# Patient Record
Sex: FEMALE | Race: WHITE | Employment: OTHER | URBAN - METROPOLITAN AREA
[De-identification: names, ages, dates, MRNs, and addresses within clinical notes are randomized per-mention and may not be internally consistent; named-entity substitution may affect disease eponyms.]

---

## 2017-01-05 DIAGNOSIS — F32.A MILD DEPRESSION: Primary | ICD-10-CM

## 2017-01-05 NOTE — TELEPHONE ENCOUNTER
Fluoxetine 20 mg     Last Written Prescription Date:11/17/2016  Last Fill Quantity:30 refills: zero  Last Office Visit with [facility] primary care provider:  11/29/2016       Last PHQ-9 score on record= 9  PHQ-9 SCORE 9/20/2016   Total Score -   Total Score 9       Thank you  Kanchan Montanez  Float Technician

## 2017-01-06 ENCOUNTER — THERAPY VISIT (OUTPATIENT)
Dept: PHYSICAL THERAPY | Facility: CLINIC | Age: 82
End: 2017-01-06
Payer: COMMERCIAL

## 2017-01-06 DIAGNOSIS — Z47.1 AFTERCARE FOLLOWING RIGHT HIP JOINT REPLACEMENT SURGERY: Primary | ICD-10-CM

## 2017-01-06 DIAGNOSIS — Z96.641 AFTERCARE FOLLOWING RIGHT HIP JOINT REPLACEMENT SURGERY: Primary | ICD-10-CM

## 2017-01-06 DIAGNOSIS — R26.9 ABNORMAL GAIT: ICD-10-CM

## 2017-01-06 DIAGNOSIS — M25.551 HIP PAIN, RIGHT: ICD-10-CM

## 2017-01-06 PROCEDURE — 97110 THERAPEUTIC EXERCISES: CPT | Mod: GP | Performed by: PHYSICAL THERAPIST

## 2017-01-06 PROCEDURE — 97112 NEUROMUSCULAR REEDUCATION: CPT | Mod: GP | Performed by: PHYSICAL THERAPIST

## 2017-01-12 ENCOUNTER — TRANSFERRED RECORDS (OUTPATIENT)
Dept: HEALTH INFORMATION MANAGEMENT | Facility: CLINIC | Age: 82
End: 2017-01-12

## 2017-01-13 ENCOUNTER — THERAPY VISIT (OUTPATIENT)
Dept: PHYSICAL THERAPY | Facility: CLINIC | Age: 82
End: 2017-01-13
Payer: COMMERCIAL

## 2017-01-13 DIAGNOSIS — R26.9 ABNORMAL GAIT: ICD-10-CM

## 2017-01-13 DIAGNOSIS — Z96.641 AFTERCARE FOLLOWING RIGHT HIP JOINT REPLACEMENT SURGERY: Primary | ICD-10-CM

## 2017-01-13 DIAGNOSIS — Z47.1 AFTERCARE FOLLOWING RIGHT HIP JOINT REPLACEMENT SURGERY: Primary | ICD-10-CM

## 2017-01-13 DIAGNOSIS — M25.551 HIP PAIN, RIGHT: ICD-10-CM

## 2017-01-13 PROCEDURE — 97110 THERAPEUTIC EXERCISES: CPT | Mod: GP | Performed by: PHYSICAL THERAPIST

## 2017-01-16 ENCOUNTER — HOSPITAL ENCOUNTER (OUTPATIENT)
Dept: MAMMOGRAPHY | Facility: CLINIC | Age: 82
Discharge: HOME | End: 2017-01-16
Attending: FAMILY MEDICINE | Admitting: FAMILY MEDICINE
Payer: COMMERCIAL

## 2017-01-16 DIAGNOSIS — Z12.31 VISIT FOR SCREENING MAMMOGRAM: ICD-10-CM

## 2017-01-16 PROCEDURE — G0202 SCR MAMMO BI INCL CAD: HCPCS | Mod: 52

## 2017-01-20 ENCOUNTER — THERAPY VISIT (OUTPATIENT)
Dept: PHYSICAL THERAPY | Facility: CLINIC | Age: 82
End: 2017-01-20
Payer: COMMERCIAL

## 2017-01-20 ENCOUNTER — OFFICE VISIT (OUTPATIENT)
Dept: FAMILY MEDICINE | Facility: CLINIC | Age: 82
End: 2017-01-20
Payer: COMMERCIAL

## 2017-01-20 VITALS
TEMPERATURE: 97.7 F | OXYGEN SATURATION: 96 % | WEIGHT: 270 LBS | BODY MASS INDEX: 47.84 KG/M2 | SYSTOLIC BLOOD PRESSURE: 140 MMHG | DIASTOLIC BLOOD PRESSURE: 70 MMHG | HEART RATE: 77 BPM

## 2017-01-20 DIAGNOSIS — F32.A MILD DEPRESSION: ICD-10-CM

## 2017-01-20 DIAGNOSIS — M25.551 HIP PAIN, RIGHT: ICD-10-CM

## 2017-01-20 DIAGNOSIS — R26.9 ABNORMAL GAIT: ICD-10-CM

## 2017-01-20 DIAGNOSIS — D62 ANEMIA DUE TO BLOOD LOSS, ACUTE: Primary | ICD-10-CM

## 2017-01-20 DIAGNOSIS — M85.80 OSTEOPENIA: ICD-10-CM

## 2017-01-20 DIAGNOSIS — I10 HYPERTENSION GOAL BP (BLOOD PRESSURE) < 140/90: ICD-10-CM

## 2017-01-20 DIAGNOSIS — Z47.1 AFTERCARE FOLLOWING RIGHT HIP JOINT REPLACEMENT SURGERY: Primary | ICD-10-CM

## 2017-01-20 DIAGNOSIS — Z96.641 AFTERCARE FOLLOWING RIGHT HIP JOINT REPLACEMENT SURGERY: Primary | ICD-10-CM

## 2017-01-20 LAB
ERYTHROCYTE [DISTWIDTH] IN BLOOD BY AUTOMATED COUNT: 13.7 % (ref 10–15)
HCT VFR BLD AUTO: 42.8 % (ref 35–47)
HGB BLD-MCNC: 13.3 G/DL (ref 11.7–15.7)
MCH RBC QN AUTO: 28.3 PG (ref 26.5–33)
MCHC RBC AUTO-ENTMCNC: 31.1 G/DL (ref 31.5–36.5)
MCV RBC AUTO: 91 FL (ref 78–100)
PLATELET # BLD AUTO: 302 10E9/L (ref 150–450)
RBC # BLD AUTO: 4.7 10E12/L (ref 3.8–5.2)
WBC # BLD AUTO: 7.9 10E9/L (ref 4–11)

## 2017-01-20 PROCEDURE — 97530 THERAPEUTIC ACTIVITIES: CPT | Mod: GP | Performed by: PHYSICAL THERAPIST

## 2017-01-20 PROCEDURE — 97110 THERAPEUTIC EXERCISES: CPT | Mod: GP | Performed by: PHYSICAL THERAPIST

## 2017-01-20 PROCEDURE — 99214 OFFICE O/P EST MOD 30 MIN: CPT | Performed by: FAMILY MEDICINE

## 2017-01-20 PROCEDURE — 85027 COMPLETE CBC AUTOMATED: CPT | Performed by: FAMILY MEDICINE

## 2017-01-20 PROCEDURE — 36415 COLL VENOUS BLD VENIPUNCTURE: CPT | Performed by: FAMILY MEDICINE

## 2017-01-20 NOTE — PROGRESS NOTES
SUBJECTIVE:                                                    Carol Merida is a 81 year old female who presents to clinic today for the following health issues:      Surgical follow up.         Patient Active Problem List   Diagnosis     Obesity     Hypothyroidism     Benign neoplasm of colon     Mild Depression [296.31]     Obstructive sleep apnea     * * * SBE PROPHYLAXIS * * *     Degeneration of lumbar or lumbosacral intervertebral disc     Pain in joint, ankle and foot     Arthrodesis status     HYPERLIPIDEMIA LDL GOAL <100     Hypertension goal BP (blood pressure) < 140/90     Health Care Home     Osteopenia     Malignant neoplasm of female breast (H)     Impaired fasting glucose     Advanced directives, counseling/discussion     History of melanoma     BMI 45.0-49.9, adult (H)     Panic disorder without agoraphobia     Humerus fracture     Arthritis     PONV (postoperative nausea and vomiting)     S/P total hip arthroplasty     Constipation     DVT prophylaxis     Anticoagulation management encounter     Physical deconditioning     Periprosthetic fracture around internal prosthetic left hip joint (H)     Pain of left thigh     Chronic pain syndrome - hips     Fracture of greater trochanter of right femur 11/6/2016, closed, with routine healing, subsequent encounter     Anxiety     Osteoarthritis of multiple joints, unspecified osteoarthritis type     Anemia due to blood loss, acute     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     Long term (current) use of anticoagulants     Aftercare following right hip joint replacement surgery     Hip pain, right     Abnormal gait       Current Outpatient Prescriptions   Medication Sig Dispense Refill     FLUoxetine (PROZAC) 20 MG capsule Take 1 capsule (20 mg) by mouth daily 30 capsule 2     ferrous sulfate (IRON) 325 (65 FE) MG tablet Take 1 tablet (325 mg) by mouth 2 times daily 60 tablet 2     ALPRAZolam (XANAX) 0.25 MG tablet Take 1 tablet (0.25 mg)  by mouth 2 times daily as needed for anxiety Don't take with the oxycodone 10 tablet 0     calcium-vitamin D (CALCIUM 600 + D) 600-400 MG-UNIT per tablet Take 1 tablet by mouth daily 100 tablet 3     Warfarin Therapy Reminder 1 each continuous prn Goal INR 1.5-2.5.       acetaminophen (TYLENOL) 325 MG tablet Take 3 tablets (975 mg) by mouth every 8 hours 500 tablet 1     levothyroxine (SYNTHROID) 88 MCG tablet Take 1 tablet (88 mcg) by mouth daily 90 tablet 3     albuterol (PROAIR HFA, PROVENTIL HFA, VENTOLIN HFA) 108 (90 BASE) MCG/ACT inhaler Inhale 2 puffs into the lungs as needed for shortness of breath / dyspnea or wheezing       simvastatin (ZOCOR) 10 MG tablet Take 1 tablet (10 mg) by mouth At Bedtime 90 tablet 3     fluticasone (FLONASE) 50 MCG/ACT nasal spray Spray 1 spray into both nostrils daily as needed   5     triamcinolone (KENALOG) 0.1 % cream Apply sparingly to affected area two times daily as needed for itching. 30 g 0     magnesium hydroxide (MILK OF MAGNESIA) 400 MG/5ML suspension Take 30 mLs by mouth daily as needed        celecoxib (CELEBREX) 100 MG capsule Take 1 capsule (100 mg) by mouth 2 times daily . May wean to one per day if doing well. 60 capsule 0     Nystatin (NYSTOP) 468604 UNIT/GM POWD Apply tid to affectead area prn 60 g 1           ROS:  CONSTITUTIONAL:NEGATIVE for fever, chills, change in weight  CV: NEGATIVE for chest pain, palpitations or peripheral edema  PSYCHIATRIC: NEGATIVE for changes in mood or affect    Still hurting top of left leg.  They think some of it is the back too.    The right leg is doing real well.      Much less of the restlessness in legs.    Did go off celebrex.  Walking will get to her regarding will cause her pain.    Has had one anxiety attack since here last.  Just came; was in the bathroom.  This was prior to the holidays; actually realized it was on Christmas morning.    Left arm bothers underarm. Comes and goes. Prosthesis irritates. Will get  rash.  Will swell.  She does not believe any LN were taken when she had her breast surgery.        OBJECTIVE:                                                    /70 mmHg  Pulse 77  Temp(Src) 97.7  F (36.5  C) (Oral)  Wt 270 lb (122.471 kg)  SpO2 96%  Body mass index is 47.84 kg/(m^2).     GENERAL APPEARANCE: alert and no distress  RESP: lungs clear to auscultation - no rales, rhonchi or wheezes  CV: regular rates and rhythm  MS: No lump was palpated in the left axillary region. At this time, I could not determine any obvious swelling. She does have some fatty tissue.  PSYCH: mentation appears normal and affect normal/bright      HEMOGLOBIN   Date Value Ref Range Status   11/29/2016 10.0* 11.7 - 15.7 g/dL Final     Comment:     Reviewed: OK with previous   11/17/2016 8.0* 11.7 - 15.7 g/dL Final     Comment:     Results confirmed by repeat test   ]    PHQ-9 SCORE 3/11/2016 5/11/2016 9/20/2016   Total Score - - -   Total Score 12 9 9       EDILSON-7 SCORE 3/11/2016 5/11/2016 9/20/2016   Total Score - - -   Total Score 8 8 11   Total Score - - -      This had been updated; I'm not sure where the scores have gone.    A1C      6.0   3/11/2016  A1C      6.1   2/9/2015  A1C      6.0   2/6/2014  A1C      5.9   2/7/2013  A1C      5.9   3/1/2011      TSH   Date Value Ref Range Status   11/17/2016 3.56 0.40 - 4.00 mU/L Final   ]         ASSESSMENT/PLAN:                                                      Anemia due to blood loss, acute   Suspect this was related to her surgery. We'll see if this has improved.  - CBC with platelets    Hypertension goal BP (blood pressure) < 140/90   Reading is borderline today. Recommend that we continue to monitor. Encourage getting some additional values.    Mild depression  Currently stable. Refilling.  She reports a panic attack. However, this was on a holiday and she notes being somewhat stressed as people were coming over etc. Continue to follow.  - FLUoxetine (PROZAC) 20 MG  capsule; Take 1 capsule (20 mg) by mouth daily    Osteopenia    - DX Hip/Pelvis/Spine; Future      Follow up prn or as previously directed.        Yessica Lora MD, MD  [facility]

## 2017-01-20 NOTE — MR AVS SNAPSHOT
After Visit Summary   1/20/2017    Carol Merida    MRN: [id number]           Patient Information     Date Of Birth          1935        Visit Information        Provider Department      1/20/2017 11:10 AM Yessica Lora MD [facility] [place]        Today's Diagnoses     Osteopenia    -  1     Anemia due to blood loss, acute         Hypertension goal BP (blood pressure) < 140/90         Mild depression            Follow-ups after your visit        Your next 10 appointments already scheduled     Jan 27, 2017 10:20 AM   CLAIRE Extremity with Jayne Espinoza PT   [facility] [place] [facility] ([facility] [place])    [address]  [place] [address]   [phone]            Feb 03, 2017 10:20 AM   CLAIRE Extremity with Jayne Espinoza PT   [facility] [place] [facility] ([facility] [place])    [address]  [place] [address]   [phone]              Future tests that were ordered for you today     Open Future Orders        Priority Expected Expires Ordered    DX Hip/Pelvis/Spine Routine  1/20/2018 1/20/2017            Who to contact     If you have questions or need follow up information about today's clinic visit or your schedule please contact [facility] directly at [phone].  Normal or non-critical lab and imaging results will be communicated to you by [employer], letter or phone within 4 business days after the clinic has received the results. If you do not hear from us within 7 days, please contact the clinic through [employer] or phone. If you have a critical or abnormal lab result, we will notify you by phone as soon as possible.  Submit refill requests through [employer] or call your pharmacy and they will forward the refill request to us. Please allow 3 business days for your refill to be completed.          Additional Information About Your Visit        [employer] Information     [employer] lets you send messages to your doctor, view your  "test results, renew your prescriptions, schedule appointments and more. To sign up, go to [email or web address] . Click on \"Log in\" on the left side of the screen, which will take you to the Welcome page. Then click on \"Sign up Now\" on the right side of the page.     You will be asked to enter the access code listed below, as well as some personal information. Please follow the directions to create your username and password.     Your access code is: [email or web address]  Expires: 2017 12:03 PM     Your access code will  in 90 days. If you need help or a new code, please call your [place] clinic or [phone].        Care EveryWhere ID     This is your Care EveryWhere ID. This could be used by other organizations to access your [place] medical records  [id number]        Your Vitals Were     Pulse Temperature Pulse Oximetry             77 97.7  F (36.5  C) (Oral) 96%          Blood Pressure from Last 3 Encounters:   17 140/70   16 122/82   16 106/62    Weight from Last 3 Encounters:   17 270 lb (122.471 kg)   16 271 lb 9.6 oz (123.197 kg)   16 277 lb (125.646 kg)              We Performed the Following     CBC with platelets          Where to get your medicines      These medications were sent to [facility] [place], [place] - [address]  [address] [facility] [address]     Phone:  [phone]    - FLUoxetine 20 MG capsule       Primary Care Provider Office Phone # Fax #    Yessica Lora [phone]       [facility] [address]  [facility] [address]        Thank you!     Thank you for choosing [facility]  for your care. Our goal is always to provide you with excellent care. Hearing back from our patients is one way we can continue to improve our services. Please take a few minutes to complete the written survey that you may receive in the mail after your visit with us. Thank you!      "        Your Updated Medication List - Protect others around you: Learn how to safely use, store and throw away your medicines at www.disposemymeds.org.          This list is accurate as of: 1/20/17 12:03 PM.  Always use your most recent med list.                   Brand Name Dispense Instructions for use    acetaminophen 325 MG tablet    TYLENOL    500 tablet    Take 3 tablets (975 mg) by mouth every 8 hours       albuterol 108 (90 BASE) MCG/ACT Inhaler    PROAIR HFA/PROVENTIL HFA/VENTOLIN HFA     Inhale 2 puffs into the lungs as needed for shortness of breath / dyspnea or wheezing       ALPRAZolam 0.25 MG tablet    XANAX    10 tablet    Take 1 tablet (0.25 mg) by mouth 2 times daily as needed for anxiety Don't take with the oxycodone       calcium 600 + D 600-400 MG-UNIT per tablet   Generic drug:  calcium-vitamin D     100 tablet    Take 1 tablet by mouth daily       celecoxib 100 MG capsule    celeBREX    60 capsule    Take 1 capsule (100 mg) by mouth 2 times daily . May wean to one per day if doing well.       ferrous sulfate 325 (65 FE) MG tablet    IRON    60 tablet    Take 1 tablet (325 mg) by mouth 2 times daily       FLUoxetine 20 MG capsule    PROZAC    90 capsule    Take 1 capsule (20 mg) by mouth daily       fluticasone 50 MCG/ACT spray    FLONASE     Spray 1 spray into both nostrils daily as needed       levothyroxine 88 MCG tablet    SYNTHROID    90 tablet    Take 1 tablet (88 mcg) by mouth daily       magnesium hydroxide 400 MG/5ML suspension    MILK OF MAGNESIA     Take 30 mLs by mouth daily as needed       NYSTOP 383806 UNIT/GM Powd powder     60 g    Apply tid to affectead area prn       simvastatin 10 MG tablet    ZOCOR    90 tablet    Take 1 tablet (10 mg) by mouth At Bedtime       triamcinolone 0.1 % cream    KENALOG    30 g    Apply sparingly to affected area two times daily as needed for itching.       Warfarin Therapy Reminder      1 each continuous prn Goal INR 1.5-2.5.

## 2017-01-20 NOTE — NURSING NOTE
"Chief Complaint   Patient presents with     Surgical Followup       Initial /70 mmHg  Pulse 77  Temp(Src) 97.7  F (36.5  C) (Oral)  Wt 270 lb (122.471 kg)  SpO2 96% Estimated body mass index is 47.84 kg/(m^2) as calculated from the following:    Height as of 11/29/16: 5' 3\" (1.6 m).    Weight as of this encounter: 270 lb (122.471 kg).  BP completed using cuff size: large.Hannah COTE MA      "

## 2017-01-24 ENCOUNTER — TELEPHONE (OUTPATIENT)
Dept: FAMILY MEDICINE | Facility: CLINIC | Age: 82
End: 2017-01-24

## 2017-01-24 NOTE — TELEPHONE ENCOUNTER
Patient returned call. States that she did complete a questionnaire today and left it on the desk. States she does not have time to complete now. States she has an appointment for PT on Friday and would complete at that time.   Spring Garcia RN

## 2017-01-24 NOTE — TELEPHONE ENCOUNTER
Left message with patient  to call back to complete questionnaires.  Rossy Perez, RN  Triage Nurse

## 2017-01-24 NOTE — TELEPHONE ENCOUNTER
Please contact her to do a PHQ 9 and EDILSON 7. I am quite sure we did them, but it is not in the chart.    Apologize for the inconvenience.    Thank you!!

## 2017-01-27 ENCOUNTER — TELEPHONE (OUTPATIENT)
Dept: PHYSICAL THERAPY | Facility: CLINIC | Age: 82
End: 2017-01-27

## 2017-01-27 ENCOUNTER — THERAPY VISIT (OUTPATIENT)
Dept: PHYSICAL THERAPY | Facility: CLINIC | Age: 82
End: 2017-01-27
Payer: COMMERCIAL

## 2017-01-27 DIAGNOSIS — Z47.1 AFTERCARE FOLLOWING RIGHT HIP JOINT REPLACEMENT SURGERY: Primary | ICD-10-CM

## 2017-01-27 DIAGNOSIS — Z96.641 AFTERCARE FOLLOWING RIGHT HIP JOINT REPLACEMENT SURGERY: Primary | ICD-10-CM

## 2017-01-27 DIAGNOSIS — M25.551 HIP PAIN, RIGHT: ICD-10-CM

## 2017-01-27 DIAGNOSIS — R26.9 ABNORMAL GAIT: ICD-10-CM

## 2017-01-27 PROCEDURE — 97110 THERAPEUTIC EXERCISES: CPT | Mod: GP | Performed by: PHYSICAL THERAPIST

## 2017-01-27 PROCEDURE — 97535 SELF CARE MNGMENT TRAINING: CPT | Mod: GP | Performed by: PHYSICAL THERAPIST

## 2017-01-27 ASSESSMENT — ANXIETY QUESTIONNAIRES
7. FEELING AFRAID AS IF SOMETHING AWFUL MIGHT HAPPEN: SEVERAL DAYS
3. WORRYING TOO MUCH ABOUT DIFFERENT THINGS: SEVERAL DAYS
1. FEELING NERVOUS, ANXIOUS, OR ON EDGE: SEVERAL DAYS
5. BEING SO RESTLESS THAT IT IS HARD TO SIT STILL: SEVERAL DAYS
2. NOT BEING ABLE TO STOP OR CONTROL WORRYING: SEVERAL DAYS
GAD7 TOTAL SCORE: 7
6. BECOMING EASILY ANNOYED OR IRRITABLE: SEVERAL DAYS

## 2017-01-27 ASSESSMENT — PATIENT HEALTH QUESTIONNAIRE - PHQ9: 5. POOR APPETITE OR OVEREATING: SEVERAL DAYS

## 2017-01-27 NOTE — TELEPHONE ENCOUNTER
Called MD office and left VM stating that I was sending patient back to MD to have lumbar spine evaluated due to the nature of the L leg pain and that fact that PT has not had desired effect on leg, feel that the pain is radicular in nature.  Said the patient would be following up with a phone call also to find out if she should return to Dr. Giordano to evaluate the L leg or if she should go directly to spine MD.  Given contact info to reach me if needed

## 2017-01-27 NOTE — TELEPHONE ENCOUNTER
PHQ-9 SCORE 5/11/2016 9/20/2016 1/27/2017   Total Score - - -   Total Score 9 9 6       EDILSON-7 SCORE 5/11/2016 9/20/2016 1/27/2017   Total Score - - -   Total Score 8 11 7   Total Score - - -

## 2017-01-27 NOTE — TELEPHONE ENCOUNTER
Pt stopped in clinic to fill out PHq-9 and EDILSON questionnaire.  Answers entered into chart.    Maria Antonia Mendez, CMA

## 2017-01-27 NOTE — PROGRESS NOTES
Subjective:    HPI                    Objective:    System    Physical Exam    General     ROS    Assessment/Plan:      PROGRESS  REPORT    Progress reporting period is from 12/5/16 to 1/27/17.       SUBJECTIVE  Subjective changes noted by patient:  repots that she was sore  in her back and L thigh after last appt for 2 days, felt like it was getting better as the week has gone on, back is not painful w/ walking but hurts when standing still, even hurts when sitting. R hip is doing well and feels that that is progressing, frustrated by lack of progress w/ L leg pain    Current pain level is 8/10 in L thigh, <2/10 in R leg  .     Previous pain level was  4/10  .   Changes in function:  Yes (See Goal flowsheet attached for changes in current functional level)  Adverse reaction to treatment or activity: activity - standing, walking, exercising, stretching    OBJECTIVE  Changes noted in objective findings:  Yes,   Objective: L quad 4/5, L hip ER 4/5, IR 4+/5, R hip ER 4-/5, IR 4/5, hams 4+/5, abd 4-/5 B,trendelemberg gait B      ASSESSMENT/PLAN  Updated problem list and treatment plan: Diagnosis 1:  S/p R HUGO  Decreased ROM/flexibility - manual therapy, therapeutic exercise and home program  Decreased strength - therapeutic exercise, therapeutic activities and home program  Impaired gait - gait training and home program  Impaired muscle performance - neuro re-education and home program  Decreased function - therapeutic activities and home program  Diagnosis 2:  L thigh pain   Pain -  manual therapy, education, directional preference exercise and home program  Decreased strength - therapeutic exercise, therapeutic activities and home program  Impaired muscle performance - neuro re-education and home program  Decreased function - therapeutic activities and home program  STG/LTGs have been met or progress has been made towards goals:  Yes (See Goal flow sheet completed today.) for R hip, NO for L thigh pain  Assessment of  Progress: The patient's condition is improving on R hip.  The patient's condition has exacerbated in L thigh and low back.  Self Management Plans:  Patient has been instructed in a home treatment program.  Patient  has been instructed in self management of symptoms.  I have re-evaluated this patient and find that the nature, scope, duration and intensity of the therapy is appropriate for the medical condition of the patient.  Carol continues to require the following intervention to meet STG and LTG's:  PT    Recommendations:  This patient would benefit from further evaluation for L thigh pain, feel that this pain is radicular in nature and is related to ongoing LBP.    Please refer to the daily flowsheet for treatment today, total treatment time and time spent performing 1:1 timed codes.

## 2017-01-27 NOTE — Clinical Note
[facility]  [address] [place] [address]  [phone]    2017    Re: Carol Merida   :   1935  MRN:  [id number]   REFERRING PHYSICIAN:   Junior Giordano    [facility]  Date of Initial Evaluation:  2016  Visits:  Rxs Used: 7  Reason for Referral:     Aftercare following right hip joint replacement surgery  Hip pain, right, Abnormal gait    PROGRESS  REPORT  Progress reporting period is from 16 to 17.       SUBJECTIVE  Subjective changes noted by patient:  repots that she was sore  in her back and L thigh after last appt for 2 days, felt like it was getting better as the week has gone on, back is not painful w/ walking but hurts when standing still, even hurts when sitting. R hip is doing well and feels that that is progressing, frustrated by lack of progress w/ L leg pain    Current pain level is 8/10 in L thigh, <2/10 in R leg  .     Previous pain level was  4/10  .   Changes in function:  Yes (See Goal flowsheet attached for changes in current functional level)  Adverse reaction to treatment or activity: activity - standing, walking, exercising, stretching    OBJECTIVE  Changes noted in objective findings:  Yes,   Objective: L quad 4/5, L hip ER 4/5, IR 4+/5, R hip ER 4-/5, IR 4/5, hams 4+/5, abd 4-/5 B,trendelemberg gait B      ASSESSMENT/PLAN  Updated problem list and treatment plan: Diagnosis 1:  S/p R HUGO  Decreased ROM/flexibility - manual therapy, therapeutic exercise and home program  Decreased strength - therapeutic exercise, therapeutic activities and home program  Impaired gait - gait training and home program  Impaired muscle performance - neuro re-education and home program  Decreased function - therapeutic activities and home program  Diagnosis 2:  L thigh pain   Pain -  manual therapy, education, directional preference exercise and home program  Decreased strength - therapeutic  exercise, therapeutic activities and home program  Impaired muscle performance - neuro re-education and home program  Decreased function - therapeutic activities and home program    Re: Carol Merida   :   1935      STG/LTGs have been met or progress has been made towards goals:  Yes (See Goal flow sheet completed today.) for R hip, NO for L thigh pain  Assessment of Progress: The patient's condition is improving on R hip.  The patient's condition has exacerbated in L thigh and low back.  Self Management Plans:  Patient has been instructed in a home treatment program.  Patient  has been instructed in self management of symptoms.  I have re-evaluated this patient and find that the nature, scope, duration and intensity of the therapy is appropriate for the medical condition of the patient.  Carol continues to require the following intervention to meet STG and LTG's:  PT    Recommendations:  This patient would benefit from further evaluation for L thigh pain, feel that this pain is radicular in nature and is related to ongoing LBP.              Thank you for your referral.    INQUIRIES  Therapist: Jayne Espinoza PT   [facility]  [address] [address]  Phone: [phone]  Fax: [phone]

## 2017-01-28 ASSESSMENT — PATIENT HEALTH QUESTIONNAIRE - PHQ9: SUM OF ALL RESPONSES TO PHQ QUESTIONS 1-9: 6

## 2017-01-28 ASSESSMENT — ANXIETY QUESTIONNAIRES: GAD7 TOTAL SCORE: 7

## 2017-02-06 ENCOUNTER — TRANSFERRED RECORDS (OUTPATIENT)
Dept: HEALTH INFORMATION MANAGEMENT | Facility: CLINIC | Age: 82
End: 2017-02-06

## 2017-02-07 ENCOUNTER — DOCUMENTATION ONLY (OUTPATIENT)
Dept: OTHER | Facility: CLINIC | Age: 82
End: 2017-02-07

## 2017-02-07 DIAGNOSIS — Z71.89 ACP (ADVANCE CARE PLANNING): Primary | Chronic | ICD-10-CM

## 2017-03-22 DIAGNOSIS — E78.5 HYPERLIPIDEMIA LDL GOAL <100: ICD-10-CM

## 2017-03-22 NOTE — TELEPHONE ENCOUNTER
SIMVASTATIN 10 MG TABLETS     Last Written Prescription Date: 12/23/2016  Last Fill Quantity: 90, # refills: 0  Last Office Visit with [facility], [facility] or [facility] prescribing provider: 1/20/2017 RAIMUNDO       Lab Results   Component Value Date    CHOL 135 03/11/2016     Lab Results   Component Value Date    HDL 69 03/11/2016     Lab Results   Component Value Date    LDL 41 03/11/2016     Lab Results   Component Value Date    TRIG 127 03/11/2016     Lab Results   Component Value Date    CHOLHDLRATIO 2.0 02/09/2015

## 2017-03-23 RX ORDER — SIMVASTATIN 10 MG
10 TABLET ORAL AT BEDTIME
Qty: 90 TABLET | Refills: 0 | Status: SHIPPED | OUTPATIENT
Start: 2017-03-23 | End: 2017-06-22

## 2017-03-23 NOTE — TELEPHONE ENCOUNTER
Medication is being filled for 1 time refill only due to:  Due for yearly fasting cholesterol test.   Orders are placed. Please call patient to schedule lab visit.  Rossy Perez RN  Triage Nurse

## 2017-03-24 NOTE — TELEPHONE ENCOUNTER
Spoke with patient to schedule appointment. She states that she will call back to schedule appointment in the next couple of weeks.    -Noy Ohara

## 2017-04-28 DIAGNOSIS — E78.5 HYPERLIPIDEMIA LDL GOAL <100: ICD-10-CM

## 2017-04-28 DIAGNOSIS — M85.80 OSTEOPENIA: ICD-10-CM

## 2017-04-28 PROCEDURE — 80061 LIPID PANEL: CPT | Performed by: FAMILY MEDICINE

## 2017-04-28 PROCEDURE — 36415 COLL VENOUS BLD VENIPUNCTURE: CPT | Performed by: FAMILY MEDICINE

## 2017-04-28 PROCEDURE — 82306 VITAMIN D 25 HYDROXY: CPT | Performed by: FAMILY MEDICINE

## 2017-04-28 PROCEDURE — 80053 COMPREHEN METABOLIC PANEL: CPT | Performed by: FAMILY MEDICINE

## 2017-04-29 LAB
ALBUMIN SERPL-MCNC: 4.1 G/DL (ref 3.4–5)
ALP SERPL-CCNC: 99 U/L (ref 40–150)
ALT SERPL W P-5'-P-CCNC: 21 U/L (ref 0–50)
ANION GAP SERPL CALCULATED.3IONS-SCNC: 14 MMOL/L (ref 3–14)
AST SERPL W P-5'-P-CCNC: 18 U/L (ref 0–45)
BILIRUB SERPL-MCNC: 1 MG/DL (ref 0.2–1.3)
BUN SERPL-MCNC: 12 MG/DL (ref 7–30)
CALCIUM SERPL-MCNC: 8.9 MG/DL (ref 8.5–10.1)
CHLORIDE SERPL-SCNC: 105 MMOL/L (ref 94–109)
CHOLEST SERPL-MCNC: 135 MG/DL
CO2 SERPL-SCNC: 23 MMOL/L (ref 20–32)
CREAT SERPL-MCNC: 0.76 MG/DL (ref 0.52–1.04)
DEPRECATED CALCIDIOL+CALCIFEROL SERPL-MC: 11 UG/L (ref 20–75)
GFR SERPL CREATININE-BSD FRML MDRD: 73 ML/MIN/1.7M2
GLUCOSE SERPL-MCNC: 111 MG/DL (ref 70–99)
HDLC SERPL-MCNC: 65 MG/DL
LDLC SERPL CALC-MCNC: 40 MG/DL
NONHDLC SERPL-MCNC: 70 MG/DL
POTASSIUM SERPL-SCNC: 4.3 MMOL/L (ref 3.4–5.3)
PROT SERPL-MCNC: 7.7 G/DL (ref 6.8–8.8)
SODIUM SERPL-SCNC: 142 MMOL/L (ref 133–144)
TRIGL SERPL-MCNC: 148 MG/DL

## 2017-05-04 PROBLEM — E55.9 VITAMIN D DEFICIENCY: Status: ACTIVE | Noted: 2017-05-04

## 2017-06-02 DIAGNOSIS — E03.9 HYPOTHYROIDISM: ICD-10-CM

## 2017-06-02 RX ORDER — LEVOTHYROXINE SODIUM 88 UG/1
TABLET ORAL
Qty: 90 TABLET | Refills: 0 | Status: SHIPPED | OUTPATIENT
Start: 2017-06-02 | End: 2017-08-31

## 2017-06-02 NOTE — TELEPHONE ENCOUNTER
levothyroxine (SYNTHROID) 88 MCG     Last Written Prescription Date: 3/16/16  Last Quantity: 90, # refills: 3  Last Office Visit with G, P or [facility] prescribing provider: 1/20/17        TSH   Date Value Ref Range Status   11/17/2016 3.56 0.40 - 4.00 mU/L Final

## 2017-06-02 NOTE — TELEPHONE ENCOUNTER
Prescription approved per [facility] Refill Protocol.  Shikha Schumacher RN

## 2017-06-22 DIAGNOSIS — E78.5 HYPERLIPIDEMIA LDL GOAL <100: ICD-10-CM

## 2017-06-22 NOTE — TELEPHONE ENCOUNTER
simvastatin (ZOCOR) 10 MG     Last Written Prescription Date: 3/23/17  Last Fill Quantity: 90, # refills: 0  Last Office Visit with G, P or [facility] prescribing provider: 1/20/17       Lab Results   Component Value Date    CHOL 135 04/28/2017     Lab Results   Component Value Date    HDL 65 04/28/2017     Lab Results   Component Value Date    LDL 40 04/28/2017     Lab Results   Component Value Date    TRIG 148 04/28/2017     Lab Results   Component Value Date    CHOLHDLRATIO 2.0 02/09/2015

## 2017-06-27 RX ORDER — SIMVASTATIN 10 MG
TABLET ORAL
Qty: 90 TABLET | Refills: 2 | Status: SHIPPED | OUTPATIENT
Start: 2017-06-27 | End: 2018-04-05

## 2017-06-27 NOTE — TELEPHONE ENCOUNTER
Prescription approved per [facility] Refill Protocol.  Rossy Perez, RN  Triage Nurse

## 2017-07-27 ENCOUNTER — TRANSFERRED RECORDS (OUTPATIENT)
Dept: HEALTH INFORMATION MANAGEMENT | Facility: CLINIC | Age: 82
End: 2017-07-27

## 2017-08-07 DIAGNOSIS — F32.A MILD DEPRESSION: ICD-10-CM

## 2017-08-07 NOTE — TELEPHONE ENCOUNTER
FLUoxetine (PROZAC) 20 MG     Last Written Prescription Date: 1/20/17  Last Fill Quantity: 90, # refills: 1  Last Office Visit with [facility] primary care provider:  1/20/17   Next 5 appointments (look out 90 days)     Aug 15, 2017  3:10 PM CDT   [facility]    [id number] [place] [address] [phone]                   Last PHQ-9 score on record=   PHQ-9 SCORE 1/27/2017   Total Score -   Total Score 6

## 2017-08-08 NOTE — TELEPHONE ENCOUNTER
Patient due for PHQ9, but has upcoming appointment scheduled. Prescription refilled x 1 per [facility] Refill Protocol.

## 2017-08-15 ENCOUNTER — OFFICE VISIT (OUTPATIENT)
Dept: FAMILY MEDICINE | Facility: CLINIC | Age: 82
End: 2017-08-15
Payer: COMMERCIAL

## 2017-08-15 VITALS
HEART RATE: 87 BPM | SYSTOLIC BLOOD PRESSURE: 130 MMHG | RESPIRATION RATE: 16 BRPM | BODY MASS INDEX: 49.49 KG/M2 | WEIGHT: 279.3 LBS | OXYGEN SATURATION: 96 % | DIASTOLIC BLOOD PRESSURE: 82 MMHG | HEIGHT: 63 IN | TEMPERATURE: 98.2 F

## 2017-08-15 DIAGNOSIS — I10 HYPERTENSION GOAL BP (BLOOD PRESSURE) < 140/90: ICD-10-CM

## 2017-08-15 DIAGNOSIS — E03.9 HYPOTHYROIDISM, UNSPECIFIED TYPE: ICD-10-CM

## 2017-08-15 DIAGNOSIS — K59.00 CONSTIPATION, UNSPECIFIED CONSTIPATION TYPE: ICD-10-CM

## 2017-08-15 DIAGNOSIS — F32.A MILD DEPRESSION: Primary | ICD-10-CM

## 2017-08-15 DIAGNOSIS — E78.5 HYPERLIPIDEMIA LDL GOAL <100: ICD-10-CM

## 2017-08-15 DIAGNOSIS — R73.01 IMPAIRED FASTING GLUCOSE: ICD-10-CM

## 2017-08-15 DIAGNOSIS — E55.9 VITAMIN D DEFICIENCY: ICD-10-CM

## 2017-08-15 DIAGNOSIS — E66.01 MORBID OBESITY, UNSPECIFIED OBESITY TYPE (H): ICD-10-CM

## 2017-08-15 DIAGNOSIS — F41.9 ANXIETY: ICD-10-CM

## 2017-08-15 DIAGNOSIS — M25.552 HIP PAIN, LEFT: ICD-10-CM

## 2017-08-15 LAB — HBA1C MFR BLD: 5.9 % (ref 4.3–6)

## 2017-08-15 PROCEDURE — 99214 OFFICE O/P EST MOD 30 MIN: CPT | Performed by: FAMILY MEDICINE

## 2017-08-15 PROCEDURE — 83036 HEMOGLOBIN GLYCOSYLATED A1C: CPT | Performed by: FAMILY MEDICINE

## 2017-08-15 PROCEDURE — 36415 COLL VENOUS BLD VENIPUNCTURE: CPT | Performed by: FAMILY MEDICINE

## 2017-08-15 PROCEDURE — 82306 VITAMIN D 25 HYDROXY: CPT | Performed by: FAMILY MEDICINE

## 2017-08-15 RX ORDER — FLUOXETINE 40 MG/1
40 CAPSULE ORAL DAILY
Qty: 90 CAPSULE | Refills: 0 | Status: SHIPPED | OUTPATIENT
Start: 2017-08-15 | End: 2017-11-24

## 2017-08-15 RX ORDER — GABAPENTIN 300 MG/1
300 CAPSULE ORAL 3 TIMES DAILY
COMMUNITY
End: 2017-11-08

## 2017-08-15 ASSESSMENT — PATIENT HEALTH QUESTIONNAIRE - PHQ9
SUM OF ALL RESPONSES TO PHQ QUESTIONS 1-9: 12
5. POOR APPETITE OR OVEREATING: SEVERAL DAYS

## 2017-08-15 ASSESSMENT — ANXIETY QUESTIONNAIRES
GAD7 TOTAL SCORE: 12
3. WORRYING TOO MUCH ABOUT DIFFERENT THINGS: NEARLY EVERY DAY
5. BEING SO RESTLESS THAT IT IS HARD TO SIT STILL: MORE THAN HALF THE DAYS
2. NOT BEING ABLE TO STOP OR CONTROL WORRYING: MORE THAN HALF THE DAYS
7. FEELING AFRAID AS IF SOMETHING AWFUL MIGHT HAPPEN: SEVERAL DAYS
IF YOU CHECKED OFF ANY PROBLEMS ON THIS QUESTIONNAIRE, HOW DIFFICULT HAVE THESE PROBLEMS MADE IT FOR YOU TO DO YOUR WORK, TAKE CARE OF THINGS AT HOME, OR GET ALONG WITH OTHER PEOPLE: SOMEWHAT DIFFICULT
1. FEELING NERVOUS, ANXIOUS, OR ON EDGE: SEVERAL DAYS
6. BECOMING EASILY ANNOYED OR IRRITABLE: MORE THAN HALF THE DAYS

## 2017-08-15 NOTE — LETTER
August 22, 2017      Carol Merida  [address] [facility] [address]        Dear Ms. Carol Merida,    Enclosed is a copy of your recent results    The Hgb A1C is a reflection of your glucose control over the last 3 months.  In people who have diabetes, we like to see this under 7.0. We do consider someone to have diabetes if this is 6.5 or higher.     Your vitamin D level looks good. I would recommend continuing on at least your current level of intake.     Have a great rest of the Summer!                         Sincerely,     Yessica Lora MD

## 2017-08-15 NOTE — NURSING NOTE
"Chief Complaint   Patient presents with     Depression     medication     Constipation     Mass     lump on the back of the head       Initial /82 (BP Location: Right arm, Cuff Size: Adult Large)  Pulse 87  Temp 98.2  F (36.8  C) (Oral)  Resp 16  Ht 5' 3\" (1.6 m)  Wt 279 lb 4.8 oz (126.7 kg)  SpO2 96%  BMI 49.48 kg/m2 Estimated body mass index is 49.48 kg/(m^2) as calculated from the following:    Height as of this encounter: 5' 3\" (1.6 m).    Weight as of this encounter: 279 lb 4.8 oz (126.7 kg).  Medication Reconciliation: complete   Maria Antonia Mendez, MERY      "

## 2017-08-15 NOTE — MR AVS SNAPSHOT
"              After Visit Summary   8/15/2017    Carol Merida    MRN: [id number]           Patient Information     Date Of Birth          1935        Visit Information        Provider Department      8/15/2017 3:10 PM Yessica Lora MD [facility]        Today's Diagnoses     Mild Depression [296.31]    -  1    Hypertension goal BP (blood pressure) < 140/90        Hip pain, left        Vitamin D deficiency        Mild depression (H)        Impaired fasting glucose        Hypothyroidism, unspecified type          Care Instructions    I would like to see you in early November; earlier if needed.  We will get some blood, but you do not need to fast.    If your HgbA1C is real high, I may invite you back further.                Follow-ups after your visit        Future tests that were ordered for you today     Open Future Orders        Priority Expected Expires Ordered    TSH with free T4 reflex Routine  10/10/2017 8/15/2017            Who to contact     If you have questions or need follow up information about today's clinic visit or your schedule please contact [facility] directly at [phone].  Normal or non-critical lab and imaging results will be communicated to you by [employer], letter or phone within 4 business days after the clinic has received the results. If you do not hear from us within 7 days, please contact the clinic through [employer] or phone. If you have a critical or abnormal lab result, we will notify you by phone as soon as possible.  Submit refill requests through [employer] or call your pharmacy and they will forward the refill request to us. Please allow 3 business days for your refill to be completed.          Additional Information About Your Visit        [employer] Information     SideStep lets you send messages to your doctor, view your test results, renew your prescriptions, schedule appointments and more. To sign up, go to [email or web address] . Click on \"Log " "in\" on the left side of the screen, which will take you to the Welcome page. Then click on \"Sign up Now\" on the right side of the page.     You will be asked to enter the access code listed below, as well as some personal information. Please follow the directions to create your username and password.     Your access code is: [email or web address]  Expires: 2017  4:01 PM     Your access code will  in 90 days. If you need help or a new code, please call your [facility] clinic or [phone].        Care EveryWhere ID     This is your Care EveryWhere ID. This could be used by other organizations to access your [facility] medical records  [id number]        Your Vitals Were     Pulse Temperature Respirations Height Pulse Oximetry BMI (Body Mass Index)    87 98.2  F (36.8  C) (Oral) 16 5' 3\" (1.6 m) 96% 49.48 kg/m2       Blood Pressure from Last 3 Encounters:   08/15/17 130/82   17 140/70   16 122/82    Weight from Last 3 Encounters:   08/15/17 279 lb 4.8 oz (126.7 kg)   17 270 lb (122.5 kg)   16 271 lb 9.6 oz (123.2 kg)              We Performed the Following     DEPRESSION ACTION PLAN (DAP)     Hemoglobin A1c     PAF COMPLETED     Vitamin D Deficiency          Today's Medication Changes          These changes are accurate as of: 8/15/17  4:01 PM.  If you have any questions, ask your nurse or doctor.               Start taking these medicines.        Dose/Directions    FLUoxetine 40 MG capsule   Commonly known as:  PROzac   Used for:  Mild depression (H)   Started by:  Yessica Lora MD        Dose:  40 mg   Take 1 capsule (40 mg) by mouth daily   Quantity:  90 capsule   Refills:  0            Where to get your medicines      These medications were sent to [facility] - [address]  [address] [address]     Phone:  [phone]     FLUoxetine 40 MG capsule                Primary Care Provider Office Phone # Fax #    Yessica Lora MD " [phone]       [address]  [facility] [address]        Equal Access to Services     KHUSHI KENNY : Hadii eduardo recio annmarie Hartmann, wabravoda luqadaha, qaybta kaalmada zara, marc hartwilliam eusebio. So [facility] [phone].    ATENCIÓN: Si habla español, tiene a johns disposición servicios gratuitos de asistencia lingüística. Llame al [phone].    We comply with applicable federal civil rights laws and [place] laws. We do not discriminate on the basis of race, color, national origin, age, disability sex, sexual orientation or gender identity.            Thank you!     Thank you for choosing [facility]  for your care. Our goal is always to provide you with excellent care. Hearing back from our patients is one way we can continue to improve our services. Please take a few minutes to complete the written survey that you may receive in the mail after your visit with us. Thank you!             Your Updated Medication List - Protect others around you: Learn how to safely use, store and throw away your medicines at www.disposemymeds.org.          This list is accurate as of: 8/15/17  4:01 PM.  Always use your most recent med list.                   Brand Name Dispense Instructions for use Diagnosis    acetaminophen 325 MG tablet    TYLENOL    500 tablet    Take 3 tablets (975 mg) by mouth every 8 hours    Status post total replacement of right hip       albuterol 108 (90 BASE) MCG/ACT Inhaler    PROAIR HFA/PROVENTIL HFA/VENTOLIN HFA     Inhale 2 puffs into the lungs as needed for shortness of breath / dyspnea or wheezing        ALPRAZolam 0.25 MG tablet    XANAX    10 tablet    Take 1 tablet (0.25 mg) by mouth 2 times daily as needed for anxiety Don't take with the oxycodone    Anxiety       calcium 600 + D 600-400 MG-UNIT per tablet   Generic drug:  calcium-vitamin D     100 tablet    Take 1 tablet by mouth daily    Osteopenia       ferrous sulfate 325 (65 FE) MG  tablet    IRON    60 tablet    Take 1 tablet (325 mg) by mouth 2 times daily    Anemia due to blood loss, acute       FLUoxetine 40 MG capsule    PROzac    90 capsule    Take 1 capsule (40 mg) by mouth daily    Mild depression (H)       fluticasone 50 MCG/ACT spray    FLONASE     Spray 1 spray into both nostrils daily as needed        gabapentin 300 MG capsule    NEURONTIN     Take 300 mg by mouth 3 times daily        levothyroxine 88 MCG tablet    SYNTHROID/LEVOTHROID    90 tablet    TAKE 1 TABLET(88 MCG) BY MOUTH DAILY    Hypothyroidism       magnesium hydroxide 400 MG/5ML suspension    MILK OF MAGNESIA     Take 30 mLs by mouth daily as needed        NYSTOP 781238 UNIT/GM Powd   Generic drug:  nystatin     60 g    Apply tid to affectead area prn    Intertrigo       simvastatin 10 MG tablet    ZOCOR    90 tablet    TAKE 1 TABLET BY MOUTH AT BEDTIME    Hyperlipidemia LDL goal <100       triamcinolone 0.1 % cream    KENALOG    30 g    Apply sparingly to affected area two times daily as needed for itching.    Atopic dermatitis

## 2017-08-15 NOTE — LETTER
My Depression Action Plan  Name: Carol Merida   Date of Birth 1935  Date: 8/15/2017    My doctor: Yessica Lora   My clinic: [facility]  [address] [place] [address] [phone]          GREEN    ZONE   Good Control    What it looks like:     Things are going generally well. You have normal up s and down s. You may even feel depressed from time to time, but bad moods usually last less than a day.   What you need to do:  1. Continue to care for yourself (see self care plan)  2. Check your depression survival kit and update it as needed  3. Follow your physician s recommendations including any medication.  4. Do not stop taking medication unless you consult with your physician first.           YELLOW         ZONE Getting Worse    What it looks like:     Depression is starting to interfere with your life.     It may be hard to get out of bed; you may be starting to isolate yourself from others.    Symptoms of depression are starting to last most all day and this has happened for several days.     You may have suicidal thoughts but they are not constant.   What you need to do:     1. Call your care team, your response to treatment will improve if you keep your care team informed of your progress. Yellow periods are signs an adjustment may need to be made.     2. Continue your self-care, even if you have to fake it!    3. Talk to someone in your support network    4. Open up your depression survival kit           RED    ZONE Medical Alert - Get Help    What it looks like:     Depression is seriously interfering with your life.     You may experience these or other symptoms: You can t get out of bed most days, can t work or engage in other necessary activities, you have trouble taking care of basic hygiene, or basic responsibilities, thoughts of suicide or death that will not go away, self-injurious behavior.     What you need to do:  1. Call your care team and  request a same-day appointment. If they are not available (weekends or after hours) call your local crisis line, emergency room or 911.      Electronically signed by: Maria Antonia Mendez, August 15, 2017    Depression Self Care Plan / Survival Kit    Self-Care for Depression  Here s the deal. Your body and mind are really not as separate as most people think.  What you do and think affects how you feel and how you feel influences what you do and think. This means if you do things that people who feel good do, it will help you feel better.  Sometimes this is all it takes.  There is also a place for medication and therapy depending on how severe your depression is, so be sure to consult with your medical provider and/ or Behavioral Health Consultant if your symptoms are worsening or not improving.     In order to better manage my stress, I will:    Exercise  Get some form of exercise, every day. This will help reduce pain and release endorphins, the  feel good  chemicals in your brain. This is almost as good as taking antidepressants!  This is not the same as joining a gym and then never going! (they count on that by the way ) It can be as simple as just going for a walk or doing some gardening, anything that will get you moving.      Hygiene   Maintain good hygiene (Get out of bed in the morning, Make your bed, Brush your teeth, Take a shower, and Get dressed like you were going to work, even if you are unemployed).  If your clothes don't fit try to get ones that do.    Diet  I will strive to eat foods that are good for me, drink plenty of water, and avoid excessive sugar, caffeine, alcohol, and other mood-altering substances.  Some foods that are helpful in depression are: complex carbohydrates, B vitamins, flaxseed, fish or fish oil, fresh fruits and vegetables.    Psychotherapy  I agree to participate in Individual Therapy (if recommended).    Medication  If prescribed medications, I agree to take them.  Missing  doses can result in serious side effects.  I understand that drinking alcohol, or other illicit drug use, may cause potential side effects.  I will not stop my medication abruptly without first discussing it with my provider.    Staying Connected With Others  I will stay in touch with my friends, family members, and my primary care provider/team.    Use your imagination  Be creative.  We all have a creative side; it doesn t matter if it s oil painting, sand castles, or mud pies! This will also kick up the endorphins.    Witness Beauty  (AKA stop and smell the roses) Take a look outside, even in mid-winter. Notice colors, textures. Watch the squirrels and birds.     Service to others  Be of service to others.  There is always someone else in need.  By helping others we can  get out of ourselves  and remember the really important things.  This also provides opportunities for practicing all the other parts of the program.    Humor  Laugh and be silly!  Adjust your TV habits for less news and crime-drama and more comedy.    Control your stress  Try breathing deep, massage therapy, biofeedback, and meditation. Find time to relax each day.     My support system    Clinic Contact:  Phone number:    Contact 1:  Phone number:    Contact 2:  Phone number:    Pentecostal/:  Phone number:    Therapist:  Phone number:    Local crisis center:    Phone number:    Other community support:  Phone number:

## 2017-08-15 NOTE — PATIENT INSTRUCTIONS
I would like to see you in early November; earlier if needed.  We will get some blood, but you do not need to fast.    If your HgbA1C is real high, I may invite you back further.

## 2017-08-15 NOTE — PROGRESS NOTES
SUBJECTIVE:                                                    Carol Merida is a 82 year old female who presents to clinic today for the following health issues:      Depression and Anxiety Follow-Up    Status since last visit: No change    Other associated symptoms:None    Complicating factors:     Significant life event: No     Current substance abuse: None    PHQ-9 SCORE 5/11/2016 9/20/2016 1/27/2017   Total Score - - -   Total Score 9 9 6     EDILSON-7 SCORE 5/11/2016 9/20/2016 1/27/2017   Total Score - - -   Total Score 8 11 7   Total Score - - -       PHQ-9  English  PHQ-9   Any Language  GAD7      Amount of exercise or physical activity: None    Problems taking medications regularly: No    Medication side effects: none  Diet: regular (no restrictions)  Constipation      Duration: x 2 weeks    Description:       Frequency of bowel movements: 2-3 days       Consistency of stool: soft     Intensity:  mild    Accompanying signs and symptoms: none       Abdominal pain: no        Rectal pain: no        Blood in stool: no        Nausea/vomitting: no     History:        Similar problems in past: YES    Precipitating or alleviating factors:        Medications worsening symptoms: YES- epidural     Therapies tried and outcome: senna       Chronic laxative use: YES    Discuss a lump on the back of the head on the right side.  It's a gland that will get smaller or larger and has a sharp pain.            Problem list and histories reviewed & adjusted, as indicated.  Additional history:     See under ROS     Patient Active Problem List   Diagnosis     Obesity     Hypothyroidism     Benign neoplasm of colon     Mild Depression [296.31]     Obstructive sleep apnea     * * * SBE PROPHYLAXIS * * *     Degeneration of lumbar or lumbosacral intervertebral disc     Pain in joint, ankle and foot     Arthrodesis status     HYPERLIPIDEMIA LDL GOAL <100     Hypertension goal BP (blood pressure) < 140/90     [facility]      Osteopenia     Malignant neoplasm of female breast (H)     Impaired fasting glucose     ACP (advance care planning)     History of melanoma     BMI 45.0-49.9, adult (H)     Panic disorder without agoraphobia     Humerus fracture     Arthritis     PONV (postoperative nausea and vomiting)     S/P total hip arthroplasty     Constipation     DVT prophylaxis     Anticoagulation management encounter     Physical deconditioning     Periprosthetic fracture around internal prosthetic left hip joint (H)     Pain of left thigh     Chronic pain syndrome - hips     Fracture of greater trochanter of right femur 11/6/2016, closed, with routine healing, subsequent encounter     Anxiety     Osteoarthritis of multiple joints, unspecified osteoarthritis type     Anemia due to blood loss, acute     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     Long term (current) use of anticoagulants     Aftercare following right hip joint replacement surgery     Hip pain, right     Abnormal gait     Vitamin D deficiency       Current Outpatient Prescriptions   Medication Sig Dispense Refill     gabapentin (NEURONTIN) 300 MG capsule Take 300 mg by mouth 3 times daily       FLUoxetine (PROZAC) 20 MG capsule TAKE ONE CAPSULE BY MOUTH DAILY 30 capsule 0     simvastatin (ZOCOR) 10 MG tablet TAKE 1 TABLET BY MOUTH AT BEDTIME 90 tablet 2     levothyroxine (SYNTHROID/LEVOTHROID) 88 MCG tablet TAKE 1 TABLET(88 MCG) BY MOUTH DAILY 90 tablet 0     ferrous sulfate (IRON) 325 (65 FE) MG tablet Take 1 tablet (325 mg) by mouth 2 times daily 60 tablet 2     ALPRAZolam (XANAX) 0.25 MG tablet Take 1 tablet (0.25 mg) by mouth 2 times daily as needed for anxiety Don't take with the oxycodone 10 tablet 0     calcium-vitamin D (CALCIUM 600 + D) 600-400 MG-UNIT per tablet Take 1 tablet by mouth daily 100 tablet 3     Warfarin Therapy Reminder 1 each continuous prn Goal INR 1.5-2.5.       acetaminophen (TYLENOL) 325 MG tablet Take 3 tablets (975 mg) by mouth  every 8 hours 500 tablet 1     Nystatin (NYSTOP) 570840 UNIT/GM POWD Apply tid to affectead area prn 60 g 1     albuterol (PROAIR HFA, PROVENTIL HFA, VENTOLIN HFA) 108 (90 BASE) MCG/ACT inhaler Inhale 2 puffs into the lungs as needed for shortness of breath / dyspnea or wheezing       fluticasone (FLONASE) 50 MCG/ACT nasal spray Spray 1 spray into both nostrils daily as needed   5     triamcinolone (KENALOG) 0.1 % cream Apply sparingly to affected area two times daily as needed for itching. 30 g 0     magnesium hydroxide (MILK OF MAGNESIA) 400 MG/5ML suspension Take 30 mLs by mouth daily as needed            Reviewed and updated as needed this visit by clinical staff     Reviewed and updated as needed this visit by Provider         ROS:  CONSTITUTIONAL:NEGATIVE for fever, chills, change in weight; continues to try to lose weight unsuccessfully.   RESP:NEGATIVE for significant cough or SOB  CV: NEGATIVE for chest pain, palpitations or peripheral edema x some chronic right ankle edema  GI: see below  PSYCHIATRIC: does note her anxiety is up.  Stresses around 's health as well.    Seems to be going through anxiety.  Still with pain left hip. Discovered it was her back. Has had several epidural shots. Helping.  History of hip replacement.  Cortisone shot to the bursa left hip 3 weeks ago with mild relief.    Right hip replacement more recently, doing well.     Right foot with arthritis.  Left shoulder was hurting as well. Did get cortisone shot there as well through Dr. Giordano.   Will follow up with Dr. Giordano in the future.     Continues with difficulty losing weight.    Low vitamin D in April; taking vitamin D since then.  Believes taking 5000 iu daily.    Sleeping better with gabapentin.   Has been on this since sometime in July.    Some swelling in the back of head; will get a shooting pain in the back of head. Will come and go.     Constipated. Wonders if the Epidural might cause that.   Only taking tylenol  "for pain.    Will cough up some stuff first thing in am; ? From CPAP machine.     OBJECTIVE:     /82 (BP Location: Right arm, Cuff Size: Adult Large)  Pulse 87  Temp 98.2  F (36.8  C) (Oral)  Resp 16  Ht 5' 3\" (1.6 m)  Wt 279 lb 4.8 oz (126.7 kg)  SpO2 96%  BMI 49.48 kg/m2  Body mass index is 49.48 kg/(m^2).   /82  Upon repeat    GENERAL APPEARANCE: alert and no distress  RESP: lungs clear to auscultation - no rales, rhonchi or wheezes  CV: regular rates and rhythm  MS: moves stiffly.  Puffy medial malleolus right foot.  PSYCH: mentation appears normal and affect normal/bright        PHQ-9 SCORE 9/20/2016 1/27/2017 8/15/2017   Total Score - - -   Total Score 9 6 12       EDILSON-7 SCORE 9/20/2016 1/27/2017 8/15/2017   Total Score - - -   Total Score 11 7 12   Total Score - - -       Hemoglobin   Date Value Ref Range Status   01/20/2017 13.3 11.7 - 15.7 g/dL Final   11/29/2016 10.0 (L) 11.7 - 15.7 g/dL Final     Comment:     Reviewed: OK with previous   ]  TSH   Date Value Ref Range Status   11/17/2016 3.56 0.40 - 4.00 mU/L Final   ]  Recent Labs   Lab Test  04/28/17   1029  03/11/16   1310  02/09/15   0910  02/06/14   0902   CHOL  135  135  114  154   HDL  65  69  57  66   LDL  40  41  28  44   TRIG  148  127  146  216*   CHOLHDLRATIO   --    --   2.0  2.3     Lab Results   Component Value Date    A1C 6.0 03/11/2016    A1C 6.1 02/09/2015    A1C 6.0 02/06/2014    A1C 5.9 02/07/2013    A1C 5.9 03/01/2011           ASSESSMENT/PLAN:     Mild depression (H)  With stressors. At this time, increase the fluoxetine.   Will have her follow up in November; possibly earlier.   - FLUoxetine (PROZAC) 40 MG capsule; Take 1 capsule (40 mg) by mouth daily    Hypertension goal BP (blood pressure) < 140/90  Borderline control; improved upon repeat     Morbid obesity, unspecified obesity type (H)  Continue to work on lifestyle.    Anxiety  As above. Stressors.  Increase fluoxetine.     Vitamin D deficiency  Now on " vitamin D; sounds like she is on a fair amount. Will check level.  - Vitamin D Deficiency    Impaired fasting glucose  Monitoring. If real high, may invite back in the near future.   - Hemoglobin A1c    Hip pain, left  Ongoing. She is working with Dr. Giordano.    Constipation, unspecified constipation type  Discussed. Not so sure if epidural might cause it; but being sedentary, etc may.  Fiber and fluids. Can use stool softener. Consider miralax or other as well.     Hypothyroidism, unspecified type  Clinically euthyroid. Anticipate checking TSH this Fall.  - TSH with free T4 reflex; Future    Hyperlipidemia LDL goal <100  Not due for fasting lab until next Spring.      Patient Instructions   I would like to see you in early November; earlier if needed.  We will get some blood, but you do not need to fast.    If your HgbA1C is real high, I may invite you back further.            Yessica Lora MD, MD  [facility]

## 2017-08-15 NOTE — LETTER
[facility]  [address] [facility] [address]  [phone]        November 6, 2017    Carol Merida  [address] [address]              Dear Carol Merida    This is to remind you that your thyroid lab is due.    You may call our office at [phone]  to schedule an appointment.    Please disregard this notice if you have already had your labs drawn or made an appointment.        Sincerely,        Yessica Lora MD and [facility] lab staff

## 2017-08-16 ASSESSMENT — ANXIETY QUESTIONNAIRES: GAD7 TOTAL SCORE: 12

## 2017-08-18 LAB — DEPRECATED CALCIDIOL+CALCIFEROL SERPL-MC: 45 UG/L (ref 20–75)

## 2017-08-31 DIAGNOSIS — E03.9 HYPOTHYROIDISM: ICD-10-CM

## 2017-09-01 NOTE — TELEPHONE ENCOUNTER
levothyroxine (SYNTHROID/LEVOTHROID) 88 MCG     Last Written Prescription Date: 6/2/17  Last Quantity: 90, # refills: 0  Last Office Visit with [facility], [facility] or [facility] prescribing provider: 8/15/17        TSH   Date Value Ref Range Status   11/17/2016 3.56 0.40 - 4.00 mU/L Final

## 2017-09-05 RX ORDER — LEVOTHYROXINE SODIUM 88 UG/1
TABLET ORAL
Qty: 90 TABLET | Refills: 0 | Status: SHIPPED | OUTPATIENT
Start: 2017-09-05 | End: 2017-11-19

## 2017-11-08 ENCOUNTER — TRANSFERRED RECORDS (OUTPATIENT)
Dept: HEALTH INFORMATION MANAGEMENT | Facility: CLINIC | Age: 82
End: 2017-11-08

## 2017-11-08 DIAGNOSIS — M48.061 SPINAL STENOSIS OF LUMBAR REGION, UNSPECIFIED WHETHER NEUROGENIC CLAUDICATION PRESENT: ICD-10-CM

## 2017-11-08 DIAGNOSIS — M15.9 OSTEOARTHRITIS OF MULTIPLE JOINTS, UNSPECIFIED OSTEOARTHRITIS TYPE: Primary | ICD-10-CM

## 2017-11-08 DIAGNOSIS — M51.369 DDD (DEGENERATIVE DISC DISEASE), LUMBAR: ICD-10-CM

## 2017-11-08 NOTE — TELEPHONE ENCOUNTER
We can probably take over that prescription.    However, I think it appropriate to have a visit to get up to speed on where things are at.  I am not sure of what diagnosis to put in. I don't see any recent correspondence about her back...    Can you please have her schedule an appointment?   We might also need to have her sign GILDA for getting records....    When is she going to run out? Would like to get her in prior to that...    Thanks!!!

## 2017-11-08 NOTE — TELEPHONE ENCOUNTER
Patient is calling to ask if Dr. Lora would be willing to refill her Neurontin.  She got it from her spine doctor originally. They were asking that her primary  Care doctor take care of filling it now. She has also seen Dr. Estrada who is going  To give her another pain shot in her back soon. He recommended that she stay on   It, but the pharmacy was not able to refill it for her. Please sign if ok.    Ok for tomorrow.    Rossy Perez, RN  Triage Nurse

## 2017-11-09 PROBLEM — M48.061 SPINAL STENOSIS OF LUMBAR REGION, UNSPECIFIED WHETHER NEUROGENIC CLAUDICATION PRESENT: Status: ACTIVE | Noted: 2017-11-09

## 2017-11-09 RX ORDER — GABAPENTIN 300 MG/1
300 CAPSULE ORAL 3 TIMES DAILY
Qty: 90 CAPSULE | Refills: 0 | Status: SHIPPED | OUTPATIENT
Start: 2017-11-09 | End: 2017-11-16

## 2017-11-09 NOTE — TELEPHONE ENCOUNTER
There are some records in my paper inbox now; I was not here yesterday.    I did approve a month.     Thanks!

## 2017-11-09 NOTE — TELEPHONE ENCOUNTER
Called patient this morning about refill.  Taking it for low back pain bilateral.  She is out so would need this today if you would.   Scheduled an appointment in one week to follow up with you.  She will call the office where she was seen recently, and have them  Send records. She was told they would be sending them, but she   Will contact them today. Please sign if ok.    Rossy Perez, RN  Triage Nurse

## 2017-11-16 ENCOUNTER — OFFICE VISIT (OUTPATIENT)
Dept: FAMILY MEDICINE | Facility: CLINIC | Age: 82
End: 2017-11-16
Payer: COMMERCIAL

## 2017-11-16 VITALS
DIASTOLIC BLOOD PRESSURE: 82 MMHG | WEIGHT: 276.4 LBS | SYSTOLIC BLOOD PRESSURE: 132 MMHG | RESPIRATION RATE: 16 BRPM | TEMPERATURE: 98.2 F | HEIGHT: 63 IN | BODY MASS INDEX: 48.97 KG/M2 | HEART RATE: 77 BPM | OXYGEN SATURATION: 96 %

## 2017-11-16 DIAGNOSIS — F43.9 STRESS: ICD-10-CM

## 2017-11-16 DIAGNOSIS — M51.369 DDD (DEGENERATIVE DISC DISEASE), LUMBAR: ICD-10-CM

## 2017-11-16 DIAGNOSIS — Z23 NEED FOR PROPHYLACTIC VACCINATION AND INOCULATION AGAINST INFLUENZA: ICD-10-CM

## 2017-11-16 DIAGNOSIS — E78.5 HYPERLIPIDEMIA LDL GOAL <100: ICD-10-CM

## 2017-11-16 DIAGNOSIS — E03.9 HYPOTHYROIDISM, UNSPECIFIED TYPE: ICD-10-CM

## 2017-11-16 DIAGNOSIS — M47.26 OSTEOARTHRITIS OF SPINE WITH RADICULOPATHY, LUMBAR REGION: Primary | ICD-10-CM

## 2017-11-16 DIAGNOSIS — M15.9 OSTEOARTHRITIS OF MULTIPLE JOINTS, UNSPECIFIED OSTEOARTHRITIS TYPE: ICD-10-CM

## 2017-11-16 DIAGNOSIS — F33.0 MAJOR DEPRESSIVE DISORDER, RECURRENT EPISODE, MILD (H): ICD-10-CM

## 2017-11-16 PROCEDURE — 99214 OFFICE O/P EST MOD 30 MIN: CPT | Mod: 25 | Performed by: FAMILY MEDICINE

## 2017-11-16 PROCEDURE — 84443 ASSAY THYROID STIM HORMONE: CPT | Performed by: FAMILY MEDICINE

## 2017-11-16 PROCEDURE — 90662 IIV NO PRSV INCREASED AG IM: CPT | Performed by: FAMILY MEDICINE

## 2017-11-16 PROCEDURE — G0008 ADMIN INFLUENZA VIRUS VAC: HCPCS | Performed by: FAMILY MEDICINE

## 2017-11-16 PROCEDURE — 36415 COLL VENOUS BLD VENIPUNCTURE: CPT | Performed by: FAMILY MEDICINE

## 2017-11-16 RX ORDER — GABAPENTIN 300 MG/1
300 CAPSULE ORAL 3 TIMES DAILY
Qty: 270 CAPSULE | Refills: 0 | Status: SHIPPED | OUTPATIENT
Start: 2017-11-16 | End: 2019-05-30

## 2017-11-16 ASSESSMENT — ANXIETY QUESTIONNAIRES
7. FEELING AFRAID AS IF SOMETHING AWFUL MIGHT HAPPEN: SEVERAL DAYS
IF YOU CHECKED OFF ANY PROBLEMS ON THIS QUESTIONNAIRE, HOW DIFFICULT HAVE THESE PROBLEMS MADE IT FOR YOU TO DO YOUR WORK, TAKE CARE OF THINGS AT HOME, OR GET ALONG WITH OTHER PEOPLE: SOMEWHAT DIFFICULT
6. BECOMING EASILY ANNOYED OR IRRITABLE: SEVERAL DAYS
5. BEING SO RESTLESS THAT IT IS HARD TO SIT STILL: NOT AT ALL
2. NOT BEING ABLE TO STOP OR CONTROL WORRYING: MORE THAN HALF THE DAYS
1. FEELING NERVOUS, ANXIOUS, OR ON EDGE: SEVERAL DAYS
GAD7 TOTAL SCORE: 9
3. WORRYING TOO MUCH ABOUT DIFFERENT THINGS: MORE THAN HALF THE DAYS

## 2017-11-16 ASSESSMENT — PATIENT HEALTH QUESTIONNAIRE - PHQ9
5. POOR APPETITE OR OVEREATING: MORE THAN HALF THE DAYS
SUM OF ALL RESPONSES TO PHQ QUESTIONS 1-9: 9

## 2017-11-16 NOTE — PROGRESS NOTES
SUBJECTIVE:   Carol Merida is a 82 year old female who presents to clinic today for the following health issues:      Hyperlipidemia Follow-Up      Rate your low fat/cholesterol diet?: fair    Taking statin?  Yes, no muscle aches from statin    Other lipid medications/supplements?:  none    Depression and Anxiety Follow-Up    Status since last visit: Worsened - more anxiety    Other associated symptoms:SOB    Complicating factors:     Significant life event: Yes-  Husbands depression and anxiety     Current substance abuse: None    PHQ-9 Score and MyChart F/U Questions 9/20/2016 1/27/2017 8/15/2017   Total Score 9 6 12   Q9: Suicide Ideation Not at all Not at all Not at all     EDILSON-7 SCORE 9/20/2016 1/27/2017 8/15/2017   Total Score - - -   Total Score 11 7 12   Total Score - - -       PHQ-9  English  PHQ-9   Any Language  GAD7  Suicide Assessment Five-step Evaluation and Treatment (SAFE-T)  Hypothyroidism Follow-up      Since last visit, patient describes the following symptoms: Weight stable, no hair loss, no skin changes, no constipation, no loose stools        Amount of exercise or physical activity: None    Problems taking medications regularly: No    Medication side effects: none    Diet: regular (no restrictions)            Problem list and histories reviewed & adjusted, as indicated.  Additional history:     See under ROS    Patient Active Problem List   Diagnosis     Obesity     Hypothyroidism     Benign neoplasm of colon     Mild Depression [296.31]     Obstructive sleep apnea     * * * SBE PROPHYLAXIS * * *     Degeneration of lumbar or lumbosacral intervertebral disc     Pain in joint, ankle and foot     Arthrodesis status     HYPERLIPIDEMIA LDL GOAL <100     Hypertension goal BP (blood pressure) < 140/90     Health Care Home     Osteopenia     Malignant neoplasm of female breast (H)     Impaired fasting glucose     ACP (advance care planning)     History of melanoma     BMI 45.0-49.9, adult (H)      Panic disorder without agoraphobia     Humerus fracture     Arthritis     PONV (postoperative nausea and vomiting)     S/P total hip arthroplasty     Constipation     DVT prophylaxis     Anticoagulation management encounter     Physical deconditioning     Periprosthetic fracture around internal prosthetic left hip joint (H)     Pain of left thigh     Chronic pain syndrome - hips     Fracture of greater trochanter of right femur 11/6/2016, closed, with routine healing, subsequent encounter     Anxiety     Osteoarthritis of multiple joints, unspecified osteoarthritis type     Anemia due to blood loss, acute     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     Long term (current) use of anticoagulants     Aftercare following right hip joint replacement surgery     Hip pain, right     Abnormal gait     Vitamin D deficiency     Spinal stenosis of lumbar region, unspecified whether neurogenic claudication present       Current Outpatient Prescriptions   Medication Sig Dispense Refill     gabapentin (NEURONTIN) 300 MG capsule Take 1 capsule (300 mg) by mouth 3 times daily 90 capsule 0     levothyroxine (SYNTHROID/LEVOTHROID) 88 MCG tablet TAKE 1 TABLET(88 MCG) BY MOUTH DAILY 90 tablet 0     FLUoxetine (PROZAC) 40 MG capsule Take 1 capsule (40 mg) by mouth daily 90 capsule 0     simvastatin (ZOCOR) 10 MG tablet TAKE 1 TABLET BY MOUTH AT BEDTIME 90 tablet 2     ferrous sulfate (IRON) 325 (65 FE) MG tablet Take 1 tablet (325 mg) by mouth 2 times daily 60 tablet 2     ALPRAZolam (XANAX) 0.25 MG tablet Take 1 tablet (0.25 mg) by mouth 2 times daily as needed for anxiety Don't take with the oxycodone 10 tablet 0     calcium-vitamin D (CALCIUM 600 + D) 600-400 MG-UNIT per tablet Take 1 tablet by mouth daily 100 tablet 3     acetaminophen (TYLENOL) 325 MG tablet Take 3 tablets (975 mg) by mouth every 8 hours 500 tablet 1     Nystatin (NYSTOP) 795018 UNIT/GM POWD Apply tid to affectead area prn 60 g 1     albuterol (PROAIR  "HFA, PROVENTIL HFA, VENTOLIN HFA) 108 (90 BASE) MCG/ACT inhaler Inhale 2 puffs into the lungs as needed for shortness of breath / dyspnea or wheezing       fluticasone (FLONASE) 50 MCG/ACT nasal spray Spray 1 spray into both nostrils daily as needed   5     triamcinolone (KENALOG) 0.1 % cream Apply sparingly to affected area two times daily as needed for itching. 30 g 0     magnesium hydroxide (MILK OF MAGNESIA) 400 MG/5ML suspension Take 30 mLs by mouth daily as needed            Reviewed and updated as needed this visit by clinical staffAllergies       Reviewed and updated as needed this visit by Provider         ROS:  CONSTITUTIONAL:NEGATIVE for fever, chills, change in weight  CV: NEGATIVE for chest pain, palpitations  MUSCULOSKELETAL: see below  PSYCHIATRIC:   Anxiety with 's dx cancer.   Believes handling OK.     Was seen for right hip and this is doing well.  Having some back pain. Spasm for a few days. Talked with Dr. Mederos.  Recommended to see Dr. Estrada.     Also with OA of left shoulder.     Old surgery not good; from 2 years ago. Bursa; will affect knee.   Had cortisone in shoulder and bursa; so cancelled the back shot.  Rescheduled for p thanksgiving.  It is better.   Mid low back, more on right side.     Was without gabapentin x ~ 10 days.  For back pain/back problem.  Notes it did help.     Has occasionally had pain down the left leg.  Would like to continue the gabapentin; transferring to us from Ortho.     OBJECTIVE:     /82 (BP Location: Right arm, Cuff Size: Adult Large)  Pulse 77  Temp 98.2  F (36.8  C) (Oral)  Resp 16  Ht 5' 3\" (1.6 m)  Wt 276 lb 6.4 oz (125.4 kg)  SpO2 96%  BMI 48.96 kg/m2  Body mass index is 48.96 kg/(m^2).  GENERAL APPEARANCE: alert and no distress  CV: regular rates and rhythm  MS: very stiff with moving around. Does have a cane.   PSYCH: mentation appears normal and affect normal/bright    PHQ-9 SCORE 1/27/2017 8/15/2017 11/16/2017   Total Score - - - "   Total Score 6 12 9       EDILSON-7 SCORE 1/27/2017 8/15/2017 11/16/2017   Total Score - - -   Total Score 7 12 9   Total Score - - -       TSH   Date Value Ref Range Status   11/17/2016 3.56 0.40 - 4.00 mU/L Final   ]    Recent Labs   Lab Test  04/28/17   1029  03/11/16   1310  02/09/15   0910  02/06/14   0902   CHOL  135  135  114  154   HDL  65  69  57  66   LDL  40  41  28  44   TRIG  148  127  146  216*   CHOLHDLRATIO   --    --   2.0  2.3     Lab Results   Component Value Date    A1C 5.9 08/15/2017    A1C 6.0 03/11/2016    A1C 6.1 02/09/2015    A1C 6.0 02/06/2014    A1C 5.9 02/07/2013             ASSESSMENT/PLAN:     Osteoarthritis of spine with radiculopathy, lumbar region  Tolerating gabapentin.  It is helping.  We can take over prescribing. Continuing to go with same dose at this time. Could consider an increase if needed as well.   - gabapentin (NEURONTIN) 300 MG capsule; Take 1 capsule (300 mg) by mouth 3 times daily    Osteoarthritis of multiple joints, unspecified osteoarthritis type  As above.   - gabapentin (NEURONTIN) 300 MG capsule; Take 1 capsule (300 mg) by mouth 3 times daily    DDD (degenerative disc disease), lumbar  As above.   - gabapentin (NEURONTIN) 300 MG capsule; Take 1 capsule (300 mg) by mouth 3 times daily    Stress  On fluoxetine. Believes handling it OK.     Mild Depression [296.31]  As above.     Hypothyroidism, unspecified type  Clinically euthyroid.   - TSH with free T4 reflex  - levothyroxine (SYNTHROID/LEVOTHROID) 88 MCG tablet; Take 1 tablet (88 mcg) by mouth daily    Hyperlipidemia LDL goal <100  Anticipate fasting lab in the Spring.     Need for prophylactic vaccination and inoculation against influenza  - FLU VACCINE, INCREASED ANTIGEN, PRESV FREE, AGE 65+ [55228]  - ADMIN INFLUENZA (For MEDICARE Patients ONLY) []      anticipate seeing in the Spring.      Yessica Lora MD, MD  [facility]      Injectable Influenza Immunization Documentation    1.  Is the  person to be vaccinated sick today?   No    2. Does the person to be vaccinated have an allergy to a component   of the vaccine?   No  Egg Allergy Algorithm Link    3. Has the person to be vaccinated ever had a serious reaction   to influenza vaccine in the past?   No    4. Has the person to be vaccinated ever had Guillain-Barré syndrome?   No    Form completed by Maria Antonia Mendez CMA

## 2017-11-16 NOTE — NURSING NOTE
"Chief Complaint   Patient presents with     Thyroid Problem     medication recheck     Anxiety     medication recheck       Initial /82 (BP Location: Right arm, Cuff Size: Adult Large)  Pulse 77  Temp 98.2  F (36.8  C) (Oral)  Resp 16  Ht 5' 3\" (1.6 m)  Wt 276 lb 6.4 oz (125.4 kg)  SpO2 96%  BMI 48.96 kg/m2 Estimated body mass index is 48.96 kg/(m^2) as calculated from the following:    Height as of this encounter: 5' 3\" (1.6 m).    Weight as of this encounter: 276 lb 6.4 oz (125.4 kg).  Medication Reconciliation: complete   Maria Antonia Mendez, MERY    "

## 2017-11-16 NOTE — MR AVS SNAPSHOT
"              After Visit Summary   11/16/2017    Carol Merida    MRN: [id number]           Patient Information     Date Of Birth          1935        Visit Information        Provider Department      11/16/2017 11:10 AM Yessica Lora MD [facility]        Today's Diagnoses     Need for prophylactic vaccination and inoculation against influenza    -  1    Osteoarthritis of spine with radiculopathy, lumbar region        Osteoarthritis of multiple joints, unspecified osteoarthritis type        DDD (degenerative disc disease), lumbar           Follow-ups after your visit        Who to contact     If you have questions or need follow up information about today's clinic visit or your schedule please contact [facility] directly at [phone].  Normal or non-critical lab and imaging results will be communicated to you by MyChart, letter or phone within 4 business days after the clinic has received the results. If you do not hear from us within 7 days, please contact the clinic through MyChart or phone. If you have a critical or abnormal lab result, we will notify you by phone as soon as possible.  Submit refill requests through [employer] or call your pharmacy and they will forward the refill request to us. Please allow 3 business days for your refill to be completed.          Additional Information About Your Visit        MyChart Information     [employer] lets you send messages to your doctor, view your test results, renew your prescriptions, schedule appointments and more. To sign up, go to [email or web address] . Click on \"Log in\" on the left side of the screen, which will take you to the Welcome page. Then click on \"Sign up Now\" on the right side of the page.     You will be asked to enter the access code listed below, as well as some personal information. Please follow the directions to create your username and password.     Your access code is: [email or web address]  Expires: 2/14/2018 " "12:06 PM     Your access code will  in 90 days. If you need help or a new code, please call your [facility] clinic or [phone].        Care EveryWhere ID     This is your Care EveryWhere ID. This could be used by other organizations to access your [facility] medical records  [id number]        Your Vitals Were     Pulse Temperature Respirations Height Pulse Oximetry BMI (Body Mass Index)    77 98.2  F (36.8  C) (Oral) 16 5' 3\" (1.6 m) 96% 48.96 kg/m2       Blood Pressure from Last 3 Encounters:   17 132/82   08/15/17 130/82   17 140/70    Weight from Last 3 Encounters:   17 276 lb 6.4 oz (125.4 kg)   08/15/17 279 lb 4.8 oz (126.7 kg)   17 270 lb (122.5 kg)              We Performed the Following     ADMIN INFLUENZA (For MEDICARE Patients ONLY) []     FLU VACCINE, INCREASED ANTIGEN, PRESV FREE, AGE 65+ [52719]          Where to get your medicines      These medications were sent to [facility] - [place], [place] - [address]  [address] [facility] [address]     Phone:  [phone]     gabapentin 300 MG capsule          Primary Care Provider Office Phone # Fax #    Yessica Lora [phone]       [address]  [facility] [address]        Equal Access to Services     [facility] : Hadii aad ku hadasho Soomaali, waaxda luqadaha, qaybta kaalmada adeegyada, marc newton . So [facility] [phone].    ATENCIÓN: Si habla español, tiene a johns disposición servicios gratuitos de asistencia lingüística. Llame al [phone].    We comply with applicable federal civil rights laws and [place] laws. We do not discriminate on the basis of race, color, national origin, age, disability, sex, sexual orientation, or gender identity.            Thank you!     Thank you for choosing [facility]  for your care. Our goal is always to provide you with excellent care. Hearing back from our patients is one way we can " continue to improve our services. Please take a few minutes to complete the written survey that you may receive in the mail after your visit with us. Thank you!             Your Updated Medication List - Protect others around you: Learn how to safely use, store and throw away your medicines at www.disposemymeds.org.          This list is accurate as of: 11/16/17 12:06 PM.  Always use your most recent med list.                   Brand Name Dispense Instructions for use Diagnosis    acetaminophen 325 MG tablet    TYLENOL    500 tablet    Take 3 tablets (975 mg) by mouth every 8 hours    Status post total replacement of right hip       albuterol 108 (90 BASE) MCG/ACT Inhaler    PROAIR HFA/PROVENTIL HFA/VENTOLIN HFA     Inhale 2 puffs into the lungs as needed for shortness of breath / dyspnea or wheezing        ALPRAZolam 0.25 MG tablet    XANAX    10 tablet    Take 1 tablet (0.25 mg) by mouth 2 times daily as needed for anxiety Don't take with the oxycodone    Anxiety       calcium 600 + D 600-400 MG-UNIT per tablet   Generic drug:  calcium-vitamin D     100 tablet    Take 1 tablet by mouth daily    Osteopenia       ferrous sulfate 325 (65 FE) MG tablet    IRON    60 tablet    Take 1 tablet (325 mg) by mouth 2 times daily    Anemia due to blood loss, acute       FLUoxetine 40 MG capsule    PROzac    90 capsule    Take 1 capsule (40 mg) by mouth daily    Mild depression (H)       fluticasone 50 MCG/ACT spray    FLONASE     Spray 1 spray into both nostrils daily as needed        gabapentin 300 MG capsule    NEURONTIN    270 capsule    Take 1 capsule (300 mg) by mouth 3 times daily    Osteoarthritis of multiple joints, unspecified osteoarthritis type, DDD (degenerative disc disease), lumbar       levothyroxine 88 MCG tablet    SYNTHROID/LEVOTHROID    90 tablet    TAKE 1 TABLET(88 MCG) BY MOUTH DAILY    Hypothyroidism       magnesium hydroxide 400 MG/5ML suspension    MILK OF MAGNESIA     Take 30 mLs by mouth daily as  needed        NYSTOP 003631 UNIT/GM Powd   Generic drug:  nystatin     60 g    Apply tid to affectead area prn    Intertrigo       simvastatin 10 MG tablet    ZOCOR    90 tablet    TAKE 1 TABLET BY MOUTH AT BEDTIME    Hyperlipidemia LDL goal <100       triamcinolone 0.1 % cream    KENALOG    30 g    Apply sparingly to affected area two times daily as needed for itching.    Atopic dermatitis

## 2017-11-16 NOTE — LETTER
November 20, 2017      Carol Merida  [address] [address] [facility] [address]        Dear Ms. Carol Merida,    Enclosed is a copy of your recent results.    TSH stands for Thyroid Stimulating Hormone. It is the most sensitive thyroid test that we have. It goes in the opposite direction of the thyroid hormone level; if your thyroid is not producing sufficient thyroid hormone, it goes up to tell your thyroid to make more.    Continue same dose thyroid med, and recheck in a year; earlier if concerns.  I would also recommend rechecking in 4-6 weeks if you get a different brand of thyroid medication.    I did send in a prescription for refills.    Have a good holidiay season!    Sincerely,     Yessica Lora MD

## 2017-11-17 LAB — TSH SERPL DL<=0.005 MIU/L-ACNC: 2.89 MU/L (ref 0.4–4)

## 2017-11-17 ASSESSMENT — ANXIETY QUESTIONNAIRES: GAD7 TOTAL SCORE: 9

## 2017-11-19 RX ORDER — LEVOTHYROXINE SODIUM 88 UG/1
88 TABLET ORAL DAILY
Qty: 90 TABLET | Refills: 3 | Status: SHIPPED | OUTPATIENT
Start: 2017-11-19 | End: 2018-12-13

## 2017-11-24 DIAGNOSIS — F32.A MILD DEPRESSION: ICD-10-CM

## 2017-11-28 RX ORDER — FLUOXETINE 40 MG/1
CAPSULE ORAL
Qty: 90 CAPSULE | Refills: 1 | Status: SHIPPED | OUTPATIENT
Start: 2017-11-28 | End: 2018-05-21

## 2017-11-28 NOTE — TELEPHONE ENCOUNTER
Requested Prescriptions   Pending Prescriptions Disp Refills     FLUoxetine (PROZAC) 40 MG capsule [Pharmacy Med Name: FLUOXETINE HCL 40MG CAPS] 90 capsule 0     Sig: TAKE ONE CAPSULE BY MOUTH EVERY DAY    SSRIs Protocol Failed    11/24/2017  1:31 PM       Failed - PHQ-9 score less than 5 in past 6 months    Please review PHQ-9 score.          Passed - Medication is NOT Bupropion    If the medication is Bupropion (Wellbutrin), and the patient is taking for smoking cessation; OK to refill.         Passed - Patient is age 18 or older       Passed - No active pregnancy on record       Passed - No positive pregnancy test in last 12 months       Passed - Recent (6 mo) or future visit with authorizing provider's specialty    Patient had office visit in the last 6 months or has a visit in the next 30 days with authorizing provider.  See chart review.             Patient seen this month, PHQ 9 score is >4, please sign if ok.  Rossy Perez, RN  Triage Nurse

## 2017-11-28 NOTE — TELEPHONE ENCOUNTER
PHQ-9 SCORE 1/27/2017 8/15/2017 11/16/2017   Total Score - - -   Total Score 6 12 9       EDILSON-7 SCORE 1/27/2017 8/15/2017 11/16/2017   Total Score - - -   Total Score 7 12 9   Total Score - - -

## 2017-12-13 DIAGNOSIS — M51.369 DDD (DEGENERATIVE DISC DISEASE), LUMBAR: ICD-10-CM

## 2017-12-13 DIAGNOSIS — M15.9 OSTEOARTHRITIS OF MULTIPLE JOINTS, UNSPECIFIED OSTEOARTHRITIS TYPE: ICD-10-CM

## 2017-12-14 RX ORDER — GABAPENTIN 300 MG/1
CAPSULE ORAL
Qty: 270 CAPSULE | Refills: 0 | Status: SHIPPED | OUTPATIENT
Start: 2017-12-14 | End: 2018-03-21

## 2017-12-14 NOTE — TELEPHONE ENCOUNTER
Unable to fill per standing order routed to Dr. Lora for approval.    **Going to different pharmacy.       GABAPENTIN 300 MG TID      Last Written Prescription Date: 11/16/17  Last Fill Quantity: 270,  # refills: 0   Last Office Visit with G, P or [facility] prescribing provider: 11/16/17

## 2017-12-14 NOTE — TELEPHONE ENCOUNTER
See what is up.  We did 270 in November; should last 3 months.    If they did not do 3 months, I would think they would have put on refills?

## 2017-12-14 NOTE — TELEPHONE ENCOUNTER
Per pharmacy,    They did not recieve the 11/16/2017 gabapentin script.    Please resend.     Gail MONDRAGON RN, BSN, PHN  [place] [facility] [place]

## 2018-01-04 ENCOUNTER — OFFICE VISIT (OUTPATIENT)
Dept: FAMILY MEDICINE | Facility: CLINIC | Age: 83
End: 2018-01-04
Payer: COMMERCIAL

## 2018-01-04 VITALS
WEIGHT: 267.9 LBS | HEART RATE: 80 BPM | BODY MASS INDEX: 47.47 KG/M2 | OXYGEN SATURATION: 94 % | HEIGHT: 63 IN | DIASTOLIC BLOOD PRESSURE: 88 MMHG | TEMPERATURE: 97.7 F | SYSTOLIC BLOOD PRESSURE: 142 MMHG | RESPIRATION RATE: 18 BRPM

## 2018-01-04 DIAGNOSIS — K80.20 CALCULUS OF GALLBLADDER WITHOUT CHOLECYSTITIS WITHOUT OBSTRUCTION: ICD-10-CM

## 2018-01-04 DIAGNOSIS — Z90.12 HISTORY OF LEFT MASTECTOMY: ICD-10-CM

## 2018-01-04 DIAGNOSIS — Z85.3 PERSONAL HISTORY OF MALIGNANT NEOPLASM OF BREAST: ICD-10-CM

## 2018-01-04 DIAGNOSIS — R10.9 RIGHT SIDED ABDOMINAL PAIN: Primary | ICD-10-CM

## 2018-01-04 DIAGNOSIS — Z12.31 ENCOUNTER FOR SCREENING MAMMOGRAM FOR BREAST CANCER: ICD-10-CM

## 2018-01-04 LAB
ERYTHROCYTE [DISTWIDTH] IN BLOOD BY AUTOMATED COUNT: 13.1 % (ref 10–15)
HCT VFR BLD AUTO: 45.9 % (ref 35–47)
HGB BLD-MCNC: 14.7 G/DL (ref 11.7–15.7)
MCH RBC QN AUTO: 29.6 PG (ref 26.5–33)
MCHC RBC AUTO-ENTMCNC: 32 G/DL (ref 31.5–36.5)
MCV RBC AUTO: 92 FL (ref 78–100)
PLATELET # BLD AUTO: 278 10E9/L (ref 150–450)
RBC # BLD AUTO: 4.97 10E12/L (ref 3.8–5.2)
WBC # BLD AUTO: 9.5 10E9/L (ref 4–11)

## 2018-01-04 PROCEDURE — 85027 COMPLETE CBC AUTOMATED: CPT | Performed by: FAMILY MEDICINE

## 2018-01-04 PROCEDURE — 36415 COLL VENOUS BLD VENIPUNCTURE: CPT | Performed by: FAMILY MEDICINE

## 2018-01-04 PROCEDURE — 80053 COMPREHEN METABOLIC PANEL: CPT | Performed by: FAMILY MEDICINE

## 2018-01-04 PROCEDURE — 99214 OFFICE O/P EST MOD 30 MIN: CPT | Performed by: FAMILY MEDICINE

## 2018-01-04 NOTE — PROGRESS NOTES
SUBJECTIVE:   Carol Merida is a 82 year old female who presents to clinic today for the following health issues:      Abdominal Pain      Duration: x 3 weeks    Description (location/character/radiation): right lower abdomen- deep dull ache- radiates around to the side       Associated flank pain: None    Intensity:  Mild to severe  Accompanying signs and symptoms:        Fever/Chills: no        Gas/Bloating: YES- gas       Nausea/vomitting: no        Diarrhea: no        Dysuria or Hematuria: no   Has swelling in that area    History (previous similar pain/trauma/previous testing): none    Precipitating or alleviating factors:       Pain worse with eating/BM/urination: none       Pain relieved by BM: no     Therapies tried and outcome: aleve     LMP:  not applicable            Problem list and histories reviewed & adjusted, as indicated.  Additional history:     See under ROS     Patient Active Problem List   Diagnosis     Obesity     Hypothyroidism     Benign neoplasm of colon     Mild Depression [296.31]     Obstructive sleep apnea     * * * SBE PROPHYLAXIS * * *     Degeneration of lumbar or lumbosacral intervertebral disc     Pain in joint, ankle and foot     Arthrodesis status     HYPERLIPIDEMIA LDL GOAL <100     Hypertension goal BP (blood pressure) < 140/90     Health Care Home     Osteopenia     Malignant neoplasm of female breast (H)     Impaired fasting glucose     ACP (advance care planning)     History of melanoma     BMI 45.0-49.9, adult (H)     Panic disorder without agoraphobia     Humerus fracture     Arthritis     PONV (postoperative nausea and vomiting)     S/P total hip arthroplasty     Constipation     DVT prophylaxis     Anticoagulation management encounter     Physical deconditioning     Periprosthetic fracture around internal prosthetic left hip joint (H)     Pain of left thigh     Chronic pain syndrome - hips     Fracture of greater trochanter of right femur 11/6/2016, closed, with  routine healing, subsequent encounter     Anxiety     Osteoarthritis of multiple joints, unspecified osteoarthritis type     Anemia due to blood loss, acute     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     Long term (current) use of anticoagulants     Aftercare following right hip joint replacement surgery     Hip pain, right     Abnormal gait     Vitamin D deficiency     Past Surgical History:   Procedure Laterality Date     ARTHROPLASTY HIP Left 7/6/2015    Procedure: ARTHROPLASTY HIP;  Surgeon: Junior Giordano MD;  Location: [facility] OR     ARTHROPLASTY HIP Right 11/2/2016    Procedure: ARTHROPLASTY HIP;  Surgeon: Junior Giordano MD;  Location: [facility] OR     ARTHROPLASTY REVISION HIP Left 7/22/2015    Procedure: ARTHROPLASTY REVISION HIP;  Surgeon: Junior Giordano MD;  Location: [facility] OR     C NONSPECIFIC PROCEDURE      Knee replacement x 2 (B)     C NONSPECIFIC PROCEDURE      s/p [facility]lect[facility])     C NONSPECIFIC PROCEDURE      s/p Appy (high school)     C NONSPECIFIC PROCEDURE      Melanoma removed with sentinel node bx     C NONSPECIFIC PROCEDURE       bilateral cataract     COLONOSCOPY  9/01    tubular adenoma; repeat 3 years.     COLONOSCOPY  9/04    normal; repeat 5 years ([facility])     HYSTERECTOMY, MARGAUX  summer 2004    and BSO     SURGICAL HISTORY OF -   9/05    left mastectomy     SURGICAL HISTORY OF -   2008    right ankle surgery; triple arthrodesis     SURGICAL HISTORY OF -   5/09    right ankle surgery; triple arthrodesis and deltoid reconstruction; possible other     SURGICAL HISTORY OF -   2/10    removal of hardware from right ankle       Current Outpatient Prescriptions   Medication Sig Dispense Refill     gabapentin (NEURONTIN) 300 MG capsule TAKE 1 CAPSULE(300 MG) BY MOUTH THREE TIMES DAILY 270 capsule 0     FLUoxetine (PROZAC) 40 MG capsule TAKE ONE CAPSULE BY MOUTH EVERY DAY 90 capsule 1     levothyroxine (SYNTHROID/LEVOTHROID) 88 MCG tablet Take 1 tablet (88 mcg) by  mouth daily 90 tablet 3     gabapentin (NEURONTIN) 300 MG capsule Take 1 capsule (300 mg) by mouth 3 times daily 270 capsule 0     simvastatin (ZOCOR) 10 MG tablet TAKE 1 TABLET BY MOUTH AT BEDTIME 90 tablet 2     ferrous sulfate (IRON) 325 (65 FE) MG tablet Take 1 tablet (325 mg) by mouth 2 times daily 60 tablet 2     ALPRAZolam (XANAX) 0.25 MG tablet Take 1 tablet (0.25 mg) by mouth 2 times daily as needed for anxiety Don't take with the oxycodone 10 tablet 0     calcium-vitamin D (CALCIUM 600 + D) 600-400 MG-UNIT per tablet Take 1 tablet by mouth daily 100 tablet 3     acetaminophen (TYLENOL) 325 MG tablet Take 3 tablets (975 mg) by mouth every 8 hours 500 tablet 1     Nystatin (NYSTOP) 525489 UNIT/GM POWD Apply tid to affectead area prn 60 g 1     albuterol (PROAIR HFA, PROVENTIL HFA, VENTOLIN HFA) 108 (90 BASE) MCG/ACT inhaler Inhale 2 puffs into the lungs as needed for shortness of breath / dyspnea or wheezing       fluticasone (FLONASE) 50 MCG/ACT nasal spray Spray 1 spray into both nostrils daily as needed   5     triamcinolone (KENALOG) 0.1 % cream Apply sparingly to affected area two times daily as needed for itching. 30 g 0     magnesium hydroxide (MILK OF MAGNESIA) 400 MG/5ML suspension Take 30 mLs by mouth daily as needed            Reviewed and updated as needed this visit by clinical staff     Reviewed and updated as needed this visit by Provider         ROS:  Here with a young woman I believe may be one of her grandchildren. CONSTITUTIONAL:NEGATIVE for fever, chills, change in weight  Resp: no cough. No change in chronic PETTY  CV: NEGATIVE for chest pain, palpitations  GI: see below  MUSCULOSKELETAL: see below  PSYCHIATRIC: noting several stressors.  has cancer.     Pain right side abodmen/hip.  Initially thought pulled a muscle.  Worse with movement.   Has tried heat.  Has not improved.     Nothing seems to make it better.   Up and moving around makes it worse.    Might have seen a Urologist  "(?) in the past. Believes same person that did mastectomy.  Not sure what it was for.     Eating does not seem to bother her.   BMs OK.   No N/V.    No urinary symptoms.     OBJECTIVE:     /88 (BP Location: Right arm, Cuff Size: Adult Large)  Pulse 80  Temp 97.7  F (36.5  C) (Oral)  Resp 18  Ht 5' 3\" (1.6 m)  Wt 267 lb 14.4 oz (121.5 kg)  SpO2 94%  BMI 47.46 kg/m2  Body mass index is 47.46 kg/(m^2).  GENERAL APPEARANCE: alert and no distress  She was not able to climb onto the exam table, even with assistance.   RESP: lungs clear to auscultation - no rales, rhonchi or wheezes  CV: regular rates and rhythm  ABDOMEN: soft, nontender, without hepatosplenomegaly or masses and bowel sounds normal  MS: there is some mild tenderness to palpation right side laterally; seems to be mostly along iliac crest; but also somewhat inferior to this.   PSYCH: mentation appears normal and affect normal/concerned.    Reviewed chart. She was noted to have gallstones in the past. Saw surgeon. HIDA scan was done and it was felt that she was not having symptoms from the stones; so surgery was not performed.         ASSESSMENT/PLAN:     Right sided abdominal pain  Uncertain etiology.   I do think this is most consistent with musculoskeletal origin. Discussed heat/ice.  Could consider having her visit with surgery again.  Will check labs to see if they raise additional concern.  - Comprehensive metabolic panel  - CBC with platelets    Calculus of gallbladder without cholecystitis without obstruction  She is known to have gallstones.   There is no relationship of her current pain to eating. It is more related to food.   Could consider having her visit with general surgery again.     Personal history of malignant neoplasm of breast    - *MA Screening Digital Bilateral; Future    History of left mastectomy    - *MA Screening Digital Bilateral; Future    Encounter for screening mammogram for breast cancer    - *MA Screening Digital " Bilateral; Future      Follow up prn or as previously directed.        Yessica Lora MD, MD  [facility]

## 2018-01-04 NOTE — NURSING NOTE
"Chief Complaint   Patient presents with     Abdominal Pain       Initial /88 (BP Location: Right arm, Cuff Size: Adult Large)  Pulse 80  Temp 97.7  F (36.5  C) (Oral)  Resp 18  Ht 5' 3\" (1.6 m)  Wt 267 lb 14.4 oz (121.5 kg)  SpO2 94%  BMI 47.46 kg/m2 Estimated body mass index is 47.46 kg/(m^2) as calculated from the following:    Height as of this encounter: 5' 3\" (1.6 m).    Weight as of this encounter: 267 lb 14.4 oz (121.5 kg).  Medication Reconciliation: complete   Maria Antonia Mendez, MERY    "

## 2018-01-04 NOTE — MR AVS SNAPSHOT
"              After Visit Summary   1/4/2018    Carol Merida    MRN: [id number]           Patient Information     Date Of Birth          1935        Visit Information        Provider Department      1/4/2018 2:50 PM Yessica Lora MD [facility]        Today's Diagnoses     Encounter for screening mammogram for breast cancer    -  1    Personal history of malignant neoplasm of breast        History of left mastectomy        Right sided abdominal pain           Follow-ups after your visit        Future tests that were ordered for you today     Open Future Orders        Priority Expected Expires Ordered    *MA Screening Digital Bilateral Routine  1/4/2019 1/4/2018            Who to contact     If you have questions or need follow up information about today's clinic visit or your schedule please contact [facility] directly at [phone].  Normal or non-critical lab and imaging results will be communicated to you by MyChart, letter or phone within 4 business days after the clinic has received the results. If you do not hear from us within 7 days, please contact the clinic through MyChart or phone. If you have a critical or abnormal lab result, we will notify you by phone as soon as possible.  Submit refill requests through [employer] or call your pharmacy and they will forward the refill request to us. Please allow 3 business days for your refill to be completed.          Additional Information About Your Visit        [employer] Information     [employer] lets you send messages to your doctor, view your test results, renew your prescriptions, schedule appointments and more. To sign up, go to [email or web address] . Click on \"Log in\" on the left side of the screen, which will take you to the Welcome page. Then click on \"Sign up Now\" on the right side of the page.     You will be asked to enter the access code listed below, as well as some personal information. Please follow the " "directions to create your username and password.     Your access code is: [email or web address]  Expires: 2018 12:06 PM     Your access code will  in 90 days. If you need help or a new code, please call your [place] clinic or [phone].        Care EveryWhere ID     This is your Care EveryWhere ID. This could be used by other organizations to access your [place] medical records  [id number]        Your Vitals Were     Pulse Temperature Respirations Height Pulse Oximetry BMI (Body Mass Index)    80 97.7  F (36.5  C) (Oral) 18 5' 3\" (1.6 m) 94% 47.46 kg/m2       Blood Pressure from Last 3 Encounters:   18 142/88   17 132/82   08/15/17 130/82    Weight from Last 3 Encounters:   18 267 lb 14.4 oz (121.5 kg)   17 276 lb 6.4 oz (125.4 kg)   08/15/17 279 lb 4.8 oz (126.7 kg)              We Performed the Following     CBC with platelets     Comprehensive metabolic panel        Primary Care Provider Office Phone # Fax #    Yessica Lora [phone] [address] [facility] [address]        Equal Access to Services     CARLOS KENNY AH: Hadii aad ku hadasho Soomaali, waaxda luqadaha, qaybta kaalmada adeegyada, waxay idiin hayaan sandra khha [place] . So [facility] [phone].    ATENCIÓN: Si habla español, tiene a johns disposición servicios gratuitos de asistencia lingüística. ame al [phone].    We comply with applicable federal civil rights laws and [place] laws. We do not discriminate on the basis of race, color, national origin, age, disability, sex, sexual orientation, or gender identity.            Thank you!     Thank you for choosing [facility]  for your care. Our goal is always to provide you with excellent care. Hearing back from our patients is one way we can continue to improve our services. Please take a few minutes to complete the written survey that you may receive in the mail after your visit with us. Thank you!             Your " Updated Medication List - Protect others around you: Learn how to safely use, store and throw away your medicines at www.disposemymeds.org.          This list is accurate as of: 1/4/18  4:05 PM.  Always use your most recent med list.                   Brand Name Dispense Instructions for use Diagnosis    acetaminophen 325 MG tablet    TYLENOL    500 tablet    Take 3 tablets (975 mg) by mouth every 8 hours    Status post total replacement of right hip       albuterol 108 (90 BASE) MCG/ACT Inhaler    PROAIR HFA/PROVENTIL HFA/VENTOLIN HFA     Inhale 2 puffs into the lungs as needed for shortness of breath / dyspnea or wheezing        ALPRAZolam 0.25 MG tablet    XANAX    10 tablet    Take 1 tablet (0.25 mg) by mouth 2 times daily as needed for anxiety Don't take with the oxycodone    Anxiety       calcium 600 + D 600-400 MG-UNIT per tablet   Generic drug:  calcium-vitamin D     100 tablet    Take 1 tablet by mouth daily    Osteopenia       ferrous sulfate 325 (65 FE) MG tablet    IRON    60 tablet    Take 1 tablet (325 mg) by mouth 2 times daily    Anemia due to blood loss, acute       FLUoxetine 40 MG capsule    PROzac    90 capsule    TAKE ONE CAPSULE BY MOUTH EVERY DAY    Mild depression (H)       fluticasone 50 MCG/ACT spray    FLONASE     Spray 1 spray into both nostrils daily as needed        * gabapentin 300 MG capsule    NEURONTIN    270 capsule    Take 1 capsule (300 mg) by mouth 3 times daily    Osteoarthritis of multiple joints, unspecified osteoarthritis type, DDD (degenerative disc disease), lumbar, Osteoarthritis of spine with radiculopathy, lumbar region       * gabapentin 300 MG capsule    NEURONTIN    270 capsule    TAKE 1 CAPSULE(300 MG) BY MOUTH THREE TIMES DAILY    Osteoarthritis of multiple joints, unspecified osteoarthritis type, DDD (degenerative disc disease), lumbar       levothyroxine 88 MCG tablet    SYNTHROID/LEVOTHROID    90 tablet    Take 1 tablet (88 mcg) by mouth daily    Hypothyroidism,  unspecified type       magnesium hydroxide 400 MG/5ML suspension    MILK OF MAGNESIA     Take 30 mLs by mouth daily as needed        NYSTOP 575440 UNIT/GM Powd   Generic drug:  nystatin     60 g    Apply tid to affectead area prn    Intertrigo       simvastatin 10 MG tablet    ZOCOR    90 tablet    TAKE 1 TABLET BY MOUTH AT BEDTIME    Hyperlipidemia LDL goal <100       triamcinolone 0.1 % cream    KENALOG    30 g    Apply sparingly to affected area two times daily as needed for itching.    Atopic dermatitis       * Notice:  This list has 2 medication(s) that are the same as other medications prescribed for you. Read the directions carefully, and ask your doctor or other care provider to review them with you.

## 2018-01-05 LAB
ALBUMIN SERPL-MCNC: 4.2 G/DL (ref 3.4–5)
ALP SERPL-CCNC: 104 U/L (ref 40–150)
ALT SERPL W P-5'-P-CCNC: 23 U/L (ref 0–50)
ANION GAP SERPL CALCULATED.3IONS-SCNC: 7 MMOL/L (ref 3–14)
AST SERPL W P-5'-P-CCNC: 23 U/L (ref 0–45)
BILIRUB SERPL-MCNC: 0.7 MG/DL (ref 0.2–1.3)
BUN SERPL-MCNC: 18 MG/DL (ref 7–30)
CALCIUM SERPL-MCNC: 9.4 MG/DL (ref 8.5–10.1)
CHLORIDE SERPL-SCNC: 103 MMOL/L (ref 94–109)
CO2 SERPL-SCNC: 30 MMOL/L (ref 20–32)
CREAT SERPL-MCNC: 0.74 MG/DL (ref 0.52–1.04)
GFR SERPL CREATININE-BSD FRML MDRD: 75 ML/MIN/1.7M2
GLUCOSE SERPL-MCNC: 86 MG/DL (ref 70–99)
POTASSIUM SERPL-SCNC: 4.9 MMOL/L (ref 3.4–5.3)
PROT SERPL-MCNC: 7.6 G/DL (ref 6.8–8.8)
SODIUM SERPL-SCNC: 140 MMOL/L (ref 133–144)

## 2018-01-18 ENCOUNTER — HOSPITAL ENCOUNTER (OUTPATIENT)
Dept: MAMMOGRAPHY | Facility: CLINIC | Age: 83
Discharge: HOME | End: 2018-01-18
Attending: FAMILY MEDICINE | Admitting: FAMILY MEDICINE
Payer: COMMERCIAL

## 2018-01-18 DIAGNOSIS — Z85.3 PERSONAL HISTORY OF MALIGNANT NEOPLASM OF BREAST: ICD-10-CM

## 2018-01-18 DIAGNOSIS — Z12.31 ENCOUNTER FOR SCREENING MAMMOGRAM FOR BREAST CANCER: ICD-10-CM

## 2018-01-18 DIAGNOSIS — Z90.12 HISTORY OF LEFT MASTECTOMY: ICD-10-CM

## 2018-01-18 PROCEDURE — 77063 BREAST TOMOSYNTHESIS BI: CPT | Mod: 52

## 2018-01-22 ENCOUNTER — TELEPHONE (OUTPATIENT)
Dept: FAMILY MEDICINE | Facility: CLINIC | Age: 83
End: 2018-01-22

## 2018-01-22 NOTE — TELEPHONE ENCOUNTER
Pt calls.      Two weeks ago she saw Dr. Lora.  She was seen with a pain in right side.  She was to go back to ortho.  He said she should go back to see Dr. Lora.  Dr. Giordano from [facility] was going to be sending Dr. Lora some information.  She just saw Dr. Giordano on Thursday.  Not seeing any notes from Dr. Giordano yet.  She will check back in the end of the week.

## 2018-01-30 NOTE — PROGRESS NOTES
Patient failed to return to PT for further treatment after last visit.  Therefore, patient will be discharged at this time.  Please refer to SOAP note and/or progress note dated 1/27/17 for discharge status.

## 2018-02-06 DIAGNOSIS — M51.369 DDD (DEGENERATIVE DISC DISEASE), LUMBAR: ICD-10-CM

## 2018-02-06 DIAGNOSIS — M15.9 OSTEOARTHRITIS OF MULTIPLE JOINTS, UNSPECIFIED OSTEOARTHRITIS TYPE: ICD-10-CM

## 2018-02-07 NOTE — TELEPHONE ENCOUNTER
Routing refill request to provider for review/approval because:  Drug not on the [facility] refill protocol   Rossy Perez, RN  Triage Nurse

## 2018-02-07 NOTE — TELEPHONE ENCOUNTER
Looks like she was given #270 mid December; shouldn't that last another month?  Please see what's up...

## 2018-02-07 NOTE — TELEPHONE ENCOUNTER
gabapentin (NEURONTIN) 300 MG capsule      Last Written Prescription Date:  12/14/2017  Last Fill Quantity: 270,   # refills: 0  Last Office Visit: 1/4/2018  Future Office visit:       Routing refill request to provider for review/approval because:  Drug not on the [facility], [facility] or [facility] refill protocol or controlled substance

## 2018-02-08 DIAGNOSIS — M15.9 OSTEOARTHRITIS OF MULTIPLE JOINTS, UNSPECIFIED OSTEOARTHRITIS TYPE: ICD-10-CM

## 2018-02-08 DIAGNOSIS — M51.369 DDD (DEGENERATIVE DISC DISEASE), LUMBAR: ICD-10-CM

## 2018-02-08 RX ORDER — GABAPENTIN 300 MG/1
CAPSULE ORAL
Qty: 270 CAPSULE | Refills: 0 | OUTPATIENT
Start: 2018-02-08

## 2018-02-08 NOTE — TELEPHONE ENCOUNTER
"Pharmacy calls and this patient did lose one bottle of these pills,   So she only has 2 days left. A 90 day refill is given in 3 separate bottles, so   She did misplace one of them.   Huddled with Dr Lora, and this is considered a controlled substance,   so we do not want to do extra refills if they are not needed.  These medications are watched closely.   Not sure either if her insurance will cover another refill at this time.   May need to pay out of pocket.  Could possibly send a \"lost\" prescription claim.    Left message for patient to call back. Will wait for her to call back and see if she can locate the other bottle.  If needed per Dr. Lora, if she cannot locate bottle, then we could send one month in for her.  Perhaps future refills should be one month at a time so they are more manageable.    Rossy Perez, RN  Triage Nurse  "

## 2018-02-09 RX ORDER — GABAPENTIN 300 MG/1
CAPSULE ORAL
Qty: 90 CAPSULE | Refills: 0 | OUTPATIENT
Start: 2018-02-09

## 2018-02-09 NOTE — TELEPHONE ENCOUNTER
Patient calls back, she did find the other bottle of pills, so  Does not need this refilled now.   Rossy Perez, RN  Triage Nurse

## 2018-03-21 DIAGNOSIS — M15.9 OSTEOARTHRITIS OF MULTIPLE JOINTS, UNSPECIFIED OSTEOARTHRITIS TYPE: ICD-10-CM

## 2018-03-21 DIAGNOSIS — M51.369 DDD (DEGENERATIVE DISC DISEASE), LUMBAR: ICD-10-CM

## 2018-03-21 NOTE — TELEPHONE ENCOUNTER
Routing refill request to provider for review/approval because:  Drug not on the [facility] refill protocol     [place]:  reviewed- no concerns    [place]  [email or web address]  Last Filled:  12/14/2017, #434  11/09/2017, #928  10/4/2017, #90 (Patrick CROCKETT)

## 2018-03-21 NOTE — TELEPHONE ENCOUNTER
gabapentin (NEURONTIN) 300 MG capsule      Last Written Prescription Date:  12/14/2017  Last Fill Quantity: 270,   # refills: 0  Last Office Visit: 1/4/2018  Future Office visit:       Routing refill request to provider for review/approval because:  Drug not on the [facility], [facility] or [facility] refill protocol or controlled substance

## 2018-03-22 RX ORDER — GABAPENTIN 300 MG/1
CAPSULE ORAL
Qty: 270 CAPSULE | Refills: 1 | Status: SHIPPED | OUTPATIENT
Start: 2018-03-22 | End: 2018-06-02

## 2018-04-05 ENCOUNTER — TELEPHONE (OUTPATIENT)
Dept: FAMILY MEDICINE | Facility: CLINIC | Age: 83
End: 2018-04-05

## 2018-04-05 DIAGNOSIS — E78.5 HYPERLIPIDEMIA LDL GOAL <100: ICD-10-CM

## 2018-04-05 NOTE — TELEPHONE ENCOUNTER
"Requested Prescriptions   Pending Prescriptions Disp Refills     simvastatin (ZOCOR) 10 MG tablet [Pharmacy Med Name: SIMVASTATIN 10MG TABLETS] 90 tablet 0     Sig: TAKE 1 TABLET BY MOUTH AT BEDTIME    Statins Protocol Passed    4/5/2018  2:12 PM       Passed - LDL on file in past 12 months    Recent Labs   Lab Test  04/28/17   1029   LDL  40            Passed - No abnormal creatine kinase in past 12 months    No lab results found.            Passed - Recent (12 mo) or future (30 days) visit within the authorizing provider's specialty    Patient had office visit in the last 12 months or has a visit in the next 30 days with authorizing provider or within the authorizing provider's specialty.  See \"Patient Info\" tab in inbasket, or \"Choose Columns\" in Meds & Orders section of the refill encounter.           Passed - Patient is age 18 or older       Passed - No active pregnancy on record       Passed - No positive pregnancy test in past 12 months          "

## 2018-04-05 NOTE — TELEPHONE ENCOUNTER
"Requested Prescriptions   Pending Prescriptions Disp Refills     simvastatin (ZOCOR) 10 MG tablet [Pharmacy Med Name: SIMVASTATIN 10MG TABLETS]  Last Written Prescription Date:  6/27/2017  Last Fill Quantity: 90,  # refills: 2   Last office visit: 1/4/2018 with prescribing provider:  Yessica Lora   Future Office Visit:     90 tablet 0     Sig: TAKE 1 TABLET BY MOUTH AT BEDTIME    Statins Protocol Passed    4/5/2018  2:05 PM       Passed - LDL on file in past 12 months    Recent Labs   Lab Test  04/28/17   1029   LDL  40            Passed - No abnormal creatine kinase in past 12 months    No lab results found.            Passed - Recent (12 mo) or future (30 days) visit within the authorizing provider's specialty    Patient had office visit in the last 12 months or has a visit in the next 30 days with authorizing provider or within the authorizing provider's specialty.  See \"Patient Info\" tab in inbasket, or \"Choose Columns\" in Meds & Orders section of the refill encounter.           Passed - Patient is age 18 or older       Passed - No active pregnancy on record       Passed - No positive pregnancy test in past 12 months          "

## 2018-04-09 RX ORDER — SIMVASTATIN 10 MG
TABLET ORAL
Qty: 90 TABLET | Refills: 0 | Status: SHIPPED | OUTPATIENT
Start: 2018-04-09 | End: 2018-07-13

## 2018-04-09 NOTE — TELEPHONE ENCOUNTER
Prescription refilled x 1 per [facility] [place] Protocol. Patient due for cholesterol check only. Lab ordered.     Routing to [facility] Nurse [facility], please call to assist patient in scheduling a FASTING lab only appointment for a cholesterol recheck. She is not due for any other labs.

## 2018-04-17 NOTE — TELEPHONE ENCOUNTER
Spoke to patient and scheduled a fasting lab appt for 4/23/18 in the [facility].

## 2018-04-23 DIAGNOSIS — E78.5 HYPERLIPIDEMIA LDL GOAL <100: ICD-10-CM

## 2018-04-23 PROCEDURE — 36415 COLL VENOUS BLD VENIPUNCTURE: CPT | Performed by: FAMILY MEDICINE

## 2018-04-23 PROCEDURE — 80061 LIPID PANEL: CPT | Performed by: FAMILY MEDICINE

## 2018-04-24 LAB
CHOLEST SERPL-MCNC: 119 MG/DL
HDLC SERPL-MCNC: 60 MG/DL
LDLC SERPL CALC-MCNC: 38 MG/DL
NONHDLC SERPL-MCNC: 59 MG/DL
TRIGL SERPL-MCNC: 107 MG/DL

## 2018-06-02 ENCOUNTER — APPOINTMENT (OUTPATIENT)
Dept: GENERAL RADIOLOGY | Facility: CLINIC | Age: 83
DRG: 193 | End: 2018-06-02
Attending: EMERGENCY MEDICINE
Payer: COMMERCIAL

## 2018-06-02 ENCOUNTER — HOSPITAL ENCOUNTER (INPATIENT)
Facility: CLINIC | Age: 83
LOS: 6 days | Discharge: HOME HEALTH | DRG: 193 | End: 2018-06-08
Attending: EMERGENCY MEDICINE | Admitting: INTERNAL MEDICINE
Payer: COMMERCIAL

## 2018-06-02 DIAGNOSIS — J18.9 PNEUMONIA OF LEFT LOWER LOBE DUE TO INFECTIOUS ORGANISM: ICD-10-CM

## 2018-06-02 DIAGNOSIS — I50.31 ACUTE DIASTOLIC CONGESTIVE HEART FAILURE (H): ICD-10-CM

## 2018-06-02 DIAGNOSIS — M62.81 GENERALIZED MUSCLE WEAKNESS: ICD-10-CM

## 2018-06-02 DIAGNOSIS — I10 BENIGN ESSENTIAL HYPERTENSION: Primary | ICD-10-CM

## 2018-06-02 DIAGNOSIS — K59.00 CONSTIPATION, UNSPECIFIED CONSTIPATION TYPE: ICD-10-CM

## 2018-06-02 LAB
ALBUMIN UR-MCNC: NEGATIVE MG/DL
ANION GAP SERPL CALCULATED.3IONS-SCNC: 7 MMOL/L (ref 3–14)
APPEARANCE UR: CLEAR
BASOPHILS # BLD AUTO: 0 10E9/L (ref 0–0.2)
BASOPHILS NFR BLD AUTO: 0.3 %
BILIRUB UR QL STRIP: NEGATIVE
BUN SERPL-MCNC: 15 MG/DL (ref 7–30)
CALCIUM SERPL-MCNC: 9.4 MG/DL (ref 8.5–10.1)
CHLORIDE SERPL-SCNC: 102 MMOL/L (ref 94–109)
CO2 SERPL-SCNC: 28 MMOL/L (ref 20–32)
COLOR UR AUTO: YELLOW
CREAT SERPL-MCNC: 0.59 MG/DL (ref 0.52–1.04)
CREAT SERPL-MCNC: 0.73 MG/DL (ref 0.52–1.04)
DIFFERENTIAL METHOD BLD: ABNORMAL
EOSINOPHIL # BLD AUTO: 0 10E9/L (ref 0–0.7)
EOSINOPHIL NFR BLD AUTO: 0.2 %
ERYTHROCYTE [DISTWIDTH] IN BLOOD BY AUTOMATED COUNT: 12.9 % (ref 10–15)
GFR SERPL CREATININE-BSD FRML MDRD: 76 ML/MIN/1.7M2
GFR SERPL CREATININE-BSD FRML MDRD: >90 ML/MIN/1.7M2
GLUCOSE SERPL-MCNC: 139 MG/DL (ref 70–99)
GLUCOSE UR STRIP-MCNC: NEGATIVE MG/DL
HCT VFR BLD AUTO: 44.8 % (ref 35–47)
HGB BLD-MCNC: 14.5 G/DL (ref 11.7–15.7)
HGB UR QL STRIP: NEGATIVE
IMM GRANULOCYTES # BLD: 0.1 10E9/L (ref 0–0.4)
IMM GRANULOCYTES NFR BLD: 0.6 %
KETONES UR STRIP-MCNC: NEGATIVE MG/DL
LEUKOCYTE ESTERASE UR QL STRIP: NEGATIVE
LYMPHOCYTES # BLD AUTO: 1 10E9/L (ref 0.8–5.3)
LYMPHOCYTES NFR BLD AUTO: 7.6 %
MCH RBC QN AUTO: 29.7 PG (ref 26.5–33)
MCHC RBC AUTO-ENTMCNC: 32.4 G/DL (ref 31.5–36.5)
MCV RBC AUTO: 92 FL (ref 78–100)
MONOCYTES # BLD AUTO: 1.1 10E9/L (ref 0–1.3)
MONOCYTES NFR BLD AUTO: 7.9 %
MUCOUS THREADS #/AREA URNS LPF: PRESENT /LPF
NEUTROPHILS # BLD AUTO: 11.5 10E9/L (ref 1.6–8.3)
NEUTROPHILS NFR BLD AUTO: 83.4 %
NITRATE UR QL: NEGATIVE
NRBC # BLD AUTO: 0 10*3/UL
NRBC BLD AUTO-RTO: 0 /100
NT-PROBNP SERPL-MCNC: 187 PG/ML (ref 0–1800)
PH UR STRIP: 7 PH (ref 5–7)
PLATELET # BLD AUTO: 207 10E9/L (ref 150–450)
PLATELET # BLD AUTO: 247 10E9/L (ref 150–450)
POTASSIUM SERPL-SCNC: 3.9 MMOL/L (ref 3.4–5.3)
PROCALCITONIN SERPL-MCNC: 0.09 NG/ML
RBC # BLD AUTO: 4.88 10E12/L (ref 3.8–5.2)
RBC #/AREA URNS AUTO: 4 /HPF (ref 0–2)
SODIUM SERPL-SCNC: 137 MMOL/L (ref 133–144)
SOURCE: ABNORMAL
SP GR UR STRIP: 1.01 (ref 1–1.03)
TROPONIN I SERPL-MCNC: <0.015 UG/L (ref 0–0.04)
UROBILINOGEN UR STRIP-MCNC: 4 MG/DL (ref 0–2)
WBC # BLD AUTO: 13.8 10E9/L (ref 4–11)
WBC #/AREA URNS AUTO: <1 /HPF (ref 0–5)

## 2018-06-02 PROCEDURE — 73502 X-RAY EXAM HIP UNI 2-3 VIEWS: CPT

## 2018-06-02 PROCEDURE — 25000132 ZZH RX MED GY IP 250 OP 250 PS 637: Performed by: EMERGENCY MEDICINE

## 2018-06-02 PROCEDURE — 96365 THER/PROPH/DIAG IV INF INIT: CPT

## 2018-06-02 PROCEDURE — 93005 ELECTROCARDIOGRAM TRACING: CPT

## 2018-06-02 PROCEDURE — 83880 ASSAY OF NATRIURETIC PEPTIDE: CPT | Performed by: EMERGENCY MEDICINE

## 2018-06-02 PROCEDURE — 84484 ASSAY OF TROPONIN QUANT: CPT | Performed by: EMERGENCY MEDICINE

## 2018-06-02 PROCEDURE — 12000000 ZZH R&B MED SURG/OB

## 2018-06-02 PROCEDURE — 87186 SC STD MICRODIL/AGAR DIL: CPT | Performed by: EMERGENCY MEDICINE

## 2018-06-02 PROCEDURE — 84145 PROCALCITONIN (PCT): CPT | Performed by: INTERNAL MEDICINE

## 2018-06-02 PROCEDURE — 25000128 H RX IP 250 OP 636: Performed by: EMERGENCY MEDICINE

## 2018-06-02 PROCEDURE — 81001 URINALYSIS AUTO W/SCOPE: CPT | Performed by: EMERGENCY MEDICINE

## 2018-06-02 PROCEDURE — 71046 X-RAY EXAM CHEST 2 VIEWS: CPT

## 2018-06-02 PROCEDURE — 96361 HYDRATE IV INFUSION ADD-ON: CPT

## 2018-06-02 PROCEDURE — 25000128 H RX IP 250 OP 636: Performed by: INTERNAL MEDICINE

## 2018-06-02 PROCEDURE — 80048 BASIC METABOLIC PNL TOTAL CA: CPT | Performed by: EMERGENCY MEDICINE

## 2018-06-02 PROCEDURE — 25000132 ZZH RX MED GY IP 250 OP 250 PS 637: Performed by: INTERNAL MEDICINE

## 2018-06-02 PROCEDURE — 87800 DETECT AGNT MULT DNA DIREC: CPT | Performed by: EMERGENCY MEDICINE

## 2018-06-02 PROCEDURE — 87077 CULTURE AEROBIC IDENTIFY: CPT | Performed by: EMERGENCY MEDICINE

## 2018-06-02 PROCEDURE — 85025 COMPLETE CBC W/AUTO DIFF WBC: CPT | Performed by: EMERGENCY MEDICINE

## 2018-06-02 PROCEDURE — 87040 BLOOD CULTURE FOR BACTERIA: CPT | Performed by: EMERGENCY MEDICINE

## 2018-06-02 PROCEDURE — 36415 COLL VENOUS BLD VENIPUNCTURE: CPT | Performed by: EMERGENCY MEDICINE

## 2018-06-02 PROCEDURE — 99285 EMERGENCY DEPT VISIT HI MDM: CPT | Mod: 25

## 2018-06-02 PROCEDURE — 82565 ASSAY OF CREATININE: CPT | Performed by: INTERNAL MEDICINE

## 2018-06-02 PROCEDURE — 36415 COLL VENOUS BLD VENIPUNCTURE: CPT | Performed by: INTERNAL MEDICINE

## 2018-06-02 PROCEDURE — 99223 1ST HOSP IP/OBS HIGH 75: CPT | Mod: AI | Performed by: INTERNAL MEDICINE

## 2018-06-02 PROCEDURE — 85049 AUTOMATED PLATELET COUNT: CPT | Performed by: INTERNAL MEDICINE

## 2018-06-02 RX ORDER — POTASSIUM CL/LIDO/0.9 % NACL 10MEQ/0.1L
10 INTRAVENOUS SOLUTION, PIGGYBACK (ML) INTRAVENOUS
Status: DISCONTINUED | OUTPATIENT
Start: 2018-06-02 | End: 2018-06-08 | Stop reason: HOSPADM

## 2018-06-02 RX ORDER — ALBUTEROL SULFATE 90 UG/1
2 AEROSOL, METERED RESPIRATORY (INHALATION) EVERY 4 HOURS PRN
Status: DISCONTINUED | OUTPATIENT
Start: 2018-06-02 | End: 2018-06-08 | Stop reason: HOSPADM

## 2018-06-02 RX ORDER — HYDRALAZINE HYDROCHLORIDE 20 MG/ML
10 INJECTION INTRAMUSCULAR; INTRAVENOUS EVERY 4 HOURS PRN
Status: DISCONTINUED | OUTPATIENT
Start: 2018-06-02 | End: 2018-06-08 | Stop reason: HOSPADM

## 2018-06-02 RX ORDER — ACETAMINOPHEN 325 MG/1
650 TABLET ORAL EVERY 4 HOURS PRN
Status: DISCONTINUED | OUTPATIENT
Start: 2018-06-02 | End: 2018-06-08 | Stop reason: HOSPADM

## 2018-06-02 RX ORDER — POTASSIUM CHLORIDE 1500 MG/1
20-40 TABLET, EXTENDED RELEASE ORAL
Status: DISCONTINUED | OUTPATIENT
Start: 2018-06-02 | End: 2018-06-08 | Stop reason: HOSPADM

## 2018-06-02 RX ORDER — ONDANSETRON 2 MG/ML
4 INJECTION INTRAMUSCULAR; INTRAVENOUS EVERY 6 HOURS PRN
Status: DISCONTINUED | OUTPATIENT
Start: 2018-06-02 | End: 2018-06-08 | Stop reason: HOSPADM

## 2018-06-02 RX ORDER — LEVOTHYROXINE SODIUM 88 UG/1
88 TABLET ORAL DAILY
Status: DISCONTINUED | OUTPATIENT
Start: 2018-06-02 | End: 2018-06-08 | Stop reason: HOSPADM

## 2018-06-02 RX ORDER — CEFTRIAXONE 1 G/1
1 INJECTION, POWDER, FOR SOLUTION INTRAMUSCULAR; INTRAVENOUS ONCE
Status: COMPLETED | OUTPATIENT
Start: 2018-06-02 | End: 2018-06-02

## 2018-06-02 RX ORDER — ACETAMINOPHEN 325 MG/1
650 TABLET ORAL ONCE
Status: COMPLETED | OUTPATIENT
Start: 2018-06-02 | End: 2018-06-02

## 2018-06-02 RX ORDER — SIMVASTATIN 10 MG
10 TABLET ORAL AT BEDTIME
Status: DISCONTINUED | OUTPATIENT
Start: 2018-06-02 | End: 2018-06-08 | Stop reason: HOSPADM

## 2018-06-02 RX ORDER — POTASSIUM CHLORIDE 1.5 G/1.58G
20-40 POWDER, FOR SOLUTION ORAL
Status: DISCONTINUED | OUTPATIENT
Start: 2018-06-02 | End: 2018-06-08 | Stop reason: HOSPADM

## 2018-06-02 RX ORDER — SODIUM CHLORIDE 9 MG/ML
1000 INJECTION, SOLUTION INTRAVENOUS CONTINUOUS
Status: DISCONTINUED | OUTPATIENT
Start: 2018-06-02 | End: 2018-06-06

## 2018-06-02 RX ORDER — ALPRAZOLAM 0.25 MG
0.25 TABLET ORAL 2 TIMES DAILY PRN
Status: DISCONTINUED | OUTPATIENT
Start: 2018-06-02 | End: 2018-06-08 | Stop reason: HOSPADM

## 2018-06-02 RX ORDER — BENZONATATE 100 MG/1
100 CAPSULE ORAL 3 TIMES DAILY PRN
Status: DISCONTINUED | OUTPATIENT
Start: 2018-06-02 | End: 2018-06-08 | Stop reason: HOSPADM

## 2018-06-02 RX ORDER — POTASSIUM CHLORIDE 29.8 MG/ML
20 INJECTION INTRAVENOUS
Status: DISCONTINUED | OUTPATIENT
Start: 2018-06-02 | End: 2018-06-08 | Stop reason: HOSPADM

## 2018-06-02 RX ORDER — CEFTRIAXONE 2 G/1
2 INJECTION, POWDER, FOR SOLUTION INTRAMUSCULAR; INTRAVENOUS EVERY 24 HOURS
Status: DISCONTINUED | OUTPATIENT
Start: 2018-06-03 | End: 2018-06-08 | Stop reason: HOSPADM

## 2018-06-02 RX ORDER — POTASSIUM CHLORIDE 7.45 MG/ML
10 INJECTION INTRAVENOUS
Status: DISCONTINUED | OUTPATIENT
Start: 2018-06-02 | End: 2018-06-08 | Stop reason: HOSPADM

## 2018-06-02 RX ORDER — TRIAMCINOLONE ACETONIDE 1 MG/G
CREAM TOPICAL 2 TIMES DAILY
Status: DISCONTINUED | OUTPATIENT
Start: 2018-06-02 | End: 2018-06-05

## 2018-06-02 RX ORDER — ACETAMINOPHEN 325 MG/1
975 TABLET ORAL EVERY 8 HOURS
Status: DISCONTINUED | OUTPATIENT
Start: 2018-06-02 | End: 2018-06-08 | Stop reason: HOSPADM

## 2018-06-02 RX ORDER — ONDANSETRON 4 MG/1
4 TABLET, ORALLY DISINTEGRATING ORAL EVERY 6 HOURS PRN
Status: DISCONTINUED | OUTPATIENT
Start: 2018-06-02 | End: 2018-06-08 | Stop reason: HOSPADM

## 2018-06-02 RX ORDER — MAGNESIUM SULFATE HEPTAHYDRATE 40 MG/ML
4 INJECTION, SOLUTION INTRAVENOUS EVERY 4 HOURS PRN
Status: DISCONTINUED | OUTPATIENT
Start: 2018-06-02 | End: 2018-06-08 | Stop reason: HOSPADM

## 2018-06-02 RX ORDER — GABAPENTIN 300 MG/1
300 CAPSULE ORAL 3 TIMES DAILY
Status: DISCONTINUED | OUTPATIENT
Start: 2018-06-02 | End: 2018-06-08 | Stop reason: HOSPADM

## 2018-06-02 RX ORDER — FLUTICASONE PROPIONATE 50 MCG
1 SPRAY, SUSPENSION (ML) NASAL DAILY PRN
Status: DISCONTINUED | OUTPATIENT
Start: 2018-06-02 | End: 2018-06-08 | Stop reason: HOSPADM

## 2018-06-02 RX ORDER — NALOXONE HYDROCHLORIDE 0.4 MG/ML
.1-.4 INJECTION, SOLUTION INTRAMUSCULAR; INTRAVENOUS; SUBCUTANEOUS
Status: DISCONTINUED | OUTPATIENT
Start: 2018-06-02 | End: 2018-06-08 | Stop reason: HOSPADM

## 2018-06-02 RX ADMIN — GABAPENTIN 300 MG: 300 CAPSULE ORAL at 20:31

## 2018-06-02 RX ADMIN — ACETAMINOPHEN 650 MG: 325 TABLET, FILM COATED ORAL at 23:56

## 2018-06-02 RX ADMIN — SODIUM CHLORIDE 1000 ML: 9 INJECTION, SOLUTION INTRAVENOUS at 19:32

## 2018-06-02 RX ADMIN — SODIUM CHLORIDE 500 ML: 9 INJECTION, SOLUTION INTRAVENOUS at 09:20

## 2018-06-02 RX ADMIN — ENOXAPARIN SODIUM 40 MG: 40 INJECTION SUBCUTANEOUS at 17:10

## 2018-06-02 RX ADMIN — FLUOXETINE 40 MG: 20 CAPSULE ORAL at 12:55

## 2018-06-02 RX ADMIN — MICONAZOLE NITRATE: 2 POWDER TOPICAL at 20:25

## 2018-06-02 RX ADMIN — CEFTRIAXONE SODIUM 1 G: 1 INJECTION, POWDER, FOR SOLUTION INTRAMUSCULAR; INTRAVENOUS at 10:05

## 2018-06-02 RX ADMIN — SODIUM CHLORIDE 1000 ML: 9 INJECTION, SOLUTION INTRAVENOUS at 12:58

## 2018-06-02 RX ADMIN — AZITHROMYCIN MONOHYDRATE 500 MG: 500 INJECTION, POWDER, LYOPHILIZED, FOR SOLUTION INTRAVENOUS at 10:43

## 2018-06-02 RX ADMIN — BENZONATATE 100 MG: 100 CAPSULE ORAL at 17:39

## 2018-06-02 RX ADMIN — ACETAMINOPHEN 650 MG: 325 TABLET, FILM COATED ORAL at 09:41

## 2018-06-02 RX ADMIN — LEVOTHYROXINE SODIUM 88 MCG: 88 TABLET ORAL at 12:55

## 2018-06-02 RX ADMIN — SIMVASTATIN 10 MG: 10 TABLET, FILM COATED ORAL at 20:34

## 2018-06-02 RX ADMIN — GABAPENTIN 300 MG: 300 CAPSULE ORAL at 12:55

## 2018-06-02 RX ADMIN — ACETAMINOPHEN 975 MG: 325 TABLET, FILM COATED ORAL at 17:38

## 2018-06-02 ASSESSMENT — ENCOUNTER SYMPTOMS
VOMITING: 0
DYSURIA: 0
FEVER: 0
NAUSEA: 0
FREQUENCY: 0
HEMATURIA: 0
COUGH: 1
SHORTNESS OF BREATH: 1

## 2018-06-02 NOTE — H&P
[facility]    Hospitalist History and Physical    Name: Carol Merida    MRN: [id number]  YOB: 1935    Age: 83 year old  Date of Admission:  6/2/2018  Date of Service (when I saw the patient): 06/02/18    Assessment & Plan   Carol Merida is a 83 year old female with past medical history significant for hypothyroidism presents with 4 weeks history of shortness of breath cough weakness.  She presented to the emergency room after she slipped off the bed and was not able to get up.  Evaluation in the emergency room showed elevated white cell count left lower lobe pneumonia she is being admitted for left lower lobe pneumonia and weakness.      Left lower lobe pneumonia  --Low-grade fever leukocytosis mildly tachycardic on admission.  --Meeting sats on room air  --Slightly tachypneic  --Blood culture sent in emergency room  --Started on IV ceftriaxone and azithromycin in the emergency room will continue  --We will use nebs as needed    History of sleep apnea  --Continue CPAP with home settings    Hypothyroidism  --We will continue on Synthroid    Anxiety   --continue home regimen    Status post fall  --Mechanical fall slipped off the bed  --Consult PT    Elevated blood pressure/asymptomatic  --Not on any medications prior to admission  --Likely secondary to anxiety and pain  --We will order as needed hydralazine    DVT Prophylaxis: Heparin SQ  Code Status: Full Code    Disposition: Admitted for more than 2 midnight stay    Primary Care Physician   Yessica Lora MD    Chief Complaint   Cough, shortness of breath, weakness and fall    History is obtained from the patient    History of Present Illness   Carol Merida is a 83 year old female with past medical history significant for hypothyroidism, sleep apnea, anxiety presented to the emergency room after she slipped off the bed.  Per patient for the last 4 weeks she has had trouble breathing cough shortness of breath malaise weakness today  she slipped off the bed and was not able to get up because of her weakness.  Denies any chest pain.  Did have low-grade temperature.  Cough is mainly dry occasionally productive.  She denies any pain except for some shoulder pain chronic.  Also had some pain in her leg after fall.  Pain is at 4 out of 10.  Worse with range of motion.  No lightheadedness no nausea no palpitations no diaphoresis.  She does have bad knees and was weak and could not get up.  Review of all the other systems negative    Past Medical History    Past Medical History:   Diagnosis Date     Abnormal stress test     small area on stress thalium ?2003; but normal angiogram 2012     Cardiac dysrhythmia, unspecified     irregular heartbeat     Hypothyroid      Malignant 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     Melanoma of skin, site unspecified      NONSPECIFIC MEDICAL HISTORY 04    fx fifth finger right hand     Obstructive sleep apnea (adult) (pediatric) 8/25/2006    CPAP      Osteoarthritis      Other abnormal glucose      Other and unspecified hyperlipidemia      Other chronic pain     Joint pain for many years.     Other osteoporosis      PONV (postoperative nausea and vomiting)      Tubular adenoma in colon 9/01    colonoscopy due 2004     Unspecified essential hypertension          Past Surgical History   Past Surgical History:   Procedure Laterality Date     ARTHROPLASTY HIP Left 7/6/2015    Procedure: ARTHROPLASTY HIP;  Surgeon: Junior Giordano MD;  Location: [facility] OR     ARTHROPLASTY HIP Right 11/2/2016    Procedure: ARTHROPLASTY HIP;  Surgeon: Junior Giordano MD;  Location: [facility] OR     ARTHROPLASTY REVISION HIP Left 7/22/2015    Procedure: ARTHROPLASTY REVISION HIP;  Surgeon: Junior Giordano MD;  Location: [facility] OR     C NONSPECIFIC PROCEDURE      Knee replacement x 2 (B)     C NONSPECIFIC PROCEDURE      s/p Tonsillectomy (college)     C  NONSPECIFIC PROCEDURE      s/p Appy (high school)     C NONSPECIFIC PROCEDURE      Melanoma removed with sentinel node bx     C NONSPECIFIC PROCEDURE       bilateral cataract     COLONOSCOPY  9/01    tubular adenoma; repeat 3 years.     COLONOSCOPY  9/04    normal; repeat 5 years ([facility])     HYSTERECTOMY, MARGAUX  summer 2004    and BSO     SURGICAL HISTORY OF -   9/05    left mastectomy     SURGICAL HISTORY OF -   2008    right ankle surgery; triple arthrodesis     SURGICAL HISTORY OF -   5/09    right ankle surgery; triple arthrodesis and deltoid reconstruction; possible other     SURGICAL HISTORY OF -   2/10    removal of hardware from right ankle       Prior to Admission Medications   Prior to Admission Medications   Prescriptions Last Dose Informant Patient Reported? Taking?   ALPRAZolam (XANAX) 0.25 MG tablet   No No   Sig: Take 1 tablet (0.25 mg) by mouth 2 times daily as needed for anxiety Don't take with the oxycodone   FLUoxetine (PROZAC) 40 MG capsule   No No   Sig: TAKE ONE CAPSULE BY MOUTH EVERY DAY   Nystatin (NYSTOP) 551549 UNIT/GM POWD   No No   Sig: Apply tid to affectead area prn   acetaminophen (TYLENOL) 325 MG tablet   No No   Sig: Take 3 tablets (975 mg) by mouth every 8 hours   albuterol (PROAIR HFA, PROVENTIL HFA, VENTOLIN HFA) 108 (90 BASE) MCG/ACT inhaler   Yes No   Sig: Inhale 2 puffs into the lungs as needed for shortness of breath / dyspnea or wheezing   calcium-vitamin D (CALCIUM 600 + D) 600-400 MG-UNIT per tablet   Yes No   Sig: Take 1 tablet by mouth daily   ferrous sulfate (IRON) 325 (65 FE) MG tablet   Yes No   Sig: Take 1 tablet (325 mg) by mouth 2 times daily   fluticasone (FLONASE) 50 MCG/ACT nasal spray   Yes No   Sig: Spray 1 spray into both nostrils daily as needed    gabapentin (NEURONTIN) 300 MG capsule   No No   Sig: Take 1 capsule (300 mg) by mouth 3 times daily   gabapentin (NEURONTIN) 300 MG capsule   No No   Sig: TAKE 1 CAPSULE(300 MG) BY MOUTH THREE TIMES DAILY    levothyroxine (SYNTHROID/LEVOTHROID) 88 MCG tablet   No No   Sig: Take 1 tablet (88 mcg) by mouth daily   magnesium hydroxide (MILK OF MAGNESIA) 400 MG/5ML suspension   Yes No   Sig: Take 30 mLs by mouth daily as needed    simvastatin (ZOCOR) 10 MG tablet   No No   Sig: TAKE 1 TABLET BY MOUTH AT BEDTIME   triamcinolone (KENALOG) 0.1 % cream   No No   Sig: Apply sparingly to affected area two times daily as needed for itching.      Facility-Administered Medications: None     Allergies   Allergies   Allergen Reactions     Ace Inhibitors Cough       Social History   Social History   Substance Use Topics     Smoking status: Never Smoker     Smokeless tobacco: Never Used     Alcohol use 0.0 - 0.5 oz/week     0 - 1 Glasses of wine per week      Comment: 1 glass wine weekly     Social History     Social History Narrative     Lives with her .  Denies smoking.  Occasional wine.    Family History   Siblings have arthritis    Review of Systems   A Comprehensive greater than 10 system review of systems was carried out.  Pertinent positives and negatives are noted above.  Otherwise negative for contributory information.    Physical Exam   Temp: 100  F (37.8  C) Temp src: Oral BP: (!) 177/123 Pulse: 101 Heart Rate: 92 Resp: 16 SpO2: 90 %      Vital Signs with Ranges  Temp:  [100  F (37.8  C)] 100  F (37.8  C)  Pulse:  [101] 101  Heart Rate:  [92-98] 92  Resp:  [13-26] 16  BP: (141-177)/() 177/123  SpO2:  [89 %-95 %] 90 %  255 lbs 0 oz    GEN:  Alert, oriented x 3, appears comfortable, no overt distress  HEENT:  Normocephalic/atraumatic, no scleral icterus, no nasal discharge, mouth moist.  CV:  Regular rate and rhythm, no murmur or JVD.  S1 + S2 noted, no S3 or S4.  LUNGS:   left basilar crackles.  Symmetric chest rise on inhalation noted.  ABD:  Active bowel sounds, soft, non-tender/non-distended.  No rebound/guarding/rigidity.  EXT:  No edema.  No cyanosis.  No joint synovitis noted.  SKIN:  Dry to touch, no  exanthems noted in the visualized areas.  NEURO:  Symmetric muscle strength, sensation to touch grossly intact.  Coordination symmetric on general exam.  No new focal deficits appreciated.    Data   Data reviewed today:  I personally reviewed the EKG tracing showing Normal sinus rhythm with Q-wave in lead III and aVF.  Not new when compared to prior EKG.    No results for input(s): PH, PHV, PO2, PO2V, SAT, PCO2, PCO2V, HCO3, HCO3V in the last 168 hours.    Recent Labs  Lab 06/02/18  0700   WBC 13.8*   HGB 14.5   HCT 44.8   MCV 92          Recent Labs  Lab 06/02/18  0700      POTASSIUM 3.9   CHLORIDE 102   CO2 28   ANIONGAP 7   *   BUN 15   CR 0.73   GFRESTIMATED 76   GFRESTBLACK >90   LAKSHMI 9.4     No results for input(s): CULT in the last 168 hours.  No results for input(s): AST, ALT, GGT, ALKPHOS, BILITOTAL, BILICONJ, BILIDIRECT, SOPHIE in the last 168 hours.    Invalid input(s): BILIRUBININDIRECT  No results for input(s): INR in the last 168 hours.  No results for input(s): LACT in the last 168 hours.  No results for input(s): COLOR, APPEARANCE, URINEGLC, URINEBILI, URINEKETONE, SG, UBLD, URINEPH, PROTEIN, UROBILINOGEN, NITRITE, LEUKEST, RBCU, WBCU in the last 168 hours.    Recent Results (from the past 24 hour(s))   XR Chest 2 Views    Narrative    CHEST TWO VIEWS  6/2/2018 8:14 AM     COMPARISON: Two-view chest x-ray 5/10/2012.    HISTORY: Cough.      Impression    IMPRESSION: There may be minimal patchy airspace opacity in the left  lung base that could represent atelectasis or pneumonia. The lungs are  otherwise clear. There is no pleural effusion or pneumothorax. Heart  size is normal given AP technique.    ADELA OQUENDO MD

## 2018-06-02 NOTE — PLAN OF CARE
Problem: Pneumonia (Adult)  Goal: Signs and Symptoms of Listed Potential Problems Will be Absent, Minimized or Managed (Pneumonia)  Signs and symptoms of listed potential problems will be absent, minimized or managed by discharge/transition of care (reference Pneumonia (Adult) CPG).  Outcome: No Change  PM SHIFT    Paged DR Spencer at [phone] with this text  when you get time can you call be about a cpap question on this pt. Staff to clarify why pt has a new order that states cpap continous. Per pt she wears a machine at bedtime most of the time and on for 5 hours.       Paged DR Hickman again at 1642 with this text  pt would like something to help decrease her cough. Thanks   tessaloin order obtained  Also clarified if pt needs continous cardiac monitoring. Pt had the order from ER. Per Dr. Hickman no tele needed.       Pt alert and orientated. LS dims. Pt has occs nonproductive cough. Pt tolerating room air. IS obtained and educated on at shift change around 2320. Pt did get up to the  for her meal with assist of 2. Pt later ambulated to the  with assist of 1, walker, and gait belt. Pt complained of pain in her lower back right leg area. No bruise noted but pt does have a small bruise on her upper right back. Pt feels it is from her fall out of bed at home. Pt has her own cpap here but refused to wear it at hs. Pt had purewick placed to suction urine at the end of day shift. Pt had it removed while up in the chair however pt requested it back at hs. Pt states she wears a pad at home due to dribbling. Pt was inc with the purewick in early on in the shift. Pt voided once this shift and than purewick suctioned 150 ml of rico urine up. BP elevated. Has prn order but never above 180. Plan is home on DC.

## 2018-06-02 NOTE — IP AVS SNAPSHOT
MRN:[id number]                      After Visit Summary   6/2/2018    Carol Merida    MRN: [id number]           Thank you!     Thank you for choosing [facility] for your care. Our goal is always to provide you with excellent care. Hearing back from our patients is one way we can continue to improve our services. Please take a few minutes to complete the written survey that you may receive in the mail after you visit. If you would like to speak to someone directly about your visit please contact Patient Relations at [phone]. Thank you!          Patient Information     Date Of Birth          1935        Designated Caregiver       Most Recent Value    Caregiver    Will someone help with your care after discharge? yes    Name of designated caregiver Yudi    Phone number of caregiver     Caregiver address lives with pt see facesheet      About your hospital stay     You were admitted on:  June 2, 2018 You last received care in the:  [facility]    You were discharged on:  June 8, 2018        Reason for your hospital stay       Left lower lobe pneumonia                  Who to Call     For medical emergencies, please call 911.  For non-urgent questions about your medical care, please call your primary care provider or clinic, [phone]          Attending Provider     Provider Specialty    Yaneli Banerjee MD Emergency Medicine    [facility], Monique Escobar MD Internal Medicine       Primary Care Provider Office Phone # Fax #    Yessica Lora [phone]      After Care Instructions     Diet       Follow this diet upon discharge: Orders Placed This Encounter      Advance Diet as Tolerated: Regular Diet Adult; 3 gm NA Diet                  Follow-up Appointments     Follow-up and recommended labs and tests        Follow-up with primary care physician Monday or Tuesday with a basic metabolic panel, follow-up on Lasix need                   Your next 10 appointments already scheduled     Jun 12, 2018  2:50 PM CDT   Office Visit with Yessica Lora MD   [facility]    [address] [address] [phone]           Bring a current list of meds and any records pertaining to this visit. For Physicals, please bring immunization records and any forms needing to be filled out. Please arrive 10 minutes early to complete paperwork.              Further instructions from your care team       Your home care referral was sent to [facility], for Home Physical therapy and Home RN for education on you medical conditions and assist you to best manage them   If you haven't heard from them within the next 24-48 hours,  Please call them @ [phone]          General Recommendations To Control Heart Failure When You Get Home       Heart Failure Instructions for Patients and Families: Please read and check off each of these important instructions as you do them when you get home.          Weight and Symptoms       ___ Put a scale in your bathroom.    ___ Post a weight chart or calendar next to your scale.    ___ Weigh yourself everyday as soon as you get up in the morning (before breakfast).  You should only be wearing your pajamas.  Write your weight on the chart/calendar.  ___ Bring your weight chart/calendar with you to all appointments.  ___ Call your doctor or nurse practitioner if you gain 2 pounds (in 1 day) or 5 pounds in (1 week) from your goal  good  weight.  Your good weight is also called your  dry  weight.  Your doctor or nurse will tell you what your good weight should be.    ___ Call your doctor or nurse practitioner if you have shortness of breath that gets worse over time, leg swelling or fatigue.       Medications and Diet       ___ Make sure to take your medication as prescribed.    ___ Bring a current list of your medication and all of your medicine bottles with  you to all appointments.    ___ Limit fluids if you still have swelling or shortness of breath, or if your doctor tells you to do so.    ___ Eat less than 2000 mg of sodium (salt) every day. Read food labels, and do not add salt to meals. Remember, if you eat less salt you retain less fluid.  ___ Follow a heart healthy diet that is low in saturated fat.        Activity and Suggested Lifestyle Changes      ___ Stay active. Talk to your doctor about an exercise program that is safe for your heart.  ___ Stop smoking. Reduce alcohol use.      ___ Lose weight if you are overweight. Extra weight puts a lot of stress on the heart.                 Control for Leg Swelling     ___ Keep your legs elevated (raised) as needed for swelling. If swelling is uncomfortable or elevation doesn t help, ask your doctor about using ACE wraps or support stockings.        What is the [facility]?  Cardiomyopathy  Optimization  Rehabilitation  Education    The [facility] is a heart failure specialty clinic within [facility].  It is an outpatient disease management program that is based on a phase-by-phase approach, which is tailored to each patient s individual needs.  The cardiologist, nurse practitioner, physician assistant and nurses provide an ongoing outpatient care and treatment plan that guides heart failure and cardiomyopathy patients from evaluation and education to stabilization. This team works with your current primary care doctor and cardiologist to help you:    - Avoid hospitalizations  - Slow the progression of the disease  - Improve length and quality of life  - Know who and when to call if heart failure symptoms appear  - Receive easy access to quality health care and advice  - Better understand your condition and treatment  - Decrease the tremendous cost burden of heart failure care  - Detect future heart problems before they become life threatening                                 Follow-up Appointments  "    ___ An appointment with the [facility] may already have been made for you (see section   Your next appointments already scheduled ).  If you have a question about your appointment, would like to speak with a C.O.R.E. nurse, or would like to become a [facility] Clinic patient, please call one of the following locations:     - [facility]):                                             [phone]  - [facility] ([place]):                                            [phone]  - [facility] ([place]):                 [phone]      ___ Your [facility]O.R[facility] Clinic Team will continue to educate you on your heart failure and may adjust medications based on your vital signs, lab work, and how you are feeling.  Therefore, it is very important to bring the following to all C.O.R.E. appointments:    - An accurate list of your medications  - Your medicine bottles  - Your weight chart/calendar                   Pending Results     No orders found from 5/31/2018 to 6/3/2018.            Statement of Approval     Ordered          06/08/18 1419  I have reviewed and agree with all the recommendations and orders detailed in this document.  EFFECTIVE NOW     Approved and electronically signed by:  Juan Trejo MD             Admission Information     Date & Time Provider Department Dept. Phone    6/2/2018 Monique Hickman MD [facility] [phone]      Your Vitals Were     Blood Pressure Pulse Temperature Respirations Height Weight    161/76 (BP Location: Right arm) 79 98.6  F (37  C) (Oral) 18 1.6 m (5' 3\") 127.1 kg (280 lb 3.2 oz)    Pulse Oximetry BMI (Body Mass Index)                90% 49.64 kg/m2          MyChart Information     qualifyor lets you send messages to your doctor, view your test results, renew your prescriptions, schedule appointments and more. To sign up, go to [email or web address] . Click on \"Log in\" on the " "left side of the screen, which will take you to the Welcome page. Then click on \"Sign up Now\" on the right side of the page.     You will be asked to enter the access code listed below, as well as some personal information. Please follow the directions to create your username and password.     Your access code is: [email or web address]  Expires: 2018  2:48 PM     Your access code will  in 90 days. If you need help or a new code, please call your [facility] clinic or [phone].        Care EveryWhere ID     This is your Care EveryWhere ID. This could be used by other organizations to access your [facility] medical records  [id number]        Equal Access to Services     KHUSHI KENNY : Nury Hartmann, liliam granados, esequiel zuñiga, marc kaplan. So [facility] [phone].    ATENCIÓN: Si habla español, tiene a johns disposición servicios gratuitos de asistencia lingüística. Llame al [phone].    We comply with applicable federal civil rights laws and [place] laws. We do not discriminate on the basis of race, color, national origin, age, disability, sex, sexual orientation, or gender identity.               Review of your medicines      START taking        Dose / Directions    amLODIPine 5 MG tablet   Commonly known as:  NORVASC   Used for:  Benign essential hypertension        Dose:  5 mg   Start taking on:  2018   Take 1 tablet (5 mg) by mouth daily   Quantity:  30 tablet   Refills:  0       benzonatate 100 MG capsule   Commonly known as:  TESSALON        Dose:  100 mg   Take 1 capsule (100 mg) by mouth 3 times daily as needed for cough   Quantity:  42 capsule   Refills:  0       furosemide 40 MG tablet   Commonly known as:  LASIX   Used for:  Acute diastolic congestive heart failure (H)        Dose:  40 mg   Start taking on:  2018   Take 1 tablet (40 mg) by mouth daily   Quantity:  10 tablet   Refills:  0       polyethylene glycol powder   Commonly known " as:  MIRALAX   Used for:  Constipation, unspecified constipation type        Dose:  1 capful   Take 17 g (1 capful) by mouth daily   Quantity:  510 g   Refills:  1       potassium chloride 20 MEQ Packet   Commonly known as:  KLOR-CON   Used for:  Acute diastolic congestive heart failure (H)        Dose:  20 mEq   Take 20 mEq by mouth daily   Quantity:  30 tablet   Refills:  0       senna-docusate 8.6-50 MG per tablet   Commonly known as:  SENOKOT-S;PERICOLACE   Used for:  Constipation, unspecified constipation type        Dose:  1 tablet   Take 1 tablet by mouth 2 times daily   Quantity:  100 tablet   Refills:  0         CONTINUE these medicines which may have CHANGED, or have new prescriptions. If we are uncertain of the size of tablets/capsules you have at home, strength may be listed as something that might have changed.        Dose / Directions    albuterol 108 (90 Base) MCG/ACT Inhaler   Commonly known as:  PROAIR HFA/PROVENTIL HFA/VENTOLIN HFA   This may have changed:  when to take this        Dose:  2 puff   Inhale 2 puffs into the lungs every 4 hours as needed for shortness of breath / dyspnea or wheezing   Quantity:  1 Inhaler   Refills:  0         CONTINUE these medicines which have NOT CHANGED        Dose / Directions    acetaminophen 325 MG tablet   Commonly known as:  TYLENOL   Used for:  Status post total replacement of right hip        Dose:  975 mg   Take 3 tablets (975 mg) by mouth every 8 hours   Quantity:  500 tablet   Refills:  1       ALPRAZolam 0.25 MG tablet   Commonly known as:  XANAX   Used for:  Anxiety        Dose:  0.25 mg   Take 1 tablet (0.25 mg) by mouth 2 times daily as needed for anxiety Don't take with the oxycodone   Quantity:  10 tablet   Refills:  0       calcium 600 + D 600-400 MG-UNIT per tablet   Used for:  Osteopenia   Generic drug:  calcium-vitamin D        Dose:  1 tablet   Take 1 tablet by mouth every evening   Quantity:  100 tablet   Refills:  3       FLUoxetine 40 MG  capsule   Commonly known as:  PROzac   Used for:  Mild depression (H)        TAKE ONE CAPSULE BY MOUTH EVERY DAY   Quantity:  90 capsule   Refills:  0       fluticasone 50 MCG/ACT spray   Commonly known as:  FLONASE        Dose:  1 spray   Spray 1 spray into both nostrils daily as needed   Refills:  5       gabapentin 300 MG capsule   Commonly known as:  NEURONTIN   Used for:  Osteoarthritis of multiple joints, unspecified osteoarthritis type, DDD (degenerative disc disease), lumbar, Osteoarthritis of spine with radiculopathy, lumbar region        Dose:  300 mg   Take 1 capsule (300 mg) by mouth 3 times daily   Quantity:  270 capsule   Refills:  0       levothyroxine 88 MCG tablet   Commonly known as:  SYNTHROID/LEVOTHROID   Used for:  Hypothyroidism, unspecified type        Dose:  88 mcg   Take 1 tablet (88 mcg) by mouth daily   Quantity:  90 tablet   Refills:  3       magnesium hydroxide 400 MG/5ML suspension   Commonly known as:  MILK OF MAGNESIA        Dose:  30 mL   Take 30 mLs by mouth daily as needed   Refills:  0       NYSTOP 786122 UNIT/GM Powd   Used for:  Intertrigo   Generic drug:  nystatin        Apply tid to affectead area prn   Quantity:  60 g   Refills:  1       simvastatin 10 MG tablet   Commonly known as:  ZOCOR   Used for:  Hyperlipidemia LDL goal <100        TAKE 1 TABLET BY MOUTH AT BEDTIME   Quantity:  90 tablet   Refills:  0       triamcinolone 0.1 % cream   Commonly known as:  KENALOG   Used for:  Atopic dermatitis        Apply sparingly to affected area two times daily as needed for itching.   Quantity:  30 g   Refills:  0            Where to get your medicines      These medications were sent to [facility] [address] [address] [address]     Phone:  [phone]     albuterol 108 (90 Base) MCG/ACT Inhaler    amLODIPine 5 MG tablet    benzonatate 100 MG capsule    furosemide 40 MG tablet    polyethylene glycol powder     potassium chloride 20 MEQ Packet    senna-docusate 8.6-50 MG per tablet                Protect others around you: Learn how to safely use, store and throw away your medicines at www.disposemymeds.org.             Medication List: This is a list of all your medications and when to take them. Check marks below indicate your daily home schedule. Keep this list as a reference.      Medications           Morning Afternoon Evening Bedtime As Needed    acetaminophen 325 MG tablet   Commonly known as:  TYLENOL   Take 3 tablets (975 mg) by mouth every 8 hours   Last time this was given:  975 mg on 6/8/2018 10:44 AM                                   albuterol 108 (90 Base) MCG/ACT Inhaler   Commonly known as:  PROAIR HFA/PROVENTIL HFA/VENTOLIN HFA   Inhale 2 puffs into the lungs every 4 hours as needed for shortness of breath / dyspnea or wheezing   Last time this was given:  2 puffs on 6/7/2018  8:32 AM                                ALPRAZolam 0.25 MG tablet   Commonly known as:  XANAX   Take 1 tablet (0.25 mg) by mouth 2 times daily as needed for anxiety Don't take with the oxycodone   Last time this was given:  0.25 mg on 6/3/2018  4:26 AM                                   amLODIPine 5 MG tablet   Commonly known as:  NORVASC   Take 1 tablet (5 mg) by mouth daily   Start taking on:  6/9/2018   Last time this was given:  5 mg on 6/8/2018 10:44 AM                                benzonatate 100 MG capsule   Commonly known as:  TESSALON   Take 1 capsule (100 mg) by mouth 3 times daily as needed for cough   Last time this was given:  100 mg on 6/5/2018 11:36 PM                                calcium 600 + D 600-400 MG-UNIT per tablet   Take 1 tablet by mouth every evening   Generic drug:  calcium-vitamin D                                   FLUoxetine 40 MG capsule   Commonly known as:  PROzac   TAKE ONE CAPSULE BY MOUTH EVERY DAY   Last time this was given:  40 mg on 6/8/2018  8:31 AM                                    fluticasone 50 MCG/ACT spray   Commonly known as:  FLONASE   Spray 1 spray into both nostrils daily as needed                                   furosemide 40 MG tablet   Commonly known as:  LASIX   Take 1 tablet (40 mg) by mouth daily   Start taking on:  6/9/2018                                gabapentin 300 MG capsule   Commonly known as:  NEURONTIN   Take 1 capsule (300 mg) by mouth 3 times daily   Last time this was given:  300 mg on 6/8/2018  1:48 PM                                         levothyroxine 88 MCG tablet   Commonly known as:  SYNTHROID/LEVOTHROID   Take 1 tablet (88 mcg) by mouth daily   Last time this was given:  88 mcg on 6/8/2018  8:31 AM                                   magnesium hydroxide 400 MG/5ML suspension   Commonly known as:  MILK OF MAGNESIA   Take 30 mLs by mouth daily as needed                                   NYSTOP 020017 UNIT/GM Powd   Apply tid to affectead area prn   Generic drug:  nystatin                                   polyethylene glycol powder   Commonly known as:  MIRALAX   Take 17 g (1 capful) by mouth daily                                potassium chloride 20 MEQ Packet   Commonly known as:  KLOR-CON   Take 20 mEq by mouth daily                                senna-docusate 8.6-50 MG per tablet   Commonly known as:  SENOKOT-S;PERICOLACE   Take 1 tablet by mouth 2 times daily   Last time this was given:  1 tablet on 6/8/2018  8:31 AM                                simvastatin 10 MG tablet   Commonly known as:  ZOCOR   TAKE 1 TABLET BY MOUTH AT BEDTIME   Last time this was given:  10 mg on 6/7/2018 10:04 PM                                   triamcinolone 0.1 % cream   Commonly known as:  KENALOG   Apply sparingly to affected area two times daily as needed for itching.                                             More Information        Pneumonia (Adult)  Pneumonia is an infection deep within the lungs. It is in the small air sacs (alveoli). Pneumonia may be caused by a  virus or bacteria. Pneumonia caused by bacteria is usually treated with an antibiotic. Severe cases may need to be treated in the hospital. Milder cases can be treated at home. Symptoms usually start to get better during the first 2 days of treatment.    Home care  Follow these guidelines when caring for yourself at home:    Rest at home for the first 2 to 3 days, or until you feel stronger. Don t let yourself get overly tired when you go back to your activities.    Stay away from cigarette smoke - yours or other people s.    You may use acetaminophen or ibuprofen to control fever or pain, unless another medicine was prescribed. If you have chronic liver or kidney disease, talk with your healthcare provider before using these medicines. Also talk with your provider if you ve had a stomach ulcer or gastrointestinal bleeding. Don t give aspirin to anyone younger than 18 years of age who is ill with a fever. It may cause severe liver damage.    Your appetite may be poor, so a light diet is fine.    Drink 6 to 8 glasses of fluids every day to make sure you are getting enough fluids. Beverages can include water, sport drinks, sodas without caffeine, juices, tea, or soup. Fluids will help loosen secretions in the lung. This will make it easier for you to cough up the phlegm (sputum). If you also have heart or kidney disease, check with your healthcare provider before you drink extra fluids.    Take antibiotic medicine prescribed until it is all gone, even if you are feeling better after a few days.  Follow-up care  Follow up with your healthcare provider in the next 2 to 3 days, or as advised. This is to be sure the medicine is helping you get better.  If you are 65 or older, you should get a pneumococcal vaccine and a yearly flu (influenza) shot. You should also get these vaccines if you have chronic lung disease like asthma, emphysema, or COPD. Recently, a second type of pneumonia vaccine has become available for  everyone over 65 years old. This is in addition to the previous vaccine. Ask your provider about this.  When to seek medical advice  Call your healthcare provider right away if any of these occur:    You don t get better within the first 48 hours of treatment    Shortness of breath gets worse    Rapid breathing (more than 25 breaths per minute)    Coughing up blood    Chest pain gets worse with breathing    Fever of 100.4 F (38 C) or higher that doesn t get better with fever medicine    Weakness, dizziness, or fainting that gets worse    Thirst or dry mouth that gets worse    Sinus pain, headache, or a stiff neck    Chest pain not caused by coughing  Date Last Reviewed: 1/1/2017 2000-2017 [employer]. [address] [place] [address], [place] [address]. All rights reserved. This information is not intended as a substitute for professional medical care. Always follow your healthcare professional's instructions.                Using an Incentive Spirometer    An incentive spirometer is a device that helps you do deep breathing exercises. These exercises expand your lungs, aid in circulation, and help prevent pneumonia. Deep breathing exercises also help you breathe better and improve the function of your lungs by:    Keeping your lungs clear    Strengthening your breathing muscles    Helping prevent respiratory complications or problems  The incentive spirometer gives you a way to take an active part in your recovery. A nurse or therapist will teach you breathing exercises. To do these exercises, you will breathe in through your mouth and not your nose. The incentive spirometer only works correctly if you breathe in through your mouth.  Steps to clear lungs  Step 1. Exhale normally. Then, inhale normally.    Relax and breathe out.  Step 2. Place your lips tightly around the mouthpiece.    Make sure the device is upright and not tilted.  Step 3. Inhale as much air as you can through the mouthpiece (don't breathe  through your nose).    Inhale slowly and deeply.    Hold your breath long enough to keep the balls or disk raised for at least 3 to 5 seconds, or as instructed by your healthcare provider.    Some spirometers have an indicator to let you know that you are breathing in too fast. If the indicator goes off, breathe in more slowly.  Step 4. Repeat the exercise regularly.    Do this exercise every hour while you're awake, or as instructed by your healthcare provider.    If you were taught deep breathing and coughing exercises, do them regularly as instructed by your healthcare provider.   Follow-up care  Make a follow up appointment, or as directed. Also, follow up with your healthcare provider as advised or if your symptoms don't improve or continue to get worse.  When to call your healthcare provider  Call your healthcare provider right away if you have any of the following:    Fever 100.4  (38 C) or higher, or as directed by your healthcare provider    Brownish, bloody, or smelly sputum (phlegm that you cough up)  Call 911  Call 911 if any of these occur:     Shortness of breath that doesn't get better after taking your medicine    Cool, moist, pale or blue skin    Trouble breathing or swallowing, wheezing    Fainting or loss of consciousness    Feeling of dizziness or weakness, or a sudden drop in blood pressure    Feeling very ill    Lightheadedness    Chest pain or rapid heart rate  Date Last Reviewed: 1/1/2017 2000-2017 [employer]. [address] [place], [place] [id number]. All rights reserved. This information is not intended as a substitute for professional medical care. Always follow your healthcare professional's instructions.                Discharge Instructions: Eating a Low-Salt Diet  Your healthcare provider has prescribed a low-salt diet for you. Most people with heart problems need to eat less salt, which is full of sodium. Too much sodium is linked to high blood pressure, which is  linked to a greater risk of heart disease, stroke, blindness, and kidney problems.  Home care    Learn ways to cut back on salt (sodium):    Eat less frozen, canned, dried, packaged, and fast foods. These often contain high amounts of sodium.    Season foods with herbs instead of salt when you cook.    Season with flavorings such as pepper, lemon, garlic, and onion.    Don t add salt to your food at the table.    Sprinkle salt-free herbal blends on meats and vegetables.  Learn to read food labels carefully:    Look for the total amount of sodium per serving.    Look for foods labeled low sodium, reduced sodium, or no added salt.    Beware. Salt goes by many names. Cut down on foods with these words (all forms of salt) listed as ingredients:  ? Salt  ? Sodium  ? Soy sauce  ? Baking soda  ? Baking powder  ? MSG (monosodium glutamate)  ? Monosodium  ? Na (the chemical symbol for sodium)  Other ideas:    Use more fresh food. Buy more fruits and vegetables.    Select lean meats, fish, and poultry.    Find a cookbook with low-salt recipes. You ll find ideas for tasty meals that are healthy for your heart.    When eating out, ask questions about the menu. Tell the  you're on a low-salt diet.  ? If you order fish, chicken, beef, or pork, ask to have it broiled, baked, poached, or grilled without salt, butter, or breading.  ? Choose plain steamed rice, boiled noodles, and baked or boiled potatoes. Top potatoes with chives and a little sour cream instead of butter.    Avoid antacids that are high in salt. Check the label before you buy.  Follow-up  Make a follow-up appointment with your healthcare provider, or as advised. Your provider may refer you to a dietitian.   Date Last Reviewed: 6/1/2017 2000-2017 [employer]. [address] [place] [address], [place] [address]. All rights reserved. This information is not intended as a substitute for professional medical care. Always follow your healthcare professional's  instructions.                Left-Sided Congestive Heart Failure (CHF)    The heart is a large muscle that pumps blood throughout the body. Blood carries oxygen to all the organs (including the brain), muscles, and skin of your body. After the body takes the oxygen out of the blood, the blood returns to the heart. The right side of the heart collects that blood and pumps it to the lungs to receive fresh oxygen. This oxygen-rich blood from the lungs then returns to the left side of the heart, where it is pumped back out to the brain and rest of the body, starting the process all over.   Heart failure occurs when the heart muscle is weakened. This can occur after a heart attack or with significant coronary artery disease. This affects the pumping action of the heart.   When the left side of the heart is weakened, it can t handle the blood it is getting from the lungs. Pressure then builds up in the veins of the lungs, causing fluid to leak into the lung tissues. This may be referred to as congestive heart failure. This causes you to feel short of breath, weak, or dizzy. These symptoms are often worse with exertion, such as climbing stairs or walking up hills. Lying flat is uncomfortable and can make your breathing worse. This may make sleeping difficult and force you to use extra pillows to elevate your upper body to help you sleep well.  Causes of heart failure include:    Coronary artery disease    Heart attack in the past (also known as acute myocardial infarction, or AMI)    High blood pressure    Damaged heart valve    Diabetes    Obesity    Cigarette smoking    Alcohol abuse  Treatment  Heart failure is a chronic condition. There is no cure. The purpose of medical treatment is to improve the pumping action of the heart, and remove excess water and fluids from the body. A number of medicines can help reach this goal, improve symptoms and keep the heart from becoming weaker. In some cases of severe heart failure,  a mechanical device will be placed in the heart to help the heart pump. Another major goal is to better treat the causes of heart failure, such as diabetes, high blood pressure, and your lifestyle.  Home care    Check your weight every day. A sudden increase in weight gain could mean worsening heart failure.  ? Use the same scale every day.  ? Weigh yourself at the same time every day.  ? Make sure the scale is on the floor, not on a rug.  ? Keep a record of your weight every day, so your healthcare provider can see it. If you are not given a log sheet for this, keep a separate journal for this purpose.     Cut back on how much salt (sodium) you eat:  ? Your provider will tell you how much salt to have daily, usually 2,000 mg or less.  ? Avoid high-salt foods. These include olives, pickles, smoked meats, processed foods, and salted potato chips.  ? Don't add salt to your food at the table. Use only small amounts of salt when cooking.  ? Avoid binging on salt-heavy meals.    Follow your healthcare provider's recommendations about how much fluid you should have.    Stop smoking.    Cut back on the amount of alcohol you drink.    Lose weight if you are overweight. The excess weight adds a lot of stress on the workload of the heart.    Stay active. Talk to your provider about an exercise program that is safe for your heart.    Keep your feet elevated to reduce swelling. Ask your provider about support hose as a preventive treatment for daytime leg swelling.    Follow your healthcare provider's instructions closely.  Besides taking your medicine as instructed, an important part of treatment includes lifestyle changes. These include diet, physical activity, stopping smoking, and weight control.  Improve your diet. Often in the hospital, people are given a heart healthy diet. This includes more fresh foods, lower saturated fat, less processed foods, and lower salt.  Follow-up care  Follow up with your healthcare provider,  or as advised. Make sure to keep any appointments that were made for you. This can help better control heart failure.  If an X-ray was done, you will be told of any new findings that may affect your care.  Call 911  Call 911 if you:    Become severely short of breath    Feel lightheaded, or feel like you might pass out or faint    Have chest pain or discomfort that is different than usual, the medicines your provider told you to use for this don't help, or the pain lasts longer than 10 to 15 minutes    Develop a rapid heart rate suddenly  When to seek medical advice  Call your healthcare provider right away if you have any of these signs of worsening heart failure:    Sudden weight gain. This means more than 2 pounds in 1 day, or 5 pounds in 1 week, or whatever weight gain you were told to report by your provider    Trouble breathing not related to being active    New or increased swelling of your legs or ankles    Swelling or pain in your abdomen    Breathing trouble at night, waking up short of breath or needing more pillows to elevate your upper body to help you breathe    Frequent coughing that doesn t go away    Feeling much more tired than usual  Date Last Reviewed: 1/4/2016 2000-2017 [employer]. [address] [place] [address], [place] [address]. All rights reserved. This information is not intended as a substitute for professional medical care. Always follow your healthcare professional's instructions.

## 2018-06-02 NOTE — ED NOTES
[facility]  ED Nurse Handoff Report    Carol Merida is a 83 year old female   ED Chief complaint: Fall and Shortness of Breath  . ED Diagnosis:   Final diagnoses:   Generalized muscle weakness   Pneumonia of left lower lobe due to infectious organism (H)     Allergies:   Allergies   Allergen Reactions     Ace Inhibitors Cough       Code Status: Full Code  Activity level - Baseline/Home:  Stand with Assist. Activity Level - Current:   Stand with Assist of 2. Lift room needed: Yes- if available Bariatric: No- but 255lb   Needed: No   Isolation: Yes- droplet. Infection: Not Applicable.     Vital Signs:   Vitals:    06/02/18 0745 06/02/18 0800 06/02/18 0830 06/02/18 0845   BP:  141/82 (!) 165/92 (!) 177/123   Pulse:       Resp: 16 15 13 16   Temp:       TempSrc:       SpO2: 92% 92% 95% 90%   Weight:           Cardiac Rhythm:  ,      Pain level: 0-10 Pain Scale: 6  Patient confused: No. Patient Falls Risk: Yes.   Elimination Status: straight cathed at 0910- emptied   Patient Report - Initial Complaint: fall/SOB. Focused Assessment: Respiratory - Respiratory WDL:  WDL except; cough; rhythm/pattern (Pt reports ongoing cough with SOB ongoing for the last couple weeks, worse over the last few days. Cough is productive. Denies chest pain. ) Rhythm/Pattern, Respiratory: shortness of breath reported Lung Fields: All Fields Cough Frequency: infrequent Cough Type: good Throughout All Lung Fields: diminished   Tests Performed: lab and imaging. Abnormal Results:   Labs Ordered and Resulted from Time of ED Arrival Up to the Time of Departure from the ED   CBC WITH PLATELETS DIFFERENTIAL - Abnormal; Notable for the following:        Result Value    WBC 13.8 (*)     Absolute Neutrophil 11.5 (*)     All other components within normal limits   BASIC METABOLIC PANEL - Abnormal; Notable for the following:     Glucose 139 (*)     All other components within normal limits   UA MACROSCOPIC WITH REFLEX TO MICRO AND  CULTURE - Abnormal; Notable for the following:     Urobilinogen mg/dL 4.0 (*)     RBC Urine 4 (*)     Mucous Urine Present (*)     All other components within normal limits   TROPONIN I   NT PROBNP INPATIENT   CARDIAC CONTINUOUS MONITORING   STRAIGHT CATH FOR URINE   BLOOD CULTURE   BLOOD CULTURE      Treatments provided: 500cc NS bolus, Rocephin complete but Zithromax hasn't been started as at x-ray, will send infusion with to floor.   Family Comments: at bedside  OBS brochure/video discussed/provided to patient:  N/A  ED Medications:   Medications   cefTRIAXone (ROCEPHIN) 1 g vial to attach to  mL bag for ADULTS or NS 50 mL bag for PEDS (not administered)   azithromycin (ZITHROMAX) 500 mg in sodium chloride 0.9 % 250 mL intermittent infusion (not administered)   0.9% sodium chloride BOLUS (500 mLs Intravenous New Bag 6/2/18 0920)     Followed by   sodium chloride 0.9% infusion (not administered)   acetaminophen (TYLENOL) tablet 650 mg (not administered)     Drips infusing:  Yes  For the majority of the shift, the patient's behavior Green. Interventions performed were see above.     Severe Sepsis OR Septic Shock Diagnosis Present: No      ED Nurse Name/Phone Number: Jaqueline Davisaten,   9:24 AM    RECEIVING UNIT ED HANDOFF REVIEW    Above ED Nurse Handoff Report was reviewed: Yes  Reviewed by: Bruna Matthew on June 2, 2018 at 10:03 AM

## 2018-06-02 NOTE — IP AVS SNAPSHOT
[facility]    [address]    [facility] [address]    Phone:  [phone]    Fax:  [phone]                                       After Visit Summary   6/2/2018    Carol Merida    MRN: [id number]           After Visit Summary Signature Page     I have received my discharge instructions, and my questions have been answered. I have discussed any challenges I see with this plan with the nurse or doctor.    ..........................................................................................................................................  Patient/Patient Representative Signature      ..........................................................................................................................................  Patient Representative Print Name and Relationship to Patient    ..................................................               ................................................  Date                                            Time    ..........................................................................................................................................  Reviewed by Signature/Title    ...................................................              ..............................................  Date                                                            Time

## 2018-06-02 NOTE — ED TRIAGE NOTES
83-year-old female presents to the ER with complaints of sliding out of bed this morning about 0400 however her bigger complaints is shortness of breath and a cough. Pt does have lower sats on arrival to the ER and low grade fever.

## 2018-06-02 NOTE — PHARMACY-ADMISSION MEDICATION HISTORY
Admission medication history interview status for this patient is complete. See [facility] admission navigator for allergy information, prior to admission medications and immunization status.     Medication history interview source(s):Patient and Family  Medication history resources (including written lists, pill bottles, clinic record):[facility] list  Primary pharmacy:    Changes made to PTA medication list:  Added: none  Deleted: Ferrous sulfate per patient info. Also duplicate gabapentin.  Changed: Calc+D to qpm    Actions taken by pharmacist (provider contacted, etc):As above     Additional medication history information:None    Medication reconciliation/reorder completed by provider prior to medication history? No    For patients on insulin therapy: NO    Lantus/levemir/NPH/Mix 70/30 dose: _____ in AM/PM or twice daily   Sliding scale Novolog Y/N   If Yes, do you have a baseline novolog pre-meal dose: ______units with meals   Patients eat three meals a day: Y/N   Any Barriers to therapy: cost of medications/comfortable with giving injections (if applicable)/ comfortable and confident with current diabetes regimen     Prior to Admission medications    Medication Sig Last Dose Taking? Auth Provider   acetaminophen (TYLENOL) 325 MG tablet Take 3 tablets (975 mg) by mouth every 8 hours 6/2/2018 at 0200 Yes Isha Bragg PA-C   albuterol (PROAIR HFA, PROVENTIL HFA, VENTOLIN HFA) 108 (90 BASE) MCG/ACT inhaler Inhale 2 puffs into the lungs as needed for shortness of breath / dyspnea or wheezing unknown Yes Reported, Patient   ALPRAZolam (XANAX) 0.25 MG tablet Take 1 tablet (0.25 mg) by mouth 2 times daily as needed for anxiety Don't take with the oxycodone unknown Yes Yessica Lora MD   calcium-vitamin D (CALCIUM 600 + D) 600-400 MG-UNIT per tablet Take 1 tablet by mouth every evening  6/1/2018 at pm Yes Yessica Lora MD   FLUoxetine (PROZAC) 40 MG capsule TAKE ONE CAPSULE BY MOUTH EVERY DAY 6/1/2018 at a.m Yes  Yessica Lora MD   fluticasone (FLONASE) 50 MCG/ACT nasal spray Spray 1 spray into both nostrils daily as needed  6/1/2018 at unknown Yes Reported, Patient   gabapentin (NEURONTIN) 300 MG capsule Take 1 capsule (300 mg) by mouth 3 times daily 6/1/2018 at pm Yes Yessica Lora MD   levothyroxine (SYNTHROID/LEVOTHROID) 88 MCG tablet Take 1 tablet (88 mcg) by mouth daily 6/1/2018 at a.m Yes Yessica Lora MD   magnesium hydroxide (MILK OF MAGNESIA) 400 MG/5ML suspension Take 30 mLs by mouth daily as needed  unknown Yes Reported, Patient   simvastatin (ZOCOR) 10 MG tablet TAKE 1 TABLET BY MOUTH AT BEDTIME 6/1/2018 at hs Yes Yessica Loar MD   Nystatin (NYSTOP) 718565 UNIT/GM POWD Apply tid to affectead area prn unknown  Yessica Lora MD   triamcinolone (KENALOG) 0.1 % cream Apply sparingly to affected area two times daily as needed for itching. unknown  Argentina Oakley MD

## 2018-06-02 NOTE — LETTER
Key Recommendations:  Pt admitted with PNA. Pt states she has never had pna in the past. She uses a walker at all times and still drives if needed. Pt and family state they have great family support. PT was recommending discharge to home with HC PT ***. Pna action plan was reviewed and given to the pt.    Kathy Jaimesabasguillermo    Pt was worked up for potential for CHF with findings of   Dyspnea, working diagnosis on admission is LLL pneumonia.  Also suspect diastolic CHF  --Procalcitonin is normal and the CXR on 6/2 showed minimal patchy airspace opacity in the left lung base that could represent atelectasis or pneumonia, pt was treated with IV lasix and was discharged with new meds and follow up recs for ****  Please continue CHF education on pt and follow to assess for on going gaps   Pt has appt with MD Lora for 6/14 2240   This was RN CC communication to [facility] on discharge   Home with PT/RN for education on best self management of her [place], CHF teaching was initiated at bedside but will need re-enforcement on this Please keep an eye on her she apparently has an ailing  as well who I believe was open to you guys at one point.  RN to assess for SWS involvement  need for next level of care needs.       Stella Sommer    AVS/Discharge Summary is the source of truth; this is a helpful guide for improved communication of patient story

## 2018-06-02 NOTE — PLAN OF CARE
Problem: Patient Care Overview  Goal: Plan of Care/Patient Progress Review  Outcome: Improving  VSS, low grade fever, Lung sounds diminished, non productive cough, needs sputum culture- container at bedside.  On room air. Pcds on. +BS +flatus. Tolerating diet. Straight cath x1 in ER and incont x1. Pain controlled with tylenol, PT eval for increased weakness, pt at home walks with walker at all times. Alert and oriented pleasant. No significant issues this shift. Needs weight still.

## 2018-06-02 NOTE — ED PROVIDER NOTES
"  History     Chief Complaint:  Fall and Shortness of breath    HPI   Carol Merida is a 83 year old female with a history of hypertension, hyperlipidemia, and hypothyroidism who presents to the emergency department with her son for evaluation of a fall and shortness of breath. The patient reports that this morning at about 0100 she slid off the bed while sitting on the side of the bed but was helped back into bed by her daughter after she called for help. She was then \"coughing a lot\" when she got back into bed. Then, at about 0330 she got up to go to the bathroom and slid out of bed again. She inched to the side of the bed and then fell on her buttocks, was unable to get up. Awhile later, her and her  called the ambulance. She usually ambulates with her walker at baseline. The patient lives in a town house with her  and does not have additional help. The patient also reports she has had shortness of breath and an intermittent cough for \"a long time\", but that it has gotten worse in the last 2-3 weeks. She reports the cough makes her anxious because she can't catch her breath and sometimes coughs so hard that she gags. The cough has produced clear mucous as well. She has arthritis and back problems so she is usually sore and does not note any difference from baseline.  She also reports pain in her left arm, and two nights ago had left sided chest pain, which has been intermittent for awhile as well and her doctor said it was due to a shoulder issue. The patient has normal bowel movements.  The patient denies nausea, vomiting, fever, chest pain, trouble breathing, leg swelling, or changes in urination. With each fall she appeared to land sitting up, did not fall over. She did not lose consciousness after either fall and denies hitting her head. Denies any headache, decreased vision, neck or back pain, extremity pain. She is not on any blood thinners.    Cardiac Risk Factors   Sex: Female   Tobacco: " Negative   Hypertension: Positive  Diabetes: Negative  Hyperlipidemia: Positive  Family History: Negative    Allergies:  Ace inhibitors    Medications:    Albuterol  Xanax  Iron  Prozac  Flonase  Neurontin  Synthroid/Levothroid  Milk of Magnesia  Nystop  Zocor  Kenalog    Past Medical History:    Vitamin D deficiency  right hip replacement  fracture of greater trochanter of right femur  osteoarthritis  anemia  chronic pain syndrome  periprosthetic fracture left hip joint  physical deconditioning  DVT prophylaxis  PONV  arthritis  humerus fracture  panic disorder without agoraphobia  melanoma  impair fasting glucose  malignant neoplasm of female breast   osteopenia  hypertension  hyperlipidemia  arthrodesis  degeneration of lumbar or lumbosacral intervertebral disc  mild depression  obstructive sleep apnea  benign neoplasm of colon  hypothyroidism  obesity  cardiac dysrhythmia    Past Surgical History:   Arthroplasty left and right hip  Knee replacement  Tonsillectomy  Appy  Melanoma removal  Bilateral cataract  Colonoscopy  Hysterectomy  Left mastectomy  Right ankle surgery    Family History:    Arthritis  Hypertension  Lung cancer  Terratoma tumors  Breast cancer    Social History:  Marital Status:   Presents to the ED with her son  Tobacco Use: Negative  Alcohol Use: Positive  PCP: Yessica Lora MD   The patient usually seeks care at [facility].      Review of Systems   Constitutional: Negative for fever.   Respiratory: Positive for cough and shortness of breath.    Cardiovascular: Negative for chest pain and leg swelling.   Gastrointestinal: Negative for nausea and vomiting.   Genitourinary: Negative for dysuria, frequency, hematuria and urgency.   Neurological: Negative for syncope.   All other systems reviewed and are negative.      Physical Exam   First Vitals:  BP: 141/84  Pulse: 101  Heart Rate: 96  Temp: 100  F (37.8  C)  Resp: 24  Weight: 115.7 kg (255 lb)  SpO2: 93 %      Physical  Exam  Constitutional:  Frail elderly female, no distress, GCS = 15   Eyes:  PERRL, conjunctiva normal, EOMI  HENT:  No contusions, abrasions or other gross deformities noted over the scalp or face. Dry mucous membranes  Respiratory:  No respiratory distress, frequent nonprod cough; diminished breath sounds, somewhat difficult to assess due to habitus. 02 sats 93-94% on RA  Cardiovascular:  RRR, no murmur.    GI:  Abdomen is nondistended, soft, nontender to palpation  Musculoskeletal:  Denies neck pain, no ttp, ranging neck without difficulty. No gross deformities of bilateral UE or LE noted. Able to range bilateral UE and LE without difficulty. T-spine and L-spine are without any pain with sitting up or at rest, no contusion or abrasion.  Integument:  No contusions/abrasions or wounds noted   Neurologic: Alert & oriented x 3, CN 2-12 normal, normal motor function, normal sensory function, no focal deficits noted   Psychiatric:  Normal affect.         Emergency Department Course   ECG:  @ 0717  Indication: Fall and shortness of breath  Vent. Rate 98 bpm. MT interval 192 ms. QRS duration 102 ms. QT/QTc 376/480 ms. P-R-T axis 35 -18 28.   Normal sinus rhythm. Inferior infarct, age undetermined. Abnormal ECG.  No significant change when compared to previous ECG from 10/27/16   Read @ 0721 by Dr. Banerjee.      Imaging:  XR Pelvis with Hip Right 2 Views:  IMPRESSION: Bilateral total hip arthroplasties are again noted without  change from the comparison study. Alignment between the arthroplastic  components bilaterally appears normal. No fractures noted. No evidence  for loosening or failure of any of the arthroplastic components.reading per radiology.     Chest X-Ray. 2 Views:   IMPRESSION: There may be minimal patchy airspace opacity in the left  lung base that could represent atelectasis or pneumonia. The lungs are  otherwise clear. There is no pleural effusion or pneumothorax. Heart  size is normal given AP technique.  reading per radiology.       Radiographic findings were communicated with the patient who voiced understanding of the findings.    Laboratory:  CBC: WBC: 13.8 (H), HGB: 14.5, PLT: 247  BMP: Glucose 139 (H), o/w WNL (Creatinine: 0.73)  Blood Culture: In Process  0700 Troponin I: <0.015  BNP: 187  UA: Clear yellow urine, Urobilinogen: 4.0 (H), Mucous: Present, otherwise WNL    Interventions:  0941 Tylenol 650 mg PO  1005 Rocephin 1 g IV Injection  1043 Zithromax 500 mg IV Injection    Emergency Department Course:  0740 Nursing notes and vitals reviewed.  I performed an exam of the patient as documented above.     IV inserted. Medicine administered as documented above. Blood drawn. This was sent to the lab for further testing, results above.    The patient was sent for a Chest XR and a Pelvis and Right Hip XR while in the emergency department, findings above.     0853 I rechecked the patient and discussed the results of her workup thus far.     0913  I consulted with Dr. Hickman of the hospitalist services. They are in agreement to accept the patient for admission.    Findings and plan explained to the Patient who consents to admission. Discussed the patient with Dr. Hickman, who will admit the patient to an adult med/surg bed for further monitoring, evaluation, and treatment.      Impression & Plan      Medical Decision Making:   Carol Merida is a 83 year old female who presents for evaluation of a fall, generalized weakness and shortness of breath. The workup here in the emergency room was to evaluate for infections, metabolic, electrolyte, neurologic, muscle or cardiovascular causes.  Of note, there are no signs of UTI causing the symptoms.  History, physical exam and imaging studies are consistent with pneumonia.  First dose of antibiotics given in ED within 2 hours of diagnosis.  There are no signs of complications of pneumonia at this point such as septic shock, bacteremia, empyema, hypoxia, respiratory failure  or compromise.  Supportive outpatient management is therefore indicated.  I will  therefore hospitalize for further cares or IV antibiotics.  This seems to be community acquired pneumonia and there are no risk factors at this point for coverage of antibiotic-resistant strains.  I doubt PE, dissection, acute coronary syndrome, or other worrisome etiology for patients symptoms.  Because of her generalized weakness, multiple falls, and increased fall risk in the setting of pneumonia, she will be admitted for further treatment. She and family agree.    Diagnosis:    ICD-10-CM    1. Generalized muscle weakness M62.81 UA reflex to Microscopic and Culture     Blood culture     Blood culture   2. Pneumonia of left lower lobe due to infectious organism (H) J18.1        Disposition:  Admitted to Prakash Victor, am serving as a scribe on 6/2/2018 at 7:40 AM to personally document services performed by Dr. Chriss MD based on my observations and the provider's statements to me.       Prakash Garcia  6/2/2018   [facility] EMERGENCY DEPARTMENT       Yaneli Banerjee MD  06/02/18 [id number]

## 2018-06-02 NOTE — LETTER
Transition Communication Hand-off for Care Transitions to Next Level of Care Provider    Name: Carol Merida  : 1935  MRN #: [id number]  Primary Care Provider: Yessica Lora MD  Primary Care MD Name: Yessica Lora  Primary Clinic: [address] [facility] [address]  Primary Care Clinic Name:  [facility]  Reason for Hospitalization:  Generalized muscle weakness [M62.81]  Pneumonia of left lower lobe due to infectious organism (H) [J18.1]  Admit Date/Time: 2018  6:47 AM  Discharge Date: 2018   Payor Source: Payor: UCARE / Plan: [employer] FOR [employer] / Product Type: HMO /     Readmission Assessment Measure (REBECCA) Risk Score/category: Average   Reason for Communication Hand-off Referral: Admission diagnoses: CHF  Admission diagnoses: PN    Discharge Plan:Home with HHC, [facility] RN /PT       Discharge Needs Assessment:  Needs       Most Recent Value    Equipment Currently Used at Home walker, rolling, shower chair, raised toilet, grab bar    # of Referrals Placed by CTS Scheduled Follow-up appointments        Follow-up plan:  Follow-up with primary care physician Monday or Tuesday with a basic metabolic panel, follow-up on Lasix need       Future Appointments     2018  2:50 PM CDT   Office Visit with Yessica Lora MD   [facility] ([facility])    [address] [address] [address] [phone]          Key Recommendations:  Pt admitted with PNA. Pt states she has never had pna in the past. She uses a walker at all times and still drives if needed. Pt and family state they have great family support. PT was recommending discharge to home with HC PT . Pna action plan was reviewed and given to the pt.    Kathy Burgos    Pt was worked up for potential for CHF with findings of   Dyspnea, working diagnosis on admission is LLL pneumonia.  Also suspect diastolic CHF  --Procalcitonin is normal and the CXR on  showed minimal patchy airspace opacity in the left lung  base that could represent atelectasis or pneumonia, pt was treated with IV lasix. PCP to address lasix need at appointment.   Please continue CHF education on pt and follow to assess for on going gaps   Pt has appt with MD Lora for 6/14 3360   This was Hospital RN CC communication to [facility] on discharge   Home with PT/RN for education on best self management of her [place], CHF teaching was initiated at bedside but will need re-enforcement on this Please keep an eye on her she apparently has an ailing  as well who I believe was open to you guys at one point.  RN to assess for SWS involvement  need for next level of care needs.       Stella Sommer    AVS/Discharge Summary is the source of truth; this is a helpful guide for improved communication of patient story

## 2018-06-03 ENCOUNTER — APPOINTMENT (OUTPATIENT)
Dept: PHYSICAL THERAPY | Facility: CLINIC | Age: 83
DRG: 193 | End: 2018-06-03
Attending: INTERNAL MEDICINE
Payer: COMMERCIAL

## 2018-06-03 LAB
ANION GAP SERPL CALCULATED.3IONS-SCNC: 7 MMOL/L (ref 3–14)
BUN SERPL-MCNC: 12 MG/DL (ref 7–30)
CALCIUM SERPL-MCNC: 8.6 MG/DL (ref 8.5–10.1)
CHLORIDE SERPL-SCNC: 107 MMOL/L (ref 94–109)
CO2 SERPL-SCNC: 25 MMOL/L (ref 20–32)
CREAT SERPL-MCNC: 0.67 MG/DL (ref 0.52–1.04)
ERYTHROCYTE [DISTWIDTH] IN BLOOD BY AUTOMATED COUNT: 13 % (ref 10–15)
GFR SERPL CREATININE-BSD FRML MDRD: 84 ML/MIN/1.7M2
GLUCOSE SERPL-MCNC: 117 MG/DL (ref 70–99)
HCT VFR BLD AUTO: 40.8 % (ref 35–47)
HGB BLD-MCNC: 13.2 G/DL (ref 11.7–15.7)
MCH RBC QN AUTO: 30.1 PG (ref 26.5–33)
MCHC RBC AUTO-ENTMCNC: 32.4 G/DL (ref 31.5–36.5)
MCV RBC AUTO: 93 FL (ref 78–100)
PLATELET # BLD AUTO: 213 10E9/L (ref 150–450)
POTASSIUM SERPL-SCNC: 3.7 MMOL/L (ref 3.4–5.3)
RBC # BLD AUTO: 4.39 10E12/L (ref 3.8–5.2)
SODIUM SERPL-SCNC: 139 MMOL/L (ref 133–144)
WBC # BLD AUTO: 10.9 10E9/L (ref 4–11)

## 2018-06-03 PROCEDURE — 85027 COMPLETE CBC AUTOMATED: CPT | Performed by: INTERNAL MEDICINE

## 2018-06-03 PROCEDURE — 25000128 H RX IP 250 OP 636: Performed by: INTERNAL MEDICINE

## 2018-06-03 PROCEDURE — 99233 SBSQ HOSP IP/OBS HIGH 50: CPT | Performed by: INTERNAL MEDICINE

## 2018-06-03 PROCEDURE — 25000128 H RX IP 250 OP 636: Performed by: EMERGENCY MEDICINE

## 2018-06-03 PROCEDURE — 97116 GAIT TRAINING THERAPY: CPT | Mod: GP

## 2018-06-03 PROCEDURE — 80048 BASIC METABOLIC PNL TOTAL CA: CPT | Performed by: INTERNAL MEDICINE

## 2018-06-03 PROCEDURE — 12000000 ZZH R&B MED SURG/OB

## 2018-06-03 PROCEDURE — 36415 COLL VENOUS BLD VENIPUNCTURE: CPT | Performed by: INTERNAL MEDICINE

## 2018-06-03 PROCEDURE — 97161 PT EVAL LOW COMPLEX 20 MIN: CPT | Mod: GP

## 2018-06-03 PROCEDURE — 40000193 ZZH STATISTIC PT WARD VISIT

## 2018-06-03 PROCEDURE — 25000132 ZZH RX MED GY IP 250 OP 250 PS 637: Performed by: INTERNAL MEDICINE

## 2018-06-03 RX ORDER — LANOLIN ALCOHOL/MO/W.PET/CERES
3 CREAM (GRAM) TOPICAL AT BEDTIME
Status: DISCONTINUED | OUTPATIENT
Start: 2018-06-03 | End: 2018-06-08 | Stop reason: HOSPADM

## 2018-06-03 RX ADMIN — ACETAMINOPHEN 975 MG: 325 TABLET, FILM COATED ORAL at 10:52

## 2018-06-03 RX ADMIN — ONDANSETRON 4 MG: 2 INJECTION INTRAMUSCULAR; INTRAVENOUS at 20:55

## 2018-06-03 RX ADMIN — SODIUM CHLORIDE 1000 ML: 9 INJECTION, SOLUTION INTRAVENOUS at 16:31

## 2018-06-03 RX ADMIN — ENOXAPARIN SODIUM 40 MG: 40 INJECTION SUBCUTANEOUS at 17:18

## 2018-06-03 RX ADMIN — LEVOTHYROXINE SODIUM 88 MCG: 88 TABLET ORAL at 09:03

## 2018-06-03 RX ADMIN — ACETAMINOPHEN 975 MG: 325 TABLET, FILM COATED ORAL at 04:26

## 2018-06-03 RX ADMIN — MICONAZOLE NITRATE: 2 POWDER TOPICAL at 20:00

## 2018-06-03 RX ADMIN — SIMVASTATIN 10 MG: 10 TABLET, FILM COATED ORAL at 20:01

## 2018-06-03 RX ADMIN — AZITHROMYCIN MONOHYDRATE 250 MG: 500 INJECTION, POWDER, LYOPHILIZED, FOR SOLUTION INTRAVENOUS at 10:53

## 2018-06-03 RX ADMIN — GABAPENTIN 300 MG: 300 CAPSULE ORAL at 14:42

## 2018-06-03 RX ADMIN — GABAPENTIN 300 MG: 300 CAPSULE ORAL at 09:03

## 2018-06-03 RX ADMIN — MICONAZOLE NITRATE: 2 POWDER TOPICAL at 09:04

## 2018-06-03 RX ADMIN — MELATONIN TAB 3 MG 3 MG: 3 TAB at 22:18

## 2018-06-03 RX ADMIN — FLUOXETINE 40 MG: 20 CAPSULE ORAL at 09:03

## 2018-06-03 RX ADMIN — ACETAMINOPHEN 975 MG: 325 TABLET, FILM COATED ORAL at 17:19

## 2018-06-03 RX ADMIN — CEFTRIAXONE SODIUM 2 G: 2 INJECTION, POWDER, FOR SOLUTION INTRAMUSCULAR; INTRAVENOUS at 09:05

## 2018-06-03 RX ADMIN — ALPRAZOLAM 0.25 MG: 0.25 TABLET ORAL at 04:26

## 2018-06-03 RX ADMIN — GABAPENTIN 300 MG: 300 CAPSULE ORAL at 20:01

## 2018-06-03 NOTE — PLAN OF CARE
Problem: Patient Care Overview  Goal: Plan of Care/Patient Progress Review  Discharge Planner PT    83 year old female with past medical history significant for hypothyroidism presents with 4 weeks history of shortness of breath cough weakness.  She presented to the emergency room after she slipped off the bed and was not able to get up.  pt is  being admitted for left lower lobe pneumonia and weakness. Pt lives in a town house with her spouse who is W/C bound. Pt has no HAYLEE and she has a first floor set up. Pt was ambulating with a 4WW prior to admission and was independent with ADLs prior to admission.  Patient plan for discharge: home with spouse  Current status: pt was SBA for bed mobility and transfers. pt ambulated 15 feet on RA with a FWW and CGA.  pt ambulates with decreased stance time on R LE. pt was SOB post gait training but this resloved after a short rest period. Recommend nursing staff walk pt 2-3 times per day.  Barriers to return to prior living situation: none anticipated   Recommendations for discharge: home with home PT  Rationale for recommendations: activity level below baseline       Entered by: Harris Hernández 06/03/2018 8:46 AM

## 2018-06-03 NOTE — PROGRESS NOTES
Discharge Planner   Discharge Plans in progress: home with spouse and possibly home care(would like to use [facility]).  Barriers to discharge plan: IV abx for pna.  Follow up plan: PCP hospital follow up appointment scheduled for 6/14/18 Thursday with Dr. Lora.       Entered by: Gerladine Gambucci 06/03/2018 2:40 PM

## 2018-06-03 NOTE — CONSULTS
Care Transition Initial Assessment - RN    Reason For Consult: care coordination/care conference, discharge planning   Met with: Patient and Family.  DATA   Active Problems:    Pneumonia       Cognitive Status: awake, alert and oriented.  Primary Care [facility]  Primary Care MD Name: Yessica Lora  Contact information and PCP information verified: Yes  Lives With: spouse  Living Arrangements: house  Quality Of Family Relationships: supportive, involved  Description of Support System: Supportive, Involved   Who is your support system?: , Children   Support Assessment: Adequate family and caregiver support   Insurance concerns: No Insurance issues identified  ASSESSMENT  Patient currently receives the following services:  none        Identified issues/concerns regarding health management: pt admitted with pna. Pt states she has never had pna in the past but that she had been sick for a while but just thought she had allergies. Pt lives at home with her spouse. Kanchan, pts daughter states they have very good support. Pt manages her own medications along with her husbands as he is legally blind. She uses a walker at all times and still drives, if she has too. Pna action plan was reviewed and given to the pt and family.     PLAN  Patient anticipates discharging to home in 2-3 days .    Pt currently recommending home with home PT, pt would like to use [facility] if needed at discharge.        Patient anticipates needs for home equipment: No  Plan/Disposition: Home   Appointments: PCP hospital follow up appointment scheduled for Thursday 6/14 at 1550 with Dr. Lora.      Care  (CTS) will continue to follow as needed.    Kathy Burgos RN, BSN CTS  Care Coordinator  [phone]

## 2018-06-03 NOTE — PLAN OF CARE
Problem: Patient Care Overview  Goal: Plan of Care/Patient Progress Review  Outcome: Improving  Pt up A1. Voiding in BR, also incontinent at times, using purewick at times. Small BM. Passing flatus. Poor appetite. IVF. Abx:rocephin,z-kamar. CMS intact. A&O. [facility] called with blood culture result, it is strep. MD aware. Will continue to monitor.

## 2018-06-03 NOTE — PROGRESS NOTES
Pt claims to have home CPAP unit with her but would prefer to not use it this evening. Pt has also declined to use hospital CPAP.

## 2018-06-03 NOTE — PLAN OF CARE
Problem: Patient Care Overview  Goal: Plan of Care/Patient Progress Review  Outcome: Improving  A&O. LS diminished. Dyspnea on exertion. Infrequent non productive cough. Assist of 1 with a walker and gait belt. Incontinent of urine, using pure wick overnight.

## 2018-06-03 NOTE — PROVIDER NOTIFICATION
"0715 received critical lab from Jovanny  kenny and paged out to Dr. Hickman with the following message. \"Critical lab: Positive blood cultures collected on June 2nd from the right arm. Gram positive cocci in pairs and chains\".   "

## 2018-06-03 NOTE — PROGRESS NOTES
06/03/18 0800   Quick Adds   Type of Visit Initial PT Evaluation   Living Environment   Living Environment Comment pt lives with spouse in a townhouse. No HAYLEE. first floor set up.   Self-Care   Dominant Hand right   Usual Activity Tolerance moderate   Current Activity Tolerance fair   Regular Exercise no   Equipment Currently Used at Home walker, rolling;shower chair;raised toilet;grab bar   Activity/Exercise/Self-Care Comment pt uses a 4WW for household and community ambulation   Functional Level Prior   Ambulation 1-->assistive equipment   Transferring 0-->independent   Toileting 1-->assistive equipment   Bathing 1-->assistive equipment   Dressing 0-->independent   Eating 0-->independent   Communication 0-->understands/communicates without difficulty   Fall history within last six months yes   Number of times patient has fallen within last six months 1   Which of the above functional risks had a recent onset or change? ambulation;transferring;toileting;fall history   General Information   Onset of Illness/Injury or Date of Surgery - Date 06/02/18   Referring Physician Monique Hickman MD   Patient/Family Goals Statement discharge to home with spouse   Pertinent History of Current Problem (include personal factors and/or comorbidities that impact the POC) Carol Merida is a 83 year old female with past medical history significant for hypothyroidism presents with 4 weeks history of shortness of breath cough weakness.  She presented to the emergency room after she slipped off the bed and was not able to get up.  Evaluation in the emergency room showed elevated white cell count left lower lobe pneumonia she is being admitted for left lower lobe pneumonia and weakness   Precautions/Limitations fall precautions   Cognitive Status Examination   Orientation orientation to person, place and time   Level of Consciousness alert   Follows Commands and Answers Questions 100% of the time   Personal Safety and Judgment  intact   Memory intact   Pain Assessment   Patient Currently in Pain Yes, see Vital Sign flowsheet   Integumentary/Edema   Integumentary/Edema no deficits were identifed   Posture    Posture Not impaired   Range of Motion (ROM)   ROM Quick Adds No deficits were identified   Strength   Strength Comments LE WFL   Bed Mobility   Bed Mobility Comments pt required HOB elevated to reach EOB. pt was SBA.   Transfer Skills   Transfer Comments pt required SBA in order to transfer from sitting to standing with a FWW. pt required cuing for proper hand placement   Gait   Gait Comments pt ambulated 15 feet with a FWW and CGA. pt required cuing to promote foward gaze and pt ambulates with decreased stance time on R LE. pt complained of right hip pain during gait training. pt was SOB post gait training but this resloved after a short rest period.   Balance   Balance Comments none with walker   Sensory Examination   Sensory Perception no deficits were identified   Coordination   Coordination no deficits were identified   General Therapy Interventions   Planned Therapy Interventions bed mobility training;gait training;strengthening;stretching;transfer training;home program guidelines;progressive activity/exercise;risk factor education   Clinical Impression   Criteria for Skilled Therapeutic Intervention yes, treatment indicated   PT Diagnosis decreased mobility, weakness   Influenced by the following impairments SOB, pain levels   Functional limitations due to impairments ambulation, transfers and bed mobility   Clinical Presentation Stable/Uncomplicated   Clinical Presentation Rationale mobility is improving, vitals stable   Clinical Decision Making (Complexity) Low complexity   Therapy Frequency` daily   Anticipated Discharge Disposition Home with Home Therapy;Home with Assist;Home   Risk & Benefits of therapy have been explained Yes   Patient, Family & other staff in agreement with plan of care Yes   [facility] AM-PAC TM  "\"6 Clicks\"   2016, Trustees of [facility], under license to [employer].  All rights reserved.   6 Clicks Short Forms Basic Mobility Inpatient Short Form   [facility] AM-PAC  \"6 Clicks\" V.2 Basic Mobility Inpatient Short Form   1. Turning from your back to your side while in a flat bed without using bedrails? 3 - A Little   2. Moving from lying on your back to sitting on the side of a flat bed without using bedrails? 3 - A Little   3. Moving to and from a bed to a chair (including a wheelchair)? 3 - A Little   4. Standing up from a chair using your arms (e.g., wheelchair, or bedside chair)? 3 - A Little   5. To walk in hospital room? 3 - A Little   6. Climbing 3-5 steps with a railing? 2 - A Lot   Basic Mobility Raw Score (Score out of 24.Lower scores equate to lower levels of function) 17   Total Evaluation Time   Total Evaluation Time (Minutes) 10     "

## 2018-06-03 NOTE — PROGRESS NOTES
[facility]    Hospitalist Progress Note  Name: Carol Merida    MRN: [id number]  Provider: Monique Hickman MD  Date of Service: 06/03/2018    Assessment & Plan   Summary of Stay: Carol Merida is a 83 year old female who was admitted on 6/2/2018 for possible pneumonia and weakness.  His past medical history significant for hypothyroidism, obstructive sleep apnea and anxiety    Left lower lobe pneumonia  --Presented with leukocytosis, low-grade temp, tachycardia and shortness of breath  --Preliminary blood cultures growing gram-positive cocci will await sensitivities  --Started on ceftriaxone and azithromycin will continue for now    History of sleep apnea  --Continue CPAP with home settings    Hypothyroidism  --Continue on Synthroid    Anxiety  --Continue on home regimen    Status post fall  --Mechanical fall slipped off the bed  --Consult PT    Elevated blood pressure on admission  --Likely secondary to anxiety  --Stable at this time      DVT Prophylaxis: Heparin SQ  Code Status: Full Code    Disposition: Expected discharge in 2-3 days to home      Interval History   Patient could not sleep all night was tired and.  Continues to cough feels really weak feels as if has no energy.  No fever no chills.  Review of all the other systems negative    -Data reviewed today: I reviewed all new labs and imaging reports over the last 24 hours. I personally reviewed no images or EKG's today.    Physical Exam   Temp: 97.6  F (36.4  C) Temp src: Oral BP: 153/80 Pulse: 86 Heart Rate: 73 Resp: 18 SpO2: 93 % O2 Device: None (Room air)    Vitals:    06/02/18 0650 06/03/18 0416   Weight: 115.7 kg (255 lb) 129.4 kg (285 lb 3.2 oz)     Vital Signs with Ranges  Temp:  [97.3  F (36.3  C)-99.7  F (37.6  C)] 97.6  F (36.4  C)  Pulse:  [86] 86  Heart Rate:  [73-92] 73  Resp:  [14-18] 18  BP: (125-176)/(50-88) 153/80  SpO2:  [91 %-94 %] 93 %  I/O last 3 completed shifts:  In: 2846 [P.O.:500; I.V.:2346]  Out: 400  [Urine:400]      GEN:  Alert, oriented x 3, appears comfortable, NAD.  HEENT:  Normocephalic/atraumatic, no scleral icterus, no nasal discharge, mouth moist.  CV:  Regular rate and rhythm, no murmur or JVD.  S1 + S2 noted, no S3 or S4.  LUNGS: Left basilar crackles symmetric chest rise on inhalation noted.  ABD:  Active bowel sounds, soft, non-tender/non-distended.  No rebound/guarding/rigidity.  EXT:  No edema.  No cyanosis.  No joint synovitis noted.  SKIN:  Dry to touch, no exanthems noted in the visualized areas.    Medications     sodium chloride 1,000 mL (06/02/18 1606)       acetaminophen  975 mg Oral Q8H     azithromycin  250 mg Intravenous Q24H     cefTRIAXone  2 g Intravenous Q24H     enoxaparin  40 mg Subcutaneous Q24H     FLUoxetine  40 mg Oral Daily     gabapentin  300 mg Oral TID     levothyroxine  88 mcg Oral Daily     miconazole   Topical BID     simvastatin  10 mg Oral At Bedtime     triamcinolone   Topical BID     Data       Recent Labs  Lab 06/03/18  0736 06/02/18  1553 06/02/18  0700   WBC 10.9  --  13.8*   HGB 13.2  --  14.5   HCT 40.8  --  44.8   MCV 93  --  92    207 247       Recent Labs  Lab 06/03/18  0736 06/02/18  1553 06/02/18  0700     --  137   POTASSIUM 3.7  --  3.9   CHLORIDE 107  --  102   CO2 25  --  28   ANIONGAP 7  --  7   *  --  139*   BUN 12  --  15   CR 0.67 0.59 0.73   GFRESTIMATED 84 >90 76   GFRESTBLACK >90 >90 >90   LAKSHMI 8.6  --  9.4       Recent Labs  Lab 06/02/18  0957 06/02/18  0700   CULT Cultured on the 1st day of incubation:Gram positive cocci in pairs and chains*  Critical Value/Significant Value, preliminary result only, called to and read back byILDA CASTILLO RN ([facility]ORTS).  06.03.18 0709 GJS No growth after 17 hours     No results for input(s): AST, ALT, GGT, ALKPHOS, BILITOTAL, BILICONJ, BILIDIRECT, SOPHIE in the last 168 hours.    Invalid input(s): BILIRUBININDIRECT  No results for input(s): INR in the last 168 hours.  No results for input(s):  LACT in the last 168 hours.  No results for input(s): LIPASE in the last 168 hours.    Recent Labs  Lab 06/02/18  0905   COLOR Yellow   APPEARANCE Clear   URINEGLC Negative   URINEBILI Negative   URINEKETONE Negative   SG 1.014   UBLD Negative   URINEPH 7.0   PROTEIN Negative   NITRITE Negative   LEUKEST Negative   RBCU 4*   WBCU <1       Recent Results (from the past 24 hour(s))   XR Pelvis and Hip Right 2 Views    Narrative    PELVIS AND RIGHT HIP TWO VIEWS 6/2/2018 10:36 AM     COMPARISON: Two-view right hip 11/5/2016.    HISTORY: Fall, pain.       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 Alignment between the arthroplastic  components bilaterally appears normal. No fractures noted. No evidence  for loosening or failure of any of the arthroplastic components.    ADELA OQUENDO MD

## 2018-06-04 ENCOUNTER — APPOINTMENT (OUTPATIENT)
Dept: PHYSICAL THERAPY | Facility: CLINIC | Age: 83
DRG: 193 | End: 2018-06-04
Payer: COMMERCIAL

## 2018-06-04 LAB
ANION GAP SERPL CALCULATED.3IONS-SCNC: 7 MMOL/L (ref 3–14)
BASOPHILS # BLD AUTO: 0 10E9/L (ref 0–0.2)
BASOPHILS NFR BLD AUTO: 0.4 %
BUN SERPL-MCNC: 10 MG/DL (ref 7–30)
CALCIUM SERPL-MCNC: 8.2 MG/DL (ref 8.5–10.1)
CHLORIDE SERPL-SCNC: 107 MMOL/L (ref 94–109)
CO2 SERPL-SCNC: 26 MMOL/L (ref 20–32)
CREAT SERPL-MCNC: 0.63 MG/DL (ref 0.52–1.04)
DIFFERENTIAL METHOD BLD: NORMAL
EOSINOPHIL # BLD AUTO: 0.2 10E9/L (ref 0–0.7)
EOSINOPHIL NFR BLD AUTO: 2.1 %
ERYTHROCYTE [DISTWIDTH] IN BLOOD BY AUTOMATED COUNT: 13 % (ref 10–15)
ERYTHROCYTE [SEDIMENTATION RATE] IN BLOOD BY WESTERGREN METHOD: 34 MM/H (ref 0–30)
GFR SERPL CREATININE-BSD FRML MDRD: >90 ML/MIN/1.7M2
GLUCOSE SERPL-MCNC: 107 MG/DL (ref 70–99)
HCT VFR BLD AUTO: 39.6 % (ref 35–47)
HGB BLD-MCNC: 12.6 G/DL (ref 11.7–15.7)
IMM GRANULOCYTES # BLD: 0.2 10E9/L (ref 0–0.4)
IMM GRANULOCYTES NFR BLD: 1.5 %
INTERPRETATION ECG - MUSE: NORMAL
LYMPHOCYTES # BLD AUTO: 1.3 10E9/L (ref 0.8–5.3)
LYMPHOCYTES NFR BLD AUTO: 12.4 %
MAGNESIUM SERPL-MCNC: 2.2 MG/DL (ref 1.6–2.3)
MCH RBC QN AUTO: 29.2 PG (ref 26.5–33)
MCHC RBC AUTO-ENTMCNC: 31.8 G/DL (ref 31.5–36.5)
MCV RBC AUTO: 92 FL (ref 78–100)
MONOCYTES # BLD AUTO: 0.9 10E9/L (ref 0–1.3)
MONOCYTES NFR BLD AUTO: 8.7 %
NEUTROPHILS # BLD AUTO: 8 10E9/L (ref 1.6–8.3)
NEUTROPHILS NFR BLD AUTO: 74.9 %
NRBC # BLD AUTO: 0 10*3/UL
NRBC BLD AUTO-RTO: 0 /100
PLATELET # BLD AUTO: 227 10E9/L (ref 150–450)
POTASSIUM SERPL-SCNC: 4.2 MMOL/L (ref 3.4–5.3)
RBC # BLD AUTO: 4.32 10E12/L (ref 3.8–5.2)
SODIUM SERPL-SCNC: 140 MMOL/L (ref 133–144)
WBC # BLD AUTO: 10.7 10E9/L (ref 4–11)

## 2018-06-04 PROCEDURE — 36416 COLLJ CAPILLARY BLOOD SPEC: CPT | Performed by: INTERNAL MEDICINE

## 2018-06-04 PROCEDURE — 25000128 H RX IP 250 OP 636: Performed by: INTERNAL MEDICINE

## 2018-06-04 PROCEDURE — 97530 THERAPEUTIC ACTIVITIES: CPT | Mod: GP | Performed by: PHYSICAL THERAPY ASSISTANT

## 2018-06-04 PROCEDURE — 83735 ASSAY OF MAGNESIUM: CPT | Performed by: INTERNAL MEDICINE

## 2018-06-04 PROCEDURE — 97116 GAIT TRAINING THERAPY: CPT | Mod: GP | Performed by: PHYSICAL THERAPY ASSISTANT

## 2018-06-04 PROCEDURE — 12000000 ZZH R&B MED SURG/OB

## 2018-06-04 PROCEDURE — 40000193 ZZH STATISTIC PT WARD VISIT: Performed by: PHYSICAL THERAPY ASSISTANT

## 2018-06-04 PROCEDURE — 85025 COMPLETE CBC W/AUTO DIFF WBC: CPT | Performed by: INTERNAL MEDICINE

## 2018-06-04 PROCEDURE — 85652 RBC SED RATE AUTOMATED: CPT | Performed by: INTERNAL MEDICINE

## 2018-06-04 PROCEDURE — 80048 BASIC METABOLIC PNL TOTAL CA: CPT | Performed by: INTERNAL MEDICINE

## 2018-06-04 PROCEDURE — 25000128 H RX IP 250 OP 636: Performed by: EMERGENCY MEDICINE

## 2018-06-04 PROCEDURE — 99233 SBSQ HOSP IP/OBS HIGH 50: CPT | Performed by: INTERNAL MEDICINE

## 2018-06-04 PROCEDURE — 25000132 ZZH RX MED GY IP 250 OP 250 PS 637: Performed by: INTERNAL MEDICINE

## 2018-06-04 RX ORDER — AZITHROMYCIN 250 MG/1
250 TABLET, FILM COATED ORAL DAILY
Status: COMPLETED | OUTPATIENT
Start: 2018-06-04 | End: 2018-06-06

## 2018-06-04 RX ORDER — IBUPROFEN 400 MG/1
400 TABLET, FILM COATED ORAL EVERY 6 HOURS PRN
Status: DISCONTINUED | OUTPATIENT
Start: 2018-06-04 | End: 2018-06-08 | Stop reason: HOSPADM

## 2018-06-04 RX ORDER — ALBUTEROL SULFATE 5 MG/ML
2.5 SOLUTION RESPIRATORY (INHALATION) EVERY 6 HOURS PRN
Status: DISCONTINUED | OUTPATIENT
Start: 2018-06-04 | End: 2018-06-05 | Stop reason: RX

## 2018-06-04 RX ADMIN — SODIUM CHLORIDE 1000 ML: 9 INJECTION, SOLUTION INTRAVENOUS at 22:20

## 2018-06-04 RX ADMIN — MICONAZOLE NITRATE: 2 POWDER TOPICAL at 21:03

## 2018-06-04 RX ADMIN — FLUOXETINE 40 MG: 20 CAPSULE ORAL at 08:02

## 2018-06-04 RX ADMIN — ENOXAPARIN SODIUM 40 MG: 40 INJECTION SUBCUTANEOUS at 17:47

## 2018-06-04 RX ADMIN — MELATONIN TAB 3 MG 3 MG: 3 TAB at 23:23

## 2018-06-04 RX ADMIN — SODIUM CHLORIDE 1000 ML: 9 INJECTION, SOLUTION INTRAVENOUS at 12:08

## 2018-06-04 RX ADMIN — SIMVASTATIN 10 MG: 10 TABLET, FILM COATED ORAL at 21:01

## 2018-06-04 RX ADMIN — GABAPENTIN 300 MG: 300 CAPSULE ORAL at 14:47

## 2018-06-04 RX ADMIN — ACETAMINOPHEN 975 MG: 325 TABLET, FILM COATED ORAL at 17:47

## 2018-06-04 RX ADMIN — GABAPENTIN 300 MG: 300 CAPSULE ORAL at 08:02

## 2018-06-04 RX ADMIN — CEFTRIAXONE SODIUM 2 G: 2 INJECTION, POWDER, FOR SOLUTION INTRAMUSCULAR; INTRAVENOUS at 10:57

## 2018-06-04 RX ADMIN — SODIUM CHLORIDE 1000 ML: 9 INJECTION, SOLUTION INTRAVENOUS at 02:04

## 2018-06-04 RX ADMIN — IBUPROFEN 400 MG: 400 TABLET ORAL at 10:58

## 2018-06-04 RX ADMIN — ACETAMINOPHEN 975 MG: 325 TABLET, FILM COATED ORAL at 12:07

## 2018-06-04 RX ADMIN — ACETAMINOPHEN 975 MG: 325 TABLET, FILM COATED ORAL at 02:05

## 2018-06-04 RX ADMIN — AZITHROMYCIN 250 MG: 250 TABLET, FILM COATED ORAL at 12:07

## 2018-06-04 RX ADMIN — LEVOTHYROXINE SODIUM 88 MCG: 88 TABLET ORAL at 08:02

## 2018-06-04 RX ADMIN — GABAPENTIN 300 MG: 300 CAPSULE ORAL at 21:01

## 2018-06-04 RX ADMIN — ALBUTEROL SULFATE 2 PUFF: 90 AEROSOL, METERED RESPIRATORY (INHALATION) at 08:07

## 2018-06-04 RX ADMIN — MICONAZOLE NITRATE: 2 POWDER TOPICAL at 08:05

## 2018-06-04 RX ADMIN — BENZONATATE 100 MG: 100 CAPSULE ORAL at 23:24

## 2018-06-04 NOTE — PROGRESS NOTES
[facility]    Hospitalist Progress Note  Name: Carol Merida    MRN: [id number]  Provider: Monique Hickman MD  Date of Service: 06/04/2018    Assessment & Plan   Summary of Stay: Carol Merida is a 83 year old female who was admitted on 6/2/2018 for possible pneumonia and weakness.  His past medical history significant for hypothyroidism, obstructive sleep apnea and anxiety    Left lower lobe pneumonia  --Presented with leukocytosis, low-grade temp, tachycardia and shortness of breath  --Preliminary blood cultures growing gram-positive cocci will await sensitivities  --Started on ceftriaxone and azithromycin will continue for now    History of sleep apnea  --Continue CPAP with home settings    Hypothyroidism  --Continue on Synthroid    Anxiety  --Continue on home regimen    Status post fall  --Mechanical fall slipped off the bed  --Consult PT    Elevated blood pressure on admission  --Likely secondary to anxiety  --Stable at this time    Headache left temporal region  --Tender over temporal artery and TMJ joint  --As needed NSAIDs  --We will check ESR    DVT Prophylaxis: Heparin SQ  Code Status: Full Code    Disposition: Expected discharge in 2-3 days to home      Interval History   Continues to have shortness of breath requiring supplemental O2.  Occasional wheezing.  Also complains of headache localized on the left temporal region is tender over temporal artery and TMJ joint . no fever no chills.  Review of all the other systems negative    -Data reviewed today: I reviewed all new labs and imaging reports over the last 24 hours. I personally reviewed no images or EKG's today.    Physical Exam   Temp: 98.5  F (36.9  C) Temp src: Oral BP: 142/71 Pulse: 83 Heart Rate: 98 Resp: 18 SpO2: 93 % O2 Device: Nasal cannula Oxygen Delivery: 1 LPM  Vitals:    06/02/18 0650 06/03/18 0416 06/03/18 1741   Weight: 115.7 kg (255 lb) 129.4 kg (285 lb 3.2 oz) 130.3 kg (287 lb 3.2 oz)     Vital Signs with Ranges  Temp:   [98.2  F (36.8  C)-99.4  F (37.4  C)] 98.5  F (36.9  C)  Pulse:  [83] 83  Heart Rate:  [83-98] 98  Resp:  [16-18] 18  BP: (133-169)/(64-87) 142/71  SpO2:  [84 %-96 %] 93 %  I/O last 3 completed shifts:  In: 3133 [P.O.:400; I.V.:2733]  Out: 850 [Urine:850]      GEN:  Alert, oriented x 3, appears comfortable, NAD.  HEENT:  Normocephalic/atraumatic, no scleral icterus, no nasal discharge, mouth moist.  CV:  Regular rate and rhythm, no murmur or JVD.  S1 + S2 noted, no S3 or S4.  LUNGS: Left basilar crackles symmetric chest rise on inhalation noted.  ABD:  Active bowel sounds, soft, non-tender/non-distended.  No rebound/guarding/rigidity.  EXT:  No edema.  No cyanosis.  No joint synovitis noted.  SKIN:  Dry to touch, no exanthems noted in the visualized areas.    Medications     sodium chloride 1,000 mL (06/04/18 0204)       acetaminophen  975 mg Oral Q8H     azithromycin  250 mg Intravenous Q24H     cefTRIAXone  2 g Intravenous Q24H     enoxaparin  40 mg Subcutaneous Q24H     FLUoxetine  40 mg Oral Daily     gabapentin  300 mg Oral TID     levothyroxine  88 mcg Oral Daily     melatonin  3 mg Oral At Bedtime     miconazole   Topical BID     simvastatin  10 mg Oral At Bedtime     triamcinolone   Topical BID     Data       Recent Labs  Lab 06/04/18  0624 06/03/18  0736 06/02/18  1553 06/02/18  0700   WBC 10.7 10.9  --  13.8*   HGB 12.6 13.2  --  14.5   HCT 39.6 40.8  --  44.8   MCV 92 93  --  92    213 207 247       Recent Labs  Lab 06/04/18  0624 06/03/18  0736 06/02/18  1553 06/02/18  0700    139  --  137   POTASSIUM 4.2 3.7  --  3.9   CHLORIDE 107 107  --  102   CO2 26 25  --  28   ANIONGAP 7 7  --  7   * 117*  --  139*   BUN 10 12  --  15   CR 0.63 0.67 0.59 0.73   GFRESTIMATED >90 84 >90 76   GFRESTBLACK >90 >90 >90 >90   LAKSHMI 8.2* 8.6  --  9.4       Recent Labs  Lab 06/02/18  0957 06/02/18  0700   CULT Cultured on the 1st day of incubation:Gram positive cocci in pairs and chains*  Critical  Value/Significant Value, preliminary result only, called to and read back byILDA CASTILLO RN ([facility]ORTS).  06.03.18 0709 GJS  (Note)POSITIVE for STREPTOCOCCUS SPECIES OTHER THAN pneumococcus, anginosusgroup, S. pyogenes and S. agalactiae. Performed using Verigenemultiplex nucleic acid test. Final identification and antimicrobialsusceptibility testing will be verified by standard methods.Specimen tested with Verigene multiplex, gram-positive blood culturenucleic acid test for the following targets: Staph aureus, Staphepidermidis, Staph lugdunensis, other Staph species, Enterococcusfaecalis, Enterococcus faecium, Streptococcus species, S. agalactiae,S. anginosus grp., S. pneumoniae, S. pyogenes, Listeria sp., mecA(methicillin resistance) and Fernando/B (vancomycin resistance).Critical Value/Significant Value called to and read back by Dorinda TEMPLETON ([facility]ORTS) on 06.03.18  @10:02AM by DW. No growth after 2 days     No results for input(s): AST, ALT, GGT, ALKPHOS, BILITOTAL, BILICONJ, BILIDIRECT, SOPHIE in the last 168 hours.    Invalid input(s): BILIRUBININDIRECT  No results for input(s): INR in the last 168 hours.  No results for input(s): LACT in the last 168 hours.  No results for input(s): LIPASE in the last 168 hours.    Recent Labs  Lab 06/02/18  0905   COLOR Yellow   APPEARANCE Clear   URINEGLC Negative   URINEBILI Negative   URINEKETONE Negative   SG 1.014   UBLD Negative   URINEPH 7.0   PROTEIN Negative   NITRITE Negative   LEUKEST Negative   RBCU 4*   WBCU <1       No results found for this or any previous visit (from the past 24 hour(s)).

## 2018-06-04 NOTE — PLAN OF CARE
Problem: Pneumonia (Adult)  Goal: Signs and Symptoms of Listed Potential Problems Will be Absent, Minimized or Managed (Pneumonia)  Signs and symptoms of listed potential problems will be absent, minimized or managed by discharge/transition of care (reference Pneumonia (Adult) CPG).   Outcome: No Change  PM SHIFT  Pt alert and orientated. Pt has infrequent nonproductive cough. Pt temp max this shift 99.4. SBP elevated in 160s. Pt complains of a little headache above her left eye. Tylenol given and pt feels it could be from no sleep last night. Pt has scheduled melatonin ordered for this evening. LS exp wheezes at the beginning of the shift. LS dims later in shift. Pt tolerating room air. Pt feels she is getting a little more short of breath of activity. No edema noted. Pt weight from her admit weight was off by 30 pounds but her ER weight was what the pt reported. This staff had the pt stand on the scale again. In 12 hours pt weight has increased by 2 pounds. Pt on daily weight with the standing scale. Pt urinates frequently. Pt gets very PETTY on ambulation to the BR in her room. Pt requesting the pure wick external catheter. This staff encouraged pt to get up to the BR and the BSC this shift and than use the pure wick at hs. Pt agreed. Pt stated she was nausea after lunch today. Pt felt it was just after walking and declined any zofran. Pt later was dry heaving around 2100 and was given Zofran with relief. Plan is home on DC.

## 2018-06-04 NOTE — PLAN OF CARE
Problem: Patient Care Overview  Goal: Plan of Care/Patient Progress Review  Discharge Planner PT      Patient plan for discharge: home with spouse  Current status: Pt transfers supine to sit with HOB elevated with supervision to indep. Pt indep with scooting to EOB. Pt transfers sit to/from stand with SBA - supervision. Pt transfers bed to chair with ww with RA with SBA. Note O2 sats @ beginning to 93% with RA and following amb sats were 90% - 92%. Pt amb 50' with ww with RA with SBA - supervision. Note pt to amb with = step length and ambulated within the room.   Barriers to return to prior living situation: none anticipated   Recommendations for discharge: home with home PT  Rationale for recommendations: activity level below baseline       Entered by: Elena Funes 06/04/2018 9:53 AM

## 2018-06-04 NOTE — PLAN OF CARE
Problem: Patient Care Overview  Goal: Plan of Care/Patient Progress Review  Outcome: Improving  SBA- A1 c walker. Voiding in BR. purewik at night. Incontinent/dribbling at times. No appetite. IVF. PO z-kamar, IV rocephin. Pain to right hip, headache. Ibuprofen prn. sched tylenol and gabapentin. Frequent nonproductive cough. SOB c exertion. Droplet dc'd d/t over 24 hours on IV abx per [place] protocol. Plan to dc 1-2 days home with possible home care and PT.

## 2018-06-04 NOTE — PLAN OF CARE
Problem: Patient Care Overview  Goal: Plan of Care/Patient Progress Review  Ambulatory Status:  Pt up A1 and gait belt  VS:  vss  Pain:  Tylenol for a  headache  Resp: LS diminished.  Patient refused CPAP tonight, required 3L of O2 to keep sats above 90%.   GI:  denies nausea.  Regular diet.  BS active.  Passing flatus.  Last BM 6/3.  :  purewick in place with good output  Tx:  Rocephin and zithromax  Consults:  PT consult  Disposition:  tbd

## 2018-06-05 ENCOUNTER — APPOINTMENT (OUTPATIENT)
Dept: PHYSICAL THERAPY | Facility: CLINIC | Age: 83
DRG: 193 | End: 2018-06-05
Payer: COMMERCIAL

## 2018-06-05 LAB
BACTERIA SPEC CULT: ABNORMAL
CREAT SERPL-MCNC: 0.66 MG/DL (ref 0.52–1.04)
GFR SERPL CREATININE-BSD FRML MDRD: 86 ML/MIN/1.7M2
Lab: ABNORMAL
PLATELET # BLD AUTO: 238 10E9/L (ref 150–450)
SPECIMEN SOURCE: ABNORMAL

## 2018-06-05 PROCEDURE — 85049 AUTOMATED PLATELET COUNT: CPT | Performed by: INTERNAL MEDICINE

## 2018-06-05 PROCEDURE — 97530 THERAPEUTIC ACTIVITIES: CPT | Mod: GP | Performed by: PHYSICAL THERAPY ASSISTANT

## 2018-06-05 PROCEDURE — 40000275 ZZH STATISTIC RCP TIME EA 10 MIN

## 2018-06-05 PROCEDURE — 25000128 H RX IP 250 OP 636: Performed by: INTERNAL MEDICINE

## 2018-06-05 PROCEDURE — 36416 COLLJ CAPILLARY BLOOD SPEC: CPT | Performed by: INTERNAL MEDICINE

## 2018-06-05 PROCEDURE — 12000000 ZZH R&B MED SURG/OB

## 2018-06-05 PROCEDURE — 97116 GAIT TRAINING THERAPY: CPT | Mod: GP | Performed by: PHYSICAL THERAPY ASSISTANT

## 2018-06-05 PROCEDURE — 99233 SBSQ HOSP IP/OBS HIGH 50: CPT | Performed by: INTERNAL MEDICINE

## 2018-06-05 PROCEDURE — 25000125 ZZHC RX 250

## 2018-06-05 PROCEDURE — 25000132 ZZH RX MED GY IP 250 OP 250 PS 637: Performed by: INTERNAL MEDICINE

## 2018-06-05 PROCEDURE — 40000274 ZZH STATISTIC RCP CONSULT EA 30 MIN

## 2018-06-05 PROCEDURE — 25000128 H RX IP 250 OP 636: Performed by: EMERGENCY MEDICINE

## 2018-06-05 PROCEDURE — 25000125 ZZHC RX 250: Performed by: INTERNAL MEDICINE

## 2018-06-05 PROCEDURE — 82565 ASSAY OF CREATININE: CPT | Performed by: INTERNAL MEDICINE

## 2018-06-05 PROCEDURE — 94640 AIRWAY INHALATION TREATMENT: CPT | Mod: 76

## 2018-06-05 PROCEDURE — 94640 AIRWAY INHALATION TREATMENT: CPT

## 2018-06-05 PROCEDURE — 40000193 ZZH STATISTIC PT WARD VISIT: Performed by: PHYSICAL THERAPY ASSISTANT

## 2018-06-05 RX ORDER — ALBUTEROL SULFATE 0.83 MG/ML
2.5 SOLUTION RESPIRATORY (INHALATION) EVERY 6 HOURS PRN
Status: DISCONTINUED | OUTPATIENT
Start: 2018-06-05 | End: 2018-06-08 | Stop reason: HOSPADM

## 2018-06-05 RX ORDER — IPRATROPIUM BROMIDE AND ALBUTEROL SULFATE 2.5; .5 MG/3ML; MG/3ML
3 SOLUTION RESPIRATORY (INHALATION)
Status: DISCONTINUED | OUTPATIENT
Start: 2018-06-05 | End: 2018-06-08 | Stop reason: HOSPADM

## 2018-06-05 RX ORDER — ALBUTEROL SULFATE 0.83 MG/ML
SOLUTION RESPIRATORY (INHALATION)
Status: COMPLETED
Start: 2018-06-05 | End: 2018-06-05

## 2018-06-05 RX ADMIN — GABAPENTIN 300 MG: 300 CAPSULE ORAL at 20:34

## 2018-06-05 RX ADMIN — LEVOTHYROXINE SODIUM 88 MCG: 88 TABLET ORAL at 08:13

## 2018-06-05 RX ADMIN — MICONAZOLE NITRATE: 2 POWDER TOPICAL at 20:53

## 2018-06-05 RX ADMIN — CEFTRIAXONE SODIUM 2 G: 2 INJECTION, POWDER, FOR SOLUTION INTRAMUSCULAR; INTRAVENOUS at 10:01

## 2018-06-05 RX ADMIN — ACETAMINOPHEN 975 MG: 325 TABLET, FILM COATED ORAL at 01:12

## 2018-06-05 RX ADMIN — AZITHROMYCIN 250 MG: 250 TABLET, FILM COATED ORAL at 08:13

## 2018-06-05 RX ADMIN — BENZONATATE 100 MG: 100 CAPSULE ORAL at 23:36

## 2018-06-05 RX ADMIN — MELATONIN TAB 3 MG 3 MG: 3 TAB at 23:36

## 2018-06-05 RX ADMIN — ALBUTEROL SULFATE 2.5 MG: 0.83 SOLUTION RESPIRATORY (INHALATION) at 11:42

## 2018-06-05 RX ADMIN — IPRATROPIUM BROMIDE AND ALBUTEROL SULFATE 3 ML: .5; 3 SOLUTION RESPIRATORY (INHALATION) at 15:07

## 2018-06-05 RX ADMIN — SIMVASTATIN 10 MG: 10 TABLET, FILM COATED ORAL at 20:34

## 2018-06-05 RX ADMIN — ALBUTEROL SULFATE 2.5 MG: 2.5 SOLUTION RESPIRATORY (INHALATION) at 11:42

## 2018-06-05 RX ADMIN — ALBUTEROL SULFATE 2 PUFF: 90 AEROSOL, METERED RESPIRATORY (INHALATION) at 10:04

## 2018-06-05 RX ADMIN — IPRATROPIUM BROMIDE AND ALBUTEROL SULFATE 3 ML: .5; 3 SOLUTION RESPIRATORY (INHALATION) at 19:43

## 2018-06-05 RX ADMIN — GABAPENTIN 300 MG: 300 CAPSULE ORAL at 08:13

## 2018-06-05 RX ADMIN — SODIUM CHLORIDE 1000 ML: 9 INJECTION, SOLUTION INTRAVENOUS at 20:32

## 2018-06-05 RX ADMIN — FLUOXETINE 40 MG: 20 CAPSULE ORAL at 08:13

## 2018-06-05 RX ADMIN — ACETAMINOPHEN 975 MG: 325 TABLET, FILM COATED ORAL at 10:01

## 2018-06-05 RX ADMIN — GABAPENTIN 300 MG: 300 CAPSULE ORAL at 14:01

## 2018-06-05 RX ADMIN — ENOXAPARIN SODIUM 40 MG: 40 INJECTION SUBCUTANEOUS at 17:24

## 2018-06-05 RX ADMIN — ACETAMINOPHEN 975 MG: 325 TABLET, FILM COATED ORAL at 17:25

## 2018-06-05 RX ADMIN — MICONAZOLE NITRATE: 2 POWDER TOPICAL at 08:17

## 2018-06-05 NOTE — PLAN OF CARE
Problem: Patient Care Overview  Goal: Plan of Care/Patient Progress Review  Outcome: Improving  Pt A&O. Denied pain. Up with assist of A1, walker/gait belt. SOB with exertion. O2 92% 1L./min. Infrequent nonproductive cough. Given tessalon with relief. Lungs diminished with expiratory wheeze.  Eat 50% supper. VSS

## 2018-06-05 NOTE — PROGRESS NOTES
"[facility] RCAT     Date: 6/5/2018  Admission Dx: Pneumonia  Pulmonary History: JEAN  Home Nebulizer/MDI Use: Albuterol HFA prn  Home Oxygen: Room air  Acuity Level (RCAT flow sheet): 3  Aerosol Therapy initiated: Added Duoneb QID, continue albuterol Q6 hours prn.      Pulmonary Hygiene initiated: Deep breath and cough TID      Volume Expansion initiated: IS TID      Current Oxygen Requirements: 2 Lpm NC  Current SpO2: 96%    Re-evaluation date: 6/8/2018    Patient Education: Pt was informed of RCAT.  Gave instruction in the use of an IS and the importance of deep breathing.      See \"RT Assessments\" flow sheet for patient assessment scoring and Acuity Level Details.       Rodo Mattson  June 5, 2018.11:42 AM            "

## 2018-06-05 NOTE — PLAN OF CARE
Problem: Patient Care Overview  Goal: Plan of Care/Patient Progress Review  Outcome: Improving   Patient is 1x with walker and gait belt.  Regular diet.  Voiding adequate amounts.  Scheduled Tylenol.  Tried to wean from O2 but Sats dropped to 88 on room air.  Currently on 2L O2.  Has nebs and inhaler available. SOBOE. Getting Zithromax and Rocephin for + strep cultures.

## 2018-06-05 NOTE — PLAN OF CARE
Problem: Patient Care Overview  Goal: Plan of Care/Patient Progress Review  Discharge Planner PT   Patient plan for discharge: I'm doing better today that was farther then I had even at home walked  Current status: 1 assist for bed mobility due to fatigue with RW and O2 declined O2 but agreeable after education  Barriers to return to prior living situation: none anticipated if continues to progress  Recommendations for discharge: PT- Per plan established by the Physical Therapist, the discharge recommendation is home with home PT    Rationale for recommendations: will continue to progress with skilled PT while IP status, would do better sitting up if able to elevate feet also.       Entered by: Jazzmine Bowens 06/05/2018 9:57 AM

## 2018-06-05 NOTE — PROGRESS NOTES
[facility]    Hospitalist Progress Note  Name: Carol Merida    MRN: [id number]  Provider: Monique Hickman MD  Date of Service: 06/05/2018    Assessment & Plan   Summary of Stay: Carol Merida is a 83 year old female who was admitted on 6/2/2018 for possible pneumonia and weakness.  His past medical history significant for hypothyroidism, obstructive sleep apnea and anxiety    Left lower lobe pneumonia  --Presented with leukocytosis, low-grade temp, tachycardia and shortness of breath  --Blood cultures growing Streptococcus mitis is active to ceftriaxone  --Started on ceftriaxone and azithromycin will continue for now  --Continues to require supplemental O2 wean as able    History of sleep apnea  --Continue CPAP with home settings    Hypothyroidism  --Continue on Synthroid    Anxiety  --Continue on home regimen    Status post fall  --Mechanical fall slipped off the bed  --Consult PT    Elevated blood pressure on admission  --Likely secondary to anxiety  --Stable at this time    Headache left temporal region: Resolved with treatment  --Tender over temporal artery and TMJ joint  --As needed NSAIDs  --Doubt temporal arteritis with minimally elevated ESR    DVT Prophylaxis: Heparin SQ  Code Status: Full Code    Disposition: Expected discharge in 2-3 days to home      Interval History   Continues to have shortness of breath requiring supplemental O2.  Occasional wheezing.   . no fever no chills.  Review of all the other systems negative    -Data reviewed today: I reviewed all new labs and imaging reports over the last 24 hours. I personally reviewed no images or EKG's today.    Physical Exam   Temp: 98.7  F (37.1  C) Temp src: Oral BP: 144/66 Pulse: 80 Heart Rate: 97 Resp: 20 SpO2: 96 % O2 Device: Nasal cannula Oxygen Delivery: 2 LPM (found on 2 Lpm)  Vitals:    06/02/18 0650 06/03/18 0416 06/03/18 1741   Weight: 115.7 kg (255 lb) 129.4 kg (285 lb 3.2 oz) 130.3 kg (287 lb 3.2 oz)     Vital Signs with  Ranges  Temp:  [97.5  F (36.4  C)-98.7  F (37.1  C)] 98.7  F (37.1  C)  Pulse:  [80] 80  Heart Rate:  [88-97] 97  Resp:  [20] 20  BP: (136-157)/(66-77) 144/66  SpO2:  [91 %-96 %] 96 %  I/O last 3 completed shifts:  In: 200 [P.O.:200]  Out: 450 [Urine:450]      GEN:  Alert, oriented x 3, appears comfortable, NAD.  HEENT:  Normocephalic/atraumatic, no scleral icterus, no nasal discharge, mouth moist.  CV:  Regular rate and rhythm, no murmur or JVD.  S1 + S2 noted, no S3 or S4.  LUNGS: Left basilar crackles symmetric chest rise on inhalation noted.  ABD:  Active bowel sounds, soft, non-tender/non-distended.  No rebound/guarding/rigidity.  EXT:  No edema.  No cyanosis.  No joint synovitis noted.  SKIN:  Dry to touch, no exanthems noted in the visualized areas.    Medications     sodium chloride 1,000 mL (06/05/18 0113)       acetaminophen  975 mg Oral Q8H     azithromycin  250 mg Oral Daily     cefTRIAXone  2 g Intravenous Q24H     enoxaparin  40 mg Subcutaneous Q24H     FLUoxetine  40 mg Oral Daily     gabapentin  300 mg Oral TID     ipratropium - albuterol 0.5 mg/2.5 mg/3 mL  3 mL Nebulization 4x daily     levothyroxine  88 mcg Oral Daily     melatonin  3 mg Oral At Bedtime     miconazole   Topical BID     simvastatin  10 mg Oral At Bedtime     Data       Recent Labs  Lab 06/05/18  0641 06/04/18 0624 06/03/18  0736  06/02/18  0700   WBC  --  10.7 10.9  --  13.8*   HGB  --  12.6 13.2  --  14.5   HCT  --  39.6 40.8  --  44.8   MCV  --  92 93  --  92    227 213  < > 247   < > = values in this interval not displayed.    Recent Labs  Lab 06/05/18  0641 06/04/18 0624 06/03/18  0736  06/02/18  0700   NA  --  140 139  --  137   POTASSIUM  --  4.2 3.7  --  3.9   CHLORIDE  --  107 107  --  102   CO2  --  26 25  --  28   ANIONGAP  --  7 7  --  7   GLC  --  107* 117*  --  139*   BUN  --  10 12  --  15   CR 0.66 0.63 0.67  < > 0.73   GFRESTIMATED 86 >90 84  < > 76   GFRESTBLACK >90 >90 >90  < > >90   LAKSHMI  --  8.2* 8.6  --   9.4   < > = values in this interval not displayed.    Recent Labs  Lab 06/02/18  0957 06/02/18  0700   CULT Cultured on the 1st day of incubation:Streptococcus mitis group*  Critical Value/Significant Value, preliminary result only, called to and read back byILDA CASTILLO RN ([facility]ORTS).  06.03.18 0709 GJS  (Note)POSITIVE for STREPTOCOCCUS SPECIES OTHER THAN pneumococcus, anginosusgroup, S. pyogenes and S. agalactiae. Performed using Eversnapmultiplex nucleic acid test. Final identification and antimicrobialsusceptibility testing will be verified by standard methods.Specimen tested with Verigene multiplex, gram-positive blood culturenucleic acid test for the following targets: Staph aureus, Staphepidermidis, Staph lugdunensis, other Staph species, Enterococcusfaecalis, Enterococcus faecium, Streptococcus species, S. agalactiae,S. anginosus grp., S. pneumoniae, S. pyogenes, Listeria sp., mecA(methicillin resistance) and Fernando/B (vancomycin resistance).Critical Value/Significant Value called to and read back by Dorinda TEMPLETON ([facility]ORTS) on 06.03.18  @10:02AM by DW. No growth after 3 days     No results for input(s): AST, ALT, GGT, ALKPHOS, BILITOTAL, BILICONJ, BILIDIRECT, SOPHIE in the last 168 hours.    Invalid input(s): BILIRUBININDIRECT  No results for input(s): INR in the last 168 hours.  No results for input(s): LACT in the last 168 hours.  No results for input(s): LIPASE in the last 168 hours.    Recent Labs  Lab 06/02/18  0905   COLOR Yellow   APPEARANCE Clear   URINEGLC Negative   URINEBILI Negative   URINEKETONE Negative   SG 1.014   UBLD Negative   URINEPH 7.0   PROTEIN Negative   NITRITE Negative   LEUKEST Negative   RBCU 4*   WBCU <1       No results found for this or any previous visit (from the past 24 hour(s)).

## 2018-06-05 NOTE — PLAN OF CARE
"Problem: Patient Care Overview  Goal: Plan of Care/Patient Progress Review  Outcome: Improving  Vitals:/77 (BP Location: Right arm)  Pulse 80  Temp 97.5  F (36.4  C) (Oral)  Resp 20  Ht 1.6 m (5' 3\")  Wt 130.3 kg (287 lb 3.2 oz)  SpO2 92%  BMI 50.88 kg/m2  Pain: Pt reports she is comfortable with relaxing, pain up to a 2 with movement. Controlled with scheduled Tylenol  LOC: Alert and oriented x4  Mobility: Pt is up with an assist of 1 walker and gait belt.  Lungs:Lung sounds diminished with expiratory wheezes  : voiding adequate amounts. Incontinent at times. Uses Purewick overnight  GI: Bowel sounds active  IV:NS running at 100ml/hr            "

## 2018-06-06 ENCOUNTER — APPOINTMENT (OUTPATIENT)
Dept: CARDIOLOGY | Facility: CLINIC | Age: 83
DRG: 193 | End: 2018-06-06
Attending: INTERNAL MEDICINE
Payer: COMMERCIAL

## 2018-06-06 LAB
MAGNESIUM SERPL-MCNC: 2.2 MG/DL (ref 1.6–2.3)
POTASSIUM SERPL-SCNC: 4 MMOL/L (ref 3.4–5.3)

## 2018-06-06 PROCEDURE — 25000132 ZZH RX MED GY IP 250 OP 250 PS 637: Performed by: INTERNAL MEDICINE

## 2018-06-06 PROCEDURE — 94640 AIRWAY INHALATION TREATMENT: CPT | Mod: 76

## 2018-06-06 PROCEDURE — 83735 ASSAY OF MAGNESIUM: CPT | Performed by: INTERNAL MEDICINE

## 2018-06-06 PROCEDURE — 99233 SBSQ HOSP IP/OBS HIGH 50: CPT | Performed by: INTERNAL MEDICINE

## 2018-06-06 PROCEDURE — 25000128 H RX IP 250 OP 636: Performed by: INTERNAL MEDICINE

## 2018-06-06 PROCEDURE — 93306 TTE W/DOPPLER COMPLETE: CPT | Mod: 26 | Performed by: INTERNAL MEDICINE

## 2018-06-06 PROCEDURE — 12000000 ZZH R&B MED SURG/OB

## 2018-06-06 PROCEDURE — 25500064 ZZH RX 255 OP 636: Performed by: INTERNAL MEDICINE

## 2018-06-06 PROCEDURE — 84132 ASSAY OF SERUM POTASSIUM: CPT | Performed by: INTERNAL MEDICINE

## 2018-06-06 PROCEDURE — 93306 TTE W/DOPPLER COMPLETE: CPT

## 2018-06-06 PROCEDURE — 40000275 ZZH STATISTIC RCP TIME EA 10 MIN

## 2018-06-06 PROCEDURE — 25000125 ZZHC RX 250: Performed by: INTERNAL MEDICINE

## 2018-06-06 PROCEDURE — 94640 AIRWAY INHALATION TREATMENT: CPT

## 2018-06-06 PROCEDURE — 36416 COLLJ CAPILLARY BLOOD SPEC: CPT | Performed by: INTERNAL MEDICINE

## 2018-06-06 PROCEDURE — 25000128 H RX IP 250 OP 636: Performed by: EMERGENCY MEDICINE

## 2018-06-06 RX ORDER — LIDOCAINE 40 MG/G
CREAM TOPICAL
Status: DISCONTINUED | OUTPATIENT
Start: 2018-06-06 | End: 2018-06-08 | Stop reason: HOSPADM

## 2018-06-06 RX ORDER — AMOXICILLIN 250 MG
1 CAPSULE ORAL 2 TIMES DAILY
Status: DISCONTINUED | OUTPATIENT
Start: 2018-06-06 | End: 2018-06-08 | Stop reason: HOSPADM

## 2018-06-06 RX ORDER — FUROSEMIDE 10 MG/ML
40 INJECTION INTRAMUSCULAR; INTRAVENOUS
Status: DISCONTINUED | OUTPATIENT
Start: 2018-06-06 | End: 2018-06-08

## 2018-06-06 RX ADMIN — ACETAMINOPHEN 975 MG: 325 TABLET, FILM COATED ORAL at 09:53

## 2018-06-06 RX ADMIN — IPRATROPIUM BROMIDE AND ALBUTEROL SULFATE 3 ML: .5; 3 SOLUTION RESPIRATORY (INHALATION) at 19:47

## 2018-06-06 RX ADMIN — SIMVASTATIN 10 MG: 10 TABLET, FILM COATED ORAL at 20:04

## 2018-06-06 RX ADMIN — FLUOXETINE 40 MG: 20 CAPSULE ORAL at 08:49

## 2018-06-06 RX ADMIN — MELATONIN TAB 3 MG 3 MG: 3 TAB at 22:59

## 2018-06-06 RX ADMIN — IPRATROPIUM BROMIDE AND ALBUTEROL SULFATE 3 ML: .5; 3 SOLUTION RESPIRATORY (INHALATION) at 07:16

## 2018-06-06 RX ADMIN — AZITHROMYCIN 250 MG: 250 TABLET, FILM COATED ORAL at 08:49

## 2018-06-06 RX ADMIN — GABAPENTIN 300 MG: 300 CAPSULE ORAL at 20:04

## 2018-06-06 RX ADMIN — FUROSEMIDE 40 MG: 10 INJECTION, SOLUTION INTRAMUSCULAR; INTRAVENOUS at 16:14

## 2018-06-06 RX ADMIN — Medication 1 MG: at 01:30

## 2018-06-06 RX ADMIN — ACETAMINOPHEN 975 MG: 325 TABLET, FILM COATED ORAL at 01:22

## 2018-06-06 RX ADMIN — HUMAN ALBUMIN MICROSPHERES AND PERFLUTREN 3 ML: 10; .22 INJECTION, SOLUTION INTRAVENOUS at 15:30

## 2018-06-06 RX ADMIN — GABAPENTIN 300 MG: 300 CAPSULE ORAL at 08:49

## 2018-06-06 RX ADMIN — IPRATROPIUM BROMIDE AND ALBUTEROL SULFATE 3 ML: .5; 3 SOLUTION RESPIRATORY (INHALATION) at 15:41

## 2018-06-06 RX ADMIN — MICONAZOLE NITRATE: 2 POWDER TOPICAL at 22:59

## 2018-06-06 RX ADMIN — MICONAZOLE NITRATE: 2 POWDER TOPICAL at 08:49

## 2018-06-06 RX ADMIN — IPRATROPIUM BROMIDE AND ALBUTEROL SULFATE 3 ML: .5; 3 SOLUTION RESPIRATORY (INHALATION) at 11:21

## 2018-06-06 RX ADMIN — ENOXAPARIN SODIUM 40 MG: 40 INJECTION SUBCUTANEOUS at 16:12

## 2018-06-06 RX ADMIN — SENNOSIDES AND DOCUSATE SODIUM 1 TABLET: 8.6; 5 TABLET ORAL at 20:04

## 2018-06-06 RX ADMIN — GABAPENTIN 300 MG: 300 CAPSULE ORAL at 14:13

## 2018-06-06 RX ADMIN — SODIUM CHLORIDE 1000 ML: 9 INJECTION, SOLUTION INTRAVENOUS at 06:45

## 2018-06-06 RX ADMIN — LEVOTHYROXINE SODIUM 88 MCG: 88 TABLET ORAL at 08:49

## 2018-06-06 RX ADMIN — CEFTRIAXONE SODIUM 2 G: 2 INJECTION, POWDER, FOR SOLUTION INTRAMUSCULAR; INTRAVENOUS at 09:53

## 2018-06-06 RX ADMIN — ACETAMINOPHEN 975 MG: 325 TABLET, FILM COATED ORAL at 17:27

## 2018-06-06 NOTE — PLAN OF CARE
Problem: Patient Care Overview  Goal: Plan of Care/Patient Progress Review  Outcome: Improving  DAY RN  BP in the 150's to 160's, no temps. 02 stable on RA at rest and with activity. RR 18-24. lower Lungs have fine crackles present and dim through out. Occasional productive cough, feels better with nebs. Up  With A1 and walker moving well. Up in room to and from the bathroom on a regular basis. Encourage hourly IS use today.    Up in recliner most of shift but repositioned frequently.   Stated breathing overall starting to feel better.

## 2018-06-06 NOTE — PLAN OF CARE
Problem: Patient Care Overview  Goal: Plan of Care/Patient Progress Review  Outcome: Improving  Pt A&O. Denied pain. Reported that Nebs has improved her breathing. O2 1.5L/min, 92%. SOB and audible wheezing with exertion. Up with assist of A1, walker/gait belt. Bowels active, no BM. Passing flatus. Adequate diet. VSS

## 2018-06-06 NOTE — PLAN OF CARE
"Problem: Patient Care Overview  Goal: Plan of Care/Patient Progress Review  Outcome: No Change  Vitals:/61  Pulse 80  Temp 98.5  F (36.9  C) (Oral)  Resp 18  Ht 1.6 m (5' 3\")  Wt 130.3 kg (287 lb 3.2 oz)  SpO2 94%  BMI 50.88 kg/m2  Pain:Pt denies pain at this time, but explains she is feeling anxious. Says she feels more anxious when she starts to cough. Getting out of bed and sitting in wheel chair is helping relieve some of this anxiety.  LOC: Pt is alert and oriented x4  Mobility: Pt is up with an assist of 1, walker and gait belt.   Lungs: Dyspnea on exertion. Diminished lung sounds with fine crackle in her bases. Expiratory wheezes.  : voiding adequate amounts. Up to the bathroom x3 overnight. Urgency/incontinent at times.   GI:Passing gas. Bowel sounds active.   IV: Saline locked.            "

## 2018-06-06 NOTE — PROGRESS NOTES
[facility]    Hospitalist Progress Note    Assessment & Plan   Carol Merida is a 83 year old female who was admitted on 6/2/2018. She presented with weakness and dyspnea    Problem 1: Weakness and dyspnea, working diagnosis on admission is LLL pneumonia, but the procalcitonin is normal and the CXR is not that impressive  - Clinically better but not greatly so and she is here for a 4th day  - For now will continue the Ceftriaxone  -     Problem 2: Weakness and Dyspnea  - CXR shows normal sized heart but vasculature is full in my opinion and her weight is up 35 lbs and  Her I&Os are +5 liters  - She has a history of  PETTY of about 50 feet with Orthopnea and PND  - The only chest pain she has is in the left shoulder area and has had that for years and attributed to a left shoulder problem  My best working diagnosis is that this is heart failure masquerading as pneumonia.  Will check a BNP, get an echocardiogram, and start diuresing her and check a BMP tomorrow and restrict her sodium in her diet.     Addendum: Echocardiogram shows normal LV systolic function and elevated pulmonary artery pressures.  Difficult study but consistent with previous echocardiogram of 2011. LV diastolic function could not be determined with this study.  Unlikely all her symptoms are cardiac and most are likely related to body habitus and deconditioning.     Problem 3: JEAN,   - Uses Cpap  -   4: Hypothyroidism, treated    5: Stage 3 Obesity, this makes all the above a bit more complicated    6: Hypothyroidism, treated    # Pain Assessment:  Current Pain Score 6/6/2018   Patient currently in pain? denies   Pain score (0-10) -   Pain location -   Pain descriptors -   Carol chow pain level was assessed and she currently denies pain.      DVT Prophylaxis: Enoxaparin (Lovenox) SQ  Code Status: Full Code    Disposition: Expected discharge in 2-3 days once she is better and the diagnosis is confirmed.    Arnie Fay MD    Interval  History   Mrs. Merida is feeling better in that her cough is better. No further headache, but she still has dyspnea. No chest pain other than some long standing and intermittent left shoulder pain. No abdominal pain or diarrhea. Eating ok. No history of edema, but has 5 pillow orthopnea and PND at  Home.  No history of lung, liver or renal problems. Can walk about 50 feet with a walker before she is winded. Uses Cpap faithfully at home    -Data reviewed today: I reviewed all new labs and imaging results over the last 24 hours. I personally reviewed the chest x-ray image(s) showing vascular congestion.    Physical Exam   Temp: 96.2  F (35.7  C) Temp src: Oral BP: 158/57   Heart Rate: 85 Resp: 24 SpO2: 93 % O2 Device: None (Room air) Oxygen Delivery: 2 LPM  Vitals:    06/03/18 0416 06/03/18 1741 06/06/18 0850   Weight: 129.4 kg (285 lb 3.2 oz) 130.3 kg (287 lb 3.2 oz) 131.7 kg (290 lb 4.8 oz)     Vital Signs with Ranges  Temp:  [96.2  F (35.7  C)-98.6  F (37  C)] 96.2  F (35.7  C)  Heart Rate:  [79-86] 85  Resp:  [18-24] 24  BP: (106-167)/(49-87) 158/57  SpO2:  [91 %-98 %] 93 %  I/O last 3 completed shifts:  In: 1287 [P.O.:630; I.V.:657]  Out: 300 [Urine:300]    Constitutional: Alert, sitting in her chair  Respiratory: Lungs have rales at both bases, no dullness, Diaphragms are high  Cardiovascular: Regular rhythm, no murmurs, normal S1 and S2, maybe a soft S3, no edema  GI: normal bowel sounds, not tender  Skin/Integumen: no rashes at this time.   Other:      Medications     sodium chloride 1,000 mL (06/06/18 0842)       acetaminophen  975 mg Oral Q8H     cefTRIAXone  2 g Intravenous Q24H     enoxaparin  40 mg Subcutaneous Q24H     FLUoxetine  40 mg Oral Daily     furosemide  40 mg Intravenous BID     gabapentin  300 mg Oral TID     ipratropium - albuterol 0.5 mg/2.5 mg/3 mL  3 mL Nebulization 4x daily     levothyroxine  88 mcg Oral Daily     melatonin  3 mg Oral At Bedtime     miconazole   Topical BID      simvastatin  10 mg Oral At Bedtime       Data     Recent Labs  Lab 06/06/18  0642 06/05/18  0641 06/04/18  0624 06/03/18  0736  06/02/18  0700   WBC  --   --  10.7 10.9  --  13.8*   HGB  --   --  12.6 13.2  --  14.5   MCV  --   --  92 93  --  92   PLT  --  238 227 213  < > 247   NA  --   --  140 139  --  137   POTASSIUM 4.0  --  4.2 3.7  --  3.9   CHLORIDE  --   --  107 107  --  102   CO2  --   --  26 25  --  28   BUN  --   --  10 12  --  15   CR  --  0.66 0.63 0.67  < > 0.73   ANIONGAP  --   --  7 7  --  7   LAKSHMI  --   --  8.2* 8.6  --  9.4   GLC  --   --  107* 117*  --  139*   TROPI  --   --   --   --   --  <0.015   < > = values in this interval not displayed.    Imaging:   No results found for this or any previous visit (from the past 24 hour(s)).

## 2018-06-06 NOTE — PLAN OF CARE
Problem: Patient Care Overview  Goal: Plan of Care/Patient Progress Review  PT- Upon arrival, note pt to be sleeping soundly. Pt declined PT d/t being too tired @ this time. Reports she will go for another walker with nursing later

## 2018-06-07 ENCOUNTER — APPOINTMENT (OUTPATIENT)
Dept: PHYSICAL THERAPY | Facility: CLINIC | Age: 83
DRG: 193 | End: 2018-06-07
Payer: COMMERCIAL

## 2018-06-07 LAB
ANION GAP SERPL CALCULATED.3IONS-SCNC: 6 MMOL/L (ref 3–14)
BUN SERPL-MCNC: 8 MG/DL (ref 7–30)
CALCIUM SERPL-MCNC: 8.8 MG/DL (ref 8.5–10.1)
CHLORIDE SERPL-SCNC: 100 MMOL/L (ref 94–109)
CO2 SERPL-SCNC: 33 MMOL/L (ref 20–32)
CREAT SERPL-MCNC: 0.68 MG/DL (ref 0.52–1.04)
GFR SERPL CREATININE-BSD FRML MDRD: 82 ML/MIN/1.7M2
GLUCOSE SERPL-MCNC: 111 MG/DL (ref 70–99)
MAGNESIUM SERPL-MCNC: 2.1 MG/DL (ref 1.6–2.3)
NT-PROBNP SERPL-MCNC: 390 PG/ML (ref 0–1800)
POTASSIUM SERPL-SCNC: 3.5 MMOL/L (ref 3.4–5.3)
SODIUM SERPL-SCNC: 139 MMOL/L (ref 133–144)

## 2018-06-07 PROCEDURE — 94640 AIRWAY INHALATION TREATMENT: CPT

## 2018-06-07 PROCEDURE — 97530 THERAPEUTIC ACTIVITIES: CPT | Mod: GP | Performed by: PHYSICAL THERAPY ASSISTANT

## 2018-06-07 PROCEDURE — 25000128 H RX IP 250 OP 636: Performed by: INTERNAL MEDICINE

## 2018-06-07 PROCEDURE — 40000275 ZZH STATISTIC RCP TIME EA 10 MIN

## 2018-06-07 PROCEDURE — 25000132 ZZH RX MED GY IP 250 OP 250 PS 637: Performed by: INTERNAL MEDICINE

## 2018-06-07 PROCEDURE — 80048 BASIC METABOLIC PNL TOTAL CA: CPT | Performed by: INTERNAL MEDICINE

## 2018-06-07 PROCEDURE — 36415 COLL VENOUS BLD VENIPUNCTURE: CPT | Performed by: INTERNAL MEDICINE

## 2018-06-07 PROCEDURE — 83880 ASSAY OF NATRIURETIC PEPTIDE: CPT | Performed by: INTERNAL MEDICINE

## 2018-06-07 PROCEDURE — 99232 SBSQ HOSP IP/OBS MODERATE 35: CPT | Performed by: INTERNAL MEDICINE

## 2018-06-07 PROCEDURE — 97116 GAIT TRAINING THERAPY: CPT | Mod: GP | Performed by: PHYSICAL THERAPY ASSISTANT

## 2018-06-07 PROCEDURE — 25000125 ZZHC RX 250: Performed by: INTERNAL MEDICINE

## 2018-06-07 PROCEDURE — 40000193 ZZH STATISTIC PT WARD VISIT: Performed by: PHYSICAL THERAPY ASSISTANT

## 2018-06-07 PROCEDURE — 83735 ASSAY OF MAGNESIUM: CPT | Performed by: INTERNAL MEDICINE

## 2018-06-07 PROCEDURE — 94640 AIRWAY INHALATION TREATMENT: CPT | Mod: 76

## 2018-06-07 PROCEDURE — 12000000 ZZH R&B MED SURG/OB

## 2018-06-07 RX ORDER — HYDRALAZINE HYDROCHLORIDE 20 MG/ML
10 INJECTION INTRAMUSCULAR; INTRAVENOUS EVERY 4 HOURS PRN
Status: DISCONTINUED | OUTPATIENT
Start: 2018-06-07 | End: 2018-06-07

## 2018-06-07 RX ADMIN — ACETAMINOPHEN 975 MG: 325 TABLET, FILM COATED ORAL at 18:09

## 2018-06-07 RX ADMIN — IPRATROPIUM BROMIDE AND ALBUTEROL SULFATE 3 ML: .5; 3 SOLUTION RESPIRATORY (INHALATION) at 15:36

## 2018-06-07 RX ADMIN — IPRATROPIUM BROMIDE AND ALBUTEROL SULFATE 3 ML: .5; 3 SOLUTION RESPIRATORY (INHALATION) at 07:39

## 2018-06-07 RX ADMIN — IPRATROPIUM BROMIDE AND ALBUTEROL SULFATE 3 ML: .5; 3 SOLUTION RESPIRATORY (INHALATION) at 11:55

## 2018-06-07 RX ADMIN — ACETAMINOPHEN 975 MG: 325 TABLET, FILM COATED ORAL at 10:31

## 2018-06-07 RX ADMIN — ACETAMINOPHEN 975 MG: 325 TABLET, FILM COATED ORAL at 03:05

## 2018-06-07 RX ADMIN — IPRATROPIUM BROMIDE AND ALBUTEROL SULFATE 3 ML: .5; 3 SOLUTION RESPIRATORY (INHALATION) at 20:11

## 2018-06-07 RX ADMIN — SENNOSIDES AND DOCUSATE SODIUM 1 TABLET: 8.6; 5 TABLET ORAL at 08:31

## 2018-06-07 RX ADMIN — SIMVASTATIN 10 MG: 10 TABLET, FILM COATED ORAL at 22:04

## 2018-06-07 RX ADMIN — GABAPENTIN 300 MG: 300 CAPSULE ORAL at 19:10

## 2018-06-07 RX ADMIN — LEVOTHYROXINE SODIUM 88 MCG: 88 TABLET ORAL at 08:31

## 2018-06-07 RX ADMIN — GABAPENTIN 300 MG: 300 CAPSULE ORAL at 08:31

## 2018-06-07 RX ADMIN — FUROSEMIDE 40 MG: 10 INJECTION, SOLUTION INTRAMUSCULAR; INTRAVENOUS at 08:31

## 2018-06-07 RX ADMIN — MICONAZOLE NITRATE: 2 POWDER TOPICAL at 08:32

## 2018-06-07 RX ADMIN — GABAPENTIN 300 MG: 300 CAPSULE ORAL at 13:34

## 2018-06-07 RX ADMIN — ENOXAPARIN SODIUM 40 MG: 40 INJECTION SUBCUTANEOUS at 16:16

## 2018-06-07 RX ADMIN — ALBUTEROL SULFATE 2 PUFF: 90 AEROSOL, METERED RESPIRATORY (INHALATION) at 08:32

## 2018-06-07 RX ADMIN — FLUOXETINE 40 MG: 20 CAPSULE ORAL at 08:31

## 2018-06-07 RX ADMIN — CEFTRIAXONE SODIUM 2 G: 2 INJECTION, POWDER, FOR SOLUTION INTRAMUSCULAR; INTRAVENOUS at 10:31

## 2018-06-07 RX ADMIN — FUROSEMIDE 40 MG: 10 INJECTION, SOLUTION INTRAMUSCULAR; INTRAVENOUS at 17:02

## 2018-06-07 RX ADMIN — SENNOSIDES AND DOCUSATE SODIUM 1 TABLET: 8.6; 5 TABLET ORAL at 19:10

## 2018-06-07 NOTE — PLAN OF CARE
Problem: Patient Care Overview  Goal: Plan of Care/Patient Progress Review  Discharge Planner PT   Patient plan for discharge: I'm doing better today that was farther then I had even at home walked  Current status: Note O2 sats @ beginning to be 94% with RA. Pt transfers supine to sit with SBA with use of bed rail. Pt transfers sit to/from stand with SBA & SBA with bed to chair to chair with ww. Note O2 sats following transfers and amb to be 93% with RA. Pt amb 60' x 2 with ww with SBA. Note SOB with last ~10' of each ambulattion  Barriers to return to prior living situation: none anticipated if continues to progress  Recommendations for discharge: PT- Per plan established by the Physical Therapist, the discharge recommendation is home with home PT    Rationale for recommendations: will continue to progress with skilled PT while IP status, would do better sitting up if able to elevate feet also.       Entered by: Elena Funes 06/07/2018 8:28 AM

## 2018-06-07 NOTE — PLAN OF CARE
Problem: Pneumonia (Adult)  Goal: Signs and Symptoms of Listed Potential Problems Will be Absent, Minimized or Managed (Pneumonia)  Signs and symptoms of listed potential problems will be absent, minimized or managed by discharge/transition of care (reference Pneumonia (Adult) CPG).   Outcome: Improving  PM SHIFT  Pt alert and orientated. Pt ambulating in room with SBA, walker and gait belt. Pt started on IV lasix at the beginning of the shift. Pt had frequent voiding after that. Pt sitting up in the chair. No edema noted. pts states she feels like she is getting less SOB with ambulating to the BR this shift. Pt tolerating room air. ECHO down at the beginning of the shift. No edema noted but right ankle is a little bit bigger than left due to past surgery on right ankle. Pt wants to sleep in her . Plan is home on DC.

## 2018-06-07 NOTE — PLAN OF CARE
Problem: Patient Care Overview  Goal: Plan of Care/Patient Progress Review  Outcome: Improving  Pt up A1c gb&walker. Ambulated in [place] x2 this shift. Voiding in BR and incontinent at times. Passing flatus. Infrequent, non productive cough. CMS intact. Hypertensive at times. Poor diet. Plan to dc home tomorrow on PO diuretics.

## 2018-06-07 NOTE — PLAN OF CARE
Problem: Patient Care Overview  Goal: Plan of Care/Patient Progress Review  Outcome: Improving  A&O. Assist of 1 with a walker and gait belt. Voiding, incontinent at times. Slightly short of breath with ambulation. Better at rest. LS dimished. IS utilized.

## 2018-06-07 NOTE — PROGRESS NOTES
Discharge Planner   Discharge Plans in progress: Home with Home PT/RN for meds and co morb following and education opportunity  Barriers to discharge plan: Labs, BNP, IV lasix   Follow up plan: Currently [facility] 6/14 2590 - will address change dependent on DC rec's.       Entered by: Stella Sommer 06/07/2018 10:12 AM

## 2018-06-07 NOTE — PROGRESS NOTES
[facility]    Hospitalist Progress Note    Assessment & Plan      Carol Merida is a 83 year old female patient with past medical history of obstructive sleep apnea,hypertension,hypothyroidism, obesity,  who was admitted on 6/2/2018. She presented with weakness and dyspnea    1: Dyspnea, working diagnosis on admission is LLL pneumonia.  Also suspect diastolic CHF  --Procalcitonin is normal and the CXR on 6/2 showed minimal patchy airspace opacity in the left lung base that could represent atelectasis or pneumonia.  - For now will continue the Ceftriaxone to complete course of treatment   - She has a history of  PETTY of about 50 feet with Orthopnea and PND. Echo showed EF 60-65% with elevated right ventricular pressure with mild pulmonary hypertension. We will continue Lasix 40 mg IV twice daily    2: JEAN,   - Continue Cpap at home settings.    3: Hypothyroidism: Continue Synthroid    4: Stage 3 Obesity, this makes all the above a bit more complicated    # Pain Assessment:  Current Pain Score 6/7/2018   Patient currently in pain? denies   Pain score (0-10) -   Pain location -   Pain descriptors -   Carol chow pain level was assessed and she currently denies pain.      DVT Prophylaxis: Enoxaparin (Lovenox) SQ  Code Status: Full Code    Disposition: Expected discharge in 1-2 days she continues to improve.     Suellen Vance MD, MD    Interval History   Patient claims that she still has shortness of breath.  Denies leg swelling.  No cough.  No fever.    -Data reviewed today: I reviewed all new labs and imaging results over the last 24 hours. I personally reviewed the chest x-ray image(s) showing vascular congestion.    Physical Exam   Temp: 99.6  F (37.6  C) Temp src: Oral BP: 167/86 Pulse: 79 Heart Rate: 73 Resp: 18 SpO2: 96 % O2 Device: None (Room air)    Vitals:    06/03/18 1741 06/06/18 0850 06/07/18 0528   Weight: 130.3 kg (287 lb 3.2 oz) 131.7 kg (290 lb 4.8 oz) 130 kg (286 lb 8 oz)     Vital  Signs with Ranges  Temp:  [97.7  F (36.5  C)-99.6  F (37.6  C)] 99.6  F (37.6  C)  Pulse:  [79] 79  Heart Rate:  [73-84] 73  Resp:  [18-20] 18  BP: (141-174)/(71-97) 167/86  SpO2:  [92 %-96 %] 96 %  I/O last 3 completed shifts:  In: 1720 [P.O.:1720]  Out: 3400 [Urine:3400]    Constitutional: Alert, sitting in her chair  Respiratory: Lungs have rales at both bases, no dullness, Diaphragms are high  Cardiovascular: Regular rhythm, no murmurs, normal S1 and S2, maybe a soft S3, no edema  GI: normal bowel sounds, not tender  Skin/Integumen: no rashes at this time.   Other:      Medications       acetaminophen  975 mg Oral Q8H     cefTRIAXone  2 g Intravenous Q24H     enoxaparin  40 mg Subcutaneous Q24H     FLUoxetine  40 mg Oral Daily     furosemide  40 mg Intravenous BID     gabapentin  300 mg Oral TID     ipratropium - albuterol 0.5 mg/2.5 mg/3 mL  3 mL Nebulization 4x daily     levothyroxine  88 mcg Oral Daily     melatonin  3 mg Oral At Bedtime     miconazole   Topical BID     senna-docusate  1 tablet Oral BID     simvastatin  10 mg Oral At Bedtime     sodium chloride (PF)  3 mL Intracatheter Q8H       Data     Recent Labs  Lab 06/07/18  0722 06/06/18  0642 06/05/18  0641 06/04/18  0624 06/03/18  0736  06/02/18  0700   WBC  --   --   --  10.7 10.9  --  13.8*   HGB  --   --   --  12.6 13.2  --  14.5   MCV  --   --   --  92 93  --  92   PLT  --   --  238 227 213  < > 247     --   --  140 139  --  137   POTASSIUM 3.5 4.0  --  4.2 3.7  --  3.9   CHLORIDE 100  --   --  107 107  --  102   CO2 33*  --   --  26 25  --  28   BUN 8  --   --  10 12  --  15   CR 0.68  --  0.66 0.63 0.67  < > 0.73   ANIONGAP 6  --   --  7 7  --  7   LAKSHMI 8.8  --   --  8.2* 8.6  --  9.4   *  --   --  107* 117*  --  139*   TROPI  --   --   --   --   --   --  <0.015   < > = values in this interval not displayed.    Imaging:   No results found for this or any previous visit (from the past 24 hour(s)).

## 2018-06-08 ENCOUNTER — APPOINTMENT (OUTPATIENT)
Dept: PHYSICAL THERAPY | Facility: CLINIC | Age: 83
DRG: 193 | End: 2018-06-08
Payer: COMMERCIAL

## 2018-06-08 VITALS
DIASTOLIC BLOOD PRESSURE: 76 MMHG | TEMPERATURE: 98.6 F | SYSTOLIC BLOOD PRESSURE: 161 MMHG | BODY MASS INDEX: 49.65 KG/M2 | WEIGHT: 280.2 LBS | HEIGHT: 63 IN | RESPIRATION RATE: 18 BRPM | HEART RATE: 79 BPM | OXYGEN SATURATION: 92 %

## 2018-06-08 LAB
ANION GAP SERPL CALCULATED.3IONS-SCNC: 8 MMOL/L (ref 3–14)
BACTERIA SPEC CULT: NO GROWTH
BASOPHILS # BLD AUTO: 0 10E9/L (ref 0–0.2)
BASOPHILS NFR BLD AUTO: 0.4 %
BUN SERPL-MCNC: 11 MG/DL (ref 7–30)
CALCIUM SERPL-MCNC: 9 MG/DL (ref 8.5–10.1)
CHLORIDE SERPL-SCNC: 96 MMOL/L (ref 94–109)
CO2 SERPL-SCNC: 34 MMOL/L (ref 20–32)
CREAT SERPL-MCNC: 0.64 MG/DL (ref 0.52–1.04)
DIFFERENTIAL METHOD BLD: NORMAL
EOSINOPHIL # BLD AUTO: 0.4 10E9/L (ref 0–0.7)
EOSINOPHIL NFR BLD AUTO: 4.3 %
ERYTHROCYTE [DISTWIDTH] IN BLOOD BY AUTOMATED COUNT: 12.9 % (ref 10–15)
GFR SERPL CREATININE-BSD FRML MDRD: 89 ML/MIN/1.7M2
GLUCOSE SERPL-MCNC: 124 MG/DL (ref 70–99)
HCT VFR BLD AUTO: 42.1 % (ref 35–47)
HGB BLD-MCNC: 13.5 G/DL (ref 11.7–15.7)
IMM GRANULOCYTES # BLD: 0 10E9/L (ref 0–0.4)
IMM GRANULOCYTES NFR BLD: 0.5 %
LYMPHOCYTES # BLD AUTO: 1.2 10E9/L (ref 0.8–5.3)
LYMPHOCYTES NFR BLD AUTO: 14.6 %
Lab: NORMAL
MAGNESIUM SERPL-MCNC: 2.2 MG/DL (ref 1.6–2.3)
MCH RBC QN AUTO: 28.9 PG (ref 26.5–33)
MCHC RBC AUTO-ENTMCNC: 32.1 G/DL (ref 31.5–36.5)
MCV RBC AUTO: 90 FL (ref 78–100)
MONOCYTES # BLD AUTO: 0.6 10E9/L (ref 0–1.3)
MONOCYTES NFR BLD AUTO: 7.5 %
NEUTROPHILS # BLD AUTO: 6 10E9/L (ref 1.6–8.3)
NEUTROPHILS NFR BLD AUTO: 72.7 %
NRBC # BLD AUTO: 0 10*3/UL
NRBC BLD AUTO-RTO: 0 /100
PLATELET # BLD AUTO: 327 10E9/L (ref 150–450)
POTASSIUM SERPL-SCNC: 3.3 MMOL/L (ref 3.4–5.3)
RBC # BLD AUTO: 4.67 10E12/L (ref 3.8–5.2)
SODIUM SERPL-SCNC: 138 MMOL/L (ref 133–144)
SPECIMEN SOURCE: NORMAL
WBC # BLD AUTO: 8.2 10E9/L (ref 4–11)

## 2018-06-08 PROCEDURE — 40000275 ZZH STATISTIC RCP TIME EA 10 MIN

## 2018-06-08 PROCEDURE — 25000125 ZZHC RX 250: Performed by: INTERNAL MEDICINE

## 2018-06-08 PROCEDURE — 36415 COLL VENOUS BLD VENIPUNCTURE: CPT | Performed by: INTERNAL MEDICINE

## 2018-06-08 PROCEDURE — 80048 BASIC METABOLIC PNL TOTAL CA: CPT | Performed by: INTERNAL MEDICINE

## 2018-06-08 PROCEDURE — 25000132 ZZH RX MED GY IP 250 OP 250 PS 637: Performed by: INTERNAL MEDICINE

## 2018-06-08 PROCEDURE — 97116 GAIT TRAINING THERAPY: CPT | Mod: GP | Performed by: PHYSICAL THERAPY ASSISTANT

## 2018-06-08 PROCEDURE — 94640 AIRWAY INHALATION TREATMENT: CPT

## 2018-06-08 PROCEDURE — 99239 HOSP IP/OBS DSCHRG MGMT >30: CPT | Performed by: INTERNAL MEDICINE

## 2018-06-08 PROCEDURE — 40000193 ZZH STATISTIC PT WARD VISIT: Performed by: PHYSICAL THERAPY ASSISTANT

## 2018-06-08 PROCEDURE — 25000128 H RX IP 250 OP 636: Performed by: INTERNAL MEDICINE

## 2018-06-08 PROCEDURE — 85025 COMPLETE CBC W/AUTO DIFF WBC: CPT | Performed by: INTERNAL MEDICINE

## 2018-06-08 PROCEDURE — 83735 ASSAY OF MAGNESIUM: CPT | Performed by: INTERNAL MEDICINE

## 2018-06-08 PROCEDURE — 97530 THERAPEUTIC ACTIVITIES: CPT | Mod: GP | Performed by: PHYSICAL THERAPY ASSISTANT

## 2018-06-08 RX ORDER — AMLODIPINE BESYLATE 5 MG/1
5 TABLET ORAL DAILY
Status: DISCONTINUED | OUTPATIENT
Start: 2018-06-08 | End: 2018-06-08 | Stop reason: HOSPADM

## 2018-06-08 RX ORDER — POTASSIUM CHLORIDE 1.5 G/1.58G
20 POWDER, FOR SOLUTION ORAL DAILY
Qty: 30 TABLET | Refills: 0 | Status: SHIPPED | OUTPATIENT
Start: 2018-06-08 | End: 2018-06-12

## 2018-06-08 RX ORDER — AMOXICILLIN 250 MG
1 CAPSULE ORAL 2 TIMES DAILY
Qty: 100 TABLET | Refills: 0 | Status: SHIPPED | OUTPATIENT
Start: 2018-06-08 | End: 2018-11-19

## 2018-06-08 RX ORDER — FUROSEMIDE 40 MG
40 TABLET ORAL DAILY
Qty: 10 TABLET | Refills: 0 | Status: SHIPPED | OUTPATIENT
Start: 2018-06-09 | End: 2018-06-12

## 2018-06-08 RX ORDER — BENZONATATE 100 MG/1
100 CAPSULE ORAL 3 TIMES DAILY PRN
Qty: 42 CAPSULE | Refills: 0 | Status: SHIPPED | OUTPATIENT
Start: 2018-06-08 | End: 2021-06-15

## 2018-06-08 RX ORDER — ALBUTEROL SULFATE 90 UG/1
2 AEROSOL, METERED RESPIRATORY (INHALATION) EVERY 4 HOURS PRN
Qty: 1 INHALER | Refills: 0 | Status: SHIPPED | OUTPATIENT
Start: 2018-06-08 | End: 2018-11-19

## 2018-06-08 RX ORDER — FUROSEMIDE 40 MG
40 TABLET ORAL DAILY
Status: DISCONTINUED | OUTPATIENT
Start: 2018-06-09 | End: 2018-06-08 | Stop reason: HOSPADM

## 2018-06-08 RX ORDER — AMLODIPINE BESYLATE 5 MG/1
5 TABLET ORAL DAILY
Qty: 30 TABLET | Refills: 0 | Status: SHIPPED | OUTPATIENT
Start: 2018-06-09 | End: 2018-06-19

## 2018-06-08 RX ORDER — POLYETHYLENE GLYCOL 3350 17 G/17G
1 POWDER, FOR SOLUTION ORAL DAILY
Qty: 510 G | Refills: 1 | Status: SHIPPED | OUTPATIENT
Start: 2018-06-08 | End: 2018-11-19

## 2018-06-08 RX ORDER — ALBUTEROL SULFATE 90 UG/1
2 AEROSOL, METERED RESPIRATORY (INHALATION) PRN
Qty: 1 INHALER | Refills: 0 | Status: SHIPPED | OUTPATIENT
Start: 2018-06-08 | End: 2018-06-08

## 2018-06-08 RX ADMIN — LEVOTHYROXINE SODIUM 88 MCG: 88 TABLET ORAL at 08:31

## 2018-06-08 RX ADMIN — IPRATROPIUM BROMIDE AND ALBUTEROL SULFATE 3 ML: .5; 3 SOLUTION RESPIRATORY (INHALATION) at 10:44

## 2018-06-08 RX ADMIN — FLUOXETINE 40 MG: 20 CAPSULE ORAL at 08:31

## 2018-06-08 RX ADMIN — FUROSEMIDE 40 MG: 10 INJECTION, SOLUTION INTRAMUSCULAR; INTRAVENOUS at 08:31

## 2018-06-08 RX ADMIN — SENNOSIDES AND DOCUSATE SODIUM 1 TABLET: 8.6; 5 TABLET ORAL at 08:31

## 2018-06-08 RX ADMIN — ACETAMINOPHEN 975 MG: 325 TABLET, FILM COATED ORAL at 02:08

## 2018-06-08 RX ADMIN — ENOXAPARIN SODIUM 40 MG: 40 INJECTION SUBCUTANEOUS at 16:05

## 2018-06-08 RX ADMIN — AMLODIPINE BESYLATE 5 MG: 5 TABLET ORAL at 10:44

## 2018-06-08 RX ADMIN — CEFTRIAXONE SODIUM 2 G: 2 INJECTION, POWDER, FOR SOLUTION INTRAMUSCULAR; INTRAVENOUS at 10:44

## 2018-06-08 RX ADMIN — POTASSIUM CHLORIDE 20 MEQ: 1500 TABLET, EXTENDED RELEASE ORAL at 10:44

## 2018-06-08 RX ADMIN — ACETAMINOPHEN 975 MG: 325 TABLET, FILM COATED ORAL at 10:44

## 2018-06-08 RX ADMIN — GABAPENTIN 300 MG: 300 CAPSULE ORAL at 08:31

## 2018-06-08 RX ADMIN — MICONAZOLE NITRATE: 2 POWDER TOPICAL at 08:32

## 2018-06-08 RX ADMIN — IPRATROPIUM BROMIDE AND ALBUTEROL SULFATE 3 ML: .5; 3 SOLUTION RESPIRATORY (INHALATION) at 15:32

## 2018-06-08 RX ADMIN — POTASSIUM CHLORIDE 40 MEQ: 1500 TABLET, EXTENDED RELEASE ORAL at 08:31

## 2018-06-08 RX ADMIN — GABAPENTIN 300 MG: 300 CAPSULE ORAL at 13:48

## 2018-06-08 NOTE — PROGRESS NOTES
"Care Transitions Team: Following for CC, discharge planning, and disposition.      Met with pt at bedside to discuss co morbs and rec's for Home PT.     Pt reports that her  has had HHC in past \"so I am familiar with the service\" We discussed specifically co-morb self management , education to on going and that the HHC RN as well as Clinic RN care coordinator will and can play an active role in succeeding in the best self care.     We also discussed pulling in a SWS in Home Care setting to discuss's \"next level of care needs\" In relation to her aling  and her increasing needs.   She explains to have 4 supportive children in the areas and \"these discussions\" have been taking place.     [facility] referral placed  PCP handoff to follow   PCP Follow up time has been discussed with pt   "

## 2018-06-08 NOTE — PLAN OF CARE
Problem: Patient Care Overview  Goal: Plan of Care/Patient Progress Review  Discharge Planner PT   Patient plan for discharge: home, I'm better than I have been  Current status: goals all met gait with RW no O2 to 110 feet with limites c/o SOB basic transfers with S to set up only  Barriers to return to prior living situation: none has spouse at home  Recommendations for discharge: PT- Per plan established by the Physical Therapist, the discharge recommendation is home with home PT    Rationale for recommendations: goals are all met recommend to PT for discharge.       Entered by: Jazzmine Bowens 06/08/2018 9:14 AM

## 2018-06-08 NOTE — PLAN OF CARE
Problem: Pneumonia (Adult)  Goal: Signs and Symptoms of Listed Potential Problems Will be Absent, Minimized or Managed (Pneumonia)  Signs and symptoms of listed potential problems will be absent, minimized or managed by discharge/transition of care (reference Pneumonia (Adult) CPG).   Outcome: Improving  PM SHIFT  Pt alert and orientated. Pt ambulated in room with min assist of 1 and a walker.  Very little PETTY noted this evening. LS dims. Pt has infrequent nonproductive cough. Temp 99.4 max. IS encouraged. Pt refused to wear her CPAP at hs. Pt has trace edema bilat ankles. Pt complains of generalized arthritis pain. schedule tylenol given. Pt took a few naps this shift and was able to fall asleep at hs so she refused her melatonin. Pt had a hard time sleeping the last few nights and pts thinks it maybe because of the melatonin. BP remains elevated but never above 180. Plan is home tomorrow.

## 2018-06-08 NOTE — DISCHARGE SUMMARY
[facility]  Discharge Summary  Hospitalist      Date of Admission:  6/2/2018  Date of Discharge:  6/8/2018  Provider:  Juan Trejo MD. [facility]  Date of Service (when I last saw the patient): 06/08/18      Primary Provider: Yessica Lora          Discharge Diagnosis:     Discharge Diagnoses      1. Left lower lobe community-acquired pneumonia  2. Acute on chronic diastolic heart failure  3. Obstructive sleep apnea  4. Morbid obesity BMI almost 50  5. Hypothyroidism    Other medical issues:  Past Medical History:   Diagnosis Date     Abnormal stress test     small area on stress thalium ?2003; but normal angiogram 2012     Cardiac dysrhythmia, unspecified     irregular heartbeat     Hypothyroid      Malignant 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     Melanoma of skin, site unspecified      NONSPECIFIC MEDICAL HISTORY 04    fx fifth finger right hand     Obstructive sleep apnea (adult) (pediatric) 8/25/2006    CPAP      Osteoarthritis      Other abnormal glucose      Other and unspecified hyperlipidemia      Other chronic pain     Joint pain for many years.     Other osteoporosis      PONV (postoperative nausea and vomiting)      Tubular adenoma in colon 9/01    colonoscopy due 2004     Unspecified essential hypertension          Please see the admission history and physical for full details.     Hospital Course     Carol Merida was admitted on 6/2/2018.  The following problems were addressed during her hospitalization:  83 year old female patient with past medical history of obstructive sleep apnea,hypertension,hypothyroidism, obesity,  who was admitted on 6/2/2018. She presented with weakness and dyspnea.  Further evaluation revealed patchy airspace opacity in the left lower lung on chest x-ray, she was treated with intravenous Rocephin and Zithromax, she received a full course of treatment for 1 week,  procalcitonin was not elevated.  Also there was concern because of the patient's morbid obesity and symptoms that she does have acute on chronic diastolic heart failure, she was diuresed with intravenous Lasix 40 mg IV twice a day.  Today she is feeling much better, is able to ambulate with her walker with difficulty.  Her lungs are clear to auscultation.  I recommend ongoing oral Lasix 40 mg daily with potassium replacement and follow-up with her primary care physician early next week with a repeat basic metabolic panel.  With patient morbid obesity, obstructive sleep apnea, she might need to be on chronic diuretic for volume control.  This decision would be deferred to her primary care physician.  Also blood pressure was significantly elevated and she was started on amlodipine, she is allergic to ACE inhibitor, monitoring for increased lower extremity edema which is a side effect of amlodipine is recommended.          Pending Results   Unresulted Labs Ordered in the Past 30 Days of this Admission     No orders found from 4/3/2018 to 6/3/2018.          Discharge Orders     Reason for your hospital stay   Left lower lobe pneumonia     Follow-up and recommended labs and tests    Follow-up with primary care physician Monday or Tuesday with a basic metabolic panel, follow-up on Lasix need     Diet   Follow this diet upon discharge: Orders Placed This Encounter     Advance Diet as Tolerated: Regular Diet Adult; 3 gm NA Diet         Code Status   Full Code       Primary Care Physician   Yessica Lora MD    Physical Exam   Temp: 98.6  F (37  C) Temp src: Oral BP: 161/76 Pulse: 79 Heart Rate: 78 Resp: 18 SpO2: 90 % O2 Device: None (Room air)    Vitals:    06/06/18 0850 06/07/18 0528 06/08/18 0626   Weight: 131.7 kg (290 lb 4.8 oz) 130 kg (286 lb 8 oz) 127.1 kg (280 lb 3.2 oz)     Vital Signs with Ranges  Temp:  [98.6  F (37  C)-99.6  F (37.6  C)] 98.6  F (37  C)  Pulse:  [79] 79  Heart Rate:  [73-89] 78  Resp:  [18]  18  BP: (144-170)/(66-86) 161/76  SpO2:  [90 %-97 %] 90 %  I/O last 3 completed shifts:  In: 1645 [P.O.:1645]  Out: 2200 [Urine:2200]    Constitutional:  alert, cooperative, no apparent distress  Respiratory: No increased work of breathing, good air exchange, no crackles or wheezing.  Cardiovascular: apical impulse,normal S1 and S2  GI: bowel sounds present, soft, non-distended, non-tender      Discharge Disposition   Discharged to home    Consultations This Hospital Stay   PHYSICAL THERAPY ADULT IP CONSULT  CARE COORDINATOR IP CONSULT    Time Spent on this Encounter   IJuan, personally saw the patient today and spent greater than 30 minutes discharging this patient.      Discharge Medications   Current Discharge Medication List      START taking these medications    Details   amLODIPine (NORVASC) 5 MG tablet Take 1 tablet (5 mg) by mouth daily  Qty: 30 tablet, Refills: 0    Associated Diagnoses: Benign essential hypertension      benzonatate (TESSALON) 100 MG capsule Take 1 capsule (100 mg) by mouth 3 times daily as needed for cough  Qty: 42 capsule, Refills: 0    Associated Diagnoses: Pneumonia of left lower lobe due to infectious organism (H)      furosemide (LASIX) 40 MG tablet Take 1 tablet (40 mg) by mouth daily  Qty: 10 tablet, Refills: 0    Associated Diagnoses: Acute diastolic congestive heart failure (H)      polyethylene glycol (MIRALAX) powder Take 17 g (1 capful) by mouth daily  Qty: 510 g, Refills: 1    Associated Diagnoses: Constipation, unspecified constipation type      potassium chloride (KLOR-CON) 20 MEQ Packet Take 20 mEq by mouth daily  Qty: 30 tablet, Refills: 0    Associated Diagnoses: Acute diastolic congestive heart failure (H)      senna-docusate (SENOKOT-S;PERICOLACE) 8.6-50 MG per tablet Take 1 tablet by mouth 2 times daily  Qty: 100 tablet, Refills: 0    Associated Diagnoses: Constipation, unspecified constipation type         CONTINUE these medications which have  CHANGED    Details   albuterol (PROAIR HFA/PROVENTIL HFA/VENTOLIN HFA) 108 (90 Base) MCG/ACT Inhaler Inhale 2 puffs into the lungs as needed for shortness of breath / dyspnea or wheezing  Qty: 1 Inhaler, Refills: 0    Associated Diagnoses: Pneumonia of left lower lobe due to infectious organism (H)         CONTINUE these medications which have NOT CHANGED    Details   acetaminophen (TYLENOL) 325 MG tablet Take 3 tablets (975 mg) by mouth every 8 hours  Qty: 500 tablet, Refills: 1    Associated Diagnoses: Status post total replacement of right hip      ALPRAZolam (XANAX) 0.25 MG tablet Take 1 tablet (0.25 mg) by mouth 2 times daily as needed for anxiety Don't take with the oxycodone  Qty: 10 tablet, Refills: 0    Associated Diagnoses: Anxiety      calcium-vitamin D (CALCIUM 600 + D) 600-400 MG-UNIT per tablet Take 1 tablet by mouth every evening   Qty: 100 tablet, Refills: 3    Associated Diagnoses: Osteopenia      FLUoxetine (PROZAC) 40 MG capsule TAKE ONE CAPSULE BY MOUTH EVERY DAY  Qty: 90 capsule, Refills: 0    Comments: Recommend appointment.  Associated Diagnoses: Mild depression (H)      fluticasone (FLONASE) 50 MCG/ACT nasal spray Spray 1 spray into both nostrils daily as needed   Refills: 5      gabapentin (NEURONTIN) 300 MG capsule Take 1 capsule (300 mg) by mouth 3 times daily  Qty: 270 capsule, Refills: 0    Associated Diagnoses: Osteoarthritis of multiple joints, unspecified osteoarthritis type; DDD (degenerative disc disease), lumbar; Osteoarthritis of spine with radiculopathy, lumbar region      levothyroxine (SYNTHROID/LEVOTHROID) 88 MCG tablet Take 1 tablet (88 mcg) by mouth daily  Qty: 90 tablet, Refills: 3    Associated Diagnoses: Hypothyroidism, unspecified type      magnesium hydroxide (MILK OF MAGNESIA) 400 MG/5ML suspension Take 30 mLs by mouth daily as needed       simvastatin (ZOCOR) 10 MG tablet TAKE 1 TABLET BY MOUTH AT BEDTIME  Qty: 90 tablet, Refills: 0    Associated Diagnoses:  Hyperlipidemia LDL goal <100      Nystatin (NYSTOP) 490039 UNIT/GM POWD Apply tid to affectead area prn  Qty: 60 g, Refills: 1    Associated Diagnoses: Intertrigo      triamcinolone (KENALOG) 0.1 % cream Apply sparingly to affected area two times daily as needed for itching.  Qty: 30 g, Refills: 0    Associated Diagnoses: Atopic dermatitis           Allergies   Allergies   Allergen Reactions     Ace Inhibitors Cough     Data   Most Recent 3 CBC's:  Recent Labs   Lab Test  06/08/18   0635  06/05/18   0641  06/04/18   0624  06/03/18   0736   WBC  8.2   --   10.7  10.9   HGB  13.5   --   12.6  13.2   MCV  90   --   92  93   PLT  327  238  227  213      Most Recent 3 BMP's:  Recent Labs   Lab Test  06/08/18   0635  06/07/18   0722  06/06/18   0642  06/05/18   0641  06/04/18   0624   NA  138  139   --    --   140   POTASSIUM  3.3*  3.5  4.0   --   4.2   CHLORIDE  96  100   --    --   107   CO2  34*  33*   --    --   26   BUN  11  8   --    --   10   CR  0.64  0.68   --   0.66  0.63   ANIONGAP  8  6   --    --   7   LAKSHMI  9.0  8.8   --    --   8.2*   GLC  124*  111*   --    --   107*     Most Recent 2 LFT's:  Recent Labs   Lab Test  01/04/18   1609  04/28/17   1029   AST  23  18   ALT  23  21   ALKPHOS  104  99   BILITOTAL  0.7  1.0     Most Recent INR's and Anticoagulation Dosing History:  Anticoagulation Dose History     Recent Dosing and Labs Latest Ref Rng & Units 11/2/2016 11/3/2016 11/4/2016 11/5/2016 11/6/2016 11/7/2016 11/8/2016    Warfarin 5 mg - 5 mg 5 mg - 5 mg 5 mg 5 mg -    Warfarin 10 mg - - - 10 mg - - - -    INR 0.86 - 1.14 - 1.09 1.18(H) 1.54(H) 2.08(H) 2.27(H) 2.78(H)        Most Recent 3 Troponin's:  Recent Labs   Lab Test  06/02/18   0700   TROPI  <0.015     Most Recent Cholesterol Panel:  Recent Labs   Lab Test  04/23/18   0933   CHOL  119   LDL  38   HDL  60   TRIG  107     Most Recent 6 Bacteria Isolates From Any Culture (See EPIC Reports for Culture Details):  Recent Labs   Lab Test  06/02/18   0957   06/02/18   0700   CULT  Cultured on the 1st day of incubation:  Streptococcus mitis group  *  Critical Value/Significant Value, preliminary result only, called to and read back by  ILDA CASTILLO RN ([facility]ORTS).  06.03.18 0709 GJS    (Note)  POSITIVE for STREPTOCOCCUS SPECIES OTHER THAN pneumococcus, anginosus  group, S. pyogenes and S. agalactiae. Performed using Woto  multiplex nucleic acid test. Final identification and antimicrobial  susceptibility testing will be verified by standard methods.    Specimen tested with Verigene multiplex, gram-positive blood culture  nucleic acid test for the following targets: Staph aureus, Staph  epidermidis, Staph lugdunensis, other Staph species, Enterococcus  faecalis, Enterococcus faecium, Streptococcus species, S. agalactiae,  S. anginosus grp., S. pneumoniae, S. pyogenes, Listeria sp., mecA  (methicillin resistance) and Fernando/B (vancomycin resistance).    Critical Value/Significant Value called to and read back by Yanci Fuentes RN (RHORTS) on 06.03.18  @10:02AM by DW.        No growth     Most Recent TSH, T4 and A1c Labs:  Recent Labs   Lab Test  11/16/17   1210  08/15/17   1603   TSH  2.89   --    A1C   --   5.9     Results for orders placed or performed during the hospital encounter of 06/02/18   XR Chest 2 Views    Narrative    CHEST TWO VIEWS  6/2/2018 8:14 AM     COMPARISON: Two-view chest x-ray 5/10/2012.    HISTORY: Cough.      Impression    IMPRESSION: There may be minimal patchy airspace opacity in the left  lung base that could represent atelectasis or pneumonia. The lungs are  otherwise clear. There is no pleural effusion or pneumothorax. Heart  size is normal given AP technique.    ADELA OQUENDO MD   XR Pelvis and Hip Right 2 Views    Narrative    PELVIS AND RIGHT HIP TWO VIEWS 6/2/2018 10:36 AM     COMPARISON: Two-view right hip 11/5/2016.    HISTORY: Fall, pain.       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 Alignment between the arthroplastic  components bilaterally appears normal. No fractures noted. No evidence  for loosening or failure of any of the arthroplastic components.    ADELA OQUENDO MD           Disclaimer: This note consists of symbols derived from keyboarding, dictation and/or voice recognition software. As a result, there may be errors in the script that have gone undetected. Please consider this when interpreting information found in this chart.

## 2018-06-08 NOTE — PLAN OF CARE
Problem: Patient Care Overview  Goal: Plan of Care/Patient Progress Review  Outcome: Adequate for Discharge Date Met: 06/08/18  Pt discharged to home, accompanied by son.Left unit via wheelchair.Personal belongings were packed by pt.Discharge instructions and medications were reviewed with pt.Pt signed and uplifted meds in sealed envelope.

## 2018-06-08 NOTE — PLAN OF CARE
Problem: Patient Care Overview  Goal: Plan of Care/Patient Progress Review  Outcome: Improving  A&O. Shortness of breath when up but states feels better today. Max temp 99.1. Assist of 1 with a walker and gait belt. Voiding.

## 2018-06-08 NOTE — PLAN OF CARE
Problem: Patient Care Overview  Goal: Plan of Care/Patient Progress Review  Physical Therapy Discharge Summary    Reason for therapy discharge:    Discharged to home with home therapy.    Progress towards therapy goal(s). See goals on Care Plan in [facility] electronic health record for goal details.  Goals met    Therapy recommendation(s):    Pt would benefit from home PT to progress endurance.

## 2018-06-08 NOTE — PLAN OF CARE
Problem: Patient Care Overview  Goal: Plan of Care/Patient Progress Review  Outcome: Improving  Pt up A1 ambulating in hallway. Voiding. Passing flatus. Received oral K+ supplement. Fair appetite. IV lasix. Plans to dc around or after 1600 d/t  having MD appt. After that son will come get pt. IV out. Dc orders in.

## 2018-06-08 NOTE — PROGRESS NOTES
Transition Communication Hand-off for Care Transitions to Next Level of Care Provider    Name: Carol Merida  : 1935  MRN #: [id number]  Primary Care Provider: Yessica Lora MD  Primary Care MD Name: Yessica Lora  Primary Clinic: [address] [facility] [address]  Primary Care Clinic Name:  [facility]  Reason for Hospitalization:  Generalized muscle weakness [M62.81]  Pneumonia of left lower lobe due to infectious organism (H) [J18.1]  Admit Date/Time: 2018  6:47 AM  Discharge Date: 2018   Payor Source: Payor: UCARE / Plan: [employer] FOR [employer]IORS / Product Type: HMO /     Readmission Assessment Measure (REBECCA) Risk Score/category: Average   Reason for Communication Hand-off Referral: Admission diagnoses: CHF  Admission diagnoses: PN    Discharge Plan:Home with HHC, [facility] RN /PT       Discharge Needs Assessment:  Needs       Most Recent Value    Equipment Currently Used at Home walker, rolling, shower chair, raised toilet, grab bar    # of Referrals Placed by CTS Scheduled Follow-up appointments        Follow-up plan:  Follow-up with primary care physician Monday or Tuesday with a basic metabolic panel, follow-up on Lasix need       Future Appointments     2018  2:50 PM CDT   [facility]    [address] [facility] [address] [phone]          Key Recommendations:  Pt admitted with PNA. Pt states she has never had pna in the past. She uses a walker at all times and still drives if needed. Pt and family state they have great family support. PT was recommending discharge to home with HC PT . Pna action plan was reviewed and given to the pt.    Kathy Burgos    Pt was worked up for potential for CHF with findings of   Dyspnea, working diagnosis on admission is LLL pneumonia.  Also suspect diastolic CHF  --Procalcitonin is normal and the CXR on  showed minimal patchy airspace opacity in the left lung  base that could represent atelectasis or pneumonia, pt was treated with IV lasix. PCP to address lasix need at appointment.   Please continue CHF education on pt and follow to assess for on going gaps   Pt has appt with MD Lora for 6/14 5880   This was Hospital RN CC communication to [facility] on discharge   Home with PT/RN for education on best self management of her Co morbs, CHF teaching was initiated at bedside but will need re-enforcement on this Please keep an eye on her she apparently has an ailing  as well who I believe was open to you guys at one point.  RN to assess for SWS involvement  need for next level of care needs.       Stella Sommer    AVS/Discharge Summary is the source of truth; this is a helpful guide for improved communication of patient story

## 2018-06-08 NOTE — DISCHARGE INSTRUCTIONS
Your home care referral was sent to [facility], for Home Physical therapy and Home RN for education on you medical conditions and assist you to best manage them   If you haven't heard from them within the next 24-48 hours,  Please call them @ [phone]

## 2018-06-11 ENCOUNTER — TELEPHONE (OUTPATIENT)
Dept: FAMILY MEDICINE | Facility: CLINIC | Age: 83
End: 2018-06-11

## 2018-06-11 ENCOUNTER — PATIENT OUTREACH (OUTPATIENT)
Dept: CARE COORDINATION | Facility: CLINIC | Age: 83
End: 2018-06-11

## 2018-06-11 NOTE — TELEPHONE ENCOUNTER
Please contact patient for In-patient follow up.  [phone] (home)     Visit date: 6/8/2018  Diagnosis listed:Generalized Muscle Weakness, Benign Essential Hypertension  Number of visits in past 12 months:0/1

## 2018-06-11 NOTE — PROGRESS NOTES
Clinic Care Coordination Contact    Situation: Patient chart reviewed by care coordinator.    Clinical Data: Patient was hospitalized at [facility] from 06/02/2018 to 06/08/2018 with diagnosis of Community Acquired Pneumonia (LLL), acute on CHF, JEAN, morbid obesity (BMI - almost 50) and hypothyroidism.     Per 06/08/2018 Hospitalist Discharge Summary -   Discharge Orders  Reason for your hospital stay   Left lower lobe pneumonia      Follow-up and recommended labs and tests    Follow-up with primary care physician Monday or Tuesday with a basic metabolic panel, follow-up on Lasix need      Diet   Follow this diet upon discharge: Orders Placed This Encounter     Advance Diet as Tolerated: Regular Diet Adult; 3 gm NA Diet         Per 06/08/2018 CTS - see letters tab:  Key Recommendations:  Pt admitted with PNA. Pt states she has never had pna in the past. She uses a walker at all times and still drives if needed. Pt and family state they have great family support. PT was recommending discharge to home with HC PT . Pna action plan was reviewed and given to the pt.  Kathy Burgos     Pt was worked up for potential for CHF with findings of   Dyspnea, working diagnosis on admission is LLL pneumonia.  Also suspect diastolic CHF  --Procalcitonin is normal and the CXR on 6/2 showed minimal patchy airspace opacity in the left lung base that could represent atelectasis or pneumonia, pt was treated with IV lasix. PCP to address lasix need at appointment.   Please continue CHF education on pt and follow to assess for on going gaps   Pt has appt with MD Lora for 6/14 4880   This was Hospital RN CC communication to [facility] on discharge   Home with PT/RN for education on best self management of her Co morbs, CHF teaching was initiated at bedside but will need re-enforcement on this Please keep an eye on her she apparently has an ailing  as well who I believe was open to you guys at one point.  RN to assess for SWS involvement  need  for next level of care needs.   Stella Sommer      1021 hours- Home Care Contact:              Home Care Agency: [facility]              Contact name () and phone number:  Glo Jernigan RN CM               Care Coordination contacted home care: Yes - spoke with bin Jackson.               Anticipated start of care date:  Today - Monday 06/11/2018.      Disciplines - SN, PT and OT.       CCRN left a VM for Deanna Richardson, Clinical Coordinator with [facility] providing a verbal order for SW discipline to be added to services, given above information from IP Coordinator.    CCRN sent an email to Glo Jernigan RN CM informing her of this information and requested update once patient DC's from services.     1110 hours - Patient Contact:               Introduced self and role of care coordination.               Do you have any questions about your medications? No              Follow up appointment is scheduled for:  Next 5 appointments (look out 90 days)     Jun 12, 2018  2:50 PM CDT   Office Visit with Yessica Lora MD   [facility] ([address] [address] [phone]                              Home care has contacted patient: Yes - Patient reports that the [facility] home care RN just left her home.                 Patient questions/concerns: Patient denies any current questions, concerns or needs from writer that have not been addressed by the [facility] RN.   She is looking forward to her PCP appointment tomorrow, 06/12/2018.       Plan: RN/SW Care Coordinator will await notification from home care staff informing RN/SW Care Coordinator of patients discharge plans/needs. RN/SW Care Coordinator will review chart and outreach to home care every 4 weeks and as needed.      Lia Acosta RN  [facility] Care Coordinator - Prior [facility] Locations   Direct:  [phone] (voicemail available)   (Today's  Date: 06/11/2018)

## 2018-06-11 NOTE — TELEPHONE ENCOUNTER
Routing to Dr. Lora:    Martita, nurse from [facility] ([phone]) calling for orders:    Skilled nursing 2 x/week for 1 week, 3x/week for 1 week, 2x/week for 1week, 1 x/week for 1 week, and 2 visits PRN. Patient has a f/u tomorrow with Dr. Lora.    Also need PT and OT to evaluate.    Verbal ok given.    Jailene FOX, Triage RN

## 2018-06-11 NOTE — TELEPHONE ENCOUNTER
"Has an appt scheduled tomorrow with Dr. Lora.    ED / Discharge Outreach Protocol    Patient Contact    Attempt # 1    Was call answered?  Yes.  \"May I please speak with <patient name>\"  Is patient available?   No. Left message with a man for patient to call me back.                "

## 2018-06-12 ENCOUNTER — OFFICE VISIT (OUTPATIENT)
Dept: FAMILY MEDICINE | Facility: CLINIC | Age: 83
End: 2018-06-12
Payer: COMMERCIAL

## 2018-06-12 VITALS
RESPIRATION RATE: 18 BRPM | HEIGHT: 63 IN | HEART RATE: 76 BPM | BODY MASS INDEX: 49.52 KG/M2 | DIASTOLIC BLOOD PRESSURE: 70 MMHG | WEIGHT: 279.5 LBS | TEMPERATURE: 98.2 F | SYSTOLIC BLOOD PRESSURE: 128 MMHG | OXYGEN SATURATION: 92 %

## 2018-06-12 DIAGNOSIS — Z90.12 HISTORY OF LEFT MASTECTOMY: ICD-10-CM

## 2018-06-12 DIAGNOSIS — I50.32 CHRONIC DIASTOLIC CONGESTIVE HEART FAILURE (H): ICD-10-CM

## 2018-06-12 DIAGNOSIS — I27.20 PULMONARY HYPERTENSION (H): ICD-10-CM

## 2018-06-12 DIAGNOSIS — Z09 HOSPITAL DISCHARGE FOLLOW-UP: Primary | ICD-10-CM

## 2018-06-12 DIAGNOSIS — E66.01 MORBID OBESITY (H): ICD-10-CM

## 2018-06-12 DIAGNOSIS — H61.23 BILATERAL IMPACTED CERUMEN: ICD-10-CM

## 2018-06-12 DIAGNOSIS — R60.9 EDEMA, UNSPECIFIED TYPE: ICD-10-CM

## 2018-06-12 DIAGNOSIS — J18.9 PNEUMONIA OF LEFT LOWER LOBE DUE TO INFECTIOUS ORGANISM: ICD-10-CM

## 2018-06-12 DIAGNOSIS — I50.31 ACUTE DIASTOLIC CONGESTIVE HEART FAILURE (H): ICD-10-CM

## 2018-06-12 DIAGNOSIS — H93.8X2 PLUGGED FEELING IN EAR, LEFT: ICD-10-CM

## 2018-06-12 PROCEDURE — 36415 COLL VENOUS BLD VENIPUNCTURE: CPT | Performed by: FAMILY MEDICINE

## 2018-06-12 PROCEDURE — 80048 BASIC METABOLIC PNL TOTAL CA: CPT | Performed by: FAMILY MEDICINE

## 2018-06-12 PROCEDURE — 99496 TRANSJ CARE MGMT HIGH F2F 7D: CPT | Performed by: FAMILY MEDICINE

## 2018-06-12 RX ORDER — FUROSEMIDE 40 MG
40 TABLET ORAL DAILY
Qty: 30 TABLET | Refills: 1 | Status: SHIPPED | OUTPATIENT
Start: 2018-06-12 | End: 2018-07-19

## 2018-06-12 RX ORDER — POTASSIUM CHLORIDE 1.5 G/1.58G
20 POWDER, FOR SOLUTION ORAL DAILY
Qty: 30 TABLET | Refills: 1 | Status: SHIPPED | OUTPATIENT
Start: 2018-06-12 | End: 2018-07-19

## 2018-06-12 NOTE — PROGRESS NOTES
SUBJECTIVE:   Carol Merida is a 83 year old female who presents to clinic today for the following health issues:          Hospital Follow-up Visit:    Hospital/Nursing Home/IP Rehab Facility: [facility]  Date of Admission: 6/2/2018  Date of Discharge: 6/8/2018  Reason(s) for Admission: Pt presented to emergency department with complaints of weakness and dyspnea.     Hospital Discharge Diagnoses:  1. Left Lower Lobe Community-Acquired Pneumonia  2. Acute on Chronic Diastolic Heart Failure  3. Obstructive Sleep Apnea  4. Morbid Obesity BMI almost 50  5. Hypothyroidism            Problems taking medications regularly:  None       Medication changes since discharge: None       Problems adhering to non-medication therapy:  None    Summary of hospitalization:  [facility] discharge summary reviewed  Diagnostic Tests/Treatments reviewed.  Follow up needed: bmp  Other Healthcare Providers Involved in Patient s Care:         None  Update since discharge: stable.     Post Discharge Medication Reconciliation: discharge medications reconciled, continue medications without change.  Plan of care communicated with patient and family     Coding guidelines for this visit:  Type of Medical   Decision Making Face-to-Face Visit       within 7 Days of discharge Face-to-Face Visit        within 14 days of discharge   Moderate Complexity [id number]   High Complexity 26874 24956                  Problem list and histories reviewed & adjusted, as indicated.  Additional history:     See under ROS     Patient Active Problem List   Diagnosis     Obesity     Hypothyroidism     Benign neoplasm of colon     Mild Depression [296.31]     Obstructive sleep apnea     * * * SBE PROPHYLAXIS * * *     Degeneration of lumbar or lumbosacral intervertebral disc     Pain in joint, ankle and foot     Arthrodesis status     HYPERLIPIDEMIA LDL GOAL <100     Hypertension goal BP (blood pressure) < 140/90     Health Care Home      Osteopenia     Malignant neoplasm of female breast (H)     Impaired fasting glucose     ACP (advance care planning)     History of melanoma     BMI 45.0-49.9, adult (H)     Panic disorder without agoraphobia     Humerus fracture     Arthritis     PONV (postoperative nausea and vomiting)     S/P total hip arthroplasty     Constipation     DVT prophylaxis     Anticoagulation management encounter     Physical deconditioning     Periprosthetic fracture around internal prosthetic left hip joint (H)     Chronic pain syndrome - hips     Fracture of greater trochanter of right femur 11/6/2016, closed, with routine healing, subsequent encounter     Anxiety     Osteoarthritis of multiple joints, unspecified osteoarthritis type     Anemia due to blood loss, acute     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     Long term (current) use of anticoagulants     Vitamin D deficiency     Pneumonia       Current Outpatient Prescriptions   Medication Sig Dispense Refill     acetaminophen (TYLENOL) 325 MG tablet Take 3 tablets (975 mg) by mouth every 8 hours 500 tablet 1     albuterol (PROAIR HFA/PROVENTIL HFA/VENTOLIN HFA) 108 (90 Base) MCG/ACT Inhaler Inhale 2 puffs into the lungs every 4 hours as needed for shortness of breath / dyspnea or wheezing 1 Inhaler 0     ALPRAZolam (XANAX) 0.25 MG tablet Take 1 tablet (0.25 mg) by mouth 2 times daily as needed for anxiety Don't take with the oxycodone 10 tablet 0     amLODIPine (NORVASC) 5 MG tablet Take 1 tablet (5 mg) by mouth daily 30 tablet 0     benzonatate (TESSALON) 100 MG capsule Take 1 capsule (100 mg) by mouth 3 times daily as needed for cough 42 capsule 0     calcium-vitamin D (CALCIUM 600 + D) 600-400 MG-UNIT per tablet Take 1 tablet by mouth every evening  100 tablet 3     FLUoxetine (PROZAC) 40 MG capsule TAKE ONE CAPSULE BY MOUTH EVERY DAY 90 capsule 0     fluticasone (FLONASE) 50 MCG/ACT nasal spray Spray 1 spray into both nostrils daily as needed   5      furosemide (LASIX) 40 MG tablet Take 1 tablet (40 mg) by mouth daily 10 tablet 0     gabapentin (NEURONTIN) 300 MG capsule Take 1 capsule (300 mg) by mouth 3 times daily 270 capsule 0     levothyroxine (SYNTHROID/LEVOTHROID) 88 MCG tablet Take 1 tablet (88 mcg) by mouth daily 90 tablet 3     magnesium hydroxide (MILK OF MAGNESIA) 400 MG/5ML suspension Take 30 mLs by mouth daily as needed        Nystatin (NYSTOP) 841258 UNIT/GM POWD Apply tid to affectead area prn 60 g 1     polyethylene glycol (MIRALAX) powder Take 17 g (1 capful) by mouth daily 510 g 1     potassium chloride (KLOR-CON) 20 MEQ Packet Take 20 mEq by mouth daily 30 tablet 0     senna-docusate (SENOKOT-S;PERICOLACE) 8.6-50 MG per tablet Take 1 tablet by mouth 2 times daily 100 tablet 0     simvastatin (ZOCOR) 10 MG tablet TAKE 1 TABLET BY MOUTH AT BEDTIME 90 tablet 0     triamcinolone (KENALOG) 0.1 % cream Apply sparingly to affected area two times daily as needed for itching. 30 g 0       Reviewed and updated as needed this visit by clinical staff  Tobacco  Allergies  Meds  Med Hx  Surg Hx  Fam Hx  Soc Hx      Reviewed and updated as needed this visit by Provider         ROS:  CONSTITUTIONAL:NEGATIVE for fever, chills, change in weight  ENT/MOUTH: see below  RESP:see below  CV: NEGATIVE for chest pain, palpitations  MUSCULOSKELETAL: see below  PSYCHIATRIC: NEGATIVE for changes in mood or affect    Hospitalized with pneumonia; LLL.  Acute on chronic diastolic HF.  Sleep apnea.    She is to have a recheck on lasix.      She notes her Breathing waxes and wanes since at home.   Also seemed to do that in the hospital.    Left ear has been plugged.   She notes they did not check this.  Cannot hear.  This started with some congestion she thought might be related to allergies.  Would like us to look at it.     Has not taken any cough medication since home.    Believes diuretic has been working.  Did note some increased swelling at home.   Hattiemichael has  "been bothering since the fall.  Has a donut pillow.    Has not used her CPAP for awhile.     Pain with her OA.    OBJECTIVE:     /70 (BP Location: Right arm, Patient Position: Chair, Cuff Size: Adult Regular)  Pulse 76  Temp 98.2  F (36.8  C) (Oral)  Resp 18  Ht 5' 3\" (1.6 m)  Wt 279 lb 8 oz (126.8 kg)  SpO2 92%  BMI 49.51 kg/m2  Body mass index is 49.51 kg/(m^2).  GENERAL APPEARANCE: alert and no distress  HENT: bilateral TMs obscured by cerumen  RESP: lungs clear to auscultation - no rales, rhonchi or wheezes  CV: regular rates and rhythm  MS: trace edema present on the ankles.   PSYCH: mentation appears normal and affect normal/a little down.    CXR:  IMPRESSION: There may be minimal patchy airspace opacity in the left  lung base that could represent atelectasis or pneumonia. The lungs are  otherwise clear. There is no pleural effusion or pneumothorax. Heart  size is normal given AP technique      TSH   Date Value Ref Range Status   11/16/2017 2.89 0.40 - 4.00 mU/L Final   ]        ASSESSMENT/PLAN:     Hospital discharge follow-up      Pneumonia of left lower lobe due to infectious organism (H)  Has been treated with antibiotics.  Continue to follow.     Acute diastolic congestive heart failure (H)  Will continue the lasix and potassium at this time. Encourage hydration. Monitor renal function.  Discussed what to watch for; she may wish to get a scale to follow weight.   - Basic metabolic panel  - furosemide (LASIX) 40 MG tablet; Take 1 tablet (40 mg) by mouth daily  - potassium chloride (KLOR-CON) 20 MEQ Packet; Take 20 mEq by mouth daily    Chronic diastolic congestive heart failure (H)  As above.   - Basic metabolic panel  - CARDIOLOGY EVAL ADULT REFERRAL    Elevated BMI:  Encourage healthy eating. Not able to do much exercise currently.    Pulmonary hypertension    - CARDIOLOGY EVAL ADULT REFERRAL    Edema, unspecified type  Suspect she might have some increase in edema at home as more likely " doing more sitting where in the hospital; suspect she spent more time in bed. Continue to observe.     Plugged feeling in ear, left  cerumen    Bilateral impacted cerumen  Attempted to flush; some removed and then developed some pain in left ear.   Will use some drops; and follow up for flushing prn.    History of left mastectomy  Requesting bras.   - order for DME; Equipment being ordered: left breast prosthesis. Mastectomy bras (up to 4)      Yessica Lora MD, MD  [facility]

## 2018-06-12 NOTE — MR AVS SNAPSHOT
After Visit Summary   6/12/2018    Carol Merida    MRN: [id number]           Patient Information     Date Of Birth          1935        Visit Information        Provider Department      6/12/2018 2:50 PM Yessica Lora MD [facility]        Today's Diagnoses     Hospital discharge follow-up    -  1    Pulmonary hypertension        Edema, unspecified type        Chronic diastolic congestive heart failure (H)        Plugged feeling in ear, left        Bilateral impacted cerumen        History of left mastectomy        Acute diastolic congestive heart failure (H)           Follow-ups after your visit        Additional Services     CARDIOLOGY EVAL ADULT REFERRAL       Preferred location:  [facility] [phone]   [email or web address]    Please be aware that coverage of these services is subject to the terms and limitations of your health insurance plan.  Call member services at your health plan with any benefit or coverage questions.      Please bring the following to your appointment:  Any x-rays, CTs or MRIs which have been performed. Contact the facility where they were done to arrange for  prior to your scheduled appointment.    List of current medications  This referral request   Any documents/labs given to you for this referral                  Follow-up notes from your care team     Return in about 4 weeks (around 7/10/2018) for Medication recheck.      Your next 10 appointments already scheduled     Jul 12, 2018 10:10 AM CDT   Office Visit with MD [facility] ([facility])    [address] [address] [phone]           Bring a current list of meds and any records pertaining to this visit. For Physicals, please bring immunization records and any forms needing to be filled out. Please arrive 10 minutes early to  "complete paperwork.              Who to contact     If you have questions or need follow up information about today's clinic visit or your schedule please contact [facility] directly at [phone].  Normal or non-critical lab and imaging results will be communicated to you by MyChart, letter or phone within 4 business days after the clinic has received the results. If you do not hear from us within 7 days, please contact the clinic through MyChart or phone. If you have a critical or abnormal lab result, we will notify you by phone as soon as possible.  Submit refill requests through [employer] or call your pharmacy and they will forward the refill request to us. Please allow 3 business days for your refill to be completed.          Additional Information About Your Visit        [facility] Information     FIGS lets you send messages to your doctor, view your test results, renew your prescriptions, schedule appointments and more. To sign up, go to [email or web address] . Click on \"Log in\" on the left side of the screen, which will take you to the Welcome page. Then click on \"Sign up Now\" on the right side of the page.     You will be asked to enter the access code listed below, as well as some personal information. Please follow the directions to create your username and password.     Your access code is: [email or web address]  Expires: 2018  2:48 PM     Your access code will  in 90 days. If you need help or a new code, please call your [place] clinic or [phone].        Care EveryWhere ID     This is your Care EveryWhere ID. This could be used by other organizations to access your [place] medical records  [id number]        Your Vitals Were     Pulse Temperature Respirations Height Pulse Oximetry BMI (Body Mass Index)    76 98.2  F (36.8  C) (Oral) 18 5' 3\" (1.6 m) 92% 49.51 kg/m2       Blood Pressure from Last 3 Encounters:   18 128/70   18 161/76   18 142/88    Weight from " Last 3 Encounters:   06/12/18 279 lb 8 oz (126.8 kg)   06/08/18 280 lb 3.2 oz (127.1 kg)   01/04/18 267 lb 14.4 oz (121.5 kg)              We Performed the Following     Basic metabolic panel     CARDIOLOGY EVAL ADULT REFERRAL          Today's Medication Changes          These changes are accurate as of 6/12/18  4:12 PM.  If you have any questions, ask your nurse or doctor.               Start taking these medicines.        Dose/Directions    order for DME   Used for:  History of left mastectomy   Started by:  Yessica Lora MD        Equipment being ordered: left breast prosthesis. Mastectomy bras (up to 4)   Quantity:  4 Device   Refills:  0            Where to get your medicines      These medications were sent to [facility] [place] - [address]  [address] [address] [address]     Phone:  [phone]     furosemide 40 MG tablet    potassium chloride 20 MEQ Packet         Some of these will need a paper prescription and others can be bought over the counter.  Ask your nurse if you have questions.     Bring a paper prescription for each of these medications     order for DME                Primary Care Provider Office Phone # Fax #    Yessica Lora [phone] [address] [address] [facility] [address]        Equal Access to Services     KHUSHI KENNY AH: Hadii eduardo ku hadasho Soomaali, waaxda luqadaha, qaybta kaalmada adeegyada, waxay idiin haycristobal kaplan. So [facility] [phone].    ATENCIÓN: Si habla español, tiene a johns disposición servicios gratuitos de asistencia lingüística. Llame al [phone].    We comply with applicable federal civil rights laws and [place] laws. We do not discriminate on the basis of race, color, national origin, age, disability, sex, sexual orientation, or gender identity.            Thank you!     Thank you for choosing [facility]  for your care. Our goal is always to provide you with excellent  care. Hearing back from our patients is one way we can continue to improve our services. Please take a few minutes to complete the written survey that you may receive in the mail after your visit with us. Thank you!             Your Updated Medication List - Protect others around you: Learn how to safely use, store and throw away your medicines at www.disposemymeds.org.          This list is accurate as of 6/12/18  4:12 PM.  Always use your most recent med list.                   Brand Name Dispense Instructions for use Diagnosis    acetaminophen 325 MG tablet    TYLENOL    500 tablet    Take 3 tablets (975 mg) by mouth every 8 hours    Status post total replacement of right hip       albuterol 108 (90 Base) MCG/ACT Inhaler    PROAIR HFA/PROVENTIL HFA/VENTOLIN HFA    1 Inhaler    Inhale 2 puffs into the lungs every 4 hours as needed for shortness of breath / dyspnea or wheezing    Pneumonia of left lower lobe due to infectious organism (H)       ALPRAZolam 0.25 MG tablet    XANAX    10 tablet    Take 1 tablet (0.25 mg) by mouth 2 times daily as needed for anxiety Don't take with the oxycodone    Anxiety       amLODIPine 5 MG tablet    NORVASC    30 tablet    Take 1 tablet (5 mg) by mouth daily    Benign essential hypertension       benzonatate 100 MG capsule    TESSALON    42 capsule    Take 1 capsule (100 mg) by mouth 3 times daily as needed for cough    Pneumonia of left lower lobe due to infectious organism (H)       calcium 600 + D 600-400 MG-UNIT per tablet   Generic drug:  calcium-vitamin D     100 tablet    Take 1 tablet by mouth every evening    Osteopenia       FLUoxetine 40 MG capsule    PROzac    90 capsule    TAKE ONE CAPSULE BY MOUTH EVERY DAY    Mild depression (H)       fluticasone 50 MCG/ACT spray    FLONASE     Spray 1 spray into both nostrils daily as needed        furosemide 40 MG tablet    LASIX    30 tablet    Take 1 tablet (40 mg) by mouth daily    Acute diastolic congestive heart failure (H)        gabapentin 300 MG capsule    NEURONTIN    270 capsule    Take 1 capsule (300 mg) by mouth 3 times daily    Osteoarthritis of multiple joints, unspecified osteoarthritis type, DDD (degenerative disc disease), lumbar, Osteoarthritis of spine with radiculopathy, lumbar region       levothyroxine 88 MCG tablet    SYNTHROID/LEVOTHROID    90 tablet    Take 1 tablet (88 mcg) by mouth daily    Hypothyroidism, unspecified type       magnesium hydroxide 400 MG/5ML suspension    MILK OF MAGNESIA     Take 30 mLs by mouth daily as needed        NYSTOP 768873 UNIT/GM Powd   Generic drug:  nystatin     60 g    Apply tid to affectead area prn    Intertrigo       order for DME     4 Device    Equipment being ordered: left breast prosthesis. Mastectomy bras (up to 4)    History of left mastectomy       polyethylene glycol powder    MIRALAX    510 g    Take 17 g (1 capful) by mouth daily    Constipation, unspecified constipation type       potassium chloride 20 MEQ Packet    KLOR-CON    30 tablet    Take 20 mEq by mouth daily    Acute diastolic congestive heart failure (H)       senna-docusate 8.6-50 MG per tablet    SENOKOT-S;PERICOLACE    100 tablet    Take 1 tablet by mouth 2 times daily    Constipation, unspecified constipation type       simvastatin 10 MG tablet    ZOCOR    90 tablet    TAKE 1 TABLET BY MOUTH AT BEDTIME    Hyperlipidemia LDL goal <100       triamcinolone 0.1 % cream    KENALOG    30 g    Apply sparingly to affected area two times daily as needed for itching.    Atopic dermatitis

## 2018-06-12 NOTE — LETTER
June 14, 2018      Carol Merida  [address] [address]        Dear Ms. Carol Merida,    Enclosed is a copy of your recent results.    They have flagged your glucose as being a little high. Fasting sugars (glucose) of 100 or above are considered abnormal. We are already watching this.     I hope you are feeling better!    Sincerely,     Yessica Lora MD

## 2018-06-13 ENCOUNTER — DOCUMENTATION ONLY (OUTPATIENT)
Dept: CARE COORDINATION | Facility: CLINIC | Age: 83
End: 2018-06-13

## 2018-06-13 NOTE — PROGRESS NOTES
[facility] now requests orders and shares plan of care/discharge summaries for some patients through [employer].  Please REPLY TO THIS MESSAGE OR ROUTE BACK TO THE AUTHOR in order to give authorization for orders when needed.  This is considered a verbal order, you will still receive a faxed copy of orders for signature.  Thank you for your assistance in improving collaboration for our patients.    ORDER    Requesting PT 1x/week for 1 week, 2x/week for 2 weeks, 1x/week for 1 week to address strength, gait training and activity tolerance.    Thank you.  Noy Leach, PT  [email or web address]  [phone]

## 2018-06-14 LAB
ANION GAP SERPL CALCULATED.3IONS-SCNC: 8 MMOL/L (ref 3–14)
BUN SERPL-MCNC: 17 MG/DL (ref 7–30)
CALCIUM SERPL-MCNC: 9.6 MG/DL (ref 8.5–10.1)
CHLORIDE SERPL-SCNC: 95 MMOL/L (ref 94–109)
CO2 SERPL-SCNC: 33 MMOL/L (ref 20–32)
CREAT SERPL-MCNC: 0.74 MG/DL (ref 0.52–1.04)
GFR SERPL CREATININE-BSD FRML MDRD: 75 ML/MIN/1.7M2
GLUCOSE SERPL-MCNC: 104 MG/DL (ref 70–99)
POTASSIUM SERPL-SCNC: 4 MMOL/L (ref 3.4–5.3)
SODIUM SERPL-SCNC: 136 MMOL/L (ref 133–144)

## 2018-06-17 PROBLEM — E66.01 MORBID OBESITY (H): Status: ACTIVE | Noted: 2018-06-17

## 2018-06-18 ENCOUNTER — TELEPHONE (OUTPATIENT)
Dept: FAMILY MEDICINE | Facility: CLINIC | Age: 83
End: 2018-06-18

## 2018-06-18 DIAGNOSIS — I50.31 ACUTE DIASTOLIC CONGESTIVE HEART FAILURE (H): ICD-10-CM

## 2018-06-18 DIAGNOSIS — Z53.9 DIAGNOSIS NOT YET DEFINED: Primary | ICD-10-CM

## 2018-06-18 DIAGNOSIS — I10 BENIGN ESSENTIAL HYPERTENSION: ICD-10-CM

## 2018-06-18 PROBLEM — I50.9 CHF (CONGESTIVE HEART FAILURE) (H): Status: ACTIVE | Noted: 2018-06-18

## 2018-06-18 PROCEDURE — G0180 MD CERTIFICATION HHA PATIENT: HCPCS | Performed by: FAMILY MEDICINE

## 2018-06-18 RX ORDER — FUROSEMIDE 40 MG
40 TABLET ORAL DAILY
Qty: 30 TABLET | Refills: 1 | Status: CANCELLED | OUTPATIENT
Start: 2018-06-18

## 2018-06-18 NOTE — TELEPHONE ENCOUNTER
Received Home Health Certification and Plan of Care, placed in Dr. Lroa's in basket.    Please review, sign and fax back to [phone].

## 2018-06-18 NOTE — TELEPHONE ENCOUNTER
Rosie from [facility] calling with information for Dr Lora. She is not sure if Dr Lora knows that patient was prescribed two new medications from the hospital. She is wanting to see if Dr Lora will refill patient's Lasix 40mg 1 a day because patient only has one more pill left for tomorrow. The 2nd new medication that patient is taking is the Norvasc 5 mg once a day. If Dr Lora will refill the prescription please send refill to the [facility] pharmacy. Please call nurse Velez back at [phone] and okay to leave a detailed VM.

## 2018-06-18 NOTE — TELEPHONE ENCOUNTER
Home Health Certification and Plan of Care has been faxed to [phone] and sent to [facility].  Maria Antonia Mendez, CMA

## 2018-06-19 RX ORDER — AMLODIPINE BESYLATE 5 MG/1
5 TABLET ORAL DAILY
Qty: 90 TABLET | Refills: 0 | Status: SHIPPED | OUTPATIENT
Start: 2018-06-19 | End: 2018-08-23

## 2018-06-28 ENCOUNTER — TRANSFERRED RECORDS (OUTPATIENT)
Dept: HEALTH INFORMATION MANAGEMENT | Facility: CLINIC | Age: 83
End: 2018-06-28

## 2018-07-06 ENCOUNTER — DOCUMENTATION ONLY (OUTPATIENT)
Dept: FAMILY MEDICINE | Facility: CLINIC | Age: 83
End: 2018-07-06

## 2018-07-06 NOTE — PROGRESS NOTES
Rosie from [facility]  Called requesting extension of home care orders.    Verbal ok given for nurse visits 1 x wk for 3 weeks and 2 prn for management of new dx COPD.    Consuelo Park RN

## 2018-07-11 ENCOUNTER — PATIENT OUTREACH (OUTPATIENT)
Dept: CARE COORDINATION | Facility: CLINIC | Age: 83
End: 2018-07-11

## 2018-07-11 NOTE — PROGRESS NOTES
Clinic Care Coordination Contact  Care Coordination Communication    Home Care Contact:              Home Care Agency:  [facility]# [phone]              Contact name () and phone number: Glo Simons RN               Care Coordination contacted home care: Yes              Patient is current with [facility] services.     Plan: RN/SW Care Coordinator will await notification from home care staff informing RN/SW Care Coordinator of patients discharge plans/needs. RN/SW Care Coordinator will review chart and outreach to home care every 4 weeks and as needed.    ENROLLMENT STATUS:  POTENTIAL  CARE COORDINATOR STATUS: Care team current.     Lia Acosta, JARETH  [facility]  Clinic Care Coordinator - Prior [place] and [place] Locations   Direct:  [phone] (voicemail available)   (Today's Date: 07/11/2018)

## 2018-07-13 ENCOUNTER — TRANSFERRED RECORDS (OUTPATIENT)
Dept: HEALTH INFORMATION MANAGEMENT | Facility: CLINIC | Age: 83
End: 2018-07-13

## 2018-07-19 ENCOUNTER — RADIANT APPOINTMENT (OUTPATIENT)
Dept: GENERAL RADIOLOGY | Facility: CLINIC | Age: 83
End: 2018-07-19
Attending: FAMILY MEDICINE
Payer: COMMERCIAL

## 2018-07-19 ENCOUNTER — OFFICE VISIT (OUTPATIENT)
Dept: FAMILY MEDICINE | Facility: CLINIC | Age: 83
End: 2018-07-19
Payer: COMMERCIAL

## 2018-07-19 VITALS
WEIGHT: 279.6 LBS | RESPIRATION RATE: 18 BRPM | OXYGEN SATURATION: 94 % | SYSTOLIC BLOOD PRESSURE: 126 MMHG | BODY MASS INDEX: 49.54 KG/M2 | HEART RATE: 82 BPM | HEIGHT: 63 IN | TEMPERATURE: 98.1 F | DIASTOLIC BLOOD PRESSURE: 72 MMHG

## 2018-07-19 DIAGNOSIS — R06.09 DOE (DYSPNEA ON EXERTION): Primary | ICD-10-CM

## 2018-07-19 DIAGNOSIS — R06.02 SOB (SHORTNESS OF BREATH): ICD-10-CM

## 2018-07-19 DIAGNOSIS — Z87.01 HISTORY OF PNEUMONIA: ICD-10-CM

## 2018-07-19 DIAGNOSIS — R06.09 DOE (DYSPNEA ON EXERTION): ICD-10-CM

## 2018-07-19 DIAGNOSIS — M25.473 ANKLE EDEMA: ICD-10-CM

## 2018-07-19 DIAGNOSIS — I50.31 ACUTE DIASTOLIC CONGESTIVE HEART FAILURE (H): ICD-10-CM

## 2018-07-19 LAB
FEF 25/75: 0.78
FEV-1: 1.32
FEV1/FVC: NORMAL
FVC: 1.91

## 2018-07-19 PROCEDURE — 71046 X-RAY EXAM CHEST 2 VIEWS: CPT | Mod: FY

## 2018-07-19 PROCEDURE — 80048 BASIC METABOLIC PNL TOTAL CA: CPT | Performed by: FAMILY MEDICINE

## 2018-07-19 PROCEDURE — 36415 COLL VENOUS BLD VENIPUNCTURE: CPT | Performed by: FAMILY MEDICINE

## 2018-07-19 PROCEDURE — 94010 BREATHING CAPACITY TEST: CPT | Performed by: FAMILY MEDICINE

## 2018-07-19 PROCEDURE — 99214 OFFICE O/P EST MOD 30 MIN: CPT | Mod: 25 | Performed by: FAMILY MEDICINE

## 2018-07-19 RX ORDER — FUROSEMIDE 40 MG
40 TABLET ORAL DAILY
Qty: 30 TABLET | Refills: 1 | Status: ON HOLD | OUTPATIENT
Start: 2018-07-19 | End: 2018-11-21

## 2018-07-19 RX ORDER — POTASSIUM CHLORIDE 1.5 G/1.58G
20 POWDER, FOR SOLUTION ORAL DAILY
Qty: 30 TABLET | Refills: 1 | Status: SHIPPED | OUTPATIENT
Start: 2018-07-19 | End: 2018-11-19

## 2018-07-19 ASSESSMENT — ANXIETY QUESTIONNAIRES
5. BEING SO RESTLESS THAT IT IS HARD TO SIT STILL: MORE THAN HALF THE DAYS
3. WORRYING TOO MUCH ABOUT DIFFERENT THINGS: MORE THAN HALF THE DAYS
2. NOT BEING ABLE TO STOP OR CONTROL WORRYING: SEVERAL DAYS
6. BECOMING EASILY ANNOYED OR IRRITABLE: SEVERAL DAYS
1. FEELING NERVOUS, ANXIOUS, OR ON EDGE: MORE THAN HALF THE DAYS
7. FEELING AFRAID AS IF SOMETHING AWFUL MIGHT HAPPEN: SEVERAL DAYS
GAD7 TOTAL SCORE: 11
IF YOU CHECKED OFF ANY PROBLEMS ON THIS QUESTIONNAIRE, HOW DIFFICULT HAVE THESE PROBLEMS MADE IT FOR YOU TO DO YOUR WORK, TAKE CARE OF THINGS AT HOME, OR GET ALONG WITH OTHER PEOPLE: SOMEWHAT DIFFICULT

## 2018-07-19 ASSESSMENT — PATIENT HEALTH QUESTIONNAIRE - PHQ9: 5. POOR APPETITE OR OVEREATING: MORE THAN HALF THE DAYS

## 2018-07-19 NOTE — LETTER
[facility]  [address] [address] [phone]          July 23, 2018    Carol Merida                                                                                                                     [address] [address] [address]            Martin Medina!    Enclosed is a copy of your recent results.    Other than the glucose, all is normal. This includes the potassium and kidney function.    It was nice seeing you the other day!    Sincerely,         Yessica Lora MD

## 2018-07-19 NOTE — PROGRESS NOTES
SUBJECTIVE:   Carol Merida is a 83 year old female who presents to clinic today for the following health issues:      Medication Followup of furosemide and potassium    Taking Medication as prescribed: yes    Side Effects:  None    Medication Helping Symptoms:  Still having edema off and of in the feet. Still having trouble breathing from the pneumonia.           Problem list and histories reviewed & adjusted, as indicated.  Additional history:     See under ROS     Patient Active Problem List   Diagnosis     Obesity     Hypothyroidism     Benign neoplasm of colon     Mild Depression [296.31]     Obstructive sleep apnea     * * * SBE PROPHYLAXIS * * *     Degeneration of lumbar or lumbosacral intervertebral disc     Pain in joint, ankle and foot     Arthrodesis status     HYPERLIPIDEMIA LDL GOAL <100     Hypertension goal BP (blood pressure) < 140/90     Health Care Home     Osteopenia     Malignant neoplasm of female breast (H)     Impaired fasting glucose     ACP (advance care planning)     History of melanoma     Panic disorder without agoraphobia     Humerus fracture     Arthritis     PONV (postoperative nausea and vomiting)     S/P total hip arthroplasty     Constipation     DVT prophylaxis     Anticoagulation management encounter     Physical deconditioning     Periprosthetic fracture around internal prosthetic left hip joint (H)     Chronic pain syndrome - hips     Fracture of greater trochanter of right femur 11/6/2016, closed, with routine healing, subsequent encounter     Anxiety     Osteoarthritis of multiple joints, unspecified osteoarthritis type     Anemia due to blood loss, acute     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     Long term (current) use of anticoagulants     Vitamin D deficiency     Pneumonia     Elevated BMI (H)     CHF (congestive heart failure) (H)       Current Outpatient Prescriptions   Medication Sig Dispense Refill     acetaminophen (TYLENOL) 325 MG tablet Take 3  tablets (975 mg) by mouth every 8 hours 500 tablet 1     albuterol (PROAIR HFA/PROVENTIL HFA/VENTOLIN HFA) 108 (90 Base) MCG/ACT Inhaler Inhale 2 puffs into the lungs every 4 hours as needed for shortness of breath / dyspnea or wheezing 1 Inhaler 0     ALPRAZolam (XANAX) 0.25 MG tablet Take 1 tablet (0.25 mg) by mouth 2 times daily as needed for anxiety Don't take with the oxycodone 10 tablet 0     amLODIPine (NORVASC) 5 MG tablet Take 1 tablet (5 mg) by mouth daily 90 tablet 0     benzonatate (TESSALON) 100 MG capsule Take 1 capsule (100 mg) by mouth 3 times daily as needed for cough 42 capsule 0     calcium-vitamin D (CALCIUM 600 + D) 600-400 MG-UNIT per tablet Take 1 tablet by mouth every evening  100 tablet 3     FLUoxetine (PROZAC) 40 MG capsule TAKE ONE CAPSULE BY MOUTH EVERY DAY 90 capsule 0     fluticasone (FLONASE) 50 MCG/ACT nasal spray Spray 1 spray into both nostrils daily as needed   5     furosemide (LASIX) 40 MG tablet Take 1 tablet (40 mg) by mouth daily 30 tablet 1     gabapentin (NEURONTIN) 300 MG capsule Take 1 capsule (300 mg) by mouth 3 times daily 270 capsule 0     levothyroxine (SYNTHROID/LEVOTHROID) 88 MCG tablet Take 1 tablet (88 mcg) by mouth daily 90 tablet 3     magnesium hydroxide (MILK OF MAGNESIA) 400 MG/5ML suspension Take 30 mLs by mouth daily as needed        Nystatin (NYSTOP) 469261 UNIT/GM POWD Apply tid to affectead area prn 60 g 1     order for DME Equipment being ordered: left breast prosthesis. Mastectomy bras (up to 4) 4 Device 0     polyethylene glycol (MIRALAX) powder Take 17 g (1 capful) by mouth daily 510 g 1     potassium chloride (KLOR-CON) 20 MEQ Packet Take 20 mEq by mouth daily 30 tablet 1     senna-docusate (SENOKOT-S;PERICOLACE) 8.6-50 MG per tablet Take 1 tablet by mouth 2 times daily 100 tablet 0     simvastatin (ZOCOR) 10 MG tablet TAKE 1 TABLET BY MOUTH AT BEDTIME 90 tablet 1     triamcinolone (KENALOG) 0.1 % cream Apply sparingly to affected area two times  "daily as needed for itching. 30 g 0         Reviewed and updated as needed this visit by clinical staff  Allergies       Reviewed and updated as needed this visit by Provider       Social History   Substance Use Topics     Smoking status: Never Smoker     Smokeless tobacco: Never Used     Alcohol use 0.0 - 0.5 oz/week     0 - 1 Glasses of wine per week      Comment: 1 glass wine weekly       ROS:  CONSTITUTIONAL:NEGATIVE for fever, chills  RESP:see below  CV: mild edema.  PSYCHIATRIC: NEGATIVE for changes in mood or affect    Notes breathing is not back to normal.  She will occasionally get stuff up with a cough.  Dyspnea on exertion.  Sometimes will feel short of breath at rest.     Has lost some weight, but then has gained back.  No appetite. That is coming back.    Swelling of the feet and ankles will vary.    Has twice had to reschedule her Cardiology appointment.     Does forget to use inhaler. Does not seem to help a lot.     Really trying to limit her salt intake.   Not urinating as much now even with the lasix.      She is using bipap for her sleep apnea; did recently see Sleep provider.    Overall, feeling better than previously.     OBJECTIVE:     /72 (BP Location: Right arm, Cuff Size: Adult Large)  Pulse 82  Temp 98.1  F (36.7  C) (Oral)  Resp 18  Ht 5' 3\" (1.6 m)  Wt 279 lb 9.6 oz (126.8 kg)  SpO2 94%  BMI 49.53 kg/m2  Body mass index is 49.53 kg/(m^2).  GENERAL APPEARANCE: alert and no distress  RESP: lungs clear to auscultation - no rales, rhonchi or wheezes  CV: regular rates and rhythm  MS: trace edema bilateral ankles.   PSYCH: mentation appears normal and affect normal/a little down    PHQ-9 SCORE 8/15/2017 11/16/2017 7/19/2018   Total Score - - -   Total Score 12 9 10       EDILSON-7 SCORE 8/15/2017 11/16/2017 7/19/2018   Total Score - - -   Total Score 12 9 11   Total Score - - -       GFR Estimate   Date Value Ref Range Status   06/12/2018 75 >60 mL/min/1.7m2 Final     Comment:     " Non  GFR Calc   06/08/2018 89 >60 mL/min/1.7m2 Final     Comment:     Non  GFR Calc   06/07/2018 82 >60 mL/min/1.7m2 Final     Comment:     Non  GFR Calc     Hemoglobin   Date Value Ref Range Status   06/08/2018 13.5 11.7 - 15.7 g/dL Final   ]      CXR unremarkable.  Reviewed Spirometry    ASSESSMENT/PLAN:     PETTY (dyspnea on exertion)    - XR Chest 2 Views; Future    SOB (shortness of breath)  Uncertain etiology. May be multifactorial. Anticipates seeing Cardiology.   Consider Pulmonary.  - XR Chest 2 Views; Future  - Spirometry, Breathing Capacity: Normal Order, Clinic Performed    History of pneumonia  As above; CXR currently looks good.  - HEART FAILURE ACTION PLAN  - Spirometry, Breathing Capacity: Normal Order, Clinic Performed    Acute diastolic congestive heart failure (H)  Anticipates seeing Cardiology  - Basic metabolic panel  - furosemide (LASIX) 40 MG tablet; Take 1 tablet (40 mg) by mouth daily  - potassium chloride (KLOR-CON) 20 MEQ Packet; Take 20 mEq by mouth daily    Ankle edema  On diuretic.   - Basic metabolic panel        Patient Instructions       If the breathing difficulty continues, and Cardiology does not feel it is your heart, we can have you visit with a Lung specialist.            Yessica Lora MD, MD  [facility]

## 2018-07-19 NOTE — MR AVS SNAPSHOT
After Visit Summary   7/19/2018    Carol Merida    MRN: [id number]           Patient Information     Date Of Birth          1935        Visit Information        Provider Department      7/19/2018 3:10 PM Yessica Lora MD [facility]        Today's Diagnoses     PETTY (dyspnea on exertion)    -  1    SOB (shortness of breath)        History of pneumonia        Acute diastolic congestive heart failure (H)        Ankle edema          Care Instructions        If the breathing difficulty continues, and Cardiology does not feel it is your heart, we can have you visit with a Lung specialist.                Follow-ups after your visit        Follow-up notes from your care team     Return in about 5 weeks (around 8/23/2018) for follow up after Cardiology visit..      Your next 10 appointments already scheduled     Aug 20, 2018  2:45 PM CDT   New Visit with Vincent Oliveira MD   [facility] ([facility])    [address]  [facility] [id number] [phone]            Aug 23, 2018 11:30 AM CDT   [facility]    [address] [address] [phone]           Bring a current list of meds and any records pertaining to this visit. For Physicals, please bring immunization records and any forms needing to be filled out. Please arrive 10 minutes early to complete paperwork.              Who to contact     If you have questions or need follow up information about today's clinic visit or your schedule please contact [facility] directly at [phone].  Normal or non-critical lab and imaging results will be communicated to you by MyChart, letter or phone within 4 business days after the clinic has received the results. If you do not hear from us within 7 days, please contact the clinic through MyChart or phone.  "If you have a critical or abnormal lab result, we will notify you by phone as soon as possible.  Submit refill requests through [employer] or call your pharmacy and they will forward the refill request to us. Please allow 3 business days for your refill to be completed.          Additional Information About Your Visit        mPATHhart Information     [employer] lets you send messages to your doctor, view your test results, renew your prescriptions, schedule appointments and more. To sign up, go to [email or web address] . Click on \"Log in\" on the left side of the screen, which will take you to the Welcome page. Then click on \"Sign up Now\" on the right side of the page.     You will be asked to enter the access code listed below, as well as some personal information. Please follow the directions to create your username and password.     Your access code is: [email or web address]  Expires: 2018  2:48 PM     Your access code will  in 90 days. If you need help or a new code, please call your [facility] clinic or [phone].        Care EveryWhere ID     This is your Care EveryWhere ID. This could be used by other organizations to access your [facility] medical records  [id number]        Your Vitals Were     Pulse Temperature Respirations Height Pulse Oximetry BMI (Body Mass Index)    82 98.1  F (36.7  C) (Oral) 18 5' 3\" (1.6 m) 94% 49.53 kg/m2       Blood Pressure from Last 3 Encounters:   18 126/72   18 128/70   18 161/76    Weight from Last 3 Encounters:   18 279 lb 9.6 oz (126.8 kg)   18 279 lb 8 oz (126.8 kg)   18 280 lb 3.2 oz (127.1 kg)              We Performed the Following     Basic metabolic panel     DEPRESSION ACTION PLAN (DAP)     HEART FAILURE ACTION PLAN     Spirometry, Breathing Capacity: Normal Order, Clinic Performed          Where to get your medicines      These medications [place] [facility] [place] [address]  [address], " [place] [address]     Phone:  [phone]     furosemide 40 MG tablet    potassium chloride 20 MEQ Packet          Primary Care Provider Office Phone # Fax #    Yessica Lora [phone] [address] [facility] [facility] [address]        Equal Access to Services     [facility]: Hadii aad ku hadasho Soomaali, waaxda luqadaha, qaybta kaalmada adeegyada, waxay idiin hayaan adeeg khnigelsh laaury . So [facility] [phone].    ATENCIÓN: Si habla español, tiene a johns disposición servicios gratuitos de asistencia lingüística. Llame al [phone].    We comply with applicable federal civil rights laws and [place] laws. We do not discriminate on the basis of race, color, national origin, age, disability, sex, sexual orientation, or gender identity.            Thank you!     Thank you for choosing [facility]  for your care. Our goal is always to provide you with excellent care. Hearing back from our patients is one way we can continue to improve our services. Please take a few minutes to complete the written survey that you may receive in the mail after your visit with us. Thank you!             Your Updated Medication List - Protect others around you: Learn how to safely use, store and throw away your medicines at www.disposemymeds.org.          This list is accurate as of 7/19/18  5:12 PM.  Always use your most recent med list.                   Brand Name Dispense Instructions for use Diagnosis    acetaminophen 325 MG tablet    TYLENOL    500 tablet    Take 3 tablets (975 mg) by mouth every 8 hours    Status post total replacement of right hip       albuterol 108 (90 Base) MCG/ACT Inhaler    PROAIR HFA/PROVENTIL HFA/VENTOLIN HFA    1 Inhaler    Inhale 2 puffs into the lungs every 4 hours as needed for shortness of breath / dyspnea or wheezing    Pneumonia of left lower lobe due to infectious organism (H)       ALPRAZolam 0.25 MG tablet    XANAX    10 tablet    Take 1 tablet (0.25 mg)  by mouth 2 times daily as needed for anxiety Don't take with the oxycodone    Anxiety       amLODIPine 5 MG tablet    NORVASC    90 tablet    Take 1 tablet (5 mg) by mouth daily    Benign essential hypertension       benzonatate 100 MG capsule    TESSALON    42 capsule    Take 1 capsule (100 mg) by mouth 3 times daily as needed for cough    Pneumonia of left lower lobe due to infectious organism (H)       calcium 600 + D 600-400 MG-UNIT per tablet   Generic drug:  calcium-vitamin D     100 tablet    Take 1 tablet by mouth every evening    Osteopenia       FLUoxetine 40 MG capsule    PROzac    90 capsule    TAKE ONE CAPSULE BY MOUTH EVERY DAY    Mild depression (H)       fluticasone 50 MCG/ACT spray    FLONASE     Spray 1 spray into both nostrils daily as needed        furosemide 40 MG tablet    LASIX    30 tablet    Take 1 tablet (40 mg) by mouth daily    Acute diastolic congestive heart failure (H)       gabapentin 300 MG capsule    NEURONTIN    270 capsule    Take 1 capsule (300 mg) by mouth 3 times daily    Osteoarthritis of multiple joints, unspecified osteoarthritis type, DDD (degenerative disc disease), lumbar, Osteoarthritis of spine with radiculopathy, lumbar region       levothyroxine 88 MCG tablet    SYNTHROID/LEVOTHROID    90 tablet    Take 1 tablet (88 mcg) by mouth daily    Hypothyroidism, unspecified type       magnesium hydroxide 400 MG/5ML suspension    MILK OF MAGNESIA     Take 30 mLs by mouth daily as needed        NYSTOP 297952 UNIT/GM Powd   Generic drug:  nystatin     60 g    Apply tid to affectead area prn    Intertrigo       order for DME     4 Device    Equipment being ordered: left breast prosthesis. Mastectomy bras (up to 4)    History of left mastectomy       polyethylene glycol powder    MIRALAX    510 g    Take 17 g (1 capful) by mouth daily    Constipation, unspecified constipation type       potassium chloride 20 MEQ Packet    KLOR-CON    30 tablet    Take 20 mEq by mouth daily     Acute diastolic congestive heart failure (H)       senna-docusate 8.6-50 MG per tablet    SENOKOT-S;PERICOLACE    100 tablet    Take 1 tablet by mouth 2 times daily    Constipation, unspecified constipation type       simvastatin 10 MG tablet    ZOCOR    90 tablet    TAKE 1 TABLET BY MOUTH AT BEDTIME    Hyperlipidemia LDL goal <100       triamcinolone 0.1 % cream    KENALOG    30 g    Apply sparingly to affected area two times daily as needed for itching.    Atopic dermatitis

## 2018-07-19 NOTE — PATIENT INSTRUCTIONS
If the breathing difficulty continues, and Cardiology does not feel it is your heart, we can have you visit with a Lung specialist.

## 2018-07-19 NOTE — LETTER
My Depression Action Plan  Name: Carol Merida   Date of Birth 1935  Date: 7/19/2018    My doctor: Yessica Lora   My clinic: [facility]  [id number] [place] [address] [phone]          GREEN    ZONE   Good Control    What it looks like:     Things are going generally well. You have normal up s and down s. You may even feel depressed from time to time, but bad moods usually last less than a day.   What you need to do:  1. Continue to care for yourself (see self care plan)  2. Check your depression survival kit and update it as needed  3. Follow your physician s recommendations including any medication.  4. Do not stop taking medication unless you consult with your physician first.           YELLOW         ZONE Getting Worse    What it looks like:     Depression is starting to interfere with your life.     It may be hard to get out of bed; you may be starting to isolate yourself from others.    Symptoms of depression are starting to last most all day and this has happened for several days.     You may have suicidal thoughts but they are not constant.   What you need to do:     1. Call your care team, your response to treatment will improve if you keep your care team informed of your progress. Yellow periods are signs an adjustment may need to be made.     2. Continue your self-care, even if you have to fake it!    3. Talk to someone in your support network    4. Open up your depression survival kit           RED    ZONE Medical Alert - Get Help    What it looks like:     Depression is seriously interfering with your life.     You may experience these or other symptoms: You can t get out of bed most days, can t work or engage in other necessary activities, you have trouble taking care of basic hygiene, or basic responsibilities, thoughts of suicide or death that will not go away, self-injurious behavior.     What you need to do:  1. Call your care team and  request a same-day appointment. If they are not available (weekends or after hours) call your local crisis line, emergency room or 911.            Depression Self Care Plan / Survival Kit    Self-Care for Depression  Here s the deal. Your body and mind are really not as separate as most people think.  What you do and think affects how you feel and how you feel influences what you do and think. This means if you do things that people who feel good do, it will help you feel better.  Sometimes this is all it takes.  There is also a place for medication and therapy depending on how severe your depression is, so be sure to consult with your medical provider and/ or Behavioral Health Consultant if your symptoms are worsening or not improving.     In order to better manage my stress, I will:    Exercise  Get some form of exercise, every day. This will help reduce pain and release endorphins, the  feel good  chemicals in your brain. This is almost as good as taking antidepressants!  This is not the same as joining a gym and then never going! (they count on that by the way ) It can be as simple as just going for a walk or doing some gardening, anything that will get you moving.      Hygiene   Maintain good hygiene (Get out of bed in the morning, Make your bed, Brush your teeth, Take a shower, and Get dressed like you were going to work, even if you are unemployed).  If your clothes don't fit try to get ones that do.    Diet  I will strive to eat foods that are good for me, drink plenty of water, and avoid excessive sugar, caffeine, alcohol, and other mood-altering substances.  Some foods that are helpful in depression are: complex carbohydrates, B vitamins, flaxseed, fish or fish oil, fresh fruits and vegetables.    Psychotherapy  I agree to participate in Individual Therapy (if recommended).    Medication  If prescribed medications, I agree to take them.  Missing doses can result in serious side effects.  I understand that  drinking alcohol, or other illicit drug use, may cause potential side effects.  I will not stop my medication abruptly without first discussing it with my provider.    Staying Connected With Others  I will stay in touch with my friends, family members, and my primary care provider/team.    Use your imagination  Be creative.  We all have a creative side; it doesn t matter if it s oil painting, sand castles, or mud pies! This will also kick up the endorphins.    Witness Beauty  (AKA stop and smell the roses) Take a look outside, even in mid-winter. Notice colors, textures. Watch the squirrels and birds.     Service to others  Be of service to others.  There is always someone else in need.  By helping others we can  get out of ourselves  and remember the really important things.  This also provides opportunities for practicing all the other parts of the program.    Humor  Laugh and be silly!  Adjust your TV habits for less news and crime-drama and more comedy.    Control your stress  Try breathing deep, massage therapy, biofeedback, and meditation. Find time to relax each day.     My support system    Clinic Contact:  Phone number:    Contact 1:  Phone number:    Contact 2:  Phone number:    Yazidi/:  Phone number:    Therapist:  Phone number:    Local crisis center:    Phone number:    Other community support:  Phone number:

## 2018-07-19 NOTE — LETTER
July 24, 2018      Carol Merida  [address] [address]        Martin Medina!    Enclosed is a copy of your recent results.    Other than the glucose, all is normal. This includes the potassium and kidney function.    It was nice seeing you the other day!    Sincerely,     Yessica Lora MD

## 2018-07-20 LAB
ANION GAP SERPL CALCULATED.3IONS-SCNC: 8 MMOL/L (ref 3–14)
BUN SERPL-MCNC: 17 MG/DL (ref 7–30)
CALCIUM SERPL-MCNC: 8.9 MG/DL (ref 8.5–10.1)
CHLORIDE SERPL-SCNC: 102 MMOL/L (ref 94–109)
CO2 SERPL-SCNC: 27 MMOL/L (ref 20–32)
CREAT SERPL-MCNC: 0.89 MG/DL (ref 0.52–1.04)
GFR SERPL CREATININE-BSD FRML MDRD: 61 ML/MIN/1.7M2
GLUCOSE SERPL-MCNC: 121 MG/DL (ref 70–99)
POTASSIUM SERPL-SCNC: 4.1 MMOL/L (ref 3.4–5.3)
SODIUM SERPL-SCNC: 137 MMOL/L (ref 133–144)

## 2018-07-20 ASSESSMENT — PATIENT HEALTH QUESTIONNAIRE - PHQ9: SUM OF ALL RESPONSES TO PHQ QUESTIONS 1-9: 10

## 2018-07-20 ASSESSMENT — ANXIETY QUESTIONNAIRES: GAD7 TOTAL SCORE: 11

## 2018-07-25 ENCOUNTER — DOCUMENTATION ONLY (OUTPATIENT)
Dept: FAMILY MEDICINE | Facility: CLINIC | Age: 83
End: 2018-07-25

## 2018-08-07 ENCOUNTER — PATIENT OUTREACH (OUTPATIENT)
Dept: CARE COORDINATION | Facility: CLINIC | Age: 83
End: 2018-08-07

## 2018-08-07 NOTE — PROGRESS NOTES
Clinic Care Coordination Contact  Care Coordination Communication      Home Care Contact:              Home Care Agency: [facility]              Care Coordination contacted home care: Yes, spoke with Sherin steward.               Patient is current with services as of today.      Plan: RN/SW Care Coordinator will await notification from home care staff informing RN/SW Care Coordinator of patients discharge plans/needs. RN/SW Care Coordinator will review chart and outreach to home care every 4 weeks and as needed.       Lia Acosta, JARETH  [facility]  Clinic Care Coordinator - Prior [place] Locations   Direct:  [phone] (voicemail available)   (Today's Date: 08/07/2018)

## 2018-08-14 ENCOUNTER — MEDICAL CORRESPONDENCE (OUTPATIENT)
Dept: HEALTH INFORMATION MANAGEMENT | Facility: CLINIC | Age: 83
End: 2018-08-14

## 2018-08-14 ENCOUNTER — DOCUMENTATION ONLY (OUTPATIENT)
Dept: FAMILY MEDICINE | Facility: CLINIC | Age: 83
End: 2018-08-14

## 2018-08-14 DIAGNOSIS — Z96.641 STATUS POST TOTAL REPLACEMENT OF RIGHT HIP: ICD-10-CM

## 2018-08-14 RX ORDER — ACETAMINOPHEN 500 MG
1000 TABLET ORAL 3 TIMES DAILY PRN
COMMUNITY
Start: 2018-08-14 | End: 2018-08-20

## 2018-08-14 RX ORDER — ACETAMINOPHEN 325 MG/1
975 TABLET ORAL EVERY 8 HOURS
Qty: 500 TABLET | Refills: 1 | Status: SHIPPED | OUTPATIENT
Start: 2018-08-14 | End: 2021-02-19 | Stop reason: DRUGHIGH

## 2018-08-14 NOTE — PROGRESS NOTES
Home Health Plan of Care has been faxed to [phone] and sent to abstraction.  Maria Antonia Mendez, CMA

## 2018-08-20 ENCOUNTER — OFFICE VISIT (OUTPATIENT)
Dept: CARDIOLOGY | Facility: CLINIC | Age: 83
End: 2018-08-20
Attending: FAMILY MEDICINE
Payer: COMMERCIAL

## 2018-08-20 VITALS
BODY MASS INDEX: 50.94 KG/M2 | HEIGHT: 62 IN | HEART RATE: 72 BPM | DIASTOLIC BLOOD PRESSURE: 74 MMHG | SYSTOLIC BLOOD PRESSURE: 134 MMHG | WEIGHT: 276.8 LBS

## 2018-08-20 DIAGNOSIS — I51.89 DIASTOLIC DYSFUNCTION: Primary | ICD-10-CM

## 2018-08-20 PROCEDURE — 99204 OFFICE O/P NEW MOD 45 MIN: CPT | Performed by: INTERNAL MEDICINE

## 2018-08-20 NOTE — MR AVS SNAPSHOT
"              After Visit Summary   8/20/2018    Carol Merida    MRN: [id number]           Patient Information     Date Of Birth          1935        Visit Information        Provider Department      8/20/2018 2:45 PM Ip, Vincent Saldaña MD [facility]        Today's Diagnoses     CHF (congestive heart failure) (H)    -  1       Follow-ups after your visit        Your next 10 appointments already scheduled     Aug 23, 2018 11:30 AM CDT   Office Visit with Yessica Lora MD   [facility]    [id number] [place] [id number] [phone]           Bring a current list of meds and any records pertaining to this visit. For Physicals, please bring immunization records and any forms needing to be filled out. Please arrive 10 minutes early to complete paperwork.              Who to contact     If you have questions or need follow up information about today's clinic visit or your schedule please contact [facility] directly at [phone].  Normal or non-critical lab and imaging results will be communicated to you by MyChart, letter or phone within 4 business days after the clinic has received the results. If you do not hear from us within 7 days, please contact the clinic through Springt or phone. If you have a critical or abnormal lab result, we will notify you by phone as soon as possible.  Submit refill requests through [employer] or call your pharmacy and they will forward the refill request to us. Please allow 3 business days for your refill to be completed.          Additional Information About Your Visit        MyChart Information     [employer] lets you send messages to your doctor, view your test results, renew your prescriptions, schedule appointments and more. To sign up, go to [email or web address].[email or web address] include 234 beds [facility] the [email or web address] . Click on \"Log in\" on the left side of the screen, which will take " "you to the Welcome page. Then click on \"Sign up Now\" on the right side of the page.     You will be asked to enter the access code listed below, as well as some personal information. Please follow the directions to create your username and password.     Your access code is: [email or web address]  Expires: 2018  2:48 PM     Your access code will  in 90 days. If you need help or a new code, please call your [facility] clinic or [phone].        Care EveryWhere ID     This is your Care EveryWhere ID. This could be used by other organizations to access your [facility] medical records  [id number]        Your Vitals Were     Pulse Height BMI (Body Mass Index)             72 1.575 m (5' 2\") 50.63 kg/m2          Blood Pressure from Last 3 Encounters:   18 134/74   18 126/72   18 128/70    Weight from Last 3 Encounters:   18 125.6 kg (276 lb 12.8 oz)   18 126.8 kg (279 lb 9.6 oz)   18 126.8 kg (279 lb 8 oz)              Today, you had the following     No orders found for display       Primary Care Provider Office Phone # Fax #    Yessica Lora [phone] [address] [facility] [address]        Equal Access to Services     [facility]: Hadii aad ku hadasho Soesther, waaxda luqadaha, qaybta kaalmada adetammyyada, marc newton . So [facility] [phone].    ATENCIÓN: Si habla español, tiene a johns disposición servicios gratuitos de asistencia lingüística. Llame al [phone].    We comply with applicable federal civil rights laws and [place] laws. We do not discriminate on the basis of race, color, national origin, age, disability, sex, sexual orientation, or gender identity.            Thank you!     Thank you for choosing [facility]  for your care. Our goal is always to provide you with excellent care. Hearing back from our patients is one way we can continue to improve our services. Please " take a few minutes to complete the written survey that you may receive in the mail after your visit with us. Thank you!             Your Updated Medication List - Protect others around you: Learn how to safely use, store and throw away your medicines at www.disposemymeds.org.          This list is accurate as of 8/20/18  3:19 PM.  Always use your most recent med list.                   Brand Name Dispense Instructions for use Diagnosis    acetaminophen 325 MG tablet    TYLENOL    500 tablet    Take 3 tablets (975 mg) by mouth every 8 hours    Status post total replacement of right hip       albuterol 108 (90 Base) MCG/ACT inhaler    PROAIR HFA/PROVENTIL HFA/VENTOLIN HFA    1 Inhaler    Inhale 2 puffs into the lungs every 4 hours as needed for shortness of breath / dyspnea or wheezing    Pneumonia of left lower lobe due to infectious organism (H)       ALPRAZolam 0.25 MG tablet    XANAX    10 tablet    Take 1 tablet (0.25 mg) by mouth 2 times daily as needed for anxiety Don't take with the oxycodone    Anxiety       amLODIPine 5 MG tablet    NORVASC    90 tablet    Take 1 tablet (5 mg) by mouth daily    Benign essential hypertension       benzonatate 100 MG capsule    TESSALON    42 capsule    Take 1 capsule (100 mg) by mouth 3 times daily as needed for cough    Pneumonia of left lower lobe due to infectious organism (H)       calcium 600 + D 600-400 MG-UNIT per tablet   Generic drug:  calcium-vitamin D     100 tablet    Take 1 tablet by mouth every evening    Osteopenia       FLUoxetine 40 MG capsule    PROzac    90 capsule    TAKE ONE CAPSULE BY MOUTH EVERY DAY    Mild depression (H)       fluticasone 50 MCG/ACT spray    FLONASE     Spray 1 spray into both nostrils daily as needed        furosemide 40 MG tablet    LASIX    30 tablet    Take 1 tablet (40 mg) by mouth daily    Acute diastolic congestive heart failure (H)       gabapentin 300 MG capsule    NEURONTIN    270 capsule    Take 1 capsule (300 mg) by mouth 3  times daily    Osteoarthritis of multiple joints, unspecified osteoarthritis type, DDD (degenerative disc disease), lumbar, Osteoarthritis of spine with radiculopathy, lumbar region       levothyroxine 88 MCG tablet    SYNTHROID/LEVOTHROID    90 tablet    Take 1 tablet (88 mcg) by mouth daily    Hypothyroidism, unspecified type       magnesium hydroxide 400 MG/5ML suspension    MILK OF MAGNESIA     Take 30 mLs by mouth daily as needed        NYSTOP 353439 UNIT/GM Powd   Generic drug:  nystatin     60 g    Apply tid to affectead area prn    Intertrigo       order for DME     4 Device    Equipment being ordered: left breast prosthesis. Mastectomy bras (up to 4)    History of left mastectomy       polyethylene glycol powder    MIRALAX    510 g    Take 17 g (1 capful) by mouth daily    Constipation, unspecified constipation type       potassium chloride 20 MEQ Packet    KLOR-CON    30 tablet    Take 20 mEq by mouth daily    Acute diastolic congestive heart failure (H)       senna-docusate 8.6-50 MG per tablet    SENOKOT-S;PERICOLACE    100 tablet    Take 1 tablet by mouth 2 times daily    Constipation, unspecified constipation type       simvastatin 10 MG tablet    ZOCOR    90 tablet    TAKE 1 TABLET BY MOUTH AT BEDTIME    Hyperlipidemia LDL goal <100       triamcinolone 0.1 % cream    KENALOG    30 g    Apply sparingly to affected area two times daily as needed for itching.    Atopic dermatitis

## 2018-08-20 NOTE — LETTER
8/20/2018      [place]  [address]  [facility] [address]      RE: Carol Merida       Dear Colleague,    I had the pleasure of seeing Carol Merida in the [facility].    Service Date: 08/20/2018      HISTORY OF PRESENT ILLNESS:  It is a pleasure for me to see Ms. Merida at the request of Dr. Yessica Lora for evaluation of diastolic heart failure.      This very delightful 83-year-old lady has been seen in our clinic on an intermittent basis since 2002.  She was evaluated for possible angina.  She had a positive stress test and eventually underwent coronary angiography in 2012 which fortunately did not show significant obstructive coronary artery disease.  Her body mass index is in the 40s and I suspect she more than likely had a false-positive stress test.  Other medical problems include hypothyroidism, anxiety and depression, anemia, osteoarthritis, sleep apnea as well as hypothyroidism.      She was hospitalized in 06/2018 with a diagnosis of a left lower lobe pneumonia.  She presented with persistent coughing and weakness.  A chest x-ray mentioned airspace opacity in left lung base which could be due to atelectasis or pneumonia.  Her white cell count was elevated.  However, I do note that her NT-proBNP was 180 and 390.  Nonetheless, she had a diagnosis of diastolic heart failure on a discharge summary and since hospital discharge, her shortness of breath has been slow to improve.  She has always been orthopneic.  She denies weight gain.  She does have ankle edema which is worse on the left.  She has had ankle surgery as well as bilateral knee and hip replacements.  She does say that the edema improves overnight and gradually accumulates during the day.        PHYSICAL EXAMINATION:  Certainly by physical examination, there is at most a trace to 1+ on the left and nothing on the right.  Her JVP is not elevated.  Chest sounds clear.  Heart sounds are unremarkable.   She does complain of intermittent nonexertional left sternal chest discomfort.  As this is reproduced on palpation, I think it is more than likely musculoskeletal in origin.      She had an echocardiogram during hospitalization which demonstrated left ventricular hypertrophy with indeterminant left ventricular diastolic function.  However, systolic function is normal.  There is mild left atrial enlargement and mild pulmonary hypertension.      IMPRESSION:   1.  Diastolic dysfunction of the left ventricle but no evidence of diastolic heart failure at this time.   2.  Recent pneumonia.   3.  Morbid obesity.   4.  Well-controlled hypertension.   5.  Ankle edema, likely postural.   6.  Musculoskeletal chest discomfort.      I think her pneumonia has just been slow to resolve.  I do not see much evidence at all of diastolic heart failure either during her hospitalization on at this current time.  I reassured her and her daughter.      I have arranged to follow up with her on an annual basis, more often if clinically necessary.         JOANIE MCKEE MD, [facility]             D: 2018   T: 2018   MT: RANDI      Name:     GABBY EAGLE   MRN:      [id number]        Account:      [id number]   :      1935           Service Date: 2018      Document: [id number]         Outpatient Encounter Prescriptions as of 2018   Medication Sig Dispense Refill     acetaminophen (TYLENOL) 325 MG tablet Take 3 tablets (975 mg) by mouth every 8 hours 500 tablet 1     albuterol (PROAIR HFA/PROVENTIL HFA/VENTOLIN HFA) 108 (90 Base) MCG/ACT Inhaler Inhale 2 puffs into the lungs every 4 hours as needed for shortness of breath / dyspnea or wheezing 1 Inhaler 0     ALPRAZolam (XANAX) 0.25 MG tablet Take 1 tablet (0.25 mg) by mouth 2 times daily as needed for anxiety Don't take with the oxycodone 10 tablet 0     calcium-vitamin D (CALCIUM 600 + D) 600-400 MG-UNIT per tablet Take 1 tablet by mouth every evening  100 tablet 3      furosemide (LASIX) 40 MG tablet Take 1 tablet (40 mg) by mouth daily 30 tablet 1     gabapentin (NEURONTIN) 300 MG capsule Take 1 capsule (300 mg) by mouth 3 times daily 270 capsule 0     levothyroxine (SYNTHROID/LEVOTHROID) 88 MCG tablet Take 1 tablet (88 mcg) by mouth daily 90 tablet 3     magnesium hydroxide (MILK OF MAGNESIA) 400 MG/5ML suspension Take 30 mLs by mouth daily as needed        Nystatin (NYSTOP) 404547 UNIT/GM POWD Apply tid to affectead area prn 60 g 1     order for DME Equipment being ordered: left breast prosthesis. Mastectomy bras (up to 4) 4 Device 0     polyethylene glycol (MIRALAX) powder Take 17 g (1 capful) by mouth daily 510 g 1     potassium chloride (KLOR-CON) 20 MEQ Packet Take 20 mEq by mouth daily 30 tablet 1     senna-docusate (SENOKOT-S;PERICOLACE) 8.6-50 MG per tablet Take 1 tablet by mouth 2 times daily 100 tablet 0     simvastatin (ZOCOR) 10 MG tablet TAKE 1 TABLET BY MOUTH AT BEDTIME 90 tablet 1     triamcinolone (KENALOG) 0.1 % cream Apply sparingly to affected area two times daily as needed for itching. 30 g 0     [DISCONTINUED] amLODIPine (NORVASC) 5 MG tablet Take 1 tablet (5 mg) by mouth daily 90 tablet 0     [DISCONTINUED] FLUoxetine (PROZAC) 40 MG capsule TAKE ONE CAPSULE BY MOUTH EVERY DAY 90 capsule 0     [DISCONTINUED] fluticasone (FLONASE) 50 MCG/ACT nasal spray Spray 1 spray into both nostrils daily as needed   5     benzonatate (TESSALON) 100 MG capsule Take 1 capsule (100 mg) by mouth 3 times daily as needed for cough (Patient not taking: Reported on 8/23/2018) 42 capsule 0     [DISCONTINUED] acetaminophen (TYLENOL) 500 MG tablet Take 2 tablets (1,000 mg) by mouth 3 times daily as needed for mild pain       No facility-administered encounter medications on file as of 8/20/2018.        Again, thank you for allowing me to participate in the care of your patient.      Sincerely,    DR JOANIE MCKEE MD     [facility]

## 2018-08-20 NOTE — PROGRESS NOTES
HPI and Plan:   See dictation    Orders Placed This Encounter   Procedures     Follow-Up with Cardiologist       No orders of the defined types were placed in this encounter.      Encounter Diagnosis   Name Primary?     Diastolic dysfunction Yes       CURRENT MEDICATIONS:  Current Outpatient Prescriptions   Medication Sig Dispense Refill     acetaminophen (TYLENOL) 325 MG tablet Take 3 tablets (975 mg) by mouth every 8 hours 500 tablet 1     albuterol (PROAIR HFA/PROVENTIL HFA/VENTOLIN HFA) 108 (90 Base) MCG/ACT Inhaler Inhale 2 puffs into the lungs every 4 hours as needed for shortness of breath / dyspnea or wheezing 1 Inhaler 0     ALPRAZolam (XANAX) 0.25 MG tablet Take 1 tablet (0.25 mg) by mouth 2 times daily as needed for anxiety Don't take with the oxycodone 10 tablet 0     amLODIPine (NORVASC) 5 MG tablet Take 1 tablet (5 mg) by mouth daily 90 tablet 0     calcium-vitamin D (CALCIUM 600 + D) 600-400 MG-UNIT per tablet Take 1 tablet by mouth every evening  100 tablet 3     FLUoxetine (PROZAC) 40 MG capsule TAKE ONE CAPSULE BY MOUTH EVERY DAY 90 capsule 0     fluticasone (FLONASE) 50 MCG/ACT nasal spray Spray 1 spray into both nostrils daily as needed   5     furosemide (LASIX) 40 MG tablet Take 1 tablet (40 mg) by mouth daily 30 tablet 1     gabapentin (NEURONTIN) 300 MG capsule Take 1 capsule (300 mg) by mouth 3 times daily 270 capsule 0     levothyroxine (SYNTHROID/LEVOTHROID) 88 MCG tablet Take 1 tablet (88 mcg) by mouth daily 90 tablet 3     magnesium hydroxide (MILK OF MAGNESIA) 400 MG/5ML suspension Take 30 mLs by mouth daily as needed        Nystatin (NYSTOP) 045863 UNIT/GM POWD Apply tid to affectead area prn 60 g 1     order for DME Equipment being ordered: left breast prosthesis. Mastectomy bras (up to 4) 4 Device 0     polyethylene glycol (MIRALAX) powder Take 17 g (1 capful) by mouth daily 510 g 1     potassium chloride (KLOR-CON) 20 MEQ Packet Take 20 mEq by mouth daily 30 tablet 1      senna-docusate (SENOKOT-S;PERICOLACE) 8.6-50 MG per tablet Take 1 tablet by mouth 2 times daily 100 tablet 0     simvastatin (ZOCOR) 10 MG tablet TAKE 1 TABLET BY MOUTH AT BEDTIME 90 tablet 1     triamcinolone (KENALOG) 0.1 % cream Apply sparingly to affected area two times daily as needed for itching. 30 g 0     benzonatate (TESSALON) 100 MG capsule Take 1 capsule (100 mg) by mouth 3 times daily as needed for cough (Patient not taking: Reported on 8/20/2018) 42 capsule 0       ALLERGIES     Allergies   Allergen Reactions     Ace Inhibitors Cough       PAST MEDICAL HISTORY:  Past Medical History:   Diagnosis Date     Abnormal stress test     small area on stress thalium ?2003; but normal angiogram 2012     Cardiac dysrhythmia, unspecified     irregular heartbeat     CHF (congestive heart failure) (H) 6/18/2018     Hyperlipidemia LDL goal <100 2/10/2010     Hypertension goal BP (blood pressure) < 140/90 7/18/2010     Hypothyroid      Malignant 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     Melanoma of skin, site unspecified      NONSPECIFIC MEDICAL HISTORY 04    fx fifth finger right hand     Obstructive sleep apnea (adult) (pediatric) 8/25/2006    CPAP      Osteoarthritis      Other chronic pain     Joint pain for many years.     Other osteoporosis      PONV (postoperative nausea and vomiting)      Tubular adenoma in colon 9/01    colonoscopy due 2004       PAST SURGICAL HISTORY:  Past Surgical History:   Procedure Laterality Date     ARTHROPLASTY HIP Left 7/6/2015    Procedure: ARTHROPLASTY HIP;  Surgeon: Junior Giordano MD;  Location: [facility] OR     ARTHROPLASTY HIP Right 11/2/2016    Procedure: ARTHROPLASTY HIP;  Surgeon: Junior Giordano MD;  Location: [facility] OR     ARTHROPLASTY REVISION HIP Left 7/22/2015    Procedure: ARTHROPLASTY REVISION HIP;  Surgeon: Junior Giordano MD;  Location: [facility] OR     C NONSPECIFIC PROCEDURE       Knee replacement x 2 (B)     C NONSPECIFIC PROCEDURE      s/p Tonsillectomy (college)     C NONSPECIFIC PROCEDURE      s/p Appy (high school)     C NONSPECIFIC PROCEDURE      Melanoma removed with sentinel node bx     C NONSPECIFIC PROCEDURE       bilateral cataract     COLONOSCOPY  9/01    tubular adenoma; repeat 3 years.     COLONOSCOPY  9/04    normal; repeat 5 years ([place])     HYSTERECTOMY, MARGAUX  summer 2004    and BSO     SURGICAL HISTORY OF -   9/05    left mastectomy     SURGICAL HISTORY OF -   2008    right ankle surgery; triple arthrodesis     SURGICAL HISTORY OF -   5/09    right ankle surgery; triple arthrodesis and deltoid reconstruction; possible other     SURGICAL HISTORY OF -   2/10    removal of hardware from right ankle       FAMILY HISTORY:  Family History   Problem Relation Age of Onset     No Known Problems Brother      Arthritis Mother      Hypertension Father      Cancer Father      lung     Cancer Grandchild      terratoma tumors     Breast Cancer Daughter        SOCIAL HISTORY:  Social History     Social History     Marital status:      Spouse name: Yudi     Number of children: 4     Years of education: 16     Occupational History     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     Exercise No     Parent/Sibling W/ Cabg, Mi Or Angioplasty Before 65f 55m? No     Social History Narrative       Review of Systems:  Skin:  Positive for bruising   Eyes:  Positive for glasses;visual blurring  ENT:  Negative    Respiratory:  Positive for dyspnea on exertion;shortness of breath;dyspnea at rest;sleep apnea;CPAP;cough  Cardiovascular:    palpitations;Positive  "for;fatigue;lightheadedness;edema;dizziness;chest pain  Gastroenterology: Negative    Genitourinary:  Negative    Musculoskeletal:  Positive for joint pain;back pain;arthritis;joint stiffness;nocturnal cramping;foot pain  Neurologic:  Positive for numbness or tingling of hands  Psychiatric:  Positive for anxiety  Heme/Lymph/Imm:  Positive for allergies  Endocrine:  Positive for thyroid disorder    Physical Exam:  Vitals: /74  Pulse 72  Ht 1.575 m (5' 2\")  Wt 125.6 kg (276 lb 12.8 oz)  BMI 50.63 kg/m2    Constitutional:  cooperative, alert and oriented, well developed, well nourished, in no acute distress        Skin:  warm and dry to the touch, no apparent skin lesions or masses noted          Head:  normocephalic, no masses or lesions        Eyes:  pupils equal and round, conjunctivae and lids unremarkable, sclera white, no xanthalasma, EOMS intact, no nystagmus        Lymph:No Cervical lymphadenopathy present     ENT:  no pallor or cyanosis, dentition good        Neck:  carotid pulses are full and equal bilaterally, JVP normal, no carotid bruit        Respiratory:  normal breath sounds, clear to auscultation, normal A-P diameter, normal symmetry, normal respiratory excursion, no use of accessory muscles         Cardiac: regular rhythm, normal S1/S2, no S3 or S4, apical impulse not displaced, no murmurs, gallops or rubs                                                         GI:  abdomen soft, non-tender, BS normoactive, no mass, no HSM, no bruits obese      Extremities and Muscular Skeletal:          LLE edema;trace      Neurological:  no gross motor deficits        Psych:  Alert and Oriented x 3        Recent Lab Results:  LIPID RESULTS:  Lab Results   Component Value Date    CHOL 119 04/23/2018    HDL 60 04/23/2018    LDL 38 04/23/2018    TRIG 107 04/23/2018    CHOLHDLRATIO 2.0 02/09/2015       LIVER ENZYME RESULTS:  Lab Results   Component Value Date    AST 23 01/04/2018    ALT 23 01/04/2018       CBC " RESULTS:  Lab Results   Component Value Date    WBC 8.2 06/08/2018    RBC 4.67 06/08/2018    HGB 13.5 06/08/2018    HCT 42.1 06/08/2018    MCV 90 06/08/2018    MCH 28.9 06/08/2018    MCHC 32.1 06/08/2018    RDW 12.9 06/08/2018     06/08/2018       BMP RESULTS:  Lab Results   Component Value Date     07/19/2018    POTASSIUM 4.1 07/19/2018    CHLORIDE 102 07/19/2018    CO2 27 07/19/2018    ANIONGAP 8 07/19/2018     (H) 07/19/2018    BUN 17 07/19/2018    CR 0.89 07/19/2018    GFRESTIMATED 61 07/19/2018    GFRESTBLACK 74 07/19/2018    LAKSHMI 8.9 07/19/2018        A1C RESULTS:  Lab Results   Component Value Date    A1C 5.9 08/15/2017       INR RESULTS:  Lab Results   Component Value Date    INR 2.78 (H) 11/08/2016    INR 2.27 (H) 11/07/2016           CC  Yessica Lora MD  [address] [address]

## 2018-08-20 NOTE — LETTER
8/20/2018    [place]  [address] [place] [address]    RE: Carol Merida       Dear Colleague,    I had the pleasure of seeing Carol Merida in the [facility].    HPI and Plan:   See dictation    Orders Placed This Encounter   Procedures     Follow-Up with Cardiologist       No orders of the defined types were placed in this encounter.      Encounter Diagnosis   Name Primary?     Diastolic dysfunction Yes       CURRENT MEDICATIONS:  Current Outpatient Prescriptions   Medication Sig Dispense Refill     acetaminophen (TYLENOL) 325 MG tablet Take 3 tablets (975 mg) by mouth every 8 hours 500 tablet 1     albuterol (PROAIR HFA/PROVENTIL HFA/VENTOLIN HFA) 108 (90 Base) MCG/ACT Inhaler Inhale 2 puffs into the lungs every 4 hours as needed for shortness of breath / dyspnea or wheezing 1 Inhaler 0     ALPRAZolam (XANAX) 0.25 MG tablet Take 1 tablet (0.25 mg) by mouth 2 times daily as needed for anxiety Don't take with the oxycodone 10 tablet 0     amLODIPine (NORVASC) 5 MG tablet Take 1 tablet (5 mg) by mouth daily 90 tablet 0     calcium-vitamin D (CALCIUM 600 + D) 600-400 MG-UNIT per tablet Take 1 tablet by mouth every evening  100 tablet 3     FLUoxetine (PROZAC) 40 MG capsule TAKE ONE CAPSULE BY MOUTH EVERY DAY 90 capsule 0     fluticasone (FLONASE) 50 MCG/ACT nasal spray Spray 1 spray into both nostrils daily as needed   5     furosemide (LASIX) 40 MG tablet Take 1 tablet (40 mg) by mouth daily 30 tablet 1     gabapentin (NEURONTIN) 300 MG capsule Take 1 capsule (300 mg) by mouth 3 times daily 270 capsule 0     levothyroxine (SYNTHROID/LEVOTHROID) 88 MCG tablet Take 1 tablet (88 mcg) by mouth daily 90 tablet 3     magnesium hydroxide (MILK OF MAGNESIA) 400 MG/5ML suspension Take 30 mLs by mouth daily as needed        Nystatin (NYSTOP) 870623 UNIT/GM POWD Apply tid to affectead area prn 60 g 1     order for DME Equipment being ordered: left breast prosthesis.  Mastectomy bras (up to 4) 4 Device 0     polyethylene glycol (MIRALAX) powder Take 17 g (1 capful) by mouth daily 510 g 1     potassium chloride (KLOR-CON) 20 MEQ Packet Take 20 mEq by mouth daily 30 tablet 1     senna-docusate (SENOKOT-S;PERICOLACE) 8.6-50 MG per tablet Take 1 tablet by mouth 2 times daily 100 tablet 0     simvastatin (ZOCOR) 10 MG tablet TAKE 1 TABLET BY MOUTH AT BEDTIME 90 tablet 1     triamcinolone (KENALOG) 0.1 % cream Apply sparingly to affected area two times daily as needed for itching. 30 g 0     benzonatate (TESSALON) 100 MG capsule Take 1 capsule (100 mg) by mouth 3 times daily as needed for cough (Patient not taking: Reported on 8/20/2018) 42 capsule 0       ALLERGIES     Allergies   Allergen Reactions     Ace Inhibitors Cough       PAST MEDICAL HISTORY:  Past Medical History:   Diagnosis Date     Abnormal stress test     small area on stress thalium ?2003; but normal angiogram 2012     Cardiac dysrhythmia, unspecified     irregular heartbeat     CHF (congestive heart failure) (H) 6/18/2018     Hyperlipidemia LDL goal <100 2/10/2010     Hypertension goal BP (blood pressure) < 140/90 7/18/2010     Hypothyroid      Malignant 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     Melanoma of skin, site unspecified      NONSPECIFIC MEDICAL HISTORY 04    fx fifth finger right hand     Obstructive sleep apnea (adult) (pediatric) 8/25/2006    CPAP      Osteoarthritis      Other chronic pain     Joint pain for many years.     Other osteoporosis      PONV (postoperative nausea and vomiting)      Tubular adenoma in colon 9/01    colonoscopy due 2004       PAST SURGICAL HISTORY:  Past Surgical History:   Procedure Laterality Date     ARTHROPLASTY HIP Left 7/6/2015    Procedure: ARTHROPLASTY HIP;  Surgeon: Junior Giordano MD;  Location: [facility] OR     ARTHROPLASTY HIP Right 11/2/2016    Procedure: ARTHROPLASTY HIP;  Surgeon:  Junior Giordano MD;  Location: [facility] OR     ARTHROPLASTY REVISION HIP Left 7/22/2015    Procedure: ARTHROPLASTY REVISION HIP;  Surgeon: Junior Giordano MD;  Location: [facility] OR     C NONSPECIFIC PROCEDURE      Knee replacement x 2 (B)     C NONSPECIFIC PROCEDURE      s/p Tonsillectomy (college)     C NONSPECIFIC PROCEDURE      s/p Appy (high school)     C NONSPECIFIC PROCEDURE      Melanoma removed with sentinel node bx     C NONSPECIFIC PROCEDURE       bilateral cataract     COLONOSCOPY  9/01    tubular adenoma; repeat 3 years.     COLONOSCOPY  9/04    normal; repeat 5 years ([facility])     HYSTERECTOMY, MARGAUX  summer 2004    and BSO     SURGICAL HISTORY OF -   9/05    left mastectomy     SURGICAL HISTORY OF -   2008    right ankle surgery; triple arthrodesis     SURGICAL HISTORY OF -   5/09    right ankle surgery; triple arthrodesis and deltoid reconstruction; possible other     SURGICAL HISTORY OF -   2/10    removal of hardware from right ankle       FAMILY HISTORY:  Family History   Problem Relation Age of Onset     No Known Problems Brother      Arthritis Mother      Hypertension Father      Cancer Father      lung     Cancer Grandchild      terratoma tumors     Breast Cancer Daughter        SOCIAL HISTORY:  Social History     Social History     Marital status:      Spouse name: Yudi     Number of children: 4     Years of education: 16     Occupational History     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     Exercise No     Parent/Sibling W/ Cabg, Mi Or Angioplasty Before 65f 55m? No     Social History Narrative       Review of Systems:  Skin:  Positive for bruising   Eyes:   "Positive for glasses;visual blurring  ENT:  Negative    Respiratory:  Positive for dyspnea on exertion;shortness of breath;dyspnea at rest;sleep apnea;CPAP;cough  Cardiovascular:    palpitations;Positive for;fatigue;lightheadedness;edema;dizziness;chest pain  Gastroenterology: Negative    Genitourinary:  Negative    Musculoskeletal:  Positive for joint pain;back pain;arthritis;joint stiffness;nocturnal cramping;foot pain  Neurologic:  Positive for numbness or tingling of hands  Psychiatric:  Positive for anxiety  Heme/Lymph/Imm:  Positive for allergies  Endocrine:  Positive for thyroid disorder    Physical Exam:  Vitals: /74  Pulse 72  Ht 1.575 m (5' 2\")  Wt 125.6 kg (276 lb 12.8 oz)  BMI 50.63 kg/m2    Constitutional:  cooperative, alert and oriented, well developed, well nourished, in no acute distress        Skin:  warm and dry to the touch, no apparent skin lesions or masses noted          Head:  normocephalic, no masses or lesions        Eyes:  pupils equal and round, conjunctivae and lids unremarkable, sclera white, no xanthalasma, EOMS intact, no nystagmus        Lymph:No Cervical lymphadenopathy present     ENT:  no pallor or cyanosis, dentition good        Neck:  carotid pulses are full and equal bilaterally, JVP normal, no carotid bruit        Respiratory:  normal breath sounds, clear to auscultation, normal A-P diameter, normal symmetry, normal respiratory excursion, no use of accessory muscles         Cardiac: regular rhythm, normal S1/S2, no S3 or S4, apical impulse not displaced, no murmurs, gallops or rubs                                                         GI:  abdomen soft, non-tender, BS normoactive, no mass, no HSM, no bruits obese      Extremities and Muscular Skeletal:          LLE edema;trace      Neurological:  no gross motor deficits        Psych:  Alert and Oriented x 3        Recent Lab Results:  LIPID RESULTS:  Lab Results   Component Value Date    CHOL 119 04/23/2018    HDL " 60 04/23/2018    LDL 38 04/23/2018    TRIG 107 04/23/2018    CHOLHDLRATIO 2.0 02/09/2015       LIVER ENZYME RESULTS:  Lab Results   Component Value Date    AST 23 01/04/2018    ALT 23 01/04/2018       CBC RESULTS:  Lab Results   Component Value Date    WBC 8.2 06/08/2018    RBC 4.67 06/08/2018    HGB 13.5 06/08/2018    HCT 42.1 06/08/2018    MCV 90 06/08/2018    MCH 28.9 06/08/2018    MCHC 32.1 06/08/2018    RDW 12.9 06/08/2018     06/08/2018       BMP RESULTS:  Lab Results   Component Value Date     07/19/2018    POTASSIUM 4.1 07/19/2018    CHLORIDE 102 07/19/2018    CO2 27 07/19/2018    ANIONGAP 8 07/19/2018     (H) 07/19/2018    BUN 17 07/19/2018    CR 0.89 07/19/2018    GFRESTIMATED 61 07/19/2018    GFRESTBLACK 74 07/19/2018    LAKSHMI 8.9 07/19/2018        A1C RESULTS:  Lab Results   Component Value Date    A1C 5.9 08/15/2017       INR RESULTS:  Lab Results   Component Value Date    INR 2.78 (H) 11/08/2016    INR 2.27 (H) 11/07/2016           CC  Yessica Lora MD  [address] [address]                    Thank you for allowing me to participate in the care of your patient.      Sincerely,     DR JOANIE MCKEE MD     [facility]    cc:   Yessica Lora MD  [address] [address]

## 2018-08-20 NOTE — PROGRESS NOTES
Service Date: 08/20/2018      HISTORY OF PRESENT ILLNESS:  It is a pleasure for me to see Ms. Merida at the request of Dr. Yessica Lora for evaluation of diastolic heart failure.      This very delightful 83-year-old lady has been seen in our clinic on an intermittent basis since 2002.  She was evaluated for possible angina.  She had a positive stress test and eventually underwent coronary angiography in 2012 which fortunately did not show significant obstructive coronary artery disease.  Her body mass index is in the 40s and I suspect she more than likely had a false-positive stress test.  Other medical problems include hypothyroidism, anxiety and depression, anemia, osteoarthritis, sleep apnea as well as hypothyroidism.      She was hospitalized in 06/2018 with a diagnosis of a left lower lobe pneumonia.  She presented with persistent coughing and weakness.  A chest x-ray mentioned airspace opacity in left lung base which could be due to atelectasis or pneumonia.  Her white cell count was elevated.  However, I do note that her NT-proBNP levels were 180 and 390.  Nonetheless, she had a diagnosis of diastolic heart failure on a discharge summary and since hospital discharge, her shortness of breath has been slow to improve.  She has always been orthopneic.  She denies weight gain.  She does have ankle edema which is worse on the left.  She has had ankle surgery as well as bilateral knee and hip replacements.  She does say that the edema improves overnight and gradually accumulates during the day.       She does complain of intermittent nonexertional left sternal chest discomfort.  As this is reproduced on palpation, I think it is more than likely musculoskeletal in origin.        PHYSICAL EXAMINATION:  Certainly by physical examination, there is at most a trace to 1+ on the left and nothing on the right.  Her JVP is not elevated.  Chest sounds clear.  Heart sounds are unremarkable.     She had an echocardiogram during  hospitalization which demonstrated left ventricular hypertrophy with indeterminant left ventricular diastolic function.  However, systolic function is normal.  There is mild left atrial enlargement and mild pulmonary hypertension.      IMPRESSION:   1.  Diastolic dysfunction of the left ventricle but no evidence of diastolic heart failure at this time.   2.  Recent pneumonia.   3.  Morbid obesity.   4.  Well-controlled hypertension.   5.  Ankle edema, likely postural.   6.  Musculoskeletal chest discomfort.      I think her pneumonia has just been slow to resolve.  I do not see much evidence at all of diastolic heart failure either during her hospitalization on at this current time.  I reassured her and her daughter.      I have arranged to follow up with her on an annual basis, more often if clinically necessary.         JOANIE MCKEE MD, [facility]             D: 2018   T: 2018   MT: RANDI      Name:     GABBY EAGLE   MRN:      [id number]        Account:      [id number]   :      1935           Service Date: 2018      Document: [id number]

## 2018-08-23 ENCOUNTER — OFFICE VISIT (OUTPATIENT)
Dept: FAMILY MEDICINE | Facility: CLINIC | Age: 83
End: 2018-08-23
Payer: COMMERCIAL

## 2018-08-23 VITALS
RESPIRATION RATE: 16 BRPM | TEMPERATURE: 98.7 F | BODY MASS INDEX: 50.48 KG/M2 | OXYGEN SATURATION: 95 % | SYSTOLIC BLOOD PRESSURE: 128 MMHG | WEIGHT: 274.3 LBS | HEIGHT: 62 IN | DIASTOLIC BLOOD PRESSURE: 72 MMHG | HEART RATE: 67 BPM

## 2018-08-23 DIAGNOSIS — R53.83 FATIGUE, UNSPECIFIED TYPE: Primary | ICD-10-CM

## 2018-08-23 DIAGNOSIS — F32.A MILD DEPRESSION: ICD-10-CM

## 2018-08-23 DIAGNOSIS — R73.01 IMPAIRED FASTING GLUCOSE: ICD-10-CM

## 2018-08-23 DIAGNOSIS — F41.9 ANXIETY: ICD-10-CM

## 2018-08-23 DIAGNOSIS — I10 BENIGN ESSENTIAL HYPERTENSION: ICD-10-CM

## 2018-08-23 DIAGNOSIS — I50.32 CHRONIC DIASTOLIC CONGESTIVE HEART FAILURE (H): ICD-10-CM

## 2018-08-23 DIAGNOSIS — R09.89 THROAT CLEARING: ICD-10-CM

## 2018-08-23 DIAGNOSIS — E03.9 HYPOTHYROIDISM, UNSPECIFIED TYPE: ICD-10-CM

## 2018-08-23 LAB
ERYTHROCYTE [DISTWIDTH] IN BLOOD BY AUTOMATED COUNT: 14.2 % (ref 10–15)
HBA1C MFR BLD: 5.9 % (ref 0–5.6)
HCT VFR BLD AUTO: 43.8 % (ref 35–47)
HGB BLD-MCNC: 13.6 G/DL (ref 11.7–15.7)
MCH RBC QN AUTO: 28.5 PG (ref 26.5–33)
MCHC RBC AUTO-ENTMCNC: 31.1 G/DL (ref 31.5–36.5)
MCV RBC AUTO: 92 FL (ref 78–100)
PLATELET # BLD AUTO: 302 10E9/L (ref 150–450)
RBC # BLD AUTO: 4.77 10E12/L (ref 3.8–5.2)
WBC # BLD AUTO: 9.1 10E9/L (ref 4–11)

## 2018-08-23 PROCEDURE — 80053 COMPREHEN METABOLIC PANEL: CPT | Performed by: FAMILY MEDICINE

## 2018-08-23 PROCEDURE — 99214 OFFICE O/P EST MOD 30 MIN: CPT | Performed by: FAMILY MEDICINE

## 2018-08-23 PROCEDURE — 84443 ASSAY THYROID STIM HORMONE: CPT | Performed by: FAMILY MEDICINE

## 2018-08-23 PROCEDURE — 85027 COMPLETE CBC AUTOMATED: CPT | Performed by: FAMILY MEDICINE

## 2018-08-23 PROCEDURE — 36415 COLL VENOUS BLD VENIPUNCTURE: CPT | Performed by: FAMILY MEDICINE

## 2018-08-23 PROCEDURE — 83036 HEMOGLOBIN GLYCOSYLATED A1C: CPT | Performed by: FAMILY MEDICINE

## 2018-08-23 RX ORDER — FLUOXETINE 40 MG/1
40 CAPSULE ORAL DAILY
Qty: 90 CAPSULE | Refills: 1 | Status: SHIPPED | OUTPATIENT
Start: 2018-08-23 | End: 2018-11-29

## 2018-08-23 RX ORDER — AMLODIPINE BESYLATE 5 MG/1
5 TABLET ORAL DAILY
Qty: 90 TABLET | Refills: 1 | Status: SHIPPED | OUTPATIENT
Start: 2018-08-23 | End: 2019-05-15

## 2018-08-23 RX ORDER — FLUTICASONE PROPIONATE 50 MCG
1 SPRAY, SUSPENSION (ML) NASAL DAILY PRN
Qty: 1 BOTTLE | Refills: 5 | Status: SHIPPED | OUTPATIENT
Start: 2018-08-23 | End: 2021-01-29

## 2018-08-23 NOTE — MR AVS SNAPSHOT
"              After Visit Summary   8/23/2018    Carol Merida    MRN: [id number]           Patient Information     Date Of Birth          1935        Visit Information        Provider Department      8/23/2018 11:30 AM Yessica Lora MD [facility]        Today's Diagnoses     Fatigue, unspecified type    -  1    Mild depression (H)        Hypothyroidism, unspecified type        Throat clearing        Benign essential hypertension          Care Instructions    Try using the Flonase.    Continue doing the exercises.    I do think being active is good; you may want to increase activity gradually.              Follow-ups after your visit        Follow-up notes from your care team     Return in about 6 months (around 2/23/2019) for Medication recheck.      Who to contact     If you have questions or need follow up information about today's clinic visit or your schedule please contact [facility] directly at [phone].  Normal or non-critical lab and imaging results will be communicated to you by MyChart, letter or phone within 4 business days after the clinic has received the results. If you do not hear from us within 7 days, please contact the clinic through MyChart or phone. If you have a critical or abnormal lab result, we will notify you by phone as soon as possible.  Submit refill requests through [employer] or call your pharmacy and they will forward the refill request to us. Please allow 3 business days for your refill to be completed.          Additional Information About Your Visit        MyChart Information     [employer] lets you send messages to your doctor, view your test results, renew your prescriptions, schedule appointments and more. To sign up, go to [email or web address] . Click on \"Log in\" on the left side of the screen, which will take you to the Welcome page. Then click on \"Sign up Now\" on the right side of the page.     You will be asked to enter the access " "code listed below, as well as some personal information. Please follow the directions to create your username and password.     Your access code is: [email or web address]  Expires: 2018  2:48 PM     Your access code will  in 90 days. If you need help or a new code, please call your [facility] or [phone].        Care EveryWhere ID     This is your Care EveryWhere ID. This could be used by other organizations to access your [facility] medical records  [id number]        Your Vitals Were     Pulse Temperature Respirations Height Pulse Oximetry BMI (Body Mass Index)    67 98.7  F (37.1  C) (Oral) 16 5' 2\" (1.575 m) 95% 50.17 kg/m2       Blood Pressure from Last 3 Encounters:   18 128/72   18 134/74   18 126/72    Weight from Last 3 Encounters:   18 274 lb 4.8 oz (124.4 kg)   18 276 lb 12.8 oz (125.6 kg)   18 279 lb 9.6 oz (126.8 kg)              We Performed the Following     CBC with platelets     Comprehensive metabolic panel     TSH with free T4 reflex          Today's Medication Changes          These changes are accurate as of 18 12:19 PM.  If you have any questions, ask your nurse or doctor.               These medicines have changed or have updated prescriptions.        Dose/Directions    FLUoxetine 40 MG capsule   Commonly known as:  PROzac   This may have changed:  See the new instructions.   Used for:  Mild depression (H)   Changed by:  Yessica Lora MD        Dose:  40 mg   Take 1 capsule (40 mg) by mouth daily   Quantity:  90 capsule   Refills:  1            Where to get your medicines      These medications were sent to [facility] - [place], [place] - [address] AT [address], [facility] [address]     Phone:  [phone]     amLODIPine 5 MG tablet    FLUoxetine 40 MG capsule    fluticasone 50 MCG/ACT spray                Primary Care Provider Office Phone # Fax #    Yessica Lora MD " [phone]       [address]  [facility] [address]        Equal Access to Services     KHUSHI KENNY : Hadii eduardo recio annmarie Hartmann, waaxda luqadaha, qaybta kaalmada adegabinoda, marc hartwilliam eusebio. So [facility] [phone].    ATENCIÓN: Si habla español, tiene a johns disposición servicios gratuitos de asistencia lingüística. Llame al [phone].    We comply with applicable federal civil rights laws and [place] laws. We do not discriminate on the basis of race, color, national origin, age, disability, sex, sexual orientation, or gender identity.            Thank you!     Thank you for choosing [facility]  for your care. Our goal is always to provide you with excellent care. Hearing back from our patients is one way we can continue to improve our services. Please take a few minutes to complete the written survey that you may receive in the mail after your visit with us. Thank you!             Your Updated Medication List - Protect others around you: Learn how to safely use, store and throw away your medicines at www.disposemymeds.org.          This list is accurate as of 8/23/18 12:19 PM.  Always use your most recent med list.                   Brand Name Dispense Instructions for use Diagnosis    acetaminophen 325 MG tablet    TYLENOL    500 tablet    Take 3 tablets (975 mg) by mouth every 8 hours    Status post total replacement of right hip       albuterol 108 (90 Base) MCG/ACT inhaler    PROAIR HFA/PROVENTIL HFA/VENTOLIN HFA    1 Inhaler    Inhale 2 puffs into the lungs every 4 hours as needed for shortness of breath / dyspnea or wheezing    Pneumonia of left lower lobe due to infectious organism (H)       ALPRAZolam 0.25 MG tablet    XANAX    10 tablet    Take 1 tablet (0.25 mg) by mouth 2 times daily as needed for anxiety Don't take with the oxycodone    Anxiety       amLODIPine 5 MG tablet    NORVASC    90 tablet    Take 1 tablet (5 mg) by mouth daily     Benign essential hypertension       benzonatate 100 MG capsule    TESSALON    42 capsule    Take 1 capsule (100 mg) by mouth 3 times daily as needed for cough    Pneumonia of left lower lobe due to infectious organism (H)       calcium 600 + D 600-400 MG-UNIT per tablet   Generic drug:  calcium-vitamin D     100 tablet    Take 1 tablet by mouth every evening    Osteopenia       FLUoxetine 40 MG capsule    PROzac    90 capsule    Take 1 capsule (40 mg) by mouth daily    Mild depression (H)       fluticasone 50 MCG/ACT spray    FLONASE    1 Bottle    Spray 1 spray into both nostrils daily as needed    Throat clearing       furosemide 40 MG tablet    LASIX    30 tablet    Take 1 tablet (40 mg) by mouth daily    Acute diastolic congestive heart failure (H)       gabapentin 300 MG capsule    NEURONTIN    270 capsule    Take 1 capsule (300 mg) by mouth 3 times daily    Osteoarthritis of multiple joints, unspecified osteoarthritis type, DDD (degenerative disc disease), lumbar, Osteoarthritis of spine with radiculopathy, lumbar region       levothyroxine 88 MCG tablet    SYNTHROID/LEVOTHROID    90 tablet    Take 1 tablet (88 mcg) by mouth daily    Hypothyroidism, unspecified type       magnesium hydroxide 400 MG/5ML suspension    MILK OF MAGNESIA     Take 30 mLs by mouth daily as needed        NYSTOP 477730 UNIT/GM Powd   Generic drug:  nystatin     60 g    Apply tid to affectead area prn    Intertrigo       order for DME     4 Device    Equipment being ordered: left breast prosthesis. Mastectomy bras (up to 4)    History of left mastectomy       polyethylene glycol powder    MIRALAX    510 g    Take 17 g (1 capful) by mouth daily    Constipation, unspecified constipation type       potassium chloride 20 MEQ Packet    KLOR-CON    30 tablet    Take 20 mEq by mouth daily    Acute diastolic congestive heart failure (H)       senna-docusate 8.6-50 MG per tablet    SENOKOT-S;PERICOLACE    100 tablet    Take 1 tablet by mouth 2  times daily    Constipation, unspecified constipation type       simvastatin 10 MG tablet    ZOCOR    90 tablet    TAKE 1 TABLET BY MOUTH AT BEDTIME    Hyperlipidemia LDL goal <100       triamcinolone 0.1 % cream    KENALOG    30 g    Apply sparingly to affected area two times daily as needed for itching.    Atopic dermatitis

## 2018-08-23 NOTE — PATIENT INSTRUCTIONS
Try using the Flonase.    Continue doing the exercises.    I do think being active is good; you may want to increase activity gradually.

## 2018-08-23 NOTE — LETTER
[facility]  [address] [place] [address]                  [phone]   August 27, 2018    Carol Merida  [address]  [facility] [address]      Dear Chantal [facility] is a copy of your recent results.   These are looking good!     TSH stands for Thyroid Stimulating Hormone. It is the most sensitive thyroid test that we have. It goes in the opposite direction of the thyroid hormone level; if your thyroid is not producing sufficient thyroid hormone, it goes up to tell your thyroid to make more.     The Hgb A1C is a reflection of your diabetes control over the last 3 months.  We do like to see this under 7.0.     GFR stands for glomerular filtration rate (this is in regards to the kidney). They are now showing an estimate of this, based on the actual creatinine, age, gender, and race. This is commonly lower as one gets older. Your level of hydration can have an impact also.   If this is low, we should be aggressive with managing high blood pressure or high cholesterol.     I hope you start feeling better      Results for orders placed or performed in visit on 08/23/18   Comprehensive metabolic panel   Result Value Ref Range    Sodium 140 133 - 144 mmol/L    Potassium 4.4 3.4 - 5.3 mmol/L    Chloride 104 94 - 109 mmol/L    Carbon Dioxide 29 20 - 32 mmol/L    Anion Gap 7 3 - 14 mmol/L    Glucose 81 70 - 99 mg/dL    Urea Nitrogen 18 7 - 30 mg/dL    Creatinine 0.69 0.52 - 1.04 mg/dL    GFR Estimate 81 >60 mL/min/1.7m2    GFR Estimate If Black >90 >60 mL/min/1.7m2    Calcium 8.8 8.5 - 10.1 mg/dL    Bilirubin Total 0.6 0.2 - 1.3 mg/dL    Albumin 3.9 3.4 - 5.0 g/dL    Protein Total 7.6 6.8 - 8.8 g/dL    Alkaline Phosphatase 108 40 - 150 U/L    ALT 26 0 - 50 U/L    AST 25 0 - 45 U/L   TSH with free T4 reflex   Result Value Ref Range    TSH 3.70 0.40 - 4.00 mU/L   CBC with platelets   Result Value Ref Range    WBC 9.1 4.0 - 11.0 10e9/L    RBC Count 4.77 3.8 - 5.2  10e12/L    Hemoglobin 13.6 11.7 - 15.7 g/dL    Hematocrit 43.8 35.0 - 47.0 %    MCV 92 78 - 100 fl    MCH 28.5 26.5 - 33.0 pg    MCHC 31.1 (L) 31.5 - 36.5 g/dL    RDW 14.2 10.0 - 15.0 %    Platelet Count 302 150 - 450 10e9/L   Hemoglobin A1c   Result Value Ref Range    Hemoglobin A1C 5.9 (H) 0 - 5.6 %

## 2018-08-23 NOTE — PROGRESS NOTES
SUBJECTIVE:   Carol Merida is a 83 year old female who presents to clinic today for the following health issues:      Here to follow up with cardiologist and pneumonia.  Cough is still present with phlegm. Still having a lot fatigue.          Problem list and histories reviewed & adjusted, as indicated.  Additional history:     See under ROS     Patient Active Problem List   Diagnosis     Obesity     Hypothyroidism     Benign neoplasm of colon     Mild Depression [296.31]     Obstructive sleep apnea     * * * SBE PROPHYLAXIS * * *     Degeneration of lumbar or lumbosacral intervertebral disc     Pain in joint, ankle and foot     Arthrodesis status     HYPERLIPIDEMIA LDL GOAL <100     Hypertension goal BP (blood pressure) < 140/90     Health Care Home     Osteopenia     Malignant neoplasm of female breast (H)     Impaired fasting glucose     ACP (advance care planning)     History of melanoma     Panic disorder without agoraphobia     Humerus fracture     Arthritis     PONV (postoperative nausea and vomiting)     S/P total hip arthroplasty     Constipation     DVT prophylaxis     Anticoagulation management encounter     Physical deconditioning     Periprosthetic fracture around internal prosthetic left hip joint (H)     Chronic pain syndrome - hips     Fracture of greater trochanter of right femur 11/6/2016, closed, with routine healing, subsequent encounter     Anxiety     Osteoarthritis of multiple joints, unspecified osteoarthritis type     Anemia due to blood loss, acute     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     Long term (current) use of anticoagulants     Vitamin D deficiency     Pneumonia     Elevated BMI (H)     CHF (congestive heart failure) (H)       Current Outpatient Prescriptions   Medication Sig Dispense Refill     acetaminophen (TYLENOL) 325 MG tablet Take 3 tablets (975 mg) by mouth every 8 hours 500 tablet 1     albuterol (PROAIR HFA/PROVENTIL HFA/VENTOLIN HFA) 108 (90 Base)  MCG/ACT Inhaler Inhale 2 puffs into the lungs every 4 hours as needed for shortness of breath / dyspnea or wheezing 1 Inhaler 0     ALPRAZolam (XANAX) 0.25 MG tablet Take 1 tablet (0.25 mg) by mouth 2 times daily as needed for anxiety Don't take with the oxycodone 10 tablet 0     amLODIPine (NORVASC) 5 MG tablet Take 1 tablet (5 mg) by mouth daily 90 tablet 0     calcium-vitamin D (CALCIUM 600 + D) 600-400 MG-UNIT per tablet Take 1 tablet by mouth every evening  100 tablet 3     FLUoxetine (PROZAC) 40 MG capsule TAKE ONE CAPSULE BY MOUTH EVERY DAY 90 capsule 0     fluticasone (FLONASE) 50 MCG/ACT nasal spray Spray 1 spray into both nostrils daily as needed   5     furosemide (LASIX) 40 MG tablet Take 1 tablet (40 mg) by mouth daily 30 tablet 1     gabapentin (NEURONTIN) 300 MG capsule Take 1 capsule (300 mg) by mouth 3 times daily 270 capsule 0     levothyroxine (SYNTHROID/LEVOTHROID) 88 MCG tablet Take 1 tablet (88 mcg) by mouth daily 90 tablet 3     magnesium hydroxide (MILK OF MAGNESIA) 400 MG/5ML suspension Take 30 mLs by mouth daily as needed        Nystatin (NYSTOP) 330922 UNIT/GM POWD Apply tid to affectead area prn 60 g 1     order for DME Equipment being ordered: left breast prosthesis. Mastectomy bras (up to 4) 4 Device 0     polyethylene glycol (MIRALAX) powder Take 17 g (1 capful) by mouth daily 510 g 1     potassium chloride (KLOR-CON) 20 MEQ Packet Take 20 mEq by mouth daily 30 tablet 1     senna-docusate (SENOKOT-S;PERICOLACE) 8.6-50 MG per tablet Take 1 tablet by mouth 2 times daily 100 tablet 0     simvastatin (ZOCOR) 10 MG tablet TAKE 1 TABLET BY MOUTH AT BEDTIME 90 tablet 1     triamcinolone (KENALOG) 0.1 % cream Apply sparingly to affected area two times daily as needed for itching. 30 g 0     benzonatate (TESSALON) 100 MG capsule Take 1 capsule (100 mg) by mouth 3 times daily as needed for cough (Patient not taking: Reported on 8/23/2018) 42 capsule 0       Reviewed and updated as needed this  "visit by clinical staff  Tobacco  Allergies  Meds  Med Hx  Surg Hx  Fam Hx  Soc Hx      Reviewed and updated as needed this visit by Provider         ROS:  CONSTITUTIONAL:NEGATIVE for fever, chills, change in weight  RESP:see below  CV: NEGATIVE for chest pain, palpitations or change in peripheral edema  NEURO: fatigue  PSYCHIATRIC: does have stressors; 's health and her own.    Continues to feel fatigued.  Does continue with cough.  Did not get rid of it; had it for awhile prior to dx of pneumonia.  Does have some stuff to clear in throat.  Does have to clear when first gets up in the am, from CPAP.  Feels it effects breathing. She is practicing this, has some exercises; did see therapist. She is done with the therapy but continuing at home.    Not worse since July.   Not sure if improved.  Will ache from arthritis.   Not using nasal spray.    Was to the sleep person recently. Things were good there. Dr. Adamson.   Recently saw Cardiology as well.    Did have a cortisone shot in shoulder. Left.  Left hip has ongong pain.    Considering some counseling.  will be doing a new chemo.  Conversation with  and parishinors has been helpful.    OBJECTIVE:     /72 (BP Location: Right arm, Cuff Size: Adult Large)  Pulse 67  Temp 98.7  F (37.1  C) (Oral)  Resp 16  Ht 5' 2\" (1.575 m)  Wt 274 lb 4.8 oz (124.4 kg)  SpO2 95%  BMI 50.17 kg/m2  Body mass index is 50.17 kg/(m^2).  GENERAL APPEARANCE: alert and no distress  RESP: lungs clear to auscultation - no rales, rhonchi or wheezes  CV: regular rates and rhythm  MS: trace edema  PSYCH: mentation appears normal and affect normal    TSH   Date Value Ref Range Status   11/16/2017 2.89 0.40 - 4.00 mU/L Final     Hemoglobin   Date Value Ref Range Status   06/08/2018 13.5 11.7 - 15.7 g/dL Final   06/04/2018 12.6 11.7 - 15.7 g/dL Final       PHQ-9 SCORE 8/15/2017 11/16/2017 7/19/2018   Total Score - - -   Total Score 12 9 10       EDILSON-7 SCORE " 8/15/2017 11/16/2017 7/19/2018   Total Score - - -   Total Score 12 9 11   Total Score - - -       Lab Results   Component Value Date    A1C 5.9 08/15/2017    A1C 6.0 03/11/2016    A1C 6.1 02/09/2015    A1C 6.0 02/06/2014    A1C 5.9 02/07/2013       Reviewed Cardiology note.        ASSESSMENT/PLAN:       Fatigue, unspecified type  Uncertain etiology.  Suspect multifactorial.  Has been to sleep provider and to Cardiology. Will check the following.  May be still recovering.  - Comprehensive metabolic panel  - TSH with free T4 reflex  - CBC with platelets    Mild depression (H)  Refilling.   - FLUoxetine (PROZAC) 40 MG capsule; Take 1 capsule (40 mg) by mouth daily    Chronic diastolic congestive heart failure (H)  Has recently seen Cardiology. [place] to be stable.     Hypothyroidism, unspecified type  Clinically euthyroid x fatigue.   - TSH with free T4 reflex    Throat clearing  Have recommended using her nasal spray to see if this helps  - fluticasone (FLONASE) 50 MCG/ACT spray; Spray 1 spray into both nostrils daily as needed    Benign essential hypertension  Controlled; will refill.   - amLODIPine (NORVASC) 5 MG tablet; Take 1 tablet (5 mg) by mouth daily    Anxiety  Stable.   - FLUoxetine (PROZAC) 40 MG capsule; Take 1 capsule (40 mg) by mouth daily    Impaired fasting glucose    - Hemoglobin A1c        Patient Instructions   Try using the Flonase.    Continue doing the exercises.    I do think being active is good; you may want to increase activity gradually.          Yessica Lora MD, MD  [facility]

## 2018-08-24 LAB
ALBUMIN SERPL-MCNC: 3.9 G/DL (ref 3.4–5)
ALP SERPL-CCNC: 108 U/L (ref 40–150)
ALT SERPL W P-5'-P-CCNC: 26 U/L (ref 0–50)
ANION GAP SERPL CALCULATED.3IONS-SCNC: 7 MMOL/L (ref 3–14)
AST SERPL W P-5'-P-CCNC: 25 U/L (ref 0–45)
BILIRUB SERPL-MCNC: 0.6 MG/DL (ref 0.2–1.3)
BUN SERPL-MCNC: 18 MG/DL (ref 7–30)
CALCIUM SERPL-MCNC: 8.8 MG/DL (ref 8.5–10.1)
CHLORIDE SERPL-SCNC: 104 MMOL/L (ref 94–109)
CO2 SERPL-SCNC: 29 MMOL/L (ref 20–32)
CREAT SERPL-MCNC: 0.69 MG/DL (ref 0.52–1.04)
GFR SERPL CREATININE-BSD FRML MDRD: 81 ML/MIN/1.7M2
GLUCOSE SERPL-MCNC: 81 MG/DL (ref 70–99)
POTASSIUM SERPL-SCNC: 4.4 MMOL/L (ref 3.4–5.3)
PROT SERPL-MCNC: 7.6 G/DL (ref 6.8–8.8)
SODIUM SERPL-SCNC: 140 MMOL/L (ref 133–144)
TSH SERPL DL<=0.005 MIU/L-ACNC: 3.7 MU/L (ref 0.4–4)

## 2018-08-27 DIAGNOSIS — F32.A MILD DEPRESSION: ICD-10-CM

## 2018-08-27 NOTE — TELEPHONE ENCOUNTER
"Requested Prescriptions   Pending Prescriptions Disp Refills     FLUoxetine (PROZAC) 40 MG capsule [Pharmacy Med Name: FLUOXETINE HCL 40MG CAPS]  Last Written Prescription Date:  08/23/2018  Last Fill Quantity: 90 capsule,  # refills: 1   Last office visit: 8/23/2018 with prescribing provider:  Yessica Lora MD   Future Office Visit:     90 capsule 0     Sig: TAKE ONE CAPSULE BY MOUTH EVERY DAY    SSRIs Protocol Failed    8/27/2018 10:16 AM       Failed - PHQ-9 score less than 5 in past 6 months    Please review last PHQ-9 score.   PHQ-9 SCORE 8/15/2017 11/16/2017 7/19/2018   Total Score - - -   Total Score 12 9 10     EDILSON-7 SCORE 8/15/2017 11/16/2017 7/19/2018   Total Score - - -   Total Score 12 9 11   Total Score - - -                Passed - Patient is age 18 or older       Passed - No active pregnancy on record       Passed - No positive pregnancy test in last 12 months       Passed - Recent (6 mo) or future (30 days) visit within the authorizing provider's specialty    Patient had office visit in the last 6 months or has a visit in the next 30 days with authorizing provider or within the authorizing provider's specialty.  See \"Patient Info\" tab in inbasket, or \"Choose Columns\" in Meds & Orders section of the refill encounter.              "

## 2018-08-29 RX ORDER — FLUOXETINE 40 MG/1
CAPSULE ORAL
Qty: 90 CAPSULE | Refills: 0 | OUTPATIENT
Start: 2018-08-29

## 2018-08-29 NOTE — TELEPHONE ENCOUNTER
Routing refill request to provider for review/approval because:  PHQ 9 was 10 on 7/19/18.    Vanessa Kate RN  EP Triage

## 2018-09-04 ENCOUNTER — PATIENT OUTREACH (OUTPATIENT)
Dept: CARE COORDINATION | Facility: CLINIC | Age: 83
End: 2018-09-04

## 2018-09-04 NOTE — PROGRESS NOTES
Clinic Care Coordination Contact  Care Coordination Communication    Home Care Contact:              Home Care Agency: [facility]              Care Coordination contacted home care: Yes, spoke with Maria Antonia - bin.               Patient was closed to [facility] services on 08/08/2018; CCRN was not notified.     Plan:   CCRN will outreach to patient in the next 1-2 weeks for Initial Assessment - CCRN.  ENROLLMENT STATUS:  Potential   CARE COORDINATOR STATUS: Care team current.     Lia Acosta, RN  [facility]  Clinic Care Coordinator - Prior [facility] Locations   Direct:  [phone] (voicemail available)   (Today's Date: 09/04/2018)

## 2018-09-19 ENCOUNTER — PATIENT OUTREACH (OUTPATIENT)
Dept: CARE COORDINATION | Facility: CLINIC | Age: 83
End: 2018-09-19

## 2018-09-19 NOTE — PROGRESS NOTES
Clinic Care Coordination Contact  [facility]/Voicemail       Clinical Data: Care Coordinator Outreach - Refer to 09/04/2018 Patient Outreach - Care Coordination entry.   Outreach attempted x 1. Busy signal, unable to leave message.   Plan: Care Coordinator will mail out care coordination introduction letter with care coordinator contact information and explanation of care coordination services. Care Coordinator will do no further outreaches at this time.  ENROLLMENT STATUS:  Not a Candidate  CARE COORDINATOR STATUS: Care team updated.     Lia Acosta, RN  [facility] Care Coordinator - Prior [facility] Locations   Direct:  [phone] (voicemail available)   (Today's Date: 09/19/2018)

## 2018-09-19 NOTE — LETTER
Health Care Home - Access Care Plan    About Me  Patient Name:  Carol Merida    YOB: 1935  Age:                             83 year old   Yesi MRN:            [id number] Telephone Information:     Home Phone [phone]   Mobile [phone]       Address:    [address]  [place] [address] Email address:  No e-mail address on record      Emergency Contact(s)  Name Relationship Lgl Grd Work Phone Home Phone Mobile Phone   1. WALLY MERIDA  Spouse   [phone]    2. HERNANDO, N* Daughter   [phone]    3. JACOB MERIDA Son   [phone]              Health Maintenance: Routine Health maintenance Reviewed: Not assessed    My Access Plan  Medical Emergency 911   Questions or concerns during clinic hours Primary Clinic Line, I will call the clinic directly: [facility] - [phone]   24 Hour Appointment Line [phone] or  [phone]) (toll free)   24 Hour Nurse Line [phone] (toll free)   Questions or concerns outside clinic hours 24 Hour Appointment Line, I will call the after-hours on-call line:   [facility] [phone] or [phone]) (toll-free)   Preferred Urgent Care     Preferred Hospital [facility]  [phone]   Preferred [facility]     Behavioral Health Crisis Line The National Suicide Prevention Lifeline at 1-220.565.8885 or 524     My Care Team Members  Patient Care Team       Relationship Specialty Notifications Start End    Yessica Lora MD PCP - General   2/16/02     Phone: [phone] Fax: [phone]         [address] [facility] [address]           My Medical and Care Information  Problem List   Patient Active Problem List   Diagnosis     Obesity     Hypothyroidism     Benign neoplasm of colon     Mild Depression [296.31]     Obstructive sleep apnea     * * * SBE PROPHYLAXIS * * *     Degeneration of lumbar or  lumbosacral intervertebral disc     Pain in joint, ankle and foot     Arthrodesis status     HYPERLIPIDEMIA LDL GOAL <100     Hypertension goal BP (blood pressure) < 140/90     Health Care Home     Osteopenia     Malignant neoplasm of female breast (H)     Impaired fasting glucose     ACP (advance care planning)     History of melanoma     Panic disorder without agoraphobia     Humerus fracture     Arthritis     PONV (postoperative nausea and vomiting)     S/P total hip arthroplasty     Constipation     DVT prophylaxis     Anticoagulation management encounter     Physical deconditioning     Periprosthetic fracture around internal prosthetic left hip joint (H)     Chronic pain syndrome - hips     Fracture of greater trochanter of right femur 11/6/2016, closed, with routine healing, subsequent encounter     Anxiety     Osteoarthritis of multiple joints, unspecified osteoarthritis type     Anemia due to blood loss, acute     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

## 2018-09-19 NOTE — LETTER
[place] CARE COORDINATION      September 19, 2018    Carol Merida  [address]  [place] [address]      Dear Carol,    I am a clinic care coordinator who works with Yessica Lora MD, at [facility]. I recently tried to call and was unable to reach you. I wanted to introduce myself and provide you with my contact information so that you can call me with questions or concerns about your health care. Below is a description of clinic care coordination and how I can further assist you.     The clinic care coordinator is a registered nurse and/or  who understand the health care system. The goal of clinic care coordination is to help you manage your health and improve access to the [facility] system in the most efficient manner. The registered nurse can assist you in meeting your health care goals by providing education, coordinating services, and strengthening the communication among your providers. The  can assist you with financial, behavioral, psychosocial, chemical dependency, counseling, and/or psychiatric resources.    Please feel free to contact me at [phone], with any questions or concerns. We at [facility] are focused on providing you with the highest-quality healthcare experience possible and that all starts with you.     Sincerely,     Lia Oneils    Enclosed: I have enclosed a copy of a 24 Hour Access Plan. This has helpful phone numbers for you to call when needed. Please keep this in an easy to access place to use as needed.

## 2018-10-08 DIAGNOSIS — I10 BENIGN ESSENTIAL HYPERTENSION: ICD-10-CM

## 2018-10-08 NOTE — TELEPHONE ENCOUNTER
"Requested Prescriptions   Pending Prescriptions Disp Refills     amLODIPine (NORVASC) 5 MG tablet [Pharmacy Med Name: AMLODIPINE BESYLATE 5MG TABS]  Last Written Prescription Date:  8/23/18  Last Fill Quantity: 90,  # refills: 1   Last office visit: 8/23/2018 with prescribing provider:  Yessica Lora MD    Future Office Visit:     90 tablet 0     Sig: TAKE ONE TABLET BY MOUTH EVERY DAY    Calcium Channel Blockers Protocol  Passed    10/8/2018 12:00 PM       Passed - Blood pressure under 140/90 in past 12 months    BP Readings from Last 3 Encounters:   08/23/18 128/72   08/20/18 134/74   07/19/18 126/72                Passed - Recent (12 mo) or future (30 days) visit within the authorizing provider's specialty    Patient had office visit in the last 12 months or has a visit in the next 30 days with authorizing provider or within the authorizing provider's specialty.  See \"Patient Info\" tab in inbasket, or \"Choose Columns\" in Meds & Orders section of the refill encounter.           Passed - Patient is age 18 or older       Passed - No active pregnancy on record       Passed - Normal serum creatinine on file in past 12 months    Recent Labs   Lab Test  08/23/18   1220   10/26/12   1456   CR  0.69   < >   --    CREAT   --    --   0.7    < > = values in this interval not displayed.            Passed - No positive pregnancy test in past 12 months          "

## 2018-10-10 RX ORDER — AMLODIPINE BESYLATE 5 MG/1
TABLET ORAL
Qty: 90 TABLET | Refills: 0 | Status: SHIPPED | OUTPATIENT
Start: 2018-10-10 | End: 2018-11-19

## 2018-10-10 NOTE — TELEPHONE ENCOUNTER
[facility]ills sent to [facility] in  on [place] on 8/23/18, dispense 90 with 1 refill.  Now requesting [facility] clinic.      Will call to verify where she would like the refill.  She is requesting it be sent to [facility] pharmacy.      Will send in a refill to [facility] pharmacy.

## 2018-10-12 DIAGNOSIS — M51.369 DDD (DEGENERATIVE DISC DISEASE), LUMBAR: ICD-10-CM

## 2018-10-12 DIAGNOSIS — M15.9 OSTEOARTHRITIS OF MULTIPLE JOINTS, UNSPECIFIED OSTEOARTHRITIS TYPE: ICD-10-CM

## 2018-10-12 RX ORDER — GABAPENTIN 300 MG/1
CAPSULE ORAL
Qty: 270 CAPSULE | Refills: 0 | Status: SHIPPED | OUTPATIENT
Start: 2018-10-12 | End: 2018-11-19

## 2018-10-12 NOTE — TELEPHONE ENCOUNTER
Requested Prescriptions   Pending Prescriptions Disp Refills     gabapentin (NEURONTIN) 300 MG capsule [Pharmacy Med Name: GABAPENTIN 300MG CAPSULES] 270 capsule 0     Sig: TAKE 1 CAPSULE(300 MG) BY MOUTH THREE TIMES DAILY    There is no refill protocol information for this order        Last Written Prescription Date:  11/16/17  Last Fill Quantity: 270,  # refills: 0   Last office visit: 8/23/2018 with prescribing provider:  Yessica Lora MD    Future Office Visit:

## 2018-10-12 NOTE — TELEPHONE ENCOUNTER
Routing refill request to provider for review/approval because:  Drug not on the [facility] refill protocol   Naomi Brown RN

## 2018-11-19 ENCOUNTER — APPOINTMENT (OUTPATIENT)
Dept: GENERAL RADIOLOGY | Facility: CLINIC | Age: 83
End: 2018-11-19
Attending: EMERGENCY MEDICINE
Payer: COMMERCIAL

## 2018-11-19 ENCOUNTER — HOSPITAL ENCOUNTER (OUTPATIENT)
Facility: CLINIC | Age: 83
Setting detail: OBSERVATION
Discharge: HOME | End: 2018-11-21
Attending: EMERGENCY MEDICINE | Admitting: HOSPITALIST
Payer: COMMERCIAL

## 2018-11-19 ENCOUNTER — TELEPHONE (OUTPATIENT)
Dept: FAMILY MEDICINE | Facility: CLINIC | Age: 83
End: 2018-11-19

## 2018-11-19 ENCOUNTER — APPOINTMENT (OUTPATIENT)
Dept: ULTRASOUND IMAGING | Facility: CLINIC | Age: 83
End: 2018-11-19
Attending: EMERGENCY MEDICINE
Payer: COMMERCIAL

## 2018-11-19 DIAGNOSIS — I50.9 ACUTE CONGESTIVE HEART FAILURE, UNSPECIFIED HEART FAILURE TYPE (H): ICD-10-CM

## 2018-11-19 DIAGNOSIS — I50.31 ACUTE DIASTOLIC CONGESTIVE HEART FAILURE (H): ICD-10-CM

## 2018-11-19 DIAGNOSIS — L03.116 CELLULITIS OF LEFT LOWER EXTREMITY: ICD-10-CM

## 2018-11-19 DIAGNOSIS — R06.02 SOB (SHORTNESS OF BREATH): Primary | ICD-10-CM

## 2018-11-19 LAB
ANION GAP SERPL CALCULATED.3IONS-SCNC: 5 MMOL/L (ref 3–14)
BASOPHILS # BLD AUTO: 0 10E9/L (ref 0–0.2)
BASOPHILS NFR BLD AUTO: 0.4 %
BUN SERPL-MCNC: 17 MG/DL (ref 7–30)
CALCIUM SERPL-MCNC: 9.4 MG/DL (ref 8.5–10.1)
CHLORIDE SERPL-SCNC: 103 MMOL/L (ref 94–109)
CO2 SERPL-SCNC: 30 MMOL/L (ref 20–32)
CREAT SERPL-MCNC: 0.79 MG/DL (ref 0.52–1.04)
DIFFERENTIAL METHOD BLD: NORMAL
EOSINOPHIL # BLD AUTO: 0.3 10E9/L (ref 0–0.7)
EOSINOPHIL NFR BLD AUTO: 4.7 %
ERYTHROCYTE [DISTWIDTH] IN BLOOD BY AUTOMATED COUNT: 13.6 % (ref 10–15)
GFR SERPL CREATININE-BSD FRML MDRD: 70 ML/MIN/1.7M2
GLUCOSE SERPL-MCNC: 75 MG/DL (ref 70–99)
HCT VFR BLD AUTO: 44.3 % (ref 35–47)
HGB BLD-MCNC: 14 G/DL (ref 11.7–15.7)
IMM GRANULOCYTES # BLD: 0 10E9/L (ref 0–0.4)
IMM GRANULOCYTES NFR BLD: 0.4 %
LYMPHOCYTES # BLD AUTO: 1.5 10E9/L (ref 0.8–5.3)
LYMPHOCYTES NFR BLD AUTO: 21 %
MCH RBC QN AUTO: 28.5 PG (ref 26.5–33)
MCHC RBC AUTO-ENTMCNC: 31.6 G/DL (ref 31.5–36.5)
MCV RBC AUTO: 90 FL (ref 78–100)
MONOCYTES # BLD AUTO: 0.6 10E9/L (ref 0–1.3)
MONOCYTES NFR BLD AUTO: 8.6 %
NEUTROPHILS # BLD AUTO: 4.7 10E9/L (ref 1.6–8.3)
NEUTROPHILS NFR BLD AUTO: 64.9 %
NRBC # BLD AUTO: 0 10*3/UL
NRBC BLD AUTO-RTO: 0 /100
NT-PROBNP SERPL-MCNC: 59 PG/ML (ref 0–1800)
PLATELET # BLD AUTO: 285 10E9/L (ref 150–450)
POTASSIUM SERPL-SCNC: 3.8 MMOL/L (ref 3.4–5.3)
RBC # BLD AUTO: 4.92 10E12/L (ref 3.8–5.2)
SODIUM SERPL-SCNC: 138 MMOL/L (ref 133–144)
TROPONIN I SERPL-MCNC: <0.015 UG/L (ref 0–0.04)
WBC # BLD AUTO: 7.3 10E9/L (ref 4–11)

## 2018-11-19 PROCEDURE — 83880 ASSAY OF NATRIURETIC PEPTIDE: CPT | Performed by: EMERGENCY MEDICINE

## 2018-11-19 PROCEDURE — 80048 BASIC METABOLIC PNL TOTAL CA: CPT | Performed by: EMERGENCY MEDICINE

## 2018-11-19 PROCEDURE — 25000132 ZZH RX MED GY IP 250 OP 250 PS 637: Performed by: PHYSICIAN ASSISTANT

## 2018-11-19 PROCEDURE — 99220 ZZC INITIAL OBSERVATION CARE,LEVL III: CPT | Performed by: PHYSICIAN ASSISTANT

## 2018-11-19 PROCEDURE — 93005 ELECTROCARDIOGRAM TRACING: CPT

## 2018-11-19 PROCEDURE — 99285 EMERGENCY DEPT VISIT HI MDM: CPT | Mod: 25

## 2018-11-19 PROCEDURE — 84484 ASSAY OF TROPONIN QUANT: CPT | Performed by: EMERGENCY MEDICINE

## 2018-11-19 PROCEDURE — G0378 HOSPITAL OBSERVATION PER HR: HCPCS

## 2018-11-19 PROCEDURE — 71046 X-RAY EXAM CHEST 2 VIEWS: CPT

## 2018-11-19 PROCEDURE — 93971 EXTREMITY STUDY: CPT | Mod: LT

## 2018-11-19 PROCEDURE — 85025 COMPLETE CBC W/AUTO DIFF WBC: CPT | Performed by: EMERGENCY MEDICINE

## 2018-11-19 PROCEDURE — 25000128 H RX IP 250 OP 636: Performed by: EMERGENCY MEDICINE

## 2018-11-19 PROCEDURE — 96374 THER/PROPH/DIAG INJ IV PUSH: CPT

## 2018-11-19 RX ORDER — ALPRAZOLAM 0.25 MG
0.25 TABLET ORAL 2 TIMES DAILY PRN
Status: DISCONTINUED | OUTPATIENT
Start: 2018-11-19 | End: 2018-11-21 | Stop reason: HOSPADM

## 2018-11-19 RX ORDER — SENNOSIDES A AND B 8.6 MG/1
1 TABLET, FILM COATED ORAL DAILY
COMMUNITY
End: 2021-06-15

## 2018-11-19 RX ORDER — FUROSEMIDE 10 MG/ML
40 INJECTION INTRAMUSCULAR; INTRAVENOUS ONCE
Status: COMPLETED | OUTPATIENT
Start: 2018-11-19 | End: 2018-11-19

## 2018-11-19 RX ORDER — POLYETHYLENE GLYCOL 3350 17 G/17G
17 POWDER, FOR SOLUTION ORAL DAILY PRN
Status: DISCONTINUED | OUTPATIENT
Start: 2018-11-19 | End: 2018-11-21 | Stop reason: HOSPADM

## 2018-11-19 RX ORDER — LIDOCAINE 40 MG/G
CREAM TOPICAL
Status: DISCONTINUED | OUTPATIENT
Start: 2018-11-19 | End: 2018-11-21 | Stop reason: HOSPADM

## 2018-11-19 RX ORDER — AMOXICILLIN 250 MG
2 CAPSULE ORAL 2 TIMES DAILY PRN
Status: DISCONTINUED | OUTPATIENT
Start: 2018-11-19 | End: 2018-11-21 | Stop reason: HOSPADM

## 2018-11-19 RX ORDER — AMLODIPINE BESYLATE 5 MG/1
5 TABLET ORAL DAILY
Status: DISCONTINUED | OUTPATIENT
Start: 2018-11-20 | End: 2018-11-21 | Stop reason: HOSPADM

## 2018-11-19 RX ORDER — SIMVASTATIN 10 MG
10 TABLET ORAL AT BEDTIME
Status: DISCONTINUED | OUTPATIENT
Start: 2018-11-19 | End: 2018-11-21 | Stop reason: HOSPADM

## 2018-11-19 RX ORDER — SENNOSIDES 8.6 MG
1 TABLET ORAL DAILY
Status: DISCONTINUED | OUTPATIENT
Start: 2018-11-20 | End: 2018-11-20

## 2018-11-19 RX ORDER — LEVOTHYROXINE SODIUM 88 UG/1
88 TABLET ORAL DAILY
Status: DISCONTINUED | OUTPATIENT
Start: 2018-11-20 | End: 2018-11-21 | Stop reason: HOSPADM

## 2018-11-19 RX ORDER — AMOXICILLIN 250 MG
1 CAPSULE ORAL 2 TIMES DAILY PRN
Status: DISCONTINUED | OUTPATIENT
Start: 2018-11-19 | End: 2018-11-21 | Stop reason: HOSPADM

## 2018-11-19 RX ORDER — FUROSEMIDE 10 MG/ML
40 INJECTION INTRAMUSCULAR; INTRAVENOUS ONCE
Status: COMPLETED | OUTPATIENT
Start: 2018-11-20 | End: 2018-11-20

## 2018-11-19 RX ORDER — ALBUTEROL SULFATE 90 UG/1
2 AEROSOL, METERED RESPIRATORY (INHALATION) EVERY 4 HOURS PRN
Qty: 1 INHALER | Refills: 0 | Status: SHIPPED | OUTPATIENT
Start: 2018-11-19 | End: 2019-07-26

## 2018-11-19 RX ORDER — ONDANSETRON 4 MG/1
4 TABLET, ORALLY DISINTEGRATING ORAL EVERY 6 HOURS PRN
Status: DISCONTINUED | OUTPATIENT
Start: 2018-11-19 | End: 2018-11-21 | Stop reason: HOSPADM

## 2018-11-19 RX ORDER — CEPHALEXIN 500 MG/1
500 CAPSULE ORAL EVERY 6 HOURS SCHEDULED
Status: DISCONTINUED | OUTPATIENT
Start: 2018-11-20 | End: 2018-11-21 | Stop reason: HOSPADM

## 2018-11-19 RX ORDER — ACETAMINOPHEN 325 MG/1
650 TABLET ORAL EVERY 4 HOURS PRN
Status: DISCONTINUED | OUTPATIENT
Start: 2018-11-19 | End: 2018-11-19

## 2018-11-19 RX ORDER — ACETAMINOPHEN 325 MG/1
975 TABLET ORAL EVERY 8 HOURS PRN
Status: DISCONTINUED | OUTPATIENT
Start: 2018-11-19 | End: 2018-11-21 | Stop reason: HOSPADM

## 2018-11-19 RX ORDER — GABAPENTIN 300 MG/1
300 CAPSULE ORAL 3 TIMES DAILY
Status: DISCONTINUED | OUTPATIENT
Start: 2018-11-19 | End: 2018-11-21 | Stop reason: HOSPADM

## 2018-11-19 RX ORDER — ONDANSETRON 2 MG/ML
4 INJECTION INTRAMUSCULAR; INTRAVENOUS EVERY 6 HOURS PRN
Status: DISCONTINUED | OUTPATIENT
Start: 2018-11-19 | End: 2018-11-21 | Stop reason: HOSPADM

## 2018-11-19 RX ORDER — NALOXONE HYDROCHLORIDE 0.4 MG/ML
.1-.4 INJECTION, SOLUTION INTRAMUSCULAR; INTRAVENOUS; SUBCUTANEOUS
Status: DISCONTINUED | OUTPATIENT
Start: 2018-11-19 | End: 2018-11-21 | Stop reason: HOSPADM

## 2018-11-19 RX ADMIN — SIMVASTATIN 10 MG: 10 TABLET, FILM COATED ORAL at 23:38

## 2018-11-19 RX ADMIN — FUROSEMIDE 40 MG: 10 INJECTION, SOLUTION INTRAMUSCULAR; INTRAVENOUS at 20:04

## 2018-11-19 RX ADMIN — CEPHALEXIN 500 MG: 500 CAPSULE ORAL at 23:38

## 2018-11-19 RX ADMIN — GABAPENTIN 300 MG: 300 CAPSULE ORAL at 23:39

## 2018-11-19 ASSESSMENT — ENCOUNTER SYMPTOMS
PALPITATIONS: 0
DIARRHEA: 0
CHILLS: 0
VOMITING: 0
COUGH: 0
SHORTNESS OF BREATH: 1
NAUSEA: 0
COLOR CHANGE: 1
FEVER: 0
ABDOMINAL PAIN: 0

## 2018-11-19 NOTE — TELEPHONE ENCOUNTER
Called patient back to triage: Patient   When she talks or walks she gets short of breath  Swelling left leg and ankle.   Todd Eisenberg patient had emergencies last week, was at [facility] - going back and forth with him a lot.   Moved to [facility] Saturday. Since then she lost all steam. Family and friends are getting concerned for her.    Patient has been using your Flonase. She had an inhaler but as of Friday her inhaler has gone dry.     Advised she should be seen. Huddled with Dr. Lora's MA to use one of 4 KWADWO's for tomorrow. Ok'd. Scheduled.  Next 5 appointments (look out 90 days)     Nov 20, 2018 11:50 AM CST   [facility]    [address] [facility] [address] [phone]                Albuterol inhaler:  Last Written Prescription Date:  6/8/18 for pneumonia by another provider  Last Fill Quantity: 1,  # refills: 0   Last office visit: 8/23/2018 ANTONY Lora for f/u pneumonia  Future Office Visit:   Next 5 appointments (look out 90 days)     Nov 20, 2018 11:50 AM CST   Office Visit with [facility] MD   [facility]    [address] [facility] [address] [phone]                   Johnny Perkins RN

## 2018-11-19 NOTE — IP AVS SNAPSHOT
[facility]    [address]    [facility] [address]    Phone:  [phone]                                       After Visit Summary   11/19/2018    Carol Merida    MRN: [id number]           After Visit Summary Signature Page     I have received my discharge instructions, and my questions have been answered. I have discussed any challenges I see with this plan with the nurse or doctor.    ..........................................................................................................................................  Patient/Patient Representative Signature      ..........................................................................................................................................  Patient Representative Print Name and Relationship to Patient    ..................................................               ................................................  Date                                   Time    ..........................................................................................................................................  Reviewed by Signature/Title    ...................................................              ..............................................  Date                                               Time          22EPIC Rev 08/18

## 2018-11-19 NOTE — TELEPHONE ENCOUNTER
Ok to refill the inhaler as requesting.  I do wonder if she should go to ER with swelling of one leg and increasing shortness of breath...     (looking for clot, exacerbation heart failure)

## 2018-11-19 NOTE — IP AVS SNAPSHOT
MRN:[id number]                      After Visit Summary   11/19/2018    Carol Merida    MRN: [id number]           Thank you!     Thank you for choosing [facility] for your care. Our goal is always to provide you with excellent care. Hearing back from our patients is one way we can continue to improve our services. Please take a few minutes to complete the written survey that you may receive in the mail after you visit. If you would like to speak to someone directly about your visit please contact Patient Relations at [phone]. Thank you!          Patient Information     Date Of Birth          1935        About your hospital stay     You were admitted on:  November 19, 2018 You last received care in the:  [facility] Observation Department    You were discharged on:  November 21, 2018        Reason for your hospital stay       You were admitted due to concerns for increased shortness of breath and lower extremity swelling due to a mild heart failure exacerbation. You were treated with IV Lasix to take off additional fluid you were retaining with improvement in your weight, swelling, and symptoms. Your left lower leg appeared red and warm to touch with concern for an underlying infection for which you were treated with oral antibiotics with improvement. It it recommended you take an additional dose of oral Lasix this afternoon then resume taking oral Lasix daily in order to keep from recurrence of your symptoms. You will need to take an additional 5 days of oral Keflex (antibiotic) to complete a course to treat your cellulitis.                  Who to Call     For medical emergencies, please call [phone].  For non-urgent questions about your medical care, please call your primary care provider or clinic, [phone]          Attending Provider     Provider Specialty    James Hernandez DO Emergency Medicine    Eduar Do MD Internal Medicine        "Primary Care Provider Office Phone # Fax #    Yessica Lora [phone]      After Care Instructions     Activity       Your activity upon discharge: activity as tolerated            Diet       Follow this diet upon discharge: Orders Placed This Encounter      Fluid restriction 2000 ML FLUID      Combination Diet 2 gm NA Diet            Discharge Instructions       1. Take one dose of Lasix 40 mg this afternoon (around 3 or 4 PM) then take Lasix 40 mg daily   2. Check your weight daily (contact your primary care provider if you weight increases more than 3 pounds in 1 day)  3. Recommend low sodium diet and 2 liter daily fluid restriction                  Follow-up Appointments     Follow-up and recommended labs and tests        Follow up with primary care provider, Yessica Lora MD, within 7 days for hospital follow- up.  No follow up labs or test are needed.                             Pending Results     No orders found from 11/17/2018 to 11/20/2018.            Statement of Approval     Ordered          11/21/18 1211  I have reviewed and agree with all the recommendations and orders detailed in this document.  EFFECTIVE NOW     Approved and electronically signed by:  Nicki Ontiveros PA-C             Admission Information     Date & Time Provider Department Dept. Phone    11/19/2018 Eduar Do MD [facility] Observation Department [phone]      Your Vitals Were     Blood Pressure Pulse Temperature Respirations Height Weight    110/42 (BP Location: Right arm) 67 97.8  F (36.6  C) (Oral) 20 1.6 m (5' 3\") 124.1 kg (273 lb 9.6 oz)    Pulse Oximetry BMI (Body Mass Index)                94% 48.47 kg/m2          [employer] Information     [employer] lets you send messages to your doctor, view your test results, renew your prescriptions, schedule appointments and more. To sign up, go to [email or web address] . Click on \"Log in\" on the left side of the screen, which will take you " "to the Welcome page. Then click on \"Sign up Now\" on the right side of the page.     You will be asked to enter the access code listed below, as well as some personal information. Please follow the directions to create your username and password.     Your access code is: [email or web address]  Expires: 2019 12:47 PM     Your access code will  in 90 days. If you need help or a new code, please call your [place] clinic or [phone].        Care EveryWhere ID     This is your Care EveryWhere ID. This could be used by other organizations to access your [place] medical records  [id number]        Equal Access to Services     CARLOS [facility] : Nury Hartmann, liliam granados, esequiel zuñiga, marc newton . So [facility] [phone].    ATENCIÓN: Si habla español, tiene a johns disposición servicios gratuitos de asistencia lingüística. Llame al [phone].    We comply with applicable federal civil rights laws and [place] laws. We do not discriminate on the basis of race, color, national origin, age, disability, sex, sexual orientation, or gender identity.               Review of your medicines      START taking        Dose / Directions    cephALEXin 500 MG capsule   Commonly known as:  KEFLEX   Indication:  Infection of the Skin and/or Related Soft Tissue   Used for:  Cellulitis of left lower extremity        Dose:  500 mg   Take 1 capsule (500 mg) by mouth 4 times daily for 5 days   Quantity:  20 capsule   Refills:  0         CONTINUE these medicines which have NOT CHANGED        Dose / Directions    acetaminophen 325 MG tablet   Commonly known as:  TYLENOL   Used for:  Status post total replacement of right hip        Dose:  975 mg   Take 3 tablets (975 mg) by mouth every 8 hours   Quantity:  500 tablet   Refills:  1       albuterol 108 (90 Base) MCG/ACT inhaler   Commonly known as:  PROAIR HFA/PROVENTIL HFA/VENTOLIN HFA   Used for:  SOB (shortness of breath)        " Dose:  2 puff   Inhale 2 puffs into the lungs every 4 hours as needed for shortness of breath / dyspnea or wheezing   Quantity:  1 Inhaler   Refills:  0       ALPRAZolam 0.25 MG tablet   Commonly known as:  XANAX   Used for:  Anxiety        Dose:  0.25 mg   Take 1 tablet (0.25 mg) by mouth 2 times daily as needed for anxiety Don't take with the oxycodone   Quantity:  10 tablet   Refills:  0       amLODIPine 5 MG tablet   Commonly known as:  NORVASC   Used for:  Benign essential hypertension        Dose:  5 mg   Take 1 tablet (5 mg) by mouth daily   Quantity:  90 tablet   Refills:  1       benzonatate 100 MG capsule   Commonly known as:  TESSALON   Used for:  Pneumonia of left lower lobe due to infectious organism (H)        Dose:  100 mg   Take 1 capsule (100 mg) by mouth 3 times daily as needed for cough   Quantity:  42 capsule   Refills:  0       calcium 600 + D 600-400 MG-UNIT per tablet   Used for:  Osteopenia   Generic drug:  calcium carbonate 600 mg-vitamin D 400 units        Dose:  1 tablet   Take 1 tablet by mouth every evening   Quantity:  100 tablet   Refills:  3       FLUoxetine 40 MG capsule   Commonly known as:  PROzac   Used for:  Mild depression (H), Anxiety        Dose:  40 mg   Take 1 capsule (40 mg) by mouth daily   Quantity:  90 capsule   Refills:  1       fluticasone 50 MCG/ACT spray   Commonly known as:  FLONASE   Used for:  Throat clearing        Dose:  1 spray   Spray 1 spray into both nostrils daily as needed   Quantity:  1 Bottle   Refills:  5       furosemide 40 MG tablet   Commonly known as:  LASIX   Used for:  Acute diastolic congestive heart failure (H)        Dose:  40 mg   Take 1 tablet (40 mg) by mouth daily   Quantity:  31 tablet   Refills:  0       gabapentin 300 MG capsule   Commonly known as:  NEURONTIN   Used for:  Osteoarthritis of multiple joints, unspecified osteoarthritis type, DDD (degenerative disc disease), lumbar, Osteoarthritis of spine with radiculopathy, lumbar region         Dose:  300 mg   Take 1 capsule (300 mg) by mouth 3 times daily   Quantity:  270 capsule   Refills:  0       levothyroxine 88 MCG tablet   Commonly known as:  SYNTHROID/LEVOTHROID   Used for:  Hypothyroidism, unspecified type        Dose:  88 mcg   Take 1 tablet (88 mcg) by mouth daily   Quantity:  90 tablet   Refills:  3       magnesium hydroxide 400 MG/5ML suspension   Commonly known as:  MILK OF MAGNESIA        Dose:  30 mL   Take 30 mLs by mouth daily as needed   Refills:  0       NYSTOP 801246 UNIT/GM Powd   Used for:  Intertrigo   Generic drug:  nystatin        Apply tid to affectead area prn   Quantity:  60 g   Refills:  1       order for DME   Used for:  History of left mastectomy        Equipment being ordered: left breast prosthesis. Mastectomy bras (up to 4)   Quantity:  4 Device   Refills:  0       senna 8.6 MG tablet   Commonly known as:  SENOKOT        Dose:  1 tablet   Take 1 tablet by mouth daily   Refills:  0       simvastatin 10 MG tablet   Commonly known as:  ZOCOR   Used for:  Hyperlipidemia LDL goal <100        TAKE 1 TABLET BY MOUTH AT BEDTIME   Quantity:  90 tablet   Refills:  1       triamcinolone 0.1 % cream   Commonly known as:  KENALOG   Used for:  Atopic dermatitis        Apply sparingly to affected area two times daily as needed for itching.   Quantity:  30 g   Refills:  0            Where to get your medicines      These medications were sent to [facility] [place] [address]     Phone:  [phone]     cephALEXin 500 MG capsule    furosemide 40 MG tablet                Protect others around you: Learn how to safely use, store and throw away your medicines at www.disposemymeds.org.        ANTIBIOTIC INSTRUCTION     You've Been Prescribed an Antibiotic - Now What?  Your healthcare team thinks that you or your loved one might have an infection. Some infections can be treated with antibiotics, which  are powerful, life-saving drugs. Like all medications, antibiotics have side effects and should only be used when necessary. There are some important things you should know about your antibiotic treatment.      Your healthcare team may run tests before you start taking an antibiotic.    Your team may take samples (e.g., from your blood, urine or other areas) to run tests to look for bacteria. These test can be important to determine if you need an antibiotic at all and, if you do, which antibiotic will work best.      Within a few days, your healthcare team might change or even stop your antibiotic.    Your team may start you on an antibiotic while they are working to find out what is making you sick.    Your team might change your antibiotic because test results show that a different antibiotic would be better to treat your infection.    In some cases, once your team has more information, they learn that you do not need an antibiotic at all. They may find out that you don't have an infection, or that the antibiotic you're taking won't work against your infection. For example, an infection caused by a virus can't be treated with antibiotics. Staying on an antibiotic when you don't need it is more likely to be harmful than helpful.      You may experience side effects from your antibiotic.    Like all medications, antibiotics have side effects. Some of these can be serious.    Let you healthcare team know if you have any known allergies when you are admitted to the hospital.    One significant side effect of nearly all antibiotics is the risk of severe and sometimes deadly diarrhea caused by Clostridium difficile (C. Difficile). This occurs when a person takes antibiotics because some good germs are destroyed. Antibiotic use allows C. diificile to take over, putting patients at high risk for this serious infection.    As a patient or caregiver, it is important to understand your or your loved one's antibiotic  treatment. It is especially important for caregivers to speak up when patients can't speak for themselves. Here are some important questions to ask your healthcare team.    What infection is this antibiotic treating and how do you know I have that infection?    What side effects might occur from this antibiotic?    How long will I need to take this antibiotic?    Is it safe to take this antibiotic with other medications or supplements (e.g., vitamins) that I am taking?     Are there any special directions I need to know about taking this antibiotic? For example, should I take it with food?    How will I be monitored to know whether my infection is responding to the antibiotic?    What tests may help to make sure the right antibiotic is prescribed for me?      Information provided by:  www.cdc.gov/getsmart  U.S. Department of Health and Human Services  Centers for disease Control and Prevention  National Center for Emerging and Zoonotic Infectious Diseases  Division of Healthcare Quality Promotion             Medication List: This is a list of all your medications and when to take them. Check marks below indicate your daily home schedule. Keep this list as a reference.      Medications           Morning Afternoon Evening Bedtime As Needed    acetaminophen 325 MG tablet   Commonly known as:  TYLENOL   Take 3 tablets (975 mg) by mouth every 8 hours   Last time this was given:  975 mg on 11/20/2018 10:47 PM                                albuterol 108 (90 Base) MCG/ACT inhaler   Commonly known as:  PROAIR HFA/PROVENTIL HFA/VENTOLIN HFA   Inhale 2 puffs into the lungs every 4 hours as needed for shortness of breath / dyspnea or wheezing                                ALPRAZolam 0.25 MG tablet   Commonly known as:  XANAX   Take 1 tablet (0.25 mg) by mouth 2 times daily as needed for anxiety Don't take with the oxycodone   Last time this was given:  0.25 mg on 11/20/2018  2:45 AM                                amLODIPine 5  MG tablet   Commonly known as:  NORVASC   Take 1 tablet (5 mg) by mouth daily   Last time this was given:  5 mg on 11/21/2018  9:12 AM                                benzonatate 100 MG capsule   Commonly known as:  TESSALON   Take 1 capsule (100 mg) by mouth 3 times daily as needed for cough                                calcium 600 + D 600-400 MG-UNIT per tablet   Take 1 tablet by mouth every evening   Generic drug:  calcium carbonate 600 mg-vitamin D 400 units                                cephALEXin 500 MG capsule   Commonly known as:  KEFLEX   Take 1 capsule (500 mg) by mouth 4 times daily for 5 days   Last time this was given:  500 mg on 11/21/2018 11:37 AM                                FLUoxetine 40 MG capsule   Commonly known as:  PROzac   Take 1 capsule (40 mg) by mouth daily   Last time this was given:  40 mg on 11/21/2018  9:12 AM                                fluticasone 50 MCG/ACT spray   Commonly known as:  FLONASE   Spray 1 spray into both nostrils daily as needed                                furosemide 40 MG tablet   Commonly known as:  LASIX   Take 1 tablet (40 mg) by mouth daily                                gabapentin 300 MG capsule   Commonly known as:  NEURONTIN   Take 1 capsule (300 mg) by mouth 3 times daily   Last time this was given:  300 mg on 11/21/2018  9:12 AM                                levothyroxine 88 MCG tablet   Commonly known as:  SYNTHROID/LEVOTHROID   Take 1 tablet (88 mcg) by mouth daily   Last time this was given:  88 mcg on 11/21/2018  9:12 AM                                magnesium hydroxide 400 MG/5ML suspension   Commonly known as:  MILK OF MAGNESIA   Take 30 mLs by mouth daily as needed                                NYSTOP 221832 UNIT/GM Powd   Apply tid to affectead area prn   Generic drug:  nystatin                                order for DME   Equipment being ordered: left breast prosthesis. Mastectomy bras (up to 4)                                senna 8.6  MG tablet   Commonly known as:  SENOKOT   Take 1 tablet by mouth daily   Last time this was given:  1 tablet on 11/21/2018  9:12 AM                                simvastatin 10 MG tablet   Commonly known as:  ZOCOR   TAKE 1 TABLET BY MOUTH AT BEDTIME   Last time this was given:  10 mg on 11/20/2018 10:48 PM                                triamcinolone 0.1 % cream   Commonly known as:  KENALOG   Apply sparingly to affected area two times daily as needed for itching.

## 2018-11-19 NOTE — ED TRIAGE NOTES
Patient comes in for evaluation of shortness of breath with activity which has been getting worse over the last couple of weeks. Has had a lot of stress at home. Also notes that her right leg is swollen, red, and tight.

## 2018-11-19 NOTE — TELEPHONE ENCOUNTER
Reason for call:  Symptom   Symptom or request: Patient request to talk to nurse regarding symptoms.    Duration (how long have symptoms been present): recently  Have you been treated for this before? unknown    Additional comments: Patient has shortness of breath. She had pneumonia this past summer and is worried about her symptoms. She only wants to talk to Dr. Lora's nurse. She said she is ok when she is sitting, it only gets worse when she is walking around. Please call her back asap.    Phone number to reach patient:  Home number on file [phone] (home)    Best Time:  ASAP    Can we leave a detailed message on this number?  YES

## 2018-11-19 NOTE — ED PROVIDER NOTES
"  History     Chief Complaint:  Shortness of Breath and Leg Swelling    The history is provided by the patient.      Carol Merida is a 83 year old female with a history of CHF, remote history of breast cancer status post mastectomy and lymphectomy, as well as hypertension and hyperlipidemia who presents with leg pain and shortness of breath. The patient states that over the past several weeks she has noticed increased bilateral leg pain, worse on the left. Over the past week she has noticed increased pain, redness, and swelling localizing in her left leg, which is atypical for her swelling (she normally has this on the right). Along with this the patient has been experiencing shortness of breath \"for a while.\" She is unsure when the shortness of breath exactly started but given over the past week she has had increased leg pain, she has noticed increased work of breathing and shortness of breath. She called her clinic today to get an evaluation there but was called later in the evening tonight and referred to the ED. Currently, the patient denies any chest pain. She notes a subjective fever 3 days ago but has not measured one at home. She otherwise denies cough, congestion, abdominal pain. She describes her leg pain as a tightness in the left leg and posterior calf. She otherwise has not had associated falls or injury.    Allergies:  Ace Inhibitors      Medications:    Tylenol  Albuterol inhaler  Norvasc  Xanax  Tessalon  Neurontin  Milk of magnesia  Levothyroxine  Flonase  Prozac  Klor-con  Zocor    Past Medical History:    Cardiac dysrhythmia   Hyperlipidemia   Hypertension   Hypothyroidism  JEAN  Osteoarthritis  Chronic pain  CHF  Breast cancer  Panic disorder without agoraphobia  Physical deconditioning  Mild depression  Vitamin D deficiency   Anemia     Past Surgical History:    Left hip surgery x2  Right hip surgery   Knee replacement x2  Tonsillectomy  Appendectomy  Bilateral cataract surgery  Tubular adenoma " surgery  MARGAUX-BSO  Right ankle surgery x2  Mastectomy lymphectomy     Family History:    Hypertension, arthritis, lung cancer     Social History:  Smoking Status: Never Smoker  Alcohol Use: 1 glass wine weekly   Marital Status:     Presents with daughter Cristobal     Review of Systems   Constitutional: Negative for chills and fever.   Respiratory: Positive for shortness of breath. Negative for cough.    Cardiovascular: Positive for leg swelling. Negative for chest pain and palpitations.   Gastrointestinal: Negative for abdominal pain, diarrhea, nausea and vomiting.   Skin: Positive for color change.   All other systems reviewed and are negative.      Physical Exam   Patient Vitals for the past 24 hrs:   BP Temp Temp src Pulse Heart Rate Resp SpO2 Weight   11/19/18 1805 - - - - 73 - 93 % -   11/19/18 1800 121/70 - - - 73 - 92 % -   11/19/18 1750 134/72 - - - 84 - - -   11/19/18 1734 - - - - 75 - - -   11/19/18 1733 135/76 - - - 87 - - -   11/19/18 1705 (!) 157/93 99.6  F (37.6  C) Temporal 79 - 26 96 % 117.9 kg (260 lb)        Physical Exam  Constitutional: Vital signs reviewed as above.   Head: No external signs of trauma noted.  Eyes: Pupils are equal, round, and reactive to light.   Neck: No JVD noted  Cardiovascular: Normal rate, regular rhythm and normal heart sounds.  No murmur heard. Equal B/L peripheral pulses.  Pulmonary/Chest: Effort normal and breath sounds normal. No respiratory distress. Patient has no wheezes. Patient has no rales.   Gastrointestinal: Soft. There is no tenderness.   Musculoskeletal/Extremities: B/L LE pitting edema (trace to +1). Normal tone. Some left calf tenderness  Neurological: Patient is alert and oriented to person, place, and time.   Skin: There is mild erythema most notable on the distal shin and anterior ankle. It is slightly warmer than the surrounding tissues.  Psychiatric: The patient appears calm.    Emergency Department Course     ECG (17:13:44):  Rate 76 bpm. OH  interval 176. QRS duration 100. QT/QTc 400/450. P-R-T axes 61 -12 32. Normal sinus rhythm.  Normal ECG. No significant change when compared to EKG dated 6/2/2018.  Interpreted at 1744 by James Hernandez DO.     Imaging:  Radiographic findings were communicated with the patient who voiced understanding of the findings.    X-ray Chest, 2 views:  Mild bilateral vascular congestion appears mildly  increased. No evidence for cardiomegaly.   Result per radiology.     US lower extremity:  No evidence for DVT within the left leg.  As read by Radiology.     Laboratory:  1751 - Troponin: <0.015   BNP: 59  CBC: WNL (WBC 7.3, HGB 14.0, )   BMP: WNL (Creatinine 0.79)     Interventions:  2004 - Lasix 40 mg IV    Emergency Department Course:  Past medical records, nursing notes, and vitals reviewed.  1735: I performed an exam of the patient and obtained history, as documented above.      IV inserted and blood drawn.     The patient was sent for X-ray and ultrasound while in the emergency department, findings above.     1915: Findings and plan explained to the Patient and family who consents to admission.     1940: Discussed the patient with Stella Plascencia, accepting on behalf of Dr. Do who will admit the patient to a medical bed for further monitoring, evaluation, and treatment.      Impression & Plan      Medical Decision Making:  Carol Merida is a 83 year old female who presents to the ED due to dyspnea and leg swelling. Please see the HPI and exam for specifics. The patient did have some slight hypoxia on ambulation here. She also had some erythema and warmth of the LLE. This could represent stasis dermatitis, but could also be cellulitis. Her clinical signs and symptoms are consistent with a CHF exacerbation. The patient felt short of breath and uncomfortable enough going home that I did elect to place her in observation for diuresis and monitoring as well as consideration of antibiotic treatment for  cellulitis.     Diagnosis:    ICD-10-CM    1. Acute congestive heart failure, unspecified heart failure type (H) I50.9    2. Cellulitis of left lower extremity L03.116        Tal Enamorado  11/19/2018   [facility] EMERGENCY DEPARTMENT  ITal, am serving as a scribe at 5:36 PM on 11/19/2018 to document services personally performed by James Hernandez DO based on my observations and the provider's statements to me.       James Hernandez DO  11/19/18 0811

## 2018-11-20 ENCOUNTER — APPOINTMENT (OUTPATIENT)
Dept: CARDIOLOGY | Facility: CLINIC | Age: 83
End: 2018-11-20
Attending: PHYSICIAN ASSISTANT
Payer: COMMERCIAL

## 2018-11-20 LAB
ANION GAP SERPL CALCULATED.3IONS-SCNC: 5 MMOL/L (ref 3–14)
BUN SERPL-MCNC: 16 MG/DL (ref 7–30)
CALCIUM SERPL-MCNC: 8.9 MG/DL (ref 8.5–10.1)
CHLORIDE SERPL-SCNC: 103 MMOL/L (ref 94–109)
CO2 SERPL-SCNC: 30 MMOL/L (ref 20–32)
CREAT SERPL-MCNC: 0.8 MG/DL (ref 0.52–1.04)
GFR SERPL CREATININE-BSD FRML MDRD: 69 ML/MIN/1.7M2
GLUCOSE SERPL-MCNC: 117 MG/DL (ref 70–99)
INTERPRETATION ECG - MUSE: NORMAL
POTASSIUM SERPL-SCNC: 3.8 MMOL/L (ref 3.4–5.3)
SODIUM SERPL-SCNC: 138 MMOL/L (ref 133–144)

## 2018-11-20 PROCEDURE — 25000132 ZZH RX MED GY IP 250 OP 250 PS 637: Performed by: PHYSICIAN ASSISTANT

## 2018-11-20 PROCEDURE — 25000128 H RX IP 250 OP 636: Performed by: PHYSICIAN ASSISTANT

## 2018-11-20 PROCEDURE — 99207 ZZC CDG-MDM COMPONENT: MEETS MODERATE - UP CODED: CPT | Performed by: PHYSICIAN ASSISTANT

## 2018-11-20 PROCEDURE — G0378 HOSPITAL OBSERVATION PER HR: HCPCS

## 2018-11-20 PROCEDURE — 25500064 ZZH RX 255 OP 636: Performed by: HOSPITALIST

## 2018-11-20 PROCEDURE — 96376 TX/PRO/DX INJ SAME DRUG ADON: CPT

## 2018-11-20 PROCEDURE — 93321 DOPPLER ECHO F-UP/LMTD STD: CPT | Mod: 26 | Performed by: INTERNAL MEDICINE

## 2018-11-20 PROCEDURE — 36415 COLL VENOUS BLD VENIPUNCTURE: CPT | Performed by: PHYSICIAN ASSISTANT

## 2018-11-20 PROCEDURE — 99226 ZZC SUBSEQUENT OBSERVATION CARE,LEVEL III: CPT | Performed by: PHYSICIAN ASSISTANT

## 2018-11-20 PROCEDURE — 99207 ZZC CDG-CODE CATEGORY CHANGED: CPT | Performed by: PHYSICIAN ASSISTANT

## 2018-11-20 PROCEDURE — 80048 BASIC METABOLIC PNL TOTAL CA: CPT | Performed by: PHYSICIAN ASSISTANT

## 2018-11-20 PROCEDURE — 93308 TTE F-UP OR LMTD: CPT | Mod: 26 | Performed by: INTERNAL MEDICINE

## 2018-11-20 PROCEDURE — 93325 DOPPLER ECHO COLOR FLOW MAPG: CPT | Mod: 26 | Performed by: INTERNAL MEDICINE

## 2018-11-20 PROCEDURE — 40000264 ECHO LIMITED WITH OPTISON

## 2018-11-20 RX ORDER — FUROSEMIDE 10 MG/ML
40 INJECTION INTRAMUSCULAR; INTRAVENOUS ONCE
Status: COMPLETED | OUTPATIENT
Start: 2018-11-20 | End: 2018-11-20

## 2018-11-20 RX ORDER — SENNOSIDES 8.6 MG
1 TABLET ORAL DAILY
Status: DISCONTINUED | OUTPATIENT
Start: 2018-11-21 | End: 2018-11-21 | Stop reason: HOSPADM

## 2018-11-20 RX ADMIN — GABAPENTIN 300 MG: 300 CAPSULE ORAL at 08:25

## 2018-11-20 RX ADMIN — SIMVASTATIN 10 MG: 10 TABLET, FILM COATED ORAL at 22:48

## 2018-11-20 RX ADMIN — HUMAN ALBUMIN MICROSPHERES AND PERFLUTREN 3 ML: 10; .22 INJECTION, SOLUTION INTRAVENOUS at 09:02

## 2018-11-20 RX ADMIN — SENNOSIDES 1 TABLET: 8.6 TABLET, FILM COATED ORAL at 08:24

## 2018-11-20 RX ADMIN — GABAPENTIN 300 MG: 300 CAPSULE ORAL at 22:45

## 2018-11-20 RX ADMIN — ACETAMINOPHEN 975 MG: 325 TABLET, FILM COATED ORAL at 12:15

## 2018-11-20 RX ADMIN — CEPHALEXIN 500 MG: 500 CAPSULE ORAL at 12:15

## 2018-11-20 RX ADMIN — ACETAMINOPHEN 975 MG: 325 TABLET, FILM COATED ORAL at 22:47

## 2018-11-20 RX ADMIN — GABAPENTIN 300 MG: 300 CAPSULE ORAL at 16:33

## 2018-11-20 RX ADMIN — FLUOXETINE 40 MG: 20 CAPSULE ORAL at 08:25

## 2018-11-20 RX ADMIN — ALPRAZOLAM 0.25 MG: 0.25 TABLET ORAL at 02:45

## 2018-11-20 RX ADMIN — ACETAMINOPHEN 975 MG: 325 TABLET, FILM COATED ORAL at 02:45

## 2018-11-20 RX ADMIN — AMLODIPINE BESYLATE 5 MG: 5 TABLET ORAL at 08:25

## 2018-11-20 RX ADMIN — FUROSEMIDE 40 MG: 10 INJECTION, SOLUTION INTRAMUSCULAR; INTRAVENOUS at 08:33

## 2018-11-20 RX ADMIN — CEPHALEXIN 500 MG: 500 CAPSULE ORAL at 18:34

## 2018-11-20 RX ADMIN — CEPHALEXIN 500 MG: 500 CAPSULE ORAL at 06:22

## 2018-11-20 RX ADMIN — LEVOTHYROXINE SODIUM 88 MCG: 88 TABLET ORAL at 07:07

## 2018-11-20 RX ADMIN — FUROSEMIDE 40 MG: 10 INJECTION, SOLUTION INTRAMUSCULAR; INTRAVENOUS at 16:34

## 2018-11-20 ASSESSMENT — PAIN DESCRIPTION - DESCRIPTORS: DESCRIPTORS: ACHING

## 2018-11-20 NOTE — PLAN OF CARE
Problem: Patient Care Overview  Goal: Plan of Care/Patient Progress Review  Outcome: No Change  PRIMARY DIAGNOSIS: SOFT TISSUE INFECTIONS  OUTPATIENT/OBSERVATION GOALS TO BE MET BEFORE DISCHARGE:  1. Vitals sign stable or return to baseline: Yes    2. Tolerating oral antibiotics or has home infusion set up if applicable: Yes    3. Pain status: Pain free.    4. Return to near baseline physical activity: Yes    Discharge Planner Nurse   Safe discharge environment identified: Yes  Barriers to discharge: Yes       Entered by: Estefany Goodwin 11/20/2018 5:29 AM     A&Ox4, VSS on RA. Reporting PETTY but denies SOB at rest. Denies pain just reporting some discomfort to LLE, declines medications/interventions. LLE warm, edematous & red, area outlined. Started on oral Keflex tonight. Per tele tech: SR rate 70's. 2000 fluid restrictions, monitoring I/O's & daily weights. SBA with walker in room. Plan for complete echo in AM. Will continue to monitor.     Please review provider order for any additional goals.     Nurse to notify provider when observation goals have been met and patient is ready for discharge.

## 2018-11-20 NOTE — PROGRESS NOTES
[facility]    Observation Unit   Hospitalist Progress Note  Name: Carol Merida    MRN: [id number]  Provider:  Belén Ontiveros PA-C  Date of Service: 11/20/2018    Assessment & Plan   Summary of Stay: Carol Merida is a 83 year old female with PMH significant for breast cancer, melanoma, JEAN, OA, HTN, hypothyroidism, anxiety, depression, chronic LE edema, mild pulmonary HTN who was admitted on 11/19/2018 for shortness of breath and leg swelling.     1. Mild acute diastolic CHF exacerbation: increased PETTY with increased bilateral LE peripheral edema for about 1 week prior to admission. No hypoxia since admission. Initial BNP and troponin WNL. Echocardiogram today showed EF of 55-60%, grade I diastolic dysfunction, and no significant valvular abnormalities. Suspect acute exacerbation brought on due to stopping Lasix on own in August as well recent increased sodium intake. Weight today of 274 pounds, per patient is what her recent baseline has been prior to admission. Continues to have mild shortness of breath just walking to the bathroom, would likely benefit from additional diuresis prior to discharge which may also improve #2. Will keep overnight for IV Lasix and monitor for improvement in weight and adequate output.    - Give additional IV Lasix 40 mg this evening, plan to transition to PO Lasix in AM   - Daily weights  - Strict I&Os  - Monitor on telemetry     2. LLE cellulitis: increased erythema and pain of left LE over the past several days without recent h/o trauma. Subjective fevers prior to admission. Afebrile throughout stay and normal WBC. LLE ultrasound negative for DVT. Area outlined on admission with only mild improvement today and ongoing tenderness to mild touch. Started on PO Keflex on admission.   - Continue PO Keflex (day 2 of 5 days)  - Recheck CBC in AM    3. HTN: stable, continue PTA Amlodipine     4. JEAN: compliant with nightly CPAP use but not here with patient  - PRN supplemental  oxygen while sleeping     5. HLD: continue home Simvastatin     6. Depression, anxiety: increased anxiety of recent surrounding the patient's  being ill and currently being in rehab.   - continue PTA Xanax and Fluoxetine     7. Chronic pain: at baseline, continue PTA Tylenol and Gabapentin    8. Hypothyroidism: continue PTA Levothyroxine     DVT Prophylaxis: Ambulate every shift  Code Status: Full Code  Disposition: Expected discharge tomorrow      Interval History   Patient reports mild shortness of breath just walking to the bathroom. She notes mild improvement in her lower extremity swelling but reports ongoing pain of 7/10 in her bilateral legs. The redness from the outlined area over her left lower leg appears to be mildly receeding. Denies chest pain, N/V, or abdominal pain.     -Data reviewed today: I reviewed all new labs and imaging reports over the last 24 hours. I personally reviewed all labs and imaging from this visit.     Physical Exam   Temp: 97.8  F (36.6  C) Temp src: Oral BP: 119/48 Pulse: 79 Heart Rate: 58 Resp: 20 SpO2: 93 % O2 Device: None (Room air)    Vitals:    11/19/18 1705 11/20/18 0819   Weight: 117.9 kg (260 lb) 124.6 kg (274 lb 9.6 oz)     Vital Signs with Ranges  Temp:  [96.3  F (35.7  C)-99.6  F (37.6  C)] 97.8  F (36.6  C)  Pulse:  [79] 79  Heart Rate:  [58-87] 58  Resp:  [18-26] 20  BP: ()/(48-93) 119/48  SpO2:  [91 %-97 %] 93 %     GEN:  Alert, oriented x 3, appears comfortable, NAD.  HEENT:  Normocephalic/atraumatic, no scleral icterus, no nasal discharge, mouth moist.  CV:  Regular rate and rhythm, no murmur or JVD.  S1 + S2 noted, no S3 or S4.  LUNGS:  Clear to auscultation bilaterally without rales/rhonchi/wheezing/retractions.  Symmetric chest rise on inhalation noted.  ABD:  Active bowel sounds, soft, non-tender/non-distended.  No rebound/guarding/rigidity.  EXT: 1-2+ bilateral LE edema. No cyanosis.  No acute joint synovitis noted.  SKIN: erythema over left lower  leg that starts at just posterior to knee and extends down to ankle with warmth to touch. No fluctuance appreciated.     Medications       amLODIPine  5 mg Oral Daily     cephalexin  500 mg Oral Q6H CALVIN     FLUoxetine  40 mg Oral Daily     furosemide  40 mg Intravenous Once     gabapentin  300 mg Oral TID     levothyroxine  88 mcg Oral Daily     sennosides  1 tablet Oral Daily     simvastatin  10 mg Oral At Bedtime     sodium chloride (PF)  3 mL Intracatheter Q8H     Data   Results for orders placed or performed during the hospital encounter of 11/19/18   XR Chest 2 Views    Narrative    CHEST TWO VIEWS    11/19/2018 6:43 PM     HISTORY: Dyspnea.     COMPARISON: 7/19/2018.      Impression    IMPRESSION: Mild bilateral vascular congestion appears mildly  increased. No evidence for cardiomegaly.      DUY MOISE MD   US Lower Extremity Venous Duplex Left    Narrative    US LOWER EXTREMITY VENOUS DUPLEX LEFT   11/19/2018 6:30 PM     HISTORY: Swelling, dyspnea, pain in calf.      COMPARISON: Ultrasound legs 5/11/2016.    FINDINGS: Gray-scale, color and Doppler spectral analysis ultrasound  was performed of the left leg. Compression and augmentation imaging  was performed.    There is no evidence for deep venous thrombosis.      Impression    IMPRESSION: No evidence for DVT within the left leg.       DUY MOISE MD   CBC with platelets differential   Result Value Ref Range    WBC 7.3 4.0 - 11.0 10e9/L    RBC Count 4.92 3.8 - 5.2 10e12/L    Hemoglobin 14.0 11.7 - 15.7 g/dL    Hematocrit 44.3 35.0 - 47.0 %    MCV 90 78 - 100 fl    MCH 28.5 26.5 - 33.0 pg    MCHC 31.6 31.5 - 36.5 g/dL    RDW 13.6 10.0 - 15.0 %    Platelet Count 285 150 - 450 10e9/L    Diff Method Automated Method     % Neutrophils 64.9 %    % Lymphocytes 21.0 %    % Monocytes 8.6 %    % Eosinophils 4.7 %    % Basophils 0.4 %    % Immature Granulocytes 0.4 %    Nucleated RBCs 0 0 /100    Absolute Neutrophil 4.7 1.6 - 8.3 10e9/L    Absolute Lymphocytes 1.5  0.8 - 5.3 10e9/L    Absolute Monocytes 0.6 0.0 - 1.3 10e9/L    Absolute Eosinophils 0.3 0.0 - 0.7 10e9/L    Absolute Basophils 0.0 0.0 - 0.2 10e9/L    Abs Immature Granulocytes 0.0 0 - 0.4 10e9/L    Absolute Nucleated RBC 0.0    Basic metabolic panel   Result Value Ref Range    Sodium 138 133 - 144 mmol/L    Potassium 3.8 3.4 - 5.3 mmol/L    Chloride 103 94 - 109 mmol/L    Carbon Dioxide 30 20 - 32 mmol/L    Anion Gap 5 3 - 14 mmol/L    Glucose 75 70 - 99 mg/dL    Urea Nitrogen 17 7 - 30 mg/dL    Creatinine 0.79 0.52 - 1.04 mg/dL    GFR Estimate 70 >60 mL/min/1.7m2    GFR Estimate If Black 84 >60 mL/min/1.7m2    Calcium 9.4 8.5 - 10.1 mg/dL   Troponin I   Result Value Ref Range    Troponin I ES <0.015 0.000 - 0.045 ug/L   Nt probnp inpatient (BNP)   Result Value Ref Range    N-Terminal Pro BNP Inpatient 59 0 - 1800 pg/mL   Basic metabolic panel   Result Value Ref Range    Sodium 138 133 - 144 mmol/L    Potassium 3.8 3.4 - 5.3 mmol/L    Chloride 103 94 - 109 mmol/L    Carbon Dioxide 30 20 - 32 mmol/L    Anion Gap 5 3 - 14 mmol/L    Glucose 117 (H) 70 - 99 mg/dL    Urea Nitrogen 16 7 - 30 mg/dL    Creatinine 0.80 0.52 - 1.04 mg/dL    GFR Estimate 69 >60 mL/min/1.7m2    GFR Estimate If Black 83 >60 mL/min/1.7m2    Calcium 8.9 8.5 - 10.1 mg/dL   EKG 12 lead   Result Value Ref Range    Interpretation ECG Click View Image link to view waveform and result    ECHO LIMITED WITH OPTISON    Narrative    [id number]  [facility]  [id number]  [id number]^NATHAN^KEESHA^S           [facility]  Echocardiography Laboratory  [address] [place] [address]        Name: GABBY EAGLE  MRN: [id number]  : 1935  Study Date: 2018 08:45 AM  Age: 83 yrs  Gender: Female  Patient Location: [facility]  Reason For Study: CHF  Ordering Physician: KEESHA EVANS  Referring Physician: Yessica Lora  Performed By: Risa Seaman     BSA: 2.2 m2  Height: 63 in  Weight: 260 lb  BP: 116/58  mmHg  _____________________________________________________________________________  __        Procedure  Limited Portable Echo Adult. Contrast Optison.  _____________________________________________________________________________  __        Interpretation Summary     The visual ejection fraction is estimated at 55-60%.  Left ventricular systolic function is normal.  The study was technically difficult.  _____________________________________________________________________________  __        Left Ventricle  The left ventricle is normal in size. There is normal left ventricular wall  thickness. Grade I or early diastolic dysfunction. Diastolic Doppler findings  (E/E' ratio and/or other parameters) suggest left ventricular filling  pressures are indeterminate. The visual ejection fraction is estimated at 55-  60%. Left ventricular systolic function is normal. Septal motion is consistent  with conduction abnormality.     Right Ventricle  The right ventricle is normal in size and function.     Mitral Valve  There is mild mitral annular calcification. There is trace mitral  regurgitation.     Tricuspid Valve  There is trace tricuspid regurgitation. The right ventricular systolic  pressure is approximated at 21.4 mmHg plus the right atrial pressure.        Aortic Valve  The aortic valve is not well visualized. Thickened aortic valve leaflets. No  aortic regurgitation is present. No hemodynamically significant valvular  aortic stenosis.     Pulmonic Valve  There is no pulmonic valvular regurgitation.     Vessels  The inferior vena cava is not dilated.     Pericardium  There is no pericardial effusion.     Rhythm  Sinus rhythm was noted.     _____________________________________________________________________________  __        Doppler Measurements & Calculations     TR max sary: 231.3 cm/sec  TR max P.4 mmHg           _____________________________________________________________________________  __           Report  approved by: Jason Bunch 11/20/2018 10:30 AM        Belén Ontiveros PA-C

## 2018-11-20 NOTE — TELEPHONE ENCOUNTER
Dr. Lora received a fax from the pharmacy stating that the pts plan will only cover proair.  Dr. Lora advised proair is fine.  Called the pharmacy and spoke to Mak.  He said there system just does that, but they have already taken care of that.

## 2018-11-20 NOTE — ED NOTES
Pt up to walk. O2 sats remained at 91-92% while walking and HR was 109 BPM.  Pt stated she was still a little short of breath.

## 2018-11-20 NOTE — PHARMACY-ADMISSION MEDICATION HISTORY
Admission medication history interview status for this patient is complete. See [facility] admission navigator for allergy information, prior to admission medications and immunization status.     Medication history interview source(s):Patient and family.  Medication history resources (including written lists, pill bottles, clinic record):[facility], [facility]  Primary pharmacy[facility]    Changes made to [facility] medication list:  Added: Senna-s  Deleted: Senna-docusate, potassium chloride packet, miralax and duplicates  Changed: none    Actions taken by pharmacist (provider contacted, etc):As above     Additional medication history information:[place] every where records    Medication reconciliation/reorder completed by provider prior to medication history? No    For patients on insulin therapy: NO   Lantus/levemir/NPH/Mix 70/30 dose: _____ in AM/PM or twice daily   Sliding scale Novolog Y/N   If Yes, do you have a baseline novolog pre-meal dose: ______units with meals   Patients eat three meals a day: Y/N   Any Barriers to therapy: cost of medications/comfortable with giving injections (if applicable)/ comfortable and confident with current diabetes regimen     Prior to Admission medications    Medication Sig Last Dose Taking? Auth Provider   acetaminophen (TYLENOL) 325 MG tablet Take 3 tablets (975 mg) by mouth every 8 hours unknown Yes Yessica Lora MD   albuterol (PROAIR HFA/PROVENTIL HFA/VENTOLIN HFA) 108 (90 Base) MCG/ACT inhaler Inhale 2 puffs into the lungs every 4 hours as needed for shortness of breath / dyspnea or wheezing unknown Yes Yessica Lora MD   ALPRAZolam (XANAX) 0.25 MG tablet Take 1 tablet (0.25 mg) by mouth 2 times daily as needed for anxiety Don't take with the oxycodone 11/19/2018 at a.m Yes Yessica Lora MD   amLODIPine (NORVASC) 5 MG tablet Take 1 tablet (5 mg) by mouth daily 11/19/2018 at a.m Yes Yessica Lora MD   benzonatate (TESSALON) 100 MG capsule Take 1 capsule (100 mg) by mouth 3 times daily as needed  for cough unknown Yes Juan Trejo MD   calcium-vitamin D (CALCIUM 600 + D) 600-400 MG-UNIT per tablet Take 1 tablet by mouth every evening  11/18/2018 at pm Yes Yessica Lora MD   FLUoxetine (PROZAC) 40 MG capsule Take 1 capsule (40 mg) by mouth daily 11/19/2018 at a.m Yes Yessica Lora MD   fluticasone (FLONASE) 50 MCG/ACT spray Spray 1 spray into both nostrils daily as needed unknown Yes Yessica Lora MD   furosemide (LASIX) 40 MG tablet Take 1 tablet (40 mg) by mouth daily unknown Yes Yessica Lora MD   gabapentin (NEURONTIN) 300 MG capsule Take 1 capsule (300 mg) by mouth 3 times daily 11/19/2018 at a.m Yes Yessica Lora MD   levothyroxine (SYNTHROID/LEVOTHROID) 88 MCG tablet Take 1 tablet (88 mcg) by mouth daily 11/19/2018 at a.m Yes Yessica Lora MD   magnesium hydroxide (MILK OF MAGNESIA) 400 MG/5ML suspension Take 30 mLs by mouth daily as needed  unknown Yes Reported, Patient   Nystatin (NYSTOP) 415609 UNIT/GM POWD Apply tid to affectead area prn unknown Yes Yessica Lora MD   senna (SENOKOT) 8.6 MG tablet Take 1 tablet by mouth daily 11/19/2018 at a.m Yes Unknown, Entered By History   simvastatin (ZOCOR) 10 MG tablet TAKE 1 TABLET BY MOUTH AT BEDTIME 11/18/2018 at hs Yes Yessica Lora MD   triamcinolone (KENALOG) 0.1 % cream Apply sparingly to affected area two times daily as needed for itching. unknown Yes Argentina Oakley MD   order for DME Equipment being ordered: left breast prosthesis. Mastectomy bras (up to 4)   Yessica Lora MD

## 2018-11-20 NOTE — H&P
[facility]  Observation Unit  H & P      Patient Name: Carol Merida MRN# [id number]   Age: 83 year old YOB: 1935     Date of Admission:11/19/2018    Primary care provider: Yessica Lora  Date of Service: 11/19/2018         Assessment and Plan:   Carol Merida is a 83 year old female with a history of Breast Cancer, Melanoma, JEAN, OA, HTN, Hypothyroidism, Anxiety, Depression, Obesity, Chronic LE edema, Diastolic CHF, mild Pulmonary HTN who presents to the ED today 2/2 shortness of breath and leg swelling.      Acute Diastolic CHF Exacerbation - increased PETTY with increased bilateral LE peripheral edema over the past ~one week.  No chest pain, sputum production.  No wheezing on exam.  Not hypoxic.  BNP and troponin wnl.  EKG with no acute ischemic changes.  No hx of CAD (last coronary angiography in 2012 did not show significant obstructive coronary artery disease). CXR with mild bilateral vascular congestion.  Has not taken Lasix since August.  Likely mild CHF exacerbation.  - monitor I/O and daily weights  - repeat IV Lasix in am and consider resuming oral Lasix upon discharge (was previously on 40mg daily)  - check echocardiogram  - monitor on telemetry    LLE Cellulitis - increase redness and pain of the left lower leg over the past several days.  No hx of trauma.  Has been on her feet more recently with increased swelling.  Subjective fevers last week.  Afebrile today with normal wbc.  LLE U/S negative for DVT.  LLE with redness and mild warmth to palpation.  Likely mild cellulitis.    - will diurese as above  - will outline area of redness and start oral Keflex    HTN - continue home Amlodipine    HLD - continue home Simvastatin    JEAN - continue home cpap    Depression/Anxiety - reports increased Anxiety attacks recently due to the illness of her .  - continue home Xanax and Fluoxetine    Chronic Pain - at baseline.  Continue home Tylenol and Gabapentin    Hypothyroidism -  continue home Levothyroxine     CODE: Full  Diet/IVF: low sodium, 2 liter fluid restriction  DVT ppx: SCD    Stella Plascencia MS SMYTH  Physician Assistant   Hospitalist Service  Pager: [phone]           Chief Complaint:   Shortness of breath, Leg Swelling and Redness         HPI:   83 year old female with a history of Breast Cancer, Melanoma, JEAN, OA, HTN, Hypothyroidism, Anxiety, Depression, Obesity, Chronic LE edema, Diastolic CHF, mild Pulmonary HTN who presents to the ED today 2/2 shortness of breath and leg swelling.    Patient reports she has chronic dyspnea with exertion with an increase over the past one week.  She denies any associated chest pain, wheezing, URI symptoms or new cough.  She has a chronic cough which is at baseline.  She has chronic reproducible, tender left shoulder pain/upper chest wall pain which has been at baseline.  She also admits to chronic lower extremity edema.  She was previously on Lasix and reports this was discontinued ~8/2018.  She reports increased BLE edema worse on the LLE over the past 10 days-2 weeks.  Daughter reports she noticed it worsening over the last weekend.  Her left ankle is noted to be erythematous over the past weekend as well.  Patient does not check her weights.  She has been on her feet more lately.    ED work up revealed patient hemodynamically stable and afebrile on room air.  Laboratory work up revealed BMP, BNP, Troponin, CBC wnl.  LLE U/S negative for DVT.  CXR with Mild bilateral vascular congestion appears mildly increased. No evidence for cardiomegaly. Patient received Lasix 40mg IV x1 and admitted for further management.         Past Medical History:     Past Medical History:   Diagnosis Date     Abnormal stress test     small area on stress thalium ?2003; but normal angiogram 2012     Cardiac dysrhythmia, unspecified     irregular heartbeat     CHF (congestive heart failure) (H) 6/18/2018     Hyperlipidemia LDL goal <100 2/10/2010      Hypertension goal BP (blood pressure) < 140/90 7/18/2010     Hypothyroid      Malignant 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     Melanoma of skin, site unspecified      NONSPECIFIC MEDICAL HISTORY 04    fx fifth finger right hand     Obstructive sleep apnea (adult) (pediatric) 8/25/2006    CPAP      Osteoarthritis      Other chronic pain     Joint pain for many years.     Other osteoporosis      PONV (postoperative nausea and vomiting)      Tubular adenoma in colon 9/01    colonoscopy due 2004          Past Surgical History:     Past Surgical History:   Procedure Laterality Date     ARTHROPLASTY HIP Left 7/6/2015    Procedure: ARTHROPLASTY HIP;  Surgeon: Junior Giordano MD;  Location: [facility] OR     ARTHROPLASTY HIP Right 11/2/2016    Procedure: ARTHROPLASTY HIP;  Surgeon: Junior Giordano MD;  Location: [facility] OR     ARTHROPLASTY REVISION HIP Left 7/22/2015    Procedure: ARTHROPLASTY REVISION HIP;  Surgeon: Junior Giordano MD;  Location: [facility] OR     C NONSPECIFIC PROCEDURE      Knee replacement x 2 (B)     C NONSPECIFIC PROCEDURE      s/p Tonsillectomy ([facility])     C NONSPECIFIC PROCEDURE      s/p Appy (high school)     C NONSPECIFIC PROCEDURE      Melanoma removed with sentinel node bx     C NONSPECIFIC PROCEDURE       bilateral cataract     COLONOSCOPY  9/01    tubular adenoma; repeat 3 years.     COLONOSCOPY  9/04    normal; repeat 5 years ([facility])     HYSTERECTOMY, MARGAUX  summer 2004    and BSO     SURGICAL HISTORY OF -   9/05    left mastectomy     SURGICAL HISTORY OF -   2008    right ankle surgery; triple arthrodesis     SURGICAL HISTORY OF -   5/09    right ankle surgery; triple arthrodesis and deltoid reconstruction; possible other     SURGICAL HISTORY OF -   2/10    removal of hardware from right ankle          Social History:     Social History     Social History     Marital status:      Spouse name:  Yudi     Number of children: 4     Years of education: 16     Occupational History     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     Exercise No     Parent/Sibling W/ Cabg, Mi Or Angioplasty Before 65f 55m? No     Social History Narrative          Family History:     Family History   Problem Relation Age of Onset     No Known Problems Brother      Arthritis Mother      Hypertension Father      Cancer Father      lung     Cancer Grandchild      terratoma tumors     Breast Cancer Daughter           Allergies:      Allergies   Allergen Reactions     Ace Inhibitors Cough          Medications:     Prior to Admission medications    Medication Sig Last Dose Taking? Auth Provider   acetaminophen (TYLENOL) 325 MG tablet Take 3 tablets (975 mg) by mouth every 8 hours   Yessica Lora MD   albuterol (PROAIR HFA/PROVENTIL HFA/VENTOLIN HFA) 108 (90 Base) MCG/ACT inhaler Inhale 2 puffs into the lungs every 4 hours as needed for shortness of breath / dyspnea or wheezing   Yessica Lora MD   ALPRAZolam (XANAX) 0.25 MG tablet Take 1 tablet (0.25 mg) by mouth 2 times daily as needed for anxiety Don't take with the oxycodone   Yessica Lora MD   amLODIPine (NORVASC) 5 MG tablet TAKE ONE TABLET BY MOUTH EVERY DAY   Yessica Lora MD   amLODIPine (NORVASC) 5 MG tablet Take 1 tablet (5 mg) by mouth daily   Yessica Lora MD   benzonatate (TESSALON) 100 MG capsule Take 1 capsule (100 mg) by mouth 3 times daily as needed for cough  Patient not taking: Reported on 8/23/2018   Juan Trejo MD   calcium-vitamin D (CALCIUM 600 + D) 600-400 MG-UNIT per tablet Take 1 tablet by mouth every evening    Guido  Yessica PACHECO MD   FLUoxetine (PROZAC) 40 MG capsule Take 1 capsule (40 mg) by mouth daily   Yessica Lora MD   fluticasone (FLONASE) 50 MCG/ACT spray Spray 1 spray into both nostrils daily as needed   Yessica Lora MD   furosemide (LASIX) 40 MG tablet Take 1 tablet (40 mg) by mouth daily   Yessica Lora MD   gabapentin (NEURONTIN) 300 MG capsule TAKE 1 CAPSULE(300 MG) BY MOUTH THREE TIMES DAILY   Yessica Lora MD   gabapentin (NEURONTIN) 300 MG capsule Take 1 capsule (300 mg) by mouth 3 times daily   Yessica Lora MD   levothyroxine (SYNTHROID/LEVOTHROID) 88 MCG tablet Take 1 tablet (88 mcg) by mouth daily   Yessica Lora MD   magnesium hydroxide (MILK OF MAGNESIA) 400 MG/5ML suspension Take 30 mLs by mouth daily as needed    Reported, Patient   Nystatin (NYSTOP) 301760 UNIT/GM POWD Apply tid to affectead area prn   Yessica Lora MD   order for DME Equipment being ordered: left breast prosthesis. Mastectomy bras (up to 4)   Yessica Lora MD   polyethylene glycol (MIRALAX) powder Take 17 g (1 capful) by mouth daily   Juan Trejo MD   potassium chloride (KLOR-CON) 20 MEQ Packet Take 20 mEq by mouth daily   Yessica Lora MD   senna-docusate (SENOKOT-S;PERICOLACE) 8.6-50 MG per tablet Take 1 tablet by mouth 2 times daily   Juan Trejo MD   simvastatin (ZOCOR) 10 MG tablet TAKE 1 TABLET BY MOUTH AT BEDTIME   Yessica Lora MD   triamcinolone (KENALOG) 0.1 % cream Apply sparingly to affected area two times daily as needed for itching.   Argentina Oakley MD          Review of Systems:   A complete ROS was performed and is negative other than what is stated in the HPI.       Physical Exam:   Blood pressure 121/70, pulse 79, temperature 99.6  F (37.6  C), temperature source Temporal, resp. rate 26, weight 117.9 kg (260 lb), SpO2 93 %, not currently breastfeeding.   General: Alert, interactive, NAD, obese  HEENT: AT/NC, sclera anicteric, PERRL, EOMI  Chest/Resp: few bibasilar crackles, no  wheezes  Heart/CV: regular rate and rhythm, no murmur  Abdomen/GI: Soft, nontender, nondistended. +BS.  No rebound or guarding.  Extremities/MSK: 2+ pitting  BLE edema  Skin: left ankle with erythema and mild warmth to palpation.  No fluctuance noted.  Neuro: Alert & oriented x 3, no focal deficits, moves all extremities equally         Labs:   ROUTINE ICU LABS (Last four results)  CMP  Recent Labs  Lab 11/19/18  1751      POTASSIUM 3.8   CHLORIDE 103   CO2 30   ANIONGAP 5   GLC 75   BUN 17   CR 0.79   GFRESTIMATED 70   GFRESTBLACK 84   LAKSHMI 9.4     CBC  Recent Labs  Lab 11/19/18  1751   WBC 7.3   RBC 4.92   HGB 14.0   HCT 44.3   MCV 90   MCH 28.5   MCHC 31.6   RDW 13.6        INRNo lab results found in last 7 days.  Arterial Blood GasNo lab results found in last 7 days.       Imaging/Procedures:     Results for orders placed or performed during the hospital encounter of 11/19/18   XR Chest 2 Views    Narrative    CHEST TWO VIEWS    11/19/2018 6:43 PM     HISTORY: Dyspnea.     COMPARISON: 7/19/2018.      Impression    IMPRESSION: Mild bilateral vascular congestion appears mildly  increased. No evidence for cardiomegaly.     US Lower Extremity Venous Duplex Left    Narrative    US LOWER EXTREMITY VENOUS DUPLEX LEFT   11/19/2018 6:30 PM     HISTORY: Swelling, dyspnea, pain in calf.      COMPARISON: Ultrasound legs 5/11/2016.    FINDINGS: Gray-scale, color and Doppler spectral analysis ultrasound  was performed of the left leg. Compression and augmentation imaging  was performed.    There is no evidence for deep venous thrombosis.      Impression    IMPRESSION: No evidence for DVT within the left leg.

## 2018-11-20 NOTE — PLAN OF CARE
Problem: Patient Care Overview  Goal: Plan of Care/Patient Progress Review  Outcome: No Change  PRIMARY DIAGNOSIS: SOFT TISSUE INFECTIONS  OUTPATIENT/OBSERVATION GOALS TO BE MET BEFORE DISCHARGE:  1. Vitals sign stable or return to baseline: Yes    2. Tolerating oral antibiotics or has home infusion set up if applicable: Yes    3. Pain status: Pain free.    4. Return to near baseline physical activity: Yes    Discharge Planner Nurse   Safe discharge environment identified: Yes  Barriers to discharge: Yes       Entered by: Estefany Goodwin 11/20/2018 2:26 AM     A&Ox4, VSS on RA. Reporting PETTY but denies SOB at rest. LS CTA. Denies pain just reporting some discomfort to LLE, declines medications/interventions. LLE warm, edematous & red. Started on oral Keflex tonight. Per tele tech: SR rate 70's. 2000 fluid restrictions, monitoring I/O's & daily weights. SBA with walker in room. Plan for complete echo in AM. Will continue to monitor.     Please review provider order for any additional goals.     Nurse to notify provider when observation goals have been met and patient is ready for discharge.

## 2018-11-20 NOTE — PLAN OF CARE
Problem: Patient Care Overview  Goal: Plan of Care/Patient Progress Review  Outcome: Improving  PRIMARY DIAGNOSIS: CONGESTIVE HEART FAILURE  OUTPATIENT/OBSERVATION GOALS TO BE MET BEFORE DISCHARGE:  1. Dyspnea improved and O2 sats >88% at RA or at prior home O2 therapy level: No, dyspnea on exertion        SpO2: 91 %, O2 Device: None (Room air)  Vitals:    11/19/18 1705 11/20/18 0819   Weight: 117.9 kg (260 lb) 124.6 kg (274 lb 9.6 oz)        2. ECHO and other diagnostic testing complete (if applicable): No, awaiting testing    3. Return to near baseline physical activity: Yes, pt up SBA with walker    Discharge Planner Nurse   Safe discharge environment identified: Yes, home and she has children who could help her  Barriers to discharge: Yes, awaiting results of echo       Entered by: Radha Bright 11/20/2018 10:42 AM     Please review provider order for any additional goals.   Nurse to notify provider when observation goals have been met and patient is ready for discharge.    VSS on room air. SBA with walker. Strict I&Os. Plan: echo

## 2018-11-20 NOTE — PLAN OF CARE
Problem: Patient Care Overview  Goal: Plan of Care/Patient Progress Review  Outcome: No Change  PRIMARY DIAGNOSIS: CONGESTIVE HEART FAILURE  OUTPATIENT/OBSERVATION GOALS TO BE MET BEFORE DISCHARGE:  1. Dyspnea improved and O2 sats >88% at RA or at prior home O2 therapy level: No, dyspnea on exertion        SpO2: 91 %, O2 Device: None (Room air)       Vitals:     11/19/18 1705 11/20/18 0819   Weight: 117.9 kg (260 lb) 124.6 kg (274 lb 9.6 oz)         2. ECHO and other diagnostic testing complete (if applicable): No, awaiting testing     3. Return to near baseline physical activity: Yes, pt up SBA with walker     Discharge Planner Nurse   Safe discharge environment identified: Yes, home and she has children who could help her  Barriers to discharge: Yes, awaiting results of echo       Entered by: Radha Bright 11/20/2018 10:42 AM  Please review provider order for any additional goals.   Nurse to notify provider when observation goals have been met and patient is ready for discharge.     VSS on room air. SBA with walker. Strict I&Os. Reports SOB up while ambulating to the restroom. Calls appropriately when she needs to get up to the restroom. Daughter and friend are bedside. Plan: continue I&Os, watch cellulitis symptoms

## 2018-11-20 NOTE — PLAN OF CARE
ROOM # 212-2    Living Situation (if not independent, order SW consult): Home with    Facility name: n/a   : [facility]    Activity level at baseline: Ind with wheelchair   Activity level on admit: Assist x1 with ww      Patient registered to observation; given Patient Bill of Rights; given the opportunity to ask questions about observation status and their plan of care.  Patient has been oriented to the observation room, bathroom and call light is in place.    Discussed discharge goals and expectations with patient/family.

## 2018-11-21 VITALS
DIASTOLIC BLOOD PRESSURE: 66 MMHG | OXYGEN SATURATION: 91 % | HEIGHT: 63 IN | WEIGHT: 273.6 LBS | TEMPERATURE: 98.5 F | HEART RATE: 67 BPM | SYSTOLIC BLOOD PRESSURE: 119 MMHG | BODY MASS INDEX: 48.48 KG/M2 | RESPIRATION RATE: 20 BRPM

## 2018-11-21 LAB
ANION GAP SERPL CALCULATED.3IONS-SCNC: 3 MMOL/L (ref 3–14)
BASOPHILS # BLD AUTO: 0 10E9/L (ref 0–0.2)
BASOPHILS NFR BLD AUTO: 0.4 %
BUN SERPL-MCNC: 23 MG/DL (ref 7–30)
CALCIUM SERPL-MCNC: 9 MG/DL (ref 8.5–10.1)
CHLORIDE SERPL-SCNC: 98 MMOL/L (ref 94–109)
CO2 SERPL-SCNC: 36 MMOL/L (ref 20–32)
CREAT SERPL-MCNC: 0.86 MG/DL (ref 0.52–1.04)
DIFFERENTIAL METHOD BLD: ABNORMAL
EOSINOPHIL # BLD AUTO: 0.5 10E9/L (ref 0–0.7)
EOSINOPHIL NFR BLD AUTO: 6.3 %
ERYTHROCYTE [DISTWIDTH] IN BLOOD BY AUTOMATED COUNT: 13.7 % (ref 10–15)
GFR SERPL CREATININE-BSD FRML MDRD: 63 ML/MIN/1.7M2
GLUCOSE SERPL-MCNC: 130 MG/DL (ref 70–99)
HCT VFR BLD AUTO: 45.6 % (ref 35–47)
HGB BLD-MCNC: 14.1 G/DL (ref 11.7–15.7)
IMM GRANULOCYTES # BLD: 0 10E9/L (ref 0–0.4)
IMM GRANULOCYTES NFR BLD: 0.4 %
LYMPHOCYTES # BLD AUTO: 1.4 10E9/L (ref 0.8–5.3)
LYMPHOCYTES NFR BLD AUTO: 18.7 %
MCH RBC QN AUTO: 28.5 PG (ref 26.5–33)
MCHC RBC AUTO-ENTMCNC: 30.9 G/DL (ref 31.5–36.5)
MCV RBC AUTO: 92 FL (ref 78–100)
MONOCYTES # BLD AUTO: 0.6 10E9/L (ref 0–1.3)
MONOCYTES NFR BLD AUTO: 8.8 %
NEUTROPHILS # BLD AUTO: 4.8 10E9/L (ref 1.6–8.3)
NEUTROPHILS NFR BLD AUTO: 65.4 %
NRBC # BLD AUTO: 0 10*3/UL
NRBC BLD AUTO-RTO: 0 /100
PLATELET # BLD AUTO: 274 10E9/L (ref 150–450)
POTASSIUM SERPL-SCNC: 3.6 MMOL/L (ref 3.4–5.3)
RBC # BLD AUTO: 4.95 10E12/L (ref 3.8–5.2)
SODIUM SERPL-SCNC: 137 MMOL/L (ref 133–144)
WBC # BLD AUTO: 7.3 10E9/L (ref 4–11)

## 2018-11-21 PROCEDURE — 99217 ZZC OBSERVATION CARE DISCHARGE: CPT | Performed by: PHYSICIAN ASSISTANT

## 2018-11-21 PROCEDURE — G0378 HOSPITAL OBSERVATION PER HR: HCPCS

## 2018-11-21 PROCEDURE — 80048 BASIC METABOLIC PNL TOTAL CA: CPT | Performed by: PHYSICIAN ASSISTANT

## 2018-11-21 PROCEDURE — 36415 COLL VENOUS BLD VENIPUNCTURE: CPT | Performed by: PHYSICIAN ASSISTANT

## 2018-11-21 PROCEDURE — 85025 COMPLETE CBC W/AUTO DIFF WBC: CPT | Performed by: PHYSICIAN ASSISTANT

## 2018-11-21 PROCEDURE — 25000128 H RX IP 250 OP 636: Performed by: PHYSICIAN ASSISTANT

## 2018-11-21 PROCEDURE — 25000132 ZZH RX MED GY IP 250 OP 250 PS 637: Performed by: HOSPITALIST

## 2018-11-21 PROCEDURE — 25000132 ZZH RX MED GY IP 250 OP 250 PS 637: Performed by: PHYSICIAN ASSISTANT

## 2018-11-21 RX ORDER — FUROSEMIDE 40 MG
40 TABLET ORAL DAILY
Qty: 31 TABLET | Refills: 0 | Status: SHIPPED | OUTPATIENT
Start: 2018-11-21 | End: 2019-01-04

## 2018-11-21 RX ORDER — FUROSEMIDE 10 MG/ML
40 INJECTION INTRAMUSCULAR; INTRAVENOUS ONCE
Status: COMPLETED | OUTPATIENT
Start: 2018-11-21 | End: 2018-11-21

## 2018-11-21 RX ORDER — CEPHALEXIN 500 MG/1
500 CAPSULE ORAL 4 TIMES DAILY
Qty: 20 CAPSULE | Refills: 0 | Status: SHIPPED | OUTPATIENT
Start: 2018-11-21 | End: 2019-03-12

## 2018-11-21 RX ADMIN — SENNOSIDES 1 TABLET: 8.6 TABLET, FILM COATED ORAL at 09:12

## 2018-11-21 RX ADMIN — FLUOXETINE 40 MG: 20 CAPSULE ORAL at 09:12

## 2018-11-21 RX ADMIN — LEVOTHYROXINE SODIUM 88 MCG: 88 TABLET ORAL at 09:12

## 2018-11-21 RX ADMIN — GABAPENTIN 300 MG: 300 CAPSULE ORAL at 09:12

## 2018-11-21 RX ADMIN — FUROSEMIDE 40 MG: 10 INJECTION, SOLUTION INTRAMUSCULAR; INTRAVENOUS at 09:13

## 2018-11-21 RX ADMIN — CEPHALEXIN 500 MG: 500 CAPSULE ORAL at 00:27

## 2018-11-21 RX ADMIN — AMLODIPINE BESYLATE 5 MG: 5 TABLET ORAL at 09:12

## 2018-11-21 RX ADMIN — CEPHALEXIN 500 MG: 500 CAPSULE ORAL at 11:37

## 2018-11-21 RX ADMIN — CEPHALEXIN 500 MG: 500 CAPSULE ORAL at 06:41

## 2018-11-21 ASSESSMENT — PAIN DESCRIPTION - DESCRIPTORS: DESCRIPTORS: ACHING

## 2018-11-21 NOTE — PLAN OF CARE
Problem: Patient Care Overview  Goal: Plan of Care/Patient Progress Review  PRIMARY DIAGNOSIS: CONGESTIVE HEART FAILURE  OUTPATIENT/OBSERVATION GOALS TO BE MET BEFORE DISCHARGE:  1. Dyspnea improved and O2 sats >88% at RA or at prior home O2 therapy level: No, dyspnea on exertion      2. ECHO and other diagnostic testing complete:  Echo completed      3. Return to near baseline physical activity: Yes,  SBA with walker              Adequate sats on Room Air.  Patient remains moderatly SOB on exertion, ie ambulating from bed to bathroom. Patient       does recover within a short period of time.      Pain is currently well controlled and tolerable.  Left lower extremity red, outline marked by previous shift.  No apparent enlargement.    Patient up to bathroom with standby assist of one and walker.  Patient continues on ordered fluid restriction. PLAN:  Continue to provide supportive cares.  Follow strict I and 0.        Discharge Planner Nurse   Safe discharge environment identified: Yes,  Patient states that she has children who will  Assist her in her needs,    Barriers to discharge: Yes,  Unless medically cleared.          Entered by: Karen Lesch  11/20/2018 21:00 pm     Please review provider order for any additional goals.   Nurse to notify provider when observation goals have been met and patient is ready for discharge.

## 2018-11-21 NOTE — PLAN OF CARE
Problem: Patient Care Overview  Goal: Plan of Care/Patient Progress Review  PRIMARY DIAGNOSIS: CONGESTIVE HEART FAILURE  OUTPATIENT/OBSERVATION GOALS TO BE MET BEFORE DISCHARGE:  1. Dyspnea improved and O2 sats >88% at RA or at prior home O2 therapy level: No, dyspnea on exertion           2. ECHO and other diagnostic testing complete:  Echo completed      3. Return to near baseline physical activity: Yes,  SBA with walker    Temp: 98.3  F (36.8  C) Temp src: Oral BP: 114/57   Heart Rate: 70 Resp: 24 SpO2: 93 % O2 Device: None (Room air)       VSS.  Adequate sats on Room Air.  LS  Clear, diminished in bases.  Patient states that her  Pain is currently well controlled and tolerable.  Left lower extremity red, outline marked by  Previous shift.  No apparent enlargement.  Patient c/o of mild burning.  Patient up to bathroom  With standby assist of one and walker.  Patient continues on ordered fluid restriction.  PLAN:  Continue to provide supportive cares.  Follow strict I and 0.        Discharge Planner Nurse   Safe discharge environment identified: Yes,  Patient states that she has children who will  Assist her in her needs,    Barriers to discharge: Yes,  Unless medically cleared.         Entered by: Karen Lesch  11/20/2018     Please review provider order for any additional goals.   Nurse to notify provider when observation goals have been met and patient is ready for discharge.

## 2018-11-21 NOTE — PLAN OF CARE
Problem: Patient Care Overview  Goal: Plan of Care/Patient Progress Review  Outcome: Improving  PRIMARY DIAGNOSIS: CONGESTIVE HEART FAILURE  OUTPATIENT/OBSERVATION GOALS TO BE MET BEFORE DISCHARGE:  1. Dyspnea improved and O2 sats >88% at RA or at prior home O2 therapy level: No, dyspnea on exertion, some SOB at rest. O2 decreased to 86%, pt has hx sleep apnea, 2L O2 via nc applied for the night.      2. ECHO and other diagnostic testing complete: Yes      3. Return to near baseline physical activity: Yes,  SBA with walker          Discharge Planner Nurse   Safe discharge environment identified: Yes,  Patient states that she has children who will  Assist her in her needs,    Barriers to discharge: Yes,  Unless medically cleared.     A&O x4. VSS, except decreased O2 with rest, pt has hx sleep apnea, 2L O2 via nc applied for the night. SBA w/walker and gait belt when oob. LS  diminished, +PETTY/SOB. Tele NSR. BS audible/active x4, tolerating PO, 2000ml fluid restriction, Na restriction, no caffeine, strict I&O, no BM this shift.  Voiding. LLE red/hot swollen, area marked, PO abx, skin intact, CMS intact. Resting comfortably between cares. Will continue to monitor.           Please review provider order for any additional goals. Nurse to notify provider when observation goals have been met and patient is ready for discharge.

## 2018-11-21 NOTE — PLAN OF CARE
Problem: Patient Care Overview  Goal: Plan of Care/Patient Progress Review  Outcome: Improving  PRIMARY DIAGNOSIS: CONGESTIVE HEART FAILURE  OUTPATIENT/OBSERVATION GOALS TO BE MET BEFORE DISCHARGE:  1. Dyspnea improved and O2 sats >88% at RA or at prior home O2 therapy level: No, dyspnea on exertion, some SOB at rest. O2 decreased to 86%, pt has hx sleep apnea, 2L O2 via nc applied for the night.      2. ECHO and other diagnostic testing complete: Yes      Return to near baseline physical activity: Yes,  SBA with walker        Discharge Planner Nurse   Safe discharge environment identified: Yes,  Patient states that she has children who will  Assist her in her needs,    Barriers to discharge: Yes,  Unless medically cleared.    A&O x4. VSS, except decreased O2 with rest, pt had hx sleep apnea, 2L O2 via nc applied for the night. SBA w/walker and gait belt when oob. LS  diminished, +PETTY/SOB. Tele NSR. BS audible/active x4, tolerating PO, 2000ml fluid restriction, Na restriction, no caffeine, strict I&O, no BM this shift.  Voiding. LLE red/hot swollen, area marked, PO abx, skin intact, CMS intact. Resting comfortably between cares. Will continue to monitor.           Please review provider order for any additional goals. Nurse to notify provider when observation goals have been met and patient is ready for discharge.

## 2018-11-21 NOTE — PLAN OF CARE
Problem: Patient Care Overview  Goal: Plan of Care/Patient Progress Review  Outcome: Improving  PRIMARY DIAGNOSIS: CONGESTIVE HEART FAILURE  OUTPATIENT/OBSERVATION GOALS TO BE MET BEFORE DISCHARGE:  1. Dyspnea improved and O2 sats >88% at RA or at prior home O2 therapy level: Yes        SpO2: 91 %, O2 Device: None (Room air)        Vitals:     11/19/18 1705 11/20/18 0819 11/21/18 0759   Weight: 117.9 kg (260 lb) 124.6 kg (274 lb 9.6 oz) 124.1 kg (273 lb 9.6 oz)         2. ECHO and other diagnostic testing complete (if applicable): N/A     3. Return to near baseline physical activity: Yes     Discharge Planner Nurse   Safe discharge environment identified: Yes  Barriers to discharge: No          Please review provider order for any additional goals.   Nurse to notify provider when observation goals have been met and patient is ready for discharge.     VSS on RA. Tele - SR 70s. SBA. Ambulated in the halls, O2 sats between 91-99. Pt reported SOB equal to her baseline. Plan: discharge expected

## 2018-11-21 NOTE — DISCHARGE SUMMARY
[facility]    Observation Unit   Discharge Summary  Hospitalist    Date of Admission:  11/19/2018  Date of Discharge:  11/21/2018  Provider:  Belén Ontiveros PA-C  Date of Service (when I last saw the patient): 11/21/18    Discharge Diagnoses   Mild acute diastolic CHF exacerbation   LLE cellulitis     Other medical issues:  Past Medical History:   Diagnosis Date     Abnormal stress test     small area on stress thalium ?2003; but normal angiogram 2012     Cardiac dysrhythmia, unspecified     irregular heartbeat     CHF (congestive heart failure) (H) 6/18/2018     Hyperlipidemia LDL goal <100 2/10/2010     Hypertension goal BP (blood pressure) < 140/90 7/18/2010     Hypothyroid      Malignant 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     Melanoma of skin, site unspecified      NONSPECIFIC MEDICAL HISTORY 04    fx fifth finger right hand     Obstructive sleep apnea (adult) (pediatric) 8/25/2006    CPAP      Osteoarthritis      Other chronic pain     Joint pain for many years.     Other osteoporosis      PONV (postoperative nausea and vomiting)      Tubular adenoma in colon 9/01    colonoscopy due 2004     History of Present Illness   Carol Merida is a 83 year old female with PMH significant for breast cancer, melanoma, JEAN, OA, HTN, hypothyroidism, anxiety, depression, chronic LE edema, mild pulmonary HTN who was admitted on 11/19/2018 for shortness of breath and leg swelling. Please see the admission history and physical for full details.    Hospital Course   Carol Merida was admitted on 11/19/2018.  The following problems were addressed during her hospitalization:    1. Mild acute diastolic CHF exacerbation: increased PETTY with increased bilateral LE peripheral edema for about 1 week prior to admission. No hypoxia since admission. Initial BNP and troponin WNL. Echocardiogram today showed EF of 55-60%, grade I diastolic  "dysfunction, and no significant valvular abnormalities. Suspect acute exacerbation brought on due to stopping Lasix on own in August as well recent increased sodium intake. Weight improved from 274 lbs to 273 lbs the day of discharge. Continues to have mild shortness of breath with increased exertion but per patient she feels at her baseline with this.  - Take additional dose of PO Lasix 40 mg this afternoon then restart PO Lasix 40 mg daily tomorrow   - Daily weights and bring to follow up with PCP  - Low sodium diet and 2 liter daily fluid restriction      2. LLE cellulitis: increased erythema and pain of left LE over the past several days without recent h/o trauma. Subjective fevers prior to admission. Afebrile throughout stay and normal WBC. LLE ultrasound negative for DVT. Area outlined on admission with only significant improvement of erythema today but with ongoing tenderness to mild touch. Started on PO Keflex on admission.   - Continue PO Keflex for 5 additional days (completed 2 days worth during admission)    Pending Results   None    Code Status   Full Code       Primary Care Physician   Yessica Lora MD    Exam:    /42 (BP Location: Right arm)  Pulse 67  Temp 97.8  F (36.6  C) (Oral)  Resp 20  Ht 1.6 m (5' 3\")  Wt 124.1 kg (273 lb 9.6 oz)  SpO2 94%  BMI 48.47 kg/m2  GEN:  Alert, oriented x 3, appears comfortable, NAD.  HEENT:  Normocephalic/atraumatic, no scleral icterus, no nasal discharge, mouth moist.  CV:  Regular rate and rhythm, no murmur or JVD.  S1 + S2 noted, no S3 or S4.  LUNGS:  Clear to auscultation bilaterally without rales/rhonchi/wheezing/retractions.  Symmetric chest rise on inhalation noted.  ABD:  Active bowel sounds, soft, non-tender/non-distended.  No rebound/guarding/rigidity.  EXT: 1-2+ bilateral LE edema. No cyanosis.  No acute joint synovitis noted.  SKIN: erythema over left lower leg in center of shin and extends down to above the ankle with warmth to touch. No " fluctuance appreciated.     Discharge Disposition   Discharged to home    Consultations This Hospital Stay   None    Time Spent on this Encounter   I, Nicki Ontiveros, personally saw the patient today and spent greater than 30 minutes discharging this patient.    Discharge Orders     Reason for your hospital stay   You were admitted due to concerns for increased shortness of breath and lower extremity swelling due to a mild heart failure exacerbation. You were treated with IV Lasix to take off additional fluid you were retaining with improvement in your weight, swelling, and symptoms. Your left lower leg appeared red and warm to touch with concern for an underlying infection for which you were treated with oral antibiotics with improvement. It it recommended you take an additional dose of oral Lasix this afternoon then resume taking oral Lasix daily in order to keep from recurrence of your symptoms. You will need to take an additional 5 days of oral Keflex (antibiotic) to complete a course to treat your cellulitis.     Follow-up and recommended labs and tests    Follow up with primary care provider, Yessica Lora MD, within 7 days for hospital follow- up.  No follow up labs or test are needed.     Activity   Your activity upon discharge: activity as tolerated     Discharge Instructions   1. Take one dose of Lasix 40 mg this afternoon (around 3 or 4 PM) then take Lasix 40 mg daily   2. Check your weight daily (contact your primary care provider if you weight increases more than 3 pounds in 1 day)  3. Recommend low sodium diet and 2 liter daily fluid restriction     Full Code     Diet   Follow this diet upon discharge: Orders Placed This Encounter     Fluid restriction 2000 ML FLUID     Combination Diet 2 gm NA Diet       Discharge Medications   Current Discharge Medication List      START taking these medications    Details   cephALEXin (KEFLEX) 500 MG capsule Take 1 capsule (500 mg) by mouth 4 times daily for 5  days  Qty: 20 capsule, Refills: 0    Associated Diagnoses: Cellulitis of left lower extremity         CONTINUE these medications which have CHANGED    Details   furosemide (LASIX) 40 MG tablet Take 1 tablet (40 mg) by mouth daily  Qty: 31 tablet, Refills: 0    Associated Diagnoses: Acute diastolic congestive heart failure (H)         CONTINUE these medications which have NOT CHANGED    Details   acetaminophen (TYLENOL) 325 MG tablet Take 3 tablets (975 mg) by mouth every 8 hours  Qty: 500 tablet, Refills: 1    Associated Diagnoses: Status post total replacement of right hip      albuterol (PROAIR HFA/PROVENTIL HFA/VENTOLIN HFA) 108 (90 Base) MCG/ACT inhaler Inhale 2 puffs into the lungs every 4 hours as needed for shortness of breath / dyspnea or wheezing  Qty: 1 Inhaler, Refills: 0    Associated Diagnoses: SOB (shortness of breath)      ALPRAZolam (XANAX) 0.25 MG tablet Take 1 tablet (0.25 mg) by mouth 2 times daily as needed for anxiety Don't take with the oxycodone  Qty: 10 tablet, Refills: 0    Associated Diagnoses: Anxiety      amLODIPine (NORVASC) 5 MG tablet Take 1 tablet (5 mg) by mouth daily  Qty: 90 tablet, Refills: 1    Associated Diagnoses: Benign essential hypertension      benzonatate (TESSALON) 100 MG capsule Take 1 capsule (100 mg) by mouth 3 times daily as needed for cough  Qty: 42 capsule, Refills: 0    Associated Diagnoses: Pneumonia of left lower lobe due to infectious organism (H)      calcium-vitamin D (CALCIUM 600 + D) 600-400 MG-UNIT per tablet Take 1 tablet by mouth every evening   Qty: 100 tablet, Refills: 3    Associated Diagnoses: Osteopenia      FLUoxetine (PROZAC) 40 MG capsule Take 1 capsule (40 mg) by mouth daily  Qty: 90 capsule, Refills: 1    Associated Diagnoses: Mild depression (H); Anxiety      fluticasone (FLONASE) 50 MCG/ACT spray Spray 1 spray into both nostrils daily as needed  Qty: 1 Bottle, Refills: 5    Associated Diagnoses: Throat clearing      gabapentin (NEURONTIN) 300  MG capsule Take 1 capsule (300 mg) by mouth 3 times daily  Qty: 270 capsule, Refills: 0    Associated Diagnoses: Osteoarthritis of multiple joints, unspecified osteoarthritis type; DDD (degenerative disc disease), lumbar; Osteoarthritis of spine with radiculopathy, lumbar region      levothyroxine (SYNTHROID/LEVOTHROID) 88 MCG tablet Take 1 tablet (88 mcg) by mouth daily  Qty: 90 tablet, Refills: 3    Associated Diagnoses: Hypothyroidism, unspecified type      magnesium hydroxide (MILK OF MAGNESIA) 400 MG/5ML suspension Take 30 mLs by mouth daily as needed       Nystatin (NYSTOP) 530029 UNIT/GM POWD Apply tid to affectead area prn  Qty: 60 g, Refills: 1    Associated Diagnoses: Intertrigo      senna (SENOKOT) 8.6 MG tablet Take 1 tablet by mouth daily      simvastatin (ZOCOR) 10 MG tablet TAKE 1 TABLET BY MOUTH AT BEDTIME  Qty: 90 tablet, Refills: 1    Associated Diagnoses: Hyperlipidemia LDL goal <100      triamcinolone (KENALOG) 0.1 % cream Apply sparingly to affected area two times daily as needed for itching.  Qty: 30 g, Refills: 0    Associated Diagnoses: Atopic dermatitis      order for DME Equipment being ordered: left breast prosthesis. Mastectomy bras (up to 4)  Qty: 4 Device, Refills: 0    Associated Diagnoses: History of left mastectomy           Allergies   Allergies   Allergen Reactions     Ace Inhibitors Cough     Data   Results for orders placed or performed during the hospital encounter of 11/19/18   XR Chest 2 Views    Narrative    CHEST TWO VIEWS    11/19/2018 6:43 PM     HISTORY: Dyspnea.     COMPARISON: 7/19/2018.      Impression    IMPRESSION: Mild bilateral vascular congestion appears mildly  increased. No evidence for cardiomegaly.      DUY MOISE MD   US Lower Extremity Venous Duplex Left    Narrative    US LOWER EXTREMITY VENOUS DUPLEX LEFT   11/19/2018 6:30 PM     HISTORY: Swelling, dyspnea, pain in calf.      COMPARISON: Ultrasound legs 5/11/2016.    FINDINGS: Gray-scale, color and  Doppler spectral analysis ultrasound  was performed of the left leg. Compression and augmentation imaging  was performed.    There is no evidence for deep venous thrombosis.      Impression    IMPRESSION: No evidence for DVT within the left leg.       DUY MOISE MD   CBC with platelets differential   Result Value Ref Range    WBC 7.3 4.0 - 11.0 10e9/L    RBC Count 4.92 3.8 - 5.2 10e12/L    Hemoglobin 14.0 11.7 - 15.7 g/dL    Hematocrit 44.3 35.0 - 47.0 %    MCV 90 78 - 100 fl    MCH 28.5 26.5 - 33.0 pg    MCHC 31.6 31.5 - 36.5 g/dL    RDW 13.6 10.0 - 15.0 %    Platelet Count 285 150 - 450 10e9/L    Diff Method Automated Method     % Neutrophils 64.9 %    % Lymphocytes 21.0 %    % Monocytes 8.6 %    % Eosinophils 4.7 %    % Basophils 0.4 %    % Immature Granulocytes 0.4 %    Nucleated RBCs 0 0 /100    Absolute Neutrophil 4.7 1.6 - 8.3 10e9/L    Absolute Lymphocytes 1.5 0.8 - 5.3 10e9/L    Absolute Monocytes 0.6 0.0 - 1.3 10e9/L    Absolute Eosinophils 0.3 0.0 - 0.7 10e9/L    Absolute Basophils 0.0 0.0 - 0.2 10e9/L    Abs Immature Granulocytes 0.0 0 - 0.4 10e9/L    Absolute Nucleated RBC 0.0    Basic metabolic panel   Result Value Ref Range    Sodium 138 133 - 144 mmol/L    Potassium 3.8 3.4 - 5.3 mmol/L    Chloride 103 94 - 109 mmol/L    Carbon Dioxide 30 20 - 32 mmol/L    Anion Gap 5 3 - 14 mmol/L    Glucose 75 70 - 99 mg/dL    Urea Nitrogen 17 7 - 30 mg/dL    Creatinine 0.79 0.52 - 1.04 mg/dL    GFR Estimate 70 >60 mL/min/1.7m2    GFR Estimate If Black 84 >60 mL/min/1.7m2    Calcium 9.4 8.5 - 10.1 mg/dL   Troponin I   Result Value Ref Range    Troponin I ES <0.015 0.000 - 0.045 ug/L   Nt probnp inpatient (BNP)   Result Value Ref Range    N-Terminal Pro BNP Inpatient 59 0 - 1800 pg/mL   Basic metabolic panel   Result Value Ref Range    Sodium 138 133 - 144 mmol/L    Potassium 3.8 3.4 - 5.3 mmol/L    Chloride 103 94 - 109 mmol/L    Carbon Dioxide 30 20 - 32 mmol/L    Anion Gap 5 3 - 14 mmol/L    Glucose 117 (H) 70 -  99 mg/dL    Urea Nitrogen 16 7 - 30 mg/dL    Creatinine 0.80 0.52 - 1.04 mg/dL    GFR Estimate 69 >60 mL/min/1.7m2    GFR Estimate If Black 83 >60 mL/min/1.7m2    Calcium 8.9 8.5 - 10.1 mg/dL   CBC with platelets differential   Result Value Ref Range    WBC 7.3 4.0 - 11.0 10e9/L    RBC Count 4.95 3.8 - 5.2 10e12/L    Hemoglobin 14.1 11.7 - 15.7 g/dL    Hematocrit 45.6 35.0 - 47.0 %    MCV 92 78 - 100 fl    MCH 28.5 26.5 - 33.0 pg    MCHC 30.9 (L) 31.5 - 36.5 g/dL    RDW 13.7 10.0 - 15.0 %    Platelet Count 274 150 - 450 10e9/L    Diff Method Automated Method     % Neutrophils 65.4 %    % Lymphocytes 18.7 %    % Monocytes 8.8 %    % Eosinophils 6.3 %    % Basophils 0.4 %    % Immature Granulocytes 0.4 %    Nucleated RBCs 0 0 /100    Absolute Neutrophil 4.8 1.6 - 8.3 10e9/L    Absolute Lymphocytes 1.4 0.8 - 5.3 10e9/L    Absolute Monocytes 0.6 0.0 - 1.3 10e9/L    Absolute Eosinophils 0.5 0.0 - 0.7 10e9/L    Absolute Basophils 0.0 0.0 - 0.2 10e9/L    Abs Immature Granulocytes 0.0 0 - 0.4 10e9/L    Absolute Nucleated RBC 0.0    Basic metabolic panel   Result Value Ref Range    Sodium 137 133 - 144 mmol/L    Potassium 3.6 3.4 - 5.3 mmol/L    Chloride 98 94 - 109 mmol/L    Carbon Dioxide 36 (H) 20 - 32 mmol/L    Anion Gap 3 3 - 14 mmol/L    Glucose 130 (H) 70 - 99 mg/dL    Urea Nitrogen 23 7 - 30 mg/dL    Creatinine 0.86 0.52 - 1.04 mg/dL    GFR Estimate 63 >60 mL/min/1.7m2    GFR Estimate If Black 76 >60 mL/min/1.7m2    Calcium 9.0 8.5 - 10.1 mg/dL   EKG 12 lead   Result Value Ref Range    Interpretation ECG Click View Image link to view waveform and result    ECHO LIMITED WITH OPTISON    Narrative    [id number]  [id number]  [id number]  [id number]^[email or web address]^KEESHA^S           [facility]  Echocardiography Laboratory  [address] [place] [address]        Name: GABBY EAGLE  MRN: [id number]  : 1935  Study Date: 2018 08:45 AM  Age: 83 yrs  Gender: Female  Patient Location: [facility]  Reason For  Study: CHF  Ordering Physician: KEESHA EVANS  Referring Physician: Yessica Lora  Performed By: Risa Seaman     BSA: 2.2 m2  Height: 63 in  Weight: 260 lb  BP: 116/58 mmHg  _____________________________________________________________________________  __        Procedure  Limited Portable Echo Adult. Contrast Optison.  _____________________________________________________________________________  __        Interpretation Summary     The visual ejection fraction is estimated at 55-60%.  Left ventricular systolic function is normal.  The study was technically difficult.  _____________________________________________________________________________  __        Left Ventricle  The left ventricle is normal in size. There is normal left ventricular wall  thickness. Grade I or early diastolic dysfunction. Diastolic Doppler findings  (E/E' ratio and/or other parameters) suggest left ventricular filling  pressures are indeterminate. The visual ejection fraction is estimated at 55-  60%. Left ventricular systolic function is normal. Septal motion is consistent  with conduction abnormality.     Right Ventricle  The right ventricle is normal in size and function.     Mitral Valve  There is mild mitral annular calcification. There is trace mitral  regurgitation.     Tricuspid Valve  There is trace tricuspid regurgitation. The right ventricular systolic  pressure is approximated at 21.4 mmHg plus the right atrial pressure.        Aortic Valve  The aortic valve is not well visualized. Thickened aortic valve leaflets. No  aortic regurgitation is present. No hemodynamically significant valvular  aortic stenosis.     Pulmonic Valve  There is no pulmonic valvular regurgitation.     Vessels  The inferior vena cava is not dilated.     Pericardium  There is no pericardial effusion.     Rhythm  Sinus rhythm was noted.     _____________________________________________________________________________  __        Doppler Measurements &  Calculations     TR max sary: 231.3 cm/sec  TR max P.4 mmHg           _____________________________________________________________________________  __           Report approved by: Jason Bunch 2018 10:30 AM          Nicki Ontiveros PA-C

## 2018-11-21 NOTE — PLAN OF CARE
Problem: Patient Care Overview  Goal: Plan of Care/Patient Progress Review  Outcome: Improving  PRIMARY DIAGNOSIS: CONGESTIVE HEART FAILURE  OUTPATIENT/OBSERVATION GOALS TO BE MET BEFORE DISCHARGE:  1. Dyspnea improved and O2 sats >88% at RA or at prior home O2 therapy level: Yes        SpO2: 91 %, O2 Device: None (Room air)  Vitals:    11/19/18 1705 11/20/18 0819 11/21/18 0759   Weight: 117.9 kg (260 lb) 124.6 kg (274 lb 9.6 oz) 124.1 kg (273 lb 9.6 oz)        2. ECHO and other diagnostic testing complete (if applicable): N/A    3. Return to near baseline physical activity: Yes    Discharge Planner Nurse   Safe discharge environment identified: Yes  Barriers to discharge: No          Please review provider order for any additional goals.   Nurse to notify provider when observation goals have been met and patient is ready for discharge.    VSS on RA. Tele - SR 76. SBA. Pt states she hopes to go home today. Edema in LLE has improved and redness is receding from markings. Plan: ambulate in the halls, monitor

## 2018-11-21 NOTE — PLAN OF CARE
Problem: Patient Care Overview  Goal: Plan of Care/Patient Progress Review  Outcome: Adequate for Discharge Date Met: 11/21/18  Patient's After Visit Summary was reviewed with patient and/or daughter.   Patient verbalized understanding of After Visit Summary, recommended follow up and was given an opportunity to ask questions.   Discharge medications sent home with patient/family: YES, 2 prescriptions were filled at the [place] discharge pharmacy   Discharged with daughter to home in private transportation.    OBSERVATION patient END time: 1300

## 2018-11-23 ENCOUNTER — TELEPHONE (OUTPATIENT)
Dept: FAMILY MEDICINE | Facility: CLINIC | Age: 83
End: 2018-11-23

## 2018-11-23 NOTE — TELEPHONE ENCOUNTER
ED / Discharge Outreach Protocol    Patient Contact    Attempt # 1    Was call answered?  No.  Unable to leave message.    Isha JAY RN

## 2018-11-23 NOTE — TELEPHONE ENCOUNTER
Please contact patient for In-patient follow up.  There are no phone numbers on file.    Visit date: 11/21/2018  Diagnosis listed:Acute Congestive Heart Failure, Unspecified Heart Failure Type (H)  Number of visits in past 12 months:0/1

## 2018-11-27 NOTE — TELEPHONE ENCOUNTER
"ED / Discharge Outreach Protocol    Patient Contact    Attempt # 2    Was call answered?  Yes.  \"May I please speak with [place].  Is patient available?   Yes       had been doing chemo, went into the hospital then rehab, now back in the hospital- ICU.     Hospital/TCU/ED for chronic condition Discharge Protocol    \"Hi, my name is Ishachiqui Quinonez, a registered nurse, and I am calling from [facility].  I am calling to follow up and see how things are going for you after your recent emergency visit/hospital/TCU stay.\"    Tell me how you are doing now that you are home?\" Good. Taking antibiotics, feeling better.       Discharge Instructions    \"Let's review your discharge instructions.  What is/are the follow-up recommendations?  Pt. Response: See doc- appt scheduled and take antibiotics.       \"Has an appointment with your primary care provider been scheduled?\"   No (schedule appointment)    \"When you see the provider, I would recommend that you bring your medications with you.\"    Medications    \"Tell me what changed about your medicines when you discharged?\"    Changes to chronic meds?    0-1    \"What questions do you have about your medications?\"    None     New diagnoses of heart failure, COPD, diabetes, or MI?    Treating her for SOB- d/2 heart failure? And Cellulitis              Medication reconciliation completed? Yes  Was MTM referral placed (*Make sure to put transitions as reason for referral)?   No    Call Summary    \"What questions or concerns do you have about your recent visit and your follow-up care?\"     none    \"If you have questions or things don't continue to improve, we encourage you contact us through the main clinic number (give number).  Even if the clinic is not open, triage nurses are available 24/7 to help you.     We would like you to know that our clinic has extended hours (provide information).  We also have urgent care (provide details on closest location and hours/contact " "info)\"      \"Thank you for your time and take care!\"    Isha JAY RN          "

## 2018-11-29 ENCOUNTER — OFFICE VISIT (OUTPATIENT)
Dept: FAMILY MEDICINE | Facility: CLINIC | Age: 83
End: 2018-11-29
Payer: COMMERCIAL

## 2018-11-29 ENCOUNTER — PATIENT OUTREACH (OUTPATIENT)
Dept: CARE COORDINATION | Facility: CLINIC | Age: 83
End: 2018-11-29

## 2018-11-29 VITALS
RESPIRATION RATE: 16 BRPM | HEIGHT: 63 IN | HEART RATE: 79 BPM | OXYGEN SATURATION: 96 % | DIASTOLIC BLOOD PRESSURE: 68 MMHG | BODY MASS INDEX: 48.94 KG/M2 | WEIGHT: 276.2 LBS | TEMPERATURE: 98 F | SYSTOLIC BLOOD PRESSURE: 120 MMHG

## 2018-11-29 DIAGNOSIS — R06.02 SOB (SHORTNESS OF BREATH): ICD-10-CM

## 2018-11-29 DIAGNOSIS — I50.33 ACUTE ON CHRONIC DIASTOLIC CONGESTIVE HEART FAILURE (H): ICD-10-CM

## 2018-11-29 DIAGNOSIS — Z74.09 IMPAIRED MOBILITY: ICD-10-CM

## 2018-11-29 DIAGNOSIS — R60.9 EDEMA, UNSPECIFIED TYPE: ICD-10-CM

## 2018-11-29 DIAGNOSIS — F41.9 ANXIETY: ICD-10-CM

## 2018-11-29 DIAGNOSIS — Z79.899 LONG TERM CURRENT USE OF DIURETIC: ICD-10-CM

## 2018-11-29 DIAGNOSIS — I10 HYPERTENSION GOAL BP (BLOOD PRESSURE) < 140/90: ICD-10-CM

## 2018-11-29 DIAGNOSIS — L03.116 CELLULITIS OF LEFT LOWER EXTREMITY: ICD-10-CM

## 2018-11-29 DIAGNOSIS — F32.A MILD DEPRESSION: ICD-10-CM

## 2018-11-29 DIAGNOSIS — M25.572 ACUTE LEFT ANKLE PAIN: ICD-10-CM

## 2018-11-29 DIAGNOSIS — F43.9 STRESS: ICD-10-CM

## 2018-11-29 DIAGNOSIS — Z09 HOSPITAL DISCHARGE FOLLOW-UP: Primary | ICD-10-CM

## 2018-11-29 DIAGNOSIS — R63.0 POOR APPETITE: ICD-10-CM

## 2018-11-29 PROCEDURE — 99495 TRANSJ CARE MGMT MOD F2F 14D: CPT | Performed by: FAMILY MEDICINE

## 2018-11-29 PROCEDURE — 36415 COLL VENOUS BLD VENIPUNCTURE: CPT | Performed by: FAMILY MEDICINE

## 2018-11-29 PROCEDURE — 80048 BASIC METABOLIC PNL TOTAL CA: CPT | Performed by: FAMILY MEDICINE

## 2018-11-29 RX ORDER — FLUOXETINE 40 MG/1
40 CAPSULE ORAL DAILY
Qty: 90 CAPSULE | Refills: 1 | Status: SHIPPED | OUTPATIENT
Start: 2018-11-29 | End: 2019-05-15

## 2018-11-29 NOTE — PROGRESS NOTES
SUBJECTIVE:   Carol Merida is a 83 year old female who presents to clinic today for the following health issues:          Hospital Follow-up Visit:    Hospital/Nursing Home/IP Rehab Facility: [facility]  Date of Admission: 11/19/2018  Date of Discharge: 11/21/2018  Reason(s) for Admission: SOB            Problems taking medications regularly:  None       Medication changes since discharge: Lasix and antibiotic- antibiotic is done       Problems adhering to non-medication therapy:  None    Summary of hospitalization:  [facility] discharge summary reviewed  Diagnostic Tests/Treatments reviewed.  Follow up needed: none  Other Healthcare Providers Involved in Patient s Care:         None  Update since discharge: stable.     Post Discharge Medication Reconciliation: discharge medications reconciled, continue medications without change.  Plan of care communicated with patient     Coding guidelines for this visit:  Type of Medical   Decision Making Face-to-Face Visit       within 7 Days of discharge Face-to-Face Visit        within 14 days of discharge   Moderate Complexity 20324 [id number]   High Complexity 80347 72788                  Problem list and histories reviewed & adjusted, as indicated.  Additional history:     See under ROS     Patient Active Problem List   Diagnosis     Obesity     Hypothyroidism     Benign neoplasm of colon     Mild Depression [296.31]     Obstructive sleep apnea     * * * SBE PROPHYLAXIS * * *     Degeneration of lumbar or lumbosacral intervertebral disc     Pain in joint, ankle and foot     Arthrodesis status     HYPERLIPIDEMIA LDL GOAL <100     Hypertension goal BP (blood pressure) < 140/90     Health Care Home     Osteopenia     Malignant neoplasm of female breast (H)     Impaired fasting glucose     ACP (advance care planning)     History of melanoma     Panic disorder without agoraphobia     Humerus fracture     Arthritis     PONV (postoperative nausea and vomiting)      S/P total hip arthroplasty     Constipation     DVT prophylaxis     Anticoagulation management encounter     Physical deconditioning     Periprosthetic fracture around internal prosthetic left hip joint (H)     Chronic pain syndrome - hips     Fracture of greater trochanter of right femur 11/6/2016, closed, with routine healing, subsequent encounter     Anxiety     Osteoarthritis of multiple joints, unspecified osteoarthritis type     Anemia due to blood loss, acute     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     Long term (current) use of anticoagulants     Vitamin D deficiency     Pneumonia     Elevated BMI (H)     CHF (congestive heart failure) (H)     Mild depression (H)       Current Outpatient Prescriptions   Medication Sig Dispense Refill     acetaminophen (TYLENOL) 325 MG tablet Take 3 tablets (975 mg) by mouth every 8 hours 500 tablet 1     albuterol (PROAIR HFA/PROVENTIL HFA/VENTOLIN HFA) 108 (90 Base) MCG/ACT inhaler Inhale 2 puffs into the lungs every 4 hours as needed for shortness of breath / dyspnea or wheezing 1 Inhaler 0     ALPRAZolam (XANAX) 0.25 MG tablet Take 1 tablet (0.25 mg) by mouth 2 times daily as needed for anxiety Don't take with the oxycodone 10 tablet 0     amLODIPine (NORVASC) 5 MG tablet Take 1 tablet (5 mg) by mouth daily 90 tablet 1     benzonatate (TESSALON) 100 MG capsule Take 1 capsule (100 mg) by mouth 3 times daily as needed for cough 42 capsule 0     calcium-vitamin D (CALCIUM 600 + D) 600-400 MG-UNIT per tablet Take 1 tablet by mouth every evening  100 tablet 3     fluticasone (FLONASE) 50 MCG/ACT spray Spray 1 spray into both nostrils daily as needed 1 Bottle 5     furosemide (LASIX) 40 MG tablet Take 1 tablet (40 mg) by mouth daily 31 tablet 0     gabapentin (NEURONTIN) 300 MG capsule Take 1 capsule (300 mg) by mouth 3 times daily 270 capsule 0     levothyroxine (SYNTHROID/LEVOTHROID) 88 MCG tablet Take 1 tablet (88 mcg) by mouth daily 90 tablet 3      magnesium hydroxide (MILK OF MAGNESIA) 400 MG/5ML suspension Take 30 mLs by mouth daily as needed        Nystatin (NYSTOP) 706134 UNIT/GM POWD Apply tid to affectead area prn 60 g 1     order for DME Equipment being ordered: left breast prosthesis. Mastectomy bras (up to 4) 4 Device 0     senna (SENOKOT) 8.6 MG tablet Take 1 tablet by mouth daily       simvastatin (ZOCOR) 10 MG tablet TAKE 1 TABLET BY MOUTH AT BEDTIME 90 tablet 1     triamcinolone (KENALOG) 0.1 % cream Apply sparingly to affected area two times daily as needed for itching. 30 g 0     FLUoxetine (PROZAC) 40 MG capsule Take 1 capsule (40 mg) by mouth daily (Patient not taking: Reported on 11/29/2018) 90 capsule 1         Reviewed and updated as needed this visit by clinical staff       Reviewed and updated as needed this visit by Provider         ROS:  CONSTITUTIONAL:NEGATIVE for fever, chills  RESP:NEGATIVE for significant cough or short of breath, but does have some PETTY  CV: NEGATIVE for chest pain, palpitations. Edema is improved.   MUSCULOSKELETAL: she notes there is a spot near her left ankle that seems tender.  The redness on her left leg has improved.   She does feel like a tight band around both legs.  PSYCHIATRIC: see below    Notes she is quite stressed related to her 's current health.   She has not been able to go visit him (he is currently at the ).   Does admit to good family support.    Was hospitalized with shortness of breath and cellulitis.  She was restarted on lasix. She does not feel she is responding as well now; not urinating as much. Did urinate more in the hospital.  2 pounds up today on our scale. Notes she has been stable at home, but has not checked in the last day or two.     Decreased appetite.   Drinking ensure. Will eat Oatmeal.   Not a lot of other food.     Met with palliative doctor around her .     Not walking well. Will get tired out quickly.    She ran out of her fluoxetine a couple days ago; has  "not been able to get to the pharmacy to pick it up.    OBJECTIVE:     /68 (BP Location: Right arm, Cuff Size: Adult Large)  Pulse 79  Temp 98  F (36.7  C) (Oral)  Resp 16  Ht 5' 3\" (1.6 m)  Wt 276 lb 3.2 oz (125.3 kg)  SpO2 96%  BMI 48.93 kg/m2  Body mass index is 48.93 kg/(m^2).  GENERAL APPEARANCE: alert and no distress.  Here with walker.  RESP: lungs clear to auscultation - no rales, rhonchi or wheezes  CV: regular rates and rhythm  MS: she does have trace edema bilateral ankles. Some brawniness around ankles.  There is some tenderness just proximal to medial malleolus on the left; no lump at this time.   SKIN: Right anterior shin/ankle with erythema. There is an ink edwardo that is outside the area of erythema; medial and superior (patient notes the redness has much receded)  PSYCH: mentation appears normal and affect worried.         TSH   Date Value Ref Range Status   08/23/2018 3.70 0.40 - 4.00 mU/L Final         Reviewed hospital discharge note; felt that short of breath due to exacerbation of diastolic HF related to stopping lasix in August with increased sodium intake.   Restarted 40 mg lasix.   LLE cellulitis. Put on keflex.        ASSESSMENT/PLAN:     Hospital discharge follow-up    - HOME CARE NURSING REFERRAL    Acute on chronic diastolic congestive heart failure (H)  She has been restarted on her lasix.   It sounds like her weight has been relatively stable at home, but she has not checked the last few days. Recommend that she do this consistently. Discussed she may not urinate as much now as she did in the hospital; as she was getting excess fluid off in the hospital.   Recommend to follow her weight and also go by breathing status and edema.   - Basic metabolic panel    SOB (shortness of breath)  As above.     Edema, unspecified type  Improving. Also discussed mechanical means.  - Basic metabolic panel  - CARE COORDINATION REFERRAL    Cellulitis of left lower extremity  She is still on " antibiotics; improving.   - HOME CARE NURSING REFERRAL    Left ankle pain:  To continue to follow.   I suspect this is a combination of the cellulitis and the swelling    Stress  's health deteriorating. Suspect he will not be home for awhile.   She notes they were looking at some supports through Palliative care around him, but I am thinking she can use some supports now that she will be home without him. Did introduce care coordinator to patient.  (such as possible meals on wheels or other)  - CARE COORDINATION REFERRAL    Mild depression (H)  Refilling her fluoxetine here. She does note family support. Will see about Care Coordination support.  - FLUoxetine (PROZAC) 40 MG capsule; Take 1 capsule (40 mg) by mouth daily    Anxiety  As above.   - FLUoxetine (PROZAC) 40 MG capsule; Take 1 capsule (40 mg) by mouth daily    Poor appetite  ? Meals on wheels or other resources.   - CARE COORDINATION REFERRAL  - HOME CARE NURSING REFERRAL    Hypertension goal BP (blood pressure) < 140/90  Controlled.   - Basic metabolic panel    Impaired mobility  She is not walking well. I do think she may benefit from some physical therapy.   - CARE COORDINATION REFERRAL  - HOME CARE NURSING REFERRAL    Long term current use of diuretic  Just recently restarted on it. Discussed she may not urinate as much now that she does not have so much excess.  Encourage fluids. Monitor bmp.      Patient Instructions   Weigh yourself daily.  Let us know if you gain weight suddenly.         Yessica Lora MD, MD  [facility]

## 2018-11-29 NOTE — LETTER
December 3, 2018      Carol Merida  [address] [facility] [address] [facility] [place] [address]        Dear Ms. Carol Merida,    Enclosed is a copy of your recent results.    These are looking OK.  GFR stands for glomerular filtration rate (this is in regards to the kidney). They are now showing an estimate of this, based on the actual creatinine, age, gender, and race. This is commonly lower as one gets older. Your level of hydration can have an impact also.  If this is low, we should be aggressive with managing high blood pressure or high cholesterol.    Thinking of you!    Sincerely,     Yessica Lora MD

## 2018-11-29 NOTE — LETTER
[facility]  Complex Care Plan  About Me  Patient Name:  Carol Merida    YOB: 1935  Age:     83 year old   Yesi MRN:   [id number] Telephone Information:    Home Phone [phone]   Mobile [phone]       Address:    [address]  [place] [address] Email address:  No e-mail address on record      Emergency Contact(s)  Name Relationship Lgl Grd Work Phone Home Phone Mobile Phone   1. WALLY MERIDA  Spouse   [phone]    2. HERNANDO, N* Daughter   [phone]    3. JACOB MERIDA Son   [phone]            Primary language:  English     needed? No   [employer]:  [phone] op. 1  Other communication barriers:    Preferred Method of Communication:  Mail  Current living arrangement: I live in a private home with spouse  Mobility Status/ Medical Equipment: Independent w/Device    Health Maintenance  Health Maintenance Reviewed: Due/Overdue   Health Maintenance Topics with due status: Overdue       Topic Date Due    URINE DRUG SCREEN Q1 YR 04/16/1951    MEDICARE ANNUAL WELLNESS VISIT 12/11/2015     Health Maintenance Topics with due status: Due On       Topic Date Due    FALL RISK ASSESSMENT 11/16/2018         My Access Plan  Medical Emergency 911   Primary Clinic Line [facility] - [phone]   24 Hour Appointment Line [phone] or  [phone]) (toll-free)   24 Hour Nurse Line [phone] (toll-free)   Preferred Urgent Care     Preferred Hospital     Preferred Pharmacy [facility] - A MAIL ORDER [facility]MAC[facility]     Behavioral Health Crisis Line The National Suicide Prevention Lifeline at 1-506.944.8012 or 911     My Care Team Members    Patient Care Team       Relationship Specialty Notifications Start End    Yessica Lora MD PCP - General   2/16/02     Phone: [phone] Fax: [phone]         [address] [facility] [address]    [facility] ([facility]  ([place])  11/29/18     Phone: [phone]         Francisca Long, RN Lead Care Coordinator   11/30/18     Phone: [phone]                 My Care Plans  Self Management and Treatment Plan  Goals and (Comments)  Goals        General    Healthy Coping     Notes - Note created  11/30/2018  8:19 AM by Francisca Long, RN    Goal Statement: I want to be healthy, good for my family, children  Measure of Success: Health status, ability to care for self, spouse  Supportive Steps to Achieve: Continued CCRN support. Provided resources. Take medications as prescribed. Follow up with providers as recommended.   Barriers: Mobility  Strengths: Strong flo  Date to Achieve By: TBD  Patient expressed understanding of goal: Yes                 Action Plans on File:            Depression          Advance Care Plans/Directives Type:   Type Advanced Care Plans/Directives: POLST    My Medical and Care Information  Problem List   Patient Active Problem List   Diagnosis     Obesity     Hypothyroidism     Benign neoplasm of colon     Mild Depression [296.31]     Obstructive sleep apnea     * * * SBE PROPHYLAXIS * * *     Degeneration of lumbar or lumbosacral intervertebral disc     Pain in joint, ankle and foot     Arthrodesis status     HYPERLIPIDEMIA LDL GOAL <100     Hypertension goal BP (blood pressure) < 140/90     Health Care Home     Osteopenia     Malignant neoplasm of female breast (H)     Impaired fasting glucose     ACP (advance care planning)     History of melanoma     Panic disorder without agoraphobia     Humerus fracture     Arthritis     PONV (postoperative nausea and vomiting)     S/P total hip arthroplasty     Constipation     DVT prophylaxis     Anticoagulation management encounter     Physical deconditioning     Periprosthetic fracture around internal prosthetic left hip joint (H)     Chronic pain syndrome - hips     Fracture of greater trochanter of right femur 11/6/2016, closed, with routine healing, subsequent  encounter     Anxiety     Osteoarthritis of multiple joints, unspecified osteoarthritis type     Anemia due to blood loss, acute     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    Care Coordination Start Date: 11/29/2018   Frequency of Care Coordination: weekly   Form Last Updated: 11/30/2018

## 2018-11-29 NOTE — MR AVS SNAPSHOT
After Visit Summary   11/29/2018    Carol Merida    MRN: [id number]           Patient Information     Date Of Birth          1935        Visit Information        Provider Department      11/29/2018 3:50 PM Yessica Lora MD [facility]        Today's Diagnoses     Hypertension goal BP (blood pressure) < 140/90    -  1    Mild depression (H)        Anxiety        Edema, unspecified type          Care Instructions    Weigh yourself daily.  Let us know if you gain weight suddenly.            Follow-ups after your visit        Follow-up notes from your care team     Return in about 3 weeks (around 12/20/2018).      Your next 10 appointments already scheduled     Dec 21, 2018  4:30 PM CST   Office Visit with Yessica Lora MD   [facility] ([facility])    [address] [facility] [address] [phone]           Bring a current list of meds and any records pertaining to this visit. For Physicals, please bring immunization records and any forms needing to be filled out. Please arrive 10 minutes early to complete paperwork.              Who to contact     If you have questions or need follow up information about today's clinic visit or your schedule please contact [facility] directly at [phone].  Normal or non-critical lab and imaging results will be communicated to you by MyChart, letter or phone within 4 business days after the clinic has received the results. If you do not hear from us within 7 days, please contact the clinic through [employer] or phone. If you have a critical or abnormal lab result, we will notify you by phone as soon as possible.  Submit refill requests through [employer] or call your pharmacy and they will forward the refill request to us. Please allow 3 business days for your refill to be completed.          Additional Information About Your Visit        MyChart Information     [employer] lets you send  "messages to your doctor, view your test results, renew your prescriptions, schedule appointments and more. To sign up, go to [email or web address] . Click on \"Log in\" on the left side of the screen, which will take you to the Welcome page. Then click on \"Sign up Now\" on the right side of the page.     You will be asked to enter the access code listed below, as well as some personal information. Please follow the directions to create your username and password.     Your access code is: [email or web address]  Expires: 2019 12:47 PM     Your access code will  in 90 days. If you need help or a new code, please call your [facility] clinic or [phone].        Care EveryWhere ID     This is your Care EveryWhere ID. This could be used by other organizations to access your [facility] medical records  [id number]        Your Vitals Were     Pulse Temperature Respirations Height Pulse Oximetry BMI (Body Mass Index)    79 98  F (36.7  C) (Oral) 16 5' 3\" (1.6 m) 96% 48.93 kg/m2       Blood Pressure from Last 3 Encounters:   18 120/68   18 119/66   18 128/72    Weight from Last 3 Encounters:   18 276 lb 3.2 oz (125.3 kg)   18 273 lb 9.6 oz (124.1 kg)   18 274 lb 4.8 oz (124.4 kg)              We Performed the Following     Basic metabolic panel          Where to get your medicines      Some of these will need a paper prescription and others can be bought over the counter.  Ask your nurse if you have questions.     Bring a paper prescription for each of these medications     FLUoxetine 40 MG capsule          Primary Care Provider Office Phone # Fax #    Yessica Lora [phone] [address] [place] [address]        Equal Access to Services     [facility] ZOYA : Nury Hartmann, walara luqgeorgette, qaybta marc olsen . [facility] [phone].    ATENCIÓN: Si rey dangelo, tiene a johns disposición servicios " yamileth de asistencia lingüística. Terri gann [phone].    We comply with applicable federal civil rights laws and [place] laws. We do not discriminate on the basis of race, color, national origin, age, disability, sex, sexual orientation, or gender identity.            Thank you!     Thank you for choosing [facility]  for your care. Our goal is always to provide you with excellent care. Hearing back from our patients is one way we can continue to improve our services. Please take a few minutes to complete the written survey that you may receive in the mail after your visit with us. Thank you!             Your Updated Medication List - Protect others around you: Learn how to safely use, store and throw away your medicines at www.disposemymeds.org.          This list is accurate as of 11/29/18  4:30 PM.  Always use your most recent med list.                   Brand Name Dispense Instructions for use Diagnosis    acetaminophen 325 MG tablet    TYLENOL    500 tablet    Take 3 tablets (975 mg) by mouth every 8 hours    Status post total replacement of right hip       albuterol 108 (90 Base) MCG/ACT inhaler    PROAIR HFA/PROVENTIL HFA/VENTOLIN HFA    1 Inhaler    Inhale 2 puffs into the lungs every 4 hours as needed for shortness of breath / dyspnea or wheezing    SOB (shortness of breath)       ALPRAZolam 0.25 MG tablet    XANAX    10 tablet    Take 1 tablet (0.25 mg) by mouth 2 times daily as needed for anxiety Don't take with the oxycodone    Anxiety       amLODIPine 5 MG tablet    NORVASC    90 tablet    Take 1 tablet (5 mg) by mouth daily    Benign essential hypertension       benzonatate 100 MG capsule    TESSALON    42 capsule    Take 1 capsule (100 mg) by mouth 3 times daily as needed for cough    Pneumonia of left lower lobe due to infectious organism (H)       calcium 600 + D 600-400 MG-UNIT per tablet   Generic drug:  calcium carbonate 600 mg-vitamin D 400 units     100 tablet    Take 1  tablet by mouth every evening    Osteopenia       FLUoxetine 40 MG capsule    PROzac    90 capsule    Take 1 capsule (40 mg) by mouth daily    Mild depression (H), Anxiety       fluticasone 50 MCG/ACT nasal spray    FLONASE    1 Bottle    Spray 1 spray into both nostrils daily as needed    Throat clearing       furosemide 40 MG tablet    LASIX    31 tablet    Take 1 tablet (40 mg) by mouth daily    Acute diastolic congestive heart failure (H)       gabapentin 300 MG capsule    NEURONTIN    270 capsule    Take 1 capsule (300 mg) by mouth 3 times daily    Osteoarthritis of multiple joints, unspecified osteoarthritis type, DDD (degenerative disc disease), lumbar, Osteoarthritis of spine with radiculopathy, lumbar region       levothyroxine 88 MCG tablet    SYNTHROID/LEVOTHROID    90 tablet    Take 1 tablet (88 mcg) by mouth daily    Hypothyroidism, unspecified type       magnesium hydroxide 400 MG/5ML suspension    MILK OF MAGNESIA     Take 30 mLs by mouth daily as needed        NYSTOP 705175 UNIT/GM external powder   Generic drug:  nystatin     60 g    Apply tid to affectead area prn    Intertrigo       order for DME     4 Device    Equipment being ordered: left breast prosthesis. Mastectomy bras (up to 4)    History of left mastectomy       senna 8.6 MG tablet    SENOKOT     Take 1 tablet by mouth daily        simvastatin 10 MG tablet    ZOCOR    90 tablet    TAKE 1 TABLET BY MOUTH AT BEDTIME    Hyperlipidemia LDL goal <100       triamcinolone 0.1 % external cream    KENALOG    30 g    Apply sparingly to affected area two times daily as needed for itching.    Atopic dermatitis

## 2018-11-30 LAB
ANION GAP SERPL CALCULATED.3IONS-SCNC: 10 MMOL/L (ref 3–14)
BUN SERPL-MCNC: 18 MG/DL (ref 7–30)
CALCIUM SERPL-MCNC: 9.3 MG/DL (ref 8.5–10.1)
CHLORIDE SERPL-SCNC: 102 MMOL/L (ref 94–109)
CO2 SERPL-SCNC: 27 MMOL/L (ref 20–32)
CREAT SERPL-MCNC: 0.93 MG/DL (ref 0.52–1.04)
GFR SERPL CREATININE-BSD FRML MDRD: 58 ML/MIN/1.7M2
GLUCOSE SERPL-MCNC: 102 MG/DL (ref 70–99)
POTASSIUM SERPL-SCNC: 3.6 MMOL/L (ref 3.4–5.3)
SODIUM SERPL-SCNC: 139 MMOL/L (ref 133–144)

## 2018-11-30 ASSESSMENT — ACTIVITIES OF DAILY LIVING (ADL): DEPENDENT_IADLS:: TRANSPORTATION

## 2018-11-30 NOTE — PROGRESS NOTES
Clinic Care Coordination Contact  OUTREACH    Referral Information:  Referral Source: PCP    Primary Diagnosis: CHF    Chief Complaint   Patient presents with     Clinic Care Coordination - Face To Face     support, appetite   Clinic Utilization  Difficulty keeping appointments:: No  Compliance Concerns: No  No-Show Concerns: No  No PCP office visit in Past Year: No  Utilization    Last refreshed: 11/30/2018  8:05 AM:  No Show Count (past year) 0       Last refreshed: 11/30/2018  8:05 AM:  ED visits 0       Last refreshed: 11/30/2018  8:05 AM:  Hospital admissions 2          Current as of: 11/30/2018  8:05 AM         Clinical Concerns:  Current Medical Concerns:    Patient Active Problem List   Diagnosis     Obesity     Hypothyroidism     Benign neoplasm of colon     Mild Depression [296.31]     Obstructive sleep apnea     * * * SBE PROPHYLAXIS * * *     Degeneration of lumbar or lumbosacral intervertebral disc     Pain in joint, ankle and foot     Arthrodesis status     HYPERLIPIDEMIA LDL GOAL <100     Hypertension goal BP (blood pressure) < 140/90     Health Care Home     Osteopenia     Malignant neoplasm of female breast (H)     Impaired fasting glucose     ACP (advance care planning)     History of melanoma     Panic disorder without agoraphobia     Humerus fracture     Arthritis     PONV (postoperative nausea and vomiting)     S/P total hip arthroplasty     Constipation     DVT prophylaxis     Anticoagulation management encounter     Physical deconditioning     Periprosthetic fracture around internal prosthetic left hip joint (H)     Chronic pain syndrome - hips     Fracture of greater trochanter of right femur 11/6/2016, closed, with routine healing, subsequent encounter     Anxiety     Osteoarthritis of multiple joints, unspecified osteoarthritis type     Anemia due to blood loss, acute     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 Patient then discusses her past medical history with writer. (Joint replacements, recent hospitalization, left leg swelling), and Thanksgiving holiday (brought  home). Patient expresses fear of the unknown. Patient declines any resources for transportation, caregiver assurance, or therapists at this time, but open to discuss \"maybe down the road\".  Patient reports she doesn't know who to turn to sometimes when she needs help as there are so many medical places involved with her and husbands care.   Education provided to patient: Clinic after hours, urgent care utilization.     Clinical Pathway: None    Medication Management:  Patient is independent with management of her medications.     Functional Status:  Dependent ADLs:: Ambulation-walker  Dependent IADLs:: Transportation  Bed or wheelchair confined:: No  Mobility Status: Independent w/Device    Living Situation:  Current living arrangement:: I live in a private home with spouse. Spouse is currently hospitalized and plans to transition into a TCU.   Type of residence:: Private home - stairs    Diet/Exercise/Sleep:   Not addressed during this outreach    Transportation:  Transportation concerns: No  Transportation means:: Accessible car, Family, Friend     Psychosocial:  Mental health DX:: Yes  Mental health DX how managed:: Medication  Mental health management concern (GOAL):: No  Informal Support system:: Children, Gabriella based, Neighbors     Financial/Insurance:    ARE.      Resources and Interventions:  Current Resources: No formal supports in place. Patient reports volunteering at [employer].   Community Resources: Home Care - to be starting. Referral placed by Primary " Care Provider.     Equipment Currently Used at Home: walker, standard    Advance Care Plan/Directive  Advanced Care Plans/Directives on file:: Yes  Type Advanced Care Plans/Directives: POLST    Referrals Placed: Home Care (by PCP)     Goals:   Goals        General    Healthy Coping     Notes - Note created  11/30/2018  8:19 AM by Francisca Long RN    Goal Statement: I want to be healthy, good for my family, children  Measure of Success: Health status, ability to care for self, spouse  Supportive Steps to Achieve: Continued CCRN support. Provided resources. Take medications as prescribed. Follow up with providers as recommended.   Barriers: Mobility  Strengths: Strong flo  Date to Achieve By: TBD  Patient expressed understanding of goal: Yes            Patient would like to be well enough to work on her office and get to some of her paperwork/filing completed.     Patient/Caregiver understanding: Patient verbalized understanding and denies any additional questions or concerns at this time. RNCC engaged in AIDET communications during encounter.     Outreach Frequency: weekly  Future Appointments              In 3 weeks Yessica Lora MD [facility]          Plan:     Patient was provided with this writers contact information and encouraged to call with questions, concerns, support needs.     RNCC to mail introduction letter, complex care plan and medication list to patient.     RNCC to follow up with patient next week to provide caregiver/emotional support, follow up with nutrition status, health problems (CHF), and other concerns.     Francisca Long RN Care Coordinator  [facility]  Email: [email or web address]  Phone: [phone]

## 2018-12-04 ENCOUNTER — PATIENT OUTREACH (OUTPATIENT)
Dept: CARE COORDINATION | Facility: CLINIC | Age: 83
End: 2018-12-04

## 2018-12-04 NOTE — PROGRESS NOTES
Clinic Care Coordination Contact  [facility]/Voicemail    Clinical Data: Care Coordinator Outreach. RNCC calling to follow up with patient regarding nutrition status, health (CHF), and other concerns.   Outreach attempted x 1.  Left message on voicemail with call back information and requested return call.  Plan: Care Coordinator mailed out care coordination introduction letter on 11/30. Care Coordinator will try to reach patient again in 1-2 business days.    Francisca Long RN Care Coordinator  [facility]  Email: [email or web address]  Phone: [phone]

## 2018-12-04 NOTE — PROGRESS NOTES
Clinic Care Coordination Contact  OUTREACH    Chief Complaint   Patient presents with     Clinic Care Coordination - Follow-up     Support, appetite      Universal Utilization:   Utilization    Last refreshed: 12/3/2018  9:11 AM:  No Show Count (past year) 0       Last refreshed: 12/3/2018  9:11 AM:  ED visits 0       Last refreshed: 12/3/2018  9:11 AM:  Hospital admissions 2          Current as of: 12/3/2018  9:11 AM           Clinical Concerns:  Current Medical Concerns:  CHF. Patient reports weighing self daily, with no changes in weight. CHF action plan in nearby folder. Reports her arthritis is up and down, stiff in the mornings but better.     Education Provided to patient: Taking care of self to meet goals, prevent hospitalization. Calling family to arrange next few days of transportation to coordinate scheduling homecare visit. Encouraged patient to call homecare to schedule visit, warned will need new order if continues to delay.      Health Maintenance Reviewed:    Health Maintenance Topics with due status: Overdue       Topic Date Due    URINE DRUG SCREEN Q1 YR 04/16/1950    MEDICARE ANNUAL WELLNESS VISIT 12/11/2015       Clinical Pathway: None    Functional Status:   Patient working on doing her laundry. Still has not yet started to complete paperwork/filing in her office.     Living Situation:   Patient lives in private home. Spouse is currently hospitalized (palliative cares, feeding tube, trach, can't see), visits him daily.     Diet/Exercise/Sleep:   Patient reports she is sleeping better, but still does not want to eat. Drinks an ensure supplement daily. Kids keep on her and she has a meal at the hospital. Reports drinking plenty of water. Reports lack of weight loss with decreased appetite is discouraging.     Transportation:   Patient has not yet started homecare services. Patient reports they have contacted her twice, however does not know what her transportation schedule is like when she will be  able to see spouse in hospital.       Psychosocial:  Emotional support provided to patient who thanked writer for call.     Goals:   Goals        General    Healthy Coping     Notes - Note created  11/30/2018  8:19 AM by Francisca Long RN    Goal Statement: I want to be healthy, good for my family, children  Measure of Success: Health status, ability to care for self, spouse  Supportive Steps to Achieve: Continued CCRN support. Provided resources. Take medications as prescribed. Follow up with providers as recommended.   Barriers: Mobility  Strengths: Strong flo  Date to Achieve By: TBD  Patient expressed understanding of goal: Yes              Patient/Caregiver understanding: Patient verbalized understanding and denies any additional questions or concerns at this time. RNCC engaged in AIDET communications during encounter.       Future Appointments              In 2 weeks [facility]essica L[facility] MD [facility]          Plan: Patient to schedule an appointment with homecare. Patient to keep appointments as scheduled, and follow up with providers as recommended. Patient to call writer with questions, concerns, support needs. RNCC to outreach to patient again next week per request to follow up regarding homecare services.     Francisca Long RN Care Coordinator  [facility] [place] [facility]  Email: [email or web address]  Phone: [phone]

## 2018-12-11 ENCOUNTER — DOCUMENTATION ONLY (OUTPATIENT)
Dept: CARE COORDINATION | Facility: CLINIC | Age: 83
End: 2018-12-11

## 2018-12-11 ENCOUNTER — PATIENT OUTREACH (OUTPATIENT)
Dept: CARE COORDINATION | Facility: CLINIC | Age: 83
End: 2018-12-11

## 2018-12-11 NOTE — PROGRESS NOTES
Clinic Care Coordination Contact  [facility]/Voicemail    Clinical Data: Care Coordinator Outreach  Outreach attempted x 1.  Left message on voicemail with call back information and requested return call. Per chart review, patient has been active with [facility] since 09/29. Last visit 12/07, next visit 12/13.  Eddie Morrison. Hospice episode opened but not completed dated 12/07.   Plan: Care Coordinator mailed out care coordination introduction letter on 11/30. Care Coordinator will try to reach patient again in 3-5 business days.    Francisca Long RN Care Coordinator  [facility]  Email: [email or web address]  Phone: [phone]

## 2018-12-11 NOTE — PROGRESS NOTES
..[facility] utilizes an encounter to take the place of a direct phone call to your office. Please take a moment to review the below request. Please reply or route message to author of this encounter.  Message will act as a verbal OK of orders requested below. Thank you.    Skilled Nursing 2x week for 1 week, then 1x week for 4 weeks and 4prn    Thank you,  Rosie Velasco, RN  [phone]

## 2018-12-13 DIAGNOSIS — E03.9 HYPOTHYROIDISM, UNSPECIFIED TYPE: ICD-10-CM

## 2018-12-13 NOTE — TELEPHONE ENCOUNTER
"Requested Prescriptions   Pending Prescriptions Disp Refills     levothyroxine (SYNTHROID/LEVOTHROID) 88 MCG tablet [Pharmacy Med Name: LEVOTHYROXINE SODIUM 88MCG TABS]  Last Written Prescription Date:  11/19/17  Last Fill Quantity: 90,  # refills: 3   Last office visit: 11/29/2018 with prescribing provider:  Yessica Lora MD    Future Office Visit:   Next 5 appointments (look out 90 days)    Dec 21, 2018  4:30 PM CST  Office Visit with Yessica Lora MD  [address] [address]  [phone]          90 tablet 3     Sig: TAKE ONE TABLET BY MOUTH EVERY DAY    Thyroid Protocol Passed - 12/13/2018 10:48 AM       Passed - Patient is 12 years or older       Passed - Recent (12 mo) or future (30 days) visit within the authorizing provider's specialty    Patient had office visit in the last 12 months or has a visit in the next 30 days with authorizing provider or within the authorizing provider's specialty.  See \"Patient Info\" tab in inbasket, or \"Choose Columns\" in Meds & Orders section of the refill encounter.             Passed - Normal TSH on file in past 12 months    Recent Labs   Lab Test 08/23/18  1220   TSH 3.70             Passed - No active pregnancy on record    If patient is pregnant or has had a positive pregnancy test, please check TSH.         Passed - No positive pregnancy test in past 12 months    If patient is pregnant or has had a positive pregnancy test, please check TSH.            "

## 2018-12-14 ENCOUNTER — TELEPHONE (OUTPATIENT)
Dept: FAMILY MEDICINE | Facility: CLINIC | Age: 83
End: 2018-12-14

## 2018-12-14 NOTE — TELEPHONE ENCOUNTER
Reason for call:  Rosie the  home card nurse is calling    Symptom or request: pt reportsing coughing up yellow green sputum from lungs. More than usual. Pt is worried about poss Pneu again. Rosie saw the pt this morning and lungs sounded good and did not hear crackles or flem. Pt called the nurse and then that is when she had more of the mucus. currnetly no Abx at this time. No Temp. Was 98.2 this AM.    Duration (how long have symptoms been present):     Have you been treated for this before? Yes    Additional comments:     Phone Number patient can be reached at:  [phone] Mirian call    Best Time:  any    Can we leave a detailed message on this number:  YES    Call taken on 12/14/2018 at 2:42 PM by Maria Isabel Lemon

## 2018-12-14 NOTE — TELEPHONE ENCOUNTER
TERRANCE    Spoke with nurse. VS good today. No fever. Did not hear any concerning lung sounds. Had advised patient to go to  C over the weekend if symptoms increase.    Consuelo Park RN

## 2018-12-17 RX ORDER — LEVOTHYROXINE SODIUM 88 UG/1
TABLET ORAL
Qty: 90 TABLET | Refills: 2 | Status: SHIPPED | OUTPATIENT
Start: 2018-12-17 | End: 2019-09-03

## 2018-12-17 NOTE — TELEPHONE ENCOUNTER
Prescription approved per [facility] Refill Protocol.  Dolores Wong RN  Message handled by Nurse Triage.

## 2018-12-17 NOTE — PROGRESS NOTES
Clinic Care Coordination Contact  [facility]/Voicemail       Clinical Data: Care Coordinator Outreach  Outreach attempted x 2.  Left message on voicemail with call back information and requested return call.  Plan: Care Coordinator mailed out care coordination introduction letter on 11/30. Care Coordinator will try to reach patient again in 3-5 business days.    Francisca Long RN Care Coordinator  [facility]  Email: [email or web address]  Phone: [phone]

## 2018-12-18 ENCOUNTER — PATIENT OUTREACH (OUTPATIENT)
Dept: CARE COORDINATION | Facility: CLINIC | Age: 83
End: 2018-12-18

## 2018-12-18 ENCOUNTER — TELEPHONE (OUTPATIENT)
Dept: FAMILY MEDICINE | Facility: CLINIC | Age: 83
End: 2018-12-18

## 2018-12-18 NOTE — TELEPHONE ENCOUNTER
Pt calls.    She has had a cold.  She has a bad cough in the evening and at night and she is worn out.  She has a nurse who sees her from home care.  No fever.  She has congestion in her chest.  She is coughing up a thick, yellow phlegm with maybe a little darker at times.  She has SOB, but she says she is always SOB.  She says she has had this going on for a long time.    She has an appt scheduled on Friday.      Advised she could call early tomorrow to see if she could get in to a partners same day appt or go to Urgent Care.  She advised she will keep her appt for Friday for now and be seen earlier if worse.

## 2018-12-18 NOTE — PROGRESS NOTES
Clinic Care Coordination Contact  OUTREACH     Universal Utilization:   Utilization    Last refreshed: 12/17/2018  5:54 PM:  Hospital Admissions 2           Last refreshed: 12/17/2018  5:54 PM:  ED Visits 0           Last refreshed: 12/17/2018  5:54 PM:  No Show Count (past year) 0              Current as of: 12/17/2018  5:54 PM            Clinical Concerns:  Current Medical Concerns:  Patient reports feeling down, depressed with the holidays coming up and  being at the hospital. Reports taking medications as prescribed. Last took Xanax about ten days ago for anxiety at bedtime. Hopes to start PT on shoulders soon. Receiving home care. Has started filing and her paperwork and just goes one day at a time. Appointment scheduled Friday with Primary Care Provider to address current cold. Denies any increase in shortness of breath and reports is at baseline. Per chart review patient has a [employer] [facility] Care Coordinator.      Diet/Exercise/Sleep:  Patient reports she is taking her ensure supplement daily, however still doesn't have much of an appetite. Discouraged there are no changes in weight with decreased appetite.      Goals:   Goals        General    Healthy Coping     Notes - Note created  11/30/2018  8:19 AM by Francisca Long RN    Goal Statement: I want to be healthy, good for my family, children  Measure of Success: Health status, ability to care for self, spouse  Supportive Steps to Achieve: Continued CCRN support. Provided resources. Take medications as prescribed. Follow up with providers as recommended.   Barriers: Mobility  Strengths: Strong flo  Date to Achieve By: TBD  Patient expressed understanding of goal: Yes              Patient/Caregiver understanding: Patient verbalized understanding and denies any additional questions or concerns at this time. RNCC engaged in AIDET communications during encounter.     Future Appointments              In 3 days Yessica Lora MD [facility]  [facility]        Plan: Patient will call RNCC with questions, concerns, support needs. RNCC will follow up with patient again in 3 weeks. RNCC sent secure email to [facility]  Eddie Morrison requesting updates.     Francisca Long RN Care Coordinator  [facility]  Email: [email or web address]  Phone: [phone]

## 2018-12-21 ENCOUNTER — OFFICE VISIT (OUTPATIENT)
Dept: FAMILY MEDICINE | Facility: CLINIC | Age: 83
End: 2018-12-21
Payer: COMMERCIAL

## 2018-12-21 ENCOUNTER — TELEPHONE (OUTPATIENT)
Dept: FAMILY MEDICINE | Facility: CLINIC | Age: 83
End: 2018-12-21

## 2018-12-21 VITALS
HEART RATE: 76 BPM | HEIGHT: 63 IN | TEMPERATURE: 97.9 F | DIASTOLIC BLOOD PRESSURE: 70 MMHG | WEIGHT: 273.3 LBS | SYSTOLIC BLOOD PRESSURE: 122 MMHG | RESPIRATION RATE: 18 BRPM | BODY MASS INDEX: 48.43 KG/M2 | OXYGEN SATURATION: 94 %

## 2018-12-21 DIAGNOSIS — Z53.9 DIAGNOSIS NOT YET DEFINED: Primary | ICD-10-CM

## 2018-12-21 DIAGNOSIS — I10 HYPERTENSION GOAL BP (BLOOD PRESSURE) < 140/90: ICD-10-CM

## 2018-12-21 DIAGNOSIS — E66.01 MORBID OBESITY (H): ICD-10-CM

## 2018-12-21 DIAGNOSIS — F32.A MILD DEPRESSION: ICD-10-CM

## 2018-12-21 DIAGNOSIS — M15.9 OSTEOARTHRITIS OF MULTIPLE JOINTS, UNSPECIFIED OSTEOARTHRITIS TYPE: ICD-10-CM

## 2018-12-21 DIAGNOSIS — R60.9 EDEMA, UNSPECIFIED TYPE: ICD-10-CM

## 2018-12-21 DIAGNOSIS — R06.09 DOE (DYSPNEA ON EXERTION): Primary | ICD-10-CM

## 2018-12-21 DIAGNOSIS — I50.32 CHRONIC DIASTOLIC CONGESTIVE HEART FAILURE (H): ICD-10-CM

## 2018-12-21 PROCEDURE — 99214 OFFICE O/P EST MOD 30 MIN: CPT | Performed by: FAMILY MEDICINE

## 2018-12-21 PROCEDURE — 36415 COLL VENOUS BLD VENIPUNCTURE: CPT | Performed by: FAMILY MEDICINE

## 2018-12-21 PROCEDURE — G0180 MD CERTIFICATION HHA PATIENT: HCPCS | Performed by: FAMILY MEDICINE

## 2018-12-21 PROCEDURE — 80048 BASIC METABOLIC PNL TOTAL CA: CPT | Performed by: FAMILY MEDICINE

## 2018-12-21 ASSESSMENT — MIFFLIN-ST. JEOR: SCORE: 1668.81

## 2018-12-21 NOTE — PROGRESS NOTES
SUBJECTIVE:   Carol Merida is a 83 year old female who presents to clinic today for the following health issues:      RESPIRATORY SYMPTOMS      Duration: x several months     Description  cough and wheezing    Severity: 7/10- comes and goes     Accompanying signs and symptoms: SOB    History (predisposing factors):  none    Precipitating or alleviating factors: None    Therapies tried and outcome:  none    Here to follow up with SOB.        Problem list and histories reviewed & adjusted, as indicated.  Additional history:     See under ROS     Patient Active Problem List   Diagnosis     Obesity     Hypothyroidism     Benign neoplasm of colon     Mild Depression [296.31]     Obstructive sleep apnea     * * * SBE PROPHYLAXIS * * *     Degeneration of lumbar or lumbosacral intervertebral disc     Pain in joint, ankle and foot     Arthrodesis status     HYPERLIPIDEMIA LDL GOAL <100     Hypertension goal BP (blood pressure) < 140/90     Health Care Home     Osteopenia     Malignant neoplasm of female breast (H)     Impaired fasting glucose     ACP (advance care planning)     History of melanoma     Panic disorder without agoraphobia     Humerus fracture     Arthritis     PONV (postoperative nausea and vomiting)     S/P total hip arthroplasty     Constipation     DVT prophylaxis     Anticoagulation management encounter     Physical deconditioning     Periprosthetic fracture around internal prosthetic left hip joint (H)     Chronic pain syndrome - hips     Fracture of greater trochanter of right femur 11/6/2016, closed, with routine healing, subsequent encounter     Anxiety     Osteoarthritis of multiple joints, unspecified osteoarthritis type     Anemia due to blood loss, acute     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     Long term (current) use of anticoagulants     Vitamin D deficiency     Pneumonia     Elevated BMI (H)     CHF (congestive heart failure) (H)     Mild depression (H)       Current  Outpatient Medications   Medication Sig Dispense Refill     acetaminophen (TYLENOL) 325 MG tablet Take 3 tablets (975 mg) by mouth every 8 hours 500 tablet 1     albuterol (PROAIR HFA/PROVENTIL HFA/VENTOLIN HFA) 108 (90 Base) MCG/ACT inhaler Inhale 2 puffs into the lungs every 4 hours as needed for shortness of breath / dyspnea or wheezing 1 Inhaler 0     ALPRAZolam (XANAX) 0.25 MG tablet Take 1 tablet (0.25 mg) by mouth 2 times daily as needed for anxiety Don't take with the oxycodone 10 tablet 0     amLODIPine (NORVASC) 5 MG tablet Take 1 tablet (5 mg) by mouth daily 90 tablet 1     calcium-vitamin D (CALCIUM 600 + D) 600-400 MG-UNIT per tablet Take 1 tablet by mouth every evening  100 tablet 3     FLUoxetine (PROZAC) 40 MG capsule Take 1 capsule (40 mg) by mouth daily 90 capsule 1     fluticasone (FLONASE) 50 MCG/ACT spray Spray 1 spray into both nostrils daily as needed 1 Bottle 5     furosemide (LASIX) 40 MG tablet Take 1 tablet (40 mg) by mouth daily 31 tablet 0     gabapentin (NEURONTIN) 300 MG capsule Take 1 capsule (300 mg) by mouth 3 times daily 270 capsule 0     levothyroxine (SYNTHROID/LEVOTHROID) 88 MCG tablet TAKE ONE TABLET BY MOUTH EVERY DAY 90 tablet 2     magnesium hydroxide (MILK OF MAGNESIA) 400 MG/5ML suspension Take 30 mLs by mouth daily as needed        Nystatin (NYSTOP) 599147 UNIT/GM POWD Apply tid to affectead area prn 60 g 1     order for DME Equipment being ordered: left breast prosthesis. Mastectomy bras (up to 4) 4 Device 0     senna (SENOKOT) 8.6 MG tablet Take 1 tablet by mouth daily       simvastatin (ZOCOR) 10 MG tablet TAKE 1 TABLET BY MOUTH AT BEDTIME 90 tablet 1     triamcinolone (KENALOG) 0.1 % cream Apply sparingly to affected area two times daily as needed for itching. 30 g 0     benzonatate (TESSALON) 100 MG capsule Take 1 capsule (100 mg) by mouth 3 times daily as needed for cough (Patient not taking: Reported on 12/21/2018) 42 capsule 0         Reviewed and updated as  "needed this visit by clinical staff  Tobacco  Allergies  Meds  Med Hx  Surg Hx  Fam Hx  Soc Hx      Reviewed and updated as needed this visit by Provider         ROS:  CONSTITUTIONAL:NEGATIVE for fever, chills, change in weight  ENT/MOUTH: NEGATIVE for ear, mouth and throat problems  RESP:see below.  CV: NEGATIVE for chest pain, palpitations or peripheral edema  MUSCULOSKELETAL: see below  PSYCHIATRIC: stresses with her  at [place] and her current health.     Home Care nurse comes to the house.    She is noting her arthritis is real bad.    Not moving around much.     Notes cough.   Improved. Comes and goes.   Notes had pneumonia last summer. Did not know she had it.     Also recalls sick and things in her chest in the distant past with cold. Was in a study then; cannot recall.     Left leg continues to give trouble; will dry up and hurt in the back.     Will get leg up in recliner.     OBJECTIVE:     /70 (BP Location: Right arm, Cuff Size: Adult Large)   Pulse 76   Temp 97.9  F (36.6  C) (Oral)   Resp 18   Ht 1.6 m (5' 3\")   Wt 124 kg (273 lb 4.8 oz)   SpO2 94%   BMI 48.41 kg/m    Body mass index is 48.41 kg/m .  GENERAL APPEARANCE: alert and no distress  HENT: ear canals and TM's normal and nose and mouth without ulcers or lesions  NECK: no adenopathy  RESP: lungs clear to auscultation - no rales, rhonchi or wheezes  CV: regular rates and rhythm. Tender to palpate anterior left chest.  MS: ankles do have some swelling. There is mild erythema of the left.   PSYCH: mentation appears normal and affect mildly concerned.    Reviewed her last CArdiology note (8/18)    ASSESSMENT/PLAN:     1. PETTY (dyspnea on exertion)  Suspect related to deconditioning primarily, but also may have some heart and lung contribution.     2. Chronic diastolic congestive heart failure (H)  Has seen Cardiology. In August, they did nto feel her symptoms were related to HF, but more to slow to resolve pneumonia.  She " has recently been improving some with diuretic; see recent hospitalization.  At this time, encourage her to make a follow up appointment with Cardiology.     3. Edema, unspecified type  Some improvement on lasix. She does put her feet up, but they are still dependent. Discussed getting up higher and compression stockings.   - Basic metabolic panel    4. Elevated BMI (H)  Encourage attempts to lose weight.     5. Hypertension goal BP (blood pressure) < 140/90  Controlled.     6. Osteoarthritis of multiple joints, unspecified osteoarthritis type  Discussed tylenol and topical things.     7. Mild depression (H)  She does have multiple stressors.   Does have good support network.  Recently have Home Care involved as well to assist.       Return to clinic 6 weeks or earlier prn.    Yessica Lora MD, MD  [facility]

## 2018-12-21 NOTE — TELEPHONE ENCOUNTER
Received Home Health Certification and Plan of Care, placed in Dr. Lora's in basket.    Please review, sign and fax back to [phone].

## 2018-12-21 NOTE — PATIENT INSTRUCTIONS
Try to get your feet up above the level of your heart when you are reclining.  Compression stockings might be helpful.

## 2018-12-21 NOTE — TELEPHONE ENCOUNTER
Home Health Certification and Plan of Care has been faxed to [phone] and sent to [facility].   Maria Antonia Mendez, CMA

## 2018-12-21 NOTE — LETTER
December 26, 2018      Carol Merida  [address] [facility] [address]        Dear Ms. Carol HARDY Fuad,    Enclosed is a copy of your recent results.  All is normal.    GFR stands for glomerular filtration rate (this is in regards to the kidney). They are now showing an estimate of this, based on the actual creatinine, age, gender, and race. This is commonly lower as one gets older. Your level of hydration can have an impact also.  If this is low, we should be aggressive with managing high blood pressure or high cholesterol.    I do hope you are having an OK holiday season. Thinking of you!    Sincerely,     Yessica Lora MD

## 2018-12-22 LAB
ANION GAP SERPL CALCULATED.3IONS-SCNC: 7 MMOL/L (ref 3–14)
BUN SERPL-MCNC: 16 MG/DL (ref 7–30)
CALCIUM SERPL-MCNC: 9.2 MG/DL (ref 8.5–10.1)
CHLORIDE SERPL-SCNC: 101 MMOL/L (ref 94–109)
CO2 SERPL-SCNC: 28 MMOL/L (ref 20–32)
CREAT SERPL-MCNC: 0.8 MG/DL (ref 0.52–1.04)
GFR SERPL CREATININE-BSD FRML MDRD: 68 ML/MIN/{1.73_M2}
GLUCOSE SERPL-MCNC: 95 MG/DL (ref 70–99)
POTASSIUM SERPL-SCNC: 3.7 MMOL/L (ref 3.4–5.3)
SODIUM SERPL-SCNC: 136 MMOL/L (ref 133–144)

## 2019-01-04 ENCOUNTER — OFFICE VISIT (OUTPATIENT)
Dept: CARDIOLOGY | Facility: CLINIC | Age: 84
End: 2019-01-04
Attending: FAMILY MEDICINE
Payer: COMMERCIAL

## 2019-01-04 VITALS
BODY MASS INDEX: 48.53 KG/M2 | HEART RATE: 72 BPM | DIASTOLIC BLOOD PRESSURE: 70 MMHG | SYSTOLIC BLOOD PRESSURE: 110 MMHG | HEIGHT: 63 IN | WEIGHT: 273.9 LBS

## 2019-01-04 DIAGNOSIS — I50.31 ACUTE DIASTOLIC CONGESTIVE HEART FAILURE (H): ICD-10-CM

## 2019-01-04 DIAGNOSIS — I51.89 DIASTOLIC DYSFUNCTION: Primary | ICD-10-CM

## 2019-01-04 DIAGNOSIS — I51.89 DIASTOLIC DYSFUNCTION: ICD-10-CM

## 2019-01-04 LAB — NT-PROBNP SERPL-MCNC: 36 PG/ML (ref 0–450)

## 2019-01-04 PROCEDURE — 99214 OFFICE O/P EST MOD 30 MIN: CPT | Performed by: PHYSICIAN ASSISTANT

## 2019-01-04 PROCEDURE — 36415 COLL VENOUS BLD VENIPUNCTURE: CPT | Performed by: PHYSICIAN ASSISTANT

## 2019-01-04 PROCEDURE — 83880 ASSAY OF NATRIURETIC PEPTIDE: CPT | Performed by: PHYSICIAN ASSISTANT

## 2019-01-04 RX ORDER — HYDROCHLOROTHIAZIDE 25 MG/1
25 TABLET ORAL DAILY
Qty: 30 TABLET | Refills: 3 | Status: SHIPPED | OUTPATIENT
Start: 2019-01-04 | End: 2020-06-14

## 2019-01-04 RX ORDER — FUROSEMIDE 40 MG
40 TABLET ORAL DAILY
Qty: 30 TABLET | Refills: 11 | Status: SHIPPED | OUTPATIENT
Start: 2019-01-04 | End: 2020-05-27

## 2019-01-04 ASSESSMENT — MIFFLIN-ST. JEOR: SCORE: 1671.53

## 2019-01-04 NOTE — LETTER
2019    [place]  [address]  [facility] [address]    RE: Carol Merida       Dear Colleague,    I had the pleasure of seeing Carol Merida in the [facility].    CARDIOLOGY CLINIC PROGRESS NOTE    DOS: 2019      Carol Merida  : 1935, 83 year old  MRN: [id number]      History:   I had the pleasure of meeting Carol Merida today in the cardiology clinic.  She has followed with Jessica Hazel and Samir in the remote past, but most recently was seen by Dr. Vincent Oliveira 18.       Everette is a pleasant 83-year-old lady with diastolic dysfunction, morbid obesity, HTN, hypothyroidism, h/o musculoskeletal chest discomfort, anxiety and depression, anemia, osteoarthritis, sleep apnea.     In  she had chest discomfort that was suggestive for angina and a Cardiolite stress test was subsequently performed that demonstrated a very small reversible distal anterior and apical defect consistent with possible  ischemia.  However, soft tissue attenuation artifact was also noted.  Dr. Hazel initiated medical therapy and her symptoms actually improved significantly.  Eventually, she underwent coronary angiography in  which fortunately did not show significant obstructive coronary artery disease.     She had pneumonia in the summer.  She had a cough and increased shortness of breath then.  She does not recall if she had fevers. She was admitted for a week and treated for pneumonia.     She saw Dr. Oliveira 2018 and he felt her PETTY was more due to slow-to-resolve pneumonia.     -18 hospital admission for increased left LE edema (and some RLE) and some chronic PETTY.  She was diagnosed with LLE cellulitis and also mild acute diastolic CHF exacerbation.   She had self stopped Lasix in 2018 and had increased sodium intake prior to admission.  She was restarted on Lasix 40 mg daily, and started abx for cellulitis.  Echo was done 18 that showed LVEF 55-60% w/  grade I diastolic dysfunction.   Admit wt 274#, discharge wt 273#.    12/21/18 she saw PCP due to continued PETTY and cough.  There was no change/increase.  She referred the patient back to cards as she suspects cardiac etiology given the CXR findings.        Interval History:   She presents today.   She continues to have PETTY and cough.  She has some orthopnea.  She sleeps with her head of the bed up.  Her breathing and cough have not improved after restarting Lasix.   The LLE has improved.  But the leg is still quite tender.  She has left leg discomfort since she had left hip surgery at least 3 years ago.  Her right leg is not very swollen.   She was never a smoker, but her dad smoked and she had a lot of second hand smoke exposure.   She is obese and deconditioned as well.   She is struggling with some depression as well as her  has been in the hospital for 2 months.   Monday she is having steroid injection in the hip and shoulder.       ROS:  Skin:  Negative     Eyes:  Positive for glasses  ENT:  Negative    Respiratory:  Positive for dyspnea on exertion;shortness of breath;sleep apnea;CPAP;dyspnea at rest  Cardiovascular:    edema;Positive for;fatigue  Gastroenterology: Negative    Genitourinary:  Negative    Musculoskeletal:  Positive for arthritis;joint pain  Neurologic:  Positive for numbness or tingling of hands  Psychiatric:  Negative    Heme/Lymph/Imm:  Negative    Endocrine:  Positive for thyroid disorder    PAST MEDICAL HISTORY:  Past Medical History:   Diagnosis Date     Abnormal stress test     small area on stress thalium ?2003; but normal angiogram 2012     Cardiac dysrhythmia, unspecified     irregular heartbeat     CHF (congestive heart failure) (H) 6/18/2018     Hyperlipidemia LDL goal <100 2/10/2010     Hypertension goal BP (blood pressure) < 140/90 7/18/2010     Hypothyroid      Malignant neoplasm of breast (female), unspecified site 2005    infltrating ductal     MEDICAL HISTORY OF -      fx humerus Sept 2013.     Melanoma of skin, site unspecified      NONSPECIFIC MEDICAL HISTORY 04    fx fifth finger right hand     Obstructive sleep apnea (adult) (pediatric) 8/25/2006    CPAP      Osteoarthritis      Other chronic pain     Joint pain for many years.     Other osteoporosis      PONV (postoperative nausea and vomiting)      Tubular adenoma in colon 9/01    colonoscopy due 2004       PAST SURGICAL HISTORY:  Past Surgical History:   Procedure Laterality Date     ARTHROPLASTY HIP Left 7/6/2015    Procedure: ARTHROPLASTY HIP;  Surgeon: Junior Giordano MD;  Location: [facility] OR     ARTHROPLASTY HIP Right 11/2/2016    Procedure: ARTHROPLASTY HIP;  Surgeon: Junior Giordano MD;  Location: [facility] OR     ARTHROPLASTY REVISION HIP Left 7/22/2015    Procedure: ARTHROPLASTY REVISION HIP;  Surgeon: Junior Giordano MD;  Location: [facility] OR     C NONSPECIFIC PROCEDURE      Knee replacement x 2 (B)     C NONSPECIFIC PROCEDURE      s/p Tonsillectomy (college)     C NONSPECIFIC PROCEDURE      s/p Appy (high school)     C NONSPECIFIC PROCEDURE      Melanoma removed with sentinel node bx     C NONSPECIFIC PROCEDURE       bilateral cataract     COLONOSCOPY  9/01    tubular adenoma; repeat 3 years.     COLONOSCOPY  9/04    normal; repeat 5 years ([facility])     HYSTERECTOMY, MARGAUX  summer 2004    and BSO     SURGICAL HISTORY OF -   9/05    left mastectomy     SURGICAL HISTORY OF -   2008    right ankle surgery; triple arthrodesis     SURGICAL HISTORY OF -   5/09    right ankle surgery; triple arthrodesis and deltoid reconstruction; possible other     SURGICAL HISTORY OF -   2/10    removal of hardware from right ankle       SOCIAL HISTORY:  Social History     Socioeconomic History     Marital status:      Spouse name: Yudi     Number of children: 4     Years of education: 16     Highest education level: Not on file   Social Needs     Financial resource strain: Not on file     Food insecurity - worry: Not on file      Food insecurity - inability: Not on file     Transportation needs - medical: Not on file     Transportation needs - non-medical: Not on file   Occupational History     Occupation: [employer]     Comment: Retired   Tobacco Use     Smoking status: Never Smoker     Smokeless tobacco: Never Used   Substance and Sexual Activity     Alcohol use: Yes     Alcohol/week: 0.0 - 0.5 oz     Comment: 1 glass wine weekly     Drug use: No     Sexual activity: Yes     Partners: Male   Other Topics Concern      Service Not Asked     Blood Transfusions Not Asked     Caffeine Concern Not Asked     Occupational Exposure Not Asked     Hobby Hazards Not Asked     Sleep Concern Not Asked     Stress Concern Not Asked     Weight Concern No     Comment: fruits and veggies.     Special Diet Not Asked     Back Care Not Asked     Exercise No     Bike Helmet Not Asked     Seat Belt Not Asked     Self-Exams Not Asked     Parent/sibling w/ CABG, MI or angioplasty before 65F 55M? No   Social History Narrative     Not on file       FAMILY HISTORY:  Family History   Problem Relation Age of Onset     No Known Problems Brother      Arthritis Mother      Hypertension Father      Cancer Father         lung     Cancer Grandchild         terratoma tumors     Breast Cancer Daughter        MEDS:   Current Outpatient Medications on File Prior to Visit:  acetaminophen (TYLENOL) 325 MG tablet Take 3 tablets (975 mg) by mouth every 8 hours   albuterol (PROAIR HFA/PROVENTIL HFA/VENTOLIN HFA) 108 (90 Base) MCG/ACT inhaler Inhale 2 puffs into the lungs every 4 hours as needed for shortness of breath / dyspnea or wheezing   ALPRAZolam (XANAX) 0.25 MG tablet Take 1 tablet (0.25 mg) by mouth 2 times daily as needed for anxiety Don't take with the oxycodone   amLODIPine (NORVASC) 5 MG tablet Take 1 tablet (5 mg) by mouth daily   calcium-vitamin D (CALCIUM 600 + D) 600-400 MG-UNIT per tablet Take 1 tablet by mouth every evening    FLUoxetine  "(PROZAC) 40 MG capsule Take 1 capsule (40 mg) by mouth daily   fluticasone (FLONASE) 50 MCG/ACT spray Spray 1 spray into both nostrils daily as needed   gabapentin (NEURONTIN) 300 MG capsule Take 1 capsule (300 mg) by mouth 3 times daily   levothyroxine (SYNTHROID/LEVOTHROID) 88 MCG tablet TAKE ONE TABLET BY MOUTH EVERY DAY   magnesium hydroxide (MILK OF MAGNESIA) 400 MG/5ML suspension Take 30 mLs by mouth daily as needed    Nystatin (NYSTOP) 757085 UNIT/GM POWD Apply tid to affectead area prn   order for DME Equipment being ordered: left breast prosthesis. Mastectomy bras (up to 4)   senna (SENOKOT) 8.6 MG tablet Take 1 tablet by mouth daily   simvastatin (ZOCOR) 10 MG tablet TAKE 1 TABLET BY MOUTH AT BEDTIME   triamcinolone (KENALOG) 0.1 % cream Apply sparingly to affected area two times daily as needed for itching.   benzonatate (TESSALON) 100 MG capsule Take 1 capsule (100 mg) by mouth 3 times daily as needed for cough (Patient not taking: Reported on 12/21/2018)   [DISCONTINUED] furosemide (LASIX) 40 MG tablet Take 1 tablet (40 mg) by mouth daily     No current facility-administered medications on file prior to visit.     ALLERGIES:   Allergies   Allergen Reactions     Ace Inhibitors Cough       PHYSICAL EXAM:  Vitals: /70 (BP Location: Right arm, Patient Position: Sitting, Cuff Size: Adult Large)   Pulse 72   Ht 1.6 m (5' 3\")   Wt 124.2 kg (273 lb 14.4 oz)   Breastfeeding? No   BMI 48.52 kg/m     Constitutional:  cooperative;in no acute distress morbidly obese      Skin:  warm and dry to the touch, no apparent skin lesions or masses noted        Head:  normocephalic, no masses or lesions        Eyes:  pupils equal and round;conjunctivae and lids unremarkable;sclera white        ENT:  no pallor or cyanosis        Neck:  JVP normal        Respiratory:  clear to auscultation        Cardiac: regular rhythm, normal S1/S2, no S3 or S4, apical impulse not displaced, no murmurs, gallops or rubs              "     GI:  abdomen soft;BS normoactive obese      Vascular:       2+             2+                  Extremities and Musculoskeletal:      LLE is tender to touch    Neurological:      walks with walker           LABS/DATA:  I reviewed the following:  Component      Latest Ref Rng & Units 12/21/2018   Sodium      133 - 144 mmol/L 136   Potassium      3.4 - 5.3 mmol/L 3.7   Chloride      94 - 109 mmol/L 101   Carbon Dioxide      20 - 32 mmol/L 28   Anion Gap      3 - 14 mmol/L 7   Glucose      70 - 99 mg/dL 95   Urea Nitrogen      7 - 30 mg/dL 16   Creatinine      0.52 - 1.04 mg/dL 0.80   GFR Estimate      >60 mL/min/1.73:m2 68   GFR Estimate If Black      >60 mL/min/1.73:m2 79   Calcium      8.5 - 10.1 mg/dL 9.2       Echo 11/19/18:  Interpretation Summary     The visual ejection fraction is estimated at 55-60%.  Left ventricular systolic function is normal.  The study was technically difficult.      CXR 11/19/18:  IMPRESSION: Mild bilateral vascular congestion appears mildly  increased. No evidence for cardiomegaly.          ASSESSMENT/PLAN:  PETTY, LE edema  - PETTY may be in part from diastolic dysfunction, and she had some increased vascular congestion on 11/18/18 CXR.  Though it is likely multifactorial (see below).   - I do not feel her LE edema is due to CHF as it is unilateral.  She had had LLE difficulties since her hip surgery, and most recently she had LLE cellulitis.   - She does have known diastolic dysfunction on echo.  She has been resumed on Lasix and her BMP is unchanged.  Will get NT pro BNP today.  Trial hydrochlorothiazide 25 mg daily.     Follow up in 3 weeks with BMP and repeat NT pro BNP.   - If no improvement, see Dr. Lora again re possible lung disease (long history of second hand smoke exposure) or pneumonia. She may get CXR and/or PFTs.   - Also contributing are likely obesity and deconditioning.         Thank you for allowing me to participate in the care of your patient.    Sincerely,     Philip  STEPHEN HartleyC     [facility]

## 2019-01-04 NOTE — LETTER
January 7, 2019       TO: Carol Merida   [address] [address] [facility] [id number]       Dear MsNicko Merida,    The results of your recent laboratory tests are normal. Please continue your newest medication called hydrochlorothiazide and follow up as planned on 1/22/2019 for lab at 2:15pm and seeing Philip Arciniega PA-C at 3:10pm. Please call us if you have any questions or concerns.     Results for orders placed or performed in visit on 01/04/19   N terminal pro BNP outpatient   Result Value Ref Range    N-Terminal Pro Bnp 36 0 - 450 pg/mL       Sincerely,    [facility]

## 2019-01-04 NOTE — PROGRESS NOTES
CARDIOLOGY CLINIC PROGRESS NOTE    DOS: 2019      Carol Merida  : 1935, 83 year old  MRN: [id number]      History:   I had the pleasure of meeting Carol Merida today in the cardiology clinic.  She has followed with Jessica Hazel and Samir in the remote past, but most recently was seen by Dr. Vincent Oliveira 18.       Everette is a pleasant 83-year-old lady with diastolic dysfunction, morbid obesity, HTN, hypothyroidism, h/o musculoskeletal chest discomfort, anxiety and depression, anemia, osteoarthritis, sleep apnea.     In  she had chest discomfort that was suggestive for angina and a Cardiolite stress test was subsequently performed that demonstrated a very small reversible distal anterior and apical defect consistent with possible  ischemia.  However, soft tissue attenuation artifact was also noted.  Dr. Hazel initiated medical therapy and her symptoms actually improved significantly.  Eventually, she underwent coronary angiography in  which fortunately did not show significant obstructive coronary artery disease.     She had pneumonia in the summer.  She had a cough and increased shortness of breath then.  She does not recall if she had fevers. She was admitted for a week and treated for pneumonia.     She saw Dr. Oliveira 2018 and he felt her PETTY was more due to slow-to-resolve pneumonia.     -18 hospital admission for increased left LE edema (and some RLE) and some chronic PETTY.  She was diagnosed with LLE cellulitis and also mild acute diastolic CHF exacerbation.   She had self stopped Lasix in 2018 and had increased sodium intake prior to admission.  She was restarted on Lasix 40 mg daily, and started abx for cellulitis.  Echo was done 18 that showed LVEF 55-60% w/ grade I diastolic dysfunction.   Admit wt 274#, discharge wt 273#.    18 she saw PCP due to continued PETTY and cough.  There was no change/increase.  She referred the patient back to cards as she suspects cardiac  etiology given the CXR findings.        Interval History:   She presents today.   She continues to have PETTY and cough.  She has some orthopnea.  She sleeps with her head of the bed up.  Her breathing and cough have not improved after restarting Lasix.   The LLE has improved.  But the leg is still quite tender.  She has left leg discomfort since she had left hip surgery at least 3 years ago.  Her right leg is not very swollen.   She was never a smoker, but her dad smoked and she had a lot of second hand smoke exposure.   She is obese and deconditioned as well.   She is struggling with some depression as well as her  has been in the hospital for 2 months.   Monday she is having steroid injection in the hip and shoulder.       ROS:  Skin:  Negative     Eyes:  Positive for glasses  ENT:  Negative    Respiratory:  Positive for dyspnea on exertion;shortness of breath;sleep apnea;CPAP;dyspnea at rest  Cardiovascular:    edema;Positive for;fatigue  Gastroenterology: Negative    Genitourinary:  Negative    Musculoskeletal:  Positive for arthritis;joint pain  Neurologic:  Positive for numbness or tingling of hands  Psychiatric:  Negative    Heme/Lymph/Imm:  Negative    Endocrine:  Positive for thyroid disorder    PAST MEDICAL HISTORY:  Past Medical History:   Diagnosis Date     Abnormal stress test     small area on stress thalium ?2003; but normal angiogram 2012     Cardiac dysrhythmia, unspecified     irregular heartbeat     CHF (congestive heart failure) (H) 6/18/2018     Hyperlipidemia LDL goal <100 2/10/2010     Hypertension goal BP (blood pressure) < 140/90 7/18/2010     Hypothyroid      Malignant 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     Melanoma of skin, site unspecified      NONSPECIFIC MEDICAL HISTORY 04    fx fifth finger right hand     Obstructive sleep apnea (adult) (pediatric) 8/25/2006    CPAP      Osteoarthritis       Other chronic pain     Joint pain for many years.     Other osteoporosis      PONV (postoperative nausea and vomiting)      Tubular adenoma in colon 9/01    colonoscopy due 2004       PAST SURGICAL HISTORY:  Past Surgical History:   Procedure Laterality Date     ARTHROPLASTY HIP Left 7/6/2015    Procedure: ARTHROPLASTY HIP;  Surgeon: Junior Giordano MD;  Location: [facility]     ARTHROPLASTY HIP Right 11/2/2016    Procedure: ARTHROPLASTY HIP;  Surgeon: Junior Giordano MD;  Location: [facility] OR     ARTHROPLASTY REVISION HIP Left 7/22/2015    Procedure: ARTHROPLASTY REVISION HIP;  Surgeon: Junior Giordano MD;  Location: [facility] OR     C NONSPECIFIC PROCEDURE      Knee replacement x 2 (B)     C NONSPECIFIC PROCEDURE      s/p Tonsillectomy (college)     C NONSPECIFIC PROCEDURE      s/p Appy (high school)     C NONSPECIFIC PROCEDURE      Melanoma removed with sentinel node bx     C NONSPECIFIC PROCEDURE       bilateral cataract     COLONOSCOPY  9/01    tubular adenoma; repeat 3 years.     COLONOSCOPY  9/04    normal; repeat 5 years ([facility])     HYSTERECTOMY, MARGAUX  summer 2004    and BSO     SURGICAL HISTORY OF -   9/05    left mastectomy     SURGICAL HISTORY OF -   2008    right ankle surgery; triple arthrodesis     SURGICAL HISTORY OF -   5/09    right ankle surgery; triple arthrodesis and deltoid reconstruction; possible other     SURGICAL HISTORY OF -   2/10    removal of hardware from right ankle       SOCIAL HISTORY:  Social History     Socioeconomic History     Marital status:      Spouse name: Yudi     Number of children: 4     Years of education: 16     Highest education level: Not on file   Social Needs     Financial resource strain: Not on file     Food insecurity - worry: Not on file     Food insecurity - inability: Not on file     Transportation needs - medical: Not on file     Transportation needs - non-medical: Not on file   Occupational History     Occupation: [employer]      Comment: Retired   Tobacco Use     Smoking status: Never Smoker     Smokeless tobacco: Never Used   Substance and Sexual Activity     Alcohol use: Yes     Alcohol/week: 0.0 - 0.5 oz     Comment: 1 glass wine weekly     Drug use: No     Sexual activity: Yes     Partners: Male   Other Topics Concern      Service Not Asked     Blood Transfusions Not Asked     Caffeine Concern Not Asked     Occupational Exposure Not Asked     Hobby Hazards Not Asked     Sleep Concern Not Asked     Stress Concern Not Asked     Weight Concern No     Comment: fruits and veggies.     Special Diet Not Asked     Back Care Not Asked     Exercise No     Bike Helmet Not Asked     Seat Belt Not Asked     Self-Exams Not Asked     Parent/sibling w/ CABG, MI or angioplasty before 65F 55M? No   Social History Narrative     Not on file       FAMILY HISTORY:  Family History   Problem Relation Age of Onset     No Known Problems Brother      Arthritis Mother      Hypertension Father      Cancer Father         lung     Cancer Grandchild         terratoma tumors     Breast Cancer Daughter        MEDS:   Current Outpatient Medications on File Prior to Visit:  acetaminophen (TYLENOL) 325 MG tablet Take 3 tablets (975 mg) by mouth every 8 hours   albuterol (PROAIR HFA/PROVENTIL HFA/VENTOLIN HFA) 108 (90 Base) MCG/ACT inhaler Inhale 2 puffs into the lungs every 4 hours as needed for shortness of breath / dyspnea or wheezing   ALPRAZolam (XANAX) 0.25 MG tablet Take 1 tablet (0.25 mg) by mouth 2 times daily as needed for anxiety Don't take with the oxycodone   amLODIPine (NORVASC) 5 MG tablet Take 1 tablet (5 mg) by mouth daily   calcium-vitamin D (CALCIUM 600 + D) 600-400 MG-UNIT per tablet Take 1 tablet by mouth every evening    FLUoxetine (PROZAC) 40 MG capsule Take 1 capsule (40 mg) by mouth daily   fluticasone (FLONASE) 50 MCG/ACT spray Spray 1 spray into both nostrils daily as needed   gabapentin (NEURONTIN) 300 MG capsule Take 1 capsule (300 mg)  "by mouth 3 times daily   levothyroxine (SYNTHROID/LEVOTHROID) 88 MCG tablet TAKE ONE TABLET BY MOUTH EVERY DAY   magnesium hydroxide (MILK OF MAGNESIA) 400 MG/5ML suspension Take 30 mLs by mouth daily as needed    Nystatin (NYSTOP) 303201 UNIT/GM POWD Apply tid to affectead area prn   order for DME Equipment being ordered: left breast prosthesis. Mastectomy bras (up to 4)   senna (SENOKOT) 8.6 MG tablet Take 1 tablet by mouth daily   simvastatin (ZOCOR) 10 MG tablet TAKE 1 TABLET BY MOUTH AT BEDTIME   triamcinolone (KENALOG) 0.1 % cream Apply sparingly to affected area two times daily as needed for itching.   benzonatate (TESSALON) 100 MG capsule Take 1 capsule (100 mg) by mouth 3 times daily as needed for cough (Patient not taking: Reported on 12/21/2018)   [DISCONTINUED] furosemide (LASIX) 40 MG tablet Take 1 tablet (40 mg) by mouth daily     No current facility-administered medications on file prior to visit.     ALLERGIES:   Allergies   Allergen Reactions     Ace Inhibitors Cough       PHYSICAL EXAM:  Vitals: /70 (BP Location: Right arm, Patient Position: Sitting, Cuff Size: Adult Large)   Pulse 72   Ht 1.6 m (5' 3\")   Wt 124.2 kg (273 lb 14.4 oz)   Breastfeeding? No   BMI 48.52 kg/m    Constitutional:  cooperative;in no acute distress morbidly obese      Skin:  warm and dry to the touch, no apparent skin lesions or masses noted        Head:  normocephalic, no masses or lesions        Eyes:  pupils equal and round;conjunctivae and lids unremarkable;sclera white        ENT:  no pallor or cyanosis        Neck:  JVP normal        Respiratory:  clear to auscultation        Cardiac: regular rhythm, normal S1/S2, no S3 or S4, apical impulse not displaced, no murmurs, gallops or rubs                  GI:  abdomen soft;BS normoactive obese      Vascular:       2+             2+                  Extremities and Musculoskeletal:      LLE is tender to touch    Neurological:      walks with walker     "       LABS/DATA:  I reviewed the following:  Component      Latest Ref Rng & Units 12/21/2018   Sodium      133 - 144 mmol/L 136   Potassium      3.4 - 5.3 mmol/L 3.7   Chloride      94 - 109 mmol/L 101   Carbon Dioxide      20 - 32 mmol/L 28   Anion Gap      3 - 14 mmol/L 7   Glucose      70 - 99 mg/dL 95   Urea Nitrogen      7 - 30 mg/dL 16   Creatinine      0.52 - 1.04 mg/dL 0.80   GFR Estimate      >60 mL/min/1.73:m2 68   GFR Estimate If Black      >60 mL/min/1.73:m2 79   Calcium      8.5 - 10.1 mg/dL 9.2       Echo 11/19/18:  Interpretation Summary     The visual ejection fraction is estimated at 55-60%.  Left ventricular systolic function is normal.  The study was technically difficult.      CXR 11/19/18:  IMPRESSION: Mild bilateral vascular congestion appears mildly  increased. No evidence for cardiomegaly.          ASSESSMENT/PLAN:  PETTY, LE edema  - PETTY may be in part from diastolic dysfunction, and she had some increased vascular congestion on 11/18/18 CXR.  Though it is likely multifactorial (see below).   - I do not feel her LE edema is due to CHF as it is unilateral.  She had had LLE difficulties since her hip surgery, and most recently she had LLE cellulitis.   - She does have known diastolic dysfunction on echo.  She has been resumed on Lasix and her BMP is unchanged.  Will get NT pro BNP today.  Trial hydrochlorothiazide 25 mg daily.     Follow up in 3 weeks with BMP and repeat NT pro BNP.   - If no improvement, see Dr. Lora again re possible lung disease (long history of second hand smoke exposure) or pneumonia. She may get CXR and/or PFTs.   - Also contributing are likely obesity and deconditioning.       Philip Arciniega PA-C

## 2019-01-04 NOTE — PATIENT INSTRUCTIONS
The shortness of breath may be from multiple things - I am checking a lab to see if some fluid could be contributing.   I am adding a second mild diuretic called hydrochlorothiazide to take daily.   I will see you back in 3 weeks with a lab.   If this does not improve your symptoms, I will have you see Dr. Lora again to consider looking at the lungs more.

## 2019-01-04 NOTE — LETTER
2019    [place]  [address]  [facility] [address]    RE: Carol Merida       Dear Colleague,    I had the pleasure of seeing Carol Merida in the [facility].    CARDIOLOGY CLINIC PROGRESS NOTE    DOS: 2019      Carol Merida  : 1935, 83 year old  MRN: [id number]      History:   I had the pleasure of meeting Carol Merida today in the cardiology clinic.  She has followed with Jessica Hazel and Samir in the remote past, but most recently was seen by Dr. Vincent Oliveira 18.       Everette is a pleasant 83-year-old lady with diastolic dysfunction, morbid obesity, HTN, hypothyroidism, h/o musculoskeletal chest discomfort, anxiety and depression, anemia, osteoarthritis, sleep apnea.     In  she had chest discomfort that was suggestive for angina and a Cardiolite stress test was subsequently performed that demonstrated a very small reversible distal anterior and apical defect consistent with possible  ischemia.  However, soft tissue attenuation artifact was also noted.  Dr. Hazel initiated medical therapy and her symptoms actually improved significantly.  Eventually, she underwent coronary angiography in  which fortunately did not show significant obstructive coronary artery disease.     She had pneumonia in the summer.  She had a cough and increased shortness of breath then.  She does not recall if she had fevers. She was admitted for a week and treated for pneumonia.     She saw Dr. Oliveira 2018 and he felt her PETTY was more due to slow-to-resolve pneumonia.     -18 hospital admission for increased left LE edema (and some RLE) and some chronic PETTY.  She was diagnosed with LLE cellulitis and also mild acute diastolic CHF exacerbation.   She had self stopped Lasix in 2018 and had increased sodium intake prior to admission.  She was restarted on Lasix 40 mg daily, and started abx for cellulitis.  Echo was done 18 that showed LVEF 55-60% w/  grade I diastolic dysfunction.   Admit wt 274#, discharge wt 273#.    12/21/18 she saw PCP due to continued PETTY and cough.  There was no change/increase.  She referred the patient back to cards as she suspects cardiac etiology given the CXR findings.        Interval History:   She presents today.   She continues to have PETTY and cough.  She has some orthopnea.  She sleeps with her head of the bed up.  Her breathing and cough have not improved after restarting Lasix.   The LLE has improved.  But the leg is still quite tender.  She has left leg discomfort since she had left hip surgery at least 3 years ago.  Her right leg is not very swollen.   She was never a smoker, but her dad smoked and she had a lot of second hand smoke exposure.   She is obese and deconditioned as well.   She is struggling with some depression as well as her  has been in the hospital for 2 months.   Monday she is having steroid injection in the hip and shoulder.       ROS:  Skin:  Negative     Eyes:  Positive for glasses  ENT:  Negative    Respiratory:  Positive for dyspnea on exertion;shortness of breath;sleep apnea;CPAP;dyspnea at rest  Cardiovascular:    edema;Positive for;fatigue  Gastroenterology: Negative    Genitourinary:  Negative    Musculoskeletal:  Positive for arthritis;joint pain  Neurologic:  Positive for numbness or tingling of hands  Psychiatric:  Negative    Heme/Lymph/Imm:  Negative    Endocrine:  Positive for thyroid disorder    PAST MEDICAL HISTORY:  Past Medical History:   Diagnosis Date     Abnormal stress test     small area on stress thalium ?2003; but normal angiogram 2012     Cardiac dysrhythmia, unspecified     irregular heartbeat     CHF (congestive heart failure) (H) 6/18/2018     Hyperlipidemia LDL goal <100 2/10/2010     Hypertension goal BP (blood pressure) < 140/90 7/18/2010     Hypothyroid      Malignant neoplasm of breast (female), unspecified site 2005    infltrating ductal     MEDICAL HISTORY OF -      fx humerus Sept 2013.     Melanoma of skin, site unspecified      NONSPECIFIC MEDICAL HISTORY 04    fx fifth finger right hand     Obstructive sleep apnea (adult) (pediatric) 8/25/2006    CPAP      Osteoarthritis      Other chronic pain     Joint pain for many years.     Other osteoporosis      PONV (postoperative nausea and vomiting)      Tubular adenoma in colon 9/01    colonoscopy due 2004       PAST SURGICAL HISTORY:  Past Surgical History:   Procedure Laterality Date     ARTHROPLASTY HIP Left 7/6/2015    Procedure: ARTHROPLASTY HIP;  Surgeon: Junior Giordano MD;  Location: [facility]     ARTHROPLASTY HIP Right 11/2/2016    Procedure: ARTHROPLASTY HIP;  Surgeon: Junior Giordano MD;  Location: [facility] OR     ARTHROPLASTY REVISION HIP Left 7/22/2015    Procedure: ARTHROPLASTY REVISION HIP;  Surgeon: Junior Giordano MD;  Location: [facility] OR     C NONSPECIFIC PROCEDURE      Knee replacement x 2 (B)     C NONSPECIFIC PROCEDURE      s/p Tonsillectomy (college)     C NONSPECIFIC PROCEDURE      s/p Appy (high school)     C NONSPECIFIC PROCEDURE      Melanoma removed with sentinel node bx     C NONSPECIFIC PROCEDURE       bilateral cataract     COLONOSCOPY  9/01    tubular adenoma; repeat 3 years.     COLONOSCOPY  9/04    normal; repeat 5 years ([facility])     HYSTERECTOMY, MARGAUX  summer 2004    and BSO     SURGICAL HISTORY OF -   9/05    left mastectomy     SURGICAL HISTORY OF -   2008    right ankle surgery; triple arthrodesis     SURGICAL HISTORY OF -   5/09    right ankle surgery; triple arthrodesis and deltoid reconstruction; possible other     SURGICAL HISTORY OF -   2/10    removal of hardware from right ankle       SOCIAL HISTORY:  Social History     Socioeconomic History     Marital status:      Spouse name: Yudi     Number of children: 4     Years of education: 16     Highest education level: Not on file   Social Needs     Financial resource strain: Not on file     Food insecurity - worry: Not on file      Food insecurity - inability: Not on file     Transportation needs - medical: Not on file     Transportation needs - non-medical: Not on file   Occupational History     Occupation: [employer]     Comment: Retired   Tobacco Use     Smoking status: Never Smoker     Smokeless tobacco: Never Used   Substance and Sexual Activity     Alcohol use: Yes     Alcohol/week: 0.0 - 0.5 oz     Comment: 1 glass wine weekly     Drug use: No     Sexual activity: Yes     Partners: Male   Other Topics Concern      Service Not Asked     Blood Transfusions Not Asked     Caffeine Concern Not Asked     Occupational Exposure Not Asked     Hobby Hazards Not Asked     Sleep Concern Not Asked     Stress Concern Not Asked     Weight Concern No     Comment: fruits and veggies.     Special Diet Not Asked     Back Care Not Asked     Exercise No     Bike Helmet Not Asked     Seat Belt Not Asked     Self-Exams Not Asked     Parent/sibling w/ CABG, MI or angioplasty before 65F 55M? No   Social History Narrative     Not on file       FAMILY HISTORY:  Family History   Problem Relation Age of Onset     No Known Problems Brother      Arthritis Mother      Hypertension Father      Cancer Father         lung     Cancer Grandchild         terratoma tumors     Breast Cancer Daughter        MEDS:   Current Outpatient Medications on File Prior to Visit:  acetaminophen (TYLENOL) 325 MG tablet Take 3 tablets (975 mg) by mouth every 8 hours   albuterol (PROAIR HFA/PROVENTIL HFA/VENTOLIN HFA) 108 (90 Base) MCG/ACT inhaler Inhale 2 puffs into the lungs every 4 hours as needed for shortness of breath / dyspnea or wheezing   ALPRAZolam (XANAX) 0.25 MG tablet Take 1 tablet (0.25 mg) by mouth 2 times daily as needed for anxiety Don't take with the oxycodone   amLODIPine (NORVASC) 5 MG tablet Take 1 tablet (5 mg) by mouth daily   calcium-vitamin D (CALCIUM 600 + D) 600-400 MG-UNIT per tablet Take 1 tablet by mouth every evening    FLUoxetine  "(PROZAC) 40 MG capsule Take 1 capsule (40 mg) by mouth daily   fluticasone (FLONASE) 50 MCG/ACT spray Spray 1 spray into both nostrils daily as needed   gabapentin (NEURONTIN) 300 MG capsule Take 1 capsule (300 mg) by mouth 3 times daily   levothyroxine (SYNTHROID/LEVOTHROID) 88 MCG tablet TAKE ONE TABLET BY MOUTH EVERY DAY   magnesium hydroxide (MILK OF MAGNESIA) 400 MG/5ML suspension Take 30 mLs by mouth daily as needed    Nystatin (NYSTOP) 315463 UNIT/GM POWD Apply tid to affectead area prn   order for DME Equipment being ordered: left breast prosthesis. Mastectomy bras (up to 4)   senna (SENOKOT) 8.6 MG tablet Take 1 tablet by mouth daily   simvastatin (ZOCOR) 10 MG tablet TAKE 1 TABLET BY MOUTH AT BEDTIME   triamcinolone (KENALOG) 0.1 % cream Apply sparingly to affected area two times daily as needed for itching.   benzonatate (TESSALON) 100 MG capsule Take 1 capsule (100 mg) by mouth 3 times daily as needed for cough (Patient not taking: Reported on 12/21/2018)   [DISCONTINUED] furosemide (LASIX) 40 MG tablet Take 1 tablet (40 mg) by mouth daily     No current facility-administered medications on file prior to visit.     ALLERGIES:   Allergies   Allergen Reactions     Ace Inhibitors Cough       PHYSICAL EXAM:  Vitals: /70 (BP Location: Right arm, Patient Position: Sitting, Cuff Size: Adult Large)   Pulse 72   Ht 1.6 m (5' 3\")   Wt 124.2 kg (273 lb 14.4 oz)   Breastfeeding? No   BMI 48.52 kg/m     Constitutional:  cooperative;in no acute distress morbidly obese      Skin:  warm and dry to the touch, no apparent skin lesions or masses noted        Head:  normocephalic, no masses or lesions        Eyes:  pupils equal and round;conjunctivae and lids unremarkable;sclera white        ENT:  no pallor or cyanosis        Neck:  JVP normal        Respiratory:  clear to auscultation        Cardiac: regular rhythm, normal S1/S2, no S3 or S4, apical impulse not displaced, no murmurs, gallops or rubs              "     GI:  abdomen soft;BS normoactive obese      Vascular:       2+             2+                  Extremities and Musculoskeletal:      LLE is tender to touch    Neurological:      walks with walker           LABS/DATA:  I reviewed the following:  Component      Latest Ref Rng & Units 12/21/2018   Sodium      133 - 144 mmol/L 136   Potassium      3.4 - 5.3 mmol/L 3.7   Chloride      94 - 109 mmol/L 101   Carbon Dioxide      20 - 32 mmol/L 28   Anion Gap      3 - 14 mmol/L 7   Glucose      70 - 99 mg/dL 95   Urea Nitrogen      7 - 30 mg/dL 16   Creatinine      0.52 - 1.04 mg/dL 0.80   GFR Estimate      >60 mL/min/1.73:m2 68   GFR Estimate If Black      >60 mL/min/1.73:m2 79   Calcium      8.5 - 10.1 mg/dL 9.2       Echo 11/19/18:  Interpretation Summary     The visual ejection fraction is estimated at 55-60%.  Left ventricular systolic function is normal.  The study was technically difficult.      CXR 11/19/18:  IMPRESSION: Mild bilateral vascular congestion appears mildly  increased. No evidence for cardiomegaly.          ASSESSMENT/PLAN:  PETTY, LE edema  - PETTY may be in part from diastolic dysfunction, and she had some increased vascular congestion on 11/18/18 CXR.  Though it is likely multifactorial (see below).   - I do not feel her LE edema is due to CHF as it is unilateral.  She had had LLE difficulties since her hip surgery, and most recently she had LLE cellulitis.   - She does have known diastolic dysfunction on echo.  She has been resumed on Lasix and her BMP is unchanged.  Will get NT pro BNP today.  Trial hydrochlorothiazide 25 mg daily.     Follow up in 3 weeks with BMP and repeat NT pro BNP.   - If no improvement, see Dr. Lora again re possible lung disease (long history of second hand smoke exposure) or pneumonia. She may get CXR and/or PFTs.   - Also contributing are likely obesity and deconditioning.       Philip Arciniega PA-C    Thank you for allowing me to participate in the care of your  patient.      Sincerely,     Philip Arciniega PA-C     [facility]    cc:   Yessica L[place]  [id number] [address]

## 2019-01-07 NOTE — RESULT ENCOUNTER NOTE
Letter sent w/ NT Pro BNP results and Philip Arciniega PA-C recommendations.   Scheduled for lab and follow-up 1/22/19.  Patricio TEMPLETON

## 2019-01-09 ENCOUNTER — DOCUMENTATION ONLY (OUTPATIENT)
Dept: CARE COORDINATION | Facility: CLINIC | Age: 84
End: 2019-01-09

## 2019-01-09 ENCOUNTER — PATIENT OUTREACH (OUTPATIENT)
Dept: CARE COORDINATION | Facility: CLINIC | Age: 84
End: 2019-01-09

## 2019-01-09 NOTE — PROGRESS NOTES
Clinic Care Coordination Contact  [facility]/Voicemail    Clinical Data: Care Coordinator Outreach. RNCC calling to follow up with patient regarding status of depression/anxiety, appetite, provide support and discuss other concerns. Last home care visit 01/06.   Outreach attempted x 1.  Left message on voicemail with call back information and requested return call.  Plan: Care Coordinator will try to reach patient again in 3-5 business days.    Francisca Long RN Care Coordinator  [facility] [place]  Email: [email or web address]  Phone: [phone]

## 2019-01-09 NOTE — PROGRESS NOTES
..[facility] utilizes an encounter to take the place of a direct phone call to your office. Please take a moment to review the below request. Please reply or route message to author of this encounter.  Message will act as a verbal OK of orders requested below. Thank you.    Skilled nursing 1x per week for 2 weeks and 2 prn    Thank you,    Rosie Velasco RN  [phone]

## 2019-01-10 DIAGNOSIS — I10 BENIGN ESSENTIAL HYPERTENSION: ICD-10-CM

## 2019-01-10 NOTE — TELEPHONE ENCOUNTER
"Requested Prescriptions   Pending Prescriptions Disp Refills     amLODIPine (NORVASC) 5 MG tablet [Pharmacy Med Name: AMLODIPINE BESYLATE 5MG TABS]  Last Written Prescription Date:  8/23/18  Last Fill Quantity: 90,  # refills: 1   Last office visit: 12/21/2018 with prescribing provider:  Yessica Lora MD    Future Office Visit:   Next 5 appointments (look out 90 days)    Jan 31, 2019 12:30 PM CST  Return Visit with Philip Arciniega PA-C  [facility] [address]  [facility] [address] [phone]          90 tablet 0     Sig: TAKE ONE TABLET BY MOUTH EVERY DAY    Calcium Channel Blockers Protocol  Passed - 1/10/2019 10:21 AM       Passed - Blood pressure under 140/90 in past 12 months    BP Readings from Last 3 Encounters:   01/04/19 110/70   12/21/18 122/70   11/29/18 120/68                Passed - Recent (12 mo) or future (30 days) visit within the authorizing provider's specialty    Patient had office visit in the last 12 months or has a visit in the next 30 days with authorizing provider or within the authorizing provider's specialty.  See \"Patient Info\" tab in inbasket, or \"Choose Columns\" in Meds & Orders section of the refill encounter.             Passed - Medication is active on med list       Passed - Patient is age 18 or older       Passed - No active pregnancy on record       Passed - Normal serum creatinine on file in past 12 months    Recent Labs   Lab Test 12/21/18  1706  10/26/12  1456   CR 0.80   < >  --    CREAT  --   --  0.7    < > = values in this interval not displayed.            Passed - No positive pregnancy test in past 12 months          "

## 2019-01-11 RX ORDER — AMLODIPINE BESYLATE 5 MG/1
TABLET ORAL
Qty: 90 TABLET | Refills: 0
Start: 2019-01-11

## 2019-01-11 NOTE — TELEPHONE ENCOUNTER
Current prescription at [facility]. Pharmacy notified.    Consuelo Park RN

## 2019-01-11 NOTE — PROGRESS NOTES
Clinic Care Coordination Contact  Care Team Conversations    01/10 @ 1505 pm: Patient left writer a message stating she is doing okay,  is moving to a different TCU, will see Rosie from [facility] next week. Taking things day by day.     01/11 @ 1548 pm: Writer returned call to patient and left message requesting call back. Writer will await patient to return call. Writer will attempt another outreach in 5-10 business days.     Francisca Long RN Care Coordinator  [facility] [place]  Email: [email or web address]  Phone: [phone]

## 2019-01-23 ENCOUNTER — PATIENT OUTREACH (OUTPATIENT)
Dept: CARE COORDINATION | Facility: CLINIC | Age: 84
End: 2019-01-23

## 2019-01-23 ASSESSMENT — ACTIVITIES OF DAILY LIVING (ADL): DEPENDENT_IADLS:: TRANSPORTATION

## 2019-01-23 NOTE — PROGRESS NOTES
"Clinic Care Coordination Contact  OUTREACH    Referral Information:  Referral Source: PCP    Primary Diagnosis: CHF    Chief Complaint   Patient presents with     Clinic Care Coordination - Follow-up     home care        [place] Utilization:   Clinic Utilization  Difficulty keeping appointments:: No  Compliance Concerns: No  No-Show Concerns: No  No PCP office visit in Past Year: No  Utilization    Last refreshed: 1/22/2019 10:47 PM:  Hospital Admissions 2           Last refreshed: 1/22/2019 10:47 PM:  ED Visits 0           Last refreshed: 1/22/2019 10:47 PM:  No Show Count (past year) 0              Current as of: 1/22/2019 10:47 PM            Clinical Concerns:  Current Medical Concerns:  CHF. Patient reports she is having increased shortness of breath and coughing today, but that it is manageable. Admits she has \"self neglect\" the past couple of days with husbands decline in health status. Has not weighed self in a couple of days does not recall what recent weight was.  Denies any swelling in lower extremities. Reports she will take it easy today and make some phone calls. Cellulitis cleared up, just arthritis. Home care visit scheduled with Rosie morales. Reviewed recommendation to see Primary Care Provider for 6 week follow up appointment around 02/01, patient reports she has too much going on right now and does not know her schedule. Will call back to schedule this appointment. Advised patient to make this appointment ASAP.   Current Behavioral Concerns: Patient unwilling to schedule an appointment with provider for current symptoms or for 6 week follow up.    Education Provided to patient: CHF action plan, clinic after hours, urgent care utilization, importance of daily weights.       Medication Management:  Patient reports compliance with medications.     Functional Status:  Dependent ADLs:: Ambulation-walker  Dependent IADLs:: Transportation  Bed or wheelchair confined:: No  Mobility Status: " Independent w/Device    Living Situation:  Current living arrangement:: I live in a private home with spouse (spouse currently hospitalized).   Type of residence:: Private home - stairs    Diet/Exercise/Sleep:   Not addressed during outreach.     Transportation:  Transportation concerns: No  Transportation means:: Accessible car, Family, Friend     Psychosocial:  Mental health DX:: Yes  Mental health DX how managed:: Medication  Mental health management concern:: No  Informal Support system:: Children, Gabriella based, Neighbors Patient reports her anxiety comes and goes but is under control.      Resources and Interventions:  Current Resources:   Community Resources: Home Care  Equipment Currently Used at Home: walker, standard  Advance Care Plan/Directive  Advanced Care Plans/Directives on file:: Yes  Type Advanced Care Plans/Directives: POLST    Referrals Placed: None     Goals:   Goals        General    Healthy Coping     Notes - Note created  11/30/2018  8:19 AM by Francisca Long RN    Goal Statement: I want to be healthy, good for my family, children  Measure of Success: Health status, ability to care for self, spouse  Supportive Steps to Achieve: Continued CCRN support. Provided resources. Take medications as prescribed. Follow up with providers as recommended.   Barriers: Mobility  Strengths: Strong gabriella  Date to Achieve By: TBD  Patient expressed understanding of goal: Yes              Patient/Caregiver understanding: Patient verbalized understanding and denies any additional questions or concerns at this time. RNCC engaged in A[employer] communications during encounter.      Future Appointments              In 1 week RU LAB [facility] AT [facility]    In 1 week Philip Arciniega PA-C [facility]          Plan: Patient will call writer with questions, concerns, support needs. Patient will call to schedule follow up  appointment with Primary Care Provider. Patient will weigh self daily and take medications as prescribed. RNCC will review chart complete outreach to patient again in about 4 weeks.     Francisca Long RN Care Coordinator  [facility]  Email: [email or web address]  Phone: [phone]

## 2019-01-24 DIAGNOSIS — M15.9 OSTEOARTHRITIS OF MULTIPLE JOINTS, UNSPECIFIED OSTEOARTHRITIS TYPE: ICD-10-CM

## 2019-01-24 DIAGNOSIS — M51.369 DDD (DEGENERATIVE DISC DISEASE), LUMBAR: ICD-10-CM

## 2019-01-24 DIAGNOSIS — E78.5 HYPERLIPIDEMIA LDL GOAL <100: ICD-10-CM

## 2019-01-24 RX ORDER — GABAPENTIN 300 MG/1
CAPSULE ORAL
Qty: 270 CAPSULE | Refills: 0 | OUTPATIENT
Start: 2019-01-24

## 2019-01-24 RX ORDER — SIMVASTATIN 10 MG
TABLET ORAL
Qty: 90 TABLET | Refills: 0 | Status: SHIPPED | OUTPATIENT
Start: 2019-01-24 | End: 2019-04-25

## 2019-01-24 RX ORDER — GABAPENTIN 300 MG/1
CAPSULE ORAL
Qty: 270 CAPSULE | Refills: 0 | Status: SHIPPED | OUTPATIENT
Start: 2019-01-24 | End: 2019-05-07

## 2019-01-24 RX ORDER — SIMVASTATIN 10 MG
TABLET ORAL
Qty: 90 TABLET | Refills: 0 | OUTPATIENT
Start: 2019-01-24

## 2019-01-24 NOTE — TELEPHONE ENCOUNTER
"Requested Prescriptions   Pending Prescriptions Disp Refills     gabapentin (NEURONTIN) 300 MG capsule [Pharmacy Med Name: GABAPENTIN 300MG CAPSULES]  Last Written Prescription Date:  11/16/17/  Last Fill Quantity: 270,  # refills: 0   Last office visit: 12/21/2018 with prescribing provider:  Yessica Lora MD   Future Office Visit:   Next 5 appointments (look out 90 days)    Jan 31, 2019 12:30 PM CST  Return Visit with Philip Arciniega PA-C  [facility] ([facility] [address]  [facility] [address]  [phone]          270 capsule 0     Sig: TAKE 1 CAPSULE(300 MG) BY MOUTH THREE TIMES DAILY    There is no refill protocol information for this order        simvastatin (ZOCOR) 10 MG tablet [Pharmacy Med Name: SIMVASTATIN 10MG TABLETS]  Last Written Prescription Date:  7/17/18  Last Fill Quantity: 90,  # refills: 1   Last office visit: 12/21/2018 with prescribing provider:  Yessica Lora MD   Future Office Visit:   Next 5 appointments (look out 90 days)    Jan 31, 2019 12:30 PM CST  Return Visit with Philip Arciniega PA-C  [facility] [address]  [facility] [address] [phone]          90 tablet 0     Sig: TAKE 1 TABLET BY MOUTH AT BEDTIME    Statins Protocol Passed - 1/24/2019  9:37 AM       Passed - LDL on file in past 12 months    Recent Labs   Lab Test 04/23/18  0933   LDL 38            Passed - No abnormal creatine kinase in past 12 months    No lab results found.            Passed - Recent (12 mo) or future (30 days) visit within the authorizing provider's specialty    Patient had office visit in the last 12 months or has a visit in the next 30 days with authorizing provider or within the authorizing provider's specialty.  See \"Patient Info\" tab in inbasket, or \"Choose Columns\" in Meds & Orders section of the refill encounter.             Passed - " Medication is active on med list       Passed - Patient is age 18 or older       Passed - No active pregnancy on record       Passed - No positive pregnancy test in past 12 months

## 2019-01-24 NOTE — TELEPHONE ENCOUNTER
Gabapentin: Routing refill request to provider for review/approval because:  Drug not on the [facility], [facility] or [facility] refill protocol or controlled substance.  Patient has been having prescribed for her back pain and wants Dr. Lora to take over prescribing.  Verified patient takes doses as pended 300 mg capsule TID    Simvastatin: Prescription approved per [facility], [facility] or [facility] refill protocol.  Jailene CRONIN RN - [facility]

## 2019-01-25 ENCOUNTER — TELEPHONE (OUTPATIENT)
Dept: FAMILY MEDICINE | Facility: CLINIC | Age: 84
End: 2019-01-25

## 2019-01-25 DIAGNOSIS — F41.9 ANXIETY: ICD-10-CM

## 2019-01-25 RX ORDER — ALPRAZOLAM 0.25 MG
0.25 TABLET ORAL 2 TIMES DAILY PRN
Qty: 10 TABLET | Refills: 0 | Status: SHIPPED | OUTPATIENT
Start: 2019-01-25 | End: 2019-03-12

## 2019-01-25 NOTE — TELEPHONE ENCOUNTER
Called Rosie and Left detailed message with the below information. Advised callback if needed.     Cinthia Moran RN -- [facility]

## 2019-01-25 NOTE — TELEPHONE ENCOUNTER
Order extension is OK.    I did do a refill.  Please review the possible side effects with her as well.    Thanks!

## 2019-01-25 NOTE — TELEPHONE ENCOUNTER
Rosie [facility] called in. [phone]. Requesting order for extension of Nursin time per week for 2 weeks.   She anticipates a discharge from home care after that point unless anything changes.     Dr. Lora: The Pt was prescribed 10 tabs of Xanax back in 2016. Currently the Pt's  is in the hospital and the Pt is having a lot of anxiety, and near panic attacks related to the stress of this. Home care is wondering if you could place a new order for Xanax. The Pt has 1-2 tabs left from that 2016 script, and did take one today which helped a lot.     Please advise. I did pend the medication for you to review.     Cinthia Moran RN -- [facility]

## 2019-02-26 ENCOUNTER — PATIENT OUTREACH (OUTPATIENT)
Dept: CARE COORDINATION | Facility: CLINIC | Age: 84
End: 2019-02-26

## 2019-02-26 NOTE — PROGRESS NOTES
Clinic Care Coordination Contact    Situation: Patient chart reviewed by care coordinator.    Background: RNCC following patient for management of CHF, depression, anxiety, provide support due to husbands declining health status, and other concerns.     Assessment: Patient is still active with home care services.      Plan/Recommendations: RNCC will await notification from home care staff informing RNCC of patients discharge plans/needs. RNCC will review chart and outreach to homecare every 4 weeks and as needed.     Francisca Long RN Care Coordinator  [facility] [place]  Email: [email or web address]  Phone: [phone]

## 2019-03-11 ENCOUNTER — TELEPHONE (OUTPATIENT)
Dept: FAMILY MEDICINE | Facility: CLINIC | Age: 84
End: 2019-03-11

## 2019-03-11 NOTE — TELEPHONE ENCOUNTER
Reason for call:  Symptom   Symptom or request: arthritis in legs    Duration (how long have symptoms been present): long time   Have you been treated for this before? Yes    Additional comments:     Phone number to reach patient:  Home number on file [phone] (home)    Best Time:  any    Can we leave a detailed message on this number?  YES

## 2019-03-11 NOTE — TELEPHONE ENCOUNTER
Called the pt back.  She is having some depression, she gets SOB.  She thinks she has cellulitis coming back that she had in November and she has a sharp pain in her right side that she has had and been seen for before.  She has had some bowel issues.      The SOB comes and goes.  She is taking something from the Cardiology.      Advised she should be seen.  Appt scheduled.

## 2019-03-12 ENCOUNTER — OFFICE VISIT (OUTPATIENT)
Dept: FAMILY MEDICINE | Facility: CLINIC | Age: 84
End: 2019-03-12
Payer: COMMERCIAL

## 2019-03-12 VITALS
BODY MASS INDEX: 47.04 KG/M2 | HEIGHT: 63 IN | RESPIRATION RATE: 16 BRPM | OXYGEN SATURATION: 94 % | HEART RATE: 71 BPM | SYSTOLIC BLOOD PRESSURE: 122 MMHG | WEIGHT: 265.5 LBS | DIASTOLIC BLOOD PRESSURE: 72 MMHG | TEMPERATURE: 97.6 F

## 2019-03-12 DIAGNOSIS — I50.31 ACUTE DIASTOLIC CONGESTIVE HEART FAILURE (H): ICD-10-CM

## 2019-03-12 DIAGNOSIS — E66.01 MORBID OBESITY (H): ICD-10-CM

## 2019-03-12 DIAGNOSIS — R10.31 RLQ ABDOMINAL PAIN: Primary | ICD-10-CM

## 2019-03-12 DIAGNOSIS — M15.9 OSTEOARTHRITIS OF MULTIPLE JOINTS, UNSPECIFIED OSTEOARTHRITIS TYPE: ICD-10-CM

## 2019-03-12 DIAGNOSIS — R82.90 NONSPECIFIC FINDING ON EXAMINATION OF URINE: ICD-10-CM

## 2019-03-12 DIAGNOSIS — I50.32 CHRONIC DIASTOLIC HEART FAILURE (H): ICD-10-CM

## 2019-03-12 DIAGNOSIS — R60.9 EDEMA, UNSPECIFIED TYPE: ICD-10-CM

## 2019-03-12 DIAGNOSIS — R06.09 DOE (DYSPNEA ON EXERTION): ICD-10-CM

## 2019-03-12 DIAGNOSIS — F32.A MILD DEPRESSION: ICD-10-CM

## 2019-03-12 DIAGNOSIS — F41.9 ANXIETY: ICD-10-CM

## 2019-03-12 LAB
ALBUMIN SERPL-MCNC: 4.1 G/DL (ref 3.4–5)
ALBUMIN UR-MCNC: 30 MG/DL
ALP SERPL-CCNC: 94 U/L (ref 40–150)
ALT SERPL W P-5'-P-CCNC: 25 U/L (ref 0–50)
ANION GAP SERPL CALCULATED.3IONS-SCNC: 7 MMOL/L (ref 3–14)
APPEARANCE UR: ABNORMAL
AST SERPL W P-5'-P-CCNC: 24 U/L (ref 0–45)
BACTERIA #/AREA URNS HPF: ABNORMAL /HPF
BASOPHILS # BLD AUTO: 0 10E9/L (ref 0–0.2)
BASOPHILS NFR BLD AUTO: 0.3 %
BILIRUB SERPL-MCNC: 0.8 MG/DL (ref 0.2–1.3)
BILIRUB UR QL STRIP: ABNORMAL
BUN SERPL-MCNC: 19 MG/DL (ref 7–30)
CALCIUM SERPL-MCNC: 9.7 MG/DL (ref 8.5–10.1)
CHLORIDE SERPL-SCNC: 102 MMOL/L (ref 94–109)
CO2 SERPL-SCNC: 28 MMOL/L (ref 20–32)
COLOR UR AUTO: YELLOW
CREAT SERPL-MCNC: 0.69 MG/DL (ref 0.52–1.04)
DIFFERENTIAL METHOD BLD: NORMAL
EOSINOPHIL # BLD AUTO: 0.2 10E9/L (ref 0–0.7)
EOSINOPHIL NFR BLD AUTO: 2.9 %
ERYTHROCYTE [DISTWIDTH] IN BLOOD BY AUTOMATED COUNT: 13.4 % (ref 10–15)
GFR SERPL CREATININE-BSD FRML MDRD: 81 ML/MIN/{1.73_M2}
GLUCOSE SERPL-MCNC: 113 MG/DL (ref 70–99)
GLUCOSE UR STRIP-MCNC: NEGATIVE MG/DL
HCT VFR BLD AUTO: 45.6 % (ref 35–47)
HGB BLD-MCNC: 14.6 G/DL (ref 11.7–15.7)
HGB UR QL STRIP: NEGATIVE
KETONES UR STRIP-MCNC: NEGATIVE MG/DL
LEUKOCYTE ESTERASE UR QL STRIP: ABNORMAL
LYMPHOCYTES # BLD AUTO: 1.3 10E9/L (ref 0.8–5.3)
LYMPHOCYTES NFR BLD AUTO: 17.6 %
MCH RBC QN AUTO: 29.2 PG (ref 26.5–33)
MCHC RBC AUTO-ENTMCNC: 32 G/DL (ref 31.5–36.5)
MCV RBC AUTO: 91 FL (ref 78–100)
MONOCYTES # BLD AUTO: 0.6 10E9/L (ref 0–1.3)
MONOCYTES NFR BLD AUTO: 8.4 %
NEUTROPHILS # BLD AUTO: 5.3 10E9/L (ref 1.6–8.3)
NEUTROPHILS NFR BLD AUTO: 70.8 %
NITRATE UR QL: NEGATIVE
NON-SQ EPI CELLS #/AREA URNS LPF: ABNORMAL /LPF
NT-PROBNP SERPL-MCNC: 59 PG/ML (ref 0–450)
PH UR STRIP: 7.5 PH (ref 5–7)
PLATELET # BLD AUTO: 286 10E9/L (ref 150–450)
POTASSIUM SERPL-SCNC: 3.8 MMOL/L (ref 3.4–5.3)
PROT SERPL-MCNC: 7.8 G/DL (ref 6.8–8.8)
RBC # BLD AUTO: 5 10E12/L (ref 3.8–5.2)
RBC #/AREA URNS AUTO: ABNORMAL /HPF
SODIUM SERPL-SCNC: 137 MMOL/L (ref 133–144)
SOURCE: ABNORMAL
SP GR UR STRIP: 1.01 (ref 1–1.03)
UROBILINOGEN UR STRIP-ACNC: 1 EU/DL (ref 0.2–1)
WBC # BLD AUTO: 7.5 10E9/L (ref 4–11)
WBC #/AREA URNS AUTO: ABNORMAL /HPF

## 2019-03-12 PROCEDURE — 85025 COMPLETE CBC W/AUTO DIFF WBC: CPT | Performed by: FAMILY MEDICINE

## 2019-03-12 PROCEDURE — 99214 OFFICE O/P EST MOD 30 MIN: CPT | Performed by: FAMILY MEDICINE

## 2019-03-12 PROCEDURE — 80053 COMPREHEN METABOLIC PANEL: CPT | Performed by: FAMILY MEDICINE

## 2019-03-12 PROCEDURE — 81001 URINALYSIS AUTO W/SCOPE: CPT | Performed by: FAMILY MEDICINE

## 2019-03-12 PROCEDURE — 83880 ASSAY OF NATRIURETIC PEPTIDE: CPT | Performed by: FAMILY MEDICINE

## 2019-03-12 PROCEDURE — 87086 URINE CULTURE/COLONY COUNT: CPT | Performed by: FAMILY MEDICINE

## 2019-03-12 PROCEDURE — 36415 COLL VENOUS BLD VENIPUNCTURE: CPT | Performed by: FAMILY MEDICINE

## 2019-03-12 RX ORDER — ALPRAZOLAM 0.25 MG
0.25 TABLET ORAL 2 TIMES DAILY PRN
Qty: 10 TABLET | Refills: 0 | Status: SHIPPED | OUTPATIENT
Start: 2019-03-12 | End: 2020-02-26

## 2019-03-12 ASSESSMENT — ANXIETY QUESTIONNAIRES
GAD7 TOTAL SCORE: 16
IF YOU CHECKED OFF ANY PROBLEMS ON THIS QUESTIONNAIRE, HOW DIFFICULT HAVE THESE PROBLEMS MADE IT FOR YOU TO DO YOUR WORK, TAKE CARE OF THINGS AT HOME, OR GET ALONG WITH OTHER PEOPLE: SOMEWHAT DIFFICULT
5. BEING SO RESTLESS THAT IT IS HARD TO SIT STILL: MORE THAN HALF THE DAYS
2. NOT BEING ABLE TO STOP OR CONTROL WORRYING: NEARLY EVERY DAY
6. BECOMING EASILY ANNOYED OR IRRITABLE: MORE THAN HALF THE DAYS
3. WORRYING TOO MUCH ABOUT DIFFERENT THINGS: NEARLY EVERY DAY
7. FEELING AFRAID AS IF SOMETHING AWFUL MIGHT HAPPEN: SEVERAL DAYS
1. FEELING NERVOUS, ANXIOUS, OR ON EDGE: NEARLY EVERY DAY

## 2019-03-12 ASSESSMENT — MIFFLIN-ST. JEOR: SCORE: 1633.43

## 2019-03-12 ASSESSMENT — PATIENT HEALTH QUESTIONNAIRE - PHQ9
SUM OF ALL RESPONSES TO PHQ QUESTIONS 1-9: 15
5. POOR APPETITE OR OVEREATING: MORE THAN HALF THE DAYS

## 2019-03-12 NOTE — Clinical Note
CC Pulmonary this note, Cardiology note, Cardiology evaluations and notes from the past year.Thanks!

## 2019-03-12 NOTE — PROGRESS NOTES
SUBJECTIVE:   Carol Merida is a 83 year old female who presents to clinic today for the following health issues:      Abdominal Pain      Duration: last week     Description (location/character/radiation): right lower abdomen        Associated flank pain: yes     Intensity:  8/10    Accompanying signs and symptoms:        Fever/Chills: no        Gas/Bloating: YES- both        Nausea/vomitting: no        Diarrhea: no - really loose bowels -mucous        Dysuria or Hematuria: no     History (previous similar pain/trauma/previous testing): yes     Precipitating or alleviating factors:       Pain worse with eating/BM/urination: yes eating        Pain relieved by BM: YES    Therapies tried and outcome: tylenol    LMP:  not applicable    SOB       Duration: ongoing     Intensity:  Severe with exertion     Accompanying signs and symptoms: cough, congestion     History (similar episodes/previous evaluation): yes     Precipitating or alleviating factors: concerned that pneumonia will come back -Is having more panic attacks     Therapies tried and outcome: inhaler            Problem list and histories reviewed & adjusted, as indicated.  Additional history:     See under ROS     Patient Active Problem List   Diagnosis     Obesity     Hypothyroidism     Benign neoplasm of colon     Obstructive sleep apnea     * * * SBE PROPHYLAXIS * * *     Degeneration of lumbar or lumbosacral intervertebral disc     Pain in joint, ankle and foot     Arthrodesis status     HYPERLIPIDEMIA LDL GOAL <100     Hypertension goal BP (blood pressure) < 140/90     Health Care Home     Osteopenia     Malignant neoplasm of female breast (H)     Impaired fasting glucose     ACP (advance care planning)     History of melanoma     Panic disorder without agoraphobia     Humerus fracture     Arthritis     PONV (postoperative nausea and vomiting)     S/P total hip arthroplasty     Constipation     DVT prophylaxis     Anticoagulation management encounter      Physical deconditioning     Periprosthetic fracture around internal prosthetic left hip joint (H)     Chronic pain syndrome - hips     Fracture of greater trochanter of right femur 11/6/2016, closed, with routine healing, subsequent encounter     Anxiety     Osteoarthritis of multiple joints, unspecified osteoarthritis type     Anemia due to blood loss, acute     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     Long term (current) use of anticoagulants     Vitamin D deficiency     Pneumonia     Elevated BMI (H)     CHF (congestive heart failure) (H)     Mild depression (H)       Current Outpatient Medications   Medication Sig Dispense Refill     acetaminophen (TYLENOL) 325 MG tablet Take 3 tablets (975 mg) by mouth every 8 hours 500 tablet 1     albuterol (PROAIR HFA/PROVENTIL HFA/VENTOLIN HFA) 108 (90 Base) MCG/ACT inhaler Inhale 2 puffs into the lungs every 4 hours as needed for shortness of breath / dyspnea or wheezing 1 Inhaler 0     ALPRAZolam (XANAX) 0.25 MG tablet Take 1 tablet (0.25 mg) by mouth 2 times daily as needed for anxiety Don't take with the oxycodone 10 tablet 0     amLODIPine (NORVASC) 5 MG tablet Take 1 tablet (5 mg) by mouth daily 90 tablet 1     benzonatate (TESSALON) 100 MG capsule Take 1 capsule (100 mg) by mouth 3 times daily as needed for cough 42 capsule 0     calcium-vitamin D (CALCIUM 600 + D) 600-400 MG-UNIT per tablet Take 1 tablet by mouth every evening  100 tablet 3     FLUoxetine (PROZAC) 40 MG capsule Take 1 capsule (40 mg) by mouth daily 90 capsule 1     fluticasone (FLONASE) 50 MCG/ACT spray Spray 1 spray into both nostrils daily as needed 1 Bottle 5     furosemide (LASIX) 40 MG tablet Take 1 tablet (40 mg) by mouth daily 30 tablet 11     gabapentin (NEURONTIN) 300 MG capsule TAKE 1 CAPSULE(300 MG) BY MOUTH THREE TIMES DAILY 270 capsule 0     gabapentin (NEURONTIN) 300 MG capsule Take 1 capsule (300 mg) by mouth 3 times daily 270 capsule 0     hydrochlorothiazide  (HYDRODIURIL) 25 MG tablet Take 1 tablet (25 mg) by mouth daily 30 tablet 3     levothyroxine (SYNTHROID/LEVOTHROID) 88 MCG tablet TAKE ONE TABLET BY MOUTH EVERY DAY 90 tablet 2     magnesium hydroxide (MILK OF MAGNESIA) 400 MG/5ML suspension Take 30 mLs by mouth daily as needed        Nystatin (NYSTOP) 900052 UNIT/GM POWD Apply tid to affectead area prn 60 g 1     order for DME Equipment being ordered: left breast prosthesis. Mastectomy bras (up to 4) 4 Device 0     senna (SENOKOT) 8.6 MG tablet Take 1 tablet by mouth daily       simvastatin (ZOCOR) 10 MG tablet TAKE 1 TABLET BY MOUTH AT BEDTIME 90 tablet 0     simvastatin (ZOCOR) 10 MG tablet TAKE 1 TABLET BY MOUTH AT BEDTIME 90 tablet 1     triamcinolone (KENALOG) 0.1 % cream Apply sparingly to affected area two times daily as needed for itching. 30 g 0         Reviewed and updated as needed this visit by clinical staff  Tobacco  Allergies  Meds  Med Hx  Surg Hx  Fam Hx  Soc Hx      Reviewed and updated as needed this visit by Provider        Social History     Tobacco Use     Smoking status: Never Smoker     Smokeless tobacco: Never Used   Substance Use Topics     Alcohol use: Yes     Alcohol/week: 0.0 - 0.5 oz     Comment: 1 glass wine weekly     Did grow up in household with smoking.    ROS:  CONSTITUTIONAL:NEGATIVE for fever, chills, change in weight x some loss.  RESP:see below  CV: NEGATIVE for chest pain, palpitations. Does have intermittent peripheral edema  GI: see below  : no urinary symptoms.   PSYCHIATRIC: somewhat down;     Home Care not coming out any longer.  She found it helpful; notes it was someone to talk with as well.  Does go to [employer] and talks some there.     Gets very short of breath.  Will get worse with anxiety. But also has the ongoing dyspnea on exertion.    Since last Friday, has had some abdominal pain.   Friday night had cramps and gurgling. Would then pass mucus.  More recently has had regular bowel movements, but loose. No  "blood.     She gets right back pain and goes around to the front; started prior to the bowel issues.   Recalls similar problem few years ago.   ? Kidney stone.   Then had some pain right back going around to front as well.    No urinary symptoms.   Not urinating frequently.  Taking lasix and the other pill from the heart doctor.    Was having difficulty with left leg.  Was red and hot; now better.  Wonders what she can do to help prevent this.    Has cut out salts and sweets.     Wondering about a stronger pain medication. Using acetaminophen 500 tid.     OBJECTIVE:     /72 (BP Location: Right arm, Cuff Size: Adult Large)   Pulse 71   Temp 97.6  F (36.4  C) (Oral)   Resp 16   Ht 1.6 m (5' 3\")   Wt 120.4 kg (265 lb 8 oz)   SpO2 94%   BMI 47.03 kg/m    Body mass index is 47.03 kg/m .  GENERAL APPEARANCE: alert and no distress  RESP: lungs clear to auscultation - no rales, rhonchi or wheezes  CV: regular rates and rhythm  ABDOMEN: soft, nontender, without hepatosplenomegaly or masses and bowel sounds normal  MS: she does have some swelling of bilateral lower extremities. A little pink left leg.  PSYCH: mentation appears normal and affect normal/anxious    Reviewed Cardiology note from January. She did not get the follow up labs yet.    Reviewed last Care Coordination note.    Hemoglobin   Date Value Ref Range Status   11/21/2018 14.1 11.7 - 15.7 g/dL Final   11/19/2018 14.0 11.7 - 15.7 g/dL Final     TSH   Date Value Ref Range Status   08/23/2018 3.70 0.40 - 4.00 mU/L Final     CXR:  IMPRESSION: Mild bilateral vascular congestion appears mildly  increased. No evidence for cardiomegaly.      Reviewed chart; stopped losartan in 2014; noted in 5/27/14 note that it made her feel bad.  Component      Latest Ref Rng & Units 3/12/2019   WBC      4.0 - 11.0 10e9/L 7.5   RBC Count      3.8 - 5.2 10e12/L 5.00   Hemoglobin      11.7 - 15.7 g/dL 14.6   Hematocrit      35.0 - 47.0 % 45.6   MCV      78 - 100 fl 91 "   MCH      26.5 - 33.0 pg 29.2   MCHC      31.5 - 36.5 g/dL 32.0   RDW      10.0 - 15.0 % 13.4   Platelet Count      150 - 450 10e9/L 286   % Neutrophils      % 70.8   % Lymphocytes      % 17.6   % Monocytes      % 8.4   % Eosinophils      % 2.9   % Basophils      % 0.3   Absolute Neutrophil      1.6 - 8.3 10e9/L 5.3   Absolute Lymphocytes      0.8 - 5.3 10e9/L 1.3   Absolute Monocytes      0.0 - 1.3 10e9/L 0.6   Absolute Eosinophils      0.0 - 0.7 10e9/L 0.2   Absolute Basophils      0.0 - 0.2 10e9/L 0.0   Diff Method       Automated Method   Color Urine       Yellow   Appearance Urine       Slightly Cloudy   Glucose Urine      NEG:Negative mg/dL Negative   Bilirubin Urine      NEG:Negative Small (A)   Ketones Urine      NEG:Negative mg/dL Negative   Specific Gravity Urine      1.003 - 1.035 1.015   Blood Urine      NEG:Negative Negative   pH Urine      5.0 - 7.0 pH 7.5 (H)   Protein Albumin Urine      NEG:Negative mg/dL 30 (A)   Urobilinogen Urine      0.2 - 1.0 EU/dL 1.0   Nitrite Urine      NEG:Negative Negative   Leukocyte Esterase Urine      NEG:Negative Small (A)   Source       Midstream Urine   WBC Urine      OTO5:0 - 5 /HPF 10-25 (A)   RBC Urine      OTO2:O - 2 /HPF O - 2   Squamous Epithelial /LPF Urine      FEW:Few /LPF Many (A)   Bacteria Urine      NEG:Negative /HPF Few (A)     TSH   Date Value Ref Range Status   08/23/2018 3.70 0.40 - 4.00 mU/L Final       ASSESSMENT/PLAN:     1. RLQ abdominal pain  Uncertain etiology. I do wonder some about musculoskeletal. It does seem to start in the back.  Doubt kidney stone; no hematuria. Symptoms not typical.  - Comprehensive metabolic panel  - *UA reflex to Microscopic and Culture ([facility] (except [place] and [place])  - Urine Microscopic  - Urine Culture Aerobic Bacterial    2. Morbid obesity (H)  Suspect that many of her symptoms are related to this.       3. Mild depression (H)  She does have multiple stressors.  Offered to help connect  her to counselor.  She does note she goes to [employer]. Prefers talking with someone familiar with the situation.  She did find Home Care helpful with this as well.    4. Chronic diastolic heart failure (H)  She does see Cardiology.  - BETA BLOCKER NOT PRESCRIBED (INTENTIONAL); Beta Blocker not prescribed intentionally due to EF > 40 % (ejection fraction) will leave up to Cardiology.  - ACE/ARB/ARNI NOT PRESCRIBED (INTENTIONAL); Please choose reason not prescribed, below      5. PETTY (dyspnea on exertion)  Cardiology has suggested looking at Pulmonary etiologies; will have her visit with them.  - CBC with platelets and differential  - PULMONARY MEDICINE REFERRAL      6. Edema, unspecified type  Discussed getting feet up; using compression stockings.  Also see patient instructions.    7. Anxiety  Would like a refill; recently notes she is using about once per week.  - ALPRAZolam (XANAX) 0.25 MG tablet; Take 1 tablet (0.25 mg) by mouth 2 times daily as needed for anxiety Don't take with the oxycodone  Dispense: 10 tablet; Refill: 0    8. Acute diastolic congestive heart failure (H)  As per Cardiology.  - N terminal pro BNP outpatient  - Urine Culture Aerobic Bacterial    9. Osteoarthritis of multiple joints, unspecified osteoarthritis type  At this time, will increase acetaminophen. She can increase to 500 mg; up to 2 tid.    10. Nonspecific finding on examination of urine  They are running UC. Not real suspicious for infection; she does not have symptoms.   - Urine Culture Aerobic Bacterial    She notes home care no longer going out. She would be interested in having them continue to come out. At this time will have Francisca see if there may be reason to go out.    CC Pulmonary this note, Cardiology note, Cardiology evaluations and notes from the past year.    Also LUPILLO Espinosa regarding Home Care; looks like last time she had discussed with Carol, home care was still going out.    Patient Instructions       Do keep up with  fluids; keep your urine a light yellow.    Do not overdo salt.    Elevate your legs. Above the level of your heart works best.     Compression stockings can help.    If the abdominal pain is worse, we may need to consider additional evaluation.    I do recommend following up with Cardiology as they had requested.  We did draw the labs today.        Yessica Lora MD, MD  [facility]

## 2019-03-12 NOTE — PATIENT INSTRUCTIONS
Do keep up with fluids; keep your urine a light yellow.    Do not overdo salt.    Elevate your legs. Above the level of your heart works best.     Compression stockings can help.    If the abdominal pain is worse, we may need to consider additional evaluation.    I do recommend following up with Cardiology as they had requested.  We did draw the labs today.

## 2019-03-12 NOTE — Clinical Note
She would like Home Care to still go out; she notes they no longer are now.I am suspecting she may have met goals? Can you look into this? Thanks!!!

## 2019-03-12 NOTE — LETTER
March 21, 2019      Carol Merida  [address] [address] [address]        Dear Ms. Carol ANTONY Merida,    Enclosed is a copy of your recent results.    Overall, these are looking good.    There is no sign of anemia.  Your kidney and liver tests look normal.    I hope things settle down for you!    Sincerely,     Yessica Lora MD

## 2019-03-13 ENCOUNTER — TELEPHONE (OUTPATIENT)
Dept: CARDIOLOGY | Facility: CLINIC | Age: 84
End: 2019-03-13

## 2019-03-13 LAB
BACTERIA SPEC CULT: NORMAL
SPECIMEN SOURCE: NORMAL

## 2019-03-13 ASSESSMENT — ANXIETY QUESTIONNAIRES: GAD7 TOTAL SCORE: 16

## 2019-03-13 NOTE — TELEPHONE ENCOUNTER
Called pt to review normal N-t Pro BMP results and recommendations from PMD to follow up with Cardiology.  Pt was advised previously to follow up at the end of January.  She had scheduled appts on 1/22, 1/31, and 2/14 which she cancelled.  Appt scheduled with LIBRA Calle on 4/4.  Confirmation letter sent per pt request.  KMortimer RN

## 2019-03-18 ENCOUNTER — PATIENT OUTREACH (OUTPATIENT)
Dept: CARE COORDINATION | Facility: CLINIC | Age: 84
End: 2019-03-18

## 2019-03-18 NOTE — PROGRESS NOTES
Clinic Care Coordination Contact  [facility]/Voicemail    Clinical Data: Care Coordinator Outreach. Chart reviewed. Appears home care discharged patient on 03/07. Patient had Primary Care Provider follow up appointment on 03/12. Pulmonary medicine referral ordered by Primary Care Provider at this visit. Patient has cardiology follow up appointment scheduled 04/04. Primary Care Provider instructions from last office visit indicate patient to follow up again in 6 weeks around 4/23 for medication recheck. No appointment currently scheduled.  RNCC calling to follow up with above and patient progress toward goal of being healthy and well for her family, management of anxiety, and other concerns.   Outreach attempted x 1.  Left message on [employer] with call back information and requested return call. Then attempted outreach to mobile number. Patient answered and stated she was not available to talk because she was on the way to the hospital to see her . Writer requested patient return call when able.   Plan: Care Coordinator will try to reach patient again in 3-5 business days.    Francisca Long RN Care Coordinator  [facility] [place]  Email: [email or web address]  Phone: [phone]

## 2019-03-18 NOTE — LETTER
[address]  [place] [address]    3/27/2019    Dear Everette,     I have been attempting to reach you since our last contact. I would like to continue to work with you on the goals from our last conversation and provide the support you may need. I would appreciate if you would give me a call at [phone] and let me know if you would like to continue working together. I know that there are many things that can affect our ability to communicate and hope we can plan better moving forward.     All of us at [facility] are invested in your health and are here to assist you in meeting your goals.     Sincerely,      Francisca Long RN Care Coordinator  [facility] [place]  Phone: [phone]

## 2019-03-25 NOTE — PROGRESS NOTES
Clinic Care Coordination Contact  [facility]    Clinical Data: Care Coordinator Outreach  Outreach attempted x 2.  Patient reports not available to talk as she is visiting her  in the hospital. Requests call from writer on Wednesday morning.   Plan: Care Coordinator will try to reach patient again in 2-3 business days.    Francisca Long RN Care Coordinator  [facility] - [facility] [place]  Email: [email or web address]  Phone: [phone]

## 2019-03-27 NOTE — PROGRESS NOTES
Clinic Care Coordination Contact  [facility]/Voicemail    Clinical Data: Care Coordinator Outreach  Outreach attempted x 3.  Left message on voicemail with call back information and requested return call.  Plan: Care Coordinator will mail out care coordination unable to contact letter with care coordinator contact information and explanation of care coordination services. Care Coordinator will do no further outreaches at this time.    Francisca Long RN Care Coordinator  [facility] - [facility] [place]  Email: [email or web address]  Phone: [phone]

## 2019-04-25 DIAGNOSIS — E78.5 HYPERLIPIDEMIA LDL GOAL <100: ICD-10-CM

## 2019-04-25 NOTE — TELEPHONE ENCOUNTER
"Requested Prescriptions   Pending Prescriptions Disp Refills     simvastatin (ZOCOR) 10 MG tablet [Pharmacy Med Name: SIMVASTATIN 10MG TABLETS]  Last Written Prescription Date:  1/24/19  Last Fill Quantity: 90,  # refills: 0    Last office visit: 3/12/2019 with prescribing provider:  Yessica Lora MD        Future Office Visit:     90 tablet 0     Sig: TAKE 1 TABLET BY MOUTH AT BEDTIME       Statins Protocol Failed - 4/25/2019  9:42 AM        Failed - LDL on file in past 12 months     Recent Labs   Lab Test 04/23/18  0933   LDL 38             Passed - No abnormal creatine kinase in past 12 months     No lab results found.             Passed - Recent (12 mo) or future (30 days) visit within the authorizing provider's specialty     Patient had office visit in the last 12 months or has a visit in the next 30 days with authorizing provider or within the authorizing provider's specialty.  See \"Patient Info\" tab in inbasket, or \"Choose Columns\" in Meds & Orders section of the refill encounter.              Passed - Medication is active on med list        Passed - Patient is age 18 or older        Passed - No active pregnancy on record        Passed - No positive pregnancy test in past 12 months          "

## 2019-04-29 RX ORDER — SIMVASTATIN 10 MG
TABLET ORAL
Qty: 90 TABLET | Refills: 0 | Status: SHIPPED | OUTPATIENT
Start: 2019-04-29 | End: 2019-07-24

## 2019-04-29 NOTE — TELEPHONE ENCOUNTER
TC: Patient is due for a physical with labs.  Please call patient to schedule.    Prescription approved per [facility] [place] [facility].    Dolores Wong RN  Message handled by Nurse Triage.

## 2019-05-07 DIAGNOSIS — M15.9 OSTEOARTHRITIS OF MULTIPLE JOINTS, UNSPECIFIED OSTEOARTHRITIS TYPE: ICD-10-CM

## 2019-05-07 DIAGNOSIS — M51.369 DDD (DEGENERATIVE DISC DISEASE), LUMBAR: ICD-10-CM

## 2019-05-07 RX ORDER — GABAPENTIN 300 MG/1
CAPSULE ORAL
Qty: 270 CAPSULE | Refills: 0 | Status: SHIPPED | OUTPATIENT
Start: 2019-05-07 | End: 2019-07-24

## 2019-05-07 NOTE — TELEPHONE ENCOUNTER
Requested Prescriptions   Pending Prescriptions Disp Refills     gabapentin (NEURONTIN) 300 MG capsule [Pharmacy Med Name: GABAPENTIN 300MG CAPSULES] 270 capsule 0     Sig: TAKE 1 CAPSULE(300 MG) BY MOUTH THREE TIMES DAILY       There is no refill protocol information for this order        Last Written Prescription Date:  1/24/19  Last Fill Quantity: 270,  # refills: 0    Last office visit: 3/12/2019 with prescribing provider:  Yessica Lora MD        Future Office Visit:   Next 5 appointments (look out 90 days)    May 30, 2019  1:30 PM CDT  PHYSICAL with Yessica Lora MD  [facility] [address] [place] [address] [phone]

## 2019-05-15 DIAGNOSIS — F41.9 ANXIETY: ICD-10-CM

## 2019-05-15 DIAGNOSIS — F32.A MILD DEPRESSION: ICD-10-CM

## 2019-05-15 DIAGNOSIS — I10 BENIGN ESSENTIAL HYPERTENSION: ICD-10-CM

## 2019-05-16 RX ORDER — FLUOXETINE 40 MG/1
CAPSULE ORAL
Qty: 90 CAPSULE | Refills: 0 | Status: SHIPPED | OUTPATIENT
Start: 2019-05-16 | End: 2019-05-30

## 2019-05-16 RX ORDER — AMLODIPINE BESYLATE 5 MG/1
TABLET ORAL
Qty: 90 TABLET | Refills: 0 | Status: SHIPPED | OUTPATIENT
Start: 2019-05-16 | End: 2019-10-14

## 2019-05-16 NOTE — TELEPHONE ENCOUNTER
"Requested Prescriptions   Pending Prescriptions Disp Refills     amLODIPine (NORVASC) 5 MG tablet [Pharmacy Med Name: AMLODIPINE BESYLATE 5MG TABS]  Last Written Prescription Date:  8/23/18  Last Fill Quantity: 90,  # refills: 1    Last office visit: 3/12/2019 with prescribing provider:  Yessica Lora MD       Future Office Visit:   Next 5 appointments (look out 90 days)    May 30, 2019  1:30 PM CDT  PHYSICAL with Yessica Lora MD  [address] [address] [phone]          90 tablet 1     Sig: TAKE ONE TABLET BY MOUTH ONCE DAILY       Calcium Channel Blockers Protocol  Passed - 5/15/2019 11:36 AM        Passed - Blood pressure under 140/90 in past 12 months     BP Readings from Last 3 Encounters:   03/12/19 122/72   01/04/19 110/70   12/21/18 122/70           Passed - Recent (12 mo) or future (30 days) visit within the authorizing provider's specialty     Patient had office visit in the last 12 months or has a visit in the next 30 days with authorizing provider or within the authorizing provider's specialty.  See \"Patient Info\" tab in inbasket, or \"Choose Columns\" in Meds & Orders section of the refill encounter.              Passed - Medication is active on med list        Passed - Patient is age 18 or older        Passed - No active pregnancy on record        Passed - Normal serum creatinine on file in past 12 months     Recent Labs   Lab Test 03/12/19  0957  10/26/12  1456   CR 0.69   < >  --    CREAT  --   --  0.7    < > = values in this interval not displayed.             Passed - No positive pregnancy test in past 12 months        FLUoxetine (PROZAC) 40 MG capsule [Pharmacy Med Name: FLUOXETINE HCL 40MG CAPS]  Last Written Prescription Date:  11/29/18  Last Fill Quantity: 90,  # refills: 1    Last office visit: 3/12/2019 with prescribing provider:  Yessica Lora MD       Future Office Visit:   Next 5 appointments (look out 90 " "days)    May 30, 2019  1:30 PM CDT  PHYSICAL with Yessica Lora MD  [facility] [address] [facility] [address] [phone]          90 capsule 1     Sig: TAKE ONE CAPSULE BY MOUTH ONCE DAILY       SSRIs Protocol Failed - 5/15/2019 11:36 AM        Failed - PHQ-9 score less than 5 in past 6 months     Please review last PHQ-9 score.   PHQ-9 SCORE 11/16/2017 7/19/2018 3/12/2019   PHQ-9 Total Score - - -   PHQ-9 Total Score 9 10 15     EDILSON-7 SCORE 11/16/2017 7/19/2018 3/12/2019   Total Score - - -   Total Score 9 11 16   Total Score - - -                 Passed - Medication is active on med list        Passed - Patient is age 18 or older        Passed - No active pregnancy on record        Passed - No positive pregnancy test in last 12 months        Passed - Recent (6 mo) or future (30 days) visit within the authorizing provider's specialty     Patient had office visit in the last 6 months or has a visit in the next 30 days with authorizing provider or within the authorizing provider's specialty.  See \"Patient Info\" tab in inbasket, or \"Choose Columns\" in Meds & Orders section of the refill encounter.          Routing refill request to provider for review/approval because:  Labs out of range:  PHQ9  A break in medication    Angelina Latham RN            "

## 2019-05-17 ENCOUNTER — TELEPHONE (OUTPATIENT)
Dept: FAMILY MEDICINE | Facility: CLINIC | Age: 84
End: 2019-05-17

## 2019-05-17 ENCOUNTER — OFFICE VISIT (OUTPATIENT)
Dept: FAMILY MEDICINE | Facility: CLINIC | Age: 84
End: 2019-05-17
Payer: COMMERCIAL

## 2019-05-17 VITALS
WEIGHT: 264.7 LBS | HEART RATE: 85 BPM | SYSTOLIC BLOOD PRESSURE: 110 MMHG | HEIGHT: 63 IN | DIASTOLIC BLOOD PRESSURE: 60 MMHG | TEMPERATURE: 99.3 F | RESPIRATION RATE: 16 BRPM | BODY MASS INDEX: 46.9 KG/M2 | OXYGEN SATURATION: 95 %

## 2019-05-17 DIAGNOSIS — M79.605 BILATERAL LOWER EXTREMITY PAIN: Primary | ICD-10-CM

## 2019-05-17 DIAGNOSIS — I50.32 CHRONIC DIASTOLIC HEART FAILURE (H): ICD-10-CM

## 2019-05-17 DIAGNOSIS — M79.604 BILATERAL LOWER EXTREMITY PAIN: Primary | ICD-10-CM

## 2019-05-17 PROCEDURE — 99213 OFFICE O/P EST LOW 20 MIN: CPT | Performed by: PHYSICIAN ASSISTANT

## 2019-05-17 ASSESSMENT — MIFFLIN-ST. JEOR: SCORE: 1624.8

## 2019-05-17 NOTE — TELEPHONE ENCOUNTER
"Spoke to patient. Pain, swelling, and redness started last weekend in bilateral legs. It goes from her ankles up to her knees. She states the right ankle is \"the worse area.\" Her bilateral toes become numb at times. Unable to state if venous pain. She has had cellulitis in the past and felt the pain in the ankle was beginning to feel this way.     She was not taking her Lasix as ordered but for the last week she has and the swelling did come down. She has been elevating her legs regularly.     Her  is in the hospital (7months) and she states she hasn't been taking very good care of herself.       Appointment made for this afternoon.     Lindsey Javier RN Flex        "

## 2019-05-17 NOTE — TELEPHONE ENCOUNTER
Type of outreach:  Discussed with patient at  on 5/17/19.  Health Maintenance Due   Topic Date Due     URINE DRUG SCREEN Q1 YR  04/16/1950     ZOSTER IMMUNIZATION (2 of 3) 01/30/2013     MEDICARE ANNUAL WELLNESS VISIT  12/11/2015     MAMMO Q1 YR  01/18/2019     LIPID MONITORING Q1 YEAR  04/23/2019     Pt knows that she is due for a mammogram, does these within [place] at [facility], will get scheduled.   Rosi Tejada MA

## 2019-05-17 NOTE — TELEPHONE ENCOUNTER
Reason for call:  Patient reporting a symptom    Symptom or request: prob with right leg. Leg is painful. Has been taking lasix and does not seen to help. Pt thinks she has cellulitis    Duration (how long have symptoms been present): 10 days    Have you been treated for this before? No    Additional comments: in the evening sometimes it gets red and hard from the knee down.    Phone Number patient can be reached at:  Home number on file [phone] (home)    Best Time:  any    Can we leave a detailed message on this number:  YES    Call taken on 5/17/2019 at 9:47 AM by Maria Isabel Lemon

## 2019-05-17 NOTE — PROGRESS NOTES
SUBJECTIVE:   Carol Merida is a 84 year old female who presents to clinic today for the following   health issues:    Musculoskeletal problem/pain    Duration: Ongoing     Description  Location: Legs, toes and ankles     Intensity: moderate to severe    Accompanying signs and symptoms: swelling, redness, pain and shortness of breath     History  Previous similar problem: YES  Previous evaluation: multiple surgeries in the past     Precipitating or alleviating factors:  Trauma or overuse: no   Aggravating factors include: none    Therapies tried and outcome: Tylenol for arthritis     Patient is an 85yo female with hx of diastolic dysfunction/CHF presents with a new onset of lower extremity pain and redness  -The pain in the right lower leg was quite sudden, pain seemed to move up to the knee  -they have been slightly more swollen  -redness has improved a bit since this started  -there is just a mild cough  -she does admit she has been taking poorer care of herself lately  -she had not been taking lasix leading up to the start of the symptoms   -once she started taking them the symptoms improved  -she denies any fevers      Additional history: as documented    Reviewed  and updated as needed this visit by clinical staff         Reviewed and updated as needed this visit by Provider         Patient Active Problem List   Diagnosis     Obesity     Hypothyroidism     Benign neoplasm of colon     Obstructive sleep apnea     * * * SBE PROPHYLAXIS * * *     Degeneration of lumbar or lumbosacral intervertebral disc     Pain in joint, ankle and foot     Arthrodesis status     HYPERLIPIDEMIA LDL GOAL <100     Hypertension goal BP (blood pressure) < 140/90     Health Care Home     Osteopenia     Malignant neoplasm of female breast (H)     Impaired fasting glucose     ACP (advance care planning)     History of melanoma     Panic disorder without agoraphobia     Arthritis     PONV (postoperative nausea and vomiting)     S/P  total hip arthroplasty     Constipation     DVT prophylaxis     Anticoagulation management encounter     Physical deconditioning     Periprosthetic fracture around internal prosthetic left hip joint (H)     Chronic pain syndrome - hips     Fracture of greater trochanter of right femur 11/6/2016, closed, with routine healing, subsequent encounter     Anxiety     Osteoarthritis of multiple joints, unspecified osteoarthritis type     Anemia due to blood loss, acute     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     Long term (current) use of anticoagulants     Vitamin D deficiency     Pneumonia     Elevated BMI (H)     CHF (congestive heart failure) (H)     Mild depression (H)     Chronic diastolic heart failure (H)     Past Surgical History:   Procedure Laterality Date     ARTHROPLASTY HIP Left 7/6/2015    Procedure: ARTHROPLASTY HIP;  Surgeon: Junior Giordano MD;  Location: [facility] OR     ARTHROPLASTY HIP Right 11/2/2016    Procedure: ARTHROPLASTY HIP;  Surgeon: Junior Giordano MD;  Location: [facility] OR     ARTHROPLASTY REVISION HIP Left 7/22/2015    Procedure: ARTHROPLASTY REVISION HIP;  Surgeon: Junior Giordano MD;  Location: [facility] OR     C NONSPECIFIC PROCEDURE      Knee replacement x 2 (B)     C NONSPECIFIC PROCEDURE      s/p Tonsillectomy ([facility])     C NONSPECIFIC PROCEDURE      s/p Appy (high school)     C NONSPECIFIC PROCEDURE      Melanoma removed with sentinel node bx     C NONSPECIFIC PROCEDURE       bilateral cataract     COLONOSCOPY  9/01    tubular adenoma; repeat 3 years.     COLONOSCOPY  9/04    normal; repeat 5 years ([facility])     HYSTERECTOMY, MARGAUX  summer 2004    and BSO     SURGICAL HISTORY OF -   9/05    left mastectomy     SURGICAL HISTORY OF -   2008    right ankle surgery; triple arthrodesis     SURGICAL HISTORY OF -   5/09    right ankle surgery; triple arthrodesis and deltoid reconstruction; possible other     SURGICAL HISTORY OF -   2/10    removal of hardware from right  "ankle       Social History     Tobacco Use     Smoking status: Never Smoker     Smokeless tobacco: Never Used   Substance Use Topics     Alcohol use: Yes     Alcohol/week: 0.0 - 0.5 oz     Comment: 1 glass wine weekly     Family History   Problem Relation Age of Onset     No Known Problems Brother      Arthritis Mother      Hypertension Father      Cancer Father         lung     Cancer Grandchild         terratoma tumors     Breast Cancer Daughter            ROS:  Constitutional, HEENT, cardiovascular, pulmonary, gi and gu systems are negative, except as otherwise noted.    OBJECTIVE:     /60   Pulse 85   Temp 99.3  F (37.4  C) (Tympanic)   Resp 16   Ht 1.6 m (5' 3\")   Wt 120.1 kg (264 lb 11.2 oz)   SpO2 95%   BMI 46.89 kg/m    Body mass index is 46.89 kg/m .  GENERAL: healthy, alert and no distress  RESP: lungs clear to auscultation - no rales, rhonchi or wheezes  CV: regular rates and rhythm  MS/SKIN: I do not note any gross erythema to the extremities. There is mild pitting edema in the bilateral extremities, mostly distal to the mid tibia and into the ankle. Her pedal pulses are palpable at 2+. The legs show gross normal active ROM. They are quite ttp   PSYCH: mentation appears normal, affect normal/bright    Diagnostic Test Results:  none     ASSESSMENT/PLAN:   1. Bilateral lower extremity pain  2. Chronic diastolic heart failure (H)  I suspect that Everette's pain is related to increased swelling in the legs as she limited the use of her diuretic. She tells me she's been visiting her  more and so less willing to take the diuretic to limit her urination. I'd like her to start back up and monitor the symptoms. I do not suspect cellulitis or dvt. She'll follow up if not improving      Jim Block PA-C  [facility]      "

## 2019-05-24 ENCOUNTER — TELEPHONE (OUTPATIENT)
Dept: FAMILY MEDICINE | Facility: CLINIC | Age: 84
End: 2019-05-24

## 2019-05-24 NOTE — TELEPHONE ENCOUNTER
She can ultimately decide. It has been a week since seen so a good idea to be evaluated. She could consider urgent care or trying to schedule with someone on Tuesday

## 2019-05-24 NOTE — TELEPHONE ENCOUNTER
"Patient seen 5/17 for Rt leg pain. C/o pain getting worse and is severe past few days. 10/10 on pain scale. Hard to walk. Denies swelling and warmth. Has been taking lasix regularly. Some redness, but that has improved.    Lt leg pain. Patient states \"that's always there because of my hip\".    Pain slightly improved by Tylenol. Patient states wants to go to the ER.    Please advise.    Consuelo Park RN    "

## 2019-05-24 NOTE — TELEPHONE ENCOUNTER
Informed patient of provider advice. Has appt with Ortho on 5/29 and with Dr. Lora on 5/30.    Patient stated will go to [facility] if needed.    Consuelo Park RN

## 2019-05-30 ENCOUNTER — TELEPHONE (OUTPATIENT)
Dept: FAMILY MEDICINE | Facility: CLINIC | Age: 84
End: 2019-05-30

## 2019-05-30 ENCOUNTER — OFFICE VISIT (OUTPATIENT)
Dept: FAMILY MEDICINE | Facility: CLINIC | Age: 84
End: 2019-05-30
Payer: COMMERCIAL

## 2019-05-30 VITALS
TEMPERATURE: 97.9 F | BODY MASS INDEX: 46.62 KG/M2 | RESPIRATION RATE: 16 BRPM | HEIGHT: 63 IN | OXYGEN SATURATION: 95 % | WEIGHT: 263.1 LBS | SYSTOLIC BLOOD PRESSURE: 110 MMHG | HEART RATE: 92 BPM | DIASTOLIC BLOOD PRESSURE: 72 MMHG

## 2019-05-30 DIAGNOSIS — M54.5 CHRONIC LOW BACK PAIN, UNSPECIFIED BACK PAIN LATERALITY, WITH SCIATICA PRESENCE UNSPECIFIED: ICD-10-CM

## 2019-05-30 DIAGNOSIS — R60.9 EDEMA, UNSPECIFIED TYPE: ICD-10-CM

## 2019-05-30 DIAGNOSIS — F32.A MILD DEPRESSION: ICD-10-CM

## 2019-05-30 DIAGNOSIS — Z00.01 ENCOUNTER FOR ROUTINE ADULT HEALTH EXAMINATION WITH ABNORMAL FINDINGS: Primary | ICD-10-CM

## 2019-05-30 DIAGNOSIS — Z85.820 HISTORY OF MELANOMA: ICD-10-CM

## 2019-05-30 DIAGNOSIS — R22.32 MASS OF LEFT AXILLA: ICD-10-CM

## 2019-05-30 DIAGNOSIS — Z85.3 HX: BREAST CANCER: ICD-10-CM

## 2019-05-30 DIAGNOSIS — F41.9 ANXIETY: ICD-10-CM

## 2019-05-30 DIAGNOSIS — R73.01 IMPAIRED FASTING GLUCOSE: ICD-10-CM

## 2019-05-30 DIAGNOSIS — G62.9 NEUROPATHY: ICD-10-CM

## 2019-05-30 DIAGNOSIS — G89.29 CHRONIC LOW BACK PAIN, UNSPECIFIED BACK PAIN LATERALITY, WITH SCIATICA PRESENCE UNSPECIFIED: ICD-10-CM

## 2019-05-30 DIAGNOSIS — E03.9 HYPOTHYROIDISM, UNSPECIFIED TYPE: ICD-10-CM

## 2019-05-30 DIAGNOSIS — R22.32 MASS OF LEFT AXILLA: Primary | ICD-10-CM

## 2019-05-30 DIAGNOSIS — M79.622 PAIN IN LEFT AXILLA: ICD-10-CM

## 2019-05-30 DIAGNOSIS — E78.5 HYPERLIPIDEMIA LDL GOAL <100: ICD-10-CM

## 2019-05-30 DIAGNOSIS — H61.23 BILATERAL IMPACTED CERUMEN: ICD-10-CM

## 2019-05-30 DIAGNOSIS — I10 HYPERTENSION GOAL BP (BLOOD PRESSURE) < 140/90: ICD-10-CM

## 2019-05-30 DIAGNOSIS — Z85.3 PERSONAL HISTORY OF MALIGNANT NEOPLASM OF BREAST: ICD-10-CM

## 2019-05-30 PROCEDURE — G0438 PPPS, INITIAL VISIT: HCPCS | Performed by: FAMILY MEDICINE

## 2019-05-30 PROCEDURE — 99213 OFFICE O/P EST LOW 20 MIN: CPT | Mod: 25 | Performed by: FAMILY MEDICINE

## 2019-05-30 PROCEDURE — 36415 COLL VENOUS BLD VENIPUNCTURE: CPT | Performed by: FAMILY MEDICINE

## 2019-05-30 PROCEDURE — 80048 BASIC METABOLIC PNL TOTAL CA: CPT | Performed by: FAMILY MEDICINE

## 2019-05-30 PROCEDURE — 69209 REMOVE IMPACTED EAR WAX UNI: CPT | Performed by: FAMILY MEDICINE

## 2019-05-30 RX ORDER — FLUOXETINE 40 MG/1
CAPSULE ORAL
Qty: 90 CAPSULE | Refills: 1 | Status: SHIPPED | OUTPATIENT
Start: 2019-05-30 | End: 2020-02-13

## 2019-05-30 ASSESSMENT — ENCOUNTER SYMPTOMS
CONSTIPATION: 0
HEARTBURN: 0
ARTHRALGIAS: 1
NAUSEA: 0
WEAKNESS: 1
SORE THROAT: 0
CHILLS: 0
HEMATURIA: 0
EYE PAIN: 0
HEMATOCHEZIA: 0
ABDOMINAL PAIN: 0
DIZZINESS: 0
DYSURIA: 0
COUGH: 1
PARESTHESIAS: 0
PALPITATIONS: 0
JOINT SWELLING: 1
HEADACHES: 0
FREQUENCY: 0
SHORTNESS OF BREATH: 1
NERVOUS/ANXIOUS: 1
DIARRHEA: 0
FEVER: 0
MYALGIAS: 1

## 2019-05-30 ASSESSMENT — PATIENT HEALTH QUESTIONNAIRE - PHQ9
5. POOR APPETITE OR OVEREATING: SEVERAL DAYS
SUM OF ALL RESPONSES TO PHQ QUESTIONS 1-9: 8
10. IF YOU CHECKED OFF ANY PROBLEMS, HOW DIFFICULT HAVE THESE PROBLEMS MADE IT FOR YOU TO DO YOUR WORK, TAKE CARE OF THINGS AT HOME, OR GET ALONG WITH OTHER PEOPLE: SOMEWHAT DIFFICULT
SUM OF ALL RESPONSES TO PHQ QUESTIONS 1-9: 8

## 2019-05-30 ASSESSMENT — ANXIETY QUESTIONNAIRES
IF YOU CHECKED OFF ANY PROBLEMS ON THIS QUESTIONNAIRE, HOW DIFFICULT HAVE THESE PROBLEMS MADE IT FOR YOU TO DO YOUR WORK, TAKE CARE OF THINGS AT HOME, OR GET ALONG WITH OTHER PEOPLE: SOMEWHAT DIFFICULT
7. FEELING AFRAID AS IF SOMETHING AWFUL MIGHT HAPPEN: MORE THAN HALF THE DAYS
3. WORRYING TOO MUCH ABOUT DIFFERENT THINGS: NEARLY EVERY DAY
6. BECOMING EASILY ANNOYED OR IRRITABLE: SEVERAL DAYS
2. NOT BEING ABLE TO STOP OR CONTROL WORRYING: SEVERAL DAYS
1. FEELING NERVOUS, ANXIOUS, OR ON EDGE: SEVERAL DAYS
GAD7 TOTAL SCORE: 9
5. BEING SO RESTLESS THAT IT IS HARD TO SIT STILL: NOT AT ALL

## 2019-05-30 ASSESSMENT — ACTIVITIES OF DAILY LIVING (ADL): CURRENT_FUNCTION: TRANSPORTATION REQUIRES ASSISTANCE

## 2019-05-30 ASSESSMENT — MIFFLIN-ST. JEOR: SCORE: 1617.54

## 2019-05-30 NOTE — LETTER
Heather 3, 2019      Carol Merida  [address] [address] [facility] [id number]        Dear Ms. Carol Merida,    Enclosed is a copy of your recent results.  This is looking good!    I hope things start settling down for you!    Sincerely,     Yessica Lora MD

## 2019-05-30 NOTE — PROGRESS NOTES
SUBJECTIVE:   Carol Merida is a 84 year old female who presents for Preventive Visit.  Are you in the first 12 months of your Medicare coverage?  No    HPI  Do you feel safe in your environment? Yes     Do you have a Health Care Directive? Yes: Advance Directive has been received and scanned.      Fall risk  Fallen 2 or more times in the past year?: No  Any fall with injury in the past year?: No    Cognitive Screening   1) Repeat 3 items (Leader, Season, Table)    2) Clock draw: NORMAL  3) 3 item recall: Recalls 3 objects  Results: NORMAL clock, 1-2 items recalled: COGNITIVE IMPAIRMENT LESS LIKELY    Mini-CogTM Copyright S Kristi. Licensed by the author for use in [facility]; reprinted with permission (soob@[facility]). All rights reserved.      Do you have sleep apnea, excessive snoring or daytime drowsiness?: yes- cpap    Reviewed and updated as needed this visit by clinical staff         Reviewed and updated as needed this visit by Provider        Social History     Tobacco Use     Smoking status: Never Smoker     Smokeless tobacco: Never Used   Substance Use Topics     Alcohol use: Yes     Alcohol/week: 0.0 - 0.5 oz     Comment: 1 glass wine weekly         Alcohol Use 12/11/2014   Prescreen: >3 drinks/day or >7 drinks/week? The patient does not drink >3 drinks per day nor >7 drinks per week.               Current providers sharing in care for this patient include:   Patient Care Team:  Yessica Lora MD as PCP - General  Care, [facility] ([facility] ([facility] ([place]))  Yessica Lora MD as Assigned PCP    The following health maintenance items are reviewed in Epic and correct as of today:  Health Maintenance   Topic Date Due     URINE DRUG SCREEN  1935     ZOSTER IMMUNIZATION (2 of 3) 01/30/2013     MEDICARE ANNUAL WELLNESS VISIT  12/11/2015     LIPID  04/23/2019     BMP  09/12/2019     PHQ-9  09/12/2019     FALL RISK ASSESSMENT  11/29/2019     DEXA  02/06/2020     ALT  03/12/2020      CREATININE  03/12/2020     EDILSON ASSESSMENT  03/12/2020     CBC  03/12/2020     HF ACTION PLAN  07/19/2021     ADVANCED DIRECTIVE PLANNING  02/07/2022     DTAP/TDAP/TD IMMUNIZATION (3 - Td) 01/22/2023     DEPRESSION ACTION PLAN  Completed     INFLUENZA VACCINE  Completed     IPV IMMUNIZATION  Aged Out     MENINGITIS IMMUNIZATION  Aged Out       Patient Active Problem List   Diagnosis     Obesity     Hypothyroidism     Benign neoplasm of colon     Obstructive sleep apnea     * * * SBE PROPHYLAXIS * * *     Degeneration of lumbar or lumbosacral intervertebral disc     Pain in joint, ankle and foot     Arthrodesis status     HYPERLIPIDEMIA LDL GOAL <100     Hypertension goal BP (blood pressure) < 140/90     Health Care Home     Osteopenia     Malignant neoplasm of female breast (H)     Impaired fasting glucose     ACP (advance care planning)     History of melanoma     Panic disorder without agoraphobia     Arthritis     PONV (postoperative nausea and vomiting)     S/P total hip arthroplasty     Constipation     DVT prophylaxis     Anticoagulation management encounter     Physical deconditioning     Periprosthetic fracture around internal prosthetic left hip joint (H)     Chronic pain syndrome - hips     Fracture of greater trochanter of right femur 11/6/2016, closed, with routine healing, subsequent encounter     Anxiety     Osteoarthritis of multiple joints, unspecified osteoarthritis type     Anemia due to blood loss, acute     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     Long term (current) use of anticoagulants     Vitamin D deficiency     Pneumonia     Elevated BMI (H)     CHF (congestive heart failure) (H)     Mild depression (H)     Chronic diastolic heart failure (H)       Past Medical History:   Diagnosis Date     Abnormal stress test     small area on stress thalium ?2003; but normal angiogram 2012     Anemia      Anxiety      Cardiac dysrhythmia, unspecified     irregular heartbeat     CHF  (congestive heart failure) (H) 6/18/2018     PETTY (dyspnea on exertion)      Hyperlipidemia LDL goal <100 2/10/2010     Hypertension goal BP (blood pressure) < 140/90 7/18/2010     Hypothyroid      Malignant 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     Melanoma of skin, site unspecified      NONSPECIFIC MEDICAL HISTORY 04    fx fifth finger right hand     Obstructive sleep apnea (adult) (pediatric) 8/25/2006    CPAP      Osteoarthritis      Other chronic pain     Joint pain for many years.     Other osteoporosis      PONV (postoperative nausea and vomiting)      Tubular adenoma in colon 9/01    colonoscopy due 2004       Past Surgical History:   Procedure Laterality Date     ARTHROPLASTY HIP Left 7/6/2015    Procedure: ARTHROPLASTY HIP;  Surgeon: Junior Giordano MD;  Location: [facility] OR     ARTHROPLASTY HIP Right 11/2/2016    Procedure: ARTHROPLASTY HIP;  Surgeon: Junior Giordano MD;  Location: [facility] OR     ARTHROPLASTY REVISION HIP Left 7/22/2015    Procedure: ARTHROPLASTY REVISION HIP;  Surgeon: Junior Giordano MD;  Location: [facility] OR     C NONSPECIFIC PROCEDURE      Knee replacement x 2 (B)     C NONSPECIFIC PROCEDURE      s/p Tonsillectomy (college)     C NONSPECIFIC PROCEDURE      s/p Appy (high school)     C NONSPECIFIC PROCEDURE      Melanoma removed with sentinel node bx     C NONSPECIFIC PROCEDURE       bilateral cataract     COLONOSCOPY  9/01    tubular adenoma; repeat 3 years.     COLONOSCOPY  9/04    normal; repeat 5 years ([facility])     HYSTERECTOMY, MARGAUX  summer 2004    and BSO     SURGICAL HISTORY OF -   9/05    left mastectomy     SURGICAL HISTORY OF -   2008    right ankle surgery; triple arthrodesis     SURGICAL HISTORY OF -   5/09    right ankle surgery; triple arthrodesis and deltoid reconstruction; possible other     SURGICAL HISTORY OF -   2/10    removal of hardware from right ankle       OB History     Para Term  AB Living   6 4 4 0 2 4   SAB TAB Ectopic Multiple Live Births   2 0 0 0 0      # Outcome Date GA Lbr Jhon/2nd Weight Sex Delivery Anes PTL Lv   6 Term            5 Term            4 Term            3 Term            2 SAB            1 SAB                Current Outpatient Medications   Medication Sig Dispense Refill     ACE/ARB/ARNI NOT PRESCRIBED (INTENTIONAL) Please choose reason not prescribed, below       acetaminophen (TYLENOL) 325 MG tablet Take 3 tablets (975 mg) by mouth every 8 hours 500 tablet 1     albuterol (PROAIR HFA/PROVENTIL HFA/VENTOLIN HFA) 108 (90 Base) MCG/ACT inhaler Inhale 2 puffs into the lungs every 4 hours as needed for shortness of breath / dyspnea or wheezing 1 Inhaler 0     ALPRAZolam (XANAX) 0.25 MG tablet Take 1 tablet (0.25 mg) by mouth 2 times daily as needed for anxiety Don't take with the oxycodone 10 tablet 0     amLODIPine (NORVASC) 5 MG tablet TAKE ONE TABLET BY MOUTH ONCE DAILY 90 tablet 0     benzonatate (TESSALON) 100 MG capsule Take 1 capsule (100 mg) by mouth 3 times daily as needed for cough 42 capsule 0     BETA BLOCKER NOT PRESCRIBED (INTENTIONAL) Beta Blocker not prescribed intentionally due to EF > 40 % (ejection fraction) will leave up to Cardiology.       calcium-vitamin D (CALCIUM 600 + D) 600-400 MG-UNIT per tablet Take 1 tablet by mouth every evening  100 tablet 3     FLUoxetine (PROZAC) 40 MG capsule TAKE ONE CAPSULE BY MOUTH ONCE DAILY 90 capsule 1     fluticasone (FLONASE) 50 MCG/ACT spray Spray 1 spray into both nostrils daily as needed 1 Bottle 5     furosemide (LASIX) 40 MG tablet Take 1 tablet (40 mg) by mouth daily 30 tablet 11     gabapentin (NEURONTIN) 300 MG capsule TAKE 1 CAPSULE(300 MG) BY MOUTH THREE TIMES DAILY 270 capsule 0     hydrochlorothiazide (HYDRODIURIL) 25 MG tablet Take 1 tablet (25 mg) by mouth daily 30 tablet 3     levothyroxine (SYNTHROID/LEVOTHROID) 88 MCG tablet TAKE ONE TABLET BY MOUTH EVERY DAY 90 tablet 2      magnesium hydroxide (MILK OF MAGNESIA) 400 MG/5ML suspension Take 30 mLs by mouth daily as needed        Nystatin (NYSTOP) 395846 UNIT/GM POWD Apply tid to affectead area prn 60 g 1     order for DME Equipment being ordered: left breast prosthesis. Mastectomy bras (up to 4) 4 Device 0     senna (SENOKOT) 8.6 MG tablet Take 1 tablet by mouth daily       simvastatin (ZOCOR) 10 MG tablet TAKE 1 TABLET BY MOUTH AT BEDTIME 90 tablet 0     triamcinolone (KENALOG) 0.1 % cream Apply sparingly to affected area two times daily as needed for itching. 30 g 0       Family History   Problem Relation Age of Onset     No Known Problems Brother      Arthritis Mother      Hypertension Father      Cancer Father         lung     Cancer Grandchild         terratoma tumors     Breast Cancer Daughter        Social History     Tobacco Use     Smoking status: Never Smoker     Smokeless tobacco: Never Used   Substance Use Topics     Alcohol use: Yes     Alcohol/week: 0.0 - 0.5 oz     Comment: 1 glass wine weekly       Immunization History   Administered Date(s) Administered     Influenza (H1N1) 12/15/2009     Influenza (High Dose) 3 valent vaccine 10/20/2010, 10/07/2011, 10/03/2012, 11/19/2013, 12/02/2014, 10/15/2015, 10/04/2016, 11/16/2017, 11/14/2018     Influenza (IIV3) PF 11/12/2003, 11/22/2004, 11/08/2005, 12/01/2006, 11/08/2007, 11/05/2008, 09/23/2009     Pneumo Conj 13-V (2010&after) 05/29/2015     Pneumococcal 23 valent 05/21/2003, 11/08/2005     TD (ADULT, 7+) 05/21/2003     TDAP Vaccine (Adacel) 01/22/2013     Zoster vaccine, live 12/05/2012         Review of Systems   Constitutional: Negative for chills and fever.   HENT: Positive for congestion. Negative for ear pain, hearing loss and sore throat.    Eyes: Negative for pain and visual disturbance.   Respiratory: Positive for cough and shortness of breath.    Cardiovascular: Positive for peripheral edema. Negative for chest pain and palpitations.   Gastrointestinal: Negative  for abdominal pain, constipation, diarrhea, heartburn, hematochezia and nausea.   Breasts:  Negative for discharge.   Genitourinary: Positive for pelvic pain. Negative for dysuria, frequency, genital sores, hematuria, urgency, vaginal bleeding and vaginal discharge.   Musculoskeletal: Positive for arthralgias, joint swelling and myalgias.   Skin: Negative for rash.   Neurological: Positive for weakness. Negative for dizziness, headaches and paresthesias.   Psychiatric/Behavioral: Positive for mood changes. The patient is nervous/anxious.      CONSTITUTIONAL: NEGATIVE for fever, chills, change in weight  ENT/MOUTH: NEGATIVE for ear, mouth and throat problems  RESP: NEGATIVE for significant cough or short of breath x mild cough that has continued and some shortness of breath. Not new.   CV: NEGATIVE for chest pain, palpitations. Occasional peripheral edema.  Notes not always taking the two diuretics daily. But now has been taking regularly.   No nausea, vomitting or change in bowel habits.  No urinary symptoms.    Feels she is not caring for herself and is falling apart.    in long term care; with trach so location is limited. He is not as happy where he currently is and this affects her.     Did see Dr. Giordano the other day regarding pain.  Thinking may be related to her back.  Will go in for epidural. He is checking if she should have another MRI.  He did mention neuropathy in right leg. She notes symptoms from knee to foot.     She notes she was told by [facility] that she does not need another colonoscopy; was in with her  and they looked it up.     Granddaughter living with her now.   This has been helpful, but she will be getting  soon. She is fearful they may ask to live with her.     Pain in left axilla.  Comes and goes. Months. Not years. Notes a lump.    OBJECTIVE:   /72 (BP Location: Right arm, Cuff Size: Adult Large)   Pulse 92   Temp 97.9  F (36.6  C) (Oral)   Resp 16   Ht 1.6 m  "(5' 3\")   Wt 119.3 kg (263 lb 1.6 oz)   SpO2 95%   BMI 46.61 kg/m   Estimated body mass index is 46.61 kg/m  as calculated from the following:    Height as of this encounter: 1.6 m (5' 3\").    Weight as of this encounter: 119.3 kg (263 lb 1.6 oz).  Physical Exam  GENERAL: alert and no distress  HEENT:Right > Left cerumen  NECK: no adenopathy, no asymmetry, masses, or scars and thyroid normal to palpation  Tender lump in middle of left axilla; ~ 1 cm  RESP: lungs clear to auscultation - no rales, rhonchi or wheezes  CV: regular rates and rhythm  ABDOMEN: soft, nontender, no hepatosplenomegaly, no masses and bowel sounds normal  MS: Trace to 1+ edema.         TSH   Date Value Ref Range Status   08/23/2018 3.70 0.40 - 4.00 mU/L Final     Lab Results   Component Value Date    A1C 5.9 08/23/2018    A1C 5.9 08/15/2017    A1C 6.0 03/11/2016    A1C 6.1 02/09/2015    A1C 6.0 02/06/2014     Hemoglobin   Date Value Ref Range Status   03/12/2019 14.6 11.7 - 15.7 g/dL Final   11/21/2018 14.1 11.7 - 15.7 g/dL Final         GFR Estimate   Date Value Ref Range Status   03/12/2019 81 >60 mL/min/[1.73_m2] Final     Comment:     Non  GFR Calc  Starting 12/18/2018, serum creatinine based estimated GFR (eGFR) will be   calculated using the Chronic Kidney Disease Epidemiology Collaboration   (CKD-EPI) equation.     12/21/2018 68 >60 mL/min/[1.73_m2] Final     Comment:     Non  GFR Calc  Starting 12/18/2018, serum creatinine based estimated GFR (eGFR) will be   calculated using the Chronic Kidney Disease Epidemiology Collaboration   (CKD-EPI) equation.     11/29/2018 58 (L) >60 mL/min/1.7m2 Final     Comment:     Non  GFR Calc     Recent Labs   Lab Test 04/23/18  0933 04/28/17  1029  02/09/15  0910 02/06/14  0902   CHOL 119 135   < > 114 154   HDL 60 65   < > 57 66   LDL 38 40   < > 28 44   TRIG 107 148   < > 146 216*   CHOLHDLRATIO  --   --   --  2.0 2.3    < > = values in this interval " not displayed.       Answers for HPI/ROS submitted by the patient on 5/30/2019   Annual Exam:  If you checked off any problems, how difficult have these problems made it for you to do your work, take care of things at home, or get along with other people?: Somewhat difficult  PHQ9 TOTAL SCORE: 8  Answers for HPI/ROS submitted by the patient on 5/30/2019   Annual Exam:  If you checked off any problems, how difficult have these problems made it for you to do your work, take care of things at home, or get along with other people?: Somewhat difficult  PHQ9 TOTAL SCORE: 8    PHQ-9 SCORE 7/19/2018 3/12/2019 5/30/2019   PHQ-9 Total Score - - -   PHQ-9 Total Score MyChart - - 8 (Mild depression)   PHQ-9 Total Score 10 15 8       EDILSON-7 SCORE 7/19/2018 3/12/2019 5/30/2019   Total Score - - -   Total Score 11 16 9   Total Score - - -           ASSESSMENT / PLAN:     1. Encounter for routine adult health examination with abnormal findings  With axillary mass.     2. Mass of left axilla  Uncertain etiology. Will do ultrasound. See phone message; difficulty ordering while she was in clinic.    3. Personal history of malignant neoplasm of breast  See above; was of the left breast.    4. Mild depression (H)  Refilling. She does have multiple stressors at this time.   - FLUoxetine (PROZAC) 40 MG capsule; TAKE ONE CAPSULE BY MOUTH ONCE DAILY  Dispense: 90 capsule; Refill: 1    5. Anxiety  As above.   - FLUoxetine (PROZAC) 40 MG capsule; TAKE ONE CAPSULE BY MOUTH ONCE DAILY  Dispense: 90 capsule; Refill: 1    6. Edema, unspecified type  On furosemide.   - Basic metabolic panel    7. Hypertension goal BP (blood pressure) < 140/90  Controlled.   - Comprehensive metabolic panel; Future  - Albumin Random Urine Quantitative with Creat Ratio; Future    8. Hypothyroidism, unspecified type  Clinically euthyroid.   - **TSH with free T4 reflex FUTURE anytime; Future    9. Hyperlipidemia LDL goal <100  Will return fasting.  - Lipid panel reflex  "to direct LDL Fasting; Future  - Comprehensive metabolic panel; Future    10. Impaired fasting glucose  Encourage lifestyle.   - Comprehensive metabolic panel; Future  - Hemoglobin A1c; Future    11. Bilateral impacted cerumen  Flushed.   - REMOVE IMPACTED CERUMEN    12. Chronic low back pain, unspecified back pain laterality, with sciatica presence unspecified  She is working with Ortho at this time.     13. Neuropathy  at this time, I am not sure of the etiology.   Did discuss some; will watch for Ortho note.    14. History of melanoma  She has not been to Dermatology in some time. Recommend that she does get a skin check.  - DERMATOLOGY REFERRAL          End of Life Planning:  Patient currently has an advanced directive: Yes.  Practitioner is supportive of decision.    COUNSELING:  Reviewed preventive health counseling, as reflected in patient instructions       Regular exercise       Healthy diet/nutrition       Vision screening       Dental care    Estimated body mass index is 46.89 kg/m  as calculated from the following:    Height as of 5/17/19: 1.6 m (5' 3\").    Weight as of 5/17/19: 120.1 kg (264 lb 11.2 oz).    Weight management plan: Discussed healthy diet and exercise guidelines     reports that she has never smoked. She has never used smokeless tobacco.      Appropriate preventive services were discussed with this patient, including applicable screening as appropriate for cardiovascular disease, diabetes, osteopenia/osteoporosis, and glaucoma.  As appropriate for age/gender, discussed screening for colorectal cancer, prostate cancer, breast cancer, and cervical cancer. Checklist reviewing preventive services available has been given to the patient.    Reviewed patients plan of care and provided an AVS. The Basic Care Plan (routine screening as documented in Health Maintenance) for Carol meets the Care Plan requirement. This Care Plan has been established and reviewed with the Patient.    Counseling " Resources:  ATP IV Guidelines  Pooled Cohorts Equation Calculator  Breast Cancer Risk Calculator  FRAX Risk Assessment  ICSI Preventive Guidelines  Dietary Guidelines for Americans, 2010  [employer]'s MyPlate  ASA Prophylaxis  Lung CA Screening    Yessica Lora MD, MD  [facility]    Identified Health Risks:

## 2019-05-30 NOTE — TELEPHONE ENCOUNTER
I would like to order a soft tissue ultrasound of the left axilla...   But I can't see how to do that.   There is a lump there.    Please call radiology and find out the code or how I order...  Thanks!

## 2019-05-31 LAB
ANION GAP SERPL CALCULATED.3IONS-SCNC: 10 MMOL/L (ref 3–14)
BUN SERPL-MCNC: 24 MG/DL (ref 7–30)
CALCIUM SERPL-MCNC: 9.7 MG/DL (ref 8.5–10.1)
CHLORIDE SERPL-SCNC: 102 MMOL/L (ref 94–109)
CO2 SERPL-SCNC: 27 MMOL/L (ref 20–32)
CREAT SERPL-MCNC: 0.73 MG/DL (ref 0.52–1.04)
GFR SERPL CREATININE-BSD FRML MDRD: 76 ML/MIN/{1.73_M2}
GLUCOSE SERPL-MCNC: 101 MG/DL (ref 70–99)
POTASSIUM SERPL-SCNC: 3.5 MMOL/L (ref 3.4–5.3)
SODIUM SERPL-SCNC: 139 MMOL/L (ref 133–144)

## 2019-05-31 ASSESSMENT — ANXIETY QUESTIONNAIRES: GAD7 TOTAL SCORE: 9

## 2019-05-31 ASSESSMENT — PATIENT HEALTH QUESTIONNAIRE - PHQ9: SUM OF ALL RESPONSES TO PHQ QUESTIONS 1-9: 8

## 2019-06-02 PROBLEM — G62.9 NEUROPATHY: Status: ACTIVE | Noted: 2019-06-02

## 2019-06-02 ASSESSMENT — ENCOUNTER SYMPTOMS
HEMATURIA: 0
JOINT SWELLING: 1
PARESTHESIAS: 0
FREQUENCY: 0
EYE PAIN: 0
DYSURIA: 0
MYALGIAS: 1
HEMATOCHEZIA: 0
DIZZINESS: 0
SORE THROAT: 0
FEVER: 0
SHORTNESS OF BREATH: 1
NAUSEA: 0
DIARRHEA: 0
HEARTBURN: 0
PALPITATIONS: 0
WEAKNESS: 1
COUGH: 1
ARTHRALGIAS: 1
HEADACHES: 0
CHILLS: 0
ABDOMINAL PAIN: 0
NERVOUS/ANXIOUS: 1
CONSTIPATION: 0

## 2019-06-06 ENCOUNTER — HOSPITAL ENCOUNTER (OUTPATIENT)
Dept: ULTRASOUND IMAGING | Facility: CLINIC | Age: 84
Discharge: HOME | End: 2019-06-06
Attending: FAMILY MEDICINE | Admitting: FAMILY MEDICINE
Payer: COMMERCIAL

## 2019-06-06 ENCOUNTER — HOSPITAL ENCOUNTER (OUTPATIENT)
Dept: MAMMOGRAPHY | Facility: CLINIC | Age: 84
End: 2019-06-06
Attending: FAMILY MEDICINE
Payer: COMMERCIAL

## 2019-06-06 DIAGNOSIS — R22.32 MASS OF LEFT AXILLA: ICD-10-CM

## 2019-06-06 DIAGNOSIS — Z85.3 HX: BREAST CANCER: ICD-10-CM

## 2019-06-06 DIAGNOSIS — Z12.31 SCREENING MAMMOGRAM, ENCOUNTER FOR: ICD-10-CM

## 2019-06-06 DIAGNOSIS — M79.622 PAIN IN LEFT AXILLA: ICD-10-CM

## 2019-06-06 PROCEDURE — 77063 BREAST TOMOSYNTHESIS BI: CPT | Mod: 52

## 2019-06-06 PROCEDURE — 76882 US LMTD JT/FCL EVL NVASC XTR: CPT | Mod: LT

## 2019-07-24 DIAGNOSIS — E78.5 HYPERLIPIDEMIA LDL GOAL <100: ICD-10-CM

## 2019-07-24 DIAGNOSIS — M15.9 OSTEOARTHRITIS OF MULTIPLE JOINTS, UNSPECIFIED OSTEOARTHRITIS TYPE: ICD-10-CM

## 2019-07-24 DIAGNOSIS — M51.369 DDD (DEGENERATIVE DISC DISEASE), LUMBAR: ICD-10-CM

## 2019-07-24 NOTE — TELEPHONE ENCOUNTER
"Requested Prescriptions   Pending Prescriptions Disp Refills     simvastatin (ZOCOR) 10 MG tablet [Pharmacy Med Name: SIMVASTATIN 10MG TABLETS] 90 tablet 0     Sig: TAKE 1 TABLET BY MOUTH AT BEDTIME  Last Written Prescription Date:  04/29/2019  Last Fill Quantity: 90 tablet,  # refills: 0   Last office visit: 5/30/2019 with prescribing provider:  Yessica Lora MD    Future Office Visit:   Next 5 appointments (look out 90 days)    Aug 29, 2019 10:50 AM CDT  Office Visit with Yessica Lora MD  [address] [address] [phone]                Statins Protocol Failed - 7/24/2019  5:13 AM        Failed - LDL on file in past 12 months     Recent Labs   Lab Test 04/23/18  0933   LDL 38             Passed - No abnormal creatine kinase in past 12 months     No lab results found.             Passed - Recent (12 mo) or future (30 days) visit within the authorizing provider's specialty     Patient had office visit in the last 12 months or has a visit in the next 30 days with authorizing provider or within the authorizing provider's specialty.  See \"Patient Info\" tab in inbasket, or \"Choose Columns\" in Meds & Orders section of the refill encounter.              Passed - Medication is active on med list        Passed - Patient is age 18 or older        Passed - No active pregnancy on record        Passed - No positive pregnancy test in past 12 months        gabapentin (NEURONTIN) 300 MG capsule [Pharmacy Med Name: GABAPENTIN 300MG CAPSULES] 270 capsule 0     Sig: TAKE 1 CAPSULE(300 MG) BY MOUTH THREE TIMES DAILY       There is no refill protocol information for this order      Last Written Prescription Date:  05/07/2019  Last Fill Quantity: 270 tablet,  # refills: 0   Last office visit: 5/30/2019 with prescribing provider:  Yessica Lora MD    Future Office Visit:   Next 5 appointments (look out 90 days)    Aug 29, 2019 10:50 AM CDT  Office Visit " with Yessica Lora MD  [facility] [address] [facility] [address] [phone]         Routing refill request to provider for review/approval because:  Drug not on the [facility], P or [facility] refill protocol or controlled substance

## 2019-07-26 DIAGNOSIS — R06.02 SOB (SHORTNESS OF BREATH): ICD-10-CM

## 2019-07-26 RX ORDER — GABAPENTIN 300 MG/1
CAPSULE ORAL
Qty: 270 CAPSULE | Refills: 0 | Status: SHIPPED | OUTPATIENT
Start: 2019-07-26 | End: 2019-08-29

## 2019-07-26 RX ORDER — SIMVASTATIN 10 MG
TABLET ORAL
Qty: 90 TABLET | Refills: 0 | Status: SHIPPED | OUTPATIENT
Start: 2019-07-26 | End: 2019-10-22

## 2019-07-26 NOTE — TELEPHONE ENCOUNTER
Routing refill request to provider for review/approval because:  Labs not current:  LDL  Mikaela Yañez RN

## 2019-07-26 NOTE — TELEPHONE ENCOUNTER
"Requested Prescriptions   Pending Prescriptions Disp Refills     albuterol (PROAIR HFA/PROVENTIL HFA/VENTOLIN HFA) 108 (90 Base) MCG/ACT inhaler [Pharmacy Med Name: ALBUTEROL HFA INH (200 PUFFS) 8.5GM] 8.5 g 0     Sig: INHALE 2 PUFFS INTO THE LUNGS EVERY 4 HOURS AS NEEDED FOR SHORTNESS OF BREATH OR DIFFICULT BREATHING OR WHEEZING  Last Written Prescription Date:  11/19/18  Last Fill Quantity: 1 inhaler,  # refills: 0   Last office visit: 5/30/2019 with prescribing provider:  Guido   Future Office Visit:   Next 5 appointments (look out 90 days)    Aug 29, 2019 10:50 AM CDT  Office Visit with Yessica Lora MD  [facility] ([address] [address]  [phone]              Asthma Maintenance Inhalers - Anticholinergics Passed - 7/26/2019 11:42 AM        Passed - Patient is age 12 years or older        Passed - Recent (12 mo) or future (30 days) visit within the authorizing provider's specialty     Patient had office visit in the last 12 months or has a visit in the next 30 days with authorizing provider or within the authorizing provider's specialty.  See \"Patient Info\" tab in inbasket, or \"Choose Columns\" in Meds & Orders section of the refill encounter.              Passed - Medication is active on med list          "

## 2019-07-29 ENCOUNTER — DOCUMENTATION ONLY (OUTPATIENT)
Dept: FAMILY MEDICINE | Facility: CLINIC | Age: 84
End: 2019-07-29

## 2019-07-29 RX ORDER — ALBUTEROL SULFATE 90 UG/1
AEROSOL, METERED RESPIRATORY (INHALATION)
Qty: 8.5 G | Refills: 0 | Status: SHIPPED | OUTPATIENT
Start: 2019-07-29 | End: 2020-12-10

## 2019-07-29 NOTE — PROGRESS NOTES
Prescription approved per [facility] [place] Protocol.    Montserrat Barger RN  7/29/2019   9:49 a.m.

## 2019-08-29 ENCOUNTER — OFFICE VISIT (OUTPATIENT)
Dept: FAMILY MEDICINE | Facility: CLINIC | Age: 84
End: 2019-08-29
Payer: COMMERCIAL

## 2019-08-29 VITALS
DIASTOLIC BLOOD PRESSURE: 76 MMHG | WEIGHT: 269.6 LBS | BODY MASS INDEX: 47.77 KG/M2 | RESPIRATION RATE: 18 BRPM | OXYGEN SATURATION: 95 % | HEART RATE: 70 BPM | TEMPERATURE: 97.6 F | HEIGHT: 63 IN | SYSTOLIC BLOOD PRESSURE: 118 MMHG

## 2019-08-29 DIAGNOSIS — E66.01 MORBID OBESITY (H): ICD-10-CM

## 2019-08-29 DIAGNOSIS — F32.A MILD DEPRESSION: ICD-10-CM

## 2019-08-29 DIAGNOSIS — G89.29 CHRONIC LEFT SHOULDER PAIN: Primary | ICD-10-CM

## 2019-08-29 DIAGNOSIS — F41.9 ANXIETY: ICD-10-CM

## 2019-08-29 DIAGNOSIS — M25.512 CHRONIC LEFT SHOULDER PAIN: Primary | ICD-10-CM

## 2019-08-29 DIAGNOSIS — F43.9 STRESS: ICD-10-CM

## 2019-08-29 DIAGNOSIS — M15.9 OSTEOARTHRITIS OF MULTIPLE JOINTS, UNSPECIFIED OSTEOARTHRITIS TYPE: ICD-10-CM

## 2019-08-29 DIAGNOSIS — R60.9 EDEMA, UNSPECIFIED TYPE: ICD-10-CM

## 2019-08-29 DIAGNOSIS — E78.5 HYPERLIPIDEMIA LDL GOAL <100: ICD-10-CM

## 2019-08-29 DIAGNOSIS — M51.369 DDD (DEGENERATIVE DISC DISEASE), LUMBAR: ICD-10-CM

## 2019-08-29 DIAGNOSIS — E03.9 HYPOTHYROIDISM, UNSPECIFIED TYPE: ICD-10-CM

## 2019-08-29 DIAGNOSIS — R73.01 IMPAIRED FASTING GLUCOSE: ICD-10-CM

## 2019-08-29 DIAGNOSIS — R63.5 WEIGHT GAIN: ICD-10-CM

## 2019-08-29 DIAGNOSIS — R26.89 DECREASED MOBILITY: ICD-10-CM

## 2019-08-29 DIAGNOSIS — I10 HYPERTENSION GOAL BP (BLOOD PRESSURE) < 140/90: ICD-10-CM

## 2019-08-29 LAB — HBA1C MFR BLD: 5.6 % (ref 0–5.6)

## 2019-08-29 PROCEDURE — 99207 C PAF COMPLETED  NO CHARGE: CPT | Performed by: FAMILY MEDICINE

## 2019-08-29 PROCEDURE — 36415 COLL VENOUS BLD VENIPUNCTURE: CPT | Performed by: FAMILY MEDICINE

## 2019-08-29 PROCEDURE — 82043 UR ALBUMIN QUANTITATIVE: CPT | Performed by: FAMILY MEDICINE

## 2019-08-29 PROCEDURE — 80061 LIPID PANEL: CPT | Performed by: FAMILY MEDICINE

## 2019-08-29 PROCEDURE — 83036 HEMOGLOBIN GLYCOSYLATED A1C: CPT | Performed by: FAMILY MEDICINE

## 2019-08-29 PROCEDURE — 99215 OFFICE O/P EST HI 40 MIN: CPT | Performed by: FAMILY MEDICINE

## 2019-08-29 PROCEDURE — 80053 COMPREHEN METABOLIC PANEL: CPT | Performed by: FAMILY MEDICINE

## 2019-08-29 PROCEDURE — 84443 ASSAY THYROID STIM HORMONE: CPT | Performed by: FAMILY MEDICINE

## 2019-08-29 RX ORDER — GABAPENTIN 300 MG/1
CAPSULE ORAL
Qty: 360 CAPSULE | Refills: 0 | Status: SHIPPED | OUTPATIENT
Start: 2019-08-29 | End: 2020-02-13

## 2019-08-29 ASSESSMENT — ANXIETY QUESTIONNAIRES
IF YOU CHECKED OFF ANY PROBLEMS ON THIS QUESTIONNAIRE, HOW DIFFICULT HAVE THESE PROBLEMS MADE IT FOR YOU TO DO YOUR WORK, TAKE CARE OF THINGS AT HOME, OR GET ALONG WITH OTHER PEOPLE: SOMEWHAT DIFFICULT
6. BECOMING EASILY ANNOYED OR IRRITABLE: SEVERAL DAYS
3. WORRYING TOO MUCH ABOUT DIFFERENT THINGS: MORE THAN HALF THE DAYS
GAD7 TOTAL SCORE: 11
7. FEELING AFRAID AS IF SOMETHING AWFUL MIGHT HAPPEN: MORE THAN HALF THE DAYS
2. NOT BEING ABLE TO STOP OR CONTROL WORRYING: MORE THAN HALF THE DAYS
1. FEELING NERVOUS, ANXIOUS, OR ON EDGE: MORE THAN HALF THE DAYS
5. BEING SO RESTLESS THAT IT IS HARD TO SIT STILL: NOT AT ALL

## 2019-08-29 ASSESSMENT — MIFFLIN-ST. JEOR: SCORE: 1647.03

## 2019-08-29 NOTE — PROGRESS NOTES
Subjective     Carol Merida is a 84 year old female who presents to clinic today for the following health issues:    HPI   Medication Followup of all medications      Taking Medication as prescribed: yes    Side Effects:  None    Medication Helping Symptoms:  not applicable     Would like to discuss edema in both ankles.  Has been taking the furosemide and notices not urinating a lot.     See under ROS       Patient Active Problem List   Diagnosis     Obesity     Hypothyroidism     Benign neoplasm of colon     Obstructive sleep apnea     * * * SBE PROPHYLAXIS * * *     Degeneration of lumbar or lumbosacral intervertebral disc     Pain in joint, ankle and foot     Arthrodesis status     HYPERLIPIDEMIA LDL GOAL <100     Hypertension goal BP (blood pressure) < 140/90     Health Care Home     Osteopenia     Malignant neoplasm of female breast (H)     Impaired fasting glucose     ACP (advance care planning)     History of melanoma     Panic disorder without agoraphobia     Arthritis     PONV (postoperative nausea and vomiting)     S/P total hip arthroplasty     Constipation     DVT prophylaxis     Anticoagulation management encounter     Physical deconditioning     Periprosthetic fracture around internal prosthetic left hip joint (H)     Chronic pain syndrome - hips     Fracture of greater trochanter of right femur 11/6/2016, closed, with routine healing, subsequent encounter     Anxiety     Osteoarthritis of multiple joints, unspecified osteoarthritis type     Anemia due to blood loss, acute     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     Long term (current) use of anticoagulants     Vitamin D deficiency     Pneumonia     Elevated BMI (H)     CHF (congestive heart failure) (H)     Mild depression (H)     Chronic diastolic heart failure (H)     Neuropathy       Current Outpatient Medications   Medication Sig Dispense Refill     ACE/ARB/ARNI NOT PRESCRIBED (INTENTIONAL) Please choose reason not prescribed,  below       acetaminophen (TYLENOL) 325 MG tablet Take 3 tablets (975 mg) by mouth every 8 hours 500 tablet 1     albuterol (PROAIR HFA/PROVENTIL HFA/VENTOLIN HFA) 108 (90 Base) MCG/ACT inhaler INHALE 2 PUFFS INTO THE LUNGS EVERY 4 HOURS AS NEEDED FOR SHORTNESS OF BREATH OR DIFFICULT BREATHING OR WHEEZING 8.5 g 0     ALPRAZolam (XANAX) 0.25 MG tablet Take 1 tablet (0.25 mg) by mouth 2 times daily as needed for anxiety Don't take with the oxycodone 10 tablet 0     amLODIPine (NORVASC) 5 MG tablet TAKE ONE TABLET BY MOUTH ONCE DAILY 90 tablet 0     benzonatate (TESSALON) 100 MG capsule Take 1 capsule (100 mg) by mouth 3 times daily as needed for cough 42 capsule 0     BETA BLOCKER NOT PRESCRIBED (INTENTIONAL) Beta Blocker not prescribed intentionally due to EF > 40 % (ejection fraction) will leave up to Cardiology.       calcium-vitamin D (CALCIUM 600 + D) 600-400 MG-UNIT per tablet Take 1 tablet by mouth every evening  100 tablet 3     FLUoxetine (PROZAC) 40 MG capsule TAKE ONE CAPSULE BY MOUTH ONCE DAILY 90 capsule 1     fluticasone (FLONASE) 50 MCG/ACT spray Spray 1 spray into both nostrils daily as needed 1 Bottle 5     furosemide (LASIX) 40 MG tablet Take 1 tablet (40 mg) by mouth daily 30 tablet 11     gabapentin (NEURONTIN) 300 MG capsule TAKE 1 CAPSULE(300 MG) BY MOUTH THREE TIMES DAILY 270 capsule 0     hydrochlorothiazide (HYDRODIURIL) 25 MG tablet Take 1 tablet (25 mg) by mouth daily 30 tablet 3     levothyroxine (SYNTHROID/LEVOTHROID) 88 MCG tablet TAKE ONE TABLET BY MOUTH EVERY DAY 90 tablet 2     magnesium hydroxide (MILK OF MAGNESIA) 400 MG/5ML suspension Take 30 mLs by mouth daily as needed        Nystatin (NYSTOP) 186873 UNIT/GM POWD Apply tid to affectead area prn 60 g 1     order for DME Equipment being ordered: left breast prosthesis. Mastectomy bras (up to 4) 4 Device 0     senna (SENOKOT) 8.6 MG tablet Take 1 tablet by mouth daily       simvastatin (ZOCOR) 10 MG tablet TAKE 1 TABLET BY MOUTH AT  "BEDTIME 90 tablet 0     triamcinolone (KENALOG) 0.1 % cream Apply sparingly to affected area two times daily as needed for itching. 30 g 0           Reviewed and updated as needed this visit by Provider         Review of Systems   ROS COMP: CONSTITUTIONAL:NEGATIVE for fever, chills, change in weight x weight gain.  RESP:NEGATIVE for significant cough or SOB  CV: NEGATIVE for chest pain, palpitations; see below regarding peripheral edema  MUSCULOSKELETAL: see below regarding edema.   PSYCHIATRIC: see below.    Daughter here with her.    She notes a lot of ongoing stress.    still in rehab. Probably declining.  Her health has been issue as well.    Anxiety/depression with it.  Weight gain. Not eating well. Left shoulder is bone on bone; does get cortisone shots. Helps awhile, but does note a fair amount of pain. Did have an epidural in back that helped; still with pain in front of thigh.     Difficulty with getting up from chairs. Using walker.   Lives in a twin home. Granddaughter lives there; but not there a lot. Has a lot of family support. Does own bathing.   Does use some ensure. [employer] meals as well. Some help by family with meals.     Did fall. About a month ago.was sitting in walker; pushed back and fell   Went to [place] in [place] [employer].       Concerns: decreased mobility due to pain. Will take tylenol and aleve; some relief.  Has used heat and ice.     Edema.  Notes not peeing a lot.  Will go a lot p lasix, but otherwise not. Not drinking much.    Trouble going to sleep at night. Thinking too much.      Objective    /76 (BP Location: Right arm, Cuff Size: Adult Large)   Pulse 70   Temp 97.6  F (36.4  C) (Oral)   Resp 18   Ht 1.6 m (5' 3\")   Wt 122.3 kg (269 lb 9.6 oz)   SpO2 95%   BMI 47.76 kg/m    Body mass index is 47.76 kg/m .  Physical Exam   GENERAL APPEARANCE: alert and no distress  RESP: lungs clear to auscultation - no rales, rhonchi or wheezes  CV: regular rates and rhythm  MS: mild " right ankle edema; none on left.  PSYCH: mentation appears normal and affect normal/bright    GFR Estimate   Date Value Ref Range Status   05/30/2019 76 >60 mL/min/[1.73_m2] Final     Comment:     Non  GFR Calc  Starting 12/18/2018, serum creatinine based estimated GFR (eGFR) will be   calculated using the Chronic Kidney Disease Epidemiology Collaboration   (CKD-EPI) equation.     03/12/2019 81 >60 mL/min/[1.73_m2] Final     Comment:     Non  GFR Calc  Starting 12/18/2018, serum creatinine based estimated GFR (eGFR) will be   calculated using the Chronic Kidney Disease Epidemiology Collaboration   (CKD-EPI) equation.     12/21/2018 68 >60 mL/min/[1.73_m2] Final     Comment:     Non  GFR Calc  Starting 12/18/2018, serum creatinine based estimated GFR (eGFR) will be   calculated using the Chronic Kidney Disease Epidemiology Collaboration   (CKD-EPI) equation.                 Assessment & Plan     1. Chronic left shoulder pain  She does continue with Ortho. Does get periodic shots. Not helping as much.   At this time, will treat topically; discussed.  Caution around Aleve and other systemic NSAID. Tylenol is safer.   - diclofenac (VOLTAREN) 1 % topical gel; Apply 2 g topically 4 times daily Left shoulder.  Dispense: 3 Tube; Refill: 1    2. Hypertension goal BP (blood pressure) < 140/90  Satisfactory reading.   - Albumin Random Urine Quantitative with Creat Ratio  - Comprehensive metabolic panel    3. Mild depression (H)  Reviewed with her.  Multiple stressors.  Discussed counseling as an option as well; daughter encouraging this as well.   - MENTAL HEALTH REFERRAL  - Adult; Outpatient Treatment; Individual/Couples/Family/Group Therapy/Health Psychology; Other: [facility] [phone]; We will contact you to schedule the appointment or please call with any questi...    4. Elevated BMI (H)  Encourage lifestyle. She notes she is not eating much, but I  also wonder about choices. If she is skipping meals, she might be putting her body to conserving calories as well.     5. Anxiety  - MENTAL HEALTH REFERRAL  - Adult; Outpatient Treatment; Individual/Couples/Family/Group Therapy/Health Psychology; Other: [facility] [phone]; We will contact you to schedule the appointment or please call with any questi...    6. Edema, unspecified type  Currently looks good.  She notes not peeing a lot other than immediately p taking lasix.  Encourage hydration. It also may help decrease swelling by helping things move through.   Would also explain why she is not peeing well.  Did discuss gabapentin can increase edema as well.    7. Stress  As above. Anticipate counseling.  - MENTAL HEALTH REFERRAL  - Adult; Outpatient Treatment; Individual/Couples/Family/Group Therapy/Health Psychology; Other: [facility] [phone]; We will contact you to schedule the appointment or please call with any questi...    8. Hypothyroidism, unspecified type  Clinically euthyroid.   - **TSH with free T4 reflex FUTURE anytime    9. Impaired fasting glucose    - Hemoglobin A1c  - Comprehensive metabolic panel    10. Hyperlipidemia LDL goal <100    - Comprehensive metabolic panel  - Lipid panel reflex to direct LDL Fasting    11. DDD (degenerative disc disease), lumbar  Discussed. She was interested in increasing gabapentin. Did discuss this can increase edema.  At this time, will increase at night.  - gabapentin (NEURONTIN) 300 MG capsule; 1 capsule in am and noon and 2 at bedtime.  Dispense: 360 capsule; Refill: 0    12. Osteoarthritis of multiple joints, unspecified osteoarthritis type  As above.   - gabapentin (NEURONTIN) 300 MG capsule; 1 capsule in am and noon and 2 at bedtime.  Dispense: 360 capsule; Refill: 0    13. Weight gain  Uncertain etiology. At this time, I am thinking not fluid.   Continue to follow.     14. Decreased mobility  Discussed.  "  Also discussed [employer]. They do anticipate bringing the paperwork in.       BMI:   Estimated body mass index is 47.76 kg/m  as calculated from the following:    Height as of this encounter: 1.6 m (5' 3\").    Weight as of this encounter: 122.3 kg (269 lb 9.6 oz).   Weight management plan: Discussed healthy diet and exercise guidelines      Return in about 3 months (around 11/29/2019) for Medication recheck.    Yessica Lora MD, MD  [facility]    LENNY spent ~40 minutes face to face, of which at least 50 % was spent in counseling or coordination of care for multiple complaints, especially pain, edema, mobility.    She did want daugher as consent to communicate and also anticipating that she will become Proxy on MyChart.  Offered Care Coordination and discussed what this is; defers currently. Does appear to have good family involvement.      From staff message 9/5/2019:    Patient was contacted by [facility]. Patient was scheduled for therapy services with Ramone Walter on 9/9/19 at 1pm.     Clover Felix      [facility]     "

## 2019-08-29 NOTE — LETTER
September 3, 2019      Carol Merida  [address] [address]        Dear Ms. Carol HARDY Fuad,    Enclosed is a copy of your recent results.   Overall, these are looking good!     Fasting glucoses of 100 or greater are considered abnormal. Diet ,exercise and weight management are important. This is called Impaired Fasting Glucose, which is a pre-diabetes condition. We should continue to check this periodically.   The Hgb A1C is a reflection of your glucose control over the last 3 months.  In people who have diabetes, we like to see this under 7.0. We do consider someone to have diabetes if this is 6.5 or higher.     TSH stands for Thyroid Stimulating Hormone. It is the most sensitive thyroid test that we have. It goes in the opposite direction of the thyroid hormone level; if your thyroid is not producing sufficient thyroid hormone, it goes up to tell your thyroid to make more.   I did send in a refill of your medication.     The urine albumin to creatinine ratio serves as a marker of higher risk of cardiovascular and kidney disease.  If this is elevated, we typically like to see you on either an ACE inhibitor, or an ARB.   We are also getting more aggressive with both blood pressure and cholesterol goals.   (The number we are looking at is the ratio; this usually appears at the bottom of the report with a normal range 0-25).     I hope you have a great rest of summer!     Sincerely,     Yessica Lora MD

## 2019-08-30 LAB
ALBUMIN SERPL-MCNC: 3.9 G/DL (ref 3.4–5)
ALP SERPL-CCNC: 83 U/L (ref 40–150)
ALT SERPL W P-5'-P-CCNC: 24 U/L (ref 0–50)
ANION GAP SERPL CALCULATED.3IONS-SCNC: 8 MMOL/L (ref 3–14)
AST SERPL W P-5'-P-CCNC: 23 U/L (ref 0–45)
BILIRUB SERPL-MCNC: 0.8 MG/DL (ref 0.2–1.3)
BUN SERPL-MCNC: 18 MG/DL (ref 7–30)
CALCIUM SERPL-MCNC: 9.2 MG/DL (ref 8.5–10.1)
CHLORIDE SERPL-SCNC: 107 MMOL/L (ref 94–109)
CHOLEST SERPL-MCNC: 122 MG/DL
CO2 SERPL-SCNC: 26 MMOL/L (ref 20–32)
CREAT SERPL-MCNC: 0.76 MG/DL (ref 0.52–1.04)
CREAT UR-MCNC: 131 MG/DL
GFR SERPL CREATININE-BSD FRML MDRD: 72 ML/MIN/{1.73_M2}
GLUCOSE SERPL-MCNC: 106 MG/DL (ref 70–99)
HDLC SERPL-MCNC: 60 MG/DL
LDLC SERPL CALC-MCNC: 36 MG/DL
MICROALBUMIN UR-MCNC: 8 MG/L
MICROALBUMIN/CREAT UR: 6.05 MG/G CR (ref 0–25)
NONHDLC SERPL-MCNC: 62 MG/DL
POTASSIUM SERPL-SCNC: 4.9 MMOL/L (ref 3.4–5.3)
PROT SERPL-MCNC: 7.4 G/DL (ref 6.8–8.8)
SODIUM SERPL-SCNC: 141 MMOL/L (ref 133–144)
TRIGL SERPL-MCNC: 131 MG/DL
TSH SERPL DL<=0.005 MIU/L-ACNC: 2.59 MU/L (ref 0.4–4)

## 2019-08-30 ASSESSMENT — ANXIETY QUESTIONNAIRES: GAD7 TOTAL SCORE: 11

## 2019-09-03 RX ORDER — LEVOTHYROXINE SODIUM 88 UG/1
88 TABLET ORAL DAILY
Qty: 90 TABLET | Refills: 3 | Status: SHIPPED | OUTPATIENT
Start: 2019-09-03 | End: 2020-09-09

## 2019-09-30 ENCOUNTER — TELEPHONE (OUTPATIENT)
Dept: FAMILY MEDICINE | Facility: CLINIC | Age: 84
End: 2019-09-30

## 2019-09-30 NOTE — TELEPHONE ENCOUNTER
Reason for call:  Form   Our goal is to have forms completed within 72 hours, however some forms may require a visit or additional information.     Who is the form from? Patient  Where did the form come from? Patient or family brought in     What clinic location was the form placed at? [place]  Where was the form placed? Dr Lora's Box/Folder  What number is listed as a contact on the form? [phone]    Phone call message - patient request for a letter, form or note:     Date needed: at your convenience  Please mail to [address] [place] [address]  Has the patient signed a consent form for release of information? Not Applicable    Additional comments:     Type of letter, form or note: medical, [employer]    Phone number to reach patient:  Home number on file [phone] (home)    Best Time:  any    Can we leave a detailed message on this number?  YES

## 2019-10-01 NOTE — TELEPHONE ENCOUNTER
Forms have been mailed to pt home address as requested. Copies have been made and sent to [employer].  Maria Antonia Mendez, CMA

## 2019-10-02 ENCOUNTER — DOCUMENTATION ONLY (OUTPATIENT)
Dept: FAMILY MEDICINE | Facility: CLINIC | Age: 84
End: 2019-10-02

## 2019-10-02 ENCOUNTER — TELEPHONE (OUTPATIENT)
Dept: FAMILY MEDICINE | Facility: CLINIC | Age: 84
End: 2019-10-02

## 2019-10-02 NOTE — TELEPHONE ENCOUNTER
Rae Liz from [facility] called to get information only to Dr. Lora regarding earlier conversation regarding patient. She states that the referring provider is Dr. Junior Giordano and that he will be completing face to face, and that no other action from Dr. Lora is not needed.   Any questions, please call Rae at [phone].   Thanks.

## 2019-10-02 NOTE — PROGRESS NOTES
Mrs. SWETHA Merida has home care orders through a referring MD.  Requesting from you, her primary MD, to have her home care start next Wednesday, Oct 9th, per patient request.  She states she does not feel up to it this week and is busy next week until Wednesday. Her intake diagnosis is trochanteric bursitis.    Thank you,  Rae Liz, PT

## 2019-10-02 NOTE — PROGRESS NOTES
This is OK... who was the referring provider? I wonder if we should get some notes for this.    Does she need a Face to Face or was that covered by the other provider?     Thanks!

## 2019-10-09 ENCOUNTER — DOCUMENTATION ONLY (OUTPATIENT)
Dept: CARE COORDINATION | Facility: CLINIC | Age: 84
End: 2019-10-09

## 2019-10-09 ENCOUNTER — MEDICAL CORRESPONDENCE (OUTPATIENT)
Dept: HEALTH INFORMATION MANAGEMENT | Facility: CLINIC | Age: 84
End: 2019-10-09

## 2019-10-09 NOTE — PROGRESS NOTES
[facility] now requests orders and shares plan of care/discharge summaries for some patients through [employer].  Please REPLY TO THIS MESSAGE OR ROUTE BACK TO THE AUTHOR in order to give authorization for orders when needed.  This is considered a verbal order, you will still receive a faxed copy of orders for signature.  Thank you for your assistance in improving collaboration for our patients.    ORDER    PT SOC completed. Requesting the following orders:    PT 2x/week for 3 weeks to address gait, balance and strength training.    OT to address ADLs and home safety.    SN to address medication management and education.    SW to address community resources and long term planning.    Thank you.  Noy Leach, PT  [email or web address]  [phone]

## 2019-10-13 ENCOUNTER — TELEPHONE (OUTPATIENT)
Dept: FAMILY MEDICINE | Facility: CLINIC | Age: 84
End: 2019-10-13

## 2019-10-13 DIAGNOSIS — Z53.9 DIAGNOSIS NOT YET DEFINED: Primary | ICD-10-CM

## 2019-10-13 DIAGNOSIS — Z51.81 ENCOUNTER FOR THERAPEUTIC DRUG MONITORING: Primary | ICD-10-CM

## 2019-10-13 PROCEDURE — G0180 MD CERTIFICATION HHA PATIENT: HCPCS | Performed by: FAMILY MEDICINE

## 2019-10-13 NOTE — TELEPHONE ENCOUNTER
([facility] now requests orders and shares plan of care/discharge summaries for some patients through [employer].  Please REPLY TO THIS MESSAGE OR ROUTE BACK TO THE AUTHOR in order to give authorization for orders when needed.  This is considered a verbal order, you will still receive a faxed copy of orders for signature.  Thank you for your assistance in improving collaboration for our patients.    Yes will draw a BMP the last week of SN RV week of 10/20/19

## 2019-10-13 NOTE — TELEPHONE ENCOUNTER
Orders OK.    Would like to do bmp after a couple weeks, if she has not been taking the medications and starts more consistently now.

## 2019-10-13 NOTE — TELEPHONE ENCOUNTER
([facility] now requests orders and shares plan of care/discharge summaries for some patients through [employer].  Please REPLY TO THIS MESSAGE OR ROUTE BACK TO THE AUTHOR in order to give authorization for orders when needed.  This is considered a verbal order, you will still receive a faxed copy of orders for signature.  Thank you for your assistance in improving collaboration for our patients.    SN evaluation done on 10/11/19.  Requesting SN orders [id number], 2 PRN to assess medication adherence and response.  Disease process education and home management.  Patient states has not been taking Lasix or hydrochlorothiazide due to frequent urination.  Everette has agreed to try taking both diuretics later in the day, after she is home for the day.  She stays up late anyway.  She also has not been weighing herself daily and has agreed to begin taking daily weights and recording. Questions, let me know  Thank you    JARETH Anders [phone]

## 2019-10-18 ENCOUNTER — TELEPHONE (OUTPATIENT)
Dept: FAMILY MEDICINE | Facility: CLINIC | Age: 84
End: 2019-10-18

## 2019-10-18 DIAGNOSIS — Z53.9 DIAGNOSIS NOT YET DEFINED: Primary | ICD-10-CM

## 2019-10-18 PROCEDURE — G0180 MD CERTIFICATION HHA PATIENT: HCPCS | Performed by: FAMILY MEDICINE

## 2019-10-18 NOTE — TELEPHONE ENCOUNTER
Home Health Certification and Plan of Care has been faxed to [phone] and copy sent to [employer].  Maria Antonia Mendez, CMA

## 2019-10-18 NOTE — TELEPHONE ENCOUNTER
Received Home Health Certification and Plan of Care, placed in Dr. Lora's in basket.    Please review, sign and fax back to [phone].

## 2019-10-22 DIAGNOSIS — M51.369 DDD (DEGENERATIVE DISC DISEASE), LUMBAR: ICD-10-CM

## 2019-10-22 DIAGNOSIS — E78.5 HYPERLIPIDEMIA LDL GOAL <100: ICD-10-CM

## 2019-10-22 DIAGNOSIS — M15.9 OSTEOARTHRITIS OF MULTIPLE JOINTS, UNSPECIFIED OSTEOARTHRITIS TYPE: ICD-10-CM

## 2019-10-22 RX ORDER — SIMVASTATIN 10 MG
TABLET ORAL
Qty: 90 TABLET | Refills: 2 | Status: SHIPPED | OUTPATIENT
Start: 2019-10-22 | End: 2020-07-29

## 2019-10-22 RX ORDER — GABAPENTIN 300 MG/1
CAPSULE ORAL
Qty: 270 CAPSULE | Refills: 0 | OUTPATIENT
Start: 2019-10-22

## 2019-10-22 NOTE — TELEPHONE ENCOUNTER
The dosing of gabapentin was increased and sent end of August; enough for 90 days.   Please clarify with patient and pharmacy.    Looks like the gabapentin sent on 8/29 was to our pharmacy...    Thanks.

## 2019-10-22 NOTE — TELEPHONE ENCOUNTER
Prescription approved per [facility] Refill Protocol for the simvastatin.    RX [place] ([place] reviewed:     Last fill dates:  8/12/19, 5/7/19 and 1/24/19 - all dispense 270    [place]:  [email or web address]      Routing refill request to provider for review/approval because:  Drug not on the [facility] refill protocol for the gabapentin

## 2019-10-22 NOTE — TELEPHONE ENCOUNTER
This request was sent from [facility] on [address] in [place] (an older prescription?) Called [facility] pharmacy: The prescription for August with the increased dosing was never picked up by the patient. It is still available if needed.    Called the patient. No answer on home phone or cell. LVM to return call to clarify her current dosing instructions and inform her the new prescription from her visit with Dr. Lora in August is available at the [facility] if needed.    Johnny Perkins RN

## 2019-10-22 NOTE — TELEPHONE ENCOUNTER
"Requested Prescriptions   Pending Prescriptions Disp Refills     simvastatin (ZOCOR) 10 MG tablet [Pharmacy Med Name: SIMVASTATIN 10MG TABLETS] 90 tablet 0     Sig: TAKE 1 TABLET BY MOUTH AT BEDTIME   Last Written Prescription Date:  7/26/19  Last Fill Quantity: 90,  # refills: 0   Last office visit: 8/29/2019 with prescribing provider:  Yessica Lora MD   Future Office Visit:   Next 5 appointments (look out 90 days)    Dec 06, 2019 11:10 AM CST  Office Visit with Yessica Lora MD  [facility] ([address] [address] [phone]             [facility] Protocol Passed - 10/22/2019  5:11 AM        Passed - LDL on file in past 12 months     Recent Labs   Lab Test 08/29/19  1202   LDL 36             Passed - No abnormal creatine kinase in past 12 months     No lab results found.             Passed - Recent (12 mo) or future (30 days) visit within the authorizing provider's specialty     Patient has had an office visit with the authorizing provider or a provider within the authorizing providers department within the previous 12 mos or has a future within next 30 days. See \"Patient Info\" tab in inbasket, or \"Choose Columns\" in Meds & Orders section of the refill encounter.              Passed - Medication is active on med list        Passed - Patient is age 18 or older        Passed - No active pregnancy on record        Passed - No positive pregnancy test in past 12 months        gabapentin (NEURONTIN) 300 MG capsule [Pharmacy Med Name: GABAPENTIN 300MG CAPSULES] 270 capsule 0     Sig: TAKE 1 CAPSULE(300 MG) BY MOUTH THREE TIMES DAILY   Last Written Prescription Date:  8/29/19  Last Fill Quantity: 360,  # refills: 0   Last office visit: 8/29/2019 with prescribing provider:  Yessica Lora MD   Future Office Visit:   Next 5 appointments (look out 90 days)    Dec 06, 2019 11:10 AM CST  Office Visit with Yessica Lora MD  [facility] ([facility]" [facility] [address] [address] [phone]             There is no refill protocol information for this order

## 2019-10-22 NOTE — TELEPHONE ENCOUNTER
Patient returned phone call: Patient states she did not ask for this from [facility]. She understands that her refill from August is waiting for her at [facility] and she will call them soon to pick it up. Notified her her simvastatin refill did get sent to [facility] and she stated that was ok.    Jailene FOX, Triage RN

## 2019-10-25 ENCOUNTER — MEDICAL CORRESPONDENCE (OUTPATIENT)
Dept: HEALTH INFORMATION MANAGEMENT | Facility: CLINIC | Age: 84
End: 2019-10-25

## 2019-10-25 ENCOUNTER — TELEPHONE (OUTPATIENT)
Dept: FAMILY MEDICINE | Facility: CLINIC | Age: 84
End: 2019-10-25

## 2019-10-25 LAB
ANION GAP SERPL CALCULATED.3IONS-SCNC: 5 MMOL/L (ref 3–14)
BUN SERPL-MCNC: 19 MG/DL (ref 7–30)
CALCIUM SERPL-MCNC: 9 MG/DL (ref 8.5–10.1)
CHLORIDE SERPL-SCNC: 106 MMOL/L (ref 94–109)
CO2 SERPL-SCNC: 28 MMOL/L (ref 20–32)
CREAT SERPL-MCNC: 0.7 MG/DL (ref 0.52–1.04)
GFR SERPL CREATININE-BSD FRML MDRD: 80 ML/MIN/{1.73_M2}
GLUCOSE SERPL-MCNC: 108 MG/DL (ref 70–99)
POTASSIUM SERPL-SCNC: 4.3 MMOL/L (ref 3.4–5.3)
SODIUM SERPL-SCNC: 139 MMOL/L (ref 133–144)

## 2019-10-25 PROCEDURE — 80048 BASIC METABOLIC PNL TOTAL CA: CPT | Performed by: FAMILY MEDICINE

## 2019-10-25 NOTE — TELEPHONE ENCOUNTER
Home care called in to report the Pt has had cold like symptoms for almost 2 weeks now.  Has a slight cough, somewhat productive, mucus is clear. Some nasal drainage.   Temporal temp of 99.1 today. No sinus pressure or pain. Feels like her Left ear is clogged up, no pain, just some pressure.   Home care reports lungs are clear and vitals are normal.   Oxygen levels are 96% on RA.     Advised if there are concerns for ear infection, should be seen. Could try warm, steamy shower to help loosen thing up. Decongestants not advised. The Pt did express understanding.     Cinthia Moran RN -- [facility]

## 2020-02-12 DIAGNOSIS — F32.A MILD DEPRESSION: ICD-10-CM

## 2020-02-12 DIAGNOSIS — F41.9 ANXIETY: ICD-10-CM

## 2020-02-12 DIAGNOSIS — M15.9 OSTEOARTHRITIS OF MULTIPLE JOINTS, UNSPECIFIED OSTEOARTHRITIS TYPE: ICD-10-CM

## 2020-02-12 DIAGNOSIS — M51.369 DDD (DEGENERATIVE DISC DISEASE), LUMBAR: ICD-10-CM

## 2020-02-13 RX ORDER — FLUOXETINE 40 MG/1
CAPSULE ORAL
Qty: 90 CAPSULE | Refills: 0 | Status: SHIPPED | OUTPATIENT
Start: 2020-02-13 | End: 2020-05-08

## 2020-02-13 RX ORDER — GABAPENTIN 300 MG/1
CAPSULE ORAL
Qty: 360 CAPSULE | Refills: 0 | Status: SHIPPED | OUTPATIENT
Start: 2020-02-13 | End: 2020-05-15

## 2020-02-13 ASSESSMENT — PATIENT HEALTH QUESTIONNAIRE - PHQ9
SUM OF ALL RESPONSES TO PHQ QUESTIONS 1-9: 17
5. POOR APPETITE OR OVEREATING: MORE THAN HALF THE DAYS

## 2020-02-13 ASSESSMENT — ANXIETY QUESTIONNAIRES
1. FEELING NERVOUS, ANXIOUS, OR ON EDGE: SEVERAL DAYS
2. NOT BEING ABLE TO STOP OR CONTROL WORRYING: NOT AT ALL
GAD7 TOTAL SCORE: 3
5. BEING SO RESTLESS THAT IT IS HARD TO SIT STILL: NOT AT ALL
3. WORRYING TOO MUCH ABOUT DIFFERENT THINGS: NOT AT ALL
6. BECOMING EASILY ANNOYED OR IRRITABLE: NOT AT ALL
7. FEELING AFRAID AS IF SOMETHING AWFUL MIGHT HAPPEN: NOT AT ALL
IF YOU CHECKED OFF ANY PROBLEMS ON THIS QUESTIONNAIRE, HOW DIFFICULT HAVE THESE PROBLEMS MADE IT FOR YOU TO DO YOUR WORK, TAKE CARE OF THINGS AT HOME, OR GET ALONG WITH OTHER PEOPLE: SOMEWHAT DIFFICULT

## 2020-02-13 NOTE — TELEPHONE ENCOUNTER
Requested Prescriptions   Pending Prescriptions Disp Refills     gabapentin (NEURONTIN) 300 MG capsule [Pharmacy Med Name: GABAPENTIN 300MG CAPS] 360 capsule 0     Sig: TAKE ONE CAPSULE BY MOUTH EVERY MORNING AND AT NOON THEN TAKE TWO CAPSULES BY MOUTH EVERY NIGHT AT BEDTIME   Last Written Prescription Date:  8/29/19  Last Fill Quantity: 360,  # refills: 0   Last office visit: 8/29/2019 with prescribing provider:  Yessica Lora MD   Future Office Visit:   Next 5 appointments (look out 90 days)    Apr 03, 2020  1:50 PM CDT  Office Visit with Yessica Lora MD  [facility] [address] [facility] [address] [phone]             There is no refill protocol information for this order        FLUoxetine (PROZAC) 40 MG capsule [Pharmacy Med Name: FLUOXETINE HCL 40MG CAPS] 90 capsule 1     Sig: TAKE ONE CAPSULE BY MOUTH ONCE DAILY   Last Written Prescription Date:  5/30/19  Last Fill Quantity: 90,  # refills: 1   Last office visit: 8/29/2019 with prescribing provider:  Yessica Lora MD   Future Office Visit:   Next 5 appointments (look out 90 days)    Apr 03, 2020  1:50 PM CDT  Office Visit with Yessica Lora MD  [facility] [address] [facility] [address] [phone]             SSRIs Protocol Failed - 2/12/2020  4:35 PM        Failed - PHQ-9 score less than 5 in past 6 months     Please review last PHQ-9 score.   PHQ-9 SCORE 3/12/2019 5/30/2019 8/29/2019   PHQ-9 Total Score - - -   PHQ-9 Total Score MyChart - 8 (Mild depression) -   PHQ-9 Total Score 15 8 9     EDILSON-7 SCORE 3/12/2019 5/30/2019 8/29/2019   Total Score - - -   Total Score 16 9 11   Total Score - - -               Passed - Medication is active on med list        Passed - Patient is age 18 or older        Passed - No active pregnancy on record        Passed - No positive pregnancy test in last 12 months        Passed - Recent (6 mo) or  "future (30 days) visit within the authorizing provider's specialty     Patient had office visit in the last 6 months or has a visit in the next 30 days with authorizing provider or within the authorizing provider's specialty.  See \"Patient Info\" tab in inbasket, or \"Choose Columns\" in Meds & Orders section of the refill encounter.             "

## 2020-02-13 NOTE — TELEPHONE ENCOUNTER
PHQ-9 SCORE 5/30/2019 8/29/2019 2/13/2020   PHQ-9 Total Score - - -   PHQ-9 Total Score MyChart 8 (Mild depression) - -   PHQ-9 Total Score 8 9 17       EDILSON-7 SCORE 5/30/2019 8/29/2019 2/13/2020   Total Score - - -   Total Score 9 11 3   Total Score - - -

## 2020-02-13 NOTE — TELEPHONE ENCOUNTER
Called patient. Gabapentin sent to pharmacy today by Dr. Lora.   Next 5 appointments (look out 90 days)    Apr 03, 2020  1:50 PM CDT  Office Visit with Yessica Lora MD  [facility] [address] [address] [address] [phone]        Called patient: PHQ-9 & EDILSON-7 completed, appt time moved up.  Next 5 appointments (look out 90 days)    Feb 20, 2020  9:30 AM CST  Office Visit with Yessica Lora MD  [facility] ([facility]) [address] [facility] [address] [phone]      She states she has a lot of support and flo that is helping her through but she is looking forward to visiting with Dr. Guido FOX, Triage RN

## 2020-02-13 NOTE — TELEPHONE ENCOUNTER
She does have appointment scheduled.    Please check in on her and ask her how she is doing and do a PHQ9, GAD7  I would be happy to move up her appointment if she would like to.  Thanks!          PHQ-9 SCORE 3/12/2019 5/30/2019 8/29/2019   PHQ-9 Total Score - - -   PHQ-9 Total Score MyChart - 8 (Mild depression) -   PHQ-9 Total Score 15 8 9       EDILSON-7 SCORE 3/12/2019 5/30/2019 8/29/2019   Total Score - - -   Total Score 16 9 11   Total Score - - -

## 2020-02-13 NOTE — TELEPHONE ENCOUNTER
Gabapentin 300 mg    Last Written Prescription Date:  8/29/19  Last Fill Quantity: 360,   # refills: 0  Last Office Visit: 8/29/19  Future Office visit:    Next 5 appointments (look out 90 days)    Apr 03, 2020  1:50 PM CDT  Office Visit with Yessica Lora MD  [facility]) [address] [facility] [address] [phone]           Routing refill request to provider for review/approval because:  Drug not on the FMG, UMP or  Health refill protocol or controlled substance    RX [place] reviewed:   1/13/20, 12/12/19, 11/04/19    [place] profile:  [email or web address]         Routing refill request to provider for review/approval because:  PHq9 is greater than 5.     Tiffany Anton RN on 2/13/2020 at 11:47 AM

## 2020-02-14 ASSESSMENT — ANXIETY QUESTIONNAIRES: GAD7 TOTAL SCORE: 3

## 2020-02-20 ENCOUNTER — OFFICE VISIT (OUTPATIENT)
Dept: FAMILY MEDICINE | Facility: CLINIC | Age: 85
End: 2020-02-20
Payer: COMMERCIAL

## 2020-02-20 ENCOUNTER — PATIENT OUTREACH (OUTPATIENT)
Dept: CARE COORDINATION | Facility: CLINIC | Age: 85
End: 2020-02-20

## 2020-02-20 VITALS
WEIGHT: 264 LBS | SYSTOLIC BLOOD PRESSURE: 116 MMHG | DIASTOLIC BLOOD PRESSURE: 72 MMHG | RESPIRATION RATE: 16 BRPM | HEIGHT: 63 IN | TEMPERATURE: 98 F | HEART RATE: 75 BPM | BODY MASS INDEX: 46.78 KG/M2 | OXYGEN SATURATION: 96 %

## 2020-02-20 DIAGNOSIS — R53.81 PHYSICAL DECONDITIONING: ICD-10-CM

## 2020-02-20 DIAGNOSIS — F43.21 GRIEVING: ICD-10-CM

## 2020-02-20 DIAGNOSIS — F32.A MILD DEPRESSION: ICD-10-CM

## 2020-02-20 DIAGNOSIS — R05.9 COUGH: Primary | ICD-10-CM

## 2020-02-20 DIAGNOSIS — R06.2 WHEEZE: ICD-10-CM

## 2020-02-20 PROCEDURE — 96127 BRIEF EMOTIONAL/BEHAV ASSMT: CPT | Performed by: FAMILY MEDICINE

## 2020-02-20 PROCEDURE — 99214 OFFICE O/P EST MOD 30 MIN: CPT | Performed by: FAMILY MEDICINE

## 2020-02-20 RX ORDER — AMOXICILLIN 500 MG/1
TABLET, FILM COATED ORAL
COMMUNITY
Start: 2020-02-13 | End: 2020-06-11

## 2020-02-20 ASSESSMENT — MIFFLIN-ST. JEOR: SCORE: 1621.63

## 2020-02-20 NOTE — PROGRESS NOTES
Subjective     Caorl Merida is a 84 year old female who presents to clinic today for the following health issues:    HPI   Depression and Anxiety Follow-Up    How are you doing with your depression since your last visit? Worsened     How are you doing with your anxiety since your last visit?  No change    Are you having other symptoms that might be associated with depression or anxiety? Yes:  no motivation     Have you had a significant life event? Grief or Loss     Do you have any concerns with your use of alcohol or other drugs?     Social History     Tobacco Use     Smoking status: Never Smoker     Smokeless tobacco: Never Used   Substance Use Topics     Alcohol use: Yes     Alcohol/week: 0.0 - 0.8 standard drinks     Comment: 1 glass wine weekly     Drug use: No     PHQ 5/30/2019 8/29/2019 2/13/2020   PHQ-9 Total Score 8 9 17   Q9: Thoughts of better off dead/self-harm past 2 weeks Not at all Not at all Not at all     EDILSON-7 SCORE 5/30/2019 8/29/2019 2/13/2020   Total Score - - -   Total Score 9 11 3   Total Score - - -           Suicide Assessment Five-step Evaluation and Treatment (SAFE-T)      How many servings of fruits and vegetables do you eat daily?  2-3    On average, how many sweetened beverages do you drink each day (Examples: soda, juice, sweet tea, etc.  Do NOT count diet or artificially sweetened beverages)?   0    How many days per week do you exercise enough to make your heart beat faster?  none    How many minutes a day do you exercise enough to make your heart beat faster?  none    How many days per week do you miss taking your medication? 0    RESPIRATORY SYMPTOMS      Duration: x 3 weeks    Description  cough and wheezing, runny nose     Severity: moderate    Accompanying signs and symptoms: SOB     History (predisposing factors):  none    Precipitating or alleviating factors: None    Therapies tried and outcome:  Tessalon pearls - not effective             See under ROS    Patient Active  Problem List   Diagnosis     Obesity     Hypothyroidism     Benign neoplasm of colon     Obstructive sleep apnea     * * * SBE PROPHYLAXIS * * *     Degeneration of lumbar or lumbosacral intervertebral disc     Pain in joint, ankle and foot     Arthrodesis status     HYPERLIPIDEMIA LDL GOAL <100     Hypertension goal BP (blood pressure) < 140/90     Health Care Home     Osteopenia     Malignant neoplasm of female breast (H)     Impaired fasting glucose     ACP (advance care planning)     History of melanoma     Panic disorder without agoraphobia     Arthritis     PONV (postoperative nausea and vomiting)     S/P total hip arthroplasty     Constipation     DVT prophylaxis     Anticoagulation management encounter     Physical deconditioning     Periprosthetic fracture around internal prosthetic left hip joint (H)     Chronic pain syndrome - hips     Fracture of greater trochanter of right femur 11/6/2016, closed, with routine healing, subsequent encounter     Anxiety     Osteoarthritis of multiple joints, unspecified osteoarthritis type     Anemia due to blood loss, acute     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     Long term (current) use of anticoagulants     Vitamin D deficiency     Pneumonia     Elevated BMI (H)     CHF (congestive heart failure) (H)     Mild depression (H)     Chronic diastolic heart failure (H)     Neuropathy       Current Outpatient Medications   Medication Sig Dispense Refill     ACE/ARB/ARNI NOT PRESCRIBED (INTENTIONAL) Please choose reason not prescribed, below       acetaminophen (TYLENOL) 325 MG tablet Take 3 tablets (975 mg) by mouth every 8 hours 500 tablet 1     albuterol (PROAIR HFA/PROVENTIL HFA/VENTOLIN HFA) 108 (90 Base) MCG/ACT inhaler INHALE 2 PUFFS INTO THE LUNGS EVERY 4 HOURS AS NEEDED FOR SHORTNESS OF BREATH OR DIFFICULT BREATHING OR WHEEZING 8.5 g 0     ALPRAZolam (XANAX) 0.25 MG tablet Take 1 tablet (0.25 mg) by mouth 2 times daily as needed for anxiety Don't  take with the oxycodone 10 tablet 0     amLODIPine (NORVASC) 5 MG tablet TAKE ONE TABLET BY MOUTH ONCE DAILY 90 tablet 1     amoxicillin 500 MG PO tablet        benzonatate (TESSALON) 100 MG capsule Take 1 capsule (100 mg) by mouth 3 times daily as needed for cough 42 capsule 0     BETA BLOCKER NOT PRESCRIBED (INTENTIONAL) Beta Blocker not prescribed intentionally due to EF > 40 % (ejection fraction) will leave up to Cardiology.       calcium-vitamin D (CALCIUM 600 + D) 600-400 MG-UNIT per tablet Take 1 tablet by mouth every evening  100 tablet 3     diclofenac (VOLTAREN) 1 % topical gel Apply 2 g topically 4 times daily Left shoulder. 3 Tube 1     FLUoxetine (PROZAC) 40 MG capsule TAKE ONE CAPSULE BY MOUTH ONCE DAILY 90 capsule 0     fluticasone (FLONASE) 50 MCG/ACT spray Spray 1 spray into both nostrils daily as needed 1 Bottle 5     gabapentin (NEURONTIN) 300 MG capsule TAKE ONE CAPSULE BY MOUTH EVERY MORNING AND AT NOON THEN TAKE TWO CAPSULES BY MOUTH EVERY NIGHT AT BEDTIME 360 capsule 0     levothyroxine (SYNTHROID/LEVOTHROID) 88 MCG tablet Take 1 tablet (88 mcg) by mouth daily 90 tablet 3     magnesium hydroxide (MILK OF MAGNESIA) 400 MG/5ML suspension Take 30 mLs by mouth daily as needed        Nystatin (NYSTOP) 852946 UNIT/GM POWD Apply tid to affectead area prn 60 g 1     order for DME Equipment being ordered: left breast prosthesis. Mastectomy bras (up to 4) 4 Device 0     senna (SENOKOT) 8.6 MG tablet Take 1 tablet by mouth daily       simvastatin (ZOCOR) 10 MG tablet TAKE 1 TABLET BY MOUTH AT BEDTIME 90 tablet 2     triamcinolone (KENALOG) 0.1 % cream Apply sparingly to affected area two times daily as needed for itching. 30 g 0     furosemide (LASIX) 40 MG tablet Take 1 tablet (40 mg) by mouth daily (Patient not taking: Reported on 2/20/2020) 30 tablet 11     hydrochlorothiazide (HYDRODIURIL) 25 MG tablet Take 1 tablet (25 mg) by mouth daily 30 tablet 3       Reviewed and updated as needed this visit by  "Provider       Social History     Tobacco Use     Smoking status: Never Smoker     Smokeless tobacco: Never Used   Substance Use Topics     Alcohol use: Yes     Alcohol/week: 0.0 - 0.8 standard drinks     Comment: 1 glass wine weekly       Review of Systems   ROS COMP: CONSTITUTIONAL:NEGATIVE for fever, chills, change in weight  ENT/MOUTH: see below  RESP:see below  CV: NEGATIVE for chest pain, palpitations or peripheral edema  PSYCHIATRIC: see below.    Has had teeth pulled and gum issues.   Antibiotic since last Thursday.  Improving.    Cough. Comes and goes.   Will wax and wane some.   Will wheeze.   Has been there for weeks.  Initially she said no change with the amoxicillin; later she noted it is hard to tell.  Will use the inhaler; does not feel it does much for her. Not using it now.     Regarding mood, she stares into space and sits.   Not doing much that she wants.   Kids are encouraging her to move to an apartment; she is looking into that. Not socializing much. Not driving.   Will volunteer at [employer].   She is grieving the loss of her  from last Fall.    May be able to move soon.   Excited about it.     Asking about a  or supportive person to talk to within [facility]. Someone other than family.     Did not do the counseling recommended previously; there was too much going on.           Objective    /72 (BP Location: Right arm, Cuff Size: Adult Large)   Pulse 75   Temp 98  F (36.7  C) (Oral)   Resp 16   Ht 1.6 m (5' 3\")   Wt 119.7 kg (264 lb)   SpO2 96%   BMI 46.77 kg/m    Body mass index is 46.77 kg/m .  Physical Exam   GENERAL APPEARANCE: alert and no distress  RESP: lungs had a few end expiratory wheezes  CV: regular rates and rhythm  MS: trace edema  PSYCH: mentation appears normal and affect normal/down      PHQ-9 SCORE 5/30/2019 8/29/2019 2/13/2020   PHQ-9 Total Score - - -   PHQ-9 Total Score MyChart 8 (Mild depression) - -   PHQ-9 Total Score 8 9 17       EDILSON-7 SCORE " 5/30/2019 8/29/2019 2/13/2020   Total Score - - -   Total Score 9 11 3   Total Score - - -     Reviewed the individual responses with the PHQ9 as well from 2/13.    GFR Estimate   Date Value Ref Range Status   10/25/2019 80 >60 mL/min/[1.73_m2] Final     Comment:     Non  GFR Calc  Starting 12/18/2018, serum creatinine based estimated GFR (eGFR) will be   calculated using the Chronic Kidney Disease Epidemiology Collaboration   (CKD-EPI) equation.     08/29/2019 72 >60 mL/min/[1.73_m2] Final     Comment:     Non  GFR Calc  Starting 12/18/2018, serum creatinine based estimated GFR (eGFR) will be   calculated using the Chronic Kidney Disease Epidemiology Collaboration   (CKD-EPI) equation.     05/30/2019 76 >60 mL/min/[1.73_m2] Final     Comment:     Non  GFR Calc  Starting 12/18/2018, serum creatinine based estimated GFR (eGFR) will be   calculated using the Chronic Kidney Disease Epidemiology Collaboration   (CKD-EPI) equation.                 Assessment & Plan     1. Cough  With wheeze. She has recently been on amoxicillin.   At this time, recommending to use inhaler. Discussed albuterol vs steroid inhaler and what they do. Will have her use steroid inhaler with albuterol as rescue. Discussed rinse and spit after use.  - beclomethasone HFA 80 MCG/ACT IN inhaler; Inhale 1 puff into the lungs 2 times daily  Dispense: 1 Inhaler; Refill: 1    2. Wheeze  As above.   - beclomethasone HFA 80 MCG/ACT IN inhaler; Inhale 1 puff into the lungs 2 times daily  Dispense: 1 Inhaler; Refill: 1    3. Grieving  She is interested in having someone from the clinic to contact if needed. At this time, would have difficult time getting to counseling.   She is in the process of moving to a senior apartment. Recently lost her . Will have CC connect with her.   - CARE COORDINATION REFERRAL    4. Mild depression (H)  As above.   No change in medication at this time. She did exhibit some  excitement with talking about the move. She might do well to get involved in some activities or make a friend.  - CARE COORDINATION REFERRAL    5. Physical deconditioning  She has not been doing much in the way of exercise or other. She might benefit from some resources as well due to this, such as [employer] [facility].   - CARE COORDINATION REFERRAL         Patient Instructions     Appointment in 2-3 months.  Let's see how things go with the move.  I will have a Care Coordinator call you.    If things are not improving, we can consider adding an antibiotic.  If you are at all worse, we would want to see you again.            Return in about 3 months (around 5/20/2020), or if symptoms worsen or fail to improve, for Medication recheck.    Yessica Lora MD, MD  [facility]

## 2020-02-20 NOTE — PATIENT INSTRUCTIONS
Appointment in 2-3 months.  Let's see how things go with the move.  I will have a Care Coordinator call you.    If things are not improving, we can consider adding an antibiotic.  If you are at all worse, we would want to see you again.

## 2020-02-20 NOTE — PROGRESS NOTES
Clinic Care Coordination Contact  [facility]/Voicemail    Referral Source: Primary Care Provider - referral received to provide assistance with and resources for housing, financial, lifeline, and other outstanding needs/concerns.    Clinical Data: Care Coordinator Outreach  Outreach attempted x 1.  Left message on patient's voicemail with call back information and requested return call.  Plan: Care Coordinator will try to reach patient again in 1-2 business days.    Francisca Long RN Care Coordinator  [facility]  Email: [email or web address]  Phone: [phone]

## 2020-02-20 NOTE — LETTER
[place] CARE COORDINATION  [address]  [place] [facility] [place] [facility] [address]     February 21, 2020    Carol Merida  [address]  [place] [address]      Dear Carol,    I am a clinic care coordinator who works with Yessica Lora MD, MD at [facility]. I recently tried to call and was unable to reach you. Below is a description of clinic care coordination and how I can further assist you.      The clinic care coordinator team is made up of a registered nurse,  and community health worker who understand the health care system. The goal of clinic care coordination is to help you manage your health and improve access to the health care system in the most efficient manner. The team can assist you in meeting your health care goals by providing education, coordinating services, strengthening the communication among your providers  and supporting you with any resource needs.    Please feel free to contact me, at [phone] with any questions or concerns. We are focused on providing you with the highest-quality healthcare experience possible and that all starts with you.     Sincerely,     Francisca Long RN Care Coordinator  [facility]  Email: [email or web address]  Phone: [phone]

## 2020-02-21 NOTE — PROGRESS NOTES
Clinic Care Coordination Contact  [facility]/Voicemail    Referral Source: PCP  Clinical Data: Care Coordinator Outreach  Outreach attempted x 2.  Left message on patient's voicemail with call back information and requested return call.  Plan: Care Coordinator will send care coordination introduction letter with care coordinator contact information and explanation of care coordination services via mail. Care Coordinator will do no further outreaches at this time.    Francisca Long RN Care Coordinator  [facility]  Email: [email or web address]  Phone: [phone]

## 2020-02-21 NOTE — PROGRESS NOTES
Patient returned call to writer. Reports kids been after her to move into senior apartment. Will be moving to [facility]. Meeting with real estate person in the near future, things are moving faster than want, but anxious, knows its a good transition. Needs support - giving answers to questions, and someone to reach out too. Discussed [facility]. She will reach out to writer when ready for this for additional information. Notes her  had this. Family good support system. Afraid of falling. Will look up [employer] on the internet. Will call writer with future support needs. No further outreaches scheduled.     Francisca Long RN Care Coordinator  [facility]  Email: [email or web address]  Phone: [phone]

## 2020-02-25 DIAGNOSIS — F41.9 ANXIETY: ICD-10-CM

## 2020-02-25 NOTE — TELEPHONE ENCOUNTER
Requested Prescriptions   Pending Prescriptions Disp Refills     ALPRAZolam (XANAX) 0.25 MG tablet 10 tablet 0     Sig: Take 1 tablet (0.25 mg) by mouth 2 times daily as needed for anxiety Don't take with the oxycodone   Last Written Prescription Date:  3/12/19  Last Fill Quantity: 10,  # refills: 0   Last office visit: 2/20/2020 with prescribing provider:  Yessica Lora MD    Future Office Visit:        There is no refill protocol information for this order

## 2020-02-26 RX ORDER — ALPRAZOLAM 0.25 MG
0.25 TABLET ORAL 2 TIMES DAILY PRN
Qty: 10 TABLET | Refills: 0 | Status: SHIPPED | OUTPATIENT
Start: 2020-02-26 | End: 2020-03-05

## 2020-02-26 NOTE — TELEPHONE ENCOUNTER
Routing refill request to provider for review/approval because:  Drug not on the FMG, UMP or  Health refill protocol or controlled substance  Not on     Isha JAY RN

## 2020-03-05 DIAGNOSIS — F41.9 ANXIETY: ICD-10-CM

## 2020-03-05 PROCEDURE — 99207 ZZC NO CHARGE NURSE ONLY: CPT | Performed by: FAMILY MEDICINE

## 2020-03-05 RX ORDER — ALPRAZOLAM 0.25 MG
TABLET ORAL
Qty: 10 TABLET | Refills: 0 | Status: SHIPPED | OUTPATIENT
Start: 2020-03-05 | End: 2021-01-29

## 2020-03-05 NOTE — TELEPHONE ENCOUNTER
Requested Prescriptions   Pending Prescriptions Disp Refills     ALPRAZolam (XANAX) 0.25 MG tablet [Pharmacy Med Name: ALPRAZOLAM 0.25MG TABS] 10 tablet 0     Sig: TAKE ONE TABLET BY MOUTH TWICE A DAY AS NEEDED FOR ANXIETY. DON'T TAKE WITH OXYCODONE.   Last Written Prescription Date:  2/26/20  Last Fill Quantity: 10,  # refills: 0   Last office visit: 2/20/2020 with prescribing provider:  Yessica Lora MD    Future Office Visit:        There is no refill protocol information for this order

## 2020-03-05 NOTE — TELEPHONE ENCOUNTER
I did refill.  But please suggest to her to try to make this last until I see her again. Otherwise, would like to move up an appointment.    Thanks!

## 2020-03-06 NOTE — TELEPHONE ENCOUNTER
Called patient and let her know of message below. Patient verbalized understanding and is agreeable to plan.     Tiffany Anton RN on 3/6/2020 at 9:22 AM

## 2020-04-14 DIAGNOSIS — L30.4 INTERTRIGO: ICD-10-CM

## 2020-04-15 RX ORDER — NYSTATIN 100000 [USP'U]/G
POWDER TOPICAL
Qty: 60 G | Refills: 1 | Status: SHIPPED | OUTPATIENT
Start: 2020-04-15 | End: 2021-12-21

## 2020-04-17 DIAGNOSIS — I10 BENIGN ESSENTIAL HYPERTENSION: ICD-10-CM

## 2020-04-17 RX ORDER — AMLODIPINE BESYLATE 5 MG/1
TABLET ORAL
Qty: 90 TABLET | Refills: 0 | Status: SHIPPED | OUTPATIENT
Start: 2020-04-17 | End: 2020-07-15

## 2020-04-17 NOTE — TELEPHONE ENCOUNTER
"Requested Prescriptions   Pending Prescriptions Disp Refills     amLODIPine (NORVASC) 5 MG tablet [Pharmacy Med Name: AMLODIPINE BESYLATE 5MG TABS]  Last Written Prescription Date:  10/15/19  Last Fill Quantity: 90,  # refills: 1   Last office visit: 2/20/2020 with prescribing provider:  Guido   Future Office Visit:     90 tablet 1     Sig: TAKE ONE TABLET BY MOUTH ONCE DAILY       Calcium Channel Blockers Protocol  Passed - 4/17/2020 11:35 AM        Passed - Blood pressure under 140/90 in past 12 months     BP Readings from Last 3 Encounters:   02/20/20 116/72   08/29/19 118/76   05/30/19 110/72                 Passed - Recent (12 mo) or future (30 days) visit within the authorizing provider's specialty     Patient has had an office visit with the authorizing provider or a provider within the authorizing providers department within the previous 12 mos or has a future within next 30 days. See \"Patient Info\" tab in inbasket, or \"Choose Columns\" in Meds & Orders section of the refill encounter.              Passed - Medication is active on med list        Passed - Patient is age 18 or older        Passed - No active pregnancy on record        Passed - Normal serum creatinine on file in past 12 months     Recent Labs   Lab Test 10/25/19  1142  10/26/12  1456   CR 0.70   < >  --    CREAT  --   --  0.7    < > = values in this interval not displayed.       Ok to refill medication if creatinine is low          Passed - No positive pregnancy test in past 12 months             "

## 2020-04-27 ENCOUNTER — TRANSFERRED RECORDS (OUTPATIENT)
Dept: HEALTH INFORMATION MANAGEMENT | Facility: CLINIC | Age: 85
End: 2020-04-27

## 2020-05-08 DIAGNOSIS — F41.9 ANXIETY: ICD-10-CM

## 2020-05-08 DIAGNOSIS — F32.A MILD DEPRESSION: ICD-10-CM

## 2020-05-08 RX ORDER — FLUOXETINE 40 MG/1
CAPSULE ORAL
Qty: 90 CAPSULE | Refills: 0 | Status: SHIPPED | OUTPATIENT
Start: 2020-05-08 | End: 2020-07-07

## 2020-05-08 ASSESSMENT — ANXIETY QUESTIONNAIRES
GAD7 TOTAL SCORE: 7
2. NOT BEING ABLE TO STOP OR CONTROL WORRYING: NOT AT ALL
1. FEELING NERVOUS, ANXIOUS, OR ON EDGE: MORE THAN HALF THE DAYS
3. WORRYING TOO MUCH ABOUT DIFFERENT THINGS: NEARLY EVERY DAY
5. BEING SO RESTLESS THAT IT IS HARD TO SIT STILL: NOT AT ALL
6. BECOMING EASILY ANNOYED OR IRRITABLE: SEVERAL DAYS
IF YOU CHECKED OFF ANY PROBLEMS ON THIS QUESTIONNAIRE, HOW DIFFICULT HAVE THESE PROBLEMS MADE IT FOR YOU TO DO YOUR WORK, TAKE CARE OF THINGS AT HOME, OR GET ALONG WITH OTHER PEOPLE: NOT DIFFICULT AT ALL
7. FEELING AFRAID AS IF SOMETHING AWFUL MIGHT HAPPEN: SEVERAL DAYS

## 2020-05-08 ASSESSMENT — PATIENT HEALTH QUESTIONNAIRE - PHQ9
5. POOR APPETITE OR OVEREATING: NOT AT ALL
SUM OF ALL RESPONSES TO PHQ QUESTIONS 1-9: 11

## 2020-05-08 NOTE — TELEPHONE ENCOUNTER
"Requested Prescriptions   Pending Prescriptions Disp Refills     FLUoxetine (PROZAC) 40 MG capsule [Pharmacy Med Name: FLUOXETINE HCL 40MG CAPS]  Last Written Prescription Date:  2/13/2020  Last Fill Quantity: 90,  # refills: 0   Last office visit: 2/20/2020 with prescribing provider:  Guido   Future Office Visit:           90 capsule 0     Sig: TAKE ONE CAPSULE BY MOUTH ONCE DAILY       SSRIs Protocol Failed - 5/8/2020  9:25 AM        Failed - PHQ-9 score less than 5 in past 6 months     Please review last PHQ-9 score.           Passed - Medication is active on med list        Passed - Patient is age 18 or older        Passed - No active pregnancy on record        Passed - No positive pregnancy test in last 12 months        Passed - Recent (6 mo) or future (30 days) visit within the authorizing provider's specialty     Patient had office visit in the last 6 months or has a visit in the next 30 days with authorizing provider or within the authorizing provider's specialty.  See \"Patient Info\" tab in inbasket, or \"Choose Columns\" in Meds & Orders section of the refill encounter.                 "

## 2020-05-08 NOTE — TELEPHONE ENCOUNTER
Pending Prescriptions:                       Disp   Refills    FLUoxetine (PROZAC) 40 MG capsule [Pharma*90 cap*0            Sig: TAKE ONE CAPSULE BY MOUTH ONCE DAILY    Pt has 3 tablets left.

## 2020-05-08 NOTE — TELEPHONE ENCOUNTER
Routing refill request to provider for review/approval because:  Patient needs to be seen because:  Due for an appt for a med check - and phq9 > 4    Will forward to the station, please try and help the pt schedule an appt for a med check.  Then forward to pcp for the refill.  Thanks!

## 2020-05-08 NOTE — TELEPHONE ENCOUNTER
Patient Quality Outreach Summary      Summary:    Patient is due/failing the following:   PHQ-9 Needed and Depression follow-up visit    Type of outreach:    Phone, spoke to patient, appt scheduled for 5/14, PHQ9 updated.    Questions for provider review:    None                                                                                                                    Stephen Sainz CMA (AAMA)     Chart routed to Refill pool, patient notes she has 3 days left of medications.

## 2020-05-08 NOTE — TELEPHONE ENCOUNTER
PHQ-9 SCORE 8/29/2019 2/13/2020 5/8/2020   PHQ-9 Total Score - - -   PHQ-9 Total Score MyChart - - -   PHQ-9 Total Score 9 17 11       EDILSON-7 SCORE 8/29/2019 2/13/2020 5/8/2020   Total Score - - -   Total Score 11 3 7   Total Score - - -

## 2020-05-09 ASSESSMENT — ANXIETY QUESTIONNAIRES: GAD7 TOTAL SCORE: 7

## 2020-05-14 ENCOUNTER — VIRTUAL VISIT (OUTPATIENT)
Dept: FAMILY MEDICINE | Facility: CLINIC | Age: 85
End: 2020-05-14
Payer: COMMERCIAL

## 2020-05-14 DIAGNOSIS — R53.81 PHYSICAL DECONDITIONING: ICD-10-CM

## 2020-05-14 DIAGNOSIS — F32.A MILD DEPRESSION: Primary | ICD-10-CM

## 2020-05-14 DIAGNOSIS — G89.29 CHRONIC BILATERAL LOW BACK PAIN, UNSPECIFIED WHETHER SCIATICA PRESENT: ICD-10-CM

## 2020-05-14 DIAGNOSIS — M25.512 CHRONIC LEFT SHOULDER PAIN: ICD-10-CM

## 2020-05-14 DIAGNOSIS — M54.50 CHRONIC BILATERAL LOW BACK PAIN, UNSPECIFIED WHETHER SCIATICA PRESENT: ICD-10-CM

## 2020-05-14 DIAGNOSIS — F43.21 GRIEVING: ICD-10-CM

## 2020-05-14 DIAGNOSIS — M48.061 SPINAL STENOSIS OF LUMBAR REGION, UNSPECIFIED WHETHER NEUROGENIC CLAUDICATION PRESENT: ICD-10-CM

## 2020-05-14 DIAGNOSIS — M51.369 DDD (DEGENERATIVE DISC DISEASE), LUMBAR: ICD-10-CM

## 2020-05-14 DIAGNOSIS — M15.9 OSTEOARTHRITIS OF MULTIPLE JOINTS, UNSPECIFIED OSTEOARTHRITIS TYPE: ICD-10-CM

## 2020-05-14 DIAGNOSIS — G89.29 CHRONIC LEFT SHOULDER PAIN: ICD-10-CM

## 2020-05-14 PROCEDURE — 99214 OFFICE O/P EST MOD 30 MIN: CPT | Mod: 95 | Performed by: FAMILY MEDICINE

## 2020-05-14 PROCEDURE — 96127 BRIEF EMOTIONAL/BEHAV ASSMT: CPT | Mod: 95 | Performed by: FAMILY MEDICINE

## 2020-05-14 NOTE — PROGRESS NOTES
"Carol Merida is a 85 year old female who is being evaluated via a billable telephone visit.      [phone]    The patient has been notified of following:     \"This telephone visit will be conducted via a call between you and your physician/provider. We have found that certain health care needs can be provided without the need for a physical exam.  This service lets us provide the care you need with a short phone conversation.  If a prescription is necessary we can send it directly to your pharmacy.  If lab work is needed we can place an order for that and you can then stop by our lab to have the test done at a later time.    Telephone visits are billed at different rates depending on your insurance coverage. During this emergency period, for some insurers they may be billed the same as an in-person visit.  Please reach out to your insurance provider with any questions.    If during the course of the call the physician/provider feels a telephone visit is not appropriate, you will not be charged for this service.\"    Patient has given verbal consent for Telephone visit?  Yes    What phone number would you like to be contacted at? [phone]    How would you like to obtain your AVS? Mail a copy    Subjective     Carol Merida is a 85 year old female who presents to clinic today for the following health issues:    HPI  Depression and Anxiety Follow-Up    How are you doing with your depression since your last visit? Worsened, due to COVID situation living situation and being alone.     How are you doing with your anxiety since your last visit?  No change - stable     Are you having other symptoms that might be associated with depression or anxiety? No    Have you had a significant life event? OTHER: Living situation and being alone, currently selling house and had to move into an apartment      Do you have any concerns with your use of alcohol or other drugs? No    Social History     Tobacco Use     Smoking " status: Never Smoker     Smokeless tobacco: Never Used   Substance Use Topics     Alcohol use: Yes     Alcohol/week: 0.0 - 0.8 standard drinks     Comment: 1 glass wine weekly     Drug use: No     PHQ 8/29/2019 2/13/2020 5/8/2020   PHQ-9 Total Score 9 17 11   Q9: Thoughts of better off dead/self-harm past 2 weeks Not at all Not at all Not at all     EDILSON-7 SCORE 8/29/2019 2/13/2020 5/8/2020   Total Score - - -   Total Score 11 3 7   Total Score - - -           Suicide Assessment Five-step Evaluation and Treatment (SAFE-T)      How many servings of fruits and vegetables do you eat daily?  2-3    On average, how many sweetened beverages do you drink each day (Examples: soda, juice, sweet tea, etc.  Do NOT count diet or artificially sweetened beverages)? 2    How many days per week do you exercise enough to make your heart beat faster? 3 or less    How many minutes a day do you exercise enough to make your heart beat faster? 9 or less    How many days per week do you miss taking your medication? 0  See under ROS     Patient Active Problem List   Diagnosis     Obesity     Hypothyroidism     Benign neoplasm of colon     Obstructive sleep apnea     * * * SBE PROPHYLAXIS * * *     Degeneration of lumbar or lumbosacral intervertebral disc     Pain in joint, ankle and foot     Arthrodesis status     HYPERLIPIDEMIA LDL GOAL <100     Hypertension goal BP (blood pressure) < 140/90     Health Care Home     Osteopenia     Malignant neoplasm of female breast (H)     Impaired fasting glucose     ACP (advance care planning)     History of melanoma     Panic disorder without agoraphobia     Arthritis     PONV (postoperative nausea and vomiting)     S/P total hip arthroplasty     Constipation     DVT prophylaxis     Anticoagulation management encounter     Physical deconditioning     Periprosthetic fracture around internal prosthetic left hip joint (H)     Chronic pain syndrome - hips     Fracture of greater trochanter of right femur  11/6/2016, closed, with routine healing, subsequent encounter     Anxiety     Osteoarthritis of multiple joints, unspecified osteoarthritis type     Anemia due to blood loss, acute     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     Long term (current) use of anticoagulants     Vitamin D deficiency     Pneumonia     Elevated BMI (H)     CHF (congestive heart failure) (H)     Mild depression (H)     Chronic diastolic heart failure (H)     Neuropathy       Current Outpatient Medications   Medication Sig Dispense Refill     acetaminophen (TYLENOL) 325 MG tablet Take 3 tablets (975 mg) by mouth every 8 hours 500 tablet 1     albuterol (PROAIR HFA/PROVENTIL HFA/VENTOLIN HFA) 108 (90 Base) MCG/ACT inhaler INHALE 2 PUFFS INTO THE LUNGS EVERY 4 HOURS AS NEEDED FOR SHORTNESS OF BREATH OR DIFFICULT BREATHING OR WHEEZING 8.5 g 0     ALPRAZolam (XANAX) 0.25 MG tablet TAKE ONE TABLET BY MOUTH TWICE A DAY AS NEEDED FOR ANXIETY. DON'T TAKE WITH OXYCODONE. 10 tablet 0     amLODIPine (NORVASC) 5 MG tablet TAKE ONE TABLET BY MOUTH ONCE DAILY 90 tablet 0     beclomethasone HFA 80 MCG/ACT IN inhaler Inhale 1 puff into the lungs 2 times daily 1 Inhaler 1     benzonatate (TESSALON) 100 MG capsule Take 1 capsule (100 mg) by mouth 3 times daily as needed for cough 42 capsule 0     calcium-vitamin D (CALCIUM 600 + D) 600-400 MG-UNIT per tablet Take 1 tablet by mouth every evening  100 tablet 3     diclofenac (VOLTAREN) 1 % topical gel Apply 2 g topically 4 times daily Left shoulder. 3 Tube 1     FLUoxetine (PROZAC) 40 MG capsule TAKE ONE CAPSULE BY MOUTH ONCE DAILY 90 capsule 0     fluticasone (FLONASE) 50 MCG/ACT spray Spray 1 spray into both nostrils daily as needed 1 Bottle 5     gabapentin (NEURONTIN) 300 MG capsule TAKE ONE CAPSULE BY MOUTH EVERY MORNING AND AT NOON THEN TAKE TWO CAPSULES BY MOUTH EVERY NIGHT AT BEDTIME 360 capsule 0     levothyroxine (SYNTHROID/LEVOTHROID) 88 MCG tablet Take 1 tablet (88 mcg) by mouth daily  90 tablet 3     nystatin (NYSTOP) 325077 UNIT/GM external powder Apply tid to affectead area prn 60 g 1     order for DME Equipment being ordered: left breast prosthesis. Mastectomy bras (up to 4) 4 Device 0     senna (SENOKOT) 8.6 MG tablet Take 1 tablet by mouth daily       simvastatin (ZOCOR) 10 MG tablet TAKE 1 TABLET BY MOUTH AT BEDTIME 90 tablet 2     triamcinolone (KENALOG) 0.1 % cream Apply sparingly to affected area two times daily as needed for itching. 30 g 0     vitamin B complex with vitamin C (VITAMIN  B COMPLEX) tablet Take 1 tablet by mouth daily       ACE/ARB/ARNI NOT PRESCRIBED (INTENTIONAL) Please choose reason not prescribed, below (Patient not taking: Reported on 5/14/2020)       amoxicillin 500 MG PO tablet        BETA BLOCKER NOT PRESCRIBED (INTENTIONAL) Beta Blocker not prescribed intentionally due to EF > 40 % (ejection fraction) will leave up to Cardiology. (Patient not taking: Reported on 5/14/2020)       furosemide (LASIX) 40 MG tablet Take 1 tablet (40 mg) by mouth daily (Patient not taking: Reported on 2/20/2020) 30 tablet 11     hydrochlorothiazide (HYDRODIURIL) 25 MG tablet Take 1 tablet (25 mg) by mouth daily 30 tablet 3     magnesium hydroxide (MILK OF MAGNESIA) 400 MG/5ML suspension Take 30 mLs by mouth daily as needed            Reviewed and updated as needed this visit by Provider         Review of Systems   CONSTITUTIONAL:NEGATIVE for fever, chills, change in weight x k Believes may have lost weight as clothes looser.  Resp: gets short of breath, but this is not new.  CV: NEGATIVE for chest pain, palpitations or peripheral edema  GI: NEGATIVE for nausea, abdominal pain, heartburn, or change in bowel habits  MUSCULOSKELETAL: see below  PSYCHIATRIC: see below    During the time of Covid pandemic. Doing visit as a phone visit.    She reports she is not doing well, both physically and mentally.    Has moved into an apartment. She notes it is very small; she sometimes has difficulty  turning around in it.   Now sits, feeling locked up.   Has her name in for a little bigger one.   Will get a little depressed. Continues with grieving the recent loss of .  Family selling house. Closing is soon.     Body hurts. Arthritis is bad again.  Shoulder pain; hurts all the time. Uses this with walking.   Interested in cortisone shot. Does go to [facility], but visits have been delayed due to Covid.   Back also bothers her all the time.  Thinking she might benefit from epidural. She also deals with [facility] for this.     The apartment is in Senior living. Can move to Assisted Living if needs to.   They are not having any social things now.  This is due to Covid.  They will do blood tests there for anything we would want.  Swelling is up and down. Mostly OK.     Taking tylenol for pain. Sometimes wishes she had something more.   Taking one in am, one in afternoon and 1 at night. Will usually take another one sometime in the day. It is the arthritis; so it is the 650 mg.  Will use the voltaren gel, but difficult to get on. Helps a little; likes at night.     Not driving any longer.       Objective   Reported vitals:  There were no vitals taken for this visit.   alert and no distress  PSYCH: Alert and oriented times 3; coherent speech, normal   rate and volume, able to articulate logical thoughts, able   to abstract reason.  Her affect is sad  RESP: No cough, no audible wheezing, able to talk in full sentences  Remainder of exam unable to be completed due to telephone visits    PHQ-9 SCORE 8/29/2019 2/13/2020 5/8/2020   PHQ-9 Total Score - - -   PHQ-9 Total Score MyChart - - -   PHQ-9 Total Score 9 17 11       EDILSON-7 SCORE 8/29/2019 2/13/2020 5/8/2020   Total Score - - -   Total Score 11 3 7   Total Score - - -     GFR Estimate   Date Value Ref Range Status   10/25/2019 80 >60 mL/min/[1.73_m2] Final     Comment:     Non  GFR Calc  Starting 12/18/2018, serum creatinine based estimated GFR (eGFR) will be    calculated using the Chronic Kidney Disease Epidemiology Collaboration   (CKD-EPI) equation.     08/29/2019 72 >60 mL/min/[1.73_m2] Final     Comment:     Non  GFR Calc  Starting 12/18/2018, serum creatinine based estimated GFR (eGFR) will be   calculated using the Chronic Kidney Disease Epidemiology Collaboration   (CKD-EPI) equation.     05/30/2019 76 >60 mL/min/[1.73_m2] Final     Comment:     Non  GFR Calc  Starting 12/18/2018, serum creatinine based estimated GFR (eGFR) will be   calculated using the Chronic Kidney Disease Epidemiology Collaboration   (CKD-EPI) equation.                     Assessment/Plan:  1. Mild depression (H)  Reviewed her score; this is slightly improved from February.  She is going through a lot of loss; now with home as well. She has a strong family network. Continuing current dose prozac. Hoping things will loosen up so she can become more social. Encourage the family interactions.     2. Grieving  As above.     3. Osteoarthritis of multiple joints, unspecified osteoarthritis type  Reviewed her chart; she has had tramadol in the past.  Discussed potential side effects in detail, including impairment, potential for addiction or dependence, constipation.  She anticipates using sparingly. Discussed the use of acetaminophen; would try to maximize this (can use 4 per day of the tylenol arthritis on a regular basis.  - traMADol (ULTRAM) 50 MG tablet; Take 1 tablet (50 mg) by mouth every 8 hours as needed for severe pain  Dispense: 30 tablet; Refill: 0  - gabapentin (NEURONTIN) 300 MG capsule; TAKE ONE CAPSULE BY MOUTH EVERY MORNING AND AT NOON THEN TAKE TWO CAPSULES BY MOUTH EVERY NIGHT AT BEDTIME  Dispense: 360 capsule; Refill: 0    4. Chronic left shoulder pain  As above. She is planning to follow up with [facility]; hoping for an injection.   - traMADol (ULTRAM) 50 MG tablet; Take 1 tablet (50 mg) by mouth every 8 hours as needed for severe pain  Dispense: 30  tablet; Refill: 0    5. Spinal stenosis of lumbar region, unspecified whether neurogenic claudication present  As above. Planning to follow up with [facility]; she is hoping for an epidural. Not sure when she will be able to be seen for thsi.   - traMADol (ULTRAM) 50 MG tablet; Take 1 tablet (50 mg) by mouth every 8 hours as needed for severe pain  Dispense: 30 tablet; Refill: 0    6. Chronic bilateral low back pain, unspecified whether sciatica present  As above.   - traMADol (ULTRAM) 50 MG tablet; Take 1 tablet (50 mg) by mouth every 8 hours as needed for severe pain  Dispense: 30 tablet; Refill: 0    7. DDD (degenerative disc disease), lumbar  As above.   - traMADol (ULTRAM) 50 MG tablet; Take 1 tablet (50 mg) by mouth every 8 hours as needed for severe pain  Dispense: 30 tablet; Refill: 0  - gabapentin (NEURONTIN) 300 MG capsule; TAKE ONE CAPSULE BY MOUTH EVERY MORNING AND AT NOON THEN TAKE TWO CAPSULES BY MOUTH EVERY NIGHT AT BEDTIME  Dispense: 360 capsule; Refill: 0    8. Physical deconditioning  Encourage her to move around some; she does ambulate with walker.       Return in about 4 weeks (around 6/11/2020) for Medication recheck.    Phone call duration:  27 minutes    Yessica Lora MD, MD

## 2020-05-14 NOTE — PATIENT INSTRUCTIONS
It was nice to talk with you!    You can use up to 4 daily of the acetaminophen arthritis.  This is the safest medication.    You can add a tramadol as needed.     I look forward to talking with you again!

## 2020-05-15 DIAGNOSIS — M15.9 OSTEOARTHRITIS OF MULTIPLE JOINTS, UNSPECIFIED OSTEOARTHRITIS TYPE: ICD-10-CM

## 2020-05-15 DIAGNOSIS — M51.369 DDD (DEGENERATIVE DISC DISEASE), LUMBAR: ICD-10-CM

## 2020-05-15 RX ORDER — GABAPENTIN 300 MG/1
CAPSULE ORAL
Qty: 360 CAPSULE | Refills: 0 | Status: SHIPPED | OUTPATIENT
Start: 2020-05-15 | End: 2020-07-07

## 2020-05-15 RX ORDER — TRAMADOL HYDROCHLORIDE 50 MG/1
50 TABLET ORAL EVERY 8 HOURS PRN
Qty: 30 TABLET | Refills: 0 | Status: SHIPPED | OUTPATIENT
Start: 2020-05-15 | End: 2020-06-14

## 2020-05-15 RX ORDER — GABAPENTIN 300 MG/1
CAPSULE ORAL
Qty: 360 CAPSULE | Refills: 0 | OUTPATIENT
Start: 2020-05-15

## 2020-05-27 ENCOUNTER — TELEPHONE (OUTPATIENT)
Dept: FAMILY MEDICINE | Facility: CLINIC | Age: 85
End: 2020-05-27

## 2020-05-27 DIAGNOSIS — I50.31 ACUTE DIASTOLIC CONGESTIVE HEART FAILURE (H): ICD-10-CM

## 2020-05-27 DIAGNOSIS — I50.32 CHRONIC DIASTOLIC HEART FAILURE (H): ICD-10-CM

## 2020-05-27 DIAGNOSIS — R60.9 EDEMA, UNSPECIFIED TYPE: Primary | ICD-10-CM

## 2020-05-27 RX ORDER — FUROSEMIDE 40 MG
40 TABLET ORAL DAILY
Qty: 90 TABLET | Refills: 0 | Status: SHIPPED | OUTPATIENT
Start: 2020-05-27 | End: 2020-07-07

## 2020-05-27 NOTE — TELEPHONE ENCOUNTER
Called the pt back.  She is living in an apartment.    Last Saturday she had a fever - 100.8 that day and her feet were swollen bilaterally.  The next day her fever was normal, but her feet were still swollen.      Monday she had a fever of 102.  She is concerned as she had a cough, but she always does.  She had a covid 19 test done on Monday - that was negative.  Someone in her building has covid 19.      Now her left foot big toe is swollen red and the bottom of the foot and she can hardly walk on it.  She is wondering if it is arthritis.  The rest of her foot is normal now.  She has a little headache.  She thinks it is getting better, but she is pleasing her kids.    She is rating the pain 8/10.      Also she needs her lasix filled.  Last filled by Philip Arciniega.      Will forward to Dr. Lora for advisal - be seen in [facility]? And can you fill her lasix?

## 2020-05-27 NOTE — TELEPHONE ENCOUNTER
Reason for call:  Symptom   Symptom or request: Possible infection of lt great toe, real swollen and painful.     Duration (how long have symptoms been present): 2 + days   Have you been treated for this before? No    Additional comments: n/a    Phone number to reach patient:  Cell number on file:    Telephone Information:   Mobile [phone]       Best Time:  any    Can we leave a detailed message on this number?  YES    Travel screening: Not Applicable       Marysol Caldwell on 5/27/2020 at 9:48 AM

## 2020-06-11 ENCOUNTER — VIRTUAL VISIT (OUTPATIENT)
Dept: FAMILY MEDICINE | Facility: CLINIC | Age: 85
End: 2020-06-11
Payer: COMMERCIAL

## 2020-06-11 DIAGNOSIS — F32.A MILD DEPRESSION: Primary | ICD-10-CM

## 2020-06-11 DIAGNOSIS — R60.9 EDEMA, UNSPECIFIED TYPE: ICD-10-CM

## 2020-06-11 DIAGNOSIS — M54.50 CHRONIC BILATERAL LOW BACK PAIN, UNSPECIFIED WHETHER SCIATICA PRESENT: ICD-10-CM

## 2020-06-11 DIAGNOSIS — M51.369 DDD (DEGENERATIVE DISC DISEASE), LUMBAR: ICD-10-CM

## 2020-06-11 DIAGNOSIS — M79.674 PAIN IN TOES OF BOTH FEET: ICD-10-CM

## 2020-06-11 DIAGNOSIS — M48.061 SPINAL STENOSIS OF LUMBAR REGION, UNSPECIFIED WHETHER NEUROGENIC CLAUDICATION PRESENT: ICD-10-CM

## 2020-06-11 DIAGNOSIS — M79.675 PAIN IN TOES OF BOTH FEET: ICD-10-CM

## 2020-06-11 DIAGNOSIS — M15.9 OSTEOARTHRITIS OF MULTIPLE JOINTS, UNSPECIFIED OSTEOARTHRITIS TYPE: ICD-10-CM

## 2020-06-11 DIAGNOSIS — I50.32 CHRONIC DIASTOLIC HEART FAILURE (H): ICD-10-CM

## 2020-06-11 DIAGNOSIS — F41.9 ANXIETY: ICD-10-CM

## 2020-06-11 DIAGNOSIS — G89.29 CHRONIC BILATERAL LOW BACK PAIN, UNSPECIFIED WHETHER SCIATICA PRESENT: ICD-10-CM

## 2020-06-11 PROCEDURE — 99214 OFFICE O/P EST MOD 30 MIN: CPT | Mod: 95 | Performed by: FAMILY MEDICINE

## 2020-06-11 PROCEDURE — 96127 BRIEF EMOTIONAL/BEHAV ASSMT: CPT | Performed by: FAMILY MEDICINE

## 2020-06-11 ASSESSMENT — ANXIETY QUESTIONNAIRES
1. FEELING NERVOUS, ANXIOUS, OR ON EDGE: SEVERAL DAYS
GAD7 TOTAL SCORE: 7
IF YOU CHECKED OFF ANY PROBLEMS ON THIS QUESTIONNAIRE, HOW DIFFICULT HAVE THESE PROBLEMS MADE IT FOR YOU TO DO YOUR WORK, TAKE CARE OF THINGS AT HOME, OR GET ALONG WITH OTHER PEOPLE: SOMEWHAT DIFFICULT
6. BECOMING EASILY ANNOYED OR IRRITABLE: SEVERAL DAYS
7. FEELING AFRAID AS IF SOMETHING AWFUL MIGHT HAPPEN: SEVERAL DAYS
2. NOT BEING ABLE TO STOP OR CONTROL WORRYING: SEVERAL DAYS
3. WORRYING TOO MUCH ABOUT DIFFERENT THINGS: SEVERAL DAYS
5. BEING SO RESTLESS THAT IT IS HARD TO SIT STILL: SEVERAL DAYS

## 2020-06-11 ASSESSMENT — PATIENT HEALTH QUESTIONNAIRE - PHQ9
5. POOR APPETITE OR OVEREATING: SEVERAL DAYS
SUM OF ALL RESPONSES TO PHQ QUESTIONS 1-9: 11

## 2020-06-11 NOTE — LETTER
[facility]  [address] [id number]  [phone]          June 14, 2020  Re:  Carol Merida                                                                                                                     [address]    [place] [address]            Dear To Whom it May Concern:    Please draw a bmp.  Fax results to me at [phone]      Diagnosis:  Edema, unspecified type [R60.9]  - Primary        Chronic diastolic heart failure (H) [I50.32]           Sincerely,         Yessica Lora MD

## 2020-06-11 NOTE — PROGRESS NOTES
"Carol Merida is a 85 year old female who is being evaluated via a billable telephone visit.      [phone]    The patient has been notified of following:     \"This telephone visit will be conducted via a call between you and your physician/provider. We have found that certain health care needs can be provided without the need for a physical exam.  This service lets us provide the care you need with a short phone conversation.  If a prescription is necessary we can send it directly to your pharmacy.  If lab work is needed we can place an order for that and you can then stop by our lab to have the test done at a later time.    Telephone visits are billed at different rates depending on your insurance coverage. During this emergency period, for some insurers they may be billed the same as an in-person visit.  Please reach out to your insurance provider with any questions.    If during the course of the call the physician/provider feels a telephone visit is not appropriate, you will not be charged for this service.\"    Patient has given verbal consent for Telephone visit?  Yes    What phone number would you like to be contacted at? [phone]    How would you like to obtain your AVS? Mail a copy    Subjective     Carol Merida is a 85 year old female who presents via phone visit today for the following health issues:    HPI  Medication Followup of  Tramadol    Taking Medication as prescribed: yes    Side Effects:  None    Medication Helping Symptoms:  yes      Fax Lab orders to [facility] @[phone]    See under ROS     Patient Active Problem List   Diagnosis     Obesity     Hypothyroidism     Benign neoplasm of colon     Obstructive sleep apnea     * * * SBE PROPHYLAXIS * * *     Degeneration of lumbar or lumbosacral intervertebral disc     Pain in joint, ankle and foot     Arthrodesis status     HYPERLIPIDEMIA LDL GOAL <100     Hypertension goal BP (blood pressure) < 140/90     Health Care Home     Osteopenia "     Malignant neoplasm of female breast (H)     Impaired fasting glucose     ACP (advance care planning)     History of melanoma     Panic disorder without agoraphobia     Arthritis     PONV (postoperative nausea and vomiting)     S/P total hip arthroplasty     Constipation     DVT prophylaxis     Anticoagulation management encounter     Physical deconditioning     Periprosthetic fracture around internal prosthetic left hip joint (H)     Chronic pain syndrome - hips     Fracture of greater trochanter of right femur 11/6/2016, closed, with routine healing, subsequent encounter     Anxiety     Osteoarthritis of multiple joints, unspecified osteoarthritis type     Anemia due to blood loss, acute     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     Long term (current) use of anticoagulants     Vitamin D deficiency     Pneumonia     Elevated BMI (H)     CHF (congestive heart failure) (H)     Mild depression (H)     Chronic diastolic heart failure (H)     Neuropathy     Spinal stenosis of lumbar region, unspecified whether neurogenic claudication present       Current Outpatient Medications   Medication Sig Dispense Refill     ACE/ARB/ARNI NOT PRESCRIBED (INTENTIONAL) Please choose reason not prescribed, below       acetaminophen (TYLENOL) 325 MG tablet Take 3 tablets (975 mg) by mouth every 8 hours 500 tablet 1     albuterol (PROAIR HFA/PROVENTIL HFA/VENTOLIN HFA) 108 (90 Base) MCG/ACT inhaler INHALE 2 PUFFS INTO THE LUNGS EVERY 4 HOURS AS NEEDED FOR SHORTNESS OF BREATH OR DIFFICULT BREATHING OR WHEEZING 8.5 g 0     ALPRAZolam (XANAX) 0.25 MG tablet TAKE ONE TABLET BY MOUTH TWICE A DAY AS NEEDED FOR ANXIETY. DON'T TAKE WITH OXYCODONE. 10 tablet 0     amLODIPine (NORVASC) 5 MG tablet TAKE ONE TABLET BY MOUTH ONCE DAILY 90 tablet 0     beclomethasone HFA 80 MCG/ACT IN inhaler Inhale 1 puff into the lungs 2 times daily 1 Inhaler 1     benzonatate (TESSALON) 100 MG capsule Take 1 capsule (100 mg) by mouth 3 times  daily as needed for cough 42 capsule 0     BETA BLOCKER NOT PRESCRIBED (INTENTIONAL) Beta Blocker not prescribed intentionally due to EF > 40 % (ejection fraction) will leave up to Cardiology.       calcium-vitamin D (CALCIUM 600 + D) 600-400 MG-UNIT per tablet Take 1 tablet by mouth every evening  100 tablet 3     diclofenac (VOLTAREN) 1 % topical gel Apply 2 g topically 4 times daily Left shoulder. 3 Tube 1     FLUoxetine (PROZAC) 40 MG capsule TAKE ONE CAPSULE BY MOUTH ONCE DAILY 90 capsule 0     fluticasone (FLONASE) 50 MCG/ACT spray Spray 1 spray into both nostrils daily as needed 1 Bottle 5     furosemide (LASIX) 40 MG tablet Take 1 tablet (40 mg) by mouth daily 90 tablet 0     gabapentin (NEURONTIN) 300 MG capsule TAKE ONE CAPSULE BY MOUTH EVERY MORNING AND AT NOON THEN TAKE TWO CAPSULES BY MOUTH EVERY NIGHT AT BEDTIME 360 capsule 0     levothyroxine (SYNTHROID/LEVOTHROID) 88 MCG tablet Take 1 tablet (88 mcg) by mouth daily 90 tablet 3     magnesium hydroxide (MILK OF MAGNESIA) 400 MG/5ML suspension Take 30 mLs by mouth daily as needed        nystatin (NYSTOP) 238554 UNIT/GM external powder Apply tid to affectead area prn 60 g 1     order for DME Equipment being ordered: left breast prosthesis. Mastectomy bras (up to 4) 4 Device 0     senna (SENOKOT) 8.6 MG tablet Take 1 tablet by mouth daily       simvastatin (ZOCOR) 10 MG tablet TAKE 1 TABLET BY MOUTH AT BEDTIME 90 tablet 2     traMADol (ULTRAM) 50 MG tablet Take 1 tablet (50 mg) by mouth every 8 hours as needed for severe pain 30 tablet 0     triamcinolone (KENALOG) 0.1 % cream Apply sparingly to affected area two times daily as needed for itching. 30 g 0     vitamin B complex with vitamin C (VITAMIN  B COMPLEX) tablet Take 1 tablet by mouth daily       hydrochlorothiazide (HYDRODIURIL) 25 MG tablet Take 1 tablet (25 mg) by mouth daily 30 tablet 3             Reviewed and updated as needed this visit by Provider         Review of Systems    CONSTITUTIONAL:NEGATIVE for fever, chills  RESP:NEGATIVE for significant cough or change in her SOB  CV: NEGATIVE for chest pain, palpitations  MUSCULOSKELETAL: ongoing pain.  PSYCHIATRIC: see below.    Continues to note she is stuck in the small apartment. She has recently moved there.   Notes still grieving her .  She does note that there is covid in AL and memory care, but not in the senior housing part where she resides. She was tested.     Notes problems in feet.   Believes arthritis. Swells.  Toes hurt.   Started in left; then went to right. They are getting better.   Swelling went to just above the ankle. Better now.     Continues to get depressed with just sitting there.   House is now all sold.    Has hobbies, but trying to get herself to do them.   Has talked with .     Has taken the tramadol 4 times. It does help. Does not want to take very often.     They will do blood work at her place of residence.       Objective   Reported vitals:  There were no vitals taken for this visit.   alert and no distress  PSYCH: Alert and oriented times 3; coherent speech, normal   rate and volume, able to articulate logical thoughts, able   to abstract reason Her affect is normal  RESP: No cough, no audible wheezing, able to talk in full sentences  Remainder of exam unable to be completed due to telephone visits    PHQ-9 SCORE 2/13/2020 5/8/2020 6/11/2020   PHQ-9 Total Score - - -   PHQ-9 Total Score MyChart - - -   PHQ-9 Total Score 17 11 11       EDILSON-7 SCORE 2/13/2020 5/8/2020 6/11/2020   Total Score - - -   Total Score 3 7 7   Total Score - - -       Hemoglobin   Date Value Ref Range Status   03/12/2019 14.6 11.7 - 15.7 g/dL Final   11/21/2018 14.1 11.7 - 15.7 g/dL Final     TSH   Date Value Ref Range Status   08/29/2019 2.59 0.40 - 4.00 mU/L Final                  Assessment/Plan:  1. Mild depression (H)  Discussed some options. Encourage her to do some of her hobbies and to get out.  She is not  interested in counseling beyond talking with her . She does note she has a good support network as well.  Will increase fluoxetine.   - FLUoxetine (PROZAC) 20 MG capsule; Take 1 capsule (20 mg) by mouth daily To add to the 40 mg capsules  Dispense: 90 capsule; Refill: 0    2. Anxiety  As above.   - FLUoxetine (PROZAC) 20 MG capsule; Take 1 capsule (20 mg) by mouth daily To add to the 40 mg capsules  Dispense: 90 capsule; Refill: 0    3. Edema, unspecified type  She is on Lasix. Will check lab. She notes some improvement recently. She attributes some of this to her arthritis.     4. Chronic diastolic heart failure (H)  As above. Will have her residence check a bmp.     5. Pain in toes of both feet  She notes probably arthritis. Could visit with Podiatry. She notes some recent improvement.    6. Osteoarthritis of multiple joints, unspecified osteoarthritis type  She is using tramadol infrequently. Continue to follow. She does note it helps.     7. DDD (degenerative disc disease), lumbar  As above.     8. Spinal stenosis of lumbar region, unspecified whether neurogenic claudication present  As above; also on gabapentin.    9. Chronic bilateral low back pain, unspecified whether sciatica present  As above.       Return in about 4 weeks (around 7/9/2020) for Medication recheck.     Verifying that her last mammogram was last June; she just got a letter she was due.    Phone call duration:  24 minutes    Yessica Lora MD, MD

## 2020-06-12 ASSESSMENT — ANXIETY QUESTIONNAIRES: GAD7 TOTAL SCORE: 7

## 2020-06-16 ENCOUNTER — TRANSFERRED RECORDS (OUTPATIENT)
Dept: HEALTH INFORMATION MANAGEMENT | Facility: CLINIC | Age: 85
End: 2020-06-16

## 2020-06-16 ENCOUNTER — RECORDS - HEALTHEAST (OUTPATIENT)
Dept: LAB | Facility: CLINIC | Age: 85
End: 2020-06-16

## 2020-06-16 LAB
ANION GAP SERPL CALCULATED.3IONS-SCNC: 8 MMOL/L (ref 5–18)
BUN SERPL-MCNC: 14 MG/DL (ref 8–28)
CALCIUM SERPL-MCNC: 9.4 MG/DL (ref 8.5–10.5)
CHLORIDE BLD-SCNC: 103 MMOL/L (ref 98–107)
CO2 SERPL-SCNC: 29 MMOL/L (ref 22–31)
CREAT SERPL-MCNC: 0.79 MG/DL (ref 0.6–1.1)
CREAT SERPL-MCNC: 0.79 MG/DL (ref 0.6–1.1)
GFR SERPL CREATININE-BSD FRML MDRD: >60 ML/MIN/1.73M2
GFR SERPL CREATININE-BSD FRML MDRD: >60 ML/MIN/1.73M2
GLUCOSE BLD-MCNC: 86 MG/DL (ref 70–125)
GLUCOSE SERPL-MCNC: 86 MG/DL (ref 70–125)
POTASSIUM BLD-SCNC: 4.5 MMOL/L (ref 3.5–5)
POTASSIUM SERPL-SCNC: 4.5 MMOL/L (ref 3.5–5)
SODIUM SERPL-SCNC: 140 MMOL/L (ref 136–145)

## 2020-06-17 ENCOUNTER — TELEPHONE (OUTPATIENT)
Dept: FAMILY MEDICINE | Facility: CLINIC | Age: 85
End: 2020-06-17

## 2020-06-17 NOTE — TELEPHONE ENCOUNTER
She had a bmp recently done.   It does look normal.  Please let her know.    I will get it abstracted into chart.

## 2020-07-06 NOTE — PROGRESS NOTES
"Carol Merida is a 85 year old female who is being evaluated via a billable video visit.      [phone]- use doximity    The patient has been notified of following:     \"This video visit will be conducted via a call between you and your physician/provider. We have found that certain health care needs can be provided without the need for an in-person physical exam.  This service lets us provide the care you need with a video conversation.  If a prescription is necessary we can send it directly to your pharmacy.  If lab work is needed we can place an order for that and you can then stop by our lab to have the test done at a later time.    Video visits are billed at different rates depending on your insurance coverage.  Please reach out to your insurance provider with any questions.    If during the course of the call the physician/provider feels a video visit is not appropriate, you will not be charged for this service.\"    Patient has given verbal consent for Video visit? Yes  How would you like to obtain your AVS? Mail a copy  Patient would like the video invitation sent by: Text to cell phone: [phone]  Will anyone else be joining your video visit? No    Subjective     Carol Merida is a 85 year old female who presents today via video visit for the following health issues:    HPI  Medication Followup of increase dose of Fluoxetine     Taking Medication as prescribed: yes    Side Effects:  None    Medication Helping Symptoms:  yes     See under ROS    Patient Active Problem List   Diagnosis     Obesity     Hypothyroidism     Benign neoplasm of colon     Obstructive sleep apnea     * * * SBE PROPHYLAXIS * * *     Degeneration of lumbar or lumbosacral intervertebral disc     Pain in joint, ankle and foot     Arthrodesis status     HYPERLIPIDEMIA LDL GOAL <100     Hypertension goal BP (blood pressure) < 140/90     Health Care Home     Osteopenia     Malignant neoplasm of female breast (H)     Impaired fasting " glucose     ACP (advance care planning)     History of melanoma     Panic disorder without agoraphobia     Arthritis     PONV (postoperative nausea and vomiting)     S/P total hip arthroplasty     Constipation     DVT prophylaxis     Anticoagulation management encounter     Physical deconditioning     Periprosthetic fracture around internal prosthetic left hip joint (H)     Chronic pain syndrome - hips     Fracture of greater trochanter of right femur 11/6/2016, closed, with routine healing, subsequent encounter     Anxiety     Osteoarthritis of multiple joints, unspecified osteoarthritis type     Anemia due to blood loss, acute     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     Long term (current) use of anticoagulants     Vitamin D deficiency     Pneumonia     Elevated BMI (H)     CHF (congestive heart failure) (H)     Mild depression (H)     Chronic diastolic heart failure (H)     Neuropathy     Spinal stenosis of lumbar region, unspecified whether neurogenic claudication present       Current Outpatient Medications   Medication Sig Dispense Refill     ACE/ARB/ARNI NOT PRESCRIBED (INTENTIONAL) Please choose reason not prescribed, below       acetaminophen (TYLENOL) 325 MG tablet Take 3 tablets (975 mg) by mouth every 8 hours 500 tablet 1     albuterol (PROAIR HFA/PROVENTIL HFA/VENTOLIN HFA) 108 (90 Base) MCG/ACT inhaler INHALE 2 PUFFS INTO THE LUNGS EVERY 4 HOURS AS NEEDED FOR SHORTNESS OF BREATH OR DIFFICULT BREATHING OR WHEEZING 8.5 g 0     ALPRAZolam (XANAX) 0.25 MG tablet TAKE ONE TABLET BY MOUTH TWICE A DAY AS NEEDED FOR ANXIETY. DON'T TAKE WITH OXYCODONE. 10 tablet 0     amLODIPine (NORVASC) 5 MG tablet TAKE ONE TABLET BY MOUTH ONCE DAILY 90 tablet 0     beclomethasone HFA 80 MCG/ACT IN inhaler Inhale 1 puff into the lungs 2 times daily 1 Inhaler 1     benzonatate (TESSALON) 100 MG capsule Take 1 capsule (100 mg) by mouth 3 times daily as needed for cough 42 capsule 0     BETA BLOCKER NOT  PRESCRIBED (INTENTIONAL) Beta Blocker not prescribed intentionally due to EF > 40 % (ejection fraction) will leave up to Cardiology.       calcium-vitamin D (CALCIUM 600 + D) 600-400 MG-UNIT per tablet Take 1 tablet by mouth every evening  100 tablet 3     diclofenac (VOLTAREN) 1 % topical gel Apply 2 g topically 4 times daily Left shoulder. 3 Tube 1     FLUoxetine (PROZAC) 20 MG capsule Take 1 capsule (20 mg) by mouth daily To add to the 40 mg capsules 90 capsule 0     FLUoxetine (PROZAC) 40 MG capsule TAKE ONE CAPSULE BY MOUTH ONCE DAILY 90 capsule 0     fluticasone (FLONASE) 50 MCG/ACT spray Spray 1 spray into both nostrils daily as needed 1 Bottle 5     furosemide (LASIX) 40 MG tablet Take 1 tablet (40 mg) by mouth daily 90 tablet 0     gabapentin (NEURONTIN) 300 MG capsule TAKE ONE CAPSULE BY MOUTH EVERY MORNING AND AT NOON THEN TAKE TWO CAPSULES BY MOUTH EVERY NIGHT AT BEDTIME 360 capsule 0     levothyroxine (SYNTHROID/LEVOTHROID) 88 MCG tablet Take 1 tablet (88 mcg) by mouth daily 90 tablet 3     magnesium hydroxide (MILK OF MAGNESIA) 400 MG/5ML suspension Take 30 mLs by mouth daily as needed        nystatin (NYSTOP) 898652 UNIT/GM external powder Apply tid to affectead area prn 60 g 1     order for DME Equipment being ordered: left breast prosthesis. Mastectomy bras (up to 4) 4 Device 0     senna (SENOKOT) 8.6 MG tablet Take 1 tablet by mouth daily       simvastatin (ZOCOR) 10 MG tablet TAKE 1 TABLET BY MOUTH AT BEDTIME 90 tablet 2     triamcinolone (KENALOG) 0.1 % cream Apply sparingly to affected area two times daily as needed for itching. 30 g 0     vitamin B complex with vitamin C (VITAMIN  B COMPLEX) tablet Take 1 tablet by mouth daily            Video Start Time: 10:18 AM             Reviewed and updated as needed this visit by Provider         Review of Systems   CONSTITUTIONAL:NEGATIVE for fever, chills, change in weight x might be a little down. Tries to eat healthy.   Resp: no cough or shortness of  breath.  CV: NEGATIVE for chest pain, palpitations or peripheral edema  PSYCHIATRIC: see below.    Fell this am; getting off toilet.  Nurse came in and helped her get up; took blood pressure. 126/73  O2 sat 94; T 97.8.  Arthritis continues to bother her.   Still trying to adapt to new living quarters; difficult during this time (covid).  Notes that her mood is improving. Medication (increased dose) took about a week to start helping. Feeling better since last Friday. Can talk herself into doing something.   Watering plants on deck. Talking on phone. Doing own meals.  Did get a cortisone shot in left shoulder a week ago.     Has taken pain pill rarely.   She notes the Ortho PA is wondering about Aleve instead of tylenol.    Will take 650 mg tylenol; up to 4 times per day.     Last week, they started outside visiting times where she lives.  Did get her haircut.     Does use a walker.         Objective             Physical Exam     GENERAL: alert and no distress  EYES: Eyes grossly normal to inspection.  No discharge or erythema, or obvious scleral/conjunctival abnormalities.  RESP: No audible wheeze, cough, or visible cyanosis.  No visible retractions or increased work of breathing.    SKIN: Visible skin clear. No significant rash, abnormal pigmentation or lesions.  NEURO: Cranial nerves grossly intact.  Mentation and speech appropriate for age.  PSYCH: Mentation appears normal, affect normal/bright, judgement and insight intact, normal speech and appearance well-groomed.      PHQ-9 SCORE 5/8/2020 6/11/2020 7/7/2020   PHQ-9 Total Score - - -   PHQ-9 Total Score MyChart - - -   PHQ-9 Total Score 11 11 8       EDILSON-7 SCORE 5/8/2020 6/11/2020 7/7/2020   Total Score - - -   Total Score 7 7 5   Total Score - - -       GFR Estimate   Date Value Ref Range Status   06/16/2020 >60 >60 ml/min/1.73m2 Final   10/25/2019 80 >60 mL/min/[1.73_m2] Final     Comment:     Non  GFR Calc  Starting 12/18/2018, serum  creatinine based estimated GFR (eGFR) will be   calculated using the Chronic Kidney Disease Epidemiology Collaboration   (CKD-EPI) equation.     08/29/2019 72 >60 mL/min/[1.73_m2] Final     Comment:     Non  GFR Calc  Starting 12/18/2018, serum creatinine based estimated GFR (eGFR) will be   calculated using the Chronic Kidney Disease Epidemiology Collaboration   (CKD-EPI) equation.               Lab from 6/16:              Assessment & Plan     1. Mild depression (H)  Improving. Scores reflect this as well. Continue with this dose fluoxetine.   - FLUoxetine (PROZAC) 40 MG capsule; TAKE ONE CAPSULE BY MOUTH ONCE DAILY  Dispense: 90 capsule; Refill: 0    2. Anxiety  As above.   - FLUoxetine (PROZAC) 40 MG capsule; TAKE ONE CAPSULE BY MOUTH ONCE DAILY  Dispense: 90 capsule; Refill: 0    3. Chronic diastolic heart failure (H)  Reviewed labs. Overall, she has been stable.   - furosemide (LASIX) 40 MG tablet; Take 1 tablet (40 mg) by mouth daily  Dispense: 90 tablet; Refill: 0    4. Elevated BMI (H)  Encourage some walking as able. Encourage healthy meals.     5. Osteoarthritis of multiple joints, unspecified osteoarthritis type  Refilling.  Discussed some concerns around NSAID use and renal function, especially with HTN; as well as GI.  She could use cautiously; if frequent use, would want to monitor.  I believe she will stay with acetaminophen   - gabapentin (NEURONTIN) 300 MG capsule; TAKE ONE CAPSULE BY MOUTH EVERY MORNING AND AT NOON THEN TAKE TWO CAPSULES BY MOUTH EVERY NIGHT AT BEDTIME  Dispense: 360 capsule; Refill: 0    6. DDD (degenerative disc disease), lumbar  As above.   - gabapentin (NEURONTIN) 300 MG capsule; TAKE ONE CAPSULE BY MOUTH EVERY MORNING AND AT NOON THEN TAKE TWO CAPSULES BY MOUTH EVERY NIGHT AT BEDTIME  Dispense: 360 capsule; Refill: 0    7. Hypertension goal BP (blood pressure) < 140/90  Controlled; labs reviewed.        BMI:   Estimated body mass index is 46.77 kg/m  as  "calculated from the following:    Height as of 2/20/20: 1.6 m (5' 3\").    Weight as of 2/20/20: 119.7 kg (264 lb).   Weight management plan: Discussed healthy diet and exercise guidelines        Return in about 3 months (around 10/7/2020) for Medication recheck.    Yessica Lora MD, MD  [facility]      Video-Visit Details    Type of service:  Video Visit    Video End Time:10:34 AM    Originating Location (pt. Location): Home    Distant Location (provider location):  [facility]     Platform used for Video Visit: Doximity    Return in about 3 months (around 10/7/2020) for Medication recheck.     Yessica Lora MD, MD      "

## 2020-07-07 ENCOUNTER — VIRTUAL VISIT (OUTPATIENT)
Dept: FAMILY MEDICINE | Facility: CLINIC | Age: 85
End: 2020-07-07
Payer: COMMERCIAL

## 2020-07-07 DIAGNOSIS — I50.32 CHRONIC DIASTOLIC HEART FAILURE (H): ICD-10-CM

## 2020-07-07 DIAGNOSIS — I10 HYPERTENSION GOAL BP (BLOOD PRESSURE) < 140/90: ICD-10-CM

## 2020-07-07 DIAGNOSIS — F32.A MILD DEPRESSION: Primary | ICD-10-CM

## 2020-07-07 DIAGNOSIS — E66.01 MORBID OBESITY (H): ICD-10-CM

## 2020-07-07 DIAGNOSIS — M15.9 OSTEOARTHRITIS OF MULTIPLE JOINTS, UNSPECIFIED OSTEOARTHRITIS TYPE: ICD-10-CM

## 2020-07-07 DIAGNOSIS — F41.9 ANXIETY: ICD-10-CM

## 2020-07-07 DIAGNOSIS — M51.369 DDD (DEGENERATIVE DISC DISEASE), LUMBAR: ICD-10-CM

## 2020-07-07 PROCEDURE — 99214 OFFICE O/P EST MOD 30 MIN: CPT | Mod: 95 | Performed by: FAMILY MEDICINE

## 2020-07-07 RX ORDER — GABAPENTIN 300 MG/1
CAPSULE ORAL
Qty: 360 CAPSULE | Refills: 0 | Status: SHIPPED | OUTPATIENT
Start: 2020-07-07 | End: 2020-11-19

## 2020-07-07 RX ORDER — FLUOXETINE 40 MG/1
CAPSULE ORAL
Qty: 90 CAPSULE | Refills: 0 | Status: SHIPPED | OUTPATIENT
Start: 2020-07-07 | End: 2020-11-10

## 2020-07-07 RX ORDER — FUROSEMIDE 40 MG
40 TABLET ORAL DAILY
Qty: 90 TABLET | Refills: 0 | Status: SHIPPED | OUTPATIENT
Start: 2020-07-07 | End: 2020-12-10

## 2020-07-07 ASSESSMENT — ANXIETY QUESTIONNAIRES
5. BEING SO RESTLESS THAT IT IS HARD TO SIT STILL: NOT AT ALL
2. NOT BEING ABLE TO STOP OR CONTROL WORRYING: SEVERAL DAYS
3. WORRYING TOO MUCH ABOUT DIFFERENT THINGS: SEVERAL DAYS
7. FEELING AFRAID AS IF SOMETHING AWFUL MIGHT HAPPEN: SEVERAL DAYS
6. BECOMING EASILY ANNOYED OR IRRITABLE: NOT AT ALL
GAD7 TOTAL SCORE: 5
IF YOU CHECKED OFF ANY PROBLEMS ON THIS QUESTIONNAIRE, HOW DIFFICULT HAVE THESE PROBLEMS MADE IT FOR YOU TO DO YOUR WORK, TAKE CARE OF THINGS AT HOME, OR GET ALONG WITH OTHER PEOPLE: SOMEWHAT DIFFICULT
1. FEELING NERVOUS, ANXIOUS, OR ON EDGE: SEVERAL DAYS

## 2020-07-07 ASSESSMENT — PATIENT HEALTH QUESTIONNAIRE - PHQ9
SUM OF ALL RESPONSES TO PHQ QUESTIONS 1-9: 8
5. POOR APPETITE OR OVEREATING: SEVERAL DAYS

## 2020-07-08 ASSESSMENT — ANXIETY QUESTIONNAIRES: GAD7 TOTAL SCORE: 5

## 2020-07-09 ENCOUNTER — TELEPHONE (OUTPATIENT)
Dept: FAMILY MEDICINE | Facility: CLINIC | Age: 85
End: 2020-07-09

## 2020-07-09 NOTE — TELEPHONE ENCOUNTER
Patient called back after virtual visit with Dr. Lora    Two toes on rt foot bruised, swollen and very painful now. There is also some blisters on big toe. The felt better after being elevated overnight.     Living at facility that has nurses on site and was advised to call PCP.     Asked if patient could feel her toes to see if they are colder than the toes of her other foot or colder than the uninjured toes. Patient cannot touch her feet.     This RN concerned with circulation. Advised she be seen today. No availability in clinic - advised trip to . Patient stated understanding and is in agreement with plan.    Jailene FOX Triage RN

## 2020-07-12 VITALS — TEMPERATURE: 97.8 F | DIASTOLIC BLOOD PRESSURE: 73 MMHG | SYSTOLIC BLOOD PRESSURE: 126 MMHG | OXYGEN SATURATION: 94 %

## 2020-07-15 DIAGNOSIS — I10 BENIGN ESSENTIAL HYPERTENSION: ICD-10-CM

## 2020-07-15 RX ORDER — AMLODIPINE BESYLATE 5 MG/1
TABLET ORAL
Qty: 90 TABLET | Refills: 0 | Status: SHIPPED | OUTPATIENT
Start: 2020-07-15 | End: 2020-10-13

## 2020-07-15 NOTE — TELEPHONE ENCOUNTER
Routing refill request to provider for review/approval because:  Drug interaction warning    Khadar WASHINGTON RN, BSN

## 2020-07-28 DIAGNOSIS — E03.9 HYPOTHYROIDISM, UNSPECIFIED TYPE: ICD-10-CM

## 2020-07-28 DIAGNOSIS — I10 HYPERTENSION GOAL BP (BLOOD PRESSURE) < 140/90: Primary | ICD-10-CM

## 2020-07-28 DIAGNOSIS — R73.01 IMPAIRED FASTING GLUCOSE: ICD-10-CM

## 2020-07-28 DIAGNOSIS — E78.5 HYPERLIPIDEMIA LDL GOAL <100: ICD-10-CM

## 2020-07-29 RX ORDER — SIMVASTATIN 10 MG
TABLET ORAL
Qty: 90 TABLET | Refills: 0 | Status: SHIPPED | OUTPATIENT
Start: 2020-07-29 | End: 2020-10-27

## 2020-07-29 NOTE — TELEPHONE ENCOUNTER
Routing refill request to provider for review/approval because:  Drug interaction warning for simvastatin - pt due for labs the end of August 2020 - cmp and lipids ordered.  Zheng'd up a few others edit as you wish    Pt will be due for fasting labs in the end of August - will forward to the station to see if they can help her schedule a fasting lab appt then.

## 2020-07-30 NOTE — TELEPHONE ENCOUNTER
1st attempt, no answer on line [phone], will try again. If patient calls in, she needs to scheduled appt for labs only by the end of August.

## 2020-08-31 DIAGNOSIS — E78.5 HYPERLIPIDEMIA LDL GOAL <100: ICD-10-CM

## 2020-08-31 DIAGNOSIS — R73.01 IMPAIRED FASTING GLUCOSE: ICD-10-CM

## 2020-08-31 DIAGNOSIS — I10 HYPERTENSION GOAL BP (BLOOD PRESSURE) < 140/90: ICD-10-CM

## 2020-08-31 DIAGNOSIS — E03.9 HYPOTHYROIDISM, UNSPECIFIED TYPE: ICD-10-CM

## 2020-08-31 LAB
ALBUMIN SERPL-MCNC: 3.7 G/DL (ref 3.4–5)
ALP SERPL-CCNC: 95 U/L (ref 40–150)
ALT SERPL W P-5'-P-CCNC: 29 U/L (ref 0–50)
ANION GAP SERPL CALCULATED.3IONS-SCNC: 8 MMOL/L (ref 3–14)
AST SERPL W P-5'-P-CCNC: 26 U/L (ref 0–45)
BILIRUB SERPL-MCNC: 1 MG/DL (ref 0.2–1.3)
BUN SERPL-MCNC: 16 MG/DL (ref 7–30)
CALCIUM SERPL-MCNC: 9.4 MG/DL (ref 8.5–10.1)
CHLORIDE SERPL-SCNC: 104 MMOL/L (ref 94–109)
CHOLEST SERPL-MCNC: 138 MG/DL
CO2 SERPL-SCNC: 27 MMOL/L (ref 20–32)
CREAT SERPL-MCNC: 0.66 MG/DL (ref 0.52–1.04)
CREAT UR-MCNC: 153 MG/DL
GFR SERPL CREATININE-BSD FRML MDRD: 81 ML/MIN/{1.73_M2}
GLUCOSE SERPL-MCNC: 110 MG/DL (ref 70–99)
HBA1C MFR BLD: 5.9 % (ref 0–5.6)
HDLC SERPL-MCNC: 71 MG/DL
LDLC SERPL CALC-MCNC: 44 MG/DL
MICROALBUMIN UR-MCNC: 55 MG/L
MICROALBUMIN/CREAT UR: 35.88 MG/G CR (ref 0–25)
NONHDLC SERPL-MCNC: 67 MG/DL
POTASSIUM SERPL-SCNC: 4.2 MMOL/L (ref 3.4–5.3)
PROT SERPL-MCNC: 7.6 G/DL (ref 6.8–8.8)
SODIUM SERPL-SCNC: 139 MMOL/L (ref 133–144)
TRIGL SERPL-MCNC: 116 MG/DL
TSH SERPL DL<=0.005 MIU/L-ACNC: 3.12 MU/L (ref 0.4–4)

## 2020-08-31 PROCEDURE — 80053 COMPREHEN METABOLIC PANEL: CPT | Performed by: FAMILY MEDICINE

## 2020-08-31 PROCEDURE — 83036 HEMOGLOBIN GLYCOSYLATED A1C: CPT | Performed by: FAMILY MEDICINE

## 2020-08-31 PROCEDURE — 36415 COLL VENOUS BLD VENIPUNCTURE: CPT | Performed by: FAMILY MEDICINE

## 2020-08-31 PROCEDURE — 80061 LIPID PANEL: CPT | Performed by: FAMILY MEDICINE

## 2020-08-31 PROCEDURE — 84443 ASSAY THYROID STIM HORMONE: CPT | Performed by: FAMILY MEDICINE

## 2020-08-31 PROCEDURE — 82043 UR ALBUMIN QUANTITATIVE: CPT | Performed by: FAMILY MEDICINE

## 2020-10-13 DIAGNOSIS — I10 BENIGN ESSENTIAL HYPERTENSION: ICD-10-CM

## 2020-10-13 RX ORDER — AMLODIPINE BESYLATE 5 MG/1
TABLET ORAL
Qty: 90 TABLET | Refills: 0 | Status: SHIPPED | OUTPATIENT
Start: 2020-10-13 | End: 2020-12-10

## 2020-10-13 NOTE — LETTER
October 28, 2020      Carol Merida  [address] [place] [address] [id number]  [facility] [address]        Dear ,    We are contacting you today to notify you that you are due for a medication follow up for further refills. Please call [phone] to schedule an appointment.       Sincerely,      Your [facility] Team

## 2020-10-13 NOTE — TELEPHONE ENCOUNTER
Patient is due for a medication check with Dr. Lora. Will forward to TC's to assist with scheduling.     Prescription approved per [facility] protocol.      Tiffany Anton RN on 10/13/2020 at 5:25 PM

## 2020-10-27 DIAGNOSIS — E78.5 HYPERLIPIDEMIA LDL GOAL <100: ICD-10-CM

## 2020-10-27 RX ORDER — SIMVASTATIN 10 MG
TABLET ORAL
Qty: 90 TABLET | Refills: 1 | Status: SHIPPED | OUTPATIENT
Start: 2020-10-27 | End: 2020-12-10

## 2020-10-27 NOTE — TELEPHONE ENCOUNTER
Recent Labs   Lab Test 08/31/20  1048 08/29/19  1202 02/09/15  0910 02/09/15  0910 02/06/14  0902   CHOL 138 122   < > 114 154   HDL 71 60   < > 57 66   LDL 44 36   < > 28 44   TRIG 116 131   < > 146 216*   CHOLHDLRATIO  --   --   --  2.0 2.3    < > = values in this interval not displayed.

## 2020-10-27 NOTE — TELEPHONE ENCOUNTER
simvastatin (ZOCOR) 10 MG tablet  Last Written Prescription Date:  7/29/20  Last Fill Quantity: 90,   # refills: 0  Last Office Visit: 7/7/20  Future Office visit:       Routing refill request to provider for review/approval because:  Drug interaction warning     Tiffany Anton RN on 10/27/2020 at 3:27 PM

## 2020-11-09 DIAGNOSIS — F32.A MILD DEPRESSION: ICD-10-CM

## 2020-11-09 DIAGNOSIS — F41.9 ANXIETY: ICD-10-CM

## 2020-11-10 RX ORDER — FLUOXETINE 40 MG/1
CAPSULE ORAL
Qty: 90 CAPSULE | Refills: 0 | Status: SHIPPED | OUTPATIENT
Start: 2020-11-10 | End: 2020-12-10

## 2020-11-10 NOTE — TELEPHONE ENCOUNTER
Patient is due for a medication check. Will forward to TC's to assist with scheduling.       FLUoxetine (PROZAC) 20 MG capsule    Last Written Prescription Date:  9/9/20  Last Fill Quantity: 90,   # refills: 0  Last Office Visit: 7/7/20  Future Office visit:       Routing refill request to provider for review/approval because:  PHq-9 greater than 5    Tiffany Anton RN on 11/10/2020 at 2:30 PM

## 2020-11-17 DIAGNOSIS — M51.369 DDD (DEGENERATIVE DISC DISEASE), LUMBAR: ICD-10-CM

## 2020-11-17 DIAGNOSIS — M15.9 OSTEOARTHRITIS OF MULTIPLE JOINTS, UNSPECIFIED OSTEOARTHRITIS TYPE: ICD-10-CM

## 2020-11-17 NOTE — TELEPHONE ENCOUNTER
GABAPENTIN 300MG CAPS        Last Written Prescription Date:  07/07/2020  Last Fill Quantity: 360 capsule,  # refills: 0   Last office visit: 2/20/2020 with prescribing provider:  Guido   Future Office Visit:      Routing refill request to provider for review/approval because:  Drug not on the [facility], [facility] or [facility] refill protocol or controlled substance

## 2020-11-18 NOTE — TELEPHONE ENCOUNTER
[place]  No concerns.    Routing refill request to provider for review/approval because:  Drug not on the [facility] refill protocol     Consuelo Park RN

## 2020-11-19 RX ORDER — GABAPENTIN 300 MG/1
CAPSULE ORAL
Qty: 360 CAPSULE | Refills: 1 | Status: SHIPPED | OUTPATIENT
Start: 2020-11-19 | End: 2021-05-20

## 2020-11-25 ENCOUNTER — HOSPITAL ENCOUNTER (OUTPATIENT)
Dept: MAMMOGRAPHY | Facility: CLINIC | Age: 85
Discharge: HOME | End: 2020-11-25
Attending: FAMILY MEDICINE | Admitting: FAMILY MEDICINE
Payer: COMMERCIAL

## 2020-11-25 DIAGNOSIS — Z12.31 VISIT FOR SCREENING MAMMOGRAM: ICD-10-CM

## 2020-11-25 PROCEDURE — 77067 SCR MAMMO BI INCL CAD: CPT | Mod: 52

## 2020-11-30 DIAGNOSIS — E03.9 HYPOTHYROIDISM, UNSPECIFIED TYPE: ICD-10-CM

## 2020-11-30 DIAGNOSIS — F32.A MILD DEPRESSION: ICD-10-CM

## 2020-11-30 DIAGNOSIS — F41.9 ANXIETY: ICD-10-CM

## 2020-11-30 NOTE — TELEPHONE ENCOUNTER
levothyroxine (SYNTHROID/LEVOTHROID) 88 MCG tablet 90 tablet 0 9/9/2020  No   Sig: TAKE ONE TABLET BY MOUTH ONCE DAILY   Sent to pharmacy as: Levothyroxine Sodium 88 MCG Oral Tablet (SYNTHROID/LEVOTHROID)     FLUoxetine (PROZAC) 20 MG capsule 90 capsule 0 9/9/2020  No   Sig: TAKE ONE CAPSULE BY MOUTH ONCE DAILY (TAKE WITH 40 MG CAPSULE )     [facility], [place] - [address]      Pt does not need til next week but wanted to call ahead of time.

## 2020-12-01 RX ORDER — LEVOTHYROXINE SODIUM 88 UG/1
88 TABLET ORAL DAILY
Qty: 90 TABLET | Refills: 0 | Status: SHIPPED | OUTPATIENT
Start: 2020-12-01 | End: 2020-12-10

## 2020-12-01 NOTE — TELEPHONE ENCOUNTER
"Requested Prescriptions   Pending Prescriptions Disp Refills     FLUoxetine (PROZAC) 20 MG capsule    Last Written Prescription Date:  09/09/2020  Last Fill Quantity: 90 capsule,  # refills: 0   Last office visit: 2/20/2020 with prescribing provider:  Guido   Future Office Visit:       90 capsule 0       SSRIs Protocol Failed - 11/30/2020  2:44 PM        Failed - PHQ-9 score less than 5 in past 6 months     Please review last PHQ-9 score.   PHQ 5/8/2020 6/11/2020 7/7/2020   PHQ-9 Total Score 11 11 8   Q9: Thoughts of better off dead/self-harm past 2 weeks Not at all Not at all Not at all             Passed - Medication is active on med list        Passed - Patient is age 18 or older        Passed - No active pregnancy on record        Passed - No positive pregnancy test in last 12 months        Passed - Recent (6 mo) or future (30 days) visit within the authorizing provider's specialty     Patient had office visit in the last 6 months or has a visit in the next 30 days with authorizing provider or within the authorizing provider's specialty.  See \"Patient Info\" tab in inbasket, or \"Choose Columns\" in Meds & Orders section of the refill encounter.               levothyroxine (SYNTHROID/LEVOTHROID) 88 MCG tablet    Last Written Prescription Date:  09/09/2020  Last Fill Quantity: 90 tablet,  # refills: 0   Last office visit: 2/20/2020 with prescribing provider:  Guido   Future Office Visit:       90 tablet 0     Sig: Take 1 tablet (88 mcg) by mouth daily       Thyroid Protocol Passed - 11/30/2020  2:44 PM        Passed - Patient is 12 years or older        Passed - Recent (12 mo) or future (30 days) visit within the authorizing provider's specialty     Patient has had an office visit with the authorizing provider or a provider within the authorizing providers department within the previous 12 mos or has a future within next 30 days. See \"Patient Info\" tab in inbasket, or \"Choose Columns\" in Meds & Orders section of the " refill encounter.              Passed - Medication is active on med list        Passed - Normal TSH on file in past 12 months     Recent Labs   Lab Test 08/31/20  1048   TSH 3.12              Passed - No active pregnancy on record     If patient is pregnant or has had a positive pregnancy test, please check TSH.          Passed - No positive pregnancy test in past 12 months     If patient is pregnant or has had a positive pregnancy test, please check TSH.

## 2020-12-01 NOTE — TELEPHONE ENCOUNTER
Prescription approved per [employer] [facility] [employer] protocol.    Tiffany Anton RN on 12/1/2020 at 3:56 PM

## 2020-12-10 ENCOUNTER — VIRTUAL VISIT (OUTPATIENT)
Dept: FAMILY MEDICINE | Facility: CLINIC | Age: 85
End: 2020-12-10
Payer: COMMERCIAL

## 2020-12-10 DIAGNOSIS — F41.9 ANXIETY: ICD-10-CM

## 2020-12-10 DIAGNOSIS — E78.5 HYPERLIPIDEMIA LDL GOAL <100: ICD-10-CM

## 2020-12-10 DIAGNOSIS — E03.9 HYPOTHYROIDISM, UNSPECIFIED TYPE: ICD-10-CM

## 2020-12-10 DIAGNOSIS — R06.02 SOB (SHORTNESS OF BREATH): ICD-10-CM

## 2020-12-10 DIAGNOSIS — I50.32 CHRONIC DIASTOLIC HEART FAILURE (H): ICD-10-CM

## 2020-12-10 DIAGNOSIS — F32.A MILD DEPRESSION: ICD-10-CM

## 2020-12-10 DIAGNOSIS — I10 BENIGN ESSENTIAL HYPERTENSION: ICD-10-CM

## 2020-12-10 PROCEDURE — 99442 PR PHYSICIAN TELEPHONE EVALUATION 11-20 MIN: CPT | Mod: 95 | Performed by: FAMILY MEDICINE

## 2020-12-10 RX ORDER — LEVOTHYROXINE SODIUM 88 UG/1
88 TABLET ORAL DAILY
Qty: 90 TABLET | Refills: 2 | Status: SHIPPED | OUTPATIENT
Start: 2020-12-10 | End: 2021-11-17

## 2020-12-10 RX ORDER — FLUOXETINE 40 MG/1
CAPSULE ORAL
Qty: 90 CAPSULE | Refills: 1 | Status: SHIPPED | OUTPATIENT
Start: 2020-12-10 | End: 2021-06-15

## 2020-12-10 RX ORDER — SIMVASTATIN 10 MG
10 TABLET ORAL AT BEDTIME
Qty: 90 TABLET | Refills: 3 | Status: SHIPPED | OUTPATIENT
Start: 2020-12-10 | End: 2021-11-17

## 2020-12-10 RX ORDER — FUROSEMIDE 40 MG
40 TABLET ORAL DAILY
Qty: 90 TABLET | Refills: 1 | Status: SHIPPED | OUTPATIENT
Start: 2020-12-10 | End: 2021-11-17

## 2020-12-10 RX ORDER — ALBUTEROL SULFATE 90 UG/1
AEROSOL, METERED RESPIRATORY (INHALATION)
Qty: 8.5 G | Refills: 0 | Status: SHIPPED | OUTPATIENT
Start: 2020-12-10 | End: 2021-11-17

## 2020-12-10 RX ORDER — AMLODIPINE BESYLATE 5 MG/1
5 TABLET ORAL DAILY
Qty: 90 TABLET | Refills: 1 | Status: SHIPPED | OUTPATIENT
Start: 2020-12-10 | End: 2021-07-20

## 2020-12-10 ASSESSMENT — ANXIETY QUESTIONNAIRES
IF YOU CHECKED OFF ANY PROBLEMS ON THIS QUESTIONNAIRE, HOW DIFFICULT HAVE THESE PROBLEMS MADE IT FOR YOU TO DO YOUR WORK, TAKE CARE OF THINGS AT HOME, OR GET ALONG WITH OTHER PEOPLE: NOT DIFFICULT AT ALL
2. NOT BEING ABLE TO STOP OR CONTROL WORRYING: NOT AT ALL
7. FEELING AFRAID AS IF SOMETHING AWFUL MIGHT HAPPEN: SEVERAL DAYS
GAD7 TOTAL SCORE: 5
6. BECOMING EASILY ANNOYED OR IRRITABLE: NOT AT ALL
1. FEELING NERVOUS, ANXIOUS, OR ON EDGE: SEVERAL DAYS
5. BEING SO RESTLESS THAT IT IS HARD TO SIT STILL: NOT AT ALL
3. WORRYING TOO MUCH ABOUT DIFFERENT THINGS: NEARLY EVERY DAY

## 2020-12-10 ASSESSMENT — PATIENT HEALTH QUESTIONNAIRE - PHQ9
SUM OF ALL RESPONSES TO PHQ QUESTIONS 1-9: 6
5. POOR APPETITE OR OVEREATING: NOT AT ALL

## 2020-12-10 NOTE — PROGRESS NOTES
"Carol Merida is a 85 year old female who is being evaluated via a billable telephone visit.      The patient has been notified of following:     \"This telephone visit will be conducted via a call between you and your physician/provider. We have found that certain health care needs can be provided without the need for a physical exam.  This service lets us provide the care you need with a short phone conversation.  If a prescription is necessary we can send it directly to your pharmacy.  If lab work is needed we can place an order for that and you can then stop by our lab to have the test done at a later time.    Telephone visits are billed at different rates depending on your insurance coverage. During this emergency period, for some insurers they may be billed the same as an in-person visit.  Please reach out to your insurance provider with any questions.    If during the course of the call the physician/provider feels a telephone visit is not appropriate, you will not be charged for this service.\"    Patient has given verbal consent for Telephone visit?      What phone number would you like to be contacted at?     How would you like to obtain your AVS?     Subjective     Carol Merida is a 85 year old female who presents via phone visit today for the following health issues:    HPI     Hyperlipidemia Follow-Up      Are you regularly taking any medication or supplement to lower your cholesterol?   Yes- simvastatin    Are you having muscle aches or other side effects that you think could be caused by your cholesterol lowering medication?  No    Hypertension Follow-up      Do you check your blood pressure regularly outside of the clinic? No     Are you following a low salt diet? Yes    Are your blood pressures ever more than 140 on the top number (systolic) OR more   than 90 on the bottom number (diastolic), for example 140/90? Not taking    Anxiety Follow-Up    How are you doing with your anxiety since your last " visit? Improved     Are you having other symptoms that might be associated with anxiety? Yes:  shakiness    Have you had a significant life event? Grief or Loss     Are you feeling depressed? Yes:  is getting better    Do you have any concerns with your use of alcohol or other drugs? No    Social History     Tobacco Use     Smoking status: Never Smoker     Smokeless tobacco: Never Used   Substance Use Topics     Alcohol use: Yes     Alcohol/week: 0.0 - 0.8 standard drinks     Comment: 1 glass wine weekly     Drug use: No     EDILSON-7 SCORE 5/8/2020 6/11/2020 7/7/2020   Total Score - - -   Total Score 7 7 5   Total Score - - -     PHQ 5/8/2020 6/11/2020 7/7/2020   PHQ-9 Total Score 11 11 8   Q9: Thoughts of better off dead/self-harm past 2 weeks Not at all Not at all Not at all           How many servings of fruits and vegetables do you eat daily?  2-3    On average, how many sweetened beverages do you drink each day (Examples: soda, juice, sweet tea, etc.  Do NOT count diet or artificially sweetened beverages)?   2    How many days per week do you exercise enough to make your heart beat faster? 3 or less    How many minutes a day do you exercise enough to make your heart beat faster? 9 or less    How many days per week do you miss taking your medication? 0         See under ROS    Patient Active Problem List   Diagnosis     Obesity     Hypothyroidism     Benign neoplasm of colon     Obstructive sleep apnea     * * * SBE PROPHYLAXIS * * *     Degeneration of lumbar or lumbosacral intervertebral disc     Pain in joint, ankle and foot     Arthrodesis status     HYPERLIPIDEMIA LDL GOAL <100     Hypertension goal BP (blood pressure) < 140/90     Health Care Home     Osteopenia     Malignant neoplasm of female breast (H)     Impaired fasting glucose     ACP (advance care planning)     History of melanoma     Panic disorder without agoraphobia     Arthritis     PONV (postoperative nausea and vomiting)     S/P total hip  arthroplasty     Constipation     DVT prophylaxis     Anticoagulation management encounter     Physical deconditioning     Periprosthetic fracture around internal prosthetic left hip joint (H)     Chronic pain syndrome - hips     Fracture of greater trochanter of right femur 11/6/2016, closed, with routine healing, subsequent encounter     Anxiety     Osteoarthritis of multiple joints, unspecified osteoarthritis type     Anemia due to blood loss, acute     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     Long term (current) use of anticoagulants     Vitamin D deficiency     Pneumonia     Elevated BMI (H)     CHF (congestive heart failure) (H)     Mild depression (H)     Chronic diastolic heart failure (H)     Neuropathy     Spinal stenosis of lumbar region, unspecified whether neurogenic claudication present       Current Outpatient Medications   Medication Sig Dispense Refill     ACE/ARB/ARNI NOT PRESCRIBED (INTENTIONAL) Please choose reason not prescribed, below       acetaminophen (TYLENOL) 325 MG tablet Take 3 tablets (975 mg) by mouth every 8 hours 500 tablet 1     albuterol (PROAIR HFA/PROVENTIL HFA/VENTOLIN HFA) 108 (90 Base) MCG/ACT inhaler INHALE 2 PUFFS INTO THE LUNGS EVERY 4 HOURS AS NEEDED FOR SHORTNESS OF BREATH OR DIFFICULT BREATHING OR WHEEZING 8.5 g 0     ALPRAZolam (XANAX) 0.25 MG tablet TAKE ONE TABLET BY MOUTH TWICE A DAY AS NEEDED FOR ANXIETY. DON'T TAKE WITH OXYCODONE. 10 tablet 0     amLODIPine (NORVASC) 5 MG tablet TAKE ONE TABLET BY MOUTH ONCE DAILY 90 tablet 0     beclomethasone HFA 80 MCG/ACT IN inhaler Inhale 1 puff into the lungs 2 times daily 1 Inhaler 1     benzonatate (TESSALON) 100 MG capsule Take 1 capsule (100 mg) by mouth 3 times daily as needed for cough 42 capsule 0     BETA BLOCKER NOT PRESCRIBED (INTENTIONAL) Beta Blocker not prescribed intentionally due to EF > 40 % (ejection fraction) will leave up to Cardiology.       calcium-vitamin D (CALCIUM 600 + D) 600-400  MG-UNIT per tablet Take 1 tablet by mouth every evening  100 tablet 3     diclofenac (VOLTAREN) 1 % topical gel Apply 2 g topically 4 times daily Left shoulder. 3 Tube 1     FLUoxetine (PROZAC) 40 MG capsule TAKE ONE CAPSULE BY MOUTH ONCE DAILY 90 capsule 0     fluticasone (FLONASE) 50 MCG/ACT spray Spray 1 spray into both nostrils daily as needed 1 Bottle 5     furosemide (LASIX) 40 MG tablet Take 1 tablet (40 mg) by mouth daily 90 tablet 0     gabapentin (NEURONTIN) 300 MG capsule TAKE ONE CAPSULE BY MOUTH EVERY MORNING AND AT NOON, AND TAKE TWO CAPSULES EVERY NIGHT 360 capsule 1     levothyroxine (SYNTHROID/LEVOTHROID) 88 MCG tablet Take 1 tablet (88 mcg) by mouth daily 90 tablet 0     magnesium hydroxide (MILK OF MAGNESIA) 400 MG/5ML suspension Take 30 mLs by mouth daily as needed        nystatin (NYSTOP) 533738 UNIT/GM external powder Apply tid to affectead area prn 60 g 1     order for DME Equipment being ordered: left breast prosthesis. Mastectomy bras (up to 4) 4 Device 0     senna (SENOKOT) 8.6 MG tablet Take 1 tablet by mouth daily       simvastatin (ZOCOR) 10 MG tablet TAKE 1 TABLET BY MOUTH AT BEDTIME 90 tablet 1     triamcinolone (KENALOG) 0.1 % cream Apply sparingly to affected area two times daily as needed for itching. 30 g 0     vitamin B complex with vitamin C (VITAMIN  B COMPLEX) tablet Take 1 tablet by mouth daily           Review of Systems   CONSTITUTIONAL:NEGATIVE for fever, chills, change in weight x believes going down  ENT/MOUTH: some phlegm in throat.  RESP:mild cough; see below  CV: NEGATIVE for chest pain, palpitations or peripheral edema  GI: NEGATIVE for nausea, abdominal pain, heartburn, or change in bowel habits  PSYCHIATRIC: multiple stressors; see below.      Notes she has been not so well recently related to stress.  A year since  passed.   Brother passed away in Sept unexpectedly. He was her only sibling.    Not fully moved into her apartment. Has been there since  March.  Children have not been able to come in for the most part.  Currently under quarantine as she went to her daughter's for Thanksgiving.    She notes her mood is improving. She has a strong flo.  She is reporting she will get the covid vaccine next week. (she notes there was a note on her door. I suspect his to be inaccurate at this time in the approval/planning process)  She lives at [facility] .     Wanting to get out and get another steroid shot in her shoulder.     She has noted some increased shortness of breath. Notes more phlegm in throat.   Has gone back to her old inhaler. Feels this has helped.    Currently taking the 40 mg fluoxetine. She notes the 20 mg was not approved.  Notes she did feel better on the 40+20.    Objective          Vitals:  No vitals were obtained today due to virtual visit.    healthy, alert and no distress  PSYCH: Alert and oriented times 3; coherent speech, normal   rate and volume, able to articulate logical thoughts, able   to abstract reason  Her affect is normal  RESP: No cough, no audible wheezing, able to talk in full sentences  Remainder of exam unable to be completed due to telephone visits    PHQ-9 SCORE 6/11/2020 7/7/2020 12/10/2020   PHQ-9 Total Score - - -   PHQ-9 Total Score MyChart - - -   PHQ-9 Total Score 11 8 6       EDILSON-7 SCORE 6/11/2020 7/7/2020 12/10/2020   Total Score - - -   Total Score 7 5 5   Total Score - - -             Recent Labs   Lab Test 08/31/20  1048 08/29/19  1202 02/09/15  0910 02/09/15  0910 02/06/14  0902   CHOL 138 122   < > 114 154   HDL 71 60   < > 57 66   LDL 44 36   < > 28 44   TRIG 116 131   < > 146 216*   CHOLHDLRATIO  --   --   --  2.0 2.3    < > = values in this interval not displayed.       GFR Estimate   Date Value Ref Range Status   08/31/2020 81 >60 mL/min/[1.73_m2] Final     Comment:     Non  GFR Calc  Starting 12/18/2018, serum creatinine based estimated GFR (eGFR) will be   calculated using the  Chronic Kidney Disease Epidemiology Collaboration   (CKD-EPI) equation.     06/16/2020 >60 >60 ml/min/1.73m2 Final   10/25/2019 80 >60 mL/min/[1.73_m2] Final     Comment:     Non  GFR Calc  Starting 12/18/2018, serum creatinine based estimated GFR (eGFR) will be   calculated using the Chronic Kidney Disease Epidemiology Collaboration   (CKD-EPI) equation.       Component      Latest Ref Rng & Units 8/31/2020   Sodium      133 - 144 mmol/L 139   Potassium      3.4 - 5.3 mmol/L 4.2   Chloride      94 - 109 mmol/L 104   Carbon Dioxide      20 - 32 mmol/L 27   Anion Gap      3 - 14 mmol/L 8   Glucose      70 - 99 mg/dL 110 (H)   Urea Nitrogen      7 - 30 mg/dL 16   Creatinine      0.52 - 1.04 mg/dL 0.66   GFR Estimate      >60 mL/min/1.73:m2 81   GFR Estimate If Black      >60 mL/min/1.73:m2 >90   Calcium      8.5 - 10.1 mg/dL 9.4   Bilirubin Total      0.2 - 1.3 mg/dL 1.0   Albumin      3.4 - 5.0 g/dL 3.7   Protein Total      6.8 - 8.8 g/dL 7.6   Alkaline Phosphatase      40 - 150 U/L 95   ALT      0 - 50 U/L 29   AST      0 - 45 U/L 26   Cholesterol      <200 mg/dL 138   Triglycerides      <150 mg/dL 116   HDL Cholesterol      >49 mg/dL 71   LDL Cholesterol Calculated      <100 mg/dL 44   Non HDL Cholesterol      <130 mg/dL 67   Creatinine Urine      mg/dL 153   Albumin Urine mg/L      mg/L 55   Albumin Urine mg/g Cr      0 - 25 mg/g Cr 35.88 (H)   Hemoglobin A1C      0 - 5.6 % 5.9 (H)   TSH      0.40 - 4.00 mU/L 3.12               Assessment/Plan:    Assessment & Plan     SOB (shortness of breath)  Refilling the albuterol. Improving with inhaler.   Consider adding additional inhaler if this is needed frequently. Discussed rescue vs controller.   - albuterol (PROAIR HFA/PROVENTIL HFA/VENTOLIN HFA) 108 (90 Base) MCG/ACT inhaler; INHALE 2 PUFFS INTO THE LUNGS EVERY 4 HOURS AS NEEDED FOR SHORTNESS OF BREATH OR DIFFICULT BREATHING OR WHEEZING    Benign essential hypertension  Will refill. Check bp when  able.   - amLODIPine (NORVASC) 5 MG tablet; Take 1 tablet (5 mg) by mouth daily    Mild depression (H)  Will try to make it clear that she is taking the 60 mg and taking both of these.  Discussed options of a 40 mg tab and taking 1.5 or taking 3 of the 20 mg as other alternatives; she preferred this.   Reviewed her scores; relatively stable. She notes she did feel better when on the 60 mg.   - FLUoxetine (PROZAC) 40 MG capsule; To take along with other dose to equal 60 mg  - FLUoxetine (PROZAC) 20 MG capsule; Take 1 capsule (20 mg) by mouth daily To take along with other dose to equal 60 mg    Anxiety  As above.   - FLUoxetine (PROZAC) 40 MG capsule; To take along with other dose to equal 60 mg  - FLUoxetine (PROZAC) 20 MG capsule; Take 1 capsule (20 mg) by mouth daily To take along with other dose to equal 60 mg    Chronic diastolic heart failure (H)  Refilling.she did have lab in August.  - furosemide (LASIX) 40 MG tablet; Take 1 tablet (40 mg) by mouth daily    Hypothyroidism, unspecified type  TSH normal in August.   - levothyroxine (SYNTHROID/LEVOTHROID) 88 MCG tablet; Take 1 tablet (88 mcg) by mouth daily    Hyperlipidemia LDL goal <100  Refilling.  - simvastatin (ZOCOR) 10 MG tablet; Take 1 tablet (10 mg) by mouth At Bedtime        Return in about 6 months (around 6/10/2021) for Medication recheck; office preferred as no VS in awhile; but other ok. .    Yessica Lora MD, MD  [facility]    Phone call duration:  12 minutes

## 2020-12-11 ASSESSMENT — ANXIETY QUESTIONNAIRES: GAD7 TOTAL SCORE: 5

## 2021-01-25 ENCOUNTER — NURSE TRIAGE (OUTPATIENT)
Dept: FAMILY MEDICINE | Facility: CLINIC | Age: 86
End: 2021-01-25

## 2021-01-25 ENCOUNTER — VIRTUAL VISIT (OUTPATIENT)
Dept: FAMILY MEDICINE | Facility: CLINIC | Age: 86
End: 2021-01-25
Payer: COMMERCIAL

## 2021-01-25 DIAGNOSIS — R09.89 PHLEGM IN THROAT: ICD-10-CM

## 2021-01-25 DIAGNOSIS — R06.02 SOB (SHORTNESS OF BREATH): Primary | ICD-10-CM

## 2021-01-25 DIAGNOSIS — I50.32 CHRONIC DIASTOLIC HEART FAILURE (H): ICD-10-CM

## 2021-01-25 PROCEDURE — 99442 PR PHYSICIAN TELEPHONE EVALUATION 11-20 MIN: CPT | Mod: 95 | Performed by: PHYSICIAN ASSISTANT

## 2021-01-25 NOTE — TELEPHONE ENCOUNTER
"Patient is calling and states she wanted to update her PCP on her shortness of breath and phlegm that is making swallowing difficult.  She stated some congestion in nasal area and the phlegm in the back of her throat seems very thick.  Advised patient of home care remedies for congestion.  Advised patient to seek emergency care for worsening symptoms.  Scheduled patient virtual visit today 1/25/2021.  Patient stated understanding.  Patient stated request for all information from scheduled visit today be sent as a message to PCP for update.    Additional Information    Negative: Breathing stopped and hasn't returned    Negative: Choking on something    Negative: SEVERE difficulty breathing (e.g., struggling for each breath, speaks in single words, pulse > 120)    Negative: Bluish (or gray) lips or face    Negative: Difficult to awaken or acting confused (e.g., disoriented, slurred speech)    Negative: Passed out (i.e., fainted, collapsed and was not responding)    Negative: Wheezing started suddenly after medicine, an allergic food, or bee sting    Negative: Stridor    Negative: Slow, shallow and weak breathing    Negative: Sounds like a life-threatening emergency to the triager    Negative: Chest pain    Negative: Wheezing (high pitched whistling sound) and previous asthma attacks or use of asthma medicines    Negative: Difficulty breathing and only present when coughing    Negative: Difficulty breathing and only from stuffy or runny nose    Negative: MODERATE difficulty breathing (e.g., speaks in phrases, SOB even at rest, pulse 100-120) of new onset or worse than normal    Negative: Wheezing can be heard across the room    Negative: Drooling or spitting out saliva (because can't swallow)    Negative: Any history of prior \"blood clot\" in leg or lungs    Negative: Recent illness requiring prolonged bedrest (i.e., immobilization)    Negative: Hip or leg fracture in past 2 months (e.g., or had cast on leg or ankle)    " "Negative: Major surgery in the past month    Negative: Recent long-distance travel with prolonged time in car, bus, plane, or train (i.e., within past 2 weeks; 6 or  more hours duration)    Negative: Extra heart beats OR irregular heart beating   (i.e., \"palpitations\")    Negative: Fever > 103 F (39.4 C)    Negative: Fever > 101 F (38.3 C) and over 60 years of age    Negative: Fever > 100.0 F (37.8 C) and bedridden (e.g., nursing home patient, stroke, chronic illness, recovering from surgery)    Negative: Fever > 100.0 F (37.8 C) and diabetes mellitus or weak immune system (e.g., HIV positive, cancer chemo, splenectomy, organ transplant, chronic steroids)    Negative: Periods where breathing stops and then resumes normally and bedridden (e.g., nursing home patient, CVA)    Negative: Pregnant or postpartum (from 0 to 6 weeks after delivery)    Negative: Patient sounds very sick or weak to the triager    MILD difficulty breathing (e.g., minimal/no SOB at rest, SOB with walking, pulse < 100) of new onset or worse than normal    Answer Assessment - Initial Assessment Questions  1. RESPIRATORY STATUS: \"Describe your breathing?\" (e.g., wheezing, shortness of breath, unable to speak, severe coughing)       Shortness of breath    2. ONSET: \"When did this breathing problem begin?\"       Approximately beginning of December    3. PATTERN \"Does the difficult breathing come and go, or has it been constant since it started?\"       Constant, some phlegm affecting swallowing    4. SEVERITY: \"How bad is your breathing?\" (e.g., mild, moderate, severe)     - MILD: No SOB at rest, mild SOB with walking, speaks normally in sentences, can lay down, no retractions, pulse < 100.     - MODERATE: SOB at rest, SOB with minimal exertion and prefers to sit, cannot lie down flat, speaks in phrases, mild retractions, audible wheezing, pulse 100-120.     - SEVERE: Very SOB at rest, speaks in single words, struggling to breathe, sitting hunched " "forward, retractions, pulse > 120       Mild to moderate    5. RECURRENT SYMPTOM: \"Have you had difficulty breathing before?\" If so, ask: \"When was the last time?\" and \"What happened that time?\"       No    6. CARDIAC HISTORY: \"Do you have any history of heart disease?\" (e.g., heart attack, angina, bypass surgery, angioplasty)       No    7. LUNG HISTORY: \"Do you have any history of lung disease?\"  (e.g., pulmonary embolus, asthma, emphysema)      No    8. CAUSE: \"What do you think is causing the breathing problem?\"       Unknown    9. OTHER SYMPTOMS: \"Do you have any other symptoms? (e.g., dizziness, runny nose, cough, chest pain, fever)      Chest pain from coughing     10. PREGNANCY: \"Is there any chance you are pregnant?\" \"When was your last menstrual period?\"        NA  11. TRAVEL: \"Have you traveled out of the country in the last month?\" (e.g., travel history, exposures)        No    Protocols used: BREATHING DIFFICULTY-A-[place]    Angelina Latham RN    "

## 2021-01-25 NOTE — PATIENT INSTRUCTIONS
Try flonase nasal spray - 1 spray to both nostrils once daily - to see if this helps with the post nasal drainage and phlegm.     Follow-up to for virtual visit to talk with Dr. Lora about your concerns on Friday at 11:20.     If any worsening symptoms or if chest pain or other concerns, follow-up immediately.

## 2021-01-25 NOTE — PROGRESS NOTES
Everette is a 85 year old who is being evaluated via a billable telephone visit.      What phone number would you like to be contacted at? [phone]  How would you like to obtain your AVS? Mail a copy     Assessment & Plan     SOB (shortness of breath)  Chronic issue of shortness of breath with exertion. No shortness of breath at rest, chest pain, leg swelling, difficulty laying flat. Has been  Using albuterol inhaler which helps some. No acute changes with this and she has been following with PCP. She would prefer to talk to Dr. Lora about management of this, so we scheduled an appointment for later this week. No red flag symptoms or acute concerns today. Discussed she should have urgent follow-up if worsening symptoms, chest pain, or other concerns.    Chronic diastolic heart failure (H)  Chronic issue    Phlegm in throat  Ongoing concern. We discussed that could be an element of post nasal drainage and recommend trial of flonase nasal spray.    Risks and benefits of treatment plan discussed. Patient and/or parent acknowledges and agrees with plan of care, all questions answered.      Return in 4 days (on 1/29/2021) for Appointment with Dr. Lora.    Rena Brito PA-C  [facility] [place]    Subjective     Everette is a 85 year old who presents to clinic today for the following health issues    HPI     Chronic shortness of breath with exertion. Has been following this with Dr. Lora, seems to be worsening. Does not feel short of breath laying down at night, sleeps with 1 pillow, does not wake up at night feeling short of breath. Non-smoker, never has. No shortness of breath at rest. No chest pain, lightheadedness, dizziness, palpitations. No cough, fever. No acute changes to her shortness of breath. No leg swelling. No wheezing. She would like to discuss this with Dr. Lora. She called the nurse line this morning and they advised that she was seen today but she would prefer to talk to Dr. Lora as  she has been seeing her for a long time and knows her history. No acute changes to her breathing, this is a chronic issue.    Has been having trouble with phlegm in throat and some difficulty swallowing. Last Friday, was trying to swallow a pill and really had to focus on swallowing it, it did come back up a few times but was eventually able to swallow it. Is able to swallow food and drinks. Has CPAP and has a lot of phlegm in the mornings. Also reports some nasal congestion.     Had first COVID-19 vaccine 2 weeks ago, second vaccine is scheduled for 2/9/20.    Review of Systems   Constitutional, HEENT, cardiovascular, pulmonary, gi and gu systems are negative, except as otherwise noted.      Objective           Vitals:  No vitals were obtained today due to virtual visit.    Physical Exam   healthy, alert and no distress  PSYCH: Alert and oriented times 3; coherent speech, normal   rate and volume, able to articulate logical thoughts, able   to abstract reason, no tangential thoughts, no hallucinations   or delusions  Her affect is normal  RESP: No cough, no audible wheezing, able to talk in full sentences  Remainder of exam unable to be completed due to telephone visits      Phone call duration: 18 minutes

## 2021-01-29 ENCOUNTER — VIRTUAL VISIT (OUTPATIENT)
Dept: FAMILY MEDICINE | Facility: CLINIC | Age: 86
End: 2021-01-29
Payer: COMMERCIAL

## 2021-01-29 DIAGNOSIS — M89.8X1 SHOULDER BLADE PAIN: ICD-10-CM

## 2021-01-29 DIAGNOSIS — R06.09 DOE (DYSPNEA ON EXERTION): Primary | ICD-10-CM

## 2021-01-29 DIAGNOSIS — F32.A MILD DEPRESSION: ICD-10-CM

## 2021-01-29 DIAGNOSIS — R09.89 THROAT CLEARING: ICD-10-CM

## 2021-01-29 DIAGNOSIS — F41.9 ANXIETY: ICD-10-CM

## 2021-01-29 DIAGNOSIS — I50.32 CHRONIC DIASTOLIC HEART FAILURE (H): ICD-10-CM

## 2021-01-29 PROCEDURE — 99443 PR PHYSICIAN TELEPHONE EVALUATION 21-30 MIN: CPT | Mod: 95 | Performed by: FAMILY MEDICINE

## 2021-01-29 RX ORDER — FLUTICASONE PROPIONATE 50 MCG
1 SPRAY, SUSPENSION (ML) NASAL DAILY PRN
Qty: 16 G | Refills: 1 | Status: SHIPPED | OUTPATIENT
Start: 2021-01-29 | End: 2021-06-15

## 2021-01-29 RX ORDER — ALPRAZOLAM 0.25 MG
TABLET ORAL
Qty: 10 TABLET | Refills: 0 | Status: SHIPPED | OUTPATIENT
Start: 2021-01-29 | End: 2021-06-15

## 2021-01-29 NOTE — PROGRESS NOTES
Everette is a 85 year old who is being evaluated via a billable telephone visit.      What phone number would you like to be contacted at? [phone]  How would you like to obtain your AVS? MyChart  Assessment & Plan     PETTY (dyspnea on exertion)  Uncertain etiology. Considerations include heart, lung issues, deconditioning, anxiety.  I do think it may be good to be seen.  Will go ahead with following tests and have recommended a follow up appointment afterwards; earlier if worsening.   - Echocardiogram Complete; Future  - CBC with platelets; Future  - Comprehensive metabolic panel; Future    Chronic diastolic heart failure (H)  As above.  She has not seen Cardiology in some time.   - Echocardiogram Complete; Future    Anxiety  She has noted this improved her dyspnea on exertion. May be related to this, but some lung things also help.  She is going to use this, she thinks perhaps once per week. I did caution about frequent use. Also discussed risk for falls.  Discussed possible counseling; she has worked with [employer].   - ALPRAZolam (XANAX) 0.25 MG tablet; TAKE ONE TABLET BY MOUTH TWICE A DAY AS NEEDED FOR ANXIETY. DON'T TAKE WITH OXYCODONE.    Mild depression (H)  As above. Encourage some counseling.    Throat clearing  Will refill this; she only tried for a couple days.   Also discussed Mucinex, fluids.   - fluticasone (FLONASE) 50 MCG/ACT nasal spray; Spray 1 spray into both nostrils daily as needed for rhinitis    Shoulder blade pain  At this time, this is sounding more musculoskeletal.                       Return in about 3 weeks (around 2/19/2021) for Face to Face; lab sooner..    Yessica Lora MD, MD  [facility] [place]    Subjective     Everette is a 85 year old who presents to clinic today for the following health issues     HPI   Recheck on SOB      See under ROS    Patient Active Problem List   Diagnosis     Obesity     Hypothyroidism     Benign neoplasm of colon     Obstructive sleep apnea      * * * SBE PROPHYLAXIS * * *     Degeneration of lumbar or lumbosacral intervertebral disc     Pain in joint, ankle and foot     Arthrodesis status     HYPERLIPIDEMIA LDL GOAL <100     Hypertension goal BP (blood pressure) < 140/90     Health Care Home     Osteopenia     Malignant neoplasm of female breast (H)     Impaired fasting glucose     ACP (advance care planning)     History of melanoma     Panic disorder without agoraphobia     Arthritis     PONV (postoperative nausea and vomiting)     S/P total hip arthroplasty     Constipation     DVT prophylaxis     Anticoagulation management encounter     Physical deconditioning     Periprosthetic fracture around internal prosthetic left hip joint (H)     Chronic pain syndrome - hips     Fracture of greater trochanter of right femur 11/6/2016, closed, with routine healing, subsequent encounter     Anxiety     Osteoarthritis of multiple joints, unspecified osteoarthritis type     Anemia due to blood loss, acute     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     Long term (current) use of anticoagulants     Vitamin D deficiency     Pneumonia     Elevated BMI (H)     CHF (congestive heart failure) (H)     Mild depression (H)     Chronic diastolic heart failure (H)     Neuropathy     Spinal stenosis of lumbar region, unspecified whether neurogenic claudication present       Current Outpatient Medications   Medication Sig Dispense Refill     ACE/ARB/ARNI NOT PRESCRIBED (INTENTIONAL) Please choose reason not prescribed, below       acetaminophen (TYLENOL) 325 MG tablet Take 3 tablets (975 mg) by mouth every 8 hours 500 tablet 1     albuterol (PROAIR HFA/PROVENTIL HFA/VENTOLIN HFA) 108 (90 Base) MCG/ACT inhaler INHALE 2 PUFFS INTO THE LUNGS EVERY 4 HOURS AS NEEDED FOR SHORTNESS OF BREATH OR DIFFICULT BREATHING OR WHEEZING 8.5 g 0     ALPRAZolam (XANAX) 0.25 MG tablet TAKE ONE TABLET BY MOUTH TWICE A DAY AS NEEDED FOR ANXIETY. DON'T TAKE WITH OXYCODONE. 10 tablet 0      amLODIPine (NORVASC) 5 MG tablet Take 1 tablet (5 mg) by mouth daily 90 tablet 1     beclomethasone HFA 80 MCG/ACT IN inhaler Inhale 1 puff into the lungs 2 times daily 1 Inhaler 1     benzonatate (TESSALON) 100 MG capsule Take 1 capsule (100 mg) by mouth 3 times daily as needed for cough 42 capsule 0     BETA BLOCKER NOT PRESCRIBED (INTENTIONAL) Beta Blocker not prescribed intentionally due to EF > 40 % (ejection fraction) will leave up to Cardiology.       calcium-vitamin D (CALCIUM 600 + D) 600-400 MG-UNIT per tablet Take 1 tablet by mouth every evening  100 tablet 3     diclofenac (VOLTAREN) 1 % topical gel Apply 2 g topically 4 times daily Left shoulder. 3 Tube 1     FLUoxetine (PROZAC) 20 MG capsule Take 1 capsule (20 mg) by mouth daily To take along with other dose to equal 60 mg 90 capsule 1     FLUoxetine (PROZAC) 40 MG capsule To take along with other dose to equal 60 mg 90 capsule 1     fluticasone (FLONASE) 50 MCG/ACT spray Spray 1 spray into both nostrils daily as needed 1 Bottle 5     furosemide (LASIX) 40 MG tablet Take 1 tablet (40 mg) by mouth daily 90 tablet 1     gabapentin (NEURONTIN) 300 MG capsule TAKE ONE CAPSULE BY MOUTH EVERY MORNING AND AT NOON, AND TAKE TWO CAPSULES EVERY NIGHT 360 capsule 1     levothyroxine (SYNTHROID/LEVOTHROID) 88 MCG tablet Take 1 tablet (88 mcg) by mouth daily 90 tablet 2     magnesium hydroxide (MILK OF MAGNESIA) 400 MG/5ML suspension Take 30 mLs by mouth daily as needed        nystatin (NYSTOP) 823066 UNIT/GM external powder Apply tid to affectead area prn 60 g 1     order for DME Equipment being ordered: left breast prosthesis. Mastectomy bras (up to 4) 4 Device 0     senna (SENOKOT) 8.6 MG tablet Take 1 tablet by mouth daily       simvastatin (ZOCOR) 10 MG tablet Take 1 tablet (10 mg) by mouth At Bedtime 90 tablet 3     triamcinolone (KENALOG) 0.1 % cream Apply sparingly to affected area two times daily as needed for itching. 30 g 0     vitamin B complex with  vitamin C (VITAMIN  B COMPLEX) tablet Take 1 tablet by mouth daily         Social History     Tobacco Use     Smoking status: Never Smoker     Smokeless tobacco: Never Used   Substance Use Topics     Alcohol use: Yes     Alcohol/week: 0.0 - 0.8 standard drinks     Comment: 1 glass wine weekly       Review of Systems   CONSTITUTIONAL:NEGATIVE for fever, chills, change in weight x clothes may be looser. Eating well; believes may be losing some weight  ENT/MOUTH: see below  RESP:see below  CV: NEGATIVE for chest pain, palpitations or peripheral edema  MUSCULOSKELETAL:  no swelling.   PSYCHIATRIC: see below    Shortness of breath is worse.  She notes lots of phlegm in throat.  Had to regurgitate a week ago as this was so bad.    Not feeling too anxious, but will shake when she is getting ready to go someplace.   Last week took an anxiety pill; this did help her shortness of breath. She is wondering if the dyspnea on exertion is due to anxiety and if she should use that pill more.  Was easier to walk.     Will get tired with her shortness of breath.   Was still a little more short of breath at last visit with me and has gradually worsened. Walking. Anxiety can trigger.     She notes her body keeps telling her to sit down and not do anything.   Will sleep in chair.   Not getting out much.     Inhaler helps some. Helps the breathing a little bit.  Walking around the apartment is ok, but difficult to walk down the halls (longer distance).     Left side seems to have more phlegm. More difficult to swallow.   Mild congestion. Occasional cough; to get phlegm up. Not as bad as previously.   Tends to be worse in am when takes her sleep apnea apparatus off.    Out of nasal spray for 2 days. Only used for a few days.     Continues to feel down at times. Also the anxiety as noted above.  Has talked with  and has been give some techniques. But needs to use them .    Sleeping well.     At end of shoulder blade; left. has  pain.  Talks with Dr. Giordano about this.   Wondering if related to heart. No chest pain.         Objective           Vitals:  No vitals were obtained today due to virtual visit.    Physical Exam   healthy, alert and no distress  PSYCH: Alert and oriented times 3; coherent speech, normal   rate and volume, able to articulate logical thoughts, able   to abstract reason, no tangential thoughts Her affect is normal and pleasant  RESP: No cough, no audible wheezing, able to talk in full sentences  Remainder of exam unable to be completed due to telephone visits    PHQ-9 SCORE 6/11/2020 7/7/2020 12/10/2020   PHQ-9 Total Score - - -   PHQ-9 Total Score MyChart - - -   PHQ-9 Total Score 11 8 6       EDILSON-7 SCORE 6/11/2020 7/7/2020 12/10/2020   Total Score - - -   Total Score 7 5 5   Total Score - - -         Hemoglobin   Date Value Ref Range Status   03/12/2019 14.6 11.7 - 15.7 g/dL Final   11/21/2018 14.1 11.7 - 15.7 g/dL Final     Component      Latest Ref Rng & Units 8/31/2020   Sodium      133 - 144 mmol/L 139   Potassium      3.4 - 5.3 mmol/L 4.2   Chloride      94 - 109 mmol/L 104   Carbon Dioxide      20 - 32 mmol/L 27   Anion Gap      3 - 14 mmol/L 8   Glucose      70 - 99 mg/dL 110 (H)   Urea Nitrogen      7 - 30 mg/dL 16   Creatinine      0.52 - 1.04 mg/dL 0.66   GFR Estimate      >60 mL/min/1.73:m2 81   GFR Estimate If Black      >60 mL/min/1.73:m2 >90   Calcium      8.5 - 10.1 mg/dL 9.4   Bilirubin Total      0.2 - 1.3 mg/dL 1.0   Albumin      3.4 - 5.0 g/dL 3.7   Protein Total      6.8 - 8.8 g/dL 7.6   Alkaline Phosphatase      40 - 150 U/L 95   ALT      0 - 50 U/L 29   AST      0 - 45 U/L 26   Cholesterol      <200 mg/dL 138   Triglycerides      <150 mg/dL 116   HDL Cholesterol      >49 mg/dL 71   LDL Cholesterol Calculated      <100 mg/dL 44   Non HDL Cholesterol      <130 mg/dL 67   Creatinine Urine      mg/dL 153   Albumin Urine mg/L      mg/L 55   Albumin Urine mg/g Cr      0 - 25 mg/g Cr 35.88 (H)    Hemoglobin A1C      0 - 5.6 % 5.9 (H)   TSH      0.40 - 4.00 mU/L 3.12                 Phone call duration: 23 minutes

## 2021-02-09 DIAGNOSIS — M15.9 OSTEOARTHRITIS OF MULTIPLE JOINTS, UNSPECIFIED OSTEOARTHRITIS TYPE: ICD-10-CM

## 2021-02-09 DIAGNOSIS — M51.369 DDD (DEGENERATIVE DISC DISEASE), LUMBAR: ICD-10-CM

## 2021-02-09 RX ORDER — GABAPENTIN 300 MG/1
CAPSULE ORAL
Qty: 360 CAPSULE | Refills: 1 | Status: CANCELLED | OUTPATIENT
Start: 2021-02-09

## 2021-02-10 NOTE — TELEPHONE ENCOUNTER
gabapentin (NEURONTIN) 300 MG capsule      Last Written Prescription Date:  11/19/2020  Last Fill Quantity: 360,   # refills: 1  Last Office Visit: 1/29/2021  (VIRTUAL VISIT)  Future Office visit:       Routing refill request to provider for review/approval because:  Drug not on the [facility], [facility] or [facility] refill protocol or controlled substance.     checked 2/10/2021:        Belinda Galeas RN

## 2021-02-13 ENCOUNTER — HOSPITAL ENCOUNTER (EMERGENCY)
Facility: CLINIC | Age: 86
Discharge: HOME | End: 2021-02-13
Attending: EMERGENCY MEDICINE | Admitting: EMERGENCY MEDICINE
Payer: COMMERCIAL

## 2021-02-13 ENCOUNTER — APPOINTMENT (OUTPATIENT)
Dept: CT IMAGING | Facility: CLINIC | Age: 86
End: 2021-02-13
Attending: EMERGENCY MEDICINE
Payer: COMMERCIAL

## 2021-02-13 ENCOUNTER — APPOINTMENT (OUTPATIENT)
Dept: ULTRASOUND IMAGING | Facility: CLINIC | Age: 86
End: 2021-02-13
Attending: EMERGENCY MEDICINE
Payer: COMMERCIAL

## 2021-02-13 VITALS
RESPIRATION RATE: 16 BRPM | DIASTOLIC BLOOD PRESSURE: 91 MMHG | SYSTOLIC BLOOD PRESSURE: 129 MMHG | HEIGHT: 63 IN | BODY MASS INDEX: 48.62 KG/M2 | OXYGEN SATURATION: 97 % | HEART RATE: 84 BPM | WEIGHT: 274.4 LBS | TEMPERATURE: 97.6 F

## 2021-02-13 DIAGNOSIS — R06.00 DYSPNEA, UNSPECIFIED TYPE: ICD-10-CM

## 2021-02-13 LAB
ANION GAP SERPL CALCULATED.3IONS-SCNC: 6 MMOL/L (ref 3–14)
BASOPHILS # BLD AUTO: 0 10E9/L (ref 0–0.2)
BASOPHILS NFR BLD AUTO: 0.2 %
BUN SERPL-MCNC: 12 MG/DL (ref 7–30)
CALCIUM SERPL-MCNC: 9.3 MG/DL (ref 8.5–10.1)
CHLORIDE SERPL-SCNC: 105 MMOL/L (ref 94–109)
CO2 SERPL-SCNC: 29 MMOL/L (ref 20–32)
CREAT SERPL-MCNC: 0.73 MG/DL (ref 0.52–1.04)
D DIMER PPP FEU-MCNC: 2.3 UG/ML FEU (ref 0–0.5)
DIFFERENTIAL METHOD BLD: ABNORMAL
EOSINOPHIL # BLD AUTO: 0.4 10E9/L (ref 0–0.7)
EOSINOPHIL NFR BLD AUTO: 6.5 %
ERYTHROCYTE [DISTWIDTH] IN BLOOD BY AUTOMATED COUNT: 13.1 % (ref 10–15)
FLUAV RNA RESP QL NAA+PROBE: NEGATIVE
FLUBV RNA RESP QL NAA+PROBE: NEGATIVE
GFR SERPL CREATININE-BSD FRML MDRD: 76 ML/MIN/{1.73_M2}
GLUCOSE SERPL-MCNC: 99 MG/DL (ref 70–99)
HCT VFR BLD AUTO: 43.1 % (ref 35–47)
HGB BLD-MCNC: 13.7 G/DL (ref 11.7–15.7)
IMM GRANULOCYTES # BLD: 0 10E9/L (ref 0–0.4)
IMM GRANULOCYTES NFR BLD: 0.6 %
LABORATORY COMMENT REPORT: NORMAL
LYMPHOCYTES # BLD AUTO: 0.7 10E9/L (ref 0.8–5.3)
LYMPHOCYTES NFR BLD AUTO: 12.7 %
MCH RBC QN AUTO: 29.9 PG (ref 26.5–33)
MCHC RBC AUTO-ENTMCNC: 31.8 G/DL (ref 31.5–36.5)
MCV RBC AUTO: 94 FL (ref 78–100)
MONOCYTES # BLD AUTO: 0.5 10E9/L (ref 0–1.3)
MONOCYTES NFR BLD AUTO: 9.7 %
NEUTROPHILS # BLD AUTO: 3.8 10E9/L (ref 1.6–8.3)
NEUTROPHILS NFR BLD AUTO: 70.3 %
NRBC # BLD AUTO: 0 10*3/UL
NRBC BLD AUTO-RTO: 0 /100
NT-PROBNP SERPL-MCNC: 44 PG/ML (ref 0–1800)
PLATELET # BLD AUTO: 254 10E9/L (ref 150–450)
POTASSIUM SERPL-SCNC: 4.1 MMOL/L (ref 3.4–5.3)
RBC # BLD AUTO: 4.58 10E12/L (ref 3.8–5.2)
RSV RNA SPEC QL NAA+PROBE: NORMAL
SARS-COV-2 RNA RESP QL NAA+PROBE: NEGATIVE
SODIUM SERPL-SCNC: 140 MMOL/L (ref 133–144)
SPECIMEN SOURCE: NORMAL
TROPONIN I SERPL-MCNC: <0.015 UG/L (ref 0–0.04)
WBC # BLD AUTO: 5.4 10E9/L (ref 4–11)

## 2021-02-13 PROCEDURE — 85379 FIBRIN DEGRADATION QUANT: CPT | Performed by: EMERGENCY MEDICINE

## 2021-02-13 PROCEDURE — 93005 ELECTROCARDIOGRAM TRACING: CPT

## 2021-02-13 PROCEDURE — 99285 EMERGENCY DEPT VISIT HI MDM: CPT | Mod: 25

## 2021-02-13 PROCEDURE — 84484 ASSAY OF TROPONIN QUANT: CPT | Performed by: EMERGENCY MEDICINE

## 2021-02-13 PROCEDURE — 250N000011 HC RX IP 250 OP 636: Performed by: EMERGENCY MEDICINE

## 2021-02-13 PROCEDURE — 87636 SARSCOV2 & INF A&B AMP PRB: CPT | Performed by: EMERGENCY MEDICINE

## 2021-02-13 PROCEDURE — 83880 ASSAY OF NATRIURETIC PEPTIDE: CPT | Performed by: EMERGENCY MEDICINE

## 2021-02-13 PROCEDURE — C9803 HOPD COVID-19 SPEC COLLECT: HCPCS

## 2021-02-13 PROCEDURE — 80048 BASIC METABOLIC PNL TOTAL CA: CPT | Performed by: EMERGENCY MEDICINE

## 2021-02-13 PROCEDURE — 85025 COMPLETE CBC W/AUTO DIFF WBC: CPT | Performed by: EMERGENCY MEDICINE

## 2021-02-13 PROCEDURE — 250N000009 HC RX 250: Performed by: EMERGENCY MEDICINE

## 2021-02-13 PROCEDURE — 71275 CT ANGIOGRAPHY CHEST: CPT | Mod: 59

## 2021-02-13 PROCEDURE — 93971 EXTREMITY STUDY: CPT | Mod: RT

## 2021-02-13 RX ORDER — IOPAMIDOL 755 MG/ML
500 INJECTION, SOLUTION INTRAVASCULAR ONCE
Status: COMPLETED | OUTPATIENT
Start: 2021-02-13 | End: 2021-02-13

## 2021-02-13 RX ADMIN — IOPAMIDOL 89 ML: 755 INJECTION, SOLUTION INTRAVENOUS at 14:45

## 2021-02-13 RX ADMIN — SODIUM CHLORIDE 100 ML: 9 INJECTION, SOLUTION INTRAVENOUS at 14:45

## 2021-02-13 ASSESSMENT — MIFFLIN-ST. JEOR: SCORE: 1663.8

## 2021-02-13 ASSESSMENT — ENCOUNTER SYMPTOMS
VOMITING: 0
MYALGIAS: 1
SHORTNESS OF BREATH: 1
NAUSEA: 0
COUGH: 1
CHILLS: 0
FEVER: 0

## 2021-02-13 NOTE — ED PROVIDER NOTES
History   Chief Complaint:  Chest Pain and Shortness of Breath    The history is provided by the patient.     Carol Merida is a 85 year old female with a history of JEAN, on BiPAP at night, congestive heart failure, hypertension, hyperlipidemia, and a remote history of breast cancer s/p left mastectomy who presents via EMS for evaluation of shortness of breath and chest pain. over the last month, she reports feeling gradually more short of breath. This has been intermittent and so she initially attributed it to overall deconditioning, however recently she has developed a cough which is productive of white phlegm. She has also noticed increasing shortness of breath especially with exertion. Today, around 1000 (approximately 2.5 hours prior to evaluation), she reports developing a soreness in her left chest. Though in a similar location, she reports this pain feels different than the typical osteoarthritis soreness she feels in the left shoulder/upper chest.  With the worsening dyspnea and new chest pain, she called 911 today to have her symptoms evaluated in the ED. Here, she also reports right calf pain. She reports noticing pain and swelling in her right lower extremity recently, but is unsure exactly when it started. She denies any nausea, vomiting, fever, chills, or other concerns. She lives alone, has not seen any one for a prolonged period of time since [place], has her groceries delivered, and is s/p both COVID-19 vaccines, so she denies concern for COVID-19/influenza exposure or infection. She further denies history of COPD, asthma, or pulmonary embolism/DVT. She is not anticoagulated.     Allergies:  Ace Inhibitors    Medications:    Albuterol inhaler   Xanax  Amlodipine   Beclomethasone inhaler   Prozac   Lasix   Gabapentin   Levothyroxine   Senna   Simvastatin     Past Medical History:    Anemia   Anxiety   Cardiac dysrhythmia   CHF  Hyperlipidemia   Hypertension   Hypothyroidism   Breast cancer  "  Melanoma   JEAN  Osteoarthritis   Osteoporosis   Colonic tubular adenoma   Depression   Lumbar spinal stenosis   Chronic hip pain     Past Surgical History:    Left hip arthroplasty x2  Right hip arthroplasty   Total abdominal hysterectomy, bilateral salpingo-oophorectomy   Left mastectomy   Right ankle triple arthrodesis   Hardware removal from right ankle    Bilateral cataract removal   Melanoma removal   Appendectomy   Tonsillectomy   Bilateral knee replacements     Family History:    Father: Hypertension, Lung Cancer   Mother: Arthritis   Daughter: Breast Cancer      Social History:  Presents with EMS  Lives alone in an independent apartment.     Review of Systems   Constitutional: Negative for chills and fever.   Respiratory: Positive for cough and shortness of breath.    Cardiovascular: Positive for chest pain and leg swelling (RLE).   Gastrointestinal: Negative for nausea and vomiting.   Musculoskeletal: Positive for myalgias (R. calf pain).   All other systems reviewed and are negative.    Physical Exam     Patient Vitals for the past 24 hrs:   BP Temp Temp src Pulse Resp SpO2 Height Weight   02/13/21 1500 (!) 129/91 -- -- 84 -- 97 % -- --   02/13/21 1400 -- -- -- -- 16 97 % -- --   02/13/21 1345 (!) 154/91 -- -- 79 9 97 % -- --   02/13/21 1330 (!) 150/81 -- -- 77 11 96 % -- --   02/13/21 1315 (!) 148/96 -- -- 75 10 96 % -- --   02/13/21 1300 (!) 157/88 -- -- 76 14 98 % -- --   02/13/21 1250 131/74 97.6  F (36.4  C) Oral 78 24 97 % 1.6 m (5' 3\") 124.5 kg (274 lb 6.4 oz)        Physical Exam  General: Patient is awake, alert and interactive when I enter the room  Head: The scalp, face, and head appear normal  Eyes: The pupils are equal, round, and reactive to light. Conjunctivae and sclerae are normal  ENT: External acoustic canals are normal. The oropharynx is normal without erythema. Uvula is in the midline  Neck: Normal range of motion. No anterior cervical lymphadenopathy noted  CV: Regular rate. S1/S2. " No murmurs.   Resp: Lungs are clear without wheezes or rales. No respiratory distress.   GI: Abdomen is soft, no rigidity, guarding, or rebound. No distension. No tenderness to palpation in any quadrant.     MS: Normal tone. Joints grossly normal without effusions. No asymmetric leg swelling, calf or thigh tenderness.    Skin: No rash or lesions noted. Normal capillary refill noted  Neuro: Speech is normal and fluent. Face is symmetric. Moving all extremities.   Psych:  Normal affect.  Appropriate interactions.    Emergency Department Course     ECG:  Time: 1246  Vent. Rate 79 bpm. KS interval *. QRS duration 82. QT/QTc 4006/465. P-R-T axis * -15 20.    Sinus rhythm  Inferior infarct, age undetermined.   No significant change compared to EKG dated 11/19/18. Read time: 1257.    Imaging:  CT Chest Pulmonary Embolism w Contrast   Final Result   IMPRESSION:   1.  No evidence for pulmonary embolism or thoracic aortic dissection.   2.  Mild fibrotic changes about the periphery of both lungs have   increased since 10/26/2012.   3.  Small hiatal hernia.      ESME BRADLEY MD      US Lower Extremity Venous Duplex Right   Final Result   IMPRESSION:  No evidence for deep venous thrombosis in the right lower   extremity veins.      ESME BRADLEY MD        Laboratory:  CBC: WBC 5.4, HGB: 13.7, PLT: 254  BMP: All WNL (Creatinine: 0.73)    Troponin (Collected at 1308): <0.015     D dimer: 2.3 (H)  BNP: 44    Symptomatic Influenza A/B & COVID-19 PCR Multiplex: All Negative    Emergency Department Course:    Reviewed:  I reviewed the patient's nursing notes, vitals, past medical records, and Care Everywhere.     Assessments:  1231: I performed an exam of the patient, as documented above. History obtained and plan for ED work up discussed as well.   1452: I reassessed the patient and discussed the results of the work up thus far.     Disposition:  The patient was discharged to home.     Impression & Plan      Covid-19  Carol HARDY  Fuad was evaluated during a global COVID-19 pandemic, which necessitated consideration that the patient might be at risk for infection with the SARS-CoV-2 virus that causes COVID-19.   Applicable protocols for evaluation were followed during the patient's care.   COVID-19 was considered as part of the patient's evaluation. The plan for testing is:  a test was obtained during this visit.    Medical Decision Making:   Carol Merida is a 85 year old female who presents with shortness of breath and intermittent chest pain for the past month.  She also reports productive cough with white phlegm that is been ongoing over similar time period.  Upon initial evaluation here she is hemodynamically stable with normal vital signs.  She is afebrile.  On physical exam she looks quite well and does not appear in acute respiratory distress.  She does not have any significant wheezing, rales or evidence of fluid overload.  Patient underwent a broad work-up to investigate her chest pain and shortness of breath.  After thorough work-up there is not appear to be a clear cause for her symptomatology.  EKG did not show any acute signs of ischemia nor dysrhythmia and her troponin is negative.  Patient CT was negative for pulmonary embolism, pneumonia, pleural effusion or pulmonary edema.  No evidence of congestive heart failure exacerbation here today.  She is Covid and flu negative.  At this point, I believe she is safe to follow-up with her primary care physician.  And she can return to the emergency department with any new or worsening symptoms.  I do think that there is a component of anxiety to her presentation as well.    Diagnosis:     ICD-10-CM    1. Dyspnea, unspecified type  R06.00        Scribe Disclosure:  I, Noy Ruby, am serving as a scribe on 2/13/2021 at 12:38 PM to personally document services performed by Nestor Diaz MD based on my observations and the provider's statements to me.       2/13/2021   EMERGENCY DEPARTMENT     Nestor Diaz MD  02/13/21 [id number]

## 2021-02-13 NOTE — ED TRIAGE NOTES
"Patient arrived at around 12:30 complaining of chest pain that \"feels different\" than chronic chest/shoulder discomfort. Pain present on left shoulder and she gets shoulder injections. Chest pain is better with palpitation. Patient also reports shortness of breath and presence of \"phlemgy\" cough that she has been evaluated for in the past. Shortness of breath has not changed but she notes increased activity intolerance. Pain with palpitation of right calf. Patient states slight increase in swelling of right ankle. Patient lives in senior apartment alone. Deneis any known COVID contact. Has received 2 doses of the COVID vaccine. ABCs intact. Alert and oriented X4. Oriented to room and call light. Patient educated about hand hygiene practices.    "

## 2021-02-15 ENCOUNTER — TELEPHONE (OUTPATIENT)
Dept: FAMILY MEDICINE | Facility: CLINIC | Age: 86
End: 2021-02-15

## 2021-02-15 LAB — INTERPRETATION ECG - MUSE: NORMAL

## 2021-02-15 NOTE — TELEPHONE ENCOUNTER
Patient calling to schedule a telephone appointment for an ER follow up.  Was in ER on 2/13/21 for dyspnea. No appointments available.  Please advise when patient can be seen.  Thanks    Can we use a same day spot?

## 2021-02-19 ENCOUNTER — VIRTUAL VISIT (OUTPATIENT)
Dept: FAMILY MEDICINE | Facility: CLINIC | Age: 86
End: 2021-02-19
Payer: COMMERCIAL

## 2021-02-19 DIAGNOSIS — R09.81 NASAL CONGESTION: ICD-10-CM

## 2021-02-19 DIAGNOSIS — R06.02 SOB (SHORTNESS OF BREATH): Primary | ICD-10-CM

## 2021-02-19 DIAGNOSIS — F41.9 ANXIETY: ICD-10-CM

## 2021-02-19 DIAGNOSIS — M19.90 ARTHRITIS: ICD-10-CM

## 2021-02-19 PROCEDURE — 99442 PR PHYSICIAN TELEPHONE EVALUATION 11-20 MIN: CPT | Mod: 95 | Performed by: FAMILY MEDICINE

## 2021-02-19 RX ORDER — SENNOSIDES 8.6 MG
650 CAPSULE ORAL EVERY 8 HOURS PRN
COMMUNITY
Start: 2021-02-19 | End: 2021-11-17

## 2021-02-19 NOTE — PROGRESS NOTES
"Everette is a 85 year old who is being evaluated via a billable telephone visit.      What phone number would you like to be contacted at? [phone]  How would you like to obtain your AVS? Mail a copy    Assessment & Plan     SOB (shortness of breath)  Recent ER visit. She notes improved symptoms.   I do not see an ECHO done; discussed this can sometimes see other things. She did have CT and EKG done at recent visit; they note they are not seeing any evidence of heart failure.   She has attributed to anxiety.   However, as I discuss possible counseling for relaxation techniques or other, she notes she is handling this well.     Anxiety  As above.   Defers counseling.   Encourage using whatever relaxation techniques she can.  She also notes there are more things she can be doing; suggested making a list and checking things off.     Arthritis  Discussed acetaminophen, diclofenac.     Nasal congestion  She might try Mucinex again. Discussed that fluids and humidity can help break off mucus.   She notes that nasal spray has been helpful as well.           35 minutes spent on the date of the encounter doing chart review, patient visit and documentation        BMI:   Estimated body mass index is 48.61 kg/m  as calculated from the following:    Height as of 2/13/21: 1.6 m (5' 3\").    Weight as of 2/13/21: 124.5 kg (274 lb 6.4 oz).           Return in about 3 months (around 5/19/2021), or if symptoms worsen or fail to improve, for Routine Visit.    Yessica Lora MD, MD  [facility] [place]    Subjective   Everette is a 85 year old who presents for the following health issues     HPI       ED/UC Followup:    Facility:  [facility] Emergency Dept  Date of visit: 02/13/2021  Reason for visit: chest pain and shortness of breath   Current Status: Feeling much better but still having the mucous issue.         See under ROS   Patient Active Problem List   Diagnosis     Obesity     Hypothyroidism     Benign " neoplasm of colon     Obstructive sleep apnea     * * * SBE PROPHYLAXIS * * *     Degeneration of lumbar or lumbosacral intervertebral disc     Pain in joint, ankle and foot     Arthrodesis status     HYPERLIPIDEMIA LDL GOAL <100     Hypertension goal BP (blood pressure) < 140/90     Health Care Home     Osteopenia     Malignant neoplasm of female breast (H)     Impaired fasting glucose     ACP (advance care planning)     History of melanoma     Panic disorder without agoraphobia     Arthritis     PONV (postoperative nausea and vomiting)     S/P total hip arthroplasty     Constipation     DVT prophylaxis     Anticoagulation management encounter     Physical deconditioning     Periprosthetic fracture around internal prosthetic left hip joint (H)     Chronic pain syndrome - hips     Fracture of greater trochanter of right femur 11/6/2016, closed, with routine healing, subsequent encounter     Anxiety     Osteoarthritis of multiple joints, unspecified osteoarthritis type     Anemia due to blood loss, acute     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     Long term (current) use of anticoagulants     Vitamin D deficiency     Pneumonia     Elevated BMI (H)     CHF (congestive heart failure) (H)     Mild depression (H)     Chronic diastolic heart failure (H)     Neuropathy     Spinal stenosis of lumbar region, unspecified whether neurogenic claudication present       Current Outpatient Medications   Medication Sig Dispense Refill     ACE/ARB/ARNI NOT PRESCRIBED (INTENTIONAL) Please choose reason not prescribed, below       acetaminophen (TYLENOL) 650 MG CR tablet Take 1 tablet (650 mg) by mouth every 8 hours as needed for mild pain or fever       albuterol (PROAIR HFA/PROVENTIL HFA/VENTOLIN HFA) 108 (90 Base) MCG/ACT inhaler INHALE 2 PUFFS INTO THE LUNGS EVERY 4 HOURS AS NEEDED FOR SHORTNESS OF BREATH OR DIFFICULT BREATHING OR WHEEZING 8.5 g 0     ALPRAZolam (XANAX) 0.25 MG tablet TAKE ONE TABLET BY MOUTH  TWICE A DAY AS NEEDED FOR ANXIETY. DON'T TAKE WITH OXYCODONE. 10 tablet 0     amLODIPine (NORVASC) 5 MG tablet Take 1 tablet (5 mg) by mouth daily 90 tablet 1     beclomethasone HFA 80 MCG/ACT IN inhaler Inhale 1 puff into the lungs 2 times daily 1 Inhaler 1     benzonatate (TESSALON) 100 MG capsule Take 1 capsule (100 mg) by mouth 3 times daily as needed for cough 42 capsule 0     BETA BLOCKER NOT PRESCRIBED (INTENTIONAL) Beta Blocker not prescribed intentionally due to EF > 40 % (ejection fraction) will leave up to Cardiology.       calcium-vitamin D (CALCIUM 600 + D) 600-400 MG-UNIT per tablet Take 1 tablet by mouth every evening  100 tablet 3     diclofenac (VOLTAREN) 1 % topical gel Apply 2 g topically 4 times daily Left shoulder. 3 Tube 1     FLUoxetine (PROZAC) 20 MG capsule Take 1 capsule (20 mg) by mouth daily To take along with other dose to equal 60 mg 90 capsule 1     FLUoxetine (PROZAC) 40 MG capsule To take along with other dose to equal 60 mg 90 capsule 1     fluticasone (FLONASE) 50 MCG/ACT nasal spray Spray 1 spray into both nostrils daily as needed for rhinitis 16 g 1     furosemide (LASIX) 40 MG tablet Take 1 tablet (40 mg) by mouth daily 90 tablet 1     gabapentin (NEURONTIN) 300 MG capsule TAKE ONE CAPSULE BY MOUTH EVERY MORNING AND AT NOON, AND TAKE TWO CAPSULES EVERY NIGHT 360 capsule 1     levothyroxine (SYNTHROID/LEVOTHROID) 88 MCG tablet Take 1 tablet (88 mcg) by mouth daily 90 tablet 2     magnesium hydroxide (MILK OF MAGNESIA) 400 MG/5ML suspension Take 30 mLs by mouth daily as needed        nystatin (NYSTOP) 424187 UNIT/GM external powder Apply tid to affectead area prn 60 g 1     order for DME Equipment being ordered: left breast prosthesis. Mastectomy bras (up to 4) 4 Device 0     senna (SENOKOT) 8.6 MG tablet Take 1 tablet by mouth daily       simvastatin (ZOCOR) 10 MG tablet Take 1 tablet (10 mg) by mouth At Bedtime 90 tablet 3     triamcinolone (KENALOG) 0.1 % cream Apply sparingly  to affected area two times daily as needed for itching. 30 g 0     vitamin B complex with vitamin C (VITAMIN  B COMPLEX) tablet Take 1 tablet by mouth daily           Review of Systems   CONSTITUTIONAL:NEGATIVE for fever, chills  RESP:see below  CV: see below  PSYCHIATRIC: see below.    Was scheduled to have an ECHO.  She notes she had one in the ER; was told does not need one.     She does find herself getting short of breath when she is getting anxious.  She is getting anxious more. She notes she can't do things as well; noting such as opening bottles due to her UE arthritis.  Difficult to get the mask on her ear.   Some days are worse than others.  Mucus is overall improved, but notes it can be worse in am and worse on some days than others.       Nose spray helps.  Mucinex can make her nauseous.     She moved into small apartment shortly after her  . COVID hit, so her family has not even gotten pictures on the wall etc.   She notes there are no social activities or congregate dining, so she has not been able to get to know anyone there.     Has not started with counseling as does not feel she needs this. Talks with friends and pastors.   Thinking will be better in future.  Has had covid vaccinations.    Believes handling things herself ok.     Takes alprazolam perhaps once per week for the anxiety and finding that helpful.    Arthritis is affecting hands, arms and feet.   She will use tylenol arthritis usually tid.   Will use diclofenac gel if things get real bad, but not on feet.     She does note some chest pain at the end of her left clavicle; she was told this was arthritis.         Objective           Vitals:  No vitals were obtained today due to virtual visit.    Physical Exam   alert and no distress  PSYCH: Alert and oriented times 3; coherent speech, normal   rate and volume, able to articulate logical thoughts, able   to abstract reason, Her affect is normal  RESP: No cough, no audible  wheezing, able to talk in full sentences  Remainder of exam unable to be completed due to telephone visits    Reviewed ER note.  Reviewed imaging reports.            Phone call duration: 17 minutes

## 2021-02-23 DIAGNOSIS — R09.89 THROAT CLEARING: ICD-10-CM

## 2021-02-25 RX ORDER — FLUTICASONE PROPIONATE 50 MCG
1 SPRAY, SUSPENSION (ML) NASAL DAILY PRN
Qty: 16 ML | Refills: 1
Start: 2021-02-25

## 2021-03-27 ENCOUNTER — HEALTH MAINTENANCE LETTER (OUTPATIENT)
Age: 86
End: 2021-03-27

## 2021-04-07 ENCOUNTER — HOSPITAL ENCOUNTER (EMERGENCY)
Facility: CLINIC | Age: 86
Discharge: HOME | End: 2021-04-07
Attending: EMERGENCY MEDICINE | Admitting: EMERGENCY MEDICINE
Payer: COMMERCIAL

## 2021-04-07 ENCOUNTER — APPOINTMENT (OUTPATIENT)
Dept: GENERAL RADIOLOGY | Facility: CLINIC | Age: 86
End: 2021-04-07
Attending: EMERGENCY MEDICINE
Payer: COMMERCIAL

## 2021-04-07 VITALS
TEMPERATURE: 98.4 F | SYSTOLIC BLOOD PRESSURE: 152 MMHG | DIASTOLIC BLOOD PRESSURE: 72 MMHG | OXYGEN SATURATION: 97 % | HEART RATE: 74 BPM | RESPIRATION RATE: 22 BRPM

## 2021-04-07 DIAGNOSIS — R13.10 DIFFICULTY SWALLOWING PILLS: ICD-10-CM

## 2021-04-07 DIAGNOSIS — R07.89 ATYPICAL CHEST PAIN: ICD-10-CM

## 2021-04-07 LAB
ANION GAP SERPL CALCULATED.3IONS-SCNC: 6 MMOL/L (ref 3–14)
BASOPHILS # BLD AUTO: 0 10E9/L (ref 0–0.2)
BASOPHILS NFR BLD AUTO: 0.3 %
BUN SERPL-MCNC: 16 MG/DL (ref 7–30)
CALCIUM SERPL-MCNC: 8.6 MG/DL (ref 8.5–10.1)
CHLORIDE SERPL-SCNC: 101 MMOL/L (ref 94–109)
CO2 SERPL-SCNC: 29 MMOL/L (ref 20–32)
CREAT SERPL-MCNC: 0.77 MG/DL (ref 0.52–1.04)
DIFFERENTIAL METHOD BLD: NORMAL
EOSINOPHIL # BLD AUTO: 0.2 10E9/L (ref 0–0.7)
EOSINOPHIL NFR BLD AUTO: 2.9 %
ERYTHROCYTE [DISTWIDTH] IN BLOOD BY AUTOMATED COUNT: 13.1 % (ref 10–15)
GFR SERPL CREATININE-BSD FRML MDRD: 71 ML/MIN/{1.73_M2}
GLUCOSE SERPL-MCNC: 103 MG/DL (ref 70–99)
HCT VFR BLD AUTO: 42.8 % (ref 35–47)
HGB BLD-MCNC: 13.8 G/DL (ref 11.7–15.7)
IMM GRANULOCYTES # BLD: 0 10E9/L (ref 0–0.4)
IMM GRANULOCYTES NFR BLD: 0.5 %
INTERPRETATION ECG - MUSE: NORMAL
LYMPHOCYTES # BLD AUTO: 0.8 10E9/L (ref 0.8–5.3)
LYMPHOCYTES NFR BLD AUTO: 10 %
MCH RBC QN AUTO: 30.1 PG (ref 26.5–33)
MCHC RBC AUTO-ENTMCNC: 32.2 G/DL (ref 31.5–36.5)
MCV RBC AUTO: 93 FL (ref 78–100)
MONOCYTES # BLD AUTO: 0.6 10E9/L (ref 0–1.3)
MONOCYTES NFR BLD AUTO: 8.1 %
NEUTROPHILS # BLD AUTO: 6 10E9/L (ref 1.6–8.3)
NEUTROPHILS NFR BLD AUTO: 78.2 %
NRBC # BLD AUTO: 0 10*3/UL
NRBC BLD AUTO-RTO: 0 /100
PLATELET # BLD AUTO: 240 10E9/L (ref 150–450)
POTASSIUM SERPL-SCNC: 4 MMOL/L (ref 3.4–5.3)
RBC # BLD AUTO: 4.58 10E12/L (ref 3.8–5.2)
SODIUM SERPL-SCNC: 136 MMOL/L (ref 133–144)
TROPONIN I SERPL-MCNC: <0.015 UG/L (ref 0–0.04)
TROPONIN I SERPL-MCNC: <0.015 UG/L (ref 0–0.04)
WBC # BLD AUTO: 7.6 10E9/L (ref 4–11)

## 2021-04-07 PROCEDURE — 84484 ASSAY OF TROPONIN QUANT: CPT | Mod: 91 | Performed by: EMERGENCY MEDICINE

## 2021-04-07 PROCEDURE — 250N000013 HC RX MED GY IP 250 OP 250 PS 637: Performed by: EMERGENCY MEDICINE

## 2021-04-07 PROCEDURE — 99285 EMERGENCY DEPT VISIT HI MDM: CPT | Mod: 25

## 2021-04-07 PROCEDURE — 93005 ELECTROCARDIOGRAM TRACING: CPT

## 2021-04-07 PROCEDURE — 250N000009 HC RX 250: Performed by: EMERGENCY MEDICINE

## 2021-04-07 PROCEDURE — 80048 BASIC METABOLIC PNL TOTAL CA: CPT | Performed by: EMERGENCY MEDICINE

## 2021-04-07 PROCEDURE — 85025 COMPLETE CBC W/AUTO DIFF WBC: CPT | Performed by: EMERGENCY MEDICINE

## 2021-04-07 PROCEDURE — 71045 X-RAY EXAM CHEST 1 VIEW: CPT

## 2021-04-07 RX ADMIN — LIDOCAINE HYDROCHLORIDE 30 ML: 20 SOLUTION ORAL; TOPICAL at 14:29

## 2021-04-07 ASSESSMENT — ENCOUNTER SYMPTOMS
NAUSEA: 0
CHOKING: 1
SHORTNESS OF BREATH: 0
TROUBLE SWALLOWING: 1
ARTHRALGIAS: 0

## 2021-04-07 NOTE — ED TRIAGE NOTES
"Patient arrives via EMS.  Patient states she developed chest pressure while swallowing pills this am about 0930. \"It felt like a pill was stuck\"  Pain worsening so EMS was called.     po and NTG x1 given en route to ED which was helpful with her pain.    Currently taking Clindamycin for a dental infection.   "

## 2021-04-07 NOTE — ED NOTES
Bed: HW03  Expected date: 4/7/21  Expected time:   Means of arrival: Ambulance  Comments:  [facility]

## 2021-04-07 NOTE — ED PROVIDER NOTES
History   Chief Complaint:  Chest Pain     HPI   Carol Merida is a 85 year old female with history of CHF, hypertension, hyperlipidemia, JEAN, and neuropathy who presents with chest pain. She took her Clindamycin at 0930 for a dental infection. Patient states she sat down and ate breakfast when she felt like the pill was stuck in her chest and she tried drinking water but began choking and felt a burning sensation in her chest as the water would not go down. She spit it up as she was not able to tolerate it. She states she has a history of difficulty when swallowing pills. Son states she usually takes her medication with apple sauce but her current antibiotics are a bit larger than usual. She began to cough up her food and was concerned so she called EMS. EMS administered aspirin 324 mg PO and nitroglycerin which was helpful with her pain. Here, she denies nausea or GI concerns. She denies changes in her shortness of breath from her baseline. She denies any radiating pain from her chest. She has chronic shoulder pain but has no exacerbations in her pain or changes from baseline. She usually does not have heart burn and only has these sensations when taking pills in the past. She denies a history of heart attacks but has a history of CHF. She has not visited GI for an assessment yet.      Review of Systems   HENT: Positive for trouble swallowing.    Respiratory: Positive for choking. Negative for shortness of breath.    Cardiovascular: Positive for chest pain.   Gastrointestinal: Negative for nausea.   Musculoskeletal: Negative for arthralgias.   All other systems reviewed and are negative.      Allergies:  Ace Inhibitors    Medications:  Albuterol  Xanax  Amlodipine   Beclomethasone Inhaler   Tessalon   Prozac  Lasix  Gabapentin   Levothyroxine  Senna  Simvastatin     Past Medical History:    Anemia   Anxiety   Cardiac Dysrhythmia   CHF  PETTY  Hypertension  Hyperlipidemia  Hypothyroid  Melanoma of Skin    Obstructive Sleep Apnea   Osteoporosis  Tubular Adenoma of Colon  Neuropathy     Past Surgical History:    Arthroscopy Hip - Left and Right   Colonoscopy x2  Hysterectomy   Left Mastectomy   Right Ankle Surgery   Knee Replacement   Bilateral Cataract Surgery     Family History:    Mother - Arthritis, lung Cancer    Social History:  Arrives with son.     Physical Exam     Patient Vitals for the past 24 hrs:   BP Temp Temp src Pulse Resp SpO2   04/07/21 1300 -- -- -- 72 -- 94 %   04/07/21 1245 -- -- -- 70 -- 93 %   04/07/21 1230 -- -- -- 70 -- 95 %   04/07/21 1218 119/67 98.4  F (36.9  C) Oral 74 24 96 %   04/07/21 1215 117/73 -- -- 75 -- 94 %       Physical Exam  General: Alert, no acute distress; well appearing  HEENT:  Moist mucous membranes.  Conjunctiva normal.  CV:  RRR, no m/r/g, skin warm and well perfused  Pulm:  CTAB, no wheezes/ronchi/rales.  No acute distress, breathing comfortably  GI:  Soft, mild epigastric tenderness, nondistended.  No rebound or guarding.  Normal bowel sounds  MSK:  Moving all extremities.  No focal areas of edema, erythema, or tenderness; reproducible left chest wall tenderness.  Skin:  WWP, no rashes, no lower extremity edema, skin color normal, no diaphoresis  Psych:  Well-appearing, normal affect, regular speech    Emergency Department Course   ECG  ECG taken at 1219, ECG read at 1233  Normal sinus rhythm   Normal ECG    Rate 73 bpm. OK interval 194 ms. QRS duration 98 ms. QT/QTc 422/464 ms. P-R-T axes 7 -21 28.     Imaging:  XR Chest Port 1 View  Unremarkable single view of the chest.  Reading per radiology    Laboratory:  CBC: WBC 7.6, HGB 13.8,   BMP: Glucose 103 (H), o/w WNL (Creatinine: 0.77)    Troponin (Resulted 1309): <0.015  Troponin (Resulted 1506): <0.015    Emergency Department Course:    Reviewed:  I reviewed nursing notes, past medical history and care everywhere    Assessments:  1331 I obtained history and examined the patient as noted above.   1518 I  rechecked the patient and explained findings.     Interventions:  5949 GI Cocktail (Maalox/Mylanta and viscous Lidocaine), 30 mL suspension, PO     Disposition:  The patient was discharged to home.     Impression & Plan     Medical Decision Making:  Carol Merida is a 85 year old female who presents to the ER for evaluation of chest pain described as a burning sensation with sensation of pill getting stuck in her throat after taking her clindamycin.  Subsequently, she tried drinking water I would not go down as it was unable to pass.  Feeling anxious, she called EMS who brought her in for further evaluation.  She denies any history of exertional chest pain or radiation of the pain.  She is afebrile and vitally stable.  She is handling her oral secretions in the ER.  She had mild reproducible left chest wall tenderness and mild epigastric tenderness on exam.  A broad differential was considered including esophageal obstruction, esophageal spasm, esophagitis/gastritis,  ACS, gastritis amongst other things.  No shortness of breath or pain with breathing to suggest PE.  Chest x-ray shows no mediastinal widening, pneumothorax, pneumonia.  EKG without signs of acute ischemia or infarct or dynamic changes from 2/13.  Troponin and delta troponin are within normal limits.  The rest of her lab studies are unremarkable.  She had some relief with GI cocktail.  Overall her presentation is atypical for ACS and I do not feel further cardiac work-up is indicated at this time.  Suspect her symptoms are secondary to pill getting stuck in her esophagus she has had difficulty with swallowing pills in the past requiring applesauce but she did not have this today.  No signs of complete obstruction as she is able to tolerate oral challenge here.  With reasonable clinical certainty, I feel she safe to discharge home.  Recommend that if this continues to happen, she should follow-up with GI for endoscopy and evaluation of possible  esophageal stricture.  No signs of dysphagia on exam.  Reasons to return to the ER discussed and all questions were answered.  Patient is comfortable with the above plan.      Diagnosis:    ICD-10-CM    1. Atypical chest pain  R07.89 Troponin I   2. Difficulty swallowing pills  R19.8        Scribe Disclosure:  Gulshan GALVEZ, am serving as a scribe at 12:34 PM on 4/7/2021 to document services personally performed by Richard Pinzon MD based on my observations and the provider's statements to me.              Richard Pinzon MD  04/07/21 4330

## 2021-05-20 DIAGNOSIS — M15.9 OSTEOARTHRITIS OF MULTIPLE JOINTS, UNSPECIFIED OSTEOARTHRITIS TYPE: ICD-10-CM

## 2021-05-20 DIAGNOSIS — M51.369 DDD (DEGENERATIVE DISC DISEASE), LUMBAR: ICD-10-CM

## 2021-05-20 RX ORDER — GABAPENTIN 300 MG/1
CAPSULE ORAL
Qty: 360 CAPSULE | Refills: 1 | Status: SHIPPED | OUTPATIENT
Start: 2021-05-20 | End: 2021-11-17

## 2021-05-20 NOTE — TELEPHONE ENCOUNTER
gabapentin (NEURONTIN) 300 MG capsule  Last Written Prescription Date:  11/19/20  Last Fill Quantity: 360,   # refills: 1  Last Office Visit: 2/19/21  Future Office visit:       Drug not on the [facility] or [facility] refill protocol or controlled substance    Tiffany Anton RN on 5/20/2021 at 11:38 AM

## 2021-05-27 ENCOUNTER — TELEPHONE (OUTPATIENT)
Dept: FAMILY MEDICINE | Facility: CLINIC | Age: 86
End: 2021-05-27

## 2021-05-27 NOTE — TELEPHONE ENCOUNTER
Reason for Call:  Other call back    Detailed comments: Everette would like to speak to Dr. Lora regarding some of her dx's she has, didn't want to leave a detailed message and only wants to speak to PCP.    Phone Number Patient can be reached at: Home number on file [phone] (home)    Best Time: any    Can we leave a detailed message on this number? YES    Call taken on 5/27/2021 at 11:06 AM by Shiresa H. Ormond

## 2021-05-27 NOTE — TELEPHONE ENCOUNTER
Calling patient-     Patient would like a ER follow up visit. Appt scheduled.     Tifafny Anton RN on 5/27/2021 at 12:50 PM

## 2021-06-07 DIAGNOSIS — F41.9 ANXIETY: ICD-10-CM

## 2021-06-07 DIAGNOSIS — F32.A MILD DEPRESSION: ICD-10-CM

## 2021-06-09 DIAGNOSIS — F32.A MILD DEPRESSION: ICD-10-CM

## 2021-06-09 DIAGNOSIS — E03.9 HYPOTHYROIDISM, UNSPECIFIED TYPE: ICD-10-CM

## 2021-06-09 DIAGNOSIS — F41.9 ANXIETY: ICD-10-CM

## 2021-06-09 NOTE — TELEPHONE ENCOUNTER
Routing refill request to provider for review/approval because:  Labs out of range and not current:  PHQ-9    PHQ 6/11/2020 7/7/2020 12/10/2020   PHQ-9 Total Score 11 8 6   Q9: Thoughts of better off dead/self-harm past 2 weeks Not at all Not at all Not at all     Guillermo WHITLEY RN

## 2021-06-10 RX ORDER — LEVOTHYROXINE SODIUM 88 UG/1
TABLET ORAL
Qty: 90 TABLET | Refills: 0
Start: 2021-06-10

## 2021-06-15 ENCOUNTER — OFFICE VISIT (OUTPATIENT)
Dept: FAMILY MEDICINE | Facility: CLINIC | Age: 86
End: 2021-06-15
Payer: COMMERCIAL

## 2021-06-15 VITALS
OXYGEN SATURATION: 96 % | BODY MASS INDEX: 48.34 KG/M2 | HEART RATE: 79 BPM | HEIGHT: 63 IN | RESPIRATION RATE: 20 BRPM | SYSTOLIC BLOOD PRESSURE: 100 MMHG | TEMPERATURE: 98.4 F | DIASTOLIC BLOOD PRESSURE: 60 MMHG | WEIGHT: 272.8 LBS

## 2021-06-15 DIAGNOSIS — R09.89 THROAT CLEARING: ICD-10-CM

## 2021-06-15 DIAGNOSIS — F32.A MILD DEPRESSION: ICD-10-CM

## 2021-06-15 DIAGNOSIS — R05.9 COUGH: ICD-10-CM

## 2021-06-15 DIAGNOSIS — I10 HYPERTENSION GOAL BP (BLOOD PRESSURE) < 140/90: Primary | ICD-10-CM

## 2021-06-15 DIAGNOSIS — K11.7 DROOLING: ICD-10-CM

## 2021-06-15 DIAGNOSIS — H61.22 IMPACTED CERUMEN OF LEFT EAR: ICD-10-CM

## 2021-06-15 DIAGNOSIS — F41.9 ANXIETY: ICD-10-CM

## 2021-06-15 DIAGNOSIS — E03.9 HYPOTHYROIDISM, UNSPECIFIED TYPE: ICD-10-CM

## 2021-06-15 DIAGNOSIS — E78.5 HYPERLIPIDEMIA LDL GOAL <100: ICD-10-CM

## 2021-06-15 DIAGNOSIS — R73.01 IMPAIRED FASTING GLUCOSE: ICD-10-CM

## 2021-06-15 DIAGNOSIS — R06.2 WHEEZE: ICD-10-CM

## 2021-06-15 PROCEDURE — 96127 BRIEF EMOTIONAL/BEHAV ASSMT: CPT | Performed by: FAMILY MEDICINE

## 2021-06-15 PROCEDURE — 99214 OFFICE O/P EST MOD 30 MIN: CPT | Mod: 25 | Performed by: FAMILY MEDICINE

## 2021-06-15 PROCEDURE — 69210 REMOVE IMPACTED EAR WAX UNI: CPT | Mod: LT | Performed by: FAMILY MEDICINE

## 2021-06-15 RX ORDER — BENZONATATE 100 MG/1
100 CAPSULE ORAL 3 TIMES DAILY PRN
Qty: 42 CAPSULE | Refills: 0 | Status: SHIPPED | OUTPATIENT
Start: 2021-06-15 | End: 2022-08-02

## 2021-06-15 RX ORDER — ALPRAZOLAM 0.25 MG
TABLET ORAL
Qty: 10 TABLET | Refills: 0 | Status: SHIPPED | OUTPATIENT
Start: 2021-06-15 | End: 2021-09-15

## 2021-06-15 RX ORDER — FLUOXETINE 40 MG/1
CAPSULE ORAL
Qty: 90 CAPSULE | Refills: 1 | Status: SHIPPED | OUTPATIENT
Start: 2021-06-15 | End: 2021-11-17

## 2021-06-15 RX ORDER — FLUTICASONE PROPIONATE 50 MCG
1 SPRAY, SUSPENSION (ML) NASAL DAILY PRN
Qty: 16 G | Refills: 1 | Status: SHIPPED | OUTPATIENT
Start: 2021-06-15 | End: 2021-11-17

## 2021-06-15 ASSESSMENT — ANXIETY QUESTIONNAIRES
GAD7 TOTAL SCORE: 7
6. BECOMING EASILY ANNOYED OR IRRITABLE: SEVERAL DAYS
5. BEING SO RESTLESS THAT IT IS HARD TO SIT STILL: NOT AT ALL
2. NOT BEING ABLE TO STOP OR CONTROL WORRYING: MORE THAN HALF THE DAYS
1. FEELING NERVOUS, ANXIOUS, OR ON EDGE: SEVERAL DAYS
4. TROUBLE RELAXING: SEVERAL DAYS
7. FEELING AFRAID AS IF SOMETHING AWFUL MIGHT HAPPEN: NOT AT ALL
7. FEELING AFRAID AS IF SOMETHING AWFUL MIGHT HAPPEN: NOT AT ALL
GAD7 TOTAL SCORE: 7
3. WORRYING TOO MUCH ABOUT DIFFERENT THINGS: MORE THAN HALF THE DAYS
GAD7 TOTAL SCORE: 7

## 2021-06-15 ASSESSMENT — PAIN SCALES - GENERAL: PAINLEVEL: SEVERE PAIN (6)

## 2021-06-15 ASSESSMENT — PATIENT HEALTH QUESTIONNAIRE - PHQ9
SUM OF ALL RESPONSES TO PHQ QUESTIONS 1-9: 7
10. IF YOU CHECKED OFF ANY PROBLEMS, HOW DIFFICULT HAVE THESE PROBLEMS MADE IT FOR YOU TO DO YOUR WORK, TAKE CARE OF THINGS AT HOME, OR GET ALONG WITH OTHER PEOPLE: SOMEWHAT DIFFICULT
SUM OF ALL RESPONSES TO PHQ QUESTIONS 1-9: 7

## 2021-06-15 ASSESSMENT — MIFFLIN-ST. JEOR: SCORE: 1651.54

## 2021-06-15 NOTE — PROGRESS NOTES
Assessment & Plan     Hypertension goal BP (blood pressure) < 140/90  Satisfactory control.   - Comprehensive metabolic panel; Future    Hypothyroidism, unspecified type  Clinically euthryoid.   - TSH with free T4 reflex; Future    Hyperlipidemia LDL goal <100  To return fasting.  - Lipid panel reflex to direct LDL Fasting; Future  - Comprehensive metabolic panel; Future    Mild depression (H)  She does note some increase, triggered by above. Score reflects this as well. Recommend counseling. She did talk with someone at [employer]; recommend regular communication. No change in medication at this time.  - FLUoxetine (PROZAC) 40 MG capsule; To take along with other dose to equal 60 mg  - FLUoxetine (PROZAC) 20 MG capsule; Take 1 capsule (20 mg) by mouth daily To take along with other dose to equal 60 mg    Anxiety  As above.   - FLUoxetine (PROZAC) 40 MG capsule; To take along with other dose to equal 60 mg  - FLUoxetine (PROZAC) 20 MG capsule; Take 1 capsule (20 mg) by mouth daily To take along with other dose to equal 60 mg  - ALPRAZolam (XANAX) 0.25 MG tablet; TAKE ONE TABLET BY MOUTH TWICE A DAY AS NEEDED FOR ANXIETY. DON'T TAKE WITH OXYCODONE.    Throat clearing  Refilling. Sounds like she has continued to use this, though she may be out soon.   - fluticasone (FLONASE) 50 MCG/ACT nasal spray; Spray 1 spray into both nostrils daily as needed for rhinitis    Drooling  Uncertain etiology. I was not sure if she was using the nasal spray at first, but then she said she was. I wonder if she is sleeping with her mouth open some.  CN did appear intact. Continue to follow.     Cough  Refilling. It does sound like she has been without the inhaler.   - benzonatate (TESSALON) 100 MG capsule; Take 1 capsule (100 mg) by mouth 3 times daily as needed for cough  - beclomethasone HFA (QVAR REDIHALER) 80 MCG/ACT inhaler; Inhale 1 puff into the lungs 2 times daily    Wheeze  Refilling; sounds like she has not been using.  -  "beclomethasone HFA (QVAR REDIHALER) 80 MCG/ACT inhaler; Inhale 1 puff into the lungs 2 times daily    Impacted cerumen of left ear    - REMOVE IMPACTED CERUMEN    Impaired fasting glucose    - Comprehensive metabolic panel; Future  - Hemoglobin A1c; Future             BMI:   Estimated body mass index is 48.32 kg/m  as calculated from the following:    Height as of this encounter: 1.6 m (5' 3\").    Weight as of this encounter: 123.7 kg (272 lb 12.8 oz).           Return in about 3 months (around 9/15/2021) for Lab Work, Medication recheck, Face to Face.    Yessica Lora MD, MD  [facility] ISAAC Gaviria is a 86 year old who presents for the following health issues     HPI     ED/UC Followup:    Facility:  [facility]  Date of visit: 4/7/2021  Reason for visit: Atypical chest pain and difficulty swallowing  Current Status: She is doing better. Still have some SOB. She is drooling on the right side of mouth. She is having trouble coughing and getting rid of the mucus.       See under ROS     Patient Active Problem List   Diagnosis     Obesity     Hypothyroidism     Benign neoplasm of colon     Obstructive sleep apnea     * * * SBE PROPHYLAXIS * * *     Degeneration of lumbar or lumbosacral intervertebral disc     Pain in joint, ankle and foot     Arthrodesis status     HYPERLIPIDEMIA LDL GOAL <100     Hypertension goal BP (blood pressure) < 140/90     Health Care Home     Osteopenia     Malignant neoplasm of female breast (H)     Impaired fasting glucose     ACP (advance care planning)     History of melanoma     Panic disorder without agoraphobia     Arthritis     PONV (postoperative nausea and vomiting)     S/P total hip arthroplasty     Constipation     DVT prophylaxis     Anticoagulation management encounter     Physical deconditioning     Periprosthetic fracture around internal prosthetic left hip joint (H)     Chronic pain syndrome - hips     Fracture of greater trochanter of right " femur 11/6/2016, closed, with routine healing, subsequent encounter     Anxiety     Osteoarthritis of multiple joints, unspecified osteoarthritis type     Anemia due to blood loss, acute     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     Long term (current) use of anticoagulants     Vitamin D deficiency     Pneumonia     Elevated BMI (H)     CHF (congestive heart failure) (H)     Mild depression (H)     Chronic diastolic heart failure (H)     Neuropathy     Spinal stenosis of lumbar region, unspecified whether neurogenic claudication present       Current Outpatient Medications   Medication Sig Dispense Refill     ACE/ARB/ARNI NOT PRESCRIBED (INTENTIONAL) Please choose reason not prescribed, below       acetaminophen (TYLENOL) 650 MG CR tablet Take 1 tablet (650 mg) by mouth every 8 hours as needed for mild pain or fever       albuterol (PROAIR HFA/PROVENTIL HFA/VENTOLIN HFA) 108 (90 Base) MCG/ACT inhaler INHALE 2 PUFFS INTO THE LUNGS EVERY 4 HOURS AS NEEDED FOR SHORTNESS OF BREATH OR DIFFICULT BREATHING OR WHEEZING 8.5 g 0     ALPRAZolam (XANAX) 0.25 MG tablet TAKE ONE TABLET BY MOUTH TWICE A DAY AS NEEDED FOR ANXIETY. DON'T TAKE WITH OXYCODONE. 10 tablet 0     amLODIPine (NORVASC) 5 MG tablet Take 1 tablet (5 mg) by mouth daily 90 tablet 1     beclomethasone HFA 80 MCG/ACT IN inhaler Inhale 1 puff into the lungs 2 times daily 1 Inhaler 1     benzonatate (TESSALON) 100 MG capsule Take 1 capsule (100 mg) by mouth 3 times daily as needed for cough 42 capsule 0     calcium-vitamin D (CALCIUM 600 + D) 600-400 MG-UNIT per tablet Take 1 tablet by mouth every evening  100 tablet 3     diclofenac (VOLTAREN) 1 % topical gel Apply 2 g topically 4 times daily Left shoulder. 3 Tube 1     FLUoxetine (PROZAC) 20 MG capsule TAKE 1 CAPSULE (20 MG) BY MOUTH DAILY TO TAKE ALONG WITH OTHER DOSE TO EQUAL 60 MG 90 capsule 0     FLUoxetine (PROZAC) 40 MG capsule To take along with other dose to equal 60 mg 90 capsule 1      fluticasone (FLONASE) 50 MCG/ACT nasal spray Spray 1 spray into both nostrils daily as needed for rhinitis 16 g 1     furosemide (LASIX) 40 MG tablet Take 1 tablet (40 mg) by mouth daily 90 tablet 1     gabapentin (NEURONTIN) 300 MG capsule TAKE ONE CAPSULE BY MOUTH EVERY MORNING AND AT NOON, AND TAKE TWO CAPSULES EVERY NIGHT 360 capsule 1     levothyroxine (SYNTHROID/LEVOTHROID) 88 MCG tablet Take 1 tablet (88 mcg) by mouth daily 90 tablet 2     nystatin (NYSTOP) 373912 UNIT/GM external powder Apply tid to affectead area prn 60 g 1     order for DME Equipment being ordered: left breast prosthesis. Mastectomy bras (up to 4) 4 Device 0     simvastatin (ZOCOR) 10 MG tablet Take 1 tablet (10 mg) by mouth At Bedtime 90 tablet 3     triamcinolone (KENALOG) 0.1 % cream Apply sparingly to affected area two times daily as needed for itching. 30 g 0     BETA BLOCKER NOT PRESCRIBED (INTENTIONAL) Beta Blocker not prescribed intentionally due to EF > 40 % (ejection fraction) will leave up to Cardiology. (Patient not taking: Reported on 6/15/2021)           Review of Systems   CONSTITUTIONAL:NEGATIVE for fever, chills, change in weight  ENT/MOUTH: see below  RESP:see below  CV: NEGATIVE for chest pain, palpitations or peripheral edema  NEURO: see below  PSYCHIATRIC: see below    Was in the er with an episode of choking on a pill back in April.    She notes she still has mucus in throat. Sometimes better than others.  Will get some drooling on right side. - this is new.  Still some cough. This is intermittent.  Using the nasal spray.     She would like me to check for Wax in left ear.    Arthritis is bad in left arm. Saw Dr. Giordano. She notes he said she was bone on bone in elbow.  Referred her to a hand specialist. She reports some numbness; more in the last 2 fingers.     She also has difficulty walking.  Notes that her left arm seems to affect this also.  Arthritis in toes all the time.     Feeling down again lately.  going  "through photos of her late  seemed to trigger this.  Kids were bringing in boxes to go through; she had them bring them back home again.  Lost brother last August as well.    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     Will use albuterol prn.  Has been out of beclomethasone HFA          Objective    /60 (BP Location: Right arm, Patient Position: Sitting, Cuff Size: Adult Large)   Pulse 79   Temp 98.4  F (36.9  C) (Oral)   Resp 20   Ht 1.6 m (5' 3\")   Wt 123.7 kg (272 lb 12.8 oz)   SpO2 96%   BMI 48.32 kg/m    Body mass index is 48.32 kg/m .  Physical Exam   GENERAL APPEARANCE: healthy, alert and no distress  HENT: Left TM was obscured by cerumen; see procedure.   She had a symmetric smile; both sides of mouth moved symmetrically.   RESP: lungs clear to auscultation - no rales, rhonchi or wheezes  CV: regular rates and rhythm  NEURO: mentation intact and speech normal. CN II-XII intact.  PSYCH: mentation appears normal and affect normal/bright    PHQ-9 SCORE 7/7/2020 12/10/2020 6/15/2021   PHQ-9 Total Score - - -   PHQ-9 Total Score MyChart - - 7 (Mild depression)   PHQ-9 Total Score 8 6 7       EDILSON-7 SCORE 7/7/2020 12/10/2020 6/15/2021   Total Score - - -   Total Score - - 7 (mild anxiety)   Total Score 5 5 7   Total Score - - -           TSH   Date Value Ref Range Status   08/31/2020 3.12 0.40 - 4.00 mU/L Final       Recent Labs   Lab Test 08/31/20  1048 08/29/19  1202 02/09/15  0910 02/09/15  0910 02/06/14  0902   CHOL 138 122   < > 114 154   HDL 71 60   < > 57 66   LDL 44 36   < > 28 44   TRIG 116 131   < > 146 216*   CHOLHDLRATIO  --   --   --  2.0 2.3    < > = values in this interval not displayed.       GFR Estimate   Date Value Ref Range Status   04/07/2021 71 >60 mL/min/[1.73_m2] Final     Comment:     Non  GFR Calc  Starting 12/18/2018, serum creatinine based estimated GFR (eGFR) will be   calculated using the Chronic Kidney Disease Epidemiology " Collaboration   (CKD-EPI) equation.     02/13/2021 76 >60 mL/min/[1.73_m2] Final     Comment:     Non  GFR Calc  Starting 12/18/2018, serum creatinine based estimated GFR (eGFR) will be   calculated using the Chronic Kidney Disease Epidemiology Collaboration   (CKD-EPI) equation.     08/31/2020 81 >60 mL/min/[1.73_m2] Final     Comment:     Non  GFR Calc  Starting 12/18/2018, serum creatinine based estimated GFR (eGFR) will be   calculated using the Chronic Kidney Disease Epidemiology Collaboration   (CKD-EPI) equation.       Hemoglobin   Date Value Ref Range Status   04/07/2021 13.8 11.7 - 15.7 g/dL Final   02/13/2021 13.7 11.7 - 15.7 g/dL Final     Lab Results   Component Value Date    A1C 5.9 08/31/2020    A1C 5.6 08/29/2019    A1C 5.9 08/23/2018    A1C 5.9 08/15/2017    A1C 6.0 03/11/2016       Procedure:  After attempts at flushing ear, did view.  There was some wax close to the meatus. I was able to scoop this out with mild discomfort with use of a currette. She noted immediate relief after removal.   TM appeared normal. Minimal amount of erythema posterior wall of ear canal and no wax present.     Reviewed ER note.            Answers for HPI/ROS submitted by the patient on 6/15/2021   If you checked off any problems, how difficult have these problems made it for you to do your work, take care of things at home, or get along with other people?: Somewhat difficult  PHQ9 TOTAL SCORE: 7  EDILSON 7 TOTAL SCORE: 7

## 2021-06-16 ASSESSMENT — PATIENT HEALTH QUESTIONNAIRE - PHQ9: SUM OF ALL RESPONSES TO PHQ QUESTIONS 1-9: 7

## 2021-06-16 ASSESSMENT — ANXIETY QUESTIONNAIRES: GAD7 TOTAL SCORE: 7

## 2021-06-30 ENCOUNTER — TELEPHONE (OUTPATIENT)
Dept: FAMILY MEDICINE | Facility: CLINIC | Age: 86
End: 2021-06-30

## 2021-06-30 NOTE — TELEPHONE ENCOUNTER
Thinking tylenol.   She could try something like aspercream or diclofenac gel. These are over the counter topicals.    I am thinking if arthritis, heat often helps. Relative rest.      I have some concern if her thumb is very swollen that there could be other things going on?

## 2021-06-30 NOTE — TELEPHONE ENCOUNTER
Adv pt of below. She said she is doing those things. She said her thumb is fine. She will reach out if anything changes.     Isha JAY RN

## 2021-06-30 NOTE — TELEPHONE ENCOUNTER
Pt calls.    She has arthritis.  On Monday, I got pain in her right arm.  It is down in her hand and fingers.  Her thumb looks like a little drumstick.  Her thumb is swollen and very painful.  She takes tylenol.      She is wondering what to do for pain?  It is painful for her with her walker.      No appointments available.  Had offered to look at another clinic.  She does not use mychart.  She is aware Dr. Lora is out of the office today.      Will forward to Dr. Lora.

## 2021-06-30 NOTE — TELEPHONE ENCOUNTER
Called to adv below. She does NOT want to be seen. Offered her a scheduled appt here or at [place] or to go to . Pt said she doesn't need to be seen. She knows it is her arthritis and just wants something for the pain. She says she gets this and then it just goes away, but she uses her right hand so much.     She is looking for pain recommendations?    Isha JAY RN

## 2021-06-30 NOTE — TELEPHONE ENCOUNTER
I am thinking she should be seen with her thumb like that. If not at a clinic, then urgent care or would [facility] have an opening?

## 2021-07-08 DIAGNOSIS — R09.89 THROAT CLEARING: ICD-10-CM

## 2021-07-08 RX ORDER — FLUTICASONE PROPIONATE 50 MCG
1 SPRAY, SUSPENSION (ML) NASAL DAILY PRN
Qty: 16 ML | Refills: 1
Start: 2021-07-08

## 2021-07-21 ENCOUNTER — LAB (OUTPATIENT)
Dept: LAB | Facility: CLINIC | Age: 86
End: 2021-07-21
Payer: COMMERCIAL

## 2021-07-21 DIAGNOSIS — Z47.1 AFTERCARE FOLLOWING JOINT REPLACEMENT: Primary | ICD-10-CM

## 2021-07-21 LAB
CRP SERPL HS-MCNC: 1.8 MG/L
ERYTHROCYTE [SEDIMENTATION RATE] IN BLOOD BY WESTERGREN METHOD: 13 MM/HR (ref 0–30)

## 2021-07-21 PROCEDURE — 85652 RBC SED RATE AUTOMATED: CPT

## 2021-07-21 PROCEDURE — 36415 COLL VENOUS BLD VENIPUNCTURE: CPT

## 2021-07-21 PROCEDURE — 86141 C-REACTIVE PROTEIN HS: CPT

## 2021-08-05 ENCOUNTER — HOSPITAL ENCOUNTER (OUTPATIENT)
Dept: NUCLEAR MEDICINE | Facility: CLINIC | Age: 86
Setting detail: NUCLEAR MEDICINE
Discharge: HOME | End: 2021-08-05
Attending: ORTHOPAEDIC SURGERY | Admitting: ORTHOPAEDIC SURGERY
Payer: COMMERCIAL

## 2021-08-05 ENCOUNTER — HOSPITAL ENCOUNTER (OUTPATIENT)
Dept: NUCLEAR MEDICINE | Facility: CLINIC | Age: 86
Setting detail: NUCLEAR MEDICINE
End: 2021-08-05
Attending: ORTHOPAEDIC SURGERY
Payer: COMMERCIAL

## 2021-08-05 DIAGNOSIS — Z96.649 S/P REVISION OF TOTAL HIP: ICD-10-CM

## 2021-08-05 DIAGNOSIS — M25.559 JOINT PAIN, HIP: ICD-10-CM

## 2021-08-05 PROCEDURE — A9503 TC99M MEDRONATE: HCPCS | Performed by: ORTHOPAEDIC SURGERY

## 2021-08-05 PROCEDURE — 343N000001 HC RX 343: Performed by: ORTHOPAEDIC SURGERY

## 2021-08-05 PROCEDURE — 78315 BONE IMAGING 3 PHASE: CPT

## 2021-08-05 RX ORDER — TC 99M MEDRONATE 20 MG/10ML
25 INJECTION, POWDER, LYOPHILIZED, FOR SOLUTION INTRAVENOUS ONCE
Status: COMPLETED | OUTPATIENT
Start: 2021-08-05 | End: 2021-08-05

## 2021-08-05 RX ADMIN — TC 99M MEDRONATE 25 MCI.: 20 INJECTION, POWDER, LYOPHILIZED, FOR SOLUTION INTRAVENOUS at 11:00

## 2021-08-11 ENCOUNTER — TRANSFERRED RECORDS (OUTPATIENT)
Dept: HEALTH INFORMATION MANAGEMENT | Facility: CLINIC | Age: 86
End: 2021-08-11

## 2021-09-03 ENCOUNTER — LAB (OUTPATIENT)
Dept: LAB | Facility: CLINIC | Age: 86
End: 2021-09-03
Payer: COMMERCIAL

## 2021-09-03 DIAGNOSIS — E78.5 HYPERLIPIDEMIA LDL GOAL <100: ICD-10-CM

## 2021-09-03 DIAGNOSIS — E03.9 HYPOTHYROIDISM, UNSPECIFIED TYPE: ICD-10-CM

## 2021-09-03 DIAGNOSIS — R73.01 IMPAIRED FASTING GLUCOSE: ICD-10-CM

## 2021-09-03 DIAGNOSIS — I10 HYPERTENSION GOAL BP (BLOOD PRESSURE) < 140/90: ICD-10-CM

## 2021-09-03 LAB — HBA1C MFR BLD: 5.8 % (ref 0–5.6)

## 2021-09-03 PROCEDURE — 36415 COLL VENOUS BLD VENIPUNCTURE: CPT

## 2021-09-03 PROCEDURE — 80061 LIPID PANEL: CPT

## 2021-09-03 PROCEDURE — 80053 COMPREHEN METABOLIC PANEL: CPT

## 2021-09-03 PROCEDURE — 83036 HEMOGLOBIN GLYCOSYLATED A1C: CPT

## 2021-09-03 PROCEDURE — 84443 ASSAY THYROID STIM HORMONE: CPT

## 2021-09-04 LAB
CHOLEST SERPL-MCNC: 139 MG/DL
FASTING STATUS PATIENT QL REPORTED: YES
HDLC SERPL-MCNC: 64 MG/DL
LDLC SERPL CALC-MCNC: 49 MG/DL
NONHDLC SERPL-MCNC: 75 MG/DL
TRIGL SERPL-MCNC: 132 MG/DL
TSH SERPL DL<=0.005 MIU/L-ACNC: 3.6 MU/L (ref 0.4–4)

## 2021-09-05 ENCOUNTER — HEALTH MAINTENANCE LETTER (OUTPATIENT)
Age: 86
End: 2021-09-05

## 2021-09-05 LAB
ALBUMIN SERPL-MCNC: 3.6 G/DL (ref 3.4–5)
ALP SERPL-CCNC: 79 U/L (ref 40–150)
ALT SERPL W P-5'-P-CCNC: 30 U/L (ref 0–50)
ANION GAP SERPL CALCULATED.3IONS-SCNC: 7 MMOL/L (ref 3–14)
AST SERPL W P-5'-P-CCNC: 27 U/L (ref 0–45)
BILIRUB SERPL-MCNC: 0.8 MG/DL (ref 0.2–1.3)
BUN SERPL-MCNC: 15 MG/DL (ref 7–30)
CALCIUM SERPL-MCNC: 9 MG/DL (ref 8.5–10.1)
CHLORIDE BLD-SCNC: 107 MMOL/L (ref 94–109)
CO2 SERPL-SCNC: 23 MMOL/L (ref 20–32)
CREAT SERPL-MCNC: 0.78 MG/DL (ref 0.52–1.04)
GFR SERPL CREATININE-BSD FRML MDRD: 70 ML/MIN/1.73M2
GLUCOSE BLD-MCNC: 100 MG/DL (ref 70–99)
POTASSIUM BLD-SCNC: 4.6 MMOL/L (ref 3.4–5.3)
PROT SERPL-MCNC: 7.4 G/DL (ref 6.8–8.8)
SODIUM SERPL-SCNC: 137 MMOL/L (ref 133–144)

## 2021-09-15 ENCOUNTER — OFFICE VISIT (OUTPATIENT)
Dept: FAMILY MEDICINE | Facility: CLINIC | Age: 86
End: 2021-09-15
Payer: COMMERCIAL

## 2021-09-15 ENCOUNTER — TELEPHONE (OUTPATIENT)
Dept: FAMILY MEDICINE | Facility: CLINIC | Age: 86
End: 2021-09-15

## 2021-09-15 VITALS
WEIGHT: 271 LBS | TEMPERATURE: 98.7 F | BODY MASS INDEX: 48.01 KG/M2 | OXYGEN SATURATION: 93 % | SYSTOLIC BLOOD PRESSURE: 106 MMHG | RESPIRATION RATE: 20 BRPM | DIASTOLIC BLOOD PRESSURE: 62 MMHG | HEART RATE: 86 BPM

## 2021-09-15 DIAGNOSIS — R06.2 WHEEZE: ICD-10-CM

## 2021-09-15 DIAGNOSIS — R05.9 COUGH: ICD-10-CM

## 2021-09-15 DIAGNOSIS — H61.21 IMPACTED CERUMEN OF RIGHT EAR: ICD-10-CM

## 2021-09-15 DIAGNOSIS — G47.33 OBSTRUCTIVE SLEEP APNEA: ICD-10-CM

## 2021-09-15 DIAGNOSIS — Z74.09 IMMOBILITY: Primary | ICD-10-CM

## 2021-09-15 DIAGNOSIS — F32.A MILD DEPRESSION: ICD-10-CM

## 2021-09-15 DIAGNOSIS — E66.01 MORBID OBESITY (H): ICD-10-CM

## 2021-09-15 DIAGNOSIS — F41.9 ANXIETY: ICD-10-CM

## 2021-09-15 DIAGNOSIS — Z23 NEED FOR PROPHYLACTIC VACCINATION AND INOCULATION AGAINST INFLUENZA: ICD-10-CM

## 2021-09-15 DIAGNOSIS — H91.93 DECREASED HEARING OF BOTH EARS: ICD-10-CM

## 2021-09-15 PROCEDURE — 99214 OFFICE O/P EST MOD 30 MIN: CPT | Mod: 25 | Performed by: FAMILY MEDICINE

## 2021-09-15 PROCEDURE — 96127 BRIEF EMOTIONAL/BEHAV ASSMT: CPT | Performed by: FAMILY MEDICINE

## 2021-09-15 PROCEDURE — 90662 IIV NO PRSV INCREASED AG IM: CPT | Performed by: FAMILY MEDICINE

## 2021-09-15 PROCEDURE — G0008 ADMIN INFLUENZA VIRUS VAC: HCPCS | Performed by: FAMILY MEDICINE

## 2021-09-15 RX ORDER — FLUOXETINE 40 MG/1
CAPSULE ORAL
Qty: 90 CAPSULE | Refills: 1 | Status: CANCELLED | OUTPATIENT
Start: 2021-09-15

## 2021-09-15 RX ORDER — ALPRAZOLAM 0.25 MG
TABLET ORAL
Qty: 10 TABLET | Refills: 0 | Status: SHIPPED | OUTPATIENT
Start: 2021-09-15 | End: 2021-11-17

## 2021-09-15 ASSESSMENT — PATIENT HEALTH QUESTIONNAIRE - PHQ9
SUM OF ALL RESPONSES TO PHQ QUESTIONS 1-9: 6
5. POOR APPETITE OR OVEREATING: MORE THAN HALF THE DAYS

## 2021-09-15 ASSESSMENT — ANXIETY QUESTIONNAIRES
2. NOT BEING ABLE TO STOP OR CONTROL WORRYING: MORE THAN HALF THE DAYS
GAD7 TOTAL SCORE: 10
7. FEELING AFRAID AS IF SOMETHING AWFUL MIGHT HAPPEN: NOT AT ALL
6. BECOMING EASILY ANNOYED OR IRRITABLE: SEVERAL DAYS
5. BEING SO RESTLESS THAT IT IS HARD TO SIT STILL: SEVERAL DAYS
1. FEELING NERVOUS, ANXIOUS, OR ON EDGE: MORE THAN HALF THE DAYS
3. WORRYING TOO MUCH ABOUT DIFFERENT THINGS: MORE THAN HALF THE DAYS

## 2021-09-15 ASSESSMENT — PAIN SCALES - GENERAL: PAINLEVEL: SEVERE PAIN (6)

## 2021-09-15 NOTE — TELEPHONE ENCOUNTER
Reason for call:  Form   Our goal is to have forms completed within 72 hours, however some forms may require a visit or additional information.     Who is the form from? Patient  Where did the form come from? Patient or family brought in     What clinic location was the form placed at? [facility]  Where was the form placed? Given to physician  What number is listed as a contact on the form? [phone]    Phone call message - patient request for a letter, form or note:     Date needed: as soon as possible  Please mail to [address], [place] [address]  Has the patient signed a consent form for release of information? Not Applicable    Additional comments:     Type of letter, form or note: disability    Phone number to reach patient:  Home number on file [phone] (home)    Best Time:  Anytime    Can we leave a detailed message on this number?  YES    Travel screening: Not Applicable

## 2021-09-15 NOTE — PROGRESS NOTES
"    Assessment & Plan     Immobility  She is working on getting some mobility assistance.  I am unable to see notes from Dr. Giordano, but can see studies.     Mild depression (H)  Stable. She does appear improved. She is now able to get out of her residence.  She had just moved into her new residence shortly after her   and then the pandemic started. This was very difficult for her as she was not able to get out with family.   She is now able to do this.   Score looks stable.    Anxiety  Reviewed . She has used < 10 in 3 months. Will give refill.   Score looks perhaps increased; continue to follow. She is on fluoxetine as well.  - ALPRAZolam (XANAX) 0.25 MG tablet; TAKE ONE TABLET BY MOUTH TWICE A DAY AS NEEDED FOR ANXIETY. DON'T TAKE WITH OXYCODONE.    Cough  Refilling.   - beclomethasone HFA (QVAR REDIHALER) 80 MCG/ACT inhaler; Inhale 1 puff into the lungs 2 times daily    Wheeze  Refilling.   - beclomethasone HFA (QVAR REDIHALER) 80 MCG/ACT inhaler; Inhale 1 puff into the lungs 2 times daily    Decreased hearing of both ears  She is interested in seeing ENT  - Otolaryngology Referral; Future    Impacted cerumen of right ear  She was more interested in seeing if her left ear was plugged; it was not.     Obstructive sleep apnea  Doing better with current machine.  She does follow with Dr. Adamson.     Need for prophylactic vaccination and inoculation against influenza    - ADMIN INFLUENZA (For MEDICARE Patients ONLY) []  - INFLUENZA, QUAD, HIGH DOSE, PF, 65YR + (FLUZONE HD)    Morbid obesity (H)  Encourage healthy lifestyle.                BMI:   Estimated body mass index is 48.01 kg/m  as calculated from the following:    Height as of 6/15/21: 1.6 m (5' 3\").    Weight as of this encounter: 122.9 kg (271 lb).           Return in about 7 weeks (around 11/3/2021), or if symptoms worsen or fail to improve, for Establish care (has appointment).    Yessica Lora MD, MD  [facility] " "ISAAC  (Dr. Cutler at [facility]); close to where she lives.     Subjective   Everette is a 86 year old who presents for the following health issues     HPI     Depression and Anxiety Follow-Up    How are you doing with your depression since your last visit? \"up and down\"    How are you doing with your anxiety since your last visit?  Improved     Are you having other symptoms that might be associated with depression or anxiety? No    Have you had a significant life event? No     Do you have any concerns with your use of alcohol or other drugs? No    Social History     Tobacco Use     Smoking status: Never Smoker     Smokeless tobacco: Never Used   Vaping Use     Vaping Use: Never used   Substance Use Topics     Alcohol use: Yes     Alcohol/week: 0.0 - 0.8 standard drinks     Comment: 1 glass wine weekly     Drug use: Never     PHQ 7/7/2020 12/10/2020 6/15/2021   PHQ-9 Total Score 8 6 7   Q9: Thoughts of better off dead/self-harm past 2 weeks Not at all Not at all Not at all     EIDLSON-7 SCORE 7/7/2020 12/10/2020 6/15/2021   Total Score - - -   Total Score - - 7 (mild anxiety)   Total Score 5 5 7   Total Score - - -         Suicide Assessment Five-step Evaluation and Treatment (SAFE-T)      How many servings of fruits and vegetables do you eat daily?  2-3    On average, how many sweetened beverages do you drink each day (Examples: soda, juice, sweet tea, etc.  Do NOT count diet or artificially sweetened beverages)?   0    How many days per week do you exercise enough to make your heart beat faster? 3 or less    How many minutes a day do you exercise enough to make your heart beat faster? 9 or less    How many days per week do you miss taking your medication? 0        See under ROS     Patient Active Problem List   Diagnosis     Obesity     Hypothyroidism     Benign neoplasm of colon     Obstructive sleep apnea     * * * SBE PROPHYLAXIS * * *     Degeneration of lumbar or lumbosacral intervertebral disc     Pain in joint, " ankle and foot     Arthrodesis status     HYPERLIPIDEMIA LDL GOAL <100     Hypertension goal BP (blood pressure) < 140/90     Health Care Home     Osteopenia     Impaired fasting glucose     ACP (advance care planning)     History of melanoma     Panic disorder without agoraphobia     Arthritis     PONV (postoperative nausea and vomiting)     S/P total hip arthroplasty     Constipation     DVT prophylaxis     Anticoagulation management encounter     Physical deconditioning     Periprosthetic fracture around internal prosthetic left hip joint (H)     Chronic pain syndrome - hips     Fracture of greater trochanter of right femur 11/6/2016, closed, with routine healing, subsequent encounter     Anxiety     Osteoarthritis of multiple joints, unspecified osteoarthritis type     Anemia due to blood loss, acute     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     Long term (current) use of anticoagulants     Vitamin D deficiency     Pneumonia     Elevated BMI (H)     CHF (congestive heart failure) (H)     Mild depression (H)     Chronic diastolic heart failure (H)     Neuropathy     Spinal stenosis of lumbar region, unspecified whether neurogenic claudication present       Current Outpatient Medications   Medication Sig Dispense Refill     acetaminophen (TYLENOL) 650 MG CR tablet Take 1 tablet (650 mg) by mouth every 8 hours as needed for mild pain or fever       albuterol (PROAIR HFA/PROVENTIL HFA/VENTOLIN HFA) 108 (90 Base) MCG/ACT inhaler INHALE 2 PUFFS INTO THE LUNGS EVERY 4 HOURS AS NEEDED FOR SHORTNESS OF BREATH OR DIFFICULT BREATHING OR WHEEZING 8.5 g 0     ALPRAZolam (XANAX) 0.25 MG tablet TAKE ONE TABLET BY MOUTH TWICE A DAY AS NEEDED FOR ANXIETY. DON'T TAKE WITH OXYCODONE. 10 tablet 0     amLODIPine (NORVASC) 5 MG tablet TAKE 1 TABLET BY MOUTH EVERY DAY 90 tablet 0     beclomethasone HFA (QVAR REDIHALER) 80 MCG/ACT inhaler Inhale 1 puff into the lungs 2 times daily 10.6 g 3     diclofenac (VOLTAREN) 1 %  topical gel Apply 2 g topically 4 times daily Left shoulder. 3 Tube 1     FLUoxetine (PROZAC) 20 MG capsule Take 1 capsule (20 mg) by mouth daily To take along with other dose to equal 60 mg 90 capsule 1     FLUoxetine (PROZAC) 40 MG capsule To take along with other dose to equal 60 mg 90 capsule 1     fluticasone (FLONASE) 50 MCG/ACT nasal spray Spray 1 spray into both nostrils daily as needed for rhinitis 16 g 1     furosemide (LASIX) 40 MG tablet Take 1 tablet (40 mg) by mouth daily 90 tablet 1     gabapentin (NEURONTIN) 300 MG capsule TAKE ONE CAPSULE BY MOUTH EVERY MORNING AND AT NOON, AND TAKE TWO CAPSULES EVERY NIGHT 360 capsule 1     levothyroxine (SYNTHROID/LEVOTHROID) 88 MCG tablet Take 1 tablet (88 mcg) by mouth daily 90 tablet 2     nystatin (NYSTOP) 181339 UNIT/GM external powder Apply tid to affectead area prn 60 g 1     simvastatin (ZOCOR) 10 MG tablet Take 1 tablet (10 mg) by mouth At Bedtime 90 tablet 3     triamcinolone (KENALOG) 0.1 % cream Apply sparingly to affected area two times daily as needed for itching. 30 g 0     ACE/ARB/ARNI NOT PRESCRIBED (INTENTIONAL) Please choose reason not prescribed, below       benzonatate (TESSALON) 100 MG capsule Take 1 capsule (100 mg) by mouth 3 times daily as needed for cough 42 capsule 0     BETA BLOCKER NOT PRESCRIBED (INTENTIONAL) Beta Blocker not prescribed intentionally due to EF > 40 % (ejection fraction) will leave up to Cardiology. (Patient not taking: Reported on 6/15/2021)       calcium-vitamin D (CALCIUM 600 + D) 600-400 MG-UNIT per tablet Take 1 tablet by mouth every evening  100 tablet 3     order for DME Equipment being ordered: left breast prosthesis. Mastectomy bras (up to 4) 4 Device 0       Review of Systems   CONSTITUTIONAL:NEGATIVE for fever, chills, change in weight  ENT/MOUTH: NEGATIVE for ear, mouth and throat problems  RESP:NEGATIVE for significant cough or change in her SOB  CV: NEGATIVE for chest pain, palpitations  MUSCULOSKELETAL:  see below  PSYCHIATRIC: NEGATIVE for changes in mood or affect    She notes her current biggest limitation for quality of life is the limitation of her mobility.  Has been working with Dr. Giordano on left hip pain.  Not anticipating any surgery.   Will get a light motorized wheelchair. Can do a walker for a short walk. Family is helping with this.    She believes left ear may be filled with wax; would like me to check.    Notes will use anxiety pills only when necessary.  Did use one today.  She did well with the 10 she was last given; still has a few left.     Got a new bipap machine. Hers had been old.   The sensation in her throat is almost gone. She notes her breathing is better too. Perhaps her eyesight is even better as well.     She notes she needs a mammogram soon.     Moodwise, she notes she is up and down; getting better coming out of the pandemic.  Mobilitity is her biggest issue.   She notes ongoing right hand arthritis.     Short of breath has improved some, but still there.     Wears pad/ will leak while getting up.        Objective    /62 (BP Location: Right arm, Patient Position: Sitting, Cuff Size: Adult Large)   Pulse 86   Temp 98.7  F (37.1  C) (Oral)   Resp 20   Wt 122.9 kg (271 lb)   SpO2 93%   BMI 48.01 kg/m    Body mass index is 48.01 kg/m .  Physical Exam   GENERAL APPEARANCE: alert and no distress  EYES: Eyes grossly normal to inspection, PERRL and conjunctivae and sclerae normal  HENT: ear canals and TM's normal and nose and mouth without ulcers or lesions x right TM partially obscured by cerumen.  RESP: lungs clear to auscultation - no rales, rhonchi or wheezes  CV: regular rates and rhythm  PSYCH: mentation appears normal and affect normal/bright    PHQ-9 SCORE 12/10/2020 6/15/2021 9/15/2021   PHQ-9 Total Score - - -   PHQ-9 Total Score MyChart - 7 (Mild depression) -   PHQ-9 Total Score 6 7 6       EDILSON-7 SCORE 12/10/2020 6/15/2021 9/15/2021   Total Score - - -   Total Score - 7  (mild anxiety) -   Total Score 5 7 10   Total Score - - -         Recent bone scan.  IMPRESSION: Abnormal increased uptake at the tip of the left femoral  component and less prominently surrounding the remainder of the  component suspicious for loosening.     Did not see a recent note from Dr. Giordano.

## 2021-09-16 ASSESSMENT — ANXIETY QUESTIONNAIRES: GAD7 TOTAL SCORE: 10

## 2021-09-16 NOTE — TELEPHONE ENCOUNTER
Patient states that she recently moved out of her house into an apartment and during the move it got lost.  She now needs a new one.    Belinda Galeas RN

## 2021-09-17 NOTE — TELEPHONE ENCOUNTER
Copy has been made to send to abstracting , per patient request this will be mailed to her.  Form has been put in the mail.      Sarah Beth Lucero

## 2021-11-01 ENCOUNTER — HOSPITAL ENCOUNTER (EMERGENCY)
Facility: CLINIC | Age: 86
Discharge: HOME | End: 2021-11-01
Payer: COMMERCIAL

## 2021-11-01 VITALS
RESPIRATION RATE: 28 BRPM | SYSTOLIC BLOOD PRESSURE: 128 MMHG | DIASTOLIC BLOOD PRESSURE: 66 MMHG | OXYGEN SATURATION: 95 % | TEMPERATURE: 98.2 F | HEART RATE: 80 BPM

## 2021-11-01 NOTE — ED TRIAGE NOTES
Pt presents with bilateral feet swelling since 10/29. Pt began taking 40 mg lasix daily for the last two days. Denies chest pain, endorses increases shortness of breath.

## 2021-11-02 LAB
ATRIAL RATE - MUSE: 75 BPM
DIASTOLIC BLOOD PRESSURE - MUSE: NORMAL MMHG
INTERPRETATION ECG - MUSE: NORMAL
P AXIS - MUSE: 50 DEGREES
PR INTERVAL - MUSE: 186 MS
QRS DURATION - MUSE: 96 MS
QT - MUSE: 430 MS
QTC - MUSE: 480 MS
R AXIS - MUSE: -17 DEGREES
SYSTOLIC BLOOD PRESSURE - MUSE: NORMAL MMHG
T AXIS - MUSE: 30 DEGREES
VENTRICULAR RATE- MUSE: 75 BPM

## 2021-11-04 ENCOUNTER — OFFICE VISIT (OUTPATIENT)
Dept: PEDIATRICS | Facility: CLINIC | Age: 86
End: 2021-11-04
Payer: COMMERCIAL

## 2021-11-04 ENCOUNTER — ANCILLARY PROCEDURE (OUTPATIENT)
Dept: GENERAL RADIOLOGY | Facility: CLINIC | Age: 86
End: 2021-11-04
Attending: INTERNAL MEDICINE
Payer: COMMERCIAL

## 2021-11-04 VITALS
SYSTOLIC BLOOD PRESSURE: 112 MMHG | HEART RATE: 91 BPM | OXYGEN SATURATION: 96 % | TEMPERATURE: 98.6 F | WEIGHT: 270 LBS | RESPIRATION RATE: 18 BRPM | BODY MASS INDEX: 47.83 KG/M2 | DIASTOLIC BLOOD PRESSURE: 60 MMHG

## 2021-11-04 DIAGNOSIS — M79.89 SWELLING OF LOWER LEG: Primary | ICD-10-CM

## 2021-11-04 DIAGNOSIS — Z00.00 ENCOUNTER FOR MEDICAL EXAMINATION TO ESTABLISH CARE: ICD-10-CM

## 2021-11-04 DIAGNOSIS — I50.32 CHRONIC DIASTOLIC HEART FAILURE (H): ICD-10-CM

## 2021-11-04 DIAGNOSIS — R06.02 SHORTNESS OF BREATH: ICD-10-CM

## 2021-11-04 DIAGNOSIS — M79.89 SWELLING OF LOWER LEG: ICD-10-CM

## 2021-11-04 PROBLEM — E66.01 SEVERE OBESITY (BMI >= 40) (H): Status: ACTIVE | Noted: 2020-06-20

## 2021-11-04 LAB — NT-PROBNP SERPL-MCNC: 75 PG/ML (ref 0–450)

## 2021-11-04 PROCEDURE — 96127 BRIEF EMOTIONAL/BEHAV ASSMT: CPT | Performed by: INTERNAL MEDICINE

## 2021-11-04 PROCEDURE — 80053 COMPREHEN METABOLIC PANEL: CPT | Performed by: INTERNAL MEDICINE

## 2021-11-04 PROCEDURE — 83880 ASSAY OF NATRIURETIC PEPTIDE: CPT | Performed by: INTERNAL MEDICINE

## 2021-11-04 PROCEDURE — 99214 OFFICE O/P EST MOD 30 MIN: CPT | Performed by: INTERNAL MEDICINE

## 2021-11-04 PROCEDURE — 36415 COLL VENOUS BLD VENIPUNCTURE: CPT | Performed by: INTERNAL MEDICINE

## 2021-11-04 PROCEDURE — 71046 X-RAY EXAM CHEST 2 VIEWS: CPT | Performed by: RADIOLOGY

## 2021-11-04 RX ORDER — BECLOMETHASONE DIPROPIONATE HFA 80 UG/1
1 AEROSOL, METERED RESPIRATORY (INHALATION) 2 TIMES DAILY
Qty: 10.6 G | Refills: 3 | Status: SHIPPED | OUTPATIENT
Start: 2021-11-04 | End: 2021-11-17

## 2021-11-04 ASSESSMENT — PATIENT HEALTH QUESTIONNAIRE - PHQ9
SUM OF ALL RESPONSES TO PHQ QUESTIONS 1-9: 11
SUM OF ALL RESPONSES TO PHQ QUESTIONS 1-9: 11
10. IF YOU CHECKED OFF ANY PROBLEMS, HOW DIFFICULT HAVE THESE PROBLEMS MADE IT FOR YOU TO DO YOUR WORK, TAKE CARE OF THINGS AT HOME, OR GET ALONG WITH OTHER PEOPLE: VERY DIFFICULT

## 2021-11-04 NOTE — PROGRESS NOTES
"  Assessment & Plan     Encounter for medical examination to establish care  Addressed swelling primarily today. Will follow up again in 2 weeks and complete the establish care visit.     Swelling of lower leg  Chronic diastolic heart failure (H)  With diagnosis of CHF (diastolic), no significant recent changes. Had not been on lasix for many months.  Had EKG 3 days ago in the ED, NSR, no atrial fibrillation. Will re-check labs today and repeat echo. Continue 40 mg lasix daily   - BNP-N terminal pro; Future  - Comprehensive metabolic panel (BMP + Alb, Alk Phos, ALT, AST, Total. Bili, TP); Future  - Echocardiogram Complete; Future  - XR Chest 2 Views; Future  - BNP-N terminal pro  - Comprehensive metabolic panel (BMP + Alb, Alk Phos, ALT, AST, Total. Bili, TP)      Shortness of breath  Had not started qvar inhaler yet.  Willing to try although it is expensive.   - beclomethasone HFA (QVAR REDIHALER) 80 MCG/ACT inhaler; Inhale 1 puff into the lungs 2 times daily             BMI:   Estimated body mass index is 47.83 kg/m  as calculated from the following:    Height as of 6/15/21: 1.6 m (5' 3\").    Weight as of this encounter: 122.5 kg (270 lb).   Weight management plan: Not discussed today.         Return in about 2 weeks (around 11/18/2021) for Follow up.    Stella Cutler MD  [facility] NEGIN Gaviria is a 86 year old who presents for the following health issues     HPI     Pt would like to establish care as her previous provider retired.     She is most concerned today about her LE swelling, which is new. Pt states she has had swollen feet for a week and was seen in the ER on 11/1/21, she ended up leaving before getting evaluated. Since the swelling in her feet she has not been able to sleep, this is unusual for her. Has a diagnosis of diastolic CHF.     The foot swelling is a new problem for her, for the past week. Used to take lasix years ago. Stopped it because of going to the bathroom " too much. Started it again last Saturday (5 days ago), 40 mg daily. Today a bit better because she can get her shoes on.     Since the swelling started she is not sleeping well. Used to be a great sleeper. Has a CPAP, has some chronic shortness of breath.  Got a new CPAP and this changed her breathing. Has phlegm in her throat in the morning. Also short of breath with exertion.     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     Moved to a Seniors apartment.  , moved to the facility when the pandemic started so has been shut up and feeling down about that. It has assisted living available but she is in independent living.             Review of Systems   Constitutional, HEENT, cardiovascular, pulmonary, gi and gu systems are negative, except as otherwise noted.      Objective    /60   Pulse 91   Temp 98.6  F (37  C) (Temporal)   Resp 18   Wt 122.5 kg (270 lb)   SpO2 96%   BMI 47.83 kg/m    Body mass index is 47.83 kg/m .  Physical Exam   GENERAL: healthy, alert and no distress, obese  EYES: Eyes grossly normal to inspection, PERRL and conjunctivae and sclerae normal  NECK: no adenopathy, no asymmetry, masses, or scars and thyroid normal to palpation  RESP: lungs clear to auscultation - no rales, rhonchi or wheezes  CV: regular rate and rhythm, normal S1 S2, no S3 or S4, no murmur, click or rub, no peripheral edema and peripheral pulses strong  MS: there is 3+ pitting edema in the feet bilaterally, no erythema, no significant edema in the lower leg  SKIN: no suspicious lesions or rashes  NEURO: Normal strength and tone, mentation intact and speech normal  PSYCH: mentation appears normal, affect normal/bright                Answers for HPI/ROS submitted by the patient on 2021  If you checked off any problems, how difficult have these problems made it for you to do your work, take care of things at home, or get along with other people?: Very difficult  PHQ9  TOTAL SCORE: 11

## 2021-11-05 LAB
ALBUMIN SERPL-MCNC: 3.8 G/DL (ref 3.4–5)
ALP SERPL-CCNC: 103 U/L (ref 40–150)
ALT SERPL W P-5'-P-CCNC: 24 U/L (ref 0–50)
ANION GAP SERPL CALCULATED.3IONS-SCNC: 7 MMOL/L (ref 3–14)
AST SERPL W P-5'-P-CCNC: 22 U/L (ref 0–45)
BILIRUB SERPL-MCNC: 0.7 MG/DL (ref 0.2–1.3)
BUN SERPL-MCNC: 19 MG/DL (ref 7–30)
CALCIUM SERPL-MCNC: 9.3 MG/DL (ref 8.5–10.1)
CHLORIDE BLD-SCNC: 102 MMOL/L (ref 94–109)
CO2 SERPL-SCNC: 28 MMOL/L (ref 20–32)
CREAT SERPL-MCNC: 0.72 MG/DL (ref 0.52–1.04)
GFR SERPL CREATININE-BSD FRML MDRD: 77 ML/MIN/1.73M2
GLUCOSE BLD-MCNC: 101 MG/DL (ref 70–99)
POTASSIUM BLD-SCNC: 3.7 MMOL/L (ref 3.4–5.3)
PROT SERPL-MCNC: 7.7 G/DL (ref 6.8–8.8)
SODIUM SERPL-SCNC: 137 MMOL/L (ref 133–144)

## 2021-11-05 ASSESSMENT — PATIENT HEALTH QUESTIONNAIRE - PHQ9: SUM OF ALL RESPONSES TO PHQ QUESTIONS 1-9: 11

## 2021-11-16 ENCOUNTER — TELEPHONE (OUTPATIENT)
Dept: PEDIATRICS | Facility: CLINIC | Age: 86
End: 2021-11-16
Payer: COMMERCIAL

## 2021-11-16 DIAGNOSIS — R06.02 SOB (SHORTNESS OF BREATH): ICD-10-CM

## 2021-11-16 DIAGNOSIS — E03.9 HYPOTHYROIDISM, UNSPECIFIED TYPE: ICD-10-CM

## 2021-11-16 DIAGNOSIS — E78.5 HYPERLIPIDEMIA LDL GOAL <100: ICD-10-CM

## 2021-11-16 DIAGNOSIS — M15.9 OSTEOARTHRITIS OF MULTIPLE JOINTS, UNSPECIFIED OSTEOARTHRITIS TYPE: ICD-10-CM

## 2021-11-16 DIAGNOSIS — I10 BENIGN ESSENTIAL HYPERTENSION: ICD-10-CM

## 2021-11-16 DIAGNOSIS — R09.89 THROAT CLEARING: ICD-10-CM

## 2021-11-16 DIAGNOSIS — R06.02 SHORTNESS OF BREATH: ICD-10-CM

## 2021-11-16 DIAGNOSIS — M51.369 DDD (DEGENERATIVE DISC DISEASE), LUMBAR: ICD-10-CM

## 2021-11-16 DIAGNOSIS — F41.9 ANXIETY: ICD-10-CM

## 2021-11-16 DIAGNOSIS — I50.32 CHRONIC DIASTOLIC HEART FAILURE (H): ICD-10-CM

## 2021-11-16 DIAGNOSIS — F32.A MILD DEPRESSION: ICD-10-CM

## 2021-11-16 NOTE — TELEPHONE ENCOUNTER
Pt LM at 11:49 today & requested a call back at [phone]. She has some questions on her medication & would like to cancel her upcoming appointment.     Called pt back to get details. Pt has been using [facility] pharmacy in [place] now. Would like to transfer all her chronic medications to [facility] in [place] for convenience.     Went over he med list & pended the meds that she requested to switch. Pt recently est care with Dr.Emily Cutler on 11/4/21, we haven't started to refill meds for her yet. Pended meds & routing to Dr.Emily Cutler to review & sign rx that are appropriate. Pt takes tylenol 650 mg bid with one additional dose in the afternoon as prn. Thanks.     Also, because of her upcoming appointments such as mammogram, ECHO & eye check, she would like to postpone her f/u with Dr.Emily Cutler to the middle of Dec, if Dr.Emily Cutler agrees. Currently she has an appointment with Dr.Emily Cutler tomorrow. Please advise.     If reschedling is ok, please route this message to TC to help reschedule. If not, we need to let her know to come in for her appointment tomorrow. Thanks.     Mela, RN  Patient Advocate Liason (PAL)  [facility]

## 2021-11-17 RX ORDER — ALPRAZOLAM 0.25 MG
TABLET ORAL
Qty: 10 TABLET | Refills: 0 | Status: SHIPPED | OUTPATIENT
Start: 2021-11-17 | End: 2022-03-09

## 2021-11-17 RX ORDER — SENNOSIDES 8.6 MG
650 CAPSULE ORAL EVERY 8 HOURS PRN
Qty: 270 TABLET | Refills: 1 | Status: ON HOLD | OUTPATIENT
Start: 2021-11-17 | End: 2023-03-01

## 2021-11-17 RX ORDER — ALBUTEROL SULFATE 90 UG/1
AEROSOL, METERED RESPIRATORY (INHALATION)
Qty: 8.5 G | Refills: 0 | Status: SHIPPED | OUTPATIENT
Start: 2021-11-17

## 2021-11-17 RX ORDER — FUROSEMIDE 40 MG
40 TABLET ORAL DAILY
Qty: 90 TABLET | Refills: 1 | Status: SHIPPED | OUTPATIENT
Start: 2021-11-17 | End: 2021-11-22 | Stop reason: DRUGHIGH

## 2021-11-17 RX ORDER — FLUTICASONE PROPIONATE 50 MCG
1 SPRAY, SUSPENSION (ML) NASAL DAILY PRN
Qty: 16 G | Refills: 1 | Status: SHIPPED | OUTPATIENT
Start: 2021-11-17 | End: 2021-12-22

## 2021-11-17 RX ORDER — SIMVASTATIN 10 MG
10 TABLET ORAL AT BEDTIME
Qty: 90 TABLET | Refills: 3 | Status: SHIPPED | OUTPATIENT
Start: 2021-11-17 | End: 2023-01-23

## 2021-11-17 RX ORDER — LEVOTHYROXINE SODIUM 88 UG/1
88 TABLET ORAL DAILY
Qty: 90 TABLET | Refills: 2 | Status: SHIPPED | OUTPATIENT
Start: 2021-11-17 | End: 2022-08-24

## 2021-11-17 RX ORDER — FLUOXETINE 40 MG/1
CAPSULE ORAL
Qty: 90 CAPSULE | Refills: 1 | Status: SHIPPED | OUTPATIENT
Start: 2021-11-17 | End: 2022-08-12

## 2021-11-17 RX ORDER — BECLOMETHASONE DIPROPIONATE HFA 80 UG/1
1 AEROSOL, METERED RESPIRATORY (INHALATION) 2 TIMES DAILY
Qty: 10.6 G | Refills: 3 | Status: SHIPPED | OUTPATIENT
Start: 2021-11-17 | End: 2022-03-15 | Stop reason: ALTCHOICE

## 2021-11-17 RX ORDER — AMLODIPINE BESYLATE 5 MG/1
5 TABLET ORAL DAILY
Qty: 90 TABLET | Refills: 1 | Status: SHIPPED | OUTPATIENT
Start: 2021-11-17 | End: 2022-07-14

## 2021-11-17 RX ORDER — GABAPENTIN 300 MG/1
CAPSULE ORAL
Qty: 360 CAPSULE | Refills: 1 | Status: SHIPPED | OUTPATIENT
Start: 2021-11-17 | End: 2022-05-19

## 2021-11-17 NOTE — TELEPHONE ENCOUNTER
Prescriptions are sent. OK to reschedule, would be helpful to have echo results before we meet again.  My schedule will be mostly virtual in December so she could meet with me virtually or we could schedule her to see someone else in clinic.     Stella Cutler MD   Internal Medicine - Pediatrics

## 2021-11-17 NOTE — TELEPHONE ENCOUNTER
Left voicemail asking patient to call me back directly.     Routing to MA/TC Team - patient likely will cancel her appointment tomorrow but I couldn't get a hold of her. Can you please call her in the morning if she doesn't call back?  Thank you!

## 2021-11-18 NOTE — TELEPHONE ENCOUNTER
Called patient and informed her that her prescriptions were sent to her pharmacy. We cancelled her appointment for today with Dr Cutler and then rescheduled her for a virtual appointment with Dr Cutler on 12/21. Patient had no further questions.

## 2021-11-19 ENCOUNTER — TELEPHONE (OUTPATIENT)
Dept: PEDIATRICS | Facility: CLINIC | Age: 86
End: 2021-11-19
Payer: COMMERCIAL

## 2021-11-19 DIAGNOSIS — I50.32 CHRONIC DIASTOLIC HEART FAILURE (H): Primary | ICD-10-CM

## 2021-11-19 NOTE — TELEPHONE ENCOUNTER
----- Message from Stella Cutler MD sent at 11/19/2021  1:31 PM CST -----  Since we cancelled the appointment for yesterday, and her swelling has resolved. Please ask her to decrease furosemide to 20 mg daily and to monitor for changes in her swelling.     Let us know if there is a change in her swelling or shortness of breath.     Stella Cutler MD   Internal Medicine - Pediatrics

## 2021-11-19 NOTE — TELEPHONE ENCOUNTER
Attempted to call patient, left detailed message with provider message below. Advised to call back if swelling or SOB returns or if there is any questions about medication change.

## 2021-11-22 RX ORDER — FUROSEMIDE 20 MG
20 TABLET ORAL DAILY
Qty: 90 TABLET | Refills: 1 | Status: SHIPPED | OUTPATIENT
Start: 2021-11-22 | End: 2022-08-02

## 2021-11-22 NOTE — TELEPHONE ENCOUNTER
Called pt back. She verbalizes that she got the message & started taking lasix 20 mg. Feeling good & leg swelling has been much better. Agrees to monitor closely & reach us back with any SOB, increased swelling or any other concerns.    Mela RN  Patient Advocate Liason (PAL)  [facility]

## 2021-11-22 NOTE — TELEPHONE ENCOUNTER
Called lasix 40 mg from the med list, sent a new rx for lasix 20 mg 90 tabs with 1 extra refill to the pharmacy. Also, called pharmacy & left detailed message regarding the dose change to pharmacist.     Mela RN  Patient Advocate Liason (PAL)  [facility]

## 2021-11-23 ENCOUNTER — HOSPITAL ENCOUNTER (OUTPATIENT)
Dept: CARDIOLOGY | Facility: CLINIC | Age: 86
Discharge: HOME | End: 2021-11-23
Attending: INTERNAL MEDICINE | Admitting: INTERNAL MEDICINE
Payer: COMMERCIAL

## 2021-11-23 DIAGNOSIS — I50.32 CHRONIC DIASTOLIC HEART FAILURE (H): ICD-10-CM

## 2021-11-23 DIAGNOSIS — M79.89 SWELLING OF LOWER LEG: ICD-10-CM

## 2021-11-23 LAB — LVEF ECHO: NORMAL

## 2021-11-23 PROCEDURE — 93306 TTE W/DOPPLER COMPLETE: CPT

## 2021-11-23 PROCEDURE — 93306 TTE W/DOPPLER COMPLETE: CPT | Mod: 26 | Performed by: INTERNAL MEDICINE

## 2021-12-21 ENCOUNTER — VIRTUAL VISIT (OUTPATIENT)
Dept: PEDIATRICS | Facility: CLINIC | Age: 86
End: 2021-12-21
Payer: COMMERCIAL

## 2021-12-21 DIAGNOSIS — R06.02 SHORTNESS OF BREATH: Primary | ICD-10-CM

## 2021-12-21 DIAGNOSIS — G89.29 CHRONIC LEFT SHOULDER PAIN: ICD-10-CM

## 2021-12-21 DIAGNOSIS — L30.4 INTERTRIGO: ICD-10-CM

## 2021-12-21 DIAGNOSIS — M25.512 CHRONIC LEFT SHOULDER PAIN: ICD-10-CM

## 2021-12-21 PROCEDURE — 99215 OFFICE O/P EST HI 40 MIN: CPT | Mod: 95 | Performed by: INTERNAL MEDICINE

## 2021-12-21 RX ORDER — NYSTATIN 100000 [USP'U]/G
POWDER TOPICAL
Qty: 60 G | Refills: 3 | Status: SHIPPED | OUTPATIENT
Start: 2021-12-21

## 2021-12-21 NOTE — PROGRESS NOTES
Everette is a 86 year old who is being evaluated via a billable video visit.      How would you like to obtain your AVS? MyChart  If the video visit is dropped, the invitation should be resent by: Text to cell phone: [phone]  Will anyone else be joining your video visit? No      Video Start Time: 1:20    Assessment & Plan     Shortness of breath  Improved with qvar, but has GI side effects.  Can try switching to similar. Also encouraged her to call insurance to find out what similar medication is covered.   - mometasone (ASMANEX TWISTHALER) 110 MCG/INH inhaler; Inhale 1 puff into the lungs every evening    Intertrigo  Refill.   - nystatin (NYSTOP) 300606 UNIT/GM external powder; Apply tid to affectead area prn    Chronic left shoulder pain  Refill.   - diclofenac (VOLTAREN) 1 % topical gel; Apply 4 g topically 4 times daily as needed for moderate pain      44 minutes spent on the date of the encounter doing chart review, history and exam, documentation and further activities per the note           Return in about 3 months (around 3/21/2022) for Follow up.    Stella Cutler MD  [facility] NEGIN    Subjective   Everette is a 86 year old who presents for the following health issues     HPI     Everette had been experiencing increased swelling in the lower extremities as well as shortness of breath at our last visit visit on 11/4.  We prescribed qvar at that time, and got a repeat Echo to assess for worsening HF and PFTs also.  Echo was normal and PFTs no significant change (she does have reduced FEV).  The Qvar does seem to be helping her breathing quite a bit but she feels it is causing GI discomfort. It sounds like a lot of rumbling down there, and also she has mucus stool.  On days she skips qvar, the symptoms go away.     We continued 40 mg lasix daily for a week or two, her swelling improved significantly.  She feels the swelling is generally due to her arthritis.     Her breathing has improved since starting  qvar medication. She used to have a lot of mucus in her throat and that is improved. Still uses CPAP, her old machine was recalled and less mucus now with the new machine.     She has been asked to follow up with pulmonology (sleep doctor). She will get this rescheduled.     Also is interested in refill of nystatin powder, and diclofenac gel.     Her mood has struggled a bit. She feels more anxious than normal, but holidays are hard. She recently lost her sister in law.  She still takes 60 mg fluoxetine daily, declines therapy but has good relationship with a  at her [employer].           Review of Systems   Constitutional, HEENT, cardiovascular, pulmonary, gi and gu systems are negative, except as otherwise noted.      Objective           Vitals:  No vitals were obtained today due to virtual visit.    Physical Exam   GENERAL: Healthy, alert and no distress  RESP: No audible wheeze, cough.  NEURO: Mentation and speech appropriate for age.  PSYCH: Mentation appears normal, affect normal/bright, judgement and insight intact, normal speech and appearance well-groomed.                Video-Visit Details    Type of service:  Video Visit    Video End Time:1:58    Originating Location (pt. Location): Home    Distant Location (provider location):  [facility]     Platform used for Video Visit: RocketBolt

## 2021-12-22 DIAGNOSIS — R09.89 THROAT CLEARING: ICD-10-CM

## 2021-12-22 RX ORDER — FLUTICASONE PROPIONATE 50 MCG
1 SPRAY, SUSPENSION (ML) NASAL DAILY PRN
Qty: 16 G | Refills: 3 | Status: SHIPPED | OUTPATIENT
Start: 2021-12-22 | End: 2023-05-15

## 2021-12-22 NOTE — TELEPHONE ENCOUNTER
Prescription approved per [facility], [facility] or [facility] refill protocol.  Jailene DENNIS - Registered Nurse  [facility]  Acute and Diagnostic Services

## 2022-01-10 ENCOUNTER — OFFICE VISIT (OUTPATIENT)
Dept: OPTOMETRY | Facility: CLINIC | Age: 87
End: 2022-01-10
Payer: COMMERCIAL

## 2022-01-10 DIAGNOSIS — H52.203 MYOPIA OF BOTH EYES WITH ASTIGMATISM: Primary | ICD-10-CM

## 2022-01-10 DIAGNOSIS — H04.129 DRY EYE: ICD-10-CM

## 2022-01-10 DIAGNOSIS — H52.13 MYOPIA OF BOTH EYES WITH ASTIGMATISM: Primary | ICD-10-CM

## 2022-01-10 DIAGNOSIS — Z96.1 PSEUDOPHAKIA OF BOTH EYES: ICD-10-CM

## 2022-01-10 DIAGNOSIS — H10.13 ALLERGIC CONJUNCTIVITIS OF BOTH EYES: ICD-10-CM

## 2022-01-10 PROCEDURE — 92015 DETERMINE REFRACTIVE STATE: CPT | Performed by: OPTOMETRIST

## 2022-01-10 PROCEDURE — 92004 COMPRE OPH EXAM NEW PT 1/>: CPT | Performed by: OPTOMETRIST

## 2022-01-10 RX ORDER — OLOPATADINE HYDROCHLORIDE 1 MG/ML
1 SOLUTION/ DROPS OPHTHALMIC 2 TIMES DAILY
Qty: 5 ML | Refills: 12 | Status: SHIPPED | OUTPATIENT
Start: 2022-01-10 | End: 2023-02-25

## 2022-01-10 ASSESSMENT — VISUAL ACUITY
OS_CC+: -1
CORRECTION_TYPE: GLASSES
OD_SC: 20/70
OD_CC: 20/150
METHOD: SNELLEN - LINEAR
OS_SC: 20/50-1
OS_CC: 20/50
OD_SC: 20/50
OS_SC+: -1
OD_CC: 20/70
OD_PH_CC: 20/20
OD_SC+: -1
OD_PH_CC+: -2
OS_CC: 20/100
OS_PH_CC: 20/30
OS_SC: 20/25

## 2022-01-10 ASSESSMENT — KERATOMETRY
OS_AXISANGLE2_DEGREES: 119
OS_AXISANGLE_DEGREES: 029
OS_K1POWER_DIOPTERS: 42.75
OD_K2POWER_DIOPTERS: 43.12
OD_K1POWER_DIOPTERS: 42.00
OD_AXISANGLE_DEGREES: 162
OS_K2POWER_DIOPTERS: 43.62
OD_AXISANGLE2_DEGREES: 072

## 2022-01-10 ASSESSMENT — REFRACTION_MANIFEST
OS_SPHERE: -0.25
OD_AXIS: 166
OD_ADD: +2.50
METHOD_AUTOREFRACTION: 1
OS_AXIS: 007
OS_SPHERE: -0.50
OS_AXIS: 005
OD_AXIS: 140
OD_SPHERE: -1.50
OS_CYLINDER: +1.25
OD_CYLINDER: +1.25
OS_CYLINDER: +1.25
OD_CYLINDER: +1.00
OS_ADD: +2.25
OD_SPHERE: -1.00

## 2022-01-10 ASSESSMENT — CONF VISUAL FIELD
OS_NORMAL: 1
METHOD: COUNTING FINGERS
OD_NORMAL: 1

## 2022-01-10 ASSESSMENT — SLIT LAMP EXAM - LIDS
COMMENTS: UPPER LID DERMATOCHALASIS
COMMENTS: UPPER LID DERMATOCHALASIS

## 2022-01-10 ASSESSMENT — EXTERNAL EXAM - LEFT EYE: OS_EXAM: NORMAL

## 2022-01-10 ASSESSMENT — REFRACTION_WEARINGRX
OS_AXIS: 003
SPECS_TYPE: PAL
OD_AXIS: 155
OD_SPHERE: -0.50
OS_CYLINDER: +1.50
OS_SPHERE: -0.50
OS_ADD: +2.50
OD_ADD: +2.50
OD_CYLINDER: +1.00

## 2022-01-10 ASSESSMENT — EXTERNAL EXAM - RIGHT EYE: OD_EXAM: NORMAL

## 2022-01-10 ASSESSMENT — TONOMETRY
OD_IOP_MMHG: 18
IOP_METHOD: APPLANATION
OS_IOP_MMHG: 19

## 2022-01-10 NOTE — PATIENT INSTRUCTIONS
DRY EYE TREATMENT    I recommend using artificial tears for your dry eye. There are over the counter drops that work well and may be used up to 4x daily. ( systane balance or ultra, blink, refresh optive, soothe xp) stay away from any red eye relief products such as visine and clear eyes.     If you need more than 4 drops daily, use a preservative free product which come in individual vials and may be used for 24 hours and discarded.   The more severe the dry eye, the more frequent instillation of artificial tears  that are needed to reduce irritation/ inflammation. (Sometimes every 1-2 hours for a couple of days).  You can also add a lubricating ointment in the lower lid at bedtime. ( over the counter refresh pm, genteal gel, lacrilube etc.)    Artificial tears work best as a preventative and not as well after your eyes are starting to bother you and/ or are red.  It may take 4- 6 weeks of using the drops before you notice improvement.  If after that time you are still having problems schedule an appointment for an evaluation to discuss different treatments.    Dry eyes are a chronic condition and you may have more symptoms at certain times of the year.      Additional recommended treatment:  Warm compresses once to twice daily for 5-10 minutes    Directions for warm soaks  There are few methods for hot compresses. Moisten a washcloth with hot water, or microwave for 10 seconds, being careful to not get the cloth too hot.   Then put the washcloth onto your eyelids for 5 minutes. It will cool quickly so a rice pack or eyemask that can be heated and laid on top of the washcloth will help retain the heat.      Overabundance of bacterial microorganisms along the eyelashes and lid margins induce stress on the tear film and promote inflammation.  Regular lid hygiene helps diminish the bacterial population to prevent inflammation and infection.  Use a warm compress to loosen crusts   Cleanse lids once daily with a lid  cleansing product as directed such as Ocusoft or Sterilid which can be purchased at most pharmacies. Diluted baby shampoo will also work, but not as well as it dries the skin and can irritate eyelids.  Hypo chlor spray may also be used on closed lids.      Omega 3 fatty acid supplements taken 1-2x daily  Recommend  at least  2000mg omega 3  800 EPA  600 DHA    Blink regularly  Stay hydrated  Increase humidity  Wear sunglasses   Avoid direct exposure to forced air--turn air vents in the car away and keep fans from blowing directly on your face     add allergy drops two times daily wait at least 10 min then use blink so you are putting 4 drops in daily    Update glasses

## 2022-01-10 NOTE — PROGRESS NOTES
Chief Complaint   Patient presents with     Annual Eye Exam      Accompanied by self  Last Eye Exam: 1-2021  Dilated Previously: Yes    What are you currently using to see?  glasses       Distance Vision Acuity: Satisfied with vision    Near Vision Acuity: Satisfied with vision while reading  with glasses    Eye Comfort: dry itchy  Do you use eye drops? : Yes: blink  Occupation or Hobbies: retired     Radha Topete Optometric Assistant, A.B.O.C.          Medical, surgical and family histories reviewed and updated 1/10/2022.       OBJECTIVE: See Ophthalmology exam    ASSESSMENT:    ICD-10-CM    1. Myopia of both eyes with astigmatism  H52.13 EYE EXAM (SIMPLE-NONBILLABLE)    H52.203 REFRACTION   2. Pseudophakia of both eyes  Z96.1 EYE EXAM (SIMPLE-NONBILLABLE)     REFRACTION   3. Dry eye  H04.129    4. Allergic conjunctivitis of both eyes  H10.13 olopatadine (PATANOL) 0.1 % ophthalmic solution      PLAN:   Artificial tears  Two times daily , allergy drops two times daily   Add gel at bedtime   Update prescription     Miriam Enamorado OD

## 2022-01-10 NOTE — LETTER
1/10/2022         RE: Carol Merida  [address] [place] [address]  [address]  [facility] [address]        Dear Colleague,    Thank you for referring your patient, Carol Merida, to the [facility]. Please see a copy of my visit note below.    Chief Complaint   Patient presents with     Annual Eye Exam      Accompanied by self  Last Eye Exam: 1-2021  Dilated Previously: Yes    What are you currently using to see?  glasses       Distance Vision Acuity: Satisfied with vision    Near Vision Acuity: Satisfied with vision while reading  with glasses    Eye Comfort: dry itchy  Do you use eye drops? : Yes: blink  Occupation or Hobbies: retired     Radha Topete Optometric Assistant, A.B.O.C.          Medical, surgical and family histories reviewed and updated 1/10/2022.       OBJECTIVE: See Ophthalmology exam    ASSESSMENT:    ICD-10-CM    1. Myopia of both eyes with astigmatism  H52.13 EYE EXAM (SIMPLE-NONBILLABLE)    H52.203 REFRACTION   2. Pseudophakia of both eyes  Z96.1 EYE EXAM (SIMPLE-NONBILLABLE)     REFRACTION   3. Dry eye  H04.129    4. Allergic conjunctivitis of both eyes  H10.13 olopatadine (PATANOL) 0.1 % ophthalmic solution      PLAN:   Artificial tears  Two times daily , allergy drops two times daily   Add gel at bedtime   Update prescription     Miriam Enamorado OD       Again, thank you for allowing me to participate in the care of your patient.        Sincerely,        Miriam Enamorado, OD

## 2022-01-11 ASSESSMENT — CUP TO DISC RATIO
OS_RATIO: 0.4
OD_RATIO: 0.35

## 2022-01-17 ENCOUNTER — HOSPITAL ENCOUNTER (OUTPATIENT)
Dept: MAMMOGRAPHY | Facility: CLINIC | Age: 87
Discharge: HOME | End: 2022-01-17
Attending: INTERNAL MEDICINE | Admitting: INTERNAL MEDICINE
Payer: COMMERCIAL

## 2022-01-17 DIAGNOSIS — Z12.31 SCREENING MAMMOGRAM FOR HIGH-RISK PATIENT: ICD-10-CM

## 2022-01-17 PROCEDURE — 77063 BREAST TOMOSYNTHESIS BI: CPT | Mod: 52

## 2022-02-13 NOTE — ED NOTES
"[facility]  ED Nurse Handoff Report    Carol Merida is a 83 year old female   ED Chief complaint: Shortness of Breath and Leg Swelling  . ED Diagnosis:   Final diagnoses:   Acute congestive heart failure, unspecified heart failure type (H)     Allergies:   Allergies   Allergen Reactions     Ace Inhibitors Cough       Code Status: Full Code  Activity level - Baseline/Home:  Stand with Assist. Activity Level - Current:   Stand with Assist. Lift room needed: No. Bariatric: No   Needed: No   Isolation: No. Infection: Not Applicable.     Vital Signs:   Vitals:    11/19/18 1757 11/19/18 1758 11/19/18 1800 11/19/18 1805   BP:   121/70    Pulse:       Resp:       Temp:       TempSrc:       SpO2: 95% 95% 92% 93%   Weight:           Cardiac Rhythm:  ,      Pain level: 0-10 Pain Scale: 7  Patient confused: No. Patient Falls Risk: Yes.   Elimination Status: Has voided   Patient Report - Initial Complaint: Carol Merida is a 83 year old female with a history of CHF, remote history of breast cancer status post mastectomy and lymphectomy, as well as hypertension and hyperlipidemia who presents with leg pain and shortness of breath. The patient states that over the past several weeks she has noticed increased bilateral leg pain, worse on the left. Over the past week she has noticed increased pain, redness, and swelling localizing in her left leg, which is atypical for her swelling (she normally has this on the right). Along with this the patient has been experiencing shortness of breath \"for a while.\" She is unsure when the shortness of breath exactly started but given over the past week she has had increased leg pain, she has noticed increased work of breathing and shortness of breath. She called her clinic today to get an evaluation there but was called later in the evening tonight and referred to the ED. Currently, the patient denies any chest pain. She notes a subjective fever 3 days ago but has not " "measured one at home. She otherwise denies cough, congestion, abdominal pain. She describes her leg pain as a tightness in the left leg and posterior calf. She otherwise has not had associated falls or injury. Focused Assessment:   Skin Color/Condition Skin - Skin Comment:  (Pt has redness and swelling of left lower leg which is new. She has hx of right leg swelling without redness which she attributes to previous anklle surgeries.)        Respiratory Respiratory - Lung Fields:  (Pt has shortness of breath which she has had \"for a while\" and indicates for several weeks. Lungs sound clear.)     Cardiac Cardiac - Cardiac Comment:  (Cardiac monitor shows NSR with rate in low 70s.)     Tests Performed: labs, US. Abnormal Results:   Labs Ordered and Resulted from Time of ED Arrival Up to the Time of Departure from the ED   CBC WITH PLATELETS DIFFERENTIAL   BASIC METABOLIC PANEL   TROPONIN I   NT PROBNP INPATIENT   PERIPHERAL IV CATHETER     US Lower Extremity Venous Duplex Left   Final Result   IMPRESSION: No evidence for DVT within the left leg.          DUY MOISE MD      XR Chest 2 Views   Final Result   IMPRESSION: Mild bilateral vascular congestion appears mildly   increased. No evidence for cardiomegaly.        DUY MOISE MD         Treatments provided: Lasix, Monitor  Family Comments: Daughter at bedside  OBS brochure/video discussed/provided to patient:  Yes  ED Medications:   Medications   furosemide (LASIX) injection 40 mg (40 mg Intravenous Given 11/19/18 2004)     Drips infusing:  No  For the majority of the shift, the patient's behavior Green. Interventions performed were NA.     Severe Sepsis OR Septic Shock Diagnosis Present: No      ED Nurse Name/Phone Number: Uriah Rodriguez,   8:10 PM    RECEIVING UNIT ED HANDOFF REVIEW    Above ED Nurse Handoff Report was reviewed: Yes  Reviewed by: Estefany Goodwin on November 19, 2018 at 8:30 PM     " Male

## 2022-03-07 NOTE — TELEPHONE ENCOUNTER
Received fax from [facility] for refill of alprazolam, which has been prescribed PRN at low dose 0.25mg for anxiety.    Given patient age and other sedating medications including gabapentin, just want to confirm that patient does require a refill at this time and is not experiencing negative side effects like oversedation.     She is also due for annual Medicare wellness exam - recommend she be seen for this in next few months.  Would provide a refill if needed before then.    Brielle Calderon MD  Internal Medicine & Pediatrics  [facility]

## 2022-03-08 NOTE — TELEPHONE ENCOUNTER
Called pt at [phone], not available, so LM to call me back at [phone].    Pt established care with Dr.Emily Cutler on 11/4/21. No future appointment in place. Need to schedule for routine px.     Mela RN  Patient Advocate Liason (PAL)  [facility]

## 2022-03-09 DIAGNOSIS — F41.9 ANXIETY: ICD-10-CM

## 2022-03-09 RX ORDER — ALPRAZOLAM 0.25 MG
TABLET ORAL
Qty: 10 TABLET | Refills: 0 | Status: SHIPPED | OUTPATIENT
Start: 2022-03-09 | End: 2022-08-02

## 2022-03-15 ENCOUNTER — TELEPHONE (OUTPATIENT)
Dept: PEDIATRICS | Facility: CLINIC | Age: 87
End: 2022-03-15
Payer: COMMERCIAL

## 2022-03-15 DIAGNOSIS — R06.02 SHORTNESS OF BREATH: Primary | ICD-10-CM

## 2022-03-15 NOTE — TELEPHONE ENCOUNTER
I see that  sent 10 tabs of xanax 0.25 mg on 3/9/22. Called pt & left another message for her to call us back.    TC:  Pt is due for Medicare annual exam, please help her to schedule with any one of our providers. Thanks.     Mela, RN  Patient Advocate Liason (PAL)  [facility]

## 2022-03-15 NOTE — TELEPHONE ENCOUNTER
Pt notifies that the new inhaler cost her around $250 for one inhaler. Requesting cheaper alternate, if possible.     Looks like Dr.Emily Cutler switched her Qvar bid to Asmanex Twisthaler every day on 12/21/21.      Called [facility] at [phone] to get clarification. I was told pt picked up one inhaler of Asmanex in Dec for $47. Beginning of the year, she has to meet her deductible for cheaper co-pay. Right now the Asmanex one inhaler will cost her $185.82. I was advised that pt need to contact her insurance to find a cheaper alternate, if available.     Possible alternates except Qvar in this line of med(inhaled corticosteroids) are Asmanex HFA, Flovent Diskus/Flovent HFA, Pulmicort Flexhaler, Arnuity Ellipta, Alvesco HFA or ArmonAir RespiClick.     Medicare: Part B(Medical) [id number]  [phone]  [place]are: [place] [id number]; Grp: [id number]  RxBIN: [id number]; RxGrp: [id number]     Notes from VV on 12/21/21:  Shortness of breath  Improved with qvar, but has GI side effects.  Can try switching to similar. Also encouraged her to call insurance to find out what similar medication is covered.   - mometasone (ASMANEX TWISTHALER) 110 MCG/INH inhaler; Inhale 1 puff into the lungs every evening    Mela RN  Patient Advocate Liason (PAL)  [facility]

## 2022-03-16 NOTE — TELEPHONE ENCOUNTER
Advised pt to contact her insurance to get the cheaper alternate for Asmanex in [facility]. Will await for her call back.    Mela RN  Patient Advocate Liason (PAL)  [facility]

## 2022-03-23 NOTE — TELEPHONE ENCOUNTER
Patient called back, reports her insurance says they will cover generic Advair.     Routing to covering provider to review and send in new Rx.    ----------------  Depression  - pt on Fluoxetine 60mg  - reports lately feeling more depressed, having bouts of depression. Last week stared out window for awhile with no appetite, had to force herself to eat, concerned about these feelings, no SI/SH thoughts. Wonders if she needs any med changes or PRN meds etc.   - assisted with heath  F/up with SWETHA Ruffin NP    Next 5 appointments (look out 90 days)    Mar 25, 2022 11:00 AM  (Arrive by 10:40 AM)  Provider Visit with Naomi Ruffin NP  [facility] ([facility] ) [address] [place] [address]  [address]  [facility] [id number] [phone]   May 17, 2022 11:20 AM  (Arrive by 11:00 AM)  Annual Wellness Visit with Stella Cutler MD  [facility] ([facility] ) [address] [place] [address]  [address]  [facility] [id number] [phone]

## 2022-03-23 NOTE — TELEPHONE ENCOUNTER
Left generic email informing of the new Rx being sent.    Montserrat Pizarro on 3/23/2022 at 2:27 PM

## 2022-03-25 ENCOUNTER — VIRTUAL VISIT (OUTPATIENT)
Dept: PEDIATRICS | Facility: CLINIC | Age: 87
End: 2022-03-25
Payer: COMMERCIAL

## 2022-03-25 DIAGNOSIS — F32.A MILD DEPRESSION: ICD-10-CM

## 2022-03-25 DIAGNOSIS — F41.9 ANXIETY: Primary | ICD-10-CM

## 2022-03-25 PROCEDURE — 99214 OFFICE O/P EST MOD 30 MIN: CPT | Mod: 95 | Performed by: NURSE PRACTITIONER

## 2022-03-25 RX ORDER — BUPROPION HYDROCHLORIDE 75 MG/1
75 TABLET ORAL 2 TIMES DAILY
Qty: 180 TABLET | Refills: 0 | Status: SHIPPED | OUTPATIENT
Start: 2022-03-25 | End: 2022-08-24

## 2022-03-25 NOTE — PATIENT INSTRUCTIONS
It was nice seeing you today.    Please let me know if you have any questions regarding today's visit!    Take care,    SWETHA Ruffin DNP  Family Medicine

## 2022-03-25 NOTE — PROGRESS NOTES
Everette is a 86 year old who is being evaluated via a billable telephone visit.      What phone number would you like to be contacted at? [phone]  How would you like to obtain your AVS? MyChart    Assessment & Plan     Anxiety  Mild depression (H)  Worsening.  [facility] [place] counseling referral placed today.  Started on bupropion 75mg BID today.  Follow-up in 4 weeks or sooner if needed  - Adult Mental Health  Referral; Future  - buPROPion (WELLBUTRIN) 75 MG tablet; Take 1 tablet (75 mg) by mouth 2 times daily      Return in about 4 weeks (around 4/22/2022) for Follow up:  follow-up, using a phone visit.    Naomi Ruffin NP  [facility]    Subjective   Everette is a 86 year old who presents for the following health issues    HPI     Depression and anxiety:  -States has been worsening since 's death a few years ago  -Has been on fluoxetine for quite some time and was increased to fluoxetine 60 mg shortly after his death  -States she has had lifestyle changes--lives in independent living now, does not drive anymore, mobility is limited (due to arthritis and using walker) and family deaths.  This lifestyle change happened shortly before the COVID-19 pandemic started in 2020.  -States 2-3x per week she feels very depressed.  Denies SI/HI.  States she loses motivation.  -States she has a lot to look forward to--children and grandchildren  -Has not done therapy.  Has a close  from [place] who she confides in.  States she may be interested in therpy  -Interested in a medication adjustment    Review of Systems   Constitutional, HEENT, cardiovascular, pulmonary, gi and gu systems are negative, except as otherwise noted.      Objective       Vitals:  No vitals were obtained today due to virtual visit.    Physical Exam   healthy, alert and no distress  PSYCH: Alert and oriented times 3; coherent speech, normal   rate and volume, able to articulate logical thoughts, able   to abstract reason, no  tangential thoughts, no hallucinations   or delusions  Her affect is normal  RESP: No cough, no audible wheezing, able to talk in full sentences  Remainder of exam unable to be completed due to telephone visits        Phone call duration: 15 minutes

## 2022-04-01 ENCOUNTER — TELEPHONE (OUTPATIENT)
Dept: BEHAVIORAL HEALTH | Facility: CLINIC | Age: 87
End: 2022-04-01

## 2022-04-01 NOTE — TELEPHONE ENCOUNTER
4/1/22 Received call from Pt requesting a DA for Adult MH, referred her PCP to determine the appropriate level of care.

## 2022-04-04 ENCOUNTER — TELEPHONE (OUTPATIENT)
Dept: BEHAVIORAL HEALTH | Facility: CLINIC | Age: 87
End: 2022-04-04
Payer: COMMERCIAL

## 2022-04-15 NOTE — TELEPHONE ENCOUNTER
Pt called to reschedule the MH DA she cancelled earlier this month.  She was referred by her PCP.    Referral attached to appt    MH DA scheduled for Wednesday, April 20 @ 10am with Kaylan

## 2022-04-17 ENCOUNTER — HEALTH MAINTENANCE LETTER (OUTPATIENT)
Age: 87
End: 2022-04-17

## 2022-04-18 ENCOUNTER — TELEPHONE (OUTPATIENT)
Dept: BEHAVIORAL HEALTH | Facility: CLINIC | Age: 87
End: 2022-04-18
Payer: COMMERCIAL

## 2022-04-20 ENCOUNTER — HOSPITAL ENCOUNTER (OUTPATIENT)
Dept: BEHAVIORAL HEALTH | Facility: CLINIC | Age: 87
Discharge: HOME | End: 2022-04-20
Attending: FAMILY MEDICINE | Admitting: FAMILY MEDICINE
Payer: COMMERCIAL

## 2022-04-20 PROCEDURE — 90791 PSYCH DIAGNOSTIC EVALUATION: CPT | Mod: 95 | Performed by: COUNSELOR

## 2022-04-20 ASSESSMENT — ANXIETY QUESTIONNAIRES
3. WORRYING TOO MUCH ABOUT DIFFERENT THINGS: NEARLY EVERY DAY
7. FEELING AFRAID AS IF SOMETHING AWFUL MIGHT HAPPEN: MORE THAN HALF THE DAYS
2. NOT BEING ABLE TO STOP OR CONTROL WORRYING: NEARLY EVERY DAY
1. FEELING NERVOUS, ANXIOUS, OR ON EDGE: MORE THAN HALF THE DAYS
4. TROUBLE RELAXING: NOT AT ALL
GAD7 TOTAL SCORE: 11
6. BECOMING EASILY ANNOYED OR IRRITABLE: SEVERAL DAYS
5. BEING SO RESTLESS THAT IT IS HARD TO SIT STILL: NOT AT ALL

## 2022-04-20 ASSESSMENT — COLUMBIA-SUICIDE SEVERITY RATING SCALE - C-SSRS
6. HAVE YOU EVER DONE ANYTHING, STARTED TO DO ANYTHING, OR PREPARED TO DO ANYTHING TO END YOUR LIFE?: NO
4. HAVE YOU HAD THESE THOUGHTS AND HAD SOME INTENTION OF ACTING ON THEM?: NO
TOTAL  NUMBER OF ABORTED OR SELF INTERRUPTED ATTEMPTS LIFETIME: NO
6. HAVE YOU EVER DONE ANYTHING, STARTED TO DO ANYTHING, OR PREPARED TO DO ANYTHING TO END YOUR LIFE?: NO
TOTAL  NUMBER OF INTERRUPTED ATTEMPTS LIFETIME: NO
3. HAVE YOU BEEN THINKING ABOUT HOW YOU MIGHT KILL YOURSELF?: NO
ATTEMPT LIFETIME: NO
1. IN THE PAST MONTH, HAVE YOU WISHED YOU WERE DEAD OR WISHED YOU COULD GO TO SLEEP AND NOT WAKE UP?: NO
5. HAVE YOU STARTED TO WORK OUT OR WORKED OUT THE DETAILS OF HOW TO KILL YOURSELF? DO YOU INTEND TO CARRY OUT THIS PLAN?: NO
2. HAVE YOU ACTUALLY HAD ANY THOUGHTS OF KILLING YOURSELF IN THE PAST MONTH?: NO
2. HAVE YOU ACTUALLY HAD ANY THOUGHTS OF KILLING YOURSELF?: NO
1. HAVE YOU WISHED YOU WERE DEAD OR WISHED YOU COULD GO TO SLEEP AND NOT WAKE UP?: NO

## 2022-04-20 ASSESSMENT — PATIENT HEALTH QUESTIONNAIRE - PHQ9: SUM OF ALL RESPONSES TO PHQ QUESTIONS 1-9: 19
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
## 2022-04-20 NOTE — TELEPHONE ENCOUNTER
Patient have a video appointment today at 10am with [facility]. Writer placed a call this morning to patient's mobile number to check in ([phone]). Unable to get a hold of patient, writer left a voicemail with writer's call back number. Writer tried calling the number listed as home phone in [facility] ([phone]). Phone call went to [facility] in [place]. Staff at  answered and informed writer that they don't have a resident with patient's name.Peggy will place a second to mobile number only.

## 2022-04-21 ASSESSMENT — ANXIETY QUESTIONNAIRES: GAD7 TOTAL SCORE: 11

## 2022-04-22 ENCOUNTER — VIRTUAL VISIT (OUTPATIENT)
Dept: PEDIATRICS | Facility: CLINIC | Age: 87
End: 2022-04-22
Payer: COMMERCIAL

## 2022-04-22 DIAGNOSIS — F41.9 ANXIETY: ICD-10-CM

## 2022-04-22 DIAGNOSIS — F32.A MILD DEPRESSION: Primary | ICD-10-CM

## 2022-04-22 PROCEDURE — 99213 OFFICE O/P EST LOW 20 MIN: CPT | Mod: 95 | Performed by: NURSE PRACTITIONER

## 2022-04-22 NOTE — PROGRESS NOTES
Everette is a 87 year old who is being evaluated via a billable telephone visit.      What phone number would you like to be contacted at? [phone]  How would you like to obtain your AVS? MyChart    Assessment & Plan     Mild depression (H)  Anxiety  Improving.  Continue with current medication and with therapy.  Patient has follow-up with PCP next month.        Return if symptoms worsen or fail to improve.    Naomi Ruffin NP  [facility]    Subjective   Everette is a 87 year old who presents for the following health issues    HPI     Depression and anxiety:  -Last visit 3/25/22.  Patient started on Wellbutrin.  Feeling better on Wellbutrin.  -[facility] [place] appointment 4/20/22.  Suguested a support class but patient declined.    -States she has anxiety and depression over lifestyle changes, family deaths, and arthritis.  -States she has a lot to look forward too with her grandchild and children.  -Denies SI/HI    Review of Systems   Constitutional, HEENT, cardiovascular, pulmonary, gi and gu systems are negative, except as otherwise noted.      Objective       Vitals:  No vitals were obtained today due to virtual visit.    Physical Exam   healthy, alert and no distress  PSYCH: Alert and oriented times 3; coherent speech, normal   rate and volume, able to articulate logical thoughts, able   to abstract reason, no tangential thoughts, no hallucinations   or delusions  Her affect is normal  RESP: No cough, no audible wheezing, able to talk in full sentences  Remainder of exam unable to be completed due to telephone visits        Phone call duration: 10 minutes

## 2022-05-17 DIAGNOSIS — R06.02 SHORTNESS OF BREATH: ICD-10-CM

## 2022-05-18 DIAGNOSIS — M15.9 OSTEOARTHRITIS OF MULTIPLE JOINTS, UNSPECIFIED OSTEOARTHRITIS TYPE: ICD-10-CM

## 2022-05-18 DIAGNOSIS — M51.369 DDD (DEGENERATIVE DISC DISEASE), LUMBAR: ICD-10-CM

## 2022-05-19 RX ORDER — GABAPENTIN 300 MG/1
CAPSULE ORAL
Qty: 360 CAPSULE | Refills: 1 | Status: SHIPPED | OUTPATIENT
Start: 2022-05-19 | End: 2022-08-24

## 2022-05-19 RX ORDER — FLUTICASONE PROPIONATE AND SALMETEROL 100; 50 UG/1; UG/1
POWDER RESPIRATORY (INHALATION)
Qty: 60 EACH | Refills: 1 | Status: SHIPPED | OUTPATIENT
Start: 2022-05-19 | End: 2022-08-16

## 2022-05-19 NOTE — TELEPHONE ENCOUNTER
Routing refill request to provider for review/approval because:     Order for Serevent, Striverdi, or Foradil and pt has steroid inhaler    Alea Iqbal RN on 5/19/2022 at 11:40 AM

## 2022-05-27 DIAGNOSIS — F32.A MILD DEPRESSION: ICD-10-CM

## 2022-05-27 DIAGNOSIS — F41.9 ANXIETY: ICD-10-CM

## 2022-05-31 NOTE — TELEPHONE ENCOUNTER
Routing refill request to provider for review/approval because:  Labs out of range:  PHQ-9    PHQ 6/15/2021 9/15/2021 11/4/2021   PHQ-9 Total Score 7 6 11   Q9: Thoughts of better off dead/self-harm past 2 weeks Not at all Not at all Not at all   Some encounter information is confidential and restricted. Go to Review Flowsheets activity to see all data.     Ne Izaguirre RN

## 2022-06-21 ENCOUNTER — NURSE TRIAGE (OUTPATIENT)
Dept: PEDIATRICS | Facility: CLINIC | Age: 87
End: 2022-06-21
Payer: COMMERCIAL

## 2022-06-21 ENCOUNTER — VIRTUAL VISIT (OUTPATIENT)
Dept: FAMILY MEDICINE | Facility: CLINIC | Age: 87
End: 2022-06-21
Payer: COMMERCIAL

## 2022-06-21 DIAGNOSIS — K59.00 CONSTIPATION, UNSPECIFIED CONSTIPATION TYPE: Primary | ICD-10-CM

## 2022-06-21 DIAGNOSIS — L30.4 INTERTRIGO: ICD-10-CM

## 2022-06-21 PROCEDURE — 99214 OFFICE O/P EST MOD 30 MIN: CPT | Mod: 95 | Performed by: FAMILY MEDICINE

## 2022-06-21 RX ORDER — CLOTRIMAZOLE AND BETAMETHASONE DIPROPIONATE 10; .64 MG/G; MG/G
CREAM TOPICAL 2 TIMES DAILY
Qty: 45 G | Refills: 1 | Status: SHIPPED | OUTPATIENT
Start: 2022-06-21 | End: 2022-07-05

## 2022-06-21 RX ORDER — SENNA AND DOCUSATE SODIUM 50; 8.6 MG/1; MG/1
1 TABLET, FILM COATED ORAL 2 TIMES DAILY
Qty: 180 TABLET | Refills: 0 | Status: SHIPPED | OUTPATIENT
Start: 2022-06-21 | End: 2022-09-28

## 2022-06-21 RX ORDER — POLYETHYLENE GLYCOL 3350 17 G/17G
1-2 POWDER, FOR SOLUTION ORAL DAILY
Qty: 578 G | Refills: 1 | Status: SHIPPED | OUTPATIENT
Start: 2022-06-21 | End: 2022-09-28

## 2022-06-21 NOTE — PROGRESS NOTES
Everette is a 87 year old who is being evaluated via a billable telephone visit.      What phone number would you like to be contacted at? [phone]  How would you like to obtain your AVS? MyChart    Assessment & Plan     Constipation, unspecified constipation type  - patient advised to increase and water intake. Considering has not had BM in over a week will add miralax, and senna to regimen. Also recommend daily fiber.   - SENNA-docusate sodium (SENNA S) 8.6-50 MG tablet; Take 1 tablet by mouth 2 times daily  - polyethylene glycol (MIRALAX) 17 GM/Dose powder; Take 17-34 g (1-2 capfuls) by mouth daily    Intertrigo  - will start on trial of Lotrisone. If no improvement will need to be seen in clinic for further evaluation  - clotrimazole-betamethasone (LOTRISONE) 1-0.05 % external cream; Apply topically 2 times daily for 14 days      25 minutes spent on the date of the encounter doing chart review, history and exam, documentation and further activities per the note       See Patient Instructions    Return in about 1 month (around 7/21/2022), or if symptoms worsen or fail to improve.    Neena Laughlin MD  [facility]    Subjective   Everette is a 87 year old, presenting for the following health issues:  Constipation      HPI     Constipation  Onset/Duration: several weeks   Description:  Frequency of bowel movements: 1-2 times per week  Consistency of stool: hard stool, some diarrhea   Progression of Symptoms: worsening  Accompanying signs and symptoms:    Abdominal pain: no   Rectal pain: no   Blood in stool: no   Nausea/Vomiting: no   Weight loss or gain: no  History:   Similar problems in past: no  History of abdominal surgery: no  Chronic laxative use: YES- milk of magnesia   New medications: Wellbutrin   Precipitating or alleviating factors: none  Therapies tried and outcome: milk of magnesia with no relief     Has not had a bowel movement for a week now. She was advised this  morning to drink prune juice.     She is also struggling with a diaper rash in her right groin- she has tried steroid cream and nystatin without relief.   Per patient, she recently moved into an [facility] senior facility and is not drinking as much as she should.   Due to her arthritis she is also not as mobile as she would like.           Review of Systems   Constitutional, HEENT, cardiovascular, pulmonary, GI, , musculoskeletal, neuro, skin, endocrine and psych systems are negative, except as otherwise noted.      Objective           Vitals:  No vitals were obtained today due to virtual visit.    Physical Exam   healthy, alert and no distress  PSYCH: Alert and oriented times 3; coherent speech, normal   rate and volume, able to articulate logical thoughts, able   to abstract reason, no tangential thoughts, no hallucinations   or delusions  Her affect is normal  RESP: No cough, no audible wheezing, able to talk in full sentences  Remainder of exam unable to be completed due to telephone visits            Phone call duration:  13 minutes    .  ..

## 2022-06-21 NOTE — PATIENT INSTRUCTIONS
Add metamucil or benefiber daily   Miralax 1-2 capfuls daily as needed for constipation until loose stool and then stop.   Increase water intake   Apply lotrisone to affected area twice daily for 14 days and then after that go back to nystatin powder.

## 2022-06-21 NOTE — TELEPHONE ENCOUNTER
"S-(situation): constipation    B-(background): Pt has been experiencing constipation for past 4 months on and off. Last 4 months, pt has had cycle of constipation and then passing BMs which were painful and very loose. No BM since 6/13/22.    A-(assessment): Pt has had constipation on and off for 4 months alternating with loose painful stools. Pt has not had BM since Monday 6/13/22. Pt reports no hemorrhoids or rectal pain but states \"I think I may have a prolapsed rectum for the past 2 years\". Pt using Milk of Magnesia daily but has not tried prune juice, Dulcolax, suppository or enema. No diet changes. Pt started Wellbutrin 3/25/22 but stopped taking it 3 weeks ago because \"I didn't like the way it made me feel\".    Denies: abdominal pain, vomiting, nausea, fever, rectal pain    R-(recommendations): Reviewed care advice with pt under care tab. Protocol recommends pt be seen today 6/21/22. Scheduled pt for virtual visit for today with Neena Laughlin MD. Instructed pt to call back if new or worsening symptoms.    Patient was given an opportunity to ask questions, verbalized understanding of plan, and is agreeable.    Ne Izaguirre RN        Reason for Disposition    Last bowel movement (BM) > 4 days ago    Additional Information    Negative: Abdomen pain is the main symptom and adult male    Negative: Abdomen pain is the main symptom and adult female    Negative: Rectal bleeding or blood in stool is the main symptom    Negative: Patient sounds very sick or weak to the triager    Negative: Constant abdominal pain lasting > 2 hours    Negative: Vomiting bile (green color)    Negative: Vomiting and abdomen looks much more swollen than usual    Negative: Rectal pain or fullness from fecal impaction (rectum full of stool) and NOT better after SITZ bath, suppository or enema    Negative: Abdomen is more swollen than usual    Answer Assessment - Initial Assessment Questions  1. STOOL PATTERN OR FREQUENCY: \"How " "often do you pass bowel movements (BMs)?\"  (Normal range: tid to q 3 days)  \"When was the last BM passed?\"        One week ago from yesterday (Monday, 6/13/22)  Prior to last 4 months, pt was having BM's every 2-3 days  Last 4 months- every 2 days, pt would be constipated and then pass BMs which were painful and very loose  2. STRAINING: \"Do you have to strain to have a BM?\"       Yes when constipated, not when loose  3. RECTAL PAIN: \"Does your rectum hurt when the stool comes out?\" If so, ask: \"Do you have hemorrhoids? How bad is the pain?\"  (Scale 1-10; or mild, moderate, severe)      \"I think I have prolapsed rectum for long time (2-3 years)  No rectal pain  4. STOOL COMPOSITION: \"Are the stools hard?\"       yes  5. BLOOD ON STOOLS: \"Has there been any blood on the toilet tissue or on the surface of the BM?\" If so, ask: \"When was the last time?\"       no  6. CHRONIC CONSTIPATION: \"Is this a new problem for you?\"  If no, ask: \"How long have you had this problem?\" (days, weeks, months)       New problem, Off and on for about 4 months  7. CHANGES IN DIET: \"Have there been any recent changes in your diet?\"       no  8. MEDICATIONS: \"Have you been taking any new medications?\"      Yes, new med- antidepressant wellbutrin prescribed 3/25/22 but pt stopped taking it about 3 weeks ago  9. LAXATIVES: \"Have you been using any laxatives or enemas?\"  If yes, ask \"What, how often, and when was the last time?\"      No just milk of magnesia  10. CAUSE: \"What do you think is causing the constipation?\"         unsure  11. OTHER SYMPTOMS: \"Do you have any other symptoms?\" (e.g., abdominal pain, fever, vomiting)        Denies abdominal pain, fever, vomiting  Mid May-cough and phlegm  12. PREGNANCY: \"Is there any chance you are pregnant?\" \"When was your last menstrual period?\"        no    Protocols used: CONSTIPATION-A-OH      "

## 2022-07-13 DIAGNOSIS — I10 BENIGN ESSENTIAL HYPERTENSION: ICD-10-CM

## 2022-07-14 NOTE — TELEPHONE ENCOUNTER
Routing refill request to provider for review/approval because:  Drug interaction warning    Guillermo WHITLEY RN

## 2022-07-15 RX ORDER — AMLODIPINE BESYLATE 5 MG/1
5 TABLET ORAL DAILY
Qty: 90 TABLET | Refills: 0 | Status: SHIPPED | OUTPATIENT
Start: 2022-07-15 | End: 2022-10-14

## 2022-07-28 ENCOUNTER — OFFICE VISIT (OUTPATIENT)
Dept: URGENT CARE | Facility: URGENT CARE | Age: 87
End: 2022-07-28
Payer: COMMERCIAL

## 2022-07-28 ENCOUNTER — NURSE TRIAGE (OUTPATIENT)
Dept: PEDIATRICS | Facility: CLINIC | Age: 87
End: 2022-07-28

## 2022-07-28 ENCOUNTER — HOSPITAL ENCOUNTER (OUTPATIENT)
Dept: CT IMAGING | Facility: CLINIC | Age: 87
Discharge: HOME | End: 2022-07-28
Attending: ORTHOPAEDIC SURGERY | Admitting: ORTHOPAEDIC SURGERY
Payer: COMMERCIAL

## 2022-07-28 VITALS
TEMPERATURE: 97.8 F | OXYGEN SATURATION: 97 % | HEART RATE: 81 BPM | DIASTOLIC BLOOD PRESSURE: 71 MMHG | SYSTOLIC BLOOD PRESSURE: 133 MMHG

## 2022-07-28 DIAGNOSIS — Z96.649 S/P REVISION OF TOTAL HIP: ICD-10-CM

## 2022-07-28 DIAGNOSIS — I87.2 VENOUS STASIS DERMATITIS OF BOTH LOWER EXTREMITIES: Primary | ICD-10-CM

## 2022-07-28 LAB
ERYTHROCYTE [DISTWIDTH] IN BLOOD BY AUTOMATED COUNT: 14.3 % (ref 10–15)
HCT VFR BLD AUTO: 42.7 % (ref 35–47)
HGB BLD-MCNC: 13.8 G/DL (ref 11.7–15.7)
MCH RBC QN AUTO: 30.1 PG (ref 26.5–33)
MCHC RBC AUTO-ENTMCNC: 32.3 G/DL (ref 31.5–36.5)
MCV RBC AUTO: 93 FL (ref 78–100)
PLATELET # BLD AUTO: 259 10E3/UL (ref 150–450)
RBC # BLD AUTO: 4.58 10E6/UL (ref 3.8–5.2)
WBC # BLD AUTO: 7.9 10E3/UL (ref 4–11)

## 2022-07-28 PROCEDURE — 73700 CT LOWER EXTREMITY W/O DYE: CPT | Mod: LT

## 2022-07-28 PROCEDURE — 36415 COLL VENOUS BLD VENIPUNCTURE: CPT | Performed by: PHYSICIAN ASSISTANT

## 2022-07-28 PROCEDURE — 99213 OFFICE O/P EST LOW 20 MIN: CPT | Performed by: PHYSICIAN ASSISTANT

## 2022-07-28 PROCEDURE — 85027 COMPLETE CBC AUTOMATED: CPT | Performed by: PHYSICIAN ASSISTANT

## 2022-07-28 NOTE — PROGRESS NOTES
Assessment & Plan     Venous stasis dermatitis of both lower extremities  Darker rubor of lesions on lower extremities with no to minimal warmth not as consistent with cellulitis picture.  CBC without evidence of leukocytosis.  Patient tender over much of bilateral lower extremities, and decent amount of edema bilaterally.  Per patient and daughter's report, the swelling has definitely reduced since her last visit.  Advised continued use of Lasix, elevation of legs to help with continued reduction of edema, and completion of current course of antibiotics.  Did outline areas of redness for home monitoring, and advised if development of fever, weakness, myalgias, with continued extension with more bright reddened appearance to have more urgent follow-up.  Advised close follow-up with PCP for ongoing management of edema and heart failure and further management of venous stasis.  - CBC with platelets  - Primary Care Referral     I spent a total of 20 minutes on the day of the visit.   Time spent doing chart review, history and exam, documentation and further activities per the note    Return in about 5 days (around 8/2/2022) for reevaluation with PCP if symptoms not improving.    Basil Barrientos PA-C  [facility]      Tim Gaviria is a 87 year old female who presents to clinic today for the following health issues:  Chief Complaint   Patient presents with     Urgent Care     Poss cellulitis      HPI  One week ago went to Ortho physician due to a fall that occurred about three weeks ago.  Concern that she has swelling in the left ankle and lower leg.  Xrays taken without fracture.  Does have history of venous stasis with cellulitis, put on antibiotic  Advised to be consistent with Lasix dosing.  Wrapped leg.  Swelling went down but the rash worsened.  Currently on cephalexin which she reported gave her some stomach issues and 1-2 loose stools during the day.  Feels pulsating/throbbing,  somewhat sensitive to touch.  Rash has moved superiorly on left leg, and new on right.  Antibiotic finishing today.    Feels well otherwise.  Uses a walker at home and a scooter for living facility (senior living).  Denies fever/chills, nausea/vomiting, other areas of skin lesions/rashes, arthralgias, or other complaints.      Review of Systems  Focused ROS obtained, pertinent positives/negatives reviewed in the HPI.         Objective    /71   Pulse 81   Temp 97.8  F (36.6  C)   SpO2 97%   Physical Exam   GENERAL: healthy, alert and no distress  RESP: lungs clear to auscultation - no rales, rhonchi or wheezes  CV: regular rate and rhythm, normal S1 S2, no S3 or S4, no murmur, click or rub, no peripheral edema and peripheral pulses strong  MS: bilateral lower extremities with pitting edema +1, right leg with small area of darker erythema, not warm to palpation, moderately tender over areas of erythema and non-erythematous skin without induration.  Left lower leg with greater area of darker red erythema, somewhat petechial , non-blanching, again not significantly warm to palpation and moderately tender over much of leg, not just areas of erythema.  No streaking.  Distal pulses and sensation intact.      Results for orders placed or performed in visit on 07/28/22 (from the past 24 hour(s))   CBC with platelets   Result Value Ref Range    WBC Count 7.9 4.0 - 11.0 10e3/uL    RBC Count 4.58 3.80 - 5.20 10e6/uL    Hemoglobin 13.8 11.7 - 15.7 g/dL    Hematocrit 42.7 35.0 - 47.0 %    MCV 93 78 - 100 fL    MCH 30.1 26.5 - 33.0 pg    MCHC 32.3 31.5 - 36.5 g/dL    RDW 14.3 10.0 - 15.0 %    Platelet Count 259 150 - 450 10e3/uL

## 2022-07-28 NOTE — TELEPHONE ENCOUNTER
"Patient was seen by her orthopedic doctor on 7/18/22. Patient was diagnosed with cellulitis caused by venus status. Patient was prescribed an antibiotic and she took her last one today. Jovi Luna with Dr. Pacheco: Patient should be seen in  today.    Called patient. Patient will be seen at the [facility] today.       Answer Assessment - Initial Assessment Questions  1. APPEARANCE of RASH: \"Describe the rash.\"       Bright red rash that is spreading on both legs.   2. LOCATION: \"Where is the rash located?\"       Mostly on the lower left leg and a small area on the right leg   3. NUMBER: \"How many spots are there?\"       2  4. SIZE: \"How big are the spots?\" (Inches, centimeters or compare to size of a coin)       Left leg 4-5in by 12in, right leg 3in by 6in  5. ONSET: \"When did the rash start?\"       Started 2 weeks ago.   6. ITCHING: \"Does the rash itch?\" If so, ask: \"How bad is the itch?\"  (Scale 1-10; or mild, moderate, severe)      No itching.   7. PAIN: \"Does the rash hurt?\" If so, ask: \"How bad is the pain?\"  (Scale 1-10; or mild, moderate, severe)      Painful to the touch.   8. OTHER SYMPTOMS: \"Do you have any other symptoms?\" (e.g., fever)      none  9. PREGNANCY: \"Is there any chance you are pregnant?\" \"When was your last menstrual period?\"      n/a    Protocols used: RASH OR REDNESS - [id number]    Alea Iqbal RN on 7/28/2022 at 4:29 PM    "

## 2022-07-29 NOTE — PATIENT INSTRUCTIONS
Continue elevating your legs to maintain reduction in swelling. . Follow-up with your Primary Care Provider next week as referred.  Return for significant worsening/extension of leg redness or increased swelling.

## 2022-07-31 ENCOUNTER — NURSE TRIAGE (OUTPATIENT)
Dept: NURSING | Facility: CLINIC | Age: 87
End: 2022-07-31

## 2022-07-31 ENCOUNTER — OFFICE VISIT (OUTPATIENT)
Dept: URGENT CARE | Facility: URGENT CARE | Age: 87
End: 2022-07-31
Payer: COMMERCIAL

## 2022-07-31 VITALS
DIASTOLIC BLOOD PRESSURE: 86 MMHG | HEART RATE: 85 BPM | TEMPERATURE: 98.5 F | SYSTOLIC BLOOD PRESSURE: 124 MMHG | OXYGEN SATURATION: 96 %

## 2022-07-31 DIAGNOSIS — I87.2 VENOUS STASIS DERMATITIS OF BOTH LOWER EXTREMITIES: ICD-10-CM

## 2022-07-31 DIAGNOSIS — R21 RASH DUE TO ALLERGY: Primary | ICD-10-CM

## 2022-07-31 DIAGNOSIS — T78.40XA RASH DUE TO ALLERGY: Primary | ICD-10-CM

## 2022-07-31 PROCEDURE — 99213 OFFICE O/P EST LOW 20 MIN: CPT | Performed by: FAMILY MEDICINE

## 2022-07-31 RX ORDER — CETIRIZINE HYDROCHLORIDE 10 MG/1
10 TABLET ORAL DAILY
Qty: 30 TABLET | Refills: 0 | Status: SHIPPED | OUTPATIENT
Start: 2022-07-31 | End: 2022-09-28

## 2022-07-31 RX ORDER — TRIAMCINOLONE ACETONIDE 1 MG/G
CREAM TOPICAL 2 TIMES DAILY
Qty: 80 G | Refills: 0 | Status: SHIPPED | OUTPATIENT
Start: 2022-07-31 | End: 2022-09-28

## 2022-07-31 ASSESSMENT — PAIN SCALES - GENERAL: PAINLEVEL: NO PAIN (0)

## 2022-07-31 NOTE — PROGRESS NOTES
ASSESSMENT/  PLAN:  Rash due to allergy     - cetirizine (ZYRTEC) 10 MG tablet; Take 1 tablet (10 mg) by mouth daily  - triamcinolone (KENALOG) 0.1 % external cream; Apply topically 2 times daily    Has faint pink rash on arms, legs trunk, little itchy, mostly dry.    Suspect a mild Id reaction from the swelling of her venous stasis, or may be associated with some other allergen-  Symptoms are mild    Venous stasis dermatitis of both lower extremities    Use triamcinolone to reduce inflammation and use knee high , low level compression stockings OTC to help with  Venous return and healing of her skin      Follow-up with primary clinic if not improving    ---------------------------------------------------------------------------------------------------------------------------------------------------------------    SUBJECTIVE:  Chief Complaint   Patient presents with     Urgent Care     Pt was here Friday for a rash on her legs. Pt says rash is getting worse and more itchy. Rash is starting to appear all over body per patient (abdomen and arms). No pain, but very itchy. Pt says her anxiety has been worse since having the rash and she has started to take her Xanax.        Carol Merida is a 87 year old female who presents to the clinic today for a rash.  Onset of rash was 1 day(s) ago.   Rash is gradual onset and still present.  Location of the rash: generalized.  Quality/symptoms of rash: dry , slightly itchy,  Faint pink areas  Symptoms are mild and rash seems to be not changing over the course of time.  Previous history of a similar rash? No  Recent exposure history: none known-    She has had swelling bilateral lower legs due to venous stasis dermatitis with red areas and bronzing    Associated symptoms include: nothing.  Patient denies: fever, throat tightness, wheezing, cough, tongue/lip swelling, sore throat and URI symptoms.    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     Melanoma of skin, site unspecified      NONSPECIFIC MEDICAL HISTORY 04    fx fifth finger right hand     Obstructive sleep apnea (adult) (pediatric) 8/25/2006    CPAP      Osteoarthritis      Other chronic pain     Joint pain for many years.     Other osteoporosis      PONV (postoperative nausea and vomiting)      Tubular adenoma in colon 9/01    colonoscopy due 2004     Patient Active Problem List   Diagnosis     Obesity     Hypothyroidism     Benign neoplasm of colon     Obstructive sleep apnea     * * * SBE PROPHYLAXIS * * *     Degeneration of lumbar or lumbosacral intervertebral disc     Pain in joint, ankle and foot     Arthrodesis status     HYPERLIPIDEMIA LDL GOAL <100     Hypertension goal BP (blood pressure) < 140/90     Health Care Home     Osteopenia     Impaired fasting glucose     ACP (advance care planning)     History of melanoma     Panic disorder without agoraphobia     Arthritis     PONV (postoperative nausea and vomiting)     S/P total hip arthroplasty     Constipation     DVT prophylaxis     Anticoagulation management encounter     Physical deconditioning     Periprosthetic fracture around internal prosthetic left hip joint (H)     Chronic pain syndrome - hips     Fracture of greater trochanter of right femur 11/6/2016, closed, with routine healing, subsequent encounter     Anxiety     Osteoarthritis of multiple joints, unspecified osteoarthritis type     Anemia due to blood loss, acute     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     Long term (current) use of anticoagulants     Vitamin D deficiency     Pneumonia     Elevated BMI (H)     CHF (congestive heart failure) (H)     Mild depression (H)     Chronic diastolic heart failure (H)     Neuropathy     Spinal stenosis of lumbar region, unspecified whether neurogenic claudication present     Severe obesity (BMI >= 40) (H)       ALLERGIES:  Ace inhibitors    ACE/ARB/ARNI NOT PRESCRIBED (INTENTIONAL), Please choose reason not prescribed, below  acetaminophen (TYLENOL) 650 MG CR tablet, Take 1 tablet (650 mg) by mouth every 8 hours as needed for mild pain or fever  albuterol (PROAIR HFA/PROVENTIL HFA/VENTOLIN HFA) 108 (90 Base) MCG/ACT inhaler, INHALE 2 PUFFS INTO THE LUNGS EVERY 4 HOURS AS NEEDED FOR SHORTNESS OF BREATH OR DIFFICULT BREATHING OR WHEEZING  ALPRAZolam (XANAX) 0.25 MG tablet, TAKE ONE TABLET BY MOUTH TWICE A DAY AS NEEDED FOR ANXIETY. DON'T TAKE WITH OXYCODONE.  amLODIPine (NORVASC) 5 MG tablet, Take 1 tablet (5 mg) by mouth daily  benzonatate (TESSALON) 100 MG capsule, Take 1 capsule (100 mg) by mouth 3 times daily as needed for cough  BETA BLOCKER NOT PRESCRIBED (INTENTIONAL), Beta Blocker not prescribed intentionally due to EF > 40 % (ejection fraction) will leave up to Cardiology.  buPROPion (WELLBUTRIN) 75 MG tablet, Take 1 tablet (75 mg) by mouth 2 times daily  calcium carbonate 600 mg-vitamin D 400 units (CALTRATE) 600-400 MG-UNIT per tablet, Take 1 tablet by mouth every evening   diclofenac (VOLTAREN) 1 % topical gel, Apply 4 g topically 4 times daily as needed for moderate pain  FLUoxetine (PROZAC) 20 MG capsule, Take 1 capsule (20 mg) by mouth daily To take along with other dose to equal 60 mg  FLUoxetine (PROZAC) 40 MG capsule, To take along with other dose to equal 60 mg  fluticasone (FLONASE) 50 MCG/ACT nasal spray, Spray 1 spray into both nostrils daily as needed for rhinitis  furosemide (LASIX) 20 MG tablet, Take 1 tablet (20 mg) by mouth  daily  gabapentin (NEURONTIN) 300 MG capsule, TAKE ONE CAPSULE BY MOUTH EVERY MORNING AND AT NOON, AND TAKE TWO CAPSULES EVERY NIGHT  levothyroxine (SYNTHROID/LEVOTHROID) 88 MCG tablet, Take 1 tablet (88 mcg) by mouth daily  nystatin (NYSTOP) 845446 UNIT/GM external powder, Apply tid to affectead area prn  olopatadine (PATANOL) 0.1 % ophthalmic solution, Place 1 drop into both eyes 2 times daily  order for DME, Equipment being ordered: left breast prosthesis. Mastectomy bras (up to 4)  polyethylene glycol (MIRALAX) 17 GM/Dose powder, Take 17-34 g (1-2 capfuls) by mouth daily  SENNA-docusate sodium (SENNA S) 8.6-50 MG tablet, Take 1 tablet by mouth 2 times daily  simvastatin (ZOCOR) 10 MG tablet, Take 1 tablet (10 mg) by mouth At Bedtime  triamcinolone (KENALOG) 0.1 % cream, Apply sparingly to affected area two times daily as needed for itching.  WIXELA INHUB 100-50 MCG/ACT inhaler, INHALE 1 PUFF INTO THE LUNGS EVERY 12 HOURS.    No current facility-administered medications on file prior to visit.      Social History     Tobacco Use     Smoking status: Never Smoker     Smokeless tobacco: Never Used   Substance Use Topics     Alcohol use: Yes     Alcohol/week: 0.0 - 0.8 standard drinks     Comment: 1 glass wine weekly       Family History   Problem Relation Age of Onset     No Known Problems Brother         brain tumor related to shingles in his eye     Arthritis Mother      Hypertension Father      Cancer Father         lung     Cancer Grandchild         terratoma tumors     Breast Cancer Daughter          ROS:  CONSTITUTIONAL:NEGATIVE for fever, chills,    EYES: NEGATIVE for vision changes or irritation  ENT/MOUTH: NEGATIVE for ear, mouth and throat problems  RESP:NEGATIVE for significant cough or SOB    EXAM:   /86   Pulse 85   Temp 98.5  F (36.9  C) (Tympanic)   LMP  (LMP Unknown)   SpO2 96%   Breastfeeding No   GENERAL: alert, no acute distress.  SKIN: Rash description:    Distribution: generalized   Location: generalized    Color: pink, faint,  Lesion type: maculopapular, scattered discrete lesions with slight swelling       EYES: EOMI,   conjunctiva clear  HENT: External ears with no swelling or lesions   Nose and lips without  Swelling, ulcers, erythema or lesions  NECK: normal pain free ROM  RESP: no labored respirations, no tachypnea  EXTREMITIES:    Venous stasis, without pitting edema, bilateral lower extremities with inflammation to about mid lower leg bilateral  NEURO: Normal strength and tone, ambulation without difficulty,   normal speech and mentation

## 2022-07-31 NOTE — TELEPHONE ENCOUNTER
"  Nurse Triage SBAR    Is this a 2nd Level Triage? NO    Situation: Patient calling with Worsening rash.  Consent: not needed    Background: [place] [facility].  On Friday night began to develop more of a full body rash.  Rash has continued since Friday but it's now moved from just on her legs and now it's all over.      Assessment:   * Rash is raised in some areas.  No blisters.  No peeling skin, just normal dry skin per pt.  Spots are red - \"more of an angry red like after you itch something\".    * LOCATION:  \"All over my body\"  Back, stomach, arms, chest  * ITCHY:  Very itchy on my back.  Pt states she is rubbing it on the chair but doesn't want to scratch with her nails as she feels this will make it worse.    * Denies headache, dizziness, sore throat, and joint pain.    * Pt states she had a fever off and on yesterday.    * Pt states \"I think I even feel some in my mouth today\".       Protocol Recommended Disposition:   See HCP within 4hours    Recommendation: Advised patient to Go to urgent care . Reviewed concerning symptoms and when to call back.     Routed to provider:  Stella Saeed RN [facility] 7/31/2022 11:46 AM      COVID 19 Nurse Triage Plan/Patient Instructions    Please be aware that novel coronavirus (COVID-19) may be circulating in the community. If you develop symptoms such as fever, cough, or SOB or if you have concerns about the presence of another infection including coronavirus (COVID-19), please contact your health care provider or visit [email or web address].     Disposition/Instructions    In-Person Visit with provider recommended. Reference Visit Selection Guide.    Thank you for taking steps to prevent the spread of this virus.  o Limit your contact with others.  o Wear a simple mask to cover your cough.  o Wash your hands well and often.    [facility]: About COVID-19: " [email or web address]    CDC: What to Do If You're Sick: www.cdc.gov/coronavirus/2019-ncov/about/steps-when-sick.html    CDC: Ending Home Isolation: www.cdc.gov/coronavirus/2019-ncov/hcp/disposition-in-home-patients.html     CDC: Caring for Someone: www.cdc.gov/coronavirus/2019-ncov/if-you-are-sick/care-for-someone.html     [facility]: Interim Guidance for Hospital Discharge to Home: www.health.[facility].mn./diseases/coronavirus/hcp/hospdischarge.pdf    [facility] clinical trials (COVID-19 research studies): [email or web address]     Below are the COVID-19 hotlines at the [place] [facility] of [facility] ([facility]). Interpreters are available.   o For health questions: Call 499-974-6926 or 1-760.225.2723 (7 a.m. to 7 p.m.)  o For questions about schools and childcare: Call 253-934-3244 or 1-468.169.7177 (7 a.m. to 7 p.m.)                         Reason for Disposition    Sores in mouth    Additional Information    Negative: [1] Life-threatening reaction (anaphylaxis) in the past to similar substance (e.g., food, insect bite/sting, chemical, etc.) AND [2] < 2 hours since exposure    Negative: [1] Sudden onset of rash (within last 2 hours) AND [2] difficulty with breathing or swallowing    Negative: Shock suspected (e.g., cold/pale/clammy skin, too weak to stand, low BP, rapid pulse)    Negative: Difficult to awaken or acting confused (e.g., disoriented, slurred speech)    Negative: [1] Purple or blood-colored spots or dots AND [2] fever    Negative: Sounds like a life-threatening emergency to the triager    Negative: Insect bites suspected    Negative: Swimmer's Itch suspected    Negative: Sunburn suspected    Negative: Hives suspected    Negative: Measles suspected AND [2] known exposure to measles in past 3 weeks    Negative: [1] Chickenpox suspected AND [2] known exposure to chickenpox in past 3 weeks    Negative: [1] Drug rash suspected AND [2] started taking new medicine within last  2 weeks(Exception: antihistamine, eye drops, ear drops, decongestant or other OTC cough/cold medicines)    Negative: [1] Widespread rash AND [2] bright red, sunburn-like AND [3] current tampon use or nasal packing    Negative: [1] Widespread rash AND [2] bright red, sunburn-like AND [3] wound infection or recent surgery    Negative: [1] Bright red skin AND [2] peels off in sheets    Negative: Stiff neck (unable to touch chin to chest)    Negative: Fever    Negative: Joint pain or swelling    Negative: Rash looks like large or small blisters (i.e., fluid filled bubbles or sacs on the skin)    Negative: Patient sounds very sick or weak to the triager    Negative: [1] Purple or blood-colored rash (spots or dots) AND [2] no fever AND [3] sounds well to triager    Protocols used: RASH OR REDNESS - WIDESPREAD-A-

## 2022-08-01 NOTE — TELEPHONE ENCOUNTER
Per chart review, patient seen at  7/31/22. Per notes: Venous stasis dermatitis of both lower extremities     Use triamcinolone to reduce inflammation and use knee high , low level compression stockings OTC to help with  Venous return and healing of her skin       Follow-up with primary clinic if not improving    Patient has upcoming appointment w/ PCP 8/5/22 for UC follow up. Called and spoke with patient. Patient has concerns regarding her follow up care, would like in person visit scheduled if possible to discuss with a provider. Scheduled for OV 8/2/22 at [facility]. RN advised if any new/worsening of symptoms prior to upcoming appointment, to be seen in UC or contact clinic. Patient verbalized understanding and agreed with plan. Scheduled for follow up medication check appointment w/ PCP 9/28/22 by patient request.      Guillermo WHITLEY RN

## 2022-08-02 ENCOUNTER — OFFICE VISIT (OUTPATIENT)
Dept: PEDIATRICS | Facility: CLINIC | Age: 87
End: 2022-08-02
Payer: COMMERCIAL

## 2022-08-02 VITALS
RESPIRATION RATE: 24 BRPM | SYSTOLIC BLOOD PRESSURE: 114 MMHG | HEIGHT: 62 IN | WEIGHT: 262 LBS | HEART RATE: 80 BPM | TEMPERATURE: 97.7 F | BODY MASS INDEX: 48.21 KG/M2 | DIASTOLIC BLOOD PRESSURE: 64 MMHG | OXYGEN SATURATION: 96 %

## 2022-08-02 DIAGNOSIS — I50.32 CHRONIC DIASTOLIC HEART FAILURE (H): ICD-10-CM

## 2022-08-02 DIAGNOSIS — F41.9 ANXIETY: ICD-10-CM

## 2022-08-02 DIAGNOSIS — R60.0 LEG EDEMA: Primary | ICD-10-CM

## 2022-08-02 PROCEDURE — 99214 OFFICE O/P EST MOD 30 MIN: CPT | Performed by: PHYSICIAN ASSISTANT

## 2022-08-02 RX ORDER — FUROSEMIDE 20 MG
TABLET ORAL
Qty: 30 TABLET | Refills: 0 | Status: SHIPPED | OUTPATIENT
Start: 2022-08-02 | End: 2022-08-05

## 2022-08-02 RX ORDER — ALPRAZOLAM 0.25 MG
TABLET ORAL
Qty: 10 TABLET | Refills: 0 | Status: ON HOLD | OUTPATIENT
Start: 2022-08-02 | End: 2023-03-06

## 2022-08-02 RX ORDER — DOXYCYCLINE HYCLATE 100 MG
100 TABLET ORAL 2 TIMES DAILY
Qty: 20 TABLET | Refills: 0 | Status: SHIPPED | OUTPATIENT
Start: 2022-08-02 | End: 2022-08-16

## 2022-08-02 RX ORDER — FUROSEMIDE 20 MG
20 TABLET ORAL 2 TIMES DAILY
Qty: 60 TABLET | Refills: 0 | Status: SHIPPED | OUTPATIENT
Start: 2022-08-02 | End: 2022-09-02

## 2022-08-02 NOTE — PROGRESS NOTES
As per Charleen's recommendation, advised pt to take lasix 40 mg in the morning & additional 20 mg around 4 pm x 3 days till she f/up with Charleen on Friday(has appointment in place). Pt agrees to the plan.     Pt is out of lasix 20 mg tabs. The lasix bottle that she has at home is for 40 mg & its from 2020.     So, sent anew rx for lasix 20 mg tabs with Charleen's recommended dose. Pt is planning to pick-up the 3 rxs from today now.     Mela RN  Patient Advocate Liason (PAL)  [facility]

## 2022-08-02 NOTE — PROGRESS NOTES
"  Assessment & Plan     Leg edema  Confirmed dosing when patient returned home. Patient to increase lasix from 20 mg daily to 20 mg twice daily. Cover for new cellulitis with doxy.  Elevate. Return in three days for follow up.  - doxycycline hyclate (VIBRA-TABS) 100 MG tablet; Take 1 tablet (100 mg) by mouth 2 times daily  - furosemide (LASIX) 20 MG tablet; Take 1 tablet (20 mg) by mouth 2 times daily    Chronic diastolic heart failure (H)    Anxiety  Refill given.  - ALPRAZolam (XANAX) 0.25 MG tablet; TAKE ONE TABLET BY MOUTH TWICE A DAY AS NEEDED FOR ANXIETY. DON'T TAKE WITH OXYCODONE.    Olya Polo PA-C  [facility] NEGIN Gaviria is a 87 year old presenting for the following health issues:  Rash and Vaginal Problem (Swelling in itching in vaginal area. Similar Sx in past. No concerns of urinary issues)      HPI   Rash  Onset/Duration: Four days  Description  Location: all over body  Character: round, red at times  Itching: moderate  Intensity:  moderate  Progression of Symptoms:  worsening  Accompanying signs and symptoms:   Fever: No  Body aches or joint pain: YES. Dx of arthritis  Sore throat symptoms: No  Recent cold symptoms: No  History:           Previous episodes of similar rash: None  New exposures:  Pt was treated with abx Cephalexin  two weeks ago for infection in lower legs, which Pt thinks Sx are coming back. Red and swelling  Patient then had drug reaction  Seen in UC twice  Recent travel: No  Exposure to similar rash: No  Precipitating or alleviating factors: NA  Therapies tried and outcome: none    Anxiety due to symptoms. Patient would like refill of benzo.    Review of Systems   Constitutional, HEENT, cardiovascular, pulmonary, gi and gu systems are negative, except as otherwise noted.      Objective    /64   Pulse 80   Temp 97.7  F (36.5  C) (Oral)   Resp 24   Ht 1.581 m (5' 2.25\")   Wt 118.8 kg (262 lb)   LMP  (LMP Unknown)   SpO2 96%   BMI " 47.54 kg/m    Body mass index is 47.54 kg/m .  Physical Exam   GENERAL: alert and no distress  EYES: Eyes grossly normal to inspection, PERRL and conjunctivae and sclerae normal  HENT: mouth without ulcers or lesions  NECK: no adenopathy  RESP: lungs clear to auscultation - no rales, rhonchi or wheezes  CV: regular rate and rhythm, normal S1 S2, no S3 or S4  LE: edema bilaterally  SKIN: inspection of the lower legs reveals diffuse erythema--mild. Mildly warm. Pitting edema.   erythemic macular lesions of the torso, LE, UE.

## 2022-08-05 DIAGNOSIS — F32.A MILD DEPRESSION: ICD-10-CM

## 2022-08-05 DIAGNOSIS — I50.32 CHRONIC DIASTOLIC HEART FAILURE (H): Primary | ICD-10-CM

## 2022-08-05 DIAGNOSIS — R06.02 SHORTNESS OF BREATH: ICD-10-CM

## 2022-08-05 DIAGNOSIS — F41.9 ANXIETY: ICD-10-CM

## 2022-08-05 NOTE — TELEPHONE ENCOUNTER
Huddled with Charleen:  - pt to finish doxy  - can continue lasix 20 mg bid till next Wed(8/10)  - decrease lasix 20 mg to every day from Thurs(8/11) & monitor closely    Called pt back & advised as above. Pt agrees to the plan.     Dr.Emily Cutler - please advise whether pt need a f/u sooner than 9/28? Also, do we need to recheck labs next week because of the increased dose of diuretics? Thanks.     Mela RN  Patient Advocate Liason (PAL)  [facility]

## 2022-08-05 NOTE — TELEPHONE ENCOUNTER
I see that pt cancelled her f/u appointment with Charleen today. So, called pt back to get an update.    Pt says that her leg swelling has been much better after the increased dose of lasix(increased from 20 mg every day to bid since 8/3). The rash on legs are looking better as well. Legs has some redness but definitely better.     Pt doesn't check her weight at home & has been urinating more than usual. Denies any new or worsening sx's. Is continuing doxy bid & lasix 20 mg bid.     Mela RN  Patient Advocate Liason (PAL)  [facility]

## 2022-08-08 NOTE — TELEPHONE ENCOUNTER
She should get BMP done this week.     Can she come in 8/25 at 7am? Can use that KWADWO.     Stella Cutler MD   Internal Medicine - Pediatrics

## 2022-08-09 ENCOUNTER — HOSPITAL ENCOUNTER (OUTPATIENT)
Facility: CLINIC | Age: 87
Setting detail: OBSERVATION
Discharge: HOME | End: 2022-08-10
Attending: EMERGENCY MEDICINE | Admitting: STUDENT IN AN ORGANIZED HEALTH CARE EDUCATION/TRAINING PROGRAM
Payer: COMMERCIAL

## 2022-08-09 ENCOUNTER — TRANSFERRED RECORDS (OUTPATIENT)
Dept: HEALTH INFORMATION MANAGEMENT | Facility: CLINIC | Age: 87
End: 2022-08-09

## 2022-08-09 ENCOUNTER — APPOINTMENT (OUTPATIENT)
Dept: ULTRASOUND IMAGING | Facility: CLINIC | Age: 87
End: 2022-08-09
Attending: EMERGENCY MEDICINE
Payer: COMMERCIAL

## 2022-08-09 ENCOUNTER — APPOINTMENT (OUTPATIENT)
Dept: CT IMAGING | Facility: CLINIC | Age: 87
End: 2022-08-09
Attending: EMERGENCY MEDICINE
Payer: COMMERCIAL

## 2022-08-09 DIAGNOSIS — M79.89 LEG SWELLING: ICD-10-CM

## 2022-08-09 DIAGNOSIS — K80.20 CALCULUS OF GALLBLADDER WITHOUT CHOLECYSTITIS WITHOUT OBSTRUCTION: ICD-10-CM

## 2022-08-09 DIAGNOSIS — R07.9 CHEST PAIN, UNSPECIFIED TYPE: ICD-10-CM

## 2022-08-09 DIAGNOSIS — R91.8 PULMONARY NODULES: ICD-10-CM

## 2022-08-09 DIAGNOSIS — R79.89 ELEVATED D-DIMER: ICD-10-CM

## 2022-08-09 LAB
ALBUMIN SERPL BCG-MCNC: 4.1 G/DL (ref 3.5–5.2)
ALP SERPL-CCNC: 87 U/L (ref 35–104)
ALT SERPL W P-5'-P-CCNC: 24 U/L (ref 10–35)
ANION GAP SERPL CALCULATED.3IONS-SCNC: 11 MMOL/L (ref 7–15)
AST SERPL W P-5'-P-CCNC: 31 U/L (ref 10–35)
ATRIAL RATE - MUSE: 76 BPM
BASOPHILS # BLD AUTO: 0 10E3/UL (ref 0–0.2)
BASOPHILS NFR BLD AUTO: 0 %
BILIRUB SERPL-MCNC: 0.8 MG/DL
BUN SERPL-MCNC: 18.2 MG/DL (ref 8–23)
CALCIUM SERPL-MCNC: 8.8 MG/DL (ref 8.8–10.2)
CHLORIDE SERPL-SCNC: 101 MMOL/L (ref 98–107)
CREAT BLD-MCNC: 0.7 MG/DL (ref 0.5–1)
CREAT SERPL-MCNC: 0.72 MG/DL (ref 0.51–0.95)
D DIMER PPP FEU-MCNC: 2.5 UG/ML FEU (ref 0–0.5)
DEPRECATED HCO3 PLAS-SCNC: 28 MMOL/L (ref 22–29)
DIASTOLIC BLOOD PRESSURE - MUSE: NORMAL MMHG
EOSINOPHIL # BLD AUTO: 0.3 10E3/UL (ref 0–0.7)
EOSINOPHIL NFR BLD AUTO: 7 %
ERYTHROCYTE [DISTWIDTH] IN BLOOD BY AUTOMATED COUNT: 13.9 % (ref 10–15)
GFR SERPL CREATININE-BSD FRML MDRD: 81 ML/MIN/1.73M2
GFR SERPL CREATININE-BSD FRML MDRD: >60 ML/MIN/1.73M2
GLUCOSE SERPL-MCNC: 107 MG/DL (ref 70–99)
HCT VFR BLD AUTO: 43.3 % (ref 35–47)
HGB BLD-MCNC: 13.4 G/DL (ref 11.7–15.7)
HOLD SPECIMEN: NORMAL
IMM GRANULOCYTES # BLD: 0 10E3/UL
IMM GRANULOCYTES NFR BLD: 0 %
INTERPRETATION ECG - MUSE: NORMAL
LIPASE SERPL-CCNC: 4 U/L (ref 13–60)
LYMPHOCYTES # BLD AUTO: 0.7 10E3/UL (ref 0.8–5.3)
LYMPHOCYTES NFR BLD AUTO: 14 %
MCH RBC QN AUTO: 29 PG (ref 26.5–33)
MCHC RBC AUTO-ENTMCNC: 30.9 G/DL (ref 31.5–36.5)
MCV RBC AUTO: 94 FL (ref 78–100)
MONOCYTES # BLD AUTO: 0.4 10E3/UL (ref 0–1.3)
MONOCYTES NFR BLD AUTO: 9 %
NEUTROPHILS # BLD AUTO: 3.4 10E3/UL (ref 1.6–8.3)
NEUTROPHILS NFR BLD AUTO: 70 %
NRBC # BLD AUTO: 0 10E3/UL
NRBC BLD AUTO-RTO: 0 /100
NT-PROBNP SERPL-MCNC: 54 PG/ML (ref 0–1800)
P AXIS - MUSE: 42 DEGREES
PLATELET # BLD AUTO: 243 10E3/UL (ref 150–450)
POTASSIUM SERPL-SCNC: 3.6 MMOL/L (ref 3.4–5.3)
PR INTERVAL - MUSE: 198 MS
PROT SERPL-MCNC: 6.9 G/DL (ref 6.4–8.3)
QRS DURATION - MUSE: 86 MS
QT - MUSE: 436 MS
QTC - MUSE: 490 MS
R AXIS - MUSE: -16 DEGREES
RBC # BLD AUTO: 4.62 10E6/UL (ref 3.8–5.2)
SARS-COV-2 RNA RESP QL NAA+PROBE: NEGATIVE
SODIUM SERPL-SCNC: 140 MMOL/L (ref 136–145)
SYSTOLIC BLOOD PRESSURE - MUSE: NORMAL MMHG
T AXIS - MUSE: 20 DEGREES
TROPONIN T SERPL HS-MCNC: 13 NG/L
TROPONIN T SERPL HS-MCNC: 21 NG/L
VENTRICULAR RATE- MUSE: 76 BPM
WBC # BLD AUTO: 4.9 10E3/UL (ref 4–11)

## 2022-08-09 PROCEDURE — 76604 US EXAM CHEST: CPT

## 2022-08-09 PROCEDURE — 71275 CT ANGIOGRAPHY CHEST: CPT

## 2022-08-09 PROCEDURE — 36415 COLL VENOUS BLD VENIPUNCTURE: CPT | Performed by: EMERGENCY MEDICINE

## 2022-08-09 PROCEDURE — 82306 VITAMIN D 25 HYDROXY: CPT | Performed by: STUDENT IN AN ORGANIZED HEALTH CARE EDUCATION/TRAINING PROGRAM

## 2022-08-09 PROCEDURE — 96360 HYDRATION IV INFUSION INIT: CPT | Mod: 59

## 2022-08-09 PROCEDURE — 83880 ASSAY OF NATRIURETIC PEPTIDE: CPT | Performed by: EMERGENCY MEDICINE

## 2022-08-09 PROCEDURE — 99285 EMERGENCY DEPT VISIT HI MDM: CPT | Mod: 25

## 2022-08-09 PROCEDURE — 250N000009 HC RX 250: Performed by: EMERGENCY MEDICINE

## 2022-08-09 PROCEDURE — G0378 HOSPITAL OBSERVATION PER HR: HCPCS

## 2022-08-09 PROCEDURE — 93308 TTE F-UP OR LMTD: CPT

## 2022-08-09 PROCEDURE — 93970 EXTREMITY STUDY: CPT

## 2022-08-09 PROCEDURE — C9803 HOPD COVID-19 SPEC COLLECT: HCPCS

## 2022-08-09 PROCEDURE — 82947 ASSAY GLUCOSE BLOOD QUANT: CPT | Performed by: EMERGENCY MEDICINE

## 2022-08-09 PROCEDURE — 85025 COMPLETE CBC W/AUTO DIFF WBC: CPT | Performed by: EMERGENCY MEDICINE

## 2022-08-09 PROCEDURE — 250N000013 HC RX MED GY IP 250 OP 250 PS 637: Performed by: EMERGENCY MEDICINE

## 2022-08-09 PROCEDURE — 82565 ASSAY OF CREATININE: CPT

## 2022-08-09 PROCEDURE — U0005 INFEC AGEN DETEC AMPLI PROBE: HCPCS | Performed by: EMERGENCY MEDICINE

## 2022-08-09 PROCEDURE — 84484 ASSAY OF TROPONIN QUANT: CPT | Performed by: EMERGENCY MEDICINE

## 2022-08-09 PROCEDURE — 85379 FIBRIN DEGRADATION QUANT: CPT | Performed by: EMERGENCY MEDICINE

## 2022-08-09 PROCEDURE — 96361 HYDRATE IV INFUSION ADD-ON: CPT

## 2022-08-09 PROCEDURE — 83690 ASSAY OF LIPASE: CPT | Performed by: EMERGENCY MEDICINE

## 2022-08-09 PROCEDURE — 258N000003 HC RX IP 258 OP 636: Performed by: EMERGENCY MEDICINE

## 2022-08-09 PROCEDURE — 250N000011 HC RX IP 250 OP 636: Performed by: EMERGENCY MEDICINE

## 2022-08-09 PROCEDURE — 99220 PR INITIAL OBSERVATION CARE,LEVEL III: CPT | Performed by: STUDENT IN AN ORGANIZED HEALTH CARE EDUCATION/TRAINING PROGRAM

## 2022-08-09 RX ORDER — AMLODIPINE BESYLATE 5 MG/1
5 TABLET ORAL DAILY
Status: DISCONTINUED | OUTPATIENT
Start: 2022-08-10 | End: 2022-08-10 | Stop reason: HOSPADM

## 2022-08-09 RX ORDER — IOPAMIDOL 755 MG/ML
500 INJECTION, SOLUTION INTRAVASCULAR ONCE
Status: COMPLETED | OUTPATIENT
Start: 2022-08-09 | End: 2022-08-09

## 2022-08-09 RX ORDER — GABAPENTIN 100 MG/1
300 CAPSULE ORAL 3 TIMES DAILY
Status: DISCONTINUED | OUTPATIENT
Start: 2022-08-10 | End: 2022-08-10 | Stop reason: HOSPADM

## 2022-08-09 RX ORDER — ALPRAZOLAM 0.25 MG
0.25 TABLET ORAL 3 TIMES DAILY PRN
Status: DISCONTINUED | OUTPATIENT
Start: 2022-08-09 | End: 2022-08-10 | Stop reason: HOSPADM

## 2022-08-09 RX ORDER — LIDOCAINE 4 G/G
1 PATCH TOPICAL
Status: DISCONTINUED | OUTPATIENT
Start: 2022-08-09 | End: 2022-08-10 | Stop reason: HOSPADM

## 2022-08-09 RX ORDER — LEVOTHYROXINE SODIUM 88 UG/1
88 TABLET ORAL DAILY
Status: DISCONTINUED | OUTPATIENT
Start: 2022-08-10 | End: 2022-08-10 | Stop reason: HOSPADM

## 2022-08-09 RX ORDER — BUPROPION HYDROCHLORIDE 75 MG/1
75 TABLET ORAL 2 TIMES DAILY
Status: DISCONTINUED | OUTPATIENT
Start: 2022-08-09 | End: 2022-08-10 | Stop reason: HOSPADM

## 2022-08-09 RX ORDER — SIMVASTATIN 10 MG
10 TABLET ORAL AT BEDTIME
Status: DISCONTINUED | OUTPATIENT
Start: 2022-08-10 | End: 2022-08-10 | Stop reason: HOSPADM

## 2022-08-09 RX ORDER — LIDOCAINE 40 MG/G
CREAM TOPICAL
Status: DISCONTINUED | OUTPATIENT
Start: 2022-08-09 | End: 2022-08-10 | Stop reason: HOSPADM

## 2022-08-09 RX ORDER — FUROSEMIDE 20 MG
20 TABLET ORAL 2 TIMES DAILY
Status: DISCONTINUED | OUTPATIENT
Start: 2022-08-09 | End: 2022-08-10 | Stop reason: HOSPADM

## 2022-08-09 RX ORDER — ACETAMINOPHEN 325 MG/1
650 TABLET ORAL EVERY 8 HOURS PRN
Status: DISCONTINUED | OUTPATIENT
Start: 2022-08-09 | End: 2022-08-10 | Stop reason: HOSPADM

## 2022-08-09 RX ORDER — CETIRIZINE HYDROCHLORIDE 10 MG/1
10 TABLET ORAL DAILY
Status: DISCONTINUED | OUTPATIENT
Start: 2022-08-10 | End: 2022-08-10 | Stop reason: HOSPADM

## 2022-08-09 RX ORDER — POLYETHYLENE GLYCOL 3350 17 G/17G
17-34 POWDER, FOR SOLUTION ORAL DAILY
Status: DISCONTINUED | OUTPATIENT
Start: 2022-08-10 | End: 2022-08-10 | Stop reason: HOSPADM

## 2022-08-09 RX ADMIN — LIDOCAINE HYDROCHLORIDE 30 ML: 20 SOLUTION ORAL; TOPICAL at 13:21

## 2022-08-09 RX ADMIN — SODIUM CHLORIDE 500 ML: 9 INJECTION, SOLUTION INTRAVENOUS at 13:14

## 2022-08-09 RX ADMIN — SODIUM CHLORIDE 500 ML: 9 INJECTION, SOLUTION INTRAVENOUS at 15:21

## 2022-08-09 RX ADMIN — IOPAMIDOL 68 ML: 755 INJECTION, SOLUTION INTRAVENOUS at 15:43

## 2022-08-09 RX ADMIN — SODIUM CHLORIDE 90 ML: 9 INJECTION, SOLUTION INTRAVENOUS at 15:44

## 2022-08-09 ASSESSMENT — ENCOUNTER SYMPTOMS
ABDOMINAL PAIN: 0
NAUSEA: 0
NUMBNESS: 1
VOMITING: 0
NERVOUS/ANXIOUS: 1
APPETITE CHANGE: 0
BACK PAIN: 0
SHORTNESS OF BREATH: 0

## 2022-08-09 ASSESSMENT — ACTIVITIES OF DAILY LIVING (ADL)
ADLS_ACUITY_SCORE: 35
ADLS_ACUITY_SCORE: 28
ADLS_ACUITY_SCORE: 35

## 2022-08-09 NOTE — ED PROVIDER NOTES
"  History   Chief Complaint:  sudden onset chest pain      The history is provided by the patient.      Carol Merida is a 87 year old female with history of hypothyroidism, EJAN, hyperlipidemia, hypertension, DVT, CHF, and neuropathy who presents via EMS from home with sudden onset of 10/10 localized left-sided chest pain approximately 1.5 hours ago.  Last night, she experienced slight discomfort and paraesthesia to the left upper extremity, which she suspects was due to anxiety. She explains that her onset of symptoms today initially felt like heartburn. She reports taking her daily medications today. She experienced similar symptoms approximately 1 week ago after being seen in the .  Here in the ED, she reports improvement of pain to 6/10 and describes it as \"tender\".  She denies recent travel. She denies abdominal or back pain, shortness of breath, leg swelling, nausea, loss of appetite, and emesis.    Review of Systems   Constitutional: Negative for appetite change.   Respiratory: Negative for shortness of breath.    Cardiovascular: Positive for chest pain (left-side). Negative for leg swelling.   Gastrointestinal: Negative for abdominal pain, nausea and vomiting.   Musculoskeletal: Negative for back pain.   Neurological: Positive for numbness.   Psychiatric/Behavioral: The patient is nervous/anxious.    All other systems reviewed and are negative.      Allergies:  Ace Inhibitors    Medications:  Alprazolam  Amlodipine  Bupropion  Fluoxetine  Furosemide  Gabapentin   Levothyroxine   Senna-docusate  Albuterol  Wixela  Synthroid   Metoprolol  Exemestane   Aspirin     Past Medical History:     Obesity  Hypothyroidism  Neoplasm of colon  JEAN  Hyperlipidemia  Hypertension  Melanoma  DVT  Chronic pain syndrome  Anxiety  Anemia  Vitamin D deficiency  CHF  Depression  Neuropathy  Anxiety     Past Surgical History:    Arthroplasty hip x3  Cataract, bilateral  Colonoscopy x2  Hysterectomy  Mastectomy, left  Ankle " surgery, bilateral  Hardware removal, right  Knee replacement x2  Tonsillectomy  Appendectomy  Melanoma removed  Subtalar arthroereisis     Family History:    Brain tumor  Arthritis  Hypertension  Lung cancer  Teratoma tumors  Breast cancer    Social History:  The patient presents alone.  The patient presents in a private vehicle.  PCP: Stella Cutler MD     Physical Exam     Patient Vitals for the past 24 hrs:   BP Temp Temp src Pulse Resp SpO2 Weight   08/09/22 1500 95/54 -- -- 73 13 94 % --   08/09/22 1445 98/58 -- -- 71 14 96 % --   08/09/22 1430 (!) 77/43 -- -- 74 16 (!) 89 % --   08/09/22 1415 90/41 -- -- 72 10 92 % --   08/09/22 1400 98/56 -- -- 72 15 93 % --   08/09/22 1345 97/55 -- -- 71 14 93 % --   08/09/22 1330 104/51 -- -- 70 15 92 % --   08/09/22 1315 118/52 -- -- 72 -- 92 % --   08/09/22 1300 100/51 -- -- 74 18 91 % --   08/09/22 1259 -- 98.1  F (36.7  C) Oral -- -- -- --   08/09/22 1245 (!) 80/36 -- -- 85 20 91 % 116.8 kg (257 lb 9.6 oz)       Physical Exam    Constitutional: Alert, attentive, GCS 15  HENT:    Nose: Nose normal.    Mouth/Throat: Oropharynx is clear, mucous membranes are moist  Eyes: EOM are normal, anicteric, conjugate gaze  CV: regular rate and rhythm; no murmurs  Chest: Effort normal and breath sounds clear without wheezing or rales, symmetric bilaterally   GI:  non tender. No distension. No guarding or rebound.    MSK: No LE edema, no tenderness to palpation of BLE. Diffused bilateral leg tenderness  Neurological: Alert, attentive, moving all extremities equally.   Skin: Skin is warm and dry.    Emergency Department Course   ECG  ECG taken at 1254, ECG read  Normal sinus rhythm  Inferior infarct, age undetermined.  Abnormal ECG.  No significant change as compared to prior EKG dated 11/1/21   Rate 76 bpm. MS interval 198 ms. QRS duration 86 ms. QT/QTc 436/490 ms. P-R-T axis 42 -16 20.     Imaging:  CT Chest Pulmonary Embolism w Contrast   Final Result   IMPRESSION:   1.  No  evidence of pulmonary embolus.   2.  Displaced left 11th rib fracture which appears acute-to-subacute.   No pleural effusion or pneumothorax.   3.  Peripheral fibrotic changes in both lungs, similar to prior CT.   4.  Cholelithiasis without evidence of acute cholecystitis.      JOANIE BURROWS MD            SYSTEM ID:  [id number]      POC US ECHO LIMITED   Final Result   PROCEDURE NOTE: ED BEDSIDE LIMITED ULTRASOUND   Name of the study: Limited Echo   Performed by: Dr. Boateng   Indications: Chest pain    Body Location / Organs Imaged: Multiple planes of the heart, bilateral lungs.   Findings: good global LV function, no overt RV dilation, IVC small, B-lines are absent   Interpretation: Normal limited echo.   Archiving of images: Images were also saved digitally to the internal hard drive of the ultrasound machine/PACS.            US Lower Extremity Venous Duplex Bilateral    (Results Pending)     Report per radiology    Laboratory:  Labs Ordered and Resulted from Time of ED Arrival to Time of ED Departure   TROPONIN T, HIGH SENSITIVITY - Abnormal       Result Value    Troponin T, High Sensitivity 21 (*)    COMPREHENSIVE METABOLIC PANEL - Abnormal    Sodium 140      Potassium 3.6      Creatinine 0.72      Urea Nitrogen 18.2      Chloride 101      Carbon Dioxide (CO2) 28      Anion Gap 11      Glucose 107 (*)     Calcium 8.8      Protein Total 6.9      Albumin 4.1      Bilirubin Total 0.8      Alkaline Phosphatase 87      AST 31      ALT 24      GFR Estimate 81     CBC WITH PLATELETS AND DIFFERENTIAL - Abnormal    WBC Count 4.9      RBC Count 4.62      Hemoglobin 13.4      Hematocrit 43.3      MCV 94      MCH 29.0      MCHC 30.9 (*)     RDW 13.9      Platelet Count 243      % Neutrophils 70      % Lymphocytes 14      % Monocytes 9      % Eosinophils 7      % Basophils 0      % Immature Granulocytes 0      NRBCs per 100 WBC 0      Absolute Neutrophils 3.4      Absolute Lymphocytes 0.7 (*)     Absolute Monocytes 0.4       Absolute Eosinophils 0.3      Absolute Basophils 0.0      Absolute Immature Granulocytes 0.0      Absolute NRBCs 0.0     D DIMER QUANTITATIVE - Abnormal    D-Dimer Quantitative 2.50 (*)    ISTAT CREATININE POCT - Normal    Creatinine POCT 0.7      GFR, ESTIMATED POCT >60     NT PROBNP INPATIENT - Normal    N terminal Pro BNP Inpatient 54     LIPASE   TROPONIN T, HIGH SENSITIVITY        Emergency Department Course:    Reviewed:  I reviewed nursing notes, vitals, past medical history and Care Everywhere    Assessments:  1305 I obtained history and examined the patient as noted above.   1500 I rechecked the patient and explained findings. /60, no dizziness or lightheadedness         Interventions:  1314  mL IV  1321 GI Cocktail 30 mL PO    Disposition:  The patient signed out to Dr. Cortes pending results of her repeat troponin and CT pulmonary angiogram.  Dissipate admission, disposition to which floor is unclear as if her troponin is flat she could be observation admission for chest pain if she bumps she would rate herself and requiring heparinization inpatient status.    Impression & Plan     Medical Decision Makin-year-old woman past medical history sent for CHF, hypertension, hyperlipidemia, prior DVT not on anticoagulation presenting for sudden onset central, right-sided chest pain without radiation to her back belly or arm or neck.  She was given aspirin and nitro by EMS, this improved her pain from a 9 to a 6 however dropped her pressure into the 80s over 40s on arrival here, she was given IV fluids with improvement in her blood pressure though they remain soft.  I did perform bedside echo, this shows diminutive IVC, with no effusion, and no overt LV or RV dysfunction.  Her EKG on arrival shows no ischemic changes, her initial high-sensitivity troponin after approximately 2 hours of chest pain was detectable but technically negative at 21.  She has endorsed leg swelling, left greater than right  for several weeks for which she has been seen in clinic, her D-dimer returned elevated at 2.5 prompting CT pulmonary angiogram which shows no PE, does show subacute rib fracture without pneumothorax.  ULT BLE pending.  Repeat trop pending, patient is pain free at this time.  If repeat trop increased or >100, would rule in for ACS, warrant heparin, otherwise if remains pain free, would be obs admit for high risk chest pain.  She has had significant amount of urine output, she had been taking extra diuretics due to her leg swelling, question if low BP is secondary to overdiuresis, most recent echo shows an EF of 70%, will continue to treat with IV fluids, low suspicion for sepsis given absent leukocytosis and fever.    Diagnosis:    ICD-10-CM    1. Chest pain, unspecified type  R07.9    2. Elevated d-dimer  R79.89    3. Leg swelling  M79.89        Hilario Boateng MD  [employer]  3:40 PM 08/09/22     Scribe Disclosure:  I, Kenzie Wade, am serving as a scribe at 1:05 PM on 8/9/2022 to document services personally performed by Hilario Boateng MD based on my observations and the provider's statements to me.            Hilario Boateng MD  08/09/22 [id number]

## 2022-08-09 NOTE — TELEPHONE ENCOUNTER
"I spoke with patient to advise her that she needs to have lab work done this week.  Patient reports that the swelling has improved while taking Lasix 20 mg twice daily, and she is aware that she should be on this dosing til 08/10.    On 08/11, she will decrease Lasix dosing to 20 mg daily.  Patient reports that the infection on her left lower leg has improved since beginning the antibiotic, but she feels that the redness \"over my veins\" in my left anterior ankle remains about the same, and she is concerned about this.   She will be on Doxycycline twice daily until Friday.  Patient had been scheduled for a follow-up appointment on Friday with Charleen Polo, but she cancelled this.  Appointment scheduled tomorrow with Charleen for reassessment.  Will obtain labs at that visit.  Patient is unable to come to the clinic for an appointment with Dr. Cutler at 0700 on 08/25.     Will schedule follow-up appointment with Dr. Cutler at office appointment tomorrow.  Patient is comfortable with this plan and will call with any other questions or concerns.  No further questions.  Will call back if any other questions or concerns.  ROSEANNE Rosenbaum RN    "

## 2022-08-09 NOTE — ED NOTES
"RECEIVING UNIT ED HANDOFF REVIEW    Above ED Nurse Handoff Report was reviewed: Yes  Reviewed by: Darby Rashid RN on August 9, 2022 at 9:09 PM       [facility]  ED Nurse Handoff Report    Carol Merida is a 87 year old female   ED Chief complaint: sudden onset chest pain   . ED Diagnosis:   Final diagnoses:   Chest pain, unspecified type   Elevated d-dimer   Leg swelling   Pulmonary nodules   Calculus of gallbladder without cholecystitis without obstruction     Allergies:   Allergies   Allergen Reactions     Ace Inhibitors Cough       Code Status: Full Code  Activity level - Baseline/Home:  Independent. Activity Level - Current: Stand by assist Lift room needed: No. Bariatric: No   Needed: No   Isolation: No. Infection: Not Applicable.     Vital Signs:   Vitals:    08/09/22 1800 08/09/22 1815 08/09/22 1830 08/09/22 1845   BP: 121/77 115/75 123/71 121/71   Pulse: 81 80 82 83   Resp: 11 14 13 13   Temp:       TempSrc:       SpO2: 92% 93% 92% 93%   Weight:           Cardiac Rhythm:  ,      Pain level:    Patient confused: No. Patient Falls Risk: Yes.   Elimination Status: has purewick in place, at least 1500ml output thus far.    Patient Report - Initial Complaint: a 87 year old female with history of hypothyroidism, JEAN, hyperlipidemia, hypertension, DVT, CHF, and neuropathy who presents via EMS from home with sudden onset of 10/10 localized left-sided chest pain approximately 1.5 hours ago.  Last night, she experienced slight discomfort and paraesthesia to the left upper extremity, which she suspects was due to anxiety. She explains that her onset of symptoms today initially felt like heartburn. She reports taking her daily medications today. She experienced similar symptoms approximately 1 week ago after being seen in the .  Here in the ED, she reports improvement of pain to 6/10 and describes it as \"tender\".  She denies recent travel. She denies abdominal or back pain, shortness of " breath, leg swelling, nausea, loss of appetite, and emesis.   Focused Assessment: Cardiac (Adult) - Cardiac WDL: .WDL except; all (hypotensive upon arrival after one nitr0 given, dropped from 128SB to 80SBP) Cardiac Rhythm: apical pulse regular; radial pulse regular  Additional Documentation: ECG (Group)   Chest Pain Assessment - Chest Pain Location: anterior chest, left (sudden onset while sitting in chair; rating 9/10 upon onset. )   Tests Performed: EKG, labs, CT chest, US lower extremities   Abnormal Results: .  Labs Ordered and Resulted from Time of ED Arrival to Time of ED Departure   TROPONIN T, HIGH SENSITIVITY - Abnormal       Result Value    Troponin T, High Sensitivity 21 (*)    COMPREHENSIVE METABOLIC PANEL - Abnormal    Sodium 140      Potassium 3.6      Creatinine 0.72      Urea Nitrogen 18.2      Chloride 101      Carbon Dioxide (CO2) 28      Anion Gap 11      Glucose 107 (*)     Calcium 8.8      Protein Total 6.9      Albumin 4.1      Bilirubin Total 0.8      Alkaline Phosphatase 87      AST 31      ALT 24      GFR Estimate 81     CBC WITH PLATELETS AND DIFFERENTIAL - Abnormal    WBC Count 4.9      RBC Count 4.62      Hemoglobin 13.4      Hematocrit 43.3      MCV 94      MCH 29.0      MCHC 30.9 (*)     RDW 13.9      Platelet Count 243      % Neutrophils 70      % Lymphocytes 14      % Monocytes 9      % Eosinophils 7      % Basophils 0      % Immature Granulocytes 0      NRBCs per 100 WBC 0      Absolute Neutrophils 3.4      Absolute Lymphocytes 0.7 (*)     Absolute Monocytes 0.4      Absolute Eosinophils 0.3      Absolute Basophils 0.0      Absolute Immature Granulocytes 0.0      Absolute NRBCs 0.0     D DIMER QUANTITATIVE - Abnormal    D-Dimer Quantitative 2.50 (*)    LIPASE - Abnormal    Lipase 4 (*)    ISTAT CREATININE POCT - Normal    Creatinine POCT 0.7      GFR, ESTIMATED POCT >60     NT PROBNP INPATIENT - Normal    N terminal Pro BNP Inpatient 54     TROPONIN T, HIGH SENSITIVITY - Normal     Troponin T, High Sensitivity 13     COVID-19 VIRUS (CORONAVIRUS) BY PCR     .  US Lower Extremity Venous Duplex Bilateral   Final Result   IMPRESSION:   1.  No deep venous thrombosis in the bilateral lower extremities.      CT Chest Pulmonary Embolism w Contrast   Final Result   IMPRESSION:   1.  No evidence of pulmonary embolus.   2.  Displaced left 11th rib fracture which appears acute-to-subacute.   No pleural effusion or pneumothorax.   3.  Peripheral fibrotic changes in both lungs, similar to prior CT.   4.  Cholelithiasis without evidence of acute cholecystitis.      JOANIE BURROWS MD            SYSTEM ID:  [id number]      POC US ECHO LIMITED   Final Result   PROCEDURE NOTE: ED BEDSIDE LIMITED ULTRASOUND   Name of the study: Limited Echo   Performed by: Dr. Boateng   Indications: Chest pain    Body Location / Organs Imaged: Multiple planes of the heart, bilateral lungs.   Findings: good global LV function, no overt RV dilation, IVC small, B-lines are absent   Interpretation: Normal limited echo.   Archiving of images: Images were also saved digitally to the internal hard drive of the ultrasound machine/PACS.               Treatments provided: see ED meds below  Family Comments: NA  OBS brochure/video discussed/provided to patient:  yes  ED Medications:   Medications   0.9% sodium chloride BOLUS (0 mLs Intravenous Stopped 8/9/22 1414)   lidocaine (viscous) (XYLOCAINE) 2 % 15 mL, alum & mag hydroxide-simethicone (MAALOX) 15 mL GI Cocktail (30 mLs Oral Given 8/9/22 1321)   0.9% sodium chloride BOLUS (0 mLs Intravenous Stopped 8/9/22 1656)   0.9% sodium chloride BOLUS (0 mLs Intravenous Stopped 8/9/22 1618)   iopamidol (ISOVUE-370) solution 500 mL (68 mLs Intravenous Given 8/9/22 1543)     Drips infusing:  No  For the majority of the shift, the patient's behavior Green. Interventions performed were NA.    Sepsis treatment initiated: No     Patient tested for COVID 19 prior to admission: YES    ED Nurse Name/Phone  Number: Veronica Rogers RN,   5:35 PM

## 2022-08-09 NOTE — ED PROVIDER NOTES
Sign-out Note    Received this patient in sign-out from Dr. Boateng at 4:00 PM.  Please refer to earlier documentation detailing presenting complaints, evaluation, and ED course.  In brief, patient is being seen in the ER for chest pain.    Recommendations from previous provider: F/U on repeat trop, CT chest and leg US.  Admit    ED course under my care:    US Lower Extremity Venous Duplex Bilateral   Final Result   IMPRESSION:   1.  No deep venous thrombosis in the bilateral lower extremities.      CT Chest Pulmonary Embolism w Contrast   Final Result   IMPRESSION:   1.  No evidence of pulmonary embolus.   2.  Displaced left 11th rib fracture which appears acute-to-subacute.   No pleural effusion or pneumothorax.   3.  Peripheral fibrotic changes in both lungs, similar to prior CT.   4.  Cholelithiasis without evidence of acute cholecystitis.      JOANIE BURROWS MD            SYSTEM ID:  [id number]      POC US ECHO LIMITED   Final Result   PROCEDURE NOTE: ED BEDSIDE LIMITED ULTRASOUND   Name of the study: Limited Echo   Performed by: Dr. Boateng   Indications: Chest pain    Body Location / Organs Imaged: Multiple planes of the heart, bilateral lungs.   Findings: good global LV function, no overt RV dilation, IVC small, B-lines are absent   Interpretation: Normal limited echo.   Archiving of images: Images were also saved digitally to the internal hard drive of the ultrasound machine/PACS.              Patient updated.  Currently chest pain-free.  Mild discomfort with palpation of her anterior chest wall.  Results and incidental findings discussed with patient.  She reports a fall approximately 6 weeks ago, that may account for her left 11th rib fracture.  We will plan admission to observation.    Disposition: Admit to obs           Joanie Cortes MD  08/09/22 3902

## 2022-08-09 NOTE — H&P
[facility]  History and Physical - Hospitalist Service  Date of Admission:  8/9/2022    Assessment & Plan     Ms. Carol Merida is a 87 year old female with a history of hypothyroidism, JEAN, hyperlipidemia, hypertension, DVT, CHF, and neuropathy admitted on 8/9/2022 for chest pain found to have a displaced rib fracture.  Admitted for further work-up with stress test and monitoring with concern for cardiac cause of chest discomfort.    Exertional angina  Claudication symptoms with chronic LE edema   Concern for anxiety attacks    The patient describes a history of worsening exertional dyspnea and angina in addition to underlying history of anxiety and reports that she often will have worried thoughts which can also exacerbate her symptoms. She is able to walk much less distance than she used to due to her chest discomfort and her calf discomfort. Reports calf pain with exertion but improves with rest consistent with claudication.  She has had lower extremity edema that has been progressive, but recent TTE ruled out heart failure.  Here in the ED, D-dimer elevated CT PE negative for PE, LE US negative for DVT.  Initial troponin mildly elevated, but second troponin within normal limits.  Low concern for active ACS at this time, no indication for heparin drip or urgent Cardiologic intervention at this time.  Plan for admission for further work-up including stress testing.  -Adenosine stress test in AM   -PENNY testing in AM   -PTA fluoxetine, bupropion and Xanax as needed for anxiety attack  -Nitro as needed chest discomfort, with repeat EKG and troponins    Acute-subacute displaced rib fracture #11   Noted on imaging.  The patient does not report a recent fall.  -lidocaine patch PRN to area  -Incentive spirometry    Troponin elevation, resolved - suspect demand ischemia     Incidental:   Peripheral fibrotic changes in both lungs- similar to prior CT   Cholelithiasis without evidence of acute  cholecystitis - monitor     Chronic/Stable:   History of diastolic CHF    No hx of CAD (last coronary angiography in 2012 did not show significant obstructive   coronary artery disease.  Recent TTE with no evidence of heart failure.  -PTA Lasix 20 mg twice daily     HTN - PTA Amlodipine 5 mg     HLD - PTA Simvastatin     JEAN - home cpap     Depression/Anxiety - PTA Xanax , bupropion fluoxetine     Chronic Pain and various joint pains-PTA Tylenol and Gabapentin     Hypothyroidism - PTA Levothyroxine     Severe obesity-BMI 46.74, noted     Diet: Combination Diet Regular Diet Adult  DVT Prophylaxis: Pneumatic Compression Devices  Benson Catheter: Not present  Central Lines: None  Cardiac Monitoring: ACTIVE order. Indication: Chest pain/ ACS rule out (24 hours)  Code Status: Full Code    Clinically Significant Risk Factors Present on Admission                 Disposition Plan      Expected Discharge Date: 08/10/2022                The patient's care was discussed with the Patient and Patient's Family.    Barb Sandoval DO  Hospitalist Service  [facility]  Securely message with the pluriSelect Web Console (learn more here)  Text page via StyleChat by ProSent Mobile Paging/Directory     ______________________________________________________________________    Chief Complaint   Chest discomfort    History is obtained from the patient    History of Present Illness   Ms. Carol Merida is a 87 year old female with a history of hypothyroidism, JEAN, hyperlipidemia, hypertension, DVT, CHF, and neuropathy admitted on 8/9/2022 for chest pain found to have a displaced rib fracture.      The patient describes a history of worsening exertional dyspnea and angina in addition to underlying history of anxiety and reports that she often will have worried thoughts which can also exacerbate her symptoms.  She also reports episodes of chest discomfort after eating certain foods although it it does not seem consistent with what types of  foods.  She is able to walk much less distance than she used to due to her chest discomfort and her calf discomfort. Reports calf pain with exertion but improves with rest consistent.     She has had lower extremity edema that has been progressive, and she has been prescribed Lasix for this. Recent TTE ruled out heart failure.     She denies any dizziness or lightheadedness.  She has no abdominal pain, nausea or vomiting.  She has no urinary symptoms.  She reports chronic skin changes to the bilateral lower extremities.    Review of Systems    The 10 point Review of Systems is negative other than noted in the HPI or here.     Past Medical History    I have reviewed this patient's medical history and updated it with pertinent information if needed.   Past Medical History:   Diagnosis Date     Abnormal stress test     small area on stress thalium ?2003; but normal angiogram 2012     Anemia      Anxiety      Cardiac dysrhythmia, unspecified     irregular heartbeat     CHF (congestive heart failure) (H) 6/18/2018     PETTY (dyspnea on exertion)      Hyperlipidemia LDL goal <100 2/10/2010     Hypertension goal BP (blood pressure) < 140/90 7/18/2010     Hypothyroid      Malignant 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     Melanoma of skin, site unspecified      NONSPECIFIC MEDICAL HISTORY 04    fx fifth finger right hand     Obstructive sleep apnea (adult) (pediatric) 8/25/2006    CPAP      Osteoarthritis      Other chronic pain     Joint pain for many years.     Other osteoporosis      PONV (postoperative nausea and vomiting)      Tubular adenoma in colon 9/01    colonoscopy due 2004       Past Surgical History   I have reviewed this patient's surgical history and updated it with pertinent information if needed.  Past Surgical History:   Procedure Laterality Date     ARTHROPLASTY HIP Left 7/6/2015    Procedure: ARTHROPLASTY HIP;  Surgeon:  Junior Giordano MD;  Location: [facility] OR     ARTHROPLASTY HIP Right 11/2/2016    Procedure: ARTHROPLASTY HIP;  Surgeon: Junior Giordano MD;  Location: [facility] OR     ARTHROPLASTY REVISION HIP Left 7/22/2015    Procedure: ARTHROPLASTY REVISION HIP;  Surgeon: Junior Giordano MD;  Location: [facility] OR     CATARACT IOL, RT/LT       COLONOSCOPY  9/01    tubular adenoma; repeat 3 years.     COLONOSCOPY  9/04    normal; repeat 5 years ([facility])     HYSTERECTOMY, MARGAUX  summer 2004    and BSO     SURGICAL HISTORY OF -   9/05    left mastectomy     SURGICAL HISTORY OF -   2008    right ankle surgery; triple arthrodesis     SURGICAL HISTORY OF -   5/09    right ankle surgery; triple arthrodesis and deltoid reconstruction; possible other     SURGICAL HISTORY OF -   2/10    removal of hardware from right ankle     [facility] NONSPECIFIC PROCEDURE      Knee replacement x 2 (B)     [facility] NONSPECIFIC PROCEDURE      s/p Tonsillectomy (college)     Z NONSPECIFIC PROCEDURE      s/p Appy (high school)     [facility] NONSPECIFIC PROCEDURE      Melanoma removed with sentinel node bx     [facility] NONSPECIFIC PROCEDURE       bilateral cataract       Social History   I have reviewed this patient's social history and updated it with pertinent information if needed.  Social History     Tobacco Use     Smoking status: Never Smoker     Smokeless tobacco: Never Used   Vaping Use     Vaping Use: Never used   Substance Use Topics     Alcohol use: Yes     Alcohol/week: 0.0 - 0.8 standard drinks     Comment: 1 glass wine weekly     Drug use: Never       Family History   I have reviewed this patient's family history and updated it with pertinent information if needed.  Family History   Problem Relation Age of Onset     No Known Problems Brother         brain tumor related to shingles in his eye     Arthritis Mother      Hypertension Father      Cancer Father         lung     Cancer Grandchild         terratoma tumors     Breast Cancer Daughter        Prior to  Admission Medications   Prior to Admission Medications   Prescriptions Last Dose Informant Patient Reported? Taking?   ACE/ARB/ARNI NOT PRESCRIBED (INTENTIONAL)   No No   Sig: Please choose reason not prescribed, below   ALPRAZolam (XANAX) 0.25 MG tablet   No No   Sig: TAKE ONE TABLET BY MOUTH TWICE A DAY AS NEEDED FOR ANXIETY. DON'T TAKE WITH OXYCODONE.   BETA BLOCKER NOT PRESCRIBED (INTENTIONAL)   No No   Sig: Beta Blocker not prescribed intentionally due to EF > 40 % (ejection fraction) will leave up to Cardiology.   FLUoxetine (PROZAC) 20 MG capsule   No No   Sig: Take 1 capsule (20 mg) by mouth daily To take along with other dose to equal 60 mg   FLUoxetine (PROZAC) 40 MG capsule   No No   Sig: To take along with other dose to equal 60 mg   SENNA-docusate sodium (SENNA S) 8.6-50 MG tablet   No No   Sig: Take 1 tablet by mouth 2 times daily   WIXELA INHUB 100-50 MCG/ACT inhaler   No No   Sig: INHALE 1 PUFF INTO THE LUNGS EVERY 12 HOURS.   acetaminophen (TYLENOL) 650 MG CR tablet   No No   Sig: Take 1 tablet (650 mg) by mouth every 8 hours as needed for mild pain or fever   albuterol (PROAIR HFA/PROVENTIL HFA/VENTOLIN HFA) 108 (90 Base) MCG/ACT inhaler   No No   Sig: INHALE 2 PUFFS INTO THE LUNGS EVERY 4 HOURS AS NEEDED FOR SHORTNESS OF BREATH OR DIFFICULT BREATHING OR WHEEZING   amLODIPine (NORVASC) 5 MG tablet   No No   Sig: Take 1 tablet (5 mg) by mouth daily   buPROPion (WELLBUTRIN) 75 MG tablet   No No   Sig: Take 1 tablet (75 mg) by mouth 2 times daily   calcium carbonate 600 mg-vitamin D 400 units (CALTRATE) 600-400 MG-UNIT per tablet  Self Yes No   Sig: Take 1 tablet by mouth every evening    cetirizine (ZYRTEC) 10 MG tablet   No No   Sig: Take 1 tablet (10 mg) by mouth daily   diclofenac (VOLTAREN) 1 % topical gel   No No   Sig: Apply 4 g topically 4 times daily as needed for moderate pain   doxycycline hyclate (VIBRA-TABS) 100 MG tablet   No No   Sig: Take 1 tablet (100 mg) by mouth 2 times daily    fluticasone (FLONASE) 50 MCG/ACT nasal spray   No No   Sig: Spray 1 spray into both nostrils daily as needed for rhinitis   furosemide (LASIX) 20 MG tablet   No No   Sig: Take 1 tablet (20 mg) by mouth 2 times daily   gabapentin (NEURONTIN) 300 MG capsule   No No   Sig: TAKE ONE CAPSULE BY MOUTH EVERY MORNING AND AT NOON, AND TAKE TWO CAPSULES EVERY NIGHT   levothyroxine (SYNTHROID/LEVOTHROID) 88 MCG tablet   No No   Sig: Take 1 tablet (88 mcg) by mouth daily   nystatin (NYSTOP) 872548 UNIT/GM external powder   No No   Sig: Apply tid to affectead area prn   olopatadine (PATANOL) 0.1 % ophthalmic solution   No No   Sig: Place 1 drop into both eyes 2 times daily   order for DME  Self No No   Sig: Equipment being ordered: left breast prosthesis. Mastectomy bras (up to 4)   polyethylene glycol (MIRALAX) 17 GM/Dose powder   No No   Sig: Take 17-34 g (1-2 capfuls) by mouth daily   simvastatin (ZOCOR) 10 MG tablet   No No   Sig: Take 1 tablet (10 mg) by mouth At Bedtime   triamcinolone (KENALOG) 0.1 % cream  Self No No   Sig: Apply sparingly to affected area two times daily as needed for itching.   triamcinolone (KENALOG) 0.1 % external cream   No No   Sig: Apply topically 2 times daily      Facility-Administered Medications: None     Allergies   Allergies   Allergen Reactions     Ace Inhibitors Cough       Physical Exam   Vital Signs: Temp: 98.1  F (36.7  C) Temp src: Oral BP: 118/71 Pulse: 82   Resp: 22 SpO2: 96 % O2 Device: Nasal cannula Oxygen Delivery: 2 LPM (placed on 2L, desatting with sleeping.  States wears a cpap at night)  Weight: 257 lbs 9.6 oz    Constitutional: awake, alert, cooperative, no apparent distress, and appears stated age  Respiratory: No increased work of breathing, good air exchange, clear to auscultation bilaterally, no crackles or wheezing  Cardiovascular: Normal apical impulse, regular rate and rhythm, normal S1 and S2, no S3 or S4, and no murmur noted  GI: Obese abdomen soft, nontender to  palpation, nondistended  Skin: Chronic venous stasis changes, has no overlying warmth or erythema  Musculoskeletal: No deformities, 3+ pitting bilateral lower extremity edema  Neurologic: Alert and oriented, no focal deficits, sensation intact bilateral lower extremities  Neuropsychiatric: General: normal, calm and normal eye contact    Data   Data reviewed today: I reviewed all medications, new labs and imaging results over the last 24 hours. I personally reviewed the EKG tracing showing Sinus rhythm, stable with prior EKGs, and the chest CT image(s) showing no PE and fibrotic lung changes.    Recent Labs   Lab 08/09/22  1608 08/09/22  1310 08/09/22  1303   WBC  --   --  4.9   HGB  --   --  13.4   MCV  --   --  94   PLT  --   --  243   NA  --   --  140   POTASSIUM  --   --  3.6   CHLORIDE  --   --  101   CO2  --   --  28   BUN  --   --  18.2   CR  --  0.7 0.72   ANIONGAP  --   --  11   LAKSHMI  --   --  8.8   GLC  --   --  107*   ALBUMIN  --   --  4.1   PROTTOTAL  --   --  6.9   BILITOTAL  --   --  0.8   ALKPHOS  --   --  87   ALT  --   --  24   AST  --   --  31   LIPASE 4*  --   --      Recent Results (from the past 24 hour(s))   POC US ECHO LIMITED    Impression    PROCEDURE NOTE: ED BEDSIDE LIMITED ULTRASOUND  Name of the study: Limited Echo  Performed by: Dr. Boateng  Indications: Chest pain   Body Location / Organs Imaged: Multiple planes of the heart, bilateral lungs.  Findings: good global LV function, no overt RV dilation, IVC small, B-lines are absent  Interpretation: Normal limited echo.  Archiving of images: Images were also saved digitally to the internal hard drive of the ultrasound machine/PACS.       CT Chest Pulmonary Embolism w Contrast    Narrative    CT CHEST PULMONARY EMBOLISM W CONTRAST 8/9/2022 3:57 PM    CLINICAL HISTORY: chest pain, +dimer, elevated dimer  TECHNIQUE: CT angiogram chest during arterial phase injection IV  contrast. 2D and 3D MIP reconstructions were performed by the  CT  technologist. Dose reduction techniques were used.     CONTRAST: 68mL Isovue-370    COMPARISON: Chest radiograph 11/4/2021, CT 12/13/2021    FINDINGS:  ANGIOGRAM CHEST: Pulmonary arteries are normal caliber and negative  for pulmonary emboli. Thoracic aorta is negative for dissection. No CT  evidence of right heart strain.    LUNGS AND PLEURA: There are some subtle peripheral fibrotic changes in  both lungs, similar to prior exam. No definite new focal airspace  consolidation. No pleural effusion. Stable size of 1.3 cm right lower  lobe pulmonary nodule, stable since at least 2012, no specific  follow-up recommended.    MEDIASTINUM/AXILLAE: Normal.    CORONARY ARTERY CALCIFICATION: None.    UPPER ABDOMEN: Cholelithiasis without evidence of acute cholecystitis.  Small hiatal hernia.    MUSCULOSKELETAL: Partially visualized displaced left 11th rib fracture  which appears acute to subacute (series 8 image 280). No additional  acute bony abnormality.      Impression    IMPRESSION:  1.  No evidence of pulmonary embolus.  2.  Displaced left 11th rib fracture which appears acute-to-subacute.  No pleural effusion or pneumothorax.  3.  Peripheral fibrotic changes in both lungs, similar to prior CT.  4.  Cholelithiasis without evidence of acute cholecystitis.    JOANIE BURROWS MD         SYSTEM ID:  [id number]   US Lower Extremity Venous Duplex Bilateral    Narrative    EXAM: US LOWER EXTREMITY VENOUS DUPLEX BILATERAL  LOCATION: [facility]  DATE/TIME: 8/9/2022 5:16 PM    INDICATION: Bilateral lower extremity swelling, elevated d summer  COMPARISON: None.  TECHNIQUE: Venous Duplex ultrasound of bilateral lower extremities with and without compression, augmentation and duplex. Color flow and spectral Doppler with waveform analysis performed.    FINDINGS: Exam includes the common femoral, femoral, popliteal veins as well as segmentally visualized deep calf veins and greater saphenous vein.     RIGHT: No  deep vein thrombosis. No superficial thrombophlebitis. No popliteal cyst.    LEFT: No deep vein thrombosis. No superficial thrombophlebitis. No popliteal cyst.      Impression    IMPRESSION:  1.  No deep venous thrombosis in the bilateral lower extremities.

## 2022-08-09 NOTE — ED NOTES
Bed: ED20  Expected date: 8/9/22  Expected time:   Means of arrival:   Comments:  PERLA 594 86YO F CHEST PAIN

## 2022-08-09 NOTE — ED TRIAGE NOTES
"Arrives via EMS with sudden onset chest pain while sitting.  Pain rating 9/10.  Rcvd one nitr0 en route along with ASA 325mg.  BP dropped after nitr0 given from FMM826 to SBP80.  Patient states \"just a little bit, not bad\" re.feeling light-headed at this time.  Pain improved to 6/10 with the one nitr0.  ABCD's intact; A/o x 4.      Triage Assessment     Row Name 08/09/22 3834       Triage Assessment (Adult)    Airway WDL WDL       Respiratory WDL    Respiratory WDL X;all    Rhythm/Pattern, Respiratory shortness of breath  chronic              "

## 2022-08-10 ENCOUNTER — APPOINTMENT (OUTPATIENT)
Dept: NUCLEAR MEDICINE | Facility: CLINIC | Age: 87
End: 2022-08-10
Attending: STUDENT IN AN ORGANIZED HEALTH CARE EDUCATION/TRAINING PROGRAM
Payer: COMMERCIAL

## 2022-08-10 ENCOUNTER — APPOINTMENT (OUTPATIENT)
Dept: ULTRASOUND IMAGING | Facility: CLINIC | Age: 87
End: 2022-08-10
Attending: STUDENT IN AN ORGANIZED HEALTH CARE EDUCATION/TRAINING PROGRAM
Payer: COMMERCIAL

## 2022-08-10 ENCOUNTER — APPOINTMENT (OUTPATIENT)
Dept: CARDIOLOGY | Facility: CLINIC | Age: 87
End: 2022-08-10
Attending: STUDENT IN AN ORGANIZED HEALTH CARE EDUCATION/TRAINING PROGRAM
Payer: COMMERCIAL

## 2022-08-10 VITALS
RESPIRATION RATE: 14 BRPM | BODY MASS INDEX: 44.61 KG/M2 | OXYGEN SATURATION: 93 % | TEMPERATURE: 98.1 F | WEIGHT: 251.8 LBS | DIASTOLIC BLOOD PRESSURE: 61 MMHG | HEART RATE: 71 BPM | HEIGHT: 63 IN | SYSTOLIC BLOOD PRESSURE: 134 MMHG

## 2022-08-10 LAB
ANION GAP SERPL CALCULATED.3IONS-SCNC: 12 MMOL/L (ref 7–15)
BUN SERPL-MCNC: 11.4 MG/DL (ref 8–23)
CALCIUM SERPL-MCNC: 9.4 MG/DL (ref 8.8–10.2)
CHLORIDE SERPL-SCNC: 100 MMOL/L (ref 98–107)
CREAT SERPL-MCNC: 0.67 MG/DL (ref 0.51–0.95)
CV STRESS MAX HR HE: 83
DEPRECATED CALCIDIOL+CALCIFEROL SERPL-MC: 11 UG/L (ref 20–75)
DEPRECATED HCO3 PLAS-SCNC: 28 MMOL/L (ref 22–29)
ERYTHROCYTE [DISTWIDTH] IN BLOOD BY AUTOMATED COUNT: 13.8 % (ref 10–15)
GFR SERPL CREATININE-BSD FRML MDRD: 85 ML/MIN/1.73M2
GLUCOSE SERPL-MCNC: 123 MG/DL (ref 70–99)
HCT VFR BLD AUTO: 43.1 % (ref 35–47)
HGB BLD-MCNC: 13.6 G/DL (ref 11.7–15.7)
MAGNESIUM SERPL-MCNC: 1.9 MG/DL (ref 1.7–2.3)
MCH RBC QN AUTO: 29.2 PG (ref 26.5–33)
MCHC RBC AUTO-ENTMCNC: 31.6 G/DL (ref 31.5–36.5)
MCV RBC AUTO: 93 FL (ref 78–100)
NUC STRESS EJECTION FRACTION: 92 %
PLATELET # BLD AUTO: 267 10E3/UL (ref 150–450)
POTASSIUM SERPL-SCNC: 3.3 MMOL/L (ref 3.4–5.3)
RATE PRESSURE PRODUCT: 8798
RBC # BLD AUTO: 4.66 10E6/UL (ref 3.8–5.2)
SODIUM SERPL-SCNC: 140 MMOL/L (ref 136–145)
STRESS ECHO BASELINE DIASTOLIC HE: 64
STRESS ECHO BASELINE HR: 72 BPM
STRESS ECHO BASELINE SYSTOLIC BP: 122
STRESS ECHO CALCULATED PERCENT HR: 62 %
STRESS ECHO LAST STRESS DIASTOLIC BP: 56
STRESS ECHO LAST STRESS SYSTOLIC BP: 106
STRESS ECHO TARGET HR: 134
WBC # BLD AUTO: 5.9 10E3/UL (ref 4–11)

## 2022-08-10 PROCEDURE — 93017 CV STRESS TEST TRACING ONLY: CPT

## 2022-08-10 PROCEDURE — 250N000011 HC RX IP 250 OP 636

## 2022-08-10 PROCEDURE — G0378 HOSPITAL OBSERVATION PER HR: HCPCS

## 2022-08-10 PROCEDURE — 78452 HT MUSCLE IMAGE SPECT MULT: CPT

## 2022-08-10 PROCEDURE — 78452 HT MUSCLE IMAGE SPECT MULT: CPT | Mod: 26 | Performed by: INTERNAL MEDICINE

## 2022-08-10 PROCEDURE — 99217 PR OBSERVATION CARE DISCHARGE: CPT | Performed by: PHYSICIAN ASSISTANT

## 2022-08-10 PROCEDURE — A9502 TC99M TETROFOSMIN: HCPCS | Performed by: STUDENT IN AN ORGANIZED HEALTH CARE EDUCATION/TRAINING PROGRAM

## 2022-08-10 PROCEDURE — 93922 UPR/L XTREMITY ART 2 LEVELS: CPT

## 2022-08-10 PROCEDURE — 93018 CV STRESS TEST I&R ONLY: CPT | Performed by: INTERNAL MEDICINE

## 2022-08-10 PROCEDURE — 83735 ASSAY OF MAGNESIUM: CPT | Performed by: STUDENT IN AN ORGANIZED HEALTH CARE EDUCATION/TRAINING PROGRAM

## 2022-08-10 PROCEDURE — 99204 OFFICE O/P NEW MOD 45 MIN: CPT | Mod: 25 | Performed by: INTERNAL MEDICINE

## 2022-08-10 PROCEDURE — 343N000001 HC RX 343: Performed by: STUDENT IN AN ORGANIZED HEALTH CARE EDUCATION/TRAINING PROGRAM

## 2022-08-10 PROCEDURE — 82310 ASSAY OF CALCIUM: CPT | Performed by: STUDENT IN AN ORGANIZED HEALTH CARE EDUCATION/TRAINING PROGRAM

## 2022-08-10 PROCEDURE — 36415 COLL VENOUS BLD VENIPUNCTURE: CPT | Performed by: STUDENT IN AN ORGANIZED HEALTH CARE EDUCATION/TRAINING PROGRAM

## 2022-08-10 PROCEDURE — 250N000013 HC RX MED GY IP 250 OP 250 PS 637: Performed by: STUDENT IN AN ORGANIZED HEALTH CARE EDUCATION/TRAINING PROGRAM

## 2022-08-10 PROCEDURE — 85027 COMPLETE CBC AUTOMATED: CPT | Performed by: STUDENT IN AN ORGANIZED HEALTH CARE EDUCATION/TRAINING PROGRAM

## 2022-08-10 RX ORDER — REGADENOSON 0.08 MG/ML
0.4 INJECTION, SOLUTION INTRAVENOUS ONCE
Status: DISCONTINUED | OUTPATIENT
Start: 2022-08-10 | End: 2022-08-10 | Stop reason: HOSPADM

## 2022-08-10 RX ORDER — AMINOPHYLLINE 25 MG/ML
50-100 INJECTION, SOLUTION INTRAVENOUS
Status: DISCONTINUED | OUTPATIENT
Start: 2022-08-10 | End: 2022-08-10 | Stop reason: HOSPADM

## 2022-08-10 RX ORDER — ACYCLOVIR 200 MG/1
0-1 CAPSULE ORAL
Status: DISCONTINUED | OUTPATIENT
Start: 2022-08-10 | End: 2022-08-10 | Stop reason: HOSPADM

## 2022-08-10 RX ORDER — REGADENOSON 0.08 MG/ML
INJECTION, SOLUTION INTRAVENOUS
Status: COMPLETED
Start: 2022-08-10 | End: 2022-08-10

## 2022-08-10 RX ORDER — CAFFEINE CITRATE 20 MG/ML
60 SOLUTION INTRAVENOUS
Status: DISCONTINUED | OUTPATIENT
Start: 2022-08-10 | End: 2022-08-10 | Stop reason: HOSPADM

## 2022-08-10 RX ORDER — ALBUTEROL SULFATE 90 UG/1
2 AEROSOL, METERED RESPIRATORY (INHALATION) EVERY 5 MIN PRN
Status: DISCONTINUED | OUTPATIENT
Start: 2022-08-10 | End: 2022-08-10 | Stop reason: HOSPADM

## 2022-08-10 RX ADMIN — LIDOCAINE 1 PATCH: 246 PATCH TOPICAL at 00:47

## 2022-08-10 RX ADMIN — FLUOXETINE 60 MG: 20 CAPSULE ORAL at 11:36

## 2022-08-10 RX ADMIN — TETROFOSMIN 10.2 MCI.: 1.38 INJECTION, POWDER, LYOPHILIZED, FOR SOLUTION INTRAVENOUS at 07:17

## 2022-08-10 RX ADMIN — POLYETHYLENE GLYCOL 3350 17 G: 17 POWDER, FOR SOLUTION ORAL at 11:42

## 2022-08-10 RX ADMIN — SIMVASTATIN 10 MG: 10 TABLET, FILM COATED ORAL at 00:57

## 2022-08-10 RX ADMIN — Medication 1 TABLET: at 00:46

## 2022-08-10 RX ADMIN — GABAPENTIN 300 MG: 100 CAPSULE ORAL at 11:37

## 2022-08-10 RX ADMIN — TETROFOSMIN 32.8 MCI.: 1.38 INJECTION, POWDER, LYOPHILIZED, FOR SOLUTION INTRAVENOUS at 09:11

## 2022-08-10 RX ADMIN — BUPROPION HYDROCHLORIDE 75 MG: 75 TABLET, FILM COATED ORAL at 00:46

## 2022-08-10 RX ADMIN — BUPROPION HYDROCHLORIDE 75 MG: 75 TABLET, FILM COATED ORAL at 11:38

## 2022-08-10 RX ADMIN — CETIRIZINE HYDROCHLORIDE 10 MG: 10 TABLET, FILM COATED ORAL at 11:37

## 2022-08-10 RX ADMIN — REGADENOSON 0.4 MG: 0.08 INJECTION, SOLUTION INTRAVENOUS at 09:10

## 2022-08-10 RX ADMIN — AMLODIPINE BESYLATE 5 MG: 5 TABLET ORAL at 11:37

## 2022-08-10 RX ADMIN — GABAPENTIN 300 MG: 100 CAPSULE ORAL at 00:46

## 2022-08-10 RX ADMIN — LEVOTHYROXINE SODIUM 88 MCG: 0.09 TABLET ORAL at 11:37

## 2022-08-10 RX ADMIN — FUROSEMIDE 20 MG: 20 TABLET ORAL at 11:37

## 2022-08-10 RX ADMIN — FUROSEMIDE 20 MG: 20 TABLET ORAL at 00:47

## 2022-08-10 ASSESSMENT — ACTIVITIES OF DAILY LIVING (ADL)
ADLS_ACUITY_SCORE: 33
ADLS_ACUITY_SCORE: 31
ADLS_ACUITY_SCORE: 33
ADLS_ACUITY_SCORE: 31
DEPENDENT_IADLS:: TRANSPORTATION
ADLS_ACUITY_SCORE: 31
ADLS_ACUITY_SCORE: 31

## 2022-08-10 NOTE — PLAN OF CARE
PRIMARY DIAGNOSIS: CHEST PAIN  OUTPATIENT/OBSERVATION GOALS TO BE MET BEFORE DISCHARGE:  1. Ruled out acute coronary syndrome (negative or stable Troponin):  Yes  2. Pain Status: Pain free.  3. Appropriate provocative testing performed: Yes  - Stress Test Procedure: To be done in the morning  - Interpretation of cardiac rhythm per telemetry tech:     4. Cleared by Consultants (if applicable):No  5. Return to near baseline physical activity: Yes  Discharge Planner Nurse   Safe discharge environment identified: Yes  Barriers to discharge: No       Entered by: Darby Rashid RN 08/09/2022 11:13 PM     Please review provider order for any additional goals.   Nurse to notify provider when observation goals have been met and patient is ready for discharge.

## 2022-08-10 NOTE — PLAN OF CARE
PRIMARY DIAGNOSIS: CHEST PAIN/Ongoing leg swelling  OUTPATIENT/OBSERVATION GOALS TO BE MET BEFORE DISCHARGE:  1. Ruled out acute coronary syndrome (negative or stable Troponin):  Yes  2. Pain Status: Pain free.  3. Appropriate provocative testing performed: Yes  - Stress Test Procedure: lexiscan this morning  - Interpretation of cardiac rhythm per telemetry tech: SR    4. Cleared by Consultants (if applicable):N/A  5. Return to near baseline physical activity: No  Discharge Planner Nurse   Safe discharge environment identified: No  Barriers to discharge: Yes       Entered by: Stella Zee RN 08/10/2022      Please review provider order for any additional goals.   Nurse to notify provider when observation goals have been met and patient is ready for discharge.

## 2022-08-10 NOTE — PLAN OF CARE
"PRIMARY DIAGNOSIS: CHEST PAIN  OUTPATIENT/OBSERVATION GOALS TO BE MET BEFORE DISCHARGE:  1. Ruled out acute coronary syndrome (negative or stable Troponin):  Yes  2. Pain Status: Pain free.  3. Appropriate provocative testing performed: Yes  - Stress Test Procedure: To be done in the morning  - Interpretation of cardiac rhythm per telemetry tech:     4. Cleared by Consultants (if applicable):No  5. Return to near baseline physical activity: Yes  Discharge Planner Nurse   Safe discharge environment identified: Yes  Barriers to discharge: No       Entered by: Darby Rashid RN 08/10/2022 5:42 AM       Pt denies chest pain, No N/V/headache. Void 1500 ML. Tele reading SR HR 70's. Pt assist of two with care. Son voiced a concern of anxiety his mom is experiencing and possible schedule Antianxiety medications?. Will report to Day nurse to communicate with provider.    rBP 123/71 (BP Location: Right arm)   Pulse 76   Temp 97.6  F (36.4  C) (Oral)   Resp 13   Ht 1.6 m (5' 3\")   Wt 114 kg (251 lb 6.4 oz)   LMP  (LMP Unknown)   SpO2 94%   BMI 44.53 kg/m          Please review provider order for any additional goals.   Nurse to notify provider when observation goals have been met and patient is ready for discharge.    "

## 2022-08-10 NOTE — PLAN OF CARE
PRIMARY DIAGNOSIS: CHEST PAIN/Ongoing leg swelling  OUTPATIENT/OBSERVATION GOALS TO BE MET BEFORE DISCHARGE:  1. Ruled out acute coronary syndrome (negative or stable Troponin):  Yes  2. Pain Status: Pain free.  3. Appropriate provocative testing performed: Yes  - Stress Test Procedure: lexiscan pending  - Interpretation of cardiac rhythm per telemetry tech: SR    4. Cleared by Consultants (if applicable):N/A  5. Return to near baseline physical activity: Yes  Discharge Planner Nurse   Safe discharge environment identified: Yes  Barriers to discharge: Yes       Entered by: Stella Zee RN 08/10/2022       A/Ox4. Patient denies chest pain. Pt ambulating in room via walker, standby assist. Pt returning to independent senior living pending lexiscan results. Last BM this morning.   Will continue to monitor chest pain.      Please review provider order for any additional goals.   Nurse to notify provider when observation goals have been met and patient is ready for discharge.

## 2022-08-10 NOTE — PROGRESS NOTES
Patient's After Visit Summary was reviewed with patient.   Patient verbalized understanding of After Visit Summary, recommended follow up and was given an opportunity to ask questions.   Discharge medications sent home with patient/family: YES, no new meds  Discharged with son    OBSERVATION patient END time: 8753

## 2022-08-10 NOTE — PLAN OF CARE
ROOM # 206-1    Living Situation (if not independent, order SW consult):  Facility name: [facility]   : [place] [phone]    Activity level at baseline: Indep  Activity level on admit: Assist X 2    Who will be transporting you at discharge: [place]    Patient registered to observation; given Patient Bill of Rights; given the opportunity to ask questions about observation status and their plan of care.  Patient has been oriented to the observation room, bathroom and call light is in place.    Discussed discharge goals and expectations with patient/family.

## 2022-08-10 NOTE — PLAN OF CARE
Patient's After Visit Summary was reviewed with patient and/or son.   Patient verbalized understanding of After Visit Summary, recommended follow up and was given an opportunity to ask questions.   Discharge medications sent home with patient/family: No - no new meds.  Discharged with son.  IV removed. All questions/concerns addressed. All belongings remain with patient. Stable when leaving room 206-2.     OBSERVATION patient END time: 1630

## 2022-08-10 NOTE — PLAN OF CARE
PRIMARY DIAGNOSIS: CHEST PAIN  OUTPATIENT/OBSERVATION GOALS TO BE MET BEFORE DISCHARGE:  1. Ruled out acute coronary syndrome (negative or stable Troponin):  Yes  2. Pain Status: Pain free.  3. Appropriate provocative testing performed: Yes  - Stress Test Procedure: To be done in the morning  - Interpretation of cardiac rhythm per telemetry tech:     4. Cleared by Consultants (if applicable):No  5. Return to near baseline physical activity: Yes  Discharge Planner Nurse   Safe discharge environment identified: Yes  Barriers to discharge: No       Entered by: Darby Rashid RN 08/10/2022 1:12 AM       Pt A&O X 4, able to communicate needs clearly. Denies chest pain. Tolerated oral intake. Denies SOB, N/V and no headache. Tele reading SR HR 79. Pt voiding well. Pt assist of two with care.       Please review provider order for any additional goals.   Nurse to notify provider when observation goals have been met and patient is ready for discharge.

## 2022-08-10 NOTE — PHARMACY-ADMISSION MEDICATION HISTORY
Admission medication history interview status for this patient is complete. See [facility] admission navigator for allergy information, prior to admission medications and immunization status.     Medication history interview done, indicate source(s): Patient  Medication history resources (including written lists, pill bottles, clinic record):None  Pharmacy: [facility]    Changes made to PTA medication list:  Added: None  Changed: olopatadine eye drops bid --> bid prn, Miralax daily --> daily prn, senna bid --> bid prn  Reported as Not Taking: None  Removed: duplicate triamcinolone cream    Actions taken by pharmacist (provider contacted, etc):None     Additional medication history information:None    Medication reconciliation/reorder completed by provider prior to medication history?  Y   (Y/N)       Prior to Admission medications    Medication Sig Last Dose Taking? Auth Provider Long Term End Date   acetaminophen (TYLENOL) 650 MG CR tablet Take 1 tablet (650 mg) by mouth every 8 hours as needed for mild pain or fever prn at prn Yes Stella Cutler MD     albuterol (PROAIR HFA/PROVENTIL HFA/VENTOLIN HFA) 108 (90 Base) MCG/ACT inhaler INHALE 2 PUFFS INTO THE LUNGS EVERY 4 HOURS AS NEEDED FOR SHORTNESS OF BREATH OR DIFFICULT BREATHING OR WHEEZING prn at prn Yes Stella Cutler MD Yes    ALPRAZolam (XANAX) 0.25 MG tablet TAKE ONE TABLET BY MOUTH TWICE A DAY AS NEEDED FOR ANXIETY. DON'T TAKE WITH OXYCODONE. prn at prn Yes Olya Polo PA-C     amLODIPine (NORVASC) 5 MG tablet Take 1 tablet (5 mg) by mouth daily 8/9/2022 at am Yes Stella Cutler MD Yes    buPROPion (WELLBUTRIN) 75 MG tablet Take 1 tablet (75 mg) by mouth 2 times daily 8/9/2022 at am Yes Naomi Ruffin, NP Yes    calcium carbonate 600 mg-vitamin D 400 units (CALTRATE) 600-400 MG-UNIT per tablet Take 1 tablet by mouth every evening  Past Week at pm Yes Yessica Lora MD     cetirizine (ZYRTEC) 10 MG tablet Take 1 tablet (10 mg) by mouth daily  8/9/2022 at am Yes Ne Rodríguez MD     diclofenac (VOLTAREN) 1 % topical gel Apply 4 g topically 4 times daily as needed for moderate pain prn at prn Yes Stella Cutler MD     doxycycline hyclate (VIBRA-TABS) 100 MG tablet Take 1 tablet (100 mg) by mouth 2 times daily 8/9/2022 at am Yes Olya Polo PA-C     FLUoxetine (PROZAC) 20 MG capsule Take 1 capsule (20 mg) by mouth daily To take along with other dose to equal 60 mg 8/9/2022 at am Yes Stella Cutler MD Yes    FLUoxetine (PROZAC) 40 MG capsule To take along with other dose to equal 60 mg 8/9/2022 at am Yes Stella Cutler MD Yes    fluticasone (FLONASE) 50 MCG/ACT nasal spray Spray 1 spray into both nostrils daily as needed for rhinitis prn at prn Yes Stella Cutler MD     furosemide (LASIX) 20 MG tablet Take 1 tablet (20 mg) by mouth 2 times daily 8/9/2022 at am Yes Olya Polo PA-C Yes    gabapentin (NEURONTIN) 300 MG capsule TAKE ONE CAPSULE BY MOUTH EVERY MORNING AND AT NOON, AND TAKE TWO CAPSULES EVERY NIGHT 8/9/2022 at am Yes Stella Cutler MD Yes    levothyroxine (SYNTHROID/LEVOTHROID) 88 MCG tablet Take 1 tablet (88 mcg) by mouth daily 8/9/2022 at am Yes Stella Cutler MD Yes    nystatin (NYSTOP) 975084 UNIT/GM external powder Apply tid to affectead area prn prn at prn Yes Stella Cutler MD     olopatadine (PATANOL) 0.1 % ophthalmic solution Place 1 drop into both eyes 2 times daily  Patient taking differently: Place 1 drop into both eyes 2 times daily as needed for allergies prn at prn Yes Miriam Enamorado OD     SENNA-docusate sodium (SENNA S) 8.6-50 MG tablet Take 1 tablet by mouth 2 times daily  Patient taking differently: Take 1 tablet by mouth 2 times daily as needed prn at prn Yes Neena Laughlin MD     simvastatin (ZOCOR) 10 MG tablet Take 1 tablet (10 mg) by mouth At Bedtime 8/8/2022 at hs Yes Stella Cutler MD Yes    WIXELA INHUB 100-50 MCG/ACT inhaler INHALE 1 PUFF INTO THE  LUNGS EVERY 12 HOURS. Past Week at Unknown time Yes Stella Cutler MD Yes    ACE/ARB/ARNI NOT PRESCRIBED (INTENTIONAL) Please choose reason not prescribed, below   Yessica Lora MD     BETA BLOCKER NOT PRESCRIBED (INTENTIONAL) Beta Blocker not prescribed intentionally due to EF > 40 % (ejection fraction) will leave up to Cardiology.   Yessica Lora MD     order for DME Equipment being ordered: left breast prosthesis. Mastectomy bras (up to 4)   Yessica Lora MD     polyethylene glycol (MIRALAX) 17 GM/Dose powder Take 17-34 g (1-2 capfuls) by mouth daily  Patient taking differently: Take 1-2 capfuls by mouth daily as needed for constipation prn at prn Yes Neena Laughlin MD     triamcinolone (KENALOG) 0.1 % external cream Apply topically 2 times daily  Patient taking differently: Apply topically 2 times daily as needed prn at prn Yes Ne Rodríguez MD

## 2022-08-10 NOTE — CONSULTS
Care Management Initial Consult    General Information  Assessment completed with: Patient,    Type of CM/SW Visit: Initial Assessment    Primary Care Provider verified and updated as needed:     Readmission within the last 30 days:        Reason for Consult: discharge planning  Advance Care Planning:            Communication Assessment  Patient's communication style: spoken language (English or Bilingual)    Hearing Difficulty or Deaf: no   Wear Glasses or Blind: yes    Cognitive  Cognitive/Neuro/Behavioral: WDL                      Living Environment:   People in home: alone     Current living Arrangements: assisted living (With no service)  Name of Facility: [facility] assisted living   Able to return to prior arrangements:         Family/Social Support:  Care provided by:    Provides care for:    Marital Status:   Children          Description of Support System: Supportive, Involved         Current Resources:   Patient receiving home care services: No     Community Resources:    Equipment currently used at home: walker, rolling, other (see comments) (scooter)  Supplies currently used at home:      Employment/Financial:  Employment Status:          Financial Concerns:             Lifestyle & Psychosocial Needs:  Social Determinants of Health     Tobacco Use: Low Risk      Smoking Tobacco Use: Never Smoker     Smokeless Tobacco Use: Never Used   Alcohol Use: Not on file   Financial Resource Strain: Not on file   Food Insecurity: Not on file   Transportation Needs: Not on file   Physical Activity: Not on file   Stress: Not on file   Social Connections: Not on file   Intimate Partner Violence: Not on file   Depression: At risk     PHQ-2 Score: 5   Housing Stability: Not on file       Functional Status:  Prior to admission patient needed assistance:   Dependent ADLs:: Ambulation-walker  Dependent IADLs:: Transportation       Mental Health Status:          Chemical Dependency Status:                 Values/Beliefs:  Spiritual, Cultural Beliefs, Cheondoism Practices, Values that affect care:                 Additional Information:  SW spoke with patient to discuss discharge planning & MOON. Patient plans to return to [facility] to her independent living apartment where she lived prior. Family will provide transportation. Patient shared she is in talks with moving to an assisted living apartment at [facility] but is not ready to do so yet. She denied any discharge needs at this time & is hopeful to discharge later today. Family assists with transportation & she has [employer]. Patient gets some meals through [facility] otherwise heats things up in her apartment. She denied any other services at this time but shared her family also will help her with anything she needs.   SW spoke with Noy, staff member at [facility] who shared that since patient is in independent living she can return at any time &  Facility does not need any additional information or updates. She is aware patient may return later today.     TAMMIE Saeed

## 2022-08-10 NOTE — CONSULTS
[facility]    Cardiology Consultation     Date of Admission:  8/9/2022    Primary Care Physician   Stella Cutler     Consult Date: 08/09/2022    REASON FOR CONSULTATION:  Chest pain, positive stress test.    REFERRING PHYSICIAN:  Hospitalist service.    IMPRESSION:    1.  Atypical chest discomfort, unlikely to be cardiac in origin.  May be due to anxiety.  2.  Anxiety with a history of panic attacks.  3.  False positive stress nuclear study.  4.  Morbid obesity.  5.  Hypertension.  6.  Dyslipidemia.  7.  Obstructive sleep apnea, on CPAP.    This very nice lady had a single episode of chest discomfort with vomiting, which lasted for at least an hour and a half but with negative cardiac markers, unchanged EKG.  Her stress nuclear study was noted as having a possible inferior infarct with perhaps mild page-infarct ischemia.  I personally reviewed the study.  She had almost exactly the same findings in 2012.  Angiography at that time revealed no significant obstructive disease.    With this lady's body habitus, it is very likely that she has a false-positive stress test secondary to diaphragmatic attenuation.  As mentioned above, her symptoms are atypical and we have no evidence that she is having unstable coronary artery disease.    I reassured her that I think her symptoms are likely due to stress.  I do not think further workup is necessary at this time.  If she does have recurrent episodes of chest discomfort, we may proceed with coronary angiography.    No further evaluation is planned from my point of view at this time.  I think she can be discharged.    HISTORY OF PRESENT ILLNESS:  A very pleasant 87-year-old lady with the above medical illnesses, who lives in an assisted living facility.  She admits to being under quite a bit of stress at this time and she has anxiety.  She has been prescribed medications according to her son who was present but has not been taking any of them.  About a  week ago, she had a panic attack.  She recalls having some minor falls, that she has not noticed any injuries, but a chest x-ray on this admission did demonstrate a displaced rib fracture.    She was sitting down yesterday when she experienced the sudden onset of substernal chest discomfort.  This was associated with nausea and vomiting.  She had a similar episode last week but it was associated with some numbness and tingling in both hands, which was not present on this occasion.  Symptoms lasted for at least an hour and a half.  She came to the emergency room and has been admitted for further evaluation.    Her cardiac markers are essentially negative.  I personally reviewed her EKG, which shows sinus rhythm with delayed R-wave progression and nonspecific intraventricular conduction delay in inferior leads.  Comparison with previous EKGs have shown no significant changes.  Stress nuclear study result as mentioned above.    FAMILY HISTORY:  Negative for premature atherosclerotic disease.  She does not smoke, drink, or abuse drugs.      Past Medical History   I have reviewed this patient's medical history and updated it with pertinent information if needed.   Past Medical History:   Diagnosis Date     Abnormal stress test     small area on stress thalium ?2003; but normal angiogram 2012     Anemia      Anxiety      Cardiac dysrhythmia, unspecified     irregular heartbeat     CHF (congestive heart failure) (H) 6/18/2018     PETTY (dyspnea on exertion)      Hyperlipidemia LDL goal <100 2/10/2010     Hypertension goal BP (blood pressure) < 140/90 7/18/2010     Hypothyroid      Malignant 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     Melanoma of skin, site unspecified      NONSPECIFIC MEDICAL HISTORY 04    fx fifth finger right hand     Obstructive sleep apnea (adult) (pediatric) 8/25/2006    CPAP      Osteoarthritis      Other chronic pain      Joint pain for many years.     Other osteoporosis      PONV (postoperative nausea and vomiting)      Tubular adenoma in colon 9/01    colonoscopy due 2004       Past Surgical History   I have reviewed this patient's surgical history and updated it with pertinent information if needed.  Past Surgical History:   Procedure Laterality Date     ARTHROPLASTY HIP Left 7/6/2015    Procedure: ARTHROPLASTY HIP;  Surgeon: Junior Giordano MD;  Location: [facility] OR     ARTHROPLASTY HIP Right 11/2/2016    Procedure: ARTHROPLASTY HIP;  Surgeon: Junior Giordano MD;  Location: [facility] OR     ARTHROPLASTY REVISION HIP Left 7/22/2015    Procedure: ARTHROPLASTY REVISION HIP;  Surgeon: Junior Giordano MD;  Location: [facility] OR     CATARACT IOL, RT/LT       COLONOSCOPY  9/01    tubular adenoma; repeat 3 years.     COLONOSCOPY  9/04    normal; repeat 5 years ([facility])     HYSTERECTOMY, MARGAUX  summer 2004    and BSO     SURGICAL HISTORY OF -   9/05    left mastectomy     SURGICAL HISTORY OF -   2008    right ankle surgery; triple arthrodesis     SURGICAL HISTORY OF -   5/09    right ankle surgery; triple arthrodesis and deltoid reconstruction; possible other     SURGICAL HISTORY OF -   2/10    removal of hardware from right ankle     [facility] NONSPECIFIC PROCEDURE      Knee replacement x 2 (B)     [facility] NONSPECIFIC PROCEDURE      s/p Tonsillectomy (college)     [facility] NONSPECIFIC PROCEDURE      s/p Appy (high school)     [facility] NONSPECIFIC PROCEDURE      Melanoma removed with sentinel node bx     [facility] NONSPECIFIC PROCEDURE       bilateral cataract       Prior to Admission Medications   Prior to Admission Medications   Prescriptions Last Dose Informant Patient Reported? Taking?   ACE/ARB/ARNI NOT PRESCRIBED (INTENTIONAL)   No No   Sig: Please choose reason not prescribed, below   ALPRAZolam (XANAX) 0.25 MG tablet prn at prn  No Yes   Sig: TAKE ONE TABLET BY MOUTH TWICE A DAY AS NEEDED FOR ANXIETY. DON'T TAKE WITH OXYCODONE.   BETA BLOCKER NOT  PRESCRIBED (INTENTIONAL)   No No   Sig: Beta Blocker not prescribed intentionally due to EF > 40 % (ejection fraction) will leave up to Cardiology.   FLUoxetine (PROZAC) 20 MG capsule 8/9/2022 at am  No Yes   Sig: Take 1 capsule (20 mg) by mouth daily To take along with other dose to equal 60 mg   FLUoxetine (PROZAC) 40 MG capsule 8/9/2022 at am  No Yes   Sig: To take along with other dose to equal 60 mg   SENNA-docusate sodium (SENNA S) 8.6-50 MG tablet prn at prn  No Yes   Sig: Take 1 tablet by mouth 2 times daily   Patient taking differently: Take 1 tablet by mouth 2 times daily as needed   WIXELA INHUB 100-50 MCG/ACT inhaler Past Week at Unknown time  No Yes   Sig: INHALE 1 PUFF INTO THE LUNGS EVERY 12 HOURS.   acetaminophen (TYLENOL) 650 MG CR tablet prn at prn  No Yes   Sig: Take 1 tablet (650 mg) by mouth every 8 hours as needed for mild pain or fever   albuterol (PROAIR HFA/PROVENTIL HFA/VENTOLIN HFA) 108 (90 Base) MCG/ACT inhaler prn at prn  No Yes   Sig: INHALE 2 PUFFS INTO THE LUNGS EVERY 4 HOURS AS NEEDED FOR SHORTNESS OF BREATH OR DIFFICULT BREATHING OR WHEEZING   amLODIPine (NORVASC) 5 MG tablet 8/9/2022 at am  No Yes   Sig: Take 1 tablet (5 mg) by mouth daily   buPROPion (WELLBUTRIN) 75 MG tablet 8/9/2022 at am  No Yes   Sig: Take 1 tablet (75 mg) by mouth 2 times daily   calcium carbonate 600 mg-vitamin D 400 units (CALTRATE) 600-400 MG-UNIT per tablet Past Week at pm Self Yes Yes   Sig: Take 1 tablet by mouth every evening    cetirizine (ZYRTEC) 10 MG tablet 8/9/2022 at am  No Yes   Sig: Take 1 tablet (10 mg) by mouth daily   diclofenac (VOLTAREN) 1 % topical gel prn at prn  No Yes   Sig: Apply 4 g topically 4 times daily as needed for moderate pain   doxycycline hyclate (VIBRA-TABS) 100 MG tablet 8/9/2022 at am  No Yes   Sig: Take 1 tablet (100 mg) by mouth 2 times daily   fluticasone (FLONASE) 50 MCG/ACT nasal spray prn at prn  No Yes   Sig: Spray 1 spray into both nostrils daily as needed for  rhinitis   furosemide (LASIX) 20 MG tablet 8/9/2022 at am  No Yes   Sig: Take 1 tablet (20 mg) by mouth 2 times daily   gabapentin (NEURONTIN) 300 MG capsule 8/9/2022 at am  No Yes   Sig: TAKE ONE CAPSULE BY MOUTH EVERY MORNING AND AT NOON, AND TAKE TWO CAPSULES EVERY NIGHT   levothyroxine (SYNTHROID/LEVOTHROID) 88 MCG tablet 8/9/2022 at am  No Yes   Sig: Take 1 tablet (88 mcg) by mouth daily   nystatin (NYSTOP) 734000 UNIT/GM external powder prn at prn  No Yes   Sig: Apply tid to affectead area prn   olopatadine (PATANOL) 0.1 % ophthalmic solution prn at prn  No Yes   Sig: Place 1 drop into both eyes 2 times daily   Patient taking differently: Place 1 drop into both eyes 2 times daily as needed for allergies   order for DME  Self No No   Sig: Equipment being ordered: left breast prosthesis. Mastectomy bras (up to 4)   polyethylene glycol (MIRALAX) 17 GM/Dose powder   No Yes   Sig: Take 17-34 g (1-2 capfuls) by mouth daily   Patient taking differently: Take 1-2 capfuls by mouth daily as needed for constipation   simvastatin (ZOCOR) 10 MG tablet 8/8/2022 at hs  No Yes   Sig: Take 1 tablet (10 mg) by mouth At Bedtime   triamcinolone (KENALOG) 0.1 % external cream   No Yes   Sig: Apply topically 2 times daily   Patient taking differently: Apply topically 2 times daily as needed      Facility-Administered Medications: None     Allergies   Allergies   Allergen Reactions     Ace Inhibitors Cough       Social History   I have reviewed this patient's social history and updated it with pertinent information if needed. Carol ANTONY Merida  reports that she has never smoked. She has never used smokeless tobacco. She reports current alcohol use. She reports that she does not use drugs.    Family History   I have reviewed this patient's family history and updated it with pertinent information if needed.   Family History   Problem Relation Age of Onset     No Known Problems Brother         brain tumor related to shingles in his eye      Arthritis Mother      Hypertension Father      Cancer Father         lung     Cancer Grandchild         terratoma tumors     Breast Cancer Daughter        Review of Systems   The 10 point Review of Systems is negative other than noted in the HPI or here.     Physical Exam   Temp: 98.1  F (36.7  C) Temp src: Oral BP: 134/61 Pulse: 71   Resp: 14 SpO2: 93 % O2 Device: None (Room air)    Vital Signs with Ranges  Temp:  [98.1  F (36.7  C)] 98.1  F (36.7  C)  Pulse:  [69-71] 71  Resp:  [14] 14  BP: (134)/(61-74) 134/61  SpO2:  [93 %-95 %] 93 %  251 lbs 12.8 oz  GENERAL:  A pleasant lady in no acute distress.  VITAL SIGNS:  Blood pressure 134/74, she is afebrile.  Heart rate is in the 60s, normal sinus rhythm.  HEENT:  Unremarkable.  Mucous membranes appear normal.  SKIN:  She has no clubbing, no peripheral central cyanosis.  NECK:  No raised JVP and no thyromegaly.  CARDIAC:  [place] not palpable.  Heart sounds are normal.  CHEST:  Symmetrical expansion without the use of accessory muscles.  LUNGS:  Breath sounds are normal.  ABDOMEN:  Obese, nontender.  No hepatosplenomegaly.  Abdominal aorta is not palpable.  EXTREMITIES:  Foot pulses are diminished.  She has trace bilateral ankle swelling with evidence of venous stasis.  CENTRAL NERVOUS SYSTEM:  Grossly intact.    LABORATORY INVESTIGATIONS:  Potassium 3.3, sodium 140, creatinine 0.67.  CBC within normal limits.  She had a CT scan of her chest, which demonstrated no evidence of pulmonary embolism, displaced left 11th rib fracture acute to subacute.  Peripheral fibrotic changes in both lungs and gallstones without evidence of acute cholecystitis.      Data   Results for orders placed or performed during the hospital encounter of 08/09/22 (from the past 24 hour(s))   US PENNY Doppler No Exercise    Narrative    IR PENNY US PENNY DOPPLER NO EXERCISE, 1-2 LEVELS, BILAT   8/10/2022 8:30  AM     HISTORY: claudication sx    COMPARISON: None.    FINDINGS:  Right PENNY:   DP: 1.09  PT:  1.22.    Left PENNY:   DP: 1.15   PT: 1.16.    Waveforms: Triphasic waveforms are identified in the distal tibial  arteries      Impression    IMPRESSION: Normal ABIs. Findings do not indicate significant lower  extremity arterial insufficiency.    PENNY CRITERIA:  >0.95 Normal  0.90 - 0.94 Mild  0.5 - 0.89 Moderate  0.2 - 0.49 Severe  <0.2 Critical    DAVID GAMBOA MD         SYSTEM ID:  [id number]   NM MPI w Lexiscan   Result Value Ref Range    Target      Baseline Systolic      Baseline Diastolic BP 64     Last Stress Systolic      Last Stress Diastolic BP 56     Baseline HR 72 bpm    Max HR  83     Max Predicted HR  62 %    Rate Pressure Product 8,798.0     Left Ventricular EF 92 %    Narrative       The nuclear stress test is probably abnormal. There is a medium sized   area of infarction in the mid to basal inferior, inferolateral and   inferoseptal segment(s) of the left ventricle. The left ventricular   ejection fraction at stress is 92%. Left ventricular function is   hyperdynamic.     A prior study was conducted on 2012.  This study has no change   when compared with the prior study.         Vincent Oliveira MD, [facility]        D: 08/10/2022   T: 08/10/2022   MT: HELEN    Name:     GABBY EAGLE  MRN:      [id number]        Account:      [id number]   :      1935           Consult Date: 2022     Document: [id number]

## 2022-08-10 NOTE — DISCHARGE SUMMARY
[facility]  Hospitalist Discharge Summary      Date of Admission:  8/9/2022  Date of Discharge:  8/10/2022  Discharging Provider: Rosi Keys PA-C  Discharge Service: Hospitalist Service    Discharge Diagnoses   Chest pain  PETTY  Leg pain  Anxiety     Follow-ups Needed After Discharge   Follow-up Appointments     Follow-up and recommended labs and tests       Follow up with primary care provider, Stella Cutler, within 7 days for   hospital follow- up.  No follow up labs or test are needed.             Unresulted Labs Ordered in the Past 30 Days of this Admission     No orders found for last 31 day(s).      These results will be followed up by PCP    Discharge Disposition   Discharged to home  Condition at discharge: Stable      Hospital Course   Ms. Carol Merida is a 87 year old female with a history of hypothyroidism, JEAN, hyperlipidemia, hypertension, DVT, CHF, and neuropathy admitted on 8/9/2022 for chest pain found to have a displaced rib fracture.    The patient describes a history of worsening exertional dyspnea and angina in addition to underlying history of anxiety and reports that she often will have worried thoughts which can also exacerbate her symptoms.  She also reports episodes of chest discomfort after eating certain foods although it it does not seem consistent with what types of foods.  She is able to walk much less distance than she used to due to her chest discomfort and her calf discomfort. Reports calf pain at rest, denies this with exertion. She has had lower extremity edema that has been progressive, and she has been prescribed Lasix for this. Recent TTE ruled out heart failure.  She was admitted for further work-up with stress test and monitoring with concern for cardiac cause of chest discomfort.  She was admitted to OBS. She was monitored on tele. Serial troponins were monitored and trended down from 21 to 13. ABIs done to assess arterial blood flow given  possible claudication hx, this was unremarkable. NM Lexiscan was obtained and was abnormal, showing a moderate sized defect. Her symptoms are not classic for angina, likely related to anxiety. Cardiology was consulted and reviewed previous Lexiscan and results are very similar. Pt and family opted for conservative management. Pt will discharge home and follow up with PCP. If chest pain reoccurs, could consider coronary angiogram at that time.    Exertional angina  Claudication symptoms with chronic LE edema   Concern for anxiety attacks    D-dimer elevated CT PE negative for PE, LE US negative for DVT.  Initial troponin mildly elevated, but second troponin within normal limits.   - NM Lexiscan abnormal  - PENNY testing unremarkable  - cardiology consulted, no further work up indicated    Acute-subacute displaced rib fracture #11   Noted on imaging.  The patient does not report a recent fall.    Troponin elevation, resolved - suspect demand ischemia     Incidental:   Peripheral fibrotic changes in both lungs- similar to prior CT       Consultations This Hospital Stay   CARE MANAGEMENT / SOCIAL WORK IP CONSULT  CARDIOLOGY IP CONSULT    Code Status   Full Code    Time Spent on this Encounter   I, Rosi Keys PA-C, personally saw the patient today and spent greater than 30 minutes discharging this patient.       Rosi Keys PA-C  [facility] OBSERVATION DEPT  [address] [place] [address]  Phone: [phone]  ______________________________________________________________________    Physical Exam   Vital Signs: Temp: 98.1  F (36.7  C) Temp src: Oral BP: 134/74 Pulse: 69   Resp: 14 SpO2: 95 % O2 Device: Nasal cannula Oxygen Delivery: 2 LPM  Weight: 251 lbs 12.8 oz    GENERAL:  Comfortable.  PSYCH: pleasant, oriented, No acute distress.  HEART: RRR  LUNGS:  Normal Respiratory effort.   EXTREMITIES:  Able to ambulate.  SKIN:  Dry to touch, No rash, wound or  ulcerations.  NEUROLOGIC:  Grossly intact       Primary Care Physician   Stella Cutler    Discharge Orders      Reason for your hospital stay    Chest pain and dyspnea on exertion. You also note lower extremity discomfort and intermittent swelling. Initial work up in ED showed a mildly elevated troponin but normalized on recheck. EKG was sinus rhythm. CT of the chest was negative for blood clot or pneumonia but did show a rib fracture. LE US was negative for DVT. Additional testing showed normal blood flow to your legs. A NM Lexiscan was also obtained and was abnormal. Cardiology was consulted, they did not feel your symptoms were consistent with coronary artery disease and feel your stress test results are similar to previous testing many years ago. Symptoms may be related to anxiety.     Follow-up and recommended labs and tests     Follow up with primary care provider, Stella Cutler, within 7 days for hospital follow- up.  No follow up labs or test are needed.     Activity    Your activity upon discharge: activity as tolerated     When to contact your care team    Call your primary doctor if you have any of the following: temperature greater than 101, increased shortness of breath, or increased chest pain.     Diet    Follow this diet upon discharge: Regular       Significant Results and Procedures   Most Recent 3 CBC's:Recent Labs   Lab Test 08/10/22  0651 08/09/22  1303 07/28/22  1811   WBC 5.9 4.9 7.9   HGB 13.6 13.4 13.8   MCV 93 94 93    243 259     Most Recent 3 BMP's:Recent Labs   Lab Test 08/10/22  0651 08/09/22  1310 08/09/22  1303 11/04/21  1201     --  140 137   POTASSIUM 3.3*  --  3.6 3.7   CHLORIDE 100  --  101 102   CO2 28  --  28 28   BUN 11.4  --  18.2 19   CR 0.67 0.7 0.72 0.72   ANIONGAP 12  --  11 7   LAKSHMI 9.4  --  8.8 9.3   *  --  107* 101*     Most Recent 2 LFT's:Recent Labs   Lab Test 08/09/22  1303 11/04/21  1201   AST 31 22   ALT 24 24   ALKPHOS 87 103   BILITOTAL 0.8  0.7     Most Recent 3 Troponin's:Recent Labs   Lab Test 04/07/21  1427 04/07/21  1228 02/13/21  1308   TROPI <0.015 <0.015 <0.015     Most Recent 3 BNP's:Recent Labs   Lab Test 08/09/22  1303 11/04/21  1201 02/13/21  1308 03/12/19  0957 01/04/19  1322 11/19/18  1751   NTBNPI 54  --  44  --   --  59   NTBNP  --  75  --  59 36  --      Most Recent D-dimer:Recent Labs   Lab Test 08/09/22  1304   DD 2.50*     Most Recent 6 Bacteria Isolates From Any Culture (See EPIC Reports for Culture Details):Recent Labs   Lab Test 03/12/19  0957 06/02/18  0957 06/02/18  0700   CULT 10,000 to 50,000 colonies/mL  mixed urogenital yuko   Cultured on the 1st day of incubation:  Streptococcus mitis group  *  Critical Value/Significant Value, preliminary result only, called to and read back by  ILDA CASTILLO RN (RHORTS).  06.03.18 0709 GJS    (Note)  POSITIVE for STREPTOCOCCUS SPECIES OTHER THAN pneumococcus, anginosus  group, S. pyogenes and S. agalactiae. Performed using [facility]  multiplex nucleic acid test. Final identification and antimicrobial  susceptibility testing will be verified by standard methods.    Specimen tested with Verigene multiplex, gram-positive blood culture  nucleic acid test for the following targets: Staph aureus, Staph  epidermidis, Staph lugdunensis, other Staph species, Enterococcus  faecalis, Enterococcus faecium, Streptococcus species, S. agalactiae,  S. anginosus grp., S. pneumoniae, S. pyogenes, Listeria sp., mecA  (methicillin resistance) and Fernando/B (vancomycin resistance).    Critical Value/Significant Value called to and read back by Yanci Fuentes RN (RHOTRISTAN) on 06.03.18  @10:02AM by DW.       No growth     Most Recent Urinalysis:Recent Labs   Lab Test 03/12/19  0957   COLOR Yellow   APPEARANCE Slightly Cloudy   URINEGLC Negative   URINEBILI Small*   URINEKETONE Negative   SG 1.015   UBLD Negative   URINEPH 7.5*   PROTEIN 30*   UROBILINOGEN 1.0   NITRITE Negative   LEUKEST Small*   RBCU O -  2   WBCU 10-25*   ,   Results for orders placed or performed during the hospital encounter of 08/09/22   POC US ECHO LIMITED    Impression    PROCEDURE NOTE: ED BEDSIDE LIMITED ULTRASOUND  Name of the study: Limited Echo  Performed by: Dr. Boateng  Indications: Chest pain   Body Location / Organs Imaged: Multiple planes of the heart, bilateral lungs.  Findings: good global LV function, no overt RV dilation, IVC small, B-lines are absent  Interpretation: Normal limited echo.  Archiving of images: Images were also saved digitally to the internal hard drive of the ultrasound machine/PACS.       CT Chest Pulmonary Embolism w Contrast    Narrative    CT CHEST PULMONARY EMBOLISM W CONTRAST 8/9/2022 3:57 PM    CLINICAL HISTORY: chest pain, +dimer, elevated dimer  TECHNIQUE: CT angiogram chest during arterial phase injection IV  contrast. 2D and 3D MIP reconstructions were performed by the CT  technologist. Dose reduction techniques were used.     CONTRAST: 68mL Isovue-370    COMPARISON: Chest radiograph 11/4/2021, CT 12/13/2021    FINDINGS:  ANGIOGRAM CHEST: Pulmonary arteries are normal caliber and negative  for pulmonary emboli. Thoracic aorta is negative for dissection. No CT  evidence of right heart strain.    LUNGS AND PLEURA: There are some subtle peripheral fibrotic changes in  both lungs, similar to prior exam. No definite new focal airspace  consolidation. No pleural effusion. Stable size of 1.3 cm right lower  lobe pulmonary nodule, stable since at least 2012, no specific  follow-up recommended.    MEDIASTINUM/AXILLAE: Normal.    CORONARY ARTERY CALCIFICATION: None.    UPPER ABDOMEN: Cholelithiasis without evidence of acute cholecystitis.  Small hiatal hernia.    MUSCULOSKELETAL: Partially visualized displaced left 11th rib fracture  which appears acute to subacute (series 8 image 280). No additional  acute bony abnormality.      Impression    IMPRESSION:  1.  No evidence of pulmonary embolus.  2.  Displaced left  11th rib fracture which appears acute-to-subacute.  No pleural effusion or pneumothorax.  3.  Peripheral fibrotic changes in both lungs, similar to prior CT.  4.  Cholelithiasis without evidence of acute cholecystitis.    JOANIE BURROWS MD         SYSTEM ID:  [id number]   US Lower Extremity Venous Duplex Bilateral    Narrative    EXAM: US LOWER EXTREMITY VENOUS DUPLEX BILATERAL  LOCATION: [facility]  DATE/TIME: 8/9/2022 5:16 PM    INDICATION: Bilateral lower extremity swelling, elevated d summer  COMPARISON: None.  TECHNIQUE: Venous Duplex ultrasound of bilateral lower extremities with and without compression, augmentation and duplex. Color flow and spectral Doppler with waveform analysis performed.    FINDINGS: Exam includes the common femoral, femoral, popliteal veins as well as segmentally visualized deep calf veins and greater saphenous vein.     RIGHT: No deep vein thrombosis. No superficial thrombophlebitis. No popliteal cyst.    LEFT: No deep vein thrombosis. No superficial thrombophlebitis. No popliteal cyst.      Impression    IMPRESSION:  1.  No deep venous thrombosis in the bilateral lower extremities.   US PENNY Doppler No Exercise    Narrative    IR PENNY US PENNY DOPPLER NO EXERCISE, 1-2 LEVELS, BILAT   8/10/2022 8:30  AM     HISTORY: claudication sx    COMPARISON: None.    FINDINGS:  Right PENNY:   DP: 1.09  PT: 1.22.    Left PENNY:   DP: 1.15   PT: 1.16.    Waveforms: Triphasic waveforms are identified in the distal tibial  arteries      Impression    IMPRESSION: Normal ABIs. Findings do not indicate significant lower  extremity arterial insufficiency.    PENNY CRITERIA:  >0.95 Normal  0.90 - 0.94 Mild  0.5 - 0.89 Moderate  0.2 - 0.49 Severe  <0.2 Critical    DAVID GAMBOA MD         SYSTEM ID:  [id number]   NM MPI w Lexiscan     Value    Target     Baseline Systolic     Baseline Diastolic BP 64    Last Stress Systolic     Last Stress Diastolic BP 56    Baseline HR 72     Max HR  83    Max Predicted HR  62    Rate Pressure Product 8,798.0    Left Ventricular EF 92    Narrative       The nuclear stress test is probably abnormal. There is a medium sized   area of infarction in the mid to basal inferior, inferolateral and   inferoseptal segment(s) of the left ventricle. The left ventricular   ejection fraction at stress is 92%. Left ventricular function is   hyperdynamic.     A prior study was conducted on 5/25/2012.  This study has no change   when compared with the prior study.           Discharge Medications   Current Discharge Medication List      CONTINUE these medications which have NOT CHANGED    Details   !! ACE/ARB/ARNI NOT PRESCRIBED (INTENTIONAL) Please choose reason not prescribed, below    Associated Diagnoses: Chronic diastolic heart failure (H)      acetaminophen (TYLENOL) 650 MG CR tablet Take 1 tablet (650 mg) by mouth every 8 hours as needed for mild pain or fever  Qty: 270 tablet, Refills: 1    Associated Diagnoses: DDD (degenerative disc disease), lumbar      albuterol (PROAIR HFA/PROVENTIL HFA/VENTOLIN HFA) 108 (90 Base) MCG/ACT inhaler INHALE 2 PUFFS INTO THE LUNGS EVERY 4 HOURS AS NEEDED FOR SHORTNESS OF BREATH OR DIFFICULT BREATHING OR WHEEZING  Qty: 8.5 g, Refills: 0    Comments: Pharmacy may dispense brand covered by insurance (Proair, or proventil or ventolin or generic albuterol inhaler)  Associated Diagnoses: SOB (shortness of breath)      ALPRAZolam (XANAX) 0.25 MG tablet TAKE ONE TABLET BY MOUTH TWICE A DAY AS NEEDED FOR ANXIETY. DON'T TAKE WITH OXYCODONE.  Qty: 10 tablet, Refills: 0    Associated Diagnoses: Anxiety      amLODIPine (NORVASC) 5 MG tablet Take 1 tablet (5 mg) by mouth daily  Qty: 90 tablet, Refills: 0    Associated Diagnoses: Benign essential hypertension      !! BETA BLOCKER NOT PRESCRIBED (INTENTIONAL) Beta Blocker not prescribed intentionally due to EF > 40 % (ejection fraction) will leave up to Cardiology.    Associated Diagnoses:  Chronic diastolic heart failure (H)      buPROPion (WELLBUTRIN) 75 MG tablet Take 1 tablet (75 mg) by mouth 2 times daily  Qty: 180 tablet, Refills: 0    Associated Diagnoses: Anxiety; Mild depression (H)      calcium carbonate 600 mg-vitamin D 400 units (CALTRATE) 600-400 MG-UNIT per tablet Take 1 tablet by mouth every evening   Qty: 100 tablet, Refills: 3    Associated Diagnoses: Osteopenia      cetirizine (ZYRTEC) 10 MG tablet Take 1 tablet (10 mg) by mouth daily  Qty: 30 tablet, Refills: 0    Associated Diagnoses: Rash due to allergy      diclofenac (VOLTAREN) 1 % topical gel Apply 4 g topically 4 times daily as needed for moderate pain  Qty: 150 g, Refills: 3    Associated Diagnoses: Chronic left shoulder pain      doxycycline hyclate (VIBRA-TABS) 100 MG tablet Take 1 tablet (100 mg) by mouth 2 times daily  Qty: 20 tablet, Refills: 0    Associated Diagnoses: Leg edema      !! FLUoxetine (PROZAC) 20 MG capsule Take 1 capsule (20 mg) by mouth daily To take along with other dose to equal 60 mg  Qty: 90 capsule, Refills: 1    Associated Diagnoses: Anxiety; Mild depression (H)      !! FLUoxetine (PROZAC) 40 MG capsule To take along with other dose to equal 60 mg  Qty: 90 capsule, Refills: 1    Associated Diagnoses: Anxiety; Mild depression (H)      fluticasone (FLONASE) 50 MCG/ACT nasal spray Spray 1 spray into both nostrils daily as needed for rhinitis  Qty: 16 g, Refills: 3    Associated Diagnoses: Throat clearing      furosemide (LASIX) 20 MG tablet Take 1 tablet (20 mg) by mouth 2 times daily  Qty: 60 tablet, Refills: 0    Associated Diagnoses: Leg edema      gabapentin (NEURONTIN) 300 MG capsule TAKE ONE CAPSULE BY MOUTH EVERY MORNING AND AT NOON, AND TAKE TWO CAPSULES EVERY NIGHT  Qty: 360 capsule, Refills: 1    Associated Diagnoses: Osteoarthritis of multiple joints, unspecified osteoarthritis type; DDD (degenerative disc disease), lumbar      levothyroxine (SYNTHROID/LEVOTHROID) 88 MCG tablet Take 1 tablet  (88 mcg) by mouth daily  Qty: 90 tablet, Refills: 2    Associated Diagnoses: Hypothyroidism, unspecified type      nystatin (NYSTOP) 942421 UNIT/GM external powder Apply tid to affectead area prn  Qty: 60 g, Refills: 3    Associated Diagnoses: Intertrigo      olopatadine (PATANOL) 0.1 % ophthalmic solution Place 1 drop into both eyes 2 times daily  Qty: 5 mL, Refills: 12    Comments: Okay to substitute optivar, or azelastine two times daily each eye  Associated Diagnoses: Allergic conjunctivitis of both eyes      order for DME Equipment being ordered: left breast prosthesis. Mastectomy bras (up to 4)  Qty: 4 Device, Refills: 0    Associated Diagnoses: History of left mastectomy      polyethylene glycol (MIRALAX) 17 GM/Dose powder Take 17-34 g (1-2 capfuls) by mouth daily  Qty: 578 g, Refills: 1    Associated Diagnoses: Constipation, unspecified constipation type      SENNA-docusate sodium (SENNA S) 8.6-50 MG tablet Take 1 tablet by mouth 2 times daily  Qty: 180 tablet, Refills: 0    Associated Diagnoses: Constipation, unspecified constipation type      simvastatin (ZOCOR) 10 MG tablet Take 1 tablet (10 mg) by mouth At Bedtime  Qty: 90 tablet, Refills: 3    Associated Diagnoses: Hyperlipidemia LDL goal <100      !! triamcinolone (KENALOG) 0.1 % cream Apply sparingly to affected area two times daily as needed for itching.  Qty: 30 g, Refills: 0    Associated Diagnoses: Atopic dermatitis      !! triamcinolone (KENALOG) 0.1 % external cream Apply topically 2 times daily  Qty: 80 g, Refills: 0    Associated Diagnoses: Rash due to allergy      WIXELA INHUB 100-50 MCG/ACT inhaler INHALE 1 PUFF INTO THE LUNGS EVERY 12 HOURS.  Qty: 60 each, Refills: 1    Associated Diagnoses: Shortness of breath       !! - Potential duplicate medications found. Please discuss with provider.        Allergies   Allergies   Allergen Reactions     Ace Inhibitors Cough

## 2022-08-10 NOTE — PROGRESS NOTES
Care Management Discharge Note    Discharge Date: 08/10/2022     Discharge Disposiyion: Return to IL[facility]    Discharge Transportation: family will provide    Patient/Family in Agreement with the Plan:      Handoff Referral Completed: Yes    Additional Information:  Pt identified as a Service Bundle #3.     GABY Carreon, [facility]   Inpatient Care Coordination  [facility]   [phone]

## 2022-08-11 ENCOUNTER — PATIENT OUTREACH (OUTPATIENT)
Dept: CARE COORDINATION | Facility: CLINIC | Age: 87
End: 2022-08-11

## 2022-08-11 DIAGNOSIS — Z71.89 OTHER SPECIFIED COUNSELING: ICD-10-CM

## 2022-08-11 NOTE — PROGRESS NOTES
Clinic Care Coordination Contact  [facility]/Voicemail    Clinical Data: Care Coordinator Outreach  Outreach attempted x 1. Left message on patient's voicemail with call back information and requested return call.     Plan:Care Coordinator will try to reach patient again in 1-2 business days.    KATELIN Hernández  [phone]  [facility]

## 2022-08-11 NOTE — TELEPHONE ENCOUNTER
Patient was admitted to the hospital on 08/09/22 for evaluation of chest pain.  ROSEANNE Rosenbaum RN

## 2022-08-12 DIAGNOSIS — F32.A MILD DEPRESSION: ICD-10-CM

## 2022-08-12 DIAGNOSIS — F41.9 ANXIETY: ICD-10-CM

## 2022-08-12 RX ORDER — FLUOXETINE 40 MG/1
CAPSULE ORAL
Qty: 90 CAPSULE | Refills: 1 | Status: SHIPPED | OUTPATIENT
Start: 2022-08-12 | End: 2022-08-24

## 2022-08-12 NOTE — PROGRESS NOTES
[facility]    Background: Care Coordination referral placed from [facility] discharge report for reason of patient meeting criteria for a TCM outreach call by [facility] team.    Assessment: Upon chart review, [facility] Team member will cancel/close the referral for TCM outreach due to reason below:    Patient has a follow up appointment with an appropriate provider today for hospital discharge     A Registered Nurse contacted and talked to patient today regarding hospital discharge and follow-up. Patient requested a medication refill and discussed experiencing an increase in anxiety attacks. Patient requested RN call back next week. RN recommended a virtual visit to discuss medication therapy. RN routed refill request to patient's PCP. No W outreach call needed at this time.      Plan: Care Coordination referral for TCM outreach canceled.      KATELIN Hernández  [phone]  [facility]    *Connected Care Resource Team does NOT follow patient ongoing. Referrals are identified based on internal discharge reports and the outreach is to ensure patient has an understanding of their discharge instructions.

## 2022-08-12 NOTE — TELEPHONE ENCOUNTER
Patient had called to notify us that she has one pill of Fluoxetine left.  She is unable to talk at this time as she has another appointment.  PHQ-9 is due, but patient unable to update this now.     Patient is experiencing increased anxiety attacks, now occurring 1-2 times/week.  She asked me to call her back next week.  May need a virtual visit to discuss medication therapy.    Fluoxetine 40 mg        Last written prescription date: 11/17/21      Last fill quantity: 90, # refills: 1       Last office visit: 08/02/22        Future office visit: 09/28/22        Is this a controlled substance?  No         Routing refill request to provider for review/approval because: PHQ-9 is not less than 5.  DNicko Rosenbaum RN

## 2022-08-12 NOTE — TELEPHONE ENCOUNTER
Patient called stating that she has been discharged from the hospital, and would like direction on what her care should be going forward.     She was told that she had an anxiety attack, and she has noted that she is having anxiety attacks more often now.   States that depending on what she is doing, she will have an anxiety attack 1-2 times/week.  Patient also states that she is on her last pill of Fluoxetine.  She is unable to talk at this time as she has another appointment.  She asked me to call her back early next week.    When we speak-  1. Need an update on her leg infection  2. Update PHQ-9 and EDILSON  ROSEANNE Rosenbaum RN

## 2022-08-16 RX ORDER — FLUTICASONE PROPIONATE AND SALMETEROL 100; 50 UG/1; UG/1
1 POWDER RESPIRATORY (INHALATION) 2 TIMES DAILY
Qty: 60 EACH | Refills: 5 | Status: SHIPPED | OUTPATIENT
Start: 2022-08-16

## 2022-08-16 ASSESSMENT — ANXIETY QUESTIONNAIRES
GAD7 TOTAL SCORE: 6
2. NOT BEING ABLE TO STOP OR CONTROL WORRYING: NOT AT ALL
IF YOU CHECKED OFF ANY PROBLEMS ON THIS QUESTIONNAIRE, HOW DIFFICULT HAVE THESE PROBLEMS MADE IT FOR YOU TO DO YOUR WORK, TAKE CARE OF THINGS AT HOME, OR GET ALONG WITH OTHER PEOPLE: NOT DIFFICULT AT ALL
7. FEELING AFRAID AS IF SOMETHING AWFUL MIGHT HAPPEN: SEVERAL DAYS
3. WORRYING TOO MUCH ABOUT DIFFERENT THINGS: MORE THAN HALF THE DAYS
5. BEING SO RESTLESS THAT IT IS HARD TO SIT STILL: NOT AT ALL
6. BECOMING EASILY ANNOYED OR IRRITABLE: NOT AT ALL
GAD7 TOTAL SCORE: 6
1. FEELING NERVOUS, ANXIOUS, OR ON EDGE: NEARLY EVERY DAY

## 2022-08-16 ASSESSMENT — PATIENT HEALTH QUESTIONNAIRE - PHQ9
SUM OF ALL RESPONSES TO PHQ QUESTIONS 1-9: 6
5. POOR APPETITE OR OVEREATING: NOT AT ALL

## 2022-08-16 NOTE — TELEPHONE ENCOUNTER
#1: Wixela refilled.  Please continue to use twice daily.     #2: Continue furosemide as prescribed.  Would like to get labs prior to next visit, labs ordered. Please schedule appointment for this week or next.     Stella Cutler MD   Internal Medicine - Pediatrics

## 2022-08-16 NOTE — TELEPHONE ENCOUNTER
"Lengthy conversation with patient regarding post-hospital concerns.    Scheduled patient for a hospital follow-up on 08/24 with Dr. Cutler.  Dr. Cutler, please address #1 and #2  only  1. Patient reports that the infection in her lower leg is \"120% improved\".  She has no lower extremity edema.  Reports that she was not on Lasix prior to this infection and is wondering if she should continue this?  Her last bottle of Lasix was dated 2020.  Potassium   Date Value Ref Range Status   08/10/2022 3.3 (L) 3.4 - 5.3 mmol/L Final   11/04/2021 3.7 3.4 - 5.3 mmol/L Final   04/07/2021 4.0 3.4 - 5.3 mmol/L Final   On 08/02 patient was instructed to take lasix 40 mg in the morning & additional 20 mg around 4 pm x 3 days to treat edema associated with left lower leg edema.  Is now taking Lasix 20 mg twice daily.  States she still feels like there is discomfort in her veins and would like to discuss this at next visit.    2. Wixela 100-50        Last written prescription date: 05/19/22      Last fill quantity: 60, # refills: 1        Last office visit: 08/02/22        Future office visit: 08/24/22   Routing refill request to provider for review/approval because:  Failed the RN protocol.  Using Wixela on a prn basis.  Advised her that this is meant for daily use.    Patient states that with increased anxiety in the past month, she has been using this once daily in the am            FYI regarding conversation with patient.  Patient expresses concern with increased anxiety.   Discussed that she has increased stressor associated with an upcoming wedding, and also a rent increase.  In review of her medications, patient has not taken the Bupropion since the end of June.   She thought this was going to be a short-term medication.  Advised we will discuss medication management at upcoming visit.   Stopping the Bupropion in June may have contributed to the increased anxiety.  PHQ-9 updated with a score of 6  EDILSON updated with score of " 6.    Patient has also stopped the Caltrate and will discuss the need for this at upcoming visit.        Asked question if CT scan indicated when rib fracture occurred.   This does cause her discomfort, but no increased shortness of breath.  Patient wonders if this is related to a fall one month ago.   States that staff tried to use [place] lift to get her up after a fall, and this was really painful for her.  ROSEANNE Rosenbaum RN

## 2022-08-17 NOTE — TELEPHONE ENCOUNTER
Pt calling back. Went over the recommendation by Dr.Emily Cutler. Pt agrees to the plan.    Scheduled lab-only appointment on 8/19(11:30 am).    Because of the time pt is able to come for lab draw, placed BMP order as STAT for Dr.Emily Cutler to receive the result in a timely manner. Dr.Emily Cutler agrees with the plan.    Mela RN  Patient Advocate Liason (PAL)  [facility]

## 2022-08-17 NOTE — TELEPHONE ENCOUNTER
Called pt at [phone], not available, so LM to call us back. Will await for her call back.     Need to notify pt when she calls:  - continue lasix 20 mg bid  - refilled wixela inhaler - use bid  - need lab-only this week to recheck BMP(potassium was low, 3.3 on 8/10) - need to help schedule this appointment   - keep her upcoming OV with Dr.Emily Cutler on 8/24 for hospital f/u    Mela RN  Patient Advocate Liason (PAL)  [facility]

## 2022-08-19 ENCOUNTER — LAB (OUTPATIENT)
Dept: LAB | Facility: CLINIC | Age: 87
End: 2022-08-19
Payer: COMMERCIAL

## 2022-08-19 DIAGNOSIS — R06.02 SHORTNESS OF BREATH: ICD-10-CM

## 2022-08-19 DIAGNOSIS — I50.32 CHRONIC DIASTOLIC HEART FAILURE (H): ICD-10-CM

## 2022-08-19 LAB
ANION GAP SERPL CALCULATED.3IONS-SCNC: 9 MMOL/L (ref 3–14)
BUN SERPL-MCNC: 13 MG/DL (ref 7–30)
CALCIUM SERPL-MCNC: 9.5 MG/DL (ref 8.5–10.1)
CHLORIDE BLD-SCNC: 99 MMOL/L (ref 94–109)
CO2 SERPL-SCNC: 28 MMOL/L (ref 20–32)
CREAT SERPL-MCNC: 0.9 MG/DL (ref 0.52–1.04)
GFR SERPL CREATININE-BSD FRML MDRD: 62 ML/MIN/1.73M2
GLUCOSE BLD-MCNC: 154 MG/DL (ref 70–99)
POTASSIUM BLD-SCNC: 4.4 MMOL/L (ref 3.4–5.3)
SODIUM SERPL-SCNC: 136 MMOL/L (ref 133–144)

## 2022-08-19 PROCEDURE — 80048 BASIC METABOLIC PNL TOTAL CA: CPT

## 2022-08-19 PROCEDURE — 36415 COLL VENOUS BLD VENIPUNCTURE: CPT

## 2022-08-24 ENCOUNTER — OFFICE VISIT (OUTPATIENT)
Dept: PEDIATRICS | Facility: CLINIC | Age: 87
End: 2022-08-24
Payer: COMMERCIAL

## 2022-08-24 VITALS
SYSTOLIC BLOOD PRESSURE: 114 MMHG | HEART RATE: 80 BPM | OXYGEN SATURATION: 97 % | DIASTOLIC BLOOD PRESSURE: 50 MMHG | TEMPERATURE: 98.8 F | BODY MASS INDEX: 44.47 KG/M2 | WEIGHT: 251 LBS | HEIGHT: 63 IN

## 2022-08-24 DIAGNOSIS — M79.89 LEG SWELLING: ICD-10-CM

## 2022-08-24 DIAGNOSIS — E03.9 HYPOTHYROIDISM, UNSPECIFIED TYPE: ICD-10-CM

## 2022-08-24 DIAGNOSIS — F32.A MILD DEPRESSION: ICD-10-CM

## 2022-08-24 DIAGNOSIS — Z13.220 SCREENING FOR HYPERLIPIDEMIA: ICD-10-CM

## 2022-08-24 DIAGNOSIS — H91.93 HEARING DIFFICULTY OF BOTH EARS: ICD-10-CM

## 2022-08-24 DIAGNOSIS — E66.01 MORBID OBESITY (H): ICD-10-CM

## 2022-08-24 DIAGNOSIS — M51.369 DDD (DEGENERATIVE DISC DISEASE), LUMBAR: ICD-10-CM

## 2022-08-24 DIAGNOSIS — N81.6 RECTOCELE: ICD-10-CM

## 2022-08-24 DIAGNOSIS — F41.9 ANXIETY: ICD-10-CM

## 2022-08-24 DIAGNOSIS — Z78.0 POST-MENOPAUSAL: ICD-10-CM

## 2022-08-24 DIAGNOSIS — Z00.00 ROUTINE GENERAL MEDICAL EXAMINATION AT A HEALTH CARE FACILITY: Primary | ICD-10-CM

## 2022-08-24 DIAGNOSIS — M15.9 OSTEOARTHRITIS OF MULTIPLE JOINTS, UNSPECIFIED OSTEOARTHRITIS TYPE: ICD-10-CM

## 2022-08-24 PROCEDURE — 99214 OFFICE O/P EST MOD 30 MIN: CPT | Mod: 25 | Performed by: INTERNAL MEDICINE

## 2022-08-24 PROCEDURE — G0439 PPPS, SUBSEQ VISIT: HCPCS | Performed by: INTERNAL MEDICINE

## 2022-08-24 RX ORDER — GABAPENTIN 300 MG/1
CAPSULE ORAL
Qty: 360 CAPSULE | Refills: 1 | Status: ON HOLD | OUTPATIENT
Start: 2022-08-24 | End: 2023-03-01

## 2022-08-24 RX ORDER — BUSPIRONE HYDROCHLORIDE 10 MG/1
10 TABLET ORAL 2 TIMES DAILY
Qty: 60 TABLET | Refills: 3 | Status: SHIPPED | OUTPATIENT
Start: 2022-08-24 | End: 2022-12-29

## 2022-08-24 RX ORDER — LEVOTHYROXINE SODIUM 88 UG/1
88 TABLET ORAL DAILY
Qty: 90 TABLET | Refills: 2 | Status: SHIPPED | OUTPATIENT
Start: 2022-08-24 | End: 2023-05-18

## 2022-08-24 ASSESSMENT — ENCOUNTER SYMPTOMS
FEVER: 0
HEMATOCHEZIA: 0
PARESTHESIAS: 0
FREQUENCY: 0
NERVOUS/ANXIOUS: 1
BREAST MASS: 0
EYE PAIN: 0
JOINT SWELLING: 1
HEADACHES: 0
COUGH: 0
WEAKNESS: 0
MYALGIAS: 1
CHILLS: 0
DIARRHEA: 0
ARTHRALGIAS: 1
DYSURIA: 0
SORE THROAT: 0
CONSTIPATION: 0
DIZZINESS: 0
HEMATURIA: 0
NAUSEA: 0
SHORTNESS OF BREATH: 1
ABDOMINAL PAIN: 0
PALPITATIONS: 0

## 2022-08-24 ASSESSMENT — PATIENT HEALTH QUESTIONNAIRE - PHQ9
SUM OF ALL RESPONSES TO PHQ QUESTIONS 1-9: 9
SUM OF ALL RESPONSES TO PHQ QUESTIONS 1-9: 9
10. IF YOU CHECKED OFF ANY PROBLEMS, HOW DIFFICULT HAVE THESE PROBLEMS MADE IT FOR YOU TO DO YOUR WORK, TAKE CARE OF THINGS AT HOME, OR GET ALONG WITH OTHER PEOPLE: SOMEWHAT DIFFICULT

## 2022-08-24 ASSESSMENT — ACTIVITIES OF DAILY LIVING (ADL): CURRENT_FUNCTION: NO ASSISTANCE NEEDED

## 2022-08-24 NOTE — PROGRESS NOTES
"  {PROVIDER CHARTING PREFERENCE:460316}    Subjective   Everette is a 87 year old{ACCOMPANIED BY STATEMENT (Optional):292046}, presenting for the following health issues:  No chief complaint on file.      HPI       Hospital Follow-up Visit:    Hospital/Nursing Home/IP Rehab Facility: [facility]  Date of Admission: 08/09/2022  Date of Discharge: 08/10/2022  Reason(s) for Admission: Chest pain, leg pain and anxiety     Was your hospitalization related to COVID-19? {Yes add questions_No:454962}  Problems taking medications regularly:  {NONE DEFAULTED:702682::\"None\"}  Medication changes since discharge: {NONE DEFAULTED:609496::\"None\"}  Problems adhering to non-medication therapy:  {NONE DEFAULTED:623710::\"None\"}    Summary of hospitalization:  {HOSPITAL DISCHARGE SUMMARY INFO:049884::\"[facility] discharge summary reviewed\"}  Diagnostic Tests/Treatments reviewed.  Follow up needed: {NONE DEFAULTED:745108::\"none\"}  Other Healthcare Providers Involved in Patient s Care:         {those currently involved after discharge:730848::\"None\"}  Update since discharge: {IMPROVED DEFAULT:569011::\"improved.\"} {TIP  Include information from family/caregivers, SNF, Care Coordination :393560}      {TIP  Click the link below to document, then refresh to pull in response :862358}  Post Medication Reconciliation Status:        Plan of care communicated with {Communicate Plan to:026966::\"patient\"}     {Reference  Coding guidelines- Moderate Complexity F2F/Video within 7 - 14 days of discharge 6586451, High Complexity F2F/Video within 7 days 4481061 or [id number] days 9046311 :716889}      {additonal problems for provider to add (Optional):295856}  {additonal problems for provider to add (Optional):452964}    Review of Systems   {ROS COMP (Optional):034033}      Objective    LMP  (LMP Unknown)   There is no height or weight on file to calculate BMI.  Physical Exam   {Exam List " (Optional):092898}    {Diagnostic Test Results (Optional):479900}    {AMBULATORY ATTESTATION (Optional):735455}            .  ..

## 2022-08-24 NOTE — PATIENT INSTRUCTIONS
I recommend you get the shingles vaccine in the pharmacy. The next one is in 2-6 months.     Anxiety:   For your anxiety, I am adding a medication called buspar twice a day. This is meant to help control daily anxiety and reduce panic attacks. Continue taking 60 mg fluoxetine daily.     When you have a panic attack, I recommend letting your kids know, going to a quiet place, deep breaths, and take a tablet of alprazolam. I do not recommend taking alprazolam pre-emptively for situations that may provoke anxiety.       Constipation:   Use senna-s and miralax as needed if you have constipation.       Preventive Health Recommendations    See your health care provider every year to    Review health changes.     Discuss preventive care.      Review your medicines if your doctor has prescribed any.      You no longer need a yearly Pap test unless you've had an abnormal Pap test in the past 10 years. If you have vaginal symptoms, such as bleeding or discharge, be sure to talk with your provider about a Pap test.      Every 1 to 2 years, have a mammogram.  If you are over 69, talk with your health care provider about whether or not you want to continue having screening mammograms.      Every 10 years, have a colonoscopy. Or, have a yearly FIT test (stool test). These exams will check for colon cancer.       Have a cholesterol test every 5 years, or more often if your doctor advises it.       Have a diabetes test (fasting glucose) every three years. If you are at risk for diabetes, you should have this test more often.       At age 65, have a bone density scan (DEXA) to check for osteoporosis (brittle bone disease).    Shots:    Get a flu shot each year.    Get a tetanus shot every 10 years.    Talk to your doctor about your pneumonia vaccines. There are now two you should receive - Pneumovax (PPSV 23) and Prevnar (PCV 13).    Talk to your pharmacist about the shingles vaccine.    Talk to your doctor about the hepatitis B  vaccine.    Nutrition:     Eat at least 5 servings of fruits and vegetables each day.      Eat whole-grain bread, whole-wheat pasta and brown rice instead of white grains and rice.      Get adequate about Calcium and Vitamin D.     Lifestyle    Exercise at least 150 minutes a week (30 minutes a day, 5 days a week). This will help you control your weight and prevent disease.      Limit alcohol to one drink per day.      No smoking.       Wear sunscreen to prevent skin cancer.       See your dentist twice a year for an exam and cleaning.      See your eye doctor every 1 to 2 years to screen for conditions such as glaucoma, macular degeneration, cataracts, etc.    Personalized Prevention Plan  You are due for the preventive services outlined below.  Your care team is available to assist you in scheduling these services.  If you have already completed any of these items, please share that information with your care team to update in your medical record.    Health Maintenance Due   Topic Date Due     URINE DRUG SCREEN  Never done     ANNUAL REVIEW OF HM ORDERS  Never done     Zoster (Shingles) Vaccine (2 of 3) 01/30/2013     Osteoporosis Screening  02/06/2020     Heart Failure Action Plan  07/19/2021     Cholesterol Lab  09/03/2022

## 2022-08-24 NOTE — NURSING NOTE
Patient identified using two patient identifiers.  Ear exam showing wax occlusion completed by provider.  Solution: warm water and H202/H20 was placed in the bilateral ear(s) via irrigation tool: elephant ear.    Gina Bond MA 1:00 PM 8/24/2022

## 2022-08-24 NOTE — PROGRESS NOTES
"SUBJECTIVE:   Carol Merida is a 87 year old female who presents for Preventive Visit.      Patient has been advised of split billing requirements and indicates understanding: Yes  Are you in the first 12 months of your Medicare coverage?  No    Healthy Habits:     In general, how would you rate your overall health?  Fair    Frequency of exercise:  None    Do you usually eat at least 4 servings of fruit and vegetables a day, include whole grains    & fiber and avoid regularly eating high fat or \"junk\" foods?  Yes    Taking medications regularly:  Yes    Medication side effects:  None    Ability to successfully perform activities of daily living:  No assistance needed    Home Safety:  No safety concerns identified    Hearing Impairment:  No hearing concerns    In the past 6 months, have you been bothered by leaking of urine?  No    In general, how would you rate your overall mental or emotional health?  Fair      PHQ-2 Total Score: 2    Additional concerns today:  No    Leg swelling- she has been taking furosemide 20 mg BID for leg swelling. She is willing to continue this dose but would prefer to use less so that she is not urinating so much. Her leg swelling is much better now after her hospitalization.  Prior to hospitalization, for 6 to 8 weeks, she had significantly worse swelling, was treated for cellulitis a couple of times.  She feels it was triggered by constipation.  She does her best to keep her legs elevated when at home, to get up and ambulate a few times a day.  She cannot tolerate compression socks, due to pain on the anterior shins.      Anxiety - This has been an ongoing issue for years. She continues to have intermittent panic attacks.  It is not always predictable when they might happen.  For example, the most recent severe panic attack that resulted in an long hospitalization and cardiac work-up started in the morning after she took meds.  She took her normal medications, then had some crackers, " then felt severe left-sided chest pain.  The chest pain was sharp, and went down her left arm.  It was reminiscent of previous panic attacks, but it was persistent so she went to the hospital.  After extensive work-up, it was determined that this was likely due to anxiety.    She does benefit from alprazolam, and it generally helps when she has a panic attack.  She has been on 60 mg fluoxetine for quite some time, and was also prescribed bupropion but is not taking that because it was not helpful.  She worries about situations including family gatherings, or times when she cannot predict what the terrain would be.  She wonders if taking alprazolam preemptively in those situations is recommended.      Other issues:  Hearing in right ear has been bad.  She wonders if there is wax there    had a rash in the vaginal area, has been using a cream which has been helpful.    She feels she has a prolapsed rectum, and sometimes does need to press on the rectal wall through the vaginal wall to have a bowel movement.  It is not a consistent problem, does not feel that other intervention is needed right now, but may request in the future.      PATIENT HEALTH QUESTIONNAIRE-9 (PHQ - 9)    Over the last 2 weeks, how often have you been bothered by any of the following problems?    1. Little interest or pleasure in doing things -  Several days   2. Feeling down, depressed, or hopeless -  Several days   3. Trouble falling or staying asleep, or sleeping too much - Not at all   4. Feeling tired or having little energy -  More than half the days   5. Poor appetite or overeating -  More than half the days   6. Feeling bad about yourself - or that you are a failure or have let yourself or your family down -  Several days   7. Trouble concentrating on things, such as reading the newspaper or watching television - Several days   8. Moving or speaking so slowly that other people could have noticed? Or the opposite - being so fidgety or  restless that you have been moving around a lot more than usual Not at all   9. Thoughts that you would be better off dead or of hurting  yourself in some way Several days   Total Score: 9       Do you feel safe in your environment? Yes    Have you ever done Advance Care Planning? (For example, a Health Directive, POLST, or a discussion with a medical provider or your loved ones about your wishes): Yes, advance care planning is on file.       Fall risk  Fallen 2 or more times in the past year?: Yes  Any fall with injury in the past year?: No    Cognitive Screening   1) Repeat 3 items (Leader, Season, Table)    2) Clock draw: NORMAL  3) 3 item recall: Recalls 2 objects   Results: NORMAL clock, 1-2 items recalled: COGNITIVE IMPAIRMENT LESS LIKELY    Mini-CogTM Copyright S Kristi. Licensed by the author for use in [facility]; reprinted with permission (bienvenido@[facility]). All rights reserved.      Do you have sleep apnea, excessive snoring or daytime drowsiness?: no    Reviewed and updated as needed this visit by clinical staff   Tobacco  Allergies  Meds  Problems  Med Hx  Surg Hx  Fam Hx         Adriana Brown on 8/24/2022 at 11:17 AM      Reviewed and updated as needed this visit by Provider   Tobacco  Allergies  Meds  Problems  Med Hx  Surg Hx  Fam Hx           Social History     Tobacco Use     Smoking status: Never Smoker     Smokeless tobacco: Never Used   Substance Use Topics     Alcohol use: Yes     Alcohol/week: 0.0 - 0.8 standard drinks     Comment: 1 glass wine weekly         Alcohol Use 8/24/2022   Prescreen: >3 drinks/day or >7 drinks/week? No   Prescreen: >3 drinks/day or >7 drinks/week? -               Current providers sharing in care for this patient include:   Patient Care Team:  Stella Cutler MD as PCP - General (Internal Medicine)  Stella Cutler MD as Assigned PCP  Miriam Enamorado OD as Assigned Surgical Provider  Elena Rosenbaum RN as Personal Advocate & Liaison  "(PAL)    The following health maintenance items are reviewed in Epic and correct as of today:  Health Maintenance Due   Topic Date Due     URINE DRUG SCREEN  Never done     ANNUAL REVIEW OF HM ORDERS  Never done     ZOSTER IMMUNIZATION (2 of 3) 01/30/2013     DEXA  02/06/2020     HF ACTION PLAN  07/19/2021     ADVANCE CARE PLANNING  02/07/2022     LIPID  09/03/2022             Pertinent mammograms are reviewed under the imaging tab.    Review of Systems   Constitutional: Negative for chills and fever.   HENT: Positive for hearing loss. Negative for congestion, ear pain and sore throat.    Eyes: Negative for pain and visual disturbance.   Respiratory: Positive for shortness of breath. Negative for cough.    Cardiovascular: Positive for peripheral edema. Negative for chest pain and palpitations.   Gastrointestinal: Negative for abdominal pain, constipation, diarrhea, hematochezia and nausea.   Breasts:  Negative for tenderness, breast mass and discharge.   Genitourinary: Negative for dysuria, frequency, genital sores, hematuria, pelvic pain, urgency, vaginal bleeding and vaginal discharge.   Musculoskeletal: Positive for arthralgias, joint swelling and myalgias.   Skin: Negative for rash.   Neurological: Negative for dizziness, weakness, headaches and paresthesias.   Psychiatric/Behavioral: Positive for mood changes. The patient is nervous/anxious.          OBJECTIVE:   /50 (BP Location: Right arm, Patient Position: Sitting, Cuff Size: Adult Large)   Pulse 80   Temp 98.8  F (37.1  C) (Temporal)   Ht 1.6 m (5' 3\")   Wt 113.9 kg (251 lb)   LMP  (LMP Unknown)   SpO2 97%   BMI 44.46 kg/m   Estimated body mass index is 44.46 kg/m  as calculated from the following:    Height as of this encounter: 1.6 m (5' 3\").    Weight as of this encounter: 113.9 kg (251 lb).  Physical Exam  GENERAL: healthy, alert and no distress  EYES: Eyes grossly normal to inspection, PERRL and conjunctivae and sclerae normal  HENT: " normal cephalic/atraumatic, both ears: occluded with wax, nose and mouth without ulcers or lesions, oropharynx clear and oral mucous membranes moist  RESP: lungs clear to auscultation - no rales, rhonchi or wheezes  CV: regular rate and rhythm, normal S1 S2, no S3 or S4, no murmur, click or rub, no peripheral edema and peripheral pulses strong  MS: no gross musculoskeletal defects noted, no edema  SKIN: no suspicious lesions or rashes  NEURO: Normal strength and tone, mentation intact and speech normal  PSYCH: mentation appears normal, affect normal/bright    Diagnostic Test Results:  Labs reviewed in Epic    ASSESSMENT / PLAN:   (Z00.00) Routine general medical examination at a health care facility  (primary encounter diagnosis)  Comment: up to date on screening      (F41.9) Anxiety  Comment: Significant ongoing problem.  Does benefit from fluoxetine 60 mg daily as well as as needed alprazolam.  Will add buspirone and assess in 4 weeks for increased benefit.  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  Plan: FLUoxetine (PROZAC) 20 MG capsule            (M79.89) Leg swelling  Comment: Due to venous stasis, now with skin changes of chronic venous stasis.  Currently taking furosemide 20 mg twice daily, I told her that on a few days she could try taking 20 mg daily but that with any increased swelling must increase to 20 mg twice daily, and elevate legs is much as possible to control swelling.  She is not able to tolerate compression at this time.      (M15.9) Osteoarthritis of multiple joints, unspecified osteoarthritis type  Comment: Refill gabapentin is appropriate today.  Plan: gabapentin (NEURONTIN) 300 MG capsule            (M51.36) DDD (degenerative disc disease), lumbar  Plan: gabapentin (NEURONTIN) 300 MG capsule            (E03.9) Hypothyroidism,  "unspecified type  Comment: Due for refill.  Plan: levothyroxine (SYNTHROID/LEVOTHROID) 88 MCG         tablet            (Z78.0) Post-menopausal  Comment: Due for DEXA.  Most recently done 5 years ago, bone density normal.  Plan: DEXA HIP/PELVIS/SPINE - Future            (H91.93) Hearing difficulty of both ears  Comment: Bilateral ear wash done today.  Also sent to audiology for hearing exam.  Plan: Adult Audiology  Referral            (E66.01) Morbid obesity (H)  Comment: Also with hyperlipidemia and degenerative joint disease.      (Z13.220) Screening for hyperlipidemia  Comment: Due for labs.  Plan: Lipid panel reflex to direct LDL Non-fasting            (N81.6) Rectocele  Comment: No additional treatment requested at this time.  Offered referral to urogynecology for pessary fitting.  She will consider this and let us know when she is ready for that.          COUNSELING:  Reviewed preventive health counseling, as reflected in patient instructions       Regular exercise       Healthy diet/nutrition       Hearing screening       Bladder control       Fall risk prevention       Osteoporosis prevention/bone health    Estimated body mass index is 44.46 kg/m  as calculated from the following:    Height as of this encounter: 1.6 m (5' 3\").    Weight as of this encounter: 113.9 kg (251 lb).    Weight management plan: Discussed healthy diet and exercise guidelines    She reports that she has never smoked. She has never used smokeless tobacco.      Appropriate preventive services were discussed with this patient, including applicable screening as appropriate for cardiovascular disease, diabetes, osteopenia/osteoporosis, and glaucoma.  As appropriate for age/gender, discussed screening for colorectal cancer, prostate cancer, breast cancer, and cervical cancer. Checklist reviewing preventive services available has been given to the patient.    Reviewed patients plan of care and provided an AVS. The Basic Care Plan " (routine screening as documented in Health Maintenance) for Carol meets the Care Plan requirement. This Care Plan has been established and reviewed with the Patient.    Counseling Resources:  ATP IV Guidelines  Pooled Cohorts Equation Calculator  Breast Cancer Risk Calculator  Breast Cancer: Medication to Reduce Risk  FRAX Risk Assessment  ICSI Preventive Guidelines  Dietary Guidelines for Americans, 2010  [employer] MyPlate  ASA Prophylaxis  Lung CA Screening    Stella Cutler MD  [facility]    Identified Health Risks:

## 2022-09-02 DIAGNOSIS — R60.0 LEG EDEMA: ICD-10-CM

## 2022-09-02 RX ORDER — FUROSEMIDE 20 MG
TABLET ORAL
Qty: 60 TABLET | Refills: 11 | Status: ON HOLD | OUTPATIENT
Start: 2022-09-02 | End: 2022-10-04

## 2022-09-02 NOTE — TELEPHONE ENCOUNTER
Prescription approved per [facility] Refill Protocol.  Julia JAY RN on 9/2/2022 at 2:37 PM

## 2022-09-22 ENCOUNTER — TELEPHONE (OUTPATIENT)
Dept: PEDIATRICS | Facility: CLINIC | Age: 87
End: 2022-09-22

## 2022-09-22 NOTE — TELEPHONE ENCOUNTER
"Patient calling with concerns about an infection on her anterior left lower leg, just above the ankle.   One week ago she was getting into the car and her scooter got too close to the car causing her to get an injury to her lower leg.   She estimates that the laceration is about one inch long and 1/2 inch wide.  Last Sunday the wound was weeping a lot, so she quit wearing a band-aid and then the wound dried up a bit and formed a scab.  Last evening noted increased yellow discharge again.  This area \"throbs\", and it is painful to walk.  Reports that the skin around the wound is reddened.  Unsure if she has had a fever.  No reported chills.  Advised patient that she needs to have this evaluated for possible infection.  Offered appointment tomorrow afternoon, but states that she needs to be seen in the evening.   Advised her that she would need to be seen in Urgent care this evening.  Patient is in agreement.    2. Patient is asking if she can change the office appointment on 09/28 to a telephone visit.   States this was discussed at recent office appointment.  Will discuss with Dr. Cutler.  ROSEANNE Rosenbaum RN    "

## 2022-09-23 NOTE — TELEPHONE ENCOUNTER
Patient reports that the wound on her leg looks better today.  She didn't come to Urgent care last night due to long wait times.  She doesn't have transportation today.  Patient did schedule an appointment on Monday.  If symptoms worsen, increased redness around the wound, fever or chills, she will come to Urgent care this weekend for evaluation.  Patient is in agreement with this plan.  No further questions.  Will call back if any other questions or concerns.  ROSEANNE Rosenbaum RN

## 2022-09-26 ENCOUNTER — OFFICE VISIT (OUTPATIENT)
Dept: PEDIATRICS | Facility: CLINIC | Age: 87
End: 2022-09-26
Payer: COMMERCIAL

## 2022-09-26 VITALS
DIASTOLIC BLOOD PRESSURE: 54 MMHG | OXYGEN SATURATION: 96 % | WEIGHT: 260 LBS | SYSTOLIC BLOOD PRESSURE: 90 MMHG | BODY MASS INDEX: 46.06 KG/M2 | HEART RATE: 74 BPM | RESPIRATION RATE: 22 BRPM | TEMPERATURE: 98.9 F

## 2022-09-26 DIAGNOSIS — L08.9 TRAUMATIC OPEN WOUND OF LEFT LOWER LEG WITH INFECTION, INITIAL ENCOUNTER: Primary | ICD-10-CM

## 2022-09-26 DIAGNOSIS — S81.802A TRAUMATIC OPEN WOUND OF LEFT LOWER LEG WITH INFECTION, INITIAL ENCOUNTER: Primary | ICD-10-CM

## 2022-09-26 PROCEDURE — 99214 OFFICE O/P EST MOD 30 MIN: CPT | Performed by: PHYSICIAN ASSISTANT

## 2022-09-26 ASSESSMENT — PAIN SCALES - GENERAL: PAINLEVEL: SEVERE PAIN (6)

## 2022-09-26 NOTE — PROGRESS NOTES
Assessment & Plan   (S81.802A,  L08.9) Traumatic open wound of left lower leg with infection, initial encounter  (primary encounter diagnosis)  Comment: cellulitis present. Concerning for lymphangitis. Patient referred to ED for further evaluation and management.   Plan:     LIBRA [facility] NEGIN Gaviria is a 87 year old accompanied by daughter, presenting for the following health issues:  Wound Check      HPI   Concern - wound on leg  Onset: 9/15/22  Description: open wound left lower leg--this occurred when patient hit leg against running scooter  Intensity: moderate  Progression of Symptoms:  waxing and waning  Accompanying Signs & Symptoms: pain, redness, yellowish drainage  Previous history of similar problem: none  Precipitating factors:        Worsened by: scab becoming dislodged, walking  Alleviating factors:        Improved by: resting  Therapies tried and outcome:  none     No fevers, chills.   Tetanus 2013.    Review of Systems   Constitutional, HEENT, cardiovascular, pulmonary, gi and gu systems are negative, except as otherwise noted.      Objective    BP 90/54 (BP Location: Right arm, Patient Position: Sitting, Cuff Size: Adult Large)   Pulse 74   Temp 98.9  F (37.2  C) (Tympanic)   Resp 22   Wt 117.9 kg (260 lb)   LMP  (LMP Unknown)   SpO2 96%   BMI 46.06 kg/m    Body mass index is 46.06 kg/m .  Physical Exam   GENERAL: healthy, alert and no distress  EYES: Eyes grossly normal to inspection, PERRL and conjunctivae and sclerae normal  LEG: inspection of the anterior distal left leg reveals a large open wound. Erythema surrounding site. Erythema extending laterally to knee. Warmth extending laterally to mid upper leg. Pain along the entire area of warmth.

## 2022-09-26 NOTE — PATIENT INSTRUCTIONS
Patient has leg wound--infected. Warmth and redness into mid upper lateral leg. Recommend labs and IV antibiotics.

## 2022-09-28 ENCOUNTER — TELEPHONE (OUTPATIENT)
Dept: PEDIATRICS | Facility: CLINIC | Age: 87
End: 2022-09-28

## 2022-09-28 ENCOUNTER — APPOINTMENT (OUTPATIENT)
Dept: CT IMAGING | Facility: CLINIC | Age: 87
DRG: 603 | End: 2022-09-28
Attending: EMERGENCY MEDICINE
Payer: COMMERCIAL

## 2022-09-28 ENCOUNTER — TRANSFERRED RECORDS (OUTPATIENT)
Dept: HEALTH INFORMATION MANAGEMENT | Facility: CLINIC | Age: 87
End: 2022-09-28

## 2022-09-28 ENCOUNTER — HOSPITAL ENCOUNTER (INPATIENT)
Facility: CLINIC | Age: 87
LOS: 6 days | Discharge: HOME | DRG: 603 | End: 2022-10-04
Attending: EMERGENCY MEDICINE | Admitting: INTERNAL MEDICINE
Payer: COMMERCIAL

## 2022-09-28 DIAGNOSIS — R60.0 LEG EDEMA: ICD-10-CM

## 2022-09-28 DIAGNOSIS — M85.80 OSTEOPENIA: ICD-10-CM

## 2022-09-28 DIAGNOSIS — G44.209 TENSION-TYPE HEADACHE, NOT INTRACTABLE, UNSPECIFIED CHRONICITY PATTERN: ICD-10-CM

## 2022-09-28 DIAGNOSIS — R68.83 CHILLS: ICD-10-CM

## 2022-09-28 DIAGNOSIS — L03.119 CELLULITIS OF LOWER EXTREMITY, UNSPECIFIED LATERALITY: ICD-10-CM

## 2022-09-28 LAB
ALBUMIN SERPL BCG-MCNC: 3.6 G/DL (ref 3.5–5.2)
ALBUMIN UR-MCNC: NEGATIVE MG/DL
ALP SERPL-CCNC: 90 U/L (ref 35–104)
ALT SERPL W P-5'-P-CCNC: 17 U/L (ref 10–35)
ANION GAP SERPL CALCULATED.3IONS-SCNC: 10 MMOL/L (ref 7–15)
APPEARANCE UR: CLEAR
AST SERPL W P-5'-P-CCNC: 23 U/L (ref 10–35)
BASOPHILS # BLD AUTO: 0 10E3/UL (ref 0–0.2)
BASOPHILS NFR BLD AUTO: 0 %
BILIRUB SERPL-MCNC: 0.8 MG/DL
BILIRUB UR QL STRIP: NEGATIVE
BUN SERPL-MCNC: 13.1 MG/DL (ref 8–23)
CALCIUM SERPL-MCNC: 8.1 MG/DL (ref 8.8–10.2)
CHLORIDE SERPL-SCNC: 104 MMOL/L (ref 98–107)
COLOR UR AUTO: NORMAL
CREAT SERPL-MCNC: 0.69 MG/DL (ref 0.51–0.95)
DEPRECATED HCO3 PLAS-SCNC: 25 MMOL/L (ref 22–29)
EOSINOPHIL # BLD AUTO: 1.1 10E3/UL (ref 0–0.7)
EOSINOPHIL NFR BLD AUTO: 14 %
ERYTHROCYTE [DISTWIDTH] IN BLOOD BY AUTOMATED COUNT: 13.6 % (ref 10–15)
FLUAV RNA SPEC QL NAA+PROBE: NEGATIVE
FLUBV RNA RESP QL NAA+PROBE: NEGATIVE
GFR SERPL CREATININE-BSD FRML MDRD: 84 ML/MIN/1.73M2
GLUCOSE SERPL-MCNC: 96 MG/DL (ref 70–99)
GLUCOSE UR STRIP-MCNC: NEGATIVE MG/DL
HCT VFR BLD AUTO: 42.7 % (ref 35–47)
HGB BLD-MCNC: 13 G/DL (ref 11.7–15.7)
HGB UR QL STRIP: NEGATIVE
HOLD SPECIMEN: NORMAL
HOLD SPECIMEN: NORMAL
IMM GRANULOCYTES # BLD: 0 10E3/UL
IMM GRANULOCYTES NFR BLD: 1 %
INR PPP: 1.15 (ref 0.85–1.15)
KETONES UR STRIP-MCNC: NEGATIVE MG/DL
LACTATE SERPL-SCNC: 1.2 MMOL/L (ref 0.7–2)
LEUKOCYTE ESTERASE UR QL STRIP: NEGATIVE
LYMPHOCYTES # BLD AUTO: 0.5 10E3/UL (ref 0.8–5.3)
LYMPHOCYTES NFR BLD AUTO: 7 %
MCH RBC QN AUTO: 29.3 PG (ref 26.5–33)
MCHC RBC AUTO-ENTMCNC: 30.4 G/DL (ref 31.5–36.5)
MCV RBC AUTO: 96 FL (ref 78–100)
MONOCYTES # BLD AUTO: 0.7 10E3/UL (ref 0–1.3)
MONOCYTES NFR BLD AUTO: 9 %
NEUTROPHILS # BLD AUTO: 5.7 10E3/UL (ref 1.6–8.3)
NEUTROPHILS NFR BLD AUTO: 69 %
NITRATE UR QL: NEGATIVE
NRBC # BLD AUTO: 0 10E3/UL
NRBC BLD AUTO-RTO: 0 /100
PH UR STRIP: 7 [PH] (ref 5–7)
PLATELET # BLD AUTO: 272 10E3/UL (ref 150–450)
POTASSIUM SERPL-SCNC: 3.5 MMOL/L (ref 3.4–5.3)
PROT SERPL-MCNC: 6.4 G/DL (ref 6.4–8.3)
RBC # BLD AUTO: 4.44 10E6/UL (ref 3.8–5.2)
RBC URINE: <1 /HPF
RSV RNA SPEC NAA+PROBE: NEGATIVE
SARS-COV-2 RNA RESP QL NAA+PROBE: NEGATIVE
SODIUM SERPL-SCNC: 139 MMOL/L (ref 136–145)
SP GR UR STRIP: 1.02 (ref 1–1.03)
SQUAMOUS EPITHELIAL: 1 /HPF
TROPONIN T SERPL HS-MCNC: 17 NG/L
UROBILINOGEN UR STRIP-MCNC: NORMAL MG/DL
WBC # BLD AUTO: 8.1 10E3/UL (ref 4–11)
WBC URINE: 1 /HPF

## 2022-09-28 PROCEDURE — 96361 HYDRATE IV INFUSION ADD-ON: CPT

## 2022-09-28 PROCEDURE — C9803 HOPD COVID-19 SPEC COLLECT: HCPCS

## 2022-09-28 PROCEDURE — 250N000009 HC RX 250: Performed by: EMERGENCY MEDICINE

## 2022-09-28 PROCEDURE — 83605 ASSAY OF LACTIC ACID: CPT | Performed by: EMERGENCY MEDICINE

## 2022-09-28 PROCEDURE — 85610 PROTHROMBIN TIME: CPT | Performed by: EMERGENCY MEDICINE

## 2022-09-28 PROCEDURE — 99285 EMERGENCY DEPT VISIT HI MDM: CPT | Mod: 25

## 2022-09-28 PROCEDURE — 36415 COLL VENOUS BLD VENIPUNCTURE: CPT | Performed by: EMERGENCY MEDICINE

## 2022-09-28 PROCEDURE — 86140 C-REACTIVE PROTEIN: CPT | Performed by: INTERNAL MEDICINE

## 2022-09-28 PROCEDURE — 85014 HEMATOCRIT: CPT | Performed by: EMERGENCY MEDICINE

## 2022-09-28 PROCEDURE — 250N000013 HC RX MED GY IP 250 OP 250 PS 637: Performed by: EMERGENCY MEDICINE

## 2022-09-28 PROCEDURE — 258N000003 HC RX IP 258 OP 636: Performed by: EMERGENCY MEDICINE

## 2022-09-28 PROCEDURE — 250N000011 HC RX IP 250 OP 636: Performed by: EMERGENCY MEDICINE

## 2022-09-28 PROCEDURE — 81001 URINALYSIS AUTO W/SCOPE: CPT | Performed by: EMERGENCY MEDICINE

## 2022-09-28 PROCEDURE — 84484 ASSAY OF TROPONIN QUANT: CPT | Performed by: EMERGENCY MEDICINE

## 2022-09-28 PROCEDURE — 120N000001 HC R&B MED SURG/OB

## 2022-09-28 PROCEDURE — 99223 1ST HOSP IP/OBS HIGH 75: CPT | Mod: AI | Performed by: INTERNAL MEDICINE

## 2022-09-28 PROCEDURE — 71275 CT ANGIOGRAPHY CHEST: CPT

## 2022-09-28 PROCEDURE — 87637 SARSCOV2&INF A&B&RSV AMP PRB: CPT | Performed by: EMERGENCY MEDICINE

## 2022-09-28 PROCEDURE — 80053 COMPREHEN METABOLIC PANEL: CPT | Performed by: EMERGENCY MEDICINE

## 2022-09-28 PROCEDURE — 87040 BLOOD CULTURE FOR BACTERIA: CPT | Performed by: EMERGENCY MEDICINE

## 2022-09-28 PROCEDURE — 93005 ELECTROCARDIOGRAM TRACING: CPT

## 2022-09-28 RX ORDER — POLYETHYLENE GLYCOL 3350 17 G/17G
17 POWDER, FOR SOLUTION ORAL DAILY PRN
Status: ON HOLD | COMMUNITY
End: 2023-03-01

## 2022-09-28 RX ORDER — AMOXICILLIN 250 MG
1 CAPSULE ORAL 2 TIMES DAILY PRN
COMMUNITY
End: 2023-02-25

## 2022-09-28 RX ORDER — CEPHALEXIN 500 MG/1
500 CAPSULE ORAL 4 TIMES DAILY
Status: ON HOLD | COMMUNITY
Start: 2022-09-26 | End: 2022-10-04

## 2022-09-28 RX ORDER — IOPAMIDOL 755 MG/ML
500 INJECTION, SOLUTION INTRAVASCULAR ONCE
Status: COMPLETED | OUTPATIENT
Start: 2022-09-28 | End: 2022-09-28

## 2022-09-28 RX ORDER — ACETAMINOPHEN 500 MG
500 TABLET ORAL EVERY 4 HOURS PRN
Status: DISCONTINUED | OUTPATIENT
Start: 2022-09-28 | End: 2022-09-29

## 2022-09-28 RX ADMIN — ACETAMINOPHEN 500 MG: 500 TABLET, FILM COATED ORAL at 17:11

## 2022-09-28 RX ADMIN — SODIUM CHLORIDE 90 ML: 9 INJECTION, SOLUTION INTRAVENOUS at 18:32

## 2022-09-28 RX ADMIN — ACETAMINOPHEN 500 MG: 500 TABLET, FILM COATED ORAL at 23:15

## 2022-09-28 RX ADMIN — IOPAMIDOL 75 ML: 755 INJECTION, SOLUTION INTRAVENOUS at 18:32

## 2022-09-28 RX ADMIN — SODIUM CHLORIDE 500 ML: 9 INJECTION, SOLUTION INTRAVENOUS at 17:14

## 2022-09-28 ASSESSMENT — ENCOUNTER SYMPTOMS
FEVER: 0
CHILLS: 1
NAUSEA: 0
VOMITING: 0
SHORTNESS OF BREATH: 0
HEADACHES: 1

## 2022-09-28 ASSESSMENT — ACTIVITIES OF DAILY LIVING (ADL)
ADLS_ACUITY_SCORE: 35

## 2022-09-28 NOTE — ED NOTES
Bed: ED38  Expected date: 9/28/22  Expected time:   Means of arrival: Ambulance  Comments:  [facility]

## 2022-09-28 NOTE — TELEPHONE ENCOUNTER
Discussed with BON Ahmadi.  [facility] would not be able to provide care for patient until late afternoon.  Charleen would like patient to return to the [facility] for IV antibiotics.  I called patient and informed her of this recommendation.  Patient told me that she was given the impression that the [facility] doesn't administer IV antibiotics.  I called the [facility] and was told they do administer IV antibiotics.  LM to  inform patient that the [facility] room is capable of administering IV antibiotics and she should go to there for further treatment.  ROSEANNE Rosenbaum RN

## 2022-09-28 NOTE — ED TRIAGE NOTES
Pt arrives to the ED via EMS from the urgent care due to having cellulitis to right and left leg. Pt given zosyn and currently has vancomycin going. Pt states that 2 weeks ago she hit her left leg on her scooter. States that now having increasing redness and pain. Denies any known fevers. Pt had elevated D dimer at urgent care.
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
## 2022-09-29 ENCOUNTER — TELEPHONE (OUTPATIENT)
Dept: PEDIATRICS | Facility: CLINIC | Age: 87
End: 2022-09-29

## 2022-09-29 LAB
ANION GAP SERPL CALCULATED.3IONS-SCNC: 12 MMOL/L (ref 7–15)
ATRIAL RATE - MUSE: 88 BPM
BUN SERPL-MCNC: 9.9 MG/DL (ref 8–23)
CALCIUM SERPL-MCNC: 8.8 MG/DL (ref 8.8–10.2)
CHLORIDE SERPL-SCNC: 103 MMOL/L (ref 98–107)
CREAT SERPL-MCNC: 0.67 MG/DL (ref 0.51–0.95)
CRP SERPL-MCNC: 50.01 MG/L
DEPRECATED HCO3 PLAS-SCNC: 22 MMOL/L (ref 22–29)
DIASTOLIC BLOOD PRESSURE - MUSE: NORMAL MMHG
ERYTHROCYTE [DISTWIDTH] IN BLOOD BY AUTOMATED COUNT: 13.6 % (ref 10–15)
GFR SERPL CREATININE-BSD FRML MDRD: 85 ML/MIN/1.73M2
GLUCOSE SERPL-MCNC: 132 MG/DL (ref 70–99)
HCT VFR BLD AUTO: 38.5 % (ref 35–47)
HGB BLD-MCNC: 12.2 G/DL (ref 11.7–15.7)
INTERPRETATION ECG - MUSE: NORMAL
MCH RBC QN AUTO: 29 PG (ref 26.5–33)
MCHC RBC AUTO-ENTMCNC: 31.7 G/DL (ref 31.5–36.5)
MCV RBC AUTO: 92 FL (ref 78–100)
P AXIS - MUSE: 58 DEGREES
PLATELET # BLD AUTO: 252 10E3/UL (ref 150–450)
POTASSIUM SERPL-SCNC: 3.9 MMOL/L (ref 3.4–5.3)
PR INTERVAL - MUSE: 204 MS
QRS DURATION - MUSE: 96 MS
QT - MUSE: 404 MS
QTC - MUSE: 488 MS
R AXIS - MUSE: 2 DEGREES
RBC # BLD AUTO: 4.2 10E6/UL (ref 3.8–5.2)
SODIUM SERPL-SCNC: 137 MMOL/L (ref 136–145)
SYSTOLIC BLOOD PRESSURE - MUSE: NORMAL MMHG
T AXIS - MUSE: 39 DEGREES
VENTRICULAR RATE- MUSE: 88 BPM
WBC # BLD AUTO: 10.7 10E3/UL (ref 4–11)

## 2022-09-29 PROCEDURE — 85027 COMPLETE CBC AUTOMATED: CPT | Performed by: INTERNAL MEDICINE

## 2022-09-29 PROCEDURE — 36415 COLL VENOUS BLD VENIPUNCTURE: CPT | Performed by: INTERNAL MEDICINE

## 2022-09-29 PROCEDURE — 999N000157 HC STATISTIC RCP TIME EA 10 MIN

## 2022-09-29 PROCEDURE — 250N000011 HC RX IP 250 OP 636

## 2022-09-29 PROCEDURE — 250N000013 HC RX MED GY IP 250 OP 250 PS 637: Performed by: INTERNAL MEDICINE

## 2022-09-29 PROCEDURE — 258N000003 HC RX IP 258 OP 636: Performed by: INTERNAL MEDICINE

## 2022-09-29 PROCEDURE — 96366 THER/PROPH/DIAG IV INF ADDON: CPT

## 2022-09-29 PROCEDURE — 80048 BASIC METABOLIC PNL TOTAL CA: CPT | Performed by: INTERNAL MEDICINE

## 2022-09-29 PROCEDURE — 96368 THER/DIAG CONCURRENT INF: CPT

## 2022-09-29 PROCEDURE — 120N000001 HC R&B MED SURG/OB

## 2022-09-29 PROCEDURE — 99233 SBSQ HOSP IP/OBS HIGH 50: CPT | Performed by: INTERNAL MEDICINE

## 2022-09-29 PROCEDURE — 96365 THER/PROPH/DIAG IV INF INIT: CPT | Mod: 59

## 2022-09-29 PROCEDURE — 250N000011 HC RX IP 250 OP 636: Performed by: INTERNAL MEDICINE

## 2022-09-29 RX ORDER — SIMVASTATIN 10 MG
10 TABLET ORAL AT BEDTIME
Status: DISCONTINUED | OUTPATIENT
Start: 2022-09-29 | End: 2022-10-04 | Stop reason: HOSPADM

## 2022-09-29 RX ORDER — BUSPIRONE HYDROCHLORIDE 10 MG/1
10 TABLET ORAL 2 TIMES DAILY
Status: DISCONTINUED | OUTPATIENT
Start: 2022-09-29 | End: 2022-10-04 | Stop reason: HOSPADM

## 2022-09-29 RX ORDER — FLUTICASONE PROPIONATE 50 MCG
1 SPRAY, SUSPENSION (ML) NASAL DAILY PRN
Status: DISCONTINUED | OUTPATIENT
Start: 2022-09-29 | End: 2022-10-04 | Stop reason: HOSPADM

## 2022-09-29 RX ORDER — GABAPENTIN 300 MG/1
300 CAPSULE ORAL 2 TIMES DAILY
Status: DISCONTINUED | OUTPATIENT
Start: 2022-09-29 | End: 2022-10-04 | Stop reason: HOSPADM

## 2022-09-29 RX ORDER — LEVOTHYROXINE SODIUM 88 UG/1
88 TABLET ORAL DAILY
Status: DISCONTINUED | OUTPATIENT
Start: 2022-09-29 | End: 2022-10-04 | Stop reason: HOSPADM

## 2022-09-29 RX ORDER — ALBUTEROL SULFATE 90 UG/1
2 AEROSOL, METERED RESPIRATORY (INHALATION) EVERY 4 HOURS PRN
Status: DISCONTINUED | OUTPATIENT
Start: 2022-09-29 | End: 2022-10-04 | Stop reason: HOSPADM

## 2022-09-29 RX ORDER — LIDOCAINE 40 MG/G
CREAM TOPICAL
Status: DISCONTINUED | OUTPATIENT
Start: 2022-09-29 | End: 2022-10-04 | Stop reason: HOSPADM

## 2022-09-29 RX ORDER — GABAPENTIN 300 MG/1
600 CAPSULE ORAL AT BEDTIME
Status: DISCONTINUED | OUTPATIENT
Start: 2022-09-29 | End: 2022-10-04 | Stop reason: HOSPADM

## 2022-09-29 RX ORDER — POLYETHYLENE GLYCOL 3350 17 G/17G
17 POWDER, FOR SOLUTION ORAL DAILY PRN
Status: DISCONTINUED | OUTPATIENT
Start: 2022-09-29 | End: 2022-10-04 | Stop reason: HOSPADM

## 2022-09-29 RX ORDER — CEFAZOLIN SODIUM 1 G/50ML
2500 SOLUTION INTRAVENOUS ONCE
Status: COMPLETED | OUTPATIENT
Start: 2022-09-29 | End: 2022-09-29

## 2022-09-29 RX ORDER — HEPARIN SODIUM 5000 [USP'U]/.5ML
5000 INJECTION, SOLUTION INTRAVENOUS; SUBCUTANEOUS EVERY 12 HOURS
Status: DISCONTINUED | OUTPATIENT
Start: 2022-09-29 | End: 2022-10-04 | Stop reason: HOSPADM

## 2022-09-29 RX ORDER — AMLODIPINE BESYLATE 5 MG/1
5 TABLET ORAL DAILY
Status: DISCONTINUED | OUTPATIENT
Start: 2022-09-29 | End: 2022-10-04 | Stop reason: HOSPADM

## 2022-09-29 RX ORDER — ACETAMINOPHEN 325 MG/1
650 TABLET ORAL EVERY 8 HOURS PRN
Status: DISCONTINUED | OUTPATIENT
Start: 2022-09-29 | End: 2022-10-04 | Stop reason: HOSPADM

## 2022-09-29 RX ORDER — AMOXICILLIN 250 MG
1 CAPSULE ORAL 2 TIMES DAILY PRN
Status: DISCONTINUED | OUTPATIENT
Start: 2022-09-29 | End: 2022-10-04 | Stop reason: HOSPADM

## 2022-09-29 RX ORDER — ALPRAZOLAM 0.25 MG
0.25 TABLET ORAL 2 TIMES DAILY PRN
Status: DISCONTINUED | OUTPATIENT
Start: 2022-09-29 | End: 2022-10-04 | Stop reason: HOSPADM

## 2022-09-29 RX ORDER — FUROSEMIDE 20 MG
20 TABLET ORAL DAILY
Status: DISCONTINUED | OUTPATIENT
Start: 2022-09-29 | End: 2022-10-04 | Stop reason: HOSPADM

## 2022-09-29 RX ADMIN — ACETAMINOPHEN 650 MG: 325 TABLET, FILM COATED ORAL at 20:26

## 2022-09-29 RX ADMIN — SIMVASTATIN 10 MG: 10 TABLET, FILM COATED ORAL at 21:32

## 2022-09-29 RX ADMIN — SIMVASTATIN 10 MG: 10 TABLET, FILM COATED ORAL at 01:33

## 2022-09-29 RX ADMIN — FLUOXETINE 60 MG: 20 CAPSULE ORAL at 08:27

## 2022-09-29 RX ADMIN — GABAPENTIN 600 MG: 300 CAPSULE ORAL at 01:31

## 2022-09-29 RX ADMIN — TAZOBACTAM SODIUM AND PIPERACILLIN SODIUM 3.38 G: 375; 3 INJECTION, SOLUTION INTRAVENOUS at 18:11

## 2022-09-29 RX ADMIN — BUSPIRONE HYDROCHLORIDE 10 MG: 10 TABLET ORAL at 08:28

## 2022-09-29 RX ADMIN — LEVOTHYROXINE SODIUM 88 MCG: 0.09 TABLET ORAL at 08:28

## 2022-09-29 RX ADMIN — GABAPENTIN 600 MG: 300 CAPSULE ORAL at 21:32

## 2022-09-29 RX ADMIN — BUSPIRONE HYDROCHLORIDE 10 MG: 10 TABLET ORAL at 20:26

## 2022-09-29 RX ADMIN — FUROSEMIDE 20 MG: 20 TABLET ORAL at 08:29

## 2022-09-29 RX ADMIN — GABAPENTIN 300 MG: 300 CAPSULE ORAL at 11:30

## 2022-09-29 RX ADMIN — HEPARIN SODIUM 5000 UNITS: 10000 INJECTION, SOLUTION INTRAVENOUS; SUBCUTANEOUS at 08:31

## 2022-09-29 RX ADMIN — HEPARIN SODIUM 5000 UNITS: 10000 INJECTION, SOLUTION INTRAVENOUS; SUBCUTANEOUS at 18:59

## 2022-09-29 RX ADMIN — FLUTICASONE FUROATE AND VILANTEROL TRIFENATATE 1 PUFF: 100; 25 POWDER RESPIRATORY (INHALATION) at 10:21

## 2022-09-29 RX ADMIN — ACETAMINOPHEN 650 MG: 325 TABLET, FILM COATED ORAL at 06:13

## 2022-09-29 RX ADMIN — GABAPENTIN 300 MG: 300 CAPSULE ORAL at 08:28

## 2022-09-29 RX ADMIN — TAZOBACTAM SODIUM AND PIPERACILLIN SODIUM 4.5 G: 500; 4 INJECTION, SOLUTION INTRAVENOUS at 01:31

## 2022-09-29 RX ADMIN — AMLODIPINE BESYLATE 5 MG: 5 TABLET ORAL at 08:29

## 2022-09-29 RX ADMIN — VANCOMYCIN HYDROCHLORIDE 2500 MG: 10 INJECTION, POWDER, LYOPHILIZED, FOR SOLUTION INTRAVENOUS at 03:12

## 2022-09-29 RX ADMIN — TAZOBACTAM SODIUM AND PIPERACILLIN SODIUM 4.5 G: 500; 4 INJECTION, SOLUTION INTRAVENOUS at 08:41

## 2022-09-29 ASSESSMENT — ACTIVITIES OF DAILY LIVING (ADL)
ADLS_ACUITY_SCORE: 35
ADLS_ACUITY_SCORE: 32
ADLS_ACUITY_SCORE: 35
ADLS_ACUITY_SCORE: 33
ADLS_ACUITY_SCORE: 35
ADLS_ACUITY_SCORE: 33
ADLS_ACUITY_SCORE: 33
ADLS_ACUITY_SCORE: 35
ADLS_ACUITY_SCORE: 35

## 2022-09-29 NOTE — PHARMACY-VANCOMYCIN DOSING SERVICE
"Pharmacy Vancomycin Initial Note  Date of Service 2022  Patient's  1935  87 year old, female    Indication: Skin and Soft Tissue Infection    Current estimated CrCl = Estimated Creatinine Clearance: 73.8 mL/min (based on SCr of 0.69 mg/dL).    Creatinine for last 3 days  2022:  4:28 PM Creatinine 0.69 mg/dL    Recent Vancomycin Level(s) for last 3 days  No results found for requested labs within last 72 hours.      Vancomycin IV Administrations (past 72 hours)                   vancomycin (VANCOCIN) 2,500 mg in sodium chloride 0.9 % 500 mL intermittent infusion (mg) 2,500 mg New Bag 22 031                Nephrotoxins and other renal medications (From now, onward)    Start     Dose/Rate Route Frequency Ordered Stop    22 0800  furosemide (LASIX) tablet 20 mg        Note to Pharmacy: PTA Sig:TAKE 1 TABLET BY MOUTH TWICE A DAY  Patient taking differently: Take 20 mg by mouth daily      20 mg Oral DAILY 228      22 010  piperacillin-tazobactam (ZOSYN) intermittent infusion 4.5 g        Note to Pharmacy: For [facility], [facility] and [facility]: For Zosyn-naive patients, use the \"Zosyn initial dose + extended infusion\" order panel.    4.5 g  200 mL/hr over 30 Minutes Intravenous EVERY 8 HOURS 22 0108            Contrast Orders - past 72 hours (72h ago, onward)    Start     Dose/Rate Route Frequency Stop    22 182  iopamidol (ISOVUE-370) solution 500 mL         500 mL Intravenous ONCE 22 183          [employer] Prediction of Planned Initial Vancomycin Regimen  Regimen: 1500 mg IV every 24 hours.  Start time: 05:00 on 2022  Exposure target: AUC24 (range)400-600 mg/L.hr   AUC24,ss: 547 mg/L.hr  Probability of AUC24 > 400: 72 %  Ctrough,ss: 13.5 mg/L  Probability of Ctrough,ss > 20: 34 %  Probability of nephrotoxicity (Lodise TYLER ): 9 %          Plan:  1. Start vancomycin  1500 mg IV q24h tomorrow (2500mg loading dose given in ER.   2. Vancomycin monitoring " method: AUC  3. Vancomycin therapeutic monitoring goal: 400-600 mg*h/L  4. Pharmacy will check vancomycin levels as appropriate in 1-3 Days.    5. Serum creatinine levels will be ordered a minimum of twice weekly.      Kenya Thakkar RPH

## 2022-09-29 NOTE — H&P
"[facility]    History and Physical - Hospitalist Service       Date of Admission:  9/28/2022    Assessment & Plan      Carol Merida is a 87 year old female admitted on 9/28/2022.     Lower extremity cellulitis   left greater than right  Which has failed outpatient antibiotic therapy  We will start patient on Zosyn and vancomycin  Check MRSA  Check CRP    Hypertension  We will continue amlodipine 5 mg daily    Obstructive sleep apnea  Patient wants to take CPAP during the hospitalization    Panic attack/anxiety  Continue Prozac 60 mg daily and BuSpar 10 mg 2 times daily    Hypothyroidism  Continue Synthroid 88 mcg daily    Hypercholesteremia  Continue simvastatin 10 mg daily           Diet:   Cardiac diet  DVT Prophylaxis: Heparin SQ  Benson Catheter: Not present  Central Lines: None  Cardiac Monitoring: None  Code Status:   FULL CODE    Clinically Significant Risk Factors Present on Admission                 # Hypertension: home medication list includes antihypertensive(s)    # Severe Obesity: Estimated body mass index is 47.29 kg/m  as calculated from the following:    Height as of this encounter: 1.6 m (5' 3\").    Weight as of this encounter: 121.1 kg (266 lb 15.6 oz).        Disposition Plan   3-5 days       The patient's care was discussed with the Patient.    Ollie Chu MD  Hospitalist Service  [facility]  Securely message with the Vocera Web Console (learn more here)  Text page via [employer] Paging/Directory         ______________________________________________________________________    Chief Complaint   Chills    History is obtained from the patient, medical records and my discussion with emergency department physician      History of Present Illness   Carol Merida is a 87 year old lady who is obese, has hypothyroidism, obstructive sleep apnea on CPAP, hypertension, hypercholesteremia, panic disorder without agoraphobia, chronic pain syndrome, anxiety, " congestive heart failure with chronic diastolic CHF, trochanteric bursitis who lives in [facility] for the last about 2-1/2 years and uses a scooter for transportation     She was transferring from her scooter to her car about 2-1/2 weeks ago when she got prescription and no open wound on the anterior part of her left shin which has progressively gotten red around it.  She went to get this evaluated to emergency room where she was prescribed IV antibiotic followed by Keflex 4 times daily.  This has not improved the redness and in fact it is getting worse in the left lower extremity.  She got another area of redness in the right lower extremity.  She has chills which has made her come to the hospital according to her for the last 2 days and has been having headache since today associated with nausea.  She thinks she is short of breath but attributes this more to her anxiety and panic disorder.  There is no history of fever.  She was again seen in the emergency room today for throbbing pain in the left lower extremity and was referred here for further evaluation and treatment.    WBC 8.1 hemoglobin 13 platelet count 272,000 her BMP was normal with calcium 8.1 troponin was checked it was 17 with less than 14 being normal but the patient denies any chest pain.    Review of Systems    He has headache, without blurring of vision or double vision, neck pain jaw pain or shoulder pain, chest pain, but has shortness of breath, without cough or increased sputum production, nausea or vomiting, abdominal pain, diarrhea or constipation.  Denies any urinary symptoms of burning or increased frequency. Denies any weakness of upper or lower extremities or any seizure activity. Fever but has chills. No Bleeding from anywhere else. with cellulitis in the lower limb.      Past Medical History    I have reviewed this patient's medical history and updated it with pertinent information if needed.   Past Medical  History:   Diagnosis Date     Abnormal stress test     small area on stress thalium ?2003; but normal angiogram 2012     Anemia      Anxiety      Cardiac dysrhythmia, unspecified     irregular heartbeat     CHF (congestive heart failure) (H) 6/18/2018     PETTY (dyspnea on exertion)      Hyperlipidemia LDL goal <100 2/10/2010     Hypertension goal BP (blood pressure) < 140/90 7/18/2010     Hypothyroid      Malignant 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     Melanoma of skin, site unspecified      NONSPECIFIC MEDICAL HISTORY 04    fx fifth finger right hand     Obstructive sleep apnea (adult) (pediatric) 8/25/2006    CPAP      Osteoarthritis      Other chronic pain     Joint pain for many years.     Other osteoporosis      PONV (postoperative nausea and vomiting)      Tubular adenoma in colon 9/01    colonoscopy due 2004       Past Surgical History   I have reviewed this patient's surgical history and updated it with pertinent information if needed.  Past Surgical History:   Procedure Laterality Date     ARTHROPLASTY HIP Left 7/6/2015    Procedure: ARTHROPLASTY HIP;  Surgeon: Junior Giordano MD;  Location: [facility] OR     ARTHROPLASTY HIP Right 11/2/2016    Procedure: ARTHROPLASTY HIP;  Surgeon: Junior Giordano MD;  Location: [facility] OR     ARTHROPLASTY REVISION HIP Left 7/22/2015    Procedure: ARTHROPLASTY REVISION HIP;  Surgeon: Junior Giordano MD;  Location: [facility] OR     CATARACT IOL, RT/LT       COLONOSCOPY  9/01    tubular adenoma; repeat 3 years.     COLONOSCOPY  9/04    normal; repeat 5 years ([facility])     HYSTERECTOMY, MARGAUX  summer 2004    and BSO     SURGICAL HISTORY OF -   9/05    left mastectomy     SURGICAL HISTORY OF -   2008    right ankle surgery; triple arthrodesis     SURGICAL HISTORY OF -   5/09    right ankle surgery; triple arthrodesis and deltoid reconstruction; possible other     SURGICAL HISTORY OF -   2/10     removal of hardware from right ankle     [facility] NONSPECIFIC PROCEDURE      Knee replacement x 2 (B)     [facility] NONSPECIFIC PROCEDURE      s/p Tonsillectomy (college)     [facility] NONSPECIFIC PROCEDURE      s/p Appy (high school)     [facility] NONSPECIFIC PROCEDURE      Melanoma removed with sentinel node bx     [facility] NONSPECIFIC PROCEDURE       bilateral cataract       Social History   I have reviewed this patient's social history and updated it with pertinent information if needed.  Social History     Tobacco Use     Smoking status: Never Smoker     Smokeless tobacco: Never Used   Vaping Use     Vaping Use: Never used   Substance Use Topics     Alcohol use: Yes     Alcohol/week: 0.0 - 0.8 standard drinks     Comment: 1 glass wine weekly     Drug use: Never       Family History   I have reviewed this patient's family history and updated it with pertinent information if needed.  Family History   Problem Relation Age of Onset     No Known Problems Brother         brain tumor related to shingles in his eye     Arthritis Mother      Hypertension Father      Cancer Father         lung     Cancer Grandchild         terratoma tumors     Breast Cancer Daughter        Prior to Admission Medications   Prior to Admission Medications   Prescriptions Last Dose Informant Patient Reported? Taking?   ACE/ARB/ARNI NOT PRESCRIBED (INTENTIONAL)   No No   Sig: Please choose reason not prescribed, below   ALPRAZolam (XANAX) 0.25 MG tablet   No No   Sig: TAKE ONE TABLET BY MOUTH TWICE A DAY AS NEEDED FOR ANXIETY. DON'T TAKE WITH OXYCODONE.   BETA BLOCKER NOT PRESCRIBED (INTENTIONAL)   No No   Sig: Beta Blocker not prescribed intentionally due to EF > 40 % (ejection fraction) will leave up to Cardiology.   FLUoxetine (PROZAC) 20 MG capsule   No No   Sig: Take 3 capsules (60 mg) by mouth daily   SENNA-docusate sodium (SENNA S) 8.6-50 MG tablet   No No   Sig: Take 1 tablet by mouth 2 times daily   Patient taking differently: Take 1 tablet by mouth 2 times  daily as needed   acetaminophen (TYLENOL) 650 MG CR tablet   No No   Sig: Take 1 tablet (650 mg) by mouth every 8 hours as needed for mild pain or fever   albuterol (PROAIR HFA/PROVENTIL HFA/VENTOLIN HFA) 108 (90 Base) MCG/ACT inhaler   No No   Sig: INHALE 2 PUFFS INTO THE LUNGS EVERY 4 HOURS AS NEEDED FOR SHORTNESS OF BREATH OR DIFFICULT BREATHING OR WHEEZING   amLODIPine (NORVASC) 5 MG tablet   No No   Sig: Take 1 tablet (5 mg) by mouth daily   busPIRone (BUSPAR) 10 MG tablet   No No   Sig: Take 1 tablet (10 mg) by mouth 2 times daily   calcium carbonate 600 mg-vitamin D 400 units (CALTRATE) 600-400 MG-UNIT per tablet  Self Yes No   Sig: Take 1 tablet by mouth every evening    cetirizine (ZYRTEC) 10 MG tablet   No No   Sig: Take 1 tablet (10 mg) by mouth daily   diclofenac (VOLTAREN) 1 % topical gel   No No   Sig: Apply 4 g topically 4 times daily as needed for moderate pain   fluticasone (FLONASE) 50 MCG/ACT nasal spray   No No   Sig: Spray 1 spray into both nostrils daily as needed for rhinitis   fluticasone-salmeterol (WIXELA INHUB) 100-50 MCG/ACT inhaler   No No   Sig: Inhale 1 puff into the lungs 2 times daily   furosemide (LASIX) 20 MG tablet   No No   Sig: TAKE 1 TABLET BY MOUTH TWICE A DAY   gabapentin (NEURONTIN) 300 MG capsule   No No   Sig: TAKE ONE CAPSULE BY MOUTH EVERY MORNING AND AT NOON, AND TAKE TWO CAPSULES EVERY NIGHT   levothyroxine (SYNTHROID/LEVOTHROID) 88 MCG tablet   No No   Sig: Take 1 tablet (88 mcg) by mouth daily   nystatin (NYSTOP) 097858 UNIT/GM external powder   No No   Sig: Apply tid to affectead area prn   olopatadine (PATANOL) 0.1 % ophthalmic solution   No No   Sig: Place 1 drop into both eyes 2 times daily   Patient taking differently: Place 1 drop into both eyes 2 times daily as needed for allergies   order for DME  Self No No   Sig: Equipment being ordered: left breast prosthesis. Mastectomy bras (up to 4)   polyethylene glycol (MIRALAX) 17 GM/Dose powder   No No   Sig: Take  17-34 g (1-2 capfuls) by mouth daily   Patient taking differently: Take 1-2 capfuls by mouth daily as needed for constipation   simvastatin (ZOCOR) 10 MG tablet   No No   Sig: Take 1 tablet (10 mg) by mouth At Bedtime   triamcinolone (KENALOG) 0.1 % external cream   No No   Sig: Apply topically 2 times daily   Patient taking differently: Apply topically 2 times daily as needed      Facility-Administered Medications: None     Allergies   Allergies   Allergen Reactions     Ace Inhibitors Cough       Physical Exam   Vital Signs: Temp: 98.4  F (36.9  C) Temp src: Oral BP: (!) 142/79 Pulse: 88   Resp: 22 SpO2: 95 % O2 Device: None (Room air)    Weight: 266 lbs 15.63 oz      GENERAL: Patient does not look in any acute distress  HEENT: EOM+, Conjunctiva is clear   NECK: I did not see a Jugular Venous distention  HEART: S1 S2 regular  Rate and Rhythm  no murmur,    LUNGS: Respirations are not laboured, Lungs are clear to auscultation Crepitations or Wheezing   ABDOMEN: Soft , there is tenderness , Bowel Sounds are Positive   LOWER LIMBS: has a scab on the front of the left leg surrounded by a large area of erythema with pedal Edema there is also redness in the lower third part of the right leg  CNS:  Alert,  Oriented x 3, Moving all the Four Limbs       Data   Data reviewed today: I reviewed all medications, new labs and imaging results over the last 24 hours. I personally reviewed the EKG tracing showing Sinus rhythm with out any signs of acute ongoing ischemia.    Most Recent 3 CBC's:Recent Labs   Lab Test 09/28/22 1628 08/10/22  0651 08/09/22  1303   WBC 8.1 5.9 4.9   HGB 13.0 13.6 13.4   MCV 96 93 94    267 243     Most Recent 3 BMP's:Recent Labs   Lab Test 09/28/22  1628 08/19/22  1211 08/10/22  0651    136 140   POTASSIUM 3.5 4.4 3.3*   CHLORIDE 104 99 100   CO2 25 28 28   BUN 13.1 13 11.4   CR 0.69 0.90 0.67   ANIONGAP 10 9 12   LAKSHMI 8.1* 9.5 9.4   GLC 96 154* 123*     Most Recent 2 LFT's:Recent Labs    Lab Test 09/28/22  1628 08/09/22  1303   AST 23 31   ALT 17 24   ALKPHOS 90 87   BILITOTAL 0.8 0.8     Most Recent 3 INR's:Recent Labs   Lab Test 09/28/22  1631 11/08/16  0643 11/07/16  0610   INR 1.15 2.78* 2.27*     Recent Results (from the past 24 hour(s))   CT Chest Pulmonary Embolism w Contrast    Narrative    EXAM: CT CHEST PULMONARY EMBOLISM W CONTRAST  LOCATION: [facility]  DATE/TIME: 9/28/2022 6:39 PM    INDICATION: Elevated dimer at urgent care.  COMPARISON: 08/09/2022.  TECHNIQUE: CT chest pulmonary angiogram during arterial phase injection of IV contrast. Multiplanar reformats and MIP reconstructions were performed. Dose reduction techniques were used.   CONTRAST: 75 mL Isovue 370.    FINDINGS:  ANGIOGRAM CHEST: Pulmonary arteries are normal caliber and negative for pulmonary emboli. Thoracic aorta is negative for dissection. No CT evidence of right heart strain.    LUNGS AND PLEURA: No acute finding such as lung infiltrates, significant atelectasis, or pleural effusions.    MEDIASTINUM/AXILLAE: Moderate mitral annular calcification.     CORONARY ARTERY CALCIFICATION: Minimal coronary artery calcification.    UPPER ABDOMEN: Fatty changes of the pancreas.    MUSCULOSKELETAL: Advanced scattered degenerative changes of both the axillary and axial skeleton with no acute findings. Old left 11th fracture.      Impression    IMPRESSION:  1.  No acute findings such as dissection, aneurysm, or pulmonary emboli.    2.  Minimal coronary artery calcification.    3.  Fatty changes in the pancreas.    4.  Advanced degenerative changes bony skeleton.

## 2022-09-29 NOTE — PHARMACY-ADMISSION MEDICATION HISTORY
"Admission medication history interview status for this patient is complete. See [facility] admission navigator for allergy information, prior to admission medications and immunization status.     Medication history interview done, indicate source(s): Patient  Medication history resources (including written lists, pill bottles, clinic record): [facility] dispense records  Pharmacy: [facility]/pharmacy #[id number] - [place], [place] - [address] AT [facility]  [phone]      Changes made to PTA medication list:  Added: Cephalexin  Changed:   1. Furosemide 20mg BID --> 20mg daily [\"patient taking differently\"];  2. Miralax and Senna-docusate to PRN;   Removed: Cetirizine    Actions taken by pharmacist (provider contacted, etc):None     Additional medication history information:None    Medication reconciliation/reorder completed by provider prior to medication history?  No     Prior to Admission medications    Medication Sig Last Dose Taking? Auth Provider Long Term End Date   acetaminophen (TYLENOL) 650 MG CR tablet Take 1 tablet (650 mg) by mouth every 8 hours as needed for mild pain or fever  Yes Stella Cutler MD     albuterol (PROAIR HFA/PROVENTIL HFA/VENTOLIN HFA) 108 (90 Base) MCG/ACT inhaler INHALE 2 PUFFS INTO THE LUNGS EVERY 4 HOURS AS NEEDED FOR SHORTNESS OF BREATH OR DIFFICULT BREATHING OR WHEEZING  Yes Stella Cutler MD Yes    ALPRAZolam (XANAX) 0.25 MG tablet TAKE ONE TABLET BY MOUTH TWICE A DAY AS NEEDED FOR ANXIETY. DON'T TAKE WITH OXYCODONE.  Yes Olya Pool PA-C     amLODIPine (NORVASC) 5 MG tablet Take 1 tablet (5 mg) by mouth daily 9/28/2022 at AM Yes Stella Cutler MD Yes    busPIRone (BUSPAR) 10 MG tablet Take 1 tablet (10 mg) by mouth 2 times daily 9/28/2022 at x1 Yes Stella Cutler MD Yes    calcium carbonate 600 mg-vitamin D 400 units (CALTRATE) 600-400 MG-UNIT per tablet Take 1 tablet by mouth every evening  9/27/2022 at Unknown time Yes Yessica Lora MD     cephALEXin " (KEFLEX) 500 MG capsule Take 500 mg by mouth 4 times daily 9/28/2022 at x2 Yes Unknown, Entered By History  10/2/22   diclofenac (VOLTAREN) 1 % topical gel Apply 4 g topically 4 times daily as needed for moderate pain  Yes Stella Cutler MD     FLUoxetine (PROZAC) 20 MG capsule Take 3 capsules (60 mg) by mouth daily 9/28/2022 at AM Yes Stella Cutler MD Yes    fluticasone (FLONASE) 50 MCG/ACT nasal spray Spray 1 spray into both nostrils daily as needed for rhinitis  Yes Stella Cutler MD     fluticasone-salmeterol (WIXELA INHUB) 100-50 MCG/ACT inhaler Inhale 1 puff into the lungs 2 times daily 9/28/2022 at x1 Yes Stella Cutler MD Yes    furosemide (LASIX) 20 MG tablet TAKE 1 TABLET BY MOUTH TWICE A DAY  Patient taking differently: Take 20 mg by mouth daily 9/28/2022 at AM Yes Olya Polo PA-C Yes    gabapentin (NEURONTIN) 300 MG capsule TAKE ONE CAPSULE BY MOUTH EVERY MORNING AND AT NOON, AND TAKE TWO CAPSULES EVERY NIGHT 9/28/2022 at x1 Yes Stella Cutler MD Yes    levothyroxine (SYNTHROID/LEVOTHROID) 88 MCG tablet Take 1 tablet (88 mcg) by mouth daily 9/28/2022 at AM Yes Stella Cutler MD Yes    nystatin (NYSTOP) 393698 UNIT/GM external powder Apply tid to affectead area prn  Yes Stella Cutler MD     olopatadine (PATANOL) 0.1 % ophthalmic solution Place 1 drop into both eyes 2 times daily  Patient taking differently: Place 1 drop into both eyes 2 times daily as needed for allergies Past Week at Unknown time Yes Miriam Enamorado, OD     polyethylene glycol (MIRALAX) 17 GM/Dose powder Take 17 g by mouth daily as needed for constipation  Yes Unknown, Entered By History     senna-docusate (SENOKOT-S/PERICOLACE) 8.6-50 MG tablet Take 1 tablet by mouth 2 times daily as needed for constipation  Yes Unknown, Entered By History     simvastatin (ZOCOR) 10 MG tablet Take 1 tablet (10 mg) by mouth At Bedtime 9/27/2022 at HS Yes Stella Cutler MD Yes    ACE/ARB/ARNI NOT PRESCRIBED  (INTENTIONAL) Please choose reason not prescribed, below   Yessica Lora MD     BETA BLOCKER NOT PRESCRIBED (INTENTIONAL) Beta Blocker not prescribed intentionally due to EF > 40 % (ejection fraction) will leave up to Cardiology.   Yessica Lora MD     order for DME Equipment being ordered: left breast prosthesis. Mastectomy bras (up to 4)   Yessica Lora MD

## 2022-09-29 NOTE — TELEPHONE ENCOUNTER
Received a call from the patient stating that she is in the hospital at this time   - Patient reports that she had to cancel her appointment with Dr. Cutler that was scheduled for 9/29/2022 due to her admission to the hospital   - Patient admitted to the hospital for cellulitis of lower extremity   - Patient reports that her appointment on 9/29/2022 with Dr. Cutler was to discuss her buspirone medication that was started on 8/24/2022     busPIRone (BUSPAR) 10 MG tablet 60 tablet 3 8/24/2022  --   Sig - Route: Take 1 tablet (10 mg) by mouth 2 times daily - Oral     - See encounter from 8/24/2022   - Patient reports that the buspar medication is helping her and decreasing her anxiety     - Patient has an appointment for DX HIP/PELVIS/SPINE on 9/30/2022, patient requesting to cancel that appointment due to her currently being in the hospital   - Cancelled patient's 9/30/2022 appointment, patient declines rescheduling the appointment at this time, states she will call to reschedule once discharged from the hospital     - Patient stated that she will reach out to SHIRLEY Aguilera RN when she is discharged from the hospital to schedule a hospital follow-up appointment     Set reminder to follow-up with patient once discharged to assist her in scheduling a hospital follow-up appointment and rescheduling her Dexa bone scan     Routing to Dr. Cutler as an TERRANCE LOZANO RN   Patient Advocate Liaison (PAL)  [facility]

## 2022-09-29 NOTE — PROGRESS NOTES
AOx4  Activity: Ax1 walker gb  O2: RA  B&B: Continent at baseline. Some incontinent episodes of the bowel, small amounts of dribbling from bladder. Pt up to commode. Walking short distance bc of pain in LE's   Diet: Lo Sat Fat, Na < 2400mg  PIV: SL in btwn IV abx    Other: Pt is [place], I ordered pocket talker for her. Waiting for it to arrive so she can start using it.    D/c plans: Pending.

## 2022-09-29 NOTE — PROGRESS NOTES
[facility]    Hospitalist Progress Note  Name: Carol Merida    MRN: [id number]  Provider: Monique Hickman MD  Date of Service: 09/29/2022    Assessment & Plan   Summary of Stay: Carol Merida is a 87 year old female who was admitted on 9/28/2022 for lower extremity cellulitis.  Past medical history significant for obesity, hypothyroidism, obstructive sleep apnea on CPAP, hypertension, hyperlipidemia, panic disorder with agoraphobia, chronic pain syndrome, congestive heart failure chronic diastolic heart failure.  Presented with worsening lower extremity redness despite being on oral antibiotics for localized cellulitis.  Admitted for cellulitis failed outpatient treatment    Lower extremity cellulitis   left greater than right  Was on Keflex prior to  In the emergency room was started on Zosyn and vancomycin: We will continue   CRP elevated 50  Cultures pending     Hypertension  Amlodipine 5 mg daily     Obstructive sleep apnea  CPAP during the hospitalization     Panic attack/anxiety  Prozac 60 mg daily and BuSpar 10 mg 2 times daily     Hypothyroidism  Synthroid 88 mcg daily     Hypercholesteremia  simvastatin 10 mg daily         DVT Prophylaxis: Heparin SQ  Code Status: Full Code    Disposition: Expected discharge at least 2 to 3 days given extent of cellulitis      Interval History   Assumed care reviewed chart, erythema warmth swelling of the leg.  Denies any chest pain shortness of breath lightheadedness dizziness.  More than 10 point review of systems was carried out was otherwise negative    -Data reviewed today: I reviewed all new labs and imaging reports over the last 24 hours. I personally reviewed no images or EKG's today.    Physical Exam   Temp: 98.5  F (36.9  C) Temp src: Oral BP: 115/64 Pulse: 79   Resp: 20 SpO2: 96 % O2 Device: None (Room air)    Vitals:    09/28/22 1620   Weight: 121.1 kg (266 lb 15.6 oz)     Vital Signs with Ranges  Temp:  [98.4  F (36.9  C)-98.5  F (36.9  C)]  98.5  F (36.9  C)  Pulse:  [78-95] 79  Resp:  [20-22] 20  BP: (111-145)/(58-82) 115/64  SpO2:  [92 %-96 %] 96 %  No intake/output data recorded.      GENERAL: Patient does not look in any acute distress  HEENT: EOM+, Conjunctiva is clear   NECK: I did not see a Jugular Venous distention  HEART: S1 S2 regular  Rate and Rhythm  no murmur,    LUNGS: Respirations are not laboured, Lungs are clear to auscultation Crepitations or Wheezing   ABDOMEN: Soft , there is tenderness , Bowel Sounds are Positive   LOWER LIMBS: has a scab on the front of the left leg surrounded by a large area of erythema with pedal Edema there is also redness in the lower third part of the right leg  CNS:  Alert,  Oriented x 3, Moving all the Four Limbs     Medications       amLODIPine  5 mg Oral Daily     busPIRone  10 mg Oral BID     FLUoxetine  60 mg Oral Daily     fluticasone-vilanterol  1 puff Inhalation Daily     furosemide  20 mg Oral Daily     gabapentin  300 mg Oral BID 09 12     gabapentin  600 mg Oral At Bedtime     heparin ANTICOAGULANT  5,000 Units Subcutaneous Q12H     levothyroxine  88 mcg Oral Daily     piperacillin-tazobactam  4.5 g Intravenous Q8H     simvastatin  10 mg Oral At Bedtime     sodium chloride (PF)  3 mL Intracatheter Q8H     [START ON 9/30/2022] vancomycin  1,500 mg Intravenous Q24H     Data     Recent Labs   Lab 09/29/22  0714 09/28/22  1628   WBC 10.7 8.1   HGB 12.2 13.0   HCT 38.5 42.7   MCV 92 96    272     Recent Labs   Lab 09/29/22  0714 09/28/22  1628    139   POTASSIUM 3.9 3.5   CHLORIDE 103 104   CO2 22 25   ANIONGAP 12 10   * 96   BUN 9.9 13.1   CR 0.67 0.69   GFRESTIMATED 85 84   LAKSHMI 8.8 8.1*     7-Day Micro Results     Collected Updated Procedure Result Status      09/28/2022 1647 09/28/2022 1710 Blood Culture Hand, Right [[id number]]   Blood from Hand, Right    In process Component Value   No component results               09/28/2022 1631 09/28/2022 1719 Symptomatic; Unknown  Influenza A/B & SARS-CoV2 (COVID-19) Virus PCR Multiplex Nasopharyngeal [[id number]]    Swab from Nasopharyngeal    Final result Component Value   Influenza A PCR Negative   Influenza B PCR Negative   RSV PCR Negative   SARS CoV2 PCR Negative   NEGATIVE: SARS-CoV-2 (COVID-19) RNA not detected, presumed negative.            09/28/2022 1628 09/29/2022 0848 Blood Culture Peripheral Blood [[id number]]   Peripheral Blood    Preliminary result Component Value   Culture No growth after 12 hours  [P]                09/28/2022 1416 09/28/2022 1632 COVID-19 VIRUS (CORONAVIRUS) BY PCR (EXTERNAL RESULT) [[id number]]    Swab   Specimen type and source: Swa...    Final result Component Value   COVID-19 Virus by PCR (External Result) Negative   SARS-CoV-2 viral RNA not detected.         A negative result should not be used as the sole basis for patient        management and does not rule out co-infections with other        pathogens.  Negative results should be treated as presumptive and        considered in the context of the patients' clinical status.  False        negatives may occur if inadequate levels of viruses are present in the        sample.                No results for input(s): NTBNPI, NTBNP in the last 168 hours.  No results for input(s): CKT in the last 168 hours.    Invalid input(s): CK, CK TOTAL  Recent Labs   Lab 09/29/22  0714 09/28/22  1628   CR 0.67 0.69     Recent Labs   Lab 09/29/22  0714 09/28/22  1628   * 96     Recent Labs   Lab 09/29/22  0714 09/28/22  1628   HGB 12.2 13.0     Recent Labs   Lab 09/28/22  1628   AST 23   ALT 17   ALKPHOS 90   BILITOTAL 0.8     Recent Labs   Lab 09/28/22  1631   INR 1.15     Recent Labs   Lab 09/28/22  1628   LACT 1.2     No results for input(s): LIPASE in the last 168 hours.  Recent Labs   Lab 09/29/22  0714 09/28/22  1628    272     Recent Labs   Lab 09/29/22  0714 09/28/22  1628   BUN 9.9 13.1   CR 0.67 0.69     No results for input(s): TSH in the  last 168 hours.  No results for input(s): TROPONIN, TROPI, TROPR, TROPONINIS in the last 168 hours.    Invalid input(s): TROP, TROPONINIES, TNIH  Recent Labs   Lab 09/28/22  1732   COLOR Light Yellow   APPEARANCE Clear   URINEGLC Negative   URINEBILI Negative   URINEKETONE Negative   SG 1.018   UBLD Negative   URINEPH 7.0   PROTEIN Negative   NITRITE Negative   LEUKEST Negative   RBCU <1   WBCU 1       Recent Results (from the past 24 hour(s))   CT Chest Pulmonary Embolism w Contrast    Narrative    EXAM: CT CHEST PULMONARY EMBOLISM W CONTRAST  LOCATION: [facility]  DATE/TIME: 9/28/2022 6:39 PM    INDICATION: Elevated dimer at urgent care.  COMPARISON: 08/09/2022.  TECHNIQUE: CT chest pulmonary angiogram during arterial phase injection of IV contrast. Multiplanar reformats and MIP reconstructions were performed. Dose reduction techniques were used.   CONTRAST: 75 mL Isovue 370.    FINDINGS:  ANGIOGRAM CHEST: Pulmonary arteries are normal caliber and negative for pulmonary emboli. Thoracic aorta is negative for dissection. No CT evidence of right heart strain.    LUNGS AND PLEURA: No acute finding such as lung infiltrates, significant atelectasis, or pleural effusions.    MEDIASTINUM/AXILLAE: Moderate mitral annular calcification.     CORONARY ARTERY CALCIFICATION: Minimal coronary artery calcification.    UPPER ABDOMEN: Fatty changes of the pancreas.    MUSCULOSKELETAL: Advanced scattered degenerative changes of both the axillary and axial skeleton with no acute findings. Old left 11th fracture.      Impression    IMPRESSION:  1.  No acute findings such as dissection, aneurysm, or pulmonary emboli.    2.  Minimal coronary artery calcification.    3.  Fatty changes in the pancreas.    4.  Advanced degenerative changes bony skeleton.

## 2022-09-30 ENCOUNTER — APPOINTMENT (OUTPATIENT)
Dept: PHYSICAL THERAPY | Facility: CLINIC | Age: 87
DRG: 603 | End: 2022-09-30
Attending: INTERNAL MEDICINE
Payer: COMMERCIAL

## 2022-09-30 LAB — VANCOMYCIN SERPL-MCNC: 11.4 UG/ML

## 2022-09-30 PROCEDURE — 250N000011 HC RX IP 250 OP 636: Performed by: INTERNAL MEDICINE

## 2022-09-30 PROCEDURE — 999N000157 HC STATISTIC RCP TIME EA 10 MIN

## 2022-09-30 PROCEDURE — 250N000013 HC RX MED GY IP 250 OP 250 PS 637: Performed by: INTERNAL MEDICINE

## 2022-09-30 PROCEDURE — 97530 THERAPEUTIC ACTIVITIES: CPT | Mod: GP

## 2022-09-30 PROCEDURE — 36415 COLL VENOUS BLD VENIPUNCTURE: CPT | Performed by: INTERNAL MEDICINE

## 2022-09-30 PROCEDURE — 97161 PT EVAL LOW COMPLEX 20 MIN: CPT | Mod: GP

## 2022-09-30 PROCEDURE — 120N000001 HC R&B MED SURG/OB

## 2022-09-30 PROCEDURE — 258N000003 HC RX IP 258 OP 636: Performed by: INTERNAL MEDICINE

## 2022-09-30 PROCEDURE — 99233 SBSQ HOSP IP/OBS HIGH 50: CPT | Performed by: INTERNAL MEDICINE

## 2022-09-30 PROCEDURE — 94640 AIRWAY INHALATION TREATMENT: CPT

## 2022-09-30 PROCEDURE — 250N000011 HC RX IP 250 OP 636

## 2022-09-30 PROCEDURE — 80202 ASSAY OF VANCOMYCIN: CPT | Performed by: INTERNAL MEDICINE

## 2022-09-30 RX ORDER — VANCOMYCIN HYDROCHLORIDE 1 G/200ML
1000 INJECTION, SOLUTION INTRAVENOUS EVERY 12 HOURS
Status: DISCONTINUED | OUTPATIENT
Start: 2022-09-30 | End: 2022-10-02

## 2022-09-30 RX ADMIN — TAZOBACTAM SODIUM AND PIPERACILLIN SODIUM 3.38 G: 375; 3 INJECTION, SOLUTION INTRAVENOUS at 18:00

## 2022-09-30 RX ADMIN — HEPARIN SODIUM 5000 UNITS: 10000 INJECTION, SOLUTION INTRAVENOUS; SUBCUTANEOUS at 06:29

## 2022-09-30 RX ADMIN — GABAPENTIN 300 MG: 300 CAPSULE ORAL at 08:57

## 2022-09-30 RX ADMIN — BUSPIRONE HYDROCHLORIDE 10 MG: 10 TABLET ORAL at 20:30

## 2022-09-30 RX ADMIN — ACETAMINOPHEN 650 MG: 325 TABLET, FILM COATED ORAL at 20:30

## 2022-09-30 RX ADMIN — HEPARIN SODIUM 5000 UNITS: 10000 INJECTION, SOLUTION INTRAVENOUS; SUBCUTANEOUS at 18:56

## 2022-09-30 RX ADMIN — BUSPIRONE HYDROCHLORIDE 10 MG: 10 TABLET ORAL at 08:57

## 2022-09-30 RX ADMIN — FLUTICASONE FUROATE AND VILANTEROL TRIFENATATE 1 PUFF: 100; 25 POWDER RESPIRATORY (INHALATION) at 08:10

## 2022-09-30 RX ADMIN — VANCOMYCIN HYDROCHLORIDE 1000 MG: 1 INJECTION, SOLUTION INTRAVENOUS at 21:40

## 2022-09-30 RX ADMIN — AMLODIPINE BESYLATE 5 MG: 5 TABLET ORAL at 08:57

## 2022-09-30 RX ADMIN — TAZOBACTAM SODIUM AND PIPERACILLIN SODIUM 3.38 G: 375; 3 INJECTION, SOLUTION INTRAVENOUS at 12:39

## 2022-09-30 RX ADMIN — VANCOMYCIN HYDROCHLORIDE 1500 MG: 5 INJECTION, POWDER, LYOPHILIZED, FOR SOLUTION INTRAVENOUS at 04:37

## 2022-09-30 RX ADMIN — FUROSEMIDE 20 MG: 20 TABLET ORAL at 08:57

## 2022-09-30 RX ADMIN — GABAPENTIN 300 MG: 300 CAPSULE ORAL at 12:39

## 2022-09-30 RX ADMIN — FLUOXETINE 60 MG: 20 CAPSULE ORAL at 08:57

## 2022-09-30 RX ADMIN — LEVOTHYROXINE SODIUM 88 MCG: 0.09 TABLET ORAL at 08:57

## 2022-09-30 RX ADMIN — ACETAMINOPHEN 650 MG: 325 TABLET, FILM COATED ORAL at 05:11

## 2022-09-30 RX ADMIN — SIMVASTATIN 10 MG: 10 TABLET, FILM COATED ORAL at 21:40

## 2022-09-30 RX ADMIN — TAZOBACTAM SODIUM AND PIPERACILLIN SODIUM 3.38 G: 375; 3 INJECTION, SOLUTION INTRAVENOUS at 00:12

## 2022-09-30 RX ADMIN — TAZOBACTAM SODIUM AND PIPERACILLIN SODIUM 3.38 G: 375; 3 INJECTION, SOLUTION INTRAVENOUS at 06:26

## 2022-09-30 RX ADMIN — GABAPENTIN 600 MG: 300 CAPSULE ORAL at 21:40

## 2022-09-30 ASSESSMENT — ACTIVITIES OF DAILY LIVING (ADL)
ADLS_ACUITY_SCORE: 32
DEPENDENT_IADLS:: SHOPPING;CLEANING;TRANSPORTATION
ADLS_ACUITY_SCORE: 32

## 2022-09-30 NOTE — PLAN OF CARE
Goal Outcome Evaluation:    Aox4. Able to communicate needs well. VSS. Declined CPAP but requested NC oxygen (2L overnight). PETTY. Chest wall symmetrical with no sign of accessory muscle use. No labored breathing. Reported headache rating 6/10. PRN 650mg of Tylenol x2 given with relief. Left leg cellulitis marked and no change noted. Site continues to be red and warm to the touch. No pain at rest. No fever. Voiding without any issues. Uneventful night. Calm and pleasant with cares. Will continue to provide supportive care.

## 2022-09-30 NOTE — CONSULTS
Care Management Initial Consult    General Information  Assessment completed with: Patient,    Type of CM/SW Visit: Initial Assessment    Primary Care Provider verified and updated as needed:     Readmission within the last 30 days: no previous admission in last 30 days      Reason for Consult: discharge planning  Advance Care Planning:            Communication Assessment  Patient's communication style: spoken language (English or Bilingual)    Hearing Difficulty or Deaf: yes   Wear Glasses or Blind: yes    Cognitive  Cognitive/Neuro/Behavioral: WDL                      Living Environment:   People in home: alone     Current living Arrangements: apartment      Able to return to prior arrangements: no  Living Arrangement Comments: Needs TCU for increased mobility prior to return home    Family/Social Support:  Care provided by: self, child(balbir)  Provides care for: no one     Children          Description of Support System: Supportive, Involved    Support Assessment: Adequate family and caregiver support    Current Resources:   Patient receiving home care services: No     Community Resources: None  Equipment currently used at home: walker, rolling, shower chair, grab bar, toilet, grab bar, tub/shower, other (see comments) (scooter)  Supplies currently used at home: None    Employment/Financial:  Employment Status:          Financial Concerns:             Lifestyle & Psychosocial Needs:  Social Determinants of Health     Tobacco Use: Low Risk      Smoking Tobacco Use: Never Smoker     Smokeless Tobacco Use: Never Used   Alcohol Use: Not on file   Financial Resource Strain: Not on file   Food Insecurity: Not on file   Transportation Needs: Not on file   Physical Activity: Not on file   Stress: Not on file   Social Connections: Not on file   Intimate Partner Violence: Not on file   Depression: Not at risk     PHQ-2 Score: 2   Housing Stability: Not on file       Functional Status:  Prior to admission patient needed  assistance:   Dependent ADLs:: Ambulation-walker  Dependent IADLs:: Shopping, Cleaning, Transportation  Assesssment of Functional Status: Not at baseline with ADL Functioning    Mental Health Status:          Chemical Dependency Status:                Values/Beliefs:  Spiritual, Cultural Beliefs, Mormonism Practices, Values that affect care:                 Additional Information:    Met with pt at bedside to discuss discharge planning. Pt explained that she lives at home alone and uses a scooter or a walker at baseline. Pt explained that she would like to go home but does not have anyone to stay with her. Explained PT recommendation of TCU in which pt was in agreement and would like referrals sent near her home in [place]. Pt would like a private room and is aware of the potential cost associated with that. Pt would like [facility] WC ride set upon discharge. Reviewed out of pocket cost for [employer] transport, $81.80 for base rate and $5.26 per mile to the destination. Spoke with pt, they expressed understanding and are agreeable to this. [facility], [facility], [facility], and [facility]. SW following.     Addendum    [facility] called to explain that they could accept pt Monday 10/3 if still looking for placement. Admissions explained that they do not have anyone working over the weekend and would like to connect Monday 10/3 if pt still searching for TCU.     GABY Owen, [facility]  Inpatient Care Coordination  Ortho/Spine Unit  [phone]  Ruth Do, [facility]

## 2022-09-30 NOTE — PROGRESS NOTES
[facility]    Hospitalist Progress Note  Name: Carol Merida    MRN: [id number]  Provider: Monique Hickman MD  Date of Service: 09/30/2022    Assessment & Plan   Summary of Stay: Carol Merida is a 87 year old female who was admitted on 9/28/2022 for lower extremity cellulitis.  Past medical history significant for obesity, hypothyroidism, obstructive sleep apnea on CPAP, hypertension, hyperlipidemia, panic disorder with agoraphobia, chronic pain syndrome, congestive heart failure chronic diastolic heart failure.  Presented with worsening lower extremity redness despite being on oral antibiotics for localized cellulitis.  Admitted for cellulitis failed outpatient treatment    Lower extremity cellulitis   left greater than right  Was on Keflex prior to  In the emergency room was started on Zosyn and vancomycin: We will continue   CRP elevated 50  Cultures pending  -Erythema and swelling has improved     Hypertension  Amlodipine 5 mg daily     Obstructive sleep apnea  CPAP during the hospitalization     Panic attack/anxiety  Prozac 60 mg daily and BuSpar 10 mg 2 times daily     Hypothyroidism  Synthroid 88 mcg daily     Hypercholesteremia  simvastatin 10 mg daily         DVT Prophylaxis: Heparin SQ  Code Status: Full Code    Disposition: Expected discharge at least 2 to 3 days given extent of cellulitis      Interval History   Reviewed chart, erythema warmth swelling of the leg.  Denies any chest pain shortness of breath lightheadedness dizziness.  More than 10 point review of systems was carried out was otherwise negative    -Data reviewed today: I reviewed all new labs and imaging reports over the last 24 hours. I personally reviewed no images or EKG's today.    Physical Exam   Temp: 97.7  F (36.5  C) Temp src: Temporal BP: 112/54 Pulse: 75   Resp: 18 SpO2: 94 % O2 Device: Nasal cannula Oxygen Delivery: 2 LPM  Vitals:    09/28/22 1620 09/29/22 1543   Weight: 121.1 kg (266 lb 15.6 oz) 116 kg (255 lb  11.2 oz)     Vital Signs with Ranges  Temp:  [97.7  F (36.5  C)-98.2  F (36.8  C)] 97.7  F (36.5  C)  Pulse:  [75-86] 75  Resp:  [18-20] 18  BP: (110-124)/(53-57) 112/54  SpO2:  [91 %-95 %] 94 %  I/O last 3 completed shifts:  In: -   Out: 1000 [Urine:1000]      GENERAL: Patient does not look in any acute distress  HEENT: EOM+, Conjunctiva is clear   NECK: I did not see a Jugular Venous distention  HEART: S1 S2 regular  Rate and Rhythm  no murmur,    LUNGS: Respirations are not laboured, Lungs are clear to auscultation Crepitations or Wheezing   ABDOMEN: Soft , there is tenderness , Bowel Sounds are Positive   LOWER LIMBS: has a scab on the front of the left leg surrounded by a large area of erythema with pedal Edema there is also redness in the lower third part of the right leg: Erythema redness and swelling has not  CNS:  Alert,  Oriented x 3, Moving all the Four Limbs     Medications       amLODIPine  5 mg Oral Daily     busPIRone  10 mg Oral BID     FLUoxetine  60 mg Oral Daily     fluticasone-vilanterol  1 puff Inhalation Daily     furosemide  20 mg Oral Daily     gabapentin  300 mg Oral BID 09 12     gabapentin  600 mg Oral At Bedtime     heparin ANTICOAGULANT  5,000 Units Subcutaneous Q12H     levothyroxine  88 mcg Oral Daily     piperacillin-tazobactam  3.375 g Intravenous Q6H     simvastatin  10 mg Oral At Bedtime     sodium chloride (PF)  3 mL Intracatheter Q8H     vancomycin  1,500 mg Intravenous Q24H     Data     Recent Labs   Lab 09/29/22  0714 09/28/22  1628   WBC 10.7 8.1   HGB 12.2 13.0   HCT 38.5 42.7   MCV 92 96    272     Recent Labs   Lab 09/29/22  0714 09/28/22  1628    139   POTASSIUM 3.9 3.5   CHLORIDE 103 104   CO2 22 25   ANIONGAP 12 10   * 96   BUN 9.9 13.1   CR 0.67 0.69   GFRESTIMATED 85 84   LAKSHMI 8.8 8.1*     7-Day Micro Results     Collected Updated Procedure Result Status      09/28/2022 1647 09/29/2022 2217 Blood Culture Hand, Right [[id number]]    Blood from Hand,  Right    Preliminary result Component Value   Culture No growth after 1 day  [P]                09/28/2022 1631 09/28/2022 1719 Symptomatic; Unknown Influenza A/B & SARS-CoV2 (COVID-19) Virus PCR Multiplex Nasopharyngeal [[id number]]    Swab from Nasopharyngeal    Final result Component Value   Influenza A PCR Negative   Influenza B PCR Negative   RSV PCR Negative   SARS CoV2 PCR Negative   NEGATIVE: SARS-CoV-2 (COVID-19) RNA not detected, presumed negative.            09/28/2022 1628 09/29/2022 2047 Blood Culture Peripheral Blood [[id number]]   Peripheral Blood    Preliminary result Component Value   Culture No growth after 1 day  [P]                09/28/2022 1416 09/28/2022 1632 COVID-19 VIRUS (CORONAVIRUS) BY PCR (EXTERNAL RESULT) [[id number]]    Swab   Specimen type and source: Swa...    Final result Component Value   COVID-19 Virus by PCR (External Result) Negative   SARS-CoV-2 viral RNA not detected.         A negative result should not be used as the sole basis for patient        management and does not rule out co-infections with other        pathogens.  Negative results should be treated as presumptive and        considered in the context of the patients' clinical status.  False        negatives may occur if inadequate levels of viruses are present in the        sample.                No results for input(s): NTBNPI, NTBNP in the last 168 hours.  No results for input(s): CKT in the last 168 hours.    Invalid input(s): CK, CK TOTAL  Recent Labs   Lab 09/29/22  0714 09/28/22  1628   CR 0.67 0.69     Recent Labs   Lab 09/29/22  0714 09/28/22  1628   * 96     Recent Labs   Lab 09/29/22  0714 09/28/22  1628   HGB 12.2 13.0     Recent Labs   Lab 09/28/22  1628   AST 23   ALT 17   ALKPHOS 90   BILITOTAL 0.8     Recent Labs   Lab 09/28/22  1631   INR 1.15     Recent Labs   Lab 09/28/22  1628   LACT 1.2     No results for input(s): LIPASE in the last 168 hours.  Recent Labs   Lab 09/29/22  0714  09/28/22  1628    272     Recent Labs   Lab 09/29/22  0714 09/28/22  1628   BUN 9.9 13.1   CR 0.67 0.69     No results for input(s): TSH in the last 168 hours.  No results for input(s): TROPONIN, TROPI, TROPR, TROPONINIS in the last 168 hours.    Invalid input(s): TROP, TROPONINIES, TNIH  Recent Labs   Lab 09/28/22  1732   COLOR Light Yellow   APPEARANCE Clear   URINEGLC Negative   URINEBILI Negative   URINEKETONE Negative   SG 1.018   UBLD Negative   URINEPH 7.0   PROTEIN Negative   NITRITE Negative   LEUKEST Negative   RBCU <1   WBCU 1       No results found for this or any previous visit (from the past 24 hour(s)).

## 2022-09-30 NOTE — PLAN OF CARE
Goal Outcome Evaluation:          Overall Patient Progress: improving    Outcome Evaluation: pending tcu placement    VSS, RA, S.L., continent of b&b, abx infused without complication. Pt was up in chair for approximately 3 hours. Erythremia retreating from boarders BLE. LLE tender to touch. 1A WGB. Referrals sent for TCU, SW following. Will continue to provide cares.

## 2022-09-30 NOTE — PROGRESS NOTES
"   09/30/22 1000   Quick Adds   Type of Visit Initial PT Evaluation   Living Environment   People in Home alone   Current Living Arrangements apartment   Home Accessibility no concerns   Transportation Anticipated family or friend will provide   Living Environment Comments Lives in ILF. Children live near by and assist with driving.   Self-Care   Usual Activity Tolerance moderate   Current Activity Tolerance fair   Equipment Currently Used at Home walker, rolling;shower chair;grab bar, toilet;grab bar, tub/shower;other (see comments)  (scooter)   Fall history within last six months no   Activity/Exercise/Self-Care Comment Uses scooter and 4WW depending on the day. Children assist with driving, cleaning, groceries. IND for ADLs and IADLs at baseline. Has button she can push for help PRN.Has been thinking about hiring someone to help her with showering.   General Information   Onset of Illness/Injury or Date of Surgery 09/28/22   Referring Physician Ollie Chu MD   Patient/Family Therapy Goals Statement (PT) \"to move safely even though my leg hurts\"   Pertinent History of Current Problem (include personal factors and/or comorbidities that impact the POC) Carol Merida is a 87 year old female who was admitted on 9/28/2022 for lower extremity cellulitis.  Past medical history significant for obesity, hypothyroidism, obstructive sleep apnea on CPAP, hypertension, hyperlipidemia, panic disorder with agoraphobia, chronic pain syndrome, congestive heart failure chronic diastolic heart failure.  Presented with worsening lower extremity redness despite being on oral antibiotics for localized cellulitis.   Existing Precautions/Restrictions fall   General Observations Pt supine upon arrival, agreeable to session.   Cognition   Affect/Mental Status (Cognition) WFL   Orientation Status (Cognition) oriented x 4   Follows Commands (Cognition) WFL   Pain Assessment   Patient Currently in Pain   (none at rest, increased pain with " L LE movement)   Integumentary/Edema   Integumentary/Edema other (describe)   Integumentary/Edema Comments redness throughout BLE, scabbing present on R shin   Posture    Posture Forward head position;Protracted shoulders   Range of Motion (ROM)   ROM Comment L LE ROM limited due to pain and prior hip surgeries.   Strength (Manual Muscle Testing)   Strength (Manual Muscle Testing) Able to perform R SLR;Able to perform L SLR;Deficits observed during functional mobility   Strength Comments Decreased SLR ROM on L>R. Generalized LE weakness.   Bed Mobility   Bed Mobility supine-sit   Comment, (Bed Mobility) ModA with HOB elevated   Transfers   Transfers sit-stand transfer   Comment, (Transfers) [place] from EOB with 4WW   Gait/Stairs (Locomotion)   [place] Level (Gait) contact guard   Assistive Device (Gait) walker, 4-wheeled   Distance in Feet (Required for LE Total Joints) 5 ft eval + 20 ft treat   Pattern (Gait) step-through   Deviations/Abnormal Patterns (Gait) gait speed decreased;stride length decreased;samuel decreased   Comment, (Gait/Stairs) Increased L LE pain, throughout   Balance   Balance other (describe)   Balance Comments fair sitting balance, requires FWW for any upright mobility   Sensory Examination   Sensory Perception WFL   Sensory Perception Comments Reports intermittent numbnes/tingling in fingers and toes at baseline   Coordination   Coordination other (see comments)   Muscle Tone   Muscle Tone no deficits were identified   Clinical Impression   Criteria for Skilled Therapeutic Intervention Yes, treatment indicated   PT Diagnosis (PT) impaired functional mobility   Influenced by the following impairments impaired strength and endurance, decreased activity tolerance, impaired balance, pain   Functional limitations due to impairments impaired bed mobility, transfers, ambulation   Clinical Presentation (PT Evaluation Complexity) Stable/Uncomplicated   Clinical Presentation Rationale Based on  current presentation, PM, social support   Clinical Decision Making (Complexity) low complexity   Planned Therapy Interventions (PT) balance training;bed mobility training;gait training;home exercise program;patient/family education;ROM (range of motion);stair training;strengthening;transfer training   Anticipated Equipment Needs at Discharge (PT) other (see comments)  (may benefit from FWW vs 4WW (pt owns) pending progress and discharge plan)   Risk & Benefits of therapy have been explained evaluation/treatment results reviewed;care plan/treatment goals reviewed;risks/benefits reviewed;current/potential barriers reviewed;participants voiced agreement with care plan;participants included;patient   PT Discharge Planning   PT Discharge Recommendation (DC Rec) Transitional Care Facility;home with assist;home with home care physical therapy   PT Rationale for DC Rec Pt currently moving below reported independent baseline. Lives alone and has intermittent assist from family. Pt currently requires A of 1 for all mobility. Pt would benefit from TCU stay to maximize safety and functional mobility before returning home. If not TCU, would rec 24 hr assist at initial discharge, continued use of WW + scooter in home, and HC PT to address continued deficits. Would qualify for HC due to high burden of leaving the home.   Total Evaluation Time   Total Evaluation Time (Minutes) 15   Physical Therapy Goals   PT Frequency 5x/week   PT Predicted Duration/Target Date for Goal Attainment 10/07/22   PT Goals Transfers;Bed Mobility;Gait   PT: Bed Mobility Independent;Supine to/from sit   PT: Transfers Modified independent;Sit to/from stand;Assistive device;Bed to/from chair   PT: Gait Modified independent;50 feet;Rolling walker

## 2022-10-01 ENCOUNTER — APPOINTMENT (OUTPATIENT)
Dept: PHYSICAL THERAPY | Facility: CLINIC | Age: 87
DRG: 603 | End: 2022-10-01
Payer: COMMERCIAL

## 2022-10-01 LAB
ANION GAP SERPL CALCULATED.3IONS-SCNC: 12 MMOL/L (ref 7–15)
BUN SERPL-MCNC: 14 MG/DL (ref 8–23)
CALCIUM SERPL-MCNC: 9 MG/DL (ref 8.8–10.2)
CHLORIDE SERPL-SCNC: 103 MMOL/L (ref 98–107)
CREAT SERPL-MCNC: 0.74 MG/DL (ref 0.51–0.95)
DEPRECATED HCO3 PLAS-SCNC: 25 MMOL/L (ref 22–29)
ERYTHROCYTE [DISTWIDTH] IN BLOOD BY AUTOMATED COUNT: 13 % (ref 10–15)
GFR SERPL CREATININE-BSD FRML MDRD: 78 ML/MIN/1.73M2
GLUCOSE SERPL-MCNC: 120 MG/DL (ref 70–99)
HCT VFR BLD AUTO: 40.7 % (ref 35–47)
HGB BLD-MCNC: 12.7 G/DL (ref 11.7–15.7)
MCH RBC QN AUTO: 29.3 PG (ref 26.5–33)
MCHC RBC AUTO-ENTMCNC: 31.2 G/DL (ref 31.5–36.5)
MCV RBC AUTO: 94 FL (ref 78–100)
PLATELET # BLD AUTO: 294 10E3/UL (ref 150–450)
POTASSIUM SERPL-SCNC: 3.5 MMOL/L (ref 3.4–5.3)
RBC # BLD AUTO: 4.34 10E6/UL (ref 3.8–5.2)
SODIUM SERPL-SCNC: 140 MMOL/L (ref 136–145)
WBC # BLD AUTO: 6 10E3/UL (ref 4–11)

## 2022-10-01 PROCEDURE — 120N000001 HC R&B MED SURG/OB

## 2022-10-01 PROCEDURE — 250N000011 HC RX IP 250 OP 636: Performed by: INTERNAL MEDICINE

## 2022-10-01 PROCEDURE — 97530 THERAPEUTIC ACTIVITIES: CPT | Mod: GP | Performed by: PHYSICAL THERAPIST

## 2022-10-01 PROCEDURE — 82310 ASSAY OF CALCIUM: CPT | Performed by: INTERNAL MEDICINE

## 2022-10-01 PROCEDURE — 85014 HEMATOCRIT: CPT | Performed by: INTERNAL MEDICINE

## 2022-10-01 PROCEDURE — 36415 COLL VENOUS BLD VENIPUNCTURE: CPT | Performed by: INTERNAL MEDICINE

## 2022-10-01 PROCEDURE — 99233 SBSQ HOSP IP/OBS HIGH 50: CPT | Performed by: INTERNAL MEDICINE

## 2022-10-01 PROCEDURE — 250N000011 HC RX IP 250 OP 636

## 2022-10-01 PROCEDURE — 250N000013 HC RX MED GY IP 250 OP 250 PS 637: Performed by: INTERNAL MEDICINE

## 2022-10-01 RX ADMIN — GABAPENTIN 300 MG: 300 CAPSULE ORAL at 12:24

## 2022-10-01 RX ADMIN — HEPARIN SODIUM 5000 UNITS: 10000 INJECTION, SOLUTION INTRAVENOUS; SUBCUTANEOUS at 21:08

## 2022-10-01 RX ADMIN — GABAPENTIN 300 MG: 300 CAPSULE ORAL at 08:13

## 2022-10-01 RX ADMIN — FUROSEMIDE 20 MG: 20 TABLET ORAL at 08:13

## 2022-10-01 RX ADMIN — TAZOBACTAM SODIUM AND PIPERACILLIN SODIUM 3.38 G: 375; 3 INJECTION, SOLUTION INTRAVENOUS at 08:35

## 2022-10-01 RX ADMIN — BUSPIRONE HYDROCHLORIDE 10 MG: 10 TABLET ORAL at 21:08

## 2022-10-01 RX ADMIN — AMLODIPINE BESYLATE 5 MG: 5 TABLET ORAL at 08:12

## 2022-10-01 RX ADMIN — FLUTICASONE FUROATE AND VILANTEROL TRIFENATATE 1 PUFF: 100; 25 POWDER RESPIRATORY (INHALATION) at 08:41

## 2022-10-01 RX ADMIN — HEPARIN SODIUM 5000 UNITS: 10000 INJECTION, SOLUTION INTRAVENOUS; SUBCUTANEOUS at 08:04

## 2022-10-01 RX ADMIN — ACETAMINOPHEN 650 MG: 325 TABLET, FILM COATED ORAL at 08:23

## 2022-10-01 RX ADMIN — BUSPIRONE HYDROCHLORIDE 10 MG: 10 TABLET ORAL at 08:12

## 2022-10-01 RX ADMIN — VANCOMYCIN HYDROCHLORIDE 1000 MG: 1 INJECTION, SOLUTION INTRAVENOUS at 09:49

## 2022-10-01 RX ADMIN — TAZOBACTAM SODIUM AND PIPERACILLIN SODIUM 3.38 G: 375; 3 INJECTION, SOLUTION INTRAVENOUS at 15:42

## 2022-10-01 RX ADMIN — TAZOBACTAM SODIUM AND PIPERACILLIN SODIUM 3.38 G: 375; 3 INJECTION, SOLUTION INTRAVENOUS at 21:24

## 2022-10-01 RX ADMIN — FLUOXETINE 60 MG: 20 CAPSULE ORAL at 08:12

## 2022-10-01 RX ADMIN — ACETAMINOPHEN 650 MG: 325 TABLET, FILM COATED ORAL at 22:49

## 2022-10-01 RX ADMIN — GABAPENTIN 600 MG: 300 CAPSULE ORAL at 21:08

## 2022-10-01 RX ADMIN — TAZOBACTAM SODIUM AND PIPERACILLIN SODIUM 3.38 G: 375; 3 INJECTION, SOLUTION INTRAVENOUS at 03:01

## 2022-10-01 RX ADMIN — LEVOTHYROXINE SODIUM 88 MCG: 0.09 TABLET ORAL at 08:12

## 2022-10-01 RX ADMIN — VANCOMYCIN HYDROCHLORIDE 1000 MG: 1 INJECTION, SOLUTION INTRAVENOUS at 22:00

## 2022-10-01 RX ADMIN — SIMVASTATIN 10 MG: 10 TABLET, FILM COATED ORAL at 21:08

## 2022-10-01 ASSESSMENT — ACTIVITIES OF DAILY LIVING (ADL)
ADLS_ACUITY_SCORE: 32
ADLS_ACUITY_SCORE: 34

## 2022-10-01 NOTE — PLAN OF CARE
Goal Outcome Evaluation:    Plan of Care Reviewed With: patient     Overall Patient Progress: no change    A & O x4, able to make needs known. Tolerating low fat/low sodium diet. IV in right hand infiltrated at start of shift. Slight edema, hand elevated. New IV placed. IV ABX given. LLE cellulitis. Elevated while in bed. PRN tylenol for pain. Purewick in place overnight, per request of patient.

## 2022-10-01 NOTE — PROGRESS NOTES
[facility]    Hospitalist Progress Note  Name: Carol Merida    MRN: [id number]  Provider: Monique Hickman MD  Date of Service: 10/01/2022    Assessment & Plan   Summary of Stay: Carol Merida is a 87 year old female who was admitted on 9/28/2022 for lower extremity cellulitis.  Past medical history significant for obesity, hypothyroidism, obstructive sleep apnea on CPAP, hypertension, hyperlipidemia, panic disorder with agoraphobia, chronic pain syndrome, congestive heart failure chronic diastolic heart failure.  Presented with worsening lower extremity redness despite being on oral antibiotics for localized cellulitis.  Admitted for cellulitis failed outpatient treatment    Lower extremity cellulitis : Improving  left greater than right  Was on Keflex prior to  In the emergency room was started on Zosyn and vancomycin: We will continue   CRP elevated 50  Cultures with no growth to date  -Erythema and swelling has improved  -Switch to p.o. antibiotics tomorrow if continues to improve     Hypertension  Amlodipine 5 mg daily     Obstructive sleep apnea  CPAP during the hospitalization     Panic attack/anxiety  Prozac 60 mg daily and BuSpar 10 mg 2 times daily     Hypothyroidism  Synthroid 88 mcg daily     Hypercholesteremia  simvastatin 10 mg daily         DVT Prophylaxis: Heparin SQ  Code Status: Full Code    Disposition: Expected discharge at least 2 to 3 days given extent of cellulitis      Interval History   Reviewed chart, leg pain and swelling improved.  denies any chest pain shortness of breath lightheadedness dizziness.  More than 10 point review of systems was carried out was otherwise negative    -Data reviewed today: I reviewed all new labs and imaging reports over the last 24 hours. I personally reviewed no images or EKG's today.    Physical Exam   Temp: 97.9  F (36.6  C) Temp src: Temporal BP: 122/55 Pulse: 70   Resp: 16 SpO2: 94 % O2 Device: Nasal cannula Oxygen Delivery: 2 LPM  Vitals:     09/28/22 1620 09/29/22 1543   Weight: 121.1 kg (266 lb 15.6 oz) 116 kg (255 lb 11.2 oz)     Vital Signs with Ranges  Temp:  [97.9  F (36.6  C)-98.3  F (36.8  C)] 97.9  F (36.6  C)  Pulse:  [70-81] 70  Resp:  [16-20] 16  BP: (118-134)/(55-62) 122/55  SpO2:  [92 %-95 %] 94 %  I/O last 3 completed shifts:  In: -   Out: 700 [Urine:700]      GENERAL: Patient does not look in any acute distress  HEENT: EOM+, Conjunctiva is clear   NECK: I did not see a Jugular Venous distention  HEART: S1 S2 regular  Rate and Rhythm  no murmur,    LUNGS: Respirations are not laboured, Lungs are clear to auscultation Crepitations or Wheezing   ABDOMEN: Soft , there is tenderness , Bowel Sounds are Positive   LOWER LIMBS: has a scab on the front of the left leg surrounded by a large area of erythema with pedal Edema there is also redness in the lower third part of the right leg: Erythema redness and swelling has not  CNS:  Alert,  Oriented x 3, Moving all the Four Limbs     Medications       amLODIPine  5 mg Oral Daily     busPIRone  10 mg Oral BID     FLUoxetine  60 mg Oral Daily     fluticasone-vilanterol  1 puff Inhalation Daily     furosemide  20 mg Oral Daily     gabapentin  300 mg Oral BID 09 12     gabapentin  600 mg Oral At Bedtime     heparin ANTICOAGULANT  5,000 Units Subcutaneous Q12H     levothyroxine  88 mcg Oral Daily     piperacillin-tazobactam  3.375 g Intravenous Q6H     simvastatin  10 mg Oral At Bedtime     sodium chloride (PF)  3 mL Intracatheter Q8H     vancomycin  1,000 mg Intravenous Q12H     Data     Recent Labs   Lab 10/01/22  0618 09/29/22  0714 09/28/22  1628   WBC 6.0 10.7 8.1   HGB 12.7 12.2 13.0   HCT 40.7 38.5 42.7   MCV 94 92 96    252 272     Recent Labs   Lab 10/01/22  0618 09/29/22  0714 09/28/22  1628    137 139   POTASSIUM 3.5 3.9 3.5   CHLORIDE 103 103 104   CO2 25 22 25   ANIONGAP 12 12 10   * 132* 96   BUN 14.0 9.9 13.1   CR 0.74 0.67 0.69   GFRESTIMATED 78 85 84   LAKSHMI 9.0 8.8  8.1*     7-Day Micro Results     Collected Updated Procedure Result Status      09/28/2022 1647 09/30/2022 2218 Blood Culture Hand, Right [[id number]]    Blood from Hand, Right    Preliminary result Component Value   Culture No growth after 2 days  [P]                09/28/2022 1631 09/28/2022 1719 Symptomatic; Unknown Influenza A/B & SARS-CoV2 (COVID-19) Virus PCR Multiplex Nasopharyngeal [[id number]]    Swab from Nasopharyngeal    Final result Component Value   Influenza A PCR Negative   Influenza B PCR Negative   RSV PCR Negative   SARS CoV2 PCR Negative   NEGATIVE: SARS-CoV-2 (COVID-19) RNA not detected, presumed negative.            09/28/2022 1628 09/30/2022 2046 Blood Culture Peripheral Blood [[id number]]   Peripheral Blood    Preliminary result Component Value   Culture No growth after 2 days  [P]                09/28/2022 1416 09/28/2022 1632 COVID-19 VIRUS (CORONAVIRUS) BY PCR (EXTERNAL RESULT) [[id number]]    Swab   Specimen type and source: Swa...    Final result Component Value   COVID-19 Virus by PCR (External Result) Negative   SARS-CoV-2 viral RNA not detected.         A negative result should not be used as the sole basis for patient        management and does not rule out co-infections with other        pathogens.  Negative results should be treated as presumptive and        considered in the context of the patients' clinical status.  False        negatives may occur if inadequate levels of viruses are present in the        sample.                No results for input(s): NTBNPI, NTBNP in the last 168 hours.  No results for input(s): CKT in the last 168 hours.    Invalid input(s): CK, CK TOTAL  Recent Labs   Lab 10/01/22  0618 09/29/22  0714 09/28/22  1628   CR 0.74 0.67 0.69     Recent Labs   Lab 10/01/22  0618 09/29/22  0714 09/28/22  1628   * 132* 96     Recent Labs   Lab 10/01/22  0618 09/29/22  0714 09/28/22  1628   HGB 12.7 12.2 13.0     Recent Labs   Lab 09/28/22  1628   AST  23   ALT 17   ALKPHOS 90   BILITOTAL 0.8     Recent Labs   Lab 09/28/22  1631   INR 1.15     Recent Labs   Lab 09/28/22  1628   LACT 1.2     No results for input(s): LIPASE in the last 168 hours.  Recent Labs   Lab 10/01/22  0618 09/29/22  0714 09/28/22  1628    252 272     Recent Labs   Lab 10/01/22  0618 09/29/22  0714 09/28/22  1628   BUN 14.0 9.9 13.1   CR 0.74 0.67 0.69     No results for input(s): TSH in the last 168 hours.  No results for input(s): TROPONIN, TROPI, TROPR, TROPONINIS in the last 168 hours.    Invalid input(s): TROP, TROPONINIES, TNIH  Recent Labs   Lab 09/28/22  1732   COLOR Light Yellow   APPEARANCE Clear   URINEGLC Negative   URINEBILI Negative   URINEKETONE Negative   SG 1.018   UBLD Negative   URINEPH 7.0   PROTEIN Negative   NITRITE Negative   LEUKEST Negative   RBCU <1   WBCU 1       No results found for this or any previous visit (from the past 24 hour(s)).

## 2022-10-01 NOTE — PHARMACY-VANCOMYCIN DOSING SERVICE
"Pharmacy Vancomycin Note  Date of Service 2022  Patient's  1935   87 year old, female    Indication: Skin and Soft Tissue Infection  Day of Therapy: 2  Current vancomycin regimen:  1500 mg IV q24h  Current vancomycin monitoring method: AUC  Current vancomycin therapeutic monitoring goal: 400-600 mg*h/L    InsightRX Prediction of Current Vancomycin Regimen  Regimen: 1500 mg IV every 24 hours.  Exposure target: AUC24 (range)400-600 mg/L.hr   AUC24,ss: 350 mg/L.hr  Probability of AUC24 > 400: 24 %  Ctrough,ss: 3.9 mg/L  Probability of Ctrough,ss > 20: 0 %  Probability of nephrotoxicity (Lodise TYLER ): 3 %      Current estimated CrCl = Estimated Creatinine Clearance: 74 mL/min (based on SCr of 0.67 mg/dL).    Creatinine for last 3 days  2022:  4:28 PM Creatinine 0.69 mg/dL  2022:  7:14 AM Creatinine 0.67 mg/dL    Recent Vancomycin Levels (past 3 days)  2022:  5:51 PM Vancomycin 11.4 ug/mL    Vancomycin IV Administrations (past 72 hours)                   vancomycin 1500 mg in 0.9% NaCl 250 ml intermittent infusion 1,500 mg (mg) 1,500 mg New Bag 22 0437    vancomycin (VANCOCIN) 2,500 mg in sodium chloride 0.9 % 500 mL intermittent infusion (mg) 2,500 mg New Bag 22 0312                Nephrotoxins and other renal medications (From now, onward)    Start     Dose/Rate Route Frequency Ordered Stop    22  vancomycin (VANCOCIN) 1000 mg in dextrose 5% 200 mL PREMIX         1,000 mg  200 mL/hr over 1 Hours Intravenous EVERY 12 HOURS 22 1441  piperacillin-tazobactam (ZOSYN) infusion 3.375 g        Note to Pharmacy: For [facility], [facility] and [facility]: For Zosyn-naive patients, use the \"Zosyn initial dose + extended infusion\" order panel.    3.375 g  100 mL/hr over 30 Minutes Intravenous EVERY 6 HOURS 22 1044      22 0800  furosemide (LASIX) tablet 20 mg        Note to Pharmacy: PTA Sig:TAKE 1 TABLET BY MOUTH TWICE A DAY  Patient taking " differently: Take 20 mg by mouth daily      20 mg Oral DAILY 09/29/22 0108               Contrast Orders - past 72 hours (72h ago, onward)    Start     Dose/Rate Route Frequency Stop    09/28/22 1825  iopamidol (ISOVUE-370) solution 500 mL         500 mL Intravenous ONCE 09/28/22 1832          Interpretation of levels and current regimen:  Vancomycin level is reflective of AUC less than 400    Has serum creatinine changed greater than 50% in last 72 hours: No    Renal Function: Stable    InsightRX Prediction of Planned New Vancomycin Regimen  Regimen: 1000 mg IV every 12 hours.  Exposure target: AUC24 (range)400-600 mg/L.hr   AUC24,ss: 466 mg/L.hr  Probability of AUC24 > 400: 79 %  Ctrough,ss: 10.9 mg/L  Probability of Ctrough,ss > 20: 0 %  Probability of nephrotoxicity (Lodise TYLER 2009): 6 %      Plan:  1. Increase to Vancomycin 1g iv q12h  2. Vancomycin monitoring method: AUC  3. Vancomycin therapeutic monitoring goal: 400-600 mg*h/L  4. Pharmacy will check vancomycin levels as appropriate in 1-3 Days.  5. Serum creatinine levels will be checked a minimum of twice weekly.    Harmeet Lopez [facility]

## 2022-10-01 NOTE — PLAN OF CARE
"Aox4. Minimal pain, declining interventions. Up assist of 1 to bathroom. Redness receding from marked lines, improved. Scab intact. PIV SL. Discharge plan TBD, plan TCU vs home. Will continue to monitor.    /54 (BP Location: Right arm)   Pulse 65   Temp 98.2  F (36.8  C) (Temporal)   Resp 16   Ht 1.6 m (5' 3\")   Wt 116 kg (255 lb 11.2 oz)   LMP  (LMP Unknown)   SpO2 96%   BMI 45.30 kg/m      Fanta Garcia RN on 10/1/2022 at 7:01 PM      "

## 2022-10-01 NOTE — PLAN OF CARE
Goal Outcome Evaluation:                    Pt is A&OX3, able to communicate needs. Pt is A- with gate belt and walker.LS CTA anterior and slightly diminished to basal areas. IV to RUE, hand, SL.Pt continues antibiotic therapy. Tolerating cardiac diet.pt is voiding via external cathter.LLE has scab and red in color.pt will discharge to TCU.

## 2022-10-02 LAB
CREAT SERPL-MCNC: 0.77 MG/DL (ref 0.51–0.95)
GFR SERPL CREATININE-BSD FRML MDRD: 75 ML/MIN/1.73M2
VANCOMYCIN SERPL-MCNC: 21.7 UG/ML

## 2022-10-02 PROCEDURE — 250N000011 HC RX IP 250 OP 636: Performed by: INTERNAL MEDICINE

## 2022-10-02 PROCEDURE — 94640 AIRWAY INHALATION TREATMENT: CPT

## 2022-10-02 PROCEDURE — 82565 ASSAY OF CREATININE: CPT | Performed by: INTERNAL MEDICINE

## 2022-10-02 PROCEDURE — 80202 ASSAY OF VANCOMYCIN: CPT | Performed by: INTERNAL MEDICINE

## 2022-10-02 PROCEDURE — 250N000013 HC RX MED GY IP 250 OP 250 PS 637: Performed by: INTERNAL MEDICINE

## 2022-10-02 PROCEDURE — 120N000001 HC R&B MED SURG/OB

## 2022-10-02 PROCEDURE — 94640 AIRWAY INHALATION TREATMENT: CPT | Mod: 76

## 2022-10-02 PROCEDURE — 999N000157 HC STATISTIC RCP TIME EA 10 MIN

## 2022-10-02 PROCEDURE — 99233 SBSQ HOSP IP/OBS HIGH 50: CPT | Performed by: INTERNAL MEDICINE

## 2022-10-02 PROCEDURE — 250N000011 HC RX IP 250 OP 636

## 2022-10-02 PROCEDURE — 36415 COLL VENOUS BLD VENIPUNCTURE: CPT | Performed by: INTERNAL MEDICINE

## 2022-10-02 RX ORDER — VANCOMYCIN HYDROCHLORIDE 1 G/200ML
1000 INJECTION, SOLUTION INTRAVENOUS EVERY 24 HOURS
Status: DISCONTINUED | OUTPATIENT
Start: 2022-10-03 | End: 2022-10-02

## 2022-10-02 RX ORDER — VANCOMYCIN HYDROCHLORIDE 1 G/200ML
1000 INJECTION, SOLUTION INTRAVENOUS
Status: DISCONTINUED | OUTPATIENT
Start: 2022-10-02 | End: 2022-10-03

## 2022-10-02 RX ADMIN — FLUOXETINE 60 MG: 20 CAPSULE ORAL at 08:00

## 2022-10-02 RX ADMIN — LEVOTHYROXINE SODIUM 88 MCG: 0.09 TABLET ORAL at 07:59

## 2022-10-02 RX ADMIN — HEPARIN SODIUM 5000 UNITS: 10000 INJECTION, SOLUTION INTRAVENOUS; SUBCUTANEOUS at 20:15

## 2022-10-02 RX ADMIN — SIMVASTATIN 10 MG: 10 TABLET, FILM COATED ORAL at 22:08

## 2022-10-02 RX ADMIN — TAZOBACTAM SODIUM AND PIPERACILLIN SODIUM 3.38 G: 375; 3 INJECTION, SOLUTION INTRAVENOUS at 08:35

## 2022-10-02 RX ADMIN — AMLODIPINE BESYLATE 5 MG: 5 TABLET ORAL at 07:59

## 2022-10-02 RX ADMIN — BUSPIRONE HYDROCHLORIDE 10 MG: 10 TABLET ORAL at 20:15

## 2022-10-02 RX ADMIN — TAZOBACTAM SODIUM AND PIPERACILLIN SODIUM 3.38 G: 375; 3 INJECTION, SOLUTION INTRAVENOUS at 01:32

## 2022-10-02 RX ADMIN — HEPARIN SODIUM 5000 UNITS: 10000 INJECTION, SOLUTION INTRAVENOUS; SUBCUTANEOUS at 07:59

## 2022-10-02 RX ADMIN — FUROSEMIDE 20 MG: 20 TABLET ORAL at 07:59

## 2022-10-02 RX ADMIN — TAZOBACTAM SODIUM AND PIPERACILLIN SODIUM 3.38 G: 375; 3 INJECTION, SOLUTION INTRAVENOUS at 14:57

## 2022-10-02 RX ADMIN — GABAPENTIN 600 MG: 300 CAPSULE ORAL at 22:08

## 2022-10-02 RX ADMIN — TAZOBACTAM SODIUM AND PIPERACILLIN SODIUM 3.38 G: 375; 3 INJECTION, SOLUTION INTRAVENOUS at 20:15

## 2022-10-02 RX ADMIN — GABAPENTIN 300 MG: 300 CAPSULE ORAL at 08:00

## 2022-10-02 RX ADMIN — FLUTICASONE FUROATE AND VILANTEROL TRIFENATATE 1 PUFF: 100; 25 POWDER RESPIRATORY (INHALATION) at 08:24

## 2022-10-02 RX ADMIN — ACETAMINOPHEN 650 MG: 325 TABLET, FILM COATED ORAL at 22:08

## 2022-10-02 RX ADMIN — VANCOMYCIN HYDROCHLORIDE 1000 MG: 1 INJECTION, SOLUTION INTRAVENOUS at 10:33

## 2022-10-02 RX ADMIN — BUSPIRONE HYDROCHLORIDE 10 MG: 10 TABLET ORAL at 07:59

## 2022-10-02 RX ADMIN — GABAPENTIN 300 MG: 300 CAPSULE ORAL at 12:43

## 2022-10-02 ASSESSMENT — ACTIVITIES OF DAILY LIVING (ADL)
ADLS_ACUITY_SCORE: 34

## 2022-10-02 NOTE — PROGRESS NOTES
Care Management Follow Up    Length of Stay (days): 4    Expected Discharge Date: 10/03/2022     Concerns to be Addressed: all concerns addressed in this encounter     Patient plan of care discussed at interdisciplinary rounds: Yes    Anticipated Discharge Disposition: TCU     Anticipated Discharge Services:  TCU Rehab Services  Anticipated Discharge DME:  N/A    Patient/family educated on Medicare website which has current facility and service quality ratings:  yes  Education Provided on the Discharge Plan:    Patient/Family in Agreement with the Plan: yes    Referrals Placed by CM/SW:  TCU Referrals placed, [facility] possible admission on Monday, SWCM will f/u with TCU for bed openings.     Private pay costs discussed: Not applicable    Additional Information:  Unable to secure placement at [facility] d/t no admissions staff over weekend. SWCM to contact TCU on Monday 10/3 for placement.     GABY Lopez, [facility]  Inpatient Care Coordination  [facility]   [phone]      Britni Carlson

## 2022-10-02 NOTE — PLAN OF CARE
"INPATIENT NOTE: 1900-0700     PRIMARY PROBLEM: Cellulitis of lower extremity, Chills        Vital signs:  Temp: 98.2  F (36.8  C) Temp src: Temporal BP: 115/54 Pulse: 65   Resp: 16 SpO2: 96 % O2 Device: None (Room air) Oxygen Delivery: 2 LPM Height: 160 cm (5' 3\") Weight: 116 kg (255 lb 11.2 oz)  Estimated body mass index is 45.3 kg/m  as calculated from the following:    Height as of this encounter: 1.6 m (5' 3\").    Weight as of this encounter: 116 kg (255 lb 11.2 oz).        Orientation: Alert and Oriented x4  Neuro: Intact   Pain status: denying pain.   Resp: Lung sounds are Clear. Denies any SOB.   Cardiac: WNL, Denies any Chest Pain   GI: Bowels are active x 4 Quads. Last BM 9/29/22  : Voiding with no concern    Skin: abdominal bruises and rash  Peripheral edema: BLE  LDA: Peripheral IV   Infusions: Patient is [place] locked.   Diet: Low fat  Activity: are 1 Assist with Gait Belt and Walker  Consults:  PT  Discharge Plan: TCU per SW  Shift Event: pt is on Vanco and Zosyn antibiotics.      Will continue to monitor and provide cares.     Angel Deal RN    "

## 2022-10-02 NOTE — PROGRESS NOTES
[facility]    Hospitalist Progress Note  Name: Carol Merida    MRN: [id number]  Provider: Monique Hickman MD  Date of Service: 10/02/2022    Assessment & Plan   Summary of Stay: Carol Merida is a 87 year old female who was admitted on 9/28/2022 for lower extremity cellulitis.  Past medical history significant for obesity, hypothyroidism, obstructive sleep apnea on CPAP, hypertension, hyperlipidemia, panic disorder with agoraphobia, chronic pain syndrome, congestive heart failure chronic diastolic heart failure.  Presented with worsening lower extremity redness despite being on oral antibiotics for localized cellulitis.  Admitted for cellulitis failed outpatient treatment    Lower extremity cellulitis   left greater than right  Was on Keflex prior to  In the emergency room was started on Zosyn and vancomycin: We will continue   CRP elevated 50  Cultures pending  -Erythema and swelling has improved     Hypertension  Amlodipine 5 mg daily     Obstructive sleep apnea  CPAP during the hospitalization     Panic attack/anxiety  Prozac 60 mg daily and BuSpar 10 mg 2 times daily     Hypothyroidism  Synthroid 88 mcg daily     Hypercholesteremia  simvastatin 10 mg daily         DVT Prophylaxis: Heparin SQ  Code Status: Full Code    Disposition: Expected discharge at least 2 to 3 days given extent of cellulitis      Interval History   Reviewed chart, erythema warmth swelling of the leg.  Denies any chest pain shortness of breath lightheadedness dizziness.  More than 10 point review of systems was carried out was otherwise negative    -Data reviewed today: I reviewed all new labs and imaging reports over the last 24 hours. I personally reviewed no images or EKG's today.    Physical Exam   Temp: 97.7  F (36.5  C) Temp src: Temporal BP: 126/55 Pulse: 74   Resp: 18 SpO2: 92 % O2 Device: None (Room air)    Vitals:    09/28/22 1620 09/29/22 1543   Weight: 121.1 kg (266 lb 15.6 oz) 116 kg (255 lb 11.2 oz)     Vital  Signs with Ranges  Temp:  [97.6  F (36.4  C)-98.2  F (36.8  C)] 97.7  F (36.5  C)  Pulse:  [65-75] 74  Resp:  [16-24] 18  BP: (106-126)/(45-55) 126/55  SpO2:  [92 %-96 %] 92 %  I/O last 3 completed shifts:  In: -   Out: 1000 [Urine:1000]      GENERAL: Patient does not look in any acute distress  HEENT: EOM+, Conjunctiva is clear   NECK: I did not see a Jugular Venous distention  HEART: S1 S2 regular  Rate and Rhythm  no murmur,    LUNGS: Respirations are not laboured, Lungs are clear to auscultation Crepitations or Wheezing   ABDOMEN: Soft , there is tenderness , Bowel Sounds are Positive   LOWER LIMBS: has a scab on the front of the left leg surrounded by a large area of erythema with pedal Edema there is also redness in the lower third part of the right leg: Erythema redness and swelling has not  CNS:  Alert,  Oriented x 3, Moving all the Four Limbs     Medications       amLODIPine  5 mg Oral Daily     busPIRone  10 mg Oral BID     FLUoxetine  60 mg Oral Daily     fluticasone-vilanterol  1 puff Inhalation Daily     furosemide  20 mg Oral Daily     gabapentin  300 mg Oral BID 09 12     gabapentin  600 mg Oral At Bedtime     heparin ANTICOAGULANT  5,000 Units Subcutaneous Q12H     levothyroxine  88 mcg Oral Daily     piperacillin-tazobactam  3.375 g Intravenous Q6H     simvastatin  10 mg Oral At Bedtime     sodium chloride (PF)  3 mL Intracatheter Q8H     vancomycin  1,000 mg Intravenous Q12H     Data     Recent Labs   Lab 10/01/22  0618 09/29/22  0714 09/28/22  1628   WBC 6.0 10.7 8.1   HGB 12.7 12.2 13.0   HCT 40.7 38.5 42.7   MCV 94 92 96    252 272     Recent Labs   Lab 10/01/22  0618 09/29/22  0714 09/28/22  1628    137 139   POTASSIUM 3.5 3.9 3.5   CHLORIDE 103 103 104   CO2 25 22 25   ANIONGAP 12 12 10   * 132* 96   BUN 14.0 9.9 13.1   CR 0.74 0.67 0.69   GFRESTIMATED 78 85 84   LAKSHMI 9.0 8.8 8.1*     7-Day Micro Results     Collected Updated Procedure Result Status      09/28/2022 2811  10/01/2022 2218 Blood Culture Hand, Right [[id number]]    Blood from Hand, Right    Preliminary result Component Value   Culture No growth after 3 days  [P]                09/28/2022 1631 09/28/2022 1719 Symptomatic; Unknown Influenza A/B & SARS-CoV2 (COVID-19) Virus PCR Multiplex Nasopharyngeal [[id number]]    Swab from Nasopharyngeal    Final result Component Value   Influenza A PCR Negative   Influenza B PCR Negative   RSV PCR Negative   SARS CoV2 PCR Negative   NEGATIVE: SARS-CoV-2 (COVID-19) RNA not detected, presumed negative.            09/28/2022 1628 10/01/2022 2048 Blood Culture Peripheral Blood [[id number]]   Peripheral Blood    Preliminary result Component Value   Culture No growth after 3 days  [P]                09/28/2022 1416 09/28/2022 1632 COVID-19 VIRUS (CORONAVIRUS) BY PCR (EXTERNAL RESULT) [[id number]]    Swab   Specimen type and source: Swa...    Final result Component Value   COVID-19 Virus by PCR (External Result) Negative   SARS-CoV-2 viral RNA not detected.         A negative result should not be used as the sole basis for patient        management and does not rule out co-infections with other        pathogens.  Negative results should be treated as presumptive and        considered in the context of the patients' clinical status.  False        negatives may occur if inadequate levels of viruses are present in the        sample.                No results for input(s): NTBNPI, NTBNP in the last 168 hours.  No results for input(s): CKT in the last 168 hours.    Invalid input(s): CK, CK TOTAL  Recent Labs   Lab 10/01/22  0618 09/29/22  0714 09/28/22  1628   CR 0.74 0.67 0.69     Recent Labs   Lab 10/01/22  0618 09/29/22  0714 09/28/22  1628   * 132* 96     Recent Labs   Lab 10/01/22  0618 09/29/22  0714 09/28/22  1628   HGB 12.7 12.2 13.0     Recent Labs   Lab 09/28/22  1628   AST 23   ALT 17   ALKPHOS 90   BILITOTAL 0.8     Recent Labs   Lab 09/28/22  1631   INR 1.15      Recent Labs   Lab 09/28/22  1628   LACT 1.2     No results for input(s): LIPASE in the last 168 hours.  Recent Labs   Lab 10/01/22  0618 09/29/22  0714 09/28/22  1628    252 272     Recent Labs   Lab 10/01/22  0618 09/29/22  0714 09/28/22  1628   BUN 14.0 9.9 13.1   CR 0.74 0.67 0.69     No results for input(s): TSH in the last 168 hours.  No results for input(s): TROPONIN, TROPI, TROPR, TROPONINIS in the last 168 hours.    Invalid input(s): TROP, TROPONINIES, TNIH  Recent Labs   Lab 09/28/22  1732   COLOR Light Yellow   APPEARANCE Clear   URINEGLC Negative   URINEBILI Negative   URINEKETONE Negative   SG 1.018   UBLD Negative   URINEPH 7.0   PROTEIN Negative   NITRITE Negative   LEUKEST Negative   RBCU <1   WBCU 1       No results found for this or any previous visit (from the past 24 hour(s)).

## 2022-10-02 NOTE — PLAN OF CARE
"Patient Aox4. Pleasant. Denies pain. Cellulitis remains tender and warm to touch, but margins are receded from original markings. Up assist of 1. PIV patent, monitoring old infiltration site. Patient stating baseline numbness to LUE but has increased some recently. Possible discharge tomorrow. Will continue to monitor and follow plan of care.    /55 (BP Location: Right arm)   Pulse 74   Temp 97.7  F (36.5  C) (Temporal)   Resp 18   Ht 1.6 m (5' 3\")   Wt 116 kg (255 lb 11.2 oz)   LMP  (LMP Unknown)   SpO2 92%   BMI 45.30 kg/m      Fanta Garcia RN on 10/2/2022 at 2:29 PM    "

## 2022-10-03 LAB
BACTERIA BLD CULT: NO GROWTH
BACTERIA BLD CULT: NO GROWTH
CREAT SERPL-MCNC: 0.72 MG/DL (ref 0.51–0.95)
GFR SERPL CREATININE-BSD FRML MDRD: 81 ML/MIN/1.73M2
VANCOMYCIN SERPL-MCNC: 12.1 UG/ML

## 2022-10-03 PROCEDURE — 250N000011 HC RX IP 250 OP 636: Performed by: INTERNAL MEDICINE

## 2022-10-03 PROCEDURE — 258N000003 HC RX IP 258 OP 636: Performed by: INTERNAL MEDICINE

## 2022-10-03 PROCEDURE — 80202 ASSAY OF VANCOMYCIN: CPT | Performed by: INTERNAL MEDICINE

## 2022-10-03 PROCEDURE — 120N000001 HC R&B MED SURG/OB

## 2022-10-03 PROCEDURE — 250N000013 HC RX MED GY IP 250 OP 250 PS 637: Performed by: INTERNAL MEDICINE

## 2022-10-03 PROCEDURE — 36415 COLL VENOUS BLD VENIPUNCTURE: CPT | Performed by: INTERNAL MEDICINE

## 2022-10-03 PROCEDURE — 250N000011 HC RX IP 250 OP 636

## 2022-10-03 PROCEDURE — 99232 SBSQ HOSP IP/OBS MODERATE 35: CPT | Performed by: INTERNAL MEDICINE

## 2022-10-03 PROCEDURE — 82565 ASSAY OF CREATININE: CPT | Performed by: INTERNAL MEDICINE

## 2022-10-03 RX ORDER — CEPHALEXIN 500 MG/1
500 CAPSULE ORAL EVERY 8 HOURS SCHEDULED
Status: DISCONTINUED | OUTPATIENT
Start: 2022-10-03 | End: 2022-10-04 | Stop reason: HOSPADM

## 2022-10-03 RX ADMIN — FLUOXETINE 60 MG: 20 CAPSULE ORAL at 08:49

## 2022-10-03 RX ADMIN — TAZOBACTAM SODIUM AND PIPERACILLIN SODIUM 3.38 G: 375; 3 INJECTION, SOLUTION INTRAVENOUS at 02:08

## 2022-10-03 RX ADMIN — ACETAMINOPHEN 650 MG: 325 TABLET, FILM COATED ORAL at 21:29

## 2022-10-03 RX ADMIN — BUSPIRONE HYDROCHLORIDE 10 MG: 10 TABLET ORAL at 08:49

## 2022-10-03 RX ADMIN — SIMVASTATIN 10 MG: 10 TABLET, FILM COATED ORAL at 21:23

## 2022-10-03 RX ADMIN — VANCOMYCIN HYDROCHLORIDE 1500 MG: 10 INJECTION, POWDER, LYOPHILIZED, FOR SOLUTION INTRAVENOUS at 10:32

## 2022-10-03 RX ADMIN — GABAPENTIN 300 MG: 300 CAPSULE ORAL at 08:49

## 2022-10-03 RX ADMIN — BUSPIRONE HYDROCHLORIDE 10 MG: 10 TABLET ORAL at 20:12

## 2022-10-03 RX ADMIN — GABAPENTIN 300 MG: 300 CAPSULE ORAL at 15:08

## 2022-10-03 RX ADMIN — TAZOBACTAM SODIUM AND PIPERACILLIN SODIUM 3.38 G: 375; 3 INJECTION, SOLUTION INTRAVENOUS at 08:52

## 2022-10-03 RX ADMIN — CEPHALEXIN 500 MG: 500 CAPSULE ORAL at 15:10

## 2022-10-03 RX ADMIN — FUROSEMIDE 20 MG: 20 TABLET ORAL at 08:49

## 2022-10-03 RX ADMIN — HEPARIN SODIUM 5000 UNITS: 10000 INJECTION, SOLUTION INTRAVENOUS; SUBCUTANEOUS at 08:52

## 2022-10-03 RX ADMIN — GABAPENTIN 600 MG: 300 CAPSULE ORAL at 21:23

## 2022-10-03 RX ADMIN — CEPHALEXIN 500 MG: 500 CAPSULE ORAL at 21:23

## 2022-10-03 RX ADMIN — FLUTICASONE FUROATE AND VILANTEROL TRIFENATATE 1 PUFF: 100; 25 POWDER RESPIRATORY (INHALATION) at 08:54

## 2022-10-03 RX ADMIN — AMLODIPINE BESYLATE 5 MG: 5 TABLET ORAL at 08:49

## 2022-10-03 RX ADMIN — HEPARIN SODIUM 5000 UNITS: 10000 INJECTION, SOLUTION INTRAVENOUS; SUBCUTANEOUS at 20:12

## 2022-10-03 RX ADMIN — LEVOTHYROXINE SODIUM 88 MCG: 0.09 TABLET ORAL at 08:49

## 2022-10-03 ASSESSMENT — ACTIVITIES OF DAILY LIVING (ADL)
ADLS_ACUITY_SCORE: 38
ADLS_ACUITY_SCORE: 34
ADLS_ACUITY_SCORE: 40
ADLS_ACUITY_SCORE: 38
ADLS_ACUITY_SCORE: 38
ADLS_ACUITY_SCORE: 34

## 2022-10-03 NOTE — PLAN OF CARE
Goal Outcome Evaluation:               Pt is A&OX4, able to make need known. LS light wheezing noted. Abdomen soft, round large in size.BS active X4. Pt is on IV antibiotic for her LLE cellulites.redness and swelling decreased to LLE. Scab to LLE shin intact.abdomen round, bS active X4. Pt has external cathter in place .Pt is A-1 with gate belt and walker. Pt will discharge to TCU today.

## 2022-10-03 NOTE — PROGRESS NOTES
Care Management Discharge Note    Discharge Date: 10/03/2022       Discharge Disposition: Home    Discharge Services: None    Discharge DME:      Discharge Transportation: family or friend will provide    Private pay costs discussed: Not applicable    PAS Confirmation Code:    Patient/family educated on Medicare website which has current facility and service quality ratings:      Education Provided on the Discharge Plan:    Persons Notified of Discharge Plans:   Patient/Family in Agreement with the Plan: yes    Handoff Referral Completed: Yes    Additional Information:    Met with pt at bedside to discuss discharge planning.  Pt explained that she would like to go home without services and will have one of her children provide transport once medically ready. Pt has no further SW needs. Please alert SW if needs arise.    GABY Owen, GILDA  Inpatient Care Coordination  Ortho/Spine Unit  [phone]  Ruth Do, [facility]

## 2022-10-03 NOTE — PLAN OF CARE
A&Ox4. VSS. IV SL. Tolerating a low sat fat, Na <2400mg diet. Ambulates with Ax1 using the walker and gait belt. Lungs clear. Bowel sounds active. Uses the external catheter at night. Incontinent this afternoon. Edema in BLE. BLE pink and warm; cellulitis has receeded from original markings.     Goal Outcome Evaluation:    Plan of Care Reviewed With: patient     Overall Patient Progress: improving    Outcome Evaluation: Pt antcipating discharge home possibly tomorrow on 10/4/22. Family to transport.

## 2022-10-03 NOTE — PLAN OF CARE
Goal Outcome Evaluation:    Plan of Care Reviewed With: patient     Overall Patient Progress: improving    A & O x4, able to make needs known. Tolerating diet. Up with one assist/walker. Purewick in place overnight per pt request. IV ABX given.

## 2022-10-03 NOTE — PROGRESS NOTES
[facility]    Hospitalist Progress Note  Name: Carol Merida    MRN: [id number]  Provider: Monique Hickman MD  Date of Service: 10/03/2022    Assessment & Plan   Summary of Stay: Carol Merida is a 87 year old female who was admitted on 9/28/2022 for lower extremity cellulitis.  Past medical history significant for obesity, hypothyroidism, obstructive sleep apnea on CPAP, hypertension, hyperlipidemia, panic disorder with agoraphobia, chronic pain syndrome, congestive heart failure chronic diastolic heart failure.  Presented with worsening lower extremity redness despite being on oral antibiotics for localized cellulitis.  Admitted for cellulitis failed outpatient treatment    Lower extremity cellulitis   left greater than right  Was on Keflex prior to  Switched to oral antibiotics today.  CRP elevated 50  Cultures pending  -Erythema and swelling has improved     Hypertension  Amlodipine 5 mg daily     Obstructive sleep apnea  CPAP during the hospitalization     Panic attack/anxiety  Prozac 60 mg daily and BuSpar 10 mg 2 times daily     Hypothyroidism  Synthroid 88 mcg daily     Hypercholesteremia  simvastatin 10 mg daily         DVT Prophylaxis: Heparin SQ  Code Status: Full Code    Disposition: Expected discharge tomorrow if continues to improve      Interval History   Reviewed chart, leg pain and swelling improved denies any chest pain shortness of breath lightheadedness dizziness.  More than 10 point review of systems was carried out was otherwise negative    -Data reviewed today: I reviewed all new labs and imaging reports over the last 24 hours. I personally reviewed no images or EKG's today.    Physical Exam   Temp: 97.1  F (36.2  C) Temp src: Temporal BP: 124/50 Pulse: 75   Resp: 18 SpO2: 94 % O2 Device: None (Room air)    Vitals:    09/28/22 1620 09/29/22 1543   Weight: 121.1 kg (266 lb 15.6 oz) 116 kg (255 lb 11.2 oz)     Vital Signs with Ranges  Temp:  [97.1  F (36.2  C)] 97.1  F (36.2   C)  Pulse:  [75] 75  Resp:  [18] 18  BP: (124)/(50) 124/50  SpO2:  [94 %] 94 %  I/O last 3 completed shifts:  In: -   Out: 500 [Urine:500]      GENERAL: Patient does not look in any acute distress  HEENT: EOM+, Conjunctiva is clear   NECK: I did not see a Jugular Venous distention  HEART: S1 S2 regular  Rate and Rhythm  no murmur,    LUNGS: Respirations are not laboured, Lungs are clear to auscultation Crepitations or Wheezing   ABDOMEN: Soft , there is tenderness , Bowel Sounds are Positive   LOWER LIMBS: has a scab on the front of the left leg surrounded by a large area of erythema with pedal Edema there is also redness in the lower third part of the right leg: Erythema redness and swelling has not  CNS:  Alert,  Oriented x 3, Moving all the Four Limbs     Medications       amLODIPine  5 mg Oral Daily     busPIRone  10 mg Oral BID     FLUoxetine  60 mg Oral Daily     fluticasone-vilanterol  1 puff Inhalation Daily     furosemide  20 mg Oral Daily     gabapentin  300 mg Oral BID 09 12     gabapentin  600 mg Oral At Bedtime     heparin ANTICOAGULANT  5,000 Units Subcutaneous Q12H     levothyroxine  88 mcg Oral Daily     piperacillin-tazobactam  3.375 g Intravenous Q6H     simvastatin  10 mg Oral At Bedtime     sodium chloride (PF)  3 mL Intracatheter Q8H     vancomycin  1,500 mg Intravenous Q18H     Data     Recent Labs   Lab 10/01/22  0618 09/29/22  0714 09/28/22  1628   WBC 6.0 10.7 8.1   HGB 12.7 12.2 13.0   HCT 40.7 38.5 42.7   MCV 94 92 96    252 272     Recent Labs   Lab 10/03/22  0641 10/02/22  1815 10/01/22  0618 09/29/22  0714 09/28/22  1628   NA  --   --  140 137 139   POTASSIUM  --   --  3.5 3.9 3.5   CHLORIDE  --   --  103 103 104   CO2  --   --  25 22 25   ANIONGAP  --   --  12 12 10   GLC  --   --  120* 132* 96   BUN  --   --  14.0 9.9 13.1   CR 0.72 0.77 0.74 0.67 0.69   GFRESTIMATED 81 75 78 85 84   LAKSHMI  --   --  9.0 8.8 8.1*     7-Day Micro Results     Collected Updated Procedure Result  Status      09/28/2022 1647 10/02/2022 2218 Blood Culture Hand, Right [[id number]]    Blood from Hand, Right    Preliminary result Component Value   Culture No growth after 4 days  [P]                09/28/2022 1631 09/28/2022 1719 Symptomatic; Unknown Influenza A/B & SARS-CoV2 (COVID-19) Virus PCR Multiplex Nasopharyngeal [[id number]]    Swab from Nasopharyngeal    Final result Component Value   Influenza A PCR Negative   Influenza B PCR Negative   RSV PCR Negative   SARS CoV2 PCR Negative   NEGATIVE: SARS-CoV-2 (COVID-19) RNA not detected, presumed negative.            09/28/2022 1628 10/02/2022 2048 Blood Culture Peripheral Blood [[id number]]   Peripheral Blood    Preliminary result Component Value   Culture No growth after 4 days  [P]                09/28/2022 1416 09/28/2022 1632 COVID-19 VIRUS (CORONAVIRUS) BY PCR (EXTERNAL RESULT) [[id number]]    Swab   Specimen type and source: Swa...    Final result Component Value   COVID-19 Virus by PCR (External Result) Negative   SARS-CoV-2 viral RNA not detected.         A negative result should not be used as the sole basis for patient        management and does not rule out co-infections with other        pathogens.  Negative results should be treated as presumptive and        considered in the context of the patients' clinical status.  False        negatives may occur if inadequate levels of viruses are present in the        sample.                No results for input(s): NTBNPI, NTBNP in the last 168 hours.  No results for input(s): CKT in the last 168 hours.    Invalid input(s): CK, CK TOTAL  Recent Labs   Lab 10/03/22  0641 10/02/22  1815 10/01/22  0618   CR 0.72 0.77 0.74     Recent Labs   Lab 10/01/22  0618 09/29/22  0714 09/28/22  1628   * 132* 96     Recent Labs   Lab 10/01/22  0618 09/29/22  0714 09/28/22  1628   HGB 12.7 12.2 13.0     Recent Labs   Lab 09/28/22  1628   AST 23   ALT 17   ALKPHOS 90   BILITOTAL 0.8     Recent Labs   Lab  09/28/22  1631   INR 1.15     Recent Labs   Lab 09/28/22  1628   LACT 1.2     No results for input(s): LIPASE in the last 168 hours.  Recent Labs   Lab 10/01/22  0618 09/29/22  0714 09/28/22  1628    252 272     Recent Labs   Lab 10/03/22  0641 10/02/22  1815 10/01/22  0618 09/29/22  0714 09/28/22  1628   BUN  --   --  14.0 9.9 13.1   CR 0.72 0.77 0.74 0.67 0.69     No results for input(s): TSH in the last 168 hours.  No results for input(s): TROPONIN, TROPI, TROPR, TROPONINIS in the last 168 hours.    Invalid input(s): TROP, TROPONINIES, TNIH  Recent Labs   Lab 09/28/22  1732   COLOR Light Yellow   APPEARANCE Clear   URINEGLC Negative   URINEBILI Negative   URINEKETONE Negative   SG 1.018   UBLD Negative   URINEPH 7.0   PROTEIN Negative   NITRITE Negative   LEUKEST Negative   RBCU <1   WBCU 1       No results found for this or any previous visit (from the past 24 hour(s)).

## 2022-10-03 NOTE — PHARMACY-ADMISSION MEDICATION HISTORY
"Pharmacy Vancomycin Note  Date of Service October 3, 2022  Patient's  1935   87 year old, female    Indication: Skin and Soft Tissue Infection  Day of Therapy: 3  Current vancomycin regimen:  1000 mg IV q12h (on hold for level evaluation)  Current vancomycin monitoring method: AUC  Current vancomycin therapeutic monitoring goal: 400-600 mg*h/L    InsightRX Prediction of Current Vancomycin Regimen  Loading dose: N/A  Regimen: 1000 mg IV every 12 hours.  Start time: 10:00 on 10/03/2022  Exposure target: AUC24 (range)400-600 mg/L.hr   AUC24,ss: 510 mg/L.hr  Probability of AUC24 > 400: 100 %  Ctrough,ss: 16.6 mg/L  Probability of Ctrough,ss > 20: 0 %  Probability of nephrotoxicity (Lodise TYLRE ): 12 %      Current estimated CrCl = Estimated Creatinine Clearance: 68.9 mL/min (based on SCr of 0.72 mg/dL).    Creatinine for last 3 days  10/1/2022:  6:18 AM Creatinine 0.74 mg/dL  10/2/2022:  6:15 PM Creatinine 0.77 mg/dL  10/3/2022:  6:41 AM Creatinine 0.72 mg/dL    Recent Vancomycin Levels (past 3 days)  2022:  5:51 PM Vancomycin 11.4 ug/mL  10/2/2022:  6:15 PM Vancomycin 21.7 ug/mL  10/3/2022:  6:41 AM Vancomycin 12.1 ug/mL    Vancomycin IV Administrations (past 72 hours)                   vancomycin (VANCOCIN) 1000 mg in dextrose 5% 200 mL PREMIX (mg) 1,000 mg New Bag 10/02/22 1033     1,000 mg New Bag 10/01/22 2200     1,000 mg New Bag  0949     1,000 mg New Bag 22 2140                Nephrotoxins and other renal medications (From now, onward)    Start     Dose/Rate Route Frequency Ordered Stop    10/03/22 1000  vancomycin 1500 mg in 0.9% NaCl 250 ml intermittent infusion 1,500 mg         1,500 mg  over 90 Minutes Intravenous EVERY 18 HOURS 10/03/22 0829      22 1441  piperacillin-tazobactam (ZOSYN) infusion 3.375 g        Note to Pharmacy: For [facility], [facility] and [facility]: For Zosyn-naive patients, use the \"Zosyn initial dose + extended infusion\" order panel.    3.375 g  100 mL/hr over 30 Minutes " Intravenous EVERY 6 HOURS 09/29/22 1044      09/29/22 0800  furosemide (LASIX) tablet 20 mg        Note to Pharmacy: PTA Sig:TAKE 1 TABLET BY MOUTH TWICE A DAY  Patient taking differently: Take 20 mg by mouth daily      20 mg Oral DAILY 09/29/22 0108               Contrast Orders - past 72 hours (72h ago, onward)    None          Interpretation of levels and current regimen:  Vancomycin level is reflective of -600    Has serum creatinine changed greater than 50% in last 72 hours: No    Renal Function: Stable    InsightRX Prediction of Planned New Vancomycin Regimen  Loading dose: N/A  Regimen: 1500 mg IV every 18 hours.  Start time: 10:00 on 10/03/2022  Exposure target: AUC24 (range)400-600 mg/L.hr   AUC24,ss: 510 mg/L.hr  Probability of AUC24 > 400: 100 %  Ctrough,ss: 14.9 mg/L  Probability of Ctrough,ss > 20: 0 %  Probability of nephrotoxicity (Lodise TYLER 2009): 10 %      Plan:  1. Change dose to 1500 mg IV q18h for lower trough, decreased chance of renal toxicity.  2. Vancomycin monitoring method: AUC  3. Vancomycin therapeutic monitoring goal: 400-600 mg*h/L  4. Pharmacy will check vancomycin levels as appropriate in 1-3 Days.  5. Serum creatinine levels will be ordered daily for the first week of therapy and at least twice weekly for subsequent weeks.    Jaqueline Do [facility]

## 2022-10-04 VITALS
RESPIRATION RATE: 18 BRPM | HEIGHT: 63 IN | OXYGEN SATURATION: 92 % | HEART RATE: 74 BPM | BODY MASS INDEX: 45.3 KG/M2 | TEMPERATURE: 97.5 F | WEIGHT: 255.7 LBS | DIASTOLIC BLOOD PRESSURE: 56 MMHG | SYSTOLIC BLOOD PRESSURE: 130 MMHG

## 2022-10-04 LAB
CREAT SERPL-MCNC: 0.64 MG/DL (ref 0.51–0.95)
GFR SERPL CREATININE-BSD FRML MDRD: 86 ML/MIN/1.73M2
PLATELET # BLD AUTO: 304 10E3/UL (ref 150–450)

## 2022-10-04 PROCEDURE — 258N000003 HC RX IP 258 OP 636: Performed by: INTERNAL MEDICINE

## 2022-10-04 PROCEDURE — 250N000011 HC RX IP 250 OP 636: Performed by: INTERNAL MEDICINE

## 2022-10-04 PROCEDURE — 85049 AUTOMATED PLATELET COUNT: CPT | Performed by: INTERNAL MEDICINE

## 2022-10-04 PROCEDURE — 250N000013 HC RX MED GY IP 250 OP 250 PS 637: Performed by: INTERNAL MEDICINE

## 2022-10-04 PROCEDURE — 82565 ASSAY OF CREATININE: CPT | Performed by: INTERNAL MEDICINE

## 2022-10-04 PROCEDURE — 36415 COLL VENOUS BLD VENIPUNCTURE: CPT | Performed by: INTERNAL MEDICINE

## 2022-10-04 PROCEDURE — 99239 HOSP IP/OBS DSCHRG MGMT >30: CPT | Performed by: INTERNAL MEDICINE

## 2022-10-04 RX ORDER — FUROSEMIDE 20 MG
20 TABLET ORAL DAILY
Start: 2022-10-04 | End: 2023-02-25

## 2022-10-04 RX ORDER — CEPHALEXIN 500 MG/1
500 CAPSULE ORAL EVERY 8 HOURS
Qty: 15 CAPSULE | Refills: 0 | Status: SHIPPED | OUTPATIENT
Start: 2022-10-04 | End: 2022-10-09

## 2022-10-04 RX ADMIN — CEPHALEXIN 500 MG: 500 CAPSULE ORAL at 14:16

## 2022-10-04 RX ADMIN — GABAPENTIN 300 MG: 300 CAPSULE ORAL at 12:31

## 2022-10-04 RX ADMIN — HEPARIN SODIUM 5000 UNITS: 10000 INJECTION, SOLUTION INTRAVENOUS; SUBCUTANEOUS at 08:02

## 2022-10-04 RX ADMIN — FLUOXETINE 60 MG: 20 CAPSULE ORAL at 08:01

## 2022-10-04 RX ADMIN — FLUTICASONE FUROATE AND VILANTEROL TRIFENATATE 1 PUFF: 100; 25 POWDER RESPIRATORY (INHALATION) at 08:19

## 2022-10-04 RX ADMIN — AMLODIPINE BESYLATE 5 MG: 5 TABLET ORAL at 08:01

## 2022-10-04 RX ADMIN — BUSPIRONE HYDROCHLORIDE 10 MG: 10 TABLET ORAL at 08:01

## 2022-10-04 RX ADMIN — LEVOTHYROXINE SODIUM 88 MCG: 0.09 TABLET ORAL at 08:01

## 2022-10-04 RX ADMIN — FUROSEMIDE 20 MG: 20 TABLET ORAL at 08:01

## 2022-10-04 RX ADMIN — VANCOMYCIN HYDROCHLORIDE 1500 MG: 10 INJECTION, POWDER, LYOPHILIZED, FOR SOLUTION INTRAVENOUS at 03:43

## 2022-10-04 RX ADMIN — GABAPENTIN 300 MG: 300 CAPSULE ORAL at 10:30

## 2022-10-04 RX ADMIN — CEPHALEXIN 500 MG: 500 CAPSULE ORAL at 05:23

## 2022-10-04 ASSESSMENT — ACTIVITIES OF DAILY LIVING (ADL)
ADLS_ACUITY_SCORE: 40

## 2022-10-04 NOTE — PLAN OF CARE
Goal Outcome Evaluation:    Aox4. Able to express needs well. VSS. On 2L NC overnight. Reported pain and requested PRN Tylenol. Marking for lower extremities passed the marked area otherwise pink and warm to the touch. Scab on the L shin. On Vanco. Uneventful night. Possible discharge home to today. Will continue to provide supportive care.

## 2022-10-04 NOTE — PLAN OF CARE
Physical Therapy Discharge Summary    Reason for therapy discharge:    Discharged to home with home therapy.    Progress towards therapy goal(s). See goals on Care Plan in [facility] electronic health record for goal details.  Goals not met.  Barriers to achieving goals:   limited tolerance for therapy and discharge from facility.    Therapy recommendation(s):    Continued therapy is recommended.  Rationale/Recommendations:  Home PT recommended to maximize return to PLOF.

## 2022-10-04 NOTE — DISCHARGE SUMMARY
[facility]  Discharge Summary  Hospitalist      Date of Admission:  9/28/2022  Date of Discharge:  10/4/2022  Provider:  Monique Hickman MD  Date of Service (when I last saw the patient): 10/04/22      Primary Provider: Stella Cutler          Discharge Diagnosis:   Discharge Diagnoses   Lateral lower extremity cellulitis    Other medical issues:  Past Medical History:   Diagnosis Date     Abnormal stress test     small area on stress thalium ?2003; but normal angiogram 2012     Anemia      Anxiety      Cardiac dysrhythmia, unspecified     irregular heartbeat     CHF (congestive heart failure) (H) 6/18/2018     PETTY (dyspnea on exertion)      Hyperlipidemia LDL goal <100 2/10/2010     Hypertension goal BP (blood pressure) < 140/90 7/18/2010     Hypothyroid      Malignant 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     Melanoma of skin, site unspecified      NONSPECIFIC MEDICAL HISTORY 04    fx fifth finger right hand     Obstructive sleep apnea (adult) (pediatric) 8/25/2006    CPAP      Osteoarthritis      Other chronic pain     Joint pain for many years.     Other osteoporosis      PONV (postoperative nausea and vomiting)      Tubular adenoma in colon 9/01    colonoscopy due 2004          History of Present Illness   Carol Merida is an 87 year old female who presented with bilateral lower extremity cellulitis left more than right.  Please see the admission history and physical for full details.    Hospital Course     Carol Merida was admitted on 9/28/2022. is a 87 year old female who was admitted on 9/28/2022 for lower extremity cellulitis.  Past medical history significant for obesity, hypothyroidism, obstructive sleep apnea on CPAP, hypertension, hyperlipidemia, panic disorder with agoraphobia, chronic pain syndrome, congestive heart failure chronic diastolic heart failure.  Presented with worsening lower  extremity redness despite being on oral antibiotics for localized cellulitis.  Admitted for cellulitis failed outpatient treatment     The following problems were addressed during her hospitalization:       Lower extremity cellulitis   left greater than right  Treated with IV Zosyn in the hospital.  Now switched to Keflex and  CRP elevated 50  Cultures no growth to date  -Erythema and swelling has improved  -She will need chronic wound management  -Patient will need follow-up with primary care provider     Hypertension  Continue amlodipine 5 mg daily     Obstructive sleep apnea  CPAP during the hospitalization     Panic attack/anxiety  Continue Prozac 60 mg daily and BuSpar 10 mg 2 times daily     Hypothyroidism  Continue Synthroid 88 mcg daily     Hypercholesteremia  Continue simvastatin 10 mg daily    Significant Results and Procedures   As noted above    Pending Results   Unresulted Labs Ordered in the Past 30 Days of this Admission     No orders found from 8/29/2022 to 9/29/2022.          Code Status   Full Code       Primary Care Physician   Stella Cutler    Physical Exam   Temp: 97.5  F (36.4  C) Temp src: Temporal BP: 130/56 Pulse: 74   Resp: 18 SpO2: 92 % O2 Device: None (Room air)    Vitals:    09/28/22 1620 09/29/22 1543   Weight: 121.1 kg (266 lb 15.6 oz) 116 kg (255 lb 11.2 oz)     Vital Signs with Ranges  Temp:  [97.5  F (36.4  C)-97.8  F (36.6  C)] 97.5  F (36.4  C)  Pulse:  [68-77] 74  Resp:  [16-18] 18  BP: (128-136)/(56-69) 130/56  SpO2:  [92 %-95 %] 92 %  I/O last 3 completed shifts:  In: 60 [P.O.:60]  Out: 700 [Urine:700]    GENERAL: Patient does not look in any acute distress  HEENT: EOM+, Conjunctiva is clear   NECK: I did not see a Jugular Venous distention  HEART: S1 S2 regular  Rate and Rhythm  no murmur,    LUNGS: Respirations are not laboured, Lungs are clear to auscultation Crepitations or Wheezing   ABDOMEN: Soft , there is tenderness , Bowel Sounds are Positive   LOWER LIMBS: has a scab  on the front of the left leg surrounded by a  area of erythema with pedal Edema: Erythema redness and swelling involving left leg has improved has underlying stasis dermatitis bilaterally  CNS:  Alert,  Oriented x 3, Moving all the Four Limbs       Discharge Disposition   Discharged to home    Consultations This Hospital Stay   PHARMACY TO DOSE VANCO  PHYSICAL THERAPY ADULT IP CONSULT  CARE MANAGEMENT / SOCIAL WORK IP CONSULT    Time Spent on this Encounter   I, Monique Hickman MD, personally saw the patient today and spent greater than 30 minutes discharging this patient.    Discharge Orders      Reason for your hospital stay    Refer to discharge summary.     Follow-up and recommended labs and tests     Follow up with primary care provider, Stella Cutler, within 7 days for hospital follow- up.  No follow up labs or test are needed.    Primary care provider to assess bilateral lower extremities and chronic wound     Activity    Your activity upon discharge: activity as tolerated     Diet    Follow this diet upon discharge: Orders Placed This Encounter      Snacks/Supplements Adult: Ensure Enlive; Between Meals      Low Saturated Fat Na <2400 mg     Discharge Medications   Current Discharge Medication List      CONTINUE these medications which have CHANGED    Details   cephALEXin (KEFLEX) 500 MG capsule Take 1 capsule (500 mg) by mouth every 8 hours for 5 days  Qty: 15 capsule, Refills: 0    Associated Diagnoses: Cellulitis of lower extremity, unspecified laterality         CONTINUE these medications which have NOT CHANGED    Details   acetaminophen (TYLENOL) 650 MG CR tablet Take 1 tablet (650 mg) by mouth every 8 hours as needed for mild pain or fever  Qty: 270 tablet, Refills: 1    Associated Diagnoses: DDD (degenerative disc disease), lumbar      albuterol (PROAIR HFA/PROVENTIL HFA/VENTOLIN HFA) 108 (90 Base) MCG/ACT inhaler INHALE 2 PUFFS INTO THE LUNGS EVERY 4 HOURS AS NEEDED FOR SHORTNESS OF BREATH OR  DIFFICULT BREATHING OR WHEEZING  Qty: 8.5 g, Refills: 0    Comments: Pharmacy may dispense brand covered by insurance (Proair, or proventil or ventolin or generic albuterol inhaler)  Associated Diagnoses: SOB (shortness of breath)      ALPRAZolam (XANAX) 0.25 MG tablet TAKE ONE TABLET BY MOUTH TWICE A DAY AS NEEDED FOR ANXIETY. DON'T TAKE WITH OXYCODONE.  Qty: 10 tablet, Refills: 0    Associated Diagnoses: Anxiety      amLODIPine (NORVASC) 5 MG tablet Take 1 tablet (5 mg) by mouth daily  Qty: 90 tablet, Refills: 0    Associated Diagnoses: Benign essential hypertension      busPIRone (BUSPAR) 10 MG tablet Take 1 tablet (10 mg) by mouth 2 times daily  Qty: 60 tablet, Refills: 3    Associated Diagnoses: Anxiety      calcium carbonate 600 mg-vitamin D 400 units (CALTRATE) 600-400 MG-UNIT per tablet Take 1 tablet by mouth every evening   Qty: 100 tablet, Refills: 3    Associated Diagnoses: Osteopenia      diclofenac (VOLTAREN) 1 % topical gel Apply 4 g topically 4 times daily as needed for moderate pain  Qty: 150 g, Refills: 3    Associated Diagnoses: Chronic left shoulder pain      FLUoxetine (PROZAC) 20 MG capsule Take 3 capsules (60 mg) by mouth daily  Qty: 240 capsule, Refills: 3    Associated Diagnoses: Anxiety; Mild depression      fluticasone (FLONASE) 50 MCG/ACT nasal spray Spray 1 spray into both nostrils daily as needed for rhinitis  Qty: 16 g, Refills: 3    Associated Diagnoses: Throat clearing      fluticasone-salmeterol (WIXELA INHUB) 100-50 MCG/ACT inhaler Inhale 1 puff into the lungs 2 times daily  Qty: 60 each, Refills: 5    Associated Diagnoses: Shortness of breath      furosemide (LASIX) 20 MG tablet TAKE 1 TABLET BY MOUTH TWICE A DAY  Qty: 60 tablet, Refills: 11    Associated Diagnoses: Leg edema      gabapentin (NEURONTIN) 300 MG capsule TAKE ONE CAPSULE BY MOUTH EVERY MORNING AND AT NOON, AND TAKE TWO CAPSULES EVERY NIGHT  Qty: 360 capsule, Refills: 1    Associated Diagnoses: Osteoarthritis of  multiple joints, unspecified osteoarthritis type; DDD (degenerative disc disease), lumbar      levothyroxine (SYNTHROID/LEVOTHROID) 88 MCG tablet Take 1 tablet (88 mcg) by mouth daily  Qty: 90 tablet, Refills: 2    Associated Diagnoses: Hypothyroidism, unspecified type      nystatin (NYSTOP) 321409 UNIT/GM external powder Apply tid to affectead area prn  Qty: 60 g, Refills: 3    Associated Diagnoses: Intertrigo      olopatadine (PATANOL) 0.1 % ophthalmic solution Place 1 drop into both eyes 2 times daily  Qty: 5 mL, Refills: 12    Comments: Okay to substitute optivar, or azelastine two times daily each eye  Associated Diagnoses: Allergic conjunctivitis of both eyes      polyethylene glycol (MIRALAX) 17 GM/Dose powder Take 17 g by mouth daily as needed for constipation      senna-docusate (SENOKOT-S/PERICOLACE) 8.6-50 MG tablet Take 1 tablet by mouth 2 times daily as needed for constipation      simvastatin (ZOCOR) 10 MG tablet Take 1 tablet (10 mg) by mouth At Bedtime  Qty: 90 tablet, Refills: 3    Associated Diagnoses: Hyperlipidemia LDL goal <100      !! ACE/ARB/ARNI NOT PRESCRIBED (INTENTIONAL) Please choose reason not prescribed, below    Associated Diagnoses: Chronic diastolic heart failure (H)      !! BETA BLOCKER NOT PRESCRIBED (INTENTIONAL) Beta Blocker not prescribed intentionally due to EF > 40 % (ejection fraction) will leave up to Cardiology.    Associated Diagnoses: Chronic diastolic heart failure (H)      order for DME Equipment being ordered: left breast prosthesis. Mastectomy bras (up to 4)  Qty: 4 Device, Refills: 0    Associated Diagnoses: History of left mastectomy       !! - Potential duplicate medications found. Please discuss with provider.        Allergies   Allergies   Allergen Reactions     Ace Inhibitors Cough     Data   Most Recent 3 CBC's:Recent Labs   Lab Test 10/04/22  0558 10/01/22  0618 09/29/22  0714 09/28/22  1628   WBC  --  6.0 10.7 8.1   HGB  --  12.7 12.2 13.0   MCV  --  94 92 96     294 252 272      Most Recent 3 BMP's:  Recent Labs   Lab Test 10/04/22  0558 10/03/22  0641 10/02/22  1815 10/01/22  0618 09/29/22  0714 09/28/22  1628   NA  --   --   --  140 137 139   POTASSIUM  --   --   --  3.5 3.9 3.5   CHLORIDE  --   --   --  103 103 104   CO2  --   --   --  25 22 25   BUN  --   --   --  14.0 9.9 13.1   CR 0.64 0.72 0.77 0.74 0.67 0.69   ANIONGAP  --   --   --  12 12 10   LAKSHMI  --   --   --  9.0 8.8 8.1*   GLC  --   --   --  120* 132* 96     Most Recent 2 LFT's:  Recent Labs   Lab Test 09/28/22  1628 08/09/22  1303   AST 23 31   ALT 17 24   ALKPHOS 90 87   BILITOTAL 0.8 0.8     Most Recent INR's and Anticoagulation Dosing History:  Anticoagulation Dose History     Recent Dosing and Labs Latest Ref Rng & Units 11/3/2016 11/4/2016 11/5/2016 11/6/2016 11/7/2016 11/8/2016 9/28/2022    Warfarin 5 mg - 5 mg - 5 mg 5 mg 5 mg - -    Warfarin 10 mg - - 10 mg - - - - -    INR 0.85 - 1.15 1.09 1.18(H) 1.54(H) 2.08(H) 2.27(H) 2.78(H) 1.15        Most Recent 3 Troponin's:  Recent Labs   Lab Test 04/07/21  1427 04/07/21  1228 02/13/21  1308   TROPI <0.015 <0.015 <0.015     Most Recent Cholesterol Panel:  Recent Labs   Lab Test 09/03/21  1024   CHOL 139   LDL 49   HDL 64   TRIG 132     Most Recent 6 Bacteria Isolates From Any Culture (See EPIC Reports for Culture Details):  Recent Labs   Lab Test 03/12/19  0957 06/02/18  0957 06/02/18  0700   CULT 10,000 to 50,000 colonies/mL  mixed urogenital yuko   Cultured on the 1st day of incubation:  Streptococcus mitis group  *  Critical Value/Significant Value, preliminary result only, called to and read back by  ILDA CASTILLO RN ([facility]).  06.03.18 0709 GJS    (Note)  POSITIVE for STREPTOCOCCUS SPECIES OTHER THAN pneumococcus, anginosus  group, S. pyogenes and S. agalactiae. Performed using [employer]  multiplex nucleic acid test. Final identification and antimicrobial  susceptibility testing will be verified by standard methods.    Specimen tested with  Verigene multiplex, gram-positive blood culture  nucleic acid test for the following targets: Staph aureus, Staph  epidermidis, Staph lugdunensis, other Staph species, Enterococcus  faecalis, Enterococcus faecium, Streptococcus species, S. agalactiae,  S. anginosus grp., S. pneumoniae, S. pyogenes, Listeria sp., mecA  (methicillin resistance) and Fernando/B (vancomycin resistance).    Critical Value/Significant Value called to and read back by Yanci Fuentes RN ([email or web address]) on 06.03.18  @10:02AM by SALLY.       No growth     Most Recent TSH, T4 and A1c Labs:  Recent Labs   Lab Test 09/03/21  1024   TSH 3.60   A1C 5.8*     Results for orders placed or performed during the hospital encounter of 09/28/22   CT Chest Pulmonary Embolism w Contrast    Narrative    EXAM: CT CHEST PULMONARY EMBOLISM W CONTRAST  LOCATION: [facility]  DATE/TIME: 9/28/2022 6:39 PM    INDICATION: Elevated dimer at urgent care.  COMPARISON: 08/09/2022.  TECHNIQUE: CT chest pulmonary angiogram during arterial phase injection of IV contrast. Multiplanar reformats and MIP reconstructions were performed. Dose reduction techniques were used.   CONTRAST: 75 mL Isovue 370.    FINDINGS:  ANGIOGRAM CHEST: Pulmonary arteries are normal caliber and negative for pulmonary emboli. Thoracic aorta is negative for dissection. No CT evidence of right heart strain.    LUNGS AND PLEURA: No acute finding such as lung infiltrates, significant atelectasis, or pleural effusions.    MEDIASTINUM/AXILLAE: Moderate mitral annular calcification.     CORONARY ARTERY CALCIFICATION: Minimal coronary artery calcification.    UPPER ABDOMEN: Fatty changes of the pancreas.    MUSCULOSKELETAL: Advanced scattered degenerative changes of both the axillary and axial skeleton with no acute findings. Old left 11th fracture.      Impression    IMPRESSION:  1.  No acute findings such as dissection, aneurysm, or pulmonary emboli.    2.  Minimal coronary artery  calcification.    3.  Fatty changes in the pancreas.    4.  Advanced degenerative changes bony skeleton.           Disclaimer: This note consists of symbols derived from keyboarding, dictation and/or voice recognition software. As a result, there may be errors in the script that have gone undetected. Please consider this when interpreting information found in this chart.

## 2022-10-04 NOTE — TELEPHONE ENCOUNTER
"Patient discharged from hospital today (10/4/2022)     Per ED Hospital-Admission encounter from 9/28/2022, \"Follow up with primary care provider, Stella Cutler, within 7 days for hospital follow- up.  No follow up labs or test are needed.     Primary care provider to assess bilateral lower extremities and chronic wound\"     MA-TC, please assist patient in scheduling a hospital follow-up appointment (active request placed) and rescheduling her Dexa bone scan   - Patient had to cancel appointment that was scheduled for 9/30/2022 due to being admitted in the hospital     Yesenia LOZANO RN   Patient Advocate Liaison (PAL)  [facility]     "

## 2022-10-04 NOTE — PLAN OF CARE
Goal Outcome Evaluation:    Plan of Care Reviewed With: patient     Overall Patient Progress: improving    Outcome Evaluation: Discharge today after 1600 - family to transport    A&O x4, RA, no IV access, continent of B&B    Morning went well, pt up and OOB to/from bathroom and recliner. Pt excited to be discharging this afternoon. No new concerns. Will continue to provide care

## 2022-10-05 NOTE — TELEPHONE ENCOUNTER
Called pt. She picked up, confirmed identification, then phone went silent. Will try later.    Montserrat Pizarro on 10/5/2022 at 12:33 PM

## 2022-10-06 ENCOUNTER — PATIENT OUTREACH (OUTPATIENT)
Dept: CARE COORDINATION | Facility: CLINIC | Age: 87
End: 2022-10-06

## 2022-10-06 NOTE — PROGRESS NOTES
Connected Care Resource Center Contact  [facility]/Voicemail     Clinical Data: Transitional Care Management Outreach     Outreach attempted x 2.  Left message on patient's voicemail, providing [facility]'s 24/7 scheduling and nurse triage phone number [phone] ([phone]) for questions/concerns and/or to schedule an appt with an [facility] provider, if they do not have a PCP.      Plan:  [facility] will do no further outreaches at this time.       KATELIN Khalil  Connected Care Resource [place], [facility]    *Connected Care Resource Team does NOT follow patient ongoing. Referrals are identified based on internal discharge reports and the outreach is to ensure patient has an understanding of their discharge instructions.

## 2022-10-07 ENCOUNTER — MYC MEDICAL ADVICE (OUTPATIENT)
Dept: PEDIATRICS | Facility: CLINIC | Age: 87
End: 2022-10-07

## 2022-10-07 NOTE — TELEPHONE ENCOUNTER
Pt calling to schedule hospital f/u with Dr.Emily Cutler. Scheduled in-clinic visit with Dr.Emily Cutler on 10/11(Tues) at 12:45 pm.     Also, pt says that she is out of one of her anxiety med & requesting refill. After she found the bottle, says that its the buspar she needs refill on. Advised pt to contact her pharmacy to request a  Refill, the previous rx has 3 additional refills left. Pt agrees to the plan.     No other questions or concerns from pt.     busPIRone (BUSPAR) 10 MG tablet 60 tablet 3 8/24/2022  --   Sig - Route: Take 1 tablet (10 mg) by mouth 2 times daily - Oral     Cammie RN  Patient Advocate Liason (PAL)  [facility]  Ph. [phone] / Fax. [phone]

## 2022-10-11 ENCOUNTER — OFFICE VISIT (OUTPATIENT)
Dept: PEDIATRICS | Facility: CLINIC | Age: 87
End: 2022-10-11
Payer: COMMERCIAL

## 2022-10-11 VITALS
HEART RATE: 83 BPM | WEIGHT: 255 LBS | SYSTOLIC BLOOD PRESSURE: 110 MMHG | DIASTOLIC BLOOD PRESSURE: 48 MMHG | HEIGHT: 63 IN | OXYGEN SATURATION: 97 % | TEMPERATURE: 98.3 F | BODY MASS INDEX: 45.18 KG/M2 | RESPIRATION RATE: 12 BRPM

## 2022-10-11 DIAGNOSIS — F33.0 MILD EPISODE OF RECURRENT MAJOR DEPRESSIVE DISORDER (H): ICD-10-CM

## 2022-10-11 DIAGNOSIS — L03.116 CELLULITIS OF LEFT LOWER EXTREMITY: ICD-10-CM

## 2022-10-11 DIAGNOSIS — Z09 HOSPITAL DISCHARGE FOLLOW-UP: Primary | ICD-10-CM

## 2022-10-11 DIAGNOSIS — Z23 HIGH PRIORITY FOR 2019-NCOV VACCINE: ICD-10-CM

## 2022-10-11 PROBLEM — F33.9 MAJOR DEPRESSION, RECURRENT (H): Status: ACTIVE | Noted: 2022-10-11

## 2022-10-11 PROCEDURE — 91312 COVID-19,PF,PFIZER BOOSTER BIVALENT: CPT | Performed by: INTERNAL MEDICINE

## 2022-10-11 PROCEDURE — G0008 ADMIN INFLUENZA VIRUS VAC: HCPCS | Performed by: INTERNAL MEDICINE

## 2022-10-11 PROCEDURE — 99213 OFFICE O/P EST LOW 20 MIN: CPT | Mod: 25 | Performed by: INTERNAL MEDICINE

## 2022-10-11 PROCEDURE — 90662 IIV NO PRSV INCREASED AG IM: CPT | Performed by: INTERNAL MEDICINE

## 2022-10-11 PROCEDURE — 0124A COVID-19,PF,PFIZER BOOSTER BIVALENT: CPT | Performed by: INTERNAL MEDICINE

## 2022-10-11 ASSESSMENT — PATIENT HEALTH QUESTIONNAIRE - PHQ9
10. IF YOU CHECKED OFF ANY PROBLEMS, HOW DIFFICULT HAVE THESE PROBLEMS MADE IT FOR YOU TO DO YOUR WORK, TAKE CARE OF THINGS AT HOME, OR GET ALONG WITH OTHER PEOPLE: SOMEWHAT DIFFICULT
SUM OF ALL RESPONSES TO PHQ QUESTIONS 1-9: 9
SUM OF ALL RESPONSES TO PHQ QUESTIONS 1-9: 9

## 2022-10-11 NOTE — PROGRESS NOTES
Assessment & Plan     Hospital discharge follow-up    Cellulitis of left lower extremity  Much improved, healing well. Is deconditioned, weaker following hospitalization, and would benefit from physical therapy and possibly occupational therapy in the home.  She is unable to ambulate 100 feet, unable to get to clinic on her own.  With increased strength and ability to exercise, this would be of benefit for her edema.  - Home Care Referral    High priority for 2019-nCoV vaccine  Due for booster today.  - COVID-19,PF,PFIZER BOOSTER BIVALENT 12+Yrs    Mild episode of recurrent major depressive disorder (H)  Significantly improved since taking BuSpar 10 mg twice daily.  We discussed the long-term benefits of continuing this, she will continue.                   Return in about 3 months (around 1/11/2023) for Follow up.    Stella Cutler MD  [facility] NEGIN Gaviria is a 87 year old, presenting for the following health issues:  Hospital F/U      [facility]       Hospital Follow-up Visit:    Hospital/Nursing Home/IP Rehab Facility: [facility]  Date of Admission: 09/28/2022  Date of Discharge: 10/04/2022  Reason(s) for Admission: Lateral lower extremity cellulitis     Was your hospitalization related to COVID-19? No   Problems taking medications regularly:  None  Medication changes since discharge: None  Problems adhering to non-medication therapy:  None    Summary of hospitalization:  [facility] discharge summary reviewed  Diagnostic Tests/Treatments reviewed.  Follow up needed: none  Other Healthcare Providers Involved in Patient s Care:         None  Update since discharge: improved.         Post Medication Reconciliation Status:        Plan of care communicated with patient             Since discharge to home, she overall is doing better. Her leg is healing well, but the leg/ ankle does feel weaker, occasionally feels like it would give out. She has not been  "able to be as active as before, feeling unsteady and she has lost strength. Upon discharge they recommended TCU, but she wanted to get home.     The leg is healing well, one scab has fallen off and the other is still hanging on. Some mild discomfort in the ankle, wondering about ankle arthritis.           Review of Systems   Constitutional, HEENT, cardiovascular, pulmonary, gi and gu systems are negative, except as otherwise noted.      Objective    /48 (BP Location: Right arm, Patient Position: Sitting, Cuff Size: Adult Large)   Pulse 83   Temp 98.3  F (36.8  C) (Temporal)   Resp 12   Ht 1.6 m (5' 3\")   Wt 115.7 kg (255 lb)   LMP  (LMP Unknown)   SpO2 97%   BMI 45.17 kg/m    Body mass index is 45.17 kg/m .  Physical Exam   GENERAL: healthy, alert and no distress  NECK: no adenopathy, no asymmetry, masses, or scars and thyroid normal to palpation  RESP: lungs clear to auscultation - no rales, rhonchi or wheezes  CV: regular rate and rhythm, normal S1 S2, no S3 or S4, no murmur, click or rub, no peripheral edema and peripheral pulses strong  MS: 2+ pitting edema in lower extremities bilaterally.  Left lower extremity with anterior markers showing extent of redness from previous cellulitis infection.  Inside this marking there is a large healing scab.  No significant erythema or warmth over this area currently.  There are skin changes of chronic venous stasis.                    "

## 2022-10-12 DIAGNOSIS — I10 BENIGN ESSENTIAL HYPERTENSION: ICD-10-CM

## 2022-10-14 RX ORDER — AMLODIPINE BESYLATE 5 MG/1
5 TABLET ORAL DAILY
Qty: 90 TABLET | Refills: 0 | Status: SHIPPED | OUTPATIENT
Start: 2022-10-14 | End: 2023-01-20

## 2022-10-14 NOTE — TELEPHONE ENCOUNTER
Routing refill request to provider for review/approval because:  Drug interaction warning    Naheed ENRIQUE RN, BSN, PHN  [facility]

## 2022-10-20 ENCOUNTER — TELEPHONE (OUTPATIENT)
Dept: PEDIATRICS | Facility: CLINIC | Age: 87
End: 2022-10-20

## 2022-10-20 NOTE — TELEPHONE ENCOUNTER
Received a call back from Grimstead with [facility]  - Grimstead states that they are able to take the patient and that her insurance is in their network   - Grimstead states that they do not need any additional information from us at this time   - Annelise states that they will reach out to the patient     Routing to Dr. Cutler as an FYI.     Yesenia LOZANO RN   Patient Advocate Liaison (PAL)  [facility]

## 2022-10-20 NOTE — TELEPHONE ENCOUNTER
Dr. Cutler placed a home care referral for the patient on 10/11/2022   - Received a fax from [facility] stating that they received the home health referral for the patient, but that they are unable to take the patient due to capacity     Called the patient at [phone] and left a message requesting a call back   - Awaiting a call back at this time     - Patient's primary insurance is [employer]   - Home Care Referral placed for:     Physical Therapy Eval and Treat for: Therapeutic Exercise, Home Safety Assessment     Occupational Therapy: ADLs, Home Safety     Patient has difficulty ambulating >100 feet    Dyspnea on exertion that makes it unsafe to lave home without clinical deterioration     Associated diagnosis: Cellulitis of left lower extremity [L03.116]     Called [facility] at [phone]   - Spoke with Patrick   - Patrick states that the patient's insurance ([facility] Medicare) is out of network     Called [facility] at [phone]   - Spoke with Annelise   - States that she has Epic access, they will review and see if they have the capacity to take on this patient   - Awaiting a call back at this time     Yesenia LOZANO RN   Patient Advocate Liaison (PAL)  [facility]

## 2022-10-21 ENCOUNTER — TELEPHONE (OUTPATIENT)
Dept: PEDIATRICS | Facility: CLINIC | Age: 87
End: 2022-10-21

## 2022-10-21 NOTE — TELEPHONE ENCOUNTER
Kenya from [facility] ([phone]) called stating pt is set to start homecare and they are able to get a nurse out sooner than a therapist. They would like verbal orders for the start of care portion with the nurse and the Physical Therapist will come in after.     Kenya's voicemail is secure.    Montserrat Pizarro on 10/21/2022 at 3:45 PM

## 2022-10-24 ENCOUNTER — TELEPHONE (OUTPATIENT)
Dept: PEDIATRICS | Facility: CLINIC | Age: 87
End: 2022-10-24

## 2022-10-24 ENCOUNTER — MEDICAL CORRESPONDENCE (OUTPATIENT)
Dept: HEALTH INFORMATION MANAGEMENT | Facility: CLINIC | Age: 87
End: 2022-10-24

## 2022-10-24 NOTE — TELEPHONE ENCOUNTER
- Patient was seen by Dr. Cutler on 10/11/2022 for a hospital follow-up appointment   - Patient was hospitalized for left lower extremity cellulitis   - See patient's home care nurses comments below     Dr. Cutler, please review and advise. Would you advise an acute visit?     Yesenia LOZANO RN   Patient Advocate Liaison (PAL)  [facility] [place]

## 2022-10-24 NOTE — TELEPHONE ENCOUNTER
Routing to Dr. Cutler to review, advise, and provide verbal orders if appropriate.     Please route back so we can call [facility] back and provide the verbal orders if appropriate.     Yesenia LOZANO RN   Patient Advocate Liaison (PAL)  [facility]

## 2022-10-24 NOTE — TELEPHONE ENCOUNTER
Called the patient at [phone] and left a message requesting a call back     When the patient calls back:     1. Inform the patient of Dr. Cutler's comments/recommendations   - Inform the patient to continue to elevate as much as possible and use compression stockings if possible   - Inform the patient that the increased edema and water blisters are likely extra fluid from being up on her feet     2. Scheduled patient in for an appointment tomorrow (10/25/2022) at 1 PM with Dr. Cutler (approved per Dr. Cutler)   - Confirm that this appointment date and time works for the patient     Called JARETH Macdonald with [facility] at [phone] and left a message informing her of Dr. Cutler's comments/recommendations   - [facility] provided JARETH Macdonald's phone number ([phone])   - Called Torres at [phone]   - Informed JARETH Macdonald of Dr. Cutler's comments/recommendations   - Torres stated that she will try and get ahold of the patient to confirm the appointment tomorrow (10/25/2022)     Yesenia LOZANO RN   Patient Advocate Liaison (PAL)  [facility] [place] [facility]

## 2022-10-24 NOTE — CONFIDENTIAL NOTE
I recommend she continue to elevate as much as possible, use compression stockings if possible. The increased edema and water blisters are likely extra fluid from being up on her feet. You can schedule her in my 1 pm tomorrow for visit.     Stella Cutler MD   Internal Medicine - Pediatrics

## 2022-10-24 NOTE — TELEPHONE ENCOUNTER
The Home Care/Assisted Living/Nursing Facility is calling regarding an established patient.  Has the patient seen Home Care in the past or is currently residing in Assisted Living or Nursing Facility? No.     Kenya calling from [facility] requesting the following orders that are NOT within the Home Care, Assisted Living or Nursing Home Eval and Treatment standing order and must be ordered by a Licensed Practitioner.    Preferred Call Back Number: [phone]    PT/OT/Speech Therapy    Routing to Licensed Practitioner (Provider) to review request and provide approval or recommendation.    Writer has verified [employer] will send fax to have orders signed.        PT/ OT requested    Physical Therapy -  3 times a week for 2 weeks, then 2 times a week for 3 weeks, then once a week for 3 weeks    Occupational Therapy-  Twice a week for 3 weeks    JARETH Dumont on 10/24/2022 at 12:54 PM

## 2022-10-24 NOTE — TELEPHONE ENCOUNTER
Patient's home Care nurse saw patient this morning.She states she still has a healing scab on left lower extremity with pitting edema and small water blisters, was on her feet a bit more this weekend, pitting edema/water blisters (very small) near the healing scab in the left lower leg.    Patient's temperature was 100.3 this morning. Patient stated overall she feels fine, but the nurse is concerned.     Home Care nurse thinks she should be seen again.     Patient stated she can take [facility] if she should be seen.     Please advise.   JARETH Dumont on 10/24/2022 at 11:02 AM

## 2022-10-25 NOTE — TELEPHONE ENCOUNTER
Attempted to reach the patient, called the patient at [phone] and left a message requesting a call back     Called JARETH Macdonald with [facility]   - JARETH Macdonald states that she spoke with the patient yesterday (10/24/2022) and states that the patient wants to continue to monitor at this time     Received a call back from the patient after speaking with JARETH Macdonald   - Patient states that she is feeling well and states that she cancelled her appointment for today (10/25/2022)   - States that she would like to continue to monitor and will update us if any changes occur   - Patient denies a fever and denies any new or worsening pain in her legs     Routing to Dr. Cutler as an FYI     Yesenia LOZANO RN   Patient Advocate Liaison (PAL)  [facility] [place]

## 2022-11-02 ENCOUNTER — TELEPHONE (OUTPATIENT)
Dept: PEDIATRICS | Facility: CLINIC | Age: 87
End: 2022-11-02

## 2022-11-02 DIAGNOSIS — Z96.641 STATUS POST TOTAL REPLACEMENT OF RIGHT HIP: ICD-10-CM

## 2022-11-02 NOTE — TELEPHONE ENCOUNTER
Patient states that she has a history of arthritis.  She has been doing Physical therapy since discharge and this has caused a flare up of the arthritis.   Is doing Physical therapy three times weekly, and is having pain in left shoulder and elbow, bilateral hips, bilateral knees and ankles.  States that it is hard to move due to the pain after Physical therapy.     The pain awakens her at night.  Has tried Ibuprofen 800 mg for the past 5 days without relief.  The Physical therapist suggested that she call her PCP to inquire about an anti-inflammatory.   Patient uses [facility] [place].  In review, patient has used Celebrex and Naproxen in the past.  Last office appointment was 10/11 and she will follow-up in 3 months.  Please advise if you would like a virtual visit to discuss-one appointment tomorrow (approval required).  ROSEANNE Rosenbaum RN

## 2022-11-07 RX ORDER — CELECOXIB 100 MG/1
100 CAPSULE ORAL 2 TIMES DAILY
Qty: 60 CAPSULE | Refills: 1 | Status: SHIPPED | OUTPATIENT
Start: 2022-11-07 | End: 2023-01-13

## 2022-11-07 NOTE — TELEPHONE ENCOUNTER
Per Dr. Cutler-Ok to trial celebrex for joint pains. Follow up as scheduled.  Sorry for the delay in getting back to her, I'm glad she's sticking with PT as I do think this will have long term benefit.       Stella Cutler MD   Internal Medicine - Pediatrics     LM for patient that RX for Celebrex has been sent to her pharmacy.  Advised her to call with any other questions or concerns.  ROSEANNE Rosenbaum RN

## 2022-11-07 NOTE — TELEPHONE ENCOUNTER
Pt calls to check status of message, discussed    ~PCP reviewed but did not process  ~pt informs has used Celebrex in the past (2017)  ~willing to use this again or whatever PCP recommends  ~pharmacy entered  ~informs PT recommends anti-inflammatory rx in addition tp PT    Routed again to PCP, please review and advise, route to inform pt when final, aware PCP not in clinic today, recommend follow up tomorrow am if has not heard    Yessica Valle, RN, BSN  [facility]

## 2022-11-07 NOTE — CONFIDENTIAL NOTE
Ok to trial celebrex for joint pains. Follow up as scheduled.  Sorry for the delay in getting back to her, I'm glad she's sticking with PT as I do think this will have long term benefit.       Stella Cutler MD   Internal Medicine - Pediatrics

## 2022-11-08 ENCOUNTER — ANCILLARY PROCEDURE (OUTPATIENT)
Dept: BONE DENSITY | Facility: CLINIC | Age: 87
End: 2022-11-08
Attending: INTERNAL MEDICINE
Payer: COMMERCIAL

## 2022-11-08 DIAGNOSIS — Z78.0 POST-MENOPAUSAL: ICD-10-CM

## 2022-11-08 DIAGNOSIS — Z78.0 MENOPAUSE: ICD-10-CM

## 2022-11-08 PROCEDURE — 77081 DXA BONE DENSITY APPENDICULR: CPT | Mod: XU | Performed by: INTERNAL MEDICINE

## 2022-11-08 PROCEDURE — 77080 DXA BONE DENSITY AXIAL: CPT | Performed by: INTERNAL MEDICINE

## 2022-11-15 NOTE — TELEPHONE ENCOUNTER
I called patient to follow-up on Celebrex use.   States that she has only been using this prn, but that she does get some pain relief when she takes this.  OT has started working on upper arm strength, and now she is having pain in her shoulders as well.  Advised patient that Celebrex is ordered for twice daily use, so she will try this to see if she can get better pain relief so she can continue with Physical therapy.   Advised to take Celebrex with food.  Advised her to stop the Ibuprofen if she is taking the Celebrex.   May use Tylenol prn in between doses of Celebrex-not to exceed 3000 mg daily.  Patient voices understanding of this.    Patient also reports that she doesn't feel the Buspar has helped her mood.  Describes that she wants to do something and knows she should do something, but then doesn't do it, and then she gets mad at herself.    Video visit scheduled to discuss dosing of Buspar.  Patient is comfortable with the plan.  No further questions.  Will call back if any other questions or concerns.  ROSEANNE Rosenbaum RN

## 2022-11-18 ENCOUNTER — TELEPHONE (OUTPATIENT)
Dept: PEDIATRICS | Facility: CLINIC | Age: 87
End: 2022-11-18

## 2022-11-18 NOTE — TELEPHONE ENCOUNTER
Kenya from [facility] called in.     Requesting:  One additional visit for OT.    Verbal order given.    Faiza PAUL RN, BSN  [facility]

## 2022-11-22 ENCOUNTER — TELEPHONE (OUTPATIENT)
Dept: PEDIATRICS | Facility: CLINIC | Age: 87
End: 2022-11-22

## 2022-11-22 NOTE — TELEPHONE ENCOUNTER
The Home Care/Assisted Living/Nursing Facility is calling regarding an established patient.  Has the patient seen Home Care in the past or is currently residing in Assisted Living or Nursing Facility? Yes.     Jasmina calling from [facility] requesting the following orders that are within the Home Care, Assisted Living or Nursing Home Eval and Treatment standing order and can be signed as standing order signature required by RN.    Preferred Call Back Number: [phone]    PT/OT/Speech Therapy     PT one time next week.     Any additional Orders:  Are there any orders requested, not stated above, that are outside of the standing order and must be routed to a licensed practitioner for approval?    No    Writer has verified [employer] will send fax to have orders signed.    Alea Iqbal RN on 11/22/2022 at 3:03 PM

## 2022-11-30 ENCOUNTER — MYC MEDICAL ADVICE (OUTPATIENT)
Dept: PEDIATRICS | Facility: CLINIC | Age: 87
End: 2022-11-30

## 2022-12-02 NOTE — TELEPHONE ENCOUNTER
Pt haven't read the  message that oMntserrat sent on 11/30. So, called pt at [phone] to help reschedule her upcoming video & phone visits.     Pt is not available, so LM to call us back to [phone] to reschedule her appointments. Will await for her call back.    Cammie RN  Patient Advocate Liason (PAL)  [facility]  Ph. [phone] / Fax. [phone]

## 2022-12-14 ENCOUNTER — MEDICAL CORRESPONDENCE (OUTPATIENT)
Dept: HEALTH INFORMATION MANAGEMENT | Facility: CLINIC | Age: 87
End: 2022-12-14

## 2022-12-22 ENCOUNTER — VIRTUAL VISIT (OUTPATIENT)
Dept: PEDIATRICS | Facility: CLINIC | Age: 87
End: 2022-12-22
Payer: COMMERCIAL

## 2022-12-22 DIAGNOSIS — M15.9 OSTEOARTHRITIS OF MULTIPLE JOINTS, UNSPECIFIED OSTEOARTHRITIS TYPE: Primary | ICD-10-CM

## 2022-12-29 ENCOUNTER — OFFICE VISIT (OUTPATIENT)
Dept: PEDIATRICS | Facility: CLINIC | Age: 87
End: 2022-12-29
Attending: STUDENT IN AN ORGANIZED HEALTH CARE EDUCATION/TRAINING PROGRAM
Payer: COMMERCIAL

## 2022-12-29 ENCOUNTER — HOSPITAL ENCOUNTER (OUTPATIENT)
Dept: ULTRASOUND IMAGING | Facility: CLINIC | Age: 87
Discharge: HOME | End: 2022-12-29
Attending: PHYSICIAN ASSISTANT | Admitting: PHYSICIAN ASSISTANT
Payer: COMMERCIAL

## 2022-12-29 ENCOUNTER — VIRTUAL VISIT (OUTPATIENT)
Dept: PEDIATRICS | Facility: CLINIC | Age: 87
End: 2022-12-29
Payer: COMMERCIAL

## 2022-12-29 VITALS
OXYGEN SATURATION: 93 % | RESPIRATION RATE: 18 BRPM | DIASTOLIC BLOOD PRESSURE: 62 MMHG | SYSTOLIC BLOOD PRESSURE: 98 MMHG | BODY MASS INDEX: 46.41 KG/M2 | WEIGHT: 262 LBS | TEMPERATURE: 98 F | HEART RATE: 89 BPM

## 2022-12-29 DIAGNOSIS — F41.9 ANXIETY: Primary | ICD-10-CM

## 2022-12-29 DIAGNOSIS — M79.89 LEFT LEG SWELLING: ICD-10-CM

## 2022-12-29 DIAGNOSIS — R60.0 LEG EDEMA, LEFT: Primary | ICD-10-CM

## 2022-12-29 DIAGNOSIS — M79.605 PAIN OF LEFT LOWER EXTREMITY: ICD-10-CM

## 2022-12-29 PROCEDURE — 99207 REFERRAL TO ACUTE AND DIAGNOSTIC SERVICES: CPT | Performed by: STUDENT IN AN ORGANIZED HEALTH CARE EDUCATION/TRAINING PROGRAM

## 2022-12-29 PROCEDURE — 93971 EXTREMITY STUDY: CPT | Mod: LT

## 2022-12-29 PROCEDURE — 99215 OFFICE O/P EST HI 40 MIN: CPT | Performed by: PHYSICIAN ASSISTANT

## 2022-12-29 RX ORDER — BUSPIRONE HYDROCHLORIDE 10 MG/1
10 TABLET ORAL 2 TIMES DAILY
Qty: 180 TABLET | Refills: 3 | Status: SHIPPED | OUTPATIENT
Start: 2022-12-29 | End: 2024-03-15

## 2022-12-29 NOTE — PROGRESS NOTES
Everette is a 87 year old who is being evaluated via a billable video visit.        Assessment & Plan     Anxiety  Continue fluoxetine (Prozac) and buspar at same dose.  - busPIRone (BUSPAR) 10 MG tablet; Take 1 tablet (10 mg) by mouth 2 times daily    Left leg swelling  Differential diagnosis includes venous stasis, DVT, left lower extremity cellulitis. Difficult to evaluate over virtual visit. Recommend evaluation at [facility] for ultrasound. Patient will call one of her children to bring her there.  Normal echocardiogram in 2021, on lasix for lower extremity swelling.  - Referral to Acute and Diagnostic Services (Day of diagnostic / First order acute); Future    No follow-ups on file.    Brielle Calderon MD  [facility] [place]    Subjective   Everette is a 87 year old, presenting for the following health issues:  No chief complaint on file.      HPI   Buspar follow up  -some improvement  -holiday season has been difficult    Left calf is swollen and hard for past week  -no overlying skin changes  -no chest pain or shortness of breath         Objective           Vitals:  No vitals were obtained today due to virtual visit.    Physical Exam   GENERAL: Healthy, alert and no distress  EYES: Eyes grossly normal to inspection.  No discharge or erythema, or obvious scleral/conjunctival abnormalities.  RESP: No audible wheeze, cough, or visible cyanosis.  No visible retractions or increased work of breathing.    SKIN: Visible skin clear. No significant rash, abnormal pigmentation or lesions.  NEURO: Cranial nerves grossly intact.  Mentation and speech appropriate for age.  PSYCH: Mentation appears normal, affect normal/bright, judgement and insight intact, normal speech and appearance well-groomed.          Video-Visit Details    Type of service:  Video Visit   Video Start Time: 1:28 PM  Video End Time:1:45 PM    Originating Location (pt. Location): Home  Distant Location (provider location):  Off-site  Platform used for  Video Visit: Slim

## 2022-12-29 NOTE — PATIENT INSTRUCTIONS
Referral to Acute and Diagnostic Services    The [facility] has been contacted at [phone] ([place]) to confirm patient acceptance. The transition to Acute & Diagnostic Services Clinic has been discussed with patient, and she agrees with next level of care.  Patient understands that evaluation/treatment at [facility] typically takes significantly longer than in clinic/urgent care (>2 hours).        Special issues:  None            Referral placed: Yes  Patient has transportation arranged and will travel to the [facility] without delay: Yes  Patient aware not to eat or drink. Yes        Brielle Calderon MD

## 2022-12-29 NOTE — PATIENT INSTRUCTIONS
(R60.0) Leg edema, left  (primary encounter diagnosis)  Comment: secondary to prolonged standing  Plan: elevate lower extremity frequently to avoid edema.  Take 40mg today and elevate your left lower leg for at least an hour.     Compression with ace wrap or other compression wrap.  NOT TOO TIGHT.    To Emergency Room should you develop fever or significant redness of the affected leg.        (M79.89) Left leg swelling  Comment:   Plan: US Lower Extremity Venous Duplex Left            (M79.605) Pain of left lower extremity  Comment:   Plan: US Lower Extremity Venous Duplex Left

## 2022-12-29 NOTE — PROGRESS NOTES
(R60.0) Leg edema, left  (primary encounter diagnosis)  Comment: secondary to prolonged standing  Plan: elevate lower extremity frequently to avoid edema.  Take 40mg today and elevate your left lower leg for at least an hour.     Compression with ace wrap or other compression wrap.  NOT TOO TIGHT.    To Emergency Room should you develop fever or significant redness of the affected leg.        (M79.89) Left leg swelling  Comment:   Plan: US Lower Extremity Venous Duplex Left            (M79.605) Pain of left lower extremity  Comment:   Plan: US Lower Extremity Venous Duplex Left          57 minutes spent on the date of the encounter doing chart review, review of test results, interpretation of tests, patient visit and documentation        Return in about 1 week (around 1/5/2023) for WITH YOUR PRIMARY CLINIC for re-check, sooner should symptoms persist or worsen..    Naomi Sosa PA-C  [facility] INOCENCIO Gaviria is a 87 year old accompanied by her daughter, presenting for the following health issues: left lower leg swelling and pain.    She has had chronic issues with swelling in her left lower leg, which were complicated by cellulitis in the extremity 2 months ago.  She takes lasix intermittently to help with the swelling, but was on her feet over the holidays more than usual.       Has used a 20mg of lasix without relief    Denies any fevers or other new symptoms.    Denies any new injury.  Musculoskeletal Problem      HPI     Musculoskeletal problem/pain  Onset/Duration: as above  Description  Location: leg - left  Joint Swelling: YES  Redness: YES  Pain: 6/10 sitting 9/10 walking  Warmth: YES  Progression of Symptoms:  worse  Accompanying signs and symptoms:   Fevers: no   Numbness/tingling/weakness: YES  History  Trauma to the area: no   Previous history of Gout: no    Alcohol usage: Occassional  Diuretic Use: YES- Furosemide  Recent illness:  no   Previous similar  problem:no    Previous evaluation:  YES- Virtual visit only  Aggravating factors include: standing and walking  Therapies tried and outcome:  nothing    Review of Systems   Constitutional, HEENT, cardiovascular, pulmonary, GI, , musculoskeletal, neuro, skin, endocrine and psych systems are negative, except as otherwise noted.      Objective    Wt 118.8 kg (262 lb)   LMP  (LMP Unknown)   BMI 46.41 kg/m    Body mass index is 46.41 kg/m .  Physical Exam   GENERAL: healthy, alert and no distress  RESP: lungs clear to auscultation - no rales, rhonchi or wheezes  CV: regular rate and rhythm, normal S1 S2, no S3 or S4, no murmur, click or rub, no peripheral edema and peripheral pulses strong  SKIN: pitting edema of left lower extremity with healed wound on anterior inferior leg with pink skin.  No erythema or warmth.  Hemosiderin staining distal aspect.  Positive Homans.          IMAGING:    FINDINGS:   The left common femoral, superficial femoral, popliteal and  segmentally visualized calf veins are patent and fully compressible  and demonstrate normal venous Doppler flow. The visualized greater  saphenous vein is negative for thrombus.     The contralateral right common femoral vein is patent without deep  venous thrombosis.                                                                      IMPRESSION:   No DVT demonstrated.

## 2023-01-01 NOTE — TELEPHONE ENCOUNTER
I spoke with patient and she will proceed to the Urgency room for evaluation.  She will contact family for a ride there.  ROSEANNE Rosenbaum RN     Statement Selected

## 2023-01-06 ENCOUNTER — TRANSFERRED RECORDS (OUTPATIENT)
Dept: HEALTH INFORMATION MANAGEMENT | Facility: CLINIC | Age: 88
End: 2023-01-06

## 2023-01-11 DIAGNOSIS — Z96.641 STATUS POST TOTAL REPLACEMENT OF RIGHT HIP: ICD-10-CM

## 2023-01-13 RX ORDER — CELECOXIB 100 MG/1
100 CAPSULE ORAL 2 TIMES DAILY
Qty: 60 CAPSULE | Refills: 1 | Status: SHIPPED | OUTPATIENT
Start: 2023-01-13 | End: 2023-02-25

## 2023-01-13 NOTE — TELEPHONE ENCOUNTER
Routing refill request to provider for review/approval because:  Over the age of 64 unable to fill using RN protocol     Tracey Gallo, Registered Nurse  [facility]

## 2023-01-20 DIAGNOSIS — I10 BENIGN ESSENTIAL HYPERTENSION: ICD-10-CM

## 2023-01-20 DIAGNOSIS — E78.5 HYPERLIPIDEMIA LDL GOAL <100: ICD-10-CM

## 2023-01-20 RX ORDER — AMLODIPINE BESYLATE 5 MG/1
5 TABLET ORAL DAILY
Qty: 90 TABLET | Refills: 0 | Status: ON HOLD | OUTPATIENT
Start: 2023-01-20 | End: 2023-03-06

## 2023-01-20 NOTE — TELEPHONE ENCOUNTER
Routing refill request to provider for review/approval because:  Drug interaction warning    Yuliya Rogers, RN

## 2023-01-20 NOTE — LETTER
[facility]  [address]  [address]  [place] [address]  Phone: [phone]  Fax: [phone]        January 31, 2023      Carol Merida                                                                                                                                [place] [facility]  [address] [place] [address]  [place] [address]            Dear Ms. Merida,    We have attempted to reach you multiple times because we are concerned about your health care. We recently provided you with a medication refill. Many medications require routine follow-up with your Doctor.      At this time we ask that: You schedule a lab appointment.     Your prescription for simvastatin (ZOCOR) 10 MG tablet: Has been refilled for 90 days so you may have time for the above noted follow-up.    Please call our office at [phone] to schedule a lab only appointment. If you have any questions or concerns, you can call the number mentioned above.     Thank you,      [facility] Care Team

## 2023-01-23 RX ORDER — SIMVASTATIN 10 MG
10 TABLET ORAL AT BEDTIME
Qty: 90 TABLET | Refills: 0 | Status: ON HOLD | OUTPATIENT
Start: 2023-01-23 | End: 2023-03-01

## 2023-01-23 NOTE — TELEPHONE ENCOUNTER
Routing refill request to provider for review/approval because:  Labs not current:    LDL Cholesterol Calculated   Date Value Ref Range Status   09/03/2021 50 <=100 mg/dL Final     Comment:     Age 0-19 years:  Desirable: 0-110 mg/dL   Borderline high: 110-129 mg/dL   High: >= 130 mg/dL    Age 20 years and older:  Desirable: <100mg/dL  Above desirable: 100-129 mg/dL   Borderline high: 130-159 mg/dL   High: 160-189 mg/dL   Very high: >= 190 mg/dL   08/31/2020 45 <100 mg/dL Final     Comment:     Desirable:       <100 mg/dl      Dolores Wong RN

## 2023-01-31 NOTE — TELEPHONE ENCOUNTER
Pt LM at 11:43 am that someone left multiple messages to offer lab-only appointment. She would like someone to call her help to schedule this appointment. Call back at [phone].     MA/TC:  Please call pt to help schedule a fasting lab-only appointment.     JARETH Bonds  Patient Advocate Liason (PAL)  [facility]  Ph. [phone] / Fax. [phone]

## 2023-02-05 ENCOUNTER — LAB (OUTPATIENT)
Dept: LAB | Facility: CLINIC | Age: 88
End: 2023-02-05
Payer: COMMERCIAL

## 2023-02-05 DIAGNOSIS — Z13.220 SCREENING FOR HYPERLIPIDEMIA: ICD-10-CM

## 2023-02-05 PROCEDURE — 80061 LIPID PANEL: CPT

## 2023-02-05 PROCEDURE — 36415 COLL VENOUS BLD VENIPUNCTURE: CPT

## 2023-02-06 LAB
CHOLEST SERPL-MCNC: 135 MG/DL
HDLC SERPL-MCNC: 62 MG/DL
LDLC SERPL CALC-MCNC: 53 MG/DL
NONHDLC SERPL-MCNC: 73 MG/DL
TRIGL SERPL-MCNC: 98 MG/DL

## 2023-02-15 DIAGNOSIS — F41.9 ANXIETY: ICD-10-CM

## 2023-02-15 DIAGNOSIS — F32.A MILD DEPRESSION: ICD-10-CM

## 2023-02-25 ENCOUNTER — APPOINTMENT (OUTPATIENT)
Dept: GENERAL RADIOLOGY | Facility: CLINIC | Age: 88
End: 2023-02-25
Attending: EMERGENCY MEDICINE
Payer: COMMERCIAL

## 2023-02-25 ENCOUNTER — HOSPITAL ENCOUNTER (EMERGENCY)
Facility: CLINIC | Age: 88
Discharge: ANOTHER HOSPITAL | End: 2023-02-26
Attending: EMERGENCY MEDICINE | Admitting: EMERGENCY MEDICINE
Payer: COMMERCIAL

## 2023-02-25 ENCOUNTER — APPOINTMENT (OUTPATIENT)
Dept: CT IMAGING | Facility: CLINIC | Age: 88
End: 2023-02-25
Attending: ORTHOPAEDIC SURGERY
Payer: COMMERCIAL

## 2023-02-25 VITALS
RESPIRATION RATE: 16 BRPM | TEMPERATURE: 98.3 F | DIASTOLIC BLOOD PRESSURE: 74 MMHG | SYSTOLIC BLOOD PRESSURE: 152 MMHG | OXYGEN SATURATION: 94 % | HEART RATE: 94 BPM

## 2023-02-25 DIAGNOSIS — M97.8XXA PERI-PROSTHETIC SUPRACONDYLAR FRACTURE OF FEMUR, INITIAL ENCOUNTER: Primary | ICD-10-CM

## 2023-02-25 DIAGNOSIS — Z96.659 PERI-PROSTHETIC SUPRACONDYLAR FRACTURE OF FEMUR, INITIAL ENCOUNTER: Primary | ICD-10-CM

## 2023-02-25 DIAGNOSIS — Z96.643 H/O BILATERAL HIP REPLACEMENTS: ICD-10-CM

## 2023-02-25 DIAGNOSIS — E66.01 SEVERE OBESITY (BMI >= 40) (H): ICD-10-CM

## 2023-02-25 DIAGNOSIS — Z96.653 HISTORY OF BILATERAL KNEE REPLACEMENT: ICD-10-CM

## 2023-02-25 DIAGNOSIS — Z96.653 HISTORY OF KNEE REPLACEMENT, TOTAL, BILATERAL: ICD-10-CM

## 2023-02-25 DIAGNOSIS — Z96.643 HISTORY OF HIP REPLACEMENT, TOTAL, BILATERAL: ICD-10-CM

## 2023-02-25 DIAGNOSIS — I50.32 CHRONIC DIASTOLIC HEART FAILURE (H): ICD-10-CM

## 2023-02-25 DIAGNOSIS — Z96.659 PERI-PROSTHETIC SUPRACONDYLAR FRACTURE OF FEMUR, INITIAL ENCOUNTER: ICD-10-CM

## 2023-02-25 DIAGNOSIS — M97.8XXA PERI-PROSTHETIC SUPRACONDYLAR FRACTURE OF FEMUR, INITIAL ENCOUNTER: ICD-10-CM

## 2023-02-25 LAB
ABO/RH(D): NORMAL
ANION GAP SERPL CALCULATED.3IONS-SCNC: 13 MMOL/L (ref 7–15)
ANTIBODY SCREEN: NEGATIVE
BASOPHILS # BLD AUTO: 0 10E3/UL (ref 0–0.2)
BASOPHILS NFR BLD AUTO: 0 %
BUN SERPL-MCNC: 19.3 MG/DL (ref 8–23)
CALCIUM SERPL-MCNC: 8.8 MG/DL (ref 8.8–10.2)
CHLORIDE SERPL-SCNC: 102 MMOL/L (ref 98–107)
CREAT SERPL-MCNC: 0.65 MG/DL (ref 0.51–0.95)
DEPRECATED HCO3 PLAS-SCNC: 24 MMOL/L (ref 22–29)
EOSINOPHIL # BLD AUTO: 0.6 10E3/UL (ref 0–0.7)
EOSINOPHIL NFR BLD AUTO: 6 %
ERYTHROCYTE [DISTWIDTH] IN BLOOD BY AUTOMATED COUNT: 13.7 % (ref 10–15)
GFR SERPL CREATININE-BSD FRML MDRD: 85 ML/MIN/1.73M2
GLUCOSE SERPL-MCNC: 127 MG/DL (ref 70–99)
HCT VFR BLD AUTO: 42 % (ref 35–47)
HGB BLD-MCNC: 13.6 G/DL (ref 11.7–15.7)
HOLD SPECIMEN: NORMAL
HOLD SPECIMEN: NORMAL
IMM GRANULOCYTES # BLD: 0 10E3/UL
IMM GRANULOCYTES NFR BLD: 0 %
LYMPHOCYTES # BLD AUTO: 0.7 10E3/UL (ref 0.8–5.3)
LYMPHOCYTES NFR BLD AUTO: 7 %
MCH RBC QN AUTO: 29.6 PG (ref 26.5–33)
MCHC RBC AUTO-ENTMCNC: 32.4 G/DL (ref 31.5–36.5)
MCV RBC AUTO: 91 FL (ref 78–100)
MONOCYTES # BLD AUTO: 0.8 10E3/UL (ref 0–1.3)
MONOCYTES NFR BLD AUTO: 9 %
NEUTROPHILS # BLD AUTO: 7 10E3/UL (ref 1.6–8.3)
NEUTROPHILS NFR BLD AUTO: 78 %
NRBC # BLD AUTO: 0 10E3/UL
NRBC BLD AUTO-RTO: 0 /100
PLATELET # BLD AUTO: 239 10E3/UL (ref 150–450)
POTASSIUM SERPL-SCNC: 3.8 MMOL/L (ref 3.4–5.3)
RBC # BLD AUTO: 4.6 10E6/UL (ref 3.8–5.2)
SODIUM SERPL-SCNC: 139 MMOL/L (ref 136–145)
SPECIMEN EXPIRATION DATE: NORMAL
WBC # BLD AUTO: 9.1 10E3/UL (ref 4–11)

## 2023-02-25 PROCEDURE — 86850 RBC ANTIBODY SCREEN: CPT | Performed by: PHYSICIAN ASSISTANT

## 2023-02-25 PROCEDURE — 96374 THER/PROPH/DIAG INJ IV PUSH: CPT

## 2023-02-25 PROCEDURE — 99285 EMERGENCY DEPT VISIT HI MDM: CPT | Mod: 25

## 2023-02-25 PROCEDURE — 80048 BASIC METABOLIC PNL TOTAL CA: CPT | Performed by: EMERGENCY MEDICINE

## 2023-02-25 PROCEDURE — 85025 COMPLETE CBC W/AUTO DIFF WBC: CPT | Performed by: EMERGENCY MEDICINE

## 2023-02-25 PROCEDURE — 72170 X-RAY EXAM OF PELVIS: CPT

## 2023-02-25 PROCEDURE — 73560 X-RAY EXAM OF KNEE 1 OR 2: CPT | Mod: RT

## 2023-02-25 PROCEDURE — 96376 TX/PRO/DX INJ SAME DRUG ADON: CPT

## 2023-02-25 PROCEDURE — 86901 BLOOD TYPING SEROLOGIC RH(D): CPT | Performed by: PHYSICIAN ASSISTANT

## 2023-02-25 PROCEDURE — 96375 TX/PRO/DX INJ NEW DRUG ADDON: CPT

## 2023-02-25 PROCEDURE — 250N000011 HC RX IP 250 OP 636: Performed by: EMERGENCY MEDICINE

## 2023-02-25 PROCEDURE — 73700 CT LOWER EXTREMITY W/O DYE: CPT | Mod: RT

## 2023-02-25 PROCEDURE — 36415 COLL VENOUS BLD VENIPUNCTURE: CPT | Performed by: EMERGENCY MEDICINE

## 2023-02-25 PROCEDURE — 73552 X-RAY EXAM OF FEMUR 2/>: CPT | Mod: RT

## 2023-02-25 RX ORDER — CEFAZOLIN SODIUM 2 G/50ML
2 SOLUTION INTRAVENOUS
Status: CANCELLED | OUTPATIENT
Start: 2023-02-25

## 2023-02-25 RX ORDER — ACETAMINOPHEN 325 MG/1
975 TABLET ORAL ONCE
Status: CANCELLED | OUTPATIENT
Start: 2023-02-25 | End: 2023-02-25

## 2023-02-25 RX ORDER — FUROSEMIDE 20 MG
20 TABLET ORAL DAILY PRN
COMMUNITY
End: 2023-03-10 | Stop reason: ALTCHOICE

## 2023-02-25 RX ORDER — OXYCODONE HYDROCHLORIDE 5 MG/1
10 TABLET ORAL
Status: DISCONTINUED | OUTPATIENT
Start: 2023-02-25 | End: 2023-03-06

## 2023-02-25 RX ORDER — GABAPENTIN 300 MG/1
300 CAPSULE ORAL
Status: DISCONTINUED | OUTPATIENT
Start: 2023-02-25 | End: 2023-03-01

## 2023-02-25 RX ORDER — FENTANYL CITRATE 50 UG/ML
50 INJECTION, SOLUTION INTRAMUSCULAR; INTRAVENOUS EVERY 30 MIN PRN
Status: DISCONTINUED | OUTPATIENT
Start: 2023-02-25 | End: 2023-02-25

## 2023-02-25 RX ORDER — OLOPATADINE HYDROCHLORIDE 1 MG/ML
1 SOLUTION/ DROPS OPHTHALMIC 2 TIMES DAILY PRN
COMMUNITY
End: 2024-03-15

## 2023-02-25 RX ORDER — CELECOXIB 100 MG/1
100 CAPSULE ORAL 2 TIMES DAILY PRN
COMMUNITY

## 2023-02-25 RX ORDER — CEFAZOLIN SODIUM 2 G/50ML
2 SOLUTION INTRAVENOUS SEE ADMIN INSTRUCTIONS
Status: CANCELLED | OUTPATIENT
Start: 2023-02-25

## 2023-02-25 RX ORDER — HYDROMORPHONE HYDROCHLORIDE 1 MG/ML
0.5 INJECTION, SOLUTION INTRAMUSCULAR; INTRAVENOUS; SUBCUTANEOUS
Status: COMPLETED | OUTPATIENT
Start: 2023-02-25 | End: 2023-02-25

## 2023-02-25 RX ADMIN — HYDROMORPHONE HYDROCHLORIDE 0.5 MG: 1 INJECTION, SOLUTION INTRAMUSCULAR; INTRAVENOUS; SUBCUTANEOUS at 22:23

## 2023-02-25 RX ADMIN — FENTANYL CITRATE 50 MCG: 50 INJECTION, SOLUTION INTRAMUSCULAR; INTRAVENOUS at 18:37

## 2023-02-25 RX ADMIN — HYDROMORPHONE HYDROCHLORIDE 0.5 MG: 1 INJECTION, SOLUTION INTRAMUSCULAR; INTRAVENOUS; SUBCUTANEOUS at 23:47

## 2023-02-25 ASSESSMENT — ACTIVITIES OF DAILY LIVING (ADL)
ADLS_ACUITY_SCORE: 35

## 2023-02-25 NOTE — ED TRIAGE NOTES
"Patient arrives via EMS after falling inside her house. Patient reports pain in her right hip to her right knee. Patient states she felt something \"snap\" in her leg.  Patient was unable to get up on her own. Patient reports she did hit her head but no LOC, patient is not on thinners.      Triage Assessment     Row Name 02/25/23 8000       Triage Assessment (Adult)    Airway WDL WDL       Respiratory WDL    Respiratory WDL WDL       Skin Circulation/Temperature WDL    Skin Circulation/Temperature WDL WDL       Cardiac WDL    Cardiac WDL WDL       Peripheral/Neurovascular WDL    Peripheral Neurovascular WDL WDL       Cognitive/Neuro/Behavioral WDL    Cognitive/Neuro/Behavioral WDL WDL              "

## 2023-02-25 NOTE — ED PROVIDER NOTES
History   Chief Complaint:  Fall     HPI   Carol Merida is a 87 year old female with a history of osteoarthrosis who presents by EMS for evaluation of right upper leg pain after a fall. The patient reports that she fell in her house today at 1530 walking with her walker after her legs gave out while trying to turn and sit in a chair, and she landed on her right leg. At time of fall she states she heard a snap in her leg. She endorses pain from right knee radiating up toward her right hip. She states that she has been unable to get up on her own or ambulate since the fall due to severe pain.  She states she may have hit her head lightly on the nearby chair but she denies any loss of consciousness. She denies any headache, neck pain, confusion, chest pain, abdominal pain, nausea, vomiting, diarrhea, and cough. The patient reports she has had bilateral hip and knee replacement surgeries long ago. The patient states en route she was given pain medication.  She is not on blood thinners.  No focal weakness or numbness.    Independent Historian: History supplemented by EMS, who reports that they gave 100 mcg of fentanyl during transport.    ROS:  Review of Systems  ROS neg other than the symptoms noted above in the HPI.    Allergies:  Ace Inhibitors     Medications:    Albuterol   Xanax   Norvasc   Buspar   Celebrex   Prozac   Wixela   Lasix   Neurontin   Synthroid   Zocor     Past Medical History:    Anxiety   CHF   Hyperlipidemia   Hypertension   Hypothyroid   Breast cancer   JEAN   Osteoarthritis   Depression   DVT   Spinal stenosis     Past Surgical History:    Hip arthroplasty x2   Cataract removal   Hysterectomy   Mastectomy   Right ankle surgery   Hardware removal   Knee replacement x2  Appendectomy   Tonsillectomy and adenoidectomy      Family History:    family history includes Arthritis in her mother; Breast Cancer in her daughter; Cancer in her father and grandchild; Hypertension in her father; No Known  Problems in her brother.    Social History:  The patient presents alone via EMS.   Patient lives in senior home unassisted.   reports that she has never smoked. She has never used smokeless tobacco. She reports current alcohol use. She reports that she does not use drugs.  PCP: Stella Cutler     Physical Exam     Patient Vitals for the past 24 hrs:   BP Temp Temp src Pulse Resp SpO2   02/25/23 2200 -- -- -- -- -- 95 %   02/25/23 2145 (!) 134/91 -- -- -- -- 96 %   02/25/23 2130 133/82 -- -- 96 -- 95 %   02/25/23 1945 -- 98.3  F (36.8  C) Oral -- -- --   02/25/23 1918 133/78 -- -- 89 18 94 %   02/25/23 1800 102/62 -- -- -- -- 95 %      Physical Exam  General: Woman recumbent in room 12  HENT: MMM, no signs of facial trauma  CV:  regular rhythm, soft compartments in RLE, cap refill normal, easily palpable right DP and PT pulses  Resp: normal effort, speaks in full phrases, no stridor, no cough observed  GI: abdomen soft, nontender  MSK:  Spine: nontender  Chest wall nontender  Extremities: Significant tenderness to right distal femur, unable to lift right leg off bed due to pain, crepitus present in distal upper leg, pelvis stable, decreased range of motion of right hip due to pain in the lower thigh, right leg is nontender below the knee  Skin:   No abrasion  No ecchymosis  Well-healed bilateral anterior knee incisions  No laceration  Neuro: awake, alert, GCS 15, responds appropriately to commands, SILT all extremities, no nuchal rigidity  Psych: cooperative, no apparent hallucinations    Emergency Department Course     Imaging:  CT Femur Right w/o Contrast   Final Result   IMPRESSION:   1.  Acute displaced and angulated periprosthetic fracture of the distal right femur extending into the distal shaft.         XR Femur Right 2 Views   Final Result   IMPRESSION:    Right knee: Total knee arthroplasty. No dislocation. Acute comminuted and displaced periprosthetic fracture of the distal femur extending from the femoral  component proximally into the distal shaft. Main distal fragment is displaced posteriorly by nearly    one bone width and there is mild overriding of the main fracture fragments, and apex posterior angulation.      Femur: No additional acute fracture. 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      Pelvis: Bilateral hip arthroplasties. No dislocation. No additional acute fracture. Degenerative changes in lumbar spine.      Diffuse bony demineralization.      XR Pelvis 1/2 Views   Final Result   IMPRESSION:    Right knee: Total knee arthroplasty. No dislocation. Acute comminuted and displaced periprosthetic fracture of the distal femur extending from the femoral component proximally into the distal shaft. Main distal fragment is displaced posteriorly by nearly    one bone width and there is mild overriding of the main fracture fragments, and apex posterior angulation.      Femur: No additional acute fracture. 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      Pelvis: Bilateral hip arthroplasties. No dislocation. No additional acute fracture. Degenerative changes in lumbar spine.      Diffuse bony demineralization.      XR Knee Right 1/2 Views   Final Result   IMPRESSION:    Right knee: Total knee arthroplasty. No dislocation. Acute comminuted and displaced periprosthetic fracture of the distal femur extending from the femoral component proximally into the distal shaft. Main distal fragment is displaced posteriorly by nearly    one bone width and there is mild overriding of the main fracture fragments, and apex posterior angulation.      Femur: No additional acute fracture. 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      Pelvis: Bilateral hip arthroplasties. No dislocation. No additional acute fracture. Degenerative changes in lumbar spine.      Diffuse bony demineralization.         Report per radiology    Laboratory:  Labs Ordered and Resulted from Time of  ED Arrival to Time of ED Departure   BASIC METABOLIC PANEL - Abnormal       Result Value    Sodium 139      Potassium 3.8      Chloride 102      Carbon Dioxide (CO2) 24      Anion Gap 13      Urea Nitrogen 19.3      Creatinine 0.65      Calcium 8.8      Glucose 127 (*)     GFR Estimate 85     CBC WITH PLATELETS AND DIFFERENTIAL - Abnormal    WBC Count 9.1      RBC Count 4.60      Hemoglobin 13.6      Hematocrit 42.0      MCV 91      MCH 29.6      MCHC 32.4      RDW 13.7      Platelet Count 239      % Neutrophils 78      % Lymphocytes 7      % Monocytes 9      % Eosinophils 6      % Basophils 0      % Immature Granulocytes 0      NRBCs per 100 WBC 0      Absolute Neutrophils 7.0      Absolute Lymphocytes 0.7 (*)     Absolute Monocytes 0.8      Absolute Eosinophils 0.6      Absolute Basophils 0.0      Absolute Immature Granulocytes 0.0      Absolute NRBCs 0.0     TYPE AND SCREEN, ADULT    ABO/RH(D) A POS      Antibody Screen Negative      SPECIMEN EXPIRATION DATE [id number]     ABO/RH TYPE AND SCREEN        Emergency Department Course & Assessments:  Interventions:  Medications   HYDROmorphone (PF) (DILAUDID) injection 0.5 mg (0.5 mg Intravenous $Given 2/25/23 2223)      Independent Interpretation (X-rays, CTs, rhythm strip):  I personally reviewed her x-rays, noting a displaced periprosthetic fracture of the right distal femur    Consultations/Discussion of Management or Tests:  1949 I spoke with Dr. Helms of orthopedic surgery regarding patient's presentation, findings, and plan of care.  He has reviewed x-rays, recommends admission to the [facility], does not think that transfer is indicated.    2041 I spoke with Dr. Helms of orthopedic surgery regarding patient's presentation, findings, and plan of care.  On further review, he recommends that patient be transferred to a higher level trauma center due to the complex anatomy of her periprosthetic fracture as well as anticipated multiday delay to  potential access to an orthopedic subspecialist here at [facility] who might be able to care for this injury.    I spoke with the [facility] ED [facility], requested trauma transfer to the .    2054 I spoke with Dr. Kang of [facility] ED regarding patient's presentation, findings, and plan of care. She is willing to tentatively accept the patient to the ED as a transfer if the [facility] orthopedist agrees to provide orthopedic consultation.    2057 I spoke with Dr. Campa of Orthopedic surgery team at [facility] regarding patient's presentation, findings, and plan of care.  He has reviewed x-rays and states that his orthopedic team can provide surgical care for this injury at [facility], with probable surgery taking place in the next few days.    2104  I spoke with Dr. Kang of [facility] ED regarding patient's presentation, findings, and plan of care. She accepts the patient for transfer.     Assessments:  ED Course as of 02/25/23 2227   Sat Feb 25, 2023 1804 I obtained history and examined the patient as noted above.     1933 I returned to check on patient. We discussed findings and plan of care.      1956 I returned to check on patient.    2048 I spoke with [facility] about patient transfer. They are looking into options and availability.    2109 I returned to check on patient. I updated her on plan of care and need for transfer.      Disposition:  The patient was transferred to [facility] ED via EMS. Dr. Kang accepted the patient for transfer.     My partner Dr. Cortes in ED aware of patient as of shift change at 2200, signed out pending EMS transfer.    Impression & Plan    Medical Decision Making:  She sustained a ground-level fall, without signs or symptoms of an acutely serious medical precipitant such as stroke, PE, intracranial hemorrhage, or other worrisome process that is identified or suspected.  Unfortunately however, from a trauma standpoint, she has sustained a significant periprosthetic fracture.  She is neurovascularly  intact.  However, the anatomy of her fracture is complex, and ultimately the on-call orthopedist here at [facility] determined that she required transfer to a higher level trauma center, and after multiple phone calls as documented above, I have arranged for her to be transferred to [facility] ED for further care including subspecialty orthopedic consultation.  Patient will be kept n.p.o. after midnight to facilitate potential surgical plans.  I notified both the patient and her son at bedside of these multiple changes in disposition and plan of care, they are gracious and understanding, agree with the plan for transfer and understand the rationale.  Her prior adjacent joint replacements will make her surgical care more complicated.  She has been given parenteral opioids with improvement in her pain, which is quite tolerable as long as she is not moving.  She has excellent distal pulses.  No signs or symptoms of serious head injury or other significant trauma at this time, so additional trauma imaging and surgical consultation was deferred though she will benefit from a tertiary exam in the coming hours.    Diagnosis:    ICD-10-CM    1. Mary Kay-prosthetic supracondylar fracture of femur, initial encounter  M97.8XXA     Z96.659       2. History of knee replacement, total, bilateral  Z96.653       3. History of hip replacement, total, bilateral  Z96.643          Scribe Disclosure:  I, Cassy Martin, am serving as a scribe at 5:47 PM on 2/25/2023 to document services personally performed by Sulaiman Garcia MD based on my observations and the provider's statements to me.     2/25/2023   Sulaiman Garcia MD Reitsema, Jeffrey Alan, MD  02/25/23 0206

## 2023-02-26 ENCOUNTER — APPOINTMENT (OUTPATIENT)
Dept: GENERAL RADIOLOGY | Facility: CLINIC | Age: 88
DRG: 481 | End: 2023-02-26
Attending: ORTHOPAEDIC SURGERY
Payer: COMMERCIAL

## 2023-02-26 ENCOUNTER — ANESTHESIA (OUTPATIENT)
Dept: SURGERY | Facility: CLINIC | Age: 88
DRG: 481 | End: 2023-02-26
Payer: COMMERCIAL

## 2023-02-26 ENCOUNTER — ANESTHESIA EVENT (OUTPATIENT)
Dept: SURGERY | Facility: CLINIC | Age: 88
DRG: 481 | End: 2023-02-26
Payer: COMMERCIAL

## 2023-02-26 ENCOUNTER — HOSPITAL ENCOUNTER (INPATIENT)
Facility: CLINIC | Age: 88
LOS: 8 days | Discharge: ANOTHER HOSPITAL | DRG: 481 | End: 2023-03-06
Attending: EMERGENCY MEDICINE | Admitting: STUDENT IN AN ORGANIZED HEALTH CARE EDUCATION/TRAINING PROGRAM
Payer: COMMERCIAL

## 2023-02-26 ENCOUNTER — TRANSFERRED RECORDS (OUTPATIENT)
Dept: HEALTH INFORMATION MANAGEMENT | Facility: CLINIC | Age: 88
End: 2023-02-26

## 2023-02-26 ENCOUNTER — APPOINTMENT (OUTPATIENT)
Dept: CARDIOLOGY | Facility: CLINIC | Age: 88
DRG: 481 | End: 2023-02-26
Attending: INTERNAL MEDICINE
Payer: COMMERCIAL

## 2023-02-26 ENCOUNTER — APPOINTMENT (OUTPATIENT)
Dept: GENERAL RADIOLOGY | Facility: CLINIC | Age: 88
DRG: 481 | End: 2023-02-26
Payer: COMMERCIAL

## 2023-02-26 DIAGNOSIS — Z96.659 PERI-PROSTHETIC SUPRACONDYLAR FRACTURE OF FEMUR, INITIAL ENCOUNTER: ICD-10-CM

## 2023-02-26 DIAGNOSIS — Z96.643 H/O BILATERAL HIP REPLACEMENTS: ICD-10-CM

## 2023-02-26 DIAGNOSIS — Z96.649 PERIPROSTHETIC FRACTURE OF SHAFT OF FEMUR: ICD-10-CM

## 2023-02-26 DIAGNOSIS — F41.9 ANXIETY: ICD-10-CM

## 2023-02-26 DIAGNOSIS — Z96.653 HISTORY OF BILATERAL KNEE REPLACEMENT: ICD-10-CM

## 2023-02-26 DIAGNOSIS — M97.8XXA PERI-PROSTHETIC SUPRACONDYLAR FRACTURE OF FEMUR, INITIAL ENCOUNTER: ICD-10-CM

## 2023-02-26 DIAGNOSIS — E66.01 SEVERE OBESITY (BMI >= 40) (H): ICD-10-CM

## 2023-02-26 DIAGNOSIS — M97.8XXA PERIPROSTHETIC FRACTURE OF SHAFT OF FEMUR: ICD-10-CM

## 2023-02-26 DIAGNOSIS — I50.32 CHRONIC DIASTOLIC HEART FAILURE (H): ICD-10-CM

## 2023-02-26 DIAGNOSIS — J02.9 SORE THROAT: Chronic | ICD-10-CM

## 2023-02-26 DIAGNOSIS — R68.2 DRY MOUTH: Primary | Chronic | ICD-10-CM
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
## 2023-02-26 DIAGNOSIS — M51.369 DDD (DEGENERATIVE DISC DISEASE), LUMBAR: ICD-10-CM

## 2023-02-26 LAB
ABO/RH(D): NORMAL
ANION GAP SERPL CALCULATED.3IONS-SCNC: 12 MMOL/L (ref 7–15)
ANTIBODY SCREEN: NEGATIVE
APTT PPP: 31 SECONDS (ref 22–38)
ATRIAL RATE - MUSE: 88 BPM
BASE EXCESS BLDA CALC-SCNC: 0.8 MMOL/L (ref -9.6–2)
BUN SERPL-MCNC: 18.6 MG/DL (ref 8–23)
CA-I BLD-MCNC: 4.5 MG/DL (ref 4.4–5.2)
CALCIUM SERPL-MCNC: 8.9 MG/DL (ref 8.8–10.2)
CHLORIDE SERPL-SCNC: 98 MMOL/L (ref 98–107)
CREAT SERPL-MCNC: 0.6 MG/DL (ref 0.51–0.95)
DEPRECATED HCO3 PLAS-SCNC: 26 MMOL/L (ref 22–29)
DIASTOLIC BLOOD PRESSURE - MUSE: NORMAL MMHG
ERYTHROCYTE [DISTWIDTH] IN BLOOD BY AUTOMATED COUNT: 13.7 % (ref 10–15)
GFR SERPL CREATININE-BSD FRML MDRD: 87 ML/MIN/1.73M2
GLUCOSE BLD-MCNC: 117 MG/DL (ref 70–99)
GLUCOSE SERPL-MCNC: 158 MG/DL (ref 70–99)
HCO3 BLDA-SCNC: 26 MMOL/L (ref 21–28)
HCT VFR BLD AUTO: 38.5 % (ref 35–47)
HGB BLD-MCNC: 11 G/DL (ref 11.7–15.7)
HGB BLD-MCNC: 12.3 G/DL (ref 11.7–15.7)
INR PPP: 1.04 (ref 0.85–1.15)
INR PPP: 1.07 (ref 0.85–1.15)
INTERPRETATION ECG - MUSE: NORMAL
LACTATE BLD-SCNC: 1.4 MMOL/L
LVEF ECHO: NORMAL
MAGNESIUM SERPL-MCNC: 2.4 MG/DL (ref 1.7–2.3)
MCH RBC QN AUTO: 28.9 PG (ref 26.5–33)
MCHC RBC AUTO-ENTMCNC: 31.9 G/DL (ref 31.5–36.5)
MCV RBC AUTO: 91 FL (ref 78–100)
O2/TOTAL GAS SETTING VFR VENT: 37 %
P AXIS - MUSE: 61 DEGREES
PCO2 BLDA: 42 MM HG (ref 35–45)
PH BLDA: 7.4 [PH] (ref 7.35–7.45)
PLATELET # BLD AUTO: 223 10E3/UL (ref 150–450)
PO2 BLDA: 100 MM HG (ref 80–105)
POTASSIUM BLD-SCNC: 4.1 MMOL/L (ref 3.5–5)
POTASSIUM SERPL-SCNC: 4.3 MMOL/L (ref 3.4–5.3)
PR INTERVAL - MUSE: 168 MS
QRS DURATION - MUSE: 82 MS
QT - MUSE: 388 MS
QTC - MUSE: 469 MS
R AXIS - MUSE: -9 DEGREES
RBC # BLD AUTO: 4.25 10E6/UL (ref 3.8–5.2)
SODIUM BLD-SCNC: 135 MMOL/L (ref 133–144)
SODIUM SERPL-SCNC: 136 MMOL/L (ref 136–145)
SPECIMEN EXPIRATION DATE: NORMAL
SYSTOLIC BLOOD PRESSURE - MUSE: NORMAL MMHG
T AXIS - MUSE: 33 DEGREES
VENTRICULAR RATE- MUSE: 88 BPM
WBC # BLD AUTO: 7.9 10E3/UL (ref 4–11)

## 2023-02-26 PROCEDURE — 84132 ASSAY OF SERUM POTASSIUM: CPT

## 2023-02-26 PROCEDURE — 272N000001 HC OR GENERAL SUPPLY STERILE: Performed by: ORTHOPAEDIC SURGERY

## 2023-02-26 PROCEDURE — 99207 PR NO CHARGE LOS: CPT | Performed by: INTERNAL MEDICINE

## 2023-02-26 PROCEDURE — 258N000003 HC RX IP 258 OP 636: Performed by: NURSE ANESTHETIST, CERTIFIED REGISTERED

## 2023-02-26 PROCEDURE — 80048 BASIC METABOLIC PNL TOTAL CA: CPT | Performed by: STUDENT IN AN ORGANIZED HEALTH CARE EDUCATION/TRAINING PROGRAM

## 2023-02-26 PROCEDURE — 250N000013 HC RX MED GY IP 250 OP 250 PS 637: Performed by: ORTHOPAEDIC SURGERY

## 2023-02-26 PROCEDURE — 999N000141 HC STATISTIC PRE-PROCEDURE NURSING ASSESSMENT: Performed by: ORTHOPAEDIC SURGERY

## 2023-02-26 PROCEDURE — 85610 PROTHROMBIN TIME: CPT | Performed by: STUDENT IN AN ORGANIZED HEALTH CARE EDUCATION/TRAINING PROGRAM

## 2023-02-26 PROCEDURE — 99285 EMERGENCY DEPT VISIT HI MDM: CPT | Mod: 25 | Performed by: EMERGENCY MEDICINE

## 2023-02-26 PROCEDURE — 120N000002 HC R&B MED SURG/OB UMMC

## 2023-02-26 PROCEDURE — 360N000084 HC SURGERY LEVEL 4 W/ FLUORO, PER MIN: Performed by: ORTHOPAEDIC SURGERY

## 2023-02-26 PROCEDURE — 250N000011 HC RX IP 250 OP 636: Performed by: ORTHOPAEDIC SURGERY

## 2023-02-26 PROCEDURE — 99223 1ST HOSP IP/OBS HIGH 75: CPT | Mod: AI | Performed by: STUDENT IN AN ORGANIZED HEALTH CARE EDUCATION/TRAINING PROGRAM

## 2023-02-26 PROCEDURE — 86901 BLOOD TYPING SEROLOGIC RH(D): CPT | Performed by: EMERGENCY MEDICINE

## 2023-02-26 PROCEDURE — 999N000065 XR FEMUR PORT RIGHT 2 VIEWS: Mod: RT

## 2023-02-26 PROCEDURE — 250N000009 HC RX 250: Performed by: STUDENT IN AN ORGANIZED HEALTH CARE EDUCATION/TRAINING PROGRAM

## 2023-02-26 PROCEDURE — 99285 EMERGENCY DEPT VISIT HI MDM: CPT | Performed by: EMERGENCY MEDICINE

## 2023-02-26 PROCEDURE — 36415 COLL VENOUS BLD VENIPUNCTURE: CPT | Performed by: EMERGENCY MEDICINE

## 2023-02-26 PROCEDURE — C1769 GUIDE WIRE: HCPCS | Performed by: ORTHOPAEDIC SURGERY

## 2023-02-26 PROCEDURE — C1713 ANCHOR/SCREW BN/BN,TIS/BN: HCPCS | Performed by: ORTHOPAEDIC SURGERY

## 2023-02-26 PROCEDURE — 83735 ASSAY OF MAGNESIUM: CPT | Performed by: STUDENT IN AN ORGANIZED HEALTH CARE EDUCATION/TRAINING PROGRAM

## 2023-02-26 PROCEDURE — 370N000017 HC ANESTHESIA TECHNICAL FEE, PER MIN: Performed by: ORTHOPAEDIC SURGERY

## 2023-02-26 PROCEDURE — 85730 THROMBOPLASTIN TIME PARTIAL: CPT | Performed by: EMERGENCY MEDICINE

## 2023-02-26 PROCEDURE — 99418 PROLNG IP/OBS E/M EA 15 MIN: CPT | Performed by: STUDENT IN AN ORGANIZED HEALTH CARE EDUCATION/TRAINING PROGRAM

## 2023-02-26 PROCEDURE — 27507 TREATMENT OF THIGH FRACTURE: CPT | Mod: RT | Performed by: ORTHOPAEDIC SURGERY

## 2023-02-26 PROCEDURE — 999N000180 XR SURGERY CARM FLUORO LESS THAN 5 MIN: Mod: TC

## 2023-02-26 PROCEDURE — 86850 RBC ANTIBODY SCREEN: CPT | Performed by: EMERGENCY MEDICINE

## 2023-02-26 PROCEDURE — 250N000009 HC RX 250: Performed by: NURSE ANESTHETIST, CERTIFIED REGISTERED

## 2023-02-26 PROCEDURE — 250N000009 HC RX 250: Performed by: ORTHOPAEDIC SURGERY

## 2023-02-26 PROCEDURE — 93306 TTE W/DOPPLER COMPLETE: CPT

## 2023-02-26 PROCEDURE — 258N000003 HC RX IP 258 OP 636: Performed by: ORTHOPAEDIC SURGERY

## 2023-02-26 PROCEDURE — 85027 COMPLETE CBC AUTOMATED: CPT | Performed by: STUDENT IN AN ORGANIZED HEALTH CARE EDUCATION/TRAINING PROGRAM

## 2023-02-26 PROCEDURE — 250N000011 HC RX IP 250 OP 636: Performed by: STUDENT IN AN ORGANIZED HEALTH CARE EDUCATION/TRAINING PROGRAM

## 2023-02-26 PROCEDURE — 250N000013 HC RX MED GY IP 250 OP 250 PS 637: Performed by: STUDENT IN AN ORGANIZED HEALTH CARE EDUCATION/TRAINING PROGRAM

## 2023-02-26 PROCEDURE — 36415 COLL VENOUS BLD VENIPUNCTURE: CPT | Performed by: STUDENT IN AN ORGANIZED HEALTH CARE EDUCATION/TRAINING PROGRAM

## 2023-02-26 PROCEDURE — 710N000009 HC RECOVERY PHASE 1, LEVEL 1, PER MIN: Performed by: ORTHOPAEDIC SURGERY

## 2023-02-26 PROCEDURE — 85610 PROTHROMBIN TIME: CPT | Performed by: EMERGENCY MEDICINE

## 2023-02-26 PROCEDURE — 82330 ASSAY OF CALCIUM: CPT

## 2023-02-26 PROCEDURE — 93306 TTE W/DOPPLER COMPLETE: CPT | Mod: 26 | Performed by: INTERNAL MEDICINE

## 2023-02-26 PROCEDURE — 250N000025 HC SEVOFLURANE, PER MIN: Performed by: ORTHOPAEDIC SURGERY

## 2023-02-26 PROCEDURE — 250N000011 HC RX IP 250 OP 636: Performed by: NURSE ANESTHETIST, CERTIFIED REGISTERED

## 2023-02-26 PROCEDURE — 250N000011 HC RX IP 250 OP 636: Performed by: EMERGENCY MEDICINE

## 2023-02-26 PROCEDURE — 82803 BLOOD GASES ANY COMBINATION: CPT

## 2023-02-26 DEVICE — IMPLANTABLE DEVICE: Type: IMPLANTABLE DEVICE | Site: FEMUR | Status: FUNCTIONAL

## 2023-02-26 DEVICE — BEADED CABLE AND SLEEVE SET
Type: IMPLANTABLE DEVICE | Site: FEMUR | Status: FUNCTIONAL
Brand: DALL-MILES

## 2023-02-26 DEVICE — LOCKING SCREW, FULLY THREADED: Type: IMPLANTABLE DEVICE | Site: FEMUR | Status: FUNCTIONAL

## 2023-02-26 DEVICE — K-WIRE, STERILE: Type: IMPLANTABLE DEVICE | Site: FEMUR | Status: FUNCTIONAL

## 2023-02-26 RX ORDER — OLOPATADINE HYDROCHLORIDE 1 MG/ML
1 SOLUTION/ DROPS OPHTHALMIC 2 TIMES DAILY PRN
Status: DISCONTINUED | OUTPATIENT
Start: 2023-02-26 | End: 2023-03-06 | Stop reason: HOSPADM

## 2023-02-26 RX ORDER — GABAPENTIN 600 MG/1
600 TABLET ORAL AT BEDTIME
Status: DISCONTINUED | OUTPATIENT
Start: 2023-02-26 | End: 2023-03-06 | Stop reason: HOSPADM

## 2023-02-26 RX ORDER — HYDROMORPHONE HYDROCHLORIDE 1 MG/ML
0.2 INJECTION, SOLUTION INTRAMUSCULAR; INTRAVENOUS; SUBCUTANEOUS EVERY 5 MIN PRN
Status: DISCONTINUED | OUTPATIENT
Start: 2023-02-26 | End: 2023-02-26 | Stop reason: HOSPADM

## 2023-02-26 RX ORDER — LIDOCAINE HYDROCHLORIDE 20 MG/ML
INJECTION, SOLUTION INFILTRATION; PERINEURAL PRN
Status: DISCONTINUED | OUTPATIENT
Start: 2023-02-26 | End: 2023-02-26

## 2023-02-26 RX ORDER — FENTANYL CITRATE 50 UG/ML
25 INJECTION, SOLUTION INTRAMUSCULAR; INTRAVENOUS EVERY 5 MIN PRN
Status: DISCONTINUED | OUTPATIENT
Start: 2023-02-26 | End: 2023-02-26 | Stop reason: HOSPADM

## 2023-02-26 RX ORDER — NALOXONE HYDROCHLORIDE 0.4 MG/ML
0.2 INJECTION, SOLUTION INTRAMUSCULAR; INTRAVENOUS; SUBCUTANEOUS
Status: DISCONTINUED | OUTPATIENT
Start: 2023-02-26 | End: 2023-03-06 | Stop reason: HOSPADM

## 2023-02-26 RX ORDER — FENTANYL CITRATE 50 UG/ML
INJECTION, SOLUTION INTRAMUSCULAR; INTRAVENOUS PRN
Status: DISCONTINUED | OUTPATIENT
Start: 2023-02-26 | End: 2023-02-26

## 2023-02-26 RX ORDER — SODIUM CHLORIDE, SODIUM LACTATE, POTASSIUM CHLORIDE, CALCIUM CHLORIDE 600; 310; 30; 20 MG/100ML; MG/100ML; MG/100ML; MG/100ML
INJECTION, SOLUTION INTRAVENOUS CONTINUOUS PRN
Status: DISCONTINUED | OUTPATIENT
Start: 2023-02-26 | End: 2023-02-26

## 2023-02-26 RX ORDER — ACETAMINOPHEN 325 MG/1
975 TABLET ORAL
Status: DISCONTINUED | OUTPATIENT
Start: 2023-02-26 | End: 2023-02-26 | Stop reason: HOSPADM

## 2023-02-26 RX ORDER — NALOXONE HYDROCHLORIDE 0.4 MG/ML
0.4 INJECTION, SOLUTION INTRAMUSCULAR; INTRAVENOUS; SUBCUTANEOUS
Status: DISCONTINUED | OUTPATIENT
Start: 2023-02-26 | End: 2023-03-06 | Stop reason: HOSPADM

## 2023-02-26 RX ORDER — POLYETHYLENE GLYCOL 3350 17 G/17G
17 POWDER, FOR SOLUTION ORAL DAILY
Status: DISCONTINUED | OUTPATIENT
Start: 2023-02-26 | End: 2023-03-06 | Stop reason: HOSPADM

## 2023-02-26 RX ORDER — ONDANSETRON 4 MG/1
4 TABLET, ORALLY DISINTEGRATING ORAL EVERY 6 HOURS PRN
Status: DISCONTINUED | OUTPATIENT
Start: 2023-02-26 | End: 2023-03-06 | Stop reason: HOSPADM

## 2023-02-26 RX ORDER — LIDOCAINE 40 MG/G
CREAM TOPICAL
Status: DISCONTINUED | OUTPATIENT
Start: 2023-02-26 | End: 2023-03-06 | Stop reason: HOSPADM

## 2023-02-26 RX ORDER — CEFAZOLIN SODIUM/WATER 2 G/20 ML
2 SYRINGE (ML) INTRAVENOUS
Status: COMPLETED | OUTPATIENT
Start: 2023-02-26 | End: 2023-02-26

## 2023-02-26 RX ORDER — AMLODIPINE BESYLATE 5 MG/1
5 TABLET ORAL DAILY
Status: DISCONTINUED | OUTPATIENT
Start: 2023-02-26 | End: 2023-03-06 | Stop reason: HOSPADM

## 2023-02-26 RX ORDER — CEFAZOLIN SODIUM 1 G/3ML
1 INJECTION, POWDER, FOR SOLUTION INTRAMUSCULAR; INTRAVENOUS EVERY 8 HOURS
Status: COMPLETED | OUTPATIENT
Start: 2023-02-27 | End: 2023-02-27

## 2023-02-26 RX ORDER — PROPOFOL 10 MG/ML
INJECTION, EMULSION INTRAVENOUS PRN
Status: DISCONTINUED | OUTPATIENT
Start: 2023-02-26 | End: 2023-02-26

## 2023-02-26 RX ORDER — FLUTICASONE FUROATE AND VILANTEROL 100; 25 UG/1; UG/1
1 POWDER RESPIRATORY (INHALATION) DAILY
Status: DISCONTINUED | OUTPATIENT
Start: 2023-02-26 | End: 2023-03-06 | Stop reason: HOSPADM

## 2023-02-26 RX ORDER — HYDROMORPHONE HYDROCHLORIDE 1 MG/ML
0.5 INJECTION, SOLUTION INTRAMUSCULAR; INTRAVENOUS; SUBCUTANEOUS
Status: COMPLETED | OUTPATIENT
Start: 2023-02-26 | End: 2023-02-26

## 2023-02-26 RX ORDER — BUSPIRONE HYDROCHLORIDE 10 MG/1
10 TABLET ORAL 2 TIMES DAILY
Status: DISCONTINUED | OUTPATIENT
Start: 2023-02-26 | End: 2023-03-06 | Stop reason: HOSPADM

## 2023-02-26 RX ORDER — PHENYLEPHRINE HCL IN 0.9% NACL 50MG/250ML
.1-1 PLASTIC BAG, INJECTION (ML) INTRAVENOUS CONTINUOUS
Status: DISCONTINUED | OUTPATIENT
Start: 2023-02-26 | End: 2023-02-28

## 2023-02-26 RX ORDER — PROPOFOL 10 MG/ML
INJECTION, EMULSION INTRAVENOUS CONTINUOUS PRN
Status: DISCONTINUED | OUTPATIENT
Start: 2023-02-26 | End: 2023-02-26

## 2023-02-26 RX ORDER — GABAPENTIN 300 MG/1
300 CAPSULE ORAL 2 TIMES DAILY
Status: DISCONTINUED | OUTPATIENT
Start: 2023-02-26 | End: 2023-03-06 | Stop reason: HOSPADM

## 2023-02-26 RX ORDER — AMOXICILLIN 250 MG
1 CAPSULE ORAL 2 TIMES DAILY
Status: DISCONTINUED | OUTPATIENT
Start: 2023-02-26 | End: 2023-03-05

## 2023-02-26 RX ORDER — ONDANSETRON 2 MG/ML
INJECTION INTRAMUSCULAR; INTRAVENOUS PRN
Status: DISCONTINUED | OUTPATIENT
Start: 2023-02-26 | End: 2023-02-26

## 2023-02-26 RX ORDER — FLUTICASONE PROPIONATE 50 MCG
1 SPRAY, SUSPENSION (ML) NASAL DAILY PRN
Status: DISCONTINUED | OUTPATIENT
Start: 2023-02-26 | End: 2023-03-06 | Stop reason: HOSPADM

## 2023-02-26 RX ORDER — SIMVASTATIN 10 MG
10 TABLET ORAL AT BEDTIME
Status: DISCONTINUED | OUTPATIENT
Start: 2023-02-26 | End: 2023-03-06 | Stop reason: HOSPADM

## 2023-02-26 RX ORDER — AMOXICILLIN 250 MG
2 CAPSULE ORAL 2 TIMES DAILY
Status: DISCONTINUED | OUTPATIENT
Start: 2023-02-26 | End: 2023-03-05

## 2023-02-26 RX ORDER — CEFAZOLIN SODIUM/WATER 2 G/20 ML
2 SYRINGE (ML) INTRAVENOUS SEE ADMIN INSTRUCTIONS
Status: DISCONTINUED | OUTPATIENT
Start: 2023-02-26 | End: 2023-02-26 | Stop reason: HOSPADM

## 2023-02-26 RX ORDER — NYSTATIN 100000 [USP'U]/G
POWDER TOPICAL 3 TIMES DAILY PRN
Status: DISCONTINUED | OUTPATIENT
Start: 2023-02-26 | End: 2023-03-06 | Stop reason: HOSPADM

## 2023-02-26 RX ORDER — ACETAMINOPHEN 325 MG/1
975 TABLET ORAL ONCE
Status: COMPLETED | OUTPATIENT
Start: 2023-02-26 | End: 2023-02-26

## 2023-02-26 RX ORDER — ONDANSETRON 2 MG/ML
4 INJECTION INTRAMUSCULAR; INTRAVENOUS EVERY 6 HOURS PRN
Status: DISCONTINUED | OUTPATIENT
Start: 2023-02-26 | End: 2023-03-06 | Stop reason: HOSPADM

## 2023-02-26 RX ORDER — ALBUTEROL SULFATE 90 UG/1
2 AEROSOL, METERED RESPIRATORY (INHALATION) EVERY 4 HOURS PRN
Status: DISCONTINUED | OUTPATIENT
Start: 2023-02-26 | End: 2023-03-06 | Stop reason: HOSPADM

## 2023-02-26 RX ORDER — ONDANSETRON 4 MG/1
4 TABLET, ORALLY DISINTEGRATING ORAL EVERY 30 MIN PRN
Status: DISCONTINUED | OUTPATIENT
Start: 2023-02-26 | End: 2023-02-26 | Stop reason: HOSPADM

## 2023-02-26 RX ORDER — VANCOMYCIN HYDROCHLORIDE 1 G/20ML
INJECTION, POWDER, LYOPHILIZED, FOR SOLUTION INTRAVENOUS PRN
Status: DISCONTINUED | OUTPATIENT
Start: 2023-02-26 | End: 2023-02-26 | Stop reason: HOSPADM

## 2023-02-26 RX ORDER — DEXAMETHASONE SODIUM PHOSPHATE 4 MG/ML
INJECTION, SOLUTION INTRA-ARTICULAR; INTRALESIONAL; INTRAMUSCULAR; INTRAVENOUS; SOFT TISSUE PRN
Status: DISCONTINUED | OUTPATIENT
Start: 2023-02-26 | End: 2023-02-26

## 2023-02-26 RX ORDER — ACETAMINOPHEN 325 MG/1
975 TABLET ORAL EVERY 8 HOURS
Status: DISCONTINUED | OUTPATIENT
Start: 2023-02-26 | End: 2023-03-06 | Stop reason: HOSPADM

## 2023-02-26 RX ORDER — ONDANSETRON 2 MG/ML
4 INJECTION INTRAMUSCULAR; INTRAVENOUS EVERY 30 MIN PRN
Status: DISCONTINUED | OUTPATIENT
Start: 2023-02-26 | End: 2023-02-26 | Stop reason: HOSPADM

## 2023-02-26 RX ORDER — HYDROMORPHONE HCL IN WATER/PF 6 MG/30 ML
0.4 PATIENT CONTROLLED ANALGESIA SYRINGE INTRAVENOUS
Status: DISCONTINUED | OUTPATIENT
Start: 2023-02-26 | End: 2023-03-03

## 2023-02-26 RX ORDER — SODIUM CHLORIDE, SODIUM LACTATE, POTASSIUM CHLORIDE, CALCIUM CHLORIDE 600; 310; 30; 20 MG/100ML; MG/100ML; MG/100ML; MG/100ML
INJECTION, SOLUTION INTRAVENOUS CONTINUOUS
Status: DISCONTINUED | OUTPATIENT
Start: 2023-02-26 | End: 2023-02-26 | Stop reason: HOSPADM

## 2023-02-26 RX ORDER — OXYCODONE HYDROCHLORIDE 5 MG/1
5 TABLET ORAL EVERY 4 HOURS PRN
Status: DISCONTINUED | OUTPATIENT
Start: 2023-02-26 | End: 2023-02-27 | Stop reason: SINTOL

## 2023-02-26 RX ADMIN — ACETAMINOPHEN 975 MG: 325 TABLET, FILM COATED ORAL at 05:46

## 2023-02-26 RX ADMIN — SODIUM CHLORIDE, POTASSIUM CHLORIDE, SODIUM LACTATE AND CALCIUM CHLORIDE: 600; 310; 30; 20 INJECTION, SOLUTION INTRAVENOUS at 15:22

## 2023-02-26 RX ADMIN — PHENYLEPHRINE HYDROCHLORIDE 200 MCG: 10 INJECTION INTRAVENOUS at 15:43

## 2023-02-26 RX ADMIN — HYDROMORPHONE HYDROCHLORIDE 0.4 MG: 0.2 INJECTION, SOLUTION INTRAMUSCULAR; INTRAVENOUS; SUBCUTANEOUS at 06:16

## 2023-02-26 RX ADMIN — Medication 1 MG: at 23:41

## 2023-02-26 RX ADMIN — LIDOCAINE HYDROCHLORIDE 100 MG: 20 INJECTION, SOLUTION INFILTRATION; PERINEURAL at 15:32

## 2023-02-26 RX ADMIN — FENTANYL CITRATE 50 MCG: 50 INJECTION, SOLUTION INTRAMUSCULAR; INTRAVENOUS at 16:28

## 2023-02-26 RX ADMIN — Medication 2 G: at 16:07

## 2023-02-26 RX ADMIN — SODIUM CHLORIDE, POTASSIUM CHLORIDE, SODIUM LACTATE AND CALCIUM CHLORIDE: 600; 310; 30; 20 INJECTION, SOLUTION INTRAVENOUS at 18:16

## 2023-02-26 RX ADMIN — PROPOFOL 50 MG: 10 INJECTION, EMULSION INTRAVENOUS at 15:32

## 2023-02-26 RX ADMIN — ACETAMINOPHEN 975 MG: 325 TABLET ORAL at 14:33

## 2023-02-26 RX ADMIN — PHENYLEPHRINE HYDROCHLORIDE 200 MCG: 10 INJECTION INTRAVENOUS at 15:48

## 2023-02-26 RX ADMIN — HYDROMORPHONE HYDROCHLORIDE 0.3 MG: 1 INJECTION, SOLUTION INTRAMUSCULAR; INTRAVENOUS; SUBCUTANEOUS at 19:50

## 2023-02-26 RX ADMIN — BUSPIRONE HYDROCHLORIDE 10 MG: 10 TABLET ORAL at 09:45

## 2023-02-26 RX ADMIN — PHENYLEPHRINE HYDROCHLORIDE 100 MCG: 10 INJECTION INTRAVENOUS at 15:40

## 2023-02-26 RX ADMIN — FENTANYL CITRATE 50 MCG: 50 INJECTION, SOLUTION INTRAMUSCULAR; INTRAVENOUS at 15:32

## 2023-02-26 RX ADMIN — PHENYLEPHRINE HYDROCHLORIDE 100 MCG: 10 INJECTION INTRAVENOUS at 15:54

## 2023-02-26 RX ADMIN — FENTANYL CITRATE 25 MCG: 50 INJECTION, SOLUTION INTRAMUSCULAR; INTRAVENOUS at 17:47

## 2023-02-26 RX ADMIN — HYDROMORPHONE HYDROCHLORIDE 0.5 MG: 1 INJECTION, SOLUTION INTRAMUSCULAR; INTRAVENOUS; SUBCUTANEOUS at 01:29

## 2023-02-26 RX ADMIN — GABAPENTIN 600 MG: 600 TABLET, FILM COATED ORAL at 05:45

## 2023-02-26 RX ADMIN — HYDROMORPHONE HYDROCHLORIDE 0.4 MG: 0.2 INJECTION, SOLUTION INTRAMUSCULAR; INTRAVENOUS; SUBCUTANEOUS at 11:34

## 2023-02-26 RX ADMIN — FENTANYL CITRATE 25 MCG: 50 INJECTION, SOLUTION INTRAMUSCULAR; INTRAVENOUS at 17:52

## 2023-02-26 RX ADMIN — GABAPENTIN 300 MG: 300 CAPSULE ORAL at 11:34

## 2023-02-26 RX ADMIN — TRANEXAMIC ACID 1 G: 100 INJECTION INTRAVENOUS at 19:42

## 2023-02-26 RX ADMIN — FENTANYL CITRATE 50 MCG: 50 INJECTION, SOLUTION INTRAMUSCULAR; INTRAVENOUS at 16:34

## 2023-02-26 RX ADMIN — Medication 2 G: at 19:58

## 2023-02-26 RX ADMIN — SUGAMMADEX 200 MG: 100 INJECTION, SOLUTION INTRAVENOUS at 20:16

## 2023-02-26 RX ADMIN — PROPOFOL 30 MCG/KG/MIN: 10 INJECTION, EMULSION INTRAVENOUS at 16:17

## 2023-02-26 RX ADMIN — TRANEXAMIC ACID 1 G: 100 INJECTION INTRAVENOUS at 16:18

## 2023-02-26 RX ADMIN — HYDROMORPHONE HYDROCHLORIDE 0.5 MG: 1 INJECTION, SOLUTION INTRAMUSCULAR; INTRAVENOUS; SUBCUTANEOUS at 02:25

## 2023-02-26 RX ADMIN — Medication 0.2 MCG/KG/MIN: at 16:13

## 2023-02-26 RX ADMIN — Medication 50 MG: at 15:33

## 2023-02-26 RX ADMIN — FLUOXETINE 60 MG: 20 CAPSULE ORAL at 09:46

## 2023-02-26 RX ADMIN — HYDROMORPHONE HYDROCHLORIDE 0.2 MG: 1 INJECTION, SOLUTION INTRAMUSCULAR; INTRAVENOUS; SUBCUTANEOUS at 18:24

## 2023-02-26 RX ADMIN — FLUTICASONE FUROATE AND VILANTEROL TRIFENATATE 1 PUFF: 100; 25 POWDER RESPIRATORY (INHALATION) at 09:46

## 2023-02-26 RX ADMIN — ONDANSETRON 4 MG: 2 INJECTION INTRAMUSCULAR; INTRAVENOUS at 19:41

## 2023-02-26 RX ADMIN — DEXAMETHASONE SODIUM PHOSPHATE 4 MG: 4 INJECTION, SOLUTION INTRA-ARTICULAR; INTRALESIONAL; INTRAMUSCULAR; INTRAVENOUS; SOFT TISSUE at 17:22

## 2023-02-26 RX ADMIN — PHENYLEPHRINE HYDROCHLORIDE 200 MCG: 10 INJECTION INTRAVENOUS at 15:58

## 2023-02-26 ASSESSMENT — ACTIVITIES OF DAILY LIVING (ADL)
WEAR_GLASSES_OR_BLIND: YES
ADLS_ACUITY_SCORE: 35
DRESSING/BATHING_DIFFICULTY: YES
ADLS_ACUITY_SCORE: 35
ADLS_ACUITY_SCORE: 39
ADLS_ACUITY_SCORE: 35
DIFFICULTY_EATING/SWALLOWING: NO
TOILETING_ISSUES: YES
ADLS_ACUITY_SCORE: 39
TOILETING_ASSISTANCE: TOILETING DIFFICULTY, REQUIRES EQUIPMENT;TOILETING DIFFICULTY, ASSISTANCE 1 PERSON
ADLS_ACUITY_SCORE: 35
DOING_ERRANDS_INDEPENDENTLY_DIFFICULTY: YES
ADLS_ACUITY_SCORE: 39
ADLS_ACUITY_SCORE: 35
CHANGE_IN_FUNCTIONAL_STATUS_SINCE_ONSET_OF_CURRENT_ILLNESS/INJURY: YES
DRESSING/BATHING: BATHING DIFFICULTY, ASSISTANCE 1 PERSON;BATHING DIFFICULTY, REQUIRES EQUIPMENT
CONCENTRATING,_REMEMBERING_OR_MAKING_DECISIONS_DIFFICULTY: YES
NUMBER_OF_TIMES_PATIENT_HAS_FALLEN_WITHIN_LAST_SIX_MONTHS: 2
ADLS_ACUITY_SCORE: 35
WALKING_OR_CLIMBING_STAIRS: AMBULATION DIFFICULTY, REQUIRES EQUIPMENT
ADLS_ACUITY_SCORE: 35
ADLS_ACUITY_SCORE: 39
WALKING_OR_CLIMBING_STAIRS_DIFFICULTY: YES
FALL_HISTORY_WITHIN_LAST_SIX_MONTHS: YES
ADLS_ACUITY_SCORE: 39

## 2023-02-26 NOTE — ANESTHESIA PROCEDURE NOTES
Airway       Patient location during procedure: OR       Procedure Start/Stop Times: 2/26/2023 3:35 PM  Staff -        Anesthesiologist:  Radha Bradford MD       CRNA: Britni Alonso APRN CRNA       Performed By: CRNAIndications and Patient Condition       Indications for airway management: page-procedural       Induction type:intravenous       Mask difficulty assessment: 1 - vent by mask    Final Airway Details       Final airway type: endotracheal airway       Successful airway: ETT - single  Endotracheal Airway Details        ETT size (mm): 7.0       Cuffed: yes       Tracheal cuff pressure (cm H2O): 20       Successful intubation technique: video laryngoscopy       VL Blade Size: MAC D Blade       Grade View of Cords: 1       Adjucts: stylet       Position: Right       Measured from: lips       Secured at (cm): 21       Bite block used: None    Post intubation assessment        Placement verified by: capnometry, equal breath sounds and chest rise        Number of attempts at approach: 1       Number of other approaches attempted: 0       Secured with: silk tape       Ease of procedure: easy       Dentition: Intact and Unchanged    Medication(s) Administered   Medication Administration Time: 2/26/2023 3:35 PM

## 2023-02-26 NOTE — ANESTHESIA PREPROCEDURE EVALUATION
Anesthesia Pre-Procedure Evaluation    Patient: Carol Merida   MRN: [id number] : 1935        Procedure : Procedure(s):  Retrograde Nail Right femur          Past Medical History:   Diagnosis Date     Abnormal stress test     small area on stress thalium ?; but normal angiogram      Anemia      Anxiety      Cardiac dysrhythmia, unspecified     irregular heartbeat     CHF (congestive heart failure) (H) 2018     PETTY (dyspnea on exertion)      Hyperlipidemia LDL goal <100 2/10/2010     Hypertension goal BP (blood pressure) < 140/90 2010     Hypothyroid      Malignant neoplasm of breast (female), unspecified site     infltrating ductal     MEDICAL HISTORY OF -     fx humerus 2013.     Melanoma of skin, site unspecified      NONSPECIFIC MEDICAL HISTORY 04    fx fifth finger right hand     Obstructive sleep apnea (adult) (pediatric) 2006    CPAP      Osteoarthritis      Other chronic pain     Joint pain for many years.     Other osteoporosis      PONV (postoperative nausea and vomiting)      Tubular adenoma in colon     colonoscopy due       Past Surgical History:   Procedure Laterality Date     ARTHROPLASTY HIP Left 2015    Procedure: ARTHROPLASTY HIP;  Surgeon: Junior Giordano MD;  Location: [facility] OR     ARTHROPLASTY HIP Right 2016    Procedure: ARTHROPLASTY HIP;  Surgeon: Junior Giordano MD;  Location: [facility] OR     ARTHROPLASTY REVISION HIP Left 2015    Procedure: ARTHROPLASTY REVISION HIP;  Surgeon: Junior Giordano MD;  Location: [facility] OR     CATARACT IOL, RT/LT       COLONOSCOPY      tubular adenoma; repeat 3 years.     COLONOSCOPY      normal; repeat 5 years ([place])     HYSTERECTOMY, MARGAUX  summer     and BSO     SURGICAL HISTORY OF -       left mastectomy     SURGICAL HISTORY OF -       right ankle surgery; triple arthrodesis     SURGICAL HISTORY OF -       right ankle surgery; triple arthrodesis and deltoid  reconstruction; possible other     SURGICAL HISTORY OF -   2/10    removal of hardware from right ankle     [facility] NONSPECIFIC PROCEDURE      Knee replacement x 2 (B)     [facility] NONSPECIFIC PROCEDURE      s/p Tonsillectomy (college)     [facility] NONSPECIFIC PROCEDURE      s/p Appy (high school)     [facility] NONSPECIFIC PROCEDURE      Melanoma removed with sentinel node bx     [facility] NONSPECIFIC PROCEDURE       bilateral cataract      Allergies   Allergen Reactions     Ace Inhibitors Cough      Social History     Tobacco Use     Smoking status: Never     Smokeless tobacco: Never   Substance Use Topics     Alcohol use: Yes     Alcohol/week: 0.0 - 0.8 standard drinks     Comment: 1 glass wine weekly      Wt Readings from Last 1 Encounters:   02/26/23 118.8 kg (262 lb)        Anesthesia Evaluation   Pt has had prior anesthetic. Type: General.    History of anesthetic complications  - PONV.      ROS/MED HX  ENT/Pulmonary: Comment: Chronic cough  RA sats 91-92 in preop    (+) sleep apnea, uses CPAP,     Neurologic: Comment: Patient required assistance operating knob for andressa hugger to understand rotating left would decrease temp      Cardiovascular:     (+) Dyslipidemia hypertension-----Taking blood thinners CHF Previous cardiac testing   Echo: Date: 2/26/23 Results:  Global and regional left ventricular function is hyperkinetic with an EF >70%.  Left ventricular diastolic function is normal.  Right ventricular function, chamber size, wall motion, and thickness are  normal.  Both atria appear normal.  Pulmonary artery systolic pressure is normal.  The inferior vena cava is normal.  No pericardial effusion is present.  Stress Test: Date: 2022 Results:  The nuclear stress test is probably abnormal. There is a medium sized area of infarction in the mid to basal inferior, inferolateral and inferoseptal segment(s) of the left ventricle. The left ventricular ejection fraction at stress is 92%. Left ventricular function is hyperdynamic.  ECG  Reviewed: Date: Results:    Cath: Date: Results:   (-) murmur   METS/Exercise Tolerance: 1 - Eating, dressing    Hematologic: Comments: inr 1   (-) anemia   Musculoskeletal: Comment: H/o b/l knee and hip replacements      GI/Hepatic:  - neg GI/hepatic ROS     Renal/Genitourinary:  - neg Renal ROS     Endo:     (+) thyroid problem, hypothyroidism, Obesity,     Psychiatric/Substance Use:     (+) psychiatric history anxiety and depression     Infectious Disease:       Malignancy:   (+) Malignancy, History of Skin.Skin CA Remission status post.        Other:      (+) , H/O Chronic Pain,        Physical Exam    Airway      Comment: Thick neck    Mallampati: III   TM distance: < 3 FB   Neck ROM: full   Mouth opening: > 3 cm    Respiratory Devices and Support         Dental       (+) Modest Abnormalities - crowns, retainers, 1 or 2 missing teeth      Cardiovascular          Rhythm and rate: regular and normal   (+) peripheral edema   (-) no murmur    Pulmonary   pulmonary exam normal        breath sounds clear to auscultation           OUTSIDE LABS:  CBC:   Lab Results   Component Value Date    WBC 7.9 02/26/2023    WBC 9.1 02/25/2023    HGB 12.3 02/26/2023    HGB 13.6 02/25/2023    HCT 38.5 02/26/2023    HCT 42.0 02/25/2023     02/26/2023     02/25/2023     BMP:   Lab Results   Component Value Date     02/26/2023     02/25/2023    POTASSIUM 4.3 02/26/2023    POTASSIUM 3.8 02/25/2023    CHLORIDE 98 02/26/2023    CHLORIDE 102 02/25/2023    CO2 26 02/26/2023    CO2 24 02/25/2023    BUN 18.6 02/26/2023    BUN 19.3 02/25/2023    CR 0.60 02/26/2023    CR 0.65 02/25/2023     (H) 02/26/2023     (H) 02/25/2023     COAGS:   Lab Results   Component Value Date    PTT 31 02/26/2023    INR 1.04 02/26/2023     POC:   Lab Results   Component Value Date     (H) 07/06/2015     HEPATIC:   Lab Results   Component Value Date    ALBUMIN 3.6 09/28/2022    PROTTOTAL 6.4 09/28/2022    ALT 17  09/28/2022    AST 23 09/28/2022    ALKPHOS 90 09/28/2022    BILITOTAL 0.8 09/28/2022     OTHER:   Lab Results   Component Value Date    LACT 1.2 09/28/2022    A1C 5.8 (H) 09/03/2021    LAKSHMI 8.9 02/26/2023    PHOS 3.8 08/02/2005    MAG 2.4 (H) 02/26/2023    LIPASE 4 (L) 08/09/2022    TSH 3.60 09/03/2021    CRP 1.8 07/21/2021    SED 13 07/21/2021       Anesthesia Plan    ASA Status:  3, emergent    NPO Status:  NPO Appropriate    Anesthesia Type: General.     - Airway: ETT   Induction: Intravenous.   Maintenance: Balanced.   Techniques and Equipment:     - Airway: Video-Laryngoscope, Shoulder Robert/Ramp     - Lines/Monitors: BIS, Arterial Line, 2nd IV     Consents    Anesthesia Plan(s) and associated risks, benefits, and realistic alternatives discussed. Questions answered and patient/representative(s) expressed understanding.    - Discussed:     - Discussed with:  Patient, Spouse (son and daughter also present)      - Extended Intubation/Ventilatory Support Discussed: Yes.      - Patient is DNR/DNI Status: No    Use of blood products discussed: Yes.     - Discussed with: Patient.     - Consented: consented to blood products            Reason for refusal: other.     Postoperative Care    Pain management: Multi-modal analgesia, Oral pain medications, IV analgesics.   PONV prophylaxis: Background Propofol Infusion, Ondansetron (or other 5HT-3), Dexamethasone or Solumedrol     Comments:    Other Comments: Discussed risks of anesthesia including nausea, vomiting, sore throat, dental damage, cardiopulmonary complications, blood transfusion, neurologic complications, delirium, and serious complications. Discussed that patient was at higher risk of pulmonary and cardiac complications given preexisting conditions            Radha Bradford MD

## 2023-02-26 NOTE — ED PROVIDER NOTES
History     Chief Complaint   Patient presents with     Fall     HPI  Carol Merida is a 87 year old female with PMH notable for dyspnea on exertion, JEAN, CHF, hypertension who presents to the ED with right femur fracture.  Patient sent as transfer from [facility].  Patient reports that she was trying to lower herself down into a recliner and tripped backwards with her leg curling underneath her.  Patient felt a pop in the right thigh.  She denies other areas of injury.  Patient notes mild chronic shortness of breath, feeling otherwise well recently.     Past Medical History  Past Medical History:   Diagnosis Date     Abnormal stress test     small area on stress thalium ?2003; but normal angiogram 2012     Anemia      Anxiety      Cardiac dysrhythmia, unspecified     irregular heartbeat     CHF (congestive heart failure) (H) 6/18/2018     PETTY (dyspnea on exertion)      Hyperlipidemia LDL goal <100 2/10/2010     Hypertension goal BP (blood pressure) < 140/90 7/18/2010     Hypothyroid      Malignant 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     Melanoma of skin, site unspecified      NONSPECIFIC MEDICAL HISTORY 04    fx fifth finger right hand     Obstructive sleep apnea (adult) (pediatric) 8/25/2006    CPAP      Osteoarthritis      Other chronic pain     Joint pain for many years.     Other osteoporosis      PONV (postoperative nausea and vomiting)      Tubular adenoma in colon 9/01    colonoscopy due 2004     Past Surgical History:   Procedure Laterality Date     ARTHROPLASTY HIP Left 7/6/2015    Procedure: ARTHROPLASTY HIP;  Surgeon: Junior Giordano MD;  Location: [facility] OR     ARTHROPLASTY HIP Right 11/2/2016    Procedure: ARTHROPLASTY HIP;  Surgeon: Junior Giordano MD;  Location: [facility] OR     ARTHROPLASTY REVISION HIP Left 7/22/2015    Procedure: ARTHROPLASTY REVISION HIP;  Surgeon: Junior Giordano MD;   Location: [facility] OR     CATARACT IOL, RT/LT       COLONOSCOPY  9/01    tubular adenoma; repeat 3 years.     COLONOSCOPY  9/04    normal; repeat 5 years ([facility])     HYSTERECTOMY, MARGAUX  summer 2004    and BSO     SURGICAL HISTORY OF -   9/05    left mastectomy     SURGICAL HISTORY OF -   2008    right ankle surgery; triple arthrodesis     SURGICAL HISTORY OF -   5/09    right ankle surgery; triple arthrodesis and deltoid reconstruction; possible other     SURGICAL HISTORY OF -   2/10    removal of hardware from right ankle     [facility] NONSPECIFIC PROCEDURE      Knee replacement x 2 (B)     [facility] NONSPECIFIC PROCEDURE      s/p Tonsillectomy (college)     [facility] NONSPECIFIC PROCEDURE      s/p Appy (high school)     [facility] NONSPECIFIC PROCEDURE      Melanoma removed with sentinel node bx     [facility] NONSPECIFIC PROCEDURE       bilateral cataract     No current outpatient medications on file.    Allergies   Allergen Reactions     Ace Inhibitors Cough     Family History  Family History   Problem Relation Age of Onset     No Known Problems Brother         brain tumor related to shingles in his eye     Arthritis Mother      Hypertension Father      Cancer Father         lung     Cancer Grandchild         terratoma tumors     Breast Cancer Daughter      Social History   Social History     Tobacco Use     Smoking status: Never     Smokeless tobacco: Never   Vaping Use     Vaping Use: Never used   Substance Use Topics     Alcohol use: Yes     Alcohol/week: 0.0 - 0.8 standard drinks     Comment: 1 glass wine weekly     Drug use: Never         A medically appropriate review of systems was performed with pertinent positives and negatives noted in the HPI, and all other systems negative.    Physical Exam   BP: 116/75  Pulse: 87  Temp: 98.5  F (36.9  C)  Resp: 16  Weight: 118.8 kg (262 lb)  SpO2: 93 %    Physical Exam  General: no acute distress. Appears stated age.   HENT: MMM, no oropharyngeal lesions  Eyes: PERRL, normal sclerae  Neck: non-tender,  supple  Cardio: regular rate. Regular rhythm. Extremities well perfused  Resp: Normal work of breathing, normal respiratory rate.  Chest/Back: no visual signs of trauma, no CVA tenderness, no chest wall tenderness  Abdomen: no tenderness, non-distended, no rebound, no guarding  Neuro: alert and fully oriented. CN II-XII grossly intact. Grossly normal strength and sensation in all extremities.   MSK: tenderness throughout the right thigh and knee. Otherwise, no deformities. Grossly normal ROM in extremities.   Integumentary/Skin: no rash visualized, normal color  Psych: normal affect, normal behavior    ED Course      Procedures          Labs Ordered and Resulted from Time of ED Arrival to Time of ED Departure   INR - Normal       Result Value    INR 1.07     PARTIAL THROMBOPLASTIN TIME - Normal    aPTT 31     TYPE AND SCREEN, ADULT    ABO/[facility](D) A POS      Antibody Screen Negative      SPECIMEN EXPIRATION DATE [id number]     [facility] TYPE AND SCREEN     Echo Complete    (Results Pending)            Medical Decision Making  The patient's presentation was of moderate complexity (an acute complicated injury).    The patient's evaluation involved:  Review of 3+ tests ordered prior to this encounter (CBC, BMP, imaging).  review of external note(s) from 1 sources (see separate area of note for details)  independent interpretation of testing performed by another health professional (femur XR, knee XR, pelvis XR, femur CT from [facility])  discussion of management or test interpretation with another health professional (see separate area of note for details)    The patient's management necessitated high risk (a parenteral controlled substance) and high risk (a decision regarding hospitalization).      Assessments & Plan (with Medical Decision Making)   Patient presenting with fall from standing that resulted in periprosthetic femur fracture. Vitals in the ED unremarkable. Nursing notes reviewed.  Imaging from [facility]  reviewed, agree with interpretation of periprosthetic distal femur fracture near knee replacement hardware.    History and exam notable for pain and tenderness only in the right leg, no other areas of injury.  No indication for trauma service consultation.  Orthopedics resident was contacted, and recommended admission to medicine on the [facility] with plan for operative repair this coming day.    In the ED, the patient's symptoms were managed with hydromorphone, with improvement in symptoms upon reassessment.     After counseling on the diagnosis, work-up, and treatment plan, the patient was admitted to medicine on the [facility].  Case discussed with admitting hospitalist.      Final diagnoses:   Periprosthetic fracture of shaft of femur   Fall from standing, initial encounter     Current Discharge Medication List        --  Robbin Contreras MD   Emergency Medicine   [facility] EMERGENCY DEPARTMENT  2/25/2023     Robbin Contreras MD  02/26/23 0454

## 2023-02-26 NOTE — PLAN OF CARE
Discussed with ortho   See H and P from Dr Boyle at 0520 am   Paged echo tech . Will be done at noon   Anesthesia consulted  Discussed with RN and charge nurse and care rounds

## 2023-02-26 NOTE — PHARMACY-ADMISSION MEDICATION HISTORY
Pharmacy obtained medication history at [facility] on 2/25/223. Please see  Note by Kathy Jolley for details.

## 2023-02-26 NOTE — ED TRIAGE NOTES
Triage Assessment & Note:    /75   Pulse 87   Temp 98.5  F (36.9  C) (Oral)   Resp 16   LMP  (LMP Unknown)   SpO2 93%       Patient BIBA from [facility] for a surgical consult for right femur fx. Pt is alert and oriented. Vitals stable. Good cap refill, 2+ pedal pulses.          Yasmeen PAUL RN  February 26, 2023           Triage Assessment     Row Name 02/26/23 0055       Triage Assessment (Adult)    Airway WDL WDL       Respiratory WDL    Respiratory WDL WDL       Skin Circulation/Temperature WDL    Skin Circulation/Temperature WDL WDL       Cardiac WDL    Cardiac WDL WDL       Peripheral/Neurovascular WDL    Peripheral Neurovascular WDL neurovascular assessment lower;pulse assessment;WDL    Pulse Assessment dorsalis pedis       RLE Neurovascular Assessment    Temperature RLE warm    Color RLE no discoloration    Sensation RLE tenderness present       Pulse Dorsalis Pedis    Right Dorsalis Pedis Pulse 2+ (normal)       Cognitive/Neuro/Behavioral WDL    Cognitive/Neuro/Behavioral WDL WDL

## 2023-02-26 NOTE — ANESTHESIA PROCEDURE NOTES
Arterial Line Procedure Note    Pre-Procedure   Staff -        Anesthesiologist:  Radha Bradford MD       Performed By: anesthesiologist       Location: OR       Pre-Anesthestic Checklist: patient identified, IV checked, risks and benefits discussed, informed consent, monitors and equipment checked and pre-op evaluation  Timeout:       Correct Patient: Yes        Correct Procedure: Yes        Correct Site: Yes        Correct Position: Yes   Line Placement:   This line was placed Post Induction  Procedure   Procedure: arterial line       Diagnosis: chf       Laterality: right       Insertion Site: radial.  Sterile Prep        Standard elements of sterile barrier followed       Skin prep: Chloraprep  Insertion/Injection        Technique: ultrasound guided and Seldinger Technique        1. Ultrasound was used to evaluate the access site.       2. Artery evaluated via ultrasound for patency/adequacy.       3. Using real-time ultrasound the needle/catheter was observed entering the artery/vein.       Catheter Type/Size: 20 G, 1.75 in/4.5 cm quick cath (integral wire)  Narrative         Secured by: suture       Tegaderm dressing used.       Arterial waveform: Yes        IBP within 10% of NIBP: Yes   Comments:  2 attempts. Small hematoma at site of first attempt

## 2023-02-26 NOTE — PHARMACY-ADMISSION MEDICATION HISTORY
Admission medication history interview status for this patient is complete. See [facility] admission navigator for allergy information, prior to admission medications and immunization status.     Medication history interview done, indicate source(s): Patient  Medication history resources (including written lists, pill bottles, clinic record): SureScripts and Care Everywhere  Pharm[facility]: [facility]    Changes made to PTA medication list:  Added: -  Changed: celebrex bid --> bid prn. Lasix daily --> daily prn swelling.   Reported as Not Taking: -  Removed: senna-doc    Actions taken by pharmacist (provider contacted, etc):None     Additional medication history information:None    Medication reconciliation/reorder completed by provider prior to medication history?  N   (Y/N)     Medication Affordability:  Not including over the counter (OTC) medications, was there a time in the past 12 months when you did not take your medications as prescribed because of cost?: No       Prior to Admission medications    Medication Sig Last Dose Taking? Auth Provider Long Term End Date   ACE/ARB/ARNI NOT PRESCRIBED (INTENTIONAL) Please choose reason not prescribed, below  Yes Yessica Lora MD     acetaminophen (TYLENOL) 650 MG CR tablet Take 1 tablet (650 mg) by mouth every 8 hours as needed for mild pain or fever 2/25/2023 at AM Yes Stella Cutler MD     albuterol (PROAIR HFA/PROVENTIL HFA/VENTOLIN HFA) 108 (90 Base) MCG/ACT inhaler INHALE 2 PUFFS INTO THE LUNGS EVERY 4 HOURS AS NEEDED FOR SHORTNESS OF BREATH OR DIFFICULT BREATHING OR WHEEZING Past Week Yes Stella Cutler MD Yes    ALPRAZolam (XANAX) 0.25 MG tablet TAKE ONE TABLET BY MOUTH TWICE A DAY AS NEEDED FOR ANXIETY. DON'T TAKE WITH OXYCODONE. More than a month at PRN Yes Olya Polo PA-C     amLODIPine (NORVASC) 5 MG tablet Take 1 tablet (5 mg) by mouth daily 2/25/2023 at AM Yes Patrick Montano MD Yes    BETA BLOCKER NOT PRESCRIBED (INTENTIONAL)  Beta Blocker not prescribed intentionally due to EF > 40 % (ejection fraction) will leave up to Cardiology.  Yes Yessica Lora MD     busPIRone (BUSPAR) 10 MG tablet Take 1 tablet (10 mg) by mouth 2 times daily 2/25/2023 at AM Yes Brielle Calderon MD Yes    calcium carbonate 600 mg-vitamin D 400 units (CALTRATE) 600-400 MG-UNIT per tablet Take 1 tablet by mouth every evening  2/24/2023 at PM Yes Yessica Lora MD     celecoxib (CELEBREX) 100 MG capsule Take 100 mg by mouth 2 times daily as needed for moderate pain (4-6) 2/24/2023 at PM Yes Unknown, Entered By History No    diclofenac (VOLTAREN) 1 % topical gel Apply 4 g topically 4 times daily as needed for moderate pain Past Month at PRN Yes Stella Cutler MD     FLUoxetine (PROZAC) 20 MG capsule Take 3 capsules (60 mg) by mouth daily 2/25/2023 at AM Yes Stella Cutler MD Yes    fluticasone (FLONASE) 50 MCG/ACT nasal spray Spray 1 spray into both nostrils daily as needed for rhinitis Past Week at PRN Yes Stella Cutler MD     fluticasone-salmeterol (WIXELA INHUB) 100-50 MCG/ACT inhaler Inhale 1 puff into the lungs 2 times daily 2/25/2023 at AM Yes Stella Cutler MD Yes    furosemide (LASIX) 20 MG tablet Take 20 mg by mouth daily as needed (feet/ankle swelling) Past Month at PRN Yes Unknown, Entered By History No    gabapentin (NEURONTIN) 300 MG capsule TAKE ONE CAPSULE BY MOUTH EVERY MORNING AND AT NOON, AND TAKE TWO CAPSULES EVERY NIGHT 2/25/2023 at 1200 Yes Stella Cutler MD Yes    levothyroxine (SYNTHROID/LEVOTHROID) 88 MCG tablet Take 1 tablet (88 mcg) by mouth daily 2/25/2023 at AM Yes Stella Cutler MD Yes    nystatin (NYSTOP) 031382 UNIT/GM external powder Apply tid to affectead area prn More than a month at PRN Yes Stella Cutler MD     olopatadine (PATANOL) 0.1 % ophthalmic solution Place 1 drop into both eyes 2 times daily as needed for allergies Past Month at PRN Yes Unknown, Entered By History     polyethylene glycol (MIRALAX) 17 GM/Dose  powder Take 17 g by mouth daily as needed for constipation Past Month at PRN Yes Unknown, Entered By History     simvastatin (ZOCOR) 10 MG tablet Take 1 tablet (10 mg) by mouth At Bedtime 2/24/2023 at PM Yes Olya Polo PA-C Yes

## 2023-02-26 NOTE — H&P
"[facility]    History and Physical - Hospitalist Service, GOLD TEAM        Date of Admission:  2/26/2023    Assessment & Plan      Carol Merida is a 87 year old female with a history of grade 1 diastolic dysfunction, hypertension and osteoarthritis presenting with a ground level fall resulting in periprosthetic fracture of her femur. Admitted to medicine for pre-op clearance.     #Periprosthetic right femur fracture  #Pain control  #Osteoarthritis   -Scheduled tylenol  -PRN oxycodone with IV dilaudid for breakthrough  -PTA gabapentin 300 mg @ 0800 and 1200, 600 mg at bedtime  -Scheduled miralax and senna while receiving opiates    Cardiovascular Risk. Surgery is time sensitive and is elevated risk. Patient has hypertension and known grade 1 diastolic dysfunction. Functional capacity is <4 METs with dyspnea on exertion. Clinical risk factors include diastolic dysfunction and elevated risk surgery (high risk surgery - vascular or open intraperitoneal/intrathoracic, ischemic heart disease, HF, cerebrovascular disease, IDDM, Cr > 2) reflecting a 10.1% risk of cardiac death, nonfatal MI, or nonfatal cardiac arrest based on RCRI (Revised Mahajan Cardiac Risk Index). Patient had a stress test in 2012 and Fall 2022, both with the same area of infarction with stress. Catheterization of in 2012 showed \"no evidence of significant coronary   narrowing that would benefit from mechanical revascularization.\" Would not benefit from repeat stress test or PCI. Appears euvolemic on exam, however, given known diastolic dysfunction and worsening PETTY, will obtain echocardiogram.     Pulmonary Risk. does have known underlying pulmonary disease. Risk for any post op pulmonary complications is high at 42.1% based on Canet Risk Index (age, pre op O2 sat, resp inf past month, pre op hgb < 10, surgical incision site, duration of surgery, emergency surgery). Patient may proceed with surgery " "pending echo as above. Would encourage CPAP use at night.     #Diastolic dysfunction, grade 1  #Dyspnea on exertion  Patient had a stress test in 2012 and Fall 2022, both with the same area of infarction with stress. Catheterization of in 2012 showed \"no evidence of significant coronary narrowing that would benefit from mechanical revascularization.\" Would not benefit from repeat stress test or PCI. Appears euvolemic on exam, however, given known diastolic dysfunction and worsening PETTY, will obtain echocardiogram. Per outpatient work up and patient report, unclear if PETTY is cardiac or respiratory in nature, on scheduled inhaled medications as well, though spirometry in 2018 was normal.   -echo in AM  -Continue PTA statin  -PTA wixela inhub ordered as breo ellipta  -PRN albuterol    #Lower extremity swelling  Uses Lasix as need, currently without swelling. Did not order home prn lasix.     #Hypertension  -Continue PTA amlodipine    #Anxiety  #Depression  -PTA    #JEAN  CPAP at night       Diet: NPO per Anesthesia Guidelines for Procedure/Surgery Except for: Meds    DVT Prophylaxis: Pharmacologic VTE Prophylaxis contraindicated due to upcoming procedure  Benson Catheter: Not present  Lines: None     Cardiac Monitoring: None  Code Status: Full Code      Clinically Significant Risk Factors Present on Admission                  # Hypertension: home medication list includes antihypertensive(s)              Disposition Plan      Expected Discharge Date: 02/28/2023                  Hilario Boyle MD  Hospitalist Service, [facility]  Securely message with [employer] (more info)  Text page via [facility] Paging/Directory   See signed in provider for up to date coverage information    ______________________________________________________________________    Chief Complaint   Mechanical fall    History is obtained from the patient    History of Present Illness   Carol HARDY" "Fuad is a 87 year old female who fell in her senior living facility resulting in femur fracture. She has a history of arthritis s/p knee and hip replacements. She was in her usual state of health, was walking from her kitchen to her living room with her walker, then attempted to back up into her chair and fell. She heard a pop in her right thigh and was unable to bear weight. She denies dizziness, light headedness, or loss of consciousness. She reports hitting her head on her chair, but denies headache or neck pain. She has no chest pain. She has dyspnea on exertion which has been ongoing for several years now. She reports that it comes and goes, but that over the past year or so, the days that she has PETTY have become more frequent. She denies dyspnea at rest.     She underwent a stress test in August that showed an inducible inferior wall motion abnormality which was unchanged from a stress test that she had in 2012. After her stress test in 2012, she underwent coronary catheterization which revealed \"no evidence of significant coronary narrowing that would benefit from mechanical revascularization.\" Last complete echo was in November 2021, with normal EF and indeterminate diastolic function. She has a known history of grade 1 diastolic dysfunction demonstrated on previous echocardiograms     After her fall, she was brought to [facility] ED and found to have a periprosthetic femur fracture. Ortho at [facility] felt she needed to be transferred to a higher level trauma center. She was sent to [place] ED. [place] [facility]          Past Medical History    Past Medical History:   Diagnosis Date     Abnormal stress test     small area on stress thalium ?2003; but normal angiogram 2012     Anemia      Anxiety      Cardiac dysrhythmia, unspecified     irregular heartbeat     CHF (congestive heart failure) (H) 6/18/2018     PETTY (dyspnea on exertion)      Hyperlipidemia LDL goal <100 " 2/10/2010     Hypertension goal BP (blood pressure) < 140/90 7/18/2010     Hypothyroid      Malignant 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     Melanoma of skin, site unspecified      NONSPECIFIC MEDICAL HISTORY 04    fx fifth finger right hand     Obstructive sleep apnea (adult) (pediatric) 8/25/2006    CPAP      Osteoarthritis      Other chronic pain     Joint pain for many years.     Other osteoporosis      PONV (postoperative nausea and vomiting)      Tubular adenoma in colon 9/01    colonoscopy due 2004       Past Surgical History   Past Surgical History:   Procedure Laterality Date     ARTHROPLASTY HIP Left 7/6/2015    Procedure: ARTHROPLASTY HIP;  Surgeon: Junior Giordano MD;  Location: [facility] OR     ARTHROPLASTY HIP Right 11/2/2016    Procedure: ARTHROPLASTY HIP;  Surgeon: Junior Giordano MD;  Location: [facility] OR     ARTHROPLASTY REVISION HIP Left 7/22/2015    Procedure: ARTHROPLASTY REVISION HIP;  Surgeon: Junior Giordano MD;  Location: [facility] OR     CATARACT IOL, RT/LT       COLONOSCOPY  9/01    tubular adenoma; repeat 3 years.     COLONOSCOPY  9/04    normal; repeat 5 years ([facility])     HYSTERECTOMY, MARGAUX  summer 2004    and BSO     SURGICAL HISTORY OF -   9/05    left mastectomy     SURGICAL HISTORY OF -   2008    right ankle surgery; triple arthrodesis     SURGICAL HISTORY OF -   5/09    right ankle surgery; triple arthrodesis and deltoid reconstruction; possible other     SURGICAL HISTORY OF -   2/10    removal of hardware from right ankle     [facility] NONSPECIFIC PROCEDURE      Knee replacement x 2 (B)     [facility] NONSPECIFIC PROCEDURE      s/p Tonsillectomy (college)     [facility] NONSPECIFIC PROCEDURE      s/p Appy (high school)     [facility] NONSPECIFIC PROCEDURE      Melanoma removed with sentinel node bx     [facility] NONSPECIFIC PROCEDURE       bilateral cataract       Prior to Admission Medications   Prior to Admission  Medications   Prescriptions Last Dose Informant Patient Reported? Taking?   ACE/ARB/ARNI NOT PRESCRIBED (INTENTIONAL)   No No   Sig: Please choose reason not prescribed, below   ALPRAZolam (XANAX) 0.25 MG tablet   No No   Sig: TAKE ONE TABLET BY MOUTH TWICE A DAY AS NEEDED FOR ANXIETY. DON'T TAKE WITH OXYCODONE.   BETA BLOCKER NOT PRESCRIBED (INTENTIONAL)   No No   Sig: Beta Blocker not prescribed intentionally due to EF > 40 % (ejection fraction) will leave up to Cardiology.   FLUoxetine (PROZAC) 20 MG capsule   No No   Sig: Take 3 capsules (60 mg) by mouth daily   acetaminophen (TYLENOL) 650 MG CR tablet   No No   Sig: Take 1 tablet (650 mg) by mouth every 8 hours as needed for mild pain or fever   albuterol (PROAIR HFA/PROVENTIL HFA/VENTOLIN HFA) 108 (90 Base) MCG/ACT inhaler   No No   Sig: INHALE 2 PUFFS INTO THE LUNGS EVERY 4 HOURS AS NEEDED FOR SHORTNESS OF BREATH OR DIFFICULT BREATHING OR WHEEZING   amLODIPine (NORVASC) 5 MG tablet   No No   Sig: Take 1 tablet (5 mg) by mouth daily   busPIRone (BUSPAR) 10 MG tablet   No No   Sig: Take 1 tablet (10 mg) by mouth 2 times daily   calcium carbonate 600 mg-vitamin D 400 units (CALTRATE) 600-400 MG-UNIT per tablet  Self Yes No   Sig: Take 1 tablet by mouth every evening    celecoxib (CELEBREX) 100 MG capsule   Yes No   Sig: Take 100 mg by mouth 2 times daily as needed for moderate pain (4-6)   diclofenac (VOLTAREN) 1 % topical gel   No No   Sig: Apply 4 g topically 4 times daily as needed for moderate pain   fluticasone (FLONASE) 50 MCG/ACT nasal spray   No No   Sig: Spray 1 spray into both nostrils daily as needed for rhinitis   fluticasone-salmeterol (WIXELA INHUB) 100-50 MCG/ACT inhaler   No No   Sig: Inhale 1 puff into the lungs 2 times daily   furosemide (LASIX) 20 MG tablet   Yes No   Sig: Take 20 mg by mouth daily as needed (feet/ankle swelling)   gabapentin (NEURONTIN) 300 MG capsule   No No   Sig: TAKE ONE CAPSULE BY MOUTH EVERY MORNING AND AT NOON, AND  TAKE TWO CAPSULES EVERY NIGHT   levothyroxine (SYNTHROID/LEVOTHROID) 88 MCG tablet   No No   Sig: Take 1 tablet (88 mcg) by mouth daily   nystatin (NYSTOP) 036975 UNIT/GM external powder   No No   Sig: Apply tid to affectead area prn   olopatadine (PATANOL) 0.1 % ophthalmic solution   Yes No   Sig: Place 1 drop into both eyes 2 times daily as needed for allergies   polyethylene glycol (MIRALAX) 17 GM/Dose powder   Yes No   Sig: Take 17 g by mouth daily as needed for constipation   simvastatin (ZOCOR) 10 MG tablet   No No   Sig: Take 1 tablet (10 mg) by mouth At Bedtime      Facility-Administered Medications Last Administration Doses Remaining   gabapentin (NEURONTIN) capsule 300 mg None recorded 1   oxyCODONE (ROXICODONE) tablet 10 mg None recorded 1           Physical Exam   Vital Signs: Temp: 97.2  F (36.2  C) Temp src: Oral BP: 125/50 Pulse: 85   Resp: 17 SpO2: 94 % O2 Device: Nasal cannula Oxygen Delivery: 2 LPM  Weight: 262 lbs 0 oz    Vitals: /50 (BP Location: Right arm)   Pulse 85   Temp 97.2  F (36.2  C) (Oral)   Resp 17   Wt 118.8 kg (262 lb)   LMP  (LMP Unknown)   SpO2 94%   BMI 46.41 kg/m    BMI= Body mass index is 46.41 kg/m .    Constitutional: sitting in bed, comfortable, no apparent distress  HEENT: normocephalic, atraumatic, extraocular muscles intact, pupils equal, round, and reactive to light.   CV: regular rate and rhythm, no murmurs rubs or gallops  Resp: clear to auscultation bilaterally  Abd: soft, non-tender, non-distended, no hepatosplenomegaly or masses palpated  MSK: no obvious deformity of right thigh, DP pulses and sensation intact   Neuro: alert and oriented x 3, no focal neurological deficits      Medical Decision Making       >90 MINUTES SPENT BY ME on the date of service doing chart review, history, exam, documentation & further activities per the note.      Data     I have personally reviewed the following data over the past 24 hrs:    9.1  \   13.6   / 239     139 102  19.3 /  127 (H)   3.8 24 0.65 \       INR:  1.07 PTT:  31   D-dimer:  N/A Fibrinogen:  N/A       Imaging results reviewed over the past 24 hrs:   Recent Results (from the past 24 hour(s))   XR Knee Right 1/2 Views    Narrative    EXAM: XR PELVIS 1/2 VIEWS, XR FEMUR RIGHT 2 VIEWS, XR KNEE RIGHT 1/2 VIEWS  LOCATION: [facility]  DATE/TIME: 2/25/2023 7:11 PM    INDICATION: acute pain injury to R upper leg, remote hip and knee replacements  COMPARISON: None.      Impression    IMPRESSION:   Right knee: Total knee arthroplasty. No dislocation. Acute comminuted and displaced periprosthetic fracture of the distal femur extending from the femoral component proximally into the distal shaft. Main distal fragment is displaced posteriorly by nearly   one bone width and there is mild overriding of the main fracture fragments, and apex posterior angulation.    Femur: No additional acute fracture. 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    Pelvis: Bilateral hip arthroplasties. No dislocation. No additional acute fracture. Degenerative changes in lumbar spine.    Diffuse bony demineralization.   XR Pelvis 1/2 Views    Narrative    EXAM: XR PELVIS 1/2 VIEWS, XR FEMUR RIGHT 2 VIEWS, XR KNEE RIGHT 1/2 VIEWS  LOCATION: [facility]  DATE/TIME: 2/25/2023 7:11 PM    INDICATION: acute pain injury to R upper leg, remote hip and knee replacements  COMPARISON: None.      Impression    IMPRESSION:   Right knee: Total knee arthroplasty. No dislocation. Acute comminuted and displaced periprosthetic fracture of the distal femur extending from the femoral component proximally into the distal shaft. Main distal fragment is displaced posteriorly by nearly   one bone width and there is mild overriding of the main fracture fragments, and apex posterior angulation.    Femur: No additional acute fracture. 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    Pelvis:  Bilateral hip arthroplasties. No dislocation. No additional acute fracture. Degenerative changes in lumbar spine.    Diffuse bony demineralization.   XR Femur Right 2 Views    Narrative    EXAM: XR PELVIS 1/2 VIEWS, XR FEMUR RIGHT 2 VIEWS, XR KNEE RIGHT 1/2 VIEWS  LOCATION: [facility]  DATE/TIME: 2/25/2023 7:11 PM    INDICATION: acute pain injury to R upper leg, remote hip and knee replacements  COMPARISON: None.      Impression    IMPRESSION:   Right knee: Total knee arthroplasty. No dislocation. Acute comminuted and displaced periprosthetic fracture of the distal femur extending from the femoral component proximally into the distal shaft. Main distal fragment is displaced posteriorly by nearly   one bone width and there is mild overriding of the main fracture fragments, and apex posterior angulation.    Femur: No additional acute fracture. 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    Pelvis: Bilateral hip arthroplasties. No dislocation. No additional acute fracture. Degenerative changes in lumbar spine.    Diffuse bony demineralization.   CT Femur Right w/o Contrast    Narrative    EXAM: CT FEMUR THIGH RIGHT W/O CONTRAST  LOCATION: [facility]  DATE/TIME: 2/25/2023 9:02 PM    INDICATION: evaluate femoral prosthesis for loosening, also evaluate fracture pattern for surgical planning  COMPARISON: X-rays 02/25/2023  TECHNIQUE: Noncontrast. Axial, sagittal and coronal thin-section reconstruction. Dose reduction techniques were used.     FINDINGS:   Right total knee arthroplasty with associated metallic artifact.. Acute comminuted periprosthetic fracture of the distal right femur extending into the distal shaft. The main distal articular fragment is displaced posteriorly by approximately one bone   width and there is mild overriding of this fragment with the main proximal articular fragment. There is apex posterior angulation at the main fracture site,  as well as mild varus angulation. There are separate displaced elongated bone fragments about the   main fracture site.    No additional acute fracture. No significant periprosthetic lucency to suggest loosening, within limitations of metallic artifact.    Right total hip arthroplasty. No CT evidence of loosening or acute periprosthetic fracture. There is a chronic ununited displaced right greater trochanter fracture.      Impression    IMPRESSION:  1.  Acute displaced and angulated periprosthetic fracture of the distal right femur extending into the distal shaft.

## 2023-02-26 NOTE — PLAN OF CARE
Pt A&O x's 4. VSS. Afebrile. 02 sats in the 90s on RA. Lungs clear. Denies SOB, CP or nausea. NPO since midnight except sips of water.  Bowel sound active in all quadrants. No BM but passing gas.last BM on Friday.  Voiding in to pure wick.  Pain well managed with oral and IV pain medication. CMS intact, denies N/T. Pcd on left leg.  Right forearm piv patent and SL. Pt slept between care and is able to make needs known, call light with in reach. Will continue to monitor.      Sign and held Ancef is to be released before pt goes down to surgery.

## 2023-02-26 NOTE — CONSULTS
[facility]  Orthopedic Surgery Consult    Name: Carol Merida  Age: 87 year old  MRN: [id number]  YOB: 1935    Reason for Consult: Right inner prosthetic fracture    Assessment and Plan:     Assessment:  87-year-old female with a right interprosthetic femur fracture after a fall from standing.     Plan:  -OR today for open reduction internal fixation right femur fracture  -N.p.o.  -Case request placed  -Consent obtained  -Echo for assessment of preoperative clearance (to be obtained this morning)  -Preoperative labs pending    Racquel Harris MD  Orthopedic Surgery PGY4  [phone]    History of Present Illness:     Patient was seen and examined by me. History, PMH, Meds, SH, complete ROS (10 organ systems) and PE reviewed with patient and prior medical records.      87-year-old female with past medical history detailed below.  She presents after a fall at nursing.  She reports that she was backing into her chair utilizing her walker when her leg twisted she heard a crack and fell to the floor.  She denies any thigh pain prior to the injury but does endorse some ankle pain on that side.  She currently lives alone but lives at a facility which does have escalated levels of care.    She ambulates mostly with the assistance of the power chair but does use her walker for transfers and short distances.    She has a history of bilateral total hips and total knees.  She does not believe that she is currently taking any medications that thin her blood but is unsure.  She denies any adverse reactions to anesthesia.    11/2/2016: R HUGO, Junior Giordano   - Steffi 54mm cup, 1x screw   - 36mm liner   - Steffi [facility] sz 13 high offset stem   - 36mm  +7mm head     Remote TKA, unable to locate operative reports in care everywhere. Patient unsure of timing. Likely prior to 2000s        Past Medical History:     Past Medical History:   Diagnosis Date     Abnormal stress test     small  area on stress thalium ?2003; but normal angiogram 2012     Anemia      Anxiety      Cardiac dysrhythmia, unspecified     irregular heartbeat     CHF (congestive heart failure) (H) 6/18/2018     PETTY (dyspnea on exertion)      Hyperlipidemia LDL goal <100 2/10/2010     Hypertension goal BP (blood pressure) < 140/90 7/18/2010     Hypothyroid      Malignant 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     Melanoma of skin, site unspecified      NONSPECIFIC MEDICAL HISTORY 04    fx fifth finger right hand     Obstructive sleep apnea (adult) (pediatric) 8/25/2006    CPAP      Osteoarthritis      Other chronic pain     Joint pain for many years.     Other osteoporosis      PONV (postoperative nausea and vomiting)      Tubular adenoma in colon 9/01    colonoscopy due 2004       Past Surgical History:     Past Surgical History:   Procedure Laterality Date     ARTHROPLASTY HIP Left 7/6/2015    Procedure: ARTHROPLASTY HIP;  Surgeon: Junior Giordano MD;  Location: [facility] OR     ARTHROPLASTY HIP Right 11/2/2016    Procedure: ARTHROPLASTY HIP;  Surgeon: Junior Giordano MD;  Location: [facility] OR     ARTHROPLASTY REVISION HIP Left 7/22/2015    Procedure: ARTHROPLASTY REVISION HIP;  Surgeon: Junior Giordano MD;  Location: [facility] OR     CATARACT IOL, RT/LT       COLONOSCOPY  9/01    tubular adenoma; repeat 3 years.     COLONOSCOPY  9/04    normal; repeat 5 years ([facility])     HYSTERECTOMY, MARGAUX  summer 2004    and BSO     SURGICAL HISTORY OF -   9/05    left mastectomy     SURGICAL HISTORY OF -   2008    right ankle surgery; triple arthrodesis     SURGICAL HISTORY OF -   5/09    right ankle surgery; triple arthrodesis and deltoid reconstruction; possible other     SURGICAL HISTORY OF -   2/10    removal of hardware from right ankle     [facility] NONSPECIFIC PROCEDURE      Knee replacement x 2 (B)     [facility] NONSPECIFIC PROCEDURE      s/p Tonsillectomy  (college)     [facility] NONSPECIFIC PROCEDURE      s/p Appy (high school)     [facility] NONSPECIFIC PROCEDURE      Melanoma removed with sentinel node bx     [facility] NONSPECIFIC PROCEDURE       bilateral cataract       Social History:     Social History     Socioeconomic History     Marital status:      Spouse name: Yudi     Number of children: 4     Years of education: 16   Occupational History     Occupation: [employer]     Comment: Retired   Tobacco Use     Smoking status: Never     Smokeless tobacco: Never   Vaping Use     Vaping Use: Never used   Substance and Sexual Activity     Alcohol use: Yes     Alcohol/week: 0.0 - 0.8 standard drinks     Comment: 1 glass wine weekly     Drug use: Never     Sexual activity: Not Currently     Partners: Male   Other Topics Concern     Weight Concern No     Comment: fruits and veggies.     Exercise No     Parent/sibling w/ CABG, MI or angioplasty before 65F 55M? No       Family History:     Family History   Problem Relation Age of Onset     No Known Problems Brother         brain tumor related to shingles in his eye     Arthritis Mother      Hypertension Father      Cancer Father         lung     Cancer Grandchild         terratoma tumors     Breast Cancer Daughter        Medications:     Current Facility-Administered Medications   Medication     acetaminophen (TYLENOL) tablet 975 mg     albuterol (PROVENTIL HFA/VENTOLIN HFA) inhaler     amLODIPine (NORVASC) tablet 5 mg     busPIRone (BUSPAR) tablet 10 mg     FLUoxetine (PROzac) capsule 60 mg     fluticasone (FLONASE) 50 MCG/ACT spray 1 spray     fluticasone-vilanterol (BREO ELLIPTA) 100-25 MCG/ACT inhaler 1 puff     gabapentin (NEURONTIN) capsule 300 mg     gabapentin (NEURONTIN) tablet 600 mg     HYDROmorphone (DILAUDID) injection 0.4 mg     lidocaine (LMX4) cream     lidocaine 1 % 0.1-1 mL     melatonin tablet 1 mg     naloxone (NARCAN) injection 0.2 mg    Or     naloxone (NARCAN) injection 0.4 mg    Or      naloxone (NARCAN) injection 0.2 mg    Or     naloxone (NARCAN) injection 0.4 mg     nystatin (MYCOSTATIN) topical powder     olopatadine (PATANOL) 0.1 % ophthalmic solution 1 drop     ondansetron (ZOFRAN ODT) ODT tab 4 mg    Or     ondansetron (ZOFRAN) injection 4 mg     oxyCODONE (ROXICODONE) tablet 5 mg     oxyCODONE IR (ROXICODONE) half-tab 2.5 mg     polyethylene glycol (MIRALAX) Packet 17 g     senna-docusate (SENOKOT-S/PERICOLACE) 8.6-50 MG per tablet 1 tablet    Or     senna-docusate (SENOKOT-S/PERICOLACE) 8.6-50 MG per tablet 2 tablet     simvastatin (ZOCOR) tablet 10 mg     sodium chloride (PF) 0.9% PF flush 3 mL     sodium chloride (PF) 0.9% PF flush 3 mL       Allergies:     Allergies   Allergen Reactions     Ace Inhibitors Cough       Review of Systems:     A comprehensive 10 point review of systems (constitutional, ENT, cardiac, peripheral vascular, respiratory, GI, , musculoskeletal, skin, neurological) was performed and found to be negative except as described in this note.      Physical Exam:     Vital Signs: BP 94/48 (BP Location: Right arm)   Pulse 77   Temp (!) 96.5  F (35.8  C) (Oral)   Resp 18   Wt 118.8 kg (262 lb)   LMP  (LMP Unknown)   SpO2 95%   BMI 46.41 kg/m    General: Resting comfortably in bed, awake, alert, cooperative, no apparent distress, appears stated age.  HEENT: Normocephalic, atraumatic, EOMI, no scleral icterus.  Cardiovascular: RRR assessed by peripheral pulse.  Respiratory: Breathing comfortably on room air, no wheezing.  Skin: No rashes or lesions.  Neurologic: A&Ox3, CN II-XII grossly intact  Musculoskeletal:  RLE: Foot externally rotated compared to contralateral side.  Leg slightly shortened.  No obvious deformity.  Well-healed prior total knee incision. Skin intact.  Knee and hip range of motion deferred due to no injury.  Full ankle range of motion. Fires TA/Gastroc/EHL/FHL with 5/5 strength. SILT in femoral, sural, saphenous, deep peroneal, superficial  peroneal, and tibial nerve distributions. Dorsalis pedis and posterior tibial arteries 2+ and foot warm and well-perfused with <2 seconds capillary refill.     Imaging:     Radiographs and CT of right femur demonstrated an interprosthetic distal femur fracture with evidence of comminution.  Total knee implant appears to be stable without obvious evidence of loosening.    Data:     CBC:  Lab Results   Component Value Date    WBC 7.9 02/26/2023    HGB 12.3 02/26/2023     02/26/2023     BMP:  Lab Results   Component Value Date     02/26/2023    POTASSIUM 4.3 02/26/2023    CHLORIDE 98 02/26/2023    CO2 26 02/26/2023    BUN 18.6 02/26/2023    CR 0.60 02/26/2023    ANIONGAP 12 02/26/2023    LAKSHMI 8.9 02/26/2023     (H) 02/26/2023     Inflammatory Markers:  Lab Results   Component Value Date    WBC 7.9 02/26/2023    WBC 9.1 02/25/2023    WBC 6.0 10/01/2022    CRP 1.8 07/21/2021    CRP <2.9 01/20/2016    SED 13 07/21/2021    SED 34 (H) 06/04/2018    SED 13 01/20/2016

## 2023-02-27 ENCOUNTER — APPOINTMENT (OUTPATIENT)
Dept: OCCUPATIONAL THERAPY | Facility: CLINIC | Age: 88
DRG: 481 | End: 2023-02-27
Payer: COMMERCIAL

## 2023-02-27 LAB
HGB BLD-MCNC: 9 G/DL (ref 11.7–15.7)
MAGNESIUM SERPL-MCNC: 2.1 MG/DL (ref 1.7–2.3)
POTASSIUM SERPL-SCNC: 4 MMOL/L (ref 3.4–5.3)

## 2023-02-27 PROCEDURE — 99233 SBSQ HOSP IP/OBS HIGH 50: CPT | Performed by: NURSE PRACTITIONER

## 2023-02-27 PROCEDURE — 83735 ASSAY OF MAGNESIUM: CPT | Performed by: NURSE PRACTITIONER

## 2023-02-27 PROCEDURE — 97165 OT EVAL LOW COMPLEX 30 MIN: CPT | Mod: GO

## 2023-02-27 PROCEDURE — 84132 ASSAY OF SERUM POTASSIUM: CPT | Performed by: NURSE PRACTITIONER

## 2023-02-27 PROCEDURE — 36415 COLL VENOUS BLD VENIPUNCTURE: CPT | Performed by: NURSE PRACTITIONER

## 2023-02-27 PROCEDURE — 85018 HEMOGLOBIN: CPT | Performed by: NURSE PRACTITIONER

## 2023-02-27 PROCEDURE — 120N000002 HC R&B MED SURG/OB UMMC

## 2023-02-27 PROCEDURE — 250N000013 HC RX MED GY IP 250 OP 250 PS 637: Performed by: STUDENT IN AN ORGANIZED HEALTH CARE EDUCATION/TRAINING PROGRAM

## 2023-02-27 PROCEDURE — 250N000011 HC RX IP 250 OP 636: Performed by: STUDENT IN AN ORGANIZED HEALTH CARE EDUCATION/TRAINING PROGRAM

## 2023-02-27 PROCEDURE — 97535 SELF CARE MNGMENT TRAINING: CPT | Mod: GO

## 2023-02-27 RX ORDER — SODIUM CHLORIDE 9 MG/ML
INJECTION, SOLUTION INTRAVENOUS
Status: COMPLETED
Start: 2023-02-27 | End: 2023-02-27

## 2023-02-27 RX ORDER — LEVOTHYROXINE SODIUM 88 UG/1
88 TABLET ORAL
Status: DISCONTINUED | OUTPATIENT
Start: 2023-02-28 | End: 2023-03-06 | Stop reason: HOSPADM

## 2023-02-27 RX ADMIN — GABAPENTIN 300 MG: 300 CAPSULE ORAL at 12:28

## 2023-02-27 RX ADMIN — CEFAZOLIN 1 G: 1 INJECTION, POWDER, FOR SOLUTION INTRAMUSCULAR; INTRAVENOUS at 12:28

## 2023-02-27 RX ADMIN — POLYETHYLENE GLYCOL 3350 17 G: 17 POWDER, FOR SOLUTION ORAL at 09:29

## 2023-02-27 RX ADMIN — GABAPENTIN 600 MG: 600 TABLET, FILM COATED ORAL at 00:28

## 2023-02-27 RX ADMIN — SENNOSIDES AND DOCUSATE SODIUM 1 TABLET: 50; 8.6 TABLET ORAL at 09:29

## 2023-02-27 RX ADMIN — GABAPENTIN 300 MG: 300 CAPSULE ORAL at 09:29

## 2023-02-27 RX ADMIN — ACETAMINOPHEN 975 MG: 325 TABLET, FILM COATED ORAL at 00:26

## 2023-02-27 RX ADMIN — SIMVASTATIN 10 MG: 10 TABLET, FILM COATED ORAL at 00:28

## 2023-02-27 RX ADMIN — CEFAZOLIN 1 G: 1 INJECTION, POWDER, FOR SOLUTION INTRAMUSCULAR; INTRAVENOUS at 19:32

## 2023-02-27 RX ADMIN — ACETAMINOPHEN 975 MG: 325 TABLET, FILM COATED ORAL at 21:34

## 2023-02-27 RX ADMIN — FLUTICASONE FUROATE AND VILANTEROL TRIFENATATE 1 PUFF: 100; 25 POWDER RESPIRATORY (INHALATION) at 09:29

## 2023-02-27 RX ADMIN — BUSPIRONE HYDROCHLORIDE 10 MG: 10 TABLET ORAL at 09:29

## 2023-02-27 RX ADMIN — ACETAMINOPHEN 975 MG: 325 TABLET, FILM COATED ORAL at 14:43

## 2023-02-27 RX ADMIN — ACETAMINOPHEN 975 MG: 325 TABLET, FILM COATED ORAL at 06:32

## 2023-02-27 RX ADMIN — BUSPIRONE HYDROCHLORIDE 10 MG: 10 TABLET ORAL at 19:32

## 2023-02-27 RX ADMIN — FLUOXETINE 60 MG: 20 CAPSULE ORAL at 09:25

## 2023-02-27 RX ADMIN — SENNOSIDES AND DOCUSATE SODIUM 2 TABLET: 50; 8.6 TABLET ORAL at 19:32

## 2023-02-27 RX ADMIN — SIMVASTATIN 10 MG: 10 TABLET, FILM COATED ORAL at 21:34

## 2023-02-27 RX ADMIN — CEFAZOLIN 1 G: 1 INJECTION, POWDER, FOR SOLUTION INTRAMUSCULAR; INTRAVENOUS at 03:33

## 2023-02-27 RX ADMIN — GABAPENTIN 600 MG: 600 TABLET, FILM COATED ORAL at 21:34

## 2023-02-27 ASSESSMENT — ACTIVITIES OF DAILY LIVING (ADL)
ADLS_ACUITY_SCORE: 43
ADLS_ACUITY_SCORE: 47
ADLS_ACUITY_SCORE: 39
ADLS_ACUITY_SCORE: 47
ADLS_ACUITY_SCORE: 39
ADLS_ACUITY_SCORE: 45
ADLS_ACUITY_SCORE: 47
ADLS_ACUITY_SCORE: 39
ADLS_ACUITY_SCORE: 39
ADLS_ACUITY_SCORE: 45
ADLS_ACUITY_SCORE: 39
PREVIOUS_RESPONSIBILITIES: MEAL PREP;LAUNDRY;HOUSEKEEPING;MEDICATION MANAGEMENT
ADLS_ACUITY_SCORE: 39

## 2023-02-27 NOTE — OP NOTE
Orthopaedic Surgery Op-Note     Patient: Carol Merida  : 1935  Date of Service: 2023 8:05 PM    Pre-operative Diagnosis:   1. Pathologic Interprosthetic right femur fracture  2. Osteoporosis  3. Severe obesity, BMI 46.41    Post-operative Diagnosis: SAME    Procedure(s) Performed:   1. Retrograde intramedullary nail fixation of interprosthetic right femure fracture  2. Open reduction internal fixation of right interprosthetic femur fracture with lateral locking plate      Staff: Dr. Newton Lemon MD  Resident: Maria Antonia Zayas MD PGY-5, Racquel Harris MD PGY-4      Implants: 170 mm x 11 mm Dickens T2 retrograde femur nail; 16 hole Dickens lateral locking distal femur plate; cerclage cable  Drains: none  Intra-op Labs/Cxs/Specimens:   * No specimens in log *    Indication for Procedure:   Patient is a 87 year old female with history of right cemented total hip replacement and right total knee replacement, osteoporosis, morbid obesity who sustained an interprosthetic fragility fracture of the right femur when twisting her body while standing. She was indicated for open reduction and internal fixation to restore fracture length, alignment and rotation, provide fracture stability and reduce pain associated with fracture.  After appropriate discussion of risks, alternatives and benefits of surgery the patient elected to proceed with surgery.  We discussed the risk of cardiac, pulmonary, and embolic complications which can be life threatening. We also discussed risk of nonunion or hardware failure which can result in persistent pain and require additional surgery to address. No guarantees were given. Patient voluntarily signed written informed consent.       Description of Procedure:   On the day of surgery I met the patient in the preoperative holding area.  We reviewed the surgical plan. I answered the patient's questions to the best of my ability. Site was marked and consent forms were signed by  the patient and I.  OR and Anesthesia team were briefed. Patient was taken to the OR and general anesthesia administered with endotracheal intubation. IV antibiotics and tranexamic acid were administered prior to incision. Patient was positioned in the supine position with a bump under the right hip.  The surgical site was prepped and draped in sterile fashion. Pause for the cause was completed.    Using her previous midline total knee replacement incision 20 cm incision was made over the right knee.  Bovie was used to dissect down to the retinaculum.  We then created a lateral parapatellar arthrotomy to allow access for the intramedullary nail through the box for previous cruciate retaining total knee replacement.  We performed a synovectomy to include the approach to the box.  A small osteotome was used to remove some cement out of the box which was likely left over from the total knee replacement.  We then provisionally reduced the fracture and a guidewire was inserted through the box and confirmed to be midline on the AP and lateral views.  Starting with a 9 mm opening reamer we reamed the canal up to 12.5 mm.  We then took an 11 mm x 170 mm T2 retrograde femoral nail and impacted to the appropriate depth using fluoroscopy.  We then attached the guide handle and inserted 1 oblique screw and one transverse locking screw through the nail distally.  We then dissected the lateral condyle out and then slid a Bond at the lateral aspect of the femur to allow to pass a long locking plate of the femur to overlap with the femoral stem proximally.  I decided on this hybrid construct as given her severe osteoporosis and the fact that she sustained a torsional fracture of the femur just by twisting her body while standing I was quite concerned that just intramedullary fixation would have a high stress concentration between the implants to be prone to fracture.  In addition I was concerned that doing a stand-alone locking  plate would lead to a very prolonged recovery given her body habitus and decreased mobility at baseline.  My goal with doing this hybrid construct was to allow early weightbearing mobilization which has been shown to be a reasonable approach in the literature.    After measuring for the appropriate length of the plate using x-ray selected a 16 hole plate which was then slid under the muscle through our arthrotomy incision.  We used AP and lateral fluoroscopic then imaging to put the implant in appropriate position.  We then stabilize it with several K wires distally and a K wire proximally.  Again confirming on AP and lateral views of the plate was appropriately positioned.  We then began inserting first a cortical screw distally to suck the plate down to the lateral condyle.  We then inserted several distal locking screws across the metaphysis; and all we placed 5 screws through the plate including 1 which interlocked between the plate and one of the transverse static holes in the intramedullary polo.  Again confirmed our reduction and then moved to proximal fixation.  In order to maximize the working distance of the plate construct in place 6 unicortical locking screws through the screw insertion guide in a percutaneous fashion giving a 6 cortices of proximal fixation.  Given the very long working distance I wanted to reduce some of the potential motion at the fracture site so we passed a single cerclage wire around the fracture site which was then tightened using the provided tendon apparatus.  The locking wire crimper was then crimped and cut to length.  Final films were obtained.  The incision was thoroughly irrigated with sterile saline.  Arthrotomy incision was closed in layers using #1 Vicryl pops for the arthrotomy and to approximate subcutaneous adipose tissue.  Dermis was closed with 2-0 Vicryl pops and skin was closed with staples.  The stab incisions for the percutaneous screws in the lateral thigh were  also thoroughly irrigated and closed with 2-0 Vicryl for dermis and staples.  Sterile bandages were applied and Ace wrap was placed over the patient's lower extremity.  Drapes were then taken down and she was transferred to the hospital cart.  All sponge and needle counts were correct x2.    I was present and scrubbed for the entire procedure up until skin closure.      Post op plan:  Medicine Primary team given age and medical comorbidity; similar rationale to hip fracture patients  Activity: Up with walker and assist until independent.    Weight bearing status: May weight bear on RLE for transfers;   Pain management: Oral pain medications with IV for breakthrough. Wean IV as able.    Antibiotics: Ancef x 24 hours  Diet: Begin with clear fluids and progress diet as tolerated.   DVT prophylaxis: Mechanical prophylaxis with SCD  Imaging: Standing radiographs when able  Labs: Monitor Hgb and transfuse prn  Bracing/Splinting: ACE wrap to RLE may be replaced or reinforced as needed  Dressings: Keep clean, dry and intact   Drains: n/a  Physical Therapy/Occupational Therapy: Eval and treat.  Consults: nutrition, SW  Follow-up: Clinic with PA in 2 weeks for wound check and staple removal  Disposition: Admitted for physical therapy and pain control    Newton Lemon MD  Orthopedic Spine Surgeon  , Department of Orthopedic Surgery

## 2023-02-27 NOTE — OR NURSING
PACU to Inpatient Nursing Handoff    Patient Carol Merida is a 87 year old female who speaks English.   Procedure Procedure(s):  OPEN REDUCTION INTERNAL FIXATION RIGHT DISTAL FEMUR WITH RETROGRADE NAIL, LATERAL PLATE, AND CABLE   Surgeon(s) Primary: Newton Lemon MD  Resident - Assisting: Maria Antonia Zayas MD; Racquel Harris MD     Allergies   Allergen Reactions     Ace Inhibitors Cough       Isolation  [unfilled]     Past Medical History   has a past medical history of Abnormal stress test, Anemia, Anxiety, Cardiac dysrhythmia, unspecified, CHF (congestive heart failure) (H) (6/18/2018), PETTY (dyspnea on exertion), Hyperlipidemia LDL goal <100 (2/10/2010), Hypertension goal BP (blood pressure) < 140/90 (7/18/2010), Hypothyroid, Malignant neoplasm of breast (female), unspecified site (2005), MEDICAL HISTORY OF -, Melanoma of skin, site unspecified, NONSPECIFIC MEDICAL HISTORY (04), Obstructive sleep apnea (adult) (pediatric) (8/25/2006), Osteoarthritis, Other chronic pain, Other osteoporosis, PONV (postoperative nausea and vomiting), and Tubular adenoma in colon (9/01).    Anesthesia Choice   Dermatome Level     Preop Meds Not applicable   Nerve block Not applicable   Intraop Meds dexamethasone (Decadron)  fentanyl (Sublimaze): 200 mcg total  hydromorphone (Dilaudid): 0.5 mg total  ondansetron (Zofran): last given at 1941  neosynephrine gtt, TXA gtt   Local Meds Yes - Local Cocktail (morphine, ropivacaine, epinephrine, Toradol)   Antibiotics cefazolin (Ancef) - last given at 1607 and 1958     Pain Patient Currently in Pain: denies   PACU meds  Not applicable   PCA / epidural No   Capnography     Telemetry ECG Rhythm: Normal sinus rhythm   Inpatient Telemetry Monitor Ordered? No        Labs Glucose Lab Results   Component Value Date     02/26/2023     08/19/2022     04/07/2021       Hgb Lab Results   Component Value Date    HGB 11.0 02/26/2023    HGB 12.3 02/26/2023    HGB 13.8  04/07/2021       INR Lab Results   Component Value Date    INR 1.04 02/26/2023    INR 2.78 11/08/2016      PACU Imaging Completed     Wound/Incision Incision/Surgical Site 07/06/15 Left Hip (Active)   Number of days: 2792       Incision/Surgical Site 11/02/16 Right Hip (Active)   Number of days: 2307       Incision/Surgical Site 11/02/16 Right Hip (Active)   Number of days: 2307       Incision/Surgical Site 02/26/23 Right Leg (Active)   Incision Assessment UT 02/26/23 2054   Mary Kay-Incision Assessment UTV 02/26/23 2054   Closure ZONIA 02/26/23 2054   Dressing Intervention Clean, dry, intact 02/26/23 2054   Number of days: 0       Incision/Surgical Site 02/26/23 Right;Lateral Thigh (Active)   Incision Assessment [facility] 02/26/23 2054   Mary Kay-Incision Assessment [facility] 02/26/23 2054   Closure ZONIA 02/26/23 2054   Dressing Intervention Clean, dry, intact 02/26/23 2054   Number of days: 0      CMS        Equipment ice pack   Other LDA       IV Access Peripheral IV 02/26/23 Right Wrist (Active)   Site Assessment [facility] 02/26/23 2035   Line Status Infusing 02/26/23 2035   Phlebitis Scale 0-->no symptoms 02/26/23 2035   Infiltration Scale 0 02/26/23 2035   Number of days: 0       Peripheral IV 02/26/23 Right Lower forearm (Active)   Site Assessment [facility] 02/26/23 2035   Line Status Saline locked 02/26/23 2035   Phlebitis Scale 0-->no symptoms 02/26/23 2035   Infiltration Scale 0 02/26/23 2035   Number of days: 0       Arterial Line 02/26/23 Radial (Active)   Site Assessment [facility] 02/26/23 2035   Art Line Waveform Appropriate 02/26/23 2035   Art Line Interventions Zeroed and calibrated;Leveled;Connections checked and tightened;Flushed per protocol 02/26/23 2035   Color/Movement/Sensation Capillary refill less than 3 sec 02/26/23 2035   Line Necessity Yes, meets criteria 02/26/23 2035   Dressing Type Transparent 02/26/23 2035   Dressing Status Clean, dry, intact 02/26/23 2035   Number of days: 0      Blood Products Not applicable  mL    Intake/Output Date 02/26/23 0700 - 02/27/23 0659   Shift 8269-3395 6399-1581 9972-4812 24 Hour Total   INTAKE   I.V.  1800  1800   Shift Total(mL/kg)  1800(15.15)  1800(15.15)   OUTPUT   Urine  300  300   Blood  500  500   Shift Total(mL/kg)  800(6.73)  800(6.73)   Weight (kg) 118.84 118.84 118.84 118.84      Drains / [facility] Urethral Catheter 02/26/23 Double-lumen;Latex;Straight-tip 16 fr (Active)   Tube Description UTV 02/26/23 2035   Collection Container Standard 02/26/23 2035   Securement Method Securing device (Describe) 02/26/23 2035   Rationale for Continued Use Strict 1-2 Hour I&O 02/26/23 2035   Number of days: 0      Time of void PreOp Void Prior to Procedure: 1330 (02/26/23 1410)    PostOp      Diapered? No   Bladder Scan     PO    tolerating sips     Vitals    B/P: 112/73  T: 99.5  F (37.5  C)    Temp src: Axillary  P:  Pulse: 80 (02/26/23 2115)          R: 19  O2:  SpO2: 96 %    O2 Device: Nasal cannula (02/26/23 2115)    Oxygen Delivery: 2 LPM (02/26/23 2115)         Family/support present son   Patient belongings     Patient transported on bed   DC meds/scripts (obs/outpt) Not applicable   Inpatient Pain Meds Released? Yes       Special needs/considerations None   Tasks needing completion None       Mary Castellanos RN  [facility] [id number]

## 2023-02-27 NOTE — PROGRESS NOTES
02/27/23 1530   Appointment Info   Signing Clinician's Name / Credentials (OT) Charleen Edgar, OTR/L   Living Environment   People in Home alone   Current Living Arrangements assisted living   Home Accessibility wheelchair accessible   Transportation Anticipated family or friend will provide   Living Environment Comments Pt reports living in assisted living facility where she is able to receive meals in dining room if needed. Shower chair, FWW, scooter at baseline.   Self-Care   Usual Activity Tolerance moderate   Current Activity Tolerance fair   Regular Exercise No   Equipment Currently Used at Home shower chair;other (see comments);walker, standard;dressing device   Fall history within last six months yes   Number of times patient has fallen within last six months 2   Activity/Exercise/Self-Care Comment Pt reports using a scooter for longer distances. Typically IND for ADLs at baseline. Has dressing equipment but does not typically use.   Instrumental Activities of Daily Living (IADL)   Previous Responsibilities meal prep;laundry;housekeeping;medication management   IADL Comments Pt reports IND at baseline for IADLs. Able to receive meals from [facility] in dining room if needed.   General Information   Onset of Illness/Injury or Date of Surgery 02/26/23   Referring Physician Maria Antonia Zayas MD   Patient/Family Therapy Goal Statement (OT) To go home   Additional Occupational Profile Info/Pertinent History of Current Problem Per chart: 87 year old female with past medical history significant for grade 1 diastolic dysfunction, hypertension, JEAN, hypothyroidism, anxiety depression, and osteoarthritis transferred from [facility] d/t ground level fall resulting in periprosthetic fracture of her femur. Admitted to medicine for pre-op clearance, underwent ORIF of R femur on 2/26/23, and remains medicine primary due to medical complexity   Existing Precautions/Restrictions weight bearing;fall   Left Upper Extremity  (Weight-bearing Status) full weight-bearing (FWB)   Right Upper Extremity (Weight-bearing Status) full weight-bearing (FWB)   Left Lower Extremity (Weight-bearing Status) full weight-bearing (FWB)   Right Lower Extremity (Weight-bearing Status) other (see comments)  (WBAT for transfers only)   General Observations and Info Up with assist   Cognitive Status Examination   Orientation Status orientation to person, place and time   Follows Commands 75-90% accuracy;follows two-step commands;delayed response/completion   Cognitive Status Comments Pt appears grossly intact however requires repetition of multi step instructions.   Visual Perception   Visual Impairment/Limitations corrective lenses full-time   Sensory   Sensory Quick Adds sensation intact   Pain Assessment   Patient Currently in Pain Yes, see Vital Sign flowsheet  (pain at surgical site)   Posture   Posture forward head position;protracted shoulders   Range of Motion Comprehensive   General Range of Motion bilateral upper extremity ROM WFL   Strength Comprehensive (MMT)   General Manual Muscle Testing (MMT) Assessment other (see comments)   Comment, General Manual Muscle Testing (MMT) Assessment Not formally assessed, demonstrates generalized weakness   Coordination   Upper Extremity Coordination No deficits were identified   Bed Mobility   Bed Mobility supine-sit   Comment (Bed Mobility) Max Ax2   Transfers   Transfers sit-stand transfer   Transfer Comments Unable to complete full STS with Max Ax2   Activities of Daily Living   BADL Assessment/Intervention bathing;upper body dressing;lower body dressing;grooming;toileting   Bathing Assessment/Intervention   [place] Level (Bathing) maximum assist (25% patient effort)   Comment, (Bathing) Per clinical reasoning   Upper Body Dressing Assessment/Training   Comment, (Upper Body Dressing) Per clinical reasoning   [place] Level (Upper Body Dressing) minimum assist (75% patient effort)   Lower Body  Dressing Assessment/Training   Comment, (Lower Body Dressing) DEP for donning socks   [place] Level (Lower Body Dressing) dependent (less than 25% patient effort)   Grooming Assessment/Training   [place] Level (Grooming) supervision   Comment, (Grooming) Per clinical reasoning   Toileting   Comment, (Toileting) DEP use of bedpan, jiménez catheter   [place] Level (Toileting) dependent (less than 25% patient effort)   Clinical Impression   Criteria for Skilled Therapeutic Interventions Met (OT) Yes, treatment indicated   OT Diagnosis Decreased engagement and safety with ADLs/IADLs   OT Problem List-Impairments impacting ADL problems related to;activity tolerance impaired;balance;pain;strength;mobility   Assessment of Occupational Performance 3-5 Performance Deficits   Identified Performance Deficits dressing, bathing, toileting, functional mobility   Planned Therapy Interventions (OT) ADL retraining;IADL retraining;balance training;stretching;strengthening;home program guidelines;progressive activity/exercise;risk factor education;transfer training   Clinical Decision Making Complexity (OT) low complexity   Anticipated Equipment Needs Upon Discharge (OT) other (see comments)  (TBD)   Risk & Benefits of therapy have been explained evaluation/treatment results reviewed;care plan/treatment goals reviewed;risks/benefits reviewed;current/potential barriers reviewed;participants voiced agreement with care plan;participants included;patient;daughter   Clinical Impression Comments Pt will benefit from continued skilled OT services to progress IND and safety with ADLs/IADLs.   OT Total Evaluation Time   OT Eval, Low Complexity Minutes (49171) 10   OT Goals   Therapy Frequency (OT) 5 times/wk   OT Predicted Duration/Target Date for Goal Attainment 03/13/23   OT Goals Hygiene/Grooming;Upper Body Dressing;Lower Body Dressing;Upper Body Bathing;Lower Body Bathing;Toilet Transfer/Toileting;Home Management   OT:  Hygiene/Grooming modified independent   OT: Upper Body Dressing Modified independent   OT: Lower Body Dressing Modified independent   OT: Upper Body Bathing Modified independent   OT: Lower Body Bathing Modified independent   OT: Toilet Transfer/Toileting Modified independent   OT: Home Management Modified independent;with light demand household tasks   OT Discharge Planning   OT Plan commode transfer, full body dressing with AE PRN, BUE strengthening   OT Discharge Recommendation (DC Rec) Transitional Care Facility   OT Rationale for DC Rec Pt significantly below baseline, requiring Ax2 for bed mobility. Recommend d/c to TCU to progress IND and safety with ADLs/IADLs.   OT Brief overview of current status Ax2, nursing OH lift   Total Session Time   Total Session Time (sum of timed and untimed services) 10      10-Ye-2021 15:40

## 2023-02-27 NOTE — PLAN OF CARE
"Goal Outcome Evaluation:      Plan of Care Reviewed With: patient    Overall Patient Progress: improving    VS: Some soft BPs, asymptomatic Medicine made aware. Other VSS. Denies CP/SOB. A/O x4, but can be forgetful.    O2: >90% on 1 LPM- unable to wean off- desats to as low as 87% on RA at rest, IS encouraged.    Output: Jiménez catheter patent, adequate output. Ok per medicine to leave jiménez in until tomorrow- d/t pt's poor mobility.    Last BM: 2/27, bowel sounds active in all four quadrants, passing gas. Denies feeling constipated. Bowel meds given. Stated she \"hasn't eaten in days\". Did use bedpan for small loose BM.     Activity: Assist of 2 with cares. NWB RLE except ok for WBAT with transfers to commode/chair. Sat edge of bed with assist of 2-3, unable to stand. Did have difficulty using LLE, pt reported feeling like it was going to \"buckle\", able to bend knee while in bed, some pain in LLE, thinks she may have done something to it when she fell prior to admission- Ortho notified, xrays ordered.    Skin: Blanchable redness on R buttock, RLE surgical incisions   Pain: Pain in RLE, managing with scheduled tylenol. Pt declining ice packs.    CMS: Intact    Dressing: Scant dried drainage, monitoring closely, no change.    Diet: Regular, tolerating well.    LDA: PIV x2 SL   Equipment: IV pole, PCDs, personal belongings.    Plan: TBD   Additional Info: Hgb check 9.0 this morning  On RN managed potassium and magnesium replacements, WNL today.   Pt's children at bedside today.          "

## 2023-02-27 NOTE — PROGRESS NOTES
Orthopaedic Surgery Progress Note 02/27/2023    S: No acute events overnight.  Pain well controlled on meds. Denies numbness or tingling in the affected extremity. chest pain (-), SOB(-), nausea/vomiting(-). Tolerating oral diet. BM(-) flatus(+). Catheter is in place.     O:  Temp: 97.9  F (36.6  C) Temp src: Oral BP: 116/56 Pulse: 80   Resp: 15 SpO2: 95 % O2 Device: Nasal cannula Oxygen Delivery: 2 LPM    Exam:  Gen: No acute distress, resting comfortably in bed.  Resp: Non-labored breathing  MSK:    RLE:  - Dressings with saturation  - SILT femoral/tibial/sural/saphenous/DP/SP nerves  - Fires TA, EHL, FHL, GaSC  - DP pulses 2+, foot wwp    Recent Labs   Lab 02/26/23  1746 02/26/23  0607 02/25/23  1831   WBC  --  7.9 9.1   HGB 11.0* 12.3 13.6   PLT  --  223 239     Sed Rate   Date Value Ref Range Status   06/04/2018 34 (H) 0 - 30 mm/h Final     Erythrocyte Sedimentation Rate   Date Value Ref Range Status   07/21/2021 13 0 - 30 mm/hr Final       Assessment: Carol Merida is a 87 year old female s/p ORIF R femur on 2/26/2023 with Dr. Lemon.     2/27:  Plan for dressing change today  PT/OT    Plan:  Medicine Primary team given age and medical comorbidity; similar rationale to hip fracture patients  Activity: Up with walker and assist until independent.    Weight bearing status: May weight bear on RLE for transfers;   Pain management: Oral pain medications with IV for breakthrough. Wean IV as able.    Antibiotics: Ancef x 24 hours  Diet: Begin with clear fluids and progress diet as tolerated.   DVT prophylaxis: Mechanical prophylaxis with SCD  Imaging: Standing radiographs when able  Labs: Monitor Hgb and transfuse prn  Bracing/Splinting: ACE wrap to RLE may be replaced or reinforced as needed  Dressings: Keep clean, dry and intact   Drains: n/a  Physical Therapy/Occupational Therapy: Eval and treat.  Consults: nutrition, SW  Follow-up: Clinic with PA in 2 weeks for wound check and staple removal  Disposition:  Admitted for physical therapy and pain control    Patient discussed with .      Yesika Zayas MD  PGY-4  Orthopaedic Surgery

## 2023-02-27 NOTE — ANESTHESIA CARE TRANSFER NOTE
Patient: Carol Merida    Procedure: Procedure(s):  OPEN REDUCTION INTERNAL FIXATION RIGHT DISTAL FEMUR WITH RETROGRADE NAIL, LATERAL PLATE, AND CABLE       Diagnosis: Hip fracture (H) [S72.009A]  Diagnosis Additional Information: No value filed.    Anesthesia Type:   General     Note:    Oropharynx: oropharynx clear of all foreign objects and spontaneously breathing  Level of Consciousness: awake  Oxygen Supplementation: face mask  Level of Supplemental Oxygen (L/min / FiO2): 6  Independent Airway: airway patency satisfactory and stable  Dentition: dentition unchanged  Vital Signs Stable: post-procedure vital signs reviewed and stable  Report to RN Given: handoff report given  Patient transferred to: PACU  Comments: .Anesthesia Care Transfer Note    Patient: Carol Merida    Transferred to: PACU    Patient vital signs: stable    Airway: none    Monitors applied, VSS.  Patient awake and comfortable, breathing spontaneously.  Report given to RN with transfer of care.        Britni Alonso CRNA  2/26/2023  8:49 PM    Handoff Report: Identifed the Patient, Identified the Reponsible Provider, Reviewed the pertinent medical history, Discussed the surgical course, Reviewed Intra-OP anesthesia mangement and issues during anesthesia, Set expectations for post-procedure period and Allowed opportunity for questions and acknowledgement of understanding      Vitals:  Vitals Value Taken Time   /75 02/26/23 2040   Temp 37.5  C (99.5  F) 02/26/23 2035   Pulse 81 02/26/23 2048   Resp 18 02/26/23 2048   SpO2 98 % 02/26/23 2048   Vitals shown include unvalidated device data.    Electronically Signed By: BEATRICE Cantrell CRNA  February 26, 2023  8:49 PM

## 2023-02-27 NOTE — ANESTHESIA POSTPROCEDURE EVALUATION
Patient: Carol Merida    Procedure: Procedure(s):  OPEN REDUCTION INTERNAL FIXATION RIGHT DISTAL FEMUR WITH RETROGRADE NAIL, LATERAL PLATE, AND CABLE       Anesthesia Type:  General    Note:  Disposition: Inpatient   Postop Pain Control: Uneventful            Sign Out: Well controlled pain   PONV: No   Neuro/Psych: Uneventful            Sign Out: Acceptable/Baseline neuro status   Airway/Respiratory: Uneventful            Sign Out: Acceptable/Baseline resp. status   CV/Hemodynamics: Uneventful            Sign Out: Acceptable CV status; No obvious hypovolemia; No obvious fluid overload   Other NRE: NONE   DID A NON-ROUTINE EVENT OCCUR? No           Last vitals:  Vitals Value Taken Time   /70 02/26/23 2145   Temp 37.3  C (99.1  F) 02/26/23 2130   Pulse 77 02/26/23 2200   Resp 14 02/26/23 2200   SpO2 97 % 02/26/23 2200   Vitals shown include unvalidated device data.    Electronically Signed By: Ramone Parks MD  February 26, 2023  11:22 PM

## 2023-02-27 NOTE — PLAN OF CARE
Plan is physical therapy and pain control.      VS: VSS  On Capno   O2: O2 sat > 95% with 2LPM  Denies SOB and cough   Output: Urethral catheter in place - total output post op 400ml   Last BM: 02/23/23, bowel sounds active    Activity: Assist of 1 with walker until independent  May weight bear on RLE for transfers per ortho   Skin: Abrasion in between abdomen folds.   Ordered nystatin powder.    Pain: Rating at 4/10  Comfortably manageable with scheduled  Tylenol   Neuro: CMS and neuro intact.   Dressing: Ace wrap on RLE - may be replaced or reinforced as needed.  Clean, dry and intact    Diet: Begin with clear fluids then progress diet as tolerated   LDA: 2 R PIV SL.   R. Wrist infusing and R.for arm SL  Urethral catheter- double lumen straight tip 16fr   Equipment: PCD, IV pole, and personal belongings.    Plan: PT and pain control   discontinue plan TBD   Additional Info: Pt return from surgery about 2230 with sone at bedside. Pt confused and needing continuous re-orientation to situation and place. Few hrs later, pt is back to baseline.

## 2023-02-27 NOTE — PROGRESS NOTES
"[facility]    Medicine Progress Note - Hospitalist Service, GOLD TEAM 20    Date of Admission:  2/26/2023    Assessment & Plan   Carol \"Jan\" ANGI Merida is an 87 year old female with past medical history significant for grade 1 diastolic dysfunction, hypertension, JEAN, hypothyroidism, anxiety depression, and osteoarthritis transferred from [facility] d/t ground level fall resulting in periprosthetic fracture of her femur. Admitted to medicine for pre-op clearance, underwent ORIF of R femur on 2/26/23, and remains medicine primary due to medical complexity.     # S/p ORIF of R femur   # Mechanical ground level fall   # Osteoarthritis   POD #1.  Reports adequate pain control this morning.  Had some confusion and disorientation post op likely 2/2 anesthesia medications, but back to baseline now.     - Ortho continues to follow   - PT, OT today   - Pain control: continue scheduled APAP 975mg Q8H, Gabapentin 300/300/600mg, oxycodone 2.5-5mg Q4H PRN  - Monitor for sedation/confusion, would hold opioids if this occurs      # HTN - Continue PTA amlodipine w/ hold parameters     # Grade 1 diastolic dysfunction   # Chronic dyspnea on exertion   Per review of admission H&P, pt underwent stress test in 2012 and fall 2022 both with same area of infarction with stress, and cardiac cath in 2012 with \"no evidence of significant coronary narrowing that would benefit from mechanical revascularization.\"  Had TTE this admission as part of preop evaluation with hyperkinetic LV function and EF > 70%, normal LV function, normal RV function, chamber size, wall motion, and thickness, normal atria, normal PASP, and no e/o effusion.  Unclear if dyspnea is of cardiac or pulmonary etiology.  Per chart review, had normal PFTs in 2018.  Currently needing supplemental O2 here, but appears to be at baseline, no c/o chest pain.     - Continue Breo-Ellipta (auto sub for PTA Wixela)   - PRN albuterol   - Wean " "supplemental O2     # HLD - Continue PTA statin     # Constipation - Likely 2/2 opioids vs recent surgery. Mag 2.4.  No abdominal pain.   - Continue scheduled senna BID and miralax daily   - Please notify IM if no BM in next 24-48 hours     # Hypothyroidism - Continue PTA Levothyroxine 88mcg.     # JEAN - Continue home CPAP at HS.    # Chronic lower extremity swelling - Uses Lasix at home PRN.  Will order here if edema noted, otherwise resume at discharge.     # Anxiety, depression - Mood stable.    - Continue PTA Buspar 10mg BID and Prozac 60mg daily         Diet: Regular Diet Adult    DVT Prophylaxis: Pneumatic Compression Devices  Benson Catheter: PRESENT, indication: Strict 1-2 Hour I&O  Lines: None     Cardiac Monitoring: None  Code Status: Full Code      Clinically Significant Risk Factors                                Disposition Plan     Expected Discharge Date: 02/28/2023                The patient's care was discussed with the patient and during care rounds with RNCC and SW..    Lauren Cervantes [facility]  Hospitalist Service, GOLD TEAM 20  [facility]  Securely message with [employer] (more info)  Text page via [facility] Paging/Directory   See signed in provider for up to date coverage information  ______________________________________________________________________    Interval History   Everette is resting in bed.  She feels okay this morning, eating and drinking without issue.  No nausea or vomiting.  Post op pain is well controlled.  Reports some constipation, but no abdominal pain.  No chest pain, breathing is at baseline, says there are \"good days and bad days\" with it.      Physical Exam   Vital Signs: Temp: (!) 96.7  F (35.9  C) Temp src: Oral BP: 99/51 Pulse: 81   Resp: 17 SpO2: 96 % O2 Device: Nasal cannula Oxygen Delivery: 2 LPM  Weight: 262 lbs 0 oz    GENERAL: Alert and oriented x 3. Well nourished, well developed elderly female in no acute distress.    HEENT: " Normocephalic, atraumatic. Anicteric sclera. Mucous membranes moist.   CV: RRR. S1, S2. No murmurs appreciated.   RESPIRATORY: Effort normal on 2L NC. Lungs CTAB with no wheezing, rales, or rhonchi.   GI: Abdomen soft and non distended, bowel sounds present x all 4 quadrants. No tenderness, rebound, or guarding.   NEUROLOGICAL: No focal deficits. Follows commands.  S  MUSCULOSKELETAL: No joint swelling or tenderness. Moves all extremities.   EXTREMITIES: No gross deformities. No peripheral edema.   SKIN: Grossly warm, dry, and intact. No jaundice. No rashes.       Medical Decision Making       55 MINUTES SPENT BY ME on the date of service doing chart review, history, exam, documentation & further activities per the note.      Data     I have personally reviewed the following data over the past 24 hrs:    N/A  \   11.0 (L)   / N/A     135 N/A N/A /  117 (H)   4.1 N/A N/A \       Procal: N/A CRP: N/A Lactic Acid: 1.4         Imaging results reviewed over the past 24 hrs:   Recent Results (from the past 24 hour(s))   Echo Complete   Result Value    LVEF  70%    Narrative    [id number]  [id number]  [id number]  [id number]^TINO^PREETI     [facility],[place]  Echocardiography Laboratory  [address] [place] [id number]     Name: GABBY EAGLE  MRN: [id number]  : 1935  Study Date: 2023 10:50 AM  Age: 87 yrs  Gender: Female  [place] [facility] [place]  Reason For Study: CHF  Ordering Physician: PREETI JIMÉNEZ  Performed By: Alice Barbour     BSA: 2.2 m2  Height: 63 in  Weight: 262 lb  BP: 94/48 mmHg  ______________________________________________________________________________  Procedure  Complete Portable Echo Adult.  ______________________________________________________________________________  Interpretation Summary  Global and regional left ventricular function is hyperkinetic with an EF >70%.  Left ventricular diastolic function is normal.  Right ventricular function,  chamber size, wall motion, and thickness are  normal.  Both atria appear normal.  Pulmonary artery systolic pressure is normal.  The inferior vena cava is normal.  No pericardial effusion is present.  ______________________________________________________________________________  Left Ventricle  Global and regional left ventricular function is hyperkinetic with an EF >70%.  Left ventricular wall thickness is normal. Left ventricular size is normal.  Left ventricular diastolic function is normal. No regional wall motion  abnormalities are seen.     Right Ventricle  Right ventricular function, chamber size, wall motion, and thickness are  normal.     Atria  Both atria appear normal.     Mitral Valve  Mild mitral annular calcification is present.     Aortic Valve  Aortic valve sclerosis is present.     Tricuspid Valve  The tricuspid valve is normal. Trace tricuspid insufficiency is present. The  right ventricular systolic pressure is approximated at 20.4 mmHg plus the  right atrial pressure. Pulmonary artery systolic pressure is normal.     Pulmonic Valve  The valve leaflets are not well visualized. On Doppler interrogation, there is  no significant stenosis or regurgitation.     Vessels  The thoracic aorta is normal. The pulmonary artery and bifurcation cannot be  assessed. The inferior vena cava is normal.     Pericardium  No pericardial effusion is present.     ______________________________________________________________________________  MMode/2D Measurements & Calculations  IVSd: 0.68 cm  LVIDd: 5.0 cm  LVIDs: 2.8 cm  LVPWd: 0.70 cm     FS: 44.2 %  LV mass(C)d: 111.5 grams  LV mass(C)dI: 51.4 grams/m2  Ao root diam: 3.2 cm  asc Aorta Diam: 3.6 cm  LVOT diam: 1.9 cm  LVOT area: 2.8 cm2  LA Volume (BP): 35.5 ml  LA Volume Index (BP): 16.4 ml/m2  RWT: 0.28     Doppler Measurements & Calculations  MV E max sary: 90.9 cm/sec  MV A max sary: 97.6 cm/sec  MV E/A: 0.93  MV dec slope: 261.0 cm/sec2  MV dec time: 0.35  sec  Ao V2 max: 198.0 cm/sec  Ao max PG: 15.7 mmHg  Ao V2 mean: 135.0 cm/sec  Ao mean P.0 mmHg  Ao V2 VTI: 40.9 cm  JANICE(I,D): 1.6 cm2  JANICE(V,D): 1.6 cm2  LV V1 max P.9 mmHg  LV V1 max: 111.0 cm/sec  LV V1 VTI: 22.5 cm  SV(LVOT): 63.8 ml  SI(LVOT): 29.4 ml/m2  TR max misael: 226.0 cm/sec  TR max P.4 mmHg  RVSP(TR): 23.4 mmHg  AV Misael Ratio (DI): 0.56  JANICE Index (cm2/m2): 0.72  E/E' avg: 10.5  Lateral E/e': 11.3  Medial E/e': 9.8     ______________________________________________________________________________  Report approved by: Jason Henry 2023 11:33 AM         XR Surgery ANNALISA L/T 5 Min Fluoro    Narrative    This exam was marked as non-reportable because it will not be read by a   radiologist or a [place] non-radiologist provider.

## 2023-02-27 NOTE — OR NURSING
BRITNEY Hickman at bedside. Updated that patient is still confused as to what happened to her. Patient's son at bedside. Patient getting more oriented as time goes on.

## 2023-02-28 ENCOUNTER — APPOINTMENT (OUTPATIENT)
Dept: OCCUPATIONAL THERAPY | Facility: CLINIC | Age: 88
DRG: 481 | End: 2023-02-28
Payer: COMMERCIAL

## 2023-02-28 ENCOUNTER — APPOINTMENT (OUTPATIENT)
Dept: GENERAL RADIOLOGY | Facility: CLINIC | Age: 88
DRG: 481 | End: 2023-02-28
Payer: COMMERCIAL

## 2023-02-28 ENCOUNTER — APPOINTMENT (OUTPATIENT)
Dept: PHYSICAL THERAPY | Facility: CLINIC | Age: 88
DRG: 481 | End: 2023-02-28
Payer: COMMERCIAL

## 2023-02-28 LAB
HGB BLD-MCNC: 8.7 G/DL (ref 11.7–15.7)
MAGNESIUM SERPL-MCNC: 2.2 MG/DL (ref 1.7–2.3)
POTASSIUM SERPL-SCNC: 4.4 MMOL/L (ref 3.4–5.3)

## 2023-02-28 PROCEDURE — 97530 THERAPEUTIC ACTIVITIES: CPT | Mod: GP

## 2023-02-28 PROCEDURE — 85018 HEMOGLOBIN: CPT | Performed by: STUDENT IN AN ORGANIZED HEALTH CARE EDUCATION/TRAINING PROGRAM

## 2023-02-28 PROCEDURE — 250N000013 HC RX MED GY IP 250 OP 250 PS 637: Performed by: STUDENT IN AN ORGANIZED HEALTH CARE EDUCATION/TRAINING PROGRAM

## 2023-02-28 PROCEDURE — 250N000013 HC RX MED GY IP 250 OP 250 PS 637: Performed by: NURSE PRACTITIONER

## 2023-02-28 PROCEDURE — 250N000013 HC RX MED GY IP 250 OP 250 PS 637

## 2023-02-28 PROCEDURE — 250N000009 HC RX 250

## 2023-02-28 PROCEDURE — 84132 ASSAY OF SERUM POTASSIUM: CPT | Performed by: NURSE PRACTITIONER

## 2023-02-28 PROCEDURE — 36415 COLL VENOUS BLD VENIPUNCTURE: CPT | Performed by: NURSE PRACTITIONER

## 2023-02-28 PROCEDURE — 99232 SBSQ HOSP IP/OBS MODERATE 35: CPT | Performed by: NURSE PRACTITIONER

## 2023-02-28 PROCEDURE — 83735 ASSAY OF MAGNESIUM: CPT | Performed by: NURSE PRACTITIONER

## 2023-02-28 PROCEDURE — 250N000013 HC RX MED GY IP 250 OP 250 PS 637: Performed by: CLINICAL NURSE SPECIALIST

## 2023-02-28 PROCEDURE — 120N000002 HC R&B MED SURG/OB UMMC

## 2023-02-28 PROCEDURE — 73564 X-RAY EXAM KNEE 4 OR MORE: CPT | Mod: LT

## 2023-02-28 PROCEDURE — 97535 SELF CARE MNGMENT TRAINING: CPT | Mod: GO

## 2023-02-28 PROCEDURE — 97162 PT EVAL MOD COMPLEX 30 MIN: CPT | Mod: GP

## 2023-02-28 RX ORDER — METHOCARBAMOL 750 MG/1
750 TABLET, FILM COATED ORAL 4 TIMES DAILY
Status: DISCONTINUED | OUTPATIENT
Start: 2023-02-28 | End: 2023-03-06 | Stop reason: HOSPADM

## 2023-02-28 RX ADMIN — LIDOCAINE HYDROCHLORIDE 30 ML: 20 SOLUTION ORAL; TOPICAL at 23:07

## 2023-02-28 RX ADMIN — SENNOSIDES AND DOCUSATE SODIUM 1 TABLET: 50; 8.6 TABLET ORAL at 07:48

## 2023-02-28 RX ADMIN — ACETAMINOPHEN 975 MG: 325 TABLET, FILM COATED ORAL at 07:48

## 2023-02-28 RX ADMIN — GABAPENTIN 300 MG: 300 CAPSULE ORAL at 12:27

## 2023-02-28 RX ADMIN — ACETAMINOPHEN 975 MG: 325 TABLET, FILM COATED ORAL at 16:06

## 2023-02-28 RX ADMIN — FLUTICASONE FUROATE AND VILANTEROL TRIFENATATE 1 PUFF: 100; 25 POWDER RESPIRATORY (INHALATION) at 07:49

## 2023-02-28 RX ADMIN — Medication 25 MG: at 13:43

## 2023-02-28 RX ADMIN — METHOCARBAMOL 750 MG: 750 TABLET, FILM COATED ORAL at 17:16

## 2023-02-28 RX ADMIN — BUSPIRONE HYDROCHLORIDE 10 MG: 10 TABLET ORAL at 07:48

## 2023-02-28 RX ADMIN — SIMVASTATIN 10 MG: 10 TABLET, FILM COATED ORAL at 22:16

## 2023-02-28 RX ADMIN — METHOCARBAMOL 750 MG: 750 TABLET, FILM COATED ORAL at 20:30

## 2023-02-28 RX ADMIN — GABAPENTIN 300 MG: 300 CAPSULE ORAL at 07:48

## 2023-02-28 RX ADMIN — BUSPIRONE HYDROCHLORIDE 10 MG: 10 TABLET ORAL at 19:35

## 2023-02-28 RX ADMIN — GABAPENTIN 600 MG: 600 TABLET, FILM COATED ORAL at 22:16

## 2023-02-28 RX ADMIN — LEVOTHYROXINE SODIUM 88 MCG: 88 TABLET ORAL at 07:49

## 2023-02-28 RX ADMIN — POLYETHYLENE GLYCOL 3350 17 G: 17 POWDER, FOR SOLUTION ORAL at 07:49

## 2023-02-28 RX ADMIN — Medication 50 MG: at 19:34

## 2023-02-28 RX ADMIN — FLUOXETINE 60 MG: 20 CAPSULE ORAL at 07:48

## 2023-02-28 ASSESSMENT — ACTIVITIES OF DAILY LIVING (ADL)
ADLS_ACUITY_SCORE: 46
ADLS_ACUITY_SCORE: 45
ADLS_ACUITY_SCORE: 45
ADLS_ACUITY_SCORE: 47
ADLS_ACUITY_SCORE: 45
ADLS_ACUITY_SCORE: 47
ADLS_ACUITY_SCORE: 47
ADLS_ACUITY_SCORE: 46

## 2023-02-28 NOTE — PROGRESS NOTES
SPIRITUAL HEALTH SERVICES  SPIRITUAL ASSESSMENT Progress Note  [facility] ([facility]) M 525 ortho 02/28/23      REFERRAL SOURCE:     Unit  introduced self and SHS to Pt. She stated her  was present yesterday and provided her with a prayer blanket and communion. Pt requested more prayer. Prayed for Pt's ongoing healing.    PLAN: Will follow up with Pt as needed while on unit.    Arturo Allen MA, MPA  Associate   Pager: [phone]

## 2023-02-28 NOTE — PLAN OF CARE
Goal Outcome Evaluation:      Plan of Care Reviewed With: patient    Overall Patient Progress: no changeOverall Patient Progress: no change         VS: VSS. Denies CP/SOB. A/O x4.    /47 (BP Location: Right arm, Patient Position: Supine)   Pulse 90   Temp 98.2  F (36.8  C) (Oral)   Resp 16   Wt 118.8 kg (262 lb)   LMP  (LMP Unknown)   SpO2 94%   BMI 46.41 kg/m       O2: >90% on 1 LPM, unable to wean pt off of 1 L due to O2 Sat dropping to 84% on RA at rest.    Output: Benson removed  urine. Purewick placed and is patent. Voiding adequate amounts.    Last BM: 2/28 morning. Bowel sounds are active in all four quadrants. Pt says she is passing gas. No constipation. Pt has taken all bowel meds and is eating adequately.   Activity: Assist of 2-3 with cares. NWB RLE- but ok for WBAT with transfers. Pt has not gotten out of bed since with therapies yesterday. Difficulty using LLE d/t pain in knee. Xray ordered for LLE due to pt c/o of knee pain r/t fall previous to admission.    Skin: Blanchable redness on right buttocks, 2 surgical incisions on RLE.   Pain: Pain in RLE treated with Tylenol and Tramadol.   CMS: Alert, oriented X4.   Dressing: No change, scant dry drainage. Continue to monitor   Diet: Regular, tolerating well.    LDA: PIV x2 RUE   Equipment: IV pole, PCDs, personal belongings.   Plan: Continue plan of care. Monitoring dressings. Change purewick once a shift.   Additional Info: Xray ordered 2/27, still pending.

## 2023-02-28 NOTE — PROGRESS NOTES
Brief SW Note:    GILDA met with pt at bedside to discuss TCU recs. Pt's daughter, Lisa, was present for conversation via phone. Pt reported that she has been to multiple TCUs in the past. SW provided 'Care Compare' list for within a 25 mile radius of pt's residential zip code ([address]). Pt and Lisa agreeable to selecting 5 TCU preferences for SW to send referrals to. Lisa and pt aware that covering SW will get TCU preferences tomorrow. Family agreeable to paying for medical transportation upon discharge. SW discussed OOP costs for [employer] WC ride. No updates during IDT rounds, so it is unclear at this time when pt will be medically ready. Lisa was concerned that family has not been updated by MD. GILDA informed pt and family that GILDA will pass along pt and family's request that MD reach out to pt's son, Jono ([phone]) tomorrow; pt also encouraged to make that request to MD when at pt's bedside, so that pt gives her verbal consent to communicate with Jono. GILDA also provided Lisa with nurses' station phone number for family to reach out to for medical questions.    GILDA updated bedside nurse that pt and family would like MD to update Jono tomorrow.    1715: GILDA emailed Lisa ([email or web address]) the 'Care Compare' list for review, per pt and Lisa's request.        GLORIA GalanW, LSW  [facility]   PHONE: [phone]  Pager: [phone]

## 2023-02-28 NOTE — PROGRESS NOTES
02/28/23 1414   Appointment Info   Signing Clinician's Name / Credentials (PT) Dena Jenkins, PT, DPT   Rehab Comments (PT) NWB RLE except for WBAT for transfers   Living Environment   People in Home alone   Current Living Arrangements assisted living   Home Accessibility wheelchair accessible   Transportation Anticipated family or friend will provide   Living Environment Comments Pt lives in [place]   Self-Care   Usual Activity Tolerance moderate   Current Activity Tolerance poor   Regular Exercise No   Equipment Currently Used at Home shower chair;walker, rolling;walker, standard  (power scooter)   Fall history within last six months yes   Number of times patient has fallen within last six months 2   Activity/Exercise/Self-Care Comment Pt uses power scooter for longer distances, uses 4WW in apartment, also has FWW available. Typically IND for ADLs   General Information   Onset of Illness/Injury or Date of Surgery 02/26/23   Referring Physician Maria Antonia Zayas MD   Patient/Family Therapy Goals Statement (PT) return to PLOF   Pertinent History of Current Problem (include personal factors and/or comorbidities that impact the POC) Per chart: Carol Merida is a 87 year old female s/p ORIF R femur on 2/26/2023 with Dr. Lemon.   Existing Precautions/Restrictions weight bearing;fall   Weight-Bearing Status - RLE nonweight-bearing  (WBAT for transfers only)   Cognition   Affect/Mental Status (Cognition) WFL   Follows Commands (Cognition) WFL   Pain Assessment   Patient Currently in Pain Yes, see Vital Sign flowsheet   Integumentary/Edema   Integumentary/Edema Comments surgical incision RLE, covered in ace bandage   Posture    Posture Forward head position;Protracted shoulders   Range of Motion (ROM)   Range of Motion ROM deficits secondary to surgical procedure   Strength (Manual Muscle Testing)   Strength Comments generally deconditioned, unable to attain full stand   Bed Mobility   Comment, (Bed Mobility) min  A supine to sit with HOB elevated   Transfers   Comment, (Transfers) partial STS mod Ax2 up to FWW   Gait/Stairs (Locomotion)   Comment, (Gait/Stairs) pt unable due to WB restrictions   Balance   Balance Comments no impairments with seated balance   Clinical Impression   Criteria for Skilled Therapeutic Intervention Yes, treatment indicated   PT Diagnosis (PT) impaired functional mobility   Influenced by the following impairments weakness, impaired activity tolerance, acutely post op, WB restrictions   Functional limitations due to impairments bed mobility, transfers, amb   Clinical Presentation (PT Evaluation Complexity) Evolving/Changing   Clinical Presentation Rationale acutely post op, >3 body structure and functional impairments   Clinical Decision Making (Complexity) moderate complexity   Planned Therapy Interventions (PT) balance training;bed mobility training;gait training;home exercise program;neuromuscular re-education;strengthening;transfer training   Risk & Benefits of therapy have been explained evaluation/treatment results reviewed;care plan/treatment goals reviewed;risks/benefits reviewed   PT Total Evaluation Time   PT Eval, Moderate Complexity Minutes (85791) 6   Plan of Care Review   Plan of Care Reviewed With patient   Physical Therapy Goals   PT Frequency 5x/week   PT Predicted Duration/Target Date for Goal Attainment 03/07/23   PT Goals Bed Mobility;Transfers;PT Goal 1   PT: Bed Mobility Modified independent;Supine to/from sit;Within precautions   PT: Transfers Modified independent;Assistive device;Sit to/from stand   PT: Goal 1 Modified IND bed to chair with AD maintaining precautions   Interventions   Interventions Quick Adds Therapeutic Activity   Therapeutic Activity   Therapeutic Activities: dynamic activities to improve functional performance Minutes (87374) 24   Symptoms Noted During/After Treatment Increased pain;Fatigue   Treatment Detail/Skilled Intervention Co treat with OT for pt  safety. Pt greeted in bed, agreeable to session. Reviewed WB precautions. Pt with prolonged sitting EOB due to mild dizziness with sitting up, resolved. Following eval, pt returned to sitting. Attempted to facilitate repositioning at EOB, pt with significant difficulty despite Ax2. Additional trials of standing x2, mod-max Ax2 with FWW although pt unable to attain full stand. Pt demo'd anterior weight shift but not able to fully extend LE, high risk for falling. Pt requesting bedpan. Max Ax2 for sit to supine, difficulty with sequencing and extended time needed for repositioning in bed, max Ax2. Boost with bed in trendelenburg, total Ax2. Pt rolling with min A, bed pan placed. Pt positioned for comfort, left with all needs in reach.   PT Discharge Planning   PT Plan co treat with OT, high falls risk, michael steady? to chair for STS from chair for safety, progress to transfers as able (pt WBAT for transfers only, otherwise NWB)   PT Discharge Recommendation (DC Rec) Transitional Care Facility   PT Rationale for DC Rec Pt significantly below baseline, Ax2 for mobility. Pt will need continued PT to progress mobility prior to return home.   PT Brief overview of current status Ax2 mobility,   Total Session Time   Timed Code Treatment Minutes 24   Total Session Time (sum of timed and untimed services) 30

## 2023-02-28 NOTE — PROGRESS NOTES
Orthopaedic Surgery Progress Note 02/28/2023    S: No acute events overnight.  Pain controlled on meds at rest. She did get up to the side of the bed yesterday. Unable to ambulate with weightbearing restrictions. Noticed left knee pain when attempting to get up. Denies numbness or tingling in the affected extremity. chest pain (-), SOB(-), nausea/vomiting(-). Tolerating oral diet. BM(-) flatus(+). Catheter is in place.     O:  Temp: 98.7  F (37.1  C) Temp src: Oral BP: 104/46 Pulse: 77   Resp: 16 SpO2: 95 % O2 Device: Nasal cannula Oxygen Delivery: 1 LPM    Exam:  Gen: No acute distress, resting comfortably in bed.  Resp: Non-labored breathing  MSK:    RLE:  - Dressings with saturation  - SILT femoral/tibial/sural/saphenous/DP/SP nerves  - Fires TA, EHL, FHL, GaSC  - DP pulses 2+, foot wwp    Recent Labs   Lab 02/27/23  1240 02/26/23  1746 02/26/23  0607 02/25/23  1831   WBC  --   --  7.9 9.1   HGB 9.0* 11.0* 12.3 13.6   PLT  --   --  223 239     Sed Rate   Date Value Ref Range Status   06/04/2018 34 (H) 0 - 30 mm/h Final     Erythrocyte Sedimentation Rate   Date Value Ref Range Status   07/21/2021 13 0 - 30 mm/hr Final       Assessment: Carol Merida is a 87 year old female s/p ORIF R femur on 2/26/2023 with Dr. Lemon.     2/28:  Remove jiménez; place marilin  PT/OT  XR L knee- ordered     Plan:  Medicine Primary team given age and medical comorbidity; similar rationale to hip fracture patients  Activity: Up with walker and assist until independent.    Weight bearing status: May weight bear on RLE for transfers;   Pain management: Oral pain medications with IV for breakthrough. Wean IV as able.    Antibiotics: Ancef x 24 hours  Diet: Begin with clear fluids and progress diet as tolerated.   DVT prophylaxis: Mechanical prophylaxis with SCD  Imaging: Standing radiographs when able. XR left knee   Labs: Monitor Hgb and transfuse prn  Bracing/Splinting: ACE wrap to RLE may be replaced or reinforced as  needed  Dressings: Keep clean, dry and intact   Drains: n/a  Physical Therapy/Occupational Therapy: Eval and treat.  Consults: nutrition, SW  Follow-up: Clinic with PA in 2 weeks for wound check and staple removal  Disposition: Admitted for physical therapy and pain control    Patient discussed with .      Yesika Zayas MD  PGY-4  Orthopaedic Surgery

## 2023-02-28 NOTE — PROVIDER NOTIFICATION
Paged ortho on call:   Pt in 526 (JW) hemoglobin has been trending down- pharmacy and RN wanting to make sure you are aware. Hemoglobin 8.7 today.  Karine  [phone]

## 2023-02-28 NOTE — PROGRESS NOTES
"[facility]    Medicine Progress Note - Hospitalist Service, GOLD TEAM 20    Date of Admission:  2/26/2023    Assessment & Plan   Carol \"Jan\" ANGI Merida is an 87 year old female with past medical history significant for grade 1 diastolic dysfunction, hypertension, JEAN, hypothyroidism, anxiety depression, and osteoarthritis transferred from [facility] d/t ground level fall resulting in periprosthetic fracture of her femur. Admitted to medicine for pre-op clearance, underwent ORIF of R femur on 2/26/23, and remains medicine primary due to medical complexity.     # S/p ORIF of R femur   # Mechanical ground level fall   # Osteoarthritis   POD #2.  Reports adequate pain control.  Had some confusion and disorientation post op likely 2/2 anesthesia medications, but back to baseline now.   - Ortho continues to follow   - PT, OT recommending TCU   - Pain control: continue scheduled APAP 975mg Q8H, Gabapentin 300/300/600mg, oxycodone 2.5-5mg Q4H PRN  - Monitor for sedation/confusion, would hold opioids if this occurs      # Hx HTN - On Amlodipine PTA.  Noted to have lower BPs last 24 hours.  - Hold Amlodipine for now   - Monitor BP per unit routine    # Grade 1 diastolic dysfunction   # Chronic dyspnea on exertion   Per review of admission H&P, pt underwent stress test in 2012 and fall 2022 both with same area of infarction with stress, and cardiac cath in 2012 with \"no evidence of significant coronary narrowing that would benefit from mechanical revascularization.\"  Had TTE this admission as part of preop evaluation with hyperkinetic LV function and EF > 70%, normal LV function, normal RV function, chamber size, wall motion, and thickness, normal atria, normal PASP, and no e/o effusion.  Unclear if dyspnea is of cardiac or pulmonary etiology.  Per chart review, had normal PFTs in 2018.  Currently needing supplemental O2 here, but appears to be at baseline, no c/o chest pain.     - " Continue Breo-Ellipta (auto sub for PTA Wixela)   - PRN albuterol   - Wean supplemental O2     # HLD - Continue PTA statin     # Constipation - Resolved.  Having BMs today.  Likely 2/2 opioids vs recent surgery. Mag 2.4.  No abdominal pain.   - Continue scheduled senna BID and miralax daily, hold for loose stools     # Hypothyroidism - Continue PTA Levothyroxine 88mcg.     # JEAN - Continue home CPAP at HS.    # Acute anemia - Hgb 9.0 post op, BL closer to 12-13.  Drift to 8.7 today.  EBL ~ 500cc during procedure.  Possible 2/2 surgical blood loss vs dilutional.  - Check CBC in AM   - No s/s acute bleed     - Transfuse for Hgb < 7     # Chronic lower extremity swelling - Uses Lasix at home PRN.  Will order here if edema noted, otherwise resume at discharge.     # Anxiety, depression - Mood stable.    - Continue PTA Buspar 10mg BID and Prozac 60mg daily         Diet: Regular Diet Adult    DVT Prophylaxis: Pneumatic Compression Devices  Benson Catheter: PRESENT, indication: Anesthesia;Wound Healing  Lines: None     Cardiac Monitoring: None  Code Status: Full Code      Clinically Significant Risk Factors                                Disposition Plan     Expected Discharge Date: 02/28/2023,  3:00 PM      Discharge Comments: 1-2 days TCU?        The patient's care was discussed with the patient and during care rounds with RNCC and SW..    Lauren Cervantes, [facility]  Hospitalist Service, GOLD TEAM 20  [facility]  Securely message with [employer] (more info)  Text page via [facility] Paging/Directory   See signed in provider for up to date coverage information  ______________________________________________________________________    Interval History   Everette is resting in bed using her incentive spirometer.  She is breathing comfortably on room air.  No chest pain or dyspnea.  Had BM today.  No nausea or vomiting.  Wondering about dressing changes to her R leg.      Physical Exam   Vital Signs:  Temp: 98.7  F (37.1  C) Temp src: Oral BP: 104/46 Pulse: 77   Resp: 16 SpO2: 95 % O2 Device: Nasal cannula Oxygen Delivery: 1 LPM  Weight: 262 lbs 0 oz    GENERAL: Alert and oriented x 3. Well nourished, well developed elderly female in no acute distress.    HEENT: Normocephalic, atraumatic. Anicteric sclera. Mucous membranes moist.   CV: RRR. S1, S2. No murmurs appreciated.   RESPIRATORY: Effort normal on room air. Lungs CTAB with no wheezing, rales, or rhonchi.   GI: Abdomen soft and non distended, bowel sounds present x all 4 quadrants. No tenderness, rebound, or guarding.   NEUROLOGICAL: No focal deficits. Follows commands.    MUSCULOSKELETAL: No joint swelling or tenderness. Moves all extremities.   EXTREMITIES: No gross deformities. No peripheral edema.   SKIN: Grossly warm, dry, and intact. No jaundice. No rashes.       Medical Decision Making       45 MINUTES SPENT BY ME on the date of service doing chart review, history, exam, documentation & further activities per the note.      Data     I have personally reviewed the following data over the past 24 hrs:    N/A  \   9.0 (L)   / N/A     N/A N/A N/A /  N/A   4.0 N/A N/A \       Imaging results reviewed over the past 24 hrs:   No results found for this or any previous visit (from the past 24 hour(s)).

## 2023-02-28 NOTE — PLAN OF CARE
Pt is A&Ox4, forgetful at times. VSS. LS clear, on 1L. BS active, LBM on 2/27/2023. Benson patent and draining well. Pain managed with tylenol. R knee surgical dressing is c/d/I. Pt hasnt been OOB. R PIV is patent and infusing TKO. Continue to monitor.

## 2023-03-01 LAB
ALBUMIN SERPL BCG-MCNC: 3 G/DL (ref 3.5–5.2)
ALP SERPL-CCNC: 109 U/L (ref 35–104)
ALT SERPL W P-5'-P-CCNC: 58 U/L (ref 10–35)
ANION GAP SERPL CALCULATED.3IONS-SCNC: 12 MMOL/L (ref 7–15)
AST SERPL W P-5'-P-CCNC: 61 U/L (ref 10–35)
BILIRUB SERPL-MCNC: 0.7 MG/DL
BUN SERPL-MCNC: 13.8 MG/DL (ref 8–23)
CALCIUM SERPL-MCNC: 8.3 MG/DL (ref 8.8–10.2)
CHLORIDE SERPL-SCNC: 100 MMOL/L (ref 98–107)
CREAT SERPL-MCNC: 0.58 MG/DL (ref 0.51–0.95)
DEPRECATED HCO3 PLAS-SCNC: 24 MMOL/L (ref 22–29)
ERYTHROCYTE [DISTWIDTH] IN BLOOD BY AUTOMATED COUNT: 13.8 % (ref 10–15)
GFR SERPL CREATININE-BSD FRML MDRD: 88 ML/MIN/1.73M2
GLUCOSE SERPL-MCNC: 108 MG/DL (ref 70–99)
HCT VFR BLD AUTO: 25.8 % (ref 35–47)
HGB BLD-MCNC: 8 G/DL (ref 11.7–15.7)
HGB BLD-MCNC: 8.2 G/DL (ref 11.7–15.7)
MAGNESIUM SERPL-MCNC: 2.5 MG/DL (ref 1.7–2.3)
MCH RBC QN AUTO: 29.5 PG (ref 26.5–33)
MCHC RBC AUTO-ENTMCNC: 31.8 G/DL (ref 31.5–36.5)
MCV RBC AUTO: 93 FL (ref 78–100)
PLATELET # BLD AUTO: 245 10E3/UL (ref 150–450)
POTASSIUM SERPL-SCNC: 3.5 MMOL/L (ref 3.4–5.3)
POTASSIUM SERPL-SCNC: 3.6 MMOL/L (ref 3.4–5.3)
PROT SERPL-MCNC: 5.6 G/DL (ref 6.4–8.3)
RBC # BLD AUTO: 2.78 10E6/UL (ref 3.8–5.2)
SODIUM SERPL-SCNC: 136 MMOL/L (ref 136–145)
WBC # BLD AUTO: 8 10E3/UL (ref 4–11)

## 2023-03-01 PROCEDURE — 83735 ASSAY OF MAGNESIUM: CPT | Performed by: NURSE PRACTITIONER

## 2023-03-01 PROCEDURE — 84132 ASSAY OF SERUM POTASSIUM: CPT | Performed by: NURSE PRACTITIONER

## 2023-03-01 PROCEDURE — 99232 SBSQ HOSP IP/OBS MODERATE 35: CPT | Performed by: NURSE PRACTITIONER

## 2023-03-01 PROCEDURE — 80053 COMPREHEN METABOLIC PANEL: CPT | Performed by: NURSE PRACTITIONER

## 2023-03-01 PROCEDURE — 250N000009 HC RX 250: Performed by: NURSE PRACTITIONER

## 2023-03-01 PROCEDURE — 250N000013 HC RX MED GY IP 250 OP 250 PS 637: Performed by: STUDENT IN AN ORGANIZED HEALTH CARE EDUCATION/TRAINING PROGRAM

## 2023-03-01 PROCEDURE — 36415 COLL VENOUS BLD VENIPUNCTURE: CPT | Performed by: NURSE PRACTITIONER

## 2023-03-01 PROCEDURE — 250N000013 HC RX MED GY IP 250 OP 250 PS 637: Performed by: NURSE PRACTITIONER

## 2023-03-01 PROCEDURE — 85018 HEMOGLOBIN: CPT | Performed by: NURSE PRACTITIONER

## 2023-03-01 PROCEDURE — 85027 COMPLETE CBC AUTOMATED: CPT | Performed by: NURSE PRACTITIONER

## 2023-03-01 PROCEDURE — 120N000002 HC R&B MED SURG/OB UMMC

## 2023-03-01 RX ORDER — SENNOSIDES 8.6 MG
650 CAPSULE ORAL EVERY 8 HOURS PRN
Qty: 270 TABLET | Refills: 1 | DISCHARGE
Start: 2023-03-01 | End: 2023-03-10

## 2023-03-01 RX ORDER — METHOCARBAMOL 750 MG/1
750 TABLET, FILM COATED ORAL 4 TIMES DAILY
DISCHARGE
Start: 2023-03-01 | End: 2023-05-29

## 2023-03-01 RX ORDER — POLYETHYLENE GLYCOL 3350 17 G/17G
17 POWDER, FOR SOLUTION ORAL DAILY
DISCHARGE
Start: 2023-03-02 | End: 2023-04-02

## 2023-03-01 RX ORDER — GABAPENTIN 600 MG/1
600 TABLET ORAL AT BEDTIME
DISCHARGE
Start: 2023-03-01

## 2023-03-01 RX ORDER — AMOXICILLIN 250 MG
1 CAPSULE ORAL 2 TIMES DAILY
DISCHARGE
Start: 2023-03-01 | End: 2023-04-02

## 2023-03-01 RX ORDER — GABAPENTIN 300 MG/1
300 CAPSULE ORAL 2 TIMES DAILY
DISCHARGE
Start: 2023-03-01

## 2023-03-01 RX ORDER — TRAMADOL HYDROCHLORIDE 50 MG/1
25-50 TABLET ORAL EVERY 6 HOURS PRN
Qty: 26 TABLET | Refills: 0 | Status: SHIPPED | OUTPATIENT
Start: 2023-03-01 | End: 2023-05-29

## 2023-03-01 RX ADMIN — GABAPENTIN 600 MG: 600 TABLET, FILM COATED ORAL at 21:17

## 2023-03-01 RX ADMIN — METHOCARBAMOL 750 MG: 750 TABLET, FILM COATED ORAL at 21:12

## 2023-03-01 RX ADMIN — ACETAMINOPHEN 975 MG: 325 TABLET, FILM COATED ORAL at 01:27

## 2023-03-01 RX ADMIN — LIDOCAINE HYDROCHLORIDE 30 ML: 20 SOLUTION ORAL; TOPICAL at 11:40

## 2023-03-01 RX ADMIN — SIMVASTATIN 10 MG: 10 TABLET, FILM COATED ORAL at 21:17

## 2023-03-01 RX ADMIN — GABAPENTIN 300 MG: 300 CAPSULE ORAL at 11:40

## 2023-03-01 RX ADMIN — FLUTICASONE FUROATE AND VILANTEROL TRIFENATATE 1 PUFF: 100; 25 POWDER RESPIRATORY (INHALATION) at 08:23

## 2023-03-01 RX ADMIN — SENNOSIDES AND DOCUSATE SODIUM 2 TABLET: 50; 8.6 TABLET ORAL at 21:12

## 2023-03-01 RX ADMIN — BUSPIRONE HYDROCHLORIDE 10 MG: 10 TABLET ORAL at 21:11

## 2023-03-01 RX ADMIN — LEVOTHYROXINE SODIUM 88 MCG: 88 TABLET ORAL at 08:23

## 2023-03-01 RX ADMIN — ACETAMINOPHEN 975 MG: 325 TABLET, FILM COATED ORAL at 08:22

## 2023-03-01 RX ADMIN — METHOCARBAMOL 750 MG: 750 TABLET, FILM COATED ORAL at 16:48

## 2023-03-01 RX ADMIN — BUSPIRONE HYDROCHLORIDE 10 MG: 10 TABLET ORAL at 08:22

## 2023-03-01 RX ADMIN — METHOCARBAMOL 750 MG: 750 TABLET, FILM COATED ORAL at 08:23

## 2023-03-01 RX ADMIN — METHOCARBAMOL 750 MG: 750 TABLET, FILM COATED ORAL at 11:40

## 2023-03-01 RX ADMIN — FLUOXETINE 60 MG: 20 CAPSULE ORAL at 08:23

## 2023-03-01 RX ADMIN — GABAPENTIN 300 MG: 300 CAPSULE ORAL at 08:22

## 2023-03-01 RX ADMIN — ACETAMINOPHEN 975 MG: 325 TABLET, FILM COATED ORAL at 16:48

## 2023-03-01 ASSESSMENT — ACTIVITIES OF DAILY LIVING (ADL)
ADLS_ACUITY_SCORE: 45
ADLS_ACUITY_SCORE: 49
ADLS_ACUITY_SCORE: 45
ADLS_ACUITY_SCORE: 45
ADLS_ACUITY_SCORE: 47
ADLS_ACUITY_SCORE: 49
ADLS_ACUITY_SCORE: 47
ADLS_ACUITY_SCORE: 45
ADLS_ACUITY_SCORE: 47
ADLS_ACUITY_SCORE: 43

## 2023-03-01 NOTE — PROGRESS NOTES
Care Management Follow Up    Length of Stay (days): 3    Expected Discharge Date: 02/28/2023     Concerns to be Addressed:   discharge planning    Patient plan of care discussed at interdisciplinary rounds: Yes    Anticipated Discharge Disposition:  TCU     Anticipated Discharge Services:  Post acute therapies  Anticipated Discharge DME:      Patient/family educated on Medicare website which has current facility and service quality ratings:  Yes  Education Provided on the Discharge Plan:  Yes  Patient/Family in Agreement with the Plan:  Yes    Referrals Placed by CM/SW:    Private pay costs discussed: Not applicable    Additional Information:  SW following for discharge planning. PT/OT currently recommending TCU placement at discharge. Per 5Ortho rounds today, pt is not medically ready today.     GILDA attempted to follow up with pt daughter (Alea) to get TCU choices for pt. There was no answer. SW left VM explaining purpose of call and requesting a call back.     1630: GILDA received phone call back from Alea with pt and family TCU preferences. Alea noted that pt/family's first choice was [facility] as pt has been there before. Alea also provided the following options: [facility] and [facility]. Alea noted that pt had noted being ok with [facility] but this was more of a last choice. GILDA noted that SW would try best to accommodate first choice preferences but it would depend on bed availability once pt is medically ready. GILDA answered some of Alea's other questions regarding insurance for TCU placement.     GILDA made the following TCU referrals via [facility]:     [facility]  Ph: [phone]  3/1: referral sent    [facility]  PH: [phone]  3/1: referral sent    [facility]  PH: [phone]  3/1: referral sent    [facility]  Ph: [phone]  3/1: referral sent    SANTOSH Carson   [facility]

## 2023-03-01 NOTE — PLAN OF CARE
Goal Outcome Evaluation:      Plan of Care Reviewed With: patient        VS: /50 (BP Location: Right arm)   Pulse 92   Temp 98.9  F (37.2  C) (Oral)   Resp 18   Wt 118.8 kg (262 lb)   LMP  (LMP Unknown)   SpO2 92%   BMI 46.41 kg/m       O2: SpO2 > 92% on 1 LPM. Continue to wean oxygen. LS clear and equal bilaterally. Denies chest pain and SOB.    Output: Voiding adequate amounts via purewick   Last BM: 2/28/23, loose stools    Activity: Pt not oob, NWB to RLE - okay for WB w/transfers. Pt also has difficulty with LLE due to pain.   Skin: WDL except, RLE incision x5    Pain: Pain managed with prn tramadol, scheduled tylenol and robaxin    CMS: Intact, AOx4. Denies numbness and tingling. DP+2 bilaterally   Dressing: R hip, R thigh, R knee - scant amount dry drainage  Sacral mepilex   Diet: Regular diet. Denies nausea/vomiting.    LDA: Rt  PIV SL x2   Equipment: IV pole, personal belongings, IS, walker   Plan: Continue with plan of care. Call light within reach, pt able to make needs known. Anticipated discharge to TCU when stable    Additional Info:

## 2023-03-01 NOTE — PROGRESS NOTES
Brief Medicine Cross Cover Note:    [employer] Cover paged regarding GI upset. Requesting GI cocktail.     Interventions:  -GI cocktail  -Notify medicine if GI distress continues    Belkis Muse CNP, APRN  Internal Medicine [facility] Hospitalist  [facility]  [phone]  Securely message with the Vocera Web Console   Text page via [employer] Paging/Directory   Text page via [employer]

## 2023-03-01 NOTE — PROGRESS NOTES
Orthopedic Surgery Progress Note   3/1/2023    Subjective: No acute events overnight. Pain well controlled. Tolerating diet. Voiding spontaneously. +Flatus, no BM. Denies fever or chills, CP, SOB, numbness or tingling, motor dysfunction or weakness. C/o abdominal pain and discomfort.       Exam:  /47   Pulse 81   Temp 97.4  F (36.3  C) (Oral)   Resp 16   Wt 118.8 kg (262 lb)   LMP  (LMP Unknown)   SpO2 95%   BMI 46.41 kg/m    Gen: Awake, alert, NAD  Resp: breathing equal and non-labored  Extremities:    RLE: Toes wwp, DP 2+, bcr in all toes. Compartments soft and tolerates passive stretch of toes. +EHL/FHL/GSC/TA with 5 /5 strength. SILT SP/DP/Sa/Coelho/T. Dressing c/d/i.      Labs:  Recent Labs   Lab 03/01/23 0726 02/28/23  0743 02/27/23  1240 02/26/23  1746 02/26/23  0607 02/25/23  1831   WBC 8.0  --   --   --  7.9 9.1   HGB 8.2* 8.7* 9.0* 11.0* 12.3 13.6     --   --   --  223 239     Recent Labs   Lab 03/01/23 0726 02/28/23 0743 02/27/23  1240 02/26/23  1746 02/26/23  0607 02/25/23  1831   NA  --   --   --  135 136 139   POTASSIUM 3.5 4.4 4.0 4.1 4.3 3.8   CHLORIDE  --   --   --   --  98 102   CO2  --   --   --   --  26 24   BUN  --   --   --   --  18.6 19.3   CR  --   --   --   --  0.60 0.65   GLC  --   --   --  117* 158* 127*   MAG 2.5* 2.2 2.1  --  2.4*  --      Recent Labs   Lab 02/26/23  0607 02/26/23  0106   INR 1.04 1.07   PTT  --  31     No lab results found in last 7 days.    Invalid input(s): ESR    Assessment:     Assessment: Carol Merida is a 87 year old female s/p ORIF R femur on 2/26/2023 with Dr. Lemon.      2/28:  Remove jiménez; place purewick  PT/OT  XR L knee- ordered      Plan:  Medicine Primary team given age and medical comorbidity; similar rationale to hip fracture patients  Activity: Up with walker and assist until independent.    Weight bearing status: May weight bear on RLE for transfers;   Pain management: Oral pain medications with IV for breakthrough. Wean IV  as able.    Antibiotics: Ancef x 24 hours  Diet: Begin with clear fluids and progress diet as tolerated.   DVT prophylaxis: Mechanical prophylaxis with SCD  Imaging: Standing radiographs when able. XR left knee   Labs: Monitor Hgb and transfuse prn  Bracing/Splinting: ACE wrap to RLE may be replaced or reinforced as needed  Dressings: Keep clean, dry and intact   Drains: n/a  Physical Therapy/Occupational Therapy: Eval and treat.  Consults: nutrition, SW  Follow-up: Clinic with PA in 2 weeks for wound check and staple removal  Disposition: Admitted for physical therapy and pain control; discharge to home versus TCU today pending PT meetings.     Patient discussed with .       Linnea Gaitanshaun BARRON CNS  3/1/2023 11:27 AM  Orthopaedic Surgery       Thank you for allowing me to participate in this patient's care. Please page me directly any questions/concerns.   Securely message with the Vocera Web Console (learn more here)  Text page via [employer] Paging/Directory    If there is no response, if it is a weekend, or if it is during evening hours, please page the orthopaedic surgery resident on call via [employer] Paging/Directory

## 2023-03-01 NOTE — PLAN OF CARE
Goal Outcome Evaluation:      Plan of Care Reviewed With: patient    Overall Patient Progress: improvingOverall Patient Progress: improving    Outcome Evaluation: Increased pain meds, added muscle relaxer, anticipate DC to TCU when medically stable.      VS: Temp: 98.6  F (37  C) Temp src: Oral BP: 112/50 Pulse: 88   Resp: 18 SpO2: 90 % O2 Device: None (Room air) Oxygen Delivery: 1 LPM     O2: >90% on 1 LPM, pt 84% on RM air. Continue to wean as able.   Output: Voiding adequate amounts via purewick   Last BM: 2/28 x2 loose stools   Activity: Pt not oob, NWB to RLE - okay for WB w/transfers. Pt also has difficulty with LLE due to pain.   Skin: RLE incision x5, otherwise intact, warm and dry    Pain: Increased this afternoon, MD increased Tramadol, added sched Robaxin. Pain improved on current regiment   CMS: Denies numbness and tingling, DP+2 bilaterally   Dressing: R hip, R thigh, R knee - scant amount dry drainage  Sacral mepilex   Diet: Regular diet, denies nausea/vomiting   LDA: RUE Two PIV saline locked   Equipment: Walker, IS, Personal belongings   Plan: Anticipate discontinue to TCU once medically stable   Additional Info: Pt complains of abdominal pain - MD notified. GI cocktail ordered.

## 2023-03-01 NOTE — PROGRESS NOTES
"[facility]    Medicine Progress Note - Hospitalist Service, GOLD TEAM 20    Date of Admission:  2/26/2023    Assessment & Plan   Carol \"Jan\" ANGI Merida is an 87 year old female with past medical history significant for grade 1 diastolic dysfunction, hypertension, JEAN, hypothyroidism, anxiety depression, and osteoarthritis transferred from [facility] d/t ground level fall resulting in periprosthetic fracture of her femur. Admitted to medicine for pre-op clearance, underwent ORIF of R femur on 2/26/23, and remains medicine primary due to medical complexity.     Note: request from patient/family that this writer call and update son Jono - tried to call x 2 today 3/1 but unable to reach him.     # S/p ORIF of R femur   # Mechanical ground level fall   # Osteoarthritis   POD #3.  Reports adequate pain control.  Had some confusion and disorientation post op likely 2/2 anesthesia medications, but back to baseline now.  Pt had question about dressing changes on surgical incisions; discussed with Ortho NP, no indication to change now, can be done prior to discharge.   - Ortho continues to follow   - PT, OT recommending TCU   - Pain control: continue scheduled APAP 975mg Q8H, Gabapentin 300/300/600mg, oxycodone 2.5-5mg Q4H PRN  - Monitor for sedation/confusion, would hold opioids if this occurs      # Hx HTN - On Amlodipine PTA.  Noted to have lower BPs.  - Hold Amlodipine for now   - Monitor BP per unit routine    # Grade 1 diastolic dysfunction   # Chronic dyspnea on exertion   Per review of admission H&P, pt underwent stress test in 2012 and fall 2022 both with same area of infarction with stress, and cardiac cath in 2012 with \"no evidence of significant coronary narrowing that would benefit from mechanical revascularization.\"  Had TTE this admission as part of preop evaluation with hyperkinetic LV function and EF > 70%, normal LV function, normal RV function, chamber size, wall " motion, and thickness, normal atria, normal PASP, and no e/o effusion.  Unclear if dyspnea is of cardiac or pulmonary etiology.  Per chart review, had normal PFTs in 2018.  Currently needing supplemental O2 here, but appears to be at baseline, no c/o chest pain.     - Continue Breo-Ellipta (auto sub for PTA Wixela)   - PRN albuterol   - Wean supplemental O2     # HLD - Continue PTA statin     # Abdominal cramping - Reports onset last evening.  Received GI cocktail with good effect, but continuing to have discomfort today.  Had two large BMs yesterday 2/28 but none yet today.  Passing flatus and having active bowel sounds. No vomiting.  - GI cocktail  - If persists would check AXR     # Constipation - Resolved.  Having BMs today.  Likely 2/2 opioids vs recent surgery. Mag 2.4.  No abdominal pain.   - Continue scheduled senna BID and miralax daily, hold for loose stools     # Hypothyroidism - Continue PTA Levothyroxine 88mcg.     # JEAN - Continue home CPAP at HS.    # Acute anemia - Hgb 9.0 post op, BL closer to 12-13.  Has gradually drifted down, 8.2 this AM.  EBL ~ 500cc during procedure.  Possible 2/2 surgical blood loss vs dilutional.  - Check CBC in AM   - No s/s acute bleed     - Transfuse for Hgb < 7     # Chronic lower extremity swelling - Uses Lasix at home PRN.  Will order here if edema noted, otherwise resume at discharge.     # Anxiety, depression - Mood stable.    - Continue PTA Buspar 10mg BID and Prozac 60mg daily           Diet: Regular Diet Adult    DVT Prophylaxis: Pneumatic Compression Devices  Benson Catheter: Not present  Lines: None     Cardiac Monitoring: None  Code Status: Full Code      Clinically Significant Risk Factors                                Disposition Plan         The patient's care was discussed with the patient and during care rounds with RNCC and SW..    Lauren Cervantes CNP  Hospitalist Service, GOLD TEAM 20  [facility]  Securely  message with [employer] (more info)  Text page via [facility] Paging/Directory   See signed in provider for up to date coverage information  ______________________________________________________________________    Interval History   Everette is resting in bed.  Reports some abdominal pain today, describes as cramping.  Has had this before.  Denies history of GIB, ulcers, and liver disease.  Not known to have hematochezia or melena this admission.  No vomiting or nausea.  Denies chest pain and pressure.      Physical Exam   Vital Signs: Temp: 97.4  F (36.3  C) Temp src: Oral BP: 113/47 Pulse: 81   Resp: 16 SpO2: 95 % O2 Device: None (Room air)    Weight: 262 lbs 0 oz    GENERAL: Alert and oriented x 3. Well nourished, well developed elderly female in no acute distress.    HEENT: Normocephalic, atraumatic. Anicteric sclera. Mucous membranes moist.   CV: RRR. S1, S2. No murmurs appreciated.   RESPIRATORY: Effort normal on room air. Lungs CTAB with no wheezing, rales, or rhonchi.   GI: Abdomen soft and non distended, bowel sounds present x all 4 quadrants. No tenderness, rebound, or guarding.   NEUROLOGICAL: No focal deficits. Follows commands.    MUSCULOSKELETAL: No joint swelling or tenderness. Moves all extremities.   EXTREMITIES: No gross deformities. No peripheral edema.   SKIN: Grossly warm, dry, and intact. No jaundice. No rashes.       Medical Decision Making       45 MINUTES SPENT BY ME on the date of service doing chart review, history, exam, documentation & further activities per the note.      Data     I have personally reviewed the following data over the past 24 hrs:    8.0  \   8.2 (L)   / 245     N/A N/A N/A /  N/A   3.5 N/A N/A \       Imaging results reviewed over the past 24 hrs:   Recent Results (from the past 24 hour(s))   XR Knee Port Left G/E 4 Views    Narrative    EXAM: XR KNEE PORT LEFT G/E 4 VIEWS  LOCATION: [facility]  DATE/TIME: 2/28/2023 6:09  PM    INDICATION: left knee pain  COMPARISON: 09/26/2022      Impression    IMPRESSION: Total knee arthroplasty in place. No evidence of hardware fracture or loosening. Joint space is within normal limits. Small joint effusion. Hypertrophic changes patella. No acute osseous fracture or dislocation. No foreign or loose body.

## 2023-03-02 ENCOUNTER — APPOINTMENT (OUTPATIENT)
Dept: GENERAL RADIOLOGY | Facility: CLINIC | Age: 88
DRG: 481 | End: 2023-03-02
Attending: NURSE PRACTITIONER
Payer: COMMERCIAL

## 2023-03-02 ENCOUNTER — APPOINTMENT (OUTPATIENT)
Dept: OCCUPATIONAL THERAPY | Facility: CLINIC | Age: 88
DRG: 481 | End: 2023-03-02
Payer: COMMERCIAL

## 2023-03-02 ENCOUNTER — APPOINTMENT (OUTPATIENT)
Dept: CT IMAGING | Facility: CLINIC | Age: 88
DRG: 481 | End: 2023-03-02
Payer: COMMERCIAL

## 2023-03-02 ENCOUNTER — APPOINTMENT (OUTPATIENT)
Dept: PHYSICAL THERAPY | Facility: CLINIC | Age: 88
DRG: 481 | End: 2023-03-02
Payer: COMMERCIAL

## 2023-03-02 LAB
ALBUMIN SERPL BCG-MCNC: 2.9 G/DL (ref 3.5–5.2)
ALBUMIN UR-MCNC: 30 MG/DL
ALP SERPL-CCNC: 113 U/L (ref 35–104)
ALT SERPL W P-5'-P-CCNC: 41 U/L (ref 10–35)
ANION GAP SERPL CALCULATED.3IONS-SCNC: 9 MMOL/L (ref 7–15)
APPEARANCE UR: ABNORMAL
AST SERPL W P-5'-P-CCNC: 47 U/L (ref 10–35)
BILIRUB SERPL-MCNC: 0.7 MG/DL
BILIRUB UR QL STRIP: NEGATIVE
BUN SERPL-MCNC: 11.7 MG/DL (ref 8–23)
CALCIUM SERPL-MCNC: 8.2 MG/DL (ref 8.8–10.2)
CHLORIDE SERPL-SCNC: 100 MMOL/L (ref 98–107)
COLOR UR AUTO: YELLOW
CREAT SERPL-MCNC: 0.55 MG/DL (ref 0.51–0.95)
CRP SERPL-MCNC: 190.23 MG/L
DEPRECATED HCO3 PLAS-SCNC: 27 MMOL/L (ref 22–29)
ERYTHROCYTE [DISTWIDTH] IN BLOOD BY AUTOMATED COUNT: 13.5 % (ref 10–15)
FLUAV AG SPEC QL IA: NEGATIVE
FLUBV AG SPEC QL IA: NEGATIVE
GFR SERPL CREATININE-BSD FRML MDRD: 89 ML/MIN/1.73M2
GLUCOSE SERPL-MCNC: 115 MG/DL (ref 70–99)
GLUCOSE UR STRIP-MCNC: NEGATIVE MG/DL
HCT VFR BLD AUTO: 26.5 % (ref 35–47)
HGB BLD-MCNC: 8.2 G/DL (ref 11.7–15.7)
HGB UR QL STRIP: NEGATIVE
KETONES UR STRIP-MCNC: NEGATIVE MG/DL
LEUKOCYTE ESTERASE UR QL STRIP: NEGATIVE
MAGNESIUM SERPL-MCNC: 2.3 MG/DL (ref 1.7–2.3)
MCH RBC QN AUTO: 29.3 PG (ref 26.5–33)
MCHC RBC AUTO-ENTMCNC: 30.9 G/DL (ref 31.5–36.5)
MCV RBC AUTO: 95 FL (ref 78–100)
MUCOUS THREADS #/AREA URNS LPF: PRESENT /LPF
NITRATE UR QL: NEGATIVE
PH UR STRIP: 6 [PH] (ref 5–7)
PLATELET # BLD AUTO: 273 10E3/UL (ref 150–450)
POTASSIUM SERPL-SCNC: 3.4 MMOL/L (ref 3.4–5.3)
POTASSIUM SERPL-SCNC: 3.5 MMOL/L (ref 3.4–5.3)
PROCALCITONIN SERPL IA-MCNC: 0.06 NG/ML
PROT SERPL-MCNC: 5.9 G/DL (ref 6.4–8.3)
RBC # BLD AUTO: 2.8 10E6/UL (ref 3.8–5.2)
RBC URINE: 1 /HPF
SARS-COV-2 RNA RESP QL NAA+PROBE: NEGATIVE
SODIUM SERPL-SCNC: 136 MMOL/L (ref 136–145)
SP GR UR STRIP: 1.03 (ref 1–1.03)
SQUAMOUS EPITHELIAL: 20 /HPF
UROBILINOGEN UR STRIP-MCNC: 3 MG/DL
WBC # BLD AUTO: 8.5 10E3/UL (ref 4–11)
WBC URINE: 4 /HPF

## 2023-03-02 PROCEDURE — 250N000013 HC RX MED GY IP 250 OP 250 PS 637: Performed by: STUDENT IN AN ORGANIZED HEALTH CARE EDUCATION/TRAINING PROGRAM

## 2023-03-02 PROCEDURE — 97535 SELF CARE MNGMENT TRAINING: CPT | Mod: GO

## 2023-03-02 PROCEDURE — 250N000013 HC RX MED GY IP 250 OP 250 PS 637: Performed by: NURSE PRACTITIONER

## 2023-03-02 PROCEDURE — 36415 COLL VENOUS BLD VENIPUNCTURE: CPT | Performed by: NURSE PRACTITIONER

## 2023-03-02 PROCEDURE — 80053 COMPREHEN METABOLIC PANEL: CPT | Performed by: NURSE PRACTITIONER

## 2023-03-02 PROCEDURE — 83735 ASSAY OF MAGNESIUM: CPT | Performed by: STUDENT IN AN ORGANIZED HEALTH CARE EDUCATION/TRAINING PROGRAM

## 2023-03-02 PROCEDURE — 250N000009 HC RX 250: Performed by: STUDENT IN AN ORGANIZED HEALTH CARE EDUCATION/TRAINING PROGRAM

## 2023-03-02 PROCEDURE — 84132 ASSAY OF SERUM POTASSIUM: CPT | Performed by: STUDENT IN AN ORGANIZED HEALTH CARE EDUCATION/TRAINING PROGRAM

## 2023-03-02 PROCEDURE — 97530 THERAPEUTIC ACTIVITIES: CPT | Mod: GP

## 2023-03-02 PROCEDURE — 85027 COMPLETE CBC AUTOMATED: CPT | Performed by: NURSE PRACTITIONER

## 2023-03-02 PROCEDURE — 87804 INFLUENZA ASSAY W/OPTIC: CPT | Performed by: NURSE PRACTITIONER

## 2023-03-02 PROCEDURE — 81001 URINALYSIS AUTO W/SCOPE: CPT | Performed by: NURSE PRACTITIONER

## 2023-03-02 PROCEDURE — 120N000002 HC R&B MED SURG/OB UMMC

## 2023-03-02 PROCEDURE — 84145 PROCALCITONIN (PCT): CPT | Performed by: NURSE PRACTITIONER

## 2023-03-02 PROCEDURE — 74018 RADEX ABDOMEN 1 VIEW: CPT

## 2023-03-02 PROCEDURE — 74177 CT ABD & PELVIS W/CONTRAST: CPT

## 2023-03-02 PROCEDURE — 71045 X-RAY EXAM CHEST 1 VIEW: CPT

## 2023-03-02 PROCEDURE — 74177 CT ABD & PELVIS W/CONTRAST: CPT | Mod: 26 | Performed by: RADIOLOGY

## 2023-03-02 PROCEDURE — 99233 SBSQ HOSP IP/OBS HIGH 50: CPT | Performed by: NURSE PRACTITIONER

## 2023-03-02 PROCEDURE — 71045 X-RAY EXAM CHEST 1 VIEW: CPT | Mod: 26 | Performed by: RADIOLOGY

## 2023-03-02 PROCEDURE — U0005 INFEC AGEN DETEC AMPLI PROBE: HCPCS | Performed by: NURSE PRACTITIONER

## 2023-03-02 PROCEDURE — 74018 RADEX ABDOMEN 1 VIEW: CPT | Mod: 26 | Performed by: RADIOLOGY

## 2023-03-02 PROCEDURE — 86140 C-REACTIVE PROTEIN: CPT | Performed by: NURSE PRACTITIONER

## 2023-03-02 PROCEDURE — 250N000011 HC RX IP 250 OP 636: Performed by: STUDENT IN AN ORGANIZED HEALTH CARE EDUCATION/TRAINING PROGRAM

## 2023-03-02 RX ORDER — IOPAMIDOL 755 MG/ML
200 INJECTION, SOLUTION INTRAVASCULAR ONCE
Status: COMPLETED | OUTPATIENT
Start: 2023-03-02 | End: 2023-03-02

## 2023-03-02 RX ORDER — POTASSIUM CHLORIDE 1500 MG/1
40 TABLET, EXTENDED RELEASE ORAL ONCE
Status: COMPLETED | OUTPATIENT
Start: 2023-03-02 | End: 2023-03-02

## 2023-03-02 RX ADMIN — FLUTICASONE FUROATE AND VILANTEROL TRIFENATATE 1 PUFF: 100; 25 POWDER RESPIRATORY (INHALATION) at 08:55

## 2023-03-02 RX ADMIN — BUSPIRONE HYDROCHLORIDE 10 MG: 10 TABLET ORAL at 20:12

## 2023-03-02 RX ADMIN — GABAPENTIN 300 MG: 300 CAPSULE ORAL at 08:51

## 2023-03-02 RX ADMIN — ACETAMINOPHEN 975 MG: 325 TABLET, FILM COATED ORAL at 00:00

## 2023-03-02 RX ADMIN — Medication 50 MG: at 08:52

## 2023-03-02 RX ADMIN — POTASSIUM CHLORIDE 40 MEQ: 1500 TABLET, EXTENDED RELEASE ORAL at 08:51

## 2023-03-02 RX ADMIN — LEVOTHYROXINE SODIUM 88 MCG: 88 TABLET ORAL at 05:21

## 2023-03-02 RX ADMIN — GABAPENTIN 300 MG: 300 CAPSULE ORAL at 13:01

## 2023-03-02 RX ADMIN — ACETAMINOPHEN 975 MG: 325 TABLET, FILM COATED ORAL at 16:51

## 2023-03-02 RX ADMIN — SODIUM CHLORIDE 72 ML: 9 INJECTION, SOLUTION INTRAVENOUS at 21:21

## 2023-03-02 RX ADMIN — IOPAMIDOL 128 ML: 755 INJECTION, SOLUTION INTRAVENOUS at 21:21

## 2023-03-02 RX ADMIN — BUSPIRONE HYDROCHLORIDE 10 MG: 10 TABLET ORAL at 08:51

## 2023-03-02 RX ADMIN — SIMVASTATIN 10 MG: 10 TABLET, FILM COATED ORAL at 21:50

## 2023-03-02 RX ADMIN — METHOCARBAMOL 750 MG: 750 TABLET, FILM COATED ORAL at 20:12

## 2023-03-02 RX ADMIN — METHOCARBAMOL 750 MG: 750 TABLET, FILM COATED ORAL at 13:01

## 2023-03-02 RX ADMIN — FLUOXETINE 60 MG: 20 CAPSULE ORAL at 08:51

## 2023-03-02 RX ADMIN — GABAPENTIN 600 MG: 600 TABLET, FILM COATED ORAL at 21:50

## 2023-03-02 RX ADMIN — METHOCARBAMOL 750 MG: 750 TABLET, FILM COATED ORAL at 16:51

## 2023-03-02 RX ADMIN — ACETAMINOPHEN 975 MG: 325 TABLET, FILM COATED ORAL at 09:00

## 2023-03-02 RX ADMIN — SENNOSIDES AND DOCUSATE SODIUM 1 TABLET: 50; 8.6 TABLET ORAL at 20:12

## 2023-03-02 RX ADMIN — METHOCARBAMOL 750 MG: 750 TABLET, FILM COATED ORAL at 08:50

## 2023-03-02 ASSESSMENT — ACTIVITIES OF DAILY LIVING (ADL)
ADLS_ACUITY_SCORE: 43

## 2023-03-02 NOTE — PLAN OF CARE
Goal Outcome Evaluation:  Pt stated abdominal pain, Tylenol was given, no relief. Lidocaine and Mag oral solution given, pt stated great relief. R knee and thigh and L knee in pain per pt statement.     Orientation: Aox4  Bowel: Continent   Bladder: Purewick in place   Ambulation/Transfers: Not OOB on shift  Diet/ Liquids: Regular, thin  Tubes/ Lines/ Drains: x2 R PIV SL  Oxygen: 0.5 L, trying to wean  Skin: L incision, small amount of drainage noted through dressing

## 2023-03-02 NOTE — PROGRESS NOTES
Orthopedic Surgery Progress Note   3/2/2023    Subjective: No acute events overnight. Pain controlled at rest. Pain with movement still, has been working with PT and up to side of bed. Tolerating diet. Voiding spontaneously. +Flatus, + BM. Denies fever or chills, CP, SOB, numbness or tingling, motor dysfunction or weakness.       Exam:  /57 (BP Location: Right arm)   Pulse 83   Temp 98.5  F (36.9  C) (Oral)   Resp 16   Wt 118.8 kg (262 lb)   LMP  (LMP Unknown)   SpO2 93%   BMI 46.41 kg/m    Gen: Awake, alert, NAD  Resp: breathing equal and non-labored  Extremities:    RLE: Toes wwp, DP 2+, bcr in all toes. Compartments soft and tolerates passive stretch of toes. +EHL/FHL/GSC/TA with 5 /5 strength. SILT SP/DP/Sa/Coelho/T. Dressing c/d/i.      Labs:    Recent Labs   Lab 03/01/23 1838 03/01/23 0726 02/28/23 0743 02/27/23  1240 02/26/23 1746 02/26/23  0607 02/25/23  1831   WBC  --  8.0  --   --   --  7.9 9.1   HGB 8.0* 8.2* 8.7* 9.0*   < > 12.3 13.6   PLT  --  245  --   --   --  223 239    < > = values in this interval not displayed.     Recent Labs   Lab 03/01/23 0726 02/28/23 0743 02/27/23 1240 02/26/23 1746 02/26/23  0607 02/25/23  1831     --   --  135 136 139   POTASSIUM 3.6  3.5 4.4 4.0 4.1 4.3 3.8   CHLORIDE 100  --   --   --  98 102   CO2 24  --   --   --  26 24   BUN 13.8  --   --   --  18.6 19.3   CR 0.58  --   --   --  0.60 0.65   *  --   --  117* 158* 127*   MAG 2.5* 2.2 2.1  --  2.4*  --      Recent Labs   Lab 02/26/23  0607 02/26/23  0106   INR 1.04 1.07   PTT  --  31     No lab results found in last 7 days.    Invalid input(s): ESR    Assessment:     Assessment: Carol Merida is a 87 year old female s/p ORIF R femur on 2/26/2023 with Dr. Lemon.      3/2:  Remove jiménez; place purewick  PT/OT     Plan:  Medicine Primary team given age and medical comorbidity; similar rationale to hip fracture patients  Activity: Up with walker and assist until independent.    Weight  bearing status: May weight bear on RLE for transfers;   Pain management: Oral pain medications with IV for breakthrough. Wean IV as able.    Antibiotics: Ancef x 24 hours  Diet: Begin with clear fluids and progress diet as tolerated.   DVT prophylaxis: Mechanical prophylaxis with SCD  Imaging: Standing radiographs when able. XR left knee complete    Labs: Monitor Hgb and transfuse prn  Bracing/Splinting: ACE wrap to RLE may be replaced or reinforced as needed  Dressings: Keep clean, dry and intact   Drains: n/a  Physical Therapy/Occupational Therapy: Eval and treat.  Consults: nutrition, SW  Follow-up: Clinic with PA in 2 weeks for wound check and staple removal  Disposition: Admitted for physical therapy and pain control; discharge to home versus TCU today pending PT meetings.     Patient discussed with .       Yesika Zayas PGY4  Orthopedic Surgery

## 2023-03-02 NOTE — PROGRESS NOTES
"[facility]    Medicine Progress Note - Hospitalist Service, GOLD TEAM 20    Date of Admission:  2/26/2023    Assessment & Plan   Carol \"Jan\" ANGI Merida is an 87 year old female with past medical history significant for grade 1 diastolic dysfunction, hypertension, JEAN, hypothyroidism, anxiety depression, and osteoarthritis transferred from [facility] d/t ground level fall resulting in periprosthetic fracture of her femur. Admitted to medicine for pre-op clearance, underwent ORIF of R femur on 2/26/23, and remains medicine primary due to medical complexity.       # S/p ORIF of R femur   # Mechanical ground level fall   # Osteoarthritis   POD #3.  Reports adequate pain control.  Had some confusion and disorientation post op likely 2/2 anesthesia medications, but back to baseline now.  Pt had question about dressing changes on surgical incisions; discussed with Ortho NP, no indication to change now, can be done prior to discharge.   - Ortho continues to follow   - PT, OT recommending TCU   - Pain control: continue scheduled APAP 975mg Q8H, Gabapentin 300/300/600mg, oxycodone 2.5-5mg Q4H PRN  - Monitor for sedation/confusion, would hold opioids if this occurs      # Throat discomfort - Pt reports feeling unwell and having a scratchy throat, which is new today.  Reports feeling a little chilled as well this morning.  No fevers or cough.  Has some dyspnea at baseline.  Procal 0.06, no leukocytosis.      - Check COVID, flu   - Check CXR  - Throat lozenges PRN    # Hx HTN - On Amlodipine PTA.  Noted to have lower BPs.  - Hold Amlodipine for now   - Monitor BP per unit routine    # Grade 1 diastolic dysfunction   # Chronic dyspnea on exertion   Per review of admission H&P, pt underwent stress test in 2012 and fall 2022 both with same area of infarction with stress, and cardiac cath in 2012 with \"no evidence of significant coronary narrowing that would benefit from mechanical " "revascularization.\"  Had TTE this admission as part of preop evaluation with hyperkinetic LV function and EF > 70%, normal LV function, normal RV function, chamber size, wall motion, and thickness, normal atria, normal PASP, and no e/o effusion.  Unclear if dyspnea is of cardiac or pulmonary etiology.  Per chart review, had normal PFTs in 2018.  Currently needing supplemental O2 here, but appears to be at baseline, no c/o chest pain.     - Continue Breo-Ellipta (auto sub for PTA Wixela)   - PRN albuterol   - Wean supplemental O2     # HLD - Continue PTA statin     # Abdominal cramping  # Mild LFT elevation   Concern for possible postop ileus vs postobstructive diarrhea vs other.  Onset evening 2/28.  Received GI cocktail with good effect, but continuing to have discomfort 3/1 and 3/2.  Temporary relief with GI cocktail.  Had two large BMs 2/28 and diarrhea on 3/1.  Passing flatus and having active bowel sounds. No vomiting.  Noted to have abnormal LFTs on 3/1, improved this AM, , ALT 41 (58), AST 47 (61), Tbili 0.7.  AXR ordered, results pending.  Mag wnl.    - Awaiting AXR final read  - Trend LFTs in AM  - GI cocktail TID PRN  - Hold bowel regimen for loose stools     # Constipation - Resolved.  Having BMs today.  Likely 2/2 opioids vs recent surgery. Mag 2.4.  No abdominal pain.   - Continue scheduled senna BID and miralax daily, hold for loose stools     # Hypothyroidism - Continue PTA Levothyroxine 88mcg.     # JEAN - Continue home CPAP at HS.    # Acute anemia - Hgb 9.0 post op, BL closer to 12-13.  Has gradually drifted down, stable ~ 8.  EBL ~ 500cc during procedure.  Possible 2/2 surgical blood loss vs dilutional vs other.  Noted to have wide hematoma on operative leg today on exam.  Discussed with Ortho, no acute concerns at this time.  - Check CBC in AM      - Transfuse for Hgb < 7   - Monitor hematoma site    # Chronic lower extremity swelling - Uses Lasix at home PRN.  Will order here if edema " "noted, otherwise resume at discharge.     # Anxiety, depression - Mood stable.    - Continue PTA Buspar 10mg BID and Prozac 60mg daily           Diet: Regular Diet Adult  Diet    DVT Prophylaxis: Pneumatic Compression Devices  Benson Catheter: Not present  Lines: None     Cardiac Monitoring: None  Code Status: Full Code      Clinically Significant Risk Factors              # Hypoalbuminemia: Lowest albumin = 2.9 g/dL at 3/2/2023  5:18 AM, will monitor as appropriate                   Disposition Plan         The patient's care was discussed with the patient and during care rounds with RNCC and SW..    Lauren Cervantes [facility]  Hospitalist Service, GOLD TEAM 20  [facility]  Securely message with [email or web address] (more info)  Text page via [facility] Paging/Directory   See signed in provider for up to date coverage information  ______________________________________________________________________    Interval History   Everette is resting in bed.  She says she feels generally unwell today.  She reports feeling like she has \"a frog in her throat\" and that it feels scratchy.  Dyspnea at baseline.  No cough or fevers but did report chills this AM.  No chest pain, but continues to have abdominal cramping and tenderness.  Passing flatus and had diarrhea yesterday.       Physical Exam   Vital Signs: Temp: 98.5  F (36.9  C) Temp src: Oral BP: 111/57 Pulse: 83   Resp: 16 SpO2: 93 % O2 Device: Nasal cannula Oxygen Delivery: 1 LPM  Weight: 262 lbs 0 oz    GENERAL: Alert and oriented x 3. Well nourished, well developed elderly female in no acute distress.    HEENT: Normocephalic, atraumatic. Anicteric sclera. Mucous membranes moist.   CV: RRR. S1, S2. No murmurs appreciated.   RESPIRATORY: Effort normal on NC. Lungs CTAB with no wheezing, rales, or rhonchi.   GI: Abdomen soft and non distended, bowel sounds present x all 4 quadrants. TTP upper quadrants.   NEUROLOGICAL: No focal deficits. Follows commands.  "   MUSCULOSKELETAL: No joint swelling or tenderness. Moves all extremities.   EXTREMITIES: No gross deformities. Trace edema of R leg. Hematoma present on lateral aspect of R thigh proximal to surgical site.   SKIN: Grossly warm, dry, and intact. No jaundice. No rashes.       Medical Decision Making       60 MINUTES SPENT BY ME on the date of service doing chart review, history, exam, documentation & further activities per the note.      Data     I have personally reviewed the following data over the past 24 hrs:    N/A  \   8.0 (L)   / N/A     136 100 11.7 /  115 (H)   3.4 27 0.55 \       ALT: 41 (H) AST: 47 (H) AP: 113 (H) TBILI: 0.7   ALB: 2.9 (L) TOT PROTEIN: 5.9 (L) LIPASE: N/A       Imaging results reviewed over the past 24 hrs:   No results found for this or any previous visit (from the past 24 hour(s)).

## 2023-03-02 NOTE — PLAN OF CARE
A/Ox 4. Able to make needs known. Denies SOB, N/V, and CP. On 1 LPM NC with sats >90%. LBM 3/1/2023 per pt report. Purewick in place and draining. NWB on RLE but WBAT for transfers; did not get oob. Dried drainage noted on RLE dressings. R PIV x2 patent and saline locked. No report of pain. Call light within reach. Continue with POC.

## 2023-03-02 NOTE — PLAN OF CARE
VS: /53   Pulse 81   Temp 96.8  F (36  C) (Oral)   Resp 16   Wt 118.8 kg (262 lb)   LMP  (LMP Unknown)   SpO2 96%   BMI 46.41 kg/m        O2: On oxygen 1LPM. Dyspnea on exertion. Denies chest pain or cough. LS clear.   Output: purewic in place with adequate output. LBM 3/1, uses bedpan. No loose BM this shift.   Activity: Up with assist of 2 using michael steady per physical therapist. Was up in the chair for about an hour. Assisted with turning and repositioning q2h and as needed. RLE NWB  ex WBAT for transfers.   Skin: Right hip and knee surgical incision, bruising on RLE noted and marked. Provider aware.   Pain: Pain controlled well with schedule tylenol and robaxin and PRN tramadol. Ice applied with some relief.   CMS: Alert and oriented x4. Denies numbness/tingling   Dressing: RLE dressing with moderate dried drainage. Moist drainage noted to knee incision, NP M.B notified.  Mepilex on coccyx for protection    Diet: Regular diet. Pt did not eat breakfast d/t abdominal discomfort this morning, had a snack for lunch and ate dinner 75 %. Denies nausea/vomiting.   LDA: 2 PIV right arm, saline locked.   Plan: Possible discharge to TCU tomorrow per SW note. Continue to monitor per POC.   Additional Info: Pt had a covid and Influenza lab test done both negative.   Abdominal & chest xray and UA completed, see results. Provider updated.  RN managed potassium 3.5 and magnesium 2.3 . Potassium replaced.

## 2023-03-02 NOTE — PLAN OF CARE
Goal Outcome Evaluation:      Plan of Care Reviewed With: patient        VS: /57 (BP Location: Right arm)   Pulse 83   Temp 98.5  F (36.9  C) (Oral)   Resp 16   Wt 118.8 kg (262 lb)   LMP  (LMP Unknown)   SpO2 93%   BMI 46.41 kg/m       O2: SpO2 > 93% on 1 LPM. LS clear and equal bilaterally. Denies chest pain and SOB.    Output: Voiding adequate amounts via purewick   Last BM: 3/1/23, BS active    Activity: Pt not oob, NWB to RLE - okay for WB w/transfers. Pt also has difficulty with LLE due to pain.   Skin: WDL except, RLE incision x5    Pain: Pain managed with prn tramadol, scheduled tylenol and robaxin    CMS: Intact, AOx4. Denies numbness and tingling. DP+2 bilaterally   Dressing: R hip, R thigh, R knee - scant amount dry drainage  Sacral mepilex   Diet: Regular diet. Denies nausea/vomiting.    LDA: Rt  PIV SL x2   Equipment: IV pole, personal belongings,    Plan: Continue with plan of care. Call light within reach, pt able to make needs known.   Anticipated discharge to TCU when stable    Additional Info:

## 2023-03-03 ENCOUNTER — APPOINTMENT (OUTPATIENT)
Dept: GENERAL RADIOLOGY | Facility: CLINIC | Age: 88
DRG: 481 | End: 2023-03-03
Attending: STUDENT IN AN ORGANIZED HEALTH CARE EDUCATION/TRAINING PROGRAM
Payer: COMMERCIAL

## 2023-03-03 ENCOUNTER — APPOINTMENT (OUTPATIENT)
Dept: GENERAL RADIOLOGY | Facility: CLINIC | Age: 88
DRG: 481 | End: 2023-03-03
Attending: NURSE PRACTITIONER
Payer: COMMERCIAL

## 2023-03-03 LAB
ALBUMIN SERPL BCG-MCNC: 2.8 G/DL (ref 3.5–5.2)
ALP SERPL-CCNC: 106 U/L (ref 35–104)
ALT SERPL W P-5'-P-CCNC: 33 U/L (ref 10–35)
ANION GAP SERPL CALCULATED.3IONS-SCNC: 9 MMOL/L (ref 7–15)
AST SERPL W P-5'-P-CCNC: 36 U/L (ref 10–35)
BILIRUB SERPL-MCNC: 0.7 MG/DL
BUN SERPL-MCNC: 9.5 MG/DL (ref 8–23)
CALCIUM SERPL-MCNC: 8.3 MG/DL (ref 8.8–10.2)
CHLORIDE SERPL-SCNC: 99 MMOL/L (ref 98–107)
CREAT SERPL-MCNC: 0.5 MG/DL (ref 0.51–0.95)
DEPRECATED HCO3 PLAS-SCNC: 28 MMOL/L (ref 22–29)
ERYTHROCYTE [DISTWIDTH] IN BLOOD BY AUTOMATED COUNT: 13.4 % (ref 10–15)
GFR SERPL CREATININE-BSD FRML MDRD: >90 ML/MIN/1.73M2
GLUCOSE SERPL-MCNC: 117 MG/DL (ref 70–99)
HCT VFR BLD AUTO: 25.9 % (ref 35–47)
HGB BLD-MCNC: 8.2 G/DL (ref 11.7–15.7)
LACTATE SERPL-SCNC: 0.8 MMOL/L (ref 0.7–2)
MCH RBC QN AUTO: 29.4 PG (ref 26.5–33)
MCHC RBC AUTO-ENTMCNC: 31.7 G/DL (ref 31.5–36.5)
MCV RBC AUTO: 93 FL (ref 78–100)
PLATELET # BLD AUTO: 309 10E3/UL (ref 150–450)
POTASSIUM SERPL-SCNC: 3.3 MMOL/L (ref 3.4–5.3)
POTASSIUM SERPL-SCNC: 3.3 MMOL/L (ref 3.4–5.3)
PROT SERPL-MCNC: 5.7 G/DL (ref 6.4–8.3)
RBC # BLD AUTO: 2.79 10E6/UL (ref 3.8–5.2)
SODIUM SERPL-SCNC: 136 MMOL/L (ref 136–145)
WBC # BLD AUTO: 7.5 10E3/UL (ref 4–11)

## 2023-03-03 PROCEDURE — 99418 PROLNG IP/OBS E/M EA 15 MIN: CPT | Performed by: NURSE PRACTITIONER

## 2023-03-03 PROCEDURE — 74018 RADEX ABDOMEN 1 VIEW: CPT | Mod: 26 | Performed by: RADIOLOGY

## 2023-03-03 PROCEDURE — 250N000013 HC RX MED GY IP 250 OP 250 PS 637: Performed by: NURSE PRACTITIONER

## 2023-03-03 PROCEDURE — 36415 COLL VENOUS BLD VENIPUNCTURE: CPT | Performed by: NURSE PRACTITIONER

## 2023-03-03 PROCEDURE — 82947 ASSAY GLUCOSE BLOOD QUANT: CPT | Performed by: NURSE PRACTITIONER

## 2023-03-03 PROCEDURE — 85014 HEMATOCRIT: CPT | Performed by: NURSE PRACTITIONER

## 2023-03-03 PROCEDURE — 99233 SBSQ HOSP IP/OBS HIGH 50: CPT | Performed by: NURSE PRACTITIONER

## 2023-03-03 PROCEDURE — 74018 RADEX ABDOMEN 1 VIEW: CPT

## 2023-03-03 PROCEDURE — 250N000013 HC RX MED GY IP 250 OP 250 PS 637: Performed by: STUDENT IN AN ORGANIZED HEALTH CARE EDUCATION/TRAINING PROGRAM

## 2023-03-03 PROCEDURE — 250N000011 HC RX IP 250 OP 636: Performed by: INTERNAL MEDICINE

## 2023-03-03 PROCEDURE — 999N000065 XR ABDOMEN PORT 1 VIEW

## 2023-03-03 PROCEDURE — 36415 COLL VENOUS BLD VENIPUNCTURE: CPT | Performed by: INTERNAL MEDICINE

## 2023-03-03 PROCEDURE — 84132 ASSAY OF SERUM POTASSIUM: CPT | Performed by: INTERNAL MEDICINE

## 2023-03-03 PROCEDURE — 258N000003 HC RX IP 258 OP 636

## 2023-03-03 PROCEDURE — 250N000013 HC RX MED GY IP 250 OP 250 PS 637: Performed by: INTERNAL MEDICINE

## 2023-03-03 PROCEDURE — 83605 ASSAY OF LACTIC ACID: CPT | Performed by: NURSE PRACTITIONER

## 2023-03-03 PROCEDURE — 120N000002 HC R&B MED SURG/OB UMMC

## 2023-03-03 PROCEDURE — 250N000013 HC RX MED GY IP 250 OP 250 PS 637

## 2023-03-03 RX ORDER — HYDROMORPHONE HCL IN WATER/PF 6 MG/30 ML
.2-.3 PATIENT CONTROLLED ANALGESIA SYRINGE INTRAVENOUS
Status: DISCONTINUED | OUTPATIENT
Start: 2023-03-03 | End: 2023-03-06 | Stop reason: HOSPADM

## 2023-03-03 RX ORDER — POTASSIUM CHLORIDE 1500 MG/1
40 TABLET, EXTENDED RELEASE ORAL ONCE
Status: COMPLETED | OUTPATIENT
Start: 2023-03-03 | End: 2023-03-03

## 2023-03-03 RX ORDER — SODIUM CHLORIDE, SODIUM LACTATE, POTASSIUM CHLORIDE, CALCIUM CHLORIDE 600; 310; 30; 20 MG/100ML; MG/100ML; MG/100ML; MG/100ML
INJECTION, SOLUTION INTRAVENOUS CONTINUOUS
Status: DISCONTINUED | OUTPATIENT
Start: 2023-03-03 | End: 2023-03-03

## 2023-03-03 RX ORDER — HYDROMORPHONE HCL IN WATER/PF 6 MG/30 ML
.2-.3 PATIENT CONTROLLED ANALGESIA SYRINGE INTRAVENOUS
Status: DISCONTINUED | OUTPATIENT
Start: 2023-03-03 | End: 2023-03-04

## 2023-03-03 RX ORDER — POTASSIUM CHLORIDE 1.5 G/1.58G
40 POWDER, FOR SOLUTION ORAL ONCE
Status: COMPLETED | OUTPATIENT
Start: 2023-03-03 | End: 2023-03-03

## 2023-03-03 RX ORDER — SODIUM CHLORIDE, SODIUM LACTATE, POTASSIUM CHLORIDE, CALCIUM CHLORIDE 600; 310; 30; 20 MG/100ML; MG/100ML; MG/100ML; MG/100ML
INJECTION, SOLUTION INTRAVENOUS CONTINUOUS
Status: DISCONTINUED | OUTPATIENT
Start: 2023-03-03 | End: 2023-03-05

## 2023-03-03 RX ORDER — SALIVA STIMULANT COMB. NO.3
2 SPRAY, NON-AEROSOL (ML) MUCOUS MEMBRANE 4 TIMES DAILY
Status: DISCONTINUED | OUTPATIENT
Start: 2023-03-03 | End: 2023-03-06 | Stop reason: HOSPADM

## 2023-03-03 RX ORDER — POTASSIUM CHLORIDE 7.45 MG/ML
10 INJECTION INTRAVENOUS
Status: DISCONTINUED | OUTPATIENT
Start: 2023-03-03 | End: 2023-03-03

## 2023-03-03 RX ADMIN — METHOCARBAMOL 750 MG: 750 TABLET, FILM COATED ORAL at 21:09

## 2023-03-03 RX ADMIN — BUSPIRONE HYDROCHLORIDE 10 MG: 10 TABLET ORAL at 08:19

## 2023-03-03 RX ADMIN — BUSPIRONE HYDROCHLORIDE 10 MG: 10 TABLET ORAL at 21:09

## 2023-03-03 RX ADMIN — ACETAMINOPHEN 975 MG: 325 TABLET, FILM COATED ORAL at 15:19

## 2023-03-03 RX ADMIN — GABAPENTIN 300 MG: 300 CAPSULE ORAL at 12:21

## 2023-03-03 RX ADMIN — SIMVASTATIN 10 MG: 10 TABLET, FILM COATED ORAL at 21:08

## 2023-03-03 RX ADMIN — FLUOXETINE 60 MG: 20 CAPSULE ORAL at 08:19

## 2023-03-03 RX ADMIN — POTASSIUM CHLORIDE 40 MEQ: 1.5 FOR SOLUTION ORAL at 21:08

## 2023-03-03 RX ADMIN — SODIUM CHLORIDE, POTASSIUM CHLORIDE, SODIUM LACTATE AND CALCIUM CHLORIDE: 600; 310; 30; 20 INJECTION, SOLUTION INTRAVENOUS at 01:24

## 2023-03-03 RX ADMIN — FLUTICASONE FUROATE AND VILANTEROL TRIFENATATE 1 PUFF: 100; 25 POWDER RESPIRATORY (INHALATION) at 08:20

## 2023-03-03 RX ADMIN — Medication 2 SPRAY: at 12:29

## 2023-03-03 RX ADMIN — GABAPENTIN 300 MG: 300 CAPSULE ORAL at 08:19

## 2023-03-03 RX ADMIN — GABAPENTIN 600 MG: 600 TABLET, FILM COATED ORAL at 21:09

## 2023-03-03 RX ADMIN — METHOCARBAMOL 750 MG: 750 TABLET, FILM COATED ORAL at 15:19

## 2023-03-03 RX ADMIN — METHOCARBAMOL 750 MG: 750 TABLET, FILM COATED ORAL at 12:21

## 2023-03-03 RX ADMIN — ACETAMINOPHEN 975 MG: 325 TABLET, FILM COATED ORAL at 08:19

## 2023-03-03 RX ADMIN — Medication 50 MG: at 21:08

## 2023-03-03 RX ADMIN — LEVOTHYROXINE SODIUM 88 MCG: 88 TABLET ORAL at 08:19

## 2023-03-03 RX ADMIN — METHOCARBAMOL 750 MG: 750 TABLET, FILM COATED ORAL at 08:19

## 2023-03-03 RX ADMIN — POTASSIUM CHLORIDE 40 MEQ: 1500 TABLET, EXTENDED RELEASE ORAL at 12:21

## 2023-03-03 ASSESSMENT — ACTIVITIES OF DAILY LIVING (ADL)
ADLS_ACUITY_SCORE: 43

## 2023-03-03 NOTE — PLAN OF CARE
Pt aox4, A2 michael steady,2 R P IVs SL. Purewick in.    Skin: some drainage under tagaderm and gauze dressing on R Hip and knee.     Respiratory: Fine crackles to bases of lower lungs, pt on 1L O2 NC, denies SOB.

## 2023-03-03 NOTE — PROGRESS NOTES
"COLORECTAL BRIEF NOTE:    I was paged and asked for a \"curbside\" question about NGT placement for dilated sigmoid colon. When I returned the page, the provider did not answer.     I reviewed the CT scan. There are no acute signs of ischemia, perforation, volvulus, or large bowel obstruction with transition point.  Given her recent orthopedic procedure, age, and any opioid usage for pain control, his is likely constipation due to slow colonic motility.    This patient is located at [facility] and we do not perform official colorectal consultations at [facility]. We ask that the general surgeons or gastroenterology there officially consult and if they need formal colorectal consultations, that they are transferred to the On license of [facility].    We will discuss with staff this morning.    Iam Wang MD, MPH  Fellow in Colon and Rectal Surgery  [facility]     "

## 2023-03-03 NOTE — PLAN OF CARE
"  VS: /53 (BP Location: Right arm)   Pulse 74   Temp 96.8  F (36  C) (Oral)   Resp 17   Wt 118.8 kg (262 lb)   LMP  (LMP Unknown)   SpO2 96%   BMI 46.41 kg/m     O2: 1/2 LPM via nasal cannula  States she wears a CPAP at night- no CPAP in room    Output: Purewick in place   Last BM: 3/2- per flowsheet. Small smear throughout the day.     0900: Absent bowel sounds in LUQ, RUQ, RLQ. \"Whoosing\" noise noted in LLQ; noise has since resolved. Discomfort and pain with palpitation to all quadrants.     1200: Active bowel sounds in all quadrant. Tender to touch but States abdominal tenderness is \"getting better, it still hurts to touch but not as painful as this morning\".     -Writer checked abdomen throughout the day-  Continues to have active bowel sounds in all quadrants and tenderness to touch (but states not as painful as this morning).    Passing flatus   Activity: Ax1 with bed cares  Jasmina steady Ax2.   Refused repositioning   Skin: Surgical incision to R leg. Abrasion to abdomen. Bruise to R leg. Mepilex on coccyx.    Pain: C/o abdominal pain managed with rest and scheduled medications.    CMS: N/a   Dressing: Dried drainage to R leg surgical incision   Diet: NPO   LDA: R forearm PIV infusing LR at 50 mL, R hand PIV saline locked. NG tube at low intermittent suction; output 600 mL.    Equipment: IV pole, personal belongings   Plan: Accepted to transfer to [place]- awaiting bed   Additional Info: A&Ox4. Denies headache, SOB, numbness or tingling. C/o \"heart burn\" this morning and has since resolved. Potassium 3.3 this AM, replacement given- recheck 3.3. Replacement ordered.Uses call light appropriately and makes needs known.        "

## 2023-03-03 NOTE — PROGRESS NOTES
Writer assisted primary RN in placement of NG tube. Placed in right nare, land marked @ 55cm, gastric contents aspirated. Patient tolerated well, provider paged requesting x-ray for confirmation of placement.

## 2023-03-03 NOTE — PROGRESS NOTES
"[facility]    Medicine Progress Note - Hospitalist Service, GOLD TEAM 20    Date of Admission:  2/26/2023    Assessment & Plan   Carol \"Jan\" ANGI Merida is an 87 year old female with past medical history significant for grade 1 diastolic dysfunction, hypertension, JEAN, hypothyroidism, anxiety depression, and osteoarthritis transferred from [facility] d/t ground level fall resulting in periprosthetic fracture of her femur. Admitted to medicine for pre-op clearance, underwent ORIF of R femur on 2/26/23, and remains medicine primary due to medical complexity.     # Colonic dilatation   # Concern for duodenitis   # Abdominal pain, cramping    # LFT elevation  Abdominal pain last two days but having BMs and passing flatus.  AXR 3/2 with non-obstructive bowel gas pattern but with air distended loop of large bowel c/f developing ileus.  CTAP obtained evening 3/2 with dilated sigmoid colon without e/o obstruction, volvulus, or ileus, but did show sigmoid colon dilation up to 7.8cm as well as mild stranding along 2nd portion of duodenum with mild wall thickening and trace fluid c/f duodenitis.  Found to have cholelithiasis as well.  NG placed overnight for decompression.  LFTs from 3/2 , ALT 41 (58), AST 47 (61), Tbili 0.7, CMP for 3/3 is pending. LA 0.8.  Discussed with CRS fellow who did not feel she needed further evaluation by their team and recommended gastrograffin enema.  Note from Gen Surg Dr. Singh recommending CRS and GI consultation, and that patient would be best served on [place].    - Transfer to [place] for Gen Surg, GI consults  - Abdominal US to evaluate for acute cholecystitis   - Continue NPO   - Continue NG to LIMS  - Continue gentle IVFs with LR 50cc/hr    - Serial abdominal exams    - Daily BMP, Mag, Phos  - Hold bowel meds for now   - Monitor NG output     # S/p ORIF of R femur   # Mechanical ground level fall   # Osteoarthritis   POD #5.  Reports " "adequate pain control.  Had some confusion and disorientation post op likely 2/2 anesthesia medications, but back to baseline now.  Pt had question about dressing changes on surgical incisions; discussed with Ortho NP, no indication to change now, can be done prior to discharge.   - Consult Ortho on transfer to [place] for continuity of care  - PT, OT recommending TCU - likely needs reassessment once acute medical issues resolve  - Pain control: continue scheduled APAP 975mg Q8H, Gabapentin 300/300/600mg, minimize opioid use  - Monitor for sedation/confusion, would hold opioids if this occurs      # Throat discomfort - Pt reports feeling unwell and having a scratchy throat on 3/2.  No fevers or cough.  Has some dyspnea at baseline.  Procal 0.06, no leukocytosis.  COVID and flu negative.   - Monitor   - Biotene spray PRN     # Hx HTN - On Amlodipine PTA.  Noted to have lower BPs.  - Hold Amlodipine for now   - Monitor BP per unit routine    # Grade 1 diastolic dysfunction   # Chronic dyspnea on exertion   Per review of admission H&P, pt underwent stress test in 2012 and fall 2022 both with same area of infarction with stress, and cardiac cath in 2012 with \"no evidence of significant coronary narrowing that would benefit from mechanical revascularization.\"  Had TTE this admission as part of preop evaluation with hyperkinetic LV function and EF > 70%, normal LV function, normal RV function, chamber size, wall motion, and thickness, normal atria, normal PASP, and no e/o effusion.  Unclear if dyspnea is of cardiac or pulmonary etiology.  Per chart review, had normal PFTs in 2018.  Currently needing supplemental O2 here, but appears to be at baseline, no c/o chest pain.     - Continue Breo-Ellipta (auto sub for PTA Wixela)   - PRN albuterol   - Wean supplemental O2   - Monitor I/Os  - Daily weights     # HLD - Continue PTA statin     # Hypothyroidism - Continue PTA Levothyroxine 88mcg.     # JEAN - Continue home CPAP at " HS.    # Acute anemia - Hgb 9.0 post op, BL closer to 12-13.  Has gradually drifted down, stable ~ 8.  EBL ~ 500cc during procedure.  Possible 2/2 surgical blood loss vs dilutional vs other.  Noted to have wide hematoma on operative leg on exam 3/2 with extension to calf on 3/3.  Discussed with Ortho, no acute concerns at this time.    - Transfuse for Hgb < 7   - Monitor hematoma site    # Chronic lower extremity swelling - Uses Lasix at home PRN.  Will order here if edema noted, otherwise resume at discharge.     # Anxiety, depression - Mood stable.    - Continue PTA Buspar 10mg BID and Prozac 60mg daily           Diet: Diet  NPO for Medical/Clinical Reasons Except for: Meds    DVT Prophylaxis: Pneumatic Compression Devices  Benson Catheter: Not present  Lines: None     Cardiac Monitoring: None  Code Status: Full Code      Clinically Significant Risk Factors              # Hypoalbuminemia: Lowest albumin = 2.9 g/dL at 3/2/2023  5:18 AM, will monitor as appropriate                   Disposition Plan      Expected Discharge Date: 03/03/2023,  3:00 PM      Discharge Comments: 1-2 days TCU?      The patient's care was discussed with the Dr. Damian (Hospitalist), pt's RN, patient, and RNCC/SW via charge nurse. Will update pt's family as well..    Lauren Cervantes [facility]  Hospitalist Service, GOLD TEAM 20  [facility]  Securely message with [employer] (more info)  Text page via [facility] Paging/Directory   See signed in provider for up to date coverage information  ______________________________________________________________________    Interval History   Everette is resting in bed.  She is continuing to have abdominal pain but is still passing gas.  Breathing is at baseline.  Explained CT findings and utility of NG tube.      Physical Exam   Vital Signs: Temp: (!) 96.6  F (35.9  C) Temp src: Oral BP: 126/62 Pulse: 86   Resp: 20 SpO2: 94 % O2 Device: Nasal cannula Oxygen Delivery: 2  LPM  Weight: 262 lbs 0 oz    GENERAL: Alert and oriented x 3. Well nourished, well developed elderly female in no acute distress.    HEENT: Normocephalic, atraumatic. Anicteric sclera. Mucous membranes moist.   CV: RRR. S1, S2. No murmurs appreciated.   RESPIRATORY: Effort normal on NC. Lungs CTAB with no wheezing, rales, or rhonchi.   GI: Abdomen slightly distended, hypoactive bowel sounds. TTP upper quadrants.   NEUROLOGICAL: No focal deficits. Follows commands.    MUSCULOSKELETAL: No joint swelling or tenderness. Moves all extremities.   EXTREMITIES: No gross deformities. Trace edema of R leg. Hematoma present on lateral aspect of R thigh proximal to surgical site and now extending down lateral aspect of calf.   SKIN: Grossly warm, dry, and intact. No jaundice. No rashes.       Medical Decision Making       70 MINUTES SPENT BY ME on the date of service doing chart review, history, exam, documentation & further activities per the note.      Data     I have personally reviewed the following data over the past 24 hrs:    8.5  \   8.2 (L)   / 273     N/A N/A N/A /  N/A   3.5 N/A N/A \       Procal: N/A CRP: N/A Lactic Acid: N/A         Imaging results reviewed over the past 24 hrs:   Recent Results (from the past 24 hour(s))   XR Abdomen Port 1 View    Narrative    EXAMINATION:  XR ABDOMEN PORT 1 VIEW 3/2/2023 1:35 PM.    COMPARISON: None..    HISTORY:  Worsening abdominal pain, diarrhea, assess for ileus vs post  obstructive diarrhea, increased stool burden    FINDINGS:   Portable AP supine view of the abdomen. Nonobstructive bowel gas  pattern. There is an air distended loop of large bowel present in the  mid abdomen, measuring up to 6.8 cm. No pneumatosis or portal venous  gas. Bilateral hip arthroplasties demonstrated.      Impression    IMPRESSION:   Nonobstructive bowel gas pattern. Air distended loop of large bowel  present in the mid abdomen could represent developing ileus.     I have personally reviewed the  examination and initial interpretation  and I agree with the findings.    KRYSTYNA CROSS MD         SYSTEM ID:  [id number]   XR Chest Port 1 View    Narrative    Exam: XR CHEST PORT 1 VIEW, 3/2/2023 1:35 PM    Comparison: CT chest 9/28/2022, chest x-ray 11/4/2021    History: Dyspnea, intermittent hypoxia, evaluate for possible fluid vs  infection    Findings:  Single portable semiupright view of the chest. Trachea is midline.  Cardiac silhouette is within normal limits. Diffuse mixed interstitial  and airspace opacities. No appreciable pneumothorax. Trace bilateral  pleural effusions. No acute osseous abnormality.      Impression    Impression:   1. Diffuse mixed interstitial and airspace opacities, may represent  atelectasis/edema however cannot exclude infection. Recommend  follow-up to resolution.   2. Bilateral trace pleural effusions.    I have personally reviewed the examination and initial interpretation  and I agree with the findings.    HAKAN ALVAREZ MD         SYSTEM ID:  [id number]   CT Abdomen Pelvis w Contrast    Narrative    EXAMINATION: CT ABDOMEN PELVIS W CONTRAST, 3/2/2023 9:22 PM    INDICATION: assess for developing ileus    COMPARISON STUDY:   Abdominal x-ray 3/10/2023     TECHNIQUE: CT scan of the abdomen and pelvis was performed on  multidetector CT scanner using volumetric acquisition technique and  images were reconstructed in multiple planes with variable thickness  and reviewed on dedicated workstations.     CONTRAST: 129ml isovue 370 injected IV without oral contrast    CT scan radiation dose is optimized to minimum requisite dose using  automated dose modulation techniques.    FINDINGS:    Lower thorax: Normal.    Liver: No mass. No intrahepatic biliary ductal dilation.    Biliary System: Cholelithiasis .    Pancreas: No mass or pancreatic ductal dilation. Diffuse pancreatic  atrophy.    Adrenal glands: No mass or nodules    Spleen: Normal.    Kidneys: No suspicious mass, obstructing  calculus or hydronephrosis.    Gastrointestinal tract : The appendix is not definitely visualized.  Normal caliber small bowel. Stranding around the level of the 2nd  portion of the duodenum and trace thickening and adjacent fluid.   Dilated sigmoid colon measuring up to 7.8 cm in maximal diameter with  fluid and feces present in the rectum and no evidence of volvulus or  obstruction.    Mesentery/peritoneum/retroperitoneum: No mass. No free fluid or air.    Lymph nodes: No significant lymphadenopathy.    Vasculature: Patent major abdominal vasculature.    Pelvis: Urinary bladder is normal.  Status post total hysterectomy.    Osseous structures: No aggressive or acute osseous lesion.  Bilateral  total hip arthroplasties.      Soft tissues: Within normal limits.      Impression    IMPRESSION:   1. Dilated sigmoid colon without evidence of obstruction or volvulus.  No evidence of ileus.  2. Mild stranding along the 2nd portion of the duodenum with  associated mild wall thickening and trace fluid; cholelithiasis though  ultrasound is more sensitive for assessment of cholecystitis.    I have personally reviewed the examination and initial interpretation  and I agree with the findings.    ALEJANDRO ELIZABETH MD         SYSTEM ID:  [id number]   XR Abdomen Port 1 View    Narrative    Exam: XR ABDOMEN PORT 1 VIEW, 3/3/2023 3:40 AM    Indication: NG placement (please include chest for visualization)    Comparison: CT abdomen and pelvis and abdominal radiograph 3/2/2023    Findings:   Portable supine view of the abdomen. Gastric tube tip and sidehole  projected over the stomach. Nonobstructive bowel gas pattern.  Degenerative changes of the spine. Streaky opacities at the lung  bases.      Impression    Impression: Enteric tube tip and sidehole project over the stomach     I have personally reviewed the examination and initial interpretation  and I agree with the findings.    REDDY NINO MD         SYSTEM ID:  [id number]

## 2023-03-03 NOTE — SIGNIFICANT EVENT
Called for assistance regarding cares/imaging findings. Recommend CnR c/s for sigmoid finding, and GI c/s for duodenitis and possible HIDA if ongoing RUQ abd pain and possible IR/Cholecystostomy tube placement thereafter if positive for cholecystitis. Given completion of ortho operation, and ongoing need/cares, pt would likely be best served on [place].     Lorene Singh MD

## 2023-03-03 NOTE — PROGRESS NOTES
Brief Medicine Cross Cover Note:    Gold Cross Cover up on abdominal x-ray, and subsequent CT A/P with results available at 2231, for abdominal cramping with concern for possible postop ileus versus postobstructive diarrhea versus other.     On CT A/P noted to have dilated sigmoid colon measuring up to 7.8 cm in maximal diameter with fluid and feces present in the rectum and no evidence of volvulus or obstruction.Stranding around the level of the 2nd  portion of the duodenum and trace thickening and adjacent fluid. Dilated sigmoid colon measuring up to 7.8 cm in maximal diameter with fluid and feces present in the rectum and no evidence of volvulus or  obstruction.Cholelithiasis .    Has on ongoing discomfort since 2/28 had 2 large BMs on 2/28 and diarrhea on 3/1.  Passing gas and having active bowel sounds.  No nausea or vomiting. Did curbside with General Surgery attending (for [facility] patients), , recommending talking to CRS regarding colon dilation and possibly getting EGD for duodenum thickening/adjacent fluid. Paged CRS fellow, still awaiting callback at 1200. Consult placed to discuss/follow-up in AM if pt still uncomfortable.     Discussed with bedside nurse at 1220. Pt feels good, but has some mild tenderness in RUQ upon palpation. She just had a loose BM and was passing gas. No N/V. Resting comfortably. I did go see the pt, and she was quite tender on both her right and left sides of her abdomen on palpation. She notes she feels slightly more swollen than usual. She reports she has had this pain for a while, but hasn't been moving much at home. Given dilation, narcotics, and limited mobility, will want to avoid further dilation. Discussed with pt, son-Jono, and RN/     Interventions:  - CRS consult placed for AM  - Consider GI consult for duodenum   - NPO until cleared for PO   - RUQ in AM  - Hold scheduled BM given diarrhea  - Place NG for decompression  - IVF until cleared to take tube out/ok to  eat/drink  - IV dilaudid ONLY for severe pain  - Encourage ambulation  - Minimal narcotics as able   - Biotene for dry mouth    Belkis Muse, CNP, APRN  Internal Medicine KASSANDRA Hospitalist  [facility]  [phone]  Securely message with the Vocera Web Console   Text page via [employer] Paging/Directory   Text page via [employer]

## 2023-03-03 NOTE — PROGRESS NOTES
"[facility]  Transfer Triage Note    Date of call: 03/03/23  Time of call: 9:38 AM    Current Patient Location: [facility]  Current Level of Care: Med Surg    Vitals: Temp: (!) 96.6  F (35.9  C) Temp src: Oral BP: 126/62 Pulse: 86   Resp: 20 SpO2: 94 %   Weight: 118.8 kg (262 lb)  O2 Device: Nasal cannula at Oxygen Delivery: 2 LPM  Diagnosis: Concern for colonic dilation, duodenitis  Is COVID-19 a concern? No  Reason for requested transfer: Further diagnostic work up, management, and consultation for specialized care   Isolation Needs: None    Outside Records: Available  Additional records may be faxed to [phone].    Transfer accepted: Yes  Stability of Patient: Patient is vitally stable, with no critical labs, and will likely remain stable throughout the transfer process  Level of Care Needed: Med Surg  Telemetry Needed:  None  Expected Time of Arrival for Transfer: 0-8 hours  Arrival Location:  [facility]    Recommendations for Management and Stabilization: Not needed    Additional Comments:     Carol \"Everette\" ANGI Merida is an 87 year old female with past medical history significant for grade 1 diastolic dysfunction, hypertension, JEAN, hypothyroidism, anxiety depression, and osteoarthritis transferred from [facility] d/t ground level fall resulting in periprosthetic fracture of her femur. Admitted to medicine for pre-op clearance, underwent ORIF of R femur on 2/26/23.    Called about transfer to [place] due to abdominal pain with imaging concerning for colonic dilation and possible duodenitis.  [facility] [place] [place] does not have Colorectal Surgery support, and therefore consultants are recommending she transfer to [place].    She has an NG in for decompression.    Transfer accepted to med/surg, no tele, no iso.    Encouraged referring provider to call back if her condition worsens so we can expedite the transfer if needed, otherwise anticipate she will " transfer later today vs tomorrow (3/4) in the morning.    Nicki Claudio MD

## 2023-03-03 NOTE — PLAN OF CARE
VS: Temp: 97.2  F (36.2  C) Temp src: Oral BP: 130/53 Pulse: 82   Resp: 24 SpO2: 98 % O2 Device: Nasal cannula Oxygen Delivery: 2 LPM    O2: SpO2 > 95% and stable on 2L NC. LS clear and equal bilaterally. Denies chest pain and SOB.    Output: Voids spontaneously without difficulty; purewick on place.    Last BM: 3/2/2023 per pt, denies abdominal discomfort. BS active / passing flatus. Pt C/O tenderness on the Bilateral upper quadrant. Provider came to see pt & ordered NG tube placed for decompression. After the NG was placed, the provider put the x-ray order. X-ray was taken @ 0340 & result was not released untile Next shift. Report passed to the next shift.   Activity: Up with Axusing michael steady 2, but pt was Not OOB this shift. NWB on the RLE except for transferring.    Skin: WDL except, R leg surgical incision; Bruises on the R thigh.   Pain: Pain was rated as 5, & pt didn't want pain meds for it. Scheduled was not given due to NG tube placement & was expected to start suctioning.   CMS: Intact, AOx4. Denies numbness and tingling.   Dressing: Draid drainage on R leg surgical incision.   Diet: Regular diet. Denies nausea/vomiting.   Pt is NPO for NG.   LDA: R PIV infusing LR at 50 mL/hr.   Equipment: IV pole, personal belongings,    Plan: Continue with plan of care. Call light within reach, pt able to make needs known.

## 2023-03-03 NOTE — PROGRESS NOTES
Orthopedic Surgery Progress Note   3/2/2023    Subjective: No acute events overnight. RIght leg pain controlled at rest. Pain with movement still. Reports abdominal discomfort. NPO per primary team. Voiding spontaneously. +Flatus, + BM. Denies fever or chills, CP, SOB, numbness or tingling, motor dysfunction or weakness.       Exam:  /53 (BP Location: Right arm)   Pulse 82   Temp 97.2  F (36.2  C) (Oral)   Resp 24   Wt 118.8 kg (262 lb)   LMP  (LMP Unknown)   SpO2 98%   BMI 46.41 kg/m    Gen: Awake, alert, NAD  Resp: breathing equal and non-labored  Extremities:    RLE: Toes wwp, DP 2+, bcr in all toes. Compartments soft and tolerates passive stretch of toes. +EHL/FHL/GSC/TA with 5 /5 strength. SILT SP/DP/Sa/Coelho/T. Dressing c/d/i.      Labs:    Recent Labs   Lab 03/02/23  1023 03/01/23  1838 03/01/23  0726 02/28/23  0743 02/26/23  1746 02/26/23  0607 02/25/23  1831   WBC 8.5  --  8.0  --   --  7.9 9.1   HGB 8.2* 8.0* 8.2* 8.7*   < > 12.3 13.6     --  245  --   --  223 239    < > = values in this interval not displayed.     Recent Labs   Lab 03/02/23  1023 03/02/23  0518 03/01/23  0726 02/28/23  0743 02/27/23  1240 02/26/23  1746 02/26/23  0607 02/25/23  1831 02/25/23  1831   NA  --  136 136  --   --  135 136  --  139   POTASSIUM 3.5 3.4 3.6  3.5 4.4 4.0 4.1 4.3  --  3.8   CHLORIDE  --  100 100  --   --   --  98  --  102   CO2  --  27 24  --   --   --  26  --  24   BUN  --  11.7 13.8  --   --   --  18.6  --  19.3   CR  --  0.55 0.58  --   --   --  0.60  --  0.65   GLC  --  115* 108*  --   --  117* 158*  --  127*   MAG 2.3  --  2.5* 2.2 2.1  --  2.4*   < >  --     < > = values in this interval not displayed.     Recent Labs   Lab 02/26/23  0607 02/26/23  0106   INR 1.04 1.07   PTT  --  31     No lab results found in last 7 days.    Invalid input(s): ESR    Assessment:     Assessment: Carol Merida is a 87 year old female s/p ORIF R femur on 2/26/2023 with Dr. Lemon.      3/3:  Remove jiménez;  place purewick. Concern for extended jiménez use and potential for infection with new hardware in place  PT/OT     Plan:  Medicine Primary team given age and medical comorbidity; similar rationale to hip fracture patients  Activity: Up with walker and assist until independent.    Weight bearing status: May weight bear on RLE for transfers;   Pain management: Oral pain medications with IV for breakthrough. Wean IV as able.    Antibiotics: Ancef x 24 hours  Diet: Begin with clear fluids and progress diet as tolerated.   DVT prophylaxis: Mechanical prophylaxis with SCD  Imaging: Standing radiographs when able. XR left knee complete    Labs: Monitor Hgb and transfuse prn  Bracing/Splinting: ACE wrap to RLE may be replaced or reinforced as needed  Dressings: Keep clean, dry and intact   Drains: n/a  Physical Therapy/Occupational Therapy: Eval and treat.  Consults: nutrition, SW  Follow-up: Clinic with PA in 2 weeks for wound check and staple removal  Disposition: Admitted for physical therapy and pain control; discharge to home versus TCU today pending medical stability     Patient discussed with .       Yesika Zayas PGY4  Orthopedic Surgery

## 2023-03-04 ENCOUNTER — APPOINTMENT (OUTPATIENT)
Dept: ULTRASOUND IMAGING | Facility: CLINIC | Age: 88
DRG: 481 | End: 2023-03-04
Attending: INTERNAL MEDICINE
Payer: COMMERCIAL

## 2023-03-04 LAB
ALBUMIN SERPL BCG-MCNC: 3 G/DL (ref 3.5–5.2)
ALP SERPL-CCNC: 101 U/L (ref 35–104)
ALT SERPL W P-5'-P-CCNC: 29 U/L (ref 10–35)
ANION GAP SERPL CALCULATED.3IONS-SCNC: 13 MMOL/L (ref 7–15)
AST SERPL W P-5'-P-CCNC: 31 U/L (ref 10–35)
BILIRUB SERPL-MCNC: 0.7 MG/DL
BUN SERPL-MCNC: 9.9 MG/DL (ref 8–23)
CALCIUM SERPL-MCNC: 8.7 MG/DL (ref 8.8–10.2)
CHLORIDE SERPL-SCNC: 101 MMOL/L (ref 98–107)
CREAT SERPL-MCNC: 0.55 MG/DL (ref 0.51–0.95)
DEPRECATED HCO3 PLAS-SCNC: 25 MMOL/L (ref 22–29)
ERYTHROCYTE [DISTWIDTH] IN BLOOD BY AUTOMATED COUNT: 13.7 % (ref 10–15)
GFR SERPL CREATININE-BSD FRML MDRD: 89 ML/MIN/1.73M2
GLUCOSE SERPL-MCNC: 103 MG/DL (ref 70–99)
HCT VFR BLD AUTO: 26.3 % (ref 35–47)
HGB BLD-MCNC: 8.1 G/DL (ref 11.7–15.7)
MAGNESIUM SERPL-MCNC: 2.2 MG/DL (ref 1.7–2.3)
MCH RBC QN AUTO: 29.2 PG (ref 26.5–33)
MCHC RBC AUTO-ENTMCNC: 30.8 G/DL (ref 31.5–36.5)
MCV RBC AUTO: 95 FL (ref 78–100)
PHOSPHATE SERPL-MCNC: 3.1 MG/DL (ref 2.5–4.5)
PLATELET # BLD AUTO: 344 10E3/UL (ref 150–450)
POTASSIUM SERPL-SCNC: 3.1 MMOL/L (ref 3.4–5.3)
POTASSIUM SERPL-SCNC: 3.1 MMOL/L (ref 3.4–5.3)
POTASSIUM SERPL-SCNC: 3.2 MMOL/L (ref 3.4–5.3)
POTASSIUM SERPL-SCNC: 3.4 MMOL/L (ref 3.4–5.3)
POTASSIUM SERPL-SCNC: 3.8 MMOL/L (ref 3.4–5.3)
PROT SERPL-MCNC: 5.8 G/DL (ref 6.4–8.3)
RBC # BLD AUTO: 2.77 10E6/UL (ref 3.8–5.2)
SODIUM SERPL-SCNC: 139 MMOL/L (ref 136–145)
WBC # BLD AUTO: 6.7 10E3/UL (ref 4–11)

## 2023-03-04 PROCEDURE — 83735 ASSAY OF MAGNESIUM: CPT | Performed by: NURSE PRACTITIONER

## 2023-03-04 PROCEDURE — 250N000013 HC RX MED GY IP 250 OP 250 PS 637: Performed by: STUDENT IN AN ORGANIZED HEALTH CARE EDUCATION/TRAINING PROGRAM

## 2023-03-04 PROCEDURE — 250N000013 HC RX MED GY IP 250 OP 250 PS 637: Performed by: INTERNAL MEDICINE

## 2023-03-04 PROCEDURE — 99233 SBSQ HOSP IP/OBS HIGH 50: CPT | Performed by: INTERNAL MEDICINE

## 2023-03-04 PROCEDURE — 84132 ASSAY OF SERUM POTASSIUM: CPT | Performed by: PHYSICIAN ASSISTANT

## 2023-03-04 PROCEDURE — 85027 COMPLETE CBC AUTOMATED: CPT | Performed by: NURSE PRACTITIONER

## 2023-03-04 PROCEDURE — 258N000003 HC RX IP 258 OP 636

## 2023-03-04 PROCEDURE — 84132 ASSAY OF SERUM POTASSIUM: CPT | Performed by: INTERNAL MEDICINE

## 2023-03-04 PROCEDURE — 36415 COLL VENOUS BLD VENIPUNCTURE: CPT | Performed by: INTERNAL MEDICINE

## 2023-03-04 PROCEDURE — 76705 ECHO EXAM OF ABDOMEN: CPT

## 2023-03-04 PROCEDURE — 36415 COLL VENOUS BLD VENIPUNCTURE: CPT | Performed by: STUDENT IN AN ORGANIZED HEALTH CARE EDUCATION/TRAINING PROGRAM

## 2023-03-04 PROCEDURE — 76705 ECHO EXAM OF ABDOMEN: CPT | Mod: 26 | Performed by: RADIOLOGY

## 2023-03-04 PROCEDURE — 84132 ASSAY OF SERUM POTASSIUM: CPT | Performed by: STUDENT IN AN ORGANIZED HEALTH CARE EDUCATION/TRAINING PROGRAM

## 2023-03-04 PROCEDURE — 250N000011 HC RX IP 250 OP 636

## 2023-03-04 PROCEDURE — 36415 COLL VENOUS BLD VENIPUNCTURE: CPT | Performed by: PHYSICIAN ASSISTANT

## 2023-03-04 PROCEDURE — 84100 ASSAY OF PHOSPHORUS: CPT | Performed by: NURSE PRACTITIONER

## 2023-03-04 PROCEDURE — 120N000002 HC R&B MED SURG/OB UMMC

## 2023-03-04 PROCEDURE — 999N000128 HC STATISTIC PERIPHERAL IV START W/O US GUIDANCE

## 2023-03-04 PROCEDURE — 250N000013 HC RX MED GY IP 250 OP 250 PS 637: Performed by: NURSE PRACTITIONER

## 2023-03-04 PROCEDURE — 250N000013 HC RX MED GY IP 250 OP 250 PS 637: Performed by: PHYSICIAN ASSISTANT

## 2023-03-04 RX ORDER — HYDROXYZINE HCL 25 MG
12.5 TABLET ORAL 3 TIMES DAILY PRN
Status: DISCONTINUED | OUTPATIENT
Start: 2023-03-04 | End: 2023-03-06 | Stop reason: HOSPADM

## 2023-03-04 RX ORDER — POTASSIUM CHLORIDE 1.5 G/1.58G
20 POWDER, FOR SOLUTION ORAL ONCE
Status: COMPLETED | OUTPATIENT
Start: 2023-03-04 | End: 2023-03-04

## 2023-03-04 RX ORDER — POTASSIUM CHLORIDE 1.5 G/1.58G
40 POWDER, FOR SOLUTION ORAL ONCE
Status: COMPLETED | OUTPATIENT
Start: 2023-03-04 | End: 2023-03-04

## 2023-03-04 RX ORDER — LEVOTHYROXINE SODIUM 88 UG/1
88 TABLET ORAL DAILY
Status: DISCONTINUED | OUTPATIENT
Start: 2023-03-04 | End: 2023-03-04

## 2023-03-04 RX ADMIN — POTASSIUM CHLORIDE 40 MEQ: 1.5 POWDER, FOR SOLUTION ORAL at 08:06

## 2023-03-04 RX ADMIN — METHOCARBAMOL 750 MG: 750 TABLET, FILM COATED ORAL at 12:34

## 2023-03-04 RX ADMIN — SODIUM CHLORIDE, POTASSIUM CHLORIDE, SODIUM LACTATE AND CALCIUM CHLORIDE: 600; 310; 30; 20 INJECTION, SOLUTION INTRAVENOUS at 00:31

## 2023-03-04 RX ADMIN — ACETAMINOPHEN 975 MG: 325 TABLET, FILM COATED ORAL at 08:05

## 2023-03-04 RX ADMIN — LEVOTHYROXINE SODIUM 88 MCG: 88 TABLET ORAL at 08:05

## 2023-03-04 RX ADMIN — BUSPIRONE HYDROCHLORIDE 10 MG: 10 TABLET ORAL at 08:05

## 2023-03-04 RX ADMIN — GABAPENTIN 600 MG: 600 TABLET, FILM COATED ORAL at 22:32

## 2023-03-04 RX ADMIN — SODIUM CHLORIDE, POTASSIUM CHLORIDE, SODIUM LACTATE AND CALCIUM CHLORIDE: 600; 310; 30; 20 INJECTION, SOLUTION INTRAVENOUS at 19:10

## 2023-03-04 RX ADMIN — BUSPIRONE HYDROCHLORIDE 10 MG: 10 TABLET ORAL at 21:08

## 2023-03-04 RX ADMIN — SIMVASTATIN 10 MG: 10 TABLET, FILM COATED ORAL at 22:32

## 2023-03-04 RX ADMIN — FLUOXETINE 60 MG: 20 CAPSULE ORAL at 08:05

## 2023-03-04 RX ADMIN — POTASSIUM CHLORIDE 40 MEQ: 1.5 POWDER, FOR SOLUTION ORAL at 15:22

## 2023-03-04 RX ADMIN — METHOCARBAMOL 750 MG: 750 TABLET, FILM COATED ORAL at 15:22

## 2023-03-04 RX ADMIN — HYDROMORPHONE HYDROCHLORIDE 0.2 MG: 0.2 INJECTION, SOLUTION INTRAMUSCULAR; INTRAVENOUS; SUBCUTANEOUS at 19:00

## 2023-03-04 RX ADMIN — FLUTICASONE FUROATE AND VILANTEROL TRIFENATATE 1 PUFF: 100; 25 POWDER RESPIRATORY (INHALATION) at 08:27

## 2023-03-04 RX ADMIN — ACETAMINOPHEN 975 MG: 325 TABLET, FILM COATED ORAL at 15:22

## 2023-03-04 RX ADMIN — ACETAMINOPHEN 975 MG: 325 TABLET, FILM COATED ORAL at 00:01

## 2023-03-04 RX ADMIN — Medication 2 SPRAY: at 00:45

## 2023-03-04 RX ADMIN — POTASSIUM CHLORIDE 20 MEQ: 1.5 POWDER, FOR SOLUTION ORAL at 22:32

## 2023-03-04 RX ADMIN — METHOCARBAMOL 750 MG: 750 TABLET, FILM COATED ORAL at 21:08

## 2023-03-04 RX ADMIN — METHOCARBAMOL 750 MG: 750 TABLET, FILM COATED ORAL at 08:04

## 2023-03-04 RX ADMIN — GABAPENTIN 300 MG: 300 CAPSULE ORAL at 08:04

## 2023-03-04 RX ADMIN — GABAPENTIN 300 MG: 300 CAPSULE ORAL at 12:34

## 2023-03-04 ASSESSMENT — ACTIVITIES OF DAILY LIVING (ADL)
ADLS_ACUITY_SCORE: 46
ADLS_ACUITY_SCORE: 46
ADLS_ACUITY_SCORE: 42
ADLS_ACUITY_SCORE: 46
ADLS_ACUITY_SCORE: 42
ADLS_ACUITY_SCORE: 46
ADLS_ACUITY_SCORE: 43
ADLS_ACUITY_SCORE: 42
ADLS_ACUITY_SCORE: 43
ADLS_ACUITY_SCORE: 46
ADLS_ACUITY_SCORE: 42
ADLS_ACUITY_SCORE: 46

## 2023-03-04 NOTE — PROGRESS NOTES
[facility]    Progress Note - General Surgery Service       Date of Admission:  2/26/2023    Assessment & Plan: Surgery   87-year-old female w/ PMH including HFrEF, HTN, JEAN, hypothyroidism, EDILSON, MDD, osteoarthritis transferred to [facility] from [facility] 2/26 for orthopedic surgery consult after GLF w/ periprosthetic fracture of femur now s/p ORIF of R Femur 2/26 and transferred to [place] 3/4 for GI evaluation of duodenitis, Colonic dilation c/f pseudobstruction, and possible acute cholecystitis in the setting of cholelithiasis. General Surgery asked to weigh in on cholecystitis and pseudoobstruction.     - Surgical plans pending RUQ US, likely no acute surgical indications at this time.   - GI to assess duodenitis per medicine note  - For colonic dilation, recommend:   - Correct electrolytes w/ 4/3/2 goal.    - Minimize narcotic pain medications   - Bowel regimen. Above and below.   - Rest of cares per primary team.   - General Surgery to follow, please reach out if any questions or concerns.        Diet: Diet  NPO for Medical/Clinical Reasons Except for: Meds    DVT Prophylaxis: Defer to primary service  Benson Catheter: Not present  Lines: None     Drains: None     Cardiac Monitoring: None  Code Status: Full Code      Clinically Significant Risk Factors        # Hypokalemia: Lowest K = 3.1 mmol/L in last 2 days, will replace as needed       # Hypoalbuminemia: Lowest albumin = 2.8 g/dL at 3/3/2023 10:29 AM, will monitor as appropriate                   Disposition Plan     Expected Discharge Date: 03/05/2023,  3:00 PM      Discharge Comments: 1-2 days TCU?         The patient's care was discussed with the Attending Physician, Dr. Stephens and Chief Resident/Fellow.    Primo Cruz MD  [facility]  Non-urgent messages: Securely message with [employer] (more info)  Text page via [employer] Paging[employer]      ______________________________________________________________________    Interval History   - NAEO  - Pain still on left lateral abdomen, much improved. Resolved in right.   - No N/V  - Passing flatus with mutliple BMs.   - Adequate UOP  - RUQ US pending.     Physical Exam   Vital Signs: Temp: 98.9  F (37.2  C) Temp src: Oral BP: 133/51 Pulse: 82   Resp: 16 SpO2: 96 % O2 Device: Nasal cannula Oxygen Delivery: 1 LPM  Weight: 262 lbs 0 oz    Intake/Output Summary (Last 24 hours) at 3/4/2023 0943  Last data filed at 3/3/2023 2300  Gross per 24 hour   Intake --   Output 1650 ml   Net -1650 ml     General: Alert, resting comfortably in NAD/NTA. Cooperative  HEENT: NC/AT, sclerae anicteric.Oral mucosa moist  Neck: Trachea midline  Pulm: NLB on RA, no tachypnea/dyspnea  CV: RRR by RP, non-cyanotic, no LE edema.  ABD: Soft, obese,non-tender, non-distended, no guarding or rebound tenderness. No obvious organomegaly.   Extrem: MA4E, no obvious deformities  Neuro:  A/Ox3, NFD,   Skin: Warm and well perfused      Data     I have personally reviewed the following data over the past 24 hrs:    6.7  \   8.1 (L)   / 344     139 101 9.9 /  103 (H)   3.1 (L); 3.2 (L); 3.1 (L) 25 0.55 \       ALT: 29 AST: 31 AP: 101 TBILI: 0.7   ALB: 3.0 (L) TOT PROTEIN: 5.8 (L) LIPASE: N/A       Procal: N/A CRP: N/A Lactic Acid: 0.8         Imaging results reviewed over the past 24 hrs:   Recent Results (from the past 24 hour(s))   XR Abdomen 1 View    Narrative    Portable abdomen 1 view 1047 hours 3/3/2023    INDICATION: Absent bowel sounds    COMPARISON: 0321 hours earlier today    FINDINGS: NG/OG tube tip and side hole both projected in the proximal  stomach. Moderate amount of retained fecal material in the proximal  large intestine. Distended loops of colon noted in the midabdomen.  There is distal colonic gas clearly visible. Contrast-filled urinary  bladder noted. 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 There  are degenerative  changes of the thoracolumbar spine. No evidence of  free air.      Impression    IMPRESSION: Colonic ileus most likely without definite obstruction.  Moderate amount of retained fecal material in the proximal large  intestine. NG/OG tube tip and sidehole in the proximal stomach.    HAKAN ALVAREZ MD         SYSTEM ID:  [id number]

## 2023-03-04 NOTE — PROGRESS NOTES
"[facility]    Medicine Progress Note - Hospitalist Service, GOLD TEAM 11    Date of Admission:  2/26/2023    Assessment & Plan   Carol \"Jan\" ANGI Merida is an 87 year old female with past medical history significant for grade 1 diastolic dysfunction, hypertension, JEAN, hypothyroidism, anxiety depression, and osteoarthritis transferred from [facility] d/t ground level fall resulting in periprosthetic fracture of her femur. Admitted to medicine for pre-op clearance, underwent ORIF of R femur on 2/26/23, and transferred to [place] 3/3 in case needs colorectal or GI intervention.     Colonic dilatation and abdominal pain with Concern for duodenitis--improved today   Abdominal pain starting 3/1 but having BMs and passing flatus.  AXR 3/2 with non-obstructive bowel gas pattern but with air distended loop of large bowel c/f developing ileus.  CTAP obtained evening 3/2 with dilated sigmoid colon without e/o obstruction, volvulus, or ileus, but did show sigmoid colon dilation up to 7.8cm as well as mild stranding along 2nd portion of duodenum with mild wall thickening and trace fluid c/f duodenitis.  Found to have cholelithiasis as well.  NG placed for decompression.  LFTs from 3/2 , ALT 41 (58), AST 47 (61), Tbili 0.7. LA 0.8.  Discussed with CRS fellow while on [facility] who did not feel she needed further evaluation by their team and recommended gastrograffin enema.  Note from Gen Surg Dr. Singh recommending CRS and GI consultation, and that patient would be best served on [facility]. Now that she's transferred, will have general surgery consult done here.    - Abdominal US to evaluate for acute cholecystitis   - Clear liquid diet trial today  - Clamp NG; if tolerates clears may pull tomorrow  - Continue gentle IVFs with LR 50 mL/hr    - Serial abdominal exams    - Daily BMP, Mag, Phos  - Hold bowel meds for now     Elevated LFT: trend towards WNL. Continue to trend " "tomorrow    Mechanical ground level fall S/p ORIF of R femur in setting of known Osteoarthritis   POD #6  Reports adequate pain control.  Had some confusion and disorientation post op likely 2/2 anesthesia medications, but back to baseline now.  Pt had question about dressing changes on surgical incisions; can be done prior to discharge. She will need follow-up in clinic with PA 2 weeks post-op for wound check and staple removal  - Consulted Ortho on transfer to [place] for continuity of care  - PT, OT recommending TCU - likely needs reassessment once acute medical issues resolve  - Pain control: continue scheduled APAP 975mg Q8H, Gabapentin 300/300/600mg, minimize opioid use  - Monitor for sedation/confusion, would hold opioids if this occurs    - Ace wrap to RLE may be replaced/reinforced PRN    Throat discomfort - Pt reports feeling unwell and having a scratchy throat on 3/2.  No fevers or cough.  Has some dyspnea at baseline.  Procal 0.06, no leukocytosis.  COVID and flu negative.   - Monitor   - Biotene spray PRN     Hx HTN - On Amlodipine PTA.  Noted to have lower BPs.  - Hold Amlodipine for now   - Monitor BP per unit routine    Grade 1 diastolic dysfunction and Chronic dyspnea on exertion   Per review of admission H&P, pt underwent stress test in 2012 and fall 2022 both with same area of infarction with stress, and cardiac cath in 2012 with \"no evidence of significant coronary narrowing that would benefit from mechanical revascularization.\"  Had TTE this admission as part of preop evaluation with hyperkinetic LV function and EF > 70%, normal LV function, normal RV function, chamber size, wall motion, and thickness, normal atria, normal PASP, and no e/o effusion.  Unclear if dyspnea is of cardiac or pulmonary etiology.  Per chart review, had normal PFTs in 2018.  Recently needed supplemental O2 here, but appears to be at baseline, no c/o chest pain.     - Continue Breo-Ellipta (auto sub for PTA Wixela)   - " "PRN albuterol   - Wean supplemental O2   - Monitor I/Os  - Daily weights     Acute anemia - Hgb 9.0 post op, BL closer to 12-13.  Has gradually drifted down, stable ~ 8.  EBL ~ 500 mL during procedure.  Possible 2/2 surgical blood loss vs dilutional vs other.  Noted to have wide hematoma on operative leg on exam 3/2 with extension to calf on 3/3.      - Transfuse for Hgb < 7    - Monitor hematoma site    Hyperlipidemia - Continue PTA statin   Hypothyroidism - Continue PTA Levothyroxine 88mcg.   JEAN - Continue home CPAP at HS.  Anxiety, depression - Mood stable.    - Continue PTA Buspar 10mg BID and Prozac 60mg daily  Chronic lower extremity swelling - Uses Lasix at home PRN.  Will order here if edema noted, otherwise resume at discharge.            Diet: Diet  NPO for Medical/Clinical Reasons Except for: Meds    DVT Prophylaxis: Pneumatic Compression Devices  Benson Catheter: Not present  Lines: None     Cardiac Monitoring: None  Code Status: Full Code      Clinically Significant Risk Factors        # Hypokalemia: Lowest K = 3.1 mmol/L in last 2 days, will replace as needed       # Hypoalbuminemia: Lowest albumin = 2.8 g/dL at 3/3/2023 10:29 AM, will monitor as appropriate                   Disposition Plan     Expected Discharge Date: 03/05/2023,  3:00 PM      Discharge Comments: 1-2 days TCU?          Bobby Boyle MD  Hospitalist Service, GOLD TEAM 11  [facility]  Securely message with [employer] (more info)  Text page via [facility] Paging/Directory   See signed in provider for up to date coverage information  ______________________________________________________________________    Interval History   Transferred from [facility] at about midnight. Has had NG in place for a while and is feeling a lot better. +flatus, pain markedly improved (is just \"discomfort\" now), feels \"gurgling\" in abdomen. Open to trying clear liquids today    Physical Exam   Vital Signs: Temp: 98.9  F " (37.2  C) Temp src: Oral BP: 133/51 Pulse: 82   Resp: 16 SpO2: 96 % O2 Device: Nasal cannula Oxygen Delivery: 1 LPM  Weight: 262 lbs 0 oz  General appearance: in no apparent distress.  Neck: supple  CV: regular rate and rhythm and normal S1 S2, II/VI crescendo/decrescendo murmur at base  Resp: clear to ausculation bilaterally, normal respiratory effort  Abd: + BS all 4 quadrants, soft, NT/ND no masses   Extr: WWP  Skin: warm and dry      Medical Decision Making       55 MINUTES SPENT BY ME on the date of service doing chart review, history, exam, documentation & further activities per the note.      Data     I have personally reviewed the following data over the past 24 hrs:    6.7  \   8.1 (L)   / 344     139 101 9.9 /  103 (H)   3.4 25 0.55 \       ALT: 29 AST: 31 AP: 101 TBILI: 0.7   ALB: 3.0 (L) TOT PROTEIN: 5.8 (L) LIPASE: N/A      await US RUQ

## 2023-03-04 NOTE — PROGRESS NOTES
A/Ox's 4 but forgetful at times. Pt rated pain as tolerable. Ultram given for pain control. Dressing has some unchanged dry drainage. CMS intact. Edema noted BLE. NPO and NG suction running per orders. Pure wick in pace. Passing flatus. IV went bad at the start of potassium Iv infusion, Pt declined restarting IV potassium, reordered as PO. Resting in bed at this time with call light in reach. Able to make needs known. Updated Daughter Kanchan about transfer over patients phone and questions answered. Report called over to [facility] on 5A. Pt left At [id number].

## 2023-03-04 NOTE — PROGRESS NOTES
Admission/Transfer from: [address] [place] [address]  2 RN skin assessment completed. YES w/ Hamida Rogers  Significant findings include: Blanchable redness bilaterally of panus and sacral area. Large bruise extending down lateral side of pt's right thigh to calf, previously outlined with marker. Scattered small bruises and scabbing bilaterally of extremities. Small abrasion of the left side of the abdomen. Right knee incision UTV due to dressing; CDI.   WOC Nurse Consult Ordered? NO

## 2023-03-04 NOTE — PLAN OF CARE
Goal Outcome Evaluation:      Plan of Care Reviewed With: patient    Overall Patient Progress: no changeOverall Patient Progress: no change    Outcome Evaluation: A&Ox4. VSS on 1-2L O2. bowel sounds improving, passing flatus. advanced to clear liquid diet. NG tube still in place.    Lot of anxiety in evening. Questions answered. Spoke with daughter on phone

## 2023-03-04 NOTE — PLAN OF CARE
Goal Outcome Evaluation:      Plan of Care Reviewed With: patient    Overall Patient Progress: no changeOverall Patient Progress: no change    Outcome Evaluation: A&Ox4, forgetful. VSS on 1L. NPO due to ileus. LBM 3/2/2023, passing gas w/ audible bowel sounds. Purewick to void. NG in place, set to low intermittent suction w/ chunky tan and dark red output. PIV infusing LR 50 mL/h. Surgical R knee dressing CDI. Blanchable redness bilaterally of panus and sacral area, large bruise extending down lateral R leg. Pt denies pain on shift, but presents w/ anxiety this morning due to uncertainty surrounding treatment plan, and difficulty with staying NPO. Artificial saliva scheduled x4 daily, and moistened sponges are at the bedside to help w/ mouth dryness.

## 2023-03-04 NOTE — PROGRESS NOTES
Orthopedic Surgery Progress Note   3/2/2023    Subjective: Transferred to  overnight due to abdominal pain and c/f colonic dilation and possible duodenitis. Right leg pain controlled at rest. Pain with movement still. Abdominal discomfort is her biggest issue currently. NPO per primary team. Voiding spontaneously. +Flatus, + BM. Denies fever or chills, CP, SOB, numbness or tingling, motor dysfunction or weakness.       Exam:  /51 (BP Location: Right arm)   Pulse 82   Temp 98.9  F (37.2  C) (Oral)   Resp 16   Wt 118.8 kg (262 lb)   LMP  (LMP Unknown)   SpO2 96%   BMI 46.41 kg/m    Gen: Awake, alert, NAD  Resp: breathing equal and non-labored  Extremities:    RLE: Toes wwp, DP 2+, bcr in all toes. Compartments soft and tolerates passive stretch of toes. +EHL/FHL/GSC/TA with 5 /5 strength. SILT SP/DP/Sa/Coelho/T. Dressing c/d/i.      Labs:    Recent Labs   Lab 03/04/23  0431 03/03/23  1029 03/02/23  1023 03/01/23  1838 03/01/23  0726   WBC 6.7 7.5 8.5  --  8.0   HGB 8.1* 8.2* 8.2* 8.0* 8.2*    309 273  --  245     Recent Labs   Lab 03/04/23  0431 03/03/23  1655 03/03/23  1029 03/02/23  1023 03/02/23  0518 03/01/23  0726 02/28/23  0743     --  136  --  136 136  --    POTASSIUM 3.1*  3.1*  3.2* 3.3* 3.3* 3.5 3.4 3.6  3.5 4.4   CHLORIDE 101  --  99  --  100 100  --    CO2 25  --  28  --  27 24  --    BUN 9.9  --  9.5  --  11.7 13.8  --    CR 0.55  --  0.50*  --  0.55 0.58  --    *  --  117*  --  115* 108*  --    MAG 2.2  --   --  2.3  --  2.5* 2.2   PHOS 3.1  --   --   --   --   --   --      Recent Labs   Lab 02/26/23  0607 02/26/23  0106   INR 1.04 1.07   PTT  --  31     No lab results found in last 7 days.    Invalid input(s): ESR    Assessment:     Assessment: Carol Merida is a 87 year old female s/p ORIF R femur on 2/26/2023 with Dr. Lemon. Clinical course complicated by colonic dilation and possible duodenitis.     Plan:  Medicine Primary team given age and medical  comorbidity; similar rationale to hip fracture patients  Activity: Up with walker and assist until independent.    Weight bearing status: May weight bear on RLE for transfers;   Pain management: Oral pain medications with IV for breakthrough. Wean IV as able.    Antibiotics: Ancef x 24 hours  Diet: Begin with clear fluids and progress diet as tolerated.   DVT prophylaxis: Mechanical prophylaxis with SCD  Imaging: Standing radiographs when able. XR left knee complete    Labs: Monitor Hgb and transfuse prn  Bracing/Splinting: ACE wrap to RLE may be replaced or reinforced as needed  Dressings: Keep clean, dry and intact   Drains: n/a  Physical Therapy/Occupational Therapy: Eval and treat.  Consults: nutrition, SW  Follow-up: Clinic with PA in 2 weeks for wound check and staple removal  Disposition: Admitted for physical therapy and pain control; discharge to home versus TCU today pending medical stability     Patient discussed with .       Timoteo Orr MD  Orthopedic Surgery, PGY-1

## 2023-03-05 ENCOUNTER — APPOINTMENT (OUTPATIENT)
Dept: OCCUPATIONAL THERAPY | Facility: CLINIC | Age: 88
DRG: 481 | End: 2023-03-05
Payer: COMMERCIAL

## 2023-03-05 LAB
ALBUMIN SERPL BCG-MCNC: 3 G/DL (ref 3.5–5.2)
ALP SERPL-CCNC: 103 U/L (ref 35–104)
ALT SERPL W P-5'-P-CCNC: 25 U/L (ref 10–35)
ANION GAP SERPL CALCULATED.3IONS-SCNC: 12 MMOL/L (ref 7–15)
AST SERPL W P-5'-P-CCNC: 32 U/L (ref 10–35)
BILIRUB SERPL-MCNC: 0.7 MG/DL
BUN SERPL-MCNC: 8.2 MG/DL (ref 8–23)
CALCIUM SERPL-MCNC: 8.6 MG/DL (ref 8.8–10.2)
CHLORIDE SERPL-SCNC: 101 MMOL/L (ref 98–107)
CREAT SERPL-MCNC: 0.51 MG/DL (ref 0.51–0.95)
DEPRECATED HCO3 PLAS-SCNC: 25 MMOL/L (ref 22–29)
ERYTHROCYTE [DISTWIDTH] IN BLOOD BY AUTOMATED COUNT: 13.9 % (ref 10–15)
GFR SERPL CREATININE-BSD FRML MDRD: 90 ML/MIN/1.73M2
GLUCOSE SERPL-MCNC: 103 MG/DL (ref 70–99)
HCT VFR BLD AUTO: 26.8 % (ref 35–47)
HGB BLD-MCNC: 8.1 G/DL (ref 11.7–15.7)
MAGNESIUM SERPL-MCNC: 2.1 MG/DL (ref 1.7–2.3)
MCH RBC QN AUTO: 28.8 PG (ref 26.5–33)
MCHC RBC AUTO-ENTMCNC: 30.2 G/DL (ref 31.5–36.5)
MCV RBC AUTO: 95 FL (ref 78–100)
PHOSPHATE SERPL-MCNC: 3.3 MG/DL (ref 2.5–4.5)
PLATELET # BLD AUTO: 389 10E3/UL (ref 150–450)
POTASSIUM SERPL-SCNC: 3.5 MMOL/L (ref 3.4–5.3)
POTASSIUM SERPL-SCNC: 3.6 MMOL/L (ref 3.4–5.3)
PROT SERPL-MCNC: 5.9 G/DL (ref 6.4–8.3)
RBC # BLD AUTO: 2.81 10E6/UL (ref 3.8–5.2)
SODIUM SERPL-SCNC: 138 MMOL/L (ref 136–145)
WBC # BLD AUTO: 7.6 10E3/UL (ref 4–11)

## 2023-03-05 PROCEDURE — 999N000157 HC STATISTIC RCP TIME EA 10 MIN

## 2023-03-05 PROCEDURE — 250N000013 HC RX MED GY IP 250 OP 250 PS 637: Performed by: STUDENT IN AN ORGANIZED HEALTH CARE EDUCATION/TRAINING PROGRAM

## 2023-03-05 PROCEDURE — 85027 COMPLETE CBC AUTOMATED: CPT | Performed by: NURSE PRACTITIONER

## 2023-03-05 PROCEDURE — 83735 ASSAY OF MAGNESIUM: CPT | Performed by: NURSE PRACTITIONER

## 2023-03-05 PROCEDURE — 36415 COLL VENOUS BLD VENIPUNCTURE: CPT | Performed by: INTERNAL MEDICINE

## 2023-03-05 PROCEDURE — 80053 COMPREHEN METABOLIC PANEL: CPT | Performed by: NURSE PRACTITIONER

## 2023-03-05 PROCEDURE — 250N000013 HC RX MED GY IP 250 OP 250 PS 637: Performed by: INTERNAL MEDICINE

## 2023-03-05 PROCEDURE — 97535 SELF CARE MNGMENT TRAINING: CPT | Mod: GO

## 2023-03-05 PROCEDURE — 99233 SBSQ HOSP IP/OBS HIGH 50: CPT | Performed by: INTERNAL MEDICINE

## 2023-03-05 PROCEDURE — 120N000002 HC R&B MED SURG/OB UMMC

## 2023-03-05 PROCEDURE — 250N000011 HC RX IP 250 OP 636: Performed by: STUDENT IN AN ORGANIZED HEALTH CARE EDUCATION/TRAINING PROGRAM

## 2023-03-05 PROCEDURE — 97110 THERAPEUTIC EXERCISES: CPT | Mod: GO

## 2023-03-05 PROCEDURE — 36415 COLL VENOUS BLD VENIPUNCTURE: CPT | Performed by: NURSE PRACTITIONER

## 2023-03-05 PROCEDURE — 84132 ASSAY OF SERUM POTASSIUM: CPT | Performed by: INTERNAL MEDICINE

## 2023-03-05 PROCEDURE — 250N000013 HC RX MED GY IP 250 OP 250 PS 637: Performed by: NURSE PRACTITIONER

## 2023-03-05 PROCEDURE — 84100 ASSAY OF PHOSPHORUS: CPT | Performed by: NURSE PRACTITIONER

## 2023-03-05 RX ORDER — POTASSIUM CHLORIDE 1.5 G/1.58G
20 POWDER, FOR SOLUTION ORAL ONCE
Status: COMPLETED | OUTPATIENT
Start: 2023-03-05 | End: 2023-03-05

## 2023-03-05 RX ADMIN — GABAPENTIN 600 MG: 600 TABLET, FILM COATED ORAL at 21:25

## 2023-03-05 RX ADMIN — POTASSIUM CHLORIDE 20 MEQ: 1.5 POWDER, FOR SOLUTION ORAL at 21:11

## 2023-03-05 RX ADMIN — METHOCARBAMOL 750 MG: 750 TABLET, FILM COATED ORAL at 21:10

## 2023-03-05 RX ADMIN — METHOCARBAMOL 750 MG: 750 TABLET, FILM COATED ORAL at 08:59

## 2023-03-05 RX ADMIN — BUSPIRONE HYDROCHLORIDE 10 MG: 10 TABLET ORAL at 21:10

## 2023-03-05 RX ADMIN — ACETAMINOPHEN 975 MG: 325 TABLET, FILM COATED ORAL at 23:53

## 2023-03-05 RX ADMIN — ALBUTEROL SULFATE 2 PUFF: 90 AEROSOL, METERED RESPIRATORY (INHALATION) at 21:29

## 2023-03-05 RX ADMIN — LEVOTHYROXINE SODIUM 88 MCG: 88 TABLET ORAL at 05:59

## 2023-03-05 RX ADMIN — GABAPENTIN 300 MG: 300 CAPSULE ORAL at 08:59

## 2023-03-05 RX ADMIN — POTASSIUM CHLORIDE 20 MEQ: 1.5 POWDER, FOR SOLUTION ORAL at 10:59

## 2023-03-05 RX ADMIN — FLUOXETINE 60 MG: 20 CAPSULE ORAL at 08:59

## 2023-03-05 RX ADMIN — ACETAMINOPHEN 975 MG: 325 TABLET, FILM COATED ORAL at 17:14

## 2023-03-05 RX ADMIN — SIMVASTATIN 10 MG: 10 TABLET, FILM COATED ORAL at 21:25

## 2023-03-05 RX ADMIN — Medication 50 MG: at 02:32

## 2023-03-05 RX ADMIN — ACETAMINOPHEN 975 MG: 325 TABLET, FILM COATED ORAL at 01:29

## 2023-03-05 RX ADMIN — ONDANSETRON 4 MG: 4 TABLET, ORALLY DISINTEGRATING ORAL at 21:25

## 2023-03-05 RX ADMIN — FLUTICASONE FUROATE AND VILANTEROL TRIFENATATE 1 PUFF: 100; 25 POWDER RESPIRATORY (INHALATION) at 10:08

## 2023-03-05 RX ADMIN — BUSPIRONE HYDROCHLORIDE 10 MG: 10 TABLET ORAL at 08:59

## 2023-03-05 RX ADMIN — ALBUTEROL SULFATE 2 PUFF: 90 AEROSOL, METERED RESPIRATORY (INHALATION) at 02:32

## 2023-03-05 RX ADMIN — METHOCARBAMOL 750 MG: 750 TABLET, FILM COATED ORAL at 12:37

## 2023-03-05 RX ADMIN — GABAPENTIN 300 MG: 300 CAPSULE ORAL at 12:37

## 2023-03-05 RX ADMIN — ACETAMINOPHEN 975 MG: 325 TABLET, FILM COATED ORAL at 08:59

## 2023-03-05 RX ADMIN — METHOCARBAMOL 750 MG: 750 TABLET, FILM COATED ORAL at 17:14

## 2023-03-05 ASSESSMENT — ACTIVITIES OF DAILY LIVING (ADL)
ADLS_ACUITY_SCORE: 48
ADLS_ACUITY_SCORE: 45
ADLS_ACUITY_SCORE: 48
ADLS_ACUITY_SCORE: 46
ADLS_ACUITY_SCORE: 45
ADLS_ACUITY_SCORE: 48
ADLS_ACUITY_SCORE: 48
ADLS_ACUITY_SCORE: 45
ADLS_ACUITY_SCORE: 48
ADLS_ACUITY_SCORE: 46
ADLS_ACUITY_SCORE: 48
ADLS_ACUITY_SCORE: 45

## 2023-03-05 NOTE — PROGRESS NOTES
[facility]    Progress Note - General Surgery Service       Date of Admission:  2/26/2023    Assessment & Plan: Surgery   87-year-old female w/ PMH including HFrEF, HTN, JEAN, hypothyroidism, EDILSON, MDD, osteoarthritis transferred to [facility] from [facility] 2/26 for orthopedic surgery consult after GLF w/ periprosthetic fracture of femur now s/p ORIF of R Femur 2/26 and transferred to [place] 3/4 for evaluation of CT findings. U/s without evidence of cholecystitis. History and exam argue against intraabdominal pathology.    - No acute surgical interventions indicated.   - Okay to advance diet as tolerated  - Recommend bowel regimen continues  - NGT discontinued at bedside  - Surgery team will sign off, please call with questions      The patient's care was discussed with the Attending Physician, Dr. Stephens.    Barb Davies MD  [facility]  Non-urgent messages: Securely message with [employer] (more info)  Text page via [employer] Paging/Directory     ______________________________________________________________________    Interval History   - NAEO  - No abdominal pain.   - No N/V  - Passing flatus with mutliple BMs.   - Adequate UOP    Physical Exam   Vital Signs: Temp: 98.5  F (36.9  C) Temp src: Oral BP: 118/57 Pulse: 90   Resp: 16 SpO2: 98 % O2 Device: Nasal cannula (Simultaneous filing. User may be unaware of other data.) Oxygen Delivery: 2.5 LPM  Weight: 262 lbs 0 oz    Intake/Output Summary (Last 24 hours) at 3/4/2023 0943  Last data filed at 3/3/2023 2300  Gross per 24 hour   Intake --   Output 1650 ml   Net -1650 ml     General: Alert, resting comfortably in NAD/NTA. Cooperative  HEENT: NC/AT, sclerae anicteric.Oral mucosa moist  Neck: Trachea midline  Pulm: NLB on NC, no tachypnea/dyspnea  CV: RRR by RP, non-cyanotic, no LE edema.  ABD: Soft, obese,non-tender, non-distended, no guarding or rebound tenderness. No  obvious organomegaly.   Extrem: MA4E, no obvious deformities  Neuro:  A/Ox3, NFD,   Skin: Warm and well perfused      Data     I have personally reviewed the following data over the past 24 hrs:    7.6  \   8.1 (L)   / 389     138 101 8.2 /  103 (H)   3.5 25 0.51 \       ALT: 25 AST: 32 AP: 103 TBILI: 0.7   ALB: 3.0 (L) TOT PROTEIN: 5.9 (L) LIPASE: N/A       Imaging results reviewed over the past 24 hrs:   No results found for this or any previous visit (from the past 24 hour(s)).

## 2023-03-05 NOTE — PLAN OF CARE
Goal Outcome Evaluation:      Plan of Care Reviewed With: patient    Overall Patient Progress: improvingOverall Patient Progress: improving    Outcome Evaluation: A&Ox4, VSS on room air (was able to wean down during the day), intermittently forgetful and anxious. family at bedside. advanced to regular diet - tolerated full liquid + crackers. IV fluids stopped, PIV saline locked. surgical dressings CDI. bowel sounds improved. NG removed this morning

## 2023-03-05 NOTE — PLAN OF CARE
Goal Outcome Evaluation:      Plan of Care Reviewed With: patient    Overall Patient Progress: improvingOverall Patient Progress: improving    Outcome Evaluation: A&Ox4, forgetful and intermittently anxious. VSS on 1-4L NC. A2. Clear liquid diet, tolerating well. Purewick in place, changed x2. LBM 3/5/23, watery; continues to pass flatus/have audible bowel sounds. PIV running LR 50mL/h. Surgical dressings CDI, R leg bruise unchanged. Blanchable redness on sacrum improving. Pt reports SOB, PRN albuterol given x1. Continued gas discomfort and headache today, managed w/ scheduled tylenol, PRN tramadol, and rest. Possible NG removal today. Plan is to eventually discharge to TCU.

## 2023-03-05 NOTE — PROGRESS NOTES
Orthopedic Surgery Progress Note   3/2/2023    Subjective: Abdominal pain improving. Now passing gas. Tolerating clear liquid diet. Right leg pain controlled at rest. Pain with movement still. Voiding spontaneously. +Flatus, - BM. Denies fever or chills, CP, SOB, numbness or tingling, motor dysfunction or weakness.       Exam:  /57 (BP Location: Right arm)   Pulse 90   Temp 98.5  F (36.9  C) (Oral)   Resp 16   Wt 118.8 kg (262 lb)   LMP  (LMP Unknown)   SpO2 98%   BMI 46.41 kg/m    Gen: Awake, alert, NAD  Resp: breathing equal and non-labored  Extremities:    RLE: Toes wwp, DP 2+, bcr in all toes. Compartments soft and tolerates passive stretch of toes. +EHL/FHL/GSC/TA with 5 /5 strength. SILT SP/DP/Sa/Coelho/T. Dressing c/d/i.      Labs:    Recent Labs   Lab 03/05/23  0610 03/04/23  0431 03/03/23  1029 03/02/23  1023   WBC 7.6 6.7 7.5 8.5   HGB 8.1* 8.1* 8.2* 8.2*    344 309 273     Recent Labs   Lab 03/05/23  0610 03/04/23  1930 03/04/23  1156 03/04/23  0431 03/03/23  1655 03/03/23  1029 03/02/23  1023 03/02/23  0518 03/01/23  0726     --   --  139  --  136  --  136 136   POTASSIUM 3.5 3.8 3.4 3.1*  3.1*  3.2*   < > 3.3* 3.5 3.4 3.6  3.5   CHLORIDE 101  --   --  101  --  99  --  100 100   CO2 25  --   --  25  --  28  --  27 24   BUN 8.2  --   --  9.9  --  9.5  --  11.7 13.8   CR 0.51  --   --  0.55  --  0.50*  --  0.55 0.58   *  --   --  103*  --  117*  --  115* 108*   MAG 2.1  --   --  2.2  --   --  2.3  --  2.5*   PHOS 3.3  --   --  3.1  --   --   --   --   --     < > = values in this interval not displayed.     No lab results found in last 7 days.  No lab results found in last 7 days.    Invalid input(s): ESR    Assessment:     Assessment: Carol Merida is a 87 year old female s/p ORIF R femur on 2/26/2023 with Dr. Lemon.      Plan:  Medicine Primary team given age and medical comorbidity; similar rationale to hip fracture patients  Activity: Up with walker and assist  until independent.    Weight bearing status: May weight bear on RLE for transfers;   Pain management: Oral pain medications with IV for breakthrough. Wean IV as able.    Antibiotics: Ancef x 24 hours  Diet: Begin with clear fluids and progress diet as tolerated.   DVT prophylaxis: Mechanical prophylaxis with SCD  Imaging: Standing radiographs when able. XR left knee complete    Labs: Monitor Hgb and transfuse prn  Bracing/Splinting: ACE wrap to RLE may be replaced or reinforced as needed  Dressings: Keep clean, dry and intact   Drains: n/a  Physical Therapy/Occupational Therapy: Eval and treat.  Consults: nutrition, SW  Follow-up: Clinic with PA in 2 weeks for wound check and staple removal  Disposition: Admitted for physical therapy and pain control; discharge to home versus TCU today pending medical stability     Patient discussed with .       Timoteo Orr MD  Orthopedic Surgery, PGY-1

## 2023-03-05 NOTE — PROGRESS NOTES
"[facility]    Medicine Progress Note - Hospitalist Service, GOLD TEAM 11    Date of Admission:  2/26/2023    Assessment & Plan   Carol \"Jan\" ANGI Merida is an 87 year old female with past medical history significant for grade 1 diastolic dysfunction, hypertension, JEAN, hypothyroidism, anxiety depression, and osteoarthritis transferred from [facility] d/t ground level fall resulting in periprosthetic fracture of her femur. Admitted to medicine for pre-op clearance, underwent ORIF of R femur on 2/26/23, and transferred to [place] 3/3 in case needs colorectal or GI intervention.     Colonic dilatation and abdominal pain with Concern for duodenitis--improved today   Abdominal pain starting 3/1 but having BMs and passing flatus.  AXR 3/2 with non-obstructive bowel gas pattern but with air distended loop of large bowel c/f developing ileus.  CTAP obtained evening 3/2 with dilated sigmoid colon without e/o obstruction, volvulus, or ileus, but did show sigmoid colon dilation up to 7.8cm as well as mild stranding along 2nd portion of duodenum with mild wall thickening and trace fluid c/f duodenitis.  Found to have cholelithiasis as well.  NG placed for decompression.  LFTs from 3/2 , ALT 41 (58), AST 47 (61), Tbili 0.7. LA 0.8.  Discussed with CRS fellow while on [facility] who did not feel she needed further evaluation by their team and recommended gastrograffin enema.  Note from Gen Surg Dr. Singh recommending CRS and GI consultation, and that patient would be best served on [place]. Now that she's transferred,had general surgery consult done here--no need for acute surgical intervention.  Abdominal US: no acute cholecystitis   - Return to regular diet today  - Removed NG  - Stope IVFs   - Daily BMP, Mag, Phos  - Restart Miralax PRN    Elevated LFT: resolved    Mechanical ground level fall S/p ORIF of R femur in setting of known Osteoarthritis   POD #6  Reports adequate " "pain control.  Had some confusion and disorientation post op likely 2/2 anesthesia medications, but back to baseline now.  Pt had question about dressing changes on surgical incisions; can be done prior to discharge. She will need follow-up in clinic with PA 2 weeks post-op for wound check and staple removal  - Consulted Ortho on transfer to [place] for continuity of care  - PT, OT recommending TCU  - Pain control: continue scheduled APAP 975mg Q8H, Gabapentin 300/300/600mg, minimize opioid use  - Monitor for sedation/confusion, would hold opioids if this occurs    - Ace wrap to RLE may be replaced/reinforced PRN  - per Ortho may weight bear on right leg for transfers    Throat discomfort - Pt reports feeling unwell and having a scratchy throat on 3/2.  No fevers or cough.  Has some dyspnea at baseline.  Procal 0.06, no leukocytosis.  COVID and flu negative.   - Monitor   - Biotene spray PRN     Hx HTN - On Amlodipine PTA.  BP well controlled now  - Hold Amlodipine for now   - Monitor BP per unit routine    Grade 1 diastolic dysfunction and Chronic dyspnea on exertion   Per review of admission H&P, pt underwent stress test in 2012 and fall 2022 both with same area of infarction with stress, and cardiac cath in 2012 with \"no evidence of significant coronary narrowing that would benefit from mechanical revascularization.\"  Had TTE this admission as part of preop evaluation with hyperkinetic LV function and EF > 70%, normal LV function, normal RV function, chamber size, wall motion, and thickness, normal atria, normal PASP, and no e/o effusion.  Unclear if dyspnea is of cardiac or pulmonary etiology.  Per chart review, had normal PFTs in 2018.  Recently needed supplemental O2 here, but appears to be at baseline, no c/o chest pain.     - Continue Breo-Ellipta (auto sub for PTA Wixela)   - PRN albuterol   - Wean supplemental O2, goal > 90%   - Monitor I/Os  - Daily weights     Acute anemia - Hgb 9.0 post op, BL closer " to 12-13.  Has gradually drifted down, stable ~ 8.  EBL ~ 500 mL during procedure.  Possible 2/2 surgical blood loss vs dilutional vs other.  Noted to have wide hematoma on operative leg on exam 3/2 with extension to calf on 3/3.      - Transfuse for Hgb < 7    - Monitor hematoma site    Hypokalemia: potassium replacement protocol    Hyperlipidemia - Continue PTA statin   Hypothyroidism - Continue PTA Levothyroxine 88mcg.   JEAN - Continue home CPAP at HS.  Anxiety, depression - Mood stable.    - Continue PTA Buspar 10mg BID and Prozac 60mg daily  Chronic lower extremity swelling - Uses Lasix at home PRN.  Will order here if edema noted, otherwise resume at discharge.          Diet: Diet  Clear Liquid Diet    DVT Prophylaxis: Pneumatic Compression Devices  Benson Catheter: Not present  Lines: None     Cardiac Monitoring: None  Code Status: Full Code      Clinically Significant Risk Factors        # Hypokalemia: Lowest K = 3.1 mmol/L in last 2 days, will replace as needed       # Hypoalbuminemia: Lowest albumin = 2.8 g/dL at 3/3/2023 10:29 AM, will monitor as appropriate                   Disposition Plan     Expected Discharge Date: 03/05/2023,  3:00 PM      Discharge Comments: 1-2 days TCU?          Bobby Boyle MD  Hospitalist Service, GOLD TEAM [address] [place] [address]  Securely message with [employer] (more info)  Text page via [employer] Paging/Directory   See signed in provider for up to date coverage information  ______________________________________________________________________    Interval History   Yesterday clear liquids went fine. Pain well controlled, no issues with abdominal pain. Anxiety episodes and started on hydroxyzine PRN which helped    Physical Exam   Vital Signs: Temp: 98.5  F (36.9  C) Temp src: Oral BP: 118/57 Pulse: 90   Resp: 16 SpO2: 98 % O2 Device: Nasal cannula (Simultaneous filing. User may be unaware of other data.) Oxygen Delivery: 2.5 LPM  Weight: 262 lbs  0 oz  General appearance: in no apparent distress.  Neck: supple  CV: regular rate and rhythm and normal S1 S2, no murmur  Resp: clear to ausculation bilaterally, normal respiratory effort  Abd: + BS, soft, NT/ND no masses   Extr: WWP, legs wrapped  Skin: warm and dry      Medical Decision Making       50 MINUTES SPENT BY ME on the date of service doing chart review, history, exam, documentation & further activities per the note.      Data     I have personally reviewed the following data over the past 24 hrs:    7.6  \   8.1 (L)   / 389     138 101 8.2 /  103 (H)   3.5 25 0.51 \       ALT: 25 AST: 32 AP: 103 TBILI: 0.7   ALB: 3.0 (L) TOT PROTEIN: 5.9 (L) LIPASE: N/A

## 2023-03-06 VITALS
HEART RATE: 78 BPM | RESPIRATION RATE: 18 BRPM | DIASTOLIC BLOOD PRESSURE: 44 MMHG | BODY MASS INDEX: 46.41 KG/M2 | TEMPERATURE: 98.3 F | WEIGHT: 262 LBS | SYSTOLIC BLOOD PRESSURE: 105 MMHG | OXYGEN SATURATION: 98 %

## 2023-03-06 PROBLEM — R68.2 DRY MOUTH: Chronic | Status: ACTIVE | Noted: 2023-03-06

## 2023-03-06 PROBLEM — J02.9 SORE THROAT: Chronic | Status: ACTIVE | Noted: 2023-03-06

## 2023-03-06 LAB
ALBUMIN SERPL BCG-MCNC: 3.1 G/DL (ref 3.5–5.2)
ALP SERPL-CCNC: 104 U/L (ref 35–104)
ALT SERPL W P-5'-P-CCNC: 20 U/L (ref 10–35)
ANION GAP SERPL CALCULATED.3IONS-SCNC: 11 MMOL/L (ref 7–15)
AST SERPL W P-5'-P-CCNC: 26 U/L (ref 10–35)
BILIRUB SERPL-MCNC: 0.7 MG/DL
BUN SERPL-MCNC: 9.5 MG/DL (ref 8–23)
CALCIUM SERPL-MCNC: 8.6 MG/DL (ref 8.8–10.2)
CHLORIDE SERPL-SCNC: 101 MMOL/L (ref 98–107)
CREAT SERPL-MCNC: 0.55 MG/DL (ref 0.51–0.95)
DEPRECATED HCO3 PLAS-SCNC: 27 MMOL/L (ref 22–29)
ERYTHROCYTE [DISTWIDTH] IN BLOOD BY AUTOMATED COUNT: 14.1 % (ref 10–15)
GFR SERPL CREATININE-BSD FRML MDRD: 89 ML/MIN/1.73M2
GLUCOSE SERPL-MCNC: 107 MG/DL (ref 70–99)
HCT VFR BLD AUTO: 27.5 % (ref 35–47)
HGB BLD-MCNC: 8.2 G/DL (ref 11.7–15.7)
MAGNESIUM SERPL-MCNC: 1.9 MG/DL (ref 1.7–2.3)
MCH RBC QN AUTO: 29 PG (ref 26.5–33)
MCHC RBC AUTO-ENTMCNC: 29.8 G/DL (ref 31.5–36.5)
MCV RBC AUTO: 97 FL (ref 78–100)
PHOSPHATE SERPL-MCNC: 3.6 MG/DL (ref 2.5–4.5)
PLATELET # BLD AUTO: 415 10E3/UL (ref 150–450)
POTASSIUM SERPL-SCNC: 3.4 MMOL/L (ref 3.4–5.3)
PROT SERPL-MCNC: 5.8 G/DL (ref 6.4–8.3)
RBC # BLD AUTO: 2.83 10E6/UL (ref 3.8–5.2)
SODIUM SERPL-SCNC: 139 MMOL/L (ref 136–145)
WBC # BLD AUTO: 7.6 10E3/UL (ref 4–11)

## 2023-03-06 PROCEDURE — 250N000013 HC RX MED GY IP 250 OP 250 PS 637: Performed by: INTERNAL MEDICINE

## 2023-03-06 PROCEDURE — 80053 COMPREHEN METABOLIC PANEL: CPT | Performed by: NURSE PRACTITIONER

## 2023-03-06 PROCEDURE — 250N000013 HC RX MED GY IP 250 OP 250 PS 637: Performed by: NURSE PRACTITIONER

## 2023-03-06 PROCEDURE — 36415 COLL VENOUS BLD VENIPUNCTURE: CPT | Performed by: NURSE PRACTITIONER

## 2023-03-06 PROCEDURE — 250N000013 HC RX MED GY IP 250 OP 250 PS 637: Performed by: STUDENT IN AN ORGANIZED HEALTH CARE EDUCATION/TRAINING PROGRAM

## 2023-03-06 PROCEDURE — 84100 ASSAY OF PHOSPHORUS: CPT | Performed by: NURSE PRACTITIONER

## 2023-03-06 PROCEDURE — 85027 COMPLETE CBC AUTOMATED: CPT | Performed by: NURSE PRACTITIONER

## 2023-03-06 PROCEDURE — 0QSB04Z REPOSITION RIGHT LOWER FEMUR WITH INTERNAL FIXATION DEVICE, OPEN APPROACH: ICD-10-PCS | Performed by: ORTHOPAEDIC SURGERY

## 2023-03-06 PROCEDURE — 0QSB06Z REPOSITION RIGHT LOWER FEMUR WITH INTRAMEDULLARY INTERNAL FIXATION DEVICE, OPEN APPROACH: ICD-10-PCS | Performed by: ORTHOPAEDIC SURGERY

## 2023-03-06 PROCEDURE — 99239 HOSP IP/OBS DSCHRG MGMT >30: CPT | Performed by: INTERNAL MEDICINE

## 2023-03-06 PROCEDURE — 83735 ASSAY OF MAGNESIUM: CPT | Performed by: NURSE PRACTITIONER

## 2023-03-06 RX ORDER — HYDROXYZINE HYDROCHLORIDE 25 MG/1
12.5 TABLET, FILM COATED ORAL 3 TIMES DAILY PRN
DISCHARGE
Start: 2023-03-06 | End: 2023-03-20

## 2023-03-06 RX ORDER — SALIVA STIMULANT COMB. NO.3
2 SPRAY, NON-AEROSOL (ML) MUCOUS MEMBRANE 4 TIMES DAILY
DISCHARGE
Start: 2023-03-06 | End: 2024-03-15

## 2023-03-06 RX ORDER — POTASSIUM CHLORIDE 1.5 G/1.58G
20 POWDER, FOR SOLUTION ORAL ONCE
Status: COMPLETED | OUTPATIENT
Start: 2023-03-06 | End: 2023-03-06

## 2023-03-06 RX ORDER — BENZONATATE 100 MG/1
100 CAPSULE ORAL 3 TIMES DAILY PRN
Status: DISCONTINUED | OUTPATIENT
Start: 2023-03-06 | End: 2023-03-06 | Stop reason: HOSPADM

## 2023-03-06 RX ORDER — BENZONATATE 100 MG/1
100 CAPSULE ORAL 3 TIMES DAILY PRN
DISCHARGE
Start: 2023-03-06 | End: 2024-03-15

## 2023-03-06 RX ORDER — MAGNESIUM OXIDE 400 MG/1
400 TABLET ORAL EVERY 4 HOURS
Status: COMPLETED | OUTPATIENT
Start: 2023-03-06 | End: 2023-03-06

## 2023-03-06 RX ADMIN — ACETAMINOPHEN 975 MG: 325 TABLET, FILM COATED ORAL at 08:21

## 2023-03-06 RX ADMIN — METHOCARBAMOL 750 MG: 750 TABLET, FILM COATED ORAL at 08:21

## 2023-03-06 RX ADMIN — FLUOXETINE 60 MG: 20 CAPSULE ORAL at 08:21

## 2023-03-06 RX ADMIN — Medication 2 SPRAY: at 12:44

## 2023-03-06 RX ADMIN — POTASSIUM CHLORIDE 20 MEQ: 1.5 POWDER, FOR SOLUTION ORAL at 10:26

## 2023-03-06 RX ADMIN — BENZONATATE 100 MG: 100 CAPSULE ORAL at 10:25

## 2023-03-06 RX ADMIN — Medication 12.5 MG: at 12:42

## 2023-03-06 RX ADMIN — Medication 2 SPRAY: at 08:19

## 2023-03-06 RX ADMIN — MAGNESIUM OXIDE TAB 400 MG (240 MG ELEMENTAL MG) 400 MG: 400 (240 MG) TAB at 12:42

## 2023-03-06 RX ADMIN — GABAPENTIN 300 MG: 300 CAPSULE ORAL at 12:42

## 2023-03-06 RX ADMIN — MAGNESIUM OXIDE TAB 400 MG (240 MG ELEMENTAL MG) 400 MG: 400 (240 MG) TAB at 10:26

## 2023-03-06 RX ADMIN — FLUTICASONE FUROATE AND VILANTEROL TRIFENATATE 1 PUFF: 100; 25 POWDER RESPIRATORY (INHALATION) at 08:24

## 2023-03-06 RX ADMIN — METHOCARBAMOL 750 MG: 750 TABLET, FILM COATED ORAL at 12:42

## 2023-03-06 RX ADMIN — GABAPENTIN 300 MG: 300 CAPSULE ORAL at 08:21

## 2023-03-06 RX ADMIN — Medication 1 LOZENGE: at 10:26

## 2023-03-06 RX ADMIN — LEVOTHYROXINE SODIUM 88 MCG: 88 TABLET ORAL at 06:00

## 2023-03-06 ASSESSMENT — ACTIVITIES OF DAILY LIVING (ADL)
ADLS_ACUITY_SCORE: 44
ADLS_ACUITY_SCORE: 45
ADLS_ACUITY_SCORE: 44

## 2023-03-06 NOTE — CONSULTS
Care Management Initial Consult    General Information  Assessment completed with: Patient,    Type of CM/SW Visit: Initial Assessment    Primary Care Provider verified and updated as needed: Yes   Readmission within the last 30 days: no previous admission in last 30 days      Reason for Consult: discharge planning  Advance Care Planning: Advance Care Planning Reviewed: no concerns identified          Communication Assessment  Patient's communication style: spoken language (English or Bilingual)    Hearing Difficulty or Deaf: no   Wear Glasses or Blind: yes    Cognitive  Cognitive/Neuro/Behavioral: .WDL except (forgetful at times)  Level of Consciousness: alert  Arousal Level: opens eyes spontaneously  Orientation: oriented x 4  Mood/Behavior: calm, cooperative  Best Language: 0 - No aphasia  Speech: clear, spontaneous, logical    Living Environment:   People in home: alone     Current living Arrangements: assisted living      Able to return to prior arrangements: yes       Family/Social Support:  Care provided by: self, child(balbir), other (see comments) (Facility staff)  Provides care for: no one  Marital Status:   Children, Other (specify) (Grandchildren)          Description of Support System: Supportive, Involved         Current Resources:   Patient receiving home care services:  No     Community Resources:  none  Equipment currently used at home: shower chair, walker, rolling, walker, standard (power scooter)  Supplies currently used at home:  none    Employment/Financial:  Employment Status: retired        Financial Concerns: No concerns identified           Lifestyle & Psychosocial Needs:  Social Determinants of Health     Tobacco Use: Low Risk      Smoking Tobacco Use: Never     Smokeless Tobacco Use: Never     Passive Exposure: Not on file   Alcohol Use: Not on file   Financial Resource Strain: Not on file   Food Insecurity: Not on file   Transportation Needs: Not on file   Physical Activity: Not on  "file   Stress: Not on file   Social Connections: Not on file   Intimate Partner Violence: Not on file   Depression: At risk     PHQ-2 Score: 3   Housing Stability: Not on file       Functional Status:  Prior to admission patient needed assistance: Patient reported she was primarily independent              Mental Health Status: Patient reported she experiences anxiety           Chemical Dependency Status: No current concerns or use reported                 Values/Beliefs:  Spiritual, Cultural Beliefs, Bahai Practices, Values that affect care:  (Not dicussed at this time)               Additional Information:  Janet \"Jan\" ANGI Merida is an 87 year old female with past medical history significant for grade 1 diastolic dysfunction, hypertension, JEAN, hypothyroidism, anxiety depression, and osteoarthritis transferred from [facility] d/t ground level fall resulting in periprosthetic fracture of her femur. Admitted to medicine for pre-op clearance, underwent ORIF of R femur on 2/26/23, and transferred to [place] 3/3 in case needs colorectal or GI intervention.    SW met with the patient at bedside to complete a CMA and update the patient that the provider feels she is medically ready to discharge today and [facility] is able to accept the patient today.     The patient reported prior to her current hospitalization she was living at [facility] where she was primarily independent, she reported she has lived there for roughly 3 years. The patient uses a walker and scooter. The patient reported she has 3 daughters, 1 son, and 9 grandchildren. All of which live in the area and are involved in her care. The patient reported she experiences anxiety, she stated she felt her anxiety was under control but since coming to the hospital she stated she feels she has been experiencing more anxiety than usual.     SW reported to the patient that the provider feels she is medically ready to discharge and that " "[facility] is able to accept her today and wheelchair transportation has been scheduled for 1300. The patient asked if there was another facility that could take her or if she could remain in the hospital while we wait for a different accepting facility. SW explained that we have to continue to pursue a safe discharge plan and that discharging to [facility] is a safe discharge plan. Patient  Stated that is the last place she wanted to discharge to and feels she \"is being kicked out\" and \"understands its all political\". SW reassured the patient she would not be discharged if there was not a safe discharge plan in place or if the provider felt she was not medically ready. Patient stated she is \"too weak to discharge\". Patient stated she feels \"this is another setback\". SW informed the patient that the purpose of a TCU is to help the patient get stronger prior to her going home. Patient asked if referrals could be sent elsewhere. GILDA informed the patient that it usually takes facilities a few days to review referrals and the patient is medically ready too discharge today so we need to continue to pursue the safe discharge plan. The patient stated she does not like the sound of the facility. SW stated we only sent a referral to [facility] because it was a referral option the patient and her family gave to a previous SW to have a referral sent to. GILDA provided the patient with an IMM form explaining that she has the right to appeal her discharge. Patient asked what are the odds the appeal works in her favor, SW reported they cannot say due to not working for the insurance comp [employer] and the insurance company reviews the appeals. Patient asked for SWs personal opinion if she should appeal her discharge. GILDA informed the patient that GILDA cannot provide personal opinion on the matter, however GILDA can provide the patient with the necessary information to appeal her discharge if that is something is " "is interested in. Patient reported this conversation was giving her anxiety, patient attempted to call her son Jono with SW in the room. Patient's son Jono did not answer. Patient requested SW try and call the patient' son and daughter's once SW gets back to their office.     SW called the patient's son Jono [phone]. GILDA updated Jono on that [facility]. Jono asked if there was another facility that would be able to accept the patient. GILDA Stated that [facility] is unable to accept today. Jono stated he feels \"its one setback after another\". SW reported that wheelchair transportation is scheduled for 1300. Jono asked if there was an ultrasound that needed to be done. GILDA stated they were unaware of an ultrasound needing to be done and that the provider did not mention an ultrasound still needing to be done, however SW will follow up with the provider on that.    GILDA called the patient's daughter Alea [phone]. GILDA reported to Alea that [facility] is able to accept the patient today. Alea stated \"it feels like one setback after another\". GILDA explained that the other two accepting facilities are unable to accept today and since the patient is medically ready to discharge. GILDA explained that the patient was provided an IMM form letting the patient know she has the right to appeal the discharge and provides a phone number for the appeal if they are interested in pursing that. Alea stated that [facility] was \"the very last option they wanted\". GILDA explained that they can discuss with [facility] about transferring facilities if they do not like the facility and would prefer to be at a different facility.     GILDA attempted to contact the patient's daughter Kanchan [phone]. Kanchan did not answer. GILDA left a voicemail and provided call back number.     GILDA completed PAS: [id number]    TAMMIE Sheth  [address]   Office: [phone]  Pager: " [phone]  [email or web address]

## 2023-03-06 NOTE — PROGRESS NOTES
Care Management Discharge Note    Discharge Date: 03/06/2023       Discharge Disposition: Transitional Care    Discharge Services: None    Discharge DME: None    Discharge Transportation: agency (Wheelchair transportation)    Private pay costs discussed: not applicable  PAS Confirmation Code: [id number]   Patient/family educated on Medicare website which has current facility and service quality ratings:  yes    Education Provided on the Discharge Plan:  yes  Persons Notified of Discharge Plans: Patient, provider, nursing staff, patient's children, accepting TCU facility,   Patient/Family in Agreement with the Plan:  Yes - Patient and her children were reluctant to discharge to accepting facility     Handoff Referral Completed: Yes    Additional Information:  Patient to discharge to [facility]. Wheelchair transportation scheduled with [facility] for 3/6/23 @ 1300.  notified [facility] on the transportation time and faxed over discharge orders.      TAMMIE Sheth  [address]   Office: [phone]  Pager: [phone]  [email or web address]

## 2023-03-06 NOTE — PROGRESS NOTES
Care Management Follow Up    Length of Stay (days): 8    Expected Discharge Date: 03/06/2023     Concerns to be Addressed:   All concerns addressed in this encounter     Patient plan of care discussed at interdisciplinary rounds: Yes    Anticipated Discharge Disposition: TCU     Anticipated Discharge Services:    Anticipated Discharge DME:  none    Patient/family educated on Medicare website which has current facility and service quality ratings: yes    Education Provided on the Discharge Plan:  yes  Patient/Family in Agreement with the Plan:  Yes. Patient and family reluctant to go to accepting facility     Referrals Placed by CM/SW:      ACCEPT  [facility]  [address] [address]  Admissions: [phone]  Fax: [phone]  3/6: GILDA called [facility] in admissions asking if they would be able to accept the patient today. Montserrat reported the facility has no private rooms available today, however they would have an opening on Wednesday 3/8. GILDA reported the patient is medically ready to discharge today[place] [address]  PH: [phone]  Admissions: [phone]  Fax: [phone]  3/2: [facility] in  Admissions stated that they would have a female bed available tomorrow. GILDA provided update on pt's status. [facility] agreeable to reviewing referral and f/u with GILDA.  1530: GILDA spoke to Ana María in Admissions, who confirmed that pt can admit to TCU tomorrow (Friday 3/3/23), agreeable to a 3:00 p.m. discharge; also stated TCU would be able to accept up until 8:00 p.m. tomorrow evening; provided fax # for orders to be sent to (documented above).  1545: GILDA called back Ana María to confirm OOP private room costs, per pt's request. Ana María stated a private room would be available for pt on Saturday at $40/day; otherwise, shared room is what's available on Friday.  3/6: GILDA called to ask if they have any availability to accept the patient today.  Admissions reported they do not have any open beds today and may have some later in the week.      [facility]  [address] [place] [address]  [phone]  PH: [phone]  Admissions: [phone] ([facility])  3/2: [facility] called SW, stated that they would be able to accept pt if Covid-negative. [facility] is working from home, will have a better idea this afternoon of when bed is available. SW to update [facility] of pt's Covid status and TCU preference.  3/6: SW called [facility] to ask if they would be able to accept the patient today. [facility] reported they have a private room available with a shared bathroom. [facility] requested the patient arrives at the facility before 1730 and have discharge orders sent discharge orders 2 hours prior to the patient arriving.         [address] [address]  PH: [phone]  Private pay costs discussed: transportation costs - Patient's daughter reported they are okay with out of pocket costs associated with wheelchair transportation     Additional Information:  @0917 GILDA contacted the provider via [employer] to ask if the patient will be medically ready to discharge today.     @1003 the provider messaged GILDA via [employer] and reported the patient is medically ready to discharge today. SW reported to provider that [facility] is able to accept the patient today.    [employer] transportation to [facility] was arranged for 1300.       TAMMIE Sheth  [address]   Office: [phone]  Pager: [phone]  [email or web address]

## 2023-03-06 NOTE — PLAN OF CARE
Occupational Therapy Discharge Summary    Reason for therapy discharge:    Discharged to transitional care facility.    Progress towards therapy goal(s). See goals on Care Plan in [facility] electronic health record for goal details.  Goals partially met.  Barriers to achieving goals:   limited tolerance for therapy.    Therapy recommendation(s):    Continued therapy is recommended.  Rationale/Recommendations:  Progress strength and IND with ADLs .

## 2023-03-06 NOTE — PLAN OF CARE
Goal Outcome Evaluation:      Plan of Care Reviewed With: patient    Overall Patient Progress: improvingOverall Patient Progress: improving    Outcome Evaluation: A&Ox4, intermittently forgetful and anxious. VSS on 1-3L during shift, weaning. A2. Advanced to regular diet. BM x2, watery. PIV SL. Surgical dresssings CDI. Blanchable redness on buttock, mepilex applied. Bruising on R wrist noted and outlined. Pt reported nausea, PRN zofran given x1 with relief. Denied pain on shift, but reports abdominal discomfort relieved w/ BM and flatus. Continue w/ plan of care.

## 2023-03-06 NOTE — PROGRESS NOTES
Orthopedic Surgery Progress Note   3/2/2023    Subjective: Abdominal pain continues to improve. Tolerating clear liquid diet. Right leg pain controlled at rest, worse with movement.  Voiding spontaneously. +Flatus, - BM. Denies fever or chills, CP, SOB, numbness or tingling, motor dysfunction or weakness.     Exam:  /44 (BP Location: Right arm)   Pulse 78   Temp 98.3  F (36.8  C) (Oral)   Resp 18   Wt 118.8 kg (262 lb)   LMP  (LMP Unknown)   SpO2 98%   BMI 46.41 kg/m    Gen: Awake, alert, NAD  Resp: breathing equal and non-labored  Extremities:    RLE: Toes wwp, DP 2+, bcr in all toes. Compartments soft and tolerates passive stretch of toes. +EHL/FHL/GSC/TA with 5 /5 strength. SILT SP/DP/Sa/Coelho/T. Dressing c/d/i. Mild erythema to the lateral proximal calf. Tenderness to palpation throughout the posterior lower leg, moderate edema. Ecchymosis over lateral leg from thigh to ankle.      Labs:    Recent Labs   Lab 03/06/23  0759 03/05/23  0610 03/04/23  0431 03/03/23  1029   WBC 7.6 7.6 6.7 7.5   HGB 8.2* 8.1* 8.1* 8.2*    389 344 309     Recent Labs   Lab 03/06/23  0759 03/05/23  1545 03/05/23  0610 03/04/23  1930 03/04/23  1156 03/04/23  0431 03/03/23  1655 03/03/23  1029 03/02/23  1023     --  138  --   --  139  --  136  --    POTASSIUM 3.4 3.6 3.5 3.8   < > 3.1*  3.1*  3.2*   < > 3.3* 3.5   CHLORIDE 101  --  101  --   --  101  --  99  --    CO2 27  --  25  --   --  25  --  28  --    BUN 9.5  --  8.2  --   --  9.9  --  9.5  --    CR 0.55  --  0.51  --   --  0.55  --  0.50*  --    *  --  103*  --   --  103*  --  117*  --    MAG 1.9  --  2.1  --   --  2.2  --   --  2.3   PHOS 3.6  --  3.3  --   --  3.1  --   --   --     < > = values in this interval not displayed.     No lab results found in last 7 days.  No lab results found in last 7 days.    Invalid input(s): ESR    Assessment:     Assessment: Carol Merida is a 87 year old female s/p ORIF R femur on 2/26/2023 with Dr. Lemon.      Plan for 3/6/23  - right posterior lower leg tenderness and swelling. Venous doppler ordered and pending.      Plan:  Medicine Primary team given age and medical comorbidity; similar rationale to hip fracture patients  Activity: Up with walker and assist until independent.    Weight bearing status: May weight bear on RLE for transfers;   Pain management: Oral pain medications with IV for breakthrough. Wean IV as able.    Antibiotics: Ancef x 24 hours  Diet: Begin with clear fluids and progress diet as tolerated.   DVT prophylaxis: Mechanical prophylaxis with SCD  Imaging: Standing radiographs when able. XR left knee complete    Labs: Monitor Hgb and transfuse prn  Bracing/Splinting: ACE wrap to RLE may be replaced or reinforced as needed  Dressings: Keep clean, dry and intact   Drains: n/a  Physical Therapy/Occupational Therapy: Eval and treat.  Consults: nutrition, SW  Follow-up: Clinic with PA in 2 weeks for wound check and staple removal  Disposition: Admitted for physical therapy and pain control; discharge to home versus TCU today pending medical stability     Patient discussed with .     TAHIR ARREDONDO PA-C  3/6/2023 12:29 PM  Orthopaedic Surgery     Thank you for allowing me to participate in this patient's care. Please page me directly any questions/concerns.   Securely message with the Voc[employer] Web Console (learn more here)  Text page via [employer] Paging/[email or web address]    If there is no response, if it is a weekend, or if it is during evening hours, please page the orthopaedic surgery resident on call via [employer] Paging/Directory

## 2023-03-06 NOTE — PLAN OF CARE
Physical Therapy Discharge Summary    Reason for therapy discharge:    Discharged to transitional care facility.    Progress towards therapy goal(s). See goals on Care Plan in [facility] electronic health record for goal details.  Goals partially met.  Barriers to achieving goals:   limited tolerance for therapy and discharge from facility.    Therapy recommendation(s):    Continued therapy is recommended.  Rationale/Recommendations:  to improve functional strength and activity tolerance.

## 2023-03-06 NOTE — DISCHARGE SUMMARY
"[facility]  Hospitalist Discharge Summary      Date of Admission:  2/26/2023  Date of Discharge:  3/6/2023  1:00 PM  Discharging Provider: Bobby Boyle MD  Discharge Service: Hospitalist Service, GOLD TEAM 11    Discharge Diagnoses   ORIF R femur for periprosthetic fracture following mechanical fall  Ileus  Hypertension  Acute anemia    Follow-ups Needed After Discharge   Follow-up Appointments     Adult [facility] Follow-up and recommended labs and tests      Follow up with Dr Lemon as scheduled.      Appointments at [facility] at [address] [place]   [address] ([facility] [[email or web address] AND SURGERY CENTER)     Call [phone] if you haven't heard regarding these appointments   within 7 days of discharge.         Follow Up and recommended labs and tests      Follow up with [facility] physician.  The following labs/tests are   recommended: CBC  follow-up with PCP in 1 month with CBC, ferritin, iron panel.  Follow up with specialist, orthopedics, in 1 week. Following imaging is   recommended: Standing Xray R thigh.         BP was low so we held amlodipine.  After she was discharged, ortho reported that she had some swelling in her RLE and they would recommend venous doppler ultrasound of that leg.      Unresulted Labs Ordered in the Past 30 Days of this Admission     No orders found from 1/27/2023 to 2/27/2023.          Discharge Disposition   Discharged to short-term care facility  Condition at discharge: Good      Hospital Course   Carol \"Jan\" ANGI Merida is an 87 year old female with past medical history significant for grade 1 diastolic dysfunction, hypertension, JEAN, hypothyroidism, anxiety depression, and osteoarthritis transferred from [facility] d/t ground level fall resulting in periprosthetic fracture of her femur. Admitted to medicine for pre-op clearance, underwent ORIF of R femur on 2/26/23, and subsequently transferred to [facility] 3/3 with " ileus in case needed colorectal or GI intervention.     Mechanical ground level fall S/p ORIF of R femur 2/26/23 in setting of known Osteoarthritis    Reports adequate pain control post op.  Had some confusion and disorientation post op likely 2/2 anesthesia medications, but back to baseline now. She will need follow-up in clinic with surgical PA 2 weeks post-op for wound check and staple removal  - Pain control: continue scheduled APAP 975mg Q8H, Gabapentin 300/300/600mg, minimize opioid use  - Ace wrap to RLE may be replaced/reinforced PRN  - per Ortho may weight bear on right leg for transfers    Colonic dilatation and abdominal pain with Concern for duodenitis, most likely ileus  Abdominal pain starting 3/1 but having BMs and passing flatus.  AXR 3/2 with non-obstructive bowel gas pattern but with air distended loop of large bowel c/f developing ileus.  CTAP obtained evening 3/2 with dilated sigmoid colon without e/o obstruction, volvulus, or ileus, but did show sigmoid colon dilation up to 7.8cm as well as mild stranding along 2nd portion of duodenum with mild wall thickening and trace fluid c/f duodenitis.  Found to have cholelithiasis as well.  NG placed for decompression.  LFTs from 3/2 , ALT 41 (58), AST 47 (61), Tbili 0.7. LA 0.8.  Discussed with CRS fellow while on [facility] who did not feel she needed further evaluation by their team and recommended gastrograffin enema.  Note from Gen Surg Dr. Singh recommending CRS and GI consultation, and that patient would be best served on [place]. She general surgery consult done on [place]--no need for acute surgical intervention. She had an NG for a couple of days to help with symptoms; removed without difficulty.  Abdominal US: no acute cholecystitis. She advanced her diet without difficulty and had BM prior to discharge    Elevated LFT: resolved without intervenion    Throat discomfort - Pt reports feeling unwell and having a scratchy throat on 3/2.   "No fevers or cough.  Has some dyspnea at baseline.  Procal 0.06, no leukocytosis.  COVID and flu negative. Continued after NG was placed. Treated with Biotene spray, tessalon, Cepacol lozenges PRN     Hx HTN - On Amlodipine PTA.  BP well controlled now off of Amlodipine for now. This will need to be monitored at TCU and in follow-up     Grade 1 diastolic dysfunction and Chronic dyspnea on exertion   Per review of admission H&P, pt underwent stress test in 2012 and fall 2022 both with same area of infarction with stress, and cardiac cath in 2012 with \"no evidence of significant coronary narrowing that would benefit from mechanical revascularization.\"  Had TTE this admission as part of preop evaluation with hyperkinetic LV function and EF > 70%, normal LV function, normal RV function, chamber size, wall motion, and thickness, normal atria, normal PASP, and no e/o effusion.  Unclear if dyspnea is of cardiac or pulmonary etiology.  Per chart review, had normal PFTs in 2018.  Briefly needed supplemental O2 here, but appears to be at baseline, no c/o chest pain.     - Continue Breo-Ellipta (auto sub for PTA Wixela)   - PRN albuterol     Acute anemia - Hgb 9.0 post op, BL closer to 12-13.  Has gradually drifted down, stable ~ 8 for many days prior to discharge.  EBL ~ 500 mL during procedure.  Possible 2/2 surgical blood loss vs dilutional vs other.  Noted to have wide hematoma on operative leg on exam 3/2 with extension to calf on 3/3.        Hypokalemia: potassium replacement protocol    Hyperlipidemia - Continue PTA statin   Hypothyroidism - Continue PTA Levothyroxine 88mcg.   JEAN - Continue home CPAP at HS.  Anxiety, depression - Mood stable.    - Continue PTA Buspar 10mg BID and Prozac 60mg daily  Chronic lower extremity swelling - Uses Lasix at home PRN.  She did not require it here.         Consultations This Hospital Stay   ORTHOPAEDIC SURGERY ADULT/PEDS IP CONSULT  ANESTHESIOLOGY IP CONSULT  PHYSICAL THERAPY ADULT " IP CONSULT  OCCUPATIONAL THERAPY ADULT IP CONSULT  COLORECTAL SURGERY ADULT IP CONSULT  NURSING TO CONSULT FOR VASCULAR ACCESS CARE IP CONSULT  PHYSICAL THERAPY ADULT IP CONSULT  OCCUPATIONAL THERAPY ADULT IP CONSULT  CARE MANAGEMENT / SOCIAL WORK IP CONSULT    Code Status   Full Code    Time Spent on this Encounter   IBobby MD, personally saw the patient today and spent greater than 30 minutes discharging this patient.       Bobby Boyle MD  [facility] [address] [address]  Phone: [phone]  ______________________________________________________________________    Physical Exam   Vital Signs: Temp: 98.3  F (36.8  C) Temp src: Oral BP: 105/44 Pulse: 78   Resp: 18 SpO2: 98 % O2 Device: Nasal cannula Oxygen Delivery: 1 LPM  Weight: 262 lbs 0 oz  General appearance: in no apparent distress.  Neck: supple  CV: regular rate and rhythm and normal S1 S2  Resp: clear to ausculation bilaterally, normal respiratory effort  Abd: + BS, soft, NT/ND no masses   Extr: WWP, SCDs in place. Ecchymosis on lateral right leg  Purewick in place  Skin: warm and dry         Primary Care Physician   Beatriz George    Discharge Orders      Primary Care - Care Coordination Referral      Reason for your hospital stay    Carol Merida is a 87 year old female s/p ORIF R femur on 2/26/2023 with Dr. Lemon.     Activity    Your activity upon discharge: activity as tolerated     When to contact your care team    Call Dr. Lemon  if you have any of the following: temperature greater than 101.3  or less than 96.5,  increased shortness of breath, increased drainage, increased swelling, or increased pain.    Contact phone number is      Wound care and dressings    Instructions to care for your wound at home: ice to area for comfort, keep wound clean and dry, may get incision wet in shower but do not soak or scrub, reinforce dressing as needed, and remove dressing in 7  days.     Adult [facility] Follow-up and recommended labs and tests    Follow up with Dr Lemon as scheduled.      Appointments at [facility] at [address] [place] [address] ([facility] AND SURGERY CENTER)     Call [phone] if you haven't heard regarding these appointments within 7 days of discharge.     General info for SNF    Length of Stay Estimate: Short Term Care: Estimated # of Days <30  Condition at Discharge: Improving  Level of care:skilled   Rehabilitation Potential: Good  Admission H&P remains valid and up-to-date: Yes  Recent Chemotherapy: N/A  Use Nursing Home Standing Orders: Yes     Mantoux instructions    Give two-step Mantoux (PPD) Per Facility Policy Yes     Activity - Up with assistive device    Activity: Up with walker and assist until independent.    Weight bearing status: May weight bear on RLE for transfers;     Follow Up and recommended labs and tests    Follow up with snf physician.  The following labs/tests are recommended: CBC  follow-up with PCP in 1 month with CBC, ferritin, iron panel.  Follow up with specialist, orthopedics, in 1 week. Following imaging is recommended: Standing Xray R thigh.     Reason for your hospital stay    You were admitted with a femur fracture and orthopedic surgery did an operation to fix it.     Wound care (specify)    Site:   right leg   Instructions:  ACE wrap to RLE may be replaced or reinforced as needed     Full Code     Physical Therapy Adult Consult    Evaluate and treat as clinically indicated.    Reason:  recent ORIF right femur     Occupational Therapy Adult Consult    Evaluate and treat as clinically indicated.    Reason:  debilitated after recent operation/hospitalization     Diet    Follow this diet upon discharge: Orders Placed This Encounter      Regular Diet Adult     Diet    Follow this diet upon discharge: Orders Placed This Encounter      Diet      Regular Diet Adult       Significant Results and  Procedures   Most Recent 3 CBC's:Recent Labs   Lab Test 03/06/23  0759 03/05/23  0610 03/04/23  0431   WBC 7.6 7.6 6.7   HGB 8.2* 8.1* 8.1*   MCV 97 95 95    389 344     Most Recent 3 BMP's:Recent Labs   Lab Test 03/06/23  0759 03/05/23  1545 03/05/23  0610 03/04/23  1156 03/04/23  0431     --  138  --  139   POTASSIUM 3.4 3.6 3.5   < > 3.1*  3.1*  3.2*   CHLORIDE 101  --  101  --  101   CO2 27  --  25  --  25   BUN 9.5  --  8.2  --  9.9   CR 0.55  --  0.51  --  0.55   ANIONGAP 11  --  12  --  13   LAKSHMI 8.6*  --  8.6*  --  8.7*   *  --  103*  --  103*    < > = values in this interval not displayed.     Most Recent 2 LFT's:Recent Labs   Lab Test 03/06/23  0759 03/05/23  0610   AST 26 32   ALT 20 25   ALKPHOS 104 103   BILITOTAL 0.7 0.7       Discharge Medications   Discharge Medication List as of 3/6/2023  1:07 PM      START taking these medications    Details   artificial saliva (BIOTENE MT) SOLN solution Swish and spit 2 mLs (2 sprays) in mouth 4 times daily, Transitional      benzocaine-menthol (CHLORASEPTIC MAX) 15-10 MG lozenge Place 1 lozenge inside cheek every hour as needed, Transitional      benzonatate (TESSALON) 100 MG capsule Take 1 capsule (100 mg) by mouth 3 times daily as needed for cough, Transitional      gabapentin (NEURONTIN) 600 MG tablet Take 1 tablet (600 mg) by mouth At Bedtime, Transitional      hydrOXYzine (ATARAX) 25 MG tablet Take 0.5 tablets (12.5 mg) by mouth 3 times daily as needed for anxiety, Transitional      methocarbamol (ROBAXIN) 750 MG tablet Take 1 tablet (750 mg) by mouth 4 times daily, Transitional      senna-docusate (SENOKOT-S/PERICOLACE) 8.6-50 MG tablet Take 1 tablet by mouth 2 times daily, Transitional      traMADol (ULTRAM) 50 MG tablet Take 0.5-1 tablets (25-50 mg) by mouth every 6 hours as needed for moderate pain (4-6) or severe pain (7-10), Disp-26 tablet, R-0, Local Print         CONTINUE these medications which have CHANGED    Details    acetaminophen (TYLENOL) 650 MG CR tablet Take 1 tablet (650 mg) by mouth every 8 hours as needed for mild pain or fever, Disp-270 tablet, R-1, Transitional      gabapentin (NEURONTIN) 300 MG capsule Take 1 capsule (300 mg) by mouth 2 times daily, Transitional      polyethylene glycol (MIRALAX) 17 g packet Take 17 g by mouth daily, Transitional         CONTINUE these medications which have NOT CHANGED    Details   !! ACE/ARB/ARNI NOT PRESCRIBED (INTENTIONAL) Reason ACE/ARB was Not Prescribed: Other (see comments) / preserved EF; will leave up to Cardiology. cough from ACEi; did not like losartan in 2014.      albuterol (PROAIR HFA/PROVENTIL HFA/VENTOLIN HFA) 108 (90 Base) MCG/ACT inhaler INHALE 2 PUFFS INTO THE LUNGS EVERY 4 HOURS AS NEEDED FOR SHORTNESS OF BREATH OR DIFFICULT BREATHING OR WHEEZING, Disp-8.5 g, R-0, E-PrescribePharmacy may dispense brand covered by insurance (Proair, or proventil or ventolin or generic albuterol inhaler )      !! BETA BLOCKER NOT PRESCRIBED (INTENTIONAL) Beta Blocker not prescribed intentionally due to EF > 40 % (ejection fraction) will leave up to Cardiology., No Print Out      busPIRone (BUSPAR) 10 MG tablet Take 1 tablet (10 mg) by mouth 2 times daily, Disp-180 tablet, R-3, E-Prescribe      calcium carbonate 600 mg-vitamin D 400 units (CALTRATE) 600-400 MG-UNIT per tablet Take 1 tablet by mouth every evening , Disp-100 tablet, R-3, Historical      celecoxib (CELEBREX) 100 MG capsule Take 100 mg by mouth 2 times daily as needed for moderate pain (4-6), Historical      diclofenac (VOLTAREN) 1 % topical gel Apply 4 g topically 4 times daily as needed for moderate pain, Disp-150 g, R-3, E-Prescribe      FLUoxetine (PROZAC) 20 MG capsule Take 3 capsules (60 mg) by mouth daily, Disp-240 capsule, R-3, E-Prescribe      fluticasone (FLONASE) 50 MCG/ACT nasal spray Spray 1 spray into both nostrils daily as needed for rhinitis, Disp-16 g, R-3, E-Prescribe      fluticasone-salmeterol  (WIXELA INHUB) 100-50 MCG/ACT inhaler Inhale 1 puff into the lungs 2 times daily, Disp-60 each, R-5, E-Prescribe      furosemide (LASIX) 20 MG tablet Take 20 mg by mouth daily as needed (feet/ankle swelling), Historical      levothyroxine (SYNTHROID/LEVOTHROID) 88 MCG tablet Take 1 tablet (88 mcg) by mouth daily, Disp-90 tablet, R-2, E-Prescribe      nystatin (NYSTOP) 876108 UNIT/GM external powder Apply tid to affectead area prnDisp-60 g, [id number]      olopatadine (PATANOL) 0.1 % ophthalmic solution Place 1 drop into both eyes 2 times daily as needed for allergies, Historical       !! - Potential duplicate medications found. Please discuss with provider.      STOP taking these medications       ALPRAZolam (XANAX) 0.25 MG tablet Comments:   Reason for Stopping:         amLODIPine (NORVASC) 5 MG tablet Comments:   Reason for Stopping:         simvastatin (ZOCOR) 10 MG tablet Comments:   Reason for Stopping:             Allergies   Allergies   Allergen Reactions     Ace Inhibitors Cough

## 2023-03-06 NOTE — PLAN OF CARE
/44 (BP Location: Right arm)   Pulse 78   Temp 98.3  F (36.8  C) (Oral)   Resp 18   Wt 118.8 kg (262 lb)   LMP  (LMP Unknown)   SpO2 98%   BMI 46.41 kg/m      Assumed care 0700 to discharge  Pt rounded on hourly  Alan: alert and oriented forgetful at times. 1 PRN dose of atarax given for anxiety  Pain: denies  GI/: Denies nausea. Bowel sounds present. Good urine output rico urine via purewick. Incontinent of bowels. Pt had 1 loose watery clear BM  Cardiac: WDL  Respiratory: pt has SOB on exertion. lung sounds diminished lower lobes. Cough and scratchy throat productive cough. PRN Medication given  Skin: surgical incisions dressings clean dry and intact to right knee and hip. Bruising to right thigh, hip, knee, and calf.   Lines: PIV removed  Assist level: 2 assist Turned and repositioned in bed.   Will continue to monitor and follow plan of care.   Plan:  discharge to TCU    Discharged to: [facility]  Transportation: EMT by wheelchair  Time: 1300  Prescriptions: will get at TCU  Belongings: sent with pt  PIV/Access: reomved  Care Plan and Education discontinued: yes  Paperwork: reviewed and sent with EMT      Goal Outcome Evaluation:     Plan of Care Reviewed With: pt     Overall Patient Progress: progressing      Outcome Evaluation:  discharged

## 2023-03-07 ENCOUNTER — TELEPHONE (OUTPATIENT)
Dept: ORTHOPEDICS | Facility: CLINIC | Age: 88
End: 2023-03-07
Payer: COMMERCIAL

## 2023-03-07 NOTE — TELEPHONE ENCOUNTER
I called Ruby to offer the patient an appointment with Vicki Kimball PA-C for a post op visit at 10:40am on Tuesday March 14. They accepted the appointment.    Ez Echavarria, EMT

## 2023-03-07 NOTE — TELEPHONE ENCOUNTER
[facility] Call Center    Phone Message    May a detailed message be left on voicemail: yes     Reason for Call: Other: Ruby from [facility] [phone] called concerning scheduling an appointment postop for Carol with Dr. Lemon. He does not have any openings and we need to know who we can schedule with. Please call Ruby at [phone] to schedule. Thank you      Action Taken: Other: [facility]    Travel Screening: Not Applicable

## 2023-03-08 ENCOUNTER — PATIENT OUTREACH (OUTPATIENT)
Dept: CARE COORDINATION | Facility: CLINIC | Age: 88
End: 2023-03-08
Payer: COMMERCIAL

## 2023-03-08 NOTE — LETTER
[facility]   To:   [facility]          Please give to facility    From:   Cristobal Tafoya  [facility]  Care Coordinator   [facility]   P: [phone]  [email or web address]   Patient Name:  Carol Merida YOB: 1935   Admit date: 3/6/2023      *Information Needed:  Please contact me when the patient will discharge (or if they will move to long term care)- include the discharge date, disposition, and main diagnosis   - If the patient is discharged with home care services, please provide the name of the agency    Also- Please inform me if a care conference is being held.   Phone, Fax or Email with information                              Thank you

## 2023-03-08 NOTE — PROGRESS NOTES
Clinic Care Coordination Contact  Care Coordination Transition Communication    Referral Source: IP Handoff    Clinical Data: Patient was hospitalized at [facility] from 2/26/23 to 3/6/2023 with diagnosis of:     ORIF R femur for periprosthetic fracture following mechanical fall  Ileus  Hypertension  Acute anemia   .     Transition to Facility:              Facility Name: [facility]              Contact name and phone number/fax: [phone]    Plan: RN/SW Care Coordinator will await notification from facility staff informing RN/SW Care Coordinator of patient's discharge plans/needs. RN/SW Care Coordinator will review chart and outreach to facility staff every 4 weeks and as needed.     TAMMIE Moulton  Clinic Care Coordinator  [facility]  [phone]  [email or web address]

## 2023-03-09 ENCOUNTER — TRANSITIONAL CARE UNIT VISIT (OUTPATIENT)
Dept: GERIATRICS | Facility: CLINIC | Age: 88
End: 2023-03-09
Payer: COMMERCIAL

## 2023-03-09 VITALS
DIASTOLIC BLOOD PRESSURE: 52 MMHG | HEIGHT: 65 IN | WEIGHT: 262 LBS | RESPIRATION RATE: 18 BRPM | OXYGEN SATURATION: 92 % | HEART RATE: 84 BPM | TEMPERATURE: 97.2 F | SYSTOLIC BLOOD PRESSURE: 104 MMHG | BODY MASS INDEX: 43.65 KG/M2

## 2023-03-09 DIAGNOSIS — K56.0 PARALYTIC ILEUS (H): ICD-10-CM

## 2023-03-09 DIAGNOSIS — M51.369 DDD (DEGENERATIVE DISC DISEASE), LUMBAR: ICD-10-CM

## 2023-03-09 DIAGNOSIS — R52 PAIN: ICD-10-CM

## 2023-03-09 DIAGNOSIS — I10 HTN (HYPERTENSION): ICD-10-CM

## 2023-03-09 DIAGNOSIS — D64.9 ANEMIA: Primary | ICD-10-CM

## 2023-03-09 PROCEDURE — 99207 PR NO CHARGE LOS: CPT | Performed by: NURSE PRACTITIONER

## 2023-03-09 NOTE — LETTER
3/9/2023        RE: Carol Merida  [address]  [place] [address]            Patient: Carol Merida   1935    Admitted to TCU for acute rehab and medical management following hospitalization for mechanical fall and right periprosthetic femur fracture. S/P ORIF of right femur on . Transferred to [facility] / postoperative ileus which ultimately improved with NGT decompression. Diet advanced and patient with BM prior to discharge. Postop acute blood loss anemia noted as well as development hematoma on operative leg. HGB did remain stable in 8s.     PMH notable for diastolic dysfunction- grade I, HTN, HLD, JEAN, hypothyroidism, anxiety/depression and osteoarthritis.    Writer was not able to see patient today and so this note is for the purpose of order clarification and medication review.    Facility care conference held   Per nursing patient is requesting prn acetaminophen be scheduled instead. No further questions or concerns arose for provider.     - order for acetaminophen changed from prn to scheduled q 8 hours.   - orders for recheck of BMP and CBC 3/13 to ensure stability  - medications reconciled.   - orders reviewed and clarified where needed - discontinue prn lasix or hold while in TCU    BEATRICE Mike CNP                   Sincerely,        BEATRICE Mike CNP

## 2023-03-09 NOTE — PATIENT INSTRUCTIONS
Begin doxycycline  Increase lasix 20 mg to twice daily (AM and 4 pm)   Elevate legs  Friday at 10:45 am follow up appointment    From: Jeremy Zafar  To: Clarisse Alaniz  Sent: 3/9/2023 1:19 PM CST  Subject: Referral & Auth for a specialist     Hi Doc,  I need to see Dr. Juan Su for my follow up. He was the specialist that did my hip replacement. He is not in Network with my insurance. So I need a referral and authorazation all at once to see him for a follow up that I am due. My hip surgery was done October of 2022.    Thanks in advance,  Jeremy Zafar

## 2023-03-09 NOTE — PROGRESS NOTES
Patient: Carol eMrida   1935    Admitted to TCU for acute rehab and medical management following hospitalization for mechanical fall and right periprosthetic femur fracture. S/P ORIF of right femur on . Transferred to [facility]  postoperative ileus which ultimately improved with NGT decompression. Diet advanced and patient with BM prior to discharge. Postop acute blood loss anemia noted as well as development hematoma on operative leg. HGB did remain stable in 8s.     PMH notable for diastolic dysfunction- grade I, HTN, HLD, JEAN, hypothyroidism, anxiety/depression and osteoarthritis.    Writer was not able to see patient today and so this note is for the purpose of order clarification and medication review.    Facility care conference held   Per nursing patient is requesting prn acetaminophen be scheduled instead. No further questions or concerns arose for provider.     - order for acetaminophen changed from prn to scheduled q 8 hours.   - orders for recheck of BMP and CBC 3/13 to ensure stability  - medications reconciled.   - orders reviewed and clarified where needed - discontinue prn lasix or hold while in TCU    BEATRICE Mike CNP

## 2023-03-10 ENCOUNTER — LAB REQUISITION (OUTPATIENT)
Dept: LAB | Facility: CLINIC | Age: 88
End: 2023-03-10
Payer: COMMERCIAL

## 2023-03-10 DIAGNOSIS — D64.9 ANEMIA, UNSPECIFIED: ICD-10-CM

## 2023-03-10 DIAGNOSIS — M97.8XXA PERIPROSTHETIC FRACTURE AROUND OTHER INTERNAL PROSTHETIC JOINT, INITIAL ENCOUNTER: ICD-10-CM

## 2023-03-10 RX ORDER — SENNOSIDES 8.6 MG
650 CAPSULE ORAL EVERY 8 HOURS
Qty: 270 TABLET | Refills: 1
Start: 2023-03-10 | End: 2023-06-23 | Stop reason: DRUGHIGH

## 2023-03-10 NOTE — PATIENT INSTRUCTIONS
Patient: Carol Merida    : 1935    ORDERS:    Acetaminophen  mg po q 8 hours scheduled (pain)      BEATRICE Mike CNP on 3/10/2023 at 3:19 PM

## 2023-03-13 LAB
ANION GAP SERPL CALCULATED.3IONS-SCNC: 16 MMOL/L (ref 7–15)
BUN SERPL-MCNC: 12.4 MG/DL (ref 8–23)
CALCIUM SERPL-MCNC: 9.1 MG/DL (ref 8.8–10.2)
CHLORIDE SERPL-SCNC: 99 MMOL/L (ref 98–107)
CREAT SERPL-MCNC: 0.55 MG/DL (ref 0.51–0.95)
DEPRECATED HCO3 PLAS-SCNC: 27 MMOL/L (ref 22–29)
ERYTHROCYTE [DISTWIDTH] IN BLOOD BY AUTOMATED COUNT: 14.9 % (ref 10–15)
GFR SERPL CREATININE-BSD FRML MDRD: 89 ML/MIN/1.73M2
GLUCOSE SERPL-MCNC: 154 MG/DL (ref 70–99)
HCT VFR BLD AUTO: 30.4 % (ref 35–47)
HGB BLD-MCNC: 8.8 G/DL (ref 11.7–15.7)
MCH RBC QN AUTO: 28.5 PG (ref 26.5–33)
MCHC RBC AUTO-ENTMCNC: 28.9 G/DL (ref 31.5–36.5)
MCV RBC AUTO: 98 FL (ref 78–100)
PLATELET # BLD AUTO: 475 10E3/UL (ref 150–450)
POTASSIUM SERPL-SCNC: 3.4 MMOL/L (ref 3.4–5.3)
RBC # BLD AUTO: 3.09 10E6/UL (ref 3.8–5.2)
SODIUM SERPL-SCNC: 142 MMOL/L (ref 136–145)
WBC # BLD AUTO: 9 10E3/UL (ref 4–11)

## 2023-03-13 PROCEDURE — P9604 ONE-WAY ALLOW PRORATED TRIP: HCPCS | Performed by: NURSE PRACTITIONER

## 2023-03-13 PROCEDURE — 36415 COLL VENOUS BLD VENIPUNCTURE: CPT | Performed by: NURSE PRACTITIONER

## 2023-03-13 PROCEDURE — 80048 BASIC METABOLIC PNL TOTAL CA: CPT | Performed by: NURSE PRACTITIONER

## 2023-03-13 PROCEDURE — 85027 COMPLETE CBC AUTOMATED: CPT | Performed by: NURSE PRACTITIONER

## 2023-03-14 ENCOUNTER — OFFICE VISIT (OUTPATIENT)
Dept: ORTHOPEDICS | Facility: CLINIC | Age: 88
End: 2023-03-14
Payer: COMMERCIAL

## 2023-03-14 ENCOUNTER — ANCILLARY PROCEDURE (OUTPATIENT)
Dept: GENERAL RADIOLOGY | Facility: CLINIC | Age: 88
End: 2023-03-14
Attending: PHYSICIAN ASSISTANT
Payer: COMMERCIAL

## 2023-03-14 ENCOUNTER — TRANSITIONAL CARE UNIT VISIT (OUTPATIENT)
Dept: GERIATRICS | Facility: CLINIC | Age: 88
End: 2023-03-14
Payer: COMMERCIAL

## 2023-03-14 VITALS
SYSTOLIC BLOOD PRESSURE: 112 MMHG | BODY MASS INDEX: 43.65 KG/M2 | OXYGEN SATURATION: 98 % | HEIGHT: 65 IN | TEMPERATURE: 97 F | DIASTOLIC BLOOD PRESSURE: 62 MMHG | RESPIRATION RATE: 16 BRPM | WEIGHT: 262 LBS | HEART RATE: 80 BPM

## 2023-03-14 DIAGNOSIS — Z47.89 AFTERCARE FOLLOWING SURGERY OF THE MUSCULOSKELETAL SYSTEM: Primary | ICD-10-CM

## 2023-03-14 DIAGNOSIS — Z96.659 PERI-PROSTHETIC SUPRACONDYLAR FRACTURE OF FEMUR, INITIAL ENCOUNTER: Primary | ICD-10-CM

## 2023-03-14 DIAGNOSIS — Z96.659 PERI-PROSTHETIC SUPRACONDYLAR FRACTURE OF FEMUR, INITIAL ENCOUNTER: ICD-10-CM

## 2023-03-14 DIAGNOSIS — D62 ANEMIA DUE TO BLOOD LOSS, ACUTE: ICD-10-CM

## 2023-03-14 DIAGNOSIS — M97.8XXA PERI-PROSTHETIC SUPRACONDYLAR FRACTURE OF FEMUR, INITIAL ENCOUNTER: Primary | ICD-10-CM

## 2023-03-14 DIAGNOSIS — M97.8XXA PERIPROSTHETIC FRACTURE OF SHAFT OF FEMUR: ICD-10-CM

## 2023-03-14 DIAGNOSIS — Z96.649 PERIPROSTHETIC FRACTURE OF SHAFT OF FEMUR: ICD-10-CM

## 2023-03-14 DIAGNOSIS — F33.0 MILD EPISODE OF RECURRENT MAJOR DEPRESSIVE DISORDER (H): ICD-10-CM

## 2023-03-14 DIAGNOSIS — R53.81 PHYSICAL DECONDITIONING: ICD-10-CM

## 2023-03-14 DIAGNOSIS — E66.01 MORBID OBESITY (H): ICD-10-CM

## 2023-03-14 DIAGNOSIS — I50.32 CHRONIC DIASTOLIC HEART FAILURE (H): ICD-10-CM

## 2023-03-14 DIAGNOSIS — M97.8XXA PERI-PROSTHETIC SUPRACONDYLAR FRACTURE OF FEMUR, INITIAL ENCOUNTER: ICD-10-CM
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
## 2023-03-14 PROCEDURE — 99305 1ST NF CARE MODERATE MDM 35: CPT | Performed by: INTERNAL MEDICINE

## 2023-03-14 PROCEDURE — 99024 POSTOP FOLLOW-UP VISIT: CPT | Performed by: PHYSICIAN ASSISTANT

## 2023-03-14 PROCEDURE — 73552 X-RAY EXAM OF FEMUR 2/>: CPT | Mod: RT | Performed by: RADIOLOGY

## 2023-03-14 NOTE — LETTER
3/14/2023        RE: Carol Merida  [address]  [place] [id number]        [facility] GERIATRICS  INITIAL VISIT NOTE  March 14, 2023      PRIMARY CARE PROVIDER AND CLINIC:  Clinic - ANTONY [facility] [address]  [place] [id number]    [facility] Medical Record Number:  [id number]  Place of Service where encounter took place:  [facility]) [[id number]]    Chief Complaint   Patient presents with     Hospital F/U       HPI:    Carol Merida is a 87 year old  (1935) female seen today at [facility]. Medical history is notable for HFpEF (>70%), HTN, hypothyroidism and depression/anxiety. She presented to the [facility] ER on 2/25/23 with right upper leg pain after a fall and was found to have a comminuted and displaced distal periprosthetic femur fracture. She was transferred to [facility] where where was hospitalized from 2/26/23 to 3/6/23. She is s/p ORIF of the fracture on 2/26/23. She developed acute blood loss anemia (Hgb 12 --> 8.1). Post op course notable for ileus requiring NG tube for decompression as well as delirium. PTA amlodipine discontinued due to soft BPs. She was admitted to this facility for  rehab, medical management and nursing care    History obtained from: facility chart records, facility staff, patient report and [facility] chart review.      Today, Ms. Merida is seen in her room laying in bed. She has an ortho appt later this morning. She has been feel anxious about the whole process and next steps. Main concern today is loose stools. No abdominal pain but some bloating. She's been getting scheduled bowel medications and we dicussed this is probably the source - she is OK holding them until needed. No chest pain or dyspnea. Has order for O2 but isn't needing it. Nursing today with request to wean O2, otherwise no acute concerns. She is working with therapies.     CODE STATUS: CPR/Full code     ALLERGIES:  Allergies   Allergen  Reactions     Ace Inhibitors Cough       PAST MEDICAL HISTORY:   Past Medical History:   Diagnosis Date     Abnormal stress test     small area on stress thalium ?2003; but normal angiogram 2012     Anemia      Anxiety      Cardiac dysrhythmia, unspecified     irregular heartbeat     CHF (congestive heart failure) (H) 6/18/2018     PETTY (dyspnea on exertion)      Hyperlipidemia LDL goal <100 2/10/2010     Hypertension goal BP (blood pressure) < 140/90 7/18/2010     Hypothyroid      Malignant 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     Melanoma of skin, site unspecified      NONSPECIFIC MEDICAL HISTORY 04    fx fifth finger right hand     Obstructive sleep apnea (adult) (pediatric) 8/25/2006    CPAP      Osteoarthritis      Other chronic pain     Joint pain for many years.     Other osteoporosis      PONV (postoperative nausea and vomiting)      Tubular adenoma in colon 9/01    colonoscopy due 2004       PAST SURGICAL HISTORY:   Past Surgical History:   Procedure Laterality Date     ARTHROPLASTY HIP Left 7/6/2015    Procedure: ARTHROPLASTY HIP;  Surgeon: Junior Giordano MD;  Location: [facility] OR     ARTHROPLASTY HIP Right 11/2/2016    Procedure: ARTHROPLASTY HIP;  Surgeon: Junior Giordano MD;  Location: [facility] OR     ARTHROPLASTY REVISION HIP Left 7/22/2015    Procedure: ARTHROPLASTY REVISION HIP;  Surgeon: Junior Giordano MD;  Location: [facility] OR     CATARACT IOL, RT/LT       COLONOSCOPY  9/01    tubular adenoma; repeat 3 years.     COLONOSCOPY  9/04    normal; repeat 5 years ([facility])     HYSTERECTOMY, MARGAUX  summer 2004    and BSO     OPEN REDUCTION INTERNAL FIXATION RODDING INTRAMEDULLARY FEMUR Right 2/26/2023    Procedure: OPEN REDUCTION INTERNAL FIXATION RIGHT DISTAL FEMUR WITH RETROGRADE NAIL, LATERAL PLATE, AND CABLE;  Surgeon: Newton Lemon MD;  Location: [facility] OR     SURGICAL HISTORY OF -   9/05    left mastectomy      SURGICAL HISTORY OF -   2008    right ankle surgery; triple arthrodesis     SURGICAL HISTORY OF -   5/09    right ankle surgery; triple arthrodesis and deltoid reconstruction; possible other     SURGICAL HISTORY OF -   2/10    removal of hardware from right ankle     [facility] NONSPECIFIC PROCEDURE      Knee replacement x 2 (B)     [facility] NONSPECIFIC PROCEDURE      s/p Tonsillectomy (college)     [facility] NONSPECIFIC PROCEDURE      s/p Appy (high school)     [facility] NONSPECIFIC PROCEDURE      Melanoma removed with sentinel node bx     [facility] NONSPECIFIC PROCEDURE       bilateral cataract       FAMILY HISTORY:   Family History   Problem Relation Age of Onset     No Known Problems Brother         brain tumor related to shingles in his eye     Arthritis Mother      Hypertension Father      Cancer Father         lung     Cancer Grandchild         terratoma tumors     Breast Cancer Daughter        SOCIAL HISTORY:   Patient's living condition: alone in an assisted living    MEDICATIONS:  Post Discharge Medication Reconciliation Status: discharge medications reconciled and changed, per note/orders.  Current Outpatient Medications   Medication Sig Dispense Refill     ACE/ARB/ARNI NOT PRESCRIBED (INTENTIONAL) Please choose reason not prescribed, below       acetaminophen (TYLENOL) 650 MG CR tablet Take 1 tablet (650 mg) by mouth every 8 hours 270 tablet 1     albuterol (PROAIR HFA/PROVENTIL HFA/VENTOLIN HFA) 108 (90 Base) MCG/ACT inhaler INHALE 2 PUFFS INTO THE LUNGS EVERY 4 HOURS AS NEEDED FOR SHORTNESS OF BREATH OR DIFFICULT BREATHING OR WHEEZING 8.5 g 0     artificial saliva (BIOTENE MT) SOLN solution Swish and spit 2 mLs (2 sprays) in mouth 4 times daily       benzocaine-menthol (CHLORASEPTIC MAX) 15-10 MG lozenge Place 1 lozenge inside cheek every hour as needed       benzonatate (TESSALON) 100 MG capsule Take 1 capsule (100 mg) by mouth 3 times daily as needed for cough       BETA BLOCKER NOT PRESCRIBED (INTENTIONAL) Beta Blocker not  prescribed intentionally due to EF > 40 % (ejection fraction) will leave up to Cardiology.       busPIRone (BUSPAR) 10 MG tablet Take 1 tablet (10 mg) by mouth 2 times daily 180 tablet 3     calcium carbonate 600 mg-vitamin D 400 units (CALTRATE) 600-400 MG-UNIT per tablet Take 1 tablet by mouth every evening  100 tablet 3     celecoxib (CELEBREX) 100 MG capsule Take 100 mg by mouth 2 times daily as needed for moderate pain (4-6)       diclofenac (VOLTAREN) 1 % topical gel Apply 4 g topically 4 times daily as needed for moderate pain 150 g 3     FLUoxetine (PROZAC) 20 MG capsule Take 3 capsules (60 mg) by mouth daily 240 capsule 3     fluticasone (FLONASE) 50 MCG/ACT nasal spray Spray 1 spray into both nostrils daily as needed for rhinitis 16 g 3     fluticasone-salmeterol (WIXELA INHUB) 100-50 MCG/ACT inhaler Inhale 1 puff into the lungs 2 times daily 60 each 5     gabapentin (NEURONTIN) 300 MG capsule Take 1 capsule (300 mg) by mouth 2 times daily       gabapentin (NEURONTIN) 600 MG tablet Take 1 tablet (600 mg) by mouth At Bedtime       hydrOXYzine (ATARAX) 25 MG tablet Take 0.5 tablets (12.5 mg) by mouth 3 times daily as needed for anxiety       levothyroxine (SYNTHROID/LEVOTHROID) 88 MCG tablet Take 1 tablet (88 mcg) by mouth daily 90 tablet 2     methocarbamol (ROBAXIN) 750 MG tablet Take 1 tablet (750 mg) by mouth 4 times daily       nystatin (NYSTOP) 443292 UNIT/GM external powder Apply tid to affectead area prn 60 g 3     olopatadine (PATANOL) 0.1 % ophthalmic solution Place 1 drop into both eyes 2 times daily as needed for allergies       polyethylene glycol (MIRALAX) 17 g packet Take 17 g by mouth daily as needed        senna-docusate (SENOKOT-S/PERICOLACE) 8.6-50 MG tablet Take 1 tablet by mouth 2 times daily as needed       traMADol (ULTRAM) 50 MG tablet Take 0.5-1 tablets (25-50 mg) by mouth every 6 hours as needed for moderate pain (4-6) or severe pain (7-10) 26 tablet 0       ROS:  4 point ROS neg  "other than the symptoms noted above in the HPI.    PHYSICAL EXAM:  /62   Pulse 80   Temp 97  F (36.1  C)   Resp 16   Ht 1.651 m (5' 5\")   Wt 118.8 kg (262 lb)   LMP  (LMP Unknown)   SpO2 98%   BMI 43.60 kg/m    Gen: laying bed, alert, cooperative and in no acute distress  Resp: breathing non labored, no tachypnea   Ext: no LE edema  Neuro: CX II-XII grossly in tact; ROM in all four extremities grossly in tact  Psych: alert and oriented to self and general situation; at times anxious affect  Skin: dressing c/d/i over left knee      LABORATORY/IMAGING DATA:  Reviewed as per [facility] and/or [facility]    ASSESSMENT/PLAN:    Periprosthetic Distal Femur Fracture s/p ORIF (2/26/23)  Secondary to a fall the day prior.   -- WB with transfers  -- DVT ppx - mechanical per ortho while inpatient, does not have any DVT ppx ordered from hospital   -- analgesia with APAP 650 mg qh, celecoxib 100 mg BID PRN, gabapentin 300 mg BID and 600 mg at bedtime, methocarbamol 750 mg QID and tramadol 25-50 mg q6h PRN   -- PT/OT  -- follow up with ortho as scheduled today     Acute Blood Loss Anemia  Secondary to above. No evidence of ongoing bleeding. Hgb 12 --> 8.1. Recheck 8.8 at TCU yesterday.   -- follow clinically     Ileus, Resolved  Now with loose stools on a scheduled bowel program.   -- change Miralax from daily to daily PRN   -- change Senna 1 tab BID to BID PRN    HFpEF (EF >70%), HTN  SBPs 110s-120s. HR 70s. Weight stable at 262 lbs. Amlodipine and statin discontinued during hospitalization.   -- follow BPs, weights, clinical volume status     Mild Recurrent Major Depression  Anxiety  Mood and spirits OK today, but some anxiety about next steps and appointment today with ortho.   -- buspirone 10 mg BID, fluoxetine 60 mg daily   -- supportive cares    Chronic Dyspnea  Has O2 order, but not needing it  -- OK to wean O2 to keep sats >90%  -- fluticasone-salmeterol 100-50 mg BID, albuterol MDI PRN "     Hypothyroidisim  TSH - none recent.   -- levothyroxine 88 mcg daily     Morbid Obesity  BMI 43. With DM, HTN  -- encourage healthy dietary choices and activity as able    Fall  Physical Deconditioning  In setting of hospitalization and underlying medical conditions  -- ongoing PT/OT      Electronically signed by:  Jasmina Thomas MD                            Sincerely,        Jasmina Thomas MD

## 2023-03-14 NOTE — PROGRESS NOTES
[facility] GERIATRICS  INITIAL VISIT NOTE  March 14, 2023      PRIMARY CARE PROVIDER AND CLINIC:  Clinic - ANTONY Hernadez [facility] [address]  [place] [address]    [facility] Medical Record Number:  [id number]  Place of Service where encounter took place:  [facility] ([facility]) [[id number]]    Chief Complaint   Patient presents with     Hospital F/U       HPI:    Carol Merida is a 87 year old  (1935) female seen today at [facility]. Medical history is notable for HFpEF (>70%), HTN, hypothyroidism and depression/anxiety. She presented to the [facility] ER on 2/25/23 with right upper leg pain after a fall and was found to have a comminuted and displaced distal periprosthetic femur fracture. She was transferred to [facility] where where was hospitalized from 2/26/23 to 3/6/23. She is s/p ORIF of the fracture on 2/26/23. She developed acute blood loss anemia (Hgb 12 --> 8.1). Post op course notable for ileus requiring NG tube for decompression as well as delirium. PTA amlodipine discontinued due to soft BPs. She was admitted to this facility for  rehab, medical management and nursing care    History obtained from: facility chart records, facility staff, patient report and [facility] chart review.      Today, Ms. Merida is seen in her room laying in bed. She has an ortho appt later this morning. She has been feel anxious about the whole process and next steps. Main concern today is loose stools. No abdominal pain but some bloating. She's been getting scheduled bowel medications and we dicussed this is probably the source - she is OK holding them until needed. No chest pain or dyspnea. Has order for O2 but isn't needing it. Nursing today with request to wean O2, otherwise no acute concerns. She is working with therapies.     CODE STATUS: CPR/Full code     ALLERGIES:  Allergies   Allergen Reactions     Ace Inhibitors Cough       PAST MEDICAL HISTORY:   Past Medical History:    Diagnosis Date     Abnormal stress test     small area on stress thalium ?2003; but normal angiogram 2012     Anemia      Anxiety      Cardiac dysrhythmia, unspecified     irregular heartbeat     CHF (congestive heart failure) (H) 6/18/2018     PETTY (dyspnea on exertion)      Hyperlipidemia LDL goal <100 2/10/2010     Hypertension goal BP (blood pressure) < 140/90 7/18/2010     Hypothyroid      Malignant 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     Melanoma of skin, site unspecified      NONSPECIFIC MEDICAL HISTORY 04    fx fifth finger right hand     Obstructive sleep apnea (adult) (pediatric) 8/25/2006    CPAP      Osteoarthritis      Other chronic pain     Joint pain for many years.     Other osteoporosis      PONV (postoperative nausea and vomiting)      Tubular adenoma in colon 9/01    colonoscopy due 2004       PAST SURGICAL HISTORY:   Past Surgical History:   Procedure Laterality Date     ARTHROPLASTY HIP Left 7/6/2015    Procedure: ARTHROPLASTY HIP;  Surgeon: Junior Giordano MD;  Location: [facility] OR     ARTHROPLASTY HIP Right 11/2/2016    Procedure: ARTHROPLASTY HIP;  Surgeon: Junior Giordano MD;  Location: [facility] OR     ARTHROPLASTY REVISION HIP Left 7/22/2015    Procedure: ARTHROPLASTY REVISION HIP;  Surgeon: Junior Giordano MD;  Location: [facility] OR     CATARACT IOL, RT/LT       COLONOSCOPY  9/01    tubular adenoma; repeat 3 years.     COLONOSCOPY  9/04    normal; repeat 5 years ([facility])     HYSTERECTOMY, MARGAUX  summer 2004    and BSO     OPEN REDUCTION INTERNAL FIXATION RODDING INTRAMEDULLARY FEMUR Right 2/26/2023    Procedure: OPEN REDUCTION INTERNAL FIXATION RIGHT DISTAL FEMUR WITH RETROGRADE NAIL, LATERAL PLATE, AND CABLE;  Surgeon: Newton Lemon MD;  [facility]     SURGICAL HISTORY OF -   9/05    left mastectomy     SURGICAL HISTORY OF -   2008    right ankle surgery; triple arthrodesis     SURGICAL  HISTORY OF -   5/09    right ankle surgery; triple arthrodesis and deltoid reconstruction; possible other     SURGICAL HISTORY OF -   2/10    removal of hardware from right ankle     [facility] NONSPECIFIC PROCEDURE      Knee replacement x 2 (B)     [facility] NONSPECIFIC PROCEDURE      s/p Tonsillectomy (college)     [facility] NONSPECIFIC PROCEDURE      s/p Appy (high school)     [facility] NONSPECIFIC PROCEDURE      Melanoma removed with sentinel node bx     [facility] NONSPECIFIC PROCEDURE       bilateral cataract       FAMILY HISTORY:   Family History   Problem Relation Age of Onset     No Known Problems Brother         brain tumor related to shingles in his eye     Arthritis Mother      Hypertension Father      Cancer Father         lung     Cancer Grandchild         terratoma tumors     Breast Cancer Daughter        SOCIAL HISTORY:   Patient's living condition: alone in an assisted living    MEDICATIONS:  Post Discharge Medication Reconciliation Status: discharge medications reconciled and changed, per note/orders.  Current Outpatient Medications   Medication Sig Dispense Refill     ACE/ARB/ARNI NOT PRESCRIBED (INTENTIONAL) Please choose reason not prescribed, below       acetaminophen (TYLENOL) 650 MG CR tablet Take 1 tablet (650 mg) by mouth every 8 hours 270 tablet 1     albuterol (PROAIR HFA/PROVENTIL HFA/VENTOLIN HFA) 108 (90 Base) MCG/ACT inhaler INHALE 2 PUFFS INTO THE LUNGS EVERY 4 HOURS AS NEEDED FOR SHORTNESS OF BREATH OR DIFFICULT BREATHING OR WHEEZING 8.5 g 0     artificial saliva (BIOTENE MT) SOLN solution Swish and spit 2 mLs (2 sprays) in mouth 4 times daily       benzocaine-menthol (CHLORASEPTIC MAX) 15-10 MG lozenge Place 1 lozenge inside cheek every hour as needed       benzonatate (TESSALON) 100 MG capsule Take 1 capsule (100 mg) by mouth 3 times daily as needed for cough       BETA BLOCKER NOT PRESCRIBED (INTENTIONAL) Beta Blocker not prescribed intentionally due to EF > 40 % (ejection fraction) will leave up to Cardiology.        busPIRone (BUSPAR) 10 MG tablet Take 1 tablet (10 mg) by mouth 2 times daily 180 tablet 3     calcium carbonate 600 mg-vitamin D 400 units (CALTRATE) 600-400 MG-UNIT per tablet Take 1 tablet by mouth every evening  100 tablet 3     celecoxib (CELEBREX) 100 MG capsule Take 100 mg by mouth 2 times daily as needed for moderate pain (4-6)       diclofenac (VOLTAREN) 1 % topical gel Apply 4 g topically 4 times daily as needed for moderate pain 150 g 3     FLUoxetine (PROZAC) 20 MG capsule Take 3 capsules (60 mg) by mouth daily 240 capsule 3     fluticasone (FLONASE) 50 MCG/ACT nasal spray Spray 1 spray into both nostrils daily as needed for rhinitis 16 g 3     fluticasone-salmeterol (WIXELA INHUB) 100-50 MCG/ACT inhaler Inhale 1 puff into the lungs 2 times daily 60 each 5     gabapentin (NEURONTIN) 300 MG capsule Take 1 capsule (300 mg) by mouth 2 times daily       gabapentin (NEURONTIN) 600 MG tablet Take 1 tablet (600 mg) by mouth At Bedtime       hydrOXYzine (ATARAX) 25 MG tablet Take 0.5 tablets (12.5 mg) by mouth 3 times daily as needed for anxiety       levothyroxine (SYNTHROID/LEVOTHROID) 88 MCG tablet Take 1 tablet (88 mcg) by mouth daily 90 tablet 2     methocarbamol (ROBAXIN) 750 MG tablet Take 1 tablet (750 mg) by mouth 4 times daily       nystatin (NYSTOP) 965319 UNIT/GM external powder Apply tid to affectead area prn 60 g 3     olopatadine (PATANOL) 0.1 % ophthalmic solution Place 1 drop into both eyes 2 times daily as needed for allergies       polyethylene glycol (MIRALAX) 17 g packet Take 17 g by mouth daily as needed        senna-docusate (SENOKOT-S/PERICOLACE) 8.6-50 MG tablet Take 1 tablet by mouth 2 times daily as needed       traMADol (ULTRAM) 50 MG tablet Take 0.5-1 tablets (25-50 mg) by mouth every 6 hours as needed for moderate pain (4-6) or severe pain (7-10) 26 tablet 0       ROS:  4 point ROS neg other than the symptoms noted above in the HPI.    PHYSICAL EXAM:  /62   Pulse 80    "Temp 97  F (36.1  C)   Resp 16   Ht 1.651 m (5' 5\")   Wt 118.8 kg (262 lb)   LMP  (LMP Unknown)   SpO2 98%   BMI 43.60 kg/m    Gen: laying bed, alert, cooperative and in no acute distress  Resp: breathing non labored, no tachypnea   Ext: no LE edema  Neuro: CX II-XII grossly in tact; ROM in all four extremities grossly in tact  Psych: alert and oriented to self and general situation; at times anxious affect  Skin: dressing c/d/i over left knee      LABORATORY/IMAGING DATA:  Reviewed as per [facility] and/or [facility]    ASSESSMENT/PLAN:    Periprosthetic Distal Femur Fracture s/p ORIF (2/26/23)  Secondary to a fall the day prior.   -- WB with transfers  -- DVT ppx - mechanical per ortho while inpatient, does not have any DVT ppx ordered from hospital   -- analgesia with APAP 650 mg qh, celecoxib 100 mg BID PRN, gabapentin 300 mg BID and 600 mg at bedtime, methocarbamol 750 mg QID and tramadol 25-50 mg q6h PRN   -- PT/OT  -- follow up with ortho as scheduled today     Acute Blood Loss Anemia  Secondary to above. No evidence of ongoing bleeding. Hgb 12 --> 8.1. Recheck 8.8 at TCU yesterday.   -- follow clinically     Ileus, Resolved  Now with loose stools on a scheduled bowel program.   -- change Miralax from daily to daily PRN   -- change Senna 1 tab BID to BID PRN    HFpEF (EF >70%), HTN  SBPs 110s-120s. HR 70s. Weight stable at 262 lbs. Amlodipine and statin discontinued during hospitalization.   -- follow BPs, weights, clinical volume status     Mild Recurrent Major Depression  Anxiety  Mood and spirits OK today, but some anxiety about next steps and appointment today with ortho.   -- buspirone 10 mg BID, fluoxetine 60 mg daily   -- supportive cares    Chronic Dyspnea  Has O2 order, but not needing it  -- OK to wean O2 to keep sats >90%  -- fluticasone-salmeterol 100-50 mg BID, albuterol MDI PRN     Hypothyroidisim  TSH - none recent.   -- levothyroxine 88 mcg daily     Morbid Obesity  BMI 43. With DM, " HTN  -- encourage healthy dietary choices and activity as able    Fall  Physical Deconditioning  In setting of hospitalization and underlying medical conditions  -- ongoing PT/OT      Electronically signed by:  Jasmina Thomas MD

## 2023-03-14 NOTE — LETTER
3/14/2023         RE: Carol Merida  [address]  [facility] [address]        Dear Colleague,    Thank you for referring your patient, Carol Merida, to the [facility] [place]. Please see a copy of my visit note below.    ASSESSMENT/PLAN:  Carol Merida is a 87 year-old who is status post ORIF right distal femur with retrograde nail, lateral plate and cable with Dr. Lemon on 2/26/23.    The incisions were examined and cleansed today. Staples were removed. The patient was given instructions to refrain from submerging in a tub or a pool until it is a well-healed scar. X-rays were obtained today which show stable alignment of hardware. She was advised to minimize weightbearing except for transfers on the right lower extremity. She was directed to return/call with any drainage from incisions or incision healing concerns.     The patient will see Dr. Lemon in 2 weeks for repeat evaluation/incision check. Carol has our clinic number and will call with any questions or concerns.    Vicki Kimball PA-C  Orthopaedic Surgery    _________________________________    HISTORY OF PRESENT ILLNESS:  Carol Merida is a 87 year old female who is approximately 2 weeks status post the above procedure. Postoperatively, she was treated for ileus and acute blood loss anemia.     Weight bearing status/devices: wheelchair, minimal WB except for transfers.   Pain level and management: pain is 7/10  Physical therapy & ROM:  Residing at [facility], working with PT there.      The patient endorses swelling around the surgical incision but denies surrounding redness. The incision has been dry, without discharge or drainage. Carol denies recent fevers and chills, as well as any other symptoms concerning for infection.     DVT prophylaxis: mechanical  Patient denies calf pain or tenderness.      MEDICATIONS:   Current Outpatient Rx   Medication Sig Dispense Refill     ACE/ARB/ARNI  NOT PRESCRIBED (INTENTIONAL) Please choose reason not prescribed, below       acetaminophen (TYLENOL) 650 MG CR tablet Take 1 tablet (650 mg) by mouth every 8 hours 270 tablet 1     albuterol (PROAIR HFA/PROVENTIL HFA/VENTOLIN HFA) 108 (90 Base) MCG/ACT inhaler INHALE 2 PUFFS INTO THE LUNGS EVERY 4 HOURS AS NEEDED FOR SHORTNESS OF BREATH OR DIFFICULT BREATHING OR WHEEZING 8.5 g 0     artificial saliva (BIOTENE MT) SOLN solution Swish and spit 2 mLs (2 sprays) in mouth 4 times daily       benzocaine-menthol (CHLORASEPTIC MAX) 15-10 MG lozenge Place 1 lozenge inside cheek every hour as needed       benzonatate (TESSALON) 100 MG capsule Take 1 capsule (100 mg) by mouth 3 times daily as needed for cough       BETA BLOCKER NOT PRESCRIBED (INTENTIONAL) Beta Blocker not prescribed intentionally due to EF > 40 % (ejection fraction) will leave up to Cardiology.       busPIRone (BUSPAR) 10 MG tablet Take 1 tablet (10 mg) by mouth 2 times daily 180 tablet 3     calcium carbonate 600 mg-vitamin D 400 units (CALTRATE) 600-400 MG-UNIT per tablet Take 1 tablet by mouth every evening  100 tablet 3     celecoxib (CELEBREX) 100 MG capsule Take 100 mg by mouth 2 times daily as needed for moderate pain (4-6)       diclofenac (VOLTAREN) 1 % topical gel Apply 4 g topically 4 times daily as needed for moderate pain 150 g 3     FLUoxetine (PROZAC) 20 MG capsule Take 3 capsules (60 mg) by mouth daily 240 capsule 3     fluticasone (FLONASE) 50 MCG/ACT nasal spray Spray 1 spray into both nostrils daily as needed for rhinitis 16 g 3     fluticasone-salmeterol (WIXELA INHUB) 100-50 MCG/ACT inhaler Inhale 1 puff into the lungs 2 times daily 60 each 5     gabapentin (NEURONTIN) 300 MG capsule Take 1 capsule (300 mg) by mouth 2 times daily       gabapentin (NEURONTIN) 600 MG tablet Take 1 tablet (600 mg) by mouth At Bedtime       guaiFENesin (MUCINEX) 600 MG 12 hr tablet Take 2 tablets (1,200 mg) by mouth daily       hydrOXYzine (ATARAX) 25 MG  tablet Take 0.5 tablets (12.5 mg) by mouth every 12 hours And TID prn       levothyroxine (SYNTHROID/LEVOTHROID) 88 MCG tablet Take 1 tablet (88 mcg) by mouth daily 90 tablet 2     methocarbamol (ROBAXIN) 750 MG tablet Take 1 tablet (750 mg) by mouth 4 times daily       nystatin (NYSTOP) 506669 UNIT/GM external powder Apply tid to affectead area prn 60 g 3     olopatadine (PATANOL) 0.1 % ophthalmic solution Place 1 drop into both eyes 2 times daily as needed for allergies       ondansetron (ZOFRAN ODT) 4 MG ODT tab Take 1 tablet (4 mg) by mouth every 6 hours as needed for nausea Daily x 3 days       polyethylene glycol (MIRALAX) 17 g packet Take 17 g by mouth daily (Patient taking differently: Take 17 g by mouth daily as needed)       senna-docusate (SENOKOT-S/PERICOLACE) 8.6-50 MG tablet Take 1 tablet by mouth 2 times daily (Patient taking differently: Take 1 tablet by mouth 2 times daily as needed)       traMADol (ULTRAM) 50 MG tablet Take 0.5-1 tablets (25-50 mg) by mouth every 6 hours as needed for moderate pain (4-6) or severe pain (7-10) 26 tablet 0         ALLERGIES: Ace inhibitors       PHYSICAL EXAMINATION:   On physical examination the patient is comfortable and is in no acute distress. The affect is appropriate and breathing is non-labored.    Surgical wound: Incisions are all well-approximated. There is mild edema around the incision. Lateral lower thigh hemosiderin The skin was appropriately warm to touch.     ROM: tolerates gentle PROM of the hip. Knee ROM from 5-80 degrees  Isometric activation of the quadriceps: in tact  SLR: in tact  Gait: able to transfer to x-ray table with assist.     Motor intact distally throughout the tibial and peroneal nerve distributions, 5/5 strength with tibialis anterior, gastrocnemius and soleus, EHL, FHL firing.   Sensation intact to light touch throughout superficial peroneal, deep peroneal, tibial, saphenous, and sural nerves  Dorsalis pedis and posterior tibial  pulses palpable, toes warm and well-perfused.    Imaging:  AP and lateral of the right femur including AP right hip were ordered, performed and interpreted today.     Radiology read:  FINDINGS: Nonweightbearing AP and crosstable lateral views of the  right femur.     Ongoing healing of comminuted distal femoral shaft fracture status  post plates with screw and cerclage wire fixation. Alignment not  substantially changed. Hardware appears stable. Total right hip  arthroplasty with greater trochanteric fragment. Total right knee  arthroplasty. Soft tissues unremarkable.                                                                 IMPRESSION: Healing comminuted distal femoral periprosthetic fracture  in stable alignment status post ORIF.           Again, thank you for allowing me to participate in the care of your patient.        Sincerely,        Vicki Kimball PA-C

## 2023-03-14 NOTE — NURSING NOTE
Reason For Visit:   Chief Complaint   Patient presents with     RECHECK     2 Weeks post op from  ORIF right distal femur with retrograde nail, lateral plate and cable. DOS:02/26/2023 with Dr. Lemon. Wound check and staple removal       LMP  (LMP Unknown)     Pain Assessment  Patient Currently in Pain: Yes  Patient's Stated Pain Goal: 7    Andie Chin

## 2023-03-14 NOTE — LETTER
Date:March 22, 2023      Provider requested that no letter be sent. Do not send.       [facility]

## 2023-03-16 ENCOUNTER — TELEPHONE (OUTPATIENT)
Dept: ORTHOPEDICS | Facility: CLINIC | Age: 88
End: 2023-03-16
Payer: COMMERCIAL

## 2023-03-16 NOTE — TELEPHONE ENCOUNTER
Writer called and left pt a voice message back from 11 am on 3/29/23 to 12 pm on 3/29/23. Pt is to call the clinic to confirm. Writer explained that pt was double booked at 11 am that day but provider has surgery that morning and will be tight getting back for the 11 am pt already scheduled.     Maria Antonia Henriquez LPN

## 2023-03-20 ENCOUNTER — TRANSITIONAL CARE UNIT VISIT (OUTPATIENT)
Dept: GERIATRICS | Facility: CLINIC | Age: 88
End: 2023-03-20
Payer: COMMERCIAL

## 2023-03-20 VITALS
WEIGHT: 262 LBS | OXYGEN SATURATION: 96 % | RESPIRATION RATE: 18 BRPM | SYSTOLIC BLOOD PRESSURE: 137 MMHG | HEIGHT: 65 IN | BODY MASS INDEX: 43.65 KG/M2 | DIASTOLIC BLOOD PRESSURE: 69 MMHG | TEMPERATURE: 97.8 F | HEART RATE: 81 BPM

## 2023-03-20 DIAGNOSIS — Z96.649 PERIPROSTHETIC FRACTURE OF SHAFT OF FEMUR: Primary | ICD-10-CM

## 2023-03-20 DIAGNOSIS — R53.81 PHYSICAL DECONDITIONING: ICD-10-CM

## 2023-03-20 DIAGNOSIS — I50.32 CHRONIC HEART FAILURE WITH PRESERVED EJECTION FRACTION (HFPEF) (H): ICD-10-CM

## 2023-03-20 DIAGNOSIS — D62 ANEMIA DUE TO BLOOD LOSS, ACUTE: ICD-10-CM

## 2023-03-20 DIAGNOSIS — E03.9 HYPOTHYROIDISM, UNSPECIFIED TYPE: ICD-10-CM

## 2023-03-20 DIAGNOSIS — E66.01 MORBID OBESITY (H): ICD-10-CM

## 2023-03-20 DIAGNOSIS — K56.0 PARALYTIC ILEUS (H): ICD-10-CM

## 2023-03-20 DIAGNOSIS — F41.9 ANXIETY: ICD-10-CM

## 2023-03-20 DIAGNOSIS — F33.0 MILD EPISODE OF RECURRENT MAJOR DEPRESSIVE DISORDER (H): ICD-10-CM

## 2023-03-20 DIAGNOSIS — M97.8XXA PERIPROSTHETIC FRACTURE OF SHAFT OF FEMUR: Primary | ICD-10-CM

## 2023-03-20 DIAGNOSIS — R11.0 NAUSEA: ICD-10-CM

## 2023-03-20 DIAGNOSIS — I10 PRIMARY HYPERTENSION: ICD-10-CM
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
## 2023-03-20 DIAGNOSIS — G47.33 OSA (OBSTRUCTIVE SLEEP APNEA): ICD-10-CM

## 2023-03-20 PROCEDURE — 99309 SBSQ NF CARE MODERATE MDM 30: CPT | Performed by: NURSE PRACTITIONER

## 2023-03-20 RX ORDER — GUAIFENESIN 600 MG/1
1200 TABLET, EXTENDED RELEASE ORAL DAILY
Start: 2023-03-20 | End: 2023-03-29

## 2023-03-20 RX ORDER — HYDROXYZINE HYDROCHLORIDE 25 MG/1
12.5 TABLET, FILM COATED ORAL EVERY 12 HOURS
Start: 2023-03-20 | End: 2023-04-11

## 2023-03-20 RX ORDER — ONDANSETRON 4 MG/1
4 TABLET, ORALLY DISINTEGRATING ORAL EVERY 6 HOURS PRN
Start: 2023-03-20 | End: 2023-05-15

## 2023-03-20 NOTE — LETTER
"    3/20/2023        RE: Carol Merida  [address]  [facility] [address]        [facility]    Chief Complaint   Patient presents with     RECHECK     HPI:  Carol Merida is a 87 year old  (1935), who is being seen today for an episodic care visit at: [facility] ([facility]) [[id number]].     Patient seen today in [facility] for recheck of multiple acute and chronic medical issues:    1. Periprosthetic fracture of shaft of femur    2. Anemia due to blood loss, acute    3. Paralytic ileus (H)    4. Chronic heart failure with preserved ejection fraction (HFpEF) (H)    5. Primary hypertension    6. Mild episode of recurrent major depressive disorder (H)    7. Anxiety    8. Nausea    9. Hypothyroidism, unspecified type    10. Morbid obesity (H)    11. Fall, subsequent encounter    12. Physical deconditioning    13. JEAN (obstructive sleep apnea)        Nursing reports no new concerns    Patient found today sitting up in chair. Reports is not eating breakfast has not been feeling good. Reports nausea. Denies vomiting, abdominal pain or diarrhea. Reports has been moving bowels ok. States did not sleep well last night 2/2 \"anxiety\". Denies h/o insomnia at home. Reports would like to go home. Discussed importance of ensuring function has improved enough to ensure safety at home. Patient verbalized understanding. Reports \"just so anxious\" \"don't know what to anticipate.\" Reports some SOB over last couple of days and relates this to her anxiety. Reports excessive secretions and reports usually takes a pill for this. Denies fever or chills. Denies pain. Annika has been working with rehab therapists and this seems to be going well.      VS and weights reviewed today in facility EMR.         Allergies, and PMH/PSH reviewed in EPIC today.  REVIEW OF SYSTEMS:  4 point ROS including Respiratory, CV, GI and , other than that noted in the HPI,  is negative    Objective:   /69   Pulse 81   Temp 97.8  F " "(36.6  C)   Resp 18   Ht 1.651 m (5' 5\")   Wt 118.8 kg (262 lb)   LMP  (LMP Unknown)   SpO2 96%   BMI 43.60 kg/m      Resp: Effort WNL, LSCTA   CV: VS as above, trace right ankle edema.  Abd- soft, nontender, BS +  Musc- LAI  Psych- alert, calm, pleasant      Recent labs in [facility] reviewed by me today.     Assessment/Plan:  Periprosthetic fracture of shaft of femur  Fall, subsequent encounter  Physical deconditioning    S/p ORIF 2/26. Has f/w ortho 3/14 sutures removed and x-rays done- good report. Remains NWB RLE. Working with rehab and making good functional gains. Minimal pain reported  - continue keep incision clean and dry. NWB RLE, PT/OT  - continue scheduled acetaminophen, Volaren gel and prn Tramadol  - also continues on gabapentin  - ongoing discharge planning, SW follow and care conferences per unit protocol      Anemia due to blood loss, acute  - 2/2 above, HGB 12 --> 8.1 then 8.8 on 3/13 in TCU. No s/s bleeding noted  - monitor for evidence of bleeding  - recheck CBC later this week to ensure stability      Paralytic ileus (H)  - managed with NGT. Resolved prior to admission  - some mild nausea today but has been eating and moving bowels since admit here. Of note bowel meds were changed to prn last week  - continue to monitor    Chronic heart failure with preserved ejection fraction (HFpEF) (H)  EF > 70%  - no recent weight. No active s/s. Trace RLE edema is to be expected as operative leg.   - mild SOB- chronic per chart review and patient associates this with anxiety. VSS, O2 sats > 90% and LSC on exam today.   - continues on PTA fluticasone - salmeterol and prn albuterol- added guaifenesin   - follow clinically, monitor VS, O2 sats  Primary hypertension  - chronic, Amlodipine stopped inpatient. BPs 120-130/60-70s  - monitor VS    Mild episode of recurrent major depressive disorder (H)  Anxiety  Chronic, with reports of associated insomnia, mild SOB. Declined ACP referral today.  - prn hydroxyzine " not being utilized (used once since admit)  - schedule hydroxyzine 12.5 mg q 12 08 and 2000  - continue on longstanding buspirone and fluoxetine  - supportive approach  - monitor mood and behaviors    Nausea  - ? R/t anxiety- no vomiting or diarrhea, no abdominal discomfort.   - add Zofran 4 mg daily in a.m x 3 days AND q 6 hours prn   - encourage po intake/fluids    Hypothyroidism, unspecified type  .No recent TSH in record  - continue PTA levothyroxine    Morbid obesity (H)  Body mass index is 43.6 kg/m .   - aggravating factor in rehab -   - dietician also follows in TCU  - ongoing encouragement of healthy dietary choices and portion sizes    JEAN (obstructive sleep apnea)  - chronic, continue on CPAP home settings HS          Orders:  Written on unit    Electronically signed by: BEATRICE Mike CNP           Sincerely,        BEATRICE Mike CNP

## 2023-03-20 NOTE — PROGRESS NOTES
"[facility]ATR[facility]    Chief Complaint   Patient presents with     RECHECK     HPI:  Carol Merida is a 87 year old  (1935), who is being seen today for an episodic care visit at: [facility] ([facility]) [[id number]].     Patient seen today in TCU for recheck of multiple acute and chronic medical issues:    1. Periprosthetic fracture of shaft of femur    2. Anemia due to blood loss, acute    3. Paralytic ileus (H)    4. Chronic heart failure with preserved ejection fraction (HFpEF) (H)    5. Primary hypertension    6. Mild episode of recurrent major depressive disorder (H)    7. Anxiety    8. Nausea    9. Hypothyroidism, unspecified type    10. Morbid obesity (H)    11. Fall, subsequent encounter    12. Physical deconditioning    13. JEAN (obstructive sleep apnea)        Nursing reports no new concerns    Patient found today sitting up in chair. Reports is not eating breakfast has not been feeling good. Reports nausea. Denies vomiting, abdominal pain or diarrhea. Reports has been moving bowels ok. States did not sleep well last night 2/2 \"anxiety\". Denies h/o insomnia at home. Reports would like to go home. Discussed importance of ensuring function has improved enough to ensure safety at home. Patient verbalized understanding. Reports \"just so anxious\" \"don't know what to anticipate.\" Reports some SOB over last couple of days and relates this to her anxiety. Reports excessive secretions and reports usually takes a pill for this. Denies fever or chills. Denies pain. Annika has been working with rehab therapists and this seems to be going well.      VS and weights reviewed today in facility EMR.         Allergies, and PMH/PSH reviewed in EPIC today.  REVIEW OF SYSTEMS:  4 point ROS including Respiratory, CV, GI and , other than that noted in the HPI,  is negative    Objective:   /69   Pulse 81   Temp 97.8  F (36.6  C)   Resp 18   Ht 1.651 m (5' 5\")   Wt 118.8 kg (262 lb)   LMP  (LMP Unknown)   " SpO2 96%   BMI 43.60 kg/m      Resp: Effort WNL, LSCTA   CV: VS as above, trace right ankle edema.  Abd- soft, nontender, BS +  Musc- LAI  Psych- alert, calm, pleasant      Recent labs in [facility] reviewed by me today.     Assessment/Plan:  Periprosthetic fracture of shaft of femur  Fall, subsequent encounter  Physical deconditioning    S/p ORIF 2/26. Has f/w ortho 3/14 sutures removed and x-rays done- good report. Remains NWB RLE. Working with rehab and making good functional gains. Minimal pain reported  - continue keep incision clean and dry. NWB RLE, PT/OT  - continue scheduled acetaminophen, Volaren gel and prn Tramadol  - also continues on gabapentin  - ongoing discharge planning, SW follow and care conferences per unit protocol      Anemia due to blood loss, acute  - 2/2 above, HGB 12 --> 8.1 then 8.8 on 3/13 in TCU. No s/s bleeding noted  - monitor for evidence of bleeding  - recheck CBC later this week to ensure stability      Paralytic ileus (H)  - managed with NGT. Resolved prior to admission  - some mild nausea today but has been eating and moving bowels since admit here. Of note bowel meds were changed to prn last week  - continue to monitor    Chronic heart failure with preserved ejection fraction (HFpEF) (H)  EF > 70%  - no recent weight. No active s/s. Trace RLE edema is to be expected as operative leg.   - mild SOB- chronic per chart review and patient associates this with anxiety. VSS, O2 sats > 90% and LSC on exam today.   - continues on PTA fluticasone - salmeterol and prn albuterol- added guaifenesin   - follow clinically, monitor VS, O2 sats  Primary hypertension  - chronic, Amlodipine stopped inpatient. BPs 120-130/60-70s  - monitor VS    Mild episode of recurrent major depressive disorder (H)  Anxiety  Chronic, with reports of associated insomnia, mild SOB. Declined ACP referral today.  - prn hydroxyzine not being utilized (used once since admit)  - schedule hydroxyzine 12.5 mg q 12 08 and  2000  - continue on longstanding buspirone and fluoxetine  - supportive approach  - monitor mood and behaviors    Nausea  - ? R/t anxiety- no vomiting or diarrhea, no abdominal discomfort.   - add Zofran 4 mg daily in a.m x 3 days AND q 6 hours prn   - encourage po intake/fluids    Hypothyroidism, unspecified type  .No recent TSH in record  - continue PTA levothyroxine    Morbid obesity (H)  Body mass index is 43.6 kg/m .   - aggravating factor in rehab -   - dietician also follows in TCU  - ongoing encouragement of healthy dietary choices and portion sizes    JEAN (obstructive sleep apnea)  - chronic, continue on CPAP home settings HS          Orders:  Written on unit    Electronically signed by: BEATRICE Mike CNP

## 2023-03-21 NOTE — PROGRESS NOTES
ASSESSMENT/PLAN:  Carol Merida is a 87 year-old who is status post ORIF right distal femur with retrograde nail, lateral plate and cable with Dr. Lemon on 2/26/23.    The incisions were examined and cleansed today. Staples were removed. The patient was given instructions to refrain from submerging in a tub or a pool until it is a well-healed scar. X-rays were obtained today which show stable alignment of hardware. She was advised to minimize weightbearing except for transfers on the right lower extremity. She was directed to return/call with any drainage from incisions or incision healing concerns.     The patient will see Dr. Lemon in 2 weeks for repeat evaluation/incision check. Carol has our clinic number and will call with any questions or concerns.    iVcki Kimball PA-C  Orthopaedic Surgery    _________________________________    HISTORY OF PRESENT ILLNESS:  Carol Merida is a 87 year old female who is approximately 2 weeks status post the above procedure. Postoperatively, she was treated for ileus and acute blood loss anemia.     Weight bearing status/devices: wheelchair, minimal WB except for transfers.   Pain level and management: pain is 7/10  Physical therapy & ROM:  Residing at [facility], working with PT there.      The patient endorses swelling around the surgical incision but denies surrounding redness. The incision has been dry, without discharge or drainage. Carol denies recent fevers and chills, as well as any other symptoms concerning for infection.     DVT prophylaxis: mechanical  Patient denies calf pain or tenderness.      MEDICATIONS:   Current Outpatient Rx   Medication Sig Dispense Refill     ACE/ARB/ARNI NOT PRESCRIBED (INTENTIONAL) Please choose reason not prescribed, below       acetaminophen (TYLENOL) 650 MG CR tablet Take 1 tablet (650 mg) by mouth every 8 hours 270 tablet 1     albuterol (PROAIR HFA/PROVENTIL HFA/VENTOLIN HFA) 108 (90 Base) MCG/ACT inhaler  INHALE 2 PUFFS INTO THE LUNGS EVERY 4 HOURS AS NEEDED FOR SHORTNESS OF BREATH OR DIFFICULT BREATHING OR WHEEZING 8.5 g 0     artificial saliva (BIOTENE MT) SOLN solution Swish and spit 2 mLs (2 sprays) in mouth 4 times daily       benzocaine-menthol (CHLORASEPTIC MAX) 15-10 MG lozenge Place 1 lozenge inside cheek every hour as needed       benzonatate (TESSALON) 100 MG capsule Take 1 capsule (100 mg) by mouth 3 times daily as needed for cough       BETA BLOCKER NOT PRESCRIBED (INTENTIONAL) Beta Blocker not prescribed intentionally due to EF > 40 % (ejection fraction) will leave up to Cardiology.       busPIRone (BUSPAR) 10 MG tablet Take 1 tablet (10 mg) by mouth 2 times daily 180 tablet 3     calcium carbonate 600 mg-vitamin D 400 units (CALTRATE) 600-400 MG-UNIT per tablet Take 1 tablet by mouth every evening  100 tablet 3     celecoxib (CELEBREX) 100 MG capsule Take 100 mg by mouth 2 times daily as needed for moderate pain (4-6)       diclofenac (VOLTAREN) 1 % topical gel Apply 4 g topically 4 times daily as needed for moderate pain 150 g 3     FLUoxetine (PROZAC) 20 MG capsule Take 3 capsules (60 mg) by mouth daily 240 capsule 3     fluticasone (FLONASE) 50 MCG/ACT nasal spray Spray 1 spray into both nostrils daily as needed for rhinitis 16 g 3     fluticasone-salmeterol (WIXELA INHUB) 100-50 MCG/ACT inhaler Inhale 1 puff into the lungs 2 times daily 60 each 5     gabapentin (NEURONTIN) 300 MG capsule Take 1 capsule (300 mg) by mouth 2 times daily       gabapentin (NEURONTIN) 600 MG tablet Take 1 tablet (600 mg) by mouth At Bedtime       guaiFENesin (MUCINEX) 600 MG 12 hr tablet Take 2 tablets (1,200 mg) by mouth daily       hydrOXYzine (ATARAX) 25 MG tablet Take 0.5 tablets (12.5 mg) by mouth every 12 hours And TID prn       levothyroxine (SYNTHROID/LEVOTHROID) 88 MCG tablet Take 1 tablet (88 mcg) by mouth daily 90 tablet 2     methocarbamol (ROBAXIN) 750 MG tablet Take 1 tablet (750 mg) by mouth 4 times daily        nystatin (NYSTOP) 906215 UNIT/GM external powder Apply tid to affectead area prn 60 g 3     olopatadine (PATANOL) 0.1 % ophthalmic solution Place 1 drop into both eyes 2 times daily as needed for allergies       ondansetron (ZOFRAN ODT) 4 MG ODT tab Take 1 tablet (4 mg) by mouth every 6 hours as needed for nausea Daily x 3 days       polyethylene glycol (MIRALAX) 17 g packet Take 17 g by mouth daily (Patient taking differently: Take 17 g by mouth daily as needed)       senna-docusate (SENOKOT-S/PERICOLACE) 8.6-50 MG tablet Take 1 tablet by mouth 2 times daily (Patient taking differently: Take 1 tablet by mouth 2 times daily as needed)       traMADol (ULTRAM) 50 MG tablet Take 0.5-1 tablets (25-50 mg) by mouth every 6 hours as needed for moderate pain (4-6) or severe pain (7-10) 26 tablet 0         ALLERGIES: Ace inhibitors       PHYSICAL EXAMINATION:   On physical examination the patient is comfortable and is in no acute distress. The affect is appropriate and breathing is non-labored.    Surgical wound: Incisions are all well-approximated. There is mild edema around the incision. Lateral lower thigh hemosiderin The skin was appropriately warm to touch.     ROM: tolerates gentle PROM of the hip. Knee ROM from 5-80 degrees  Isometric activation of the quadriceps: in tact  SLR: in tact  Gait: able to transfer to x-ray table with assist.     Motor intact distally throughout the tibial and peroneal nerve distributions, 5/5 strength with tibialis anterior, gastrocnemius and soleus, EHL, FHL firing.   Sensation intact to light touch throughout superficial peroneal, deep peroneal, tibial, saphenous, and sural nerves  Dorsalis pedis and posterior tibial pulses palpable, toes warm and well-perfused.    Imaging:  AP and lateral of the right femur including AP right hip were ordered, performed and interpreted today.     Radiology read:  FINDINGS: Nonweightbearing AP and crosstable lateral views of the  right  femur.     Ongoing healing of comminuted distal femoral shaft fracture status  post plates with screw and cerclage wire fixation. Alignment not  substantially changed. Hardware appears stable. Total right hip  arthroplasty with greater trochanteric fragment. Total right knee  arthroplasty. Soft tissues unremarkable.                                                                 IMPRESSION: Healing comminuted distal femoral periprosthetic fracture  in stable alignment status post ORIF.

## 2023-03-22 ENCOUNTER — LAB REQUISITION (OUTPATIENT)
Dept: LAB | Facility: CLINIC | Age: 88
End: 2023-03-22
Payer: COMMERCIAL

## 2023-03-22 DIAGNOSIS — I50.9 HEART FAILURE, UNSPECIFIED (H): ICD-10-CM

## 2023-03-23 ENCOUNTER — TRANSITIONAL CARE UNIT VISIT (OUTPATIENT)
Dept: GERIATRICS | Facility: CLINIC | Age: 88
End: 2023-03-23
Payer: COMMERCIAL

## 2023-03-23 VITALS
TEMPERATURE: 74 F | HEIGHT: 65 IN | WEIGHT: 262 LBS | DIASTOLIC BLOOD PRESSURE: 78 MMHG | SYSTOLIC BLOOD PRESSURE: 162 MMHG | BODY MASS INDEX: 43.65 KG/M2 | HEART RATE: 74 BPM | OXYGEN SATURATION: 95 % | RESPIRATION RATE: 18 BRPM

## 2023-03-23 DIAGNOSIS — U07.1 COVID-19: ICD-10-CM

## 2023-03-23 DIAGNOSIS — F41.9 ANXIETY: Primary | ICD-10-CM

## 2023-03-23 DIAGNOSIS — R11.0 NAUSEA: ICD-10-CM

## 2023-03-23 DIAGNOSIS — G47.33 OSA (OBSTRUCTIVE SLEEP APNEA): ICD-10-CM

## 2023-03-23 LAB
ANION GAP SERPL CALCULATED.3IONS-SCNC: 12 MMOL/L (ref 7–15)
BUN SERPL-MCNC: 15.4 MG/DL (ref 8–23)
CALCIUM SERPL-MCNC: 8.9 MG/DL (ref 8.8–10.2)
CHLORIDE SERPL-SCNC: 100 MMOL/L (ref 98–107)
CREAT SERPL-MCNC: 0.62 MG/DL (ref 0.51–0.95)
DEPRECATED HCO3 PLAS-SCNC: 29 MMOL/L (ref 22–29)
ERYTHROCYTE [DISTWIDTH] IN BLOOD BY AUTOMATED COUNT: 14.9 % (ref 10–15)
GFR SERPL CREATININE-BSD FRML MDRD: 86 ML/MIN/1.73M2
GLUCOSE SERPL-MCNC: 108 MG/DL (ref 70–99)
HCT VFR BLD AUTO: 32.7 % (ref 35–47)
HGB BLD-MCNC: 9.3 G/DL (ref 11.7–15.7)
MCH RBC QN AUTO: 27.6 PG (ref 26.5–33)
MCHC RBC AUTO-ENTMCNC: 28.4 G/DL (ref 31.5–36.5)
MCV RBC AUTO: 97 FL (ref 78–100)
PLATELET # BLD AUTO: 298 10E3/UL (ref 150–450)
POTASSIUM SERPL-SCNC: 4.4 MMOL/L (ref 3.4–5.3)
RBC # BLD AUTO: 3.37 10E6/UL (ref 3.8–5.2)
SODIUM SERPL-SCNC: 141 MMOL/L (ref 136–145)
WBC # BLD AUTO: 3.6 10E3/UL (ref 4–11)

## 2023-03-23 PROCEDURE — 36415 COLL VENOUS BLD VENIPUNCTURE: CPT | Performed by: NURSE PRACTITIONER

## 2023-03-23 PROCEDURE — P9604 ONE-WAY ALLOW PRORATED TRIP: HCPCS | Performed by: NURSE PRACTITIONER

## 2023-03-23 PROCEDURE — 85027 COMPLETE CBC AUTOMATED: CPT | Performed by: NURSE PRACTITIONER

## 2023-03-23 PROCEDURE — 99309 SBSQ NF CARE MODERATE MDM 30: CPT | Mod: CS | Performed by: NURSE PRACTITIONER

## 2023-03-23 PROCEDURE — 80048 BASIC METABOLIC PNL TOTAL CA: CPT | Performed by: NURSE PRACTITIONER

## 2023-03-23 NOTE — LETTER
"    3/23/2023        RE: Carol Merida  [address]  [place] [address]        [facility] GERIATRICS    Chief Complaint   Patient presents with     RECHECK     HPI:  Carol Merida is a 87 year old  (1935), who is being seen today for an episodic care visit at: [facility] ([facility]) [[id number]].     Patient seen today in [facility] for recheck of anxiety, nausea. Added prn Zofran and scheduled q 12 hydroxyzine for a bit on Monday.     Today nursing reports patient tested positive on rapid Covid test, done as part of routine screening. Reportedly with no new s/s. Nursing also reports have noted patient's CPAP has been in the closet and patient has not been wearing since arrived. Is now set up and patient has started wearing HS.     Today patient is found in room sitting up in a chair finishing up brunch. Reports has noted improvement in anxiety/mood and denies nausea currently stating \" I think that is getting better.\". Denies SOB, cough, chest pain, dizziness. Denies n/v or abdominal discomfort. Reports ate a good lunch. Denies pain.    VS and weights reviewed in facility EMR and are stable.     Allergies, and PMH/PSH reviewed in EPIC today.  REVIEW OF SYSTEMS:  4 point ROS including Respiratory, CV, GI and , other than that noted in the HPI,  is negative    Objective:   BP (!) 162/78   Pulse 74   Temp (!) 74  F (23.3  C)   Resp 18   Ht 1.651 m (5' 5\")   Wt 118.8 kg (262 lb)   LMP  (LMP Unknown)   SpO2 95%   BMI 43.60 kg/m      Resp: Effort WNL, LSCTA   CV: VS as above, trace RLE edema  Abd- soft, nontender, BS +  Musc- MAEE- in chair  Psych- alert, calm, pleasant      Recent labs in EPIC reviewed by me today.     Assessment/Plan:  Anxiety  - chronic, situational stressors in play. Scheduled hydroxyzine 12.5 mg q 12 hours and kept one time daily prn  - also remains on PTA fluoxetine, Buspar and gabapentin  - monitor mood and behaviors, supportive approach  - ACP prn- (patient did decline on " Monday)    Nausea  - isolated incident. Moving bowels well, no vomiting noted/reported and eating well today.  - continue prn Zofran and monitoring for recurrence  - ongoing monitoring of bowel pattern    COVID-19  - + result on rapid screen today. No noted or reported s/s currently.  - monitor closely for any new clinical s/s over next few days as if develop will entertain starting Paxlovid  - does have scheduled guaifenesin and prn benzonatate which patient uses regularly at home    JEAN (obstructive sleep apnea)  - chronic, was not using home CPAP - was in closet. Is now set up. Respiratory status is and has been stable  - CPAP HS per patient's home settings      Orders written on unit. POC discussed with nursing    Electronically signed by: BEATRICE Mike CNP           Sincerely,        BEATRICE Mike CNP

## 2023-03-23 NOTE — PROGRESS NOTES
"[facility] GERIATRICS    Chief Complaint   Patient presents with     RECHECK     HPI:  Carol Merida is a 87 year old  (1935), who is being seen today for an episodic care visit at: [facility] ([facility]) [[id number]].     Patient seen today in [facility] for recheck of anxiety, nausea. Added prn Zofran and scheduled q 12 hydroxyzine for a bit on Monday.     Today nursing reports patient tested positive on rapid Covid test, done as part of routine screening. Reportedly with no new s/s. Nursing also reports have noted patient's CPAP has been in the closet and patient has not been wearing since arrived. Is now set up and patient has started wearing HS.     Today patient is found in room sitting up in a chair finishing up brunch. Reports has noted improvement in anxiety/mood and denies nausea currently stating \" I think that is getting better.\". Denies SOB, cough, chest pain, dizziness. Denies n/v or abdominal discomfort. Reports ate a good lunch. Denies pain.    VS and weights reviewed in facility EMR and are stable.     Allergies, and PMH/PSH reviewed in EPIC today.  REVIEW OF SYSTEMS:  4 point ROS including Respiratory, CV, GI and , other than that noted in the HPI,  is negative    Objective:   BP (!) 162/78   Pulse 74   Temp (!) 74  F (23.3  C)   Resp 18   Ht 1.651 m (5' 5\")   Wt 118.8 kg (262 lb)   LMP  (LMP Unknown)   SpO2 95%   BMI 43.60 kg/m      Resp: Effort WNL, LSCTA   CV: VS as above, trace RLE edema  Abd- soft, nontender, BS +  Musc- MAEE- in chair  Psych- alert, calm, pleasant      Recent labs in EPIC reviewed by me today.     Assessment/Plan:  Anxiety  - chronic, situational stressors in play. Scheduled hydroxyzine 12.5 mg q 12 hours and kept one time daily prn  - also remains on PTA fluoxetine, Buspar and gabapentin  - monitor mood and behaviors, supportive approach  - ACP prn- (patient did decline on Monday)    Nausea  - isolated incident. Moving bowels well, no vomiting noted/reported and " eating well today.  - continue prn Zofran and monitoring for recurrence  - ongoing monitoring of bowel pattern    COVID-19  - + result on rapid screen today. No noted or reported s/s currently.  - monitor closely for any new clinical s/s over next few days as if develop will entertain starting Paxlovid  - does have scheduled guaifenesin and prn benzonatate which patient uses regularly at home    JEAN (obstructive sleep apnea)  - chronic, was not using home CPAP - was in closet. Is now set up. Respiratory status is and has been stable  - CPAP HS per patient's home settings      Orders written on unit. POC discussed with nursing    Electronically signed by: BEATRICE Mike CNP

## 2023-03-27 ENCOUNTER — TRANSITIONAL CARE UNIT VISIT (OUTPATIENT)
Dept: GERIATRICS | Facility: CLINIC | Age: 88
End: 2023-03-27
Payer: COMMERCIAL

## 2023-03-27 VITALS
OXYGEN SATURATION: 93 % | RESPIRATION RATE: 18 BRPM | HEART RATE: 71 BPM | TEMPERATURE: 97.9 F | BODY MASS INDEX: 43.65 KG/M2 | DIASTOLIC BLOOD PRESSURE: 66 MMHG | SYSTOLIC BLOOD PRESSURE: 136 MMHG | HEIGHT: 65 IN | WEIGHT: 262 LBS

## 2023-03-27 DIAGNOSIS — I50.32 CHRONIC HEART FAILURE WITH PRESERVED EJECTION FRACTION (HFPEF) (H): ICD-10-CM

## 2023-03-27 DIAGNOSIS — R05.3 CHRONIC COUGH: ICD-10-CM

## 2023-03-27 DIAGNOSIS — G47.33 OSA (OBSTRUCTIVE SLEEP APNEA): ICD-10-CM

## 2023-03-27 DIAGNOSIS — D62 ANEMIA DUE TO BLOOD LOSS, ACUTE: ICD-10-CM

## 2023-03-27 DIAGNOSIS — U07.1 COVID-19: ICD-10-CM

## 2023-03-27 DIAGNOSIS — M97.8XXA PERIPROSTHETIC FRACTURE OF SHAFT OF FEMUR: ICD-10-CM

## 2023-03-27 DIAGNOSIS — K56.0 PARALYTIC ILEUS (H): ICD-10-CM

## 2023-03-27 DIAGNOSIS — R11.0 NAUSEA: ICD-10-CM

## 2023-03-27 DIAGNOSIS — F33.0 MILD EPISODE OF RECURRENT MAJOR DEPRESSIVE DISORDER (H): ICD-10-CM

## 2023-03-27 DIAGNOSIS — F41.9 ANXIETY: Primary | ICD-10-CM
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
## 2023-03-27 DIAGNOSIS — R53.81 PHYSICAL DECONDITIONING: ICD-10-CM

## 2023-03-27 DIAGNOSIS — Z96.649 PERIPROSTHETIC FRACTURE OF SHAFT OF FEMUR: ICD-10-CM

## 2023-03-27 DIAGNOSIS — I10 PRIMARY HYPERTENSION: ICD-10-CM

## 2023-03-27 PROCEDURE — 99309 SBSQ NF CARE MODERATE MDM 30: CPT | Mod: CS | Performed by: NURSE PRACTITIONER

## 2023-03-27 NOTE — PROGRESS NOTES
"[facility] GERIATRICS    Chief Complaint   Patient presents with     RECHECK     HPI:  Carol Merida is a 87 year old  (1935), who is being seen today for an episodic care visit at: [facility] ([facility]) [[id number]].     Patient seen today for recheck of multiple acute and chronic medical issues:    1. Anxiety    2. Nausea    3. COVID-19    4. JEAN (obstructive sleep apnea)    5. Periprosthetic fracture of shaft of femur    6. Fall, subsequent encounter    7. Physical deconditioning    8. Anemia due to blood loss, acute    9. Paralytic ileus (H)    10. Chronic heart failure with preserved ejection fraction (HFpEF) (H)    11. Primary hypertension    12. Mild episode of recurrent major depressive disorder (H)      Covid + on rapid 3/23. No s/s noted at that time and nursing has been monitoring. Patient is on 10 day contact/droplet isolation 2/2 contagious state.     Nursing reports noting loose cough today. Reports patient has not been wearing her BiPAP at night which is now set up in room at bedside.     Patient found in room sitting up in chair. Reports is frustrated with Covid diagnosis and isolation. Reports has ortho f/u appt Wednesday that now needs to be rescheduled. Reports associated anxiety. Writer listened and consoled. After some time patient agreed to visit with house psychologist for coping. Patient reports has cough and mild SOB but states this is baseline and chronic. States sometimes has trouble clearing secretions which is why she prefers benzonatate to be scheduled. Writer explained the better medication to help thin secretions is guaifenesin which could be increased and patient agreed to this. Discussed BiPAP reason for not wearing as recommended and patient reports \"I don't think the settings are correct\" \"I can't tolerate it\". Denies fever, chills. Denies sore throat. Denies n/v, diarrhea or abdominal pain. Ate breakfast.     Allergies, and PMH/PSH reviewed in [facility] today.  REVIEW OF " "SYSTEMS:  4 point ROS including Respiratory, CV, GI and , other than that noted in the HPI,  is negative    Objective:   /66   Pulse 71   Temp 97.9  F (36.6  C)   Resp 18   Ht 1.651 m (5' 5\")   Wt 118.8 kg (262 lb)   LMP  (LMP Unknown)   SpO2 93%   BMI 43.60 kg/m      Resp: Effort WNL, LSCTA   CV: VS as above, no edema noted  Abd- soft, nontender, BS +  Musc- MAEE, up in chair  Psych- alert, frustrated, mildly anxious        Most Recent 3 CBC's:  Recent Labs   Lab Test 03/23/23  0627 03/13/23  0858 03/06/23  0759   WBC 3.6* 9.0 7.6   HGB 9.3* 8.8* 8.2*   MCV 97 98 97    475* 415     Most Recent 3 BMP's:  Recent Labs   Lab Test 03/23/23 0627 03/13/23  0858 03/06/23  0759    142 139   POTASSIUM 4.4 3.4 3.4   CHLORIDE 100 99 101   CO2 29 27 27   BUN 15.4 12.4 9.5   CR 0.62 0.55 0.55   ANIONGAP 12 16* 11   LAKSHMI 8.9 9.1 8.6*   * 154* 107*       Assessment/Plan:  Anxiety  Mild episode of recurrent major depressive disorder (H)  - chronic, exacerbated by situational stressors.  - did scheduled hydroxyzine 12.5 mg q 12 and daily prn which seemed to have helped some but now Covid diagnosis has caused patient additional stress and anxiety.   - continues on PTA fluoxetine, Buspar and gabapentin  - ACP referral - writer updated nurse manager, patient declined previously but is open to it now.  - supportive approach  - monitor mood and behaviors     Nausea  - transient. Prn Zofran in place. Eating ok this week.  - monitor    COVID-19  Chronic cough  3/23 on routine rapid screen. No new s/s noted.   - continue on monitoring of clinical status and continue on PTA fluticasone-salmeterol and prn benzonatate and albuterol inh  - also added scheduled guaifenesin daily, will increase to q 12 today   - continue 10 day contact/droplet isolation      JEAN (obstructive sleep apnea)  - chronic, has home BiPAP here. For some reason stating unable to tolerate currently.  - writer discussed with nursing. " Unable to find home settings in Epic chart. Request nursing contact RT to evaluate and [place] [facility] to inquire about prescribed settings and ensure BiPAP is set as such  - encourage HS use as prescribed    Periprosthetic fracture of shaft of femur  S/p ORIF 2/26. F/w ortho 3/14 for recheck and suture removal. Remains NWB. Reportedly has f/u appt 3/29 but is now with Covid and in isolation.  - writer discussed with nursing. Please contact Dr. Lemon's office to reschedule appt and/or ask if f/u x-rays can be done here.   - continue NWB, keep incision clean and dry  Fall, subsequent encounter  Physical deconditioning  - continue PT/OT to optimize function, safety and independence  - continue Supportive cares and treatments  - ongoing discharge planning, SW follow and care conferences per unit protocol      Anemia due to blood loss, acute  - postop. HGB 12 --> 8.1 3/13 --> 9.3 3/23  - no s/s bleeding  - continue to monitor   - recheck periodic CBC to ensure stability    Paralytic ileus (H)  Postop. Managed with decompression with NGT. Resolved prior to admission. Some transient nausea as above. Patient is eating well and moving bowels regularly  - resolved    Chronic heart failure with preserved ejection fraction (HFpEF) (H)  EF > 70%. No recent weight but ones listed have been very stable. No new edema noted. Resp status is stable/baseline. RA sats > 90%  - monitor weights and VS    Primary hypertension  - chronic, amlodipine stopped inpatient. BPs 120-130/70-80s  - monitor VS          Orders:  Written on unit and discussed with patient and nursing    Electronically signed by: BEATRICE Mike CNP

## 2023-03-27 NOTE — LETTER
"    3/27/2023        RE: Carol Merida  [address]  [place] [address]        [facility] GERIATRICS    Chief Complaint   Patient presents with     RECHECK     HPI:  Carol Merida is a 87 year old  (1935), who is being seen today for an episodic care visit at: [facility] ([facility]) [[id number]].     Patient seen today for recheck of multiple acute and chronic medical issues:    1. Anxiety    2. Nausea    3. COVID-19    4. JEAN (obstructive sleep apnea)    5. Periprosthetic fracture of shaft of femur    6. Fall, subsequent encounter    7. Physical deconditioning    8. Anemia due to blood loss, acute    9. Paralytic ileus (H)    10. Chronic heart failure with preserved ejection fraction (HFpEF) (H)    11. Primary hypertension    12. Mild episode of recurrent major depressive disorder (H)      Covid + on rapid 3/23. No s/s noted at that time and nursing has been monitoring. Patient is on 10 day contact/droplet isolation 2/2 contagious state.     Nursing reports noting loose cough today. Reports patient has not been wearing her BiPAP at night which is now set up in room at bedside.     Patient found in room sitting up in chair. Reports is frustrated with Covid diagnosis and isolation. Reports has ortho f/u appt Wednesday that now needs to be rescheduled. Reports associated anxiety. Writer listened and consoled. After some time patient agreed to visit with house psychologist for coping. Patient reports has cough and mild SOB but states this is baseline and chronic. States sometimes has trouble clearing secretions which is why she prefers benzonatate to be scheduled. Writer explained the better medication to help thin secretions is guaifenesin which could be increased and patient agreed to this. Discussed BiPAP reason for not wearing as recommended and patient reports \"I don't think the settings are correct\" \"I can't tolerate it\". Denies fever, chills. Denies sore throat. Denies n/v, diarrhea or " "abdominal pain. Ate breakfast.     Allergies, and PMH/PSH reviewed in EPIC today.  REVIEW OF SYSTEMS:  4 point ROS including Respiratory, CV, GI and , other than that noted in the HPI,  is negative    Objective:   /66   Pulse 71   Temp 97.9  F (36.6  C)   Resp 18   Ht 1.651 m (5' 5\")   Wt 118.8 kg (262 lb)   LMP  (LMP Unknown)   SpO2 93%   BMI 43.60 kg/m      Resp: Effort WNL, LSCTA   CV: VS as above, no edema noted  Abd- soft, nontender, BS +  Musc- MAEE, up in chair  Psych- alert, frustrated, mildly anxious        Most Recent 3 CBC's:  Recent Labs   Lab Test 03/23/23 0627 03/13/23  0858 03/06/23  0759   WBC 3.6* 9.0 7.6   HGB 9.3* 8.8* 8.2*   MCV 97 98 97    475* 415     Most Recent 3 BMP's:  Recent Labs   Lab Test 03/23/23 0627 03/13/23  0858 03/06/23  0759    142 139   POTASSIUM 4.4 3.4 3.4   CHLORIDE 100 99 101   CO2 29 27 27   BUN 15.4 12.4 9.5   CR 0.62 0.55 0.55   ANIONGAP 12 16* 11   LAKSHMI 8.9 9.1 8.6*   * 154* 107*       Assessment/Plan:  Anxiety  Mild episode of recurrent major depressive disorder (H)  - chronic, exacerbated by situational stressors.  - did scheduled hydroxyzine 12.5 mg q 12 and daily prn which seemed to have helped some but now Covid diagnosis has caused patient additional stress and anxiety.   - continues on PTA fluoxetine, Buspar and gabapentin  - ACP referral - writer updated nurse manager, patient declined previously but is open to it now.  - supportive approach  - monitor mood and behaviors     Nausea  - transient. Prn Zofran in place. Eating ok this week.  - monitor    COVID-19  Chronic cough  3/23 on routine rapid screen. No new s/s noted.   - continue on monitoring of clinical status and continue on PTA fluticasone-salmeterol and prn benzonatate and albuterol inh  - also added scheduled guaifenesin daily, will increase to q 12 today   - continue 10 day contact/droplet isolation      JEAN (obstructive sleep apnea)  - chronic, has home BiPAP here. " For some reason stating unable to tolerate currently.  - writer discussed with nursing. Unable to find home settings in Epic chart. Request nursing contact RT to evaluate and [place] [facility] to inquire about prescribed settings and ensure BiPAP is set as such  - encourage HS use as prescribed    Periprosthetic fracture of shaft of femur  S/p ORIF 2/26. F/w ortho 3/14 for recheck and suture removal. Remains NWB. Reportedly has f/u appt 3/29 but is now with Covid and in isolation.  - writer discussed with nursing. Please contact Dr. Lemon's office to reschedule appt and/or ask if f/u x-rays can be done here.   - continue NWB, keep incision clean and dry  Fall, subsequent encounter  Physical deconditioning  - continue PT/OT to optimize function, safety and independence  - continue Supportive cares and treatments  - ongoing discharge planning, SW follow and care conferences per unit protocol      Anemia due to blood loss, acute  - postop. HGB 12 --> 8.1 3/13 --> 9.3 3/23  - no s/s bleeding  - continue to monitor   - recheck periodic CBC to ensure stability    Paralytic ileus (H)  Postop. Managed with decompression with NGT. Resolved prior to admission. Some transient nausea as above. Patient is eating well and moving bowels regularly  - resolved    Chronic heart failure with preserved ejection fraction (HFpEF) (H)  EF > 70%. No recent weight but ones listed have been very stable. No new edema noted. Resp status is stable/baseline. RA sats > 90%  - monitor weights and VS    Primary hypertension  - chronic, amlodipine stopped inpatient. BPs 120-130/70-80s  - monitor VS          Orders:  Written on unit and discussed with patient and nursing    Electronically signed by: BEATRICE Mike CNP           Sincerely,        BEATRICE Mike CNP

## 2023-03-29 ENCOUNTER — ANCILLARY PROCEDURE (OUTPATIENT)
Dept: GENERAL RADIOLOGY | Facility: CLINIC | Age: 88
End: 2023-03-29
Attending: ORTHOPAEDIC SURGERY
Payer: COMMERCIAL

## 2023-03-29 ENCOUNTER — OFFICE VISIT (OUTPATIENT)
Dept: ORTHOPEDICS | Facility: CLINIC | Age: 88
End: 2023-03-29
Payer: COMMERCIAL

## 2023-03-29 DIAGNOSIS — Z96.659 PERI-PROSTHETIC SUPRACONDYLAR FRACTURE OF FEMUR, INITIAL ENCOUNTER: Primary | ICD-10-CM

## 2023-03-29 DIAGNOSIS — Z96.649 PERIPROSTHETIC FRACTURE OF SHAFT OF FEMUR: ICD-10-CM

## 2023-03-29 DIAGNOSIS — E55.9 VITAMIN D DEFICIENCY: ICD-10-CM

## 2023-03-29 DIAGNOSIS — M97.8XXA PERI-PROSTHETIC SUPRACONDYLAR FRACTURE OF FEMUR, INITIAL ENCOUNTER: Primary | ICD-10-CM

## 2023-03-29 DIAGNOSIS — M97.8XXA PERIPROSTHETIC FRACTURE OF SHAFT OF FEMUR: ICD-10-CM

## 2023-03-29 DIAGNOSIS — E66.01 SEVERE OBESITY (BMI >= 40) (H): ICD-10-CM

## 2023-03-29 DIAGNOSIS — Z96.659 PERI-PROSTHETIC SUPRACONDYLAR FRACTURE OF FEMUR, INITIAL ENCOUNTER: ICD-10-CM

## 2023-03-29 DIAGNOSIS — M97.8XXA PERI-PROSTHETIC SUPRACONDYLAR FRACTURE OF FEMUR, INITIAL ENCOUNTER: ICD-10-CM

## 2023-03-29 PROCEDURE — 99024 POSTOP FOLLOW-UP VISIT: CPT | Performed by: ORTHOPAEDIC SURGERY

## 2023-03-29 PROCEDURE — 73552 X-RAY EXAM OF FEMUR 2/>: CPT | Mod: RT | Performed by: RADIOLOGY

## 2023-03-29 RX ORDER — GUAIFENESIN 600 MG/1
600 TABLET, EXTENDED RELEASE ORAL 2 TIMES DAILY
Start: 2023-03-29 | End: 2023-05-09

## 2023-03-29 NOTE — NURSING NOTE
Reason For Visit:   Chief Complaint   Patient presents with     Surgical Followup     4 wk POP right distal femur ORIF DOS: 2/26/23       LMP  (LMP Unknown)     Pain Assessment  Patient Currently in Pain: No (Right leg pain with movement)    VINNIE Orantes

## 2023-03-29 NOTE — PROGRESS NOTES
Chief concern: 1 month follow-up status post open reduction internal fixation of right distal femur periprosthetic fracture.  Everette is accompanied by her daughter today.  She has been at skilled care facility as she is unable to ambulate at this time.  She reports that her pain continually improves.  She has been largely dependent in a wheelchair and is bearing partial weight for transfers on the right leg.  She feels very apprehensive about starting to walk given her history with fractures of that leg.  She had staples removed approximately 2 weeks ago.  Denies any concern for incisional drainage or fluctuance.    On examination she is alert and oriented in no acute distress  She is in a wheelchair.  There is no fluctuance or erythema along the distal half of the incision which I examined.  She has good strength with knee flexion extension, ankle plantar dorsiflexion.  Active range of motion is from 5 to 115 degrees of right knee flexion.  Incision gross intact light touch    We reviewed AP and lateral views of the entire femur which show stable hardware alignment without any evidence of loosening or complication.  There are few areas along the fracture lines which are starting to show evidence of callus formation.  \  \  Impression plan:  87-year-old female with multiple comorbidities now 1 month status post open reduction internal fixation of right distal femur fracture.  Specifically I used a short intramedullary nail and bridged to the endoprosthetic distance with a long locking lateral femur plate.  I counseled patient that it is too early now to see a definitive fracture union but I am overall happy with alignment fixation.  Okay to begin advancing weightbearing.  In addition to weightbearing on the right lower extremity for transfers I am okay with her starting to ambulate approximately 50% body weight in the right lower extremity using a walker.  Physical therapy may also work on strengthening the abductors,  hip flexors and extenders as well as quads and hamstrings.    I would like to see her back in clinic in in 1 month with repeat radiographs of the right femur.  Copy of this clinic note was printed off and provided for the transitional care unit.    Newton Lemon MD  Orthopedic Spine Surgeon  , Department of Orthopedic Surgery

## 2023-03-30 ENCOUNTER — TRANSITIONAL CARE UNIT VISIT (OUTPATIENT)
Dept: GERIATRICS | Facility: CLINIC | Age: 88
End: 2023-03-30
Payer: COMMERCIAL

## 2023-03-30 VITALS
RESPIRATION RATE: 18 BRPM | SYSTOLIC BLOOD PRESSURE: 138 MMHG | TEMPERATURE: 98.3 F | OXYGEN SATURATION: 93 % | BODY MASS INDEX: 43.65 KG/M2 | DIASTOLIC BLOOD PRESSURE: 75 MMHG | WEIGHT: 262 LBS | HEART RATE: 72 BPM | HEIGHT: 65 IN

## 2023-03-30 DIAGNOSIS — D62 ANEMIA DUE TO BLOOD LOSS, ACUTE: ICD-10-CM

## 2023-03-30 DIAGNOSIS — Z96.649 PERIPROSTHETIC FRACTURE OF SHAFT OF FEMUR: ICD-10-CM

## 2023-03-30 DIAGNOSIS — U07.1 COVID-19: Primary | ICD-10-CM

## 2023-03-30 DIAGNOSIS — F33.0 MILD EPISODE OF RECURRENT MAJOR DEPRESSIVE DISORDER (H): ICD-10-CM

## 2023-03-30 DIAGNOSIS — I10 PRIMARY HYPERTENSION: ICD-10-CM

## 2023-03-30 DIAGNOSIS — I50.32 CHRONIC HEART FAILURE WITH PRESERVED EJECTION FRACTION (HFPEF) (H): ICD-10-CM

## 2023-03-30 DIAGNOSIS — F41.9 ANXIETY: ICD-10-CM

## 2023-03-30 DIAGNOSIS — M97.8XXA PERIPROSTHETIC FRACTURE OF SHAFT OF FEMUR: ICD-10-CM
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
## 2023-03-30 DIAGNOSIS — R05.3 CHRONIC COUGH: ICD-10-CM

## 2023-03-30 DIAGNOSIS — Z96.653 HISTORY OF BILATERAL KNEE REPLACEMENT: ICD-10-CM

## 2023-03-30 DIAGNOSIS — R53.81 PHYSICAL DECONDITIONING: ICD-10-CM

## 2023-03-30 PROCEDURE — 99309 SBSQ NF CARE MODERATE MDM 30: CPT | Mod: CS | Performed by: NURSE PRACTITIONER

## 2023-03-30 NOTE — LETTER
"    3/30/2023        RE: Carol Merida  [address]  [place] [address]        [facility] GERIATRICS    Chief Complaint   Patient presents with     RECHECK     HPI:  Carol Merida is a 87 year old  (1935), who is being seen today for an episodic care visit at: [facility] ([facility]) [[id number]].     Patient seen today in [facility] for recheck of multiple acute and chronic medical issues:    1. COVID-19    2. Chronic cough    3. Anxiety    4. Mild episode of recurrent major depressive disorder (H)    5. Anemia due to blood loss, acute    6. Periprosthetic fracture of shaft of femur    7. Fall, subsequent encounter    8. Physical deconditioning    9. Chronic heart failure with preserved ejection fraction (HFpEF) (H)    10. Primary hypertension      Nursing reports no new concerns.     VS and weights reviewed in facility EMR and are stable.     Patient found in room. Alert, calm, NAD. Reports feels good today. Reports cough is better today. Denies new congestion or SOB. Denies fever or chills. Reports does not think she has Covid. Reports some anxiety yet about the 10 day isolation for Covid. Writer listened and supported and reminded patient only has 3 days left. Denies pain currently    Allergies, and PMH/PSH reviewed in EPIC today.  REVIEW OF SYSTEMS:  4 point ROS including Respiratory, CV, GI and , other than that noted in the HPI,  is negative    Objective:   /75   Pulse 72   Temp 98.3  F (36.8  C)   Resp 18   Ht 1.651 m (5' 5\")   Wt 118.8 kg (262 lb)   LMP  (LMP Unknown)   SpO2 93%   BMI 43.60 kg/m      Resp: Effort WNL, LSCTA   CV: VS as above, no edema noted  Abd- soft, nontender, BS +  Musc- LAI- up in recliner  Psych- alert, calm, pleasant        Most Recent 3 CBC's:  Recent Labs   Lab Test 03/23/23  0627 03/13/23  0858 03/06/23  0759   WBC 3.6* 9.0 7.6   HGB 9.3* 8.8* 8.2*   MCV 97 98 97    475* 415     Most Recent 3 BMP's:  Recent Labs   Lab Test 03/23/23  0627 " 03/13/23  0858 03/06/23  0759    142 139   POTASSIUM 4.4 3.4 3.4   CHLORIDE 100 99 101   CO2 29 27 27   BUN 15.4 12.4 9.5   CR 0.62 0.55 0.55   ANIONGAP 12 16* 11   LAKSHMI 8.9 9.1 8.6*   * 154* 107*       Assessment/Plan:  COVID-19  Dx 3/23 on routine rapid screen. No new s/s. Afebrile  - completing 10 day course of droplet/contact isolation 2/2 contagious state  Chronic cough  - improved with addition of guaifenesin  - continues on PTA fluticasone-salmeterol and prn benzonatate and albuterol inh    Anxiety  - chronic, has been exacerbated with recent situational stressors. Improved today. Did agree to visit with house psychologist and ACP referral made end of last week  Mild episode of recurrent major depressive disorder (H)  - chronic  - continue on scheduled and prn hydroxyzine and PTA fluoxetine, Buspar and gabapentin.  - monitor mood and behaviors    Anemia due to blood loss, acute  - post op, HGB 12 --> 8.1 and trending up to 9.3 last 3/23. No s/s bleeding  - continue to monitor for s/s bleeding  - recheck periodic CBC to ensure stability    Periprosthetic fracture of shaft of femur  S/p ORIF 2/26- f/w ortho 3/14 with good report. NWB. Advanced to 50 % WB at 3/29 appt. Incision healing without issue. Minimal pain.  - continues on scheduled Robaxin, gabapentin, acetaminophen ER and prn low dose hydroxyzine and Celebrex and Tramadol for pain control  Fall, subsequent encounter  -mechanical, injury as above  Physical deconditioning  - continue PT/OT to optimize function, safety and independence  - continue Supportive cares and treatments  - ongoing discharge planning, SW follow and care conferences per unit protocol      Chronic heart failure with preserved ejection fraction (HFpEF) (H)  - compensated. Weights stable.  Primary hypertension  - chronic, controlled. Amlodipine stopped inpatient  - continue to monitor VS      Discussed plan of care with nursing    Electronically signed by: Beatriz Nicolas  BEATRICE George CNP           Sincerely,        BEATRICE Mike CNP

## 2023-03-30 NOTE — PROGRESS NOTES
"[facility] GERIATRICS    Chief Complaint   Patient presents with     RECHECK     HPI:  Carol Merida is a 87 year old  (1935), who is being seen today for an episodic care visit at: [facility] ([facility]) [[id number]].     Patient seen today in U for recheck of multiple acute and chronic medical issues:    1. COVID-19    2. Chronic cough    3. Anxiety    4. Mild episode of recurrent major depressive disorder (H)    5. Anemia due to blood loss, acute    6. Periprosthetic fracture of shaft of femur    7. Fall, subsequent encounter    8. Physical deconditioning    9. Chronic heart failure with preserved ejection fraction (HFpEF) (H)    10. Primary hypertension      Nursing reports no new concerns.     VS and weights reviewed in facility EMR and are stable.     Patient found in room. Alert, calm, NAD. Reports feels good today. Reports cough is better today. Denies new congestion or SOB. Denies fever or chills. Reports does not think she has Covid. Reports some anxiety yet about the 10 day isolation for Covid. Writer listened and supported and reminded patient only has 3 days left. Denies pain currently    Allergies, and PMH/PSH reviewed in EPIC today.  REVIEW OF SYSTEMS:  4 point ROS including Respiratory, CV, GI and , other than that noted in the HPI,  is negative    Objective:   /75   Pulse 72   Temp 98.3  F (36.8  C)   Resp 18   Ht 1.651 m (5' 5\")   Wt 118.8 kg (262 lb)   LMP  (LMP Unknown)   SpO2 93%   BMI 43.60 kg/m      Resp: Effort WNL, LSCTA   CV: VS as above, no edema noted  Abd- soft, nontender, BS +  Musc- LAI- up in recliner  Psych- alert, calm, pleasant        Most Recent 3 CBC's:  Recent Labs   Lab Test 03/23/23  0627 03/13/23  0858 03/06/23  0759   WBC 3.6* 9.0 7.6   HGB 9.3* 8.8* 8.2*   MCV 97 98 97    475* 415     Most Recent 3 BMP's:  Recent Labs   Lab Test 03/23/23  0627 03/13/23  0858 03/06/23  0759    142 139   POTASSIUM 4.4 3.4 3.4   CHLORIDE 100 99 101   CO2 " 29 27 27   BUN 15.4 12.4 9.5   CR 0.62 0.55 0.55   ANIONGAP 12 16* 11   LAKSHMI 8.9 9.1 8.6*   * 154* 107*       Assessment/Plan:  COVID-19  Dx 3/23 on routine rapid screen. No new s/s. Afebrile  - completing 10 day course of droplet/contact isolation 2/2 contagious state  Chronic cough  - improved with addition of guaifenesin  - continues on PTA fluticasone-salmeterol and prn benzonatate and albuterol inh    Anxiety  - chronic, has been exacerbated with recent situational stressors. Improved today. Did agree to visit with house psychologist and ACP referral made end of last week  Mild episode of recurrent major depressive disorder (H)  - chronic  - continue on scheduled and prn hydroxyzine and PTA fluoxetine, Buspar and gabapentin.  - monitor mood and behaviors    Anemia due to blood loss, acute  - post op, HGB 12 --> 8.1 and trending up to 9.3 last 3/23. No s/s bleeding  - continue to monitor for s/s bleeding  - recheck periodic CBC to ensure stability    Periprosthetic fracture of shaft of femur  S/p ORIF 2/26- f/w ortho 3/14 with good report. NWB. Advanced to 50 % WB at 3/29 appt. Incision healing without issue. Minimal pain.  - continues on scheduled Robaxin, gabapentin, acetaminophen ER and prn low dose hydroxyzine and Celebrex and Tramadol for pain control  Fall, subsequent encounter  -mechanical, injury as above  Physical deconditioning  - continue PT/OT to optimize function, safety and independence  - continue Supportive cares and treatments  - ongoing discharge planning, SW follow and care conferences per unit protocol      Chronic heart failure with preserved ejection fraction (HFpEF) (H)  - compensated. Weights stable.  Primary hypertension  - chronic, controlled. Amlodipine stopped inpatient  - continue to monitor VS      Discussed plan of care with nursing    Electronically signed by: BEATRICE Mike CNP

## 2023-04-02 RX ORDER — POLYETHYLENE GLYCOL 3350 17 G/17G
17 POWDER, FOR SOLUTION ORAL DAILY PRN
Start: 2023-04-02 | End: 2023-05-21

## 2023-04-02 RX ORDER — AMOXICILLIN 250 MG
1 CAPSULE ORAL 2 TIMES DAILY PRN
Start: 2023-04-02 | End: 2023-05-29

## 2023-04-10 ENCOUNTER — TRANSITIONAL CARE UNIT VISIT (OUTPATIENT)
Dept: GERIATRICS | Facility: CLINIC | Age: 88
End: 2023-04-10
Payer: COMMERCIAL

## 2023-04-10 VITALS
OXYGEN SATURATION: 94 % | HEART RATE: 78 BPM | TEMPERATURE: 98 F | SYSTOLIC BLOOD PRESSURE: 130 MMHG | BODY MASS INDEX: 43.65 KG/M2 | WEIGHT: 262 LBS | RESPIRATION RATE: 17 BRPM | DIASTOLIC BLOOD PRESSURE: 64 MMHG | HEIGHT: 65 IN

## 2023-04-10 DIAGNOSIS — I10 PRIMARY HYPERTENSION: ICD-10-CM

## 2023-04-10 DIAGNOSIS — D62 ANEMIA DUE TO BLOOD LOSS, ACUTE: ICD-10-CM

## 2023-04-10 DIAGNOSIS — R53.81 PHYSICAL DECONDITIONING: ICD-10-CM

## 2023-04-10 DIAGNOSIS — R05.3 CHRONIC COUGH: ICD-10-CM

## 2023-04-10 DIAGNOSIS — I50.32 CHRONIC HEART FAILURE WITH PRESERVED EJECTION FRACTION (HFPEF) (H): ICD-10-CM

## 2023-04-10 DIAGNOSIS — Z47.89 AFTERCARE FOLLOWING SURGERY OF THE MUSCULOSKELETAL SYSTEM: Primary | ICD-10-CM

## 2023-04-10 DIAGNOSIS — U07.1 COVID-19: ICD-10-CM

## 2023-04-10 DIAGNOSIS — F41.9 ANXIETY: ICD-10-CM

## 2023-04-10 DIAGNOSIS — F33.0 MILD EPISODE OF RECURRENT MAJOR DEPRESSIVE DISORDER (H): ICD-10-CM

## 2023-04-10 PROCEDURE — 99309 SBSQ NF CARE MODERATE MDM 30: CPT | Performed by: NURSE PRACTITIONER

## 2023-04-10 NOTE — LETTER
"    4/10/2023        RE: Carol Merida  [address]  [facility] [address]        [facility] GERIATRICS    Chief Complaint   Patient presents with     RECHECK     HPI:  Carol Merida is a 87 year old  (1935), who is being seen today for an episodic care visit at: [facility] ([facility]) [[id number]].     Patient is seen today in [facility] for recheck of multiple acute and chronic medical issues:    1. Aftercare following surgery of the musculoskeletal system    2. Physical deconditioning    3. Anemia due to blood loss, acute    4. COVID-19    5. Chronic cough    6. Chronic heart failure with preserved ejection fraction (HFpEF) (H)    7. Primary hypertension    8. Anxiety    9. Mild episode of recurrent major depressive disorder (H)      Nursing reports no new concerns.     Patient found sitting up in chair in room after having just completed breakfast. Reports \"feel good\". Denies SOB, cough, chest pain or dizziness. Denies pain. Reports ate well for breakfast. Denies noting n/v or abdominal pain. Denies noting issues with bowel or bladder. Is wondering about discharge plan. [facility] does not have another appt with ortho until until May 4th. Currently 50 % WB RLE and [facility] is able to transfer well and dress without much assistance. Discussed IDT meeting and discussion will need to be had about therapy progress from their perspective and timing of next appt and likely full weight bearing at that time. Patient would be discharging to A/L. Patient reporting has dry mouth and is wondering if r/t med change. Discussed that is on scheduled hydroxyzine 2/2 increased anxiety she was reporting early on and this is likely the culprit. Patient agrees to change this back to prn at this time.      VS and weights reviewed in facility EMR today.    Allergies, and PMH/PSH reviewed in EPIC today.  REVIEW OF SYSTEMS:  4 point ROS including Respiratory, CV, GI and , other than that noted in the HPI,  is negative    Objective: " "  /64   Pulse 78   Temp 98  F (36.7  C)   Resp 17   Ht 1.651 m (5' 5\")   Wt 118.8 kg (262 lb)   LMP  (LMP Unknown)   SpO2 94%   BMI 43.60 kg/m      Resp: Effort WNL, LSCTA  CV: VS as above- no edema noted  Abd- soft, nontender, BS +  Musc- LAI  Psych- alert, calm, pleasant        Most Recent 3 CBC's:  Recent Labs   Lab Test 03/23/23 0627 03/13/23  0858 03/06/23  0759   WBC 3.6* 9.0 7.6   HGB 9.3* 8.8* 8.2*   MCV 97 98 97    475* 415     Most Recent 3 BMP's:  Recent Labs   Lab Test 03/23/23 0627 03/13/23  0858 03/06/23  0759    142 139   POTASSIUM 4.4 3.4 3.4   CHLORIDE 100 99 101   CO2 29 27 27   BUN 15.4 12.4 9.5   CR 0.62 0.55 0.55   ANIONGAP 12 16* 11   LAKSHMI 8.9 9.1 8.6*   * 154* 107*       Assessment/Plan:  Aftercare following surgery of the musculoskeletal system  Physical deconditioning  S/p ORIF 2/26- f/w ortho 3/14 and then again 3/29 at which time advanced weight bearing from NWB to 50% WB. Incision healed. Minimal reports of pain. Continues in rehab therapy and making good gains. Patient inquiring about discharge plan.  - continue PT/OT  - continue on gabapentin, acetaminophen, Robaxin Celebrex and prn Tramadol for pain control.   - f/w ortho next 5/4  - ongoing discharge planning, SW follow and care conferences per unit protocol      Anemia due to blood loss, acute  - post op, HGB 12 --> 8.1 and trending up- last 9.3 3/23. No s/s bleeding  - continue to monitor for s/s bleeding.   - order recheck of CBC 4/13 to ensure stability    COVID-19  Dx 3/23 on routine rapid screen. No s/s developed. Not treated with antivirals  - completed 10 day course of droplet/contact isolation  Chronic cough  - occasional. Resp status stable  - continues on PTA fluticasone-salmeterol and prn benzonatate and albuterol inhaler  - will also continue guaifenesin as patient reports sometimes cannot clear phlegm.    Chronic heart failure with preserved ejection fraction (HFpEF) (H)  - chronic, " compensated. Weights are stable.   Primary hypertension  - chronic, controlled. Amlodipine stopped inpatient. BPs of late 120-140s/60s.   - continue to monitor VS and add back amlodipine prn if elevations noted  - order to check BMP to ensure stability    Anxiety  Mild episode of recurrent major depressive disorder (H)  - chronic, exacerbated 2/2 situational stressors. Had added scheduled low dose hydroxyzine - ACP referral made. Mood improving. Patient c/o dry mouth so will order to discontinue scheduled hydroxyzine and change to daily prn  - also continues on PTA fluoxetine, Buspar and gabapentin  - continue to monitor mood and behaviors          Orders:  Written on unit and discussed with patient and nursing.    Electronically signed by: BEATRICE Mike CNP           Sincerely,        BEATRICE Mike CNP

## 2023-04-10 NOTE — PROGRESS NOTES
"[facility] GERIATRICS    Chief Complaint   Patient presents with     RECHECK     HPI:  Carol Merida is a 87 year old  (1935), who is being seen today for an episodic care visit at: [facility] ([facility]) [[id number]].     Patient is seen today in TCU for recheck of multiple acute and chronic medical issues:    1. Aftercare following surgery of the musculoskeletal system    2. Physical deconditioning    3. Anemia due to blood loss, acute    4. COVID-19    5. Chronic cough    6. Chronic heart failure with preserved ejection fraction (HFpEF) (H)    7. Primary hypertension    8. Anxiety    9. Mild episode of recurrent major depressive disorder (H)      Nursing reports no new concerns.     Patient found sitting up in chair in room after having just completed breakfast. Reports \"feel good\". Denies SOB, cough, chest pain or dizziness. Denies pain. Reports ate well for breakfast. Denies noting n/v or abdominal pain. Denies noting issues with bowel or bladder. Is wondering about discharge plan. [facility] does not have another appt with ortho until until May 4th. Currently 50 % WB RLE and [facility] is able to transfer well and dress without much assistance. Discussed IDT meeting and discussion will need to be had about therapy progress from their perspective and timing of next appt and likely full weight bearing at that time. Patient would be discharging to A/L. Patient reporting has dry mouth and is wondering if r/t med change. Discussed that is on scheduled hydroxyzine 2/2 increased anxiety she was reporting early on and this is likely the culprit. Patient agrees to change this back to prn at this time.      VS and weights reviewed in facility EMR today.    Allergies, and PMH/PSH reviewed in EPIC today.  REVIEW OF SYSTEMS:  4 point ROS including Respiratory, CV, GI and , other than that noted in the HPI,  is negative    Objective:   /64   Pulse 78   Temp 98  F (36.7  C)   Resp 17   Ht 1.651 m (5' 5\")   Wt " 118.8 kg (262 lb)   LMP  (LMP Unknown)   SpO2 94%   BMI 43.60 kg/m      Resp: Effort WNL, LSCTA  CV: VS as above- no edema noted  Abd- soft, nontender, BS +  Musc- LAI  Psych- alert, calm, pleasant        Most Recent 3 CBC's:  Recent Labs   Lab Test 03/23/23  0627 03/13/23  0858 03/06/23  0759   WBC 3.6* 9.0 7.6   HGB 9.3* 8.8* 8.2*   MCV 97 98 97    475* 415     Most Recent 3 BMP's:  Recent Labs   Lab Test 03/23/23  0627 03/13/23  0858 03/06/23  0759    142 139   POTASSIUM 4.4 3.4 3.4   CHLORIDE 100 99 101   CO2 29 27 27   BUN 15.4 12.4 9.5   CR 0.62 0.55 0.55   ANIONGAP 12 16* 11   LAKSHMI 8.9 9.1 8.6*   * 154* 107*       Assessment/Plan:  Aftercare following surgery of the musculoskeletal system  Physical deconditioning  S/p ORIF 2/26- f/w ortho 3/14 and then again 3/29 at which time advanced weight bearing from NWB to 50% WB. Incision healed. Minimal reports of pain. Continues in rehab therapy and making good gains. Patient inquiring about discharge plan.  - continue PT/OT  - continue on gabapentin, acetaminophen, Robaxin Celebrex and prn Tramadol for pain control.   - f/w ortho next 5/4  - ongoing discharge planning, SW follow and care conferences per unit protocol      Anemia due to blood loss, acute  - post op, HGB 12 --> 8.1 and trending up- last 9.3 3/23. No s/s bleeding  - continue to monitor for s/s bleeding.   - order recheck of CBC 4/13 to ensure stability    COVID-19  Dx 3/23 on routine rapid screen. No s/s developed. Not treated with antivirals  - completed 10 day course of droplet/contact isolation  Chronic cough  - occasional. Resp status stable  - continues on PTA fluticasone-salmeterol and prn benzonatate and albuterol inhaler  - will also continue guaifenesin as patient reports sometimes cannot clear phlegm.    Chronic heart failure with preserved ejection fraction (HFpEF) (H)  - chronic, compensated. Weights are stable.   Primary hypertension  - chronic, controlled. Amlodipine  stopped inpatient. BPs of late 120-140s/60s.   - continue to monitor VS and add back amlodipine prn if elevations noted  - order to check BMP to ensure stability    Anxiety  Mild episode of recurrent major depressive disorder (H)  - chronic, exacerbated 2/2 situational stressors. Had added scheduled low dose hydroxyzine - ACP referral made. Mood improving. Patient c/o dry mouth so will order to discontinue scheduled hydroxyzine and change to daily prn  - also continues on PTA fluoxetine, Buspar and gabapentin  - continue to monitor mood and behaviors          Orders:  Written on unit and discussed with patient and nursing.    Electronically signed by: BEATRICE Mike CNP

## 2023-04-11 ENCOUNTER — LAB REQUISITION (OUTPATIENT)
Dept: LAB | Facility: CLINIC | Age: 88
End: 2023-04-11
Payer: COMMERCIAL

## 2023-04-11 DIAGNOSIS — I50.9 HEART FAILURE, UNSPECIFIED (H): ICD-10-CM

## 2023-04-11 DIAGNOSIS — D62 ACUTE POSTHEMORRHAGIC ANEMIA: ICD-10-CM

## 2023-04-11 RX ORDER — HYDROXYZINE HYDROCHLORIDE 25 MG/1
12.5 TABLET, FILM COATED ORAL DAILY PRN
Start: 2023-04-11 | End: 2024-03-15

## 2023-04-13 LAB
ANION GAP SERPL CALCULATED.3IONS-SCNC: 14 MMOL/L (ref 7–15)
BUN SERPL-MCNC: 10.6 MG/DL (ref 8–23)
CALCIUM SERPL-MCNC: 9.2 MG/DL (ref 8.8–10.2)
CHLORIDE SERPL-SCNC: 101 MMOL/L (ref 98–107)
CREAT SERPL-MCNC: 0.66 MG/DL (ref 0.51–0.95)
DEPRECATED HCO3 PLAS-SCNC: 25 MMOL/L (ref 22–29)
ERYTHROCYTE [DISTWIDTH] IN BLOOD BY AUTOMATED COUNT: 14.5 % (ref 10–15)
GFR SERPL CREATININE-BSD FRML MDRD: 85 ML/MIN/1.73M2
GLUCOSE SERPL-MCNC: 97 MG/DL (ref 70–99)
HCT VFR BLD AUTO: 38.5 % (ref 35–47)
HGB BLD-MCNC: 11.4 G/DL (ref 11.7–15.7)
MCH RBC QN AUTO: 27.7 PG (ref 26.5–33)
MCHC RBC AUTO-ENTMCNC: 29.6 G/DL (ref 31.5–36.5)
MCV RBC AUTO: 94 FL (ref 78–100)
PLATELET # BLD AUTO: 281 10E3/UL (ref 150–450)
POTASSIUM SERPL-SCNC: 4 MMOL/L (ref 3.4–5.3)
RBC # BLD AUTO: 4.11 10E6/UL (ref 3.8–5.2)
SODIUM SERPL-SCNC: 140 MMOL/L (ref 136–145)
WBC # BLD AUTO: 5.9 10E3/UL (ref 4–11)

## 2023-04-13 PROCEDURE — 80048 BASIC METABOLIC PNL TOTAL CA: CPT | Mod: ORL | Performed by: NURSE PRACTITIONER

## 2023-04-13 PROCEDURE — P9603 ONE-WAY ALLOW PRORATED MILES: HCPCS | Mod: ORL | Performed by: NURSE PRACTITIONER

## 2023-04-13 PROCEDURE — 85027 COMPLETE CBC AUTOMATED: CPT | Mod: ORL | Performed by: NURSE PRACTITIONER

## 2023-04-13 PROCEDURE — 36415 COLL VENOUS BLD VENIPUNCTURE: CPT | Mod: ORL | Performed by: NURSE PRACTITIONER

## 2023-04-17 ENCOUNTER — NURSING HOME VISIT (OUTPATIENT)
Dept: GERIATRICS | Facility: CLINIC | Age: 88
End: 2023-04-17
Payer: COMMERCIAL

## 2023-04-17 DIAGNOSIS — I50.32 CHRONIC HEART FAILURE WITH PRESERVED EJECTION FRACTION (HFPEF) (H): ICD-10-CM

## 2023-04-17 DIAGNOSIS — I10 PRIMARY HYPERTENSION: ICD-10-CM

## 2023-04-17 DIAGNOSIS — F33.0 MILD EPISODE OF RECURRENT MAJOR DEPRESSIVE DISORDER (H): ICD-10-CM

## 2023-04-17 DIAGNOSIS — F41.9 ANXIETY: ICD-10-CM

## 2023-04-17 DIAGNOSIS — U07.1 COVID-19: ICD-10-CM

## 2023-04-17 DIAGNOSIS — D62 ANEMIA DUE TO BLOOD LOSS, ACUTE: ICD-10-CM

## 2023-04-17 DIAGNOSIS — R53.81 PHYSICAL DECONDITIONING: ICD-10-CM

## 2023-04-17 DIAGNOSIS — Z47.89 AFTERCARE FOLLOWING SURGERY OF THE MUSCULOSKELETAL SYSTEM: Primary | ICD-10-CM

## 2023-04-17 DIAGNOSIS — R05.3 CHRONIC COUGH: ICD-10-CM

## 2023-04-17 PROCEDURE — 99309 SBSQ NF CARE MODERATE MDM 30: CPT | Performed by: NURSE PRACTITIONER

## 2023-04-19 NOTE — PROGRESS NOTES
"[facility] GERIATRICS    Chief Complaint   Patient presents with     RECHECK     HPI:  Carol Merida is a 88 year old  (1935), who is being seen today for an episodic care visit at: No question data found..     Patient seen today for recheck of multiple acute and chronic medical issues:    1. Aftercare following surgery of the musculoskeletal system    2. Physical deconditioning    3. Anemia due to blood loss, acute    4. Chronic cough    5. Chronic heart failure with preserved ejection fraction (HFpEF) (H)    6. Primary hypertension    7. Anxiety    8. Mild episode of recurrent major depressive disorder (H)    9. COVID-19      Nursing reports no new concerns. Per rehab notes patient is making good functional gains.     VS, weights reviewed in facility EMR and are stable.     Patient found today sitting in recliner in room. Alert, calm, NAD. Denies pain at this time. Reports is feeling \"good\". States has been working with rehab and is going well. Reports increased independence with transfers and toileting and compliance with 50% weight bearing to RLE.     Allergies, and PMH/PSH reviewed in EPIC today.  REVIEW OF SYSTEMS:  4 point ROS including Respiratory, CV, GI and , other than that noted in the HPI,  is negative    Objective:   VS reviewed in facility EMR and are stable    Resp: Effort WNL, LSCTA   CV: VS as above, no edema noted  Abd- soft, nontender, BS +  Musc- LAI  Psych- alert, calm, pleasant          Most Recent 3 CBC's:  Recent Labs   Lab Test 04/13/23  0726 03/23/23  0627 03/13/23  0858   WBC 5.9 3.6* 9.0   HGB 11.4* 9.3* 8.8*   MCV 94 97 98    298 475*     Most Recent 3 BMP's:  Recent Labs   Lab Test 04/13/23  0726 03/23/23  0627 03/13/23  0858    141 142   POTASSIUM 4.0 4.4 3.4   CHLORIDE 101 100 99   CO2 25 29 27   BUN 10.6 15.4 12.4   CR 0.66 0.62 0.55   ANIONGAP 14 12 16*   LAKSHMI 9.2 8.9 9.1   GLC 97 108* 154*       Assessment/Plan:    Aftercare following surgery of the " musculoskeletal system  Physical deconditioning  S/p ORIF 2/26- f/w ortho 3/14 and then again 3/29 at which time advanced weight bearing from NWB to 50% WB. Incision healed. Minimal reports of pain. Continues in rehab therapy and making good gains. Patient inquired about discharge plan. Discussed with SW today- Insurance/financial issues barrier to moving to new place- all pending. Patient has been updated on this.  -  Continue PT/OT  -  Continue on gabapentin, acetaminophen, Robaxin Celebrex and prn Tramadol for pain control.   - f/w ortho next 5/4  - ongoing discharge planning, SW follow and care conferences per unit protocol        Anemia due to blood loss, acute  - post op, HGB 12 --> 8.1.Has been trending up in TCU - last 11.4 4/13. No s/s bleeding  - continue to monitor for s/s bleeding.   - order recheck of CBC 4/13 to ensure stability     COVID-19  Dx 3/23 on routine rapid screen. No s/s developed. Not treated with antivirals  - completed 10 day course of droplet/contact isolation  Chronic cough  - occasional. Respiratory status is stable  - continues on PTA fluticasone-salmeterol and prn benzonatate and albuterol inhaler  - also continues guaifenesin as patient reports sometimes cannot clear phlegm.     Chronic heart failure with preserved ejection fraction (HFpEF) (H)  - chronic, compensated. Weights are stable   Primary hypertension  - chronic, controlled. Amlodipine stopped inpatient.  - continue to monitor VS and add back amlodipine prn if elevations noted     Anxiety  Mild episode of recurrent major depressive disorder (H)  - chronic, exacerbated early in TCU 2/2 situational stressors. Had added scheduled low dose hydroxyzine - ACP referral made. Mood much improved of late. Scheduled hydroxyzine discontinued last week 2/2 reports of dry mouth. Patient reports this helped and no longer notes dry mouth.  - continue on PTA fluoxetine, Buspar and gabapentin  - continue to monitor mood and  behaviors         Electronically signed by: BEATRICE Mike CNP

## 2023-04-27 DIAGNOSIS — M97.8XXA PERI-PROSTHETIC SUPRACONDYLAR FRACTURE OF FEMUR, INITIAL ENCOUNTER: Primary | ICD-10-CM

## 2023-04-27 DIAGNOSIS — Z96.659 PERI-PROSTHETIC SUPRACONDYLAR FRACTURE OF FEMUR, INITIAL ENCOUNTER: Primary | ICD-10-CM

## 2023-05-01 ENCOUNTER — TRANSITIONAL CARE UNIT VISIT (OUTPATIENT)
Dept: GERIATRICS | Facility: CLINIC | Age: 88
End: 2023-05-01
Payer: COMMERCIAL

## 2023-05-01 DIAGNOSIS — M97.8XXA PERIPROSTHETIC FRACTURE OF SHAFT OF FEMUR: ICD-10-CM

## 2023-05-01 DIAGNOSIS — Z47.89 AFTERCARE FOLLOWING SURGERY OF THE MUSCULOSKELETAL SYSTEM: ICD-10-CM

## 2023-05-01 DIAGNOSIS — F41.9 ANXIETY: ICD-10-CM

## 2023-05-01 DIAGNOSIS — R05.3 CHRONIC COUGH: ICD-10-CM

## 2023-05-01 DIAGNOSIS — I10 PRIMARY HYPERTENSION: ICD-10-CM

## 2023-05-01 DIAGNOSIS — D62 ANEMIA DUE TO BLOOD LOSS, ACUTE: ICD-10-CM

## 2023-05-01 DIAGNOSIS — I50.32 CHRONIC HEART FAILURE WITH PRESERVED EJECTION FRACTION (HFPEF) (H): ICD-10-CM

## 2023-05-01 DIAGNOSIS — R06.2 WHEEZING: Primary | ICD-10-CM

## 2023-05-01 DIAGNOSIS — Z96.649 PERIPROSTHETIC FRACTURE OF SHAFT OF FEMUR: ICD-10-CM

## 2023-05-01 DIAGNOSIS — U07.1 COVID-19: ICD-10-CM

## 2023-05-01 DIAGNOSIS — R09.81 NASAL CONGESTION: ICD-10-CM

## 2023-05-01 DIAGNOSIS — F33.0 MILD EPISODE OF RECURRENT MAJOR DEPRESSIVE DISORDER (H): ICD-10-CM

## 2023-05-01 DIAGNOSIS — R53.81 PHYSICAL DECONDITIONING: ICD-10-CM

## 2023-05-01 PROCEDURE — 99309 SBSQ NF CARE MODERATE MDM 30: CPT | Performed by: NURSE PRACTITIONER

## 2023-05-01 NOTE — LETTER
"    5/1/2023        RE: Carol Meriad  [address]  [place] [address]        [facility] GERIATRICS    Chief Complaint   Patient presents with     RECHECK     HPI:  Carol Merida is a 88 year old  (1935), who is being seen today for an episodic care visit at: [facility] ([facility]) [[id number]].     Patient seen today in [facility] for recheck of multiple acute and chronic medical issues:    1. Aftercare following surgery of the musculoskeletal system    2. Periprosthetic fracture of shaft of femur    3. Physical deconditioning    4. Anemia due to blood loss, acute    5. Chronic cough    6. Chronic heart failure with preserved ejection fraction (HFpEF) (H)    7. Primary hypertension    8. Anxiety    9. Mild episode of recurrent major depressive disorder (H)    10. COVID-19      Nursing reports no new concerns today.    VS, weights reviewed in facility EMR today.    Patient found in room sitting in chair. Alert, calm, NAD. Denies pain. Reports noting some nasal congestion and persistent cough though reports this is chronic and waxes and wanes. Denies noting new SOB. Reports appetite is good and denies noting issues with bowel or bladder.     Allergies, and PMH/PSH reviewed in EPIC today.  REVIEW OF SYSTEMS:  4 point ROS including Respiratory, CV, GI and , other than that noted in the HPI,  is negative    Objective:   BP (!) 156/75   Pulse 78   Temp 97.5  F (36.4  C)   Resp 18   Ht 1.651 m (5' 5\")   Wt 106.9 kg (235 lb 9.6 oz)   LMP  (LMP Unknown)   SpO2 95%   BMI 39.21 kg/m    ENT: nasal congestion, nasal sounding voice quality  Resp: Effort WNL, LS with crackles on right posteriorly and scattered wheezying  CV: VS as above, no edema noted  Abd- soft, nontender, BS +  Musc- LAI  Psych- alert, calm, pleasant        Most Recent 3 CBC's:  Recent Labs   Lab Test 05/04/23  0839 04/13/23  0726 03/23/23  0627   WBC 5.9 5.9 3.6*   HGB 11.8 11.4* 9.3*   MCV 90 94 97    281 298     Most Recent 3 " BMP's:  Recent Labs   Lab Test 04/13/23  0726 03/23/23  0627 03/13/23  0858    141 142   POTASSIUM 4.0 4.4 3.4   CHLORIDE 101 100 99   CO2 25 29 27   BUN 10.6 15.4 12.4   CR 0.66 0.62 0.55   ANIONGAP 14 12 16*   LAKSHMI 9.2 8.9 9.1   GLC 97 108* 154*       Assessment/Plan:  Wheezing  Nasal congestion  Chronic cough  - order CXR 2 views  - Will increase guaifenesin to 1200 ER q 12  - add albuterol nebs BID and q 4 hours prn  - continues on PTA fluticasone-salmeterol and prn benzonatate      Aftercare following surgery of the musculoskeletal system  Physical deconditioning  Perirosthetic fracture of shaft of femur, R  S/p ORIF 2/26- f/w ortho 3/14 and then again 3/29 at which time advanced weight bearing from NWB to WBAT with transfers only. Incision healed. Minimal reports of pain.  Made some good functional gains with rehab but they are completed as of 4/28. Patient inquired about discharge plan. Discussed with SW - Insurance/financial issues barrier to moving to new place- all pending yet. Patient aware.  -  Continue on gabapentin, acetaminophen, Robaxin Celebrex and prn Tramadol for pain control.   - continue supportive cares and services  - f/w ortho next 5/4 as scheduled  - ongoing discharge planning, SW follow and care conferences per unit protocol        Anemia due to blood loss, acute  - post op, HGB 12 --> 8.1.Has been trending up in TCU - last 11.4 4/13. No noted s/s of bleeding  - continue to monitor for s/s bleeding.   - periodic check of CBC to ensure stability        Chronic heart failure with preserved ejection fraction (HFpEF) (H)  - chronic, compensated. No noted LE edema. Weights are stable - ? Erroneous recent value.  Primary hypertension  - chronic, variable but generally controlled. Amlodipine stopped inpatient.  - continue to monitor VS and add back amlodipine prn if elevations noted     Anxiety  Mild episode of recurrent major depressive disorder (H)  - chronic, exacerbated early in TCU 2/2  situational stressors. Had added scheduled low dose hydroxyzine - ACP referral made. Mood remains much improved. Scheduled hydroxyzine discontinued 2/2 reports of dry mouth which has since improved  - continues on PTA fluoxetine, Buspar and gabapentin  - continue to monitor mood and behaviors    COVID-19  Dx 3/23 on routine rapid screen. No s/s developed. Not treated with antivirals  - completed 10 day course of droplet/contact isolation    Orders:  Written on unit and discussed with nursing    Electronically signed by: BEATRICE Mike CNP           Sincerely,        BEATRICE Mike CNP

## 2023-05-02 ENCOUNTER — LAB REQUISITION (OUTPATIENT)
Dept: LAB | Facility: CLINIC | Age: 88
End: 2023-05-02
Payer: COMMERCIAL

## 2023-05-02 ENCOUNTER — TELEPHONE (OUTPATIENT)
Dept: GERIATRICS | Facility: CLINIC | Age: 88
End: 2023-05-02
Payer: COMMERCIAL

## 2023-05-02 DIAGNOSIS — R05.9 COUGH, UNSPECIFIED: ICD-10-CM

## 2023-05-02 NOTE — TELEPHONE ENCOUNTER
[facility] Geriatrics Triage Nurse Telephone Encounter    Provider: BEATRICE Mike CNP   Facility: [facility] Facility Type:  TCU    Caller: Kelvin  Call Back Number: [phone]    Allergies:    Allergies   Allergen Reactions     Ace Inhibitors Cough        Reason for call: Nurse called to report patient's chest x-ray results.  Patient still has a cough.  Nebulizers were ordered yesterday but not started.  Weight today is 235.6.  Previous randall was on 4/11 at 238.6.  VS: 147/67, 92, 19, 96.0, and O2 95% on RA.  Patient was also requesting her PRN Hydroxyzine last evening to help with her anxiety but medication was discontinued yesterday.  On-call was called and they ordered a one-time dose and for NP to address today.          Verbal Order/Direction given by Provider: Have staff start the Nebulizers ASAP as ordered.  Check CBC tomorrow.  Okay to restart Hydroxyzine 12.5 mg po daily PRN.      Provider giving Order:  BEATRICE Mike CNP     Verbal Order given to: Kelvin Chin RN

## 2023-05-03 ENCOUNTER — ANCILLARY PROCEDURE (OUTPATIENT)
Dept: GENERAL RADIOLOGY | Facility: CLINIC | Age: 88
End: 2023-05-03
Attending: ORTHOPAEDIC SURGERY
Payer: COMMERCIAL

## 2023-05-03 ENCOUNTER — OFFICE VISIT (OUTPATIENT)
Dept: ORTHOPEDICS | Facility: CLINIC | Age: 88
End: 2023-05-03
Payer: COMMERCIAL

## 2023-05-03 DIAGNOSIS — M97.8XXA PERIPROSTHETIC FRACTURE OF SHAFT OF FEMUR: Primary | ICD-10-CM

## 2023-05-03 DIAGNOSIS — Z96.659 PERI-PROSTHETIC SUPRACONDYLAR FRACTURE OF FEMUR, INITIAL ENCOUNTER: ICD-10-CM

## 2023-05-03 DIAGNOSIS — Z96.649 PERIPROSTHETIC FRACTURE OF SHAFT OF FEMUR: Primary | ICD-10-CM

## 2023-05-03 DIAGNOSIS — M97.02XD PERIPROSTHETIC FRACTURE AROUND INTERNAL PROSTHETIC LEFT HIP JOINT, SUBSEQUENT ENCOUNTER: ICD-10-CM

## 2023-05-03 DIAGNOSIS — M97.8XXA PERI-PROSTHETIC SUPRACONDYLAR FRACTURE OF FEMUR, INITIAL ENCOUNTER: ICD-10-CM

## 2023-05-03 PROCEDURE — 99024 POSTOP FOLLOW-UP VISIT: CPT | Mod: GC | Performed by: ORTHOPAEDIC SURGERY

## 2023-05-03 PROCEDURE — 73552 X-RAY EXAM OF FEMUR 2/>: CPT | Mod: RT | Performed by: RADIOLOGY

## 2023-05-03 NOTE — NURSING NOTE
Reason For Visit:   Chief Complaint   Patient presents with     Surgical Followup     8 wk POP right femur ORIF        LMP  (LMP Unknown)     Pain Assessment  Patient Currently in Pain: Yes  0-10 Pain Scale: 3  Primary Pain Location: Leg (right)    Maria Antonia Henriquez LPN

## 2023-05-03 NOTE — PROGRESS NOTES
Orthopedic surgery follow-up    Date of surgery: 2/26/2023    Injury: Right interprosthetic femur fracture    Mechanism: Leg twisting while standing    Surgery:  1. Retrograde intramedullary nail fixation of interprosthetic right femure fracture  2. Open reduction internal fixation of right interprosthetic femur fracture with lateral locking plate    Subjective: 88-year-old female, today in clinic for her 3-month examination.  She has been compliant with 50% weightbearing restrictions at the right lower extremity, using a walker.  She has been participating in physical therapy.  Currently, she is doing quite well.  With minimal pain throughout the leg.  She does have some lateral distal femur pain with knee flexion, about the distal lateral plate prominence    At baseline, the patient was ambulatory with the use of a 4 wheeled walker, and lived in an independent senior living facility.    Objective:    Focused examination of the right lower extremity demonstrates well-healed surgical incisions.  There is no evidence of swelling, erythema, fluctuance, drainage.    Knee range of motion from full extension to 110 degrees.  No knee effusion    She is able to  clinic today with the use of a walker.    Neurologic exam with 5/5 TA, EHL, GSC, FHL.  Sensory intact to light touch in the DP, SP, saphenous, sural, tibial nerve distributions.  2+ DP pulse.    Imaging: Radiographs obtained in clinic today of the right femur, compared to prior films on 3/14/2023 demonstrate early healing with some callus formation, particularly visualizable on the lateral view of the femur.  There has been no change in implant position.  Satisfactory limb alignment.  No evidence of new fracture, or dislocation.  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    Assessment: 88-year-old female status post the above-stated procedure.  Currently progressing well, demonstrating early radiographic signs of fracture  union.    Plan:  - Advance to weightbearing as tolerated, right lower extremity for transfers  - Continue physical therapy at TCU  - Follow-up in 8 weeks, repeat radiographs of the femur, hip    This patient was seen and evaluated with Dr. Lemon who agrees with the assessment and plan.    Ramone Kam MD  PGY-4 Orthopaedic Surgery

## 2023-05-03 NOTE — LETTER
5/3/2023         RE: Carol Merida  [address]  [facility] [place] [facility] [address]        Dear Colleague,    Thank you for referring your patient, Carol Merida, to the [facility] [place]. Please see a copy of my visit note below.    Orthopedic surgery follow-up    Date of surgery: 2/26/2023    Injury: Right interprosthetic femur fracture    Mechanism: Leg twisting while standing    Surgery:  1. Retrograde intramedullary nail fixation of interprosthetic right femure fracture  2. Open reduction internal fixation of right interprosthetic femur fracture with lateral locking plate    Subjective: 88-year-old female, today in clinic for her 3-month examination.  She has been compliant with 50% weightbearing restrictions at the right lower extremity, using a walker.  She has been participating in physical therapy.  Currently, she is doing quite well.  With minimal pain throughout the leg.  She does have some lateral distal femur pain with knee flexion, about the distal lateral plate prominence    At baseline, the patient was ambulatory with the use of a 4 wheeled walker, and lived in an independent senior living facility.    Objective:    Focused examination of the right lower extremity demonstrates well-healed surgical incisions.  There is no evidence of swelling, erythema, fluctuance, drainage.    Knee range of motion from full extension to 110 degrees.  No knee effusion    She is able to  clinic today with the use of a walker.    Neurologic exam with 5/5 TA, EHL, GSC, FHL.  Sensory intact to light touch in the DP, SP, saphenous, sural, tibial nerve distributions.  2+ DP pulse.    Imaging: Radiographs obtained in clinic today of the right femur, compared to prior films on 3/14/2023 demonstrate early healing with some callus formation, particularly visualizable on the lateral view of the femur.  There has been no change in implant position.  Satisfactory limb alignment.  No evidence  of new fracture, or dislocation.  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    Assessment: 88-year-old female status post the above-stated procedure.  Currently progressing well, demonstrating early radiographic signs of fracture union.    Plan:  - Advance to weightbearing as tolerated, right lower extremity for transfers  - Continue physical therapy at TCU  - Follow-up in 8 weeks, repeat radiographs of the femur, hip    This patient was seen and evaluated with Dr. Lemon who agrees with the assessment and plan.    Ramone Kam MD  PGY-4 Orthopaedic Surgery            Attestation signed by Newton Lemon MD at 5/3/2023 10:47 AM:  I reviewed the patient's history, physical examination and imaging studies with the Resident. In addition I individually examined the patient and developed the treatment plan.  I agree with the assessment and plan as documented.       Newton Lemon MD  Orthopedic Spine Surgeon  , Department of Orthopedic Surgery

## 2023-05-04 ENCOUNTER — TRANSITIONAL CARE UNIT VISIT (OUTPATIENT)
Dept: GERIATRICS | Facility: CLINIC | Age: 88
End: 2023-05-04
Payer: COMMERCIAL

## 2023-05-04 VITALS
HEIGHT: 65 IN | RESPIRATION RATE: 18 BRPM | HEART RATE: 78 BPM | BODY MASS INDEX: 39.25 KG/M2 | DIASTOLIC BLOOD PRESSURE: 75 MMHG | WEIGHT: 235.6 LBS | OXYGEN SATURATION: 95 % | SYSTOLIC BLOOD PRESSURE: 156 MMHG | TEMPERATURE: 97.5 F

## 2023-05-04 VITALS
HEIGHT: 65 IN | WEIGHT: 235.6 LBS | BODY MASS INDEX: 39.25 KG/M2 | RESPIRATION RATE: 18 BRPM | DIASTOLIC BLOOD PRESSURE: 75 MMHG | TEMPERATURE: 97.5 F | HEART RATE: 78 BPM | SYSTOLIC BLOOD PRESSURE: 156 MMHG | OXYGEN SATURATION: 95 %

## 2023-05-04 DIAGNOSIS — Z53.9 ERRONEOUS ENCOUNTER--DISREGARD: Primary | ICD-10-CM

## 2023-05-04 LAB
ERYTHROCYTE [DISTWIDTH] IN BLOOD BY AUTOMATED COUNT: 13.9 % (ref 10–15)
HCT VFR BLD AUTO: 39.7 % (ref 35–47)
HGB BLD-MCNC: 11.8 G/DL (ref 11.7–15.7)
MCH RBC QN AUTO: 26.9 PG (ref 26.5–33)
MCHC RBC AUTO-ENTMCNC: 29.7 G/DL (ref 31.5–36.5)
MCV RBC AUTO: 90 FL (ref 78–100)
PLATELET # BLD AUTO: 278 10E3/UL (ref 150–450)
RBC # BLD AUTO: 4.39 10E6/UL (ref 3.8–5.2)
WBC # BLD AUTO: 5.9 10E3/UL (ref 4–11)

## 2023-05-04 PROCEDURE — 36415 COLL VENOUS BLD VENIPUNCTURE: CPT | Mod: ORL | Performed by: NURSE PRACTITIONER

## 2023-05-04 PROCEDURE — 85027 COMPLETE CBC AUTOMATED: CPT | Mod: ORL | Performed by: NURSE PRACTITIONER

## 2023-05-04 PROCEDURE — P9604 ONE-WAY ALLOW PRORATED TRIP: HCPCS | Mod: ORL | Performed by: NURSE PRACTITIONER

## 2023-05-04 NOTE — PROGRESS NOTES
"[facility] GERIATRICS    Chief Complaint   Patient presents with     RECHECK     HPI:  Carol Merida is a 88 year old  (1935), who is being seen today for an episodic care visit at: [facility] ([facility]) [[id number]].     Patient seen today in U for recheck of multiple acute and chronic medical issues:    1. Aftercare following surgery of the musculoskeletal system    2. Periprosthetic fracture of shaft of femur    3. Physical deconditioning    4. Anemia due to blood loss, acute    5. Chronic cough    6. Chronic heart failure with preserved ejection fraction (HFpEF) (H)    7. Primary hypertension    8. Anxiety    9. Mild episode of recurrent major depressive disorder (H)    10. COVID-19      Nursing reports no new concerns today.    VS, weights reviewed in facility EMR today.    Patient found in room sitting in chair. Alert, calm, NAD. Denies pain. Reports noting some nasal congestion and persistent cough though reports this is chronic and waxes and wanes. Denies noting new SOB. Reports appetite is good and denies noting issues with bowel or bladder.     Allergies, and PMH/PSH reviewed in EPIC today.  REVIEW OF SYSTEMS:  4 point ROS including Respiratory, CV, GI and , other than that noted in the HPI,  is negative    Objective:   BP (!) 156/75   Pulse 78   Temp 97.5  F (36.4  C)   Resp 18   Ht 1.651 m (5' 5\")   Wt 106.9 kg (235 lb 9.6 oz)   LMP  (LMP Unknown)   SpO2 95%   BMI 39.21 kg/m    ENT: nasal congestion, nasal sounding voice quality  Resp: Effort WNL, LS with crackles on right posteriorly and scattered wheezying  CV: VS as above, no edema noted  Abd- soft, nontender, BS +  Musc- LAI  Psych- alert, calm, pleasant        Most Recent 3 CBC's:  Recent Labs   Lab Test 05/04/23  0839 04/13/23  0726 03/23/23  0627   WBC 5.9 5.9 3.6*   HGB 11.8 11.4* 9.3*   MCV 90 94 97    281 298     Most Recent 3 BMP's:  Recent Labs   Lab Test 04/13/23  0726 03/23/23  0627 03/13/23  0858    141 142 "   POTASSIUM 4.0 4.4 3.4   CHLORIDE 101 100 99   CO2 25 29 27   BUN 10.6 15.4 12.4   CR 0.66 0.62 0.55   ANIONGAP 14 12 16*   LAKSHMI 9.2 8.9 9.1   GLC 97 108* 154*       Assessment/Plan:  Wheezing  Nasal congestion  Chronic cough  - order CXR 2 views  - Will increase guaifenesin to 1200 ER q 12  - add albuterol nebs BID and q 4 hours prn  - continues on PTA fluticasone-salmeterol and prn benzonatate      Aftercare following surgery of the musculoskeletal system  Physical deconditioning  Perirosthetic fracture of shaft of femur, R  S/p ORIF 2/26- f/w ortho 3/14 and then again 3/29 at which time advanced weight bearing from NWB to WBAT with transfers only. Incision healed. Minimal reports of pain.  Made some good functional gains with rehab but they are completed as of 4/28. Patient inquired about discharge plan. Discussed with SW - Insurance/financial issues barrier to moving to new place- all pending yet. Patient aware.  -  Continue on gabapentin, acetaminophen, Robaxin Celebrex and prn Tramadol for pain control.   - continue supportive cares and services  - f/w ortho next 5/4 as scheduled  - ongoing discharge planning, SW follow and care conferences per unit protocol        Anemia due to blood loss, acute  - post op, HGB 12 --> 8.1.Has been trending up in TCU - last 11.4 4/13. No noted s/s of bleeding  - continue to monitor for s/s bleeding.   - periodic check of CBC to ensure stability        Chronic heart failure with preserved ejection fraction (HFpEF) (H)  - chronic, compensated. No noted LE edema. Weights are stable - ? Erroneous recent value.  Primary hypertension  - chronic, variable but generally controlled. Amlodipine stopped inpatient.  - continue to monitor VS and add back amlodipine prn if elevations noted     Anxiety  Mild episode of recurrent major depressive disorder (H)  - chronic, exacerbated early in TCU 2/2 situational stressors. Had added scheduled low dose hydroxyzine - ACP referral made. Mood  remains much improved. Scheduled hydroxyzine discontinued 2/2 reports of dry mouth which has since improved  - continues on PTA fluoxetine, Buspar and gabapentin  - continue to monitor mood and behaviors    COVID-19  Dx 3/23 on routine rapid screen. No s/s developed. Not treated with antivirals  - completed 10 day course of droplet/contact isolation    Orders:  Written on unit and discussed with nursing    Electronically signed by: BEATRICE Mike CNP

## 2023-05-04 NOTE — LETTER
5/4/2023        RE: Carol Merida  [address]  [place] [address]        Erroneous encounter, disregard        Sincerely,        BEATRICE Mike CNP

## 2023-05-08 ENCOUNTER — PATIENT OUTREACH (OUTPATIENT)
Dept: CARE COORDINATION | Facility: CLINIC | Age: 88
End: 2023-05-08
Payer: COMMERCIAL

## 2023-05-08 NOTE — PROGRESS NOTES
Clinic Care Coordination Contact    Situation: Patient chart reviewed by care coordinator.    Background: Care Coordination monitoring for discharge from TCU: Mable Cunningham.     Assessment: Pt continues in TCU at this time.     Plan/Recommendations: [facility] will follow up again with chart review in 4 weeks.     TAMMIE Moulton  Clinic Care Coordinator  [facility]  [phone]  [email or web address]

## 2023-05-09 RX ORDER — ALBUTEROL SULFATE 0.83 MG/ML
2.5 SOLUTION RESPIRATORY (INHALATION) EVERY 4 HOURS PRN
Qty: 90 ML
Start: 2023-05-09

## 2023-05-09 RX ORDER — GUAIFENESIN 600 MG/1
1200 TABLET, EXTENDED RELEASE ORAL 2 TIMES DAILY
Start: 2023-05-09 | End: 2023-05-15

## 2023-05-11 ENCOUNTER — TRANSITIONAL CARE UNIT VISIT (OUTPATIENT)
Dept: GERIATRICS | Facility: CLINIC | Age: 88
End: 2023-05-11
Payer: COMMERCIAL

## 2023-05-11 VITALS
TEMPERATURE: 97.3 F | DIASTOLIC BLOOD PRESSURE: 88 MMHG | HEIGHT: 65 IN | HEART RATE: 79 BPM | OXYGEN SATURATION: 94 % | RESPIRATION RATE: 16 BRPM | BODY MASS INDEX: 39.25 KG/M2 | WEIGHT: 235.6 LBS | SYSTOLIC BLOOD PRESSURE: 168 MMHG

## 2023-05-11 DIAGNOSIS — Z53.9 ERRONEOUS ENCOUNTER--DISREGARD: Primary | ICD-10-CM

## 2023-05-11 NOTE — LETTER
5/11/2023        RE: Carol Merida  [address]  [place] [address]        Erroneous encounter, disregard          Sincerely,        BEATRICE Mike CNP

## 2023-05-15 ENCOUNTER — TRANSITIONAL CARE UNIT VISIT (OUTPATIENT)
Dept: GERIATRICS | Facility: CLINIC | Age: 88
End: 2023-05-15
Payer: COMMERCIAL

## 2023-05-15 VITALS
TEMPERATURE: 97 F | RESPIRATION RATE: 20 BRPM | SYSTOLIC BLOOD PRESSURE: 118 MMHG | DIASTOLIC BLOOD PRESSURE: 65 MMHG | WEIGHT: 235.6 LBS | BODY MASS INDEX: 39.25 KG/M2 | OXYGEN SATURATION: 96 % | HEIGHT: 65 IN | HEART RATE: 84 BPM

## 2023-05-15 DIAGNOSIS — R05.3 CHRONIC COUGH: ICD-10-CM

## 2023-05-15 DIAGNOSIS — R06.2 WHEEZING: ICD-10-CM

## 2023-05-15 DIAGNOSIS — Z96.649 PERIPROSTHETIC FRACTURE OF SHAFT OF FEMUR: ICD-10-CM

## 2023-05-15 DIAGNOSIS — D62 ANEMIA DUE TO BLOOD LOSS, ACUTE: ICD-10-CM

## 2023-05-15 DIAGNOSIS — R09.81 NASAL CONGESTION: ICD-10-CM

## 2023-05-15 DIAGNOSIS — K59.00 CONSTIPATION, UNSPECIFIED CONSTIPATION TYPE: ICD-10-CM

## 2023-05-15 DIAGNOSIS — Z96.653 HISTORY OF BILATERAL KNEE REPLACEMENT: ICD-10-CM

## 2023-05-15 DIAGNOSIS — R53.81 PHYSICAL DECONDITIONING: ICD-10-CM

## 2023-05-15 DIAGNOSIS — F41.9 ANXIETY: ICD-10-CM

## 2023-05-15 DIAGNOSIS — F33.0 MILD EPISODE OF RECURRENT MAJOR DEPRESSIVE DISORDER (H): ICD-10-CM

## 2023-05-15 DIAGNOSIS — Z47.89 AFTERCARE FOLLOWING SURGERY OF THE MUSCULOSKELETAL SYSTEM: ICD-10-CM

## 2023-05-15 DIAGNOSIS — I50.32 CHRONIC HEART FAILURE WITH PRESERVED EJECTION FRACTION (HFPEF) (H): ICD-10-CM

## 2023-05-15 DIAGNOSIS — M79.10 MUSCULAR ACHES: ICD-10-CM

## 2023-05-15 DIAGNOSIS — R10.12 LUQ ABDOMINAL PAIN: Primary | ICD-10-CM

## 2023-05-15 DIAGNOSIS — M97.8XXA PERIPROSTHETIC FRACTURE OF SHAFT OF FEMUR: ICD-10-CM

## 2023-05-15 DIAGNOSIS — I10 PRIMARY HYPERTENSION: ICD-10-CM

## 2023-05-15 PROCEDURE — 99309 SBSQ NF CARE MODERATE MDM 30: CPT | Performed by: NURSE PRACTITIONER

## 2023-05-15 RX ORDER — GUAIFENESIN AND PSEUDOEPHEDRINE HCL 1200; 120 MG/1; MG/1
1 TABLET, EXTENDED RELEASE ORAL 2 TIMES DAILY
COMMUNITY
End: 2023-08-16

## 2023-05-15 RX ORDER — LANOLIN ALCOHOL/MO/W.PET/CERES
3 CREAM (GRAM) TOPICAL AT BEDTIME
COMMUNITY

## 2023-05-15 NOTE — PROGRESS NOTES
"[facility] GERIATRICS    Chief Complaint   Patient presents with     RECHECK     HPI:  Carol Merida is a 88 year old  (1935), who is being seen today for an episodic care visit at: [facility] ([facility]) [[id number]].     Patient seen today in U for recheck of multiple acute and chronic medical issues:    1. LUQ abdominal pain    2. Constipation, unspecified constipation type    3. Aftercare following surgery of the musculoskeletal system    4. Periprosthetic fracture of shaft of femur    5. Physical deconditioning    6. Anemia due to blood loss, acute    7. Chronic heart failure with preserved ejection fraction (HFpEF) (H)    8. Primary hypertension    9. Anxiety    10. Mild episode of recurrent major depressive disorder (H)    11. Chronic cough    12. Wheezing    13. Nasal congestion    14. Muscular aches    15. History of bilateral knee replacement      Nursing reports patient c/o LUQ discomfort. Reports also some constipation.    Patient found in room seated in recliner. Alert, calm, NAD. Reports is noting some mild LUQ discomfort. Also reports some mild constipation statins has not had BM in  \"a couple of days\". Reports concerns 2/2 SBO inpatient. Denies n/v or diarrhea. Denies noting issues/difficulty with voiding. States appetite is ok and baseline. Reports also some minor aches and pains 2/2 \"my arthritis\" today at left elbow. Denies noting recent trauma. Also inquiring as to why/when Simvastatin was discontinued (was reviewing her medication list). States otherwise ok and generally sleeping well.     VS, weights reviewed in facility EMR today and are stable    Allergies, and PMH/PSH reviewed in EPIC today.  REVIEW OF SYSTEMS:  4 point ROS including Respiratory, CV, GI and , other than that noted in the HPI,  is negative    Objective:   /65   Pulse 84   Temp 97  F (36.1  C)   Resp 20   Ht 1.651 m (5' 5\")   Wt 106.9 kg (235 lb 9.6 oz)   LMP  (LMP Unknown)   SpO2 96%   BMI 39.21 kg/m  "     Resp: Effort WNL, LSCTA   CV: VS as above, no edema noted  Abd- soft, nontender, BS +  Musc- LAI  Psych- alert, calm, pleasant        Most Recent 3 CBC's:  Recent Labs   Lab Test 05/04/23  0839 04/13/23  0726 03/23/23  0627   WBC 5.9 5.9 3.6*   HGB 11.8 11.4* 9.3*   MCV 90 94 97    281 298     Most Recent 3 BMP's:  Recent Labs   Lab Test 04/13/23  0726 03/23/23  0627 03/13/23  0858    141 142   POTASSIUM 4.0 4.4 3.4   CHLORIDE 101 100 99   CO2 25 29 27   BUN 10.6 15.4 12.4   CR 0.66 0.62 0.55   ANIONGAP 14 12 16*   LAKSHMI 9.2 8.9 9.1   GLC 97 108* 154*       Assessment/Plan:  LUQ abdominal pain  - mild, not reproducible on exam/nontender  Constipation, unspecified constipation type  - self report. Per NN is moving bowels every other day or so.  - add order for dose of Miralax now and scheduled daily.  - continue on prn Miralax and Senna S  - monitor bowel pattern/discomfort and report any worsening constipation or pain    Aftercare following surgery of the musculoskeletal system  Periprosthetic fracture of shaft of femur  Physical deconditioning  - ORIF/repair 2/26/23. Was NWB for a time then 50 % WB. F/w ortho 5/3 last and given WBAT. F/u 8 weeks. Patient ambulates well with 4WW independently. Minimal related pain. Acute rehab is completed and awaiting discharge to A/L- held up by financial issues. SW following.  - discontinue Robaxin and Tramadol  - continues on scheduled acetaminophen, has prn Celebrex which was also on at home.    Anemia due to blood loss, acute  - postop. 12--> 8.1. Trending up since, last 11.8 5/4    Chronic heart failure with preserved ejection fraction (HFpEF) (H)  - chronic, respiratory status stable. Weight loss in TCU (desired and dietician following). No edema noted  Primary hypertension  - chronic, some variability but generally well controlled. Amlodipine stopped inpatient.  - continue to monitor VS - if noted persistent elevations consider adding back  "amlodipine    Anxiety  Mild episode of recurrent major depressive disorder (H)  - chronic, does fixate on medications/med list and explains prefers minimal changes and prefers to have prns stay on list so there \"when I need them\". Generally patient's anxiety is much improved since admission. ACP referral made early on. Was on sched hydroxyzine for a bit but now just prn.   - continues on PTA fluoxetin, Buspar and is on gabapentin as well  - monitor mood and behaviors     Chronic cough  - chronic, states guaifenesin and prn benzonatate helpful  Wheezing  Exacerbated early May- CXR negative.   - continued on PTA fluticasone-salmeterol  - continue on added albuterol nebs- prn  Nasal congestion  - early May. ? R/t allergies.   Resolved      Muscle aches  - mild. Reports as chronic. Left elbow today.  - continue on acetaminophen.   - monitor.    H/O COVID in TCU 3/23 on routine rapid screen. No s/s and not treated with antivirals.    Chart reviewed and appears statin discontinued 2/2 LFT elevation inpatient thought ? 2/2 SBO. Did normalize prior to hospital discharge but statin not added back. Encouraged patient to f/w PCP after discharge to discuss further. Patient agreeable.    Orders  Written on unit and discussed with patient and nursing.    Electronically signed by: BEATRICE Mike CNP     "

## 2023-05-15 NOTE — LETTER
"    5/15/2023        RE: Carol Merida  [address]  [place] [address]        [facility] G[facility]IATR[facility]    Chief Complaint   Patient presents with     RECHECK     HPI:  Carol Merida is a 88 year old  (1935), who is being seen today for an episodic care visit at: [facility] ([facility]) [[id number]].     Patient seen today in [facility] for recheck of multiple acute and chronic medical issues:    1. LUQ abdominal pain    2. Constipation, unspecified constipation type    3. Aftercare following surgery of the musculoskeletal system    4. Periprosthetic fracture of shaft of femur    5. Physical deconditioning    6. Anemia due to blood loss, acute    7. Chronic heart failure with preserved ejection fraction (HFpEF) (H)    8. Primary hypertension    9. Anxiety    10. Mild episode of recurrent major depressive disorder (H)    11. Chronic cough    12. Wheezing    13. Nasal congestion    14. Muscular aches    15. History of bilateral knee replacement      Nursing reports patient c/o LUQ discomfort. Reports also some constipation.    Patient found in room seated in recliner. Alert, calm, NAD. Reports is noting some mild LUQ discomfort. Also reports some mild constipation statins has not had BM in  \"a couple of days\". Reports concerns 2/2 SBO inpatient. Denies n/v or diarrhea. Denies noting issues/difficulty with voiding. States appetite is ok and baseline. Reports also some minor aches and pains 2/2 \"my arthritis\" today at left elbow. Denies noting recent trauma. Also inquiring as to why/when Simvastatin was discontinued (was reviewing her medication list). States otherwise ok and generally sleeping well.     VS, weights reviewed in facility EMR today and are stable    Allergies, and PMH/PSH reviewed in EPIC today.  REVIEW OF SYSTEMS:  4 point ROS including Respiratory, CV, GI and , other than that noted in the HPI,  is negative    Objective:   /65   Pulse 84   Temp 97  F (36.1  C)   Resp 20   Ht 1.651 m " "(5' 5\")   Wt 106.9 kg (235 lb 9.6 oz)   LMP  (LMP Unknown)   SpO2 96%   BMI 39.21 kg/m      Resp: Effort WNL, LSCTA   CV: VS as above, no edema noted  Abd- soft, nontender, BS +  Musc- LAI  Psych- alert, calm, pleasant        Most Recent 3 CBC's:  Recent Labs   Lab Test 05/04/23  0839 04/13/23  0726 03/23/23  0627   WBC 5.9 5.9 3.6*   HGB 11.8 11.4* 9.3*   MCV 90 94 97    281 298     Most Recent 3 BMP's:  Recent Labs   Lab Test 04/13/23  0726 03/23/23  0627 03/13/23  0858    141 142   POTASSIUM 4.0 4.4 3.4   CHLORIDE 101 100 99   CO2 25 29 27   BUN 10.6 15.4 12.4   CR 0.66 0.62 0.55   ANIONGAP 14 12 16*   LAKSHMI 9.2 8.9 9.1   GLC 97 108* 154*       Assessment/Plan:  LUQ abdominal pain  - mild, not reproducible on exam/nontender  Constipation, unspecified constipation type  - self report. Per NN is moving bowels every other day or so.  - add order for dose of Miralax now and scheduled daily.  - continue on prn Miralax and Senna S  - monitor bowel pattern/discomfort and report any worsening constipation or pain    Aftercare following surgery of the musculoskeletal system  Periprosthetic fracture of shaft of femur  Physical deconditioning  - ORIF/repair 2/26/23. Was NWB for a time then 50 % WB. F/w ortho 5/3 last and given WBAT. F/u 8 weeks. Patient ambulates well with 4WW independently. Minimal related pain. Acute rehab is completed and awaiting discharge to A/L- held up by financial issues. SW following.  - discontinue Robaxin and Tramadol  - continues on scheduled acetaminophen, has prn Celebrex which was also on at home.    Anemia due to blood loss, acute  - postop. 12--> 8.1. Trending up since, last 11.8 5/4    Chronic heart failure with preserved ejection fraction (HFpEF) (H)  - chronic, respiratory status stable. Weight loss in TCU (desired and dietician following). No edema noted  Primary hypertension  - chronic, some variability but generally well controlled. Amlodipine stopped inpatient.  - " "continue to monitor VS - if noted persistent elevations consider adding back amlodipine    Anxiety  Mild episode of recurrent major depressive disorder (H)  - chronic, does fixate on medications/med list and explains prefers minimal changes and prefers to have prns stay on list so there \"when I need them\". Generally patient's anxiety is much improved since admission. ACP referral made early on. Was on sched hydroxyzine for a bit but now just prn.   - continues on PTA fluoxetin, Buspar and is on gabapentin as well  - monitor mood and behaviors     Chronic cough  - chronic, states guaifenesin and prn benzonatate helpful  Wheezing  Exacerbated early May- CXR negative.   - continued on PTA fluticasone-salmeterol  - continue on added albuterol nebs- prn  Nasal congestion  - early May. ? R/t allergies.   Resolved      Muscle aches  - mild. Reports as chronic. Left elbow today.  - continue on acetaminophen.   - monitor.    H/O COVID in TCU 3/23 on routine rapid screen. No s/s and not treated with antivirals.    Chart reviewed and appears statin discontinued 2/2 LFT elevation inpatient thought ? 2/2 SBO. Did normalize prior to hospital discharge but statin not added back. Encouraged patient to f/w PCP after discharge to discuss further. Patient agreeable.    Orders  Written on unit and discussed with patient and nursing.    Electronically signed by: BEATRICE Mike CNP           Sincerely,        BEATRICE Mike CNP      "

## 2023-05-16 ENCOUNTER — TRANSITIONAL CARE UNIT VISIT (OUTPATIENT)
Dept: GERIATRICS | Facility: CLINIC | Age: 88
End: 2023-05-16
Payer: COMMERCIAL

## 2023-05-16 VITALS
WEIGHT: 235.6 LBS | BODY MASS INDEX: 39.25 KG/M2 | DIASTOLIC BLOOD PRESSURE: 76 MMHG | RESPIRATION RATE: 18 BRPM | SYSTOLIC BLOOD PRESSURE: 112 MMHG | HEART RATE: 87 BPM | TEMPERATURE: 97.3 F | OXYGEN SATURATION: 92 % | HEIGHT: 65 IN

## 2023-05-16 DIAGNOSIS — I50.32 CHRONIC HEART FAILURE WITH PRESERVED EJECTION FRACTION (H): ICD-10-CM

## 2023-05-16 DIAGNOSIS — Z96.649 PERIPROSTHETIC FRACTURE OF SHAFT OF FEMUR: Primary | ICD-10-CM

## 2023-05-16 DIAGNOSIS — M97.8XXA PERIPROSTHETIC FRACTURE OF SHAFT OF FEMUR: Primary | ICD-10-CM

## 2023-05-16 DIAGNOSIS — E03.9 HYPOTHYROIDISM, UNSPECIFIED TYPE: ICD-10-CM

## 2023-05-16 PROCEDURE — 99309 SBSQ NF CARE MODERATE MDM 30: CPT | Performed by: INTERNAL MEDICINE

## 2023-05-16 NOTE — LETTER
5/16/2023        RE: Carol Merida  [address]  [place] [address]        [facility] GERIATRICS  REGULATORY VISIT  May 16, 2023    [facility] Medical Record Number:  [id number]  Place of Service where encounter took place:  [facility] ([facility]) [[id number]]    Chief Complaint   Patient presents with     long term Regulatory       HPI:    Carol Merida is a 88 year old  (1935), who is being seen today for a federally mandated E/M visit at [facility] where she was admitted to the U in March after hospitalization with a distal periprosthetic femur fracture s/p ORIF. HPI information obtained from: facility chart records, facility staff, patient report and [facility] chart review.    Today, Ms. Merida is seen in her room sitting up in bed.  She tells me she is on working with therapy and is just waiting on the plan to go home. She is hoping that we will be able to go through her medications before she leaves that she knows they are different than several months ago when she was last home.  Her pain is overall controlled.  She has had some constipation but is improving.  No concerns today per discussion with nursing.      ALLERGIES:    Allergies   Allergen Reactions     Ace Inhibitors Cough        Past Medical, Surgical, Family and Social History: Reviewed and updated in EPIC.    MEDICATIONS:  Current Outpatient Medications   Medication Sig Dispense Refill     ACE/ARB/ARNI NOT PRESCRIBED (INTENTIONAL) Please choose reason not prescribed, below       acetaminophen (TYLENOL) 650 MG CR tablet Take 1 tablet (650 mg) by mouth every 8 hours 270 tablet 1     albuterol (PROAIR HFA/PROVENTIL HFA/VENTOLIN HFA) 108 (90 Base) MCG/ACT inhaler INHALE 2 PUFFS INTO THE LUNGS EVERY 4 HOURS AS NEEDED FOR SHORTNESS OF BREATH OR DIFFICULT BREATHING OR WHEEZING 8.5 g 0     albuterol (PROVENTIL) (2.5 MG/3ML) 0.083% neb solution Take 1 vial (2.5 mg) by nebulization every 4 hours as needed for  shortness of breath, wheezing or cough And BID 90 mL      artificial saliva (BIOTENE MT) SOLN solution Swish and spit 2 mLs (2 sprays) in mouth 4 times daily       benzocaine-menthol (CHLORASEPTIC MAX) 15-10 MG lozenge Place 1 lozenge inside cheek every hour as needed       benzonatate (TESSALON) 100 MG capsule Take 1 capsule (100 mg) by mouth 3 times daily as needed for cough       BETA BLOCKER NOT PRESCRIBED (INTENTIONAL) Beta Blocker not prescribed intentionally due to EF > 40 % (ejection fraction) will leave up to Cardiology.       busPIRone (BUSPAR) 10 MG tablet Take 1 tablet (10 mg) by mouth 2 times daily 180 tablet 3     calcium carbonate 600 mg-vitamin D 400 units (CALTRATE) 600-400 MG-UNIT per tablet Take 1 tablet by mouth every evening  100 tablet 3     celecoxib (CELEBREX) 100 MG capsule Take 100 mg by mouth 2 times daily as needed for moderate pain (4-6)       diclofenac (VOLTAREN) 1 % topical gel Apply 4 g topically 4 times daily as needed for moderate pain 150 g 3     FLUoxetine (PROZAC) 20 MG capsule Take 3 capsules (60 mg) by mouth daily 240 capsule 3     fluticasone-salmeterol (WIXELA INHUB) 100-50 MCG/ACT inhaler Inhale 1 puff into the lungs 2 times daily 60 each 5     gabapentin (NEURONTIN) 300 MG capsule Take 1 capsule (300 mg) by mouth 2 times daily       gabapentin (NEURONTIN) 600 MG tablet Take 1 tablet (600 mg) by mouth At Bedtime       hydrOXYzine (ATARAX) 25 MG tablet Take 0.5 tablets (12.5 mg) by mouth daily as needed for itching And TID prn       levothyroxine (SYNTHROID/LEVOTHROID) 88 MCG tablet Take 1 tablet (88 mcg) by mouth daily 90 tablet 2     melatonin 3 MG tablet Take 3 mg by mouth At Bedtime       methocarbamol (ROBAXIN) 750 MG tablet Take 1 tablet (750 mg) by mouth 4 times daily       nystatin (NYSTOP) 477271 UNIT/GM external powder Apply tid to affectead area prn 60 g 3     olopatadine (PATANOL) 0.1 % ophthalmic solution Place 1 drop into both eyes 2 times daily as needed for  "allergies       polyethylene glycol (MIRALAX) 17 g packet Take 17 g by mouth daily as needed for constipation       PseudoePHEDrine-guaiFENesin 120-1200 MG TB12 Take 1 tablet by mouth 2 times daily       senna-docusate (SENOKOT-S/PERICOLACE) 8.6-50 MG tablet Take 1 tablet by mouth 2 times daily as needed for constipation (Patient taking differently: Take 1 tablet by mouth daily as needed for constipation)       traMADol (ULTRAM) 50 MG tablet Take 0.5-1 tablets (25-50 mg) by mouth every 6 hours as needed for moderate pain (4-6) or severe pain (7-10) 26 tablet 0     Medications reviewed:  Medications reconciled to facility chart and changes were made to reflect current medications as identified as above med list. Below are the changes that were made:   Medications stopped since last EPIC medication reconciliation:   Medications Discontinued During This Encounter   Medication Reason     ondansetron (ZOFRAN ODT) 4 MG ODT tab Med Rec(No AVS / No eCancel)     guaiFENesin (MUCINEX) 600 MG 12 hr tablet Med Rec(No AVS / No eCancel)     fluticasone (FLONASE) 50 MCG/ACT nasal spray Med Rec(No AVS / No eCancel)     Medications started since last [facility] medication reconciliation:  Orders Placed This Encounter   Medications     melatonin 3 MG tablet     Sig: Take 3 mg by mouth At Bedtime     PseudoePHEDrine-guaiFENesin 120-1200 MG TB12     Sig: Take 1 tablet by mouth 2 times daily       REVIEW OF SYSTEMS:  4 point ROS neg other than the symptoms noted above in the HPI.    PHYSICAL EXAM:  /76   Pulse 87   Temp 97.3  F (36.3  C)   Resp 18   Ht 1.651 m (5' 5\")   Wt 106.9 kg (235 lb 9.6 oz)   LMP  (LMP Unknown)   SpO2 92%   BMI 39.21 kg/m    Gen: sitting up in bed, alert, cooperative and in no acute distress  Resp: breathing non labored, no tachypnea  Ext: no LE edema  Neuro: CX II-XII grossly in tact; ROM in all four extremities grossly in tact  Psych: alert and oriented to self and general situation    LABS/IMAGING: " Reviewed as per [facility] and/or [facility]    ASSESSMENT / PLAN:    Periprosthetic Distal Femur Fracture s/p ORIF (2/26/23)  Secondary to a fall the day prior. Has had follow up with ortho and WB status advanced. Pain controlled.   -- WBAT  -- analgesia with APAP 650 mg q8h, celecoxib 100 mg BID PRN, gabapentin 300 mg BID and 600 mg at bedtime, methocarbamol 750 mg QID and tramadol 25-50 mg q6h PRN    -- would be good if we could try to decrease the    methocarbamol prior to her discharge  -- PT/OT  -- Follow-up with Ortho as scheduled    HFpEF (EF >70%), HTN  SBPs 110s-130s, most 120s-130s. HR 70s-80s. Weight ?down 18 lbs since March? Amlodipine and statin discontinued during hospitalization.   -- follow BPs, weights, clinical volume status     Hypothyroidisim  TSH - none recent.   -- levothyroxine 88 mcg daily     Physical Deconditioning  In setting of hospitalization and underlying medical conditions  -- ongoing PT/OT    Electronically signed by  Jasmina Thomas MD              Sincerely,        Jasmina Thomas MD

## 2023-05-16 NOTE — PROGRESS NOTES
[facility] GERIATRICS  REGULATORY VISIT  May 16, 2023    [facility] Medical Record Number:  [id number]  Place of Service where encounter took place:  [facility]) [[id number]]    Chief Complaint   Patient presents with     FPC Regulatory       HPI:    Carol Merida is a 88 year old  (1935), who is being seen today for a federally mandated E/M visit at [facility] where she was admitted to the U in March after hospitalization with a distal periprosthetic femur fracture s/p ORIF. HPI information obtained from: facility chart records, facility staff, patient report and [facility] chart review.    Today, Ms. Merida is seen in her room sitting up in bed.  She tells me she is on working with therapy and is just waiting on the plan to go home. She is hoping that we will be able to go through her medications before she leaves that she knows they are different than several months ago when she was last home.  Her pain is overall controlled.  She has had some constipation but is improving.  No concerns today per discussion with nursing.      ALLERGIES:    Allergies   Allergen Reactions     Ace Inhibitors Cough        Past Medical, Surgical, Family and Social History: Reviewed and updated in EPIC.    MEDICATIONS:  Current Outpatient Medications   Medication Sig Dispense Refill     ACE/ARB/ARNI NOT PRESCRIBED (INTENTIONAL) Please choose reason not prescribed, below       acetaminophen (TYLENOL) 650 MG CR tablet Take 1 tablet (650 mg) by mouth every 8 hours 270 tablet 1     albuterol (PROAIR HFA/PROVENTIL HFA/VENTOLIN HFA) 108 (90 Base) MCG/ACT inhaler INHALE 2 PUFFS INTO THE LUNGS EVERY 4 HOURS AS NEEDED FOR SHORTNESS OF BREATH OR DIFFICULT BREATHING OR WHEEZING 8.5 g 0     albuterol (PROVENTIL) (2.5 MG/3ML) 0.083% neb solution Take 1 vial (2.5 mg) by nebulization every 4 hours as needed for shortness of breath, wheezing or cough And BID 90 mL      artificial saliva (BIOTENE MT) SOLN  solution Swish and spit 2 mLs (2 sprays) in mouth 4 times daily       benzocaine-menthol (CHLORASEPTIC MAX) 15-10 MG lozenge Place 1 lozenge inside cheek every hour as needed       benzonatate (TESSALON) 100 MG capsule Take 1 capsule (100 mg) by mouth 3 times daily as needed for cough       BETA BLOCKER NOT PRESCRIBED (INTENTIONAL) Beta Blocker not prescribed intentionally due to EF > 40 % (ejection fraction) will leave up to Cardiology.       busPIRone (BUSPAR) 10 MG tablet Take 1 tablet (10 mg) by mouth 2 times daily 180 tablet 3     calcium carbonate 600 mg-vitamin D 400 units (CALTRATE) 600-400 MG-UNIT per tablet Take 1 tablet by mouth every evening  100 tablet 3     celecoxib (CELEBREX) 100 MG capsule Take 100 mg by mouth 2 times daily as needed for moderate pain (4-6)       diclofenac (VOLTAREN) 1 % topical gel Apply 4 g topically 4 times daily as needed for moderate pain 150 g 3     FLUoxetine (PROZAC) 20 MG capsule Take 3 capsules (60 mg) by mouth daily 240 capsule 3     fluticasone-salmeterol (WIXELA INHUB) 100-50 MCG/ACT inhaler Inhale 1 puff into the lungs 2 times daily 60 each 5     gabapentin (NEURONTIN) 300 MG capsule Take 1 capsule (300 mg) by mouth 2 times daily       gabapentin (NEURONTIN) 600 MG tablet Take 1 tablet (600 mg) by mouth At Bedtime       hydrOXYzine (ATARAX) 25 MG tablet Take 0.5 tablets (12.5 mg) by mouth daily as needed for itching And TID prn       levothyroxine (SYNTHROID/LEVOTHROID) 88 MCG tablet Take 1 tablet (88 mcg) by mouth daily 90 tablet 2     melatonin 3 MG tablet Take 3 mg by mouth At Bedtime       methocarbamol (ROBAXIN) 750 MG tablet Take 1 tablet (750 mg) by mouth 4 times daily       nystatin (NYSTOP) 087698 UNIT/GM external powder Apply tid to affectead area prn 60 g 3     olopatadine (PATANOL) 0.1 % ophthalmic solution Place 1 drop into both eyes 2 times daily as needed for allergies       polyethylene glycol (MIRALAX) 17 g packet Take 17 g by mouth daily as needed  "for constipation       PseudoePHEDrine-guaiFENesin 120-1200 MG TB12 Take 1 tablet by mouth 2 times daily       senna-docusate (SENOKOT-S/PERICOLACE) 8.6-50 MG tablet Take 1 tablet by mouth 2 times daily as needed for constipation (Patient taking differently: Take 1 tablet by mouth daily as needed for constipation)       traMADol (ULTRAM) 50 MG tablet Take 0.5-1 tablets (25-50 mg) by mouth every 6 hours as needed for moderate pain (4-6) or severe pain (7-10) 26 tablet 0     Medications reviewed:  Medications reconciled to facility chart and changes were made to reflect current medications as identified as above med list. Below are the changes that were made:   Medications stopped since last EPIC medication reconciliation:   Medications Discontinued During This Encounter   Medication Reason     ondansetron (ZOFRAN ODT) 4 MG ODT tab Med Rec(No AVS / No eCancel)     guaiFENesin (MUCINEX) 600 MG 12 hr tablet Med Rec(No AVS / No eCancel)     fluticasone (FLONASE) 50 MCG/ACT nasal spray Med Rec(No AVS / No eCancel)     Medications started since last [facility] medication reconciliation:  Orders Placed This Encounter   Medications     melatonin 3 MG tablet     Sig: Take 3 mg by mouth At Bedtime     PseudoePHEDrine-guaiFENesin 120-1200 MG TB12     Sig: Take 1 tablet by mouth 2 times daily       REVIEW OF SYSTEMS:  4 point ROS neg other than the symptoms noted above in the HPI.    PHYSICAL EXAM:  /76   Pulse 87   Temp 97.3  F (36.3  C)   Resp 18   Ht 1.651 m (5' 5\")   Wt 106.9 kg (235 lb 9.6 oz)   LMP  (LMP Unknown)   SpO2 92%   BMI 39.21 kg/m    Gen: sitting up in bed, alert, cooperative and in no acute distress  Resp: breathing non labored, no tachypnea  Ext: no LE edema  Neuro: CX II-XII grossly in tact; ROM in all four extremities grossly in tact  Psych: alert and oriented to self and general situation    LABS/IMAGING: Reviewed as per Epic and/or [facility]    ASSESSMENT / PLAN:    Periprosthetic Distal Femur " Fracture s/p ORIF (2/26/23)  Secondary to a fall the day prior. Has had follow up with ortho and WB status advanced. Pain controlled.   -- WBAT  -- analgesia with APAP 650 mg q8h, celecoxib 100 mg BID PRN, gabapentin 300 mg BID and 600 mg at bedtime, methocarbamol 750 mg QID and tramadol 25-50 mg q6h PRN    -- would be good if we could try to decrease the    methocarbamol prior to her discharge  -- PT/OT  -- Follow-up with Ortho as scheduled    HFpEF (EF >70%), HTN  SBPs 110s-130s, most 120s-130s. HR 70s-80s. Weight ?down 18 lbs since March? Amlodipine and statin discontinued during hospitalization.   -- follow BPs, weights, clinical volume status     Hypothyroidisim  TSH - none recent.   -- levothyroxine 88 mcg daily     Physical Deconditioning  In setting of hospitalization and underlying medical conditions  -- ongoing PT/OT    Electronically signed by  Jasmina Thomas MD

## 2023-05-21 RX ORDER — POLYETHYLENE GLYCOL 3350 17 G/17G
17 POWDER, FOR SOLUTION ORAL DAILY
Status: ON HOLD
Start: 2023-05-21 | End: 2024-06-20

## 2023-05-22 ENCOUNTER — TRANSITIONAL CARE UNIT VISIT (OUTPATIENT)
Dept: GERIATRICS | Facility: CLINIC | Age: 88
End: 2023-05-22
Payer: COMMERCIAL

## 2023-05-22 VITALS
TEMPERATURE: 97.4 F | DIASTOLIC BLOOD PRESSURE: 67 MMHG | RESPIRATION RATE: 18 BRPM | HEART RATE: 70 BPM | HEIGHT: 65 IN | SYSTOLIC BLOOD PRESSURE: 127 MMHG | OXYGEN SATURATION: 94 % | WEIGHT: 235.6 LBS | BODY MASS INDEX: 39.25 KG/M2

## 2023-05-22 DIAGNOSIS — Z96.653 HISTORY OF BILATERAL KNEE REPLACEMENT: ICD-10-CM

## 2023-05-22 DIAGNOSIS — Z47.89 AFTERCARE FOLLOWING SURGERY OF THE MUSCULOSKELETAL SYSTEM: ICD-10-CM

## 2023-05-22 DIAGNOSIS — K59.00 CONSTIPATION, UNSPECIFIED CONSTIPATION TYPE: ICD-10-CM

## 2023-05-22 DIAGNOSIS — M97.8XXA PERIPROSTHETIC FRACTURE OF SHAFT OF FEMUR: ICD-10-CM

## 2023-05-22 DIAGNOSIS — M79.661 PAIN OF RIGHT LOWER LEG: ICD-10-CM

## 2023-05-22 DIAGNOSIS — Z96.649 PERIPROSTHETIC FRACTURE OF SHAFT OF FEMUR: ICD-10-CM

## 2023-05-22 DIAGNOSIS — R10.12 LUQ ABDOMINAL PAIN: Primary | ICD-10-CM

## 2023-05-22 PROCEDURE — 99309 SBSQ NF CARE MODERATE MDM 30: CPT | Performed by: NURSE PRACTITIONER

## 2023-05-22 NOTE — LETTER
"    5/22/2023        RE: Carol Merida  [address]  [place] [address]        [facility] GERIATRICS    Chief Complaint   Patient presents with     RECHECK     HPI:  Carol Merida is a 88 year old  (1935), who is being seen today for an episodic care visit at: [facility] ([facility]) [[id number]].     Patient is seen in [facility] for recheck of the following medical issues:    1. LUQ abdominal pain    2. Constipation, unspecified constipation type    3. Aftercare following surgery of the musculoskeletal system    4. Periprosthetic fracture of shaft of femur    5. Pain of right lower leg      - Nursing reports no new concerns.     VS, weights reviewed in facility EMR today and are stable.     Patient found in room. Alert, calm, NAD. Reports no further LUQ abdominal pain and relief from constipation. States though does not think she is receiving the Miralax daily as ordered. Reports noting some right lower thigh and calf pain for the last \"couple of days\". Discussed x-rays just done in ortho office f/u and patient is aware these looked good. Patient denies noting worsening any specific time. Denies recent trauma.   Patient reports is feeling a bit tired on Robaxin and not having much in way of postop pain or stiffness any longer. No recent use of Tramadol. Agreed to discontinuation of Robaxin and Tramadol today.        Allergies, and PMH/PSH reviewed in EPIC today.  REVIEW OF SYSTEMS:  4 point ROS including Respiratory, CV, GI and , other than that noted in the HPI,  is negative    Objective:   /67   Pulse 70   Temp 97.4  F (36.3  C)   Resp 18   Ht 1.651 m (5' 5\")   Wt 106.9 kg (235 lb 9.6 oz)   LMP  (LMP Unknown)   SpO2 94%   BMI 39.21 kg/m      Resp: Effort WNL, LSCTA  CV: VS as above, no noted edema  Abd- soft, nontender, BS +  Musc- LAI  Skin- no rashes, lesions, bruising noted RLE  Psych- alert, calm, pleasant      Recent labs in EPIC reviewed by me today.     Assessment/Plan:  LUQ " abdominal pain  Resolved  Constipation, unspecified constipation type  Moving bowels regularly now  - ensure patient is receiving Miralax daily as ordered. Continue prn Senna S    Aftercare following surgery of the musculoskeletal system  Periprosthetic fracture of shaft of femur  - f/w ortho regularly. Now WBAT for transfers. Was largely w/c dependent prior to surgery. Has scooter as well. Tired with Robaxin and no longer requiring prn Tramadol.   - discontinue Tramadol and Robaxin today  - continue scheduled acetaminophen, Diclofenac for pain.     Pain of right lower leg  - back of right low thigh and calf. No noted erythema, edema, bruising  - order Venous US to r/o DVT  - monitor and continue cautious WBAT with transfers and pain regimen as above.         Orders:  Written on unit and discussed with nursing    Electronically signed by: BEATRICE Mike CNP           Sincerely,        BEATRICE Mike CNP

## 2023-05-22 NOTE — PROGRESS NOTES
"[facility] GERIATRICS    Chief Complaint   Patient presents with     RECHECK     HPI:  Carol Merida is a 88 year old  (1935), who is being seen today for an episodic care visit at: [facility] ([facility]) [[id number]].     Patient is seen in U for recheck of the following medical issues:    1. LUQ abdominal pain    2. Constipation, unspecified constipation type    3. Aftercare following surgery of the musculoskeletal system    4. Periprosthetic fracture of shaft of femur    5. Pain of right lower leg      - Nursing reports no new concerns.     VS, weights reviewed in facility EMR today and are stable.     Patient found in room. Alert, calm, NAD. Reports no further LUQ abdominal pain and relief from constipation. States though does not think she is receiving the Miralax daily as ordered. Reports noting some right lower thigh and calf pain for the last \"couple of days\". Discussed x-rays just done in ortho office f/u and patient is aware these looked good. Patient denies noting worsening any specific time. Denies recent trauma.   Patient reports is feeling a bit tired on Robaxin and not having much in way of postop pain or stiffness any longer. No recent use of Tramadol. Agreed to discontinuation of Robaxin and Tramadol today.        Allergies, and PMH/PSH reviewed in EPIC today.  REVIEW OF SYSTEMS:  4 point ROS including Respiratory, CV, GI and , other than that noted in the HPI,  is negative    Objective:   /67   Pulse 70   Temp 97.4  F (36.3  C)   Resp 18   Ht 1.651 m (5' 5\")   Wt 106.9 kg (235 lb 9.6 oz)   LMP  (LMP Unknown)   SpO2 94%   BMI 39.21 kg/m      Resp: Effort WNL, LSCTA  CV: VS as above, no noted edema  Abd- soft, nontender, BS +  Musc- LAI  Skin- no rashes, lesions, bruising noted RLE  Psych- alert, calm, pleasant      Recent labs in EPIC reviewed by me today.     Assessment/Plan:  LUQ abdominal pain  Resolved  Constipation, unspecified constipation type  Moving bowels regularly " now  - ensure patient is receiving Miralax daily as ordered. Continue prn Senna S    Aftercare following surgery of the musculoskeletal system  Periprosthetic fracture of shaft of femur  - f/w ortho regularly. Now WBAT for transfers. Was largely w/c dependent prior to surgery. Has scooter as well. Tired with Robaxin and no longer requiring prn Tramadol.   - discontinue Tramadol and Robaxin today  - continue scheduled acetaminophen, Diclofenac for pain.     Pain of right lower leg  - back of right low thigh and calf. No noted erythema, edema, bruising  - order Venous US to r/o DVT  - monitor and continue cautious WBAT with transfers and pain regimen as above.         Orders:  Written on unit and discussed with nursing    Electronically signed by: BEATRICE Mike CNP

## 2023-05-25 ENCOUNTER — TRANSITIONAL CARE UNIT VISIT (OUTPATIENT)
Dept: GERIATRICS | Facility: CLINIC | Age: 88
End: 2023-05-25
Payer: COMMERCIAL

## 2023-05-25 VITALS
OXYGEN SATURATION: 93 % | SYSTOLIC BLOOD PRESSURE: 112 MMHG | RESPIRATION RATE: 18 BRPM | TEMPERATURE: 98.2 F | DIASTOLIC BLOOD PRESSURE: 57 MMHG | WEIGHT: 235.6 LBS | BODY MASS INDEX: 39.25 KG/M2 | HEART RATE: 71 BPM | HEIGHT: 65 IN

## 2023-05-25 DIAGNOSIS — M79.661 PAIN OF RIGHT LOWER LEG: Primary | ICD-10-CM

## 2023-05-25 PROCEDURE — 99308 SBSQ NF CARE LOW MDM 20: CPT | Performed by: NURSE PRACTITIONER

## 2023-05-25 NOTE — LETTER
"    5/25/2023        RE: Carol Merida  [address]  [place] [address]        [facility] GERIATRICS    Chief Complaint   Patient presents with     RECHECK     HPI:  Carol Merida is a 88 year old  (1935), who is being seen today for an episodic care visit at: [facility] ([facility]) [[id number]].     Patient seen in U for recheck of right leg pain.    Venous US ordered and reviewed and was negative for acute pathology.    Nursing reports no new concerns.    Patient found in room and reports pain is improving and feels more like a \"rash\". Exam ensued. Discussed results of venous US results. Reports no new concerns and states is looking forward to leave this weekend to attend a family wedding.         Allergies, and PMH/PSH reviewed in [facility] today.  REVIEW OF SYSTEMS:  4 point ROS including Respiratory, CV, GI and , other than that noted in the HPI,  is negative    Objective:   /57   Pulse 71   Temp 98.2  F (36.8  C)   Resp 18   Ht 1.651 m (5' 5\")   Wt 106.9 kg (235 lb 9.6 oz)   LMP  (LMP Unknown)   SpO2 93%   BMI 39.21 kg/m      Resp: Effort WNL  CV: VS as above  Abd- soft, nontender, BS +  Musc- LAI  Skin- clear of rashes, lesions, wounds, bruises RLE  Psych- alert, calm, pleasant      Recent labs in [facility] reviewed by me today.     Assessment/Plan:  Pain of right lower leg  - venous US unremarkable. Pain lessening.  - continue on acetaminophen, Diclofenac. Monitor pain.        Electronically signed by: BEATRICE Mike CNP           Sincerely,        BEATRICE Mike CNP      "

## 2023-05-25 NOTE — PROGRESS NOTES
"[facility] GERIATRICS    Chief Complaint   Patient presents with     RECHECK     HPI:  Carol Merida is a 88 year old  (1935), who is being seen today for an episodic care visit at: [facility] ([facility]) [[id number]].     Patient seen in TCU for recheck of right leg pain.    Venous US ordered and reviewed and was negative for acute pathology.    Nursing reports no new concerns.    Patient found in room and reports pain is improving and feels more like a \"rash\". Exam ensued. Discussed results of venous US results. Reports no new concerns and states is looking forward to leave this weekend to attend a family wedding.         Allergies, and PMH/PSH reviewed in [facility] today.  REVIEW OF SYSTEMS:  4 point ROS including Respiratory, CV, GI and , other than that noted in the HPI,  is negative    Objective:   /57   Pulse 71   Temp 98.2  F (36.8  C)   Resp 18   Ht 1.651 m (5' 5\")   Wt 106.9 kg (235 lb 9.6 oz)   LMP  (LMP Unknown)   SpO2 93%   BMI 39.21 kg/m      Resp: Effort WNL  CV: VS as above  Abd- soft, nontender, BS +  Musc- LAI  Skin- clear of rashes, lesions, wounds, bruises RLE  Psych- alert, calm, pleasant      Recent labs in [facility] reviewed by me today.     Assessment/Plan:  Pain of right lower leg  - venous US unremarkable. Pain lessening.  - continue on acetaminophen, Diclofenac. Monitor pain.        Electronically signed by: BEATRICE Mike CNP     "

## 2023-05-29 RX ORDER — AMOXICILLIN 250 MG
1 CAPSULE ORAL DAILY PRN
Start: 2023-05-29 | End: 2023-07-26

## 2023-06-05 ENCOUNTER — PATIENT OUTREACH (OUTPATIENT)
Dept: CARE COORDINATION | Facility: CLINIC | Age: 88
End: 2023-06-05
Payer: COMMERCIAL

## 2023-06-05 NOTE — PROGRESS NOTES
Clinic Care Coordination Contact     Situation: Patient chart reviewed by care coordinator.     Background: Care Coordination monitoring for discharge from TCU: Mable Cunningham.      Assessment: Pt continues in TCU at this time.      Plan/Recommendations: [facility] will follow up again with chart review in 4 weeks.     TAMMIE Moulton  Clinic Care Coordinator  [facility]  [phone]  [email or web address]

## 2023-06-08 ENCOUNTER — TRANSITIONAL CARE UNIT VISIT (OUTPATIENT)
Dept: GERIATRICS | Facility: CLINIC | Age: 88
End: 2023-06-08
Payer: COMMERCIAL

## 2023-06-08 VITALS
OXYGEN SATURATION: 93 % | SYSTOLIC BLOOD PRESSURE: 136 MMHG | BODY MASS INDEX: 39.25 KG/M2 | WEIGHT: 235.6 LBS | HEIGHT: 65 IN | RESPIRATION RATE: 18 BRPM | DIASTOLIC BLOOD PRESSURE: 68 MMHG | TEMPERATURE: 97.6 F | HEART RATE: 82 BPM

## 2023-06-08 DIAGNOSIS — D62 ANEMIA DUE TO BLOOD LOSS, ACUTE: ICD-10-CM

## 2023-06-08 DIAGNOSIS — E78.5 HYPERLIPIDEMIA, UNSPECIFIED HYPERLIPIDEMIA TYPE: ICD-10-CM

## 2023-06-08 DIAGNOSIS — I50.32 CHRONIC HEART FAILURE WITH PRESERVED EJECTION FRACTION (H): ICD-10-CM

## 2023-06-08 DIAGNOSIS — R26.2 DIFFICULTY WALKING: ICD-10-CM

## 2023-06-08 DIAGNOSIS — E03.9 HYPOTHYROIDISM, UNSPECIFIED TYPE: ICD-10-CM

## 2023-06-08 DIAGNOSIS — R05.3 CHRONIC COUGH: ICD-10-CM

## 2023-06-08 DIAGNOSIS — M62.81 MUSCLE WEAKNESS (GENERALIZED): ICD-10-CM

## 2023-06-08 DIAGNOSIS — R06.2 WHEEZING: ICD-10-CM

## 2023-06-08 DIAGNOSIS — Z47.89 AFTERCARE FOLLOWING SURGERY OF THE MUSCULOSKELETAL SYSTEM: Primary | ICD-10-CM

## 2023-06-08 DIAGNOSIS — F41.9 ANXIETY: ICD-10-CM

## 2023-06-08 DIAGNOSIS — K59.00 CONSTIPATION, UNSPECIFIED CONSTIPATION TYPE: ICD-10-CM

## 2023-06-08 DIAGNOSIS — I10 PRIMARY HYPERTENSION: ICD-10-CM

## 2023-06-08 PROCEDURE — 99309 SBSQ NF CARE MODERATE MDM 30: CPT | Performed by: NURSE PRACTITIONER

## 2023-06-08 NOTE — LETTER
"    6/8/2023        RE: Carol Merida  [address]  [place] [address]        [facility] GERIATRICS    Chief Complaint   Patient presents with     RECHECK     HPI:  Carol Merida is a 88 year old  (1935), who is being seen today for an episodic care visit at: [facility] ([facility]) [[id number]].     Patient is seen today for recheck of multiple chronic medical issues as below:    1. Aftercare following surgery of the musculoskeletal system    2. Muscle weakness (generalized)    3. Difficulty walking    4. Anemia due to blood loss, acute    5. Chronic heart failure with preserved ejection fraction (H)    6. Primary hypertension    7. Hyperlipidemia, unspecified hyperlipidemia type    8. Chronic cough    9. Hypothyroidism, unspecified type    10. Constipation, unspecified constipation type    11. Anxiety      Discharge has been on hold a while 2/2 financial barriers but per SW will be ensuing in short order.     Nursing reports no new concerns.     VS, weights reviewed in facility EMR today and are stable.     Patient found in room. Alert, calm, NAD. Was out recently for family wedding and reports this went well. Patient denies pain. Denies SOB, chest pain, dizziness. Reports eating well. Denies n/v or abdominal pain. States sleeping ok. Reports is looking forward to discharging soon.     Allergies, and PMH/PSH reviewed in EPIC today.  REVIEW OF SYSTEMS:  4 point ROS including Respiratory, CV, GI and , other than that noted in the HPI,  is negative    Objective:   /68   Pulse 82   Temp 97.6  F (36.4  C)   Resp 18   Ht 1.651 m (5' 5\")   Wt 106.9 kg (235 lb 9.6 oz)   LMP  (LMP Unknown)   SpO2 93%   BMI 39.21 kg/m      Resp: Effort WNL, LSCTA   CV: VS as above, no edema noted  Abd- soft, nontender, BS +  Musc- LAI- seated in a chair, station WNL  Psych- alert, calm, pleasant        Most Recent 3 CBC's:  Recent Labs   Lab Test 05/04/23  0839 04/13/23  0726 03/23/23  0627   WBC 5.9 5.9 " 3.6*   HGB 11.8 11.4* 9.3*   MCV 90 94 97    281 298     Most Recent 3 BMP's:  Recent Labs   Lab Test 04/13/23  0726 03/23/23  0627 03/13/23  0858    141 142   POTASSIUM 4.0 4.4 3.4   CHLORIDE 101 100 99   CO2 25 29 27   BUN 10.6 15.4 12.4   CR 0.66 0.62 0.55   ANIONGAP 14 12 16*   LAKSHMI 9.2 8.9 9.1   GLC 97 108* 154*       Assessment/Plan:  Aftercare following surgery of the musculoskeletal system  Periprosthetic fracture of shaft of femur s/p ORIF 2/26/2023  Muscle weakness (generalized)  Difficulty walking  - was NWB for time then slowly advanced to WBAT per ortho. Surgical incision is healed. Uses powered scooter and w/c most of the time at home. Needs new w/c- seen for face to face for this today. Tramadol and Robaxin have been discontinued. Saw ortho last 5/3 with x-rays and good report.   Completed work with rehab therapies and did make good functional gains.  - continue on acetaminophen, Diclofenac, prn Celebrex for discomfort  - f/w ortho again as directed 8 weeks from 5/3 appt    Anemia due to blood loss, acute  Postop- HGB 12 -- 8.1 and trending up in TCU, last 11.8 5/4  - periodic recheck of HGB to ensure stability    Chronic heart failure with preserved ejection fraction (H)  - chronic, compensated. Weight loss in TCU (trying to and worked with dietician). Resp status stable. No noted edema  Primary hypertension  - controlled. Amlodipine stopped inpatient  - f/w PCP for ongoing eval/managment  Hyperlipidemia, unspecified hyperlipidemia type  By history. Statin stopped inpatient 2/2 temporary elevation of LFTs that did resolve. Discussed with patient and recommend f/w PCP to discuss whether or not to resume.     Chronic cough  Wheezing  - chronic, no cough or c/o cough today  - continue on guaifenesin and PTA benzonatate  - continue on fluticasone-salmeterol and prn albuterol    Hypothyroidism, unspecified type  - last noted TSH 3.60 9/2021  - continues on levothyroxine 88 mcg daily  -  recommend TSH level check when f/w PCP    Constipation, unspecified constipation type  - chronic, intermittent. Per review of NN is moving bowels regularly. No c/o constipation or abdominal discomfort today  - continue on scheduled Miralax and prn Senna S    Anxiety  - chronic, exacerbated early on - but mood stable since adjusted to routines in TCU.  - continue on PTA fluoxetine and Buspar, prn hydroxyzine        OK to discharge to A/L with current meds and treatments.      Electronically signed by: BEATRICE Mike CNP        Face to Face and Medical Necessity Statement for DME Provider visit    Demographic Information on Carol Merida:  Gender: female  : 1935  [address]   [place] [address]  [phone] (home)     Medical Record: [id number]  Social Security Number: [id number]  Primary Care Provider: ANTONY Gan [facility]  Insurance: Payor: [employer] / Plan: [employer] MEDICARE / Product Type: HMO /     HPI:   Carol Merida is a 88 year old  (1935), who is being seen today for a face to face provider visit at [facility] medical necessity statement for DME included. This patient requires the following:  DME Ordered and Medical Necessity Statement     Wheelchair Documentation  Size: 20 x 16  Corresponding cushion: Yes: skin integrity  Standard foot rests: Yes  Elevating leg rests: Yes  Arm rests: Yes: full  Lap tray: No  Dose the patient use oxygen? No   Is the patient able to propel wheelchair? Yes   1. The patient has mobility limitations that impairs their ability to participate in one or more mobility related activities: Toileting, Feeding, Grooming and Bathing.  The wheelchair is suitable and necessary for use in the patient's home.  2. The patient's mobility limitations cannot be safely resolved by using a cane/walker:No    Reason why a cane or walker will not meet the patient's needs. (ie: balance, tolerance, level of assistance) muscle pain and weakness  bilateral LE  3. The patients home has adequate access to use a manual wheelchair:Yes  4. The use of a manual wheelchair on a regular basis will improve the patients ability to participate in mobility related ADL's at home:Yes  5. The patient is willing to use a manual wheelchair at home:Yes  6. The patient has adequate upper body strength and the mental capability to safely use a manual wheelchair and/or has a caregiver that is able to assist: Yes  7. Does the patient have a lower extremity injury or edema?Yes  Reason for Type of Wheelchair  Patient weight: 235 lbs 0 oz    Due to the patients generalized muscle weakness and pain to bilateral LE and recent right periprosthetic fracture my patient has an inability to participate in MRADLs that cannot be corrected with the use of a cane or walker. The patient's home has adequate space between rooms for maneuvering a manual w/c and the patient and caregiver are able to safely use the wheelchair. The patient will use the wheelchair daily and it will improve participation with MRADLS.    Pt needing above DME with expected length of need of 99 months  due to medical necessity associated with following diagnosis:     Aftercare following surgery of the musculoskeletal system  Muscle weakness (generalized)  Difficulty walking    PMH   has a past medical history of Abnormal stress test, Anemia, Anxiety, Cardiac dysrhythmia, unspecified, CHF (congestive heart failure) (H) (6/18/2018), PETTY (dyspnea on exertion), Hyperlipidemia LDL goal <100 (2/10/2010), Hypertension goal BP (blood pressure) < 140/90 (7/18/2010), Hypothyroid, Malignant neoplasm of breast (female), unspecified site (2005), MEDICAL HISTORY OF -, Melanoma of skin, site unspecified, NONSPECIFIC MEDICAL HISTORY (04), Obstructive sleep apnea (adult) (pediatric) (8/25/2006), Osteoarthritis, Other chronic pain, Other osteoporosis, PONV (postoperative nausea and vomiting), and Tubular adenoma in colon (9/01).    She has  "no past medical history of Chronic infection, History of blood transfusion, or Malignant hyperthermia.    ROS:4 point ROS including Respiratory, CV, GI and , other than that noted in the HPI,  is negative    EXAM  Vitals: /68   Pulse 82   Temp 97.6  F (36.4  C)   Resp 18   Ht 1.651 m (5' 5\")   Wt 106.9 kg (235 lb 9.6 oz)   LMP  (LMP Unknown)   SpO2 93%   BMI 39.21 kg/m  ;BMI= Body mass index is 39.21 kg/m .    Resp: Effort WNL, LSCTA   CV: VS as above, no edema noted  Abd- soft, nontender, BS +  Musc- LAI- seated in chair- station normal  Psych- alert, calm, pleasant      ASSESSMENT/PLAN:  1. Aftercare following surgery of the musculoskeletal system    2. Muscle weakness (generalized)    3. Difficulty walking        Orders:  1. DME as above  ELECTRONICALLY SIGNED BY PECOS CERTIFIED PROVIDER:  BEATRICE Mike CNP   NPI: [id number]  [facility]  [address] [place] [address], [address]  [place] [address]            Sincerely,        BEATRICE Mike CNP      "

## 2023-06-08 NOTE — PROGRESS NOTES
"[facility] GERIATRICS    Chief Complaint   Patient presents with     RECHECK     HPI:  Carol Merida is a 88 year old  (1935), who is being seen today for an episodic care visit at: [facility] ([facility]) [[id number]].     Patient is seen today for recheck of multiple chronic medical issues as below:    1. Aftercare following surgery of the musculoskeletal system    2. Muscle weakness (generalized)    3. Difficulty walking    4. Anemia due to blood loss, acute    5. Chronic heart failure with preserved ejection fraction (H)    6. Primary hypertension    7. Hyperlipidemia, unspecified hyperlipidemia type    8. Chronic cough    9. Hypothyroidism, unspecified type    10. Constipation, unspecified constipation type    11. Anxiety      Discharge has been on hold a while 2/2 financial barriers but per SW will be ensuing in short order.     Nursing reports no new concerns.     VS, weights reviewed in facility EMR today and are stable.     Patient found in room. Alert, calm, NAD. Was out recently for family wedding and reports this went well. Patient denies pain. Denies SOB, chest pain, dizziness. Reports eating well. Denies n/v or abdominal pain. States sleeping ok. Reports is looking forward to discharging soon.     Allergies, and PMH/PSH reviewed in EPIC today.  REVIEW OF SYSTEMS:  4 point ROS including Respiratory, CV, GI and , other than that noted in the HPI,  is negative    Objective:   /68   Pulse 82   Temp 97.6  F (36.4  C)   Resp 18   Ht 1.651 m (5' 5\")   Wt 106.9 kg (235 lb 9.6 oz)   LMP  (LMP Unknown)   SpO2 93%   BMI 39.21 kg/m      Resp: Effort WNL, LSCTA   CV: VS as above, no edema noted  Abd- soft, nontender, BS +  Musc- LAI- seated in a chair, station WNL  Psych- alert, calm, pleasant        Most Recent 3 CBC's:  Recent Labs   Lab Test 05/04/23  0839 04/13/23  0726 03/23/23  0627   WBC 5.9 5.9 3.6*   HGB 11.8 11.4* 9.3*   MCV 90 94 97    281 298     Most Recent 3 BMP's:  Recent " Labs   Lab Test 04/13/23  0726 03/23/23  0627 03/13/23  0858    141 142   POTASSIUM 4.0 4.4 3.4   CHLORIDE 101 100 99   CO2 25 29 27   BUN 10.6 15.4 12.4   CR 0.66 0.62 0.55   ANIONGAP 14 12 16*   LAKSHMI 9.2 8.9 9.1   GLC 97 108* 154*       Assessment/Plan:  Aftercare following surgery of the musculoskeletal system  Periprosthetic fracture of shaft of femur s/p ORIF 2/26/2023  Muscle weakness (generalized)  Difficulty walking  - was NWB for time then slowly advanced to WBAT per ortho. Surgical incision is healed. Uses powered scooter and w/c most of the time at home. Needs new w/c- seen for face to face for this today. Tramadol and Robaxin have been discontinued. Saw ortho last 5/3 with x-rays and good report.   Completed work with rehab therapies and did make good functional gains.  - continue on acetaminophen, Diclofenac, prn Celebrex for discomfort  - f/w ortho again as directed 8 weeks from 5/3 appt    Anemia due to blood loss, acute  Postop- HGB 12 -- 8.1 and trending up in TCU, last 11.8 5/4  - periodic recheck of HGB to ensure stability    Chronic heart failure with preserved ejection fraction (H)  - chronic, compensated. Weight loss in TCU (trying to and worked with dietician). Resp status stable. No noted edema  Primary hypertension  - controlled. Amlodipine stopped inpatient  - f/w PCP for ongoing eval/managment  Hyperlipidemia, unspecified hyperlipidemia type  By history. Statin stopped inpatient 2/2 temporary elevation of LFTs that did resolve. Discussed with patient and recommend f/w PCP to discuss whether or not to resume.     Chronic cough  Wheezing  - chronic, no cough or c/o cough today  - continue on guaifenesin and PTA benzonatate  - continue on fluticasone-salmeterol and prn albuterol    Hypothyroidism, unspecified type  - last noted TSH 3.60 9/2021  - continues on levothyroxine 88 mcg daily  - recommend TSH level check when f/w PCP    Constipation, unspecified constipation type  - chronic,  intermittent. Per review of NN is moving bowels regularly. No c/o constipation or abdominal discomfort today  - continue on scheduled Miralax and prn Senna S    Anxiety  - chronic, exacerbated early on - but mood stable since adjusted to routines in TCU.  - continue on PTA fluoxetine and Buspar, prn hydroxyzine        OK to discharge to A/L with current meds and treatments.      Electronically signed by: BEATRICE Mike CNP

## 2023-06-13 NOTE — PROGRESS NOTES
Face to Face and Medical Necessity Statement for DME Provider visit    Demographic Information on Carol Merida:  Gender: female  : 1935  [address]   [place] [address]  [phone] (home)     Medical Record: [id number]  Social Security Number: [id number]  Primary Care Provider: ANTONY Gan [facility]e  Insurance: Payor: ARE / Plan: [facility] MEDICARE / Product Type: HMO /     HPI:   Carol Merida is a 88 year old  (1935), who is being seen today for a face to face provider visit at [facility] medical necessity statement for DME included. This patient requires the following:  DME Ordered and Medical Necessity Statement     Wheelchair Documentation  Size: 20 x 16  Corresponding cushion: Yes: skin integrity  Standard foot rests: Yes  Elevating leg rests: Yes  Arm rests: Yes: full  Lap tray: No  Dose the patient use oxygen? No   Is the patient able to propel wheelchair? Yes   1. The patient has mobility limitations that impairs their ability to participate in one or more mobility related activities: Toileting, Feeding, Grooming and Bathing.  The wheelchair is suitable and necessary for use in the patient's home.  2. The patient's mobility limitations cannot be safely resolved by using a cane/walker:No    Reason why a cane or walker will not meet the patient's needs. (ie: balance, tolerance, level of assistance) muscle pain and weakness bilateral LE  3. The patients home has adequate access to use a manual wheelchair:Yes  4. The use of a manual wheelchair on a regular basis will improve the patients ability to participate in mobility related ADL's at home:Yes  5. The patient is willing to use a manual wheelchair at home:Yes  6. The patient has adequate upper body strength and the mental capability to safely use a manual wheelchair and/or has a caregiver that is able to assist: Yes  7. Does the patient have a lower extremity injury or edema?Yes  Reason for Type of  "Wheelchair  Patient weight: 235 lbs 0 oz    Due to the patients generalized muscle weakness and pain to bilateral LE and recent right periprosthetic fracture my patient has an inability to participate in MRADLs that cannot be corrected with the use of a cane or walker. The patient's home has adequate space between rooms for maneuvering a manual w/c and the patient and caregiver are able to safely use the wheelchair. The patient will use the wheelchair daily and it will improve participation with MRADLS.    Pt needing above DME with expected length of need of 99 months  due to medical necessity associated with following diagnosis:     Aftercare following surgery of the musculoskeletal system  Muscle weakness (generalized)  Difficulty walking    PMH   has a past medical history of Abnormal stress test, Anemia, Anxiety, Cardiac dysrhythmia, unspecified, CHF (congestive heart failure) (H) (6/18/2018), PETTY (dyspnea on exertion), Hyperlipidemia LDL goal <100 (2/10/2010), Hypertension goal BP (blood pressure) < 140/90 (7/18/2010), Hypothyroid, Malignant neoplasm of breast (female), unspecified site (2005), MEDICAL HISTORY OF -, Melanoma of skin, site unspecified, NONSPECIFIC MEDICAL HISTORY (04), Obstructive sleep apnea (adult) (pediatric) (8/25/2006), Osteoarthritis, Other chronic pain, Other osteoporosis, PONV (postoperative nausea and vomiting), and Tubular adenoma in colon (9/01).    She has no past medical history of Chronic infection, History of blood transfusion, or Malignant hyperthermia.    ROS:4 point ROS including Respiratory, CV, GI and , other than that noted in the HPI,  is negative    EXAM  Vitals: /68   Pulse 82   Temp 97.6  F (36.4  C)   Resp 18   Ht 1.651 m (5' 5\")   Wt 106.9 kg (235 lb 9.6 oz)   LMP  (LMP Unknown)   SpO2 93%   BMI 39.21 kg/m  ;BMI= Body mass index is 39.21 kg/m .    Resp: Effort WNL, LSCTA   CV: VS as above, no edema noted  Abd- soft, nontender, BS +  Musc- LAI- seated in " chair- station normal  Psych- alert, calm, pleasant      ASSESSMENT/PLAN:  1. Aftercare following surgery of the musculoskeletal system    2. Muscle weakness (generalized)    3. Difficulty walking        Orders:  1. DME as above  ELECTRONICALLY SIGNED BY RAMONA CERTIFIED PROVIDER:  BEATRICE Mike CNP   NPI: [id number]  [facility]  [address] [place] [address], [address]  [place] [address]

## 2023-06-23 ENCOUNTER — TELEPHONE (OUTPATIENT)
Dept: GERIATRICS | Facility: CLINIC | Age: 88
End: 2023-06-23
Payer: COMMERCIAL

## 2023-06-23 RX ORDER — ACETAMINOPHEN 325 MG/1
650 TABLET ORAL EVERY 8 HOURS
COMMUNITY
Start: 2023-06-23

## 2023-06-23 NOTE — TELEPHONE ENCOUNTER
[facility] Triage Nurse Telephone Encounter    Provider: Jasmina Larose MD  Facility: [facility] Facility Type:  TCU    Caller: Zarina  Call Back Number: [phone]    Allergies:    Allergies   Allergen Reactions     Ace Inhibitors Cough        Reason for call: Nurse is reporting that patient has left axillary lump.  It's noted to be non-movable, hard, and painful to palpation only---rated 5-6/10.  Patient states she's had this lump before.  Notable meds:  Tylenol and Gabapentin.      Verbal Order/Direction given by Provider: Cool pack or warm pack to left axillary lump PRN per patient comfort.  NP to follow up next week.  Change Tylenol order to be 650mg Q 8 hours scheduled and 650mg BID PRN(6 hours apart from scheduled dose).      Provider giving Order:  Jasmina Larose MD    Verbal Order given to: Arnie/Naomi Nicholas RN

## 2023-06-26 ENCOUNTER — TRANSITIONAL CARE UNIT VISIT (OUTPATIENT)
Dept: GERIATRICS | Facility: CLINIC | Age: 88
End: 2023-06-26
Payer: COMMERCIAL

## 2023-06-26 VITALS
HEIGHT: 65 IN | OXYGEN SATURATION: 94 % | BODY MASS INDEX: 40.35 KG/M2 | TEMPERATURE: 98.4 F | HEART RATE: 82 BPM | SYSTOLIC BLOOD PRESSURE: 129 MMHG | WEIGHT: 242.2 LBS | RESPIRATION RATE: 18 BRPM | DIASTOLIC BLOOD PRESSURE: 71 MMHG

## 2023-06-26 DIAGNOSIS — M97.8XXA PERIPROSTHETIC FRACTURE OF SHAFT OF FEMUR: ICD-10-CM

## 2023-06-26 DIAGNOSIS — Z96.649 PERIPROSTHETIC FRACTURE OF SHAFT OF FEMUR: ICD-10-CM

## 2023-06-26 DIAGNOSIS — Z47.89 AFTERCARE FOLLOWING SURGERY OF THE MUSCULOSKELETAL SYSTEM: ICD-10-CM

## 2023-06-26 DIAGNOSIS — R22.2 MASS OF CHEST WALL, LEFT: Primary | ICD-10-CM

## 2023-06-26 PROCEDURE — 99309 SBSQ NF CARE MODERATE MDM 30: CPT | Performed by: NURSE PRACTITIONER

## 2023-06-26 NOTE — PROGRESS NOTES
"[facility] GERIATRICS    Chief Complaint   Patient presents with     RECHECK     HPI:  Carol Merida is a 88 year old  (1935), who is being seen today for an episodic care visit at: [facility] ([facility]) [[id number]].     Patient seen today for nursing reports patient c/o left axilla mass.     Per [facility] chart review patient with h/o of this. Has seen her PCP for this in 2019 and ordered to continue with regular screening mammograms 2/2 h/o breast cancer.     VS, weights reviewed in EMR today- stable    Patient found sitting up in chair nodding off. Awakes easily to soft voice and tactile stimulation. Patient shows writer where felt the mass and points to left chest side- not axilla but states is \"not as big today\". Denies pain in the area today. Does report noting increased pain to right lateral knee/distal thigh area when weight bearing. Reports has f/u with surgeon in 2 weeks ago. Last x-rays when f/w surgeon were WNL. US of RLE also completed in [facility] and was WNL. Patient also reports some itching on buttocks. Denies SOB, chest pain, dizziness. Denies new cough or new LE edema.    Allergies, and PMH/PSH reviewed in [facility] today.  REVIEW OF SYSTEMS:  4 point ROS including Respiratory, CV, GI and , other than that noted in the HPI,  is negative    Objective:   /71   Pulse 82   Temp 98.4  F (36.9  C)   Resp 18   Ht 1.651 m (5' 5\")   Wt 109.9 kg (242 lb 3.2 oz)   LMP  (LMP Unknown)   SpO2 94%   BMI 40.30 kg/m    Resp: Effort WNL, LSCTA   CV: VS as above, trace RLE edema  Abd- soft, nontender, BS +  Musc- LAI- no palpable mass noted on left chest today by writer  Skin- clear- no rashes, redness or wounds noted to left chest or RLE  Psych- alert, calm, pleasant      Recent labs in [facility] reviewed by me today.     Assessment/Plan:  Mass of chest wall, left  - continue f/u screening mammograms as recommended 2/2 h/o breast cancer.   F/w PCP    Periprosthetic fracture of shaft of femur  Aftercare " Occupational Therapy Discharge Summary    Oralia Liriano  MRN: [id number]   Principal Problem: Closed fracture of left wrist      Patient Discharged from acute Occupational Therapy on 11/1/2018.  Please refer to prior OT note dated 11/1/2018 for functional status.    Assessment:      Goals partially met. Patient appropriate for care in another setting.    Objective:     GOALS:   Multidisciplinary Problems     Occupational Therapy Goals        Problem: Occupational Therapy Goal    Goal Priority Disciplines Outcome Interventions   Occupational Therapy Goal     OT, PT/OT Ongoing (interventions implemented as appropriate)    Description:  Goals to be met by: 11/09/18     Patient will increase functional independence with ADLs by performing:    Feeding with Set-up Assistance.  UE Dressing with Moderate Assistance.  LE Dressing with Moderate Assistance.  Grooming while seated with Stand-by Assistance.  Toileting from bedside commode with Moderate Assistance for hygiene and clothing management.   Supine to sit with Minimal Assistance. (MET 10/31/18)  Toilet transfer to bedside commode with Maximum Assistance.  SQPT bed to/from w/c with Max assist (MET 10/31/18)  REVISED:  Mod assist SQPT bed to/from w/c                       Reasons for Discontinuation of Therapy Services  Transfer to alternate level of care.      Plan:     Patient Discharged to: Skilled Nursing Facility    Gay Cabrera, OT  11/1/2018   following surgery of the musculoskeletal system  - pain to right lateral knee/thigh with weight bearing. Previous post surgical x-rays WNL. Previous RLE US WNL. No new redness or swelling noted on exam.   - will recheck Right femur x-rays  - continue on current pain regimen  - f/w surgery 2 weeks as directed  - NWB RLE until get x-ray results      - keep page-area/buttocks clean and dry. House barrier cream for any itching or mild redness noted    Orders:  Written on unit and discussed with patient and nursing      Electronically signed by: BEATRICE Mike CNP

## 2023-06-26 NOTE — LETTER
"    6/26/2023        RE: Carol Merida  [address]  [facility] [address]        [facility] GERIATRICS    Chief Complaint   Patient presents with     RECHECK     HPI:  Carol Merida is a 88 year old  (1935), who is being seen today for an episodic care visit at: [facility] ([facility]) [[id number]].     Patient seen today for nursing reports patient c/o left axilla mass.     Per [facility] chart review patient with h/o of this. Has seen her PCP for this in 2019 and ordered to continue with regular screening mammograms 2/2 h/o breast cancer.     VS, weights reviewed in EMR today- stable    Patient found sitting up in chair nodding off. Awakes easily to soft voice and tactile stimulation. Patient shows writer where felt the mass and points to left chest side- not axilla but states is \"not as big today\". Denies pain in the area today. Does report noting increased pain to right lateral knee/distal thigh area when weight bearing. Reports has f/u with surgeon in 2 weeks ago. Last x-rays when f/w surgeon were WNL. US of RLE also completed in [facility] and was WNL. Patient also reports some itching on buttocks. Denies SOB, chest pain, dizziness. Denies new cough or new LE edema.    Allergies, and PMH/PSH reviewed in [facility] today.  REVIEW OF SYSTEMS:  4 point ROS including Respiratory, CV, GI and , other than that noted in the HPI,  is negative    Objective:   /71   Pulse 82   Temp 98.4  F (36.9  C)   Resp 18   Ht 1.651 m (5' 5\")   Wt 109.9 kg (242 lb 3.2 oz)   LMP  (LMP Unknown)   SpO2 94%   BMI 40.30 kg/m    Resp: Effort WNL, LSCTA   CV: VS as above, trace RLE edema  Abd- soft, nontender, BS +  Musc- LAI- no palpable mass noted on left chest today by writer  Skin- clear- no rashes, redness or wounds noted to left chest or RLE  Psych- alert, calm, pleasant      Recent labs in [facility] reviewed by me today.     Assessment/Plan:  Mass of chest wall, left  - continue f/u screening mammograms as recommended 2/2 h/o " breast cancer.   F/w PCP    Periprosthetic fracture of shaft of femur  Aftercare following surgery of the musculoskeletal system  - pain to right lateral knee/thigh with weight bearing. Previous post surgical x-rays WNL. Previous RLE US WNL. No new redness or swelling noted on exam.   - will recheck Right femur x-rays  - continue on current pain regimen  - f/w surgery 2 weeks as directed  - NWB RLE until get x-ray results      - keep page-area/buttocks clean and dry. House barrier cream for any itching or mild redness noted    Orders:  Written on unit and discussed with patient and nursing      Electronically signed by: BEATRICE Mike CNP           Sincerely,        BEATRICE Mike CNP

## 2023-06-29 ENCOUNTER — TRANSITIONAL CARE UNIT VISIT (OUTPATIENT)
Dept: GERIATRICS | Facility: CLINIC | Age: 88
End: 2023-06-29
Payer: COMMERCIAL

## 2023-06-29 VITALS
RESPIRATION RATE: 19 BRPM | SYSTOLIC BLOOD PRESSURE: 122 MMHG | DIASTOLIC BLOOD PRESSURE: 67 MMHG | HEART RATE: 88 BPM | HEIGHT: 65 IN | OXYGEN SATURATION: 97 % | TEMPERATURE: 97 F | BODY MASS INDEX: 40.35 KG/M2 | WEIGHT: 242.2 LBS

## 2023-06-29 DIAGNOSIS — M97.8XXA PERIPROSTHETIC FRACTURE OF SHAFT OF FEMUR: ICD-10-CM

## 2023-06-29 DIAGNOSIS — M79.661 PAIN OF RIGHT LOWER LEG: Primary | ICD-10-CM

## 2023-06-29 DIAGNOSIS — F41.9 ANXIETY: ICD-10-CM

## 2023-06-29 DIAGNOSIS — M62.81 MUSCLE WEAKNESS (GENERALIZED): ICD-10-CM

## 2023-06-29 DIAGNOSIS — Z96.649 PERIPROSTHETIC FRACTURE OF SHAFT OF FEMUR: ICD-10-CM

## 2023-06-29 DIAGNOSIS — Z47.89 AFTERCARE FOLLOWING SURGERY OF THE MUSCULOSKELETAL SYSTEM: ICD-10-CM

## 2023-06-29 PROCEDURE — 99309 SBSQ NF CARE MODERATE MDM 30: CPT | Performed by: NURSE PRACTITIONER

## 2023-06-29 NOTE — LETTER
"    6/29/2023        RE: Carol Merida  [address]  [facility] [address]        [facility] GERIATRICS    Chief Complaint   Patient presents with     RECHECK     HPI:  Carol Merida is a 88 year old  (1935), who is being seen today for an episodic care visit at: [facility] ([facility]) [[id number]].     Patient seen today for recheck of right leg pain.    1. Pain of right lower leg    2. Periprosthetic fracture of shaft of femur    3. Aftercare following surgery of the musculoskeletal system    4. Muscle weakness (generalized)    5. Anxiety      - Right femur x-rays ordered 6/26. Results have not yet come in.     Patient found in room sleeping in chair with coffee on front of shirt. Awoke with soft voice and touch. Reports she spilled coffee when eating breakfast. Reports anxiety and trouble sleeping last night. States still has the pain to right lateral knee area which radiates slight up and slight down from there. Discussed that x-rays are yet pending. Patient reports is taking acetaminophen for pain control and that it is somewhat effective.     VS, weights reviewed in facility EMR today.        Allergies, and PMH/PSH reviewed in EPIC today.  REVIEW OF SYSTEMS:  4 point ROS including Respiratory, CV, GI and , other than that noted in the HPI,  is negative    Objective:   /67   Pulse 88   Temp 97  F (36.1  C)   Resp 19   Ht 1.651 m (5' 5\")   Wt 109.9 kg (242 lb 3.2 oz)   LMP  (LMP Unknown)   SpO2 97%   BMI 40.30 kg/m      Resp: Effort WNL  CV: VS as above, no edema noted  Abd- soft, nontender, BS +  Musc- LAI- seated. Station WNL  Psych- alert, calm, slight anxiuos      Recent labs in EPIC reviewed by me today.     Assessment/Plan:  Pain of right lower leg  Periprosthetic fracture of shaft of femur - 2/26 repair  Aftercare following surgery of the musculoskeletal system  - intermittent reports of this. ? Worsened since started therapy again. Taking scheduled acetaminophen and is on " gabapentin.  Xrays right femur ordered 6/26. Results pending  - keep NWB RLE until x-ray results reviewed.  - F/W ortho 7/12 as scheduled  Muscle weakness (generalized)  - working with PT again for strengthening, transfers. NWB RLE as above for now. Was essentially w/c dependent and has a power scooter, prior to surgery.    Anxiety  - acute on chronic. Has waxed and waned in TCU. Reports anxiety today and does appear slightly anxious about the discomfort in her leg. Also reported did not sleep well last night (chronic, intermittent issue).Consoled and explained plan today  - continue on Prozac, Buspar and melatonin. Also has prn Atarax which she was on prior to arrival and declined to allow writer to discontinue.   - monitor mood and behaviors, sleep quality.   -ACP prn, has seen while here.        Electronically signed by: BEATRICE Mike CNP           Sincerely,        BEATRICE Mike CNP

## 2023-06-29 NOTE — PROGRESS NOTES
"[facility] GERIATRICS    Chief Complaint   Patient presents with     RECHECK     HPI:  Carol Merida is a 88 year old  (1935), who is being seen today for an episodic care visit at: [facility] ([facility]) [[id number]].     Patient seen today for recheck of right leg pain.    1. Pain of right lower leg    2. Periprosthetic fracture of shaft of femur    3. Aftercare following surgery of the musculoskeletal system    4. Muscle weakness (generalized)    5. Anxiety      - Right femur x-rays ordered 6/26. Results have not yet come in.     Patient found in room sleeping in chair with coffee on front of shirt. Awoke with soft voice and touch. Reports she spilled coffee when eating breakfast. Reports anxiety and trouble sleeping last night. States still has the pain to right lateral knee area which radiates slight up and slight down from there. Discussed that x-rays are yet pending. Patient reports is taking acetaminophen for pain control and that it is somewhat effective.     VS, weights reviewed in facility EMR today.        Allergies, and PMH/PSH reviewed in EPIC today.  REVIEW OF SYSTEMS:  4 point ROS including Respiratory, CV, GI and , other than that noted in the HPI,  is negative    Objective:   /67   Pulse 88   Temp 97  F (36.1  C)   Resp 19   Ht 1.651 m (5' 5\")   Wt 109.9 kg (242 lb 3.2 oz)   LMP  (LMP Unknown)   SpO2 97%   BMI 40.30 kg/m      Resp: Effort WNL  CV: VS as above, no edema noted  Abd- soft, nontender, BS +  Musc- LAI- seated. Station WNL  Psych- alert, calm, slight anxiuos      Recent labs in EPIC reviewed by me today.     Assessment/Plan:  Pain of right lower leg  Periprosthetic fracture of shaft of femur - 2/26 repair  Aftercare following surgery of the musculoskeletal system  - intermittent reports of this. ? Worsened since started therapy again. Taking scheduled acetaminophen and is on gabapentin.  Xrays right femur ordered 6/26. Results pending  - keep NWB RLE until x-ray " results reviewed.  - F/W ortho 7/12 as scheduled  Muscle weakness (generalized)  - working with PT again for strengthening, transfers. NWB RLE as above for now. Was essentially w/c dependent and has a power scooter, prior to surgery.    Anxiety  - acute on chronic. Has waxed and waned in TCU. Reports anxiety today and does appear slightly anxious about the discomfort in her leg. Also reported did not sleep well last night (chronic, intermittent issue).Consoled and explained plan today  - continue on Prozac, Buspar and melatonin. Also has prn Atarax which she was on prior to arrival and declined to allow writer to discontinue.   - monitor mood and behaviors, sleep quality.   -ACP prn, has seen while here.        Electronically signed by: BEATRICE Mike CNP

## 2023-06-30 ENCOUNTER — TELEPHONE (OUTPATIENT)
Dept: GERIATRICS | Facility: CLINIC | Age: 88
End: 2023-06-30
Payer: COMMERCIAL

## 2023-06-30 NOTE — TELEPHONE ENCOUNTER
[facility] Triage Nurse Telephone Encounter    Provider: BEATRICE Miek CNP   Facility: [facility] Facility Type:  TCU    Caller: Gideon  Call Back Number: [phone]    Allergies:    Allergies   Allergen Reactions     Ace Inhibitors Cough        Reason for call: Pt had rt hip XR done for pain. Nurse reports pain is located below the knee on the right shin extending down to the foot. Rt shin/calf appears red, warm and swollen compared to LLE. VSS no fever. Not on anti-coagulants.        Verbal Order/Direction given by Provider:   - Venous doppler US of RLE for pain and swelling to r/o DVT    Provider giving Order:  MARTI Reyna    Verbal Order given to: Walker Lynne RN

## 2023-07-06 ENCOUNTER — APPOINTMENT (OUTPATIENT)
Dept: GENERAL RADIOLOGY | Facility: CLINIC | Age: 88
End: 2023-07-06
Attending: STUDENT IN AN ORGANIZED HEALTH CARE EDUCATION/TRAINING PROGRAM
Payer: COMMERCIAL

## 2023-07-06 ENCOUNTER — HOSPITAL ENCOUNTER (EMERGENCY)
Facility: CLINIC | Age: 88
Discharge: HOME | End: 2023-07-06
Attending: STUDENT IN AN ORGANIZED HEALTH CARE EDUCATION/TRAINING PROGRAM | Admitting: STUDENT IN AN ORGANIZED HEALTH CARE EDUCATION/TRAINING PROGRAM
Payer: COMMERCIAL

## 2023-07-06 ENCOUNTER — TRANSITIONAL CARE UNIT VISIT (OUTPATIENT)
Dept: GERIATRICS | Facility: CLINIC | Age: 88
End: 2023-07-06
Payer: COMMERCIAL

## 2023-07-06 ENCOUNTER — TELEPHONE (OUTPATIENT)
Dept: ORTHOPEDICS | Facility: CLINIC | Age: 88
End: 2023-07-06

## 2023-07-06 ENCOUNTER — APPOINTMENT (OUTPATIENT)
Dept: ULTRASOUND IMAGING | Facility: CLINIC | Age: 88
End: 2023-07-06
Attending: STUDENT IN AN ORGANIZED HEALTH CARE EDUCATION/TRAINING PROGRAM
Payer: COMMERCIAL

## 2023-07-06 VITALS
OXYGEN SATURATION: 96 % | TEMPERATURE: 96.5 F | WEIGHT: 242.2 LBS | RESPIRATION RATE: 16 BRPM | DIASTOLIC BLOOD PRESSURE: 71 MMHG | HEIGHT: 65 IN | BODY MASS INDEX: 40.35 KG/M2 | HEART RATE: 78 BPM | SYSTOLIC BLOOD PRESSURE: 145 MMHG

## 2023-07-06 VITALS
OXYGEN SATURATION: 94 % | TEMPERATURE: 97.8 F | RESPIRATION RATE: 16 BRPM | SYSTOLIC BLOOD PRESSURE: 129 MMHG | HEART RATE: 72 BPM | DIASTOLIC BLOOD PRESSURE: 88 MMHG

## 2023-07-06 DIAGNOSIS — M79.604 PAIN OF RIGHT LOWER EXTREMITY: ICD-10-CM

## 2023-07-06 DIAGNOSIS — M79.604 RIGHT LEG PAIN: ICD-10-CM

## 2023-07-06 DIAGNOSIS — Z96.649 PERIPROSTHETIC FRACTURE OF SHAFT OF FEMUR: Primary | ICD-10-CM

## 2023-07-06 DIAGNOSIS — Z47.89 AFTERCARE FOLLOWING SURGERY OF THE MUSCULOSKELETAL SYSTEM: ICD-10-CM

## 2023-07-06 DIAGNOSIS — M97.8XXA PERIPROSTHETIC FRACTURE OF SHAFT OF FEMUR: Primary | ICD-10-CM

## 2023-07-06 LAB
ANION GAP SERPL CALCULATED.3IONS-SCNC: 12 MMOL/L (ref 7–15)
BASOPHILS # BLD AUTO: 0 10E3/UL (ref 0–0.2)
BASOPHILS NFR BLD AUTO: 0 %
BUN SERPL-MCNC: 12.8 MG/DL (ref 8–23)
CALCIUM SERPL-MCNC: 9.5 MG/DL (ref 8.8–10.2)
CHLORIDE SERPL-SCNC: 100 MMOL/L (ref 98–107)
CK SERPL-CCNC: 33 U/L (ref 26–192)
CREAT SERPL-MCNC: 0.58 MG/DL (ref 0.51–0.95)
CRP SERPL-MCNC: <3 MG/L
DEPRECATED HCO3 PLAS-SCNC: 25 MMOL/L (ref 22–29)
EOSINOPHIL # BLD AUTO: 1 10E3/UL (ref 0–0.7)
EOSINOPHIL NFR BLD AUTO: 15 %
ERYTHROCYTE [DISTWIDTH] IN BLOOD BY AUTOMATED COUNT: 15.1 % (ref 10–15)
ERYTHROCYTE [SEDIMENTATION RATE] IN BLOOD BY WESTERGREN METHOD: 20 MM/HR (ref 0–30)
GFR SERPL CREATININE-BSD FRML MDRD: 87 ML/MIN/1.73M2
GLUCOSE SERPL-MCNC: 103 MG/DL (ref 70–99)
HCT VFR BLD AUTO: 43.9 % (ref 35–47)
HGB BLD-MCNC: 13.6 G/DL (ref 11.7–15.7)
IMM GRANULOCYTES # BLD: 0 10E3/UL
IMM GRANULOCYTES NFR BLD: 0 %
LYMPHOCYTES # BLD AUTO: 0.9 10E3/UL (ref 0.8–5.3)
LYMPHOCYTES NFR BLD AUTO: 13 %
MCH RBC QN AUTO: 27.2 PG (ref 26.5–33)
MCHC RBC AUTO-ENTMCNC: 31 G/DL (ref 31.5–36.5)
MCV RBC AUTO: 88 FL (ref 78–100)
MONOCYTES # BLD AUTO: 0.7 10E3/UL (ref 0–1.3)
MONOCYTES NFR BLD AUTO: 9 %
NEUTROPHILS # BLD AUTO: 4.4 10E3/UL (ref 1.6–8.3)
NEUTROPHILS NFR BLD AUTO: 63 %
NRBC # BLD AUTO: 0 10E3/UL
NRBC BLD AUTO-RTO: 0 /100
PLATELET # BLD AUTO: 307 10E3/UL (ref 150–450)
POTASSIUM SERPL-SCNC: 4.6 MMOL/L (ref 3.4–5.3)
RBC # BLD AUTO: 5 10E6/UL (ref 3.8–5.2)
SODIUM SERPL-SCNC: 137 MMOL/L (ref 136–145)
WBC # BLD AUTO: 7.1 10E3/UL (ref 4–11)

## 2023-07-06 PROCEDURE — 99283 EMERGENCY DEPT VISIT LOW MDM: CPT | Performed by: STUDENT IN AN ORGANIZED HEALTH CARE EDUCATION/TRAINING PROGRAM

## 2023-07-06 PROCEDURE — 82310 ASSAY OF CALCIUM: CPT | Performed by: STUDENT IN AN ORGANIZED HEALTH CARE EDUCATION/TRAINING PROGRAM

## 2023-07-06 PROCEDURE — 99310 SBSQ NF CARE HIGH MDM 45: CPT | Performed by: NURSE PRACTITIONER

## 2023-07-06 PROCEDURE — 73560 X-RAY EXAM OF KNEE 1 OR 2: CPT | Mod: RT

## 2023-07-06 PROCEDURE — 82550 ASSAY OF CK (CPK): CPT | Performed by: STUDENT IN AN ORGANIZED HEALTH CARE EDUCATION/TRAINING PROGRAM

## 2023-07-06 PROCEDURE — 93971 EXTREMITY STUDY: CPT | Mod: 26 | Performed by: RADIOLOGY

## 2023-07-06 PROCEDURE — 93971 EXTREMITY STUDY: CPT | Mod: RT

## 2023-07-06 PROCEDURE — 250N000013 HC RX MED GY IP 250 OP 250 PS 637: Performed by: STUDENT IN AN ORGANIZED HEALTH CARE EDUCATION/TRAINING PROGRAM

## 2023-07-06 PROCEDURE — 72170 X-RAY EXAM OF PELVIS: CPT | Mod: 26 | Performed by: RADIOLOGY

## 2023-07-06 PROCEDURE — 72170 X-RAY EXAM OF PELVIS: CPT

## 2023-07-06 PROCEDURE — 73552 X-RAY EXAM OF FEMUR 2/>: CPT | Mod: 26 | Performed by: RADIOLOGY

## 2023-07-06 PROCEDURE — 73560 X-RAY EXAM OF KNEE 1 OR 2: CPT | Mod: 26 | Performed by: RADIOLOGY

## 2023-07-06 PROCEDURE — 85025 COMPLETE CBC W/AUTO DIFF WBC: CPT | Performed by: STUDENT IN AN ORGANIZED HEALTH CARE EDUCATION/TRAINING PROGRAM

## 2023-07-06 PROCEDURE — 86140 C-REACTIVE PROTEIN: CPT | Performed by: STUDENT IN AN ORGANIZED HEALTH CARE EDUCATION/TRAINING PROGRAM

## 2023-07-06 PROCEDURE — 73552 X-RAY EXAM OF FEMUR 2/>: CPT | Mod: RT

## 2023-07-06 PROCEDURE — 85652 RBC SED RATE AUTOMATED: CPT | Performed by: STUDENT IN AN ORGANIZED HEALTH CARE EDUCATION/TRAINING PROGRAM

## 2023-07-06 PROCEDURE — 36415 COLL VENOUS BLD VENIPUNCTURE: CPT | Performed by: STUDENT IN AN ORGANIZED HEALTH CARE EDUCATION/TRAINING PROGRAM

## 2023-07-06 PROCEDURE — 73590 X-RAY EXAM OF LOWER LEG: CPT | Mod: 26 | Performed by: RADIOLOGY

## 2023-07-06 PROCEDURE — 250N000013 HC RX MED GY IP 250 OP 250 PS 637: Performed by: EMERGENCY MEDICINE

## 2023-07-06 PROCEDURE — 99285 EMERGENCY DEPT VISIT HI MDM: CPT | Mod: 25 | Performed by: STUDENT IN AN ORGANIZED HEALTH CARE EDUCATION/TRAINING PROGRAM

## 2023-07-06 PROCEDURE — 73590 X-RAY EXAM OF LOWER LEG: CPT | Mod: RT

## 2023-07-06 RX ORDER — ACETAMINOPHEN 325 MG/1
975 TABLET ORAL ONCE
Status: COMPLETED | OUTPATIENT
Start: 2023-07-06 | End: 2023-07-06

## 2023-07-06 RX ORDER — CLOTRIMAZOLE 1 %
CREAM (GRAM) TOPICAL 2 TIMES DAILY
Qty: 28 G | Refills: 0 | Status: ON HOLD | OUTPATIENT
Start: 2023-07-06 | End: 2024-06-20

## 2023-07-06 RX ORDER — OXYCODONE HYDROCHLORIDE 5 MG/1
2.5-5 TABLET ORAL EVERY 6 HOURS PRN
Qty: 12 TABLET | Refills: 0 | Status: SHIPPED | OUTPATIENT
Start: 2023-07-06 | End: 2023-07-07

## 2023-07-06 RX ADMIN — ACETAMINOPHEN 975 MG: 325 TABLET, FILM COATED ORAL at 21:02

## 2023-07-06 RX ADMIN — OXYCODONE HYDROCHLORIDE 2.5 MG: 5 TABLET ORAL at 22:55

## 2023-07-06 ASSESSMENT — ACTIVITIES OF DAILY LIVING (ADL)
ADLS_ACUITY_SCORE: 35

## 2023-07-06 NOTE — PROGRESS NOTES
"[facility] GERIATRICS    Chief Complaint   Patient presents with     RECHECK     HPI:  Carol Merida is a 88 year old  (1935), who is being seen today for an episodic care visit at: [facility] ([facility]) [[id number]].     Patient s/p ORIF periprosthetic fracture 2/26 per Dr. Newton Lemon. Has seen him in f/u with good reports. Last on 5/3 and with upcoming appt 7/12.     Is seen today for recheck of right lateral leg pain. Per nursing and PT reports patient's c/o of worsening pain to this leg. Reportedly c/o 10/10 pain when working with PT yesterday.     Venous US done 5/23 for minor c/o pain left lateral knee and calf was negative for DVT    Xrays of right femur 6/29 with no acute findings and normal postop alignment    US 6/30- negative for DVT    Today patient found sitting up in chair. Alert, but anxious and reporting worsening and severe pain to lateral left leg which now starts at lateral knee and runs up --and down- left leg. Patient reports pain when even touches foot to ground and reports noting dark edwardo on leg  Also reporting itchy rash on buttocks and legs - which patient has been intermittently c/o during her stay and which she does have cream for that helps some. Historically no real rash noted on exam.      VS reviewed in facility EMR today and are stable.    Allergies, and PMH/PSH reviewed in EPIC today.  REVIEW OF SYSTEMS:  4 point ROS including Respiratory, CV, GI and , other than that noted in the HPI,  is negative    Objective:   BP (!) 145/71   Pulse 78   Temp (!) 96.5  F (35.8  C)   Resp 16   Ht 1.651 m (5' 5\")   Wt 109.9 kg (242 lb 3.2 oz)   LMP  (LMP Unknown)   SpO2 96%   BMI 40.30 kg/m        Resp: Effort WNL  CV: VS as above, no edema noted  Abd- soft, nontender, BS +  Musc- LAI- anticipatory pain with even light touch or approach to RLE- ROM intact. Do note pale brown discoloration extending from lateral thigh and wrapping toward shin ending just above ankle. "   Skin- as above, buttocks and upper thighs not examined today 2/2 do not want patient to weight bear on RLE  Psych- alert, anxious    Recent labs in EPIC reviewed by me today.     Assessment/Plan:  Periprosthetic fracture of shaft of femu  Aftercare following surgery of the musculoskeletal system  - as above  Pain of right lower extremity  - worsening and now with brown streak noted down leg. Imaging in TCU for ? DVT or fracture, postop displacement have been negative as above.     - recommendation given today's clinical findings is hospitalization for further evaluation, imaging. Patient in agreement.     Message sent to Dr. Lemon to update. Awaiting call back.      Orders written on unit and discussed with patient, nursing.      Electronically signed by: BEATRICE Mike CNP

## 2023-07-06 NOTE — TELEPHONE ENCOUNTER
[facility] Call Center    Phone Message    May a detailed message be left on voicemail: yes     Reason for Call Pain along right Leg. Thinking of sending Patient to Hospital. Want Doctor to be aware. Please call Frances  Action Taken: Message routed to:  [facility] ([facility]): joe    Travel Screening: Not Applicable

## 2023-07-06 NOTE — ED TRIAGE NOTES
"Pt biba fro tcu w/ c/o right lateral knee pain. States pain in r knee, radiating into r buttock and down into r calf. Unable to bear weight. Endorses a \"brown streak\" in RLE. PT done w/ limited relief.      "

## 2023-07-06 NOTE — LETTER
"    7/6/2023        RE: Carol Merida  [address]  [place] [address]        [facility] GERIATRICS    Chief Complaint   Patient presents with     RECHECK     HPI:  Carol Merida is a 88 year old  (1935), who is being seen today for an episodic care visit at: [facility] ([facility]) [[id number]].     Patient s/p ORIF periprosthetic fracture 2/26 per Dr. Newton Lemon. Has seen him in f/u with good reports. Last on 5/3 and with upcoming appt 7/12.     Is seen today for recheck of right lateral leg pain. Per nursing and PT reports patient's c/o of worsening pain to this leg. Reportedly c/o 10/10 pain when working with PT yesterday.     Venous US done 5/23 for minor c/o pain left lateral knee and calf was negative for DVT    Xrays of right femur 6/29 with no acute findings and normal postop alignment    US 6/30- negative for DVT    Today patient found sitting up in chair. Alert, but anxious and reporting worsening and severe pain to lateral left leg which now starts at lateral knee and runs up --and down- left leg. Patient reports pain when even touches foot to ground and reports noting dark edwardo on leg  Also reporting itchy rash on buttocks and legs - which patient has been intermittently c/o during her stay and which she does have cream for that helps some. Historically no real rash noted on exam.      VS reviewed in facility EMR today and are stable.    Allergies, and PMH/PSH reviewed in EPIC today.  REVIEW OF SYSTEMS:  4 point ROS including Respiratory, CV, GI and , other than that noted in the HPI,  is negative    Objective:   BP (!) 145/71   Pulse 78   Temp (!) 96.5  F (35.8  C)   Resp 16   Ht 1.651 m (5' 5\")   Wt 109.9 kg (242 lb 3.2 oz)   LMP  (LMP Unknown)   SpO2 96%   BMI 40.30 kg/m        Resp: Effort WNL  CV: VS as above, no edema noted  Abd- soft, nontender, BS +  Musc- LAI- anticipatory pain with even light touch or approach to RLE- ROM intact. Do note pale brown " discoloration extending from lateral thigh and wrapping toward shin ending just above ankle.   Skin- as above, buttocks and upper thighs not examined today 2/2 do not want patient to weight bear on RLE  Psych- alert, anxious    Recent labs in EPIC reviewed by me today.     Assessment/Plan:  Periprosthetic fracture of shaft of femu  Aftercare following surgery of the musculoskeletal system  - as above  Pain of right lower extremity  - worsening and now with brown streak noted down leg. Imaging in TCU for ? DVT or fracture, postop displacement have been negative as above.     - recommendation given today's clinical findings is hospitalization for further evaluation, imaging. Patient in agreement.     Message sent to Dr. Lemon to update. Awaiting call back.      Orders written on unit and discussed with patient, nursing.      Electronically signed by: BEATRICE Mike CNP           Sincerely,        BEATRICE Mike CNP

## 2023-07-06 NOTE — ED TRIAGE NOTES
Triage Assessment     Row Name 07/06/23 1541       Triage Assessment (Adult)    Airway WDL WDL       Respiratory WDL    Respiratory WDL WDL       Skin Circulation/Temperature WDL    Skin Circulation/Temperature WDL X       Cardiac WDL    Cardiac WDL WDL       Peripheral/Neurovascular WDL    Peripheral Neurovascular WDL X       Cognitive/Neuro/Behavioral WDL    Cognitive/Neuro/Behavioral WDL WDL

## 2023-07-06 NOTE — ED NOTES
I took signout on the patient from Dr. Stein.  This is an 88-year-old female with right leg pain.  Patient was signed out to me pending labs and imaging.  Labs show acute abnormalities.  X-ray shows similar appearance to previous, difficult to evaluate due to demineralization.  ReSound shows no DVT.  Discussed case with Orthopedic Surgery who recommends up in clinic in 1 week.  Patient has an appointment on Wednesday and I encouraged patient to keep this.  They recommend activity as tolerated and I discussed this with patient.  We will start patient on a low-dose of oxycodone as she has been taking only acetaminophen for her pain.  Discussed risks associated with this medication.  Patient is also requesting medication for fungal appearing a groin rash.  Patient is on statin but we will switch to clotrimazole.  We will discharge with return precautions.     Eloy Cornejo, DO  07/06/23 1626

## 2023-07-06 NOTE — TELEPHONE ENCOUNTER
RN called staff at TCU to assess patient's condition.     Patient has been having these vague complaints of right leg pain for the last several weeks, has been working with PT and pain medication to manage. She has had negative US and Xrays, but now patient exhibits red streak on the back of right leg with increased pain and swelling. They are sending her to the [facility] ED and wanted us to be aware if patient gets admitted.     Rahel Mckeon RN

## 2023-07-06 NOTE — ED PROVIDER NOTES
[place] EMERGENCY DEPARTMENT ([facility])    7/06/23       ED PROVIDER NOTE    History     Chief Complaint   Patient presents with     Leg Pain     HPI  Carol Merida is a 88 year old female past medical history significant for osteoporosis, periprosthetic fracture of the right femur s/p ORIF (02/26/2023), CHF (EF >70% on echo 02/26/2023), HTN, HLD, hypothyroidism who presents to the ED for pain and discoloration in her right leg.    Patient notes that she had a fracture back in February and seems to be healing fairly well from that.  She previously had both a hip and knee replacement on the right side, and seems like after she had this replaced she is been having increasing pain in her right knee.  Notes that this has been getting worse over the last 2 months, and especially over the last month.  She also notes that she is had this brown streak on the right outside of her leg as well as just increasing pain over the last couple of days.  She had an ultrasound done of her leg about a week ago with no evidence of any DVTs at that time.  Also notes that she had an x-ray but is not sure which body part at the same timeframe, and that that was normal as far she is aware.  She is here mostly because the pain is gotten so severe that she wants to know what is wrong.      Past Medical History  Past Medical History:   Diagnosis Date     Abnormal stress test     small area on stress thalium ?2003; but normal angiogram 2012     Anemia      Anxiety      Cardiac dysrhythmia, unspecified     irregular heartbeat     CHF (congestive heart failure) (H) 6/18/2018     PETTY (dyspnea on exertion)      Hyperlipidemia LDL goal <100 2/10/2010     Hypertension goal BP (blood pressure) < 140/90 7/18/2010     Hypothyroid      Malignant 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     Melanoma of skin, site unspecified      NONSPECIFIC MEDICAL HISTORY  04    fx fifth finger right hand     Obstructive sleep apnea (adult) (pediatric) 8/25/2006    CPAP      Osteoarthritis      Other chronic pain     Joint pain for many years.     Other osteoporosis      PONV (postoperative nausea and vomiting)      Tubular adenoma in colon 9/01    colonoscopy due 2004     Past Surgical History:   Procedure Laterality Date     ARTHROPLASTY HIP Left 7/6/2015    Procedure: ARTHROPLASTY HIP;  Surgeon: Junior Giordano MD;  Location: [facility] OR     ARTHROPLASTY HIP Right 11/2/2016    Procedure: ARTHROPLASTY HIP;  Surgeon: Junior Giordano MD;  Location: [facility] OR     ARTHROPLASTY REVISION HIP Left 7/22/2015    Procedure: ARTHROPLASTY REVISION HIP;  Surgeon: Junior Giordano MD;  Location: [facility] OR     CATARACT IOL, RT/LT       COLONOSCOPY  9/01    tubular adenoma; repeat 3 years.     COLONOSCOPY  9/04    normal; repeat 5 years ([facility])     HYSTERECTOMY, MARGAUX  summer 2004    and BSO     OPEN REDUCTION INTERNAL FIXATION RODDING INTRAMEDULLARY FEMUR Right 2/26/2023    Procedure: OPEN REDUCTION INTERNAL FIXATION RIGHT DISTAL FEMUR WITH RETROGRADE NAIL, LATERAL PLATE, AND CABLE;  Surgeon: Newton Lemon MD;  Location: [facility] OR     SURGICAL HISTORY OF -   9/05    left mastectomy     SURGICAL HISTORY OF -   2008    right ankle surgery; triple arthrodesis     SURGICAL HISTORY OF -   5/09    right ankle surgery; triple arthrodesis and deltoid reconstruction; possible other     SURGICAL HISTORY OF -   2/10    removal of hardware from right ankle     [facility] NONSPECIFIC PROCEDURE      Knee replacement x 2 (B)     [facility] NONSPECIFIC PROCEDURE      s/p Tonsillectomy (college)     Z NONSPECIFIC PROCEDURE      s/p Appy (high school)     Z NONSPECIFIC PROCEDURE      Melanoma removed with sentinel node bx     [facility] NONSPECIFIC PROCEDURE       bilateral cataract     ACE/ARB/ARNI NOT PRESCRIBED (INTENTIONAL)  acetaminophen (TYLENOL) 325 MG tablet  albuterol (PROAIR HFA/PROVENTIL HFA/VENTOLIN HFA) 108 (90  Base) MCG/ACT inhaler  albuterol (PROVENTIL) (2.5 MG/3ML) 0.083% neb solution  artificial saliva (BIOTENE MT) SOLN solution  benzocaine-menthol (CHLORASEPTIC MAX) 15-10 MG lozenge  benzonatate (TESSALON) 100 MG capsule  BETA BLOCKER NOT PRESCRIBED (INTENTIONAL)  busPIRone (BUSPAR) 10 MG tablet  calcium carbonate 600 mg-vitamin D 400 units (CALTRATE) 600-400 MG-UNIT per tablet  celecoxib (CELEBREX) 100 MG capsule  diclofenac (VOLTAREN) 1 % topical gel  FLUoxetine (PROZAC) 20 MG capsule  fluticasone-salmeterol (WIXELA INHUB) 100-50 MCG/ACT inhaler  gabapentin (NEURONTIN) 300 MG capsule  gabapentin (NEURONTIN) 600 MG tablet  hydrOXYzine (ATARAX) 25 MG tablet  levothyroxine (SYNTHROID/LEVOTHROID) 88 MCG tablet  melatonin 3 MG tablet  nystatin (NYSTOP) 375610 UNIT/GM external powder  olopatadine (PATANOL) 0.1 % ophthalmic solution  polyethylene glycol (MIRALAX) 17 g packet  PseudoePHEDrine-guaiFENesin 120-1200 MG TB12  senna-docusate (SENOKOT-S/PERICOLACE) 8.6-50 MG tablet      Allergies   Allergen Reactions     Ace Inhibitors Cough     Family History  Family History   Problem Relation Age of Onset     No Known Problems Brother         brain tumor related to shingles in his eye     Arthritis Mother      Hypertension Father      Cancer Father         lung     Cancer Grandchild         terratoma tumors     Breast Cancer Daughter      Social History   Social History     Tobacco Use     Smoking status: Never     Smokeless tobacco: Never   Vaping Use     Vaping Use: Never used   Substance Use Topics     Alcohol use: Yes     Alcohol/week: 0.0 - 0.8 standard drinks of alcohol     Comment: 1 glass wine weekly     Drug use: Never      Past medical history, past surgical history, medications, allergies, family history, and social history were reviewed with the patient. No additional pertinent items.      A medically appropriate review of systems was performed with pertinent positives and negatives noted in the HPI, and all other  systems negative.    Physical Exam   BP: 129/88  Pulse: 72  Temp: 97.8  F (36.6  C)  Resp: 16  SpO2: 94 %  Physical Exam  GEN: Well appearing, non toxic, cooperative  HEENT: normocephalic and atraumatic, PERRLA, EOMI  CV: well-perfused, normal skin color for ethnicity  PULM: breathing comfortably, in no respiratory distress  ABD: nondistended  EXT: Full range of motion.  No significant diffuse tenderness to palpation along the entire right leg especially along the right lateral upper thigh, right calf, and bilateral anterior knee.  She has maintained normal range of motion of the right hip as well as the right knee.  No significant swelling of the right leg compared to the left.  No overlying skin changes aside from a small discoloration along the right lateral leg that is more brown than erythematous.  It is tender along the entire length of it but no more so than any other area of her leg, with no underlying cord that I can appreciate  NEURO: awake, conversant, grossly normal bilateral upper and lower extremity strength & ROM   SKIN: No rashes, ecchymosis, or lacerations  PSYCH: Calm and cooperative, interactive    ED Course, Procedures, & Data      Procedures     Results for orders placed or performed during the hospital encounter of 07/06/23   CBC with platelets and differential     Status: Abnormal   Result Value Ref Range    WBC Count 7.1 4.0 - 11.0 10e3/uL    RBC Count 5.00 3.80 - 5.20 10e6/uL    Hemoglobin 13.6 11.7 - 15.7 g/dL    Hematocrit 43.9 35.0 - 47.0 %    MCV 88 78 - 100 fL    MCH 27.2 26.5 - 33.0 pg    MCHC 31.0 (L) 31.5 - 36.5 g/dL    RDW 15.1 (H) 10.0 - 15.0 %    Platelet Count 307 150 - 450 10e3/uL    % Neutrophils 63 %    % Lymphocytes 13 %    % Monocytes 9 %    % Eosinophils 15 %    % Basophils 0 %    % Immature Granulocytes 0 %    NRBCs per 100 WBC 0 <1 /100    Absolute Neutrophils 4.4 1.6 - 8.3 10e3/uL    Absolute Lymphocytes 0.9 0.8 - 5.3 10e3/uL    Absolute Monocytes 0.7 0.0 - 1.3 10e3/uL     Absolute Eosinophils 1.0 (H) 0.0 - 0.7 10e3/uL    Absolute Basophils 0.0 0.0 - 0.2 10e3/uL    Absolute Immature Granulocytes 0.0 <=0.4 10e3/uL    Absolute NRBCs 0.0 10e3/uL   CBC with platelets differential     Status: Abnormal    Narrative    The following orders were created for panel order CBC with platelets differential.  Procedure                               Abnormality         Status                     ---------                               -----------         ------                     CBC with platelets and d...[[id number]]  Abnormal            Final result                 Please view results for these tests on the individual orders.     Medications   acetaminophen (TYLENOL) tablet 975 mg (has no administration in time range)     Labs Ordered and Resulted from Time of ED Arrival to Time of ED Departure   CBC WITH PLATELETS AND DIFFERENTIAL - Abnormal       Result Value    WBC Count 7.1      RBC Count 5.00      Hemoglobin 13.6      Hematocrit 43.9      MCV 88      MCH 27.2      MCHC 31.0 (*)     RDW 15.1 (*)     Platelet Count 307      % Neutrophils 63      % Lymphocytes 13      % Monocytes 9      % Eosinophils 15      % Basophils 0      % Immature Granulocytes 0      NRBCs per 100 WBC 0      Absolute Neutrophils 4.4      Absolute Lymphocytes 0.9      Absolute Monocytes 0.7      Absolute Eosinophils 1.0 (*)     Absolute Basophils 0.0      Absolute Immature Granulocytes 0.0      Absolute NRBCs 0.0     BASIC METABOLIC PANEL   CK TOTAL   CRP INFLAMMATION   ERYTHROCYTE SEDIMENTATION RATE AUTO     XR Pelvis and Hip Right 2 Views    (Results Pending)   XR Femur Right 2 Views    (Results Pending)   XR Knee Right 3 Views    (Results Pending)   XR Tibia & Fibula Right 2 Views    (Results Pending)   US Lower Extremity Venous Duplex Right    (Results Pending)          Critical care was not performed.     Medical Decision Making  The patient's presentation was of moderate complexity (an undiagnosed new problem with  uncertain diagnosis).    The patient's evaluation involved:  review of external note(s) from 3+ sources (see separate area of note for details)  ordering and/or review of 3+ test(s) in this encounter (see separate area of note for details)  review of 3+ test result(s) ordered prior to this encounter (see separate area of note for details)    The patient's management necessitated moderate risk (prescription drug management including medications given in the ED).      Assessment & Plan    89-year-old female with a past medical history of periprosthetic fracture about 5 months ago presenting to the emergency department due to 2 months of worsening right extremity pain with discoloration of the right exterior leg noted to have normal range of motion of the right hip, right knee, right ankle.  She does have tenderness, throughout the entire right leg and soft tissue especially in the right lateral leg and has more pain with leg abduction than any other movement.    Patient had a recent ultrasound that she states is normal, however I have no access to that at this time.  With this discoloration over the leg on the outside as well as some significant pain in the area we will plan for ultrasound to evaluate for DVT as well as to evaluate the soft tissue.  No overlying skin changes consistent with shingles rash although she does have bilateral groin rash more consistent with fungal rash she has been treated for already at her facility.  No back pain and no dermatomal distribution with lower suspicion radicular pain.  No erythema, fevers with lower suspicion of cellulitis    We will plan for plain films of her right hip, femur, knee and tib-fib.  We will also plan for CPK to evaluate for possible myositis as well as other baseline lab work.  However at this time, more consistent with musculoskeletal pain than any other pain.    I have reviewed the nursing notes. I have reviewed the findings, diagnosis, plan and need for  follow up with the patient.    New Prescriptions    No medications on file       Final diagnoses:   None       Francisca Stein MD   [facility] EMERGENCY DEPARTMENT  7/6/2023     Francisca Stein MD  07/06/23 0417

## 2023-07-06 NOTE — ED TRIAGE NOTES
BIBA from tcu for R leg pain. Had surgery on R leg 4 months ago, now unable to bear weight and has had increasing pain. VSS.

## 2023-07-07 ENCOUNTER — TELEPHONE (OUTPATIENT)
Dept: ORTHOPEDICS | Facility: CLINIC | Age: 88
End: 2023-07-07
Payer: COMMERCIAL

## 2023-07-07 DIAGNOSIS — M25.551 HIP PAIN, RIGHT: Primary | ICD-10-CM

## 2023-07-07 DIAGNOSIS — Z96.659 PERI-PROSTHETIC SUPRACONDYLAR FRACTURE OF FEMUR, INITIAL ENCOUNTER: Primary | ICD-10-CM

## 2023-07-07 DIAGNOSIS — M97.8XXA PERI-PROSTHETIC SUPRACONDYLAR FRACTURE OF FEMUR, INITIAL ENCOUNTER: Primary | ICD-10-CM

## 2023-07-07 RX ORDER — OXYCODONE HYDROCHLORIDE 5 MG/1
2.5-5 TABLET ORAL EVERY 6 HOURS PRN
Qty: 30 TABLET | Refills: 0 | Status: SHIPPED | OUTPATIENT
Start: 2023-07-07 | End: 2023-07-26

## 2023-07-07 NOTE — PROGRESS NOTES
Orthopaedic Surgery Curbside Consult   DATE OF SERVICE:  7/6/2023    Orthopedic Surgery was contacted to provide curbside consultation and weigh in on the care Ms Everette Merida  received in the ED for right lower extremity pain.    I was called by Dr. Eloy Cornejo on 7/6/2023 and was provided with the following information. Worsening pain in her right lower extremity over the past few months, more so over the last few days. May be associated with increased physical therapy at the patient's TCU. Per the ED staff, the clinical exam showed intact neurovascular status. No concerning skin changes to suggest infection. Generalized pain in her thigh and knee with ROM but no specific area of localization.     Lab work for infection has been WNL. No leukocytosis, crp <3, ck normal, and esr 20. Plain radiographs of pelvis, femur, knee, and tib fib were reviewed and negative for any acute fracture of obvious hardware failure. Appears stable in comparison to previous. No DVT on ultrasound.      Based on the limited information provided, I recommended the following:  - follow up in clinic with Dr Lemon's team next week for repeat evaluation, consideration of advanced imaging should patient still be having pain   - recommend allowing pain to be her guide with physical therapy, and stepping back on intensity should it be the  of her current symptoms     - Pain control per ED  - Xrays/imaging: complete  - Dispo: patient can discharge back to TCU pending ED clearance   - Follow-up: patient will be contacted by orthopedic schedulers to establish clinic visit in next week.     Dr. Cornejo did not request that I personally see or examine the patient at this time. My recommendations are not intended to take the place of the Dr. Russell's clinical judgment, which should always be utilized to provide the most appropriate care to meet the unique needs of each patient.     Orthopedic Surgery did not see nor was involved with the  physical examination of the patient for this encounter.     Jayne Dupont MD  PGY-2  Orthopaedic Surgery  Pager: [phone]

## 2023-07-07 NOTE — DISCHARGE INSTRUCTIONS
Continue with scheduled appointment. Return to the emergency department if symptoms continue, get worse, there are any new symptoms or any cause for concern.

## 2023-07-07 NOTE — TELEPHONE ENCOUNTER
Called pt multiple times and left a VM stating that she has an apt with Dr. Lemon next week.        -SURJIT Estrada- Orthopedics      ----- Message from Jayne Dupont MD sent at 7/6/2023 10:35 PM CDT -----  Regarding: Clinic Follow Up following ED Visit  Hello,    Could you please schedule a follow up with Dr. Lemon's team in 1 week for ED follow up for right lower extremity pain?    Plain radiographs in the ED negative for any acute fracture or hardware failure, US negative for DVT, and lab work reassuring against any type of infection.    Many thanks,   Jayne

## 2023-07-10 ENCOUNTER — TRANSITIONAL CARE UNIT VISIT (OUTPATIENT)
Dept: GERIATRICS | Facility: CLINIC | Age: 88
End: 2023-07-10
Payer: COMMERCIAL

## 2023-07-10 VITALS
WEIGHT: 242.2 LBS | BODY MASS INDEX: 40.35 KG/M2 | SYSTOLIC BLOOD PRESSURE: 129 MMHG | HEIGHT: 65 IN | RESPIRATION RATE: 16 BRPM | OXYGEN SATURATION: 94 % | DIASTOLIC BLOOD PRESSURE: 88 MMHG | TEMPERATURE: 46 F | HEART RATE: 72 BPM

## 2023-07-10 DIAGNOSIS — Z47.89 AFTERCARE FOLLOWING SURGERY OF THE MUSCULOSKELETAL SYSTEM: Primary | ICD-10-CM

## 2023-07-10 DIAGNOSIS — M79.604 PAIN OF RIGHT LOWER EXTREMITY: ICD-10-CM

## 2023-07-10 PROCEDURE — 99309 SBSQ NF CARE MODERATE MDM 30: CPT | Performed by: NURSE PRACTITIONER

## 2023-07-10 NOTE — LETTER
7/10/2023        RE: Carol Merida  [address]  [place] [address]        [facility] GERIATRICS    PRIMARY CARE PROVIDER AND CLINIC:  [facility] [place], [address] / [place] [address]  Chief Complaint   Patient presents with     Hospital F/U      [place] Medical Record Number:  [id number]  Place of Service where encounter took place:  [facility] ([facility]) [[id number]]    Carol Merida  is a 88 year old  (1935), re-admitted to the above facility from  [facility]. Hospital stay 7/6/23 - 7/6/23. .   HPI:      Patient with PMH periprosthetic fracture 2/2023 sent to hospital ED from [facility] c/o of progressively worsening right lateral leg pain. US x 2 RLE negative and repeat right femur x-rays 6/29 negative. Also noted to have some beige discoloration running dwn side of leg and across mid shin. Ortho team updated about transfer. Oxycodone prescribed and patient discharged back to [facility].    Nursing reports no new concerns.     Patient found sitting up in a chair, dressed, alert, calm, NAD.Reports some mild pain yet to right lateral leg, starting at outside of knee and radiating up and down some. Reports oxycodone helpful in relieving pain. Discussed tests performed in ED and results. Also discussed upcoming f/u appt with ortho with patient who assures writer she plans to go and states she believes she already has a ride via [employer].     In ED RLE US and xrays repeated and again were negative for acute pathology        CODE STATUS/ADVANCE DIRECTIVES DISCUSSION:  Prior  CPR/Full code   ALLERGIES:   Allergies   Allergen Reactions     Ace Inhibitors Cough      PAST MEDICAL HISTORY:   Past Medical History:   Diagnosis Date     Abnormal stress test     small area on stress thalium ?2003; but normal angiogram 2012     Anemia      Anxiety      Cardiac dysrhythmia, unspecified     irregular heartbeat     CHF (congestive  heart failure) (H) 6/18/2018     PETTY (dyspnea on exertion)      Hyperlipidemia LDL goal <100 2/10/2010     Hypertension goal BP (blood pressure) < 140/90 7/18/2010     Hypothyroid      Malignant 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     Melanoma of skin, site unspecified      NONSPECIFIC MEDICAL HISTORY 04    fx fifth finger right hand     Obstructive sleep apnea (adult) (pediatric) 8/25/2006    CPAP      Osteoarthritis      Other chronic pain     Joint pain for many years.     Other osteoporosis      PONV (postoperative nausea and vomiting)      Tubular adenoma in colon 9/01    colonoscopy due 2004      PAST SURGICAL HISTORY:   has a past surgical history that includes NONSPECIFIC PROCEDURE; NONSPECIFIC PROCEDURE; NONSPECIFIC PROCEDURE; NONSPECIFIC PROCEDURE; NONSPECIFIC PROCEDURE; colonoscopy (9/01); colonoscopy (9/04); surgical history of -  (9/05); hysterectomy, naomi (summer 2004); surgical history of -  (2008); surgical history of -  (5/09); surgical history of -  (2/10); Arthroplasty hip (Left, 7/6/2015); Arthroplasty revision hip (Left, 7/22/2015); Arthroplasty hip (Right, 11/2/2016); cataract iol, rt/lt; and Open reduction internal fixation rodding intramedullary femur (Right, 2/26/2023).  FAMILY HISTORY: family history includes Arthritis in her mother; Breast Cancer in her daughter; Cancer in her father and grandchild; Hypertension in her father; No Known Problems in her brother.  SOCIAL HISTORY:   reports that she has never smoked. She has never used smokeless tobacco. She reports current alcohol use. She reports that she does not use drugs.      Post Discharge Medication Reconciliation Status:   MED REC REQUIRED  Post Medication Reconciliation Status: discharge medications reconciled and changed, per note/orders       Current Outpatient Medications   Medication Sig     ACE/ARB/ARNI NOT PRESCRIBED (INTENTIONAL) Please  choose reason not prescribed, below     acetaminophen (TYLENOL) 325 MG tablet Take 650mg Q 8 hours scheduled and 650mg BID PRN(6 hours apart from scheduled dose).     albuterol (PROAIR HFA/PROVENTIL HFA/VENTOLIN HFA) 108 (90 Base) MCG/ACT inhaler INHALE 2 PUFFS INTO THE LUNGS EVERY 4 HOURS AS NEEDED FOR SHORTNESS OF BREATH OR DIFFICULT BREATHING OR WHEEZING     albuterol (PROVENTIL) (2.5 MG/3ML) 0.083% neb solution Take 1 vial (2.5 mg) by nebulization every 4 hours as needed for shortness of breath, wheezing or cough And BID     artificial saliva (BIOTENE MT) SOLN solution Swish and spit 2 mLs (2 sprays) in mouth 4 times daily     benzocaine-menthol (CHLORASEPTIC MAX) 15-10 MG lozenge Place 1 lozenge inside cheek every hour as needed     benzonatate (TESSALON) 100 MG capsule Take 1 capsule (100 mg) by mouth 3 times daily as needed for cough     BETA BLOCKER NOT PRESCRIBED (INTENTIONAL) Beta Blocker not prescribed intentionally due to EF > 40 % (ejection fraction) will leave up to Cardiology.     busPIRone (BUSPAR) 10 MG tablet Take 1 tablet (10 mg) by mouth 2 times daily     calcium carbonate 600 mg-vitamin D 400 units (CALTRATE) 600-400 MG-UNIT per tablet Take 1 tablet by mouth every evening      celecoxib (CELEBREX) 100 MG capsule Take 100 mg by mouth 2 times daily as needed for moderate pain (4-6)     clotrimazole (LOTRIMIN) 1 % external cream Apply topically 2 times daily     diclofenac (VOLTAREN) 1 % topical gel Apply 4 g topically 4 times daily as needed for moderate pain     FLUoxetine (PROZAC) 20 MG capsule Take 3 capsules (60 mg) by mouth daily     fluticasone-salmeterol (WIXELA INHUB) 100-50 MCG/ACT inhaler Inhale 1 puff into the lungs 2 times daily     gabapentin (NEURONTIN) 300 MG capsule Take 1 capsule (300 mg) by mouth 2 times daily     gabapentin (NEURONTIN) 600 MG tablet Take 1 tablet (600 mg) by mouth At Bedtime     hydrOXYzine (ATARAX) 25 MG tablet Take 0.5 tablets (12.5 mg) by mouth daily as  "needed for itching And TID prn     levothyroxine (SYNTHROID/LEVOTHROID) 88 MCG tablet Take 1 tablet (88 mcg) by mouth daily     melatonin 3 MG tablet Take 3 mg by mouth At Bedtime     nystatin (MYCOSTATIN) 762501 UNIT/GM external cream      nystatin (NYSTOP) 735355 UNIT/GM external powder Apply tid to affectead area prn     olopatadine (PATANOL) 0.1 % ophthalmic solution Place 1 drop into both eyes 2 times daily as needed for allergies     oxyCODONE (ROXICODONE) 5 MG tablet Take 0.5-1 tablets (2.5-5 mg) by mouth every 6 hours as needed for moderate to severe pain     polyethylene glycol (MIRALAX) 17 g packet Take 17 g by mouth daily And repeat daily prn constipation     PseudoePHEDrine-guaiFENesin 120-1200 MG TB12 Take 1 tablet by mouth 2 times daily     senna-docusate (SENOKOT-S/PERICOLACE) 8.6-50 MG tablet Take 1 tablet by mouth daily as needed for constipation     No current facility-administered medications for this visit.       ROS:  4 point ROS including Respiratory, CV, GI and , other than that noted in the HPI,  is negative    Vitals:  /88   Pulse 72   Temp (!) 46  F (7.8  C)   Resp 16   Ht 1.651 m (5' 5\")   Wt 109.9 kg (242 lb 3.2 oz)   LMP  (LMP Unknown)   SpO2 94%   BMI 40.30 kg/m    Exam:    Resp: Effort WNL, LSCTA   CV: VSS, trace bilateral ankle edema  Abd- soft, nontender, BS +  Musc- LAI- in chair, station WNL  Psych- alert, calm, pleasant      Lab/Diagnostic data:  Recent labs in [place] reviewed by me today.     ASSESSMENT/PLAN:    Aftercare following surgery of the musculoskeletal system  - h/o periprosthetic fracture and repair -2/2023   Pain of right lower extremity  Multiple US RLE and xrays negative for acute pathology. Oxycodone added per ED.  - continue on current medication regimen  - taper oxy as able (discussed with patient)  - NWB RLE until f/w ortho  - f/w ortho as scheduled 7/12. Discussed ensuring  transport with patient and nursing    Orders:  Written on unit and " discussed with nursing and patient        Electronically signed by:  BEATRICE Mike CNP                     Sincerely,        BEATRICE Mike CNP

## 2023-07-10 NOTE — PROGRESS NOTES
[facility] GERIATRICS    PRIMARY CARE PROVIDER AND CLINIC:  [facility], [address] / [place] [facility] [place] [address]  Chief Complaint   Patient presents with     Hospital F/U      [place] Medical Record Number:  [id number]  Place of Service where encounter took place:  [facility] ([facility]) [[id number]]    Carol Merida  is a 88 year old  (1935), re-admitted to the above facility from  [facility]. Hospital stay 7/6/23 - 7/6/23. .   HPI:      Patient with PMH periprosthetic fracture 2/2023 sent to hospital ED from [facility] c/o of progressively worsening right lateral leg pain. US x 2 RLE negative and repeat right femur x-rays 6/29 negative. Also noted to have some beige discoloration running dwn side of leg and across mid shin. Ortho team updated about transfer. Oxycodone prescribed and patient discharged back to [facility].    Nursing reports no new concerns.     Patient found sitting up in a chair, dressed, alert, calm, NAD.Reports some mild pain yet to right lateral leg, starting at outside of knee and radiating up and down some. Reports oxycodone helpful in relieving pain. Discussed tests performed in ED and results. Also discussed upcoming f/u appt with ortho with patient who assures writer she plans to go and states she believes she already has a ride via [employer].     In ED RLE US and xrays repeated and again were negative for acute pathology        CODE STATUS/ADVANCE DIRECTIVES DISCUSSION:  Prior  CPR/Full code   ALLERGIES:   Allergies   Allergen Reactions     Ace Inhibitors Cough      PAST MEDICAL HISTORY:   Past Medical History:   Diagnosis Date     Abnormal stress test     small area on stress thalium ?2003; but normal angiogram 2012     Anemia      Anxiety      Cardiac dysrhythmia, unspecified     irregular heartbeat     CHF (congestive heart failure) (H) 6/18/2018     PETTY (dyspnea on exertion)      Hyperlipidemia LDL goal  <100 2/10/2010     Hypertension goal BP (blood pressure) < 140/90 7/18/2010     Hypothyroid      Malignant 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     Melanoma of skin, site unspecified      NONSPECIFIC MEDICAL HISTORY 04    fx fifth finger right hand     Obstructive sleep apnea (adult) (pediatric) 8/25/2006    CPAP      Osteoarthritis      Other chronic pain     Joint pain for many years.     Other osteoporosis      PONV (postoperative nausea and vomiting)      Tubular adenoma in colon 9/01    colonoscopy due 2004      PAST SURGICAL HISTORY:   has a past surgical history that includes NONSPECIFIC PROCEDURE; NONSPECIFIC PROCEDURE; NONSPECIFIC PROCEDURE; NONSPECIFIC PROCEDURE; NONSPECIFIC PROCEDURE; colonoscopy (9/01); colonoscopy (9/04); surgical history of -  (9/05); hysterectomy, naomi (summer 2004); surgical history of -  (2008); surgical history of -  (5/09); surgical history of -  (2/10); Arthroplasty hip (Left, 7/6/2015); Arthroplasty revision hip (Left, 7/22/2015); Arthroplasty hip (Right, 11/2/2016); cataract iol, rt/lt; and Open reduction internal fixation rodding intramedullary femur (Right, 2/26/2023).  FAMILY HISTORY: family history includes Arthritis in her mother; Breast Cancer in her daughter; Cancer in her father and grandchild; Hypertension in her father; No Known Problems in her brother.  SOCIAL HISTORY:   reports that she has never smoked. She has never used smokeless tobacco. She reports current alcohol use. She reports that she does not use drugs.      Post Discharge Medication Reconciliation Status:   MED REC REQUIRED  Post Medication Reconciliation Status: discharge medications reconciled and changed, per note/orders       Current Outpatient Medications   Medication Sig     ACE/ARB/ARNI NOT PRESCRIBED (INTENTIONAL) Please choose reason not prescribed, below     acetaminophen (TYLENOL) 325 MG tablet Take 650mg Q 8  hours scheduled and 650mg BID PRN(6 hours apart from scheduled dose).     albuterol (PROAIR HFA/PROVENTIL HFA/VENTOLIN HFA) 108 (90 Base) MCG/ACT inhaler INHALE 2 PUFFS INTO THE LUNGS EVERY 4 HOURS AS NEEDED FOR SHORTNESS OF BREATH OR DIFFICULT BREATHING OR WHEEZING     albuterol (PROVENTIL) (2.5 MG/3ML) 0.083% neb solution Take 1 vial (2.5 mg) by nebulization every 4 hours as needed for shortness of breath, wheezing or cough And BID     artificial saliva (BIOTENE MT) SOLN solution Swish and spit 2 mLs (2 sprays) in mouth 4 times daily     benzocaine-menthol (CHLORASEPTIC MAX) 15-10 MG lozenge Place 1 lozenge inside cheek every hour as needed     benzonatate (TESSALON) 100 MG capsule Take 1 capsule (100 mg) by mouth 3 times daily as needed for cough     BETA BLOCKER NOT PRESCRIBED (INTENTIONAL) Beta Blocker not prescribed intentionally due to EF > 40 % (ejection fraction) will leave up to Cardiology.     busPIRone (BUSPAR) 10 MG tablet Take 1 tablet (10 mg) by mouth 2 times daily     calcium carbonate 600 mg-vitamin D 400 units (CALTRATE) 600-400 MG-UNIT per tablet Take 1 tablet by mouth every evening      celecoxib (CELEBREX) 100 MG capsule Take 100 mg by mouth 2 times daily as needed for moderate pain (4-6)     clotrimazole (LOTRIMIN) 1 % external cream Apply topically 2 times daily     diclofenac (VOLTAREN) 1 % topical gel Apply 4 g topically 4 times daily as needed for moderate pain     FLUoxetine (PROZAC) 20 MG capsule Take 3 capsules (60 mg) by mouth daily     fluticasone-salmeterol (WIXELA INHUB) 100-50 MCG/ACT inhaler Inhale 1 puff into the lungs 2 times daily     gabapentin (NEURONTIN) 300 MG capsule Take 1 capsule (300 mg) by mouth 2 times daily     gabapentin (NEURONTIN) 600 MG tablet Take 1 tablet (600 mg) by mouth At Bedtime     hydrOXYzine (ATARAX) 25 MG tablet Take 0.5 tablets (12.5 mg) by mouth daily as needed for itching And TID prn     levothyroxine (SYNTHROID/LEVOTHROID) 88 MCG tablet Take 1  "tablet (88 mcg) by mouth daily     melatonin 3 MG tablet Take 3 mg by mouth At Bedtime     nystatin (MYCOSTATIN) 933088 UNIT/GM external cream      nystatin (NYSTOP) 026240 UNIT/GM external powder Apply tid to affectead area prn     olopatadine (PATANOL) 0.1 % ophthalmic solution Place 1 drop into both eyes 2 times daily as needed for allergies     oxyCODONE (ROXICODONE) 5 MG tablet Take 0.5-1 tablets (2.5-5 mg) by mouth every 6 hours as needed for moderate to severe pain     polyethylene glycol (MIRALAX) 17 g packet Take 17 g by mouth daily And repeat daily prn constipation     PseudoePHEDrine-guaiFENesin 120-1200 MG TB12 Take 1 tablet by mouth 2 times daily     senna-docusate (SENOKOT-S/PERICOLACE) 8.6-50 MG tablet Take 1 tablet by mouth daily as needed for constipation     No current facility-administered medications for this visit.       ROS:  4 point ROS including Respiratory, CV, GI and , other than that noted in the HPI,  is negative    Vitals:  /88   Pulse 72   Temp (!) 46  F (7.8  C)   Resp 16   Ht 1.651 m (5' 5\")   Wt 109.9 kg (242 lb 3.2 oz)   LMP  (LMP Unknown)   SpO2 94%   BMI 40.30 kg/m    Exam:    Resp: Effort WNL, LSCTA   CV: VSS, trace bilateral ankle edema  Abd- soft, nontender, BS +  Musc- LAI- in chair, station WNL  Psych- alert, calm, pleasant      Lab/Diagnostic data:  Recent labs in [facility] reviewed by me today.     ASSESSMENT/PLAN:    Aftercare following surgery of the musculoskeletal system  - h/o periprosthetic fracture and repair -2/2023   Pain of right lower extremity  Multiple US RLE and xrays negative for acute pathology. Oxycodone added per ED.  - continue on current medication regimen  - taper oxy as able (discussed with patient)  - NWB RLE until f/w ortho  - f/w ortho as scheduled 7/12. Discussed ensuring  transport with patient and nursing    Orders:  Written on unit and discussed with nursing and patient        Electronically signed by:  BEATRICE Mike " CNP

## 2023-07-12 ENCOUNTER — OFFICE VISIT (OUTPATIENT)
Dept: ORTHOPEDICS | Facility: CLINIC | Age: 88
End: 2023-07-12
Payer: COMMERCIAL

## 2023-07-12 DIAGNOSIS — Z96.653 HISTORY OF BILATERAL KNEE REPLACEMENT: ICD-10-CM

## 2023-07-12 DIAGNOSIS — Z96.649 PERIPROSTHETIC FRACTURE OF SHAFT OF FEMUR: Primary | ICD-10-CM

## 2023-07-12 DIAGNOSIS — R53.81 PHYSICAL DECONDITIONING: ICD-10-CM

## 2023-07-12 DIAGNOSIS — M97.8XXA PERIPROSTHETIC FRACTURE OF SHAFT OF FEMUR: Primary | ICD-10-CM

## 2023-07-12 DIAGNOSIS — M79.89 LEG SWELLING: ICD-10-CM

## 2023-07-12 PROCEDURE — 99215 OFFICE O/P EST HI 40 MIN: CPT | Performed by: ORTHOPAEDIC SURGERY

## 2023-07-12 RX ORDER — NYSTATIN 100000 U/G
CREAM TOPICAL
COMMUNITY
Start: 2023-07-10 | End: 2024-03-15

## 2023-07-12 NOTE — LETTER
7/12/2023         RE: Carol Merida  [address]  [place] [address]        Dear Colleague,    Thank you for referring your patient, Carol Merida, to the [facility] [place]. Please see a copy of my visit note below.    CC: s/p ORIF right interprosthetic femur fracture    HPI:   Patient is again accompanied by her daughter Lisa today.  88 yoF 5 months s/p right interprosthetic femur fx with intramedullary polo through box of TKA and bridging lateral plate spanning HUGO and TKA.  She has struggled with recovery postoperatively due to deconditioning. Was progressing nicely when I last saw her in clinic on 3/29 although she had not yet started weightbearing due to apprehension.  Following that visit she started mobilizing with therapy. According to patient and her daughter PT was subsequently stopped for 5 weeks due to insurance issues. Since starting PT again she has had progressive right knee pain as well as fullness and pain of posterior distal thigh and proximal posterior calf; at first pain was with weight bearing but now is with most motions at the knee. She denies fevers, chills, constitutional symptoms.     Was seen in emergency room on 7/6 at which time they obtained inflammatory labs (which were negative) as well as an ultrasound of the RLE which was negative for DVT. She reports having another lower extremity ultrasound at [facility] which was negative for DVT.      Today she is alert and oriented. No distress  Globally deconditioned  Incisions on right lower extremity benign  No rashes or skin lesions on anterior or lateral thigh. Posteriorly there is friction erythema distally which aligns with where the thigh rests on her wheel chair.   She has tenderness to palpation about the right knee including laterally over the IT band, medial and lateral to patella. Pain with palpation and translation over the patella. She tolerate passive ROM of the knee but has pain at extremes  of flexion and extension. She does not have thigh pain. Active strength is globally decreased but she has intact extension and flexion. No pain with passive hip ROM.     AP and lateral radiographs of the femur show stable alignment of the hardware. There is incomplete healing at the frature site but I do see callous formation within and about the fracture lines.  There is no loosening of the hardware distally. Proximally there is some very subtle lucency about the unicortical screws.     Impression and plan:  Frail 88 year old female s/p ORIF of interprosthetic right femur fracture with IM nail and bridging lateral locking plate. Delayed healing radiographically. Having considerable knee pain since beginning to weight bear and ambulate.     My biggest concern is infection of the knee and extended instrumented space, however she has had normal inflammatory labs which make this much less likely.  Discussed role of aspiration however given normal inflammatory labs I have concern for seeding a sterile joint with unnecessary aspiration.  Also concern for DVT given location of pain and tenderness over calf/posterior thigh however negative ultrasound x 2.     Given delayed union with pain and subtle lucency about proximal screws recommended CT femur to better evaluate healing status and hardware loosening. Depending on CT may recommend bone stimulator for delayed union.     Will follow up once CT is available. We assisted patient in scheduling CT scan today.     Time spent on this clinical encounter including previsit chart review, history and physical examination, patient counseling and documentation was 40 minutes on the date of encounter.    Newton Lemon MD  Orthopedic Spine Surgeon  , Department of Orthopedic Surgery

## 2023-07-12 NOTE — NURSING NOTE
Reason For Visit:   Chief Complaint   Patient presents with     RECHECK     DOS 2/26/23 Open reduction internal fixation of right interprosthetic femur fracture with lateral locking plate        LMP  (LMP Unknown)     Pain Assessment  Patient Currently in Pain: Yes (pain is a 6 when not moving leg and when patient moves leg pain gets up to a 9)    Andie Chin

## 2023-07-13 ENCOUNTER — HOSPITAL ENCOUNTER (OUTPATIENT)
Dept: CT IMAGING | Facility: CLINIC | Age: 88
Discharge: HOME | End: 2023-07-13
Attending: ORTHOPAEDIC SURGERY | Admitting: ORTHOPAEDIC SURGERY
Payer: COMMERCIAL

## 2023-07-13 ENCOUNTER — PATIENT OUTREACH (OUTPATIENT)
Dept: CARE COORDINATION | Facility: CLINIC | Age: 88
End: 2023-07-13

## 2023-07-13 ENCOUNTER — TRANSITIONAL CARE UNIT VISIT (OUTPATIENT)
Dept: GERIATRICS | Facility: CLINIC | Age: 88
End: 2023-07-13
Payer: COMMERCIAL

## 2023-07-13 VITALS
WEIGHT: 244.4 LBS | HEIGHT: 65 IN | OXYGEN SATURATION: 94 % | RESPIRATION RATE: 17 BRPM | HEART RATE: 74 BPM | BODY MASS INDEX: 40.72 KG/M2 | TEMPERATURE: 97.3 F | DIASTOLIC BLOOD PRESSURE: 74 MMHG | SYSTOLIC BLOOD PRESSURE: 135 MMHG

## 2023-07-13 DIAGNOSIS — Z96.649 PERIPROSTHETIC FRACTURE OF SHAFT OF FEMUR: ICD-10-CM

## 2023-07-13 DIAGNOSIS — M97.8XXA PERIPROSTHETIC FRACTURE OF SHAFT OF FEMUR: ICD-10-CM

## 2023-07-13 DIAGNOSIS — Z47.89 AFTERCARE FOLLOWING SURGERY OF THE MUSCULOSKELETAL SYSTEM: Primary | ICD-10-CM

## 2023-07-13 DIAGNOSIS — M79.604 PAIN OF RIGHT LOWER EXTREMITY: ICD-10-CM

## 2023-07-13 PROCEDURE — 99308 SBSQ NF CARE LOW MDM 20: CPT | Performed by: NURSE PRACTITIONER

## 2023-07-13 PROCEDURE — 73700 CT LOWER EXTREMITY W/O DYE: CPT | Mod: RT

## 2023-07-13 NOTE — PROGRESS NOTES
Clinic Care Coordination Contact  Care Coordination Clinician Chart Review    Situation: Patient chart reviewed by care coordinator.    Background: Clinic Care Coordination Referral received from inpatient care team for transition handoff communication following hospital admission. Pt was discharged to [facility] 3/8/2023    Assessment: Pt presented again to ED 7/6/2023 for leg pain. Pt was discharged back to [facility]. Pt continues in TCU at this time. Given duration of stay in TCU has been greater than 3 months patient is no longer a candidate for Primary Care Clinic Care Coordination enrollment     Plan/Recommendations: Clinic Care Coordination Referral/order cancelled. RN/SW CC will perform no further monitoring/outreaches at this time and will remain available as needed. If new needs arise, a new Care Coordination Referral may be placed.    TAMMIE Moulton  Clinic Care Coordinator  [facility]  [phone]  [email or web address]

## 2023-07-13 NOTE — PROGRESS NOTES
"[facility] GERIATRICS    Chief Complaint   Patient presents with     RECHECK     HPI:  Carol Merida is a 88 year old  (1935), who is being seen today for an episodic care visit at: [facility] ([facility]) [[id number]].     Seen today in [facility] for recheck of pain to RLE. Patient did f/w ortho yesterday. CT scan of right femur ordered and results reviewed with patient. No acute findings.     Patient found in room sitting up in a chair. Reports pain to right lateral knee and radiation up and down from there, baseline. Reports pain is effectively relieved on current pain regimen. States has f/u with ortho again 7/26.    VS, weights, NN reviewed in facility EMR and are stable        Allergies, and PMH/PSH reviewed in EPIC today.  REVIEW OF SYSTEMS:  4 point ROS including Respiratory, CV, GI and , other than that noted in the HPI,  is negative    Objective:   /74   Pulse 74   Temp 97.3  F (36.3  C)   Resp 17   Ht 1.651 m (5' 5\")   Wt 110.9 kg (244 lb 6.4 oz)   LMP  (LMP Unknown)   SpO2 94%   BMI 40.67 kg/m      Resp: Effort WNL  CV: VS as above  Abd- soft, nontender, BS +  Musc- LAI- guards RLE some  Psych- alert, calm, pleasant      Recent labs in EPIC reviewed by me today.     Assessment/Plan:  Aftercare following surgery of the musculoskeletal system  - s/p ORIF of periprosthetic femur fracture, right 2/2023. Per ortho office visit notes there is delayed healing on imaging. CT ordered and was done today. No acute findings. Reviewed with patient  - f/w ortho again 7/26 as scheduled  Pain of right lower extremity  - US, imaging have been negative for new acute pathology. Following with ortho again as above  - continue on current pain regimen - taper oxycodone (didscussed with patient today )          Electronically signed by: BEATRICE Mike CNP     "

## 2023-07-13 NOTE — PROGRESS NOTES
CC: s/p ORIF right interprosthetic femur fracture    HPI:   Patient is again accompanied by her daughter Lisa today.  88 yoF 5 months s/p right interprosthetic femur fx with intramedullary polo through box of TKA and bridging lateral plate spanning HUGO and TKA.  She has struggled with recovery postoperatively due to deconditioning. Was progressing nicely when I last saw her in clinic on 3/29 although she had not yet started weightbearing due to apprehension.  Following that visit she started mobilizing with therapy. According to patient and her daughter PT was subsequently stopped for 5 weeks due to insurance issues. Since starting PT again she has had progressive right knee pain as well as fullness and pain of posterior distal thigh and proximal posterior calf; at first pain was with weight bearing but now is with most motions at the knee. She denies fevers, chills, constitutional symptoms.     Was seen in emergency room on 7/6 at which time they obtained inflammatory labs (which were negative) as well as an ultrasound of the RLE which was negative for DVT. She reports having another lower extremity ultrasound at [facility] which was negative for DVT.      Today she is alert and oriented. No distress  Globally deconditioned  Incisions on right lower extremity benign  No rashes or skin lesions on anterior or lateral thigh. Posteriorly there is friction erythema distally which aligns with where the thigh rests on her wheel chair.   She has tenderness to palpation about the right knee including laterally over the IT band, medial and lateral to patella. Pain with palpation and translation over the patella. She tolerate passive ROM of the knee but has pain at extremes of flexion and extension. She does not have thigh pain. Active strength is globally decreased but she has intact extension and flexion. No pain with passive hip ROM.     AP and lateral radiographs of the femur show stable alignment of the hardware. There is  incomplete healing at the frature site but I do see callous formation within and about the fracture lines.  There is no loosening of the hardware distally. Proximally there is some very subtle lucency about the unicortical screws.     Impression and plan:  Frail 88 year old female s/p ORIF of interprosthetic right femur fracture with IM nail and bridging lateral locking plate. Delayed healing radiographically. Having considerable knee pain since beginning to weight bear and ambulate.     My biggest concern is infection of the knee and extended instrumented space, however she has had normal inflammatory labs which make this much less likely.  Discussed role of aspiration however given normal inflammatory labs I have concern for seeding a sterile joint with unnecessary aspiration.  Also concern for DVT given location of pain and tenderness over calf/posterior thigh however negative ultrasound x 2.     Given delayed union with pain and subtle lucency about proximal screws recommended CT femur to better evaluate healing status and hardware loosening. Depending on CT may recommend bone stimulator for delayed union.     Will follow up once CT is available. We assisted patient in scheduling CT scan today.     Time spent on this clinical encounter including previsit chart review, history and physical examination, patient counseling and documentation was 40 minutes on the date of encounter.    Newton Lemon MD  Orthopedic Spine Surgeon  , Department of Orthopedic Surgery

## 2023-07-13 NOTE — LETTER
"    7/13/2023        RE: Carol Merida  [address]  [place] [address]        [facility] GERIATRICS    Chief Complaint   Patient presents with     RECHECK     HPI:  Carol Merida is a 88 year old  (1935), who is being seen today for an episodic care visit at: [facility] ([facility]) [[id number]].     Seen today in [facility] for recheck of pain to RLE. Patient did f/w ortho yesterday. CT scan of right femur ordered and results reviewed with patient. No acute findings.     Patient found in room sitting up in a chair. Reports pain to right lateral knee and radiation up and down from there, baseline. Reports pain is effectively relieved on current pain regimen. States has f/u with ortho again 7/26.    VS, weights, NN reviewed in facility EMR and are stable        Allergies, and PMH/PSH reviewed in EPIC today.  REVIEW OF SYSTEMS:  4 point ROS including Respiratory, CV, GI and , other than that noted in the HPI,  is negative    Objective:   /74   Pulse 74   Temp 97.3  F (36.3  C)   Resp 17   Ht 1.651 m (5' 5\")   Wt 110.9 kg (244 lb 6.4 oz)   LMP  (LMP Unknown)   SpO2 94%   BMI 40.67 kg/m      Resp: Effort WNL  CV: VS as above  Abd- soft, nontender, BS +  Musc- LAI- guards RLE some  Psych- alert, calm, pleasant      Recent labs in EPIC reviewed by me today.     Assessment/Plan:  Aftercare following surgery of the musculoskeletal system  - s/p ORIF of periprosthetic femur fracture, right 2/2023. Per ortho office visit notes there is delayed healing on imaging. CT ordered and was done today. No acute findings. Reviewed with patient  - f/w ortho again 7/26 as scheduled  Pain of right lower extremity  - US, imaging have been negative for new acute pathology. Following with ortho again as above  - continue on current pain regimen - taper oxycodone (didscussed with patient today )          Electronically signed by: BEATRICE Mike CNP           Sincerely,        Beatriz George, " APRN CNP

## 2023-07-26 ENCOUNTER — ANCILLARY PROCEDURE (OUTPATIENT)
Dept: GENERAL RADIOLOGY | Facility: CLINIC | Age: 88
End: 2023-07-26
Attending: ORTHOPAEDIC SURGERY
Payer: COMMERCIAL

## 2023-07-26 ENCOUNTER — OFFICE VISIT (OUTPATIENT)
Dept: ORTHOPEDICS | Facility: CLINIC | Age: 88
End: 2023-07-26
Payer: COMMERCIAL

## 2023-07-26 DIAGNOSIS — Z96.649 PERIPROSTHETIC FRACTURE OF SHAFT OF FEMUR: ICD-10-CM

## 2023-07-26 DIAGNOSIS — M97.8XXA PERIPROSTHETIC FRACTURE OF SHAFT OF FEMUR: ICD-10-CM

## 2023-07-26 DIAGNOSIS — R53.81 PHYSICAL DECONDITIONING: ICD-10-CM

## 2023-07-26 DIAGNOSIS — S72.301G DELAYED UNION OF CLOSED FRACTURE OF FEMORAL SHAFT, RIGHT: Primary | ICD-10-CM

## 2023-07-26 PROCEDURE — 99215 OFFICE O/P EST HI 40 MIN: CPT | Performed by: ORTHOPAEDIC SURGERY

## 2023-07-26 PROCEDURE — 73552 X-RAY EXAM OF FEMUR 2/>: CPT | Mod: RT | Performed by: RADIOLOGY

## 2023-07-26 ASSESSMENT — ENCOUNTER SYMPTOMS
SPUTUM PRODUCTION: 1
EXERCISE INTOLERANCE: 0
INSOMNIA: 0
SHORTNESS OF BREATH: 1
HEMOPTYSIS: 0
POSTURAL DYSPNEA: 0
MUSCLE CRAMPS: 0
ARTHRALGIAS: 1
STIFFNESS: 1
NERVOUS/ANXIOUS: 1
COUGH: 1
DYSPNEA ON EXERTION: 0
EYE WATERING: 0
MUSCLE WEAKNESS: 1
JOINT SWELLING: 1
HYPOTENSION: 0
SYNCOPE: 0
BACK PAIN: 0
NECK PAIN: 0
DECREASED CONCENTRATION: 0
EYE PAIN: 1
ORTHOPNEA: 0
LIGHT-HEADEDNESS: 0
PALPITATIONS: 0
DEPRESSION: 1
DOUBLE VISION: 0
LEG PAIN: 1
PANIC: 1
SLEEP DISTURBANCES DUE TO BREATHING: 0
NAIL CHANGES: 0
WHEEZING: 0
SKIN CHANGES: 0
MYALGIAS: 1
EYE REDNESS: 0
SNORES LOUDLY: 0
EYE IRRITATION: 1
HYPERTENSION: 0
COUGH DISTURBING SLEEP: 1
POOR WOUND HEALING: 0

## 2023-07-26 NOTE — NURSING NOTE
Reason For Visit:   Chief Complaint   Patient presents with    RECHECK     Review Right Femur MRI 07/13/2023         Pain Assessment  Patient Currently in Pain: Yes  Patient's Stated Pain Goal: 4    Andie Chin

## 2023-07-26 NOTE — LETTER
7/26/2023         RE: Carol Merida  [address]  [facility] [address]        Dear Colleague,    Thank you for referring your patient, Carol Merida, to the [facility] [place]. Please see a copy of my visit note below.    Chief concern: Follow-up CT scan to evaluate for fracture healing of right femur    History of present illness:  Carol is an 88-year-old female status post open reduction internal fixation of endoprosthetic right femur fracture on 2/26/2023 with a retrograde intramedullary nail and AN overlapping lateral locking plate.  She has had a delayed course in terms of mobilization due to deconditioning.  Upon starting to weight-bear just a few weeks ago with physical therapy she has had intense right knee pain.  At last visit I recommended a CT scan to better evaluate for bone healing as looks like there is some evidence of callus formation but certainly the fracture had not healed totally    There returns today to review the CT scan.  CT scan shows that there is no bridging callus through the majority the fracture sites.  At this point she is nearly 5 months out which is consistent with a trophic delayed union.  Obviously given her deconditioning and overall health status, trying avoid surgery if possible.  I recommended a bone stimulator which will help coordinate.  Placed a referral and the vendor will help get this set up for her at her transitional care unit.      I spent 40 minutes on this encounter including chart review history and physical exam counseling the patient and her daughter today during the visit.    Newton Lemon MD  Orthopedic Spine Surgeon  , Department of Orthopedic Surgery    Answers for HPI/ROS submitted by the patient on 7/26/2023  General Symptoms: No  Skin Symptoms: Yes  HENT Symptoms: No  EYE SYMPTOMS: Yes  HEART SYMPTOMS: Yes  LUNG SYMPTOMS: Yes  INTESTINAL SYMPTOMS: No  URINARY SYMPTOMS: No  GYNECOLOGIC  SYMPTOMS: No  BREAST SYMPTOMS: No  SKELETAL SYMPTOMS: Yes  BLOOD SYMPTOMS: No  NERVOUS SYSTEM SYMPTOMS: No  MENTAL HEALTH SYMPTOMS: Yes  Changes in hair: No  Changes in moles/birth marks: No  Itching: Yes  Rashes: Yes  Changes in nails: No  Acne: No  Hair in places you don't want it: No  Change in facial hair: No  Warts: No  Non-healing sores: No  Scarring: No  Flaking of skin: No  Color changes of hands/feet in cold : No  Sun sensitivity: No  Skin thickening: No  Eye pain: Yes  Vision loss: No  Dry eyes: Yes  Watery eyes: No  Eye bulging: No  Double vision: No  Flashing of lights: No  Spots: No  Floaters: Yes  Redness: No  Crossed eyes: No  Tunnel Vision: No  Yellowing of eyes: No  Eye irritation: Yes  Chest pain or pressure: No  Fast or irregular heartbeat: No  Pain in legs with walking: Yes  Trouble breathing while lying down: No  Fingers or toes appear blue: No  High blood pressure: No  Low blood pressure: No  Fainting: No  Murmurs: No  Pacemaker: No  Varicose veins: No  Edema or swelling: No  Wake up at night with shortness of breath: No  Light-headedness: No  Exercise intolerance: No  Cough: Yes  Sputum or phlegm: Yes  Coughing up blood: No  Difficulty breating or shortness of breath: Yes  Snoring: No  Wheezing: No  Difficulty breathing on exertion: No  Nighttime Cough: Yes  Difficulty breathing when lying flat: No  Back pain: No  Muscle aches: Yes  Neck pain: No  Swollen joints: Yes  Joint pain: Yes  Bone pain: Yes  Muscle cramps: No  Muscle weakness: Yes  Joint stiffness: Yes  Bone fracture: Yes  Nervous or Anxious: Yes  Depression: Yes  Trouble sleeping: No  Trouble thinking or concentrating: No  Mood changes: Yes  Panic attacks: Yes

## 2023-07-29 PROBLEM — S72.301G: Status: ACTIVE | Noted: 2023-07-29

## 2023-07-29 NOTE — PROGRESS NOTES
Chief concern: Follow-up CT scan to evaluate for fracture healing of right femur    History of present illness:  Carol is an 88-year-old female status post open reduction internal fixation of endoprosthetic right femur fracture on 2/26/2023 with a retrograde intramedullary nail and AN overlapping lateral locking plate.  She has had a delayed course in terms of mobilization due to deconditioning.  Upon starting to weight-bear just a few weeks ago with physical therapy she has had intense right knee pain.  At last visit I recommended a CT scan to better evaluate for bone healing as looks like there is some evidence of callus formation but certainly the fracture had not healed totally    There returns today to review the CT scan.  CT scan shows that there is no bridging callus through the majority the fracture sites.  At this point she is nearly 5 months out which is consistent with a trophic delayed union.  Obviously given her deconditioning and overall health status, trying avoid surgery if possible.  I recommended a bone stimulator which will help coordinate.  Placed a referral and the vendor will help get this set up for her at her transitional care unit.      I spent 40 minutes on this encounter including chart review history and physical exam counseling the patient and her daughter today during the visit.    Newton Lemon MD  Orthopedic Spine Surgeon  , Department of Orthopedic Surgery    Answers for HPI/ROS submitted by the patient on 7/26/2023  General Symptoms: No  Skin Symptoms: Yes  HENT Symptoms: No  EYE SYMPTOMS: Yes  HEART SYMPTOMS: Yes  LUNG SYMPTOMS: Yes  INTESTINAL SYMPTOMS: No  URINARY SYMPTOMS: No  GYNECOLOGIC SYMPTOMS: No  BREAST SYMPTOMS: No  SKELETAL SYMPTOMS: Yes  BLOOD SYMPTOMS: No  NERVOUS SYSTEM SYMPTOMS: No  MENTAL HEALTH SYMPTOMS: Yes  Changes in hair: No  Changes in moles/birth marks: No  Itching: Yes  Rashes: Yes  Changes in nails: No  Acne: No  Hair in places you  don't want it: No  Change in facial hair: No  Warts: No  Non-healing sores: No  Scarring: No  Flaking of skin: No  Color changes of hands/feet in cold : No  Sun sensitivity: No  Skin thickening: No  Eye pain: Yes  Vision loss: No  Dry eyes: Yes  Watery eyes: No  Eye bulging: No  Double vision: No  Flashing of lights: No  Spots: No  Floaters: Yes  Redness: No  Crossed eyes: No  Tunnel Vision: No  Yellowing of eyes: No  Eye irritation: Yes  Chest pain or pressure: No  Fast or irregular heartbeat: No  Pain in legs with walking: Yes  Trouble breathing while lying down: No  Fingers or toes appear blue: No  High blood pressure: No  Low blood pressure: No  Fainting: No  Murmurs: No  Pacemaker: No  Varicose veins: No  Edema or swelling: No  Wake up at night with shortness of breath: No  Light-headedness: No  Exercise intolerance: No  Cough: Yes  Sputum or phlegm: Yes  Coughing up blood: No  Difficulty breating or shortness of breath: Yes  Snoring: No  Wheezing: No  Difficulty breathing on exertion: No  Nighttime Cough: Yes  Difficulty breathing when lying flat: No  Back pain: No  Muscle aches: Yes  Neck pain: No  Swollen joints: Yes  Joint pain: Yes  Bone pain: Yes  Muscle cramps: No  Muscle weakness: Yes  Joint stiffness: Yes  Bone fracture: Yes  Nervous or Anxious: Yes  Depression: Yes  Trouble sleeping: No  Trouble thinking or concentrating: No  Mood changes: Yes  Panic attacks: Yes

## 2023-07-31 ENCOUNTER — TRANSITIONAL CARE UNIT VISIT (OUTPATIENT)
Dept: GERIATRICS | Facility: CLINIC | Age: 88
End: 2023-07-31
Payer: COMMERCIAL

## 2023-07-31 VITALS
RESPIRATION RATE: 18 BRPM | WEIGHT: 244.4 LBS | BODY MASS INDEX: 40.72 KG/M2 | DIASTOLIC BLOOD PRESSURE: 77 MMHG | HEIGHT: 65 IN | OXYGEN SATURATION: 93 % | HEART RATE: 81 BPM | SYSTOLIC BLOOD PRESSURE: 129 MMHG | TEMPERATURE: 97.3 F

## 2023-07-31 DIAGNOSIS — F41.9 ANXIETY: ICD-10-CM

## 2023-07-31 DIAGNOSIS — Z96.649 PERIPROSTHETIC FRACTURE OF SHAFT OF FEMUR: ICD-10-CM

## 2023-07-31 DIAGNOSIS — R26.2 DIFFICULTY WALKING: ICD-10-CM

## 2023-07-31 DIAGNOSIS — M79.604 PAIN OF RIGHT LOWER EXTREMITY: ICD-10-CM

## 2023-07-31 DIAGNOSIS — M97.8XXA PERIPROSTHETIC FRACTURE OF SHAFT OF FEMUR: ICD-10-CM

## 2023-07-31 DIAGNOSIS — M62.81 MUSCLE WEAKNESS (GENERALIZED): ICD-10-CM

## 2023-07-31 DIAGNOSIS — M25.551 HIP PAIN, RIGHT: ICD-10-CM

## 2023-07-31 DIAGNOSIS — Z47.89 AFTERCARE FOLLOWING SURGERY OF THE MUSCULOSKELETAL SYSTEM: Primary | ICD-10-CM

## 2023-07-31 PROCEDURE — 99309 SBSQ NF CARE MODERATE MDM 30: CPT | Performed by: NURSE PRACTITIONER

## 2023-07-31 NOTE — LETTER
"    7/31/2023        RE: Carol Merida  [address]  [place] [address]        [facility]    Chief Complaint   Patient presents with     RECHECK     HPI:  Carol Merida is a 88 year old  (1935), who is being seen today for an episodic care visit at: [facility] ([facility]) [[id number]].     Patient seen today in [facility] for recheck of multiple acute and chronic medical issues:    1. Aftercare following surgery of the musculoskeletal system    2. Pain of right lower extremity    3. Hip pain, right    4. Periprosthetic fracture of shaft of femur    5. Muscle weakness (generalized)    6. Difficulty walking    7. Anxiety      Per nursing and patient today patient f/w orthopedic surgeon 7/26. Per CT results noted to have no bridge callus through majority of fracture sites in RLE- consistent with a trophic delayed healing. Bone stimulatory recommended for 3 month period and ordered per ortho. Patient wondering where it is and when this will start. Ortho also discontinued the oxycodone. Patient also requesting new earlene -w/c and work to refamiliarize with scooter use.     VS, weights and NN reviewed in facility EMR today and are stable.     Today patient is found seated in w/c in room. Reports anxiety and stress r/t wait for discharge as MA pending and worries about assistance with cares/ADLs and wondering where bone stimulator is. Writer ensured patient that order for bone stimulatory was sent over per ortho and staff will call to check on status. Reports some discomfort yet to RLE but reports current med regimen is adequately relieving pain.    Allergies, and PMH/PSH reviewed in EPIC today.  REVIEW OF SYSTEMS:  4 point ROS including Respiratory, CV, GI and , other than that noted in the HPI,  is negative    Objective:   /77   Pulse 81   Temp 97.3  F (36.3  C)   Resp 18   Ht 1.651 m (5' 5\")   Wt 110.9 kg (244 lb 6.4 oz)   LMP  (LMP Unknown)   SpO2 93%   BMI 40.67 kg/m      Resp: " Effort WNL, LSCTA   CV: VS as above, trace bilateral ankle edema noted  Abd- soft, nontender, BS +  Musc- LAI- in w/c  Psych- alert, slight anxious      Recent labs in EPIC reviewed by me today.     Assessment/Plan:  Aftercare following surgery of the musculoskeletal system  Pain of right lower extremity  Hip pain, right  Periprosthetic fracture of shaft of femur  Muscle weakness (generalized)  Difficulty walking  -s/p ORIF endoprosthetic femur fracture 2/26/23. Ongoing progressive RLE pain and weakness. CT completed and reviewed by ortho = findings notable for delayed healing. Bone stim recommended and ordered  - order printed and writer requested facility staff check with DME company and ortho to discover status  - OT referral made for eval/tx for bariatric w/c and work on scooter use.   - oxycodone discontinued per ortho. Pain to REL persists but adequately treated with gabapentin, acetaminophen prn celebrex and atarax  - f/w ortho as directed    Anxiety  - acute on chronic, patient has visited with ACP in the past. Frustrated by discharge delay 2/2 MA pending and daily issues.  - continue supportive approach  - continue on Buspar, fluoxetine and prn atarax  - monitor mood and behaviors        Orders:  Written on the unit and discussed with patient and nursing    Electronically signed by: BEATRICE Mike CNP         Sincerely,        BEATRICE Mike CNP

## 2023-07-31 NOTE — PROGRESS NOTES
"[facility]    Chief Complaint   Patient presents with    RECHECK     HPI:  Carol Merida is a 88 year old  (1935), who is being seen today for an episodic care visit at: [facility] ([facility]) [[id number]].     Patient seen today in TCU for recheck of multiple acute and chronic medical issues:    1. Aftercare following surgery of the musculoskeletal system    2. Pain of right lower extremity    3. Hip pain, right    4. Periprosthetic fracture of shaft of femur    5. Muscle weakness (generalized)    6. Difficulty walking    7. Anxiety      Per nursing and patient today patient f/w orthopedic surgeon 7/26. Per CT results noted to have no bridge callus through majority of fracture sites in RLE- consistent with a trophic delayed healing. Bone stimulatory recommended for 3 month period and ordered per ortho. Patient wondering where it is and when this will start. Ortho also discontinued the oxycodone. Patient also requesting new earlene -w/c and work to refamiliarize with scooter use.     VS, weights and NN reviewed in facility EMR today and are stable.     Today patient is found seated in w/c in room. Reports anxiety and stress r/t wait for discharge as MA pending and worries about assistance with cares/ADLs and wondering where bone stimulator is. Writer ensured patient that order for bone stimulatory was sent over per ortho and staff will call to check on status. Reports some discomfort yet to RLE but reports current med regimen is adequately relieving pain.    Allergies, and PMH/PSH reviewed in EPIC today.  REVIEW OF SYSTEMS:  4 point ROS including Respiratory, CV, GI and , other than that noted in the HPI,  is negative    Objective:   /77   Pulse 81   Temp 97.3  F (36.3  C)   Resp 18   Ht 1.651 m (5' 5\")   Wt 110.9 kg (244 lb 6.4 oz)   LMP  (LMP Unknown)   SpO2 93%   BMI 40.67 kg/m      Resp: Effort WNL, LSCTA   CV: VS as above, trace bilateral ankle edema noted  Abd- soft, nontender, " BS +  Musc- LAI- in w/c  Psych- alert, slight anxious      Recent labs in EPIC reviewed by me today.     Assessment/Plan:  Aftercare following surgery of the musculoskeletal system  Pain of right lower extremity  Hip pain, right  Periprosthetic fracture of shaft of femur  Muscle weakness (generalized)  Difficulty walking  -s/p ORIF endoprosthetic femur fracture 2/26/23. Ongoing progressive RLE pain and weakness. CT completed and reviewed by ortho = findings notable for delayed healing. Bone stim recommended and ordered  - order printed and writer requested facility staff check with DME company and ortho to discover status  - OT referral made for eval/tx for bariatric w/c and work on scooter use.   - oxycodone discontinued per ortho. Pain to REL persists but adequately treated with gabapentin, acetaminophen prn celebrex and atarax  - f/w ortho as directed    Anxiety  - acute on chronic, patient has visited with ACP in the past. Frustrated by discharge delay 2/2 MA pending and daily issues.  - continue supportive approach  - continue on Buspar, fluoxetine and prn atarax  - monitor mood and behaviors        Orders:  Written on the unit and discussed with patient and nursing    Electronically signed by: BEATRICE Mike CNP

## 2023-08-01 ENCOUNTER — TELEPHONE (OUTPATIENT)
Dept: ORTHOPEDICS | Facility: CLINIC | Age: 88
End: 2023-08-01
Payer: COMMERCIAL

## 2023-08-01 DIAGNOSIS — S72.301G DELAYED UNION OF CLOSED FRACTURE OF FEMORAL SHAFT, RIGHT: Primary | ICD-10-CM

## 2023-08-01 DIAGNOSIS — M85.80 OSTEOPENIA: ICD-10-CM

## 2023-08-01 NOTE — TELEPHONE ENCOUNTER
RN called Ruby back to let her know we are waiting for provider signature prior to faxing the OrthoFix order. Patient will then be contacted to arrange for the device.     Rahel Mckeon RN

## 2023-08-01 NOTE — TELEPHONE ENCOUNTER
[facility] Call Center    Phone Message:    Please CALL >>    Ruby  [phone]  [facility]    >> to discuss pt's bone stimulator, or answer questions in regard. Thank you.    Reason for Call: Other: Bone Stimulator Inquiry     Action Taken: Message routed to:  [facility] ([facility]): Team    Travel Screening: Not Applicable

## 2023-08-02 NOTE — TELEPHONE ENCOUNTER
RN attempted to call Ruby at [facility] to discuss Orthofix order.  Dr. Lemon did sign Orthofix order and this information was faxed over to [facility] [place].  Should they have further questions they can feel free to reach out to our orthopedic clinic at any moment.    Rahel Mckeon RN

## 2023-08-14 ENCOUNTER — DISCHARGE SUMMARY NURSING HOME (OUTPATIENT)
Dept: GERIATRICS | Facility: CLINIC | Age: 88
End: 2023-08-14
Payer: COMMERCIAL

## 2023-08-14 VITALS
SYSTOLIC BLOOD PRESSURE: 136 MMHG | WEIGHT: 241 LBS | OXYGEN SATURATION: 91 % | BODY MASS INDEX: 40.15 KG/M2 | TEMPERATURE: 97.6 F | RESPIRATION RATE: 18 BRPM | DIASTOLIC BLOOD PRESSURE: 81 MMHG | HEIGHT: 65 IN | HEART RATE: 83 BPM

## 2023-08-14 DIAGNOSIS — F41.9 ANXIETY AND DEPRESSION: ICD-10-CM

## 2023-08-14 DIAGNOSIS — M79.604 PAIN OF RIGHT LOWER EXTREMITY: ICD-10-CM

## 2023-08-14 DIAGNOSIS — E78.5 HYPERLIPIDEMIA, UNSPECIFIED HYPERLIPIDEMIA TYPE: ICD-10-CM

## 2023-08-14 DIAGNOSIS — I10 PRIMARY HYPERTENSION: ICD-10-CM

## 2023-08-14 DIAGNOSIS — Z47.89 AFTERCARE FOLLOWING SURGERY OF THE MUSCULOSKELETAL SYSTEM: Primary | ICD-10-CM

## 2023-08-14 DIAGNOSIS — I50.32 CHRONIC HEART FAILURE WITH PRESERVED EJECTION FRACTION (H): ICD-10-CM

## 2023-08-14 DIAGNOSIS — D62 ANEMIA DUE TO BLOOD LOSS, ACUTE: ICD-10-CM

## 2023-08-14 DIAGNOSIS — Z96.649 PERIPROSTHETIC FRACTURE OF SHAFT OF FEMUR: ICD-10-CM

## 2023-08-14 DIAGNOSIS — R26.2 DIFFICULTY WALKING: ICD-10-CM

## 2023-08-14 DIAGNOSIS — M97.8XXA PERIPROSTHETIC FRACTURE OF SHAFT OF FEMUR: ICD-10-CM

## 2023-08-14 DIAGNOSIS — R05.3 CHRONIC COUGH: ICD-10-CM

## 2023-08-14 DIAGNOSIS — G89.4 CHRONIC PAIN SYNDROME: ICD-10-CM

## 2023-08-14 DIAGNOSIS — F32.A ANXIETY AND DEPRESSION: ICD-10-CM

## 2023-08-14 DIAGNOSIS — M62.81 MUSCLE WEAKNESS (GENERALIZED): ICD-10-CM

## 2023-08-14 PROCEDURE — 99316 NF DSCHRG MGMT 30 MIN+: CPT | Performed by: NURSE PRACTITIONER

## 2023-08-14 NOTE — LETTER
8/14/2023        RE: Carol Merida  [address]  [place] [address]        [facility] GERIATRICS DISCHARGE SUMMARY  PATIENT'S NAME: Carol Merida  YOB: 1935  MEDICAL RECORD NUMBER:  [id number]  Place of Service where encounter took place:  [facility]) [[id number]]    PRIMARY CARE PROVIDER AND CLINIC RESPONSIBLE AFTER TRANSFER:   [facility] [place], [address] / [place] [address]    [facility]     Transferring providers:  eBatriz BARRON CNP; Jasmina Thomas MD  Recent Hospitalization/ED:  [facility] stay 2/26/23 to 3/6/23.  Date of SNF Admission: March 06, 2023  Date of SNF (anticipated) Discharge: August 14, 2023  Discharged to: A/L  Cognitive Scores: SLUMS 28/30  Physical Function: Independent with eating, upper body dressing,mod I with lower body dressing. Assist with toileting. Transfers with 4WW independently. W/C for most mobility      CODE STATUS/ADVANCE DIRECTIVES DISCUSSION:  FULL CODE  ALLERGIES: Ace inhibitors    NURSING FACILITY COURSE   Medication Changes/Rationale:   As outlined    Summary of nursing facility stay:     Admitted following hospitalization for mechanical fall and right periprosthetic femur fracture s/p ORIF 2/26. Developed postop ileus which resolved with NGT decompression. Postop acute blood loss anemia noted and development of hematoma to operative leg. HGB remained stable 8s.     Completed acute rehab. Made some functional gains stalled by development of right lateral leg pain. Multiple x-rays and US negative. Ultimately 7/26 CT revealed no bridge callus through majority of fracture sites in RLE- consistent with delayed healing of femur fracture. Ortho has ordered 3 months use of bone stimulator. This will be delivered to patient in her new A/L.     PMH notable for diastolic dysfunction, grade 1, HTN, HLD, hypothyroidism, anxiety/depression and  osteoarthritis.     Issues addressed in TCU:      Aftercare following surgery of the musculoskeletal system  Pain of right lower extremity  Periprosthetic fracture of shaft of femur  Muscle weakness (generalized)  Difficulty walking  - s/p ORIF 2/26. Has f/w ortho regularly. Delayed healing. Bone stimulator 3 month course per ortho. To be delivered to A/L. Discharge functional status as above. Mainly w/c bound. Is to WBAT minimally and mainly for transfers or very short distances on RLE per ortho. Also uses scooter. Oxycodone discontinued per ortho. To f/w ortho as directed  - home PT/OT  Chronic pain syndrome - hips  - chronic, many aches and pains 2/2 osteoarthritis, spinal stenosis and weakness. Continues on gabapentin, acetaminophen, prn celebrex and atarax    Anemia due to blood loss, acute  - postop, in 8s inpatient. Last HGB 13.8 7/6  RESOLVED    Chronic heart failure with preserved ejection fraction (H)  EF > 70%  - compensated. Resp status stable. Occasional trace ankle edema. Weights stable.    Primary hypertension  - chronic, variable but generally well controlled. Amlodipine was stopped inpatient  - ongoing monitoring   Hyperlipidemia, unspecified hyperlipidemia type  - statin stopped inpatient 2/2 transient elevation LFTs  - not resumed in TCU.     Chronic cough  - chronic, with occasional wheezing noted. Resp status stable of late.   - continues on longstanding fluticasone-salmeterol and benzonatate  - guaifenesin added  - albuterol prn      Anxiety and depression  - chronic, severe situational anxiety and some mood lability.   - continues on PTA Buspar and fluoxetine, as well as prn atarax    Hypothyroidism  No recent TSH available  - continues on levothyroxine, no noted active s/s of issue    Discharge Medications:  MED REC REQUIRED    Post Medication Reconciliation Status: discharge medications reconciled and changed, per note/orders         Current Outpatient Medications   Medication Sig Dispense  Refill     ACE/ARB/ARNI NOT PRESCRIBED (INTENTIONAL) Please choose reason not prescribed, below       acetaminophen (TYLENOL) 325 MG tablet Take 650mg Q 8 hours scheduled and 650mg BID PRN(6 hours apart from scheduled dose).       albuterol (PROAIR HFA/PROVENTIL HFA/VENTOLIN HFA) 108 (90 Base) MCG/ACT inhaler INHALE 2 PUFFS INTO THE LUNGS EVERY 4 HOURS AS NEEDED FOR SHORTNESS OF BREATH OR DIFFICULT BREATHING OR WHEEZING 8.5 g 0     albuterol (PROVENTIL) (2.5 MG/3ML) 0.083% neb solution Take 1 vial (2.5 mg) by nebulization every 4 hours as needed for shortness of breath, wheezing or cough And BID 90 mL      artificial saliva (BIOTENE MT) SOLN solution Swish and spit 2 mLs (2 sprays) in mouth 4 times daily       benzocaine-menthol (CHLORASEPTIC MAX) 15-10 MG lozenge Place 1 lozenge inside cheek every hour as needed       benzonatate (TESSALON) 100 MG capsule Take 1 capsule (100 mg) by mouth 3 times daily as needed for cough       BETA BLOCKER NOT PRESCRIBED (INTENTIONAL) Beta Blocker not prescribed intentionally due to EF > 40 % (ejection fraction) will leave up to Cardiology.       busPIRone (BUSPAR) 10 MG tablet Take 1 tablet (10 mg) by mouth 2 times daily 180 tablet 3     calcium carbonate 600 mg-vitamin D 400 units (CALTRATE) 600-400 MG-UNIT per tablet Take 1 tablet by mouth every evening  100 tablet 3     celecoxib (CELEBREX) 100 MG capsule Take 100 mg by mouth 2 times daily as needed for moderate pain (4-6)       clotrimazole (LOTRIMIN) 1 % external cream Apply topically 2 times daily 28 g 0     FLUoxetine (PROZAC) 20 MG capsule Take 3 capsules (60 mg) by mouth daily 240 capsule 3     fluticasone-salmeterol (WIXELA INHUB) 100-50 MCG/ACT inhaler Inhale 1 puff into the lungs 2 times daily 60 each 5     gabapentin (NEURONTIN) 300 MG capsule Take 1 capsule (300 mg) by mouth 2 times daily       gabapentin (NEURONTIN) 600 MG tablet Take 1 tablet (600 mg) by mouth At Bedtime       hydrOXYzine (ATARAX) 25 MG tablet Take  "0.5 tablets (12.5 mg) by mouth daily as needed for itching And TID prn       levothyroxine (SYNTHROID/LEVOTHROID) 88 MCG tablet Take 1 tablet (88 mcg) by mouth daily 90 tablet 0     melatonin 3 MG tablet Take 3 mg by mouth At Bedtime       nystatin (MYCOSTATIN) 101789 UNIT/GM external cream        nystatin (NYSTOP) 475290 UNIT/GM external powder Apply tid to affectead area prn 60 g 3     olopatadine (PATANOL) 0.1 % ophthalmic solution Place 1 drop into both eyes 2 times daily as needed for allergies       polyethylene glycol (MIRALAX) 17 g packet Take 17 g by mouth daily And repeat daily prn constipation       PseudoePHEDrine-guaiFENesin 120-1200 MG TB12 Take 1 tablet by mouth 2 times daily            Controlled medications:   not applicable/none     Past Medical History:   Past Medical History:   Diagnosis Date     Abnormal stress test     small area on stress thalium ?2003; but normal angiogram 2012     Anemia      Anxiety      Cardiac dysrhythmia, unspecified     irregular heartbeat     CHF (congestive heart failure) (H) 6/18/2018     PETTY (dyspnea on exertion)      Hyperlipidemia LDL goal <100 2/10/2010     Hypertension goal BP (blood pressure) < 140/90 7/18/2010     Hypothyroid      Malignant 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     Melanoma of skin, site unspecified      NONSPECIFIC MEDICAL HISTORY 04    fx fifth finger right hand     Obstructive sleep apnea (adult) (pediatric) 8/25/2006    CPAP      Osteoarthritis      Other chronic pain     Joint pain for many years.     Other osteoporosis      PONV (postoperative nausea and vomiting)      Tubular adenoma in colon 9/01    colonoscopy due 2004     Physical Exam:   Vitals: /81   Pulse 83   Temp 97.6  F (36.4  C)   Resp 18   Ht 1.651 m (5' 5\")   Wt 109.3 kg (241 lb)   LMP  (LMP Unknown)   SpO2 91%   BMI 40.10 kg/m    BMI: Body mass index is 40.1 kg/m .  Resp: " Effort WNL  CV: VS as above  Abd- soft, nontender, BS +  Musc- LAI  Psych- alert, slight anxious       SNF labs: Most Recent 3 CBC's:  Recent Labs   Lab Test 07/06/23  1624 05/04/23  0839 04/13/23  0726   WBC 7.1 5.9 5.9   HGB 13.6 11.8 11.4*   MCV 88 90 94    278 281     Most Recent 3 BMP's:  Recent Labs   Lab Test 07/06/23  1624 04/13/23  0726 03/23/23  0627    140 141   POTASSIUM 4.6 4.0 4.4   CHLORIDE 100 101 100   CO2 25 25 29   BUN 12.8 10.6 15.4   CR 0.58 0.66 0.62   ANIONGAP 12 14 12   LAKSHMI 9.5 9.2 8.9   * 97 108*       DISCHARGE PLAN:  Follow up labs: No labs orders/due  Medical Follow Up:      Follow up with primary care provider in 1 weeks  Follow up with specialist as above     Discharge Services: Home Care:  Occupational Therapy and Physical Therapy    TOTAL DISCHARGE TIME:   Greater than 30 minutes  Electronically signed by:  BEATRICE Mike CNP     Documentation of Face to Face and Certification for Home Health Services    I certify that patient: Carol Merida is under my care and that I, or a nurse practitioner or physician's assistant working with me, had a face-to-face encounter that meets the physician face-to-face encounter requirements with this patient on: 8/14/2023.    This encounter with the patient was in whole, or in part, for the following medical condition, which is the primary reason for home health care: as above.    I certify that, based on my findings, the following services are medically necessary home health services: Occupational Therapy and Physical Therapy.    My clinical findings support the need for the above services because: Occupational Therapy Services are needed to assess and treat cognitive ability and address ADL safety due to impairment in difficulty with MRADLs. and Physical Therapy Services are needed to assess and treat the following functional impairments: difficulty walking.    Further, I certify that my clinical findings support  that this patient is homebound (i.e. absences from home require considerable and taxing effort and are for medical reasons or [employer] or infrequently or of short duration when for other reasons) because: Requires assistance of another person or specialized equipment to access medical services because patient: Requires supervision of another for safe transfer...    Based on the above findings. I certify that this patient is confined to the home and needs intermittent skilled nursing care, physical therapy and/or speech therapy.  The patient is under my care, and I have initiated the establishment of the plan of care.  This patient will be followed by a physician who will periodically review the plan of care.  Physician/Provider to provide follow up care: [facility] - [place] [facility] physician (the Medicare certified PECOS provider): BEATRICE Mike CNP  Physician Signature: See electronic signature associated with these discharge orders.  Date: 8/14/2023               Sincerely,        BEATRICE Mike CNP

## 2023-08-14 NOTE — PROGRESS NOTES
[facility] GERIATRICS DISCHARGE SUMMARY  PATIENT'S NAME: Carol Merida  YOB: 1935  MEDICAL RECORD NUMBER:  [id number]  Place of Service where encounter took place:  [facility] (TCU) [[id number]]    PRIMARY CARE PROVIDER AND CLINIC RESPONSIBLE AFTER TRANSFER:   [facility] [place], [address] / [place] [address]    [facility] Provider     Transferring providers:  Beatriz BARRON CNP; Jasmina Thomas MD  Recent Hospitalization/ED:  Hospital  [facility] stay 2/26/23 to 3/6/23.  Date of SNF Admission: March 06, 2023  Date of SNF (anticipated) Discharge: August 14, 2023  Discharged to: A/L  Cognitive Scores: SLUMS 28/30  Physical Function: Independent with eating, upper body dressing,mod I with lower body dressing. Assist with toileting. Transfers with 4WW independently. W/C for most mobility      CODE STATUS/ADVANCE DIRECTIVES DISCUSSION:  FULL CODE  ALLERGIES: Ace inhibitors    NURSING FACILITY COURSE   Medication Changes/Rationale:   As outlined    Summary of nursing facility stay:     Admitted following hospitalization for mechanical fall and right periprosthetic femur fracture s/p ORIF 2/26. Developed postop ileus which resolved with NGT decompression. Postop acute blood loss anemia noted and development of hematoma to operative leg. HGB remained stable 8s.     Completed acute rehab. Made some functional gains stalled by development of right lateral leg pain. Multiple x-rays and US negative. Ultimately 7/26 CT revealed no bridge callus through majority of fracture sites in RLE- consistent with delayed healing of femur fracture. Ortho has ordered 3 months use of bone stimulator. This will be delivered to patient in her new A/L.     PMH notable for diastolic dysfunction, grade 1, HTN, HLD, hypothyroidism, anxiety/depression and osteoarthritis.     Issues addressed in TCU:      Aftercare following surgery of the  musculoskeletal system  Pain of right lower extremity  Periprosthetic fracture of shaft of femur  Muscle weakness (generalized)  Difficulty walking  - s/p ORIF 2/26. Has f/w ortho regularly. Delayed healing. Bone stimulator 3 month course per ortho. To be delivered to A/L. Discharge functional status as above. Mainly w/c bound. Is to WBAT minimally and mainly for transfers or very short distances on RLE per ortho. Also uses scooter. Oxycodone discontinued per ortho. To f/w ortho as directed  - home PT/OT  Chronic pain syndrome - hips  - chronic, many aches and pains 2/2 osteoarthritis, spinal stenosis and weakness. Continues on gabapentin, acetaminophen, prn celebrex and atarax    Anemia due to blood loss, acute  - postop, in 8s inpatient. Last HGB 13.8 7/6  RESOLVED    Chronic heart failure with preserved ejection fraction (H)  EF > 70%  - compensated. Resp status stable. Occasional trace ankle edema. Weights stable.    Primary hypertension  - chronic, variable but generally well controlled. Amlodipine was stopped inpatient  - ongoing monitoring   Hyperlipidemia, unspecified hyperlipidemia type  - statin stopped inpatient 2/2 transient elevation LFTs  - not resumed in TCU.     Chronic cough  - chronic, with occasional wheezing noted. Resp status stable of late.   - continues on longstanding fluticasone-salmeterol and benzonatate  - guaifenesin added  - albuterol prn      Anxiety and depression  - chronic, severe situational anxiety and some mood lability.   - continues on PTA Buspar and fluoxetine, as well as prn atarax    Hypothyroidism  No recent TSH available  - continues on levothyroxine, no noted active s/s of issue    Discharge Medications:  MED REC REQUIRED    Post Medication Reconciliation Status: discharge medications reconciled and changed, per note/orders         Current Outpatient Medications   Medication Sig Dispense Refill    ACE/ARB/ARNI NOT PRESCRIBED (INTENTIONAL) Please choose reason not  prescribed, below      acetaminophen (TYLENOL) 325 MG tablet Take 650mg Q 8 hours scheduled and 650mg BID PRN(6 hours apart from scheduled dose).      albuterol (PROAIR HFA/PROVENTIL HFA/VENTOLIN HFA) 108 (90 Base) MCG/ACT inhaler INHALE 2 PUFFS INTO THE LUNGS EVERY 4 HOURS AS NEEDED FOR SHORTNESS OF BREATH OR DIFFICULT BREATHING OR WHEEZING 8.5 g 0    albuterol (PROVENTIL) (2.5 MG/3ML) 0.083% neb solution Take 1 vial (2.5 mg) by nebulization every 4 hours as needed for shortness of breath, wheezing or cough And BID 90 mL     artificial saliva (BIOTENE MT) SOLN solution Swish and spit 2 mLs (2 sprays) in mouth 4 times daily      benzocaine-menthol (CHLORASEPTIC MAX) 15-10 MG lozenge Place 1 lozenge inside cheek every hour as needed      benzonatate (TESSALON) 100 MG capsule Take 1 capsule (100 mg) by mouth 3 times daily as needed for cough      BETA BLOCKER NOT PRESCRIBED (INTENTIONAL) Beta Blocker not prescribed intentionally due to EF > 40 % (ejection fraction) will leave up to Cardiology.      busPIRone (BUSPAR) 10 MG tablet Take 1 tablet (10 mg) by mouth 2 times daily 180 tablet 3    calcium carbonate 600 mg-vitamin D 400 units (CALTRATE) 600-400 MG-UNIT per tablet Take 1 tablet by mouth every evening  100 tablet 3    celecoxib (CELEBREX) 100 MG capsule Take 100 mg by mouth 2 times daily as needed for moderate pain (4-6)      clotrimazole (LOTRIMIN) 1 % external cream Apply topically 2 times daily 28 g 0    FLUoxetine (PROZAC) 20 MG capsule Take 3 capsules (60 mg) by mouth daily 240 capsule 3    fluticasone-salmeterol (WIXELA INHUB) 100-50 MCG/ACT inhaler Inhale 1 puff into the lungs 2 times daily 60 each 5    gabapentin (NEURONTIN) 300 MG capsule Take 1 capsule (300 mg) by mouth 2 times daily      gabapentin (NEURONTIN) 600 MG tablet Take 1 tablet (600 mg) by mouth At Bedtime      hydrOXYzine (ATARAX) 25 MG tablet Take 0.5 tablets (12.5 mg) by mouth daily as needed for itching And TID prn      levothyroxine  "(SYNTHROID/LEVOTHROID) 88 MCG tablet Take 1 tablet (88 mcg) by mouth daily 90 tablet 0    melatonin 3 MG tablet Take 3 mg by mouth At Bedtime      nystatin (MYCOSTATIN) 534096 UNIT/GM external cream       nystatin (NYSTOP) 361996 UNIT/GM external powder Apply tid to affectead area prn 60 g 3    olopatadine (PATANOL) 0.1 % ophthalmic solution Place 1 drop into both eyes 2 times daily as needed for allergies      polyethylene glycol (MIRALAX) 17 g packet Take 17 g by mouth daily And repeat daily prn constipation      PseudoePHEDrine-guaiFENesin 120-1200 MG TB12 Take 1 tablet by mouth 2 times daily            Controlled medications:   not applicable/none     Past Medical History:   Past Medical History:   Diagnosis Date    Abnormal stress test     small area on stress thalium ?2003; but normal angiogram 2012    Anemia     Anxiety     Cardiac dysrhythmia, unspecified     irregular heartbeat    CHF (congestive heart failure) (H) 6/18/2018    PETTY (dyspnea on exertion)     Hyperlipidemia LDL goal <100 2/10/2010    Hypertension goal BP (blood pressure) < 140/90 7/18/2010    Hypothyroid     Malignant neoplasm of breast (female), unspecified site 2005    infltrating ductal    MEDICAL HISTORY OF -     fx humerus Sept 2013.    Melanoma of skin, site unspecified     NONSPECIFIC MEDICAL HISTORY 04    fx fifth finger right hand    Obstructive sleep apnea (adult) (pediatric) 8/25/2006    CPAP     Osteoarthritis     Other chronic pain     Joint pain for many years.    Other osteoporosis     PONV (postoperative nausea and vomiting)     Tubular adenoma in colon 9/01    colonoscopy due 2004     Physical Exam:   Vitals: /81   Pulse 83   Temp 97.6  F (36.4  C)   Resp 18   Ht 1.651 m (5' 5\")   Wt 109.3 kg (241 lb)   LMP  (LMP Unknown)   SpO2 91%   BMI 40.10 kg/m    BMI: Body mass index is 40.1 kg/m .  Resp: Effort WNL  CV: VS as above  Abd- soft, nontender, BS +  Musc- LAI  Psych- alert, slight anxious       SNF labs: Most " Recent 3 CBC's:  Recent Labs   Lab Test 07/06/23  1624 05/04/23  0839 04/13/23  0726   WBC 7.1 5.9 5.9   HGB 13.6 11.8 11.4*   MCV 88 90 94    278 281     Most Recent 3 BMP's:  Recent Labs   Lab Test 07/06/23  1624 04/13/23  0726 03/23/23  0627    140 141   POTASSIUM 4.6 4.0 4.4   CHLORIDE 100 101 100   CO2 25 25 29   BUN 12.8 10.6 15.4   CR 0.58 0.66 0.62   ANIONGAP 12 14 12   LAKSHMI 9.5 9.2 8.9   * 97 108*       DISCHARGE PLAN:  Follow up labs: No labs orders/due  Medical Follow Up:      Follow up with primary care provider in 1 weeks  Follow up with specialist as above     Discharge Services: Home Care:  Occupational Therapy and Physical Therapy    TOTAL DISCHARGE TIME:   Greater than 30 minutes  Electronically signed by:  BEATRICE Mike CNP     Documentation of Face to Face and Certification for Home Health Services    I certify that patient: Carol Merida is under my care and that I, or a nurse practitioner or physician's assistant working with me, had a face-to-face encounter that meets the physician face-to-face encounter requirements with this patient on: 8/14/2023.    This encounter with the patient was in whole, or in part, for the following medical condition, which is the primary reason for home health care: as above.    I certify that, based on my findings, the following services are medically necessary home health services: Occupational Therapy and Physical Therapy.    My clinical findings support the need for the above services because: Occupational Therapy Services are needed to assess and treat cognitive ability and address ADL safety due to impairment in difficulty with MRADLs. and Physical Therapy Services are needed to assess and treat the following functional impairments: difficulty walking.    Further, I certify that my clinical findings support that this patient is homebound (i.e. absences from home require considerable and taxing effort and are for medical  reasons or [employer] services or infrequently or of short duration when for other reasons) because: Requires assistance of another person or specialized equipment to access medical services because patient: Requires supervision of another for safe transfer...    Based on the above findings. I certify that this patient is confined to the home and needs intermittent skilled nursing care, physical therapy and/or speech therapy.  The patient is under my care, and I have initiated the establishment of the plan of care.  This patient will be followed by a physician who will periodically review the plan of care.  Physician/Provider to provide follow up care: [facility]va - ANTONY [facility]ade[facility] physician (the Medicare certified PECOS provider): BEATRICE Mike CNP  Physician Signature: See electronic signature associated with these discharge orders.  Date: 8/14/2023

## 2023-08-16 ENCOUNTER — TELEPHONE (OUTPATIENT)
Dept: PEDIATRICS | Facility: CLINIC | Age: 88
End: 2023-08-16
Payer: COMMERCIAL

## 2023-08-16 RX ORDER — GUAIFENESIN 1200 MG/1
1200 TABLET, EXTENDED RELEASE ORAL EVERY 12 HOURS
Status: ON HOLD
Start: 2023-08-16 | End: 2024-06-20

## 2023-08-16 NOTE — TELEPHONE ENCOUNTER
Francy is calling from [facility] ([phone] to )  Patient is discharging from TCU today-will Dr. Cutler follow patient and sign orders for ongoing home care?  Will discuss with Dr. Cutler as she is no longer listed as PCP.   Last seen by Dr. Cutler on 11/04/21.  If so, may we use that provider approval time on 08/24 to schedule a TCU follow-up appointment?     Thank you.  ROSEANNE Rosenbaum RN

## 2023-08-16 NOTE — TELEPHONE ENCOUNTER
Per routing comment-  Yes I can manage home care orders for her.  Stella Cutler MD  Internal Medicine - Pediatrics   LMTCB for [facility].  ROSEANNE Rosenbaum RN

## 2023-08-17 NOTE — TELEPHONE ENCOUNTER
[facility]  [email or web address]  [address], [place] [address]   ~4.6 mi  [phone]    I called this office and advised them that Dr. Cutler is willing to manage home care orders for this patient.  No further questions.  Will call back if any other questions or concerns, and after they see the patient.  ROSEANNE Rosenbaum RN

## 2023-08-18 ENCOUNTER — TELEPHONE (OUTPATIENT)
Dept: PEDIATRICS | Facility: CLINIC | Age: 88
End: 2023-08-18
Payer: COMMERCIAL

## 2023-08-18 NOTE — TELEPHONE ENCOUNTER
Order/Referral Request    Who is requesting: Maria Antonia     Orders being requested: Verbal orders. Discharge delayed and looking for ok to see her on 8/20/2023.    Reason service is needed/diagnosis: Periprosthetic fracture    When are orders needed by: 8/18/2023    Has this been discussed with Provider: No    Does patient have a preference on a Group/Provider/Facility? [facility]    Does patient have an appointment scheduled?: No    Where to send orders: N/A    Okay to leave a detailed message?: Yes at Other phone number:  [phone]

## 2023-08-18 NOTE — TELEPHONE ENCOUNTER
Forwarded to provider as RN is unable to approve delay of start of care order.  I did speak with Maria Antonia from [facility] and patient was discharged a day later than they had initially planned, so home care now has to change the initial date of service to 08/20.  Thank you.  ROSEANNE Rosenbaum RN    0836-I discussed with Dr. Montano and verbal approval given to delay start of care until 08/20.  I called Maria Antonia at [facility] and gave her a verbal order for the delay of start of care.  No further questions.  Will call back if any other questions or concerns.   ROSEANNE Rosenbaum RN

## 2023-08-21 ENCOUNTER — TELEPHONE (OUTPATIENT)
Dept: ORTHOPEDICS | Facility: CLINIC | Age: 88
End: 2023-08-21
Payer: COMMERCIAL

## 2023-08-21 NOTE — TELEPHONE ENCOUNTER
[facility] Call Center    Phone Message    May a detailed message be left on voicemail: yes     Reason for Call: Other: Dane is patient's physical therapist who is requesting a return call for ambulation and weight-bearing restrictions.     Dane is also requesting authorization for 1X/week for 6 weeks.    Please call Dane to discuss.    Action Taken: Message routed to:  [facility] ortho    Travel Screening: Not Applicable

## 2023-08-22 NOTE — TELEPHONE ENCOUNTER
RN called Dane physical therapist back to discuss patient's ambulation and weightbearing status 5 months out of ORIF right femur fracture.  Last visit with Dr. Lemon patient is deconditioned and arrived in wheelchair.  RN called to state that patient is weightbearing as tolerated working on her ability to ambulate without impairment, with the use of ambulation devices.  Verbalized order for physical therapy.    Rahel Mckeon RN

## 2023-08-28 ENCOUNTER — TELEPHONE (OUTPATIENT)
Dept: ORTHOPEDICS | Facility: CLINIC | Age: 88
End: 2023-08-28
Payer: COMMERCIAL

## 2023-08-28 NOTE — TELEPHONE ENCOUNTER
Hello,  I'm with ortho con.  Britni with [facility] is calling for verbal orders for OT.  OT 1 x a week for 5 weeks.  They will address wheelchair ability, ADLs and adaptive equipment training.  Britni is at .  Ok to leave a detailed message.  Thank you.

## 2023-09-02 ENCOUNTER — LAB REQUISITION (OUTPATIENT)
Dept: LAB | Facility: CLINIC | Age: 88
End: 2023-09-02
Payer: COMMERCIAL

## 2023-09-02 DIAGNOSIS — E55.9 VITAMIN D DEFICIENCY, UNSPECIFIED: ICD-10-CM

## 2023-09-02 DIAGNOSIS — E03.9 HYPOTHYROIDISM, UNSPECIFIED: ICD-10-CM

## 2023-09-02 DIAGNOSIS — E11.9 TYPE 2 DIABETES MELLITUS WITHOUT COMPLICATIONS (H): ICD-10-CM

## 2023-09-02 DIAGNOSIS — I10 ESSENTIAL (PRIMARY) HYPERTENSION: ICD-10-CM

## 2023-09-02 DIAGNOSIS — D64.9 ANEMIA, UNSPECIFIED: ICD-10-CM

## 2023-09-05 LAB
ALBUMIN SERPL BCG-MCNC: 4.2 G/DL (ref 3.5–5.2)
ALP SERPL-CCNC: 111 U/L (ref 35–104)
ALT SERPL W P-5'-P-CCNC: 16 U/L (ref 0–50)
ANION GAP SERPL CALCULATED.3IONS-SCNC: 15 MMOL/L (ref 7–15)
AST SERPL W P-5'-P-CCNC: 34 U/L (ref 0–45)
BILIRUB SERPL-MCNC: 0.5 MG/DL
BUN SERPL-MCNC: 16.5 MG/DL (ref 8–23)
CALCIUM SERPL-MCNC: 9.6 MG/DL (ref 8.8–10.2)
CHLORIDE SERPL-SCNC: 101 MMOL/L (ref 98–107)
CREAT SERPL-MCNC: 0.69 MG/DL (ref 0.51–0.95)
DEPRECATED HCO3 PLAS-SCNC: 26 MMOL/L (ref 22–29)
ERYTHROCYTE [DISTWIDTH] IN BLOOD BY AUTOMATED COUNT: 15.1 % (ref 10–15)
GFR SERPL CREATININE-BSD FRML MDRD: 84 ML/MIN/1.73M2
GLUCOSE SERPL-MCNC: 96 MG/DL (ref 70–99)
HBA1C MFR BLD: 5.9 %
HCT VFR BLD AUTO: 43.3 % (ref 35–47)
HGB BLD-MCNC: 13.2 G/DL (ref 11.7–15.7)
MCH RBC QN AUTO: 28.7 PG (ref 26.5–33)
MCHC RBC AUTO-ENTMCNC: 30.5 G/DL (ref 31.5–36.5)
MCV RBC AUTO: 94 FL (ref 78–100)
PLATELET # BLD AUTO: 279 10E3/UL (ref 150–450)
POTASSIUM SERPL-SCNC: 4.1 MMOL/L (ref 3.4–5.3)
PROT SERPL-MCNC: 7.3 G/DL (ref 6.4–8.3)
RBC # BLD AUTO: 4.6 10E6/UL (ref 3.8–5.2)
SODIUM SERPL-SCNC: 142 MMOL/L (ref 136–145)
TSH SERPL DL<=0.005 MIU/L-ACNC: 4.33 UIU/ML (ref 0.3–4.2)
VIT B12 SERPL-MCNC: 480 PG/ML (ref 232–1245)
WBC # BLD AUTO: 5.4 10E3/UL (ref 4–11)

## 2023-09-05 PROCEDURE — 85027 COMPLETE CBC AUTOMATED: CPT | Mod: ORL | Performed by: PHYSICIAN ASSISTANT

## 2023-09-05 PROCEDURE — 80053 COMPREHEN METABOLIC PANEL: CPT | Mod: ORL | Performed by: PHYSICIAN ASSISTANT

## 2023-09-05 PROCEDURE — 83036 HEMOGLOBIN GLYCOSYLATED A1C: CPT | Mod: ORL | Performed by: PHYSICIAN ASSISTANT

## 2023-09-05 PROCEDURE — 36415 COLL VENOUS BLD VENIPUNCTURE: CPT | Mod: ORL | Performed by: PHYSICIAN ASSISTANT

## 2023-09-05 PROCEDURE — 84443 ASSAY THYROID STIM HORMONE: CPT | Mod: ORL | Performed by: PHYSICIAN ASSISTANT

## 2023-09-05 PROCEDURE — 82306 VITAMIN D 25 HYDROXY: CPT | Mod: ORL | Performed by: PHYSICIAN ASSISTANT

## 2023-09-05 PROCEDURE — 82607 VITAMIN B-12: CPT | Mod: ORL | Performed by: PHYSICIAN ASSISTANT

## 2023-09-05 PROCEDURE — P9603 ONE-WAY ALLOW PRORATED MILES: HCPCS | Mod: ORL | Performed by: PHYSICIAN ASSISTANT

## 2023-09-06 LAB — DEPRECATED CALCIDIOL+CALCIFEROL SERPL-MC: 32 UG/L (ref 20–75)

## 2023-09-12 ENCOUNTER — TELEPHONE (OUTPATIENT)
Dept: ORTHOPEDICS | Facility: CLINIC | Age: 88
End: 2023-09-12
Payer: COMMERCIAL

## 2023-09-12 NOTE — TELEPHONE ENCOUNTER
[facility] Call Center    Phone Message    May a detailed message be left on voicemail: yes     Reason for Call: Order(s): Home Care Orders: Skilled Nursing:  twice a week     Action Taken: Message routed to:  [facility] [place]    Travel Screening: Not Applicable

## 2023-09-13 DIAGNOSIS — Z53.9 DIAGNOSIS NOT YET DEFINED: Primary | ICD-10-CM

## 2023-09-13 PROCEDURE — G0179 MD RECERTIFICATION HHA PT: HCPCS | Performed by: ORTHOPAEDIC SURGERY

## 2023-09-14 NOTE — TELEPHONE ENCOUNTER
[facility] Call [facility]    Phone Message    May a detailed message be left on voicemail: yes     Reason for Call: Other: Needs approval from Dr. Lemon for to use ongoing wound care orders that are coming from PCP through [facility], would like to speak to someone on Dr. Lemon's team asap      Action Taken: Message routed to:  [facility] [place]    Travel Screening: Not Applicable

## 2023-09-14 NOTE — TELEPHONE ENCOUNTER
RN called Francisca back to discuss orders for HomeCare. Since Dr. Lemon has been writing orders for PT and OT they need a verbal order from our office to transfer this care to the primary. RN gave verbal order for [facility] to now take over patient's home care. Skilled Nursing is needed for patient's wounds: pressure ulcers to her buttocks. I verbalized that this should be followed up with patient's primary care provider as ortho does not treat for this.     Rahel Mckeon RN

## 2023-09-18 DIAGNOSIS — S72.301G DELAYED UNION OF CLOSED FRACTURE OF FEMORAL SHAFT, RIGHT: Primary | ICD-10-CM

## 2023-09-19 NOTE — TELEPHONE ENCOUNTER
[facility] meds process completed for patient, it was explained to her and they were sent to the [facility]. Pt understood, RN informed.   Prescription approved per [facility] Refill Protocol.    Due for repeat thyroid labs 11/2018

## 2023-09-27 ENCOUNTER — ANCILLARY PROCEDURE (OUTPATIENT)
Dept: CT IMAGING | Facility: CLINIC | Age: 88
End: 2023-09-27
Attending: ORTHOPAEDIC SURGERY
Payer: COMMERCIAL

## 2023-09-27 ENCOUNTER — OFFICE VISIT (OUTPATIENT)
Dept: ORTHOPEDICS | Facility: CLINIC | Age: 88
End: 2023-09-27
Payer: COMMERCIAL

## 2023-09-27 ENCOUNTER — ANCILLARY PROCEDURE (OUTPATIENT)
Dept: GENERAL RADIOLOGY | Facility: CLINIC | Age: 88
End: 2023-09-27
Attending: ORTHOPAEDIC SURGERY
Payer: COMMERCIAL

## 2023-09-27 DIAGNOSIS — M97.02XD PERIPROSTHETIC FRACTURE AROUND INTERNAL PROSTHETIC LEFT HIP JOINT, SUBSEQUENT ENCOUNTER: Primary | ICD-10-CM

## 2023-09-27 DIAGNOSIS — S72.301G DELAYED UNION OF CLOSED FRACTURE OF FEMORAL SHAFT, RIGHT: ICD-10-CM

## 2023-09-27 DIAGNOSIS — M97.02XD PERIPROSTHETIC FRACTURE AROUND INTERNAL PROSTHETIC LEFT HIP JOINT, SUBSEQUENT ENCOUNTER: ICD-10-CM

## 2023-09-27 PROCEDURE — 73552 X-RAY EXAM OF FEMUR 2/>: CPT | Mod: RT | Performed by: RADIOLOGY

## 2023-09-27 PROCEDURE — 73701 CT LOWER EXTREMITY W/DYE: CPT | Mod: RT | Performed by: RADIOLOGY

## 2023-09-27 PROCEDURE — 99214 OFFICE O/P EST MOD 30 MIN: CPT | Mod: GC | Performed by: ORTHOPAEDIC SURGERY

## 2023-09-27 RX ORDER — IOPAMIDOL 755 MG/ML
118 INJECTION, SOLUTION INTRAVASCULAR ONCE
Status: COMPLETED | OUTPATIENT
Start: 2023-09-27 | End: 2023-09-27

## 2023-09-27 RX ADMIN — IOPAMIDOL 118 ML: 755 INJECTION, SOLUTION INTRAVASCULAR at 12:18

## 2023-09-27 NOTE — NURSING NOTE
Reason For Visit:   Chief Complaint   Patient presents with    RECHECK     Right Femur 2 month check up       LMP  (LMP Unknown)     Pain Assessment  Patient Currently in Pain: Yes  0-10 Pain Scale: 4  Primary Pain Location: Leg    Maria Antonia aPlmirao, CHARLOTTEN

## 2023-09-27 NOTE — LETTER
9/27/2023         RE: Carol Merida  [address]  [place] [address]        Dear Colleague,    Thank you for referring your patient, Carol Merida, to the [facility] [place]. Please see a copy of my visit note below.    Injury: Right endoprosthetic femur fracture  Surgery: 02/26/23 - ORIF with retrograde IMN and lateral locking plate right femur    Reason for Visit: Follow-up for fracture healing    History of present illness:  Carol is a 88-year-old-female who is 7 months s/p the above injury and surgery. She was last seen in 07/2023 where she had a CT R femur that showed minimal to no callus formation at fracture sites. 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 She was recommended a bone stimulator, which she started mid-August when she was discharged from TCU.    Patient has continued to have pain with weightbearing during transfers and ambulating. She reports working with home PT last week when she had the sudden onset of worsening of pain. She continues to take Celebrex at night for pain control.    Repeat XR R femur were obtained and reviewed today and demonstrate displacement of the proximal plate, mild increase in distal fracture displacement, and minimal to no further callus formation compared to previous. It is difficult to assess status of the distal screws, which are blocked by the knee arthroplasty femoral component.    Discussed these findings with the patient in detail and two main factors contributing to poor fracture healing - stability and biology. Proximal plate failure is likely related to poor bone quality. 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 Discussed risk, benefits, and alternatives to nonoperative management versus surgical fixation. Patient could continue to trial nonoperative management since she only recently started the bone stimulator. However, I recommended CT R femur to evaluate the distal femur screws in order to  assess the stability of the locking plate construct. If the screws are broken, the fracture is at risk of further displacement.     Revision surgery would consist of removing the locking screws proximally and replacing them with bicortical screws through the arthroplasty cement mantle. Revision of the distal plate would depend on the status of the screws. In addition, the cerclage wire would be removed. Bone graft would be harvested and placed at the distal femur fracture site for biologic stimulation to promote healing. Patient has bilateral HUGO and TKA, so she is not a candidate for ANGELES. Iliac crest autograft would likely be harvested. Discussed recovery process would be similar to prior procedure. Patient is concerned about quality of life if she chooses nonoperative treatment, but also is concerned about going through the surgical recovery process again since she had a difficult time at the TCU.     Plan for patient to get CT to further assess distal screws and have discussion with family before making a decision. Ample time was given for questions to be answered. Patient and family can reach out on MyChart with any further questions. Patient can continue bone stimulator and WBAT RLE in the meantime.    René Santiago MD  Orthopedic Surgery Resident    Patient seen and discussed with Dr. Lemon.    Newton Lemon MD  Orthopedic Spine Surgeon  , Department of Orthopedic Surgery    Attestation signed by Newton Lemon MD at 10/1/2023  8:55 AM:  Everette returns s/p ORIF right interprosthetic femur fracture with retrograde intramedullary nail and lateral locking plate. Previous CT showed atrophic delayed union. She has been slowly progressing with protected weight bearing. Today she is accompanied by her daughter. Patient states right thigh hurts more with weight bearing and pain is located over lateral thigh. She was just recently able to start using bone stimulator less than two  weeks ago.     AP and lateral radiographs show that the proximal aspect of the plate has become loose. It appears distal fixation is intact. We had 6 points of cortical fixation proximally which is generally the accepted number of fixation points. I attempted to leave a long working distance with the plate but it is certainly possible the construct was just too stiff and may have contributed to development of atrophic nonunion.     We had a long conversation about how to proceed. While there is displacement of the proximal plate, the intramedullary polo appears to be holding fracture in reasonable alignment.  I have recommended getting an updated CT to see if distal fixation is intact. This may be acting somewhat like dynamization of locked nail. If distal fixation is intact could attempt just exposing the proximal plate to remove the unicortical screws and secure plate with bicortical screws or cerclage. If distal fixation is compromised would need to revise entire plate construct. Will definitely need to add biology to the atrophic nonunion. Given we have no options for ANGELES, will use iliac crest autograft bone, bone morphogenic protein which would be off label use.       I reviewed the patient's history, physical examination and imaging studies with the Resident. In addition I individually examined the patient and developed the treatment plan.  I agree with the assessment and plan as documented.     Time spent on this clinical encounter by me, independent of the PA/Resident/Fellow, was 45 minutes. This included chart review, history and physical examination, patient counseling, care coordination and documentation.     Newton Lemon MD  Orthopedic Spine Surgeon  , Department of Orthopedic Surgery

## 2023-09-28 NOTE — PROGRESS NOTES
Injury: Right endoprosthetic femur fracture  Surgery: 02/26/23 - ORIF with retrograde IMN and lateral locking plate right femur    Reason for Visit: Follow-up for fracture healing    History of present illness:  Carol is a 88-year-old-female who is 7 months s/p the above injury and surgery. She was last seen in 07/2023 where she had a CT R femur that showed minimal to no callus formation at fracture sites. 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 She was recommended a bone stimulator, which she started mid-August when she was discharged from TCU.    Patient has continued to have pain with weightbearing during transfers and ambulating. She reports working with home PT last week when she had the sudden onset of worsening of pain. She continues to take Celebrex at night for pain control.    Repeat XR R femur were obtained and reviewed today and demonstrate displacement of the proximal plate, mild increase in distal fracture displacement, and minimal to no further callus formation compared to previous. It is difficult to assess status of the distal screws, which are blocked by the knee arthroplasty femoral component.    Discussed these findings with the patient in detail and two main factors contributing to poor fracture healing - stability and biology. Proximal plate failure is likely related to poor bone quality. 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 Discussed risk, benefits, and alternatives to nonoperative management versus surgical fixation. Patient could continue to trial nonoperative management since she only recently started the bone stimulator. However, I recommended CT R femur to evaluate the distal femur screws in order to assess the stability of the locking plate construct. If the screws are broken, the fracture is at risk of further displacement.     Revision surgery would consist of removing the locking screws proximally and replacing them with bicortical screws through the  arthroplasty cement mantle. Revision of the distal plate would depend on the status of the screws. In addition, the cerclage wire would be removed. Bone graft would be harvested and placed at the distal femur fracture site for biologic stimulation to promote healing. Patient has bilateral HUGO and TKA, so she is not a candidate for ANGELES. Iliac crest autograft would likely be harvested. Discussed recovery process would be similar to prior procedure. Patient is concerned about quality of life if she chooses nonoperative treatment, but also is concerned about going through the surgical recovery process again since she had a difficult time at the TCU.     Plan for patient to get CT to further assess distal screws and have discussion with family before making a decision. Ample time was given for questions to be answered. Patient and family can reach out on [facility] with any further questions. Patient can continue bone stimulator and WBAT RLE in the meantime.    René Santiago MD  Orthopedic Surgery Resident    Patient seen and discussed with Dr. Lemon.    I reviewed the patient's history, physical examination and imaging studies with the Resident. In addition I individually examined the patient and developed the treatment plan.  I agree with the assessment and plan as documented.     Time spent on this clinical encounter by me, independent of the PA/Resident/Fellow, was 45 minutes. This included chart review, history and physical examination, patient counseling, care coordination and documentation on date of encounter.     Newton Lemon MD  Orthopedic Spine Surgeon  , Department of Orthopedic Surgery      Newton Lemon MD  Orthopedic Spine Surgeon  , Department of Orthopedic Surgery

## 2023-10-04 ENCOUNTER — TELEPHONE (OUTPATIENT)
Dept: ORTHOPEDICS | Facility: CLINIC | Age: 88
End: 2023-10-04
Payer: COMMERCIAL

## 2023-10-06 ENCOUNTER — TELEPHONE (OUTPATIENT)
Dept: ORTHOPEDICS | Facility: CLINIC | Age: 88
End: 2023-10-06
Payer: COMMERCIAL

## 2023-10-18 ENCOUNTER — MEDICAL CORRESPONDENCE (OUTPATIENT)
Dept: HEALTH INFORMATION MANAGEMENT | Facility: CLINIC | Age: 88
End: 2023-10-18
Payer: COMMERCIAL

## 2023-10-19 ENCOUNTER — DOCUMENTATION ONLY (OUTPATIENT)
Dept: ORTHOPEDICS | Facility: CLINIC | Age: 88
End: 2023-10-19
Payer: COMMERCIAL

## 2023-10-19 DIAGNOSIS — S72.301G DELAYED UNION OF CLOSED FRACTURE OF FEMORAL SHAFT, RIGHT: Primary | ICD-10-CM

## 2023-10-19 NOTE — PROGRESS NOTES
Received Completed forms Yes   Faxed Forms Faxed To: [facility]  Fax Number: [phone]   Sent to HIM (Date) 10/18/23

## 2023-10-26 ENCOUNTER — MEDICAL CORRESPONDENCE (OUTPATIENT)
Dept: HEALTH INFORMATION MANAGEMENT | Facility: CLINIC | Age: 88
End: 2023-10-26
Payer: COMMERCIAL

## 2023-10-30 ENCOUNTER — TELEPHONE (OUTPATIENT)
Dept: ORTHOPEDICS | Facility: CLINIC | Age: 88
End: 2023-10-30
Payer: COMMERCIAL

## 2023-10-30 NOTE — TELEPHONE ENCOUNTER
When patient was last seen by Dr. Lemon the plan of care was for patient to continue bone stimulator and be weightbearing as tolerated to her RLE. Will call patient's daughter to discuss.    Rahel Mckeon RN

## 2023-10-30 NOTE — TELEPHONE ENCOUNTER
[facility] Call Center     Phone Message     May a detailed message be left on voicemail: Yes     Reason for Call:  Pt called and said Dr. Lemon was suppose to send order for a wheelchair to [facility]. They haven't received it, pt would like a call back and see when the order will be sent.

## 2023-10-31 NOTE — TELEPHONE ENCOUNTER
RN called patient to discuss wheelchair. Patient thinks that it was her TCU that did the work to get her a wheelchair. RN let patient know about her restrictions from the last visit. We plan to see patient next week.    Rahel Mckeon RN

## 2023-11-08 ENCOUNTER — OFFICE VISIT (OUTPATIENT)
Dept: ORTHOPEDICS | Facility: CLINIC | Age: 88
End: 2023-11-08
Payer: COMMERCIAL

## 2023-11-08 ENCOUNTER — ANCILLARY PROCEDURE (OUTPATIENT)
Dept: GENERAL RADIOLOGY | Facility: CLINIC | Age: 88
End: 2023-11-08
Attending: ORTHOPAEDIC SURGERY
Payer: COMMERCIAL

## 2023-11-08 DIAGNOSIS — S72.301G DELAYED UNION OF CLOSED FRACTURE OF FEMORAL SHAFT, RIGHT: ICD-10-CM

## 2023-11-08 DIAGNOSIS — M97.02XD PERIPROSTHETIC FRACTURE AROUND INTERNAL PROSTHETIC LEFT HIP JOINT, SUBSEQUENT ENCOUNTER: Primary | ICD-10-CM

## 2023-11-08 PROCEDURE — 99214 OFFICE O/P EST MOD 30 MIN: CPT | Performed by: ORTHOPAEDIC SURGERY

## 2023-11-08 PROCEDURE — 73552 X-RAY EXAM OF FEMUR 2/>: CPT | Mod: RT | Performed by: RADIOLOGY

## 2023-11-08 NOTE — NURSING NOTE
Reason For Visit:   Chief Complaint   Patient presents with    RECHECK     Right Femur fx           LMP  (LMP Unknown)     Pain Assessment  Patient Currently in Pain: Yes  0-10 Pain Scale: 4  Primary Pain Location: Leg    Maria Antonia Formato, LPN

## 2023-11-08 NOTE — LETTER
11/8/2023         RE: Carol Merida  [address]  [place] [address]        Dear Colleague,    Thank you for referring your patient, Carol Merida, to the [facility] [place]. Please see a copy of my visit note below.    Chief concern: Follow-up open reduction internal fixation of interprosthetic right femur fracture 2/26/2023.  She has had delayed union with loosening of the proximal unicortical screws from the lateral plate.  We have been using a bone stimulator over the fracture site and have limited weightbearing activities to transfers.  She states that since her last visit she is really not having much in terms of leg pain.  She is able to transfer without limitation.  Occasionally will have aching in the thigh but nothing like she was previously experiencing.  She has a powered scooter that she is able to get around with.  There was some delay in her getting a wheelchair due to miscommunication of some sort.  She is overall relatively happy with her functional ability right now with the caveat that she is limited in terms of social engagements because she requires wheelchair van for transportation limiting her ability to go and visit family.    On exam today she is alert and oriented with no acute distress  She is able to perform a straight leg raise without thigh pain.  Mild to moderate tenderness palpation over the IT band  No pain with passive internal/external rotation of the hip    X-rays today show interval bone formation between the plate and lateral femoral cortex proximally, 7 mm shortening between the cement mantle and proximal aspect of the intramedullary polo compared to September, there is some evidence of callus formation just proximal to the fracture line which I am hopeful is evidence of consolidation at the fracture    Impression plan:  88-year-old female status post above.  Improving symptomatically.  My hope is that as the fracture has been  dynamized and shortened along with the bone stimulator that the fracture is consolidating.  I recommend continuing with the current activity restrictions which are weightbearing for transfers only.  Can do strengthening exercise with physical therapy but only seated knee extension and flexion, ankle dorsiflexion plantarflexion.  Still need to avoid full weightbearing on the right lower extremity.  We will plan to get x-rays here in 2 to 3 months and if she continues to be relatively asymptomatic we will advance activities at that point.    She is also had a persistent decubitus ulcer on the right ischium.  I recommend using a Mepilex border dressing over this area to prevent progression of her decubitus ulcer as this is a portal of entry for potential bacterial infection.  In addition persistent decubitus ulcers raise some concern for nutritional status.  Recommend Rolly nutritional supplementation twice daily.    Time spent on this clinical encounter including previsit chart review, history and physical examination, patient counseling and documentation was 30 minutes on the date of encounter.    Newton Lemon MD  , Department of Orthopedic Surgery  [facility]

## 2023-11-08 NOTE — PROGRESS NOTES
Chief concern: Follow-up open reduction internal fixation of interprosthetic right femur fracture 2/26/2023.  She has had delayed union with loosening of the proximal unicortical screws from the lateral plate.  We have been using a bone stimulator over the fracture site and have limited weightbearing activities to transfers.  She states that since her last visit she is really not having much in terms of leg pain.  She is able to transfer without limitation.  Occasionally will have aching in the thigh but nothing like she was previously experiencing.  She has a powered scooter that she is able to get around with.  There was some delay in her getting a wheelchair due to miscommunication of some sort.  She is overall relatively happy with her functional ability right now with the caveat that she is limited in terms of social engagements because she requires wheelchair van for transportation limiting her ability to go and visit family.    On exam today she is alert and oriented with no acute distress  She is able to perform a straight leg raise without thigh pain.  Mild to moderate tenderness palpation over the IT band  No pain with passive internal/external rotation of the hip    X-rays today show interval bone formation between the plate and lateral femoral cortex proximally, 7 mm shortening between the cement mantle and proximal aspect of the intramedullary polo compared to September, there is some evidence of callus formation just proximal to the fracture line which I am hopeful is evidence of consolidation at the fracture    Impression plan:  88-year-old female status post above.  Improving symptomatically.  My hope is that as the fracture has been dynamized and shortened along with the bone stimulator that the fracture is consolidating.  I recommend continuing with the current activity restrictions which are weightbearing for transfers only.  Can do strengthening exercise with physical therapy but only seated knee  extension and flexion, ankle dorsiflexion plantarflexion.  Still need to avoid full weightbearing on the right lower extremity.  We will plan to get x-rays here in 2 to 3 months and if she continues to be relatively asymptomatic we will advance activities at that point.    She is also had a persistent decubitus ulcer on the right ischium.  I recommend using a Mepilex border dressing over this area to prevent progression of her decubitus ulcer as this is a portal of entry for potential bacterial infection.  In addition persistent decubitus ulcers raise some concern for nutritional status.  Recommend Rolly nutritional supplementation twice daily.    Time spent on this clinical encounter including previsit chart review, history and physical examination, patient counseling and documentation was 30 minutes on the date of encounter.    Newton Lemon MD  , Department of Orthopedic Surgery  [facility]

## 2024-01-29 ENCOUNTER — TELEPHONE (OUTPATIENT)
Dept: ORTHOPEDICS | Facility: CLINIC | Age: 89
End: 2024-01-29
Payer: COMMERCIAL

## 2024-01-29 NOTE — TELEPHONE ENCOUNTER
ATC called patient to reschedule her follow up with Dr. Lemon.  Moved her to 1:20pm this wednesday, Jan. 31st.  Patient knows they will have XR at 1pm for this appointment.  All questions answered at this time.    Primo Painting, ATC

## 2024-01-29 NOTE — TELEPHONE ENCOUNTER
[facility] Call Center    Phone Message    May a detailed message be left on voicemail: yes     Reason for Call: Other: patient wants to reschedule her 02/14 appt. Nothing aval until April and wants to be seen sooner.      Action Taken: Other: Te     Travel Screening: Not Applicable

## 2024-01-30 DIAGNOSIS — S72.301G DELAYED UNION OF CLOSED FRACTURE OF FEMORAL SHAFT, RIGHT: Primary | ICD-10-CM

## 2024-01-31 ENCOUNTER — ANCILLARY PROCEDURE (OUTPATIENT)
Dept: GENERAL RADIOLOGY | Facility: CLINIC | Age: 89
End: 2024-01-31
Attending: ORTHOPAEDIC SURGERY
Payer: COMMERCIAL

## 2024-01-31 ENCOUNTER — OFFICE VISIT (OUTPATIENT)
Dept: ORTHOPEDICS | Facility: CLINIC | Age: 89
End: 2024-01-31
Payer: COMMERCIAL

## 2024-01-31 DIAGNOSIS — S72.301G DELAYED UNION OF CLOSED FRACTURE OF FEMORAL SHAFT, RIGHT: ICD-10-CM

## 2024-01-31 DIAGNOSIS — S72.301G DELAYED UNION OF CLOSED FRACTURE OF FEMORAL SHAFT, RIGHT: Primary | ICD-10-CM

## 2024-01-31 PROCEDURE — 73552 X-RAY EXAM OF FEMUR 2/>: CPT | Mod: RT | Performed by: RADIOLOGY

## 2024-01-31 PROCEDURE — 99213 OFFICE O/P EST LOW 20 MIN: CPT | Mod: GC | Performed by: ORTHOPAEDIC SURGERY

## 2024-01-31 NOTE — PROGRESS NOTES
I have reviewed and updated the patient's Past Medical History, Social History, Family History and Medication List.    ALLERGIES  Ace inhibitors    Spine Surgery Follow Up    REFERRING PHYSICIAN: No ref. provider found   PRIMARY CARE PHYSICIAN: [facility] - ANTONY Hernadez           Chief Complaint:   RECHECK (DOS 2/26/23 open reduction internal fixation Right femur)      History of Present Illness:  Symptom Profile Including: location of symptoms, onset, severity, exacerbating/alleviating factors, previous treatments:        Carol Merida is a 88 year old female with a history of periprosthetic right femoral shaft fracture who presents today for follow up 11 months s/p ORIF with IMN retrograde nail, lateral plate and cable fixation of the right femur (DOS 2/26/2023 with Dr. Lemon).  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  She reports occasional right knee pain with weightbearing during transfers, but states that this is tolerable and managed with Tylenol as needed.  Otherwise she has no new complaints, reports feeling better overall in this postoperative period. She is currently still at transitional care where she receives assistance with ADLs.             Physical Exam:     Focused Exam of Right Lower Extremity  Fires EHL, FHL, TA, GSC, Quad  SILT DP, SP, Saph, Sural, Tibial Nerve Distributions  2+ DP pulse, foot WWP           Imaging:   We ordered and independently reviewed new radiographs at this clinic visit. The results were discussed with the patient.  Findings include:  Interval callus formation noted around the lateral aspect of the proximal humerus between the plate and the shaft of the femur, distally between the plate on the lateral border of the femur and on the medial aspect of the femoral metadiaphysis.  Minimal proximal translation of plate screws in relation to prior sites of fixation on the femur, indicating bony compression on the distal aspect of the femur.   Fracture lines still visible in the distal femur with interval callus formation since last imaging in November 2023.             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  Femur films demonstrate interval callus formation, most likely attributed to compression of proximal and distal bony fragments which would aid bone healing.  As there is improvement, though slowly, we should stay the current course of treatment and monitor for progress and further bony healing.     Plan:  Continue with transfers as tolerated  Partial weightbearing with walker on 50% body weight  Referral to PT  Okay to stand and pivot for transfers  Continue using bone stimulator  Okay for continued care at the transitional care unit where she can be assisted with daily tasks      Follow-up in 3 months with repeat x-rays of the right femur      Kim Mills MD  Orthopaedic Surgery, PGY-1          Respectfully,  Newton Lemon MD  Orthopaedic Spine Surgery  Dept Orthopaedic Surgery, [facility]  [facility] Phone: [phone]

## 2024-01-31 NOTE — LETTER
1/31/2024         RE: Carol Merida  [address]  [place] [address]        Dear Colleague,    Thank you for referring your patient, Carol Merida, to the [facility] [place]. Please see a copy of my visit note below.    I have reviewed and updated the patient's Past Medical History, Social History, Family History and Medication List.    ALLERGIES  Ace inhibitors    Spine Surgery Follow Up    REFERRING PHYSICIAN: No ref. provider found   PRIMARY CARE PHYSICIAN: Clinic - [place], [facility]           Chief Complaint:   RECHECK (DOS 2/26/23 open reduction internal fixation Right femur)      History of Present Illness:  Symptom Profile Including: location of symptoms, onset, severity, exacerbating/alleviating factors, previous treatments:        Carol Merida is a 88 year old female with a history of periprosthetic right femoral shaft fracture who presents today for follow up 11 months s/p ORIF with IMN retrograde nail, lateral plate and cable fixation of the right femur (DOS 2/26/2023 with Dr. Lemon).  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  She reports occasional right knee pain with weightbearing during transfers, but states that this is tolerable and managed with Tylenol as needed.  Otherwise she has no new complaints, reports feeling better overall in this postoperative period. She is currently still at transitional care where she receives assistance with ADLs.             Physical Exam:     Focused Exam of Right Lower Extremity  Fires EHL, FHL, TA, GSC, Quad  SILT DP, SP, Saph, Sural, Tibial Nerve Distributions  2+ DP pulse, foot WWP           Imaging:   We ordered and independently reviewed new radiographs at this clinic visit. The results were discussed with the patient.  Findings include:  Interval callus formation noted around the lateral aspect of the proximal humerus between the plate and the shaft of the femur, distally between  the plate on the lateral border of the femur and on the medial aspect of the femoral metadiaphysis.  Minimal proximal translation of plate screws in relation to prior sites of fixation on the femur, indicating bony compression on the distal aspect of the femur.  Fracture lines still visible in the distal femur with interval callus formation since last imaging in November 2023.             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  Femur films demonstrate interval callus formation, most likely attributed to compression of proximal and distal bony fragments which would aid bone healing.  As there is improvement, though slowly, we should stay the current course of treatment and monitor for progress and further bony healing.     Plan:  Continue with transfers as tolerated  Partial weightbearing with walker on 50% body weight  Referral to PT  Okay to stand and pivot for transfers  Continue using bone stimulator  Okay for continued care at the transitional care unit where she can be assisted with daily tasks      Follow-up in 3 months with repeat x-rays of the right femur      Kim Mills MD  Orthopaedic Surgery, PGY-1          Respectfully,  Newton Lemon MD  Orthopaedic Spine Surgery  Dept Orthopaedic Surgery, [facility] Phone: [phone]        Attestation signed by Newton Lemon MD at 1/31/2024  7:28 PM:  I reviewed the patient's history, physical examination and imaging studies with the Resident. In addition I individually examined the patient and developed the treatment plan.  I agree with the assessment and plan as documented.     Time spent on this clinical encounter by me, independent of the PA/Resident/Fellow, was 20 minutes. This included chart review, history and physical  examination, patient counseling, care coordination and documentation.     Newton Lemon MD  Orthopedic Spine Surgeon  , Department of Orthopedic Surgery

## 2024-01-31 NOTE — NURSING NOTE
Reason For Visit:   Chief Complaint   Patient presents with    RECHECK     DOS 2/26/23 open reduction internal fixation Right femur       LMP  (LMP Unknown)     Pain Assessment  Patient Currently in Pain: Yes  0-10 Pain Scale: 2  Primary Pain Location: Leg    Maria Antonia JOHN Henriquez

## 2024-01-31 NOTE — LETTER
Verification of Appointment  2024     Seen today: Yes    Patient:  Carol Merida  :   1935  MRN:     [id number]  Physician: NEWTON MARIA may weight bear as tolerated during transfer. Weight bear 50% with mobility or activity.    Continue with physical therapy for generalized conditioning and strengthening.          Electronically signed by Newton Maria MD

## 2024-02-02 ENCOUNTER — TELEPHONE (OUTPATIENT)
Dept: ORTHOPEDICS | Facility: CLINIC | Age: 89
End: 2024-02-02
Payer: COMMERCIAL

## 2024-02-02 NOTE — TELEPHONE ENCOUNTER
Left Voicemail (1st Attempt) for the patient to call back and schedule the following:    Appointment type: Return  Provider: Xochilt  Return date: Next available  Specialty phone number: [phone]  Additional appointment(s) needed:   Additonal Notes:

## 2024-02-07 ENCOUNTER — TELEPHONE (OUTPATIENT)
Dept: ORTHOPEDICS | Facility: CLINIC | Age: 89
End: 2024-02-07
Payer: COMMERCIAL

## 2024-02-26 ENCOUNTER — LAB REQUISITION (OUTPATIENT)
Dept: LAB | Facility: CLINIC | Age: 89
End: 2024-02-26
Payer: COMMERCIAL

## 2024-02-26 DIAGNOSIS — E03.9 HYPOTHYROIDISM, UNSPECIFIED: ICD-10-CM

## 2024-02-26 DIAGNOSIS — D64.9 ANEMIA, UNSPECIFIED: ICD-10-CM

## 2024-02-26 DIAGNOSIS — I10 ESSENTIAL (PRIMARY) HYPERTENSION: ICD-10-CM

## 2024-02-27 LAB
ERYTHROCYTE [DISTWIDTH] IN BLOOD BY AUTOMATED COUNT: 13.6 % (ref 10–15)
HCT VFR BLD AUTO: 42.7 % (ref 35–47)
HGB BLD-MCNC: 13.2 G/DL (ref 11.7–15.7)
MCH RBC QN AUTO: 28.8 PG (ref 26.5–33)
MCHC RBC AUTO-ENTMCNC: 30.9 G/DL (ref 31.5–36.5)
MCV RBC AUTO: 93 FL (ref 78–100)
PLATELET # BLD AUTO: 286 10E3/UL (ref 150–450)
RBC # BLD AUTO: 4.58 10E6/UL (ref 3.8–5.2)
WBC # BLD AUTO: 6.7 10E3/UL (ref 4–11)

## 2024-02-27 PROCEDURE — 80048 BASIC METABOLIC PNL TOTAL CA: CPT | Mod: ORL

## 2024-02-27 PROCEDURE — 85027 COMPLETE CBC AUTOMATED: CPT | Mod: ORL

## 2024-02-27 PROCEDURE — 36415 COLL VENOUS BLD VENIPUNCTURE: CPT | Mod: ORL

## 2024-02-27 PROCEDURE — P9604 ONE-WAY ALLOW PRORATED TRIP: HCPCS | Mod: ORL

## 2024-02-27 PROCEDURE — 84443 ASSAY THYROID STIM HORMONE: CPT | Mod: ORL

## 2024-02-28 LAB
ANION GAP SERPL CALCULATED.3IONS-SCNC: 12 MMOL/L (ref 7–15)
BUN SERPL-MCNC: 13.4 MG/DL (ref 8–23)
CALCIUM SERPL-MCNC: 9.1 MG/DL (ref 8.8–10.2)
CHLORIDE SERPL-SCNC: 101 MMOL/L (ref 98–107)
CREAT SERPL-MCNC: 0.7 MG/DL (ref 0.51–0.95)
DEPRECATED HCO3 PLAS-SCNC: 28 MMOL/L (ref 22–29)
EGFRCR SERPLBLD CKD-EPI 2021: 83 ML/MIN/1.73M2
GLUCOSE SERPL-MCNC: 100 MG/DL (ref 70–99)
POTASSIUM SERPL-SCNC: 4.8 MMOL/L (ref 3.4–5.3)
SODIUM SERPL-SCNC: 141 MMOL/L (ref 135–145)
TSH SERPL DL<=0.005 MIU/L-ACNC: 4.6 UIU/ML (ref 0.3–4.2)

## 2024-03-07 ENCOUNTER — TRANSFERRED RECORDS (OUTPATIENT)
Dept: HEALTH INFORMATION MANAGEMENT | Facility: CLINIC | Age: 89
End: 2024-03-07
Payer: COMMERCIAL

## 2024-03-07 LAB — EJECTION FRACTION: 68 %

## 2024-03-09 ENCOUNTER — MEDICAL CORRESPONDENCE (OUTPATIENT)
Dept: HEALTH INFORMATION MANAGEMENT | Facility: CLINIC | Age: 89
End: 2024-03-09
Payer: COMMERCIAL

## 2024-03-12 ENCOUNTER — MEDICAL CORRESPONDENCE (OUTPATIENT)
Dept: MEDSURG UNIT | Facility: CLINIC | Age: 89
End: 2024-03-12
Payer: COMMERCIAL

## 2024-03-15 ENCOUNTER — APPOINTMENT (OUTPATIENT)
Dept: GENERAL RADIOLOGY | Facility: CLINIC | Age: 89
DRG: 196 | End: 2024-03-15
Attending: PHYSICIAN ASSISTANT
Payer: COMMERCIAL

## 2024-03-15 ENCOUNTER — HOSPITAL ENCOUNTER (INPATIENT)
Facility: CLINIC | Age: 89
LOS: 2 days | Discharge: NURSING, REHAB OR LONG-TERM CARE | DRG: 196 | End: 2024-03-18
Attending: PHYSICIAN ASSISTANT | Admitting: INTERNAL MEDICINE
Payer: COMMERCIAL

## 2024-03-15 ENCOUNTER — APPOINTMENT (OUTPATIENT)
Dept: CT IMAGING | Facility: CLINIC | Age: 89
DRG: 196 | End: 2024-03-15
Attending: PHYSICIAN ASSISTANT
Payer: COMMERCIAL

## 2024-03-15 DIAGNOSIS — R09.02 HYPOXIA: ICD-10-CM

## 2024-03-15 DIAGNOSIS — J44.1 COPD EXACERBATION (H): Primary | ICD-10-CM

## 2024-03-15 DIAGNOSIS — J40 BRONCHITIS: ICD-10-CM

## 2024-03-15 DIAGNOSIS — S72.301G DELAYED UNION OF CLOSED FRACTURE OF FEMORAL SHAFT, RIGHT: ICD-10-CM

## 2024-03-15 DIAGNOSIS — I50.30 HEART FAILURE WITH PRESERVED EJECTION FRACTION, NYHA CLASS I (H): ICD-10-CM

## 2024-03-15 LAB
ANION GAP SERPL CALCULATED.3IONS-SCNC: 10 MMOL/L (ref 7–15)
BASOPHILS # BLD AUTO: 0 10E3/UL (ref 0–0.2)
BASOPHILS NFR BLD AUTO: 1 %
BUN SERPL-MCNC: 14.3 MG/DL (ref 8–23)
CALCIUM SERPL-MCNC: 9.3 MG/DL (ref 8.8–10.2)
CHLORIDE SERPL-SCNC: 96 MMOL/L (ref 98–107)
CREAT SERPL-MCNC: 0.73 MG/DL (ref 0.51–0.95)
CREAT SERPL-MCNC: 0.74 MG/DL (ref 0.51–0.95)
DEPRECATED HCO3 PLAS-SCNC: 35 MMOL/L (ref 22–29)
EGFRCR SERPLBLD CKD-EPI 2021: 78 ML/MIN/1.73M2
EGFRCR SERPLBLD CKD-EPI 2021: 79 ML/MIN/1.73M2
EOSINOPHIL # BLD AUTO: 0.6 10E3/UL (ref 0–0.7)
EOSINOPHIL NFR BLD AUTO: 9 %
ERYTHROCYTE [DISTWIDTH] IN BLOOD BY AUTOMATED COUNT: 13.1 % (ref 10–15)
FLUAV RNA SPEC QL NAA+PROBE: NEGATIVE
FLUBV RNA RESP QL NAA+PROBE: NEGATIVE
GLUCOSE SERPL-MCNC: 102 MG/DL (ref 70–99)
HCT VFR BLD AUTO: 46.1 % (ref 35–47)
HGB BLD-MCNC: 14.2 G/DL (ref 11.7–15.7)
IMM GRANULOCYTES # BLD: 0 10E3/UL
IMM GRANULOCYTES NFR BLD: 0 %
LYMPHOCYTES # BLD AUTO: 0.8 10E3/UL (ref 0.8–5.3)
LYMPHOCYTES NFR BLD AUTO: 13 %
MCH RBC QN AUTO: 29 PG (ref 26.5–33)
MCHC RBC AUTO-ENTMCNC: 30.8 G/DL (ref 31.5–36.5)
MCV RBC AUTO: 94 FL (ref 78–100)
MONOCYTES # BLD AUTO: 0.5 10E3/UL (ref 0–1.3)
MONOCYTES NFR BLD AUTO: 8 %
NEUTROPHILS # BLD AUTO: 4.7 10E3/UL (ref 1.6–8.3)
NEUTROPHILS NFR BLD AUTO: 69 %
NRBC # BLD AUTO: 0 10E3/UL
NRBC BLD AUTO-RTO: 0 /100
NT-PROBNP SERPL-MCNC: 72 PG/ML (ref 0–1800)
PLATELET # BLD AUTO: 250 10E3/UL (ref 150–450)
POTASSIUM SERPL-SCNC: 4.4 MMOL/L (ref 3.4–5.3)
RBC # BLD AUTO: 4.89 10E6/UL (ref 3.8–5.2)
RSV RNA SPEC NAA+PROBE: NEGATIVE
SARS-COV-2 RNA RESP QL NAA+PROBE: NEGATIVE
SODIUM SERPL-SCNC: 141 MMOL/L (ref 135–145)
TROPONIN T SERPL HS-MCNC: 18 NG/L
WBC # BLD AUTO: 6.7 10E3/UL (ref 4–11)

## 2024-03-15 PROCEDURE — 85048 AUTOMATED LEUKOCYTE COUNT: CPT | Performed by: PHYSICIAN ASSISTANT

## 2024-03-15 PROCEDURE — 96372 THER/PROPH/DIAG INJ SC/IM: CPT | Performed by: INTERNAL MEDICINE

## 2024-03-15 PROCEDURE — 93005 ELECTROCARDIOGRAM TRACING: CPT

## 2024-03-15 PROCEDURE — 87637 SARSCOV2&INF A&B&RSV AMP PRB: CPT | Performed by: PHYSICIAN ASSISTANT

## 2024-03-15 PROCEDURE — 71250 CT THORAX DX C-: CPT

## 2024-03-15 PROCEDURE — 250N000013 HC RX MED GY IP 250 OP 250 PS 637: Performed by: INTERNAL MEDICINE

## 2024-03-15 PROCEDURE — 96376 TX/PRO/DX INJ SAME DRUG ADON: CPT

## 2024-03-15 PROCEDURE — G0378 HOSPITAL OBSERVATION PER HR: HCPCS

## 2024-03-15 PROCEDURE — 83880 ASSAY OF NATRIURETIC PEPTIDE: CPT | Performed by: PHYSICIAN ASSISTANT

## 2024-03-15 PROCEDURE — 99223 1ST HOSP IP/OBS HIGH 75: CPT | Mod: AI | Performed by: INTERNAL MEDICINE

## 2024-03-15 PROCEDURE — 36415 COLL VENOUS BLD VENIPUNCTURE: CPT | Performed by: INTERNAL MEDICINE

## 2024-03-15 PROCEDURE — 36415 COLL VENOUS BLD VENIPUNCTURE: CPT | Performed by: PHYSICIAN ASSISTANT

## 2024-03-15 PROCEDURE — 250N000011 HC RX IP 250 OP 636: Performed by: INTERNAL MEDICINE

## 2024-03-15 PROCEDURE — 82565 ASSAY OF CREATININE: CPT | Performed by: INTERNAL MEDICINE

## 2024-03-15 PROCEDURE — 71046 X-RAY EXAM CHEST 2 VIEWS: CPT

## 2024-03-15 PROCEDURE — 96374 THER/PROPH/DIAG INJ IV PUSH: CPT

## 2024-03-15 PROCEDURE — 250N000011 HC RX IP 250 OP 636: Performed by: PHYSICIAN ASSISTANT

## 2024-03-15 PROCEDURE — 99285 EMERGENCY DEPT VISIT HI MDM: CPT | Mod: 25

## 2024-03-15 PROCEDURE — 250N000012 HC RX MED GY IP 250 OP 636 PS 637: Performed by: INTERNAL MEDICINE

## 2024-03-15 PROCEDURE — 250N000009 HC RX 250: Performed by: PHYSICIAN ASSISTANT

## 2024-03-15 PROCEDURE — 94640 AIRWAY INHALATION TREATMENT: CPT

## 2024-03-15 PROCEDURE — 80048 BASIC METABOLIC PNL TOTAL CA: CPT | Performed by: PHYSICIAN ASSISTANT

## 2024-03-15 PROCEDURE — 84484 ASSAY OF TROPONIN QUANT: CPT | Performed by: PHYSICIAN ASSISTANT

## 2024-03-15 RX ORDER — FUROSEMIDE 40 MG
40 TABLET ORAL DAILY
Status: DISCONTINUED | OUTPATIENT
Start: 2024-03-16 | End: 2024-03-18 | Stop reason: HOSPADM

## 2024-03-15 RX ORDER — FUROSEMIDE 10 MG/ML
40 INJECTION INTRAMUSCULAR; INTRAVENOUS ONCE
Status: COMPLETED | OUTPATIENT
Start: 2024-03-15 | End: 2024-03-15

## 2024-03-15 RX ORDER — BUSPIRONE HYDROCHLORIDE 10 MG/1
10 TABLET ORAL 2 TIMES DAILY
Status: DISCONTINUED | OUTPATIENT
Start: 2024-03-15 | End: 2024-03-16

## 2024-03-15 RX ORDER — BUSPIRONE HYDROCHLORIDE 5 MG/1
5 TABLET ORAL 2 TIMES DAILY
COMMUNITY
Start: 2024-03-14 | End: 2024-03-21

## 2024-03-15 RX ORDER — ONDANSETRON 4 MG/1
4 TABLET, ORALLY DISINTEGRATING ORAL EVERY 6 HOURS PRN
Status: DISCONTINUED | OUTPATIENT
Start: 2024-03-15 | End: 2024-03-18 | Stop reason: HOSPADM

## 2024-03-15 RX ORDER — ACETAMINOPHEN 325 MG/1
650 TABLET ORAL EVERY 4 HOURS PRN
Status: DISCONTINUED | OUTPATIENT
Start: 2024-03-15 | End: 2024-03-16

## 2024-03-15 RX ORDER — LORAZEPAM 0.5 MG/1
0.5 TABLET ORAL DAILY PRN
Status: ON HOLD | COMMUNITY
End: 2024-06-20

## 2024-03-15 RX ORDER — AMOXICILLIN 250 MG
2 CAPSULE ORAL 2 TIMES DAILY PRN
Status: DISCONTINUED | OUTPATIENT
Start: 2024-03-15 | End: 2024-03-18 | Stop reason: HOSPADM

## 2024-03-15 RX ORDER — GABAPENTIN 300 MG/1
300 CAPSULE ORAL 2 TIMES DAILY
Status: DISCONTINUED | OUTPATIENT
Start: 2024-03-16 | End: 2024-03-18 | Stop reason: HOSPADM

## 2024-03-15 RX ORDER — ACETAMINOPHEN 325 MG/1
650 TABLET ORAL 2 TIMES DAILY PRN
Status: ON HOLD | COMMUNITY
End: 2024-06-25

## 2024-03-15 RX ORDER — ONDANSETRON 2 MG/ML
4 INJECTION INTRAMUSCULAR; INTRAVENOUS EVERY 6 HOURS PRN
Status: DISCONTINUED | OUTPATIENT
Start: 2024-03-15 | End: 2024-03-18 | Stop reason: HOSPADM

## 2024-03-15 RX ORDER — LEVOFLOXACIN 500 MG/1
500 TABLET, FILM COATED ORAL DAILY
Status: DISCONTINUED | OUTPATIENT
Start: 2024-03-15 | End: 2024-03-18 | Stop reason: HOSPADM

## 2024-03-15 RX ORDER — FUROSEMIDE 40 MG
40 TABLET ORAL DAILY
Status: ON HOLD | COMMUNITY
End: 2024-03-18

## 2024-03-15 RX ORDER — AMOXICILLIN 250 MG
1 CAPSULE ORAL 2 TIMES DAILY PRN
Status: DISCONTINUED | OUTPATIENT
Start: 2024-03-15 | End: 2024-03-18 | Stop reason: HOSPADM

## 2024-03-15 RX ORDER — ALBUTEROL SULFATE 0.83 MG/ML
2.5 SOLUTION RESPIRATORY (INHALATION) EVERY 4 HOURS PRN
Status: DISCONTINUED | OUTPATIENT
Start: 2024-03-15 | End: 2024-03-16

## 2024-03-15 RX ORDER — IPRATROPIUM BROMIDE AND ALBUTEROL SULFATE 2.5; .5 MG/3ML; MG/3ML
3 SOLUTION RESPIRATORY (INHALATION)
Status: DISCONTINUED | OUTPATIENT
Start: 2024-03-15 | End: 2024-03-16

## 2024-03-15 RX ORDER — LORAZEPAM 0.5 MG/1
0.5 TABLET ORAL 3 TIMES DAILY PRN
Status: DISCONTINUED | OUTPATIENT
Start: 2024-03-15 | End: 2024-03-16

## 2024-03-15 RX ORDER — PREDNISONE 20 MG/1
40 TABLET ORAL DAILY
Status: DISCONTINUED | OUTPATIENT
Start: 2024-03-15 | End: 2024-03-18 | Stop reason: HOSPADM

## 2024-03-15 RX ORDER — GABAPENTIN 600 MG/1
600 TABLET ORAL AT BEDTIME
Status: DISCONTINUED | OUTPATIENT
Start: 2024-03-15 | End: 2024-03-18 | Stop reason: HOSPADM

## 2024-03-15 RX ORDER — ACETAMINOPHEN 650 MG/1
650 SUPPOSITORY RECTAL EVERY 4 HOURS PRN
Status: DISCONTINUED | OUTPATIENT
Start: 2024-03-15 | End: 2024-03-16

## 2024-03-15 RX ORDER — FLUTICASONE PROPIONATE 50 MCG
1 SPRAY, SUSPENSION (ML) NASAL EVERY MORNING
COMMUNITY

## 2024-03-15 RX ORDER — ALBUTEROL SULFATE 0.83 MG/ML
2.5 SOLUTION RESPIRATORY (INHALATION) ONCE
Status: COMPLETED | OUTPATIENT
Start: 2024-03-15 | End: 2024-03-15

## 2024-03-15 RX ORDER — GLIPIZIDE 10 MG/1
1 TABLET ORAL 2 TIMES DAILY
Status: DISCONTINUED | OUTPATIENT
Start: 2024-03-15 | End: 2024-03-18 | Stop reason: HOSPADM

## 2024-03-15 RX ORDER — LEVOTHYROXINE SODIUM 88 UG/1
88 TABLET ORAL DAILY
Status: DISCONTINUED | OUTPATIENT
Start: 2024-03-16 | End: 2024-03-18 | Stop reason: HOSPADM

## 2024-03-15 RX ORDER — ENOXAPARIN SODIUM 100 MG/ML
40 INJECTION SUBCUTANEOUS EVERY 24 HOURS
Status: DISCONTINUED | OUTPATIENT
Start: 2024-03-15 | End: 2024-03-18 | Stop reason: HOSPADM

## 2024-03-15 RX ADMIN — ENOXAPARIN SODIUM 40 MG: 40 INJECTION SUBCUTANEOUS at 21:32

## 2024-03-15 RX ADMIN — FUROSEMIDE 40 MG: 10 INJECTION, SOLUTION INTRAMUSCULAR; INTRAVENOUS at 18:50

## 2024-03-15 RX ADMIN — ALBUTEROL SULFATE 2.5 MG: 2.5 SOLUTION RESPIRATORY (INHALATION) at 15:33

## 2024-03-15 RX ADMIN — PREDNISONE 40 MG: 20 TABLET ORAL at 21:31

## 2024-03-15 RX ADMIN — LEVOFLOXACIN 500 MG: 500 TABLET, FILM COATED ORAL at 21:31

## 2024-03-15 RX ADMIN — BUSPIRONE HYDROCHLORIDE 10 MG: 10 TABLET ORAL at 21:31

## 2024-03-15 RX ADMIN — GABAPENTIN 600 MG: 600 TABLET, FILM COATED ORAL at 21:32

## 2024-03-15 RX ADMIN — DEXTRAN 70, GLYCERIN, HYPROMELLOSE 1 DROP: 1; 2; 3 SOLUTION/ DROPS OPHTHALMIC at 22:16

## 2024-03-15 ASSESSMENT — ACTIVITIES OF DAILY LIVING (ADL)
ADLS_ACUITY_SCORE: 35
ADLS_ACUITY_SCORE: 39
ADLS_ACUITY_SCORE: 39
ADLS_ACUITY_SCORE: 35
ADLS_ACUITY_SCORE: 39

## 2024-03-15 ASSESSMENT — COLUMBIA-SUICIDE SEVERITY RATING SCALE - C-SSRS
6. HAVE YOU EVER DONE ANYTHING, STARTED TO DO ANYTHING, OR PREPARED TO DO ANYTHING TO END YOUR LIFE?: NO
2. HAVE YOU ACTUALLY HAD ANY THOUGHTS OF KILLING YOURSELF IN THE PAST MONTH?: NO
1. IN THE PAST MONTH, HAVE YOU WISHED YOU WERE DEAD OR WISHED YOU COULD GO TO SLEEP AND NOT WAKE UP?: NO

## 2024-03-15 NOTE — PHARMACY-ADMISSION MEDICATION HISTORY
Pharmacist Admission Medication History    Admission medication history is complete. The information provided in this note is only as accurate as the sources available at the time of the update.    Information Source(s): Facility ([facility]) medication list/MAR and CareEverywhere/SureScripts via N/A    Pertinent Information: Pt is in process of tapering off buspirone and fluoxetine (to be discontinued on 3/21/24) with plan in place to start duloxetine 30mg daily on 3/26/24 after 7-day washout - did not add duloxetine to list at this time.    Changes made to PTA medication list:  Added:   Calmoseptine oint  Cyclosporine in [place] emul  Flonase  Lasix  Lorazepam PRN  Deleted:   Biotene soln  Chloraseptic lozenge  Benzonatate PRN  Olopatadine gtts  Changed:    Buspirone 10mg BID -> 5 mg BID for 1 week then discontinue 3/21/24  Fluoxetine 60 mg daily -> 20 mg daily for 1 week then discontinue 3/21/24    Allergies reviewed with patient and updates made in EHR: no    Medication History Completed By: Jayne Shelley [facility] 3/15/2024 5:52 PM    Prior to Admission medications    Medication Sig Last Dose Taking? End Date   acetaminophen (TYLENOL) 325 MG tablet Take 650 mg by mouth 2 times daily as needed for pain Take 6 hours apart from scheduled doses Unknown at PRN Yes    acetaminophen (TYLENOL) 325 MG tablet Take 650 mg by mouth every 8 hours 0630, 1400, 2145 3/15/2024 at X2 Yes    albuterol (PROAIR HFA/PROVENTIL HFA/VENTOLIN HFA) 108 (90 Base) MCG/ACT inhaler INHALE 2 PUFFS INTO THE LUNGS EVERY 4 HOURS AS NEEDED FOR SHORTNESS OF BREATH OR DIFFICULT BREATHING OR WHEEZING Unknown at PRN Yes    albuterol (PROVENTIL) (2.5 MG/3ML) 0.083% neb solution Take 1 vial (2.5 mg) by nebulization every 4 hours as needed for shortness of breath, wheezing or cough And BID Unknown at PRN Yes    busPIRone (BUSPAR) 5 MG tablet Take 5 mg by mouth 2 times daily Take for 1 week then discontinue 3/15/2024 at 0630 Yes 3/21/24    calcium carbonate 600 mg-vitamin D 400 units (CALTRATE) 600-400 MG-UNIT per tablet Take 1 tablet by mouth every evening  3/14/2024 at 2145 Yes    celecoxib (CELEBREX) 100 MG capsule Take 100 mg by mouth 2 times daily as needed for moderate pain (4-6) Unknown at PRN Yes    clotrimazole (LOTRIMIN) 1 % external cream Apply topically 2 times daily 3/15/2024 at X1 Yes    cycloSPORINE (CYCLOSPORINE IN KLARITY) 0.1 % EMUL Place 1 drop into both eyes 2 times daily 0630 and 2145 3/15/2024 at X1 Yes    FLUoxetine (PROZAC) 20 MG capsule Take 20 mg by mouth daily Take for 1 week then discontinue 3/15/2024 at 0630 Yes 3/21/24   fluticasone (FLONASE) 50 MCG/ACT nasal spray Spray 1 spray into both nostrils every morning 3/15/2024 at AM Yes    fluticasone-salmeterol (WIXELA INHUB) 100-50 MCG/ACT inhaler Inhale 1 puff into the lungs 2 times daily 3/15/2024 at X1 Yes    furosemide (LASIX) 40 MG tablet Take 40 mg by mouth daily 3/15/2024 at AM Yes    gabapentin (NEURONTIN) 300 MG capsule Take 1 capsule (300 mg) by mouth 2 times daily 3/15/2024 at 0630 and 1400 Yes    gabapentin (NEURONTIN) 600 MG tablet Take 1 tablet (600 mg) by mouth At Bedtime 3/14/2024 at HS Yes    guaiFENesin 1200 MG TB12 Take 1 tablet (1,200 mg) by mouth every 12 hours 3/15/2024 at X1 Yes    levothyroxine (SYNTHROID/LEVOTHROID) 88 MCG tablet Take 1 tablet (88 mcg) by mouth daily 3/15/2024 at AM Yes    LORazepam (ATIVAN) 0.5 MG tablet Take 0.5 mg by mouth daily as needed for anxiety Unknown at PRN Yes    melatonin 3 MG tablet Take 3 mg by mouth At Bedtime 3/14/2024 at HS Yes    menthol-zinc oxide (CALMOSEPTINE) 0.44-20.6 % OINT ointment Apply topically 2 times daily 0630 and 2145 3/15/2024 at X1 Yes    nystatin (NYSTOP) 029062 UNIT/GM external powder Apply tid to affectead area prn Unknown at PRN Yes    polyethylene glycol (MIRALAX) 17 g packet Take 17 g by mouth daily And repeat daily prn constipation 3/15/2024 at AM Yes

## 2024-03-15 NOTE — ED TRIAGE NOTES
Arrives via EMS for worsening SOB over the past 3 days. Lives in assisted living. H/o CHF. Some non-compliance with taking lasix per staff. Uses inhaler at home, no relief. Slightly tachypneic, VSS. ABCs intact. A/Ox4.

## 2024-03-15 NOTE — H&P
[facility]    History and Physical - Hospitalist Service       Date of Admission:  3/15/2024    Assessment & Plan      Carol Merida is a 88 year old female admitted on 3/15/2024.     Reactive airway disease versus interstitial lung disease  Acute hypoxic respiratory failure due to due to pneumonia with   Reactive airway disease versus interstitial airway disease  Patient has chronic shortness of breath with cough  Lung shows interstitial changes  There is no pleural effusion on CT scan  Will give patient levofloxacin, nebulizations as well as prednisone  Will continue with oxygen and try to wean it off if possible    Congestive heart failure with preserved LV function  At home patient is on furosemide 40 mg daily which will be continued specially now that the patient is getting prednisone  BNP is not elevated though given the patient's obesity this cannot be diagnostic per se  Will need to closely monitor shortness of breath and if it is getting worse increased dose of Lasix should there be other signs of CHF present    Anxiety  With possible panic attacks  At home patient is on buspirone 5 mg 2 times daily along with gabapentin 600 mg at bedtime and 300 mg 2 times daily  I will increase the dose of buspirone to 10 mg 2 times daily  Will also give lorazepam 0.5 mg 3 times daily as needed whereas patient was only once daily as needed before    Hypothyroidism  Will continue with 88 mcg of levothyroxine replacement          Diet:  Cardiac diet  DVT Prophylaxis: Enoxaparin (Lovenox) SQ  Benson Catheter: Not present  Lines: None     Cardiac Monitoring: None  Code Status:  Patient wants to be full code    Clinically Significant Risk Factors Present on Admission                  # Hypertension: Noted on problem list  # Chronic heart failure with preserved ejection fraction: heart failure noted on problem list and last echo with EF >50%                Disposition Plan   Admit to observation status        Ollie Chu MD  Hospitalist Service  [facility]  Securely message with i[employer] (more info)  Text page via [facility] Paging/Directory     ______________________________________________________________________    Chief Complaint   Shortness of breath    History is obtained from the patient who is not the best of the historians, medical records and my discussion with emergency department physician      History of Present Illness   Carol Merida is a 88 year old obese lady with hypertension, anxiety, melanoma of the skin, obstructive sleep apnea on CPAP, hypercholesteremia, hypothyroidism, congestive heart failure with the echo done in February 2023 showing LVEF greater than 70%, LVH with normal diastolic function.    She tells me that she has been having shortness of breath for a long time and also cough for a long time she says that her cough is productive of sputum and that she is wheezing.  She also tells me that she is quite anxious and a lot of her shortness of breath might be related to anxiety which has progressively gotten worse in the last 3 to weeks and especially in the last 3 days.  She denies any chest pain neck pain jaw pain or shoulder pain.  She thinks that her living place has dry paint and that is also contributing to her shortness of breath.  She denies weight increase she denies any worsening of lower extremity swelling.  Denies any orthopnea and states mostly shortness of breath is worse when she walks.    CT scan of the chest showed groundglass opacities and subpleural reticular abnormalities in the lower lung suggestive of pneumonitis or possible interstitial lung disease with bronchial wall thickening and endobronchial opacities in the lower lobes related to aspiration or mucous plugging.  COVID, influenza, RSV are negative.          Past Medical History    Past Medical History:   Diagnosis Date    Abnormal stress test     small area on stress thalium ?2003; but normal  angiogram 2012    Anemia     Anxiety     Cardiac dysrhythmia, unspecified     irregular heartbeat    CHF (congestive heart failure) (H) 6/18/2018    PETTY (dyspnea on exertion)     Hyperlipidemia LDL goal <100 2/10/2010    Hypertension goal BP (blood pressure) < 140/90 7/18/2010    Hypothyroid     Malignant neoplasm of breast (female), unspecified site 2005    infltrating ductal    MEDICAL HISTORY OF -     fx humerus Sept 2013.    Melanoma of skin, site unspecified     NONSPECIFIC MEDICAL HISTORY 04    fx fifth finger right hand    Obstructive sleep apnea (adult) (pediatric) 8/25/2006    CPAP     Osteoarthritis     Other chronic pain     Joint pain for many years.    Other osteoporosis     PONV (postoperative nausea and vomiting)     Tubular adenoma in colon 9/01    colonoscopy due 2004       Past Surgical History   Past Surgical History:   Procedure Laterality Date    ARTHROPLASTY HIP Left 7/6/2015    Procedure: ARTHROPLASTY HIP;  Surgeon: Junior Giordano MD;  Location: [facility] OR    ARTHROPLASTY HIP Right 11/2/2016    Procedure: ARTHROPLASTY HIP;  Surgeon: Junior Giordano MD;  Location: [facility] OR    ARTHROPLASTY REVISION HIP Left 7/22/2015    Procedure: ARTHROPLASTY REVISION HIP;  Surgeon: Junior Giordano MD;  Location: [facility] OR    CATARACT IOL, RT/LT      COLONOSCOPY  9/01    tubular adenoma; repeat 3 years.    COLONOSCOPY  9/04    normal; repeat 5 years ([facility])    HYSTERECTOMY, MARGAUX  summer 2004    and BSO    OPEN REDUCTION INTERNAL FIXATION RODDING INTRAMEDULLARY FEMUR Right 2/26/2023    Procedure: OPEN REDUCTION INTERNAL FIXATION RIGHT DISTAL FEMUR WITH RETROGRADE NAIL, LATERAL PLATE, AND CABLE;  Surgeon: Newton Lemon MD;  Location: [facility] OR    SURGICAL HISTORY OF -   9/05    left mastectomy    SURGICAL HISTORY OF -   2008    right ankle surgery; triple arthrodesis    SURGICAL HISTORY OF -   5/09    right ankle surgery; triple arthrodesis and deltoid reconstruction; possible other    SURGICAL  HISTORY OF -   2/10    removal of hardware from right ankle    [facility] NONSPECIFIC PROCEDURE      Knee replacement x 2 (B)    [facility] NONSPECIFIC PROCEDURE      s/p Tonsillectomy (college)    [facility] NONSPECIFIC PROCEDURE      s/p Appy (high school)    [facility] NONSPECIFIC PROCEDURE      Melanoma removed with sentinel node bx    [facility] NONSPECIFIC PROCEDURE       bilateral cataract       Prior to Admission Medications   Prior to Admission Medications   Prescriptions Last Dose Informant Patient Reported? Taking?   ACE/ARB/ARNI NOT PRESCRIBED (INTENTIONAL)   No No   Sig: Please choose reason not prescribed, below   BETA BLOCKER NOT PRESCRIBED (INTENTIONAL)   No No   Sig: Beta Blocker not prescribed intentionally due to EF > 40 % (ejection fraction) will leave up to Cardiology.   FLUoxetine (PROZAC) 20 MG capsule   No No   Sig: Take 3 capsules (60 mg) by mouth daily   acetaminophen (TYLENOL) 325 MG tablet 3/15/2024  Yes Yes   Sig: Take 650 mg by mouth every 8 hours 0630, 1400, 2145   albuterol (PROAIR HFA/PROVENTIL HFA/VENTOLIN HFA) 108 (90 Base) MCG/ACT inhaler   No No   Sig: INHALE 2 PUFFS INTO THE LUNGS EVERY 4 HOURS AS NEEDED FOR SHORTNESS OF BREATH OR DIFFICULT BREATHING OR WHEEZING   albuterol (PROVENTIL) (2.5 MG/3ML) 0.083% neb solution   No No   Sig: Take 1 vial (2.5 mg) by nebulization every 4 hours as needed for shortness of breath, wheezing or cough And BID   artificial saliva (BIOTENE MT) SOLN solution   No No   Sig: Swish and spit 2 mLs (2 sprays) in mouth 4 times daily   benzocaine-menthol (CHLORASEPTIC MAX) 15-10 MG lozenge   No No   Sig: Place 1 lozenge inside cheek every hour as needed   benzonatate (TESSALON) 100 MG capsule   No No   Sig: Take 1 capsule (100 mg) by mouth 3 times daily as needed for cough   calcium carbonate 600 mg-vitamin D 400 units (CALTRATE) 600-400 MG-UNIT per tablet  Self Yes No   Sig: Take 1 tablet by mouth every evening    celecoxib (CELEBREX) 100 MG capsule   Yes No   Sig: Take 100 mg by mouth 2  times daily as needed for moderate pain (4-6)   clotrimazole (LOTRIMIN) 1 % external cream   No No   Sig: Apply topically 2 times daily   fluticasone-salmeterol (WIXELA INHUB) 100-50 MCG/ACT inhaler   No No   Sig: Inhale 1 puff into the lungs 2 times daily   gabapentin (NEURONTIN) 300 MG capsule   No No   Sig: Take 1 capsule (300 mg) by mouth 2 times daily   gabapentin (NEURONTIN) 600 MG tablet   No No   Sig: Take 1 tablet (600 mg) by mouth At Bedtime   guaiFENesin 1200 MG TB12   No No   Sig: Take 1 tablet (1,200 mg) by mouth every 12 hours   hydrOXYzine (ATARAX) 25 MG tablet   No No   Sig: Take 0.5 tablets (12.5 mg) by mouth daily as needed for itching And TID prn   levothyroxine (SYNTHROID/LEVOTHROID) 88 MCG tablet   No No   Sig: Take 1 tablet (88 mcg) by mouth daily   melatonin 3 MG tablet   Yes No   Sig: Take 3 mg by mouth At Bedtime   nystatin (MYCOSTATIN) 200221 UNIT/GM external cream   Yes No   nystatin (NYSTOP) 090583 UNIT/GM external powder   No No   Sig: Apply tid to affectead area prn   olopatadine (PATANOL) 0.1 % ophthalmic solution   Yes No   Sig: Place 1 drop into both eyes 2 times daily as needed for allergies   polyethylene glycol (MIRALAX) 17 g packet   No No   Sig: Take 17 g by mouth daily And repeat daily prn constipation      Facility-Administered Medications: None        Review of Systems    Denies any headache, blurring of vision or double vision, neck pain jaw pain or shoulder pain, chest pain, as mentioned above has shortness of breath, cough without increased sputum production, nausea or vomiting, abdominal pain, diarrhea or constipation.  Denies any urinary symptoms of burning or increased frequency. Denies any weakness of upper or lower extremities or any seizure activity. Fever or chills. Bleeding from anywhere else.  No rashes or cellulitis.      Social History   I have reviewed this patient's social history and updated it with pertinent information if needed.  Social History     Tobacco  Use    Smoking status: Never    Smokeless tobacco: Never   Vaping Use    Vaping Use: Never used   Substance Use Topics    Alcohol use: Yes     Alcohol/week: 0.0 - 0.8 standard drinks of alcohol     Comment: 1 glass wine weekly    Drug use: Never         Family History   I have reviewed this patient's family history and updated it with pertinent information if needed.  Family History   Problem Relation Age of Onset    No Known Problems Brother         brain tumor related to shingles in his eye    Arthritis Mother     Hypertension Father     Cancer Father         lung    Cancer Grandchild         terratoma tumors    Breast Cancer Daughter          Allergies   Allergies   Allergen Reactions    Ace Inhibitors Cough        Physical Exam   Vital Signs: Temp: 97.6  F (36.4  C) Temp src: Temporal BP: 126/76 Pulse: 78   Resp: 18 SpO2: 96 % O2 Device: Nasal cannula Oxygen Delivery: 2 LPM  Weight: 0 lbs 0 oz      GENERAL: Patient does not look in any acute distress  HEENT: EOM+, Conjunctiva is clear   NECK: I could not see a jugular Venous distention with neck being obese  HEART: S1 S2  regular  Rate and Rhythm,    LUNGS: Respirations are not laboured, Lungs have decreased breath sounds in the lung bases to auscultation with Crepitations or Wheezing   ABDOMEN: Soft, there is no tenderness,  Bowel Sounds are  Positive   LOWER LIMBS:  Pedal Edema  Bilaterally   CNS:  Alert,  Oriented x 3, Moving all the Four Limbs            Data   Imaging results reviewed over the past 24 hrs:   Recent Results (from the past 24 hour(s))   Chest XR,  PA & LAT    Narrative    EXAM: XR CHEST 2 VIEWS  LOCATION: [facility]  DATE: 3/15/2024    INDICATION: shortness of breath  COMPARISON: 05/02/2023 and older studies      Impression    IMPRESSION: Large body habitus with low lung volumes. Lungs are clear. Heart is of normal size. There is pulmonary vascular congestion, accentuated by the shallow inspiration. No signs of failure.  No effusions. Old fracture deformity of the right humeral   neck again noted.   Chest CT w/o contrast    Narrative    EXAM: CT CHEST W/O CONTRAST  LOCATION: [facility]  DATE: 3/15/2024    INDICATION: concern for pneumonia CHF, increased work of breathing  COMPARISON: 9/28/2022  TECHNIQUE: CT chest without IV contrast. Multiplanar reformats were obtained. Dose reduction techniques were used.  CONTRAST: None.    FINDINGS:   LUNGS AND PLEURA: Bronchial wall thickening and endobronchial opacities in both lower lobes. Groundglass opacities and subpleural reticular abnormality in the lower lobes as well. No pleural effusion or pneumothorax.    MEDIASTINUM/AXILLAE: No lymphadenopathy. No thoracic aortic aneurysm.    CORONARY ARTERY CALCIFICATION: Mild.    UPPER ABDOMEN: Hepatic steatosis and cholelithiasis.    MUSCULOSKELETAL: Degenerative changes in the spine and shoulders.      Impression    IMPRESSION:   1.  Groundglass opacities and subpleural reticular abnormality in the lower lobes suggesting pneumonitis or possibly interstitial lung disease.    2.  Bronchial wall thickening and endobronchial opacities in the lower lobes, which could be related to aspiration and/or mucous plugging.       Most Recent 3 CBC's:  Recent Labs   Lab Test 03/15/24  1543 02/27/24  1413 09/05/23  0938   WBC 6.7 6.7 5.4   HGB 14.2 13.2 13.2   MCV 94 93 94    286 279     Most Recent 3 BMP's:  Recent Labs   Lab Test 03/15/24  1543 02/27/24  1413 09/05/23  0938    141 142   POTASSIUM 4.4 4.8 4.1   CHLORIDE 96* 101 101   CO2 35* 28 26   BUN 14.3 13.4 16.5   CR 0.74 0.70 0.69   ANIONGAP 10 12 15   LAKSHMI 9.3 9.1 9.6   * 100* 96     Most Recent 2 LFT's:  Recent Labs   Lab Test 09/05/23  0938 03/06/23  0759   AST 34 26   ALT 16 20   ALKPHOS 111* 104   BILITOTAL 0.5 0.7     Most Recent 3 INR's:  Recent Labs   Lab Test 02/26/23  0607 02/26/23  0106 09/28/22  1631   INR 1.04 1.07 1.15     Most Recent 3  Troponin's:  Recent Labs   Lab Test 04/07/21  1427 04/07/21  1228 02/13/21  1308   TROPI <0.015 <0.015 <0.015     Most Recent 3 BNP's:  Recent Labs   Lab Test 03/15/24  1543 08/09/22  1303 11/04/21  1201 02/13/21  1308 03/12/19  0957 01/04/19  1322   NTBNPI 72 54  --  44  --   --    NTBNP  --   --  75  --  59 36     Most Recent D-dimer:  Recent Labs   Lab Test 08/09/22  1304   DD 2.50*     Most Recent ABG:  Recent Labs   Lab Test 02/26/23  1746   PH 7.40   PO2 100   PCO2 42   HCO3 26   JONE 0.8

## 2024-03-15 NOTE — ED NOTES
[facility]  ED Nurse Handoff Report    ED Chief complaint: Shortness of Breath  . ED Diagnosis:   Final diagnoses:   Wheezing   Chronic diastolic congestive heart failure (H)   Hypoxia       Allergies:   Allergies   Allergen Reactions    Ace Inhibitors Cough       Code Status: Full Code    Activity level - Baseline/Home:  lift.  Activity Level - Current:   in bed.   Lift room needed: No.   Bariatric: No   Needed: No   Isolation: No.   Infection: Not Applicable.     Respiratory status: Nasal cannula    Vital Signs (within 30 minutes):   Vitals:    03/15/24 1545 03/15/24 1600 03/15/24 1636 03/15/24 1700   BP:   133/78 126/76   Pulse: 68 68 75 78   Resp: 16 17 27 18   Temp:       TempSrc:       SpO2: 98% 98% 96% 96%       Cardiac Rhythm:  ,      Pain level:    Patient confused: No.   Patient Falls Risk: arm band in place, patient and family education, and assistive device/personal items within reach.   Elimination Status: Has voided     Patient Report - Initial Complaint: sob.   Focused Assessment: wheezing, hypoxia, chf     Abnormal Results:   Labs Ordered and Resulted from Time of ED Arrival to Time of ED Departure   BASIC METABOLIC PANEL - Abnormal       Result Value    Sodium 141      Potassium 4.4      Chloride 96 (*)     Carbon Dioxide (CO2) 35 (*)     Anion Gap 10      Urea Nitrogen 14.3      Creatinine 0.74      GFR Estimate 78      Calcium 9.3      Glucose 102 (*)    TROPONIN T, HIGH SENSITIVITY - Abnormal    Troponin T, High Sensitivity 18 (*)    CBC WITH PLATELETS AND DIFFERENTIAL - Abnormal    WBC Count 6.7      RBC Count 4.89      Hemoglobin 14.2      Hematocrit 46.1      MCV 94      MCH 29.0      MCHC 30.8 (*)     RDW 13.1      Platelet Count 250      % Neutrophils 69      % Lymphocytes 13      % Monocytes 8      % Eosinophils 9      % Basophils 1      % Immature Granulocytes 0      NRBCs per 100 WBC 0      Absolute Neutrophils 4.7      Absolute Lymphocytes 0.8       Absolute Monocytes 0.5      Absolute Eosinophils 0.6      Absolute Basophils 0.0      Absolute Immature Granulocytes 0.0      Absolute NRBCs 0.0     NT PROBNP INPATIENT - Normal    N terminal Pro BNP Inpatient 72     INFLUENZA A/B, RSV, & SARS-COV2 PCR - Normal    Influenza A PCR Negative      Influenza B PCR Negative      RSV PCR Negative      SARS CoV2 PCR Negative          Chest XR,  PA & LAT   Final Result   IMPRESSION: Large body habitus with low lung volumes. Lungs are clear. Heart is of normal size. There is pulmonary vascular congestion, accentuated by the shallow inspiration. No signs of failure. No effusions. Old fracture deformity of the right humeral    neck again noted.          Treatments provided: oxygen  Family Comments:   OBS brochure/video discussed/provided to patient:  Yes  ED Medications:   Medications   albuterol (PROVENTIL) neb solution 2.5 mg (2.5 mg Nebulization $Given 3/15/24 9108)       Drips infusing:  No  For the majority of the shift this patient was Green.   Interventions performed were .    Sepsis treatment initiated: No    Cares/treatment/interventions/medications to be completed following ED care:     ED Nurse Name: Tanesha Huang RN  5:34 PM

## 2024-03-16 ENCOUNTER — APPOINTMENT (OUTPATIENT)
Dept: PHYSICAL THERAPY | Facility: CLINIC | Age: 89
DRG: 196 | End: 2024-03-16
Attending: INTERNAL MEDICINE
Payer: COMMERCIAL

## 2024-03-16 LAB
ANION GAP SERPL CALCULATED.3IONS-SCNC: 11 MMOL/L (ref 7–15)
BUN SERPL-MCNC: 14.8 MG/DL (ref 8–23)
CALCIUM SERPL-MCNC: 9.2 MG/DL (ref 8.8–10.2)
CHLORIDE SERPL-SCNC: 91 MMOL/L (ref 98–107)
CREAT SERPL-MCNC: 0.68 MG/DL (ref 0.51–0.95)
DEPRECATED HCO3 PLAS-SCNC: 33 MMOL/L (ref 22–29)
EGFRCR SERPLBLD CKD-EPI 2021: 84 ML/MIN/1.73M2
ERYTHROCYTE [DISTWIDTH] IN BLOOD BY AUTOMATED COUNT: 12.8 % (ref 10–15)
GLUCOSE SERPL-MCNC: 135 MG/DL (ref 70–99)
HCT VFR BLD AUTO: 43.8 % (ref 35–47)
HGB BLD-MCNC: 13.9 G/DL (ref 11.7–15.7)
MCH RBC QN AUTO: 29 PG (ref 26.5–33)
MCHC RBC AUTO-ENTMCNC: 31.7 G/DL (ref 31.5–36.5)
MCV RBC AUTO: 91 FL (ref 78–100)
PLATELET # BLD AUTO: 257 10E3/UL (ref 150–450)
POTASSIUM SERPL-SCNC: 4.3 MMOL/L (ref 3.4–5.3)
RBC # BLD AUTO: 4.79 10E6/UL (ref 3.8–5.2)
SODIUM SERPL-SCNC: 135 MMOL/L (ref 135–145)
WBC # BLD AUTO: 6.9 10E3/UL (ref 4–11)

## 2024-03-16 PROCEDURE — 85027 COMPLETE CBC AUTOMATED: CPT | Performed by: INTERNAL MEDICINE

## 2024-03-16 PROCEDURE — 36415 COLL VENOUS BLD VENIPUNCTURE: CPT | Performed by: INTERNAL MEDICINE

## 2024-03-16 PROCEDURE — 250N000013 HC RX MED GY IP 250 OP 250 PS 637: Performed by: PHYSICIAN ASSISTANT

## 2024-03-16 PROCEDURE — 97161 PT EVAL LOW COMPLEX 20 MIN: CPT | Mod: GP

## 2024-03-16 PROCEDURE — 999N000111 HC STATISTIC OT IP EVAL DEFER

## 2024-03-16 PROCEDURE — 97530 THERAPEUTIC ACTIVITIES: CPT | Mod: GP

## 2024-03-16 PROCEDURE — 120N000001 HC R&B MED SURG/OB

## 2024-03-16 PROCEDURE — 250N000011 HC RX IP 250 OP 636: Performed by: INTERNAL MEDICINE

## 2024-03-16 PROCEDURE — G0378 HOSPITAL OBSERVATION PER HR: HCPCS

## 2024-03-16 PROCEDURE — 999N000157 HC STATISTIC RCP TIME EA 10 MIN

## 2024-03-16 PROCEDURE — 250N000013 HC RX MED GY IP 250 OP 250 PS 637: Performed by: INTERNAL MEDICINE

## 2024-03-16 PROCEDURE — 80048 BASIC METABOLIC PNL TOTAL CA: CPT | Performed by: INTERNAL MEDICINE

## 2024-03-16 PROCEDURE — 94640 AIRWAY INHALATION TREATMENT: CPT | Mod: 76

## 2024-03-16 PROCEDURE — 999N000156 HC STATISTIC RCP CONSULT EA 30 MIN

## 2024-03-16 PROCEDURE — 250N000012 HC RX MED GY IP 250 OP 636 PS 637: Performed by: INTERNAL MEDICINE

## 2024-03-16 PROCEDURE — 99233 SBSQ HOSP IP/OBS HIGH 50: CPT | Performed by: PHYSICIAN ASSISTANT

## 2024-03-16 PROCEDURE — 250N000009 HC RX 250: Performed by: INTERNAL MEDICINE

## 2024-03-16 RX ORDER — FLUTICASONE FUROATE AND VILANTEROL 100; 25 UG/1; UG/1
1 POWDER RESPIRATORY (INHALATION) DAILY
Status: DISCONTINUED | OUTPATIENT
Start: 2024-03-16 | End: 2024-03-18 | Stop reason: HOSPADM

## 2024-03-16 RX ORDER — POLYETHYLENE GLYCOL 3350 17 G/17G
17 POWDER, FOR SOLUTION ORAL DAILY
Status: DISCONTINUED | OUTPATIENT
Start: 2024-03-16 | End: 2024-03-18 | Stop reason: HOSPADM

## 2024-03-16 RX ORDER — LORAZEPAM 0.5 MG/1
0.5 TABLET ORAL DAILY PRN
Status: DISCONTINUED | OUTPATIENT
Start: 2024-03-16 | End: 2024-03-18 | Stop reason: HOSPADM

## 2024-03-16 RX ORDER — GUAIFENESIN 600 MG/1
600 TABLET, EXTENDED RELEASE ORAL 2 TIMES DAILY
Status: DISCONTINUED | OUTPATIENT
Start: 2024-03-16 | End: 2024-03-18 | Stop reason: HOSPADM

## 2024-03-16 RX ORDER — IPRATROPIUM BROMIDE AND ALBUTEROL SULFATE 2.5; .5 MG/3ML; MG/3ML
3 SOLUTION RESPIRATORY (INHALATION) 4 TIMES DAILY
Status: DISCONTINUED | OUTPATIENT
Start: 2024-03-16 | End: 2024-03-18 | Stop reason: HOSPADM

## 2024-03-16 RX ORDER — CELECOXIB 50 MG/1
100 CAPSULE ORAL 2 TIMES DAILY PRN
Status: DISCONTINUED | OUTPATIENT
Start: 2024-03-16 | End: 2024-03-18 | Stop reason: HOSPADM

## 2024-03-16 RX ORDER — ALBUTEROL SULFATE 0.83 MG/ML
2.5 SOLUTION RESPIRATORY (INHALATION) EVERY 6 HOURS PRN
Status: DISCONTINUED | OUTPATIENT
Start: 2024-03-16 | End: 2024-03-18 | Stop reason: HOSPADM

## 2024-03-16 RX ORDER — BUSPIRONE HYDROCHLORIDE 5 MG/1
5 TABLET ORAL 2 TIMES DAILY
Status: DISCONTINUED | OUTPATIENT
Start: 2024-03-16 | End: 2024-03-18 | Stop reason: HOSPADM

## 2024-03-16 RX ORDER — ACETAMINOPHEN 325 MG/1
650 TABLET ORAL 3 TIMES DAILY
Status: DISCONTINUED | OUTPATIENT
Start: 2024-03-16 | End: 2024-03-18 | Stop reason: HOSPADM

## 2024-03-16 RX ORDER — FLUTICASONE PROPIONATE 50 MCG
1 SPRAY, SUSPENSION (ML) NASAL EVERY MORNING
Status: DISCONTINUED | OUTPATIENT
Start: 2024-03-17 | End: 2024-03-18 | Stop reason: HOSPADM

## 2024-03-16 RX ORDER — ACETAMINOPHEN 325 MG/1
650 TABLET ORAL 2 TIMES DAILY PRN
Status: DISCONTINUED | OUTPATIENT
Start: 2024-03-16 | End: 2024-03-18 | Stop reason: HOSPADM

## 2024-03-16 RX ADMIN — ACETAMINOPHEN 650 MG: 325 TABLET, FILM COATED ORAL at 08:05

## 2024-03-16 RX ADMIN — GUAIFENESIN 600 MG: 600 TABLET, EXTENDED RELEASE ORAL at 11:00

## 2024-03-16 RX ADMIN — DEXTRAN 70, GLYCERIN, HYPROMELLOSE 1 DROP: 1; 2; 3 SOLUTION/ DROPS OPHTHALMIC at 21:03

## 2024-03-16 RX ADMIN — ACETAMINOPHEN 650 MG: 325 TABLET, FILM COATED ORAL at 20:50

## 2024-03-16 RX ADMIN — IPRATROPIUM BROMIDE AND ALBUTEROL SULFATE 3 ML: .5; 3 SOLUTION RESPIRATORY (INHALATION) at 20:06

## 2024-03-16 RX ADMIN — POLYETHYLENE GLYCOL 3350 17 G: 17 POWDER, FOR SOLUTION ORAL at 20:49

## 2024-03-16 RX ADMIN — ENOXAPARIN SODIUM 40 MG: 40 INJECTION SUBCUTANEOUS at 20:50

## 2024-03-16 RX ADMIN — ACETAMINOPHEN 650 MG: 325 TABLET, FILM COATED ORAL at 13:28

## 2024-03-16 RX ADMIN — PREDNISONE 40 MG: 20 TABLET ORAL at 08:05

## 2024-03-16 RX ADMIN — FLUOXETINE HYDROCHLORIDE 20 MG: 20 CAPSULE ORAL at 20:50

## 2024-03-16 RX ADMIN — GABAPENTIN 600 MG: 600 TABLET, FILM COATED ORAL at 21:00

## 2024-03-16 RX ADMIN — BUSPIRONE HYDROCHLORIDE 10 MG: 10 TABLET ORAL at 08:05

## 2024-03-16 RX ADMIN — FUROSEMIDE 40 MG: 40 TABLET ORAL at 08:06

## 2024-03-16 RX ADMIN — IPRATROPIUM BROMIDE AND ALBUTEROL SULFATE 3 ML: .5; 3 SOLUTION RESPIRATORY (INHALATION) at 12:26

## 2024-03-16 RX ADMIN — IPRATROPIUM BROMIDE AND ALBUTEROL SULFATE 3 ML: .5; 3 SOLUTION RESPIRATORY (INHALATION) at 02:30

## 2024-03-16 RX ADMIN — LEVOFLOXACIN 500 MG: 500 TABLET, FILM COATED ORAL at 20:50

## 2024-03-16 RX ADMIN — FLUTICASONE FUROATE AND VILANTEROL TRIFENATATE 1 PUFF: 100; 25 POWDER RESPIRATORY (INHALATION) at 21:03

## 2024-03-16 RX ADMIN — LEVOTHYROXINE SODIUM 88 MCG: 0.09 TABLET ORAL at 08:05

## 2024-03-16 RX ADMIN — GABAPENTIN 300 MG: 300 CAPSULE ORAL at 08:06

## 2024-03-16 RX ADMIN — IPRATROPIUM BROMIDE AND ALBUTEROL SULFATE 3 ML: .5; 3 SOLUTION RESPIRATORY (INHALATION) at 08:45

## 2024-03-16 RX ADMIN — GABAPENTIN 300 MG: 300 CAPSULE ORAL at 13:27

## 2024-03-16 RX ADMIN — BUSPIRONE HYDROCHLORIDE 5 MG: 5 TABLET ORAL at 20:50

## 2024-03-16 RX ADMIN — GUAIFENESIN 600 MG: 600 TABLET, EXTENDED RELEASE ORAL at 20:50

## 2024-03-16 RX ADMIN — ACETAMINOPHEN 650 MG: 325 TABLET, FILM COATED ORAL at 02:34

## 2024-03-16 ASSESSMENT — ACTIVITIES OF DAILY LIVING (ADL)
ADLS_ACUITY_SCORE: 49
ADLS_ACUITY_SCORE: 45
TOILETING: 1-->ASSISTANCE (EQUIPMENT/PERSON) NEEDED (NOT DEVELOPMENTALLY APPROPRIATE)
ADLS_ACUITY_SCORE: 45
ADLS_ACUITY_SCORE: 49
WALKING_OR_CLIMBING_STAIRS: AMBULATION DIFFICULTY, REQUIRES EQUIPMENT;AMBULATION DIFFICULTY, ASSISTANCE 1 PERSON;STAIR CLIMBING DIFFICULTY, REQUIRES EQUIPMENT;STAIR CLIMBING DIFFICULTY, ASSISTANCE 1 PERSON;TRANSFERRING DIFFICULTY, REQUIRES EQUIPMENT;TRANSFERRING DIFFICULTY, ASSISTANCE 1 PERSON
DIFFICULTY_COMMUNICATING: NO
ADLS_ACUITY_SCORE: 45
DRESSING/BATHING: BATHING DIFFICULTY, ASSISTANCE 1 PERSON;DRESSING DIFFICULTY, ASSISTANCE 1 PERSON
ADLS_ACUITY_SCORE: 45
ADLS_ACUITY_SCORE: 49
ADLS_ACUITY_SCORE: 45
DOING_ERRANDS_INDEPENDENTLY_DIFFICULTY: YES
ADLS_ACUITY_SCORE: 49
WALKING_OR_CLIMBING_STAIRS_DIFFICULTY: YES
ADLS_ACUITY_SCORE: 49
ADLS_ACUITY_SCORE: 45
ADLS_ACUITY_SCORE: 49
TOILETING: 1-->ASSISTANCE (EQUIPMENT/PERSON) NEEDED
DIFFICULTY_EATING/SWALLOWING: NO
DRESSING/BATHING_DIFFICULTY: YES
ADLS_ACUITY_SCORE: 45
WEAR_GLASSES_OR_BLIND: YES
ADLS_ACUITY_SCORE: 45
TOILETING_ISSUES: YES
ADLS_ACUITY_SCORE: 49
ADLS_ACUITY_SCORE: 45
VISION_MANAGEMENT: GLASSES
CONCENTRATING,_REMEMBERING_OR_MAKING_DECISIONS_DIFFICULTY: YES
ADLS_ACUITY_SCORE: 49
TOILETING_ASSISTANCE: TOILETING DIFFICULTY, ASSISTANCE 1 PERSON
ADLS_ACUITY_SCORE: 49

## 2024-03-16 NOTE — PLAN OF CARE
"PRIMARY DIAGNOSIS: COPD exacerbation     OUTPATIENT/OBSERVATION GOALS TO BE MET BEFORE DISCHARGE:  ADLs back to baseline: Yes     Activity and level of assistance: At baseline. Baseline is WC bound, pivot turn to WC/Chair.      Pain status: Baseline joint pain. Tylenol given for pain.      Return to near baseline physical activity: Yes          Discharge Planner Nurse   Safe discharge environment identified: Yes  Barriers to discharge: No         Entered by: Noy Kemp RN 03/16/2024 12:30 PM    Please review provider order for any additional goals.   Nurse to notify provider when observation goals have been met and patient is ready for discharge.  Goal Outcome Evaluation:      Plan of Care Reviewed With: patient    Overall Patient Progress: improvingOverall Patient Progress: improving    Outcome Evaluation: Room air 91-93%      Problem: Adult Inpatient Plan of Care  Goal: Plan of Care Review  Description: The Plan of Care Review/Shift note should be completed every shift.  The Outcome Evaluation is a brief statement about your assessment that the patient is improving, declining, or no change.  This information will be displayed automatically on your shift  note.  3/16/2024 1250 by Noy Kemp RN  Outcome: Progressing  Flowsheets (Taken 3/16/2024 1230)  Outcome Evaluation: Room air 91-93%  Plan of Care Reviewed With: patient  Overall Patient Progress: improving  3/16/2024 0831 by Noy Kemp RN  Outcome: Progressing  Goal: Patient-Specific Goal (Individualized)  Description: You can add care plan individualizations to a care plan. Examples of Individualization might be:  \"Parent requests to be called daily at 9am for status\", \"I have a hard time hearing out of my right ear\", or \"Do not touch me to wake me up as it startles  me\".  3/16/2024 1250 by Noy Kemp RN  Outcome: Progressing  3/16/2024 1249 by Noy Kemp RN  Outcome: Progressing  3/16/2024 0831 by Noy Kemp RN  Outcome: " Progressing  Goal: Absence of Hospital-Acquired Illness or Injury  3/16/2024 1250 by Noy Kemp RN  Outcome: Progressing  3/16/2024 1249 by Noy Kemp RN  Outcome: Progressing  3/16/2024 0831 by Noy Kemp RN  Outcome: Progressing  Intervention: Identify and Manage Fall Risk  Recent Flowsheet Documentation  Taken 3/16/2024 0845 by Noy Kemp RN  Safety Promotion/Fall Prevention:   lighting adjusted   increase visualization of patient   increased rounding and observation   clutter free environment maintained   mobility aid in reach   room door open  Intervention: Prevent and Manage VTE (Venous Thromboembolism) Risk  Recent Flowsheet Documentation  Taken 3/16/2024 0845 by Noy Kemp RN  VTE Prevention/Management: compression stockings on  Goal: Optimal Comfort and Wellbeing  3/16/2024 1250 by Noy Kemp RN  Outcome: Progressing  3/16/2024 1249 by Noy Kemp RN  Outcome: Progressing  3/16/2024 0831 by Noy Kemp RN  Outcome: Progressing  Intervention: Monitor Pain and Promote Comfort  Recent Flowsheet Documentation  Taken 3/16/2024 0805 by Noy Kemp RN  Pain Management Interventions: medication (see MAR)  Goal: Readiness for Transition of Care  3/16/2024 1250 by Noy Kemp RN  Outcome: Progressing  3/16/2024 1249 by Noy Kemp RN  Outcome: Progressing  3/16/2024 0831 by Noy Kemp RN  Outcome: Progressing

## 2024-03-16 NOTE — PLAN OF CARE
PRIMARY DIAGNOSIS: COPD exacerbation   OUTPATIENT/OBSERVATION GOALS TO BE MET BEFORE DISCHARGE:  ADLs back to baseline: Yes    Activity and level of assistance: At baseline. Baseline is WC bound, pivot turn to WC/Chair.     Pain status: Baseline joint pain. Tylenol given for pain.     Return to near baseline physical activity: Yes     Discharge Planner Nurse   Safe discharge environment identified: Yes  Barriers to discharge: No       Entered by: Noy Kemp RN 03/16/2024 8:32 AM     Please review provider order for any additional goals.   Nurse to notify provider when observation goals have been met and patient is ready for discharge.    Goal Outcome Evaluation:

## 2024-03-16 NOTE — UTILIZATION REVIEW
[facility] Utilization Review  Admission Status; Secondary Review Determination     Admission Date: 3/15/2024  2:44 PM      Under the authority of the Utilization Management Committee, the utilization review process indicated a secondary review on the above patient.  The review outcome is based on review of the medical records, discussions with staff, and applying clinical experience noted on the date of the review.        (X)      Inpatient Status Appropriate - This patient's medical care is consistent with medical management for inpatient care and reasonable inpatient medical practice.          RATIONALE FOR DETERMINATION   88-year-old female with history of reactive airway disease versus interstitial lung disease, admitted with acute hypoxic respiratory failure due to pneumonia, no pleural effusion on CT scan, started on Levaquin, prednisone, nebulizer treatments and weaning oxygen as tolerated.  Complex patient who needs close monitoring in the hospital with ongoing interventions that require ongoing hospital stay, recommend change to inpatient status, communicated to BON Bowling      The severity of illness, intensity of service provided, expected LOS and risk for adverse outcome make the care complex, high risk and appropriate for hospital admission.The patient requires hospital based medical care which is anticipated to require a stay of 2 or more midnights; according to CMS guidelines the patient should be admitted as inpatient        The information on this document is developed by the utilization review team in order for the business office to ensure compliance.  This only denotes the appropriateness of proper admission status and does not reflect the quality of care rendered.         The definitions of Inpatient Status and Observation Status used in making the determination above are those provided in the CMS Coverage Manual, Chapter 1 and Chapter 6, section 70.4.      Sincerely,        Sabrina Villaseñor MD  Physician Advisor  Utilization Review-[place]    Phone: [phone]

## 2024-03-16 NOTE — PLAN OF CARE
PRIMARY DIAGNOSIS: COPD EXACERBATION  OUTPATIENT/OBSERVATION GOALS TO BE MET BEFORE DISCHARGE:  1. Vital signs stable: Yes    2. Improvement of peak flow to greater than 70% sustained off nebulizer for 4 hours: Yes    3. Dyspnea improved and O2 sats >88% at RA or at prior home O2 therapy level: Yes      SpO2: 94 %, O2 Device: Nasal cannula    4. Short term supplemental O2 needed for use with activity at home: No    5. Tolerating adequate PO diet and medications: Yes    6. Return to near baseline physical activity: No    Discharge Planner Nurse   Safe discharge environment identified: Yes  Barriers to discharge: Yes       Entered by: Ingrid Stanford RN 03/16/2024 1:18 AM   Pt is alert and oriented x4. Arrived on unit with 2LPM O2 but weaned down to 1lpm and maintained saturations above 95%. She states she has not used CPAP machine for over a year and declined it when offered. Has frequent congested cough and SOB with exertion. She reports she usually able to self transfer at home with walker to wheel chair. Purewick in place as she is unable to ambulate. MASD noted on buttocks and groin area. Patient refused barrier cream when offered. Skin opening noted on left buttocks, Mepiplex applied.   Please review provider order for any additional goals.   Nurse to notify provider when observation goals have been met and patient is ready for discharge.

## 2024-03-16 NOTE — PROGRESS NOTES
"[facility]  Hospitalist Progress Note  Carla Urbano PA-C 03/16/2024         Assessment and Plan:      Carol Merida is a 88 year old obese lady with hypertension, anxiety, melanoma of the skin, obstructive sleep apnea not compliant on CPAP, hypercholesteremia, hypothyroidism, congestive heart failure with the echo done in February 2023 showing pEF who presents with several weeks hx of worsening SOB, intermittent cough and PETTY.  She describes what sounds like post nasal drip, with SOB with limited activities but also heavily affected by her anxiety. She was told that she had \" fluid in the lung and around her heart\" last week and did an ECHO via [facility] (unable to get results on [facility] Everywhere).   Initial CXR shows vascular congestion but no e/o failure, CT shows groundglass opacities suggestive of pneumonitis vs interstitial lung disease. She was started on oral steroids, Levaquin and duonebs with improved sxs.     Acute hypoxic respiratory failure due to due bronchitis, and reactive airway disease versus interstitial airway disease  JEAN - non compliant with CPAP   -Patient has chronic shortness of breath with cough with previous Ct scan showing fibrosis and interstitial changes  -Seen by Dr. Adamson in the past but has not been back > 5 yrs   -Breathing maintained on Advair inhaler but now worse over the last 3-6 months but also has put on weight due to dec mobility from IM nailing of the right femur 2/23 and TCU stay coupled with increased anxiety and panic attack causing SOB   - Admits to being non-compliant with CPA due to intolerance of mask   - Multiple reasons for worsening pulm status including viral bronchitis, RAD, progressive interstitial lung disease vs untreated JEAN/Pulm HTN and possible interval development of diastolic heart failure   Plan:   - Cont course of levofloxacin 7 days for bronchitis   - Cont nebulizer, Ok to resume inhaler, Cont 40 mg Prednisone   - Obtain ECHO as " "last one was > 1 yr ago (states had one last week with [facility] but unable to get records)   - Ultimately will need repeat outpt PFT, Sleep study and Pulm follow up   - Suspect will need home O2 at night, will start with overnight oximetry testing      Congestive heart failure with preserved LV function  - At home patient is on furosemide 40 mg daily   - Does not look particularly fluids overloaded   - Cont home PTA lasix dosing   - was told she had \"fluid in her lungs last week\" and had outpt ECHO but unable to find records  - Does not do daily weights   - Will get ECHO to assess EF and valve function ans presence of Pulm HTN due to untreated JEAN      Anxiety  With possible panic attacks - also contributing to SOB   At home patient is on buspirone 5 mg 2 times daily and Prozac along with gabapentin 600 mg at bedtime and 300 mg 2 times daily  - C/o increased anxiety over her health and causes panic attacks  - PCP aware, recommended outpt therapist as well as tapering down Buspar and Prozac this week and change to duloxetine 30mg daily on 3/26/24      Hypothyroidism  Will continue with 88 mcg of levothyroxine replacement     Poor mobility/Deconditioning   Recently moved to [place] in the last 2 months, prior she was at TCU for 8 mos for Rt femur fx complicated by non-healing.   - seen by PT: currently [facility] for pivot transfers. At baseline lives in senior care and has supervision for all mobility and assist with ADLs. Recommend returning to [facility] once medically appropriate.      Diet:  Cardiac diet  DVT Prophylaxis: Enoxaparin (Lovenox) SQ  Benson Catheter: Not present  Lines: None     Cardiac Monitoring: None  Code Status:  Patient wants to be full code         Interval History (Subjective):      States breathing is better since admission.   Eating ok.   No swelling or LE edema.   C/o increased anxiety and talking about it makes her SOB                  Physical Exam:      Last Vital Signs:  /74 (BP Location: Right arm)   " "Pulse 75   Temp 98.4  F (36.9  C) (Axillary)   Resp 18   Ht 1.615 m (5' 3.6\")   Wt 107.8 kg (237 lb 10.5 oz)   LMP  (LMP Unknown)   SpO2 94%   BMI 41.31 kg/m        Intake/Output Summary (Last 24 hours) at 3/16/2024 1804  Last data filed at 3/16/2024 1500  Gross per 24 hour   Intake 600 ml   Output 1500 ml   Net -900 ml       Constitutional: Awake, alert, cooperative, no apparent distress, Obese      Respiratory: Dec bs at bases with few ronchi and wheeze, poor air exchange.    Cardiovascular: Regular rate and rhythm, normal S1 and S2, and no murmur noted   Abdomen: Normal bowel sounds, soft, non-distended, non-tender   Skin: No rashes, no cyanosis, dry to touch   Neuro: Alert and oriented x3, no weakness, numbness, memory loss   Extremities: No edema, normal range of motion   Other(s):        All other systems: Negative          Medications:      All current medications were reviewed with changes reflected in problem list.         Data:      All new lab and imaging data was reviewed.   Labs:       Lab Results   Component Value Date     03/16/2024     03/15/2024     02/27/2024     04/07/2021     02/13/2021     08/31/2020    Lab Results   Component Value Date    CHLORIDE 91 03/16/2024    CHLORIDE 96 03/15/2024    CHLORIDE 101 02/27/2024    CHLORIDE 99 08/19/2022    CHLORIDE 102 11/04/2021    CHLORIDE 107 09/03/2021    CHLORIDE 101 04/07/2021    CHLORIDE 105 02/13/2021    CHLORIDE 104 08/31/2020    Lab Results   Component Value Date    BUN 14.8 03/16/2024    BUN 14.3 03/15/2024    BUN 13.4 02/27/2024    BUN 13 08/19/2022    BUN 19 11/04/2021    BUN 15 09/03/2021    BUN 16 04/07/2021    BUN 12 02/13/2021    BUN 16 08/31/2020      Lab Results   Component Value Date    POTASSIUM 4.3 03/16/2024    POTASSIUM 4.4 03/15/2024    POTASSIUM 4.8 02/27/2024    POTASSIUM 4.1 02/26/2023    POTASSIUM 4.4 08/19/2022    POTASSIUM 3.7 11/04/2021    POTASSIUM 4.6 09/03/2021    POTASSIUM 4.0 " 04/07/2021    POTASSIUM 4.1 02/13/2021    POTASSIUM 4.2 08/31/2020    Lab Results   Component Value Date    CO2 33 03/16/2024    CO2 35 03/15/2024    CO2 28 02/27/2024    CO2 28 08/19/2022    CO2 28 11/04/2021    CO2 23 09/03/2021    CO2 29 04/07/2021    CO2 29 02/13/2021    CO2 27 08/31/2020    Lab Results   Component Value Date    CR 0.68 03/16/2024    CR 0.73 03/15/2024    CR 0.74 03/15/2024    CR 0.77 04/07/2021    CR 0.73 02/13/2021    CR 0.66 08/31/2020        Recent Labs   Lab 03/16/24  0822 03/15/24  1543   WBC 6.9 6.7   HGB 13.9 14.2   HCT 43.8 46.1   MCV 91 94    250      Imaging:   Results for orders placed or performed during the hospital encounter of 03/15/24   Chest XR,  PA & LAT    Narrative    EXAM: XR CHEST 2 VIEWS  LOCATION: [facility]  DATE: 3/15/2024    INDICATION: shortness of breath  COMPARISON: 05/02/2023 and older studies      Impression    IMPRESSION: Large body habitus with low lung volumes. Lungs are clear. Heart is of normal size. There is pulmonary vascular congestion, accentuated by the shallow inspiration. No signs of failure. No effusions. Old fracture deformity of the right humeral   neck again noted.   Chest CT w/o contrast    Narrative    EXAM: CT CHEST W/O CONTRAST  LOCATION: [facility]  DATE: 3/15/2024    INDICATION: concern for pneumonia CHF, increased work of breathing  COMPARISON: 9/28/2022  TECHNIQUE: CT chest without IV contrast. Multiplanar reformats were obtained. Dose reduction techniques were used.  CONTRAST: None.    FINDINGS:   LUNGS AND PLEURA: Bronchial wall thickening and endobronchial opacities in both lower lobes. Groundglass opacities and subpleural reticular abnormality in the lower lobes as well. No pleural effusion or pneumothorax.    MEDIASTINUM/AXILLAE: No lymphadenopathy. No thoracic aortic aneurysm.    CORONARY ARTERY CALCIFICATION: Mild.    UPPER ABDOMEN: Hepatic steatosis and  cholelithiasis.    MUSCULOSKELETAL: Degenerative changes in the spine and shoulders.      Impression    IMPRESSION:   1.  Groundglass opacities and subpleural reticular abnormality in the lower lobes suggesting pneumonitis or possibly interstitial lung disease.    2.  Bronchial wall thickening and endobronchial opacities in the lower lobes, which could be related to aspiration and/or mucous plugging.       *Note: Due to a large number of results and/or encounters for the requested time period, some results have not been displayed. A complete set of results can be found in Results Review.

## 2024-03-16 NOTE — PLAN OF CARE
PRIMARY DIAGNOSIS: COPD EXACERBATION  OUTPATIENT/OBSERVATION GOALS TO BE MET BEFORE DISCHARGE:  1. Vital signs stable: Yes    2. Improvement of peak flow to greater than 70% sustained off nebulizer for 4 hours: Yes    3. Dyspnea improved and O2 sats >88% at RA or at prior home O2 therapy level: Yes      SpO2: 93 %, O2 Device: 1LPM Nasal cannula    4. Short term supplemental O2 needed for use with activity at home: No    5. Tolerating adequate PO diet and medications: Yes    6. Return to near baseline physical activity: No    Discharge Planner Nurse   Safe discharge environment identified: Yes  Barriers to discharge: Yes       Entered by: Ingrid Stanford RN 03/16/2024 4:53 AM   C/O headache. Given Tylenol with good effect. Weaned O2 to 1LPM and can be on room air once she is awake. Fall precautions maintained. Plan of care ongoing  Please review provider order for any additional goals.   Nurse to notify provider when observation goals have been met and patient is ready for discharge.

## 2024-03-16 NOTE — PROGRESS NOTES
03/16/24 1043   Appointment Info   Signing Clinician's Name / Credentials (PT) Fariba Arteaga DPT   Quick Adds   Quick Adds Certification   Living Environment   People in Home facility resident   Current Living Arrangements assisted living   Home Accessibility wheelchair accessible   Transportation Anticipated agency   Living Environment Comments Pt comes from [facility], elevator accessible   Self-Care   Usual Activity Tolerance fair   Current Activity Tolerance fair   Equipment Currently Used at Home shower chair;walker, rolling;wheelchair, manual   Fall history within last six months yes   Number of times patient has fallen within last six months 2   Activity/Exercise/Self-Care Comment Pt  bound at baseline, pivot transfers to  with supervision. Receives assist with ADLs at [facility]   General Information   Onset of Illness/Injury or Date of Surgery 03/15/24   Referring Physician Ollie Chu MD   Patient/Family Therapy Goals Statement (PT) return home   Pertinent History of Current Problem (include personal factors and/or comorbidities that impact the POC) Carol Merida is a 88 year old female with history of CHF, JEAN, HTN, who presents with shortness of breath for past 3 days.  Patient resides at nursing facility. She noticed increased work of breathing over the past 3 days with increased productive cough.  She states she has been avoiding her Lasix at her nursing facility but recently started on 60 mg orally.  Denies fevers, chills congestion, chest pain, abdominal pain, urinary or bowel complaints, or recent falls.  Patient arrives on 2 L O2 and states she is not on supplemental oxygen at baseline. She states she had anxiety today and so was given anxiety tablet which causes drowsiness.   Existing Precautions/Restrictions fall   Cognition   Affect/Mental Status (Cognition) WFL   Orientation Status (Cognition) oriented x 4   Follows Commands (Cognition) WFL   Pain Assessment   Patient Currently in Pain No    Integumentary/Edema   Integumentary/Edema Comments sacral wound present, normal aging changes   Posture    Posture Forward head position;Protracted shoulders   Range of Motion (ROM)   Range of Motion ROM is WFL   Strength (Manual Muscle Testing)   Strength (Manual Muscle Testing) Deficits observed during functional mobility   Bed Mobility   Comment, (Bed Mobility) SBA supine>sit   Transfers   Comment, (Transfers) SBA pivot transfer   Gait/Stairs (Locomotion)   Comment, (Gait/Stairs) not ambulatory at baseline   Balance   Balance Comments good sitting balance, fair standing balance with pivot transfer   Sensory Examination   Sensory Perception patient reports no sensory changes   Clinical Impression   Criteria for Skilled Therapeutic Intervention Yes, treatment indicated   PT Diagnosis (PT) impaired mobility   Influenced by the following impairments decreased activity tolerance, need for supplemental O2, weakness, impaired balance   Functional limitations due to impairments impaired bed mobility, transfers, ambulation   Clinical Presentation (PT Evaluation Complexity) stable   Clinical Presentation Rationale clinical judgement, chart review   Clinical Decision Making (Complexity) low complexity   Planned Therapy Interventions (PT) balance training;bed mobility training;gait training;home exercise program;neuromuscular re-education;patient/family education;strengthening;transfer training;home program guidelines;risk factor education;progressive activity/exercise   Risk & Benefits of therapy have been explained evaluation/treatment results reviewed;care plan/treatment goals reviewed;risks/benefits reviewed;current/potential barriers reviewed;participants voiced agreement with care plan;participants included;patient   PT Total Evaluation Time   PT Eval, Low Complexity Minutes (47445) 6   Therapy Certification   Start of care date 03/16/24   Certification date from 03/16/24   Certification date to 03/21/24   Medical  Diagnosis COPD exacerbation   Physical Therapy Goals   PT Frequency Daily   PT Predicted Duration/Target Date for Goal Attainment 03/17/24   PT Goals Bed Mobility;Transfers   PT: Bed Mobility Supervision/stand-by assist;Supine to/from sit   PT: Transfers Supervision/stand-by assist;Bed to/from chair   Interventions   Interventions Quick Adds Therapeutic Activity   Therapeutic Activity   Therapeutic Activities: dynamic activities to improve functional performance Minutes (84956) 24   Symptoms Noted During/After Treatment Fatigue   PT Discharge Planning   PT Plan LE strengthening exercises, review pivot transfer   PT Discharge Recommendation (DC Rec) home with assist   PT Rationale for DC Rec Pt close to baseline level of mobility, currently Ax1 for pivot transfers. At baseline lives in [facility] and has supervision for all mobility and assist with ADLs. Recommend returning to [facility] once medically appropriate.   PT Brief overview of current status Ax1 pivot to chair   Total Session Time   Timed Code Treatment Minutes 24   Total Session Time (sum of timed and untimed services) 30       [facility]  OUTPATIENT PHYSICAL THERAPY EVALUATION  PLAN OF TREATMENT FOR OUTPATIENT REHABILITATION  (COMPLETE FOR INITIAL CLAIMS ONLY)  Patient's Last Name, First Name, M.I.  YOB: 1935  Carol Merida                        Provider's Name  [facility] Medical Record No.  [id number]                             Onset Date:  03/15/24   Start of Care Date:  03/16/24   Type:     _X_PT   ___OT   ___SLP Medical Diagnosis:  COPD exacerbation              PT Diagnosis:  impaired mobility Visits from SOC:  1     See note for plan of treatment, functional goals and certification details    I CERTIFY THE NEED FOR THESE SERVICES FURNISHED UNDER        THIS PLAN OF TREATMENT AND WHILE UNDER MY CARE     (Physician co-signature of this document indicates review and certification of  the therapy plan).

## 2024-03-16 NOTE — PLAN OF CARE
Occupational Therapy: Orders received. Chart reviewed and discussed with evaluating physical therapist.? Acute care Occupational Therapy not indicated. Pt under observation status. Pt functioning near baseline level and has assist with self-cares at baseline. Rehab needs can be met by PT to avoid duplication of services. Defer discharge recommendations to PT.?Will complete orders.

## 2024-03-16 NOTE — PLAN OF CARE
"  Problem: Adult Inpatient Plan of Care  Goal: Plan of Care Review  Description: The Plan of Care Review/Shift note should be completed every shift.  The Outcome Evaluation is a brief statement about your assessment that the patient is improving, declining, or no change.  This information will be displayed automatically on your shift  note.  Outcome: Progressing  Goal: Patient-Specific Goal (Individualized)  Description: You can add care plan individualizations to a care plan. Examples of Individualization might be:  \"Parent requests to be called daily at 9am for status\", \"I have a hard time hearing out of my right ear\", or \"Do not touch me to wake me up as it startles  me\".  Outcome: Progressing  Goal: Absence of Hospital-Acquired Illness or Injury  Outcome: Progressing  Goal: Optimal Comfort and Wellbeing  Outcome: Progressing  Goal: Readiness for Transition of Care  Outcome: Progressing   Goal Outcome Evaluation:                        "

## 2024-03-16 NOTE — PROGRESS NOTES
"                                                            [facility]AT    Date:3/16/23    Admission Dx: COPD exacerbation    Pulmonary History: COPD, JEAN (wears CPAP at home)    Home Nebulizer/MDI Use: Albuterol MDI  Q4 prn, Albuterol Q4 prn    Home Oxygen: none on RA    Acuity Level (RCAT flow sheet): 3    Aerosol Therapy initiated: Duoneb QID, Albuterol Q6 prn    Pulmonary Hygiene initiated: coughing techniques    Volume Expansion initiated: incentive spirometry    Current Oxygen Requirements: 1L NC    Current SpO2: 94%    Re-evaluation date: 3/19/24    Patient Education: Will education pt on the indications and benefits of nebulizer's as well as deep breathing and coughing techniques.    Karmen Christopher, RT on 3/16/2024 at 9:35 AM      See \"RT Assessments\" flow sheet for patient assessment scoring and Acuity Level Details.           "

## 2024-03-16 NOTE — PLAN OF CARE
ROOM #  233    Living Situation (if not independent, order SW consult): [facility]  Facility name: [facility]  : MECCA MARCELO (Daughter)   [phone] (Mobile)     Activity level at baseline:  Wheel chair Bound  Activity level on admit: Assist x2     Who will be transporting you at discharge: TBD    Patient registered to observation; given Patient Bill of Rights; given the opportunity to ask questions about observation status and their plan of care.  Patient has been oriented to the observation room, bathroom and call light is in place.    Discussed discharge goals and expectations with patient/family.

## 2024-03-16 NOTE — PLAN OF CARE
"Assumed care from: 0519-3249    Blood pressure 110/52, pulse 93, temperature 98.3  F (36.8  C), temperature source Oral, resp. rate 18, height 1.615 m (5' 3.6\"), weight 107.8 kg (237 lb 10.5 oz), SpO2 94%, not currently breastfeeding.    Admit Date: 3/15/24  Admitting Diagnosis: COPD exacerbation  Neuro: Alert and Oriented x4  Activity: are 1 Assist with Gait Belt, Walker, and pivot to chair.   Telemetry Monitoring: No  Pain: complaining of a 2/10 headache,  Tylenol given for pain.   GI: WNL  : incontinent of urine, Purwik in place. Pericare done. Has small ulcer-like area on right labia - patient states this wound \"has been there for a very long time\" Barrier cream applied.   Skin: Left buttock breakdown - Mepilex replaced. Turn/repositioned during shift.   Resp: LS diminished, receiving scheduled nebs, lasix, prednisone. 94% on 2L. Unable to wean O2. Continuous pulse ox in place.   Fluids: is Saline locked.  Diet: Cardiac and No caffeine  Consults: Respiratory      Goal Outcome Evaluation:      Plan of Care Reviewed With: patient    Overall Patient Progress: improvingOverall Patient Progress: improving    Outcome Evaluation: Room air 91-93%      Problem: Adult Inpatient Plan of Care  Goal: Plan of Care Review  Description: The Plan of Care Review/Shift note should be completed every shift.  The Outcome Evaluation is a brief statement about your assessment that the patient is improving, declining, or no change.  This information will be displayed automatically on your shift  note.  3/16/2024 1857 by Noy Kemp RN  Outcome: Progressing  3/16/2024 1250 by Noy Kemp RN  Outcome: Progressing  Flowsheets (Taken 3/16/2024 1230)  Outcome Evaluation: Room air 91-93%  Plan of Care Reviewed With: patient  Overall Patient Progress: improving  3/16/2024 1216 by Noy Kemp RN  Outcome: Progressing  Flowsheets (Taken 3/16/2024 1155)  Outcome Evaluation: 91-93% on RA, IS instructions given  Plan of Care Reviewed " "With: patient  Overall Patient Progress: improving  3/16/2024 0831 by Noy Kemp RN  Outcome: Progressing  Goal: Patient-Specific Goal (Individualized)  Description: You can add care plan individualizations to a care plan. Examples of Individualization might be:  \"Parent requests to be called daily at 9am for status\", \"I have a hard time hearing out of my right ear\", or \"Do not touch me to wake me up as it startles  me\".  3/16/2024 1857 by Noy Kemp RN  Outcome: Progressing  3/16/2024 1250 by Noy Kemp RN  Outcome: Progressing  3/16/2024 1249 by Noy Kemp RN  Outcome: Progressing  3/16/2024 1216 by Noy Kemp RN  Outcome: Progressing  3/16/2024 0831 by Noy Kemp RN  Outcome: Progressing  Goal: Absence of Hospital-Acquired Illness or Injury  3/16/2024 1857 by Noy Kemp RN  Outcome: Progressing  3/16/2024 1250 by Noy Kemp RN  Outcome: Progressing  3/16/2024 1249 by Noy Kemp RN  Outcome: Progressing  3/16/2024 1216 by Noy Kemp RN  Outcome: Progressing  3/16/2024 0831 by Noy Kemp RN  Outcome: Progressing  Intervention: Identify and Manage Fall Risk  Recent Flowsheet Documentation  Taken 3/16/2024 0845 by Noy Kemp RN  Safety Promotion/Fall Prevention:   lighting adjusted   increase visualization of patient   increased rounding and observation   clutter free environment maintained   mobility aid in reach   room door open  Intervention: Prevent and Manage VTE (Venous Thromboembolism) Risk  Recent Flowsheet Documentation  Taken 3/16/2024 0845 by Noy Kemp RN  VTE Prevention/Management: compression stockings on  Goal: Optimal Comfort and Wellbeing  3/16/2024 1857 by Noy Kemp RN  Outcome: Progressing  3/16/2024 1250 by Noy Kemp RN  Outcome: Progressing  3/16/2024 1249 by Noy Kemp RN  Outcome: Progressing  3/16/2024 1216 by Ostby, Noy E, RN  Outcome: Progressing  3/16/2024 0831 by Noy Kemp RN  Outcome: " Progressing  Intervention: Monitor Pain and Promote Comfort  Recent Flowsheet Documentation  Taken 3/16/2024 0805 by Noy Kemp RN  Pain Management Interventions: medication (see MAR)  Goal: Readiness for Transition of Care  3/16/2024 1857 by Noy Kemp RN  Outcome: Progressing  3/16/2024 1250 by Noy Kemp RN  Outcome: Progressing  3/16/2024 1249 by Noy Kemp RN  Outcome: Progressing  3/16/2024 1216 by Noy Kemp RN  Outcome: Progressing  3/16/2024 0831 by Noy Kemp RN  Outcome: Progressing

## 2024-03-16 NOTE — ED NOTES
[facility]  ED Nurse Handoff Report    ED Chief complaint: Shortness of Breath  . ED Diagnosis:   Final diagnoses:   Hypoxia   COPD exacerbation (H)       Allergies:   Allergies   Allergen Reactions    Ace Inhibitors Cough       Code Status: Full Code    Activity level - Baseline/Home:  lift.  Activity Level - Current:   in bed.   Lift room needed: No.   Bariatric: No   Needed: No   Isolation: No.   Infection: Not Applicable.     Respiratory status: Nasal cannula    Vital Signs (within 30 minutes):   Vitals:    03/15/24 1700 03/15/24 1800 03/15/24 1915 03/15/24 1945   BP: 126/76 121/86 (!) 126/96 129/79   Pulse: 78 75 80 80   Resp: 18 19 13 15   Temp:       TempSrc:       SpO2: 96% 96% 97% 96%       Cardiac Rhythm:  ,      Pain level:    Patient confused: No.   Patient Falls Risk: nonskid shoes/slippers when out of bed, arm band in place, patient and family education, and assistive device/personal items within reach.   Elimination Status: Has voided     Patient Report - Initial Complaint: sob.   Focused Assessment: copd exac, hypoxia     Abnormal Results:   Labs Ordered and Resulted from Time of ED Arrival to Time of ED Departure   BASIC METABOLIC PANEL - Abnormal       Result Value    Sodium 141      Potassium 4.4      Chloride 96 (*)     Carbon Dioxide (CO2) 35 (*)     Anion Gap 10      Urea Nitrogen 14.3      Creatinine 0.74      GFR Estimate 78      Calcium 9.3      Glucose 102 (*)    TROPONIN T, HIGH SENSITIVITY - Abnormal    Troponin T, High Sensitivity 18 (*)    CBC WITH PLATELETS AND DIFFERENTIAL - Abnormal    WBC Count 6.7      RBC Count 4.89      Hemoglobin 14.2      Hematocrit 46.1      MCV 94      MCH 29.0      MCHC 30.8 (*)     RDW 13.1      Platelet Count 250      % Neutrophils 69      % Lymphocytes 13      % Monocytes 8      % Eosinophils 9      % Basophils 1      % Immature Granulocytes 0      NRBCs per 100 WBC 0      Absolute Neutrophils 4.7      Absolute  Lymphocytes 0.8      Absolute Monocytes 0.5      Absolute Eosinophils 0.6      Absolute Basophils 0.0      Absolute Immature Granulocytes 0.0      Absolute NRBCs 0.0     NT PROBNP INPATIENT - Normal    N terminal Pro BNP Inpatient 72     INFLUENZA A/B, RSV, & SARS-COV2 PCR - Normal    Influenza A PCR Negative      Influenza B PCR Negative      RSV PCR Negative      SARS CoV2 PCR Negative     RESPIRATORY AEROBIC BACTERIAL CULTURE        Chest CT w/o contrast   Final Result   IMPRESSION:    1.  Groundglass opacities and subpleural reticular abnormality in the lower lobes suggesting pneumonitis or possibly interstitial lung disease.      2.  Bronchial wall thickening and endobronchial opacities in the lower lobes, which could be related to aspiration and/or mucous plugging.         Chest XR,  PA & LAT   Final Result   IMPRESSION: Large body habitus with low lung volumes. Lungs are clear. Heart is of normal size. There is pulmonary vascular congestion, accentuated by the shallow inspiration. No signs of failure. No effusions. Old fracture deformity of the right humeral    neck again noted.          Treatments provided: UD, oxygen, lasix  Family Comments:   OBS brochure/video discussed/provided to patient:  Yes  ED Medications:   Medications   albuterol (PROVENTIL) neb solution 2.5 mg (2.5 mg Nebulization $Given 3/15/24 1533)   furosemide (LASIX) injection 40 mg (40 mg Intravenous $Given 3/15/24 1850)       Drips infusing:  No  For the majority of the shift this patient was Green.   Interventions performed were .    Sepsis treatment initiated: No    Cares/treatment/interventions/medications to be completed following ED care:     ED Nurse Name: Tanesha Huang RN  8:06 PM  RECEIVING UNIT ED HANDOFF REVIEW    Above ED Nurse Handoff Report was reviewed: Yes  Reviewed by: Ingrid Stanford RN on March 15, 2024 at 8:39 PM   LENNY Rosa called the ED to inform them the note was read: Yes

## 2024-03-16 NOTE — CONSULTS
Care Management Initial Consult    General Information  Assessment completed with: Patient,    Type of CM/SW Visit: Initial Assessment    Primary Care Provider verified and updated as needed: Yes   Readmission within the last 30 days:     Reason for Consult: discharge planning    Communication Assessment  Patient's communication style: spoken language (English or Bilingual)    Hearing Difficulty or Deaf: no        Cognitive  Cognitive/Neuro/Behavioral: WDL  Level of Consciousness: alert  Arousal Level: opens eyes spontaneously  Orientation: oriented x 4  Mood/Behavior: cooperative  Best Language: 0 - No aphasia  Speech: clear, spontaneous, logical    Living Environment:   People in home: facility resident     Current living Arrangements: assisted living  Name of Facility: [facility]   Able to return to prior arrangements: yes       Family/Social Support:  Care provided by: self, other (see comments)  Provides care for: no one  Marital Status:   Children, Other (specify)            Current Resources:   Patient receiving home care services:       Community Resources:    Equipment currently used at home: shower chair, walker, rolling, wheelchair, manual    Lifestyle & Psychosocial Needs:  Social Determinants of Health     Food Insecurity: Not on file   Depression: Not at risk (1/31/2024)    PHQ-2     PHQ-2 Score: 2   Housing Stability: Not on file   Tobacco Use: Low Risk  (4/10/2023)    Patient History     Smoking Tobacco Use: Never     Smokeless Tobacco Use: Never     Passive Exposure: Not on file   Financial Resource Strain: Not on file   Alcohol Use: Not on file   Transportation Needs: Not on file   Physical Activity: Not on file   Interpersonal Safety: Not on file   Stress: Not on file   Social Connections: Not on file       Additional Information:    SW met with patient bedside to complete consult. The patient came from [facility]. She reported the office there is closed  on weekends. Patient reported support received through her assisted living and that she was previously at an independent living facility but recently moved. The patient stated she doesn't need any support or resources at this time outside of the assisted living facility. The patient also reported assistance from her children. At the time of discharge the patient will need a wheelchair transportation scheduled. PT consult completed and pending OT and Respiratory Care consult. SW following & SAMS completed.    GABY Flor, LGSW   Care Management

## 2024-03-16 NOTE — ED NOTES
Joan placed for urinary incontinence. Noted silicone border foam dressings on buttocks but unable to roll  patient sufficiently to inspect. 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

## 2024-03-17 ENCOUNTER — APPOINTMENT (OUTPATIENT)
Dept: CARDIOLOGY | Facility: CLINIC | Age: 89
DRG: 196 | End: 2024-03-17
Attending: PHYSICIAN ASSISTANT
Payer: COMMERCIAL

## 2024-03-17 ENCOUNTER — APPOINTMENT (OUTPATIENT)
Dept: CARDIOLOGY | Facility: CLINIC | Age: 89
DRG: 196 | End: 2024-03-17
Attending: NURSE PRACTITIONER
Payer: COMMERCIAL

## 2024-03-17 ENCOUNTER — APPOINTMENT (OUTPATIENT)
Dept: PHYSICAL THERAPY | Facility: CLINIC | Age: 89
DRG: 196 | End: 2024-03-17
Payer: COMMERCIAL

## 2024-03-17 LAB
ANION GAP SERPL CALCULATED.3IONS-SCNC: 10 MMOL/L (ref 7–15)
BUN SERPL-MCNC: 20.1 MG/DL (ref 8–23)
CALCIUM SERPL-MCNC: 9.2 MG/DL (ref 8.8–10.2)
CHLORIDE SERPL-SCNC: 95 MMOL/L (ref 98–107)
CREAT SERPL-MCNC: 0.82 MG/DL (ref 0.51–0.95)
DEPRECATED HCO3 PLAS-SCNC: 34 MMOL/L (ref 22–29)
EGFRCR SERPLBLD CKD-EPI 2021: 69 ML/MIN/1.73M2
ERYTHROCYTE [DISTWIDTH] IN BLOOD BY AUTOMATED COUNT: 13 % (ref 10–15)
GLUCOSE SERPL-MCNC: 100 MG/DL (ref 70–99)
HCT VFR BLD AUTO: 42.7 % (ref 35–47)
HGB BLD-MCNC: 13.7 G/DL (ref 11.7–15.7)
LVEF ECHO: NORMAL
MAGNESIUM SERPL-MCNC: 2.2 MG/DL (ref 1.7–2.3)
MCH RBC QN AUTO: 29.7 PG (ref 26.5–33)
MCHC RBC AUTO-ENTMCNC: 32.1 G/DL (ref 31.5–36.5)
MCV RBC AUTO: 93 FL (ref 78–100)
PLATELET # BLD AUTO: 242 10E3/UL (ref 150–450)
POTASSIUM SERPL-SCNC: 4.1 MMOL/L (ref 3.4–5.3)
RBC # BLD AUTO: 4.61 10E6/UL (ref 3.8–5.2)
SODIUM SERPL-SCNC: 139 MMOL/L (ref 135–145)
TROPONIN T SERPL HS-MCNC: 26 NG/L
TROPONIN T SERPL HS-MCNC: 31 NG/L
WBC # BLD AUTO: 8.6 10E3/UL (ref 4–11)

## 2024-03-17 PROCEDURE — 255N000002 HC RX 255 OP 636: Performed by: INTERNAL MEDICINE

## 2024-03-17 PROCEDURE — 97530 THERAPEUTIC ACTIVITIES: CPT | Mod: GP

## 2024-03-17 PROCEDURE — 83735 ASSAY OF MAGNESIUM: CPT | Performed by: NURSE PRACTITIONER

## 2024-03-17 PROCEDURE — 250N000012 HC RX MED GY IP 250 OP 636 PS 637: Performed by: INTERNAL MEDICINE

## 2024-03-17 PROCEDURE — 250N000009 HC RX 250: Performed by: INTERNAL MEDICINE

## 2024-03-17 PROCEDURE — 93306 TTE W/DOPPLER COMPLETE: CPT | Mod: 26 | Performed by: INTERNAL MEDICINE

## 2024-03-17 PROCEDURE — 85027 COMPLETE CBC AUTOMATED: CPT | Performed by: NURSE PRACTITIONER

## 2024-03-17 PROCEDURE — 250N000013 HC RX MED GY IP 250 OP 250 PS 637: Performed by: INTERNAL MEDICINE

## 2024-03-17 PROCEDURE — 94640 AIRWAY INHALATION TREATMENT: CPT | Mod: 76

## 2024-03-17 PROCEDURE — 80048 BASIC METABOLIC PNL TOTAL CA: CPT | Performed by: NURSE PRACTITIONER

## 2024-03-17 PROCEDURE — 99232 SBSQ HOSP IP/OBS MODERATE 35: CPT | Performed by: NURSE PRACTITIONER

## 2024-03-17 PROCEDURE — 120N000001 HC R&B MED SURG/OB

## 2024-03-17 PROCEDURE — 250N000011 HC RX IP 250 OP 636: Performed by: INTERNAL MEDICINE

## 2024-03-17 PROCEDURE — 999N000208 ECHOCARDIOGRAM COMPLETE

## 2024-03-17 PROCEDURE — 250N000013 HC RX MED GY IP 250 OP 250 PS 637: Performed by: PHYSICIAN ASSISTANT

## 2024-03-17 PROCEDURE — 84484 ASSAY OF TROPONIN QUANT: CPT | Performed by: NURSE PRACTITIONER

## 2024-03-17 PROCEDURE — 36415 COLL VENOUS BLD VENIPUNCTURE: CPT | Performed by: NURSE PRACTITIONER

## 2024-03-17 PROCEDURE — 999N000157 HC STATISTIC RCP TIME EA 10 MIN

## 2024-03-17 RX ADMIN — BUSPIRONE HYDROCHLORIDE 5 MG: 5 TABLET ORAL at 21:57

## 2024-03-17 RX ADMIN — PREDNISONE 40 MG: 20 TABLET ORAL at 08:37

## 2024-03-17 RX ADMIN — GUAIFENESIN 600 MG: 600 TABLET, EXTENDED RELEASE ORAL at 21:57

## 2024-03-17 RX ADMIN — GABAPENTIN 300 MG: 300 CAPSULE ORAL at 15:53

## 2024-03-17 RX ADMIN — ACETAMINOPHEN 650 MG: 325 TABLET, FILM COATED ORAL at 03:04

## 2024-03-17 RX ADMIN — FUROSEMIDE 40 MG: 40 TABLET ORAL at 08:37

## 2024-03-17 RX ADMIN — ACETAMINOPHEN 650 MG: 325 TABLET, FILM COATED ORAL at 15:53

## 2024-03-17 RX ADMIN — BUSPIRONE HYDROCHLORIDE 5 MG: 5 TABLET ORAL at 08:37

## 2024-03-17 RX ADMIN — HUMAN ALBUMIN MICROSPHERES AND PERFLUTREN 3 ML: 10; .22 INJECTION, SOLUTION INTRAVENOUS at 13:38

## 2024-03-17 RX ADMIN — GUAIFENESIN 600 MG: 600 TABLET, EXTENDED RELEASE ORAL at 08:30

## 2024-03-17 RX ADMIN — LEVOFLOXACIN 500 MG: 500 TABLET, FILM COATED ORAL at 21:56

## 2024-03-17 RX ADMIN — FLUOXETINE HYDROCHLORIDE 20 MG: 20 CAPSULE ORAL at 08:37

## 2024-03-17 RX ADMIN — ENOXAPARIN SODIUM 40 MG: 40 INJECTION SUBCUTANEOUS at 21:56

## 2024-03-17 RX ADMIN — GABAPENTIN 300 MG: 300 CAPSULE ORAL at 08:37

## 2024-03-17 RX ADMIN — IPRATROPIUM BROMIDE AND ALBUTEROL SULFATE 3 ML: .5; 3 SOLUTION RESPIRATORY (INHALATION) at 16:07

## 2024-03-17 RX ADMIN — ACETAMINOPHEN 650 MG: 325 TABLET, FILM COATED ORAL at 21:56

## 2024-03-17 RX ADMIN — POLYETHYLENE GLYCOL 3350 17 G: 17 POWDER, FOR SOLUTION ORAL at 08:38

## 2024-03-17 RX ADMIN — LEVOTHYROXINE SODIUM 88 MCG: 0.09 TABLET ORAL at 08:37

## 2024-03-17 RX ADMIN — DEXTRAN 70, GLYCERIN, HYPROMELLOSE 1 DROP: 1; 2; 3 SOLUTION/ DROPS OPHTHALMIC at 22:02

## 2024-03-17 RX ADMIN — GABAPENTIN 600 MG: 600 TABLET, FILM COATED ORAL at 21:56

## 2024-03-17 RX ADMIN — ACETAMINOPHEN 650 MG: 325 TABLET, FILM COATED ORAL at 06:44

## 2024-03-17 RX ADMIN — Medication 1 MG: at 22:02

## 2024-03-17 RX ADMIN — IPRATROPIUM BROMIDE AND ALBUTEROL SULFATE 3 ML: .5; 3 SOLUTION RESPIRATORY (INHALATION) at 20:57

## 2024-03-17 RX ADMIN — IPRATROPIUM BROMIDE AND ALBUTEROL SULFATE 3 ML: .5; 3 SOLUTION RESPIRATORY (INHALATION) at 11:26

## 2024-03-17 RX ADMIN — DEXTRAN 70, GLYCERIN, HYPROMELLOSE 1 DROP: 1; 2; 3 SOLUTION/ DROPS OPHTHALMIC at 08:39

## 2024-03-17 ASSESSMENT — ACTIVITIES OF DAILY LIVING (ADL)
ADLS_ACUITY_SCORE: 53
ADLS_ACUITY_SCORE: 53
ADLS_ACUITY_SCORE: 47
ADLS_ACUITY_SCORE: 49
ADLS_ACUITY_SCORE: 53
ADLS_ACUITY_SCORE: 47
ADLS_ACUITY_SCORE: 53
ADLS_ACUITY_SCORE: 47
ADLS_ACUITY_SCORE: 47
ADLS_ACUITY_SCORE: 53
ADLS_ACUITY_SCORE: 50
ADLS_ACUITY_SCORE: 49
ADLS_ACUITY_SCORE: 53
ADLS_ACUITY_SCORE: 47
ADLS_ACUITY_SCORE: 54
ADLS_ACUITY_SCORE: 49
ADLS_ACUITY_SCORE: 54
ADLS_ACUITY_SCORE: 49
ADLS_ACUITY_SCORE: 53
ADLS_ACUITY_SCORE: 49
ADLS_ACUITY_SCORE: 53

## 2024-03-17 NOTE — PLAN OF CARE
"Assumed care from: 6892-1484     /58 (BP Location: Right arm)   Pulse 76   Temp 97.9  F (36.6  C) (Oral)   Resp 18   Ht 1.615 m (5' 3.6\")   Wt 107.8 kg (237 lb 10.5 oz)   LMP  (LMP Unknown)   SpO2 93%   BMI 41.31 kg/m        Admit Date: 3/15/24  Admitting Diagnosis: COPD exacerbation  Neuro: Alert and Oriented x4  Activity: are 1 Assist with Gait Belt, Walker, and pivot to chair (baseline).  Needs encouragement to sit up in chair for meals.   Telemetry Monitoring: No  Pain:  denies pain throughout shift, receives scheduled Tylenol and Gabapentin  Labs/Test: Trops 31, 26  GI: WNL  : incontinent of urine, Purwik in place. Pericare done.   Skin: Left buttock breakdown - Mepilex replaced. Turn/repositioned during shift. Has small ulcer-like area on right labia - patient states this wound \"has been there for a very long time\" Barrier cream applied.   Resp: LS diminished, receiving scheduled nebs, lasix, prednisone. 94% on 1.5 L. Desats while sleeping, provider order CPAP for HS. Continuous pulse ox in place.   Fluids: is Saline locked.  Diet: Cardiac.   Consults: Respiratory  Plan: discharge to shelter when staff available (Monday)    Problem: Adult Inpatient Plan of Care  Goal: Plan of Care Review  Description: The Plan of Care Review/Shift note should be completed every shift.  The Outcome Evaluation is a brief statement about your assessment that the patient is improving, declining, or no change.  This information will be displayed automatically on your shift  note.  3/17/2024 1813 by Noy Kemp RN  Outcome: Progressing  Flowsheets (Taken 3/17/2024 1813)  Outcome Evaluation: Discharge tomorrow to [facility]  Plan of Care Reviewed With: patient  Overall Patient Progress: improving  3/17/2024 1812 by Noy Kemp RN  Outcome: Progressing  Flowsheets (Taken 3/17/2024 1811)  Plan of Care Reviewed With: patient  Overall Patient Progress: improving  Goal: Patient-Specific Goal (Individualized)  Description: You " "can add care plan individualizations to a care plan. Examples of Individualization might be:  \"Parent requests to be called daily at 9am for status\", \"I have a hard time hearing out of my right ear\", or \"Do not touch me to wake me up as it startles  me\".  3/17/2024 1813 by Noy Kemp RN  Outcome: Progressing  3/17/2024 1812 by Noy Kemp RN  Outcome: Progressing  Goal: Absence of Hospital-Acquired Illness or Injury  3/17/2024 1813 by Noy Kemp RN  Outcome: Progressing  3/17/2024 1812 by Noy Kemp RN  Outcome: Progressing  Intervention: Identify and Manage Fall Risk  Recent Flowsheet Documentation  Taken 3/17/2024 0900 by Noy Kemp RN  Safety Promotion/Fall Prevention:   lighting adjusted   increase visualization of patient   increased rounding and observation   clutter free environment maintained   mobility aid in reach   room door open  Intervention: Prevent and Manage VTE (Venous Thromboembolism) Risk  Recent Flowsheet Documentation  Taken 3/17/2024 0900 by Noy Kemp RN  VTE Prevention/Management: compression stockings on  Goal: Optimal Comfort and Wellbeing  3/17/2024 1813 by Noy Kemp RN  Outcome: Progressing  3/17/2024 1812 by Noy Kemp RN  Outcome: Progressing  Goal: Readiness for Transition of Care  3/17/2024 1813 by Noy Kemp RN  Outcome: Progressing  3/17/2024 1812 by Noy Kemp RN  Outcome: Progressing         Plan of Care Reviewed With: patient    Overall Patient Progress: improvingOverall Patient Progress: improving    Outcome Evaluation: Discharge tomorrow to [facility]      "

## 2024-03-17 NOTE — PLAN OF CARE
Physical Therapy Discharge Summary    Reason for therapy discharge:    All goals and outcomes met, no further needs identified.    Progress towards therapy goal(s). See goals on Care Plan in [facility] electronic health record for goal details.  Goals met    Therapy recommendation(s):    Continue home exercise program.  May benefit from further skilled therapies with  PT for improvement of activity tolerance and overall endurance.

## 2024-03-17 NOTE — PROGRESS NOTES
[facility]    Medicine Progress Note - Hospitalist Service    Date of Admission:  3/15/2024    Assessment & Plan   Carol Merida is a 88 year old obese lady with hypertension, anxiety, melanoma of the skin, obstructive sleep apnea not compliant on CPAP, hypercholesteremia, hypothyroidism, congestive heart failure with the echo done in February 2023 showing pEF who presents with several weeks hx of worsening SOB, intermittent cough and PETTY.      Acute hypoxic respiratory failure due to due bronchitis, and reactive airway disease versus interstitial airway disease  JEAN - non compliant with CPAP   Patient has chronic shortness of breath with cough with previous CT scan showing fibrosis and interstitial changes. Seen by Dr. Adamson in the past but has not been back > 5 yrs. Breathing maintained on Advair inhaler but now worse over the last 3-6 months. She also gained weight due to decreased mobility from IM nailing of the right femur 2/23 and TCU stay coupled with increased anxiety and panic attack causing SOB. Admits to being non-compliant with CPAP due to intolerance of mask. Initial CXR shows vascular congestion but no e/o failure, CT shows groundglass opacities suggestive of pneumonitis vs interstitial lung disease. She was started on oral steroids, Levaquin and duonebs with improved sxs. Multiple reasons for worsening pulm status including viral bronchitis, RAD, progressive interstitial lung disease vs untreated JEAN/Pulm HTN and possible interval development of diastolic heart failure.    Plan:   - Cont course of levofloxacin 7 days for bronchitis   - Cont nebulizer, Ok to resume inhaler, Cont 40 mg Prednisone   - Ultimately will need repeat outpt PFT, Sleep study and Pulm follow up   - Suspect will need home O2 at night, will start with overnight oximetry testing      Congestive heart failure with preserved LV function  Elevated troponin   [facility]ocardiogram from [facility] completed on 3/7/24  showed normal left ventricular size with calculated ejection fraction of 68%.  No resting regional wall motion abnormalities noted.  Mild concentric left ventricular hypertrophy.  Grade 1 diastolic dysfunction/diastolic relaxation abnormality.  Aortic valve sclerosis.  No evidence of aortic stenosis.  Trivial aortic insufficiency.  Mild tricuspid regurgitation. The patient does not fluid overloaded and proBNP 72. Troponin 18 -> 31.   - At home patient is on Furosemide 40 mg daily   - Cont home PTA lasix dosing   - Due to trending up troponin, will obtain echocardiogram to assess EF and valve function ans presence of Pulm HTN due to untreated JEAN   - Repeat troponin x1  - Daily weights     Anxiety  With possible panic attacks - also contributing to SOB   At home patient is on buspirone 5 mg 2 times daily and Prozac along with gabapentin 600 mg at bedtime and 300 mg 2 times daily  - C/o increased anxiety over her health and causes panic attacks  - PCP aware, recommended outpt therapist as well as tapering down Buspar and Prozac this week and change to duloxetine 30mg daily on 3/26/24      Hypothyroidism  Will continue with 88 mcg of levothyroxine replacement     Poor mobility/Deconditioning   Recently moved [place] in the last 2 months, prior she was at TCU for 8 mos for Rt femur fx complicated by non-healing.   - seen by PT: currently Ax1 for pivot transfers. At baseline lives in [place] and has supervision for all mobility and assist with ADLs. Recommend returning to nursing home once medically appropriate.     Diet: Combination Diet Low Saturated Fat Na <2400mg Diet    DVT Prophylaxis: Enoxaparin (Lovenox) SQ  Benson Catheter: Not present  Lines: None     Cardiac Monitoring: None  Code Status: Full Code      Clinically Significant Risk Factors                  # Hypertension: Noted on problem list  # Acute heart failure with preserved ejection fraction: heart failure noted on problem list, last echo with EF >50%, and receiving IV  "diuretics       # Severe Obesity: Estimated body mass index is 41.31 kg/m  as calculated from the following:    Height as of this encounter: 1.615 m (5' 3.6\").    Weight as of this encounter: 107.8 kg (237 lb 10.5 oz)., PRESENT ON ADMISSION            Disposition Plan     Expected Discharge Date: 03/18/2024                  The patient's care was discussed with the Attending Physician, Dr. Scott  .    [facility]  Hospitalist Service  [facility]  Securely message with [employer] (more info)  Text page via [facility] Paging/Directory   ______________________________________________________________________    Interval History   The patient still desats at night and needing oxygen overnight. The patient used to use CPAP previously but stopped using lately. The patient denies chest pain or shortness of breath while sitting in bed.      Physical Exam   Vital Signs: Temp: 97.6  F (36.4  C) Temp src: Oral BP: 111/61 Pulse: 76   Resp: 20 SpO2: 93 % O2 Device: Nasal cannula Oxygen Delivery: 1/2 LPM  Weight: 237 lbs 10.49 oz    Constitutional: Awake, alert, cooperative, no apparent distress, Obese   Respiratory: Decreased breath sounds at bases with few ronchi and wheeze, poor air exchange.   Cardiovascular: Regular rate and rhythm, normal S1 and S2, and no murmur noted  Abdomen: Obese. Normal bowel sounds, soft, non-distended, non-tender  Skin: No rashes, no cyanosis, dry to touch  Neuro: Alert and oriented x3, no weakness, numbness, memory loss  Extremities: No edema, normal range of motion  OAll other systems: Negative    Medical Decision Making       30 MINUTES SPENT BY ME on the date of service doing chart review, history, exam, documentation & further activities per the note.      Data   ------------------------- PAST 24 HR DATA REVIEWED -----------------------------------------------    I have personally reviewed the following data over the past 24 hrs:    8.6  \   13.7   / 242 "     139 95 (L) 20.1 /  100 (H)   4.1 34 (H) 0.82 \     Trop: 31 (H) BNP: N/A       Imaging results reviewed over the past 24 hrs:   No results found for this or any previous visit (from the past 24 hour(s)).

## 2024-03-17 NOTE — PLAN OF CARE
"Care from [id number]    Inpatient Progress Note: COPD Exacerbation  For complete assessment see flow sheet documentation.    /60 (BP Location: Right arm, Patient Position: Semi-Benson's, Cuff Size: Adult Large)   Pulse 92   Temp 98  F (36.7  C) (Oral)   Resp 16   Ht 1.615 m (5' 3.6\")   Wt 107.8 kg (237 lb 10.5 oz)   LMP  (LMP Unknown)   SpO2 92%   BMI 41.31 kg/m         Pt reported to have 6/10 headache, given PRN and scheduled Tylenol.Pt states pain is constant with mild relief from meds. Initiated sleep study overnight. Pt has been on room air but transitioned to 0.5LPM while asleep.        Problem: Adult Inpatient Plan of Care  Goal: Plan of Care Review  Description: The Plan of Care Review/Shift note should be completed every shift.  The Outcome Evaluation is a brief statement about your assessment that the patient is improving, declining, or no change.  This information will be displayed automatically on your shift  note.  3/17/2024 0212 by Ingrid Stanford RN  Outcome: Progressing  3/16/2024 1954 by Ingrid Stanford RN  Outcome: Progressing  Flowsheets (Taken 3/16/2024 1954)  Outcome Evaluation: O2 increased to 2LPM  Plan of Care Reviewed With: patient  Overall Patient Progress: declining  3/16/2024 1954 by Ingrid Stanford RN  Outcome: Progressing  Flowsheets (Taken 3/16/2024 1954)  Outcome Evaluation: O2 increased to 2LPM  Plan of Care Reviewed With: patient  Overall Patient Progress: declining  Goal: Patient-Specific Goal (Individualized)  Description: You can add care plan individualizations to a care plan. Examples of Individualization might be:  \"Parent requests to be called daily at 9am for status\", \"I have a hard time hearing out of my right ear\", or \"Do not touch me to wake me up as it startles  me\".  3/17/2024 0212 by Ingrid Stanford RN  Outcome: Progressing  3/16/2024 1954 by Ingrid Stanford RN  Outcome: Progressing  3/16/2024 1954 by Ingrid Stanford RN  Outcome: " Progressing  Goal: Absence of Hospital-Acquired Illness or Injury  3/17/2024 0212 by Ingrid Stanford RN  Outcome: Progressing  3/16/2024 1954 by Ingrid Stanford RN  Outcome: Progressing  3/16/2024 1954 by Ingrid Stanford RN  Outcome: Progressing  Intervention: Identify and Manage Fall Risk  Recent Flowsheet Documentation  Taken 3/17/2024 0017 by Ingrid Stanford RN  Safety Promotion/Fall Prevention:   lighting adjusted   increase visualization of patient   increased rounding and observation   clutter free environment maintained   mobility aid in reach   room door open  Intervention: Prevent Skin Injury  Recent Flowsheet Documentation  Taken 3/17/2024 0017 by Ingrid Stanford RN  Body Position:   left   turned  Intervention: Prevent and Manage VTE (Venous Thromboembolism) Risk  Recent Flowsheet Documentation  Taken 3/17/2024 0017 by Ingrid Stanford RN  VTE Prevention/Management: SCDs (sequential compression devices) off  Intervention: Prevent Infection  Recent Flowsheet Documentation  Taken 3/17/2024 0017 by Ingrid Stanford RN  Infection Prevention:   single patient room provided   rest/sleep promoted   hand hygiene promoted   equipment surfaces disinfected   personal protective equipment utilized  Goal: Optimal Comfort and Wellbeing  3/17/2024 0212 by Ingrid Stanford RN  Outcome: Progressing  3/16/2024 1954 by Ingrid Stanford RN  Outcome: Progressing  3/16/2024 1954 by Ingrid Stanford RN  Outcome: Progressing  Intervention: Monitor Pain and Promote Comfort  Recent Flowsheet Documentation  Taken 3/16/2024 2050 by Ingrid Stanford RN  Pain Management Interventions: medication (see MAR)  Goal: Readiness for Transition of Care  3/17/2024 0212 by Ingrid Stanford RN  Outcome: Progressing  3/16/2024 1954 by Ingrid Stanford RN  Outcome: Progressing  3/16/2024 1954 by Ingrid Stanford RN  Outcome: Progressing     Problem: COPD (Chronic Obstructive Pulmonary Disease)  Goal: Optimal Chronic Illness  Coping  3/17/2024 0212 by Ingrid Stanford RN  Outcome: Progressing  3/16/2024 1954 by Ingrid Stanford RN  Outcome: Progressing  3/16/2024 1954 by Ingrid Stanford RN  Outcome: Progressing  Goal: Optimal Level of Functional Pershing  3/17/2024 0212 by Ingrid Stanford RN  Outcome: Progressing  3/16/2024 1954 by Ingrid Stanford RN  Outcome: Progressing  3/16/2024 1954 by Ingrid Stanford RN  Outcome: Progressing  Intervention: Optimize Functional Ability  Recent Flowsheet Documentation  Taken 3/17/2024 0017 by Ingrid Stanford RN  Activity Management: activity adjusted per tolerance  Goal: Absence of Infection Signs and Symptoms  3/17/2024 0212 by Ingrid Stanford RN  Outcome: Progressing  3/16/2024 1954 by Ingrid Stanford RN  Outcome: Progressing  3/16/2024 1954 by Ingrid Stanford RN  Outcome: Progressing  Goal: Improved Oral Intake  3/17/2024 0212 by Ingrid Stanford RN  Outcome: Progressing  3/16/2024 1954 by Ingrid Stanford RN  Outcome: Progressing  3/16/2024 1954 by Ingrid Stanford RN  Outcome: Progressing  Goal: Effective Oxygenation and Ventilation  3/17/2024 0212 by Ingrid Stanford RN  Outcome: Progressing  3/16/2024 1954 by Ingrid Stanford RN  Outcome: Progressing  3/16/2024 1954 by Ingrid Stanford RN  Outcome: Progressing  Intervention: Promote Airway Secretion Clearance  Recent Flowsheet Documentation  Taken 3/17/2024 0017 by Ingrid Stanford RN  Cough And Deep Breathing: done independently per patient  Activity Management: activity adjusted per tolerance     Problem: Comorbidity Management  Goal: Maintenance of Heart Failure Symptom Control  3/17/2024 0212 by Ingrid Stanford RN  Outcome: Progressing  3/16/2024 1954 by Ingrid Stanford RN  Outcome: Progressing  3/16/2024 1954 by Ingrid Stanford, RN  Outcome: Progressing  Intervention: Maintain Heart Failure Management  Recent Flowsheet Documentation  Taken 3/17/2024 0017 by Ingrid Stanford, RN  Medication  Review/Management: medications reviewed  Goal: Maintenance of Osteoarthritis Symptom Control  3/17/2024 0212 by Ingrid Stanford, RN  Outcome: Progressing  3/16/2024 1954 by Ingrid Stanford, RN  Outcome: Progressing  3/16/2024 1954 by Ingrid Stanford, RN  Outcome: Progressing  Intervention: Maintain Osteoarthritis Symptom Control  Recent Flowsheet Documentation  Taken 3/17/2024 0017 by Ingrid Stanford, RN  Assistive Device Utilized:   gait belt   walker  Activity Management: activity adjusted per tolerance  Medication Review/Management: medications reviewed

## 2024-03-17 NOTE — PROGRESS NOTES
@2210: OVERNIGHT OXIMETRY STUDY was started on RA with sat in the low 90s.    @0255: RN placed pt on 1/2L NC for sats below 89%. Pt stays on the 1/2L for the remaining portion of the study.    @0508: OVERNIGHT OXIMETRY STUDY was completed.    Liudmila Moore RT on 3/17/2024 at 5:57 AM

## 2024-03-17 NOTE — PROGRESS NOTES
Care Management Follow Up    Length of Stay (days): 1    Expected Discharge Date: 03/18/2024     Concerns to be Addressed:       Patient plan of care discussed at interdisciplinary rounds: Yes    Additional Information:    GILDA called [facility] to determine if patient could return today and GILDA was unable to reach staff. GILDA left a message.    [facility] @  [phone]    GABY Flor, SW   Care Management

## 2024-03-18 VITALS
DIASTOLIC BLOOD PRESSURE: 70 MMHG | BODY MASS INDEX: 42.38 KG/M2 | WEIGHT: 248.24 LBS | SYSTOLIC BLOOD PRESSURE: 109 MMHG | HEIGHT: 64 IN | OXYGEN SATURATION: 90 % | HEART RATE: 88 BPM | RESPIRATION RATE: 21 BRPM | TEMPERATURE: 98 F

## 2024-03-18 LAB
ATRIAL RATE - MUSE: 71 BPM
CREAT SERPL-MCNC: 0.8 MG/DL (ref 0.51–0.95)
DIASTOLIC BLOOD PRESSURE - MUSE: NORMAL MMHG
EGFRCR SERPLBLD CKD-EPI 2021: 71 ML/MIN/1.73M2
INTERPRETATION ECG - MUSE: NORMAL
P AXIS - MUSE: 9 DEGREES
PLATELET # BLD AUTO: 265 10E3/UL (ref 150–450)
PR INTERVAL - MUSE: 212 MS
QRS DURATION - MUSE: 80 MS
QT - MUSE: 414 MS
QTC - MUSE: 449 MS
R AXIS - MUSE: -33 DEGREES
SYSTOLIC BLOOD PRESSURE - MUSE: NORMAL MMHG
T AXIS - MUSE: 17 DEGREES
VENTRICULAR RATE- MUSE: 71 BPM

## 2024-03-18 PROCEDURE — 250N000013 HC RX MED GY IP 250 OP 250 PS 637: Performed by: PHYSICIAN ASSISTANT

## 2024-03-18 PROCEDURE — 36415 COLL VENOUS BLD VENIPUNCTURE: CPT | Performed by: INTERNAL MEDICINE

## 2024-03-18 PROCEDURE — 82565 ASSAY OF CREATININE: CPT | Performed by: INTERNAL MEDICINE

## 2024-03-18 PROCEDURE — 85049 AUTOMATED PLATELET COUNT: CPT | Performed by: INTERNAL MEDICINE

## 2024-03-18 PROCEDURE — 94640 AIRWAY INHALATION TREATMENT: CPT

## 2024-03-18 PROCEDURE — 250N000012 HC RX MED GY IP 250 OP 636 PS 637: Performed by: INTERNAL MEDICINE

## 2024-03-18 PROCEDURE — 250N000009 HC RX 250: Performed by: INTERNAL MEDICINE

## 2024-03-18 PROCEDURE — 94640 AIRWAY INHALATION TREATMENT: CPT | Mod: 76

## 2024-03-18 PROCEDURE — 250N000013 HC RX MED GY IP 250 OP 250 PS 637: Performed by: INTERNAL MEDICINE

## 2024-03-18 PROCEDURE — 99239 HOSP IP/OBS DSCHRG MGMT >30: CPT | Performed by: STUDENT IN AN ORGANIZED HEALTH CARE EDUCATION/TRAINING PROGRAM

## 2024-03-18 PROCEDURE — 999N000157 HC STATISTIC RCP TIME EA 10 MIN

## 2024-03-18 RX ORDER — FUROSEMIDE 40 MG
20 TABLET ORAL DAILY
Status: ON HOLD | DISCHARGE
Start: 2024-03-18 | End: 2024-06-25

## 2024-03-18 RX ORDER — PREDNISONE 20 MG/1
40 TABLET ORAL DAILY
Qty: 6 TABLET | Refills: 0 | Status: SHIPPED | OUTPATIENT
Start: 2024-03-19 | End: 2024-03-22

## 2024-03-18 RX ORDER — LEVOFLOXACIN 500 MG/1
500 TABLET, FILM COATED ORAL DAILY
Qty: 5 TABLET | Refills: 0 | Status: SHIPPED | OUTPATIENT
Start: 2024-03-18 | End: 2024-03-23

## 2024-03-18 RX ADMIN — PREDNISONE 40 MG: 20 TABLET ORAL at 08:32

## 2024-03-18 RX ADMIN — IPRATROPIUM BROMIDE AND ALBUTEROL SULFATE 3 ML: .5; 3 SOLUTION RESPIRATORY (INHALATION) at 12:58

## 2024-03-18 RX ADMIN — ACETAMINOPHEN 650 MG: 325 TABLET, FILM COATED ORAL at 07:43

## 2024-03-18 RX ADMIN — LEVOTHYROXINE SODIUM 88 MCG: 0.09 TABLET ORAL at 08:33

## 2024-03-18 RX ADMIN — DEXTRAN 70, GLYCERIN, HYPROMELLOSE 1 DROP: 1; 2; 3 SOLUTION/ DROPS OPHTHALMIC at 08:48

## 2024-03-18 RX ADMIN — GUAIFENESIN 600 MG: 600 TABLET, EXTENDED RELEASE ORAL at 08:33

## 2024-03-18 RX ADMIN — ACETAMINOPHEN 650 MG: 325 TABLET, FILM COATED ORAL at 12:48

## 2024-03-18 RX ADMIN — BUSPIRONE HYDROCHLORIDE 5 MG: 5 TABLET ORAL at 08:32

## 2024-03-18 RX ADMIN — FLUTICASONE PROPIONATE 1 SPRAY: 50 SPRAY, METERED NASAL at 08:47

## 2024-03-18 RX ADMIN — IPRATROPIUM BROMIDE AND ALBUTEROL SULFATE 3 ML: .5; 3 SOLUTION RESPIRATORY (INHALATION) at 07:32

## 2024-03-18 RX ADMIN — GABAPENTIN 300 MG: 300 CAPSULE ORAL at 08:32

## 2024-03-18 RX ADMIN — FLUOXETINE HYDROCHLORIDE 20 MG: 20 CAPSULE ORAL at 08:32

## 2024-03-18 RX ADMIN — FLUTICASONE FUROATE AND VILANTEROL TRIFENATATE 1 PUFF: 100; 25 POWDER RESPIRATORY (INHALATION) at 07:32

## 2024-03-18 ASSESSMENT — ACTIVITIES OF DAILY LIVING (ADL)
ADLS_ACUITY_SCORE: 54
ADLS_ACUITY_SCORE: 54
ADLS_ACUITY_SCORE: 51
ADLS_ACUITY_SCORE: 54
ADLS_ACUITY_SCORE: 51
ADLS_ACUITY_SCORE: 54
ADLS_ACUITY_SCORE: 51
ADLS_ACUITY_SCORE: 51
ADLS_ACUITY_SCORE: 54

## 2024-03-18 NOTE — DISCHARGE SUMMARY
"[facility]  Hospitalist Discharge Summary      Date of Admission:  3/15/2024  Date of Discharge:  3/18/2024  Discharging Provider: Mary Scott DO  Discharge Service: Hospitalist Service    Discharge Diagnoses   Acute hypoxic respiratory failure due to due bronchitis, and reactive airway disease versus interstitial airway disease  JEAN - non compliant with CPAP   Congestive heart failure with preserved LV function  Elevated troponin   Anxiety  With possible panic attacks - also contributing to SOB   Poor mobility/Deconditioning     Clinically Significant Risk Factors     # Severe Obesity: Estimated body mass index is 43.15 kg/m  as calculated from the following:    Height as of this encounter: 1.615 m (5' 3.6\").    Weight as of this encounter: 112.6 kg (248 lb 3.8 oz).       Follow-ups Needed After Discharge   -Follow-up with your pulmonologist outpatient, previously saw Dr. Adamson  -repeat PFTs outpatient  -Highly recommend resuming CPAP use, discussed with facility physician if settings need to change or different mask may be beneficial  -Discharged on furosemide 20 mg daily, trend weights 1-2 times weekly to adjust dosing    Discharge Disposition   Discharged to [facility]    Condition at discharge: Stable    Hospital Course   Carol is an 88-year-old female with a history of hypertension, JEAN not currently on CPAP, HFpEF, anxiety presented with several weeks of worsening shortness of breath, intermittent cough, dyspnea on exertion and was admitted 3/15/2024.  Ultimately thought to be multifactorial with bronchitis, reactive airway disease versus ILD, with previous CT scan showing fibrosis and interstitial changes, CT on admission with groundglass opacities suggestive of pneumonitis versus ILD.  She was started on a course of oral steroids, Levaquin, DuoNebs with improvement.  Recommended she resume using her CPAP as untreated JEAN will likely worsen her respiratory status as well as " contribute to diastolic dysfunction.  Her last TTE was in March with a normal LV size, EF of 60% without wall motion abnormalities, did show grade 1 diastolic dysfunction.    Recommendations on discharge:  -Complete course of levofloxacin  -Continue PTA fluticasone-salmeterol inhaler, albuterol inhaler/neb as needed, fluticasone nasal spray  -Follow-up with your pulmonologist outpatient, previously saw Dr. Adamson  -Highly recommend resuming CPAP use, discussed with facility physician if settings need to change or different mask may be beneficial  -Discharged on furosemide 20 mg daily, trend weights  1-2 times weekly to adjust dosing      Consultations This Hospital Stay   RESPIRATORY CARE IP CONSULT  PHYSICAL THERAPY ADULT IP CONSULT  OCCUPATIONAL THERAPY ADULT IP CONSULT  CARE MANAGEMENT / SOCIAL WORK IP CONSULT  SMOKING CESSATION PROGRAM IP CONSULT    Code Status   Full Code    Time Spent on this Encounter   IMary DO, personally saw the patient today and spent greater than 30 minutes discharging this patient.       DO ANTONY Garcia [facility] OBSERVATION DEPT  [address] [place] [address]  Phone: [phone]  ______________________________________________________________________    Physical Exam   Vital Signs: Temp: 97.9  F (36.6  C) Temp src: Oral BP: 96/54 Pulse: 83   Resp: 21 SpO2: 92 % O2 Device: Nasal cannula Oxygen Delivery: 1/2 LPM  Weight: 248 lbs 3.81 oz         Primary Care Physician   [facility]    Discharge Orders   No discharge procedures on file.    Significant Results and Procedures   Most Recent 3 CBC's:  Recent Labs   Lab Test 03/18/24  0601 03/17/24  0834 03/16/24  0822 03/15/24  1543   WBC  --  8.6 6.9 6.7   HGB  --  13.7 13.9 14.2   MCV  --  93 91 94    242 257 250     Most Recent 3 BMP's:  Recent Labs   Lab Test 03/18/24  0601 03/17/24  0834 03/16/24  0822 03/15/24  2140 03/15/24  1543   NA  --  139 135  --  141    POTASSIUM  --  4.1 4.3  --  4.4   CHLORIDE  --  95* 91*  --  96*   CO2  --  34* 33*  --  35*   BUN  --  20.1 14.8  --  14.3   CR 0.80 0.82 0.68   < > 0.74   ANIONGAP  --  10 11  --  10   LAKSHMI  --  9.2 9.2  --  9.3   GLC  --  100* 135*  --  102*    < > = values in this interval not displayed.     Most Recent Hemoglobin A1c:  Recent Labs   Lab Test 09/05/23  0938   A1C 5.9*   ,   Results for orders placed or performed during the hospital encounter of 03/15/24   Chest XR,  PA & LAT    Narrative    EXAM: XR CHEST 2 VIEWS  LOCATION: [facility]  DATE: 3/15/2024    INDICATION: shortness of breath  COMPARISON: 05/02/2023 and older studies      Impression    IMPRESSION: Large body habitus with low lung volumes. Lungs are clear. Heart is of normal size. There is pulmonary vascular congestion, accentuated by the shallow inspiration. No signs of failure. No effusions. Old fracture deformity of the right humeral   neck again noted.   Chest CT w/o contrast    Narrative    EXAM: CT CHEST W/O CONTRAST  LOCATION: [facility]  DATE: 3/15/2024    INDICATION: concern for pneumonia CHF, increased work of breathing  COMPARISON: 9/28/2022  TECHNIQUE: CT chest without IV contrast. Multiplanar reformats were obtained. Dose reduction techniques were used.  CONTRAST: None.    FINDINGS:   LUNGS AND PLEURA: Bronchial wall thickening and endobronchial opacities in both lower lobes. Groundglass opacities and subpleural reticular abnormality in the lower lobes as well. No pleural effusion or pneumothorax.    MEDIASTINUM/AXILLAE: No lymphadenopathy. No thoracic aortic aneurysm.    CORONARY ARTERY CALCIFICATION: Mild.    UPPER ABDOMEN: Hepatic steatosis and cholelithiasis.    MUSCULOSKELETAL: Degenerative changes in the spine and shoulders.      Impression    IMPRESSION:   1.  Groundglass opacities and subpleural reticular abnormality in the lower lobes suggesting pneumonitis or possibly interstitial  lung disease.    2.  Bronchial wall thickening and endobronchial opacities in the lower lobes, which could be related to aspiration and/or mucous plugging.     Echocardiogram Complete     Value    LVEF  65-70%    [facility]    [id number]  [id number]  [id number]^JAMAAL^JESSICA^YASMANI     [facility]  Echocardiography Laboratory  [address] [place] [address]     Name: GABBY EAGLE  MRN: [id number]  : 1935  Study Date: 2024 01:06 PM  Age: 88 yrs  Gender: Female  Patient Location: [facility]  Reason For Study: Dyspnea  Ordering Physician: JESSICA HINTON  Referring Physician: ANTNOY Gan [facility]  Performed By: Jayshree Hector RDCS     BSA: 2.1 m2  Height: 64 in  Weight: 237 lb  HR: 77  BP: 111/61 mmHg  ______________________________________________________________________________  Procedure  Complete Echo Adult.  ______________________________________________________________________________  Interpretation Summary     1. Normal biventricular size and function. Left ventricular ejection fraction  of 65-75%.  2. No segmental wall motion abnormalities noted.  3. No hemodynamically significant valvular disease.  Compared to the previous echocardiogram, there are no significant changes.  ______________________________________________________________________________  Left Ventricle  The left ventricle is normal in size. A sigmoid septum is present. Grade I or  early diastolic dysfunction. The visual ejection fraction is 65-70%. Left  ventricular systolic function is normal. No regional wall motion abnormalities  noted.     Right Ventricle  The right ventricle is normal in structure, function and size.     Atria  Normal left atrial size. Right atrial size is normal. There is no color  Doppler evidence of an atrial shunt.     Mitral Valve  There is mild mitral annular calcification. There is trace to mild mitral  regurgitation.     Tricuspid Valve  The  tricuspid valve is normal in structure and function. There is trace  tricuspid regurgitation. The right ventricular systolic pressure is  approximated at 17.3 mmHg plus the right atrial pressure. Right ventricle  systolic pressure estimate normal.     Aortic Valve  The aortic valve is not well visualized. No aortic regurgitation is present.     Pulmonic Valve  The pulmonic valve is not well visualized.     Vessels  Normal size aorta. Normal size ascending aorta. The inferior vena cava cannot  be assessed.     Pericardium  There is no pericardial effusion.     Rhythm  Sinus rhythm was noted.  ______________________________________________________________________________  MMode/2D Measurements & Calculations  IVSd: 1.1 cm     LVIDd: 4.3 cm  LVIDs: 2.0 cm  LVPWd: 0.98 cm  FS: 53.3 %  LV mass(C)d: 148.3 grams  LV mass(C)dI: 70.6 grams/m2  Ao root diam: 3.6 cm  LA dimension: 3.3 cm  asc Aorta Diam: 3.5 cm  LA/Ao: 0.92  LVOT diam: 2.2 cm  LVOT area: 3.9 cm2  Ao root diam index Ht(cm/m): 2.2  Ao root diam index BSA (cm/m2): 1.7  Asc Ao diam index BSA (cm/m2): 1.7  Asc Ao diam index Ht(cm/m): 2.1  LA Volume (BP): 45.2 ml     LA Volume Index (BP): 21.5 ml/m2  RWT: 0.45  TAPSE: 2.3 cm     Doppler Measurements & Calculations  MV E max misael: 65.4 cm/sec  MV A max misael: 83.4 cm/sec  MV E/A: 0.78  MV dec slope: 254.3 cm/sec2  MV dec time: 0.26 sec  Ao V2 max: 152.8 cm/sec  Ao max P.0 mmHg  PA acc time: 0.07 sec  TR max misael: 207.7 cm/sec  TR max P.3 mmHg  E/E' avg: 10.3  Lateral E/e': 11.1  Medial E/e': 9.4  RV S Misael: 15.8 cm/sec     ______________________________________________________________________________  Report approved by: Jason Courtney 2024 05:22 PM           *Note: Due to a large number of results and/or encounters for the requested time period, some results have not been displayed. A complete set of results can be found in Results Review.       Discharge Medications   Current Discharge Medication  List        CONTINUE these medications which have NOT CHANGED    Details   !! acetaminophen (TYLENOL) 325 MG tablet Take 650 mg by mouth 2 times daily as needed for pain Take 6 hours apart from scheduled doses      !! acetaminophen (TYLENOL) 325 MG tablet Take 650 mg by mouth every 8 hours 0630, 1400, 2145      albuterol (PROAIR HFA/PROVENTIL HFA/VENTOLIN HFA) 108 (90 Base) MCG/ACT inhaler INHALE 2 PUFFS INTO THE LUNGS EVERY 4 HOURS AS NEEDED FOR SHORTNESS OF BREATH OR DIFFICULT BREATHING OR WHEEZING  Qty: 8.5 g, Refills: 0    Comments: Pharmacy may dispense brand covered by insurance (Proair, or proventil or ventolin or generic albuterol inhaler)  Associated Diagnoses: SOB (shortness of breath)      albuterol (PROVENTIL) (2.5 MG/3ML) 0.083% neb solution Take 1 vial (2.5 mg) by nebulization every 4 hours as needed for shortness of breath, wheezing or cough And BID  Qty: 90 mL    Associated Diagnoses: Wheezing      busPIRone (BUSPAR) 5 MG tablet Take 5 mg by mouth 2 times daily Take for 1 week then discontinue      calcium carbonate 600 mg-vitamin D 400 units (CALTRATE) 600-400 MG-UNIT per tablet Take 1 tablet by mouth every evening   Qty: 100 tablet, Refills: 3    Associated Diagnoses: Osteopenia      celecoxib (CELEBREX) 100 MG capsule Take 100 mg by mouth 2 times daily as needed for moderate pain (4-6)      clotrimazole (LOTRIMIN) 1 % external cream Apply topically 2 times daily  Qty: 28 g, Refills: 0      cycloSPORINE (CYCLOSPORINE IN KLARITY) 0.1 % EMUL Place 1 drop into both eyes 2 times daily 0630 and 2145      FLUoxetine (PROZAC) 20 MG capsule Take 20 mg by mouth daily Take for 1 week then discontinue      fluticasone (FLONASE) 50 MCG/ACT nasal spray Spray 1 spray into both nostrils every morning      fluticasone-salmeterol (WIXELA INHUB) 100-50 MCG/ACT inhaler Inhale 1 puff into the lungs 2 times daily  Qty: 60 each, Refills: 5    Associated Diagnoses: Shortness of breath      furosemide (LASIX) 40 MG tablet  Take 40 mg by mouth daily      gabapentin (NEURONTIN) 300 MG capsule Take 1 capsule (300 mg) by mouth 2 times daily    Associated Diagnoses: History of bilateral knee replacement      gabapentin (NEURONTIN) 600 MG tablet Take 1 tablet (600 mg) by mouth At Bedtime    Associated Diagnoses: History of bilateral knee replacement      guaiFENesin 1200 MG TB12 Take 1 tablet (1,200 mg) by mouth every 12 hours    Associated Diagnoses: Chronic cough      levothyroxine (SYNTHROID/LEVOTHROID) 88 MCG tablet Take 1 tablet (88 mcg) by mouth daily  Qty: 90 tablet, Refills: 0    Associated Diagnoses: Hypothyroidism, unspecified type      LORazepam (ATIVAN) 0.5 MG tablet Take 0.5 mg by mouth daily as needed for anxiety      melatonin 3 MG tablet Take 3 mg by mouth At Bedtime      menthol-zinc oxide (CALMOSEPTINE) 0.44-20.6 % OINT ointment Apply topically 2 times daily 0630 and 2145      nystatin (NYSTOP) 100467 UNIT/GM external powder Apply tid to affectead area prn  Qty: 60 g, Refills: 3    Associated Diagnoses: Intertrigo      polyethylene glycol (MIRALAX) 17 g packet Take 17 g by mouth daily And repeat daily prn constipation    Associated Diagnoses: Constipation, unspecified constipation type; History of bilateral knee replacement      !! ACE/ARB/ARNI NOT PRESCRIBED (INTENTIONAL) Please choose reason not prescribed, below    Associated Diagnoses: Chronic diastolic heart failure (H)      !! BETA BLOCKER NOT PRESCRIBED (INTENTIONAL) Beta Blocker not prescribed intentionally due to EF > 40 % (ejection fraction) will leave up to Cardiology.    Associated Diagnoses: Chronic diastolic heart failure (H)       !! - Potential duplicate medications found. Please discuss with provider.        Allergies   Allergies   Allergen Reactions    Ace Inhibitors Cough

## 2024-03-18 NOTE — PLAN OF CARE
"Pt is alert and oriented x4. Weaned off oxyygen to 1/2 L of  O2. Pain that she did not want any medication for. She does have scheduled tylenol. Asked for melatonin to help with sleep. Assist of 1 with gait belt and walker. Cardiac  diet. PIV- SL.Wound in the coccyx. Mepilex in place. External cath in place.    Problem: Adult Inpatient Plan of Care  Goal: Plan of Care Review  Description: The Plan of Care Review/Shift note should be completed every shift.  The Outcome Evaluation is a brief statement about your assessment that the patient is improving, declining, or no change.  This information will be displayed automatically on your shift  note.  Outcome: Progressing  Flowsheets (Taken 3/18/2024 0434)  Outcome Evaluation: wean off O2 and dischargiing in the morning  Plan of Care Reviewed With: patient  Overall Patient Progress: improving  Goal: Patient-Specific Goal (Individualized)  Description: You can add care plan individualizations to a care plan. Examples of Individualization might be:  \"Parent requests to be called daily at 9am for status\", \"I have a hard time hearing out of my right ear\", or \"Do not touch me to wake me up as it startles  me\".  Outcome: Progressing  Goal: Absence of Hospital-Acquired Illness or Injury  Outcome: Progressing  Intervention: Identify and Manage Fall Risk  Recent Flowsheet Documentation  Taken 3/18/2024 0010 by Alice Vasquez, RN  Safety Promotion/Fall Prevention:   lighting adjusted   increase visualization of patient   increased rounding and observation   clutter free environment maintained   mobility aid in reach   room door open  Taken 3/17/2024 2000 by Alice Vasquez, RN  Safety Promotion/Fall Prevention:   lighting adjusted   increase visualization of patient   increased rounding and observation   clutter free environment maintained   mobility aid in reach   room door open  Intervention: Prevent Skin Injury  Recent Flowsheet Documentation  Taken 3/18/2024 0200 by Christina, " Alice MARSHALL RN  Body Position:   turned   left  Taken 3/18/2024 0010 by Alice Vasquez RN  Body Position: supine, head elevated  Taken 3/17/2024 2208 by Alice Vasquez RN  Body Position: supine, head elevated  Taken 3/17/2024 2000 by Alice Vasquez RN  Body Position: supine, head elevated  Intervention: Prevent and Manage VTE (Venous Thromboembolism) Risk  Recent Flowsheet Documentation  Taken 3/18/2024 0010 by Alice Vasquez RN  VTE Prevention/Management: SCDs (sequential compression devices) on  Taken 3/17/2024 2000 by Alice Vasquez RN  VTE Prevention/Management: SCDs (sequential compression devices) on  Intervention: Prevent Infection  Recent Flowsheet Documentation  Taken 3/18/2024 0010 by Alice Vasquez RN  Infection Prevention:   single patient room provided   rest/sleep promoted   hand hygiene promoted   equipment surfaces disinfected   personal protective equipment utilized  Taken 3/17/2024 2000 by Alice Vasquez RN  Infection Prevention:   single patient room provided   rest/sleep promoted   hand hygiene promoted   equipment surfaces disinfected   personal protective equipment utilized  Goal: Optimal Comfort and Wellbeing  Outcome: Progressing  Goal: Readiness for Transition of Care  Outcome: Progressing     Problem: COPD (Chronic Obstructive Pulmonary Disease)  Goal: Optimal Chronic Illness Coping  Outcome: Progressing  Goal: Optimal Level of Functional [place]  Outcome: Progressing  Intervention: Optimize Functional Ability  Recent Flowsheet Documentation  Taken 3/18/2024 0010 by Alice Vasquez RN  Activity Management: activity adjusted per tolerance  Taken 3/17/2024 2208 by Alice Vasquez RN  Activity Management:   activity adjusted per tolerance   up in chair  Taken 3/17/2024 2000 by Alice Vasquez RN  Activity Management: activity adjusted per tolerance  Goal: Absence of Infection Signs and Symptoms  Outcome: Progressing  Goal: Improved Oral Intake  Outcome: Progressing  Goal: Effective  Oxygenation and Ventilation  Outcome: Progressing  Intervention: Promote Airway Secretion Clearance  Recent Flowsheet Documentation  Taken 3/18/2024 0010 by Alice Vasquez RN  Cough And Deep Breathing: done independently per patient  Activity Management: activity adjusted per tolerance  Taken 3/17/2024 2208 by Alice Vasquez RN  Activity Management:   activity adjusted per tolerance   up in chair  Taken 3/17/2024 2000 by Alice Vasquez RN  Cough And Deep Breathing: done independently per patient  Activity Management: activity adjusted per tolerance  Intervention: Optimize Oxygenation and Ventilation  Recent Flowsheet Documentation  Taken 3/18/2024 0200 by Alice Vasquez RN  Head of Bed (HOB) Positioning: HOB at 20 degrees  Taken 3/18/2024 0010 by Alice Vasquez RN  Head of Bed (HOB) Positioning: HOB at 20 degrees     Problem: Comorbidity Management  Goal: Maintenance of Heart Failure Symptom Control  Outcome: Progressing  Intervention: Maintain Heart Failure Management  Recent Flowsheet Documentation  Taken 3/18/2024 0010 by Alice Vasquez RN  Medication Review/Management: medications reviewed  Taken 3/17/2024 2000 by Alice Vasquez RN  Medication Review/Management: medications reviewed  Goal: Maintenance of Osteoarthritis Symptom Control  Outcome: Progressing  Intervention: Maintain Osteoarthritis Symptom Control  Recent Flowsheet Documentation  Taken 3/18/2024 0010 by Alice Vasquez RN  Assistive Device Utilized:   gait belt   walker  Activity Management: activity adjusted per tolerance  Medication Review/Management: medications reviewed  Taken 3/17/2024 2208 by Alice Vasquez RN  Activity Management:   activity adjusted per tolerance   up in chair  Taken 3/17/2024 2000 by Alice Vasquez RN  Assistive Device Utilized:   gait belt   walker  Activity Management: activity adjusted per tolerance  Medication Review/Management: medications reviewed   Goal Outcome Evaluation:      Plan of Care Reviewed With:  patient    Overall Patient Progress: improvingOverall Patient Progress: improving    Outcome Evaluation: wean off O2 and dischargiing in the morning

## 2024-03-18 NOTE — PLAN OF CARE
Patient's After Visit Summary was reviewed with patient and/or packet sent for facility.   Patient verbalized understanding of After Visit Summary, recommended follow up and was given an opportunity to ask questions.   Discharge medications sent home with patient/family: Not applicable   Discharged with transport tech

## 2024-03-18 NOTE — PROGRESS NOTES
"Care Management Discharge Note    Discharge Date: 03/18/2024    Discharge Disposition:  Return to [facility]    Discharge Services:  none    Discharge DME:      Discharge Transportation:  [employer] w/c    Private pay costs discussed: transportation costs    Patient/Family in Agreement with the Plan:  yes    Handoff Referral Completed: Yes    Additional Information:  SW following for discharge planning. MD has cleared pt to discharge today.      [employer] w/c ride arranged for today for next available ride which is between [id number].     Updated [facility] p:[phone] f\"[phone] on ride time, they are agreeable.     Orders faxed.     Updated pt at bedside. Pt called her son Jono on the phone and requested SW give update to Jono. Both pt and Jono are in agreement with discharge plan and ride time.     ADDENDUM:     Received call from [facility] stating pt is open to RN for wound management. Orders faxed.    DEWAYNE Luis, W  Care Coordination  [phone]    DEWAYNE Elias      "

## 2024-03-18 NOTE — PROGRESS NOTES
Care Management Follow Up    Length of Stay (days): 2    Expected Discharge Date: 03/18/2024     Concerns to be Addressed: discharge planning      Patient plan of care discussed at interdisciplinary rounds: Yes    Anticipated Discharge Disposition:  Return to [facility]     Anticipated Discharge Services:    Anticipated Discharge DME:      Patient/Family in Agreement with the Plan:  yes    Referrals Placed by CM/SW:    Private pay costs discussed: transportation costs    Additional Information:  SW following for discharge planning.     Spoke with Maricarmen Marroquin [facility] p:[phone] f:[phone] who stated pt is able to return today. They provide pt with assistance for toileting, dressing, bathing and med management. They use [facility] in [place]. They request pt return by noon.     Page sent to MD inquiring if pt is medically ready to discharge today.     If pt is ready, SW to arrange [facility] w/c ride and fax discharge orders.     Social work will continue to follow and assist with discharge planning as needed.    DEWAYNE Luis, W  Care Coordination  [phone]    DEWAYNE Elias

## 2024-03-19 ENCOUNTER — PATIENT OUTREACH (OUTPATIENT)
Dept: CARE COORDINATION | Facility: CLINIC | Age: 89
End: 2024-03-19
Payer: COMMERCIAL

## 2024-03-19 NOTE — PROGRESS NOTES
Clinic Care Coordination Contact  Care Coordination Clinician Chart Review    Situation: Patient chart reviewed by care coordinator.    Background: Clinic Care Coordination Referral received from inpatient care team for transition handoff communication following hospital admission.    Assessment: Upon chart review, patient is not a candidate for Primary Care Clinic Care Coordination enrollment due to reason stated below:  Patient is receiving Primary Care Provider services through [facility].     Plan/Recommendations: Clinic Care Coordination Referral/order cancelled. RN/SW CC will perform no further monitoring/outreaches at this time and will remain available as needed. If new needs arise, a new Care Coordination Referral may be placed.    Francisca Long RN Care Coordinator  [facility] [place]  Email: [email or web address]  Phone: [phone]

## 2024-05-07 ENCOUNTER — TELEPHONE (OUTPATIENT)
Dept: ORTHOPEDICS | Facility: CLINIC | Age: 89
End: 2024-05-07
Payer: COMMERCIAL

## 2024-05-07 NOTE — TELEPHONE ENCOUNTER
Left Voicemail (1st Attempt) for the patient to call back and schedule the following:    Appointment type: Return surgical spine  Provider: Dr Salamanca  Specialty phone number: [phone]  Additonal Notes: LVM regarding rescheduling 6/5 Xochilt acostat. Contact info provided, no MC.

## 2024-05-13 ENCOUNTER — LAB REQUISITION (OUTPATIENT)
Dept: LAB | Facility: CLINIC | Age: 89
End: 2024-05-13
Payer: COMMERCIAL

## 2024-05-13 DIAGNOSIS — I10 ESSENTIAL (PRIMARY) HYPERTENSION: ICD-10-CM

## 2024-05-13 DIAGNOSIS — D64.9 ANEMIA, UNSPECIFIED: ICD-10-CM

## 2024-05-13 DIAGNOSIS — E03.9 HYPOTHYROIDISM, UNSPECIFIED: ICD-10-CM

## 2024-05-21 LAB
ALBUMIN SERPL BCG-MCNC: 3.7 G/DL (ref 3.5–5.2)
ALP SERPL-CCNC: 128 U/L (ref 40–150)
ALT SERPL W P-5'-P-CCNC: 30 U/L (ref 0–50)
ANION GAP SERPL CALCULATED.3IONS-SCNC: 14 MMOL/L (ref 7–15)
AST SERPL W P-5'-P-CCNC: 50 U/L (ref 0–45)
BILIRUB SERPL-MCNC: 0.7 MG/DL
BUN SERPL-MCNC: 12.3 MG/DL (ref 8–23)
CALCIUM SERPL-MCNC: 9.3 MG/DL (ref 8.8–10.2)
CHLORIDE SERPL-SCNC: 98 MMOL/L (ref 98–107)
CREAT SERPL-MCNC: 0.59 MG/DL (ref 0.51–0.95)
DEPRECATED HCO3 PLAS-SCNC: 28 MMOL/L (ref 22–29)
EGFRCR SERPLBLD CKD-EPI 2021: 86 ML/MIN/1.73M2
ERYTHROCYTE [DISTWIDTH] IN BLOOD BY AUTOMATED COUNT: 13.2 % (ref 10–15)
GLUCOSE SERPL-MCNC: 119 MG/DL (ref 70–99)
HCT VFR BLD AUTO: 45.5 % (ref 35–47)
HGB BLD-MCNC: 13.9 G/DL (ref 11.7–15.7)
MCH RBC QN AUTO: 29.3 PG (ref 26.5–33)
MCHC RBC AUTO-ENTMCNC: 30.5 G/DL (ref 31.5–36.5)
MCV RBC AUTO: 96 FL (ref 78–100)
PLATELET # BLD AUTO: 254 10E3/UL (ref 150–450)
POTASSIUM SERPL-SCNC: 4 MMOL/L (ref 3.4–5.3)
PROT SERPL-MCNC: 6.8 G/DL (ref 6.4–8.3)
RBC # BLD AUTO: 4.75 10E6/UL (ref 3.8–5.2)
SODIUM SERPL-SCNC: 140 MMOL/L (ref 135–145)
TSH SERPL DL<=0.005 MIU/L-ACNC: 3.66 UIU/ML (ref 0.3–4.2)
WBC # BLD AUTO: 7.4 10E3/UL (ref 4–11)

## 2024-05-21 PROCEDURE — 85027 COMPLETE CBC AUTOMATED: CPT | Mod: ORL | Performed by: PHYSICIAN ASSISTANT

## 2024-05-21 PROCEDURE — P9603 ONE-WAY ALLOW PRORATED MILES: HCPCS | Mod: ORL | Performed by: PHYSICIAN ASSISTANT

## 2024-05-21 PROCEDURE — 80053 COMPREHEN METABOLIC PANEL: CPT | Mod: ORL | Performed by: PHYSICIAN ASSISTANT

## 2024-05-21 PROCEDURE — 84443 ASSAY THYROID STIM HORMONE: CPT | Mod: ORL | Performed by: PHYSICIAN ASSISTANT

## 2024-05-21 PROCEDURE — 36415 COLL VENOUS BLD VENIPUNCTURE: CPT | Mod: ORL | Performed by: PHYSICIAN ASSISTANT

## 2024-05-28 DIAGNOSIS — S72.301G DELAYED UNION OF CLOSED FRACTURE OF FEMORAL SHAFT, RIGHT: Primary | ICD-10-CM

## 2024-05-29 ENCOUNTER — OFFICE VISIT (OUTPATIENT)
Dept: ORTHOPEDICS | Facility: CLINIC | Age: 89
End: 2024-05-29
Payer: COMMERCIAL

## 2024-05-29 ENCOUNTER — ANCILLARY PROCEDURE (OUTPATIENT)
Dept: GENERAL RADIOLOGY | Facility: CLINIC | Age: 89
End: 2024-05-29
Attending: ORTHOPAEDIC SURGERY
Payer: COMMERCIAL

## 2024-05-29 DIAGNOSIS — M97.02XD PERIPROSTHETIC FRACTURE AROUND INTERNAL PROSTHETIC LEFT HIP JOINT, SUBSEQUENT ENCOUNTER: ICD-10-CM

## 2024-05-29 DIAGNOSIS — M85.851 OSTEOPENIA OF BOTH THIGHS: ICD-10-CM

## 2024-05-29 DIAGNOSIS — S72.301G DELAYED UNION OF CLOSED FRACTURE OF FEMORAL SHAFT, RIGHT: ICD-10-CM

## 2024-05-29 DIAGNOSIS — M85.852 OSTEOPENIA OF BOTH THIGHS: ICD-10-CM

## 2024-05-29 DIAGNOSIS — S72.301G DELAYED UNION OF CLOSED FRACTURE OF FEMORAL SHAFT, RIGHT: Primary | ICD-10-CM

## 2024-05-29 PROCEDURE — 73552 X-RAY EXAM OF FEMUR 2/>: CPT | Mod: RT | Performed by: RADIOLOGY

## 2024-05-29 PROCEDURE — 99214 OFFICE O/P EST MOD 30 MIN: CPT | Performed by: ORTHOPAEDIC SURGERY

## 2024-05-29 RX ORDER — DULOXETIN HYDROCHLORIDE 60 MG/1
60 CAPSULE, DELAYED RELEASE ORAL DAILY
COMMUNITY
Start: 2024-05-07

## 2024-05-29 NOTE — LETTER
5/29/2024       RE: Carol Merida  [address]  [facility] [address]    Dear Colleague,    Thank you for referring your patient, Carol Merida, to the [facility] [place]. Please see a copy of my visit note below.    Chief Concern:  Follow up ORIF right femur fracture    HPI:  Patient is a pleasant 89-year-old female status post open reduction internal fixation of right femur fracture in April 2023.  She had delayed union with proximal aspect of the plate loosening.  Following that there was subsidence at the fracture site however it appears that the fracture is stabilized and radiographically looks to achieved patient states that she has no thigh pain.  Her primary concern remains right knee pain at the site of her remote total knee arthroplasty send 30 years ago.    I shared today's x-rays with the patient and her daughter who joined us by Beth and showed him where I believe the fracture has healed on both AP and lateral views.  I suggested that we can began gradually advancing weightbearing and seeing how much functional independence she can regain.  I also suggested using a knee immobilizer which she has had in the past this does not fit her leg and constantly slides down.  My suggestion then is to use a hinged knee brace for ambulation for as her strength improves we can increase the amount of flexion allowed during ambulation.  She will definitely still need a walker for stabilization and should have standby assistance from physical therapy or nursing given deconditioning.  Both the patient and her daughter were in agreement with this plan.    Time spent on this clinical encounter including previsit chart review, history and physical examination, patient counseling and documentation was 35 minutes on the date of encounter.    Newton Lemon MD  , Department of Orthopedic Surgery  [facility]

## 2024-05-29 NOTE — NURSING NOTE
Reason For Visit:   Chief Complaint   Patient presents with    RECHECK     POST OP 2.6.2023 right femur       LMP  (LMP Unknown)     Pain Assessment  Patient Currently in Pain: Yes  0-10 Pain Scale: 5  Primary Pain Location: Leg    Maria Antonia PalmiraoJOHN

## 2024-06-01 NOTE — PROGRESS NOTES
Chief Concern:  Follow up ORIF right femur fracture    HPI:  Patient is a pleasant 89-year-old female status post open reduction internal fixation of right femur fracture in April 2023.  She had delayed union with proximal aspect of the plate loosening.  Following that there was subsidence at the fracture site however it appears that the fracture is stabilized and radiographically looks to achieved patient states that she has no thigh pain.  Her primary concern remains right knee pain at the site of her remote total knee arthroplasty send 30 years ago.    I shared today's x-rays with the patient and her daughter who joined us by [employer] and showed him where I believe the fracture has healed on both AP and lateral views.  I suggested that we can began gradually advancing weightbearing and seeing how much functional independence she can regain.  I also suggested using a knee immobilizer which she has had in the past this does not fit her leg and constantly slides down.  My suggestion then is to use a hinged knee brace for ambulation for as her strength improves we can increase the amount of flexion allowed during ambulation.  She will definitely still need a walker for stabilization and should have standby assistance from physical therapy or nursing given deconditioning.  Both the patient and her daughter were in agreement with this plan.    Time spent on this clinical encounter including previsit chart review, history and physical examination, patient counseling and documentation was 35 minutes on the date of encounter.    Newton Lemon MD  , Department of Orthopedic Surgery  [facility]

## 2024-06-06 ENCOUNTER — TRANSFERRED RECORDS (OUTPATIENT)
Dept: HEALTH INFORMATION MANAGEMENT | Facility: CLINIC | Age: 89
End: 2024-06-06
Payer: COMMERCIAL

## 2024-06-12 ENCOUNTER — TELEPHONE (OUTPATIENT)
Dept: ORTHOPEDICS | Facility: CLINIC | Age: 89
End: 2024-06-12
Payer: COMMERCIAL

## 2024-06-12 NOTE — TELEPHONE ENCOUNTER
[facility] Call Center    Phone Message    May a detailed message be left on voicemail: yes     Reason for Call: Daughter asking for call back . She want to know about Mom Right Knee Brace. Who Suppose to Order it  Doctor or Patient. Please call Daughter    Action Taken: Message routed to:  [facility] ([facility]): joe    Travel Screening: Not Applicable     Date of Service:

## 2024-06-12 NOTE — TELEPHONE ENCOUNTER
Patient's daughter was called back . The order for the knee brace is in her chart and [facility] to get it.  They will be able to see the order in her chart. She was given the number to [facility] as they are located in [place]. She had no other questions.

## 2024-06-20 ENCOUNTER — APPOINTMENT (OUTPATIENT)
Dept: GENERAL RADIOLOGY | Facility: CLINIC | Age: 89
DRG: 291 | End: 2024-06-20
Attending: EMERGENCY MEDICINE
Payer: COMMERCIAL

## 2024-06-20 ENCOUNTER — APPOINTMENT (OUTPATIENT)
Dept: CT IMAGING | Facility: CLINIC | Age: 89
DRG: 291 | End: 2024-06-20
Attending: INTERNAL MEDICINE
Payer: COMMERCIAL

## 2024-06-20 ENCOUNTER — HOSPITAL ENCOUNTER (INPATIENT)
Facility: CLINIC | Age: 89
LOS: 5 days | Discharge: NURSING, REHAB OR LONG-TERM CARE | DRG: 291 | End: 2024-06-25
Attending: EMERGENCY MEDICINE | Admitting: INTERNAL MEDICINE
Payer: COMMERCIAL

## 2024-06-20 ENCOUNTER — MEDICAL CORRESPONDENCE (OUTPATIENT)
Dept: HEALTH INFORMATION MANAGEMENT | Facility: CLINIC | Age: 89
End: 2024-06-20

## 2024-06-20 DIAGNOSIS — I50.30 HEART FAILURE WITH PRESERVED EJECTION FRACTION, NYHA CLASS I (H): ICD-10-CM

## 2024-06-20 DIAGNOSIS — F41.0 PANIC DISORDER WITHOUT AGORAPHOBIA: Primary | ICD-10-CM

## 2024-06-20 DIAGNOSIS — J96.01 ACUTE HYPOXEMIC RESPIRATORY FAILURE (H): ICD-10-CM

## 2024-06-20 DIAGNOSIS — R09.02 HYPOXIA: ICD-10-CM

## 2024-06-20 DIAGNOSIS — R06.02 SHORTNESS OF BREATH: ICD-10-CM

## 2024-06-20 DIAGNOSIS — M79.89 SWELLING OF BOTH LOWER EXTREMITIES: ICD-10-CM

## 2024-06-20 DIAGNOSIS — G44.209 TENSION-TYPE HEADACHE, NOT INTRACTABLE, UNSPECIFIED CHRONICITY PATTERN: ICD-10-CM

## 2024-06-20 DIAGNOSIS — J44.1 COPD EXACERBATION (H): ICD-10-CM

## 2024-06-20 DIAGNOSIS — I50.32 CHRONIC DIASTOLIC HEART FAILURE (H): ICD-10-CM

## 2024-06-20 DIAGNOSIS — N30.00 ACUTE CYSTITIS WITHOUT HEMATURIA: ICD-10-CM

## 2024-06-20 DIAGNOSIS — M10.9 ACUTE GOUT OF LEFT ANKLE, UNSPECIFIED CAUSE: ICD-10-CM

## 2024-06-20 LAB
ALBUMIN UR-MCNC: NEGATIVE MG/DL
ANION GAP SERPL CALCULATED.3IONS-SCNC: 12 MMOL/L (ref 7–15)
APPEARANCE UR: CLEAR
ATRIAL RATE - MUSE: 94 BPM
BACTERIA #/AREA URNS HPF: ABNORMAL /HPF
BASOPHILS # BLD AUTO: 0 10E3/UL (ref 0–0.2)
BASOPHILS NFR BLD AUTO: 1 %
BILIRUB UR QL STRIP: NEGATIVE
BUN SERPL-MCNC: 14.6 MG/DL (ref 8–23)
C PNEUM DNA SPEC QL NAA+PROBE: NOT DETECTED
CALCIUM SERPL-MCNC: 9 MG/DL (ref 8.8–10.2)
CHLORIDE SERPL-SCNC: 98 MMOL/L (ref 98–107)
COLOR UR AUTO: YELLOW
CREAT SERPL-MCNC: 0.71 MG/DL (ref 0.51–0.95)
CRP SERPL-MCNC: 15.74 MG/L
DEPRECATED HCO3 PLAS-SCNC: 28 MMOL/L (ref 22–29)
DIASTOLIC BLOOD PRESSURE - MUSE: NORMAL MMHG
EGFRCR SERPLBLD CKD-EPI 2021: 81 ML/MIN/1.73M2
EOSINOPHIL # BLD AUTO: 0.5 10E3/UL (ref 0–0.7)
EOSINOPHIL NFR BLD AUTO: 7 %
ERYTHROCYTE [DISTWIDTH] IN BLOOD BY AUTOMATED COUNT: 13.7 % (ref 10–15)
FLUAV H1 2009 PAND RNA SPEC QL NAA+PROBE: NOT DETECTED
FLUAV H1 RNA SPEC QL NAA+PROBE: NOT DETECTED
FLUAV H3 RNA SPEC QL NAA+PROBE: NOT DETECTED
FLUAV RNA SPEC QL NAA+PROBE: NEGATIVE
FLUAV RNA SPEC QL NAA+PROBE: NOT DETECTED
FLUBV RNA RESP QL NAA+PROBE: NEGATIVE
FLUBV RNA SPEC QL NAA+PROBE: NOT DETECTED
GLUCOSE SERPL-MCNC: 159 MG/DL (ref 70–99)
GLUCOSE UR STRIP-MCNC: NEGATIVE MG/DL
HADV DNA SPEC QL NAA+PROBE: NOT DETECTED
HCO3 BLDV-SCNC: 32 MMOL/L (ref 21–28)
HCOV PNL SPEC NAA+PROBE: NOT DETECTED
HCT VFR BLD AUTO: 43.5 % (ref 35–47)
HGB BLD-MCNC: 13.4 G/DL (ref 11.7–15.7)
HGB UR QL STRIP: NEGATIVE
HMPV RNA SPEC QL NAA+PROBE: NOT DETECTED
HOLD SPECIMEN: NORMAL
HOLD SPECIMEN: NORMAL
HPIV1 RNA SPEC QL NAA+PROBE: NOT DETECTED
HPIV2 RNA SPEC QL NAA+PROBE: NOT DETECTED
HPIV3 RNA SPEC QL NAA+PROBE: NOT DETECTED
HPIV4 RNA SPEC QL NAA+PROBE: NOT DETECTED
IMM GRANULOCYTES # BLD: 0 10E3/UL
IMM GRANULOCYTES NFR BLD: 0 %
INTERPRETATION ECG - MUSE: NORMAL
KETONES UR STRIP-MCNC: NEGATIVE MG/DL
LACTATE BLD-SCNC: 2.2 MMOL/L
LACTATE SERPL-SCNC: 1.5 MMOL/L (ref 0.7–2)
LEUKOCYTE ESTERASE UR QL STRIP: ABNORMAL
LYMPHOCYTES # BLD AUTO: 0.7 10E3/UL (ref 0.8–5.3)
LYMPHOCYTES NFR BLD AUTO: 10 %
M PNEUMO DNA SPEC QL NAA+PROBE: NOT DETECTED
MAGNESIUM SERPL-MCNC: 2.1 MG/DL (ref 1.7–2.3)
MCH RBC QN AUTO: 29 PG (ref 26.5–33)
MCHC RBC AUTO-ENTMCNC: 30.8 G/DL (ref 31.5–36.5)
MCV RBC AUTO: 94 FL (ref 78–100)
MONOCYTES # BLD AUTO: 0.6 10E3/UL (ref 0–1.3)
MONOCYTES NFR BLD AUTO: 8 %
NEUTROPHILS # BLD AUTO: 5.3 10E3/UL (ref 1.6–8.3)
NEUTROPHILS NFR BLD AUTO: 74 %
NITRATE UR QL: POSITIVE
NRBC # BLD AUTO: 0 10E3/UL
NRBC BLD AUTO-RTO: 0 /100
NT-PROBNP SERPL-MCNC: 114 PG/ML (ref 0–1800)
P AXIS - MUSE: 5 DEGREES
PCO2 BLDV: 46 MM HG (ref 40–50)
PH BLDV: 7.45 [PH] (ref 7.32–7.43)
PH UR STRIP: 7.5 [PH] (ref 5–7)
PLATELET # BLD AUTO: 258 10E3/UL (ref 150–450)
PO2 BLDV: 62 MM HG (ref 25–47)
POTASSIUM SERPL-SCNC: 4 MMOL/L (ref 3.4–5.3)
POTASSIUM SERPL-SCNC: 4.3 MMOL/L (ref 3.4–5.3)
PR INTERVAL - MUSE: 160 MS
PROCALCITONIN SERPL IA-MCNC: 0.12 NG/ML
QRS DURATION - MUSE: 74 MS
QT - MUSE: 356 MS
QTC - MUSE: 445 MS
R AXIS - MUSE: -21 DEGREES
RBC # BLD AUTO: 4.62 10E6/UL (ref 3.8–5.2)
RBC URINE: 1 /HPF
RSV RNA SPEC NAA+PROBE: NEGATIVE
RSV RNA SPEC QL NAA+PROBE: NOT DETECTED
RSV RNA SPEC QL NAA+PROBE: NOT DETECTED
RV+EV RNA SPEC QL NAA+PROBE: NOT DETECTED
SAO2 % BLDV: 92 % (ref 70–75)
SARS-COV-2 RNA RESP QL NAA+PROBE: NEGATIVE
SODIUM SERPL-SCNC: 138 MMOL/L (ref 135–145)
SP GR UR STRIP: 1.02 (ref 1–1.03)
SQUAMOUS EPITHELIAL: 4 /HPF
SYSTOLIC BLOOD PRESSURE - MUSE: NORMAL MMHG
T AXIS - MUSE: 35 DEGREES
TROPONIN T SERPL HS-MCNC: 15 NG/L
TROPONIN T SERPL HS-MCNC: 16 NG/L
UROBILINOGEN UR STRIP-MCNC: 3 MG/DL
VENTRICULAR RATE- MUSE: 94 BPM
WBC # BLD AUTO: 7.2 10E3/UL (ref 4–11)
WBC URINE: 31 /HPF

## 2024-06-20 PROCEDURE — 86140 C-REACTIVE PROTEIN: CPT | Performed by: INTERNAL MEDICINE

## 2024-06-20 PROCEDURE — 250N000009 HC RX 250: Performed by: INTERNAL MEDICINE

## 2024-06-20 PROCEDURE — 258N000003 HC RX IP 258 OP 636: Mod: JZ | Performed by: EMERGENCY MEDICINE

## 2024-06-20 PROCEDURE — 250N000011 HC RX IP 250 OP 636: Performed by: INTERNAL MEDICINE

## 2024-06-20 PROCEDURE — 96361 HYDRATE IV INFUSION ADD-ON: CPT

## 2024-06-20 PROCEDURE — G0463 HOSPITAL OUTPT CLINIC VISIT: HCPCS | Mod: 25

## 2024-06-20 PROCEDURE — 87040 BLOOD CULTURE FOR BACTERIA: CPT | Performed by: EMERGENCY MEDICINE

## 2024-06-20 PROCEDURE — 71275 CT ANGIOGRAPHY CHEST: CPT

## 2024-06-20 PROCEDURE — 36415 COLL VENOUS BLD VENIPUNCTURE: CPT | Performed by: INTERNAL MEDICINE

## 2024-06-20 PROCEDURE — 93005 ELECTROCARDIOGRAM TRACING: CPT

## 2024-06-20 PROCEDURE — 250N000011 HC RX IP 250 OP 636: Mod: JZ | Performed by: EMERGENCY MEDICINE

## 2024-06-20 PROCEDURE — 36415 COLL VENOUS BLD VENIPUNCTURE: CPT | Performed by: EMERGENCY MEDICINE

## 2024-06-20 PROCEDURE — 84132 ASSAY OF SERUM POTASSIUM: CPT | Performed by: INTERNAL MEDICINE

## 2024-06-20 PROCEDURE — 83735 ASSAY OF MAGNESIUM: CPT | Performed by: INTERNAL MEDICINE

## 2024-06-20 PROCEDURE — 84484 ASSAY OF TROPONIN QUANT: CPT | Performed by: EMERGENCY MEDICINE

## 2024-06-20 PROCEDURE — 99223 1ST HOSP IP/OBS HIGH 75: CPT | Mod: AI | Performed by: INTERNAL MEDICINE

## 2024-06-20 PROCEDURE — 96374 THER/PROPH/DIAG INJ IV PUSH: CPT | Mod: 59

## 2024-06-20 PROCEDURE — 99285 EMERGENCY DEPT VISIT HI MDM: CPT | Mod: 25

## 2024-06-20 PROCEDURE — 250N000011 HC RX IP 250 OP 636: Mod: JZ | Performed by: INTERNAL MEDICINE

## 2024-06-20 PROCEDURE — 82803 BLOOD GASES ANY COMBINATION: CPT

## 2024-06-20 PROCEDURE — 120N000001 HC R&B MED SURG/OB

## 2024-06-20 PROCEDURE — 80048 BASIC METABOLIC PNL TOTAL CA: CPT | Performed by: EMERGENCY MEDICINE

## 2024-06-20 PROCEDURE — 84145 PROCALCITONIN (PCT): CPT | Performed by: INTERNAL MEDICINE

## 2024-06-20 PROCEDURE — 83880 ASSAY OF NATRIURETIC PEPTIDE: CPT | Performed by: EMERGENCY MEDICINE

## 2024-06-20 PROCEDURE — 87633 RESP VIRUS 12-25 TARGETS: CPT | Performed by: INTERNAL MEDICINE

## 2024-06-20 PROCEDURE — 81001 URINALYSIS AUTO W/SCOPE: CPT | Performed by: EMERGENCY MEDICINE

## 2024-06-20 PROCEDURE — 85025 COMPLETE CBC W/AUTO DIFF WBC: CPT | Performed by: EMERGENCY MEDICINE

## 2024-06-20 PROCEDURE — 87086 URINE CULTURE/COLONY COUNT: CPT | Performed by: EMERGENCY MEDICINE

## 2024-06-20 PROCEDURE — 250N000013 HC RX MED GY IP 250 OP 250 PS 637: Performed by: INTERNAL MEDICINE

## 2024-06-20 PROCEDURE — 83605 ASSAY OF LACTIC ACID: CPT | Performed by: INTERNAL MEDICINE

## 2024-06-20 PROCEDURE — 94640 AIRWAY INHALATION TREATMENT: CPT

## 2024-06-20 PROCEDURE — 250N000009 HC RX 250: Performed by: EMERGENCY MEDICINE

## 2024-06-20 PROCEDURE — 84484 ASSAY OF TROPONIN QUANT: CPT | Performed by: INTERNAL MEDICINE

## 2024-06-20 PROCEDURE — 71046 X-RAY EXAM CHEST 2 VIEWS: CPT

## 2024-06-20 PROCEDURE — 87637 SARSCOV2&INF A&B&RSV AMP PRB: CPT | Performed by: EMERGENCY MEDICINE

## 2024-06-20 RX ORDER — BENZONATATE 100 MG/1
100 CAPSULE ORAL 3 TIMES DAILY PRN
Status: DISCONTINUED | OUTPATIENT
Start: 2024-06-20 | End: 2024-06-25 | Stop reason: HOSPADM

## 2024-06-20 RX ORDER — IOPAMIDOL 755 MG/ML
500 INJECTION, SOLUTION INTRAVASCULAR ONCE
Status: COMPLETED | OUTPATIENT
Start: 2024-06-20 | End: 2024-06-20

## 2024-06-20 RX ORDER — ONDANSETRON 2 MG/ML
4 INJECTION INTRAMUSCULAR; INTRAVENOUS EVERY 6 HOURS PRN
Status: DISCONTINUED | OUTPATIENT
Start: 2024-06-20 | End: 2024-06-25 | Stop reason: HOSPADM

## 2024-06-20 RX ORDER — IPRATROPIUM BROMIDE AND ALBUTEROL SULFATE 2.5; .5 MG/3ML; MG/3ML
3 SOLUTION RESPIRATORY (INHALATION) EVERY 4 HOURS PRN
Status: DISCONTINUED | OUTPATIENT
Start: 2024-06-20 | End: 2024-06-25 | Stop reason: HOSPADM

## 2024-06-20 RX ORDER — GUAIFENESIN AND PSEUDOEPHEDRINE HCL 1200; 120 MG/1; MG/1
1 TABLET, EXTENDED RELEASE ORAL 2 TIMES DAILY PRN
COMMUNITY

## 2024-06-20 RX ORDER — ACETAMINOPHEN 325 MG/1
650 TABLET ORAL EVERY 4 HOURS PRN
Status: DISCONTINUED | OUTPATIENT
Start: 2024-06-20 | End: 2024-06-25 | Stop reason: HOSPADM

## 2024-06-20 RX ORDER — GUAIFENESIN 600 MG/1
600 TABLET, EXTENDED RELEASE ORAL 2 TIMES DAILY
Status: DISCONTINUED | OUTPATIENT
Start: 2024-06-20 | End: 2024-06-25 | Stop reason: HOSPADM

## 2024-06-20 RX ORDER — LORAZEPAM 0.5 MG/1
0.5 TABLET ORAL EVERY 6 HOURS PRN
Status: ON HOLD | COMMUNITY
End: 2024-06-25

## 2024-06-20 RX ORDER — DULOXETIN HYDROCHLORIDE 60 MG/1
60 CAPSULE, DELAYED RELEASE ORAL DAILY
Status: DISCONTINUED | OUTPATIENT
Start: 2024-06-20 | End: 2024-06-25 | Stop reason: HOSPADM

## 2024-06-20 RX ORDER — POLYETHYLENE GLYCOL 3350 17 G/17G
17 POWDER, FOR SOLUTION ORAL 2 TIMES DAILY PRN
Status: DISCONTINUED | OUTPATIENT
Start: 2024-06-20 | End: 2024-06-25 | Stop reason: HOSPADM

## 2024-06-20 RX ORDER — TRIAMCINOLONE ACETONIDE 1 MG/G
CREAM TOPICAL EVERY EVENING
COMMUNITY

## 2024-06-20 RX ORDER — ONDANSETRON 4 MG/1
4 TABLET, ORALLY DISINTEGRATING ORAL EVERY 6 HOURS PRN
Status: DISCONTINUED | OUTPATIENT
Start: 2024-06-20 | End: 2024-06-25 | Stop reason: HOSPADM

## 2024-06-20 RX ORDER — ENOXAPARIN SODIUM 100 MG/ML
40 INJECTION SUBCUTANEOUS EVERY 12 HOURS
Status: DISCONTINUED | OUTPATIENT
Start: 2024-06-20 | End: 2024-06-25 | Stop reason: HOSPADM

## 2024-06-20 RX ORDER — IPRATROPIUM BROMIDE AND ALBUTEROL SULFATE 2.5; .5 MG/3ML; MG/3ML
1 SOLUTION RESPIRATORY (INHALATION) 3 TIMES DAILY
COMMUNITY

## 2024-06-20 RX ORDER — GABAPENTIN 300 MG/1
300 CAPSULE ORAL 2 TIMES DAILY
Status: DISCONTINUED | OUTPATIENT
Start: 2024-06-20 | End: 2024-06-25 | Stop reason: HOSPADM

## 2024-06-20 RX ORDER — AMOXICILLIN 250 MG
1 CAPSULE ORAL 2 TIMES DAILY PRN
Status: DISCONTINUED | OUTPATIENT
Start: 2024-06-20 | End: 2024-06-25 | Stop reason: HOSPADM

## 2024-06-20 RX ORDER — FUROSEMIDE 10 MG/ML
40 INJECTION INTRAMUSCULAR; INTRAVENOUS
Status: COMPLETED | OUTPATIENT
Start: 2024-06-20 | End: 2024-06-21

## 2024-06-20 RX ORDER — FLUTICASONE FUROATE AND VILANTEROL 100; 25 UG/1; UG/1
1 POWDER RESPIRATORY (INHALATION) DAILY
Status: DISCONTINUED | OUTPATIENT
Start: 2024-06-20 | End: 2024-06-25 | Stop reason: HOSPADM

## 2024-06-20 RX ORDER — FUROSEMIDE 10 MG/ML
40 INJECTION INTRAMUSCULAR; INTRAVENOUS ONCE
Status: DISCONTINUED | OUTPATIENT
Start: 2024-06-20 | End: 2024-06-20

## 2024-06-20 RX ORDER — LEVOTHYROXINE SODIUM 88 UG/1
88 TABLET ORAL DAILY
Status: DISCONTINUED | OUTPATIENT
Start: 2024-06-20 | End: 2024-06-25 | Stop reason: HOSPADM

## 2024-06-20 RX ORDER — AMOXICILLIN 250 MG
2 CAPSULE ORAL 2 TIMES DAILY PRN
Status: DISCONTINUED | OUTPATIENT
Start: 2024-06-20 | End: 2024-06-25 | Stop reason: HOSPADM

## 2024-06-20 RX ORDER — POLYETHYLENE GLYCOL 3350 17 G/17G
1 POWDER, FOR SOLUTION ORAL DAILY PRN
COMMUNITY

## 2024-06-20 RX ORDER — CALCIUM CARBONATE 500 MG/1
1000 TABLET, CHEWABLE ORAL 4 TIMES DAILY PRN
Status: DISCONTINUED | OUTPATIENT
Start: 2024-06-20 | End: 2024-06-25 | Stop reason: HOSPADM

## 2024-06-20 RX ORDER — ALBUTEROL SULFATE 0.83 MG/ML
2.5 SOLUTION RESPIRATORY (INHALATION)
Status: DISCONTINUED | OUTPATIENT
Start: 2024-06-20 | End: 2024-06-23

## 2024-06-20 RX ORDER — IPRATROPIUM BROMIDE AND ALBUTEROL SULFATE 2.5; .5 MG/3ML; MG/3ML
3 SOLUTION RESPIRATORY (INHALATION) ONCE
Status: DISCONTINUED | OUTPATIENT
Start: 2024-06-20 | End: 2024-06-20

## 2024-06-20 RX ORDER — FLUTICASONE PROPIONATE 50 MCG
1 SPRAY, SUSPENSION (ML) NASAL EVERY MORNING
Status: DISCONTINUED | OUTPATIENT
Start: 2024-06-20 | End: 2024-06-25 | Stop reason: HOSPADM

## 2024-06-20 RX ORDER — CLOBETASOL PROPIONATE 0.5 MG/ML
LOTION TOPICAL 2 TIMES DAILY
COMMUNITY

## 2024-06-20 RX ORDER — METHYLPREDNISOLONE SODIUM SUCCINATE 40 MG/ML
40 INJECTION, POWDER, LYOPHILIZED, FOR SOLUTION INTRAMUSCULAR; INTRAVENOUS 2 TIMES DAILY
Status: DISCONTINUED | OUTPATIENT
Start: 2024-06-20 | End: 2024-06-20

## 2024-06-20 RX ORDER — GABAPENTIN 600 MG/1
600 TABLET ORAL AT BEDTIME
Status: DISCONTINUED | OUTPATIENT
Start: 2024-06-20 | End: 2024-06-25 | Stop reason: HOSPADM

## 2024-06-20 RX ORDER — CEFTRIAXONE 2 G/1
2 INJECTION, POWDER, FOR SOLUTION INTRAMUSCULAR; INTRAVENOUS EVERY 24 HOURS
Status: DISCONTINUED | OUTPATIENT
Start: 2024-06-20 | End: 2024-06-25 | Stop reason: HOSPADM

## 2024-06-20 RX ORDER — LORAZEPAM 0.5 MG/1
0.5 TABLET ORAL EVERY 4 HOURS PRN
Status: DISCONTINUED | OUTPATIENT
Start: 2024-06-20 | End: 2024-06-21

## 2024-06-20 RX ORDER — ZINC OXIDE
OINTMENT (GRAM) TOPICAL PRN
COMMUNITY

## 2024-06-20 RX ORDER — LIDOCAINE 40 MG/G
CREAM TOPICAL
Status: DISCONTINUED | OUTPATIENT
Start: 2024-06-20 | End: 2024-06-25 | Stop reason: HOSPADM

## 2024-06-20 RX ORDER — GUAIFENESIN 400 MG/1
400 TABLET ORAL 2 TIMES DAILY
COMMUNITY

## 2024-06-20 RX ORDER — ALBUTEROL SULFATE 90 UG/1
2 AEROSOL, METERED RESPIRATORY (INHALATION)
Status: DISCONTINUED | OUTPATIENT
Start: 2024-06-20 | End: 2024-06-25 | Stop reason: HOSPADM

## 2024-06-20 RX ORDER — ACETAMINOPHEN 650 MG/1
650 SUPPOSITORY RECTAL EVERY 4 HOURS PRN
Status: DISCONTINUED | OUTPATIENT
Start: 2024-06-20 | End: 2024-06-25 | Stop reason: HOSPADM

## 2024-06-20 RX ORDER — LORAZEPAM 0.5 MG/1
0.5 TABLET ORAL 3 TIMES DAILY
Status: ON HOLD | COMMUNITY
End: 2024-06-25

## 2024-06-20 RX ADMIN — ENOXAPARIN SODIUM 40 MG: 40 INJECTION SUBCUTANEOUS at 21:08

## 2024-06-20 RX ADMIN — GABAPENTIN 600 MG: 600 TABLET, FILM COATED ORAL at 21:08

## 2024-06-20 RX ADMIN — FUROSEMIDE 40 MG: 10 INJECTION, SOLUTION INTRAMUSCULAR; INTRAVENOUS at 05:17

## 2024-06-20 RX ADMIN — IPRATROPIUM BROMIDE AND ALBUTEROL SULFATE 3 ML: .5; 3 SOLUTION RESPIRATORY (INHALATION) at 04:35

## 2024-06-20 RX ADMIN — SODIUM CHLORIDE 500 ML: 9 INJECTION, SOLUTION INTRAVENOUS at 04:35

## 2024-06-20 RX ADMIN — GUAIFENESIN 600 MG: 600 TABLET, EXTENDED RELEASE ORAL at 21:08

## 2024-06-20 RX ADMIN — SODIUM CHLORIDE 90 ML: 9 INJECTION, SOLUTION INTRAVENOUS at 07:07

## 2024-06-20 RX ADMIN — DULOXETINE HYDROCHLORIDE 60 MG: 60 CAPSULE, DELAYED RELEASE ORAL at 12:29

## 2024-06-20 RX ADMIN — FUROSEMIDE 40 MG: 10 INJECTION, SOLUTION INTRAMUSCULAR; INTRAVENOUS at 15:46

## 2024-06-20 RX ADMIN — ENOXAPARIN SODIUM 40 MG: 40 INJECTION SUBCUTANEOUS at 08:00

## 2024-06-20 RX ADMIN — METHYLPREDNISOLONE SODIUM SUCCINATE 40 MG: 40 INJECTION, POWDER, FOR SOLUTION INTRAMUSCULAR; INTRAVENOUS at 06:34

## 2024-06-20 RX ADMIN — GABAPENTIN 300 MG: 300 CAPSULE ORAL at 12:28

## 2024-06-20 RX ADMIN — LEVOTHYROXINE SODIUM 88 MCG: 0.09 TABLET ORAL at 12:29

## 2024-06-20 RX ADMIN — ACETAMINOPHEN 650 MG: 325 TABLET, FILM COATED ORAL at 21:07

## 2024-06-20 RX ADMIN — FLUTICASONE PROPIONATE 1 SPRAY: 50 SPRAY, METERED NASAL at 12:30

## 2024-06-20 RX ADMIN — ACETAMINOPHEN 650 MG: 325 TABLET, FILM COATED ORAL at 15:46

## 2024-06-20 RX ADMIN — GUAIFENESIN 600 MG: 600 TABLET, EXTENDED RELEASE ORAL at 08:00

## 2024-06-20 RX ADMIN — CEFTRIAXONE 2 G: 2 INJECTION, POWDER, FOR SOLUTION INTRAMUSCULAR; INTRAVENOUS at 06:36

## 2024-06-20 RX ADMIN — IOPAMIDOL 74 ML: 755 INJECTION, SOLUTION INTRAVENOUS at 07:07

## 2024-06-20 RX ADMIN — LORAZEPAM 0.5 MG: 0.5 TABLET ORAL at 21:08

## 2024-06-20 ASSESSMENT — ACTIVITIES OF DAILY LIVING (ADL)
ADLS_ACUITY_SCORE: 44
ADLS_ACUITY_SCORE: 36
ADLS_ACUITY_SCORE: 38
ADLS_ACUITY_SCORE: 44
DEPENDENT_IADLS:: CLEANING;COOKING;LAUNDRY;SHOPPING;MEAL PREPARATION;MEDICATION MANAGEMENT;MONEY MANAGEMENT;TRANSPORTATION
ADLS_ACUITY_SCORE: 44
ADLS_ACUITY_SCORE: 45
ADLS_ACUITY_SCORE: 38
ADLS_ACUITY_SCORE: 44
ADLS_ACUITY_SCORE: 38
ADLS_ACUITY_SCORE: 38
ADLS_ACUITY_SCORE: 44
ADLS_ACUITY_SCORE: 38
ADLS_ACUITY_SCORE: 44

## 2024-06-20 ASSESSMENT — COLUMBIA-SUICIDE SEVERITY RATING SCALE - C-SSRS
6. HAVE YOU EVER DONE ANYTHING, STARTED TO DO ANYTHING, OR PREPARED TO DO ANYTHING TO END YOUR LIFE?: NO
1. IN THE PAST MONTH, HAVE YOU WISHED YOU WERE DEAD OR WISHED YOU COULD GO TO SLEEP AND NOT WAKE UP?: NO
2. HAVE YOU ACTUALLY HAD ANY THOUGHTS OF KILLING YOURSELF IN THE PAST MONTH?: NO

## 2024-06-20 NOTE — ED NOTES
Pt states she never gets up and if she does only able to stand and pivot. Writer attempted to get pt up to determine pulse oximetry during exertion. Pt desatted to 87% during sitting to standing, very tachypneic in this time. Writer and ER tech got pt resettled in bed, purewick in place and pt repositioned and sitting up right for her comfort.

## 2024-06-20 NOTE — ED NOTES
Pt on purewick and draining ++ amount of urine post-Lasix, so far approximately 1L urine in suction container. Pt to go upstairs after CT CHEST.

## 2024-06-20 NOTE — CONSULTS
[facility] Nurse Inpatient Assessment     Consulted for: Buttocks    Summary: Present on admission wounds to buttocks from mixture of friction, moisture, and pressure.     Patient History (according to provider note(s):      89 year old female with PMH including diastolic CHF, chronic cough and shortness of breath, anxiety with panic attacks, JEAN, morbid obesity, hypothyroidism, HLD, osteoarthritis, right femur fracture who is now mostly wheelchair-bound, and breast cancer who presents from her ROC for worsening shortness of breath, cough, and lower extremity edema.  She is a rather vague historian regarding her cough and shortness of breath and timeline of events.  It seems like for the past 1 week or so she has had significant worsening bilateral lower extremity edema and pain in her legs mostly on the right.  She has a chronic cough that is minimally productive of sputum that may be a little bit worse this week.  She has also been more short of breath especially after a coughing fit and she gets severely anxious during these episodes.  She multiple times alludes that she thinks that her shortness of breath may be from anxiety for which she does take lorazepam as needed.  They have been checking her temperature and she has not had any fevers or chills.  She does have chest pain when she coughs.  She lives in assisted living facility and some people there do have a cough.  She denies any headaches, myalgias, nausea, vomiting, diarrhea, dysuria.  She said she told her PCP about the redness of her legs especially on the right and they were thinking of starting an antibiotic.  She did use an albuterol neb at home this evening which temporarily helped her shortness of breath.  She was hospitalized here 3 months ago for 3 days for very similar symptoms.  She was treated with IV steroids, Lasix, nebs, and levofloxacin with improvement in symptoms and discharged home without need for oxygen.   "CT at that time was concerning for bronchitis and possible ILD that would be a new diagnosis for her.     Assessment:      Areas visualized during today's visit: Sacrum/coccyx    Wound location: Bilateral buttock    Last photo: 6/20/24    Wound due to: Mixed Etiology: mositure, friction, and pressure  Wound history/plan of care: Chronic injury with evidence of chronic tissue injury, evidence by the puruple discoloration for sitting for extended period. Patient reports she has had wounds for about a year and has used a variety of pastes and dressings per patient.  Wound base: purple nonblanchable epidermis in approximately a 9cm x 9cm area bilaterally with scattered dried drainage within     Palpation of the wound bed: normal      Drainage: scant     Description of drainage: none and serosanguinous     Measurements (length x width x depth, in cm): see above     Tunneling: N/A     Undermining: N/A  Periwound skin: Intact      Color: normal and consistent with surrounding tissue      Temperature: normal   Odor: none  Pain: mild, tender  Pain interventions prior to dressing change: patient tolerated well and slow and gentle cares   Treatment goal: Heal  and Protection  STATUS: initial assessment  Supplies ordered: supplies stored on unit, discussed with RN, and discussed with patient        Treatment Plan:     Buttock wound(s): Daily   1. Clean wound with saline or MicroKlenz Spray, pat dry  2. Press a Mepilex Sacral to the area, making sure to conform nicely to skin curvatures.   3. Time and date dressing change.    Pressure Injury Prevention (PIP) Plan:  If patient is declining pressure injury prevention interventions: Explore reason why and address patient's concerns, Educate on pressure injury risk and prevention intervention(s), If patient is still declining, document \"informed refusal\" , and Ensure Care team is aware ( provider, charge nurse, etc)  Mattress: Follow bed algorithm, reassess daily and order specialty " mattress, if indicated.  HOB: Maintain at or below 30 degrees, unless contraindicated  Repositioning in bed: Every 1-2 hours , Left/right positioning; avoid supine, and Raise foot of bed prior to raising head of bed, to reduce patient sliding down (shear)  Heels: Keep elevated off mattress and Pillows under calves  Protective Dressing: Sacral Mepilex for prevention (#410707),  especially for the agitated patient   Positioning Equipment:None  Chair positioning:  chair cushion [id number]    If patient has a buttock pressure injury, or high risk for PI use chair cushion or SPS.  Moisture Management: Perineal cleansing /protection: Follow Incontinence Protocol, Avoid brief in bed, Clean and dry skin folds with bathing , Consider InterDry (#540683) between folds, and Moisturize dry skin  Under Devices: Inspect skin under all medical devices during skin inspection , Ensure tubes are stabilized without tension, and Ensure patient is not lying on medical devices or equipment when repositioned  Ask provider to discontinue device when no longer needed.       Orders: Written    RECOMMEND PRIMARY TEAM ORDER: None, at this time  Education provided: importance of repositioning, plan of care, Moisture management, Hygiene, and Off-loading pressure  Discussed plan of care with: Patient and Nurse  WOC nurse follow-up plan: weekly  Notify WOC if wound(s) deteriorate.  Nursing to notify the Provider(s) and re-consult the WOC Nurse if new skin concern.    DATA:     Current support surface: Standard  Standard Isoflex gel  Containment of urine/stool: Incontinent pad in bed and Suction based external urinary catheter   BMI: Body mass index is 46.24 kg/m .   Active diet order: Orders Placed This Encounter      Combination Diet 2 gm NA Diet     Output: I/O last 3 completed shifts:  In: 500 [IV Piggyback:500]  Out: 1000 [Urine:1000]     Labs:   Recent Labs   Lab 06/20/24  0235   HGB 13.4   WBC 7.2     Pressure injury risk assessment:   Sensory  Perception: 3-->slightly limited  Moisture: 3-->occasionally moist  Activity: 2-->chairfast  Mobility: 2-->very limited  Nutrition: 3-->adequate  Friction and Shear: 1-->problem  Keith Score: 14    Rosi Cantrell RN CWOCN  Contact Via [facility] Nurse Shila)  Dept. Office Number: [phone]

## 2024-06-20 NOTE — PROGRESS NOTES
Patient seen and examined, discussed care with patient, abhilashrPERCY    I think this is most diastolic HF with exacerbation, she has no wheezing on exam only bilateral LL crackles.  Her sx are dramatically improved with diuresis    Cont with diuresis  I stopped her steroids  Cont to wean O2    RLE erythema also improving per dtr  -cont with ceftriaxone

## 2024-06-20 NOTE — ED TRIAGE NOTES
BIBA for persistent cough from assisted living, dry cough. Bilateral leg redness/swelling noted, takes diuretic. SOB after coughing fits. Pt reports problem with anxiety that makes her more SOB. Pt appearsSOB after speaking one sentence. Did a nebulizer treatment at home that helped a little. ALEKSS on RA.

## 2024-06-20 NOTE — H&P
[facility]  Hospitalist Admission Note  Name: Carol Merida    MRN: [id number]  YOB: 1935    Age: 89 year old  Date of admission: 6/20/2024  Primary care provider: Ruben Humphrey    Chief Complaint:  shortness of breath, leg swelling/pain    Assessment and Plan:   Acute hypoxemic respiratory failure  Acute on chronic diastolic CHF  Possible acute reactive airway disease versus ILD  Chronic cough, possible chronic bronchitis  JEAN: She has a chronic cough that is minimally productive of sputum and chronic shortness of breath especially when her cough is bad.  She also gets extremely anxious when she is short of breath.  She has noticed significant increasing bilateral leg edema and her shortness of breath along with cough are worse this past 1 week.  She does have diastolic CHF and I think she has an acute exacerbation which is contributing.  It looks like her Lasix dose may have been decreased to 20 mg daily last admission.  She has not had any fevers at her [facility].  Some people do have cough at her [facility] so a viral respiratory affection is possible.  COVID-19, influenza A/B, RSV PCR is negative.  Procalcitonin not elevated.  Afebrile here, but mildly tachycardic.  O2 sats 95% on room air at rest, but when she moves around in bed O2 sats dropped to 87%.  She can only speak a few words at a time and she has mild tachypnea.  No significant wheezing on exam.  Initial VBG shows pH 7.45, pCO2 46, pO2 62, and bicarb 32.  She was hospitalized 3 months ago here for very similar symptoms/presentation and CT at that time showed some groundglass opacities and subpleural reticular abnormality which could represent interstitial lung disease.  She is a lifelong non-smoker, but is on a Wixela controller inhaler and albuterol nebs which seem to help at home today so she may have some reactive airway disease.  She also has JEAN and is.  Using CPAP.  Her chest x-ray here is unremarkable.  NT proBNP not elevated  although the setting of obesity.  Troponin T slight elevated at 16, but EKG does not show ischemic change.  She will need empiric treatment for multiple potential etiologies to ensure she is improving including IV diuretics, steroids, nebulizer treatments.  -Obtain CT chest PE protocol.  Will rule out PE and also look to see if there is findings for chronic bronchitis or potential ILD  -Ordered IV ceftriaxone more for suspected right lower extremity cellulitis.  Depending on CT chest findings may add azithromycin  -Send full respiratory PCR panel to see if we can more quickly taper off of some other empiric treatment if alternative etiologies such as rhinovirus found  -IV Lasix 40 mg twice daily  -Strict intake and output and daily weights  -Cardiac monitoring  -IV Solu-Medrol 40 mg twice daily  -Scheduled DuoNebs 4 times daily.  Albuterol inhaler and nebulizer treatments every 2 hours as needed  -Mucinex scheduled twice daily and Tessalon Perles as needed  -CPAP at bedtime    Suspect right lower extremity cellulitis: She has chronic lower extremity edema that is much worse this past 1 week nature may be secondary to diastolic heart failure.  There is some chronic venous stasis changes both legs, but there is blanchable erythema that is very tender to the touch up to the midshin and anteriorly up to the right knee which I think is consistent with acute cellulitis.  She does not have fever or leukocytosis currently.  Lactic acid slight elevated at 2.2.  -Start IV ceftriaxone 2 g every 24 hours  -add on crp  -Elevate legs  -Acetaminophen as needed for mild pain    Elevated lactic acid:  lactic acid mildly elevated 2.2.  I suspect this may impart be due to the nebulizer treatment she took before coming in.  She has evidence for hypervolemia.  Doubt sepsis at this time.  She initially received 500 L NS bolus then IV Lasix given hypervolemic state.  -Repeat lactic acid is pending    Anxiety  Panic attacks: She says she  gets very anxious all the time, but especially when she is coughing and short of breath.  She is taking lorazepam 0.5 mg as needed.  Additionally she is on Cymbalta 60 mg daily and looks like she was also prescribed fluoxetine a few months ago.  -Oral lorazepam 0.5 mg every 4 hours as needed for anxiety  -Ordered PTA duloxetine.  Awaiting pharmacy med rec to see if she is taking fluoxetine and then this will need to be ordered    Morbid obesity: BMI 46.    Hypothyroidism: Resume PTA levothyroxine.    Neuropathy?: Resume PTA gabapentin 300 mg twice daily and 600 mg at bedtime.    Osteoarthritis  History of right femur fracture: Diffuse joint pains chronically.  She had a right femur fracture in April 2023 and still has issues with this.  She is primarily using a wheelchair at her [facility].  Follows with orthopedics and I believe a knee immobilizer has been recommended.  -Acetaminophen as needed  -Consult PT    Possible buttocks pressure injury: Possible pressure injury noted in the sacral area on initial exam in the ER.  -Consult [facility] RN    DVT Prophylaxis: Enoxaparin (Lovenox) SQ  Code Status: DNR in event of cardiac arrest.  Prearrest intubation okay for potential reversible causes such as pneumonia or CHF.  Discussed risks and benefits of resuscitation at length with the patient on admission.  I also spoke with daughter Alea by phone on admission who agreed.  FEN: 2 g sodium restriction diet, 1.8 L fluid restriction  Discharge Dispo: From [facility].  Mostly just using wheelchair.  Appears deconditioned.  Consult PT and social work  Estimated Disch Date / # of Days until Disch: I anticipate she will have slow improvement in symptoms with empiric treatment, but this would likely take at least 2 or 3 nights in the hospital.      History of Present Illness:  Carol Merida is a 89 year old female with PMH including diastolic CHF, chronic cough and shortness of breath, anxiety with panic attacks, JEAN, morbid obesity,  hypothyroidism, HLD, osteoarthritis, right femur fracture who is now mostly wheelchair-bound, and breast cancer who presents from her [facility] for worsening shortness of breath, cough, and lower extremity edema.  She is a rather vague historian regarding her cough and shortness of breath and timeline of events.  It seems like for the past 1 week or so she has had significant worsening bilateral lower extremity edema and pain in her legs mostly on the right.  She has a chronic cough that is minimally productive of sputum that may be a little bit worse this week.  She has also been more short of breath especially after a coughing fit and she gets severely anxious during these episodes.  She multiple times alludes that she thinks that her shortness of breath may be from anxiety for which she does take lorazepam as needed.  They have been checking her temperature and she has not had any fevers or chills.  She does have chest pain when she coughs.  She lives in assisted living facility and some people there do have a cough.  She denies any headaches, myalgias, nausea, vomiting, diarrhea, dysuria.  She said she told her PCP about the redness of her legs especially on the right and they were thinking of starting an antibiotic.  She did use an albuterol neb at home this evening which temporarily helped her shortness of breath.  She was hospitalized here 3 months ago for 3 days for very similar symptoms.  She was treated with IV steroids, Lasix, nebs, and levofloxacin with improvement in symptoms and discharged home without need for oxygen.  CT at that time was concerning for bronchitis and possible ILD that would be a new diagnosis for her.    History obtained from patient, medical record, and from Dr. Capps in the emergency department.  Blood pressure 132/94 then up to 151/89, heart rate 100, temperature 97.7  F, oxygen 95% on room air.  When she moves around in the bed O2 sats dropped to 87%.  She is only speaking in a  "couple words at a time due to shortness of breath.  She does have some mild tachypnea.  Initial labs showed WBC 7.2, hemoglobin 13.4, platelet count 258.  VBG shows pH 7.45, pCO2 46, pO2 62, bicarb 32.  BMP is unremarkable with glucose 159.  NT proBNP not elevated at 114 and troponin T is slightly elevated 16.  Initial lactic acid slightly elevated 2.2.  COVID-19, influenza A/B, RSV PCR is negative.  EKG shows NSR with no ST ovation or depression.  Chest x-ray does not show any acute infiltrate or pulmonary edema.  She initially received 500 L NS bolus due to elevated lactic acid, but then received 40 mg IV Lasix and a DuoNeb.  Will obtain a CT chest PE to further look for PE or signs of ILD/bronchitis.  Admit inpatient.       Clinically Significant Risk Factors Present on Admission                  # Hypertension: Noted on problem list  # Acute heart failure with preserved ejection fraction: heart failure noted on problem list, last echo with EF >50%, and receiving IV diuretics            # Severe Obesity: Estimated body mass index is 46.06 kg/m  as calculated from the following:    Height as of this encounter: 1.6 m (5' 3\").    Weight as of this encounter: 117.9 kg (260 lb).                        Past Medical History reviewed:  Past Medical History:   Diagnosis Date    Abnormal stress test     small area on stress thalium ?2003; but normal angiogram 2012    Anemia     Anxiety     Cardiac dysrhythmia, unspecified     irregular heartbeat    CHF (congestive heart failure) (H) 6/18/2018    PETTY (dyspnea on exertion)     Hyperlipidemia LDL goal <100 2/10/2010    Hypertension goal BP (blood pressure) < 140/90 7/18/2010    Hypothyroid     Malignant neoplasm of breast (female), unspecified site 2005    infltrating ductal    MEDICAL HISTORY OF -     fx humerus Sept 2013.    Melanoma of skin, site unspecified     NONSPECIFIC MEDICAL HISTORY 04    fx fifth finger right hand    Obstructive sleep apnea (adult) (pediatric) " 8/25/2006    CPAP     Osteoarthritis     Other chronic pain     Joint pain for many years.    Other osteoporosis     PONV (postoperative nausea and vomiting)     Tubular adenoma in colon 9/01    colonoscopy due 2004     Past Surgical History reviewed:  Past Surgical History:   Procedure Laterality Date    ARTHROPLASTY HIP Left 7/6/2015    Procedure: ARTHROPLASTY HIP;  Surgeon: Junior Giordano MD;  Location: [facility] OR    ARTHROPLASTY HIP Right 11/2/2016    Procedure: ARTHROPLASTY HIP;  Surgeon: Junior Giordano MD;  Location: [facility] OR    ARTHROPLASTY REVISION HIP Left 7/22/2015    Procedure: ARTHROPLASTY REVISION HIP;  Surgeon: Junior Giordano MD;  Location: [facility] OR    CATARACT IOL, RT/LT      COLONOSCOPY  9/01    tubular adenoma; repeat 3 years.    COLONOSCOPY  9/04    normal; repeat 5 years ([facility])    HYSTERECTOMY, MARGAUX  summer 2004    and BSO    OPEN REDUCTION INTERNAL FIXATION RODDING INTRAMEDULLARY FEMUR Right 2/26/2023    Procedure: OPEN REDUCTION INTERNAL FIXATION RIGHT DISTAL FEMUR WITH RETROGRADE NAIL, LATERAL PLATE, AND CABLE;  Surgeon: Newton Lemon MD;  Location: [facility] OR    SURGICAL HISTORY OF -   9/05    left mastectomy    SURGICAL HISTORY OF -   2008    right ankle surgery; triple arthrodesis    SURGICAL HISTORY OF -   5/09    right ankle surgery; triple arthrodesis and deltoid reconstruction; possible other    SURGICAL HISTORY OF -   2/10    removal of hardware from right ankle    [facility] NONSPECIFIC PROCEDURE      Knee replacement x 2 (B)    [facility] NONSPECIFIC PROCEDURE      s/p Tonsillectomy (college)    Z NONSPECIFIC PROCEDURE      s/p Appy (high school)    Z NONSPECIFIC PROCEDURE      Melanoma removed with sentinel node bx    Z NONSPECIFIC PROCEDURE       bilateral cataract     Social History reviewed:  Social History     Tobacco Use    Smoking status: Never    Smokeless tobacco: Never   Substance Use Topics    Alcohol use: Yes     Alcohol/week: 0.0 - 0.8 standard drinks of alcohol      Comment: 1 glass wine weekly     Social History     Social History Narrative    Not on file     Family History reviewed:  Family History   Problem Relation Age of Onset    No Known Problems Brother         brain tumor related to shingles in his eye    Arthritis Mother     Hypertension Father     Cancer Father         lung    Cancer Grandchild         terratoma tumors    Breast Cancer Daughter      Allergies:  Allergies   Allergen Reactions    Ace Inhibitors Cough     Medications:  Prior to Admission medications    Medication Sig Last Dose Taking? Auth Provider Long Term End Date   ACE/ARB/ARNI NOT PRESCRIBED (INTENTIONAL) Please choose reason not prescribed, below  Patient not taking: Reported on 5/29/2024   Yessica Lora MD     acetaminophen (TYLENOL) 325 MG tablet Take 650 mg by mouth 2 times daily as needed for pain Take 6 hours apart from scheduled doses   Unknown, Entered By History     acetaminophen (TYLENOL) 325 MG tablet Take 650 mg by mouth every 8 hours 0630, 1400, 2145   Reported, Patient     albuterol (PROAIR HFA/PROVENTIL HFA/VENTOLIN HFA) 108 (90 Base) MCG/ACT inhaler INHALE 2 PUFFS INTO THE LUNGS EVERY 4 HOURS AS NEEDED FOR SHORTNESS OF BREATH OR DIFFICULT BREATHING OR WHEEZING   Stella Cutler MD Yes    albuterol (PROVENTIL) (2.5 MG/3ML) 0.083% neb solution Take 1 vial (2.5 mg) by nebulization every 4 hours as needed for shortness of breath, wheezing or cough And BID   Beatriz George APRN CNP Yes    BETA BLOCKER NOT PRESCRIBED (INTENTIONAL) Beta Blocker not prescribed intentionally due to EF > 40 % (ejection fraction) will leave up to Cardiology.  Patient not taking: Reported on 5/29/2024   Yessica Lora MD     calcium carbonate 600 mg-vitamin D 400 units (CALTRATE) 600-400 MG-UNIT per tablet Take 1 tablet by mouth every evening    Yessica Lora MD     celecoxib (CELEBREX) 100 MG capsule Take 100 mg by mouth 2 times daily as needed for moderate pain (4-6)   Unknown, Entered By History  No    clotrimazole (LOTRIMIN) 1 % external cream Apply topically 2 times daily  Patient not taking: Reported on 5/29/2024   Eloy Cornejo DO     cycloSPORINE (CYCLOSPORINE IN KLARITY) 0.1 % EMUL Place 1 drop into both eyes 2 times daily 0630 and 2145   Unknown, Entered By History     DULoxetine (CYMBALTA) 60 MG capsule Take 60 mg by mouth daily   Reported, Patient Yes    fluticasone (FLONASE) 50 MCG/ACT nasal spray Spray 1 spray into both nostrils every morning   Unknown, Entered By History     fluticasone-salmeterol (WIXELA INHUB) 100-50 MCG/ACT inhaler Inhale 1 puff into the lungs 2 times daily   Stella Cutler MD Yes    furosemide (LASIX) 40 MG tablet Take 0.5 tablets (20 mg) by mouth daily   Mary Scott,  Yes    gabapentin (NEURONTIN) 300 MG capsule Take 1 capsule (300 mg) by mouth 2 times daily   Anabela Lanza APRN CNS Yes    gabapentin (NEURONTIN) 600 MG tablet Take 1 tablet (600 mg) by mouth At Bedtime   Anabela Lanza APRN CNS Yes    guaiFENesin 1200 MG TB12 Take 1 tablet (1,200 mg) by mouth every 12 hours   Beatriz George APRN CNP     levothyroxine (SYNTHROID/LEVOTHROID) 88 MCG tablet Take 1 tablet (88 mcg) by mouth daily   Patrick Montano MD Yes    LORazepam (ATIVAN) 0.5 MG tablet Take 0.5 mg by mouth daily as needed for anxiety   Unknown, Entered By History     melatonin 3 MG tablet Take 3 mg by mouth At Bedtime   Reported, Patient     menthol-zinc oxide (CALMOSEPTINE) 0.44-20.6 % OINT ointment Apply topically 2 times daily 0630 and 2145   Unknown, Entered By History     nystatin (NYSTOP) 560433 UNIT/GM external powder Apply tid to affectead area prn   Stella Cutler MD     polyethylene glycol (MIRALAX) 17 g packet Take 17 g by mouth daily And repeat daily prn constipation  Patient not taking: Reported on 5/29/2024   Beatriz George, BEATRICE CNP No      Review of Systems:  A Comprehensive greater than 10 system review of systems was carried out.  Pertinent  "positives and negatives are noted above.  Otherwise negative.     Physical Exam:  Blood pressure (!) 151/89, pulse 100, temperature 97.7  F (36.5  C), temperature source Temporal, resp. rate 24, height 1.6 m (5' 3\"), weight 117.9 kg (260 lb), SpO2 96%, not currently breastfeeding.  Wt Readings from Last 1 Encounters:   06/20/24 117.9 kg (260 lb)     Exam:  Constitutional: Awake, appears to be short of breath  Eyes: sclera white   HEENT:  MMM  Respiratory: Mild tachypnea, no expiratory wheeze, bibasilar crackles present, only speaking in a couple words at a time due to shortness of breath   Cardiovascular: mild regular tachycardia without appreciable murmur  GI: Obese, non-tender, not distended, bowel sounds present  Skin/musculoskeletal/extremities: 2+ bilateral lower extremity pitting edema.  Significant tenderness to palpation mostly of the right lower extremity, mildly on the left.  Chronic venous stasis changes both lower legs.  Additional blanchable erythema posteriorly up to the knee on the right  Neurologic: A&O, speech clear  Psychiatric: Mildly anxious, but cooperative    Lab and imaging data personally reviewed:  Labs:  Recent Labs   Lab 06/20/24  0257   PHV 7.45*   PO2V 62*   PCO2V 46   HCO3V 32*     Recent Labs   Lab 06/20/24  0235   WBC 7.2   HGB 13.4   HCT 43.5   MCV 94        Recent Labs   Lab 06/20/24  0235      POTASSIUM 4.0   CHLORIDE 98   CO2 28   ANIONGAP 12   *   BUN 14.6   CR 0.71   GFRESTIMATED 81   LAKSHMI 9.0     Recent Labs   Lab 06/20/24  0235   NTBNPI 114     Recent Labs   Lab 06/20/24  0257   LACT 2.2*     Troponin T 16  Procalcitonin 0.12  COVID-19, influenza A/B, RSV PCR negative    EKG: NSR no ST elevation or depression    Imaging:  Recent Results (from the past 24 hour(s))   XR Chest 2 Views    Narrative    EXAM: XR CHEST 2 VIEWS  LOCATION: [facility]  DATE: 6/20/2024    INDICATION: persistent cough, SOB  COMPARISON: 3/15/2024.    FINDINGS: The " heart size is normal. The right hemidiaphragm is elevated. The lungs are clear. There is no pneumothorax or pleural effusion. Degenerative disease in the spine and shoulders.      Impression    IMPRESSION: No acute abnormality.       Ez Gifford MD  Hospitalist  [facility]

## 2024-06-20 NOTE — ED NOTES
[facility]  ED Nurse Handoff Report    ED Chief complaint: Cough and Leg Swelling  . ED Diagnosis:   Final diagnoses:   Acute hypoxemic respiratory failure (H)   COPD exacerbation (H)       Allergies:   Allergies   Allergen Reactions    Ace Inhibitors Cough       Code Status: DNR / DNI - intubation OK    Activity level - Baseline/Home:  in bed.  Activity Level - Current:   in bed.   Lift room needed: No.   Bariatric: No   Needed: No   Isolation: No.   Infection: Not Applicable.     Respiratory status: Room air    Vital Signs (within 30 minutes):   Vitals:    06/20/24 0300 06/20/24 0345 06/20/24 0445 06/20/24 0639   BP:   (!) 151/89 127/64   Pulse:    96   Resp:  24 24 20   Temp:    98.1  F (36.7  C)   TempSrc:    Oral   SpO2: 93% 95% 96% 91%   Weight:       Height:           Cardiac Rhythm:  ,      Pain level:    Patient confused: No.   Patient Falls Risk: arm band in place, assistive device/personal items within reach, and room door open.   Elimination Status: Has voided     Patient Report - Initial Complaint: Carol Merida is a 89 year old female with PMH including diastolic CHF, chronic cough and shortness of breath, anxiety with panic attacks, JEAN, morbid obesity, hypothyroidism, HLD, osteoarthritis, right femur fracture who is now mostly wheelchair-bound, and breast cancer who presents from her [facility] for worsening shortness of breath, cough, and lower extremity edema.  She is a rather vague historian regarding her cough and shortness of breath and timeline of events.  It seems like for the past 1 week or so she has had significant worsening bilateral lower extremity edema and pain in her legs mostly on the right.  She has a chronic cough that is minimally productive of sputum that may be a little bit worse this week.  She has also been more short of breath especially after a coughing fit and she gets severely anxious during these episodes.  She multiple times alludes that she  thinks that her shortness of breath may be from anxiety for which she does take lorazepam as needed.  They have been checking her temperature and she has not had any fevers or chills.  She does have chest pain when she coughs.  She lives in assisted living facility and some people there do have a cough.  She denies any headaches, myalgias, nausea, vomiting, diarrhea, dysuria.  She said she told her PCP about the redness of her legs especially on the right and they were thinking of starting an antibiotic.  She did use an albuterol neb at home this evening which temporarily helped her shortness of breath.  She was hospitalized here 3 months ago for 3 days for very similar symptoms.  She was treated with IV steroids, Lasix, nebs, and levofloxacin with improvement in symptoms and discharged home without need for oxygen.  CT at that time was concerning for bronchitis and possible ILD that would be a new diagnosis for her.   Focused Assessment: BIBA for persistent cough from assisted living, dry cough. Bilateral leg redness/swelling noted, takes diuretic. SOB after coughing fits. Pt reports problem with anxiety that makes her more SOB. Pt appearsSOB after speaking one sentence. Did a nebulizer treatment at home that helped a little. AVSS on RA.     Abnormal Results:   Labs Ordered and Resulted from Time of ED Arrival to Time of ED Departure   BASIC METABOLIC PANEL - Abnormal       Result Value    Sodium 138      Potassium 4.0      Chloride 98      Carbon Dioxide (CO2) 28      Anion Gap 12      Urea Nitrogen 14.6      Creatinine 0.71      GFR Estimate 81      Calcium 9.0      Glucose 159 (*)    TROPONIN T, HIGH SENSITIVITY - Abnormal    Troponin T, High Sensitivity 16 (*)    CBC WITH PLATELETS AND DIFFERENTIAL - Abnormal    WBC Count 7.2      RBC Count 4.62      Hemoglobin 13.4      Hematocrit 43.5      MCV 94      MCH 29.0      MCHC 30.8 (*)     RDW 13.7      Platelet Count 258      % Neutrophils 74      % Lymphocytes 10       % Monocytes 8      % Eosinophils 7      % Basophils 1      % Immature Granulocytes 0      NRBCs per 100 WBC 0      Absolute Neutrophils 5.3      Absolute Lymphocytes 0.7 (*)     Absolute Monocytes 0.6      Absolute Eosinophils 0.5      Absolute Basophils 0.0      Absolute Immature Granulocytes 0.0      Absolute NRBCs 0.0     ISTAT GASES LACTATE VENOUS POCT - Abnormal    Lactic Acid POCT 2.2 (*)     Bicarbonate Venous POCT 32 (*)     O2 Sat, Venous POCT 92 (*)     pCO2 Venous POCT 46      pH Venous POCT 7.45 (*)     pO2 Venous POCT 62 (*)    ROUTINE UA WITH MICROSCOPIC REFLEX TO CULTURE - Abnormal    Color Urine Yellow      Appearance Urine Clear      Glucose Urine Negative      Bilirubin Urine Negative      Ketones Urine Negative      Specific Gravity Urine 1.020      Blood Urine Negative      pH Urine 7.5 (*)     Protein Albumin Urine Negative      Urobilinogen Urine 3.0 (*)     Nitrite Urine Positive (*)     Leukocyte Esterase Urine Small (*)     Bacteria Urine Few (*)     RBC Urine 1      WBC Urine 31 (*)     Squamous Epithelials Urine 4 (*)    INFLUENZA A/B, RSV, & SARS-COV2 PCR - Normal    Influenza A PCR Negative      Influenza B PCR Negative      RSV PCR Negative      SARS CoV2 PCR Negative     NT PROBNP INPATIENT - Normal    N terminal Pro BNP Inpatient 114     PROCALCITONIN - Normal    Procalcitonin 0.12     ISTAT GASES LACTATE VENOUS POCT   TROPONIN T, HIGH SENSITIVITY   LACTIC ACID WHOLE BLOOD   CRP INFLAMMATION   BLOOD CULTURE   BLOOD CULTURE   RESPIRATORY PANEL PCR   RESPIRATORY PANEL PCR, NASOPHARYNGEAL   URINE CULTURE   RESPIRATORY AEROBIC BACTERIAL CULTURE        XR Chest 2 Views   Final Result   IMPRESSION: No acute abnormality.      CT Chest Pulmonary Embolism w Contrast    (Results Pending)       Treatments provided: Lasix, duoneb, IV abx  Family Comments:   OBS brochure/video discussed/provided to patient:  N/A  ED Medications:   Medications   albuterol (PROVENTIL HFA/VENTOLIN HFA) inhaler  (has no administration in time range)   albuterol (PROVENTIL) neb solution 2.5 mg (has no administration in time range)   ipratropium - albuterol 0.5 mg/2.5 mg/3 mL (DUONEB) neb solution 3 mL (has no administration in time range)   methylPREDNISolone sodium succinate (SOLU-MEDROL) injection 40 mg (40 mg Intravenous $Given 6/20/24 0634)   furosemide (LASIX) injection 40 mg (has no administration in time range)   cefTRIAXone (ROCEPHIN) 2 g vial to attach to  ml bag for ADULTS or NS 50 ml bag for PEDS (2 g Intravenous $New Bag 6/20/24 0636)   DULoxetine (CYMBALTA) DR capsule 60 mg (has no administration in time range)   fluticasone (FLONASE) 50 MCG/ACT spray 1 spray (has no administration in time range)   fluticasone-vilanterol (BREO ELLIPTA) 100-25 MCG/ACT inhaler 1 puff ( Inhalation Automatically Held 6/23/24 0800)   gabapentin (NEURONTIN) capsule 300 mg (has no administration in time range)   gabapentin (NEURONTIN) tablet 600 mg (has no administration in time range)   guaiFENesin (MUCINEX) 12 hr tablet 600 mg (has no administration in time range)   benzonatate (TESSALON) capsule 100 mg (has no administration in time range)   levothyroxine (SYNTHROID/LEVOTHROID) tablet 88 mcg (has no administration in time range)   LORazepam (ATIVAN) tablet 0.5 mg (has no administration in time range)   lidocaine 1 % 0.1-1 mL (has no administration in time range)   lidocaine (LMX4) cream (has no administration in time range)   sodium chloride (PF) 0.9% PF flush 3 mL (3 mLs Intracatheter $Given 6/20/24 0638)   sodium chloride (PF) 0.9% PF flush 3 mL (has no administration in time range)   senna-docusate (SENOKOT-S/PERICOLACE) 8.6-50 MG per tablet 1 tablet (has no administration in time range)     Or   senna-docusate (SENOKOT-S/PERICOLACE) 8.6-50 MG per tablet 2 tablet (has no administration in time range)   calcium carbonate (TUMS) chewable tablet 1,000 mg (has no administration in time range)   enoxaparin ANTICOAGULANT  (LOVENOX) injection 40 mg (has no administration in time range)   acetaminophen (TYLENOL) tablet 650 mg (has no administration in time range)     Or   acetaminophen (TYLENOL) Suppository 650 mg (has no administration in time range)   polyethylene glycol (MIRALAX) Packet 17 g (has no administration in time range)   melatonin tablet 1 mg (has no administration in time range)   ondansetron (ZOFRAN ODT) ODT tab 4 mg (has no administration in time range)     Or   ondansetron (ZOFRAN) injection 4 mg (has no administration in time range)       Drips infusing:  Yes  For the majority of the shift this patient was Green.   Interventions performed were DuoNeb, IV abx, CXR, CT CHEST,    Sepsis treatment initiated: No    Cares/treatment/interventions/medications to be completed following ED care: [facility]    ED Nurse Name: Wilda Britton RN  6:47 AM     RECEIVING UNIT ED HANDOFF REVIEW    Above ED Nurse Handoff Report was reviewed: Yes  Reviewed by: Fanta Garcia RN on June 20, 2024 at 8:11 AM   I Moe called the ED to inform them the note was read: Yes

## 2024-06-20 NOTE — ED NOTES
Pt does have a wound on her buttocks. Pt has rash inner thighs- pt states she is being treated for it

## 2024-06-20 NOTE — CONSULTS
Care Management Initial Consult    General Information  Assessment completed with: Patient, Caregiver,    Type of CM/SW Visit: Initial Assessment    Primary Care Provider verified and updated as needed: Yes   Readmission within the last 30 days: no previous admission in last 30 days      Reason for Consult: discharge planning       Communication Assessment  Patient's communication style: spoken language (English or Bilingual)    Hearing Difficulty or Deaf: no   Wear Glasses or Blind: yes    Cognitive  Cognitive/Neuro/Behavioral: WDL  Level of Consciousness: alert     Orientation: oriented x 4  Mood/Behavior: calm, cooperative  Best Language: 0 - No aphasia  Speech: clear, logical    Living Environment:   People in home: facility resident     Current living Arrangements: assisted living      Able to return to prior arrangements: yes     Family/Social Support:  Care provided by:  (Facility Staff)  Provides care for: no one, unable/limited ability to care for self  Marital Status:   Facility resident(s)/Staff, Children          Description of Support System: Supportive, Involved       Current Resources:   Patient receiving home care services:  Yes     Community Resources:    Equipment currently used at home: wheelchair, power, walker, rolling, shower chair, raised toilet seat, grab bar, tub/shower, grab bar, toilet  Supplies currently used at home:      Does the patient's insurance plan have a 3 day qualifying hospital stay waiver?  Yes     Which insurance plan 3 day waiver is available? Alternative insurance waiver    Will the waiver be used for post-acute placement? Undetermined at this time    Lifestyle & Psychosocial Needs:  Social Determinants of Health     Food Insecurity: Not on file   Depression: Not at risk (5/29/2024)    PHQ-2     PHQ-2 Score: 1   Housing Stability: Not on file   Tobacco Use: Low Risk  (4/10/2023)    Patient History     Smoking Tobacco Use: Never     Smokeless Tobacco Use: Never      Passive Exposure: Not on file   Financial Resource Strain: Not on file   Alcohol Use: Not on file   Transportation Needs: Not on file   Physical Activity: Not on file   Interpersonal Safety: Not on file   Stress: Not on file   Social Connections: Not on file   Health Literacy: Not on file       Functional Status:  Prior to admission patient needed assistance:   Dependent ADLs:: Wheelchair-with assist, Transfers, Bathing, Toileting  Dependent IADLs:: Cleaning, Cooking, Laundry, Shopping, Meal Preparation, Medication Management, Money Management, Transportation     Additional Information:  CM/SW consulted for discharge planning. Pt admitted with CHF/COPD exacerbation.  RN CC/SW is following/consulted for discharge planning. Per chart review, pt resides in an Assisted Living Facility. Spoke to pt to verify pt s residence at [facility]. Verbal permission was received to speak to the facility to verify services and to discuss the discharge plan of care. A call was placed to [facility] and services were verified.     Patient receives services as follows: cooking, cleaning, escorts, med management, bathing, toileting.    [facility] contact (name/number): [phone]  Fax #: [phone]  Barriers to pt returning: Needs to be assist of 1  Baseline mobility: Up with assist of 1, mostly wheelchair bound  Time of preferred return: M-F before 1400  New meds/prescriptions/Pharmacy: [facility]    Transportation: Pt requesting wheelchair transportation at time of discharge. Reviewed out of pocket cost for [facility] transport, $89.98 base and $5.79 per mile to the destination. Pt. expressed understanding and is agreeable to this.      Other: Pt is current with [facility] for RN/PT visits. Will need resumption of care orders at discharge.    RN CC/SW will continue to follow for discharge planning and return to facility.     Lizzie Pappas RN   Inpatient Care Coordination  [facility]   Phone:  [phone]

## 2024-06-20 NOTE — ED PROVIDER NOTES
"  Emergency Department Note      History of Present Illness     Chief Complaint  Cough and Leg Swelling    HPI  Carol Merida is a 89 year old female with a history of anxiety, CHF, COPD, hyperlipidemia, and hypertension who presents to the emergency department from assisted living for redness of breath. The patient states that she has been experiencing ongoing shortness of breath that she attributes to \"anxiety.\" She reports that tonight, she awoke more short of breath. Denies being on home oxygen. She states that for 1.5 weeks, she has been experiencing lower extremity edema and erythema. She adds that she is on Lasix. She notes that tonight, she also began experiencing a new cough and nausea. Denies chest pain. Denies hx of COPD, or emphysema.     Independent Historian  None    Review of External Notes  Reviewed patient's discharge summary from 3/15/2024, patient had acute hypoxic respiratory failure secondary to bronchitis, congestive heart failure, elevated troponin, panic attacks  Past Medical History   Medical History and Problem List  Anemia  Anxiety  Cardiac dysrhythmia  CHF  Hyperlipidemia  Hypertension  Hypothyroidism  Malignant neoplasm of breast  Melanoma  JEAN  Osteoarthritis  PONV  Tubular adenoma in colon    Medications  albuterol (PROAIR HFA/PROVENTIL HFA/VENTOLIN HFA) 108 (90 Base) MCG/ACT inhaler  celecoxib (CELEBREX) 100 MG capsule  DULoxetine (CYMBALTA) 60 MG capsule  fluticasone-salmeterol (WIXELA INHUB) 100-50 MCG/ACT inhaler  furosemide (LASIX) 40 MG tablet  gabapentin (NEURONTIN) 600 MG tablet  guaiFENesin 1200 MG TB12  levothyroxine (SYNTHROID/LEVOTHROID) 88 MCG tablet  LORazepam (ATIVAN) 0.5 MG tablet    Surgical History   Bilateral hip arthroplasty  Cataract IOL, bilateral  Hysterectomy, MARGAUX and BSO  ORIF R hip  Left mastectomy  R ankle procedure  Tonsillectomy  Appendectomy  Melanoma removed    Physical Exam   Patient Vitals for the past 24 hrs:   BP Temp Temp src Pulse Resp SpO2 Height " "Weight   06/20/24 0729 -- 98.4  F (36.9  C) Oral -- 18 91 % -- --   06/20/24 0727 (!) 159/89 -- -- 98 -- -- -- --   06/20/24 0639 127/64 98.1  F (36.7  C) Oral 96 20 91 % -- --   06/20/24 0445 (!) 151/89 -- -- -- 24 96 % -- --   06/20/24 0345 -- -- -- -- 24 95 % -- --   06/20/24 0300 -- -- -- -- -- 93 % -- --   06/20/24 0224 (!) 132/94 97.7  F (36.5  C) Temporal 100 20 94 % 1.6 m (5' 3\") 117.9 kg (260 lb)     Physical Exam  General: Well-nourished, resting comfortably when I enter the room  Eyes: Pupils equal, conjunctivae pink no scleral icterus or conjunctival injection  ENT:  Moist mucus membranes  Respiratory:  Lungs clear to auscultation bilaterally, no crackles/rubs/wheezes.  Good air movement  CV: Normal rate and rhythm, no murmurs  GI:  Abdomen soft and non-distended.  No tenderness, guarding or rebound  Skin: Warm, dry.  No rashes or petechiae  Musculoskeletal: Bilateral peripheral edema and erythema.  Neuro: Alert and oriented to person/place/time  Psychiatric: Normal affect    Diagnostics   Lab Results   Labs Ordered and Resulted from Time of ED Arrival to Time of ED Departure   BASIC METABOLIC PANEL - Abnormal       Result Value    Sodium 138      Potassium 4.0      Chloride 98      Carbon Dioxide (CO2) 28      Anion Gap 12      Urea Nitrogen 14.6      Creatinine 0.71      GFR Estimate 81      Calcium 9.0      Glucose 159 (*)    TROPONIN T, HIGH SENSITIVITY - Abnormal    Troponin T, High Sensitivity 16 (*)    CBC WITH PLATELETS AND DIFFERENTIAL - Abnormal    WBC Count 7.2      RBC Count 4.62      Hemoglobin 13.4      Hematocrit 43.5      MCV 94      MCH 29.0      MCHC 30.8 (*)     RDW 13.7      Platelet Count 258      % Neutrophils 74      % Lymphocytes 10      % Monocytes 8      % Eosinophils 7      % Basophils 1      % Immature Granulocytes 0      NRBCs per 100 WBC 0      Absolute Neutrophils 5.3      Absolute Lymphocytes 0.7 (*)     Absolute Monocytes 0.6      Absolute Eosinophils 0.5      Absolute " Basophils 0.0      Absolute Immature Granulocytes 0.0      Absolute NRBCs 0.0     ISTAT GASES LACTATE VENOUS POCT - Abnormal    Lactic Acid POCT 2.2 (*)     Bicarbonate Venous POCT 32 (*)     O2 Sat, Venous POCT 92 (*)     pCO2 Venous POCT 46      pH Venous POCT 7.45 (*)     pO2 Venous POCT 62 (*)    ROUTINE UA WITH MICROSCOPIC REFLEX TO CULTURE - Abnormal    Color Urine Yellow      Appearance Urine Clear      Glucose Urine Negative      Bilirubin Urine Negative      Ketones Urine Negative      Specific Gravity Urine 1.020      Blood Urine Negative      pH Urine 7.5 (*)     Protein Albumin Urine Negative      Urobilinogen Urine 3.0 (*)     Nitrite Urine Positive (*)     Leukocyte Esterase Urine Small (*)     Bacteria Urine Few (*)     RBC Urine 1      WBC Urine 31 (*)     Squamous Epithelials Urine 4 (*)    CRP INFLAMMATION - Abnormal    CRP Inflammation 15.74 (*)    INFLUENZA A/B, RSV, & SARS-COV2 PCR - Normal    Influenza A PCR Negative      Influenza B PCR Negative      RSV PCR Negative      SARS CoV2 PCR Negative     NT PROBNP INPATIENT - Normal    N terminal Pro BNP Inpatient 114     PROCALCITONIN - Normal    Procalcitonin 0.12     ISTAT GASES LACTATE VENOUS POCT   TROPONIN T, HIGH SENSITIVITY   LACTIC ACID WHOLE BLOOD   BLOOD CULTURE   BLOOD CULTURE   RESPIRATORY PANEL PCR   RESPIRATORY PANEL PCR, NASOPHARYNGEAL   URINE CULTURE   RESPIRATORY AEROBIC BACTERIAL CULTURE     Imaging  XR Chest 2 Views   Final Result   IMPRESSION: No acute abnormality.      CT Chest Pulmonary Embolism w Contrast    (Results Pending)     EKG   ECG taken at 0249, ECG read at 0251  Sinus rhythm with premature supraventricular complexes  Low voltage QRS  Inferior infarct, ager undetermined  Cannot rule out anterior infarct, age undetermined    No significant changes as compared to prior, dated 03/15/24.  Rate 94 bpm. MI interval 160 ms. QRS duration 74 ms. QT/QTc 356/445 ms. P-R-T axes 5 -21 35.    Independent Interpretation  CXR: No  pneumothorax or infiltrate.  ED Course    Medications Administered  Medications   albuterol (PROVENTIL HFA/VENTOLIN HFA) inhaler (has no administration in time range)   albuterol (PROVENTIL) neb solution 2.5 mg (has no administration in time range)   ipratropium - albuterol 0.5 mg/2.5 mg/3 mL (DUONEB) neb solution 3 mL (has no administration in time range)   methylPREDNISolone sodium succinate (SOLU-MEDROL) injection 40 mg (40 mg Intravenous $Given 6/20/24 0634)   furosemide (LASIX) injection 40 mg (has no administration in time range)   cefTRIAXone (ROCEPHIN) 2 g vial to attach to  ml bag for ADULTS or NS 50 ml bag for PEDS (2 g Intravenous $New Bag 6/20/24 0636)   DULoxetine (CYMBALTA) DR capsule 60 mg (has no administration in time range)   fluticasone (FLONASE) 50 MCG/ACT spray 1 spray (has no administration in time range)   fluticasone-vilanterol (BREO ELLIPTA) 100-25 MCG/ACT inhaler 1 puff ( Inhalation Automatically Held 6/23/24 0800)   gabapentin (NEURONTIN) capsule 300 mg (has no administration in time range)   gabapentin (NEURONTIN) tablet 600 mg (has no administration in time range)   guaiFENesin (MUCINEX) 12 hr tablet 600 mg (has no administration in time range)   benzonatate (TESSALON) capsule 100 mg (has no administration in time range)   levothyroxine (SYNTHROID/LEVOTHROID) tablet 88 mcg (has no administration in time range)   LORazepam (ATIVAN) tablet 0.5 mg (has no administration in time range)   lidocaine 1 % 0.1-1 mL (has no administration in time range)   lidocaine (LMX4) cream (has no administration in time range)   sodium chloride (PF) 0.9% PF flush 3 mL (3 mLs Intracatheter $Given 6/20/24 0638)   sodium chloride (PF) 0.9% PF flush 3 mL (has no administration in time range)   senna-docusate (SENOKOT-S/PERICOLACE) 8.6-50 MG per tablet 1 tablet (has no administration in time range)     Or   senna-docusate (SENOKOT-S/PERICOLACE) 8.6-50 MG per tablet 2 tablet (has no administration in time  range)   calcium carbonate (TUMS) chewable tablet 1,000 mg (has no administration in time range)   enoxaparin ANTICOAGULANT (LOVENOX) injection 40 mg (has no administration in time range)   acetaminophen (TYLENOL) tablet 650 mg (has no administration in time range)     Or   acetaminophen (TYLENOL) Suppository 650 mg (has no administration in time range)   polyethylene glycol (MIRALAX) Packet 17 g (has no administration in time range)   melatonin tablet 1 mg (has no administration in time range)   ondansetron (ZOFRAN ODT) ODT tab 4 mg (has no administration in time range)     Or   ondansetron (ZOFRAN) injection 4 mg (has no administration in time range)   iopamidol (ISOVUE-370) solution 500 mL (74 mLs Intravenous $Given 6/20/24 0707)   CT Scan Flush (90 mLs Intravenous $Given 6/20/24 0707)     Discussion of Management  Spoke with Dr. Gifford with hospitalist services.  Hold off on antibiotics until CT PE study is back and urine is back.    Social Determinants of Health adding to complexity of care  None    ED Course  ED Course as of 06/20/24 0734   Thu Jun 20, 2024   0251 I obtained history and examined the patient as noted above.      0506 I rechecked the patient.        Medical Decision Making / Diagnosis   The Lactic acid level is elevated due to duoneb treatment, COPD exacerbation, at this time there is no sign of severe sepsis or septic shock.     MIPS  None    [facility]  Carol Merida is a 89 year old female with a history of CHF, COPD, hyperlipidemia, hypertension presents to the emergency department with a complaint of shortness of breath.  Patient reports that she woke up from sleep, and she felt more short of breath than usual.  She does not use any oxygen at home.  She states that she attributes her shortness of breath to anxiety.  She does report that she has had increased leg swelling and is on Lasix.  On exam patient has bilateral lower extremity edema and erythema.  Patient is tachypneic with any sort of  exertion.  Wheezing bilaterally.  Patient sits up in bed and swings her legs to the side, her oxygen saturation drops to 87% on room air and she becomes more tachypneic.  Workup shows a lactic acid of 2.2.  Patient is not acidotic and does not have an elevated white blood cell count.  Will further investigate for source of infection.  Flu, COVID, RSV are negative.  Chest x-ray does not show any sign of pneumonia or pleural effusion.  No sign of KEVYN.  BNP is not significantly elevated.  Urine is still pending.  Patient is given a breathing treatment as well as Lasix.  I am unsure if this is COPD versus CHF exacerbation.  Patient did have a preserved EF on her last echo on 3/15/2024. She is given a small fluid bolus as her lactic acid is elevated.  Her legs are very swollen and they are also red and warm.  There could be an element of cellulitis, although that would be unusual to have bilateral cellulitis.  I cannot exactly tell with the patient's history if the swelling and redness of her lower extremities is chronic.  She states that she saw her doctor earlier in the week for this leg swelling and redness, and was prescribed a salve, but no antibiotics.  With her being short of breath and intermittently hypoxic and concern for fluid overload and history of CHF I am not going to give her any more fluids than the 500 mls.   Spoke with Dr. Gifford.  Recommend CT PE study for evaluation of possible interstitial lung disease.  He will hold off on antibiotics until the urine has resulted as well as the CT PE study.  Does recommend steroids, breathing treatments, Lasix.       Disposition  The patient was admitted to the hospital.     ICD-10 Codes:    ICD-10-CM    1. Acute hypoxemic respiratory failure (H)  J96.01       2. COPD exacerbation (H)  J44.1       3. Acute cystitis without hematuria  N30.00       4. Swelling of both lower extremities  M79.89       5. Shortness of breath  R06.02            Discharge Medications  New  Prescriptions    No medications on file     Scribe Disclosure:  I, Patrick Osorio, am serving as a scribe at 2:52 AM on 6/20/2024 to document services personally performed by Isha Capps MD based on my observations and the provider's statements to me.        Isha Capps MD  06/20/24 0714

## 2024-06-20 NOTE — PLAN OF CARE
"End of shift note: Aox4, reports numbness in R lower extremity. Pt up 2 assist w/ lift. Pt uses wheelchair at [facility]. Takes pills whole. K+ and mag protocol within range, recheck tomorrow AM. Tele SR/ST. Sacral sound seen by WOC. IV lasix given. Admission profile completed.   Goal Outcome Evaluation:      Plan of Care Reviewed With: patient    Overall Patient Progress: improvingOverall Patient Progress: improving    Outcome Evaluation: Reports LE pain w/ movement and headache, PRN Tylenol given. VSS. Sating well on 2 L NC, desats with movement and laying flat.      Problem: Adult Inpatient Plan of Care  Goal: Plan of Care Review  Description: The Plan of Care Review/Shift note should be completed every shift.  The Outcome Evaluation is a brief statement about your assessment that the patient is improving, declining, or no change.  This information will be displayed automatically on your shift  note.  Outcome: Progressing  Flowsheets (Taken 6/20/2024 1323)  Outcome Evaluation: Reports LE pain w/ movement and headache, PRN Tylenol given. VSS. Sating well on 2 L NC, desats with movement and laying flat.  Plan of Care Reviewed With: patient  Overall Patient Progress: improving  Goal: Patient-Specific Goal (Individualized)  Description: You can add care plan individualizations to a care plan. Examples of Individualization might be:  \"Parent requests to be called daily at 9am for status\", \"I have a hard time hearing out of my right ear\", or \"Do not touch me to wake me up as it startles  me\".  Outcome: Progressing  Goal: Absence of Hospital-Acquired Illness or Injury  Outcome: Progressing  Intervention: Identify and Manage Fall Risk  Recent Flowsheet Documentation  Taken 6/20/2024 1318 by Fanta Garcia, RN  Safety Promotion/Fall Prevention: safety round/check completed  Taken 6/20/2024 1115 by Fanta Garcia, RN  Safety Promotion/Fall Prevention: safety round/check completed  Taken 6/20/2024 1013 by Fanta Garcia, " RN  Safety Promotion/Fall Prevention: safety round/check completed  Taken 6/20/2024 0930 by Fanta Garcia RN  Safety Promotion/Fall Prevention: safety round/check completed  Taken 6/20/2024 0831 by Fanta Garcia RN  Safety Promotion/Fall Prevention: safety round/check completed  Intervention: Prevent Skin Injury  Recent Flowsheet Documentation  Taken 6/20/2024 1013 by Fanta Garcia RN  Body Position:   turned   right   log-rolled  Taken 6/20/2024 0831 by Fanta Garcia RN  Body Position:   weight shifting   log-rolled  Intervention: Prevent and Manage VTE (Venous Thromboembolism) Risk  Recent Flowsheet Documentation  Taken 6/20/2024 0831 by Fanta Garcia RN  VTE Prevention/Management: (Lovenox) SCDs (sequential compression devices) off  Goal: Optimal Comfort and Wellbeing  Outcome: Progressing  Goal: Readiness for Transition of Care  Outcome: Progressing  Intervention: Mutually Develop Transition Plan  Recent Flowsheet Documentation  Taken 6/20/2024 0942 by Fanta Garcia RN  Equipment Currently Used at Home:   wheelchair, power   walker, rolling   shower chair   raised toilet seat   grab bar, tub/shower   grab bar, toilet     Problem: Comorbidity Management  Goal: Maintenance of Heart Failure Symptom Control  Outcome: Progressing  Intervention: Maintain Heart Failure Management  Recent Flowsheet Documentation  Taken 6/20/2024 0831 by Fanta Garcia RN  Medication Review/Management: medications reviewed  Goal: Blood Pressure in Desired Range  Outcome: Progressing  Intervention: Maintain Blood Pressure Management  Recent Flowsheet Documentation  Taken 6/20/2024 0831 by Fanta Garcia RN  Medication Review/Management: medications reviewed  Goal: Maintenance of Osteoarthritis Symptom Control  Outcome: Progressing  Intervention: Maintain Osteoarthritis Symptom Control  Recent Flowsheet Documentation  Taken 6/20/2024 1013 by Fanta Garcia RN  Activity Management: activity adjusted per  tolerance  Taken 6/20/2024 0831 by Fanta Garcia, RN  Assistive Device Utilized: lift device  Activity Management: activity adjusted per tolerance  Medication Review/Management: medications reviewed     Problem: Gas Exchange Impaired  Goal: Optimal Gas Exchange  Outcome: Progressing  Intervention: Optimize Oxygenation and Ventilation  Recent Flowsheet Documentation  Taken 6/20/2024 0831 by Fanta Garcia, RN  Head of Bed (HOB) Positioning: HOB at 20-30 degrees

## 2024-06-20 NOTE — PHARMACY-ADMISSION MEDICATION HISTORY
Pharmacist Admission Medication History    Admission medication history is complete. The information provided in this note is only as accurate as the sources available at the time of the update.    Information Source(s): Hospital records and Facility (TCU/NH/) medication list/MAR via phone-Called [facility] for MAR ([phone])    Pertinent Information: pt was on albuterol nebs bid, but that was changed to duonebs TID today.     Changes made to PTA medication list:  Added: ativan 0.5mg tid and q6h prn, miralax prn, clobetasol, diclofenac, guaifenesin 400mg, duoneb, artificial saliva, triamcinolone cream, desitin, mucinex d  Deleted: clotrimazone 1% cream bid, guaifenesin 1200mg q12h, ativan 0.5mg daily prn, miralax 17gm daily and repeat daily prn, calmoseptine apply bid  Changed: None    Allergies reviewed with patient and updates made in EHR: no    Medication History Completed By: Kenya Thakkar [facility] 6/20/2024 11:03 AM    PTA Med List   Medication Sig Last Dose    acetaminophen (TYLENOL) 325 MG tablet Take 650 mg by mouth 2 times daily as needed for pain Take 6 hours apart from scheduled doses Unknown at ?    acetaminophen (TYLENOL) 325 MG tablet Take 650 mg by mouth every 8 hours 0630, 1400, 2145 6/19/2024 at pm    albuterol (PROAIR HFA/PROVENTIL HFA/VENTOLIN HFA) 108 (90 Base) MCG/ACT inhaler INHALE 2 PUFFS INTO THE LUNGS EVERY 4 HOURS AS NEEDED FOR SHORTNESS OF BREATH OR DIFFICULT BREATHING OR WHEEZING Unknown at ?    albuterol (PROVENTIL) (2.5 MG/3ML) 0.083% neb solution Take 1 vial (2.5 mg) by nebulization every 4 hours as needed for shortness of breath, wheezing or cough And BID Unknown at ?    ARTIFICIAL SALIVA MT Take 1 strip by mouth 2 times daily After nebs 6/19/2024 at pm    calcium carbonate 600 mg-vitamin D 400 units (CALTRATE) 600-400 MG-UNIT per tablet Take 1 tablet by mouth every evening  6/19/2024 at pm    celecoxib (CELEBREX) 100 MG capsule Take 100 mg by mouth 2 times daily as  needed for moderate pain (4-6) Unknown at ?    clobetasol propionate 0.05 % LOTN Externally apply topically 2 times daily To palm of hands 6/19/2024 at pm    cycloSPORINE (CYCLOSPORINE IN KLARITY) 0.1 % EMUL Place 1 drop into both eyes 2 times daily 0630 and 2145 6/19/2024 at pm    diclofenac (VOLTAREN) 1 % topical gel Apply 2 g topically 2 times daily 6/17/2024 at pm    DULoxetine (CYMBALTA) 60 MG capsule Take 60 mg by mouth daily 6/19/2024 at am    fluticasone (FLONASE) 50 MCG/ACT nasal spray Spray 1 spray into both nostrils every morning 6/19/2024 at am    fluticasone-salmeterol (WIXELA INHUB) 100-50 MCG/ACT inhaler Inhale 1 puff into the lungs 2 times daily 6/19/2024 at pm    furosemide (LASIX) 40 MG tablet Take 0.5 tablets (20 mg) by mouth daily 6/19/2024 at am    gabapentin (NEURONTIN) 300 MG capsule Take 1 capsule (300 mg) by mouth 2 times daily 6/19/2024 at 1400    gabapentin (NEURONTIN) 600 MG tablet Take 1 tablet (600 mg) by mouth At Bedtime 6/19/2024 at pm    guaiFENesin 400 MG TABS Take 400 mg by mouth 2 times daily 6/19/2024 at pm    ipratropium - albuterol 0.5 mg/2.5 mg/3 mL (DUONEB) 0.5-2.5 (3) MG/3ML neb solution Take 1 vial by nebulization 3 times daily  at new rx    levothyroxine (SYNTHROID/LEVOTHROID) 88 MCG tablet Take 1 tablet (88 mcg) by mouth daily 6/19/2024 at am    LORazepam (ATIVAN) 0.5 MG tablet Take 0.5 mg by mouth every 6 hours as needed for anxiety Unknown at ?    LORazepam (ATIVAN) 0.5 MG tablet Take 0.5 mg by mouth 3 times daily 6/19/2024 at pm    melatonin 3 MG tablet Take 3 mg by mouth At Bedtime 6/19/2024 at pm    nystatin (NYSTOP) 813481 UNIT/GM external powder Apply tid to affectead area prn Unknown at ?    polyethylene glycol (MIRALAX) 17 g packet Take 1 packet by mouth daily as needed for constipation Unknown at ?    PseudoePHEDrine-guaiFENesin 120-1200 MG TB12 Take 1 tablet by mouth 2 times daily as needed for congestion Unknown at ?    triamcinolone (KENALOG) 0.1 % external  cream Apply topically every evening 6/19/2024 at pm    zinc oxide (DESITIN) 40 % external ointment Apply topically as needed for dry skin or irritation (with toileting) Unknown at ?

## 2024-06-21 ENCOUNTER — APPOINTMENT (OUTPATIENT)
Dept: SPEECH THERAPY | Facility: CLINIC | Age: 89
DRG: 291 | End: 2024-06-21
Attending: INTERNAL MEDICINE
Payer: COMMERCIAL

## 2024-06-21 LAB
ANION GAP SERPL CALCULATED.3IONS-SCNC: 12 MMOL/L (ref 7–15)
BUN SERPL-MCNC: 15.1 MG/DL (ref 8–23)
CALCIUM SERPL-MCNC: 9.1 MG/DL (ref 8.8–10.2)
CHLORIDE SERPL-SCNC: 97 MMOL/L (ref 98–107)
CREAT SERPL-MCNC: 0.62 MG/DL (ref 0.51–0.95)
DEPRECATED HCO3 PLAS-SCNC: 29 MMOL/L (ref 22–29)
EGFRCR SERPLBLD CKD-EPI 2021: 85 ML/MIN/1.73M2
ERYTHROCYTE [DISTWIDTH] IN BLOOD BY AUTOMATED COUNT: 13.7 % (ref 10–15)
GLUCOSE SERPL-MCNC: 120 MG/DL (ref 70–99)
HCT VFR BLD AUTO: 44.4 % (ref 35–47)
HGB BLD-MCNC: 13.5 G/DL (ref 11.7–15.7)
MAGNESIUM SERPL-MCNC: 2.2 MG/DL (ref 1.7–2.3)
MCH RBC QN AUTO: 28.8 PG (ref 26.5–33)
MCHC RBC AUTO-ENTMCNC: 30.4 G/DL (ref 31.5–36.5)
MCV RBC AUTO: 95 FL (ref 78–100)
PLATELET # BLD AUTO: 272 10E3/UL (ref 150–450)
POTASSIUM SERPL-SCNC: 3.5 MMOL/L (ref 3.4–5.3)
RBC # BLD AUTO: 4.68 10E6/UL (ref 3.8–5.2)
SODIUM SERPL-SCNC: 138 MMOL/L (ref 135–145)
WBC # BLD AUTO: 7.7 10E3/UL (ref 4–11)

## 2024-06-21 PROCEDURE — 99232 SBSQ HOSP IP/OBS MODERATE 35: CPT | Performed by: INTERNAL MEDICINE

## 2024-06-21 PROCEDURE — 85027 COMPLETE CBC AUTOMATED: CPT | Performed by: INTERNAL MEDICINE

## 2024-06-21 PROCEDURE — 120N000001 HC R&B MED SURG/OB

## 2024-06-21 PROCEDURE — 92610 EVALUATE SWALLOWING FUNCTION: CPT | Mod: GN

## 2024-06-21 PROCEDURE — 94640 AIRWAY INHALATION TREATMENT: CPT

## 2024-06-21 PROCEDURE — 83735 ASSAY OF MAGNESIUM: CPT | Performed by: INTERNAL MEDICINE

## 2024-06-21 PROCEDURE — 250N000013 HC RX MED GY IP 250 OP 250 PS 637: Performed by: INTERNAL MEDICINE

## 2024-06-21 PROCEDURE — 80048 BASIC METABOLIC PNL TOTAL CA: CPT | Performed by: INTERNAL MEDICINE

## 2024-06-21 PROCEDURE — 36415 COLL VENOUS BLD VENIPUNCTURE: CPT | Performed by: INTERNAL MEDICINE

## 2024-06-21 PROCEDURE — 250N000011 HC RX IP 250 OP 636: Mod: JZ | Performed by: INTERNAL MEDICINE

## 2024-06-21 PROCEDURE — 999N000157 HC STATISTIC RCP TIME EA 10 MIN

## 2024-06-21 PROCEDURE — 250N000009 HC RX 250: Performed by: INTERNAL MEDICINE

## 2024-06-21 PROCEDURE — 94640 AIRWAY INHALATION TREATMENT: CPT | Mod: 76

## 2024-06-21 RX ORDER — LORAZEPAM 0.5 MG/1
0.5 TABLET ORAL 3 TIMES DAILY
Status: DISCONTINUED | OUTPATIENT
Start: 2024-06-21 | End: 2024-06-21

## 2024-06-21 RX ORDER — IPRATROPIUM BROMIDE AND ALBUTEROL SULFATE 2.5; .5 MG/3ML; MG/3ML
1 SOLUTION RESPIRATORY (INHALATION) 3 TIMES DAILY
Status: DISCONTINUED | OUTPATIENT
Start: 2024-06-21 | End: 2024-06-21

## 2024-06-21 RX ORDER — LORAZEPAM 0.5 MG/1
0.5 TABLET ORAL 2 TIMES DAILY
Status: DISCONTINUED | OUTPATIENT
Start: 2024-06-21 | End: 2024-06-25 | Stop reason: HOSPADM

## 2024-06-21 RX ORDER — LORAZEPAM 0.5 MG/1
0.5 TABLET ORAL EVERY 6 HOURS PRN
Status: DISCONTINUED | OUTPATIENT
Start: 2024-06-21 | End: 2024-06-25 | Stop reason: HOSPADM

## 2024-06-21 RX ORDER — TRIAMCINOLONE ACETONIDE 1 MG/G
CREAM TOPICAL EVERY EVENING
Status: DISCONTINUED | OUTPATIENT
Start: 2024-06-21 | End: 2024-06-25 | Stop reason: HOSPADM

## 2024-06-21 RX ORDER — FUROSEMIDE 40 MG
40 TABLET ORAL DAILY
Status: DISCONTINUED | OUTPATIENT
Start: 2024-06-22 | End: 2024-06-25 | Stop reason: HOSPADM

## 2024-06-21 RX ORDER — LORAZEPAM 0.5 MG/1
0.5 TABLET ORAL 2 TIMES DAILY
Status: DISCONTINUED | OUTPATIENT
Start: 2024-06-21 | End: 2024-06-21

## 2024-06-21 RX ORDER — CLOBETASOL PROPIONATE 0.5 MG/ML
SOLUTION TOPICAL 2 TIMES DAILY
Status: DISCONTINUED | OUTPATIENT
Start: 2024-06-21 | End: 2024-06-21

## 2024-06-21 RX ORDER — IPRATROPIUM BROMIDE AND ALBUTEROL SULFATE 2.5; .5 MG/3ML; MG/3ML
1 SOLUTION RESPIRATORY (INHALATION) 3 TIMES DAILY
Status: DISCONTINUED | OUTPATIENT
Start: 2024-06-21 | End: 2024-06-25 | Stop reason: HOSPADM

## 2024-06-21 RX ORDER — LORAZEPAM 0.5 MG/1
0.5 TABLET ORAL DAILY
Status: DISCONTINUED | OUTPATIENT
Start: 2024-06-24 | End: 2024-06-21

## 2024-06-21 RX ADMIN — DICLOFENAC SODIUM 2 G: 10 GEL TOPICAL at 11:42

## 2024-06-21 RX ADMIN — FUROSEMIDE 40 MG: 10 INJECTION, SOLUTION INTRAMUSCULAR; INTRAVENOUS at 15:22

## 2024-06-21 RX ADMIN — GUAIFENESIN 600 MG: 600 TABLET, EXTENDED RELEASE ORAL at 20:42

## 2024-06-21 RX ADMIN — GABAPENTIN 600 MG: 600 TABLET, FILM COATED ORAL at 21:03

## 2024-06-21 RX ADMIN — CEFTRIAXONE 2 G: 2 INJECTION, POWDER, FOR SOLUTION INTRAMUSCULAR; INTRAVENOUS at 09:26

## 2024-06-21 RX ADMIN — IPRATROPIUM BROMIDE AND ALBUTEROL SULFATE 3 ML: .5; 3 SOLUTION RESPIRATORY (INHALATION) at 20:00

## 2024-06-21 RX ADMIN — FUROSEMIDE 40 MG: 10 INJECTION, SOLUTION INTRAMUSCULAR; INTRAVENOUS at 09:26

## 2024-06-21 RX ADMIN — LORAZEPAM 0.5 MG: 0.5 TABLET ORAL at 20:42

## 2024-06-21 RX ADMIN — DICLOFENAC SODIUM 2 G: 10 GEL TOPICAL at 20:44

## 2024-06-21 RX ADMIN — IPRATROPIUM BROMIDE AND ALBUTEROL SULFATE 3 ML: .5; 3 SOLUTION RESPIRATORY (INHALATION) at 08:56

## 2024-06-21 RX ADMIN — LORAZEPAM 0.5 MG: 0.5 TABLET ORAL at 09:26

## 2024-06-21 RX ADMIN — IPRATROPIUM BROMIDE AND ALBUTEROL SULFATE 3 ML: .5; 3 SOLUTION RESPIRATORY (INHALATION) at 15:49

## 2024-06-21 RX ADMIN — LEVOTHYROXINE SODIUM 88 MCG: 0.09 TABLET ORAL at 09:26

## 2024-06-21 RX ADMIN — GABAPENTIN 300 MG: 300 CAPSULE ORAL at 09:26

## 2024-06-21 RX ADMIN — Medication 1 MG: at 23:31

## 2024-06-21 RX ADMIN — FLUTICASONE PROPIONATE 1 SPRAY: 50 SPRAY, METERED NASAL at 09:27

## 2024-06-21 RX ADMIN — ENOXAPARIN SODIUM 40 MG: 40 INJECTION SUBCUTANEOUS at 09:27

## 2024-06-21 RX ADMIN — GABAPENTIN 300 MG: 300 CAPSULE ORAL at 15:22

## 2024-06-21 RX ADMIN — DULOXETINE HYDROCHLORIDE 60 MG: 60 CAPSULE, DELAYED RELEASE ORAL at 09:26

## 2024-06-21 RX ADMIN — ENOXAPARIN SODIUM 40 MG: 40 INJECTION SUBCUTANEOUS at 20:42

## 2024-06-21 RX ADMIN — TRIAMCINOLONE ACETONIDE: 1 CREAM TOPICAL at 21:05

## 2024-06-21 RX ADMIN — GUAIFENESIN 600 MG: 600 TABLET, EXTENDED RELEASE ORAL at 09:26

## 2024-06-21 RX ADMIN — FLUTICASONE FUROATE AND VILANTEROL TRIFENATATE 1 PUFF: 100; 25 POWDER RESPIRATORY (INHALATION) at 08:56

## 2024-06-21 ASSESSMENT — ACTIVITIES OF DAILY LIVING (ADL)
ADLS_ACUITY_SCORE: 55
ADLS_ACUITY_SCORE: 55
ADLS_ACUITY_SCORE: 44
ADLS_ACUITY_SCORE: 55
ADLS_ACUITY_SCORE: 55
ADLS_ACUITY_SCORE: 44
ADLS_ACUITY_SCORE: 55
ADLS_ACUITY_SCORE: 55
ADLS_ACUITY_SCORE: 44
ADLS_ACUITY_SCORE: 55
ADLS_ACUITY_SCORE: 55
ADLS_ACUITY_SCORE: 44
ADLS_ACUITY_SCORE: 55
ADLS_ACUITY_SCORE: 44
ADLS_ACUITY_SCORE: 55

## 2024-06-21 NOTE — PLAN OF CARE
"  Problem: Adult Inpatient Plan of Care  Goal: Plan of Care Review  Description: The Plan of Care Review/Shift note should be completed every shift.  The Outcome Evaluation is a brief statement about your assessment that the patient is improving, declining, or no change.  This information will be displayed automatically on your shift  note.  Outcome: Not Progressing  Flowsheets (Taken 6/21/2024 1735)  Outcome Evaluation: Daughter at bedside. Updated on POC.  Plan of Care Reviewed With:   patient   family  Overall Patient Progress: no change  Goal: Patient-Specific Goal (Individualized)  Description: You can add care plan individualizations to a care plan. Examples of Individualization might be:  \"Parent requests to be called daily at 9am for status\", \"I have a hard time hearing out of my right ear\", or \"Do not touch me to wake me up as it startles  me\".  Outcome: Not Progressing  Goal: Absence of Hospital-Acquired Illness or Injury  Outcome: Not Progressing  Intervention: Identify and Manage Fall Risk  Recent Flowsheet Documentation  Taken 6/21/2024 1645 by Maria Antonia Bell RN  Safety Promotion/Fall Prevention:   activity supervised   assistive device/personal items within reach   clutter free environment maintained   increased rounding and observation   increase visualization of patient   lighting adjusted   mobility aid in reach   nonskid shoes/slippers when out of bed   patient and family education   room organization consistent   safety round/check completed  Intervention: Prevent Skin Injury  Recent Flowsheet Documentation  Taken 6/21/2024 1645 by Maria Antonia Bell RN  Body Position: (stood and marched in place) weight shifting  Skin Protection:   incontinence pads utilized   protective footwear used   skin to device areas padded  Device Skin Pressure Protection:   absorbent pad utilized/changed   positioning supports utilized   pressure points protected   skin-to-device areas padded   skin-to-skin areas " padded   tubing/devices free from skin contact  Intervention: Prevent and Manage VTE (Venous Thromboembolism) Risk  Recent Flowsheet Documentation  Taken 6/21/2024 1645 by Maria Antonia Bell RN  VTE Prevention/Management: SCDs (sequential compression devices) on  Goal: Optimal Comfort and Wellbeing  Outcome: Not Progressing  Goal: Readiness for Transition of Care  Outcome: Not Progressing     Problem: Comorbidity Management  Goal: Maintenance of Heart Failure Symptom Control  Outcome: Not Progressing  Intervention: Maintain Heart Failure Management  Recent Flowsheet Documentation  Taken 6/21/2024 1645 by Maria Antonia Bell RN  Medication Review/Management: medications reviewed  Goal: Blood Pressure in Desired Range  Outcome: Not Progressing  Intervention: Maintain Blood Pressure Management  Recent Flowsheet Documentation  Taken 6/21/2024 1645 by Maria Antonia Bell RN  Medication Review/Management: medications reviewed  Goal: Maintenance of Osteoarthritis Symptom Control  Outcome: Not Progressing  Intervention: Maintain Osteoarthritis Symptom Control  Recent Flowsheet Documentation  Taken 6/21/2024 1645 by Maria Antonia Bell RN  Assistive Device Utilized:   gait belt   walker  Activity Management: up in chair  Medication Review/Management: medications reviewed     Problem: Gas Exchange Impaired  Goal: Optimal Gas Exchange  Outcome: Not Progressing     Problem: Skin or Soft Tissue Infection  Goal: Absence of Infection Signs and Symptoms  Outcome: Not Progressing   Goal Outcome Evaluation:      Plan of Care Reviewed With: patient, family    Overall Patient Progress: no changeOverall Patient Progress: no change    Outcome Evaluation: Daughter at bedside. Updated on POC.

## 2024-06-21 NOTE — PLAN OF CARE
"Vitals are Temp: 98.5  F (36.9  C) Temp src: Oral BP: 128/79 Pulse: 86   Resp: 14 SpO2: 95 %.  Patient is Alert and Oriented x4. They are 2 assist with Lift.  Pt is a 2 gram sodium diet.  1800ml fluid restriction. They are complaining of 4/10 pain in their BLE.  Tylenol given for pain.  Patient is Saline locked. Denies nausea/dizziness. Dyspnea on extertion. 2LPM NC. Wean as able. Continuous pulse ox on. Purewick in place. BLE remain red, swollen and tender to touch. IV Rocephin for cellulitis. IV Lasix. Strict I&Os. Sacral Mepilex in place. On tele, SR. Sputum sample needed, cup in room and patient aware. On potassium and magnesium replacement protocols, to be rechecked in the morning. WOC and SW following. Plan is for PT consult.      Goal Outcome Evaluation:      Plan of Care Reviewed With: patient    Overall Patient Progress: improving    Outcome Evaluation: BLE remain red, swollen, and tender to touch. IV Lasix. 2 LPM. IV Rocephin. PT to consult      Problem: Adult Inpatient Plan of Care  Goal: Plan of Care Review  Description: The Plan of Care Review/Shift note should be completed every shift.  The Outcome Evaluation is a brief statement about your assessment that the patient is improving, declining, or no change.  This information will be displayed automatically on your shift  note.  Outcome: Progressing  Flowsheets (Taken 6/20/2024 2219)  Outcome Evaluation: BLE remain red, swollen, and tender to touch. IV Lasix. 2 LPM. IV Rocephin. PT to consult  Plan of Care Reviewed With: patient  Overall Patient Progress: improving  Goal: Patient-Specific Goal (Individualized)  Description: You can add care plan individualizations to a care plan. Examples of Individualization might be:  \"Parent requests to be called daily at 9am for status\", \"I have a hard time hearing out of my right ear\", or \"Do not touch me to wake me up as it startles  me\".  Outcome: Progressing  Goal: Absence of Hospital-Acquired Illness or " Injury  Outcome: Progressing  Intervention: Identify and Manage Fall Risk  Recent Flowsheet Documentation  Taken 6/20/2024 2107 by Yareli Lemon RN  Safety Promotion/Fall Prevention:   clutter free environment maintained   nonskid shoes/slippers when out of bed   room near nurse's station   safety round/check completed  Intervention: Prevent Skin Injury  Recent Flowsheet Documentation  Taken 6/20/2024 2107 by Yareli Lemon RN  Body Position: weight shifting  Intervention: Prevent and Manage VTE (Venous Thromboembolism) Risk  Recent Flowsheet Documentation  Taken 6/20/2024 2107 by Yareli Lemon RN  VTE Prevention/Management: SCDs (sequential compression devices) off  Goal: Optimal Comfort and Wellbeing  Outcome: Progressing  Intervention: Monitor Pain and Promote Comfort  Recent Flowsheet Documentation  Taken 6/20/2024 2107 by Yareli Lemon RN  Pain Management Interventions: medication (see MAR)  Goal: Readiness for Transition of Care  Outcome: Progressing     Problem: Comorbidity Management  Goal: Maintenance of Heart Failure Symptom Control  Outcome: Progressing  Intervention: Maintain Heart Failure Management  Recent Flowsheet Documentation  Taken 6/20/2024 2107 by Yareli Lemon RN  Medication Review/Management: medications reviewed  Goal: Blood Pressure in Desired Range  Outcome: Progressing  Intervention: Maintain Blood Pressure Management  Recent Flowsheet Documentation  Taken 6/20/2024 2107 by Yareli Lemon RN  Medication Review/Management: medications reviewed  Goal: Maintenance of Osteoarthritis Symptom Control  Outcome: Progressing  Intervention: Maintain Osteoarthritis Symptom Control  Recent Flowsheet Documentation  Taken 6/20/2024 2107 by Yareli Lemon RN  Assistive Device Utilized: lift device  Activity Management: activity adjusted per tolerance  Medication Review/Management: medications reviewed     Problem: Gas Exchange Impaired  Goal: Optimal Gas  Exchange  Outcome: Progressing     Problem: Skin or Soft Tissue Infection  Goal: Absence of Infection Signs and Symptoms  Outcome: Progressing

## 2024-06-21 NOTE — PLAN OF CARE
"VS stable. 95% on 2L 02. PETTY. Diminished LS. No complaints of pain. Tele is sinus rhythm.    Goal Outcome Evaluation:      Plan of Care Reviewed With: patient    Overall Patient Progress: improvingOverall Patient Progress: improving    Outcome Evaluation: 95% on 2L 02. No complaints of pain.      Problem: Adult Inpatient Plan of Care  Goal: Plan of Care Review  Description: The Plan of Care Review/Shift note should be completed every shift.  The Outcome Evaluation is a brief statement about your assessment that the patient is improving, declining, or no change.  This information will be displayed automatically on your shift  note.  Outcome: Progressing  Flowsheets (Taken 6/21/2024 0344)  Outcome Evaluation: 95% on 2L 02. No complaints of pain.  Plan of Care Reviewed With: patient  Overall Patient Progress: improving  Goal: Patient-Specific Goal (Individualized)  Description: You can add care plan individualizations to a care plan. Examples of Individualization might be:  \"Parent requests to be called daily at 9am for status\", \"I have a hard time hearing out of my right ear\", or \"Do not touch me to wake me up as it startles  me\".  Outcome: Progressing  Goal: Absence of Hospital-Acquired Illness or Injury  Outcome: Progressing  Intervention: Identify and Manage Fall Risk  Recent Flowsheet Documentation  Taken 6/21/2024 0000 by Fariba Underwood RN  Safety Promotion/Fall Prevention:   lighting adjusted   clutter free environment maintained   nonskid shoes/slippers when out of bed  Intervention: Prevent Skin Injury  Recent Flowsheet Documentation  Taken 6/21/2024 0000 by Fariba Underwood RN  Body Position: weight shifting  Device Skin Pressure Protection: positioning supports utilized  Intervention: Prevent and Manage VTE (Venous Thromboembolism) Risk  Recent Flowsheet Documentation  Taken 6/21/2024 0000 by Fariba Underwood RN  VTE Prevention/Management: SCDs (sequential compression devices) off  Intervention: Prevent " Infection  Recent Flowsheet Documentation  Taken 6/21/2024 0000 by Fariba Underwood RN  Infection Prevention:   single patient room provided   rest/sleep promoted  Goal: Optimal Comfort and Wellbeing  Outcome: Progressing  Goal: Readiness for Transition of Care  Outcome: Progressing     Problem: Comorbidity Management  Goal: Maintenance of Heart Failure Symptom Control  Outcome: Progressing  Intervention: Maintain Heart Failure Management  Recent Flowsheet Documentation  Taken 6/21/2024 0000 by Fariba Underwood RN  Medication Review/Management: medications reviewed  Goal: Blood Pressure in Desired Range  Outcome: Progressing  Intervention: Maintain Blood Pressure Management  Recent Flowsheet Documentation  Taken 6/21/2024 0000 by Fariba Underwood RN  Medication Review/Management: medications reviewed  Goal: Maintenance of Osteoarthritis Symptom Control  Outcome: Progressing  Intervention: Maintain Osteoarthritis Symptom Control  Recent Flowsheet Documentation  Taken 6/21/2024 0000 by Fariba Underwood RN  Assistive Device Utilized: lift device  Activity Management: activity adjusted per tolerance  Medication Review/Management: medications reviewed     Problem: Gas Exchange Impaired  Goal: Optimal Gas Exchange  Outcome: Progressing     Problem: Skin or Soft Tissue Infection  Goal: Absence of Infection Signs and Symptoms  Outcome: Progressing  Intervention: Minimize and Manage Infection Progression  Recent Flowsheet Documentation  Taken 6/21/2024 0000 by Fariba Underwood RN  Infection Prevention:   single patient room provided   rest/sleep promoted

## 2024-06-21 NOTE — PROGRESS NOTES
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  She ended up being discharged to TCU and was there for ~6 months, ultimately discharged to [facility] where she has her own apt with maximal services.  She is wheelchair bound and can do some transfers on her own.    For the week leading up to her hospitalization she's noticed increasing LE edema (R>L-which is chronic) with PETTY -> SOB at rest.  She noted that her chronic cough was more productive of white sputum.  No f/c/s. She, and family, have also noticed some bilateral erythema of the LE with right much worse that the left, furthermore her right leg is tender.  Sounds like the [facility] physician was planning on starting an abx but I don't see that she had been getting one as of yet.     ER O2 sats 95 % at rest but drop to 87 % with mild exertion, she was noted to be tachypnic as well.  She was noted to become sob with just talking and at times could only  speak a couple of words at a time.   BMP wnl  Trop 16->15, BNP wnl at 114 and EKG non ischemic  CBC also wnl x for lymphopenia, covid/flu/rsv and respiratory panel negative   VBG 7.45/46    Lactic acid slightly up at 2.2->500 ml bolus->IV furosemide     UA  nitrites + 31 wbc's, small LE, few bacteria but noted to be contaminated with 4 SEC    CT chest PE: negative for PE, improved mild scattered mucous  plugging improved from previous     She was admitted and initially treated for HFpEF with Iv furosemide, steroids/nebs for ? RAD, and abx for possible cellulitis     She improved within hours so retrospectively looks consistent with acute on chronic diastolic HF exacerbation despite normal BNP    She has also noted that she tends to cough up little bits of food periodically-like 3 times per week.  She is not known to chronically aspirate and I don't see that she's been admitted for aspiration pna, but clearly this complicates her picture and could be another contributor to her vulnerable respiratory status     She continues to note improvement not only in her breathing but states her anxiety seems under much better control as well.  I wonder if some of her anxiety is related to chronic SOB/pulmonary edema       Problem List:   SOB with acute hypoxic respiratory failure   HFpEF exacerbation   She was hospitalized here for a similar reason back in 3/2024 ultimately treated for both HFpEF exacerbation and acute bronchitis (CT at that time did note some GGO on CT scan).  She was discharged with furosemide every day and it was recommended that she follow-up with her pulmonologist Dr Adamson.  In any case presents with SOB and PETTY with acute hypoxia. No e/o PE or pna by symptoms/labs/imaging.   Initially treated broadly with steroids/nebs/diuretics-but given rapid improvement, exam findings of increased bilateral LE edema and bilateral LL crackles I think this is all her diastolic HF.  Shocking that her BNP is wnl  She hasn't been consistent using her BiPAP for ~a year which is certainly making her more vulnerable to decompensation but trigger for this event is unclear.  -cont with IV diuresis, net negative 3 (although some UO not measured so likely more). Down 12 kg although different scale used  -no need to repeat echo given just done 3 months ago  -tele NSR  -wean O2  -would discharge with furosemide 40 mg every day      RLE cellulitis   Responding to IV ceftriaxone, on day 2/7 abx    ? Aspiration  Reports periodically coughing up bits of food, perhaps 3 x per week.  Not known to aspirate  -SLP evaluation appreciated and swallowing is wnl     Htn, hlp  Pta regimen furosemide 20 mg every day only.  Looks as it her statin has been discontinued   Currently on IV furosemide today   Start furosemide 40 mg every day tomorrow     JEAN  Previously on biPAP but hasn't been using for the past year or so.  They have been in contact with her pulmonologist who has ordered new supplies which dtr picked up and now is at home waiting for her  Offered BiPAP here but she declined     Depression w/anxiety   Pta duloxetine 60 m g every day, lorazepam 0.5 mg tid scheduled, and lorazepam 0.5 mg q6 hours prn  -cont with duloxetine and prn lorazepam, I am weaning her tid scheduled lorazepam as not a great agent for long term scheduled use and given improvement in anxiety wonder if some of it was triggered by chronic sob        *lorazepam 0.5 mg tid-> bid and should do this for 2 weeks then would go to every day x 2 weeks and then would stop    -she is quite anxious and frequently blames her breathing on her anxiety     Hypothyroidism   Resumed pta replacement levothyroxine 88 mcg    ? Pain/neuropathy   Resume pta gabapentin     DVT Prophylaxis: Enoxaparin (Lovenox) SQ  Code Status: DNR  Functional Status: lives in shelter and gets around by wheelchair.  Has full services at her shelter  Diet: regular texture - await SLP input  Benson: not needed  Access : PIV    I called dtr Alea and left update on VM and how to reach me with questions       Medically Ready for Discharge: Anticipated Tomorrow     Securely message with [facility] (more info)  Text page via [facility] Paging/Directory       I spent 45 minutes reviewing epic including prior labs/imaging/medical history and notes related to this encounter  In addition time was spent in interveiwing the patient,  "communicating with contacts, and medical decision making      Interval History (Subjective):      Feels even better today.  Actually thinks that her anxiety is better as well.  She tells me it feels good to not feel so anxious all the time.  No cp.  No n/v/d                  Physical Exam:      Last Vital Signs:  /59 (BP Location: Right arm)   Pulse 79   Temp 97.7  F (36.5  C) (Oral)   Resp 18   Ht 1.6 m (5' 3\")   Wt 118.4 kg (261 lb 0.4 oz)   LMP  (LMP Unknown)   SpO2 95%   BMI 46.24 kg/m      I/O:  Pleasant nad looks stated age head nc/at sclera clear lungs with fine crackles in maile LL no longer wet sounding, rrr no mrg 1 + LE edema skin warm and dry no cyanosis or clubbing alert and oriented affect appropriate paul belly obese but s/nt/nd         Medications:      All current medications were reviewed with changes reflected in problem list.         Data:      All new lab and imaging data was reviewed.   Labs:  Recent Labs   Lab 06/21/24  0629      POTASSIUM 3.5   CHLORIDE 97*   CO2 29   ANIONGAP 12   *   BUN 15.1   CR 0.62   GFRESTIMATED 85   LAKSHMI 9.1     Recent Labs   Lab 06/21/24  0629   WBC 7.7   HGB 13.5   HCT 44.4   MCV 95         Imaging:   Results for orders placed or performed during the hospital encounter of 06/20/24   XR Chest 2 Views    Narrative    EXAM: XR CHEST 2 VIEWS  LOCATION: [facility]  DATE: 6/20/2024    INDICATION: persistent cough, SOB  COMPARISON: 3/15/2024.    FINDINGS: The heart size is normal. The right hemidiaphragm is elevated. The lungs are clear. There is no pneumothorax or pleural effusion. Degenerative disease in the spine and shoulders.      Impression    IMPRESSION: No acute abnormality.   CT Chest Pulmonary Embolism w Contrast    Narrative    CT CHEST PULMONARY EMBOLISM WITH CONTRAST June 20, 2024 7:20 AM    CLINICAL HISTORY: Shortness of breath.    TECHNIQUE: CT angiogram chest during arterial phase injection IV  contrast. " 2D and 3D MIP reconstructions were performed by the CT  technologist. Dose reduction techniques were used.   CONTRAST: 74mL Isovue-370.    COMPARISON: Chest CT 3/15/2024.    FINDINGS:  ANGIOGRAM CHEST: Pulmonary arteries are normal caliber and negative  for pulmonary emboli. No evidence for thoracic aortic aneurysm.  Thoracic aorta is negative for dissection.     LUNGS AND PLEURA: Indeterminate right lower lobe nodule abutting the  major fissure is unchanged for greater than two years, measuring 1.3 x  1.1 cm. Reticular and ground-glass opacities about the periphery of  both lung bases are also unchanged, and are likely fibrotic. Mild  scattered mucous plugging in both lower lobes of the lung, improved  slightly since the previous exam. No pleural effusion.    MEDIASTINUM/AXILLAE: No enlarged lymph nodes in the chest. No  pericardial effusion.    CORONARY ARTERY CALCIFICATION: Mild.    UPPER ABDOMEN: Small hiatal hernia. Hepatic steatosis. Cholelithiasis.    MUSCULOSKELETAL: Degenerative changes in the visualized thoracolumbar  spine.      Impression    IMPRESSION:  1.  No acute abnormality in the chest. No evidence for pulmonary  embolism.  2.  Mild scattered mucous plugging in both lower lobes has improved.  3.  Cholelithiasis.  4.  Hepatic steatosis.    ESME BRADLEY MD         SYSTEM ID:  [id number]     *Note: Due to a large number of results and/or encounters for the requested time period, some results have not been displayed. A complete set of results can be found in Results Review.

## 2024-06-22 ENCOUNTER — APPOINTMENT (OUTPATIENT)
Dept: PHYSICAL THERAPY | Facility: CLINIC | Age: 89
DRG: 291 | End: 2024-06-22
Payer: COMMERCIAL

## 2024-06-22 ENCOUNTER — APPOINTMENT (OUTPATIENT)
Dept: GENERAL RADIOLOGY | Facility: CLINIC | Age: 89
DRG: 291 | End: 2024-06-22
Attending: INTERNAL MEDICINE
Payer: COMMERCIAL

## 2024-06-22 LAB
ANION GAP SERPL CALCULATED.3IONS-SCNC: 13 MMOL/L (ref 7–15)
BACTERIA UR CULT: ABNORMAL
BACTERIA UR CULT: ABNORMAL
BUN SERPL-MCNC: 18.8 MG/DL (ref 8–23)
CALCIUM SERPL-MCNC: 9.2 MG/DL (ref 8.8–10.2)
CHLORIDE SERPL-SCNC: 94 MMOL/L (ref 98–107)
CREAT SERPL-MCNC: 0.59 MG/DL (ref 0.51–0.95)
DEPRECATED HCO3 PLAS-SCNC: 31 MMOL/L (ref 22–29)
EGFRCR SERPLBLD CKD-EPI 2021: 86 ML/MIN/1.73M2
GLUCOSE SERPL-MCNC: 131 MG/DL (ref 70–99)
MAGNESIUM SERPL-MCNC: 2.2 MG/DL (ref 1.7–2.3)
POTASSIUM SERPL-SCNC: 3.4 MMOL/L (ref 3.4–5.3)
POTASSIUM SERPL-SCNC: 3.8 MMOL/L (ref 3.4–5.3)
SARS-COV-2 RNA RESP QL NAA+PROBE: NEGATIVE
SODIUM SERPL-SCNC: 138 MMOL/L (ref 135–145)

## 2024-06-22 PROCEDURE — 250N000013 HC RX MED GY IP 250 OP 250 PS 637: Performed by: INTERNAL MEDICINE

## 2024-06-22 PROCEDURE — 250N000009 HC RX 250: Performed by: INTERNAL MEDICINE

## 2024-06-22 PROCEDURE — 94640 AIRWAY INHALATION TREATMENT: CPT

## 2024-06-22 PROCEDURE — 71046 X-RAY EXAM CHEST 2 VIEWS: CPT

## 2024-06-22 PROCEDURE — 97161 PT EVAL LOW COMPLEX 20 MIN: CPT | Mod: GP

## 2024-06-22 PROCEDURE — 82374 ASSAY BLOOD CARBON DIOXIDE: CPT | Performed by: INTERNAL MEDICINE

## 2024-06-22 PROCEDURE — 87635 SARS-COV-2 COVID-19 AMP PRB: CPT | Performed by: INTERNAL MEDICINE

## 2024-06-22 PROCEDURE — 97530 THERAPEUTIC ACTIVITIES: CPT | Mod: GP

## 2024-06-22 PROCEDURE — 250N000011 HC RX IP 250 OP 636: Mod: JZ | Performed by: INTERNAL MEDICINE

## 2024-06-22 PROCEDURE — 83735 ASSAY OF MAGNESIUM: CPT | Performed by: INTERNAL MEDICINE

## 2024-06-22 PROCEDURE — 120N000001 HC R&B MED SURG/OB

## 2024-06-22 PROCEDURE — 99232 SBSQ HOSP IP/OBS MODERATE 35: CPT | Performed by: INTERNAL MEDICINE

## 2024-06-22 PROCEDURE — 36415 COLL VENOUS BLD VENIPUNCTURE: CPT | Performed by: INTERNAL MEDICINE

## 2024-06-22 PROCEDURE — 999N000157 HC STATISTIC RCP TIME EA 10 MIN

## 2024-06-22 PROCEDURE — 94640 AIRWAY INHALATION TREATMENT: CPT | Mod: 76

## 2024-06-22 PROCEDURE — 84132 ASSAY OF SERUM POTASSIUM: CPT | Performed by: INTERNAL MEDICINE

## 2024-06-22 RX ORDER — POTASSIUM CHLORIDE 1500 MG/1
40 TABLET, EXTENDED RELEASE ORAL ONCE
Status: COMPLETED | OUTPATIENT
Start: 2024-06-22 | End: 2024-06-22

## 2024-06-22 RX ADMIN — LORAZEPAM 0.5 MG: 0.5 TABLET ORAL at 21:24

## 2024-06-22 RX ADMIN — POTASSIUM CHLORIDE 40 MEQ: 1500 TABLET, EXTENDED RELEASE ORAL at 09:29

## 2024-06-22 RX ADMIN — ENOXAPARIN SODIUM 40 MG: 40 INJECTION SUBCUTANEOUS at 21:24

## 2024-06-22 RX ADMIN — DICLOFENAC SODIUM 2 G: 10 GEL TOPICAL at 09:29

## 2024-06-22 RX ADMIN — IPRATROPIUM BROMIDE AND ALBUTEROL SULFATE 3 ML: .5; 3 SOLUTION RESPIRATORY (INHALATION) at 08:17

## 2024-06-22 RX ADMIN — FLUTICASONE PROPIONATE 1 SPRAY: 50 SPRAY, METERED NASAL at 09:30

## 2024-06-22 RX ADMIN — DULOXETINE HYDROCHLORIDE 60 MG: 60 CAPSULE, DELAYED RELEASE ORAL at 09:29

## 2024-06-22 RX ADMIN — ACETAMINOPHEN 650 MG: 325 TABLET, FILM COATED ORAL at 12:04

## 2024-06-22 RX ADMIN — GABAPENTIN 600 MG: 600 TABLET, FILM COATED ORAL at 21:23

## 2024-06-22 RX ADMIN — IPRATROPIUM BROMIDE AND ALBUTEROL SULFATE 3 ML: .5; 3 SOLUTION RESPIRATORY (INHALATION) at 16:11

## 2024-06-22 RX ADMIN — GABAPENTIN 300 MG: 300 CAPSULE ORAL at 14:48

## 2024-06-22 RX ADMIN — LEVOTHYROXINE SODIUM 88 MCG: 0.09 TABLET ORAL at 09:29

## 2024-06-22 RX ADMIN — LORAZEPAM 0.5 MG: 0.5 TABLET ORAL at 09:29

## 2024-06-22 RX ADMIN — GUAIFENESIN 600 MG: 600 TABLET, EXTENDED RELEASE ORAL at 09:29

## 2024-06-22 RX ADMIN — Medication 1 MG: at 22:24

## 2024-06-22 RX ADMIN — FLUTICASONE FUROATE AND VILANTEROL TRIFENATATE 1 PUFF: 100; 25 POWDER RESPIRATORY (INHALATION) at 08:17

## 2024-06-22 RX ADMIN — CEFTRIAXONE 2 G: 2 INJECTION, POWDER, FOR SOLUTION INTRAMUSCULAR; INTRAVENOUS at 09:30

## 2024-06-22 RX ADMIN — TRIAMCINOLONE ACETONIDE: 1 CREAM TOPICAL at 21:34

## 2024-06-22 RX ADMIN — GABAPENTIN 300 MG: 300 CAPSULE ORAL at 09:28

## 2024-06-22 RX ADMIN — FUROSEMIDE 40 MG: 40 TABLET ORAL at 09:29

## 2024-06-22 RX ADMIN — GUAIFENESIN 600 MG: 600 TABLET, EXTENDED RELEASE ORAL at 21:24

## 2024-06-22 RX ADMIN — IPRATROPIUM BROMIDE AND ALBUTEROL SULFATE 3 ML: .5; 3 SOLUTION RESPIRATORY (INHALATION) at 19:46

## 2024-06-22 RX ADMIN — ENOXAPARIN SODIUM 40 MG: 40 INJECTION SUBCUTANEOUS at 09:29

## 2024-06-22 ASSESSMENT — ACTIVITIES OF DAILY LIVING (ADL)
ADLS_ACUITY_SCORE: 54
ADLS_ACUITY_SCORE: 54
ADLS_ACUITY_SCORE: 52
ADLS_ACUITY_SCORE: 55
ADLS_ACUITY_SCORE: 54
ADLS_ACUITY_SCORE: 55
ADLS_ACUITY_SCORE: 48
ADLS_ACUITY_SCORE: 54
ADLS_ACUITY_SCORE: 52
ADLS_ACUITY_SCORE: 54
ADLS_ACUITY_SCORE: 55
ADLS_ACUITY_SCORE: 55
ADLS_ACUITY_SCORE: 52
ADLS_ACUITY_SCORE: 54
ADLS_ACUITY_SCORE: 55
ADLS_ACUITY_SCORE: 52
ADLS_ACUITY_SCORE: 54
ADLS_ACUITY_SCORE: 55
ADLS_ACUITY_SCORE: 54
ADLS_ACUITY_SCORE: 55
ADLS_ACUITY_SCORE: 52

## 2024-06-22 NOTE — PROGRESS NOTES
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  She ended up being discharged to TCU and was there for ~6 months, ultimately discharged to [facility] where she has her own apt with maximal services.  She is wheelchair bound and can do some transfers on her own.    For the week leading up to her hospitalization she's noticed increasing LE edema (R>L-which is chronic) with PETTY -> SOB at rest.  She noted that her chronic cough was more productive of white sputum.  No f/c/s. She, and family, have also noticed some bilateral erythema of the LE with right much worse that the left, furthermore her right leg is tender.  Sounds like the [facility] physician was planning on starting an abx but I don't see that she had been getting one as of yet.     ER O2 sats 95 % at rest but drop to 87 % with mild exertion, she was noted to be tachypnic as well.  She was noted to become sob with just talking and at times could only  speak a couple of words at a time.   BMP wnl  Trop 16->15, BNP wnl at 114 and EKG non ischemic  CBC also wnl x for lymphopenia, covid/flu/rsv and respiratory panel negative   VBG 7.45/46    Lactic acid slightly up at 2.2->500 ml bolus->IV furosemide     UA  nitrites + 31 wbc's, small LE, few bacteria but noted to be contaminated with 4 SEC    CT chest PE: negative for PE, improved mild scattered mucous  plugging improved from previous     She was admitted and initially treated for HFpEF with Iv furosemide, steroids/nebs for ? RAD, and abx for possible cellulitis     She improved within hours so retrospectively looks consistent with acute on chronic diastolic HF exacerbation despite normal BNP    She has also noted that she tends to cough up little bits of food periodically-like 3 times per week.  She is not known to chronically aspirate and I don't see that she's been admitted for aspiration pna, but clearly this complicates her picture and could be another contributor to her vulnerable respiratory status     She continues to note improvement not only in her breathing but states her anxiety seems under much better control as well.  I wonder if some of her anxiety is related to chronic SOB/pulmonary edema       Problem List:   SOB with acute hypoxic respiratory failure   HFpEF exacerbation   She was hospitalized here for a similar reason back in 3/2024 ultimately treated for both HFpEF exacerbation and acute bronchitis (CT at that time did note some GGO on CT scan).  She was discharged with furosemide every day and it was recommended that she follow-up with her pulmonologist Dr Adamson.  In any case presents with SOB and PETTY with acute hypoxia. No e/o PE or pna by symptoms/labs/imaging.   Initially treated broadly with steroids/nebs/diuretics-but given rapid improvement, exam findings of increased bilateral LE edema and bilateral LL crackles I think this is all her diastolic HF.  Shocking that her BNP is wnl  She hasn't been consistent using her BiPAP for ~a year which is certainly making her more vulnerable to decompensation but trigger for this event is unclear.  -cont with IV diuresis, net negative 3 (although some UO not measured so likely more). Down 12 kg although different scale used  -no need to repeat echo given just done 3 months ago  -tele NSR  -wean O2 but stills desats with   -would discharge with  furosemide 40 mg every day, weights daily and can make adjustments in diuretics on an as needed basis     RLE cellulitis   Responding to IV ceftriaxone, on day 2/7 abx    ? Aspiration  Reports periodically coughing up bits of food, perhaps 3 x per week.  Not known to aspirate  -SLP evaluation appreciated and swallowing is wnl     Htn, hlp  Pta regimen furosemide 20 mg every day only.  Looks as it her statin has been discontinued   Currently on IV furosemide today   Start furosemide 40 mg every day tomorrow     JEAN  Previously on biPAP but hasn't been using for the past year or so.  They have been in contact with her pulmonologist who has ordered new supplies which dtr picked up and now is at home waiting for her  Offered BiPAP here but she declined     Depression w/anxiety   Pta duloxetine 60 m g every day, lorazepam 0.5 mg tid scheduled, and lorazepam 0.5 mg q6 hours prn  -cont with duloxetine and prn lorazepam, I am weaning her tid scheduled lorazepam as not a great agent for long term scheduled use and given improvement in anxiety wonder if some of it was triggered by chronic sob        *lorazepam 0.5 mg tid-> bid and should do this for 2 weeks then would go to every day x 2 weeks and then would stop  and would continue ~ bid prn   -she is quite anxious and frequently blames her breathing on her anxiety     Hypothyroidism   Resumed pta replacement levothyroxine 88 mcg    ? Pain/neuropathy   Resume pta gabapentin     DVT Prophylaxis: Enoxaparin (Lovenox) SQ  Code Status: DNR  Functional Status: lives in [place] and gets around by wheelchair.  Has full services at her [facility]  Diet: regular texture - await SLP input  Benson: not needed  Access : PIV    Medically Ready for Discharge: Anticipated Tomorrow or Monday      Securely message with [employer] (more info)  Text page via [employer] Paging/Directory     Discussed with dtr Alea by phone       I spent 45 minutes reviewing epic including prior labs/imaging/medical history and  "notes related to this encounter  In addition time was spent in interveiwing the patient, communicating with contacts, and medical decision making      Interval History (Subjective):      Feels even better today.  Actually thinks that her anxiety is better as well.  She tells me it feels good to not feel so anxious all the time.  No cp.  No n/v/d                  Physical Exam:      Last Vital Signs:  /78 (BP Location: Right arm, Patient Position: Semi-Benson's, Cuff Size: Adult Regular)   Pulse 89   Temp 98.2  F (36.8  C) (Oral)   Resp 20   Ht 1.6 m (5' 3\")   Wt 116.2 kg (256 lb 3.2 oz)   LMP  (LMP Unknown)   SpO2 91%   BMI 45.38 kg/m      I/O:  Pleasant nad looks stated age head nc/at sclera clear lungs with fine crackles in maile LL no longer wet sounding, rrr no mrg 1 + LE edema skin warm and dry no cyanosis or clubbing alert and oriented affect appropriate paul belly obese but s/nt/nd         Medications:      All current medications were reviewed with changes reflected in problem list.         Data:      All new lab and imaging data was reviewed.   Labs:  Recent Labs   Lab 06/22/24  1237 06/22/24  0629   NA  --  138   POTASSIUM 3.8 3.4   CHLORIDE  --  94*   CO2  --  31*   ANIONGAP  --  13   GLC  --  131*   BUN  --  18.8   CR  --  0.59   GFRESTIMATED  --  86   LAKSHMI  --  9.2     Recent Labs   Lab 06/21/24  0629   WBC 7.7   HGB 13.5   HCT 44.4   MCV 95         Imaging:   Results for orders placed or performed during the hospital encounter of 06/20/24   XR Chest 2 Views    Narrative    EXAM: XR CHEST 2 VIEWS  LOCATION: [facility]  DATE: 6/20/2024    INDICATION: persistent cough, SOB  COMPARISON: 3/15/2024.    FINDINGS: The heart size is normal. The right hemidiaphragm is elevated. The lungs are clear. There is no pneumothorax or pleural effusion. Degenerative disease in the spine and shoulders.      Impression    IMPRESSION: No acute abnormality.   CT Chest Pulmonary Embolism " w Contrast    Narrative    CT CHEST PULMONARY EMBOLISM WITH CONTRAST June 20, 2024 7:20 AM    CLINICAL HISTORY: Shortness of breath.    TECHNIQUE: CT angiogram chest during arterial phase injection IV  contrast. 2D and 3D MIP reconstructions were performed by the CT  technologist. Dose reduction techniques were used.   CONTRAST: 74mL Isovue-370.    COMPARISON: Chest CT 3/15/2024.    FINDINGS:  ANGIOGRAM CHEST: Pulmonary arteries are normal caliber and negative  for pulmonary emboli. No evidence for thoracic aortic aneurysm.  Thoracic aorta is negative for dissection.     LUNGS AND PLEURA: Indeterminate right lower lobe nodule abutting the  major fissure is unchanged for greater than two years, measuring 1.3 x  1.1 cm. Reticular and ground-glass opacities about the periphery of  both lung bases are also unchanged, and are likely fibrotic. Mild  scattered mucous plugging in both lower lobes of the lung, improved  slightly since the previous exam. No pleural effusion.    MEDIASTINUM/AXILLAE: No enlarged lymph nodes in the chest. No  pericardial effusion.    CORONARY ARTERY CALCIFICATION: Mild.    UPPER ABDOMEN: Small hiatal hernia. Hepatic steatosis. Cholelithiasis.    MUSCULOSKELETAL: Degenerative changes in the visualized thoracolumbar  spine.      Impression    IMPRESSION:  1.  No acute abnormality in the chest. No evidence for pulmonary  embolism.  2.  Mild scattered mucous plugging in both lower lobes has improved.  3.  Cholelithiasis.  4.  Hepatic steatosis.    ESME BRADLEY MD         SYSTEM ID:  [id number]     *Note: Due to a large number of results and/or encounters for the requested time period, some results have not been displayed. A complete set of results can be found in Results Review.

## 2024-06-22 NOTE — PROGRESS NOTES
06/22/24 1001   Appointment Info   Signing Clinician's Name / Credentials (PT) Kajal Beaulieu DPT   Living Environment   People in Home alone   Current Living Arrangements assisted living   Home Accessibility no concerns   Transportation Anticipated agency   Self-Care   Usual Activity Tolerance moderate   Current Activity Tolerance fair   Equipment Currently Used at Home wheelchair, power;walker, rolling;shower chair;raised toilet seat;grab bar, tub/shower;grab bar, toilet;walker, standard   Fall history within last six months no   Activity/Exercise/Self-Care Comment Pt receives  cooking, cleaning, escorts, med management, bathing, toileting services   General Information   Onset of Illness/Injury or Date of Surgery 06/20/24   Referring Physician Ez Gifford MD   Patient/Family Therapy Goals Statement (PT) Return home   Pertinent History of Current Problem (include personal factors and/or comorbidities that impact the POC) Carol Merida is a 89 year old female with a history of htn/hlp, chronic diastolic HF with most recent echo 3/2024 EF 65-75% and G1dd, JEAN - previously on BiPAP but hasn't used in over a year, depression w/anxiety chronically on benzos, hypothyroidism, remote hx of breast cancer admitted on 6/20/2024 with increasing SOB   Existing Precautions/Restrictions fall   Cognition   Affect/Mental Status (Cognition) WFL   Orientation Status (Cognition) oriented x 4   Follows Commands (Cognition) WFL   Pain Assessment   Patient Currently in Pain   (Pt denies pain at rest)   Integumentary/Edema   Integumentary/Edema Comments Wound on sacrum, discoloration on LEs   Posture    Posture Forward head position   Range of Motion (ROM)   Range of Motion ROM deficits secondary to weakness   ROM Comment B shoulder ROM deficits   Strength (Manual Muscle Testing)   Strength (Manual Muscle Testing) Deficits observed during functional mobility   Bed Mobility   Comment, (Bed Mobility) Not observed, pt sleeps  in recliner at baseline   Transfers   Comment, (Transfers) Sit to stand CGA   Gait/Stairs (Locomotion)   Comment, (Gait/Stairs) Not tested, pt mostly WC bound at baseline   Balance   Balance Comments Fair seated and standing   Sensory Examination   Sensory Perception patient reports no sensory changes   Sensory Perception Comments Baseline numbness in hands   Clinical Impression   Criteria for Skilled Therapeutic Intervention Yes, treatment indicated   PT Diagnosis (PT) Impaired functional mobility   Influenced by the following impairments Pain, weakness, decreased activity tolerance, impaired balance   Functional limitations due to impairments Limited functional mobility requiring AD and assist   Clinical Presentation (PT Evaluation Complexity) stable   Clinical Presentation Rationale Based on PMH, current status, and social support   Clinical Decision Making (Complexity) low complexity   Planned Therapy Interventions (PT) balance training;bed mobility training;strengthening;transfer training;wheelchair management/propulsion training;progressive activity/exercise   Risk & Benefits of therapy have been explained evaluation/treatment results reviewed;care plan/treatment goals reviewed;risks/benefits reviewed;current/potential barriers reviewed;participants voiced agreement with care plan;participants included;patient   PT Total Evaluation Time   PT Eval, Low Complexity Minutes (92152) 10   Physical Therapy Goals   PT Frequency Daily   PT Predicted Duration/Target Date for Goal Attainment 06/29/24   PT Goals Transfers;Wheelchair Mobility   PT: Transfers Modified independent;Sit to/from stand;Bed to/from chair   PT: Wheelchair Mobility 150 feet;Caregiver SBA;power wheelchair   Interventions   Interventions Quick Adds Therapeutic Activity   Therapeutic Activity   Therapeutic Activities: dynamic activities to improve functional performance Minutes (58246) 28   Symptoms Noted During/After Treatment Fatigue   Treatment  Detail/Skilled Intervention Pt in recliner upon therapist arrival, agreeable to PT. Pt on 2Ls O2. SpO2 stable throughout session, 92-96%. Time managing lines, setting up room. Pt's chux soiled. In standing pt demos fair stability, VCs for upright posture. Pt stood for 4 minutes w/ FWW and CGA. Pt performed stand pivot to commode w/ FWW and CGA x2. Pt sat on commode, able to void and have BM. Pt performed sit to stand w/ FWW and CGA. Pt required assist for pericares. Pt performed extended stand pivot back to recliner w/ FWW and CGA. Seated rest break. Pt performed sit to stand w/ Min A x2. Pt stepped up onto scale w/ Min A x2. Weight received. Pt stepped off scale w/ Min A x2. Pt sat back on recliner. Pt required total assist x2 to reposition in recliner. All needs w/in reach, LE elevated, chair alarm on, RN updated and in room.   PT Discharge Planning   PT Plan Progress transfers, SPT, WC navigation   PT Discharge Recommendation (DC Rec) home with assist;home with home care physical therapy   PT Rationale for DC Rec Pt below baseline, currently Ax1 for transfers. Pt's ROC able to provide this level of assist. Pt reports she has recently started home PT. Recommend pt continue home PT.   PT Brief overview of current status Ax1 transfers.   Total Session Time   Timed Code Treatment Minutes 28   Total Session Time (sum of timed and untimed services) 38

## 2024-06-22 NOTE — PLAN OF CARE
"Goal Outcome Evaluation:    VSS. Tele SR. 2L NC, desats with activity and with sleep, CPAP at night. Receiving scheduled nebs. CXR done today. On IV rocephin. K+/mag protocols, K+ replaced, recheck am. Has wound care orders. WOC following. A1-2 gb/walker pivot. PT following.      Plan of Care Reviewed With: patient    Overall Patient Progress: improving    Outcome Evaluation: Receiving IV rocephin. Desats with activity. CXR done today.    Problem: Adult Inpatient Plan of Care  Goal: Plan of Care Review  Description: The Plan of Care Review/Shift note should be completed every shift.  The Outcome Evaluation is a brief statement about your assessment that the patient is improving, declining, or no change.  This information will be displayed automatically on your shift  note.  Outcome: Progressing  Flowsheets (Taken 6/22/2024 1532)  Outcome Evaluation: Receiving IV rocephin. Desats with activity. CXR done today.  Plan of Care Reviewed With: patient  Overall Patient Progress: improving  Goal: Patient-Specific Goal (Individualized)  Description: You can add care plan individualizations to a care plan. Examples of Individualization might be:  \"Parent requests to be called daily at 9am for status\", \"I have a hard time hearing out of my right ear\", or \"Do not touch me to wake me up as it startles  me\".  Outcome: Progressing  Goal: Absence of Hospital-Acquired Illness or Injury  Outcome: Progressing  Intervention: Identify and Manage Fall Risk  Recent Flowsheet Documentation  Taken 6/22/2024 0915 by Gabriella Nuñez, RN  Safety Promotion/Fall Prevention:   clutter free environment maintained   lighting adjusted   nonskid shoes/slippers when out of bed  Intervention: Prevent Skin Injury  Recent Flowsheet Documentation  Taken 6/22/2024 0930 by Gabriella Nuñez, RN  Body Position: weight shifting  Taken 6/22/2024 0915 by Gabriella Nuñez, RN  Device Skin Pressure Protection: absorbent pad utilized/changed  Goal: Optimal Comfort " and Wellbeing  Outcome: Progressing  Intervention: Monitor Pain and Promote Comfort  Recent Flowsheet Documentation  Taken 6/22/2024 1204 by Gabriella Nuñez, RN  Pain Management Interventions: medication (see MAR)  Goal: Readiness for Transition of Care  Outcome: Progressing     Problem: Comorbidity Management  Goal: Maintenance of Heart Failure Symptom Control  Outcome: Progressing  Intervention: Maintain Heart Failure Management  Recent Flowsheet Documentation  Taken 6/22/2024 0915 by Gabriella Nuñez, RN  Medication Review/Management: medications reviewed  Goal: Blood Pressure in Desired Range  Outcome: Progressing  Intervention: Maintain Blood Pressure Management  Recent Flowsheet Documentation  Taken 6/22/2024 0915 by Gabriella uNñez, RN  Medication Review/Management: medications reviewed  Goal: Maintenance of Osteoarthritis Symptom Control  Outcome: Progressing  Intervention: Maintain Osteoarthritis Symptom Control  Recent Flowsheet Documentation  Taken 6/22/2024 1207 by Gabriella Nuñez, RN  Activity Management: patient refuses activity  Taken 6/22/2024 0915 by Gabriella Nuñez, RN  Assistive Device Utilized:   gait belt   walker  Activity Management: activity adjusted per tolerance  Medication Review/Management: medications reviewed     Problem: Gas Exchange Impaired  Goal: Optimal Gas Exchange  Outcome: Progressing     Problem: Skin or Soft Tissue Infection  Goal: Absence of Infection Signs and Symptoms  Outcome: Progressing

## 2024-06-23 ENCOUNTER — APPOINTMENT (OUTPATIENT)
Dept: GENERAL RADIOLOGY | Facility: CLINIC | Age: 89
DRG: 291 | End: 2024-06-23
Attending: INTERNAL MEDICINE
Payer: COMMERCIAL

## 2024-06-23 ENCOUNTER — APPOINTMENT (OUTPATIENT)
Dept: PHYSICAL THERAPY | Facility: CLINIC | Age: 89
DRG: 291 | End: 2024-06-23
Payer: COMMERCIAL

## 2024-06-23 LAB
ANION GAP SERPL CALCULATED.3IONS-SCNC: 15 MMOL/L (ref 7–15)
BUN SERPL-MCNC: 16.7 MG/DL (ref 8–23)
CALCIUM SERPL-MCNC: 9.4 MG/DL (ref 8.8–10.2)
CHLORIDE SERPL-SCNC: 94 MMOL/L (ref 98–107)
CREAT SERPL-MCNC: 0.57 MG/DL (ref 0.51–0.95)
CRP SERPL-MCNC: 29.24 MG/L
DEPRECATED HCO3 PLAS-SCNC: 28 MMOL/L (ref 22–29)
EGFRCR SERPLBLD CKD-EPI 2021: 87 ML/MIN/1.73M2
ERYTHROCYTE [DISTWIDTH] IN BLOOD BY AUTOMATED COUNT: 13.5 % (ref 10–15)
GLUCOSE SERPL-MCNC: 122 MG/DL (ref 70–99)
HCT VFR BLD AUTO: 42.2 % (ref 35–47)
HGB BLD-MCNC: 12.9 G/DL (ref 11.7–15.7)
MAGNESIUM SERPL-MCNC: 2.1 MG/DL (ref 1.7–2.3)
MCH RBC QN AUTO: 28.9 PG (ref 26.5–33)
MCHC RBC AUTO-ENTMCNC: 30.6 G/DL (ref 31.5–36.5)
MCV RBC AUTO: 95 FL (ref 78–100)
PLATELET # BLD AUTO: 281 10E3/UL (ref 150–450)
POTASSIUM SERPL-SCNC: 3.6 MMOL/L (ref 3.4–5.3)
RBC # BLD AUTO: 4.46 10E6/UL (ref 3.8–5.2)
SODIUM SERPL-SCNC: 137 MMOL/L (ref 135–145)
URATE SERPL-MCNC: 9.9 MG/DL (ref 2.4–5.7)
WBC # BLD AUTO: 8.1 10E3/UL (ref 4–11)

## 2024-06-23 PROCEDURE — 86140 C-REACTIVE PROTEIN: CPT | Performed by: INTERNAL MEDICINE

## 2024-06-23 PROCEDURE — 80048 BASIC METABOLIC PNL TOTAL CA: CPT | Performed by: INTERNAL MEDICINE

## 2024-06-23 PROCEDURE — 97530 THERAPEUTIC ACTIVITIES: CPT | Mod: GP

## 2024-06-23 PROCEDURE — 83735 ASSAY OF MAGNESIUM: CPT | Performed by: INTERNAL MEDICINE

## 2024-06-23 PROCEDURE — 36415 COLL VENOUS BLD VENIPUNCTURE: CPT | Performed by: INTERNAL MEDICINE

## 2024-06-23 PROCEDURE — 250N000013 HC RX MED GY IP 250 OP 250 PS 637: Performed by: INTERNAL MEDICINE

## 2024-06-23 PROCEDURE — 94640 AIRWAY INHALATION TREATMENT: CPT | Mod: 76

## 2024-06-23 PROCEDURE — 84550 ASSAY OF BLOOD/URIC ACID: CPT | Performed by: INTERNAL MEDICINE

## 2024-06-23 PROCEDURE — 94640 AIRWAY INHALATION TREATMENT: CPT

## 2024-06-23 PROCEDURE — 250N000011 HC RX IP 250 OP 636: Mod: JZ | Performed by: INTERNAL MEDICINE

## 2024-06-23 PROCEDURE — 85014 HEMATOCRIT: CPT | Performed by: INTERNAL MEDICINE

## 2024-06-23 PROCEDURE — 73610 X-RAY EXAM OF ANKLE: CPT | Mod: LT

## 2024-06-23 PROCEDURE — 250N000009 HC RX 250: Performed by: INTERNAL MEDICINE

## 2024-06-23 PROCEDURE — 999N000157 HC STATISTIC RCP TIME EA 10 MIN

## 2024-06-23 PROCEDURE — 99232 SBSQ HOSP IP/OBS MODERATE 35: CPT | Performed by: INTERNAL MEDICINE

## 2024-06-23 PROCEDURE — 94660 CPAP INITIATION&MGMT: CPT

## 2024-06-23 PROCEDURE — 120N000001 HC R&B MED SURG/OB

## 2024-06-23 RX ORDER — COLCHICINE 0.6 MG/1
1.2 TABLET ORAL ONCE
Status: COMPLETED | OUTPATIENT
Start: 2024-06-23 | End: 2024-06-23

## 2024-06-23 RX ORDER — CARBOXYMETHYLCELLULOSE SODIUM 5 MG/ML
1 SOLUTION/ DROPS OPHTHALMIC
Status: DISCONTINUED | OUTPATIENT
Start: 2024-06-23 | End: 2024-06-25 | Stop reason: HOSPADM

## 2024-06-23 RX ORDER — COLCHICINE 0.6 MG/1
0.6 TABLET ORAL 2 TIMES DAILY
Status: DISCONTINUED | OUTPATIENT
Start: 2024-06-23 | End: 2024-06-25 | Stop reason: HOSPADM

## 2024-06-23 RX ADMIN — ENOXAPARIN SODIUM 40 MG: 40 INJECTION SUBCUTANEOUS at 21:50

## 2024-06-23 RX ADMIN — IPRATROPIUM BROMIDE AND ALBUTEROL SULFATE 3 ML: .5; 3 SOLUTION RESPIRATORY (INHALATION) at 08:07

## 2024-06-23 RX ADMIN — GUAIFENESIN 600 MG: 600 TABLET, EXTENDED RELEASE ORAL at 09:00

## 2024-06-23 RX ADMIN — IPRATROPIUM BROMIDE AND ALBUTEROL SULFATE 3 ML: .5; 3 SOLUTION RESPIRATORY (INHALATION) at 15:38

## 2024-06-23 RX ADMIN — LORAZEPAM 0.5 MG: 0.5 TABLET ORAL at 09:00

## 2024-06-23 RX ADMIN — GABAPENTIN 600 MG: 600 TABLET, FILM COATED ORAL at 21:52

## 2024-06-23 RX ADMIN — IPRATROPIUM BROMIDE AND ALBUTEROL SULFATE 3 ML: .5; 3 SOLUTION RESPIRATORY (INHALATION) at 19:19

## 2024-06-23 RX ADMIN — ACETAMINOPHEN 650 MG: 325 TABLET, FILM COATED ORAL at 18:25

## 2024-06-23 RX ADMIN — GUAIFENESIN 600 MG: 600 TABLET, EXTENDED RELEASE ORAL at 21:51

## 2024-06-23 RX ADMIN — ACETAMINOPHEN 650 MG: 325 TABLET, FILM COATED ORAL at 11:40

## 2024-06-23 RX ADMIN — FUROSEMIDE 40 MG: 40 TABLET ORAL at 09:00

## 2024-06-23 RX ADMIN — ENOXAPARIN SODIUM 40 MG: 40 INJECTION SUBCUTANEOUS at 09:00

## 2024-06-23 RX ADMIN — DICLOFENAC SODIUM 2 G: 10 GEL TOPICAL at 09:01

## 2024-06-23 RX ADMIN — LEVOTHYROXINE SODIUM 88 MCG: 0.09 TABLET ORAL at 09:00

## 2024-06-23 RX ADMIN — COLCHICINE 1.2 MG: 0.6 TABLET ORAL at 14:43

## 2024-06-23 RX ADMIN — CEFTRIAXONE 2 G: 2 INJECTION, POWDER, FOR SOLUTION INTRAMUSCULAR; INTRAVENOUS at 08:59

## 2024-06-23 RX ADMIN — GABAPENTIN 300 MG: 300 CAPSULE ORAL at 09:00

## 2024-06-23 RX ADMIN — GABAPENTIN 300 MG: 300 CAPSULE ORAL at 14:43

## 2024-06-23 RX ADMIN — COLCHICINE 0.6 MG: 0.6 TABLET ORAL at 21:51

## 2024-06-23 RX ADMIN — DULOXETINE HYDROCHLORIDE 60 MG: 60 CAPSULE, DELAYED RELEASE ORAL at 09:00

## 2024-06-23 RX ADMIN — FLUTICASONE FUROATE AND VILANTEROL TRIFENATATE 1 PUFF: 100; 25 POWDER RESPIRATORY (INHALATION) at 08:07

## 2024-06-23 RX ADMIN — FLUTICASONE PROPIONATE 1 SPRAY: 50 SPRAY, METERED NASAL at 09:01

## 2024-06-23 RX ADMIN — LORAZEPAM 0.5 MG: 0.5 TABLET ORAL at 21:51

## 2024-06-23 ASSESSMENT — ACTIVITIES OF DAILY LIVING (ADL)
ADLS_ACUITY_SCORE: 54
ADLS_ACUITY_SCORE: 53
ADLS_ACUITY_SCORE: 53
ADLS_ACUITY_SCORE: 52
ADLS_ACUITY_SCORE: 58
ADLS_ACUITY_SCORE: 52
ADLS_ACUITY_SCORE: 52
ADLS_ACUITY_SCORE: 54
ADLS_ACUITY_SCORE: 56
ADLS_ACUITY_SCORE: 52
ADLS_ACUITY_SCORE: 54
ADLS_ACUITY_SCORE: 58
ADLS_ACUITY_SCORE: 58
ADLS_ACUITY_SCORE: 52
ADLS_ACUITY_SCORE: 56
ADLS_ACUITY_SCORE: 56
ADLS_ACUITY_SCORE: 54
ADLS_ACUITY_SCORE: 56
ADLS_ACUITY_SCORE: 52
ADLS_ACUITY_SCORE: 54
ADLS_ACUITY_SCORE: 52

## 2024-06-23 NOTE — PLAN OF CARE
"Shift Summary (8720 - 7444):    A/O x4. VSS. Denies pain, dizziness, n/v. Tele: SR. K/Mg protocols, AM recheck. Assist 1-2 pivot to BSC, pt uses w/c at baseline, can self-transfer. Purewick in place. 1800 ml fluid restriction. IV Lasix and scheduled nebs. IV abx for cellulitis.     Goal Outcome Evaluation:      Plan of Care Reviewed With: patient    Overall Patient Progress: no change    Outcome Evaluation: PETTY, sats 90-92% on 1L O2 NC. Negative for Covid.      Problem: Adult Inpatient Plan of Care  Goal: Plan of Care Review  Description: The Plan of Care Review/Shift note should be completed every shift.  The Outcome Evaluation is a brief statement about your assessment that the patient is improving, declining, or no change.  This information will be displayed automatically on your shift  note.  Outcome: Progressing  Flowsheets (Taken 6/22/2024 2042)  Outcome Evaluation: PETTY, sats 90-92% on 1L O2 NC. Negative for Covid.  Plan of Care Reviewed With: patient  Overall Patient Progress: no change  Goal: Patient-Specific Goal (Individualized)  Description: You can add care plan individualizations to a care plan. Examples of Individualization might be:  \"Parent requests to be called daily at 9am for status\", \"I have a hard time hearing out of my right ear\", or \"Do not touch me to wake me up as it startles  me\".  Outcome: Progressing  Goal: Absence of Hospital-Acquired Illness or Injury  Outcome: Progressing  Intervention: Identify and Manage Fall Risk  Recent Flowsheet Documentation  Taken 6/22/2024 1552 by Marie Villafuerte, RN  Safety Promotion/Fall Prevention:   clutter free environment maintained   lighting adjusted   nonskid shoes/slippers when out of bed   safety round/check completed   assistive device/personal items within reach   room near nurse's station   room organization consistent   supervised activity   activity supervised  Intervention: Prevent Skin Injury  Recent Flowsheet Documentation  Taken 6/22/2024 1552 " by Marie Villafuerte RN  Body Position: weight shifting  Intervention: Prevent and Manage VTE (Venous Thromboembolism) Risk  Recent Flowsheet Documentation  Taken 6/22/2024 1552 by Marie Villafuerte RN  VTE Prevention/Management: SCDs (sequential compression devices) off  Goal: Optimal Comfort and Wellbeing  Outcome: Progressing  Goal: Readiness for Transition of Care  Outcome: Progressing     Problem: Comorbidity Management  Goal: Maintenance of Heart Failure Symptom Control  Outcome: Progressing  Intervention: Maintain Heart Failure Management  Recent Flowsheet Documentation  Taken 6/22/2024 1552 by Marie Villafuerte RN  Medication Review/Management: medications reviewed  Goal: Blood Pressure in Desired Range  Outcome: Progressing  Intervention: Maintain Blood Pressure Management  Recent Flowsheet Documentation  Taken 6/22/2024 1552 by Marie Villafuerte RN  Medication Review/Management: medications reviewed  Goal: Maintenance of Osteoarthritis Symptom Control  Outcome: Progressing  Intervention: Maintain Osteoarthritis Symptom Control  Recent Flowsheet Documentation  Taken 6/22/2024 1552 by Marie Villafuerte RN  Assistive Device Utilized:   gait belt   walker  Activity Management: activity adjusted per tolerance  Medication Review/Management: medications reviewed     Problem: Gas Exchange Impaired  Goal: Optimal Gas Exchange  Outcome: Progressing     Problem: Skin or Soft Tissue Infection  Goal: Absence of Infection Signs and Symptoms  Outcome: Progressing

## 2024-06-23 NOTE — PLAN OF CARE
"On 1L NC for comfort. External catheter in place. Fluid restriction maintained. Up to bedside commode with 1-2 assist.     Problem: Adult Inpatient Plan of Care  Goal: Plan of Care Review  Description: The Plan of Care Review/Shift note should be completed every shift.  The Outcome Evaluation is a brief statement about your assessment that the patient is improving, declining, or no change.  This information will be displayed automatically on your shift  note.  Outcome: Progressing  Flowsheets (Taken 6/23/2024 0745)  Outcome Evaluation: On 1L NC for comfort. External catheter in place. Fluid restriction maintained. Up to bedside commode with 1-2 assist.  Plan of Care Reviewed With: patient  Overall Patient Progress: no change  Goal: Patient-Specific Goal (Individualized)  Description: You can add care plan individualizations to a care plan. Examples of Individualization might be:  \"Parent requests to be called daily at 9am for status\", \"I have a hard time hearing out of my right ear\", or \"Do not touch me to wake me up as it startles  me\".  Outcome: Progressing  Goal: Absence of Hospital-Acquired Illness or Injury  Outcome: Progressing  Intervention: Identify and Manage Fall Risk  Recent Flowsheet Documentation  Taken 6/23/2024 0028 by Angelique Limon, RN  Safety Promotion/Fall Prevention:   clutter free environment maintained   lighting adjusted   nonskid shoes/slippers when out of bed   safety round/check completed   assistive device/personal items within reach   room near nurse's station   room organization consistent   supervised activity   activity supervised  Intervention: Prevent Skin Injury  Recent Flowsheet Documentation  Taken 6/23/2024 0028 by Angelique Limon, RN  Body Position: weight shifting  Intervention: Prevent and Manage VTE (Venous Thromboembolism) Risk  Recent Flowsheet Documentation  Taken 6/23/2024 0028 by Angelique Limon RN  VTE Prevention/Management: SCDs (sequential " compression devices) off  Goal: Optimal Comfort and Wellbeing  Outcome: Progressing  Intervention: Monitor Pain and Promote Comfort  Recent Flowsheet Documentation  Taken 6/23/2024 0010 by Angelique Limon, RN  Pain Management Interventions: declines  Goal: Readiness for Transition of Care  Outcome: Progressing     Problem: Comorbidity Management  Goal: Maintenance of Heart Failure Symptom Control  Outcome: Progressing  Intervention: Maintain Heart Failure Management  Recent Flowsheet Documentation  Taken 6/23/2024 0028 by Angelique Limon, RN  Medication Review/Management: medications reviewed  Goal: Blood Pressure in Desired Range  Outcome: Progressing  Intervention: Maintain Blood Pressure Management  Recent Flowsheet Documentation  Taken 6/23/2024 0028 by Angelique Limon, RN  Medication Review/Management: medications reviewed  Goal: Maintenance of Osteoarthritis Symptom Control  Outcome: Progressing  Intervention: Maintain Osteoarthritis Symptom Control  Recent Flowsheet Documentation  Taken 6/23/2024 0028 by Angelique Limon, RN  Assistive Device Utilized:   gait belt   walker  Activity Management: activity adjusted per tolerance  Medication Review/Management: medications reviewed     Problem: Gas Exchange Impaired  Goal: Optimal Gas Exchange  Outcome: Progressing  Intervention: Optimize Oxygenation and Ventilation  Recent Flowsheet Documentation  Taken 6/23/2024 0028 by Angelique Limon, RN  Head of Bed (HOB) Positioning: HOB at 30 degrees     Problem: Skin or Soft Tissue Infection  Goal: Absence of Infection Signs and Symptoms  Outcome: Progressing     Problem: Fall Injury Risk  Goal: Absence of Fall and Fall-Related Injury  Outcome: Progressing  Intervention: Identify and Manage Contributors  Recent Flowsheet Documentation  Taken 6/23/2024 0028 by Angelique Limon, RN  Medication Review/Management: medications reviewed  Intervention: Promote Injury-Free Environment  Recent  Flowsheet Documentation  Taken 6/23/2024 0028 by Angelique Limon RN  Safety Promotion/Fall Prevention:   clutter free environment maintained   lighting adjusted   nonskid shoes/slippers when out of bed   safety round/check completed   assistive device/personal items within reach   room near nurse's station   room organization consistent   supervised activity   activity supervised   Goal Outcome Evaluation:      Plan of Care Reviewed With: patient    Overall Patient Progress: no changeOverall Patient Progress: no change    Outcome Evaluation: On 1L NC for comfort. External catheter in place. Fluid restriction maintained. Up to bedside commode with 1-2 assist.

## 2024-06-23 NOTE — PLAN OF CARE
"Goal Outcome Evaluation:    VSS. Tele SR. K+/mag protocols. 1-2L NC. BiPAP at night. Receiving scheduled nebs. On IV rocephin. Has wound care orders. WOC following. C/O ankle pain, tylenol given, ice applied, XR done. Started on colchicine. A1-2 gb/walker pivot, but used lift this afternoon d/t ankle pain. PT following.      Plan of Care Reviewed With: patient    Overall Patient Progress: no change    Outcome Evaluation: 1L NC. C/O ankle pain, tylenol given, ice applied, XR done. Started on colchicine.    Problem: Adult Inpatient Plan of Care  Goal: Plan of Care Review  Description: The Plan of Care Review/Shift note should be completed every shift.  The Outcome Evaluation is a brief statement about your assessment that the patient is improving, declining, or no change.  This information will be displayed automatically on your shift  note.  Outcome: Progressing  Flowsheets (Taken 6/23/2024 1456)  Outcome Evaluation: 1L NC. C/O ankle pain, tylenol given, ice applied, XR done. Started on colchicine.  Plan of Care Reviewed With: patient  Overall Patient Progress: no change  Goal: Patient-Specific Goal (Individualized)  Description: You can add care plan individualizations to a care plan. Examples of Individualization might be:  \"Parent requests to be called daily at 9am for status\", \"I have a hard time hearing out of my right ear\", or \"Do not touch me to wake me up as it startles  me\".  Outcome: Progressing  Goal: Absence of Hospital-Acquired Illness or Injury  Outcome: Progressing  Intervention: Identify and Manage Fall Risk  Recent Flowsheet Documentation  Taken 6/23/2024 0900 by Gabriella Nuñez, RN  Safety Promotion/Fall Prevention:   activity supervised   clutter free environment maintained   lighting adjusted   nonskid shoes/slippers when out of bed  Intervention: Prevent Skin Injury  Recent Flowsheet Documentation  Taken 6/23/2024 0900 by Gabriella Nuñez, RN  Body Position: weight shifting  Goal: Optimal Comfort " and Wellbeing  Outcome: Progressing  Intervention: Monitor Pain and Promote Comfort  Recent Flowsheet Documentation  Taken 6/23/2024 1140 by Gabriella Nuñez, RN  Pain Management Interventions: medication (see MAR)  Goal: Readiness for Transition of Care  Outcome: Progressing     Problem: Comorbidity Management  Goal: Maintenance of Heart Failure Symptom Control  Outcome: Progressing  Intervention: Maintain Heart Failure Management  Recent Flowsheet Documentation  Taken 6/23/2024 0900 by Gabriella Nuñez, RN  Medication Review/Management: medications reviewed  Goal: Blood Pressure in Desired Range  Outcome: Progressing  Intervention: Maintain Blood Pressure Management  Recent Flowsheet Documentation  Taken 6/23/2024 0900 by Gabriella Nuñez, RN  Medication Review/Management: medications reviewed  Goal: Maintenance of Osteoarthritis Symptom Control  Outcome: Progressing  Intervention: Maintain Osteoarthritis Symptom Control  Recent Flowsheet Documentation  Taken 6/23/2024 0900 by Gabriella Nuñez, RN  Assistive Device Utilized:   gait belt   walker  Activity Management: activity adjusted per tolerance  Medication Review/Management: medications reviewed     Problem: Gas Exchange Impaired  Goal: Optimal Gas Exchange  Outcome: Progressing     Problem: Skin or Soft Tissue Infection  Goal: Absence of Infection Signs and Symptoms  Outcome: Progressing     Problem: Fall Injury Risk  Goal: Absence of Fall and Fall-Related Injury  Outcome: Progressing  Intervention: Identify and Manage Contributors  Recent Flowsheet Documentation  Taken 6/23/2024 0900 by Gabriella Nuñez, RN  Medication Review/Management: medications reviewed  Intervention: Promote Injury-Free Environment  Recent Flowsheet Documentation  Taken 6/23/2024 0900 by Gabriella Nuñez, RN  Safety Promotion/Fall Prevention:   activity supervised   clutter free environment maintained   lighting adjusted   nonskid shoes/slippers when out of bed

## 2024-06-23 NOTE — PROGRESS NOTES
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  She ended up being discharged to TCU and was there for ~6 months, ultimately discharged to [facility] where she has her own apt with maximal services.  She is wheelchair bound and can do some transfers on her own.    For the week leading up to her hospitalization she's noticed increasing LE edema (R>L-which is chronic) with PETTY -> SOB at rest.  She noted that her chronic cough was more productive of white sputum.  No f/c/s. She, and family, have also noticed some bilateral erythema of the LE with right much worse that the left, furthermore her right leg is tender.  Sounds like the [facility] physician was planning on starting an abx but I don't see that she had been getting one as of yet.     ER O2 sats 95 % at rest but drop to 87 % with mild exertion, she was noted to be tachypnic as well.  She was noted to become sob with just talking and at times could only  speak a couple of words at a time.   BMP wnl  Trop 16->15, BNP wnl at 114 and EKG non ischemic  CBC also wnl x for lymphopenia, covid/flu/rsv and respiratory panel negative   VBG 7.45/46    Lactic acid slightly up at 2.2->500 ml bolus->IV furosemide     UA  nitrites + 31 wbc's, small LE, few bacteria but noted to be contaminated with 4 SEC    CT chest PE: negative for PE, improved mild scattered mucous  plugging improved from previous     She was admitted and initially treated for HFpEF with Iv furosemide, steroids/nebs for ? RAD, and abx for possible cellulitis     She improved within hours so retrospectively looks consistent with acute on chronic diastolic HF exacerbation despite normal BNP    She has also noted that she tends to cough up little bits of food periodically-like 3 times per week.  She is not known to chronically aspirate and I don't see that she's been admitted for aspiration pna, but clearly this complicates her picture and could be another contributor to her vulnerable respiratory status     She continues to note improvement not only in her breathing but states her anxiety seems under much better control as well.  I suspect at least some of her anxiety is related to chronic SOB/pulmonary edema       Problem List:   SOB with acute hypoxic respiratory failure   HFpEF exacerbation   She was hospitalized here for a similar reason back in 3/2024 ultimately treated for both HFpEF exacerbation and acute bronchitis (CT at that time did note some GGO on CT scan).  She was discharged with furosemide every day and it was recommended that she follow-up with her pulmonologist Dr Adamson.  In any case presents with SOB and PETTY with acute hypoxia. No e/o PE or pna by symptoms/labs/imaging.   Initially treated broadly with steroids/nebs/diuretics-but given rapid improvement, exam findings of increased bilateral LE edema and bilateral LL crackles I think this is all her diastolic HF.  Shocking that her BNP is wnl  She hasn't been consistent using her BiPAP for ~a year which is certainly making her more vulnerable to decompensation but trigger for this event is unclear.  -cont with IV diuresis, net negative 3 (although some UO not measured so likely more). Weights unreliable   -no need to repeat echo given just done 3 months ago  -tele NSR  -wean O2 but stills desats with, may need oxygen at discharge   -would  discharge with furosemide 40 mg every day, weights daily and can make adjustments in diuretics on an as needed basis     RLE cellulitis   Responding to IV ceftriaxone, on day 3/7 abx    ? Aspiration  Reports periodically coughing up bits of food, perhaps 3 x per week.  Not known to aspirate  -SLP evaluation appreciated and swallowing is wnl     Left ankle pain and swelling  Happened during the night. She thinks she might have strained it yesterday while pivoting to the commode.  No hx of gout.  She is exquisitely tender to any movement of that ankle and it is warm/red  ? Gout  Unlikely bone fx but will ck xray for completeness purposes  Ck CRP and UA  Trial colchicine     Htn, hlp  Pta regimen furosemide 20 mg every day only.  Looks as it her statin has been discontinued   Cont with IV furosemide 40 mg every day today    JEAN  Previously on biPAP but hasn't been using for the past year or so.  They have been in contact with her pulmonologist who has ordered new supplies which dtr picked up and now is at home waiting for her  Discussed the ABSOLUTE IMPORTANCE of her using BiPAP while here, she     Depression w/anxiety   Pta duloxetine 60 m g every day, lorazepam 0.5 mg tid scheduled, and lorazepam 0.5 mg q6 hours prn  -cont with duloxetine and prn lorazepam, I am weaning her tid scheduled lorazepam as not a great agent for long term scheduled use and given improvement in anxiety wonder if some of it was triggered by chronic sob        *lorazepam 0.5 mg tid-> bid and should do this for 2 weeks then would go to every day x 2 weeks and then would stop  and would continue ~ bid prn   -she is quite anxious and frequently blames her breathing on her anxiety but I think it's the other way around    Hypothyroidism   Resumed pta replacement levothyroxine 88 mcg    ? Pain/neuropathy   Resume pta gabapentin     Chronic sacral ulcers  2/2 wheelchair bound, sleeps in recliner, so essentially always putting pressure on the same  "area   Hopefully if gets back on biPAP then will move back to the bed to help off load some of these areas         DVT Prophylaxis: Enoxaparin (Lovenox) SQ  Code Status: DNR  Functional Status: lives in shelter and gets around by wheelchair.  Has full services at her [facility]  Diet: regular texture - await SLP input  [place]: not needed  Access : PIV    Medically Ready for Discharge: Anticipated Tomorrow ?with oxygen      Securely message with [place] (more info)  Text page via [employer] Paging/Directory     Discussed with dtr Alea by phone       I spent 45 minutes reviewing epic including prior labs/imaging/medical history and notes related to this encounter  In addition time was spent in interveiwing the patient, communicating with contacts, and medical decision making      Interval History (Subjective):      Feels good from a breathing standpoint although admits that she refused bipap, again, last night.  Is complaining of long standing bilateral hand arthritis and some left ankle pain that is quite tender.  Came on during the night, states she thinks she twisted yesterday when pivoting to the commode                   Physical Exam:      Last Vital Signs:  /69 (BP Location: Right arm)   Pulse 80   Temp 98.1  F (36.7  C) (Oral)   Resp 18   Ht 1.6 m (5' 3\")   Wt 116.2 kg (256 lb 3.2 oz)   LMP  (LMP Unknown)   SpO2 92%   BMI 45.38 kg/m      I/O:  Pleasant nad looks stated age head nc/at sclera clear lungs with fine crackles in maile LL no longer wet sounding, rrr no mrg 1 + LE edema skin warm and dry no cyanosis or clubbing alert and oriented affect appropriate paul belly obese but s/nt/nd         Medications:      All current medications were reviewed with changes reflected in problem list.         Data:      All new lab and imaging data was reviewed.   Labs:  Recent Labs   Lab 06/23/24  0638      POTASSIUM 3.6   CHLORIDE 94*   CO2 28   ANIONGAP 15   *   BUN 16.7   CR 0.57   GFRESTIMATED 87   LAKSHMI 9.4 "     Recent Labs   Lab 06/23/24  0638   WBC 8.1   HGB 12.9   HCT 42.2   MCV 95         Imaging:   Results for orders placed or performed during the hospital encounter of 06/20/24   XR Chest 2 Views    Narrative    EXAM: XR CHEST 2 VIEWS  LOCATION: [facility]  DATE: 6/20/2024    INDICATION: persistent cough, SOB  COMPARISON: 3/15/2024.    FINDINGS: The heart size is normal. The right hemidiaphragm is elevated. The lungs are clear. There is no pneumothorax or pleural effusion. Degenerative disease in the spine and shoulders.      Impression    IMPRESSION: No acute abnormality.   CT Chest Pulmonary Embolism w Contrast    Narrative    CT CHEST PULMONARY EMBOLISM WITH CONTRAST June 20, 2024 7:20 AM    CLINICAL HISTORY: Shortness of breath.    TECHNIQUE: CT angiogram chest during arterial phase injection IV  contrast. 2D and 3D MIP reconstructions were performed by the CT  technologist. Dose reduction techniques were used.   CONTRAST: 74mL Isovue-370.    COMPARISON: Chest CT 3/15/2024.    FINDINGS:  ANGIOGRAM CHEST: Pulmonary arteries are normal caliber and negative  for pulmonary emboli. No evidence for thoracic aortic aneurysm.  Thoracic aorta is negative for dissection.     LUNGS AND PLEURA: Indeterminate right lower lobe nodule abutting the  major fissure is unchanged for greater than two years, measuring 1.3 x  1.1 cm. Reticular and ground-glass opacities about the periphery of  both lung bases are also unchanged, and are likely fibrotic. Mild  scattered mucous plugging in both lower lobes of the lung, improved  slightly since the previous exam. No pleural effusion.    MEDIASTINUM/AXILLAE: No enlarged lymph nodes in the chest. No  pericardial effusion.    CORONARY ARTERY CALCIFICATION: Mild.    UPPER ABDOMEN: Small hiatal hernia. Hepatic steatosis. Cholelithiasis.    MUSCULOSKELETAL: Degenerative changes in the visualized thoracolumbar  spine.      Impression    IMPRESSION:  1.  No acute  abnormality in the chest. No evidence for pulmonary  embolism.  2.  Mild scattered mucous plugging in both lower lobes has improved.  3.  Cholelithiasis.  4.  Hepatic steatosis.    EMSE BRADLEY MD         SYSTEM ID:  [id number]     *Note: Due to a large number of results and/or encounters for the requested time period, some results have not been displayed. A complete set of results can be found in Results Review.

## 2024-06-24 ENCOUNTER — APPOINTMENT (OUTPATIENT)
Dept: PHYSICAL THERAPY | Facility: CLINIC | Age: 89
DRG: 291 | End: 2024-06-24
Payer: COMMERCIAL

## 2024-06-24 LAB
ANION GAP SERPL CALCULATED.3IONS-SCNC: 12 MMOL/L (ref 7–15)
BUN SERPL-MCNC: 15.5 MG/DL (ref 8–23)
CALCIUM SERPL-MCNC: 9.1 MG/DL (ref 8.8–10.2)
CHLORIDE SERPL-SCNC: 95 MMOL/L (ref 98–107)
CREAT SERPL-MCNC: 0.54 MG/DL (ref 0.51–0.95)
DEPRECATED HCO3 PLAS-SCNC: 30 MMOL/L (ref 22–29)
EGFRCR SERPLBLD CKD-EPI 2021: 88 ML/MIN/1.73M2
GLUCOSE SERPL-MCNC: 138 MG/DL (ref 70–99)
MAGNESIUM SERPL-MCNC: 2.1 MG/DL (ref 1.7–2.3)
POTASSIUM SERPL-SCNC: 3.5 MMOL/L (ref 3.4–5.3)
SODIUM SERPL-SCNC: 137 MMOL/L (ref 135–145)

## 2024-06-24 PROCEDURE — 5A09357 ASSISTANCE WITH RESPIRATORY VENTILATION, LESS THAN 24 CONSECUTIVE HOURS, CONTINUOUS POSITIVE AIRWAY PRESSURE: ICD-10-PCS | Performed by: INTERNAL MEDICINE

## 2024-06-24 PROCEDURE — 120N000001 HC R&B MED SURG/OB

## 2024-06-24 PROCEDURE — 250N000012 HC RX MED GY IP 250 OP 636 PS 637: Performed by: INTERNAL MEDICINE

## 2024-06-24 PROCEDURE — 999N000157 HC STATISTIC RCP TIME EA 10 MIN

## 2024-06-24 PROCEDURE — 250N000013 HC RX MED GY IP 250 OP 250 PS 637: Performed by: INTERNAL MEDICINE

## 2024-06-24 PROCEDURE — 94640 AIRWAY INHALATION TREATMENT: CPT | Mod: 76

## 2024-06-24 PROCEDURE — 36415 COLL VENOUS BLD VENIPUNCTURE: CPT | Performed by: INTERNAL MEDICINE

## 2024-06-24 PROCEDURE — 94660 CPAP INITIATION&MGMT: CPT

## 2024-06-24 PROCEDURE — 97530 THERAPEUTIC ACTIVITIES: CPT | Mod: GP | Performed by: PHYSICAL THERAPIST

## 2024-06-24 PROCEDURE — 83735 ASSAY OF MAGNESIUM: CPT | Performed by: INTERNAL MEDICINE

## 2024-06-24 PROCEDURE — 250N000009 HC RX 250: Performed by: INTERNAL MEDICINE

## 2024-06-24 PROCEDURE — 94640 AIRWAY INHALATION TREATMENT: CPT

## 2024-06-24 PROCEDURE — 250N000011 HC RX IP 250 OP 636: Mod: JZ | Performed by: INTERNAL MEDICINE

## 2024-06-24 PROCEDURE — 80048 BASIC METABOLIC PNL TOTAL CA: CPT | Performed by: INTERNAL MEDICINE

## 2024-06-24 PROCEDURE — 99232 SBSQ HOSP IP/OBS MODERATE 35: CPT | Performed by: INTERNAL MEDICINE

## 2024-06-24 RX ORDER — PREDNISONE 20 MG/1
20 TABLET ORAL DAILY
Status: DISCONTINUED | OUTPATIENT
Start: 2024-06-24 | End: 2024-06-25 | Stop reason: HOSPADM

## 2024-06-24 RX ADMIN — GABAPENTIN 300 MG: 300 CAPSULE ORAL at 13:15

## 2024-06-24 RX ADMIN — GABAPENTIN 300 MG: 300 CAPSULE ORAL at 09:03

## 2024-06-24 RX ADMIN — FUROSEMIDE 40 MG: 40 TABLET ORAL at 09:03

## 2024-06-24 RX ADMIN — ACETAMINOPHEN 650 MG: 325 TABLET, FILM COATED ORAL at 15:36

## 2024-06-24 RX ADMIN — GUAIFENESIN 600 MG: 600 TABLET, EXTENDED RELEASE ORAL at 21:19

## 2024-06-24 RX ADMIN — LORAZEPAM 0.5 MG: 0.5 TABLET ORAL at 21:19

## 2024-06-24 RX ADMIN — DICLOFENAC SODIUM 2 G: 10 GEL TOPICAL at 21:21

## 2024-06-24 RX ADMIN — FLUTICASONE FUROATE AND VILANTEROL TRIFENATATE 1 PUFF: 100; 25 POWDER RESPIRATORY (INHALATION) at 08:17

## 2024-06-24 RX ADMIN — LEVOTHYROXINE SODIUM 88 MCG: 0.09 TABLET ORAL at 09:03

## 2024-06-24 RX ADMIN — FLUTICASONE PROPIONATE 1 SPRAY: 50 SPRAY, METERED NASAL at 09:04

## 2024-06-24 RX ADMIN — LORAZEPAM 0.5 MG: 0.5 TABLET ORAL at 09:03

## 2024-06-24 RX ADMIN — COLCHICINE 0.6 MG: 0.6 TABLET ORAL at 09:03

## 2024-06-24 RX ADMIN — CEFTRIAXONE 2 G: 2 INJECTION, POWDER, FOR SOLUTION INTRAMUSCULAR; INTRAVENOUS at 08:59

## 2024-06-24 RX ADMIN — GABAPENTIN 600 MG: 600 TABLET, FILM COATED ORAL at 21:19

## 2024-06-24 RX ADMIN — GUAIFENESIN 600 MG: 600 TABLET, EXTENDED RELEASE ORAL at 09:03

## 2024-06-24 RX ADMIN — IPRATROPIUM BROMIDE AND ALBUTEROL SULFATE 3 ML: .5; 3 SOLUTION RESPIRATORY (INHALATION) at 19:42

## 2024-06-24 RX ADMIN — Medication 1 MG: at 22:48

## 2024-06-24 RX ADMIN — TRIAMCINOLONE ACETONIDE: 1 CREAM TOPICAL at 21:22

## 2024-06-24 RX ADMIN — DULOXETINE HYDROCHLORIDE 60 MG: 60 CAPSULE, DELAYED RELEASE ORAL at 09:04

## 2024-06-24 RX ADMIN — PREDNISONE 20 MG: 20 TABLET ORAL at 13:16

## 2024-06-24 RX ADMIN — IPRATROPIUM BROMIDE AND ALBUTEROL SULFATE 3 ML: .5; 3 SOLUTION RESPIRATORY (INHALATION) at 08:14

## 2024-06-24 RX ADMIN — DICLOFENAC SODIUM 2 G: 10 GEL TOPICAL at 09:05

## 2024-06-24 RX ADMIN — LORAZEPAM 0.5 MG: 0.5 TABLET ORAL at 22:48

## 2024-06-24 RX ADMIN — ENOXAPARIN SODIUM 40 MG: 40 INJECTION SUBCUTANEOUS at 09:03

## 2024-06-24 RX ADMIN — COLCHICINE 0.6 MG: 0.6 TABLET ORAL at 21:19

## 2024-06-24 RX ADMIN — ENOXAPARIN SODIUM 40 MG: 40 INJECTION SUBCUTANEOUS at 21:19

## 2024-06-24 RX ADMIN — IPRATROPIUM BROMIDE AND ALBUTEROL SULFATE 3 ML: .5; 3 SOLUTION RESPIRATORY (INHALATION) at 15:33

## 2024-06-24 ASSESSMENT — ACTIVITIES OF DAILY LIVING (ADL)
ADLS_ACUITY_SCORE: 55
ADLS_ACUITY_SCORE: 58
ADLS_ACUITY_SCORE: 51
ADLS_ACUITY_SCORE: 58
ADLS_ACUITY_SCORE: 58
ADLS_ACUITY_SCORE: 55
ADLS_ACUITY_SCORE: 58
ADLS_ACUITY_SCORE: 55
ADLS_ACUITY_SCORE: 55
ADLS_ACUITY_SCORE: 51
ADLS_ACUITY_SCORE: 58
ADLS_ACUITY_SCORE: 55
ADLS_ACUITY_SCORE: 58
ADLS_ACUITY_SCORE: 55
ADLS_ACUITY_SCORE: 55
ADLS_ACUITY_SCORE: 58
ADLS_ACUITY_SCORE: 58
ADLS_ACUITY_SCORE: 55
ADLS_ACUITY_SCORE: 58

## 2024-06-24 NOTE — PLAN OF CARE
"Pt A&O x4. Denies pain. On lasix, tolerating it well. Pending discharge today.     Problem: Adult Inpatient Plan of Care  Goal: Plan of Care Review  Description: The Plan of Care Review/Shift note should be completed every shift.  The Outcome Evaluation is a brief statement about your assessment that the patient is improving, declining, or no change.  This information will be displayed automatically on your shift  note.  Outcome: Progressing  Flowsheets (Taken 6/24/2024 0258)  Outcome Evaluation: CPAP on tonight. Denies SOB. Tolerating antibiotics well.  Plan of Care Reviewed With: patient  Overall Patient Progress: no change  Goal: Patient-Specific Goal (Individualized)  Description: You can add care plan individualizations to a care plan. Examples of Individualization might be:  \"Parent requests to be called daily at 9am for status\", \"I have a hard time hearing out of my right ear\", or \"Do not touch me to wake me up as it startles  me\".  Outcome: Progressing  Goal: Absence of Hospital-Acquired Illness or Injury  Outcome: Progressing  Intervention: Identify and Manage Fall Risk  Recent Flowsheet Documentation  Taken 6/23/2024 2152 by Kristy Dawson RN  Safety Promotion/Fall Prevention:   safety round/check completed   nonskid shoes/slippers when out of bed  Intervention: Prevent Skin Injury  Recent Flowsheet Documentation  Taken 6/23/2024 2152 by Kristy Dawson RN  Body Position: weight shifting  Intervention: Prevent and Manage VTE (Venous Thromboembolism) Risk  Recent Flowsheet Documentation  Taken 6/23/2024 2152 by Kristy Dawson RN  VTE Prevention/Management: patient refused intervention  Intervention: Prevent Infection  Recent Flowsheet Documentation  Taken 6/23/2024 2152 by Kristy Dawson RN  Infection Prevention:   rest/sleep promoted   single patient room provided  Goal: Optimal Comfort and Wellbeing  Outcome: Progressing  Goal: Readiness for Transition of Care  Outcome: Progressing     Problem: Comorbidity " Management  Goal: Maintenance of Heart Failure Symptom Control  Outcome: Progressing  Intervention: Maintain Heart Failure Management  Recent Flowsheet Documentation  Taken 6/23/2024 2152 by Kristy Dawson RN  Medication Review/Management: medications reviewed  Goal: Blood Pressure in Desired Range  Outcome: Progressing  Intervention: Maintain Blood Pressure Management  Recent Flowsheet Documentation  Taken 6/23/2024 2152 by Kristy Dawson RN  Medication Review/Management: medications reviewed  Goal: Maintenance of Osteoarthritis Symptom Control  Outcome: Progressing  Intervention: Maintain Osteoarthritis Symptom Control  Recent Flowsheet Documentation  Taken 6/23/2024 2152 by Kristy Dawson RN  Assistive Device Utilized: lift device  Activity Management: activity adjusted per tolerance  Medication Review/Management: medications reviewed     Problem: Gas Exchange Impaired  Goal: Optimal Gas Exchange  Outcome: Progressing  Intervention: Optimize Oxygenation and Ventilation  Recent Flowsheet Documentation  Taken 6/23/2024 2152 by Kristy Dawson RN  Head of Bed (HOB) Positioning: HOB at 20 degrees     Problem: Skin or Soft Tissue Infection  Goal: Absence of Infection Signs and Symptoms  Outcome: Progressing  Intervention: Minimize and Manage Infection Progression  Recent Flowsheet Documentation  Taken 6/23/2024 2152 by Kristy Dawson RN  Infection Prevention:   rest/sleep promoted   single patient room provided     Problem: Fall Injury Risk  Goal: Absence of Fall and Fall-Related Injury  Outcome: Progressing  Intervention: Identify and Manage Contributors  Recent Flowsheet Documentation  Taken 6/23/2024 2152 by Kristy Dawson RN  Medication Review/Management: medications reviewed  Intervention: Promote Injury-Free Environment  Recent Flowsheet Documentation  Taken 6/23/2024 2152 by Kristy Dawson RN  Safety Promotion/Fall Prevention:   safety round/check completed   nonskid shoes/slippers when out of bed     Problem: Pain  Acute  Goal: Optimal Pain Control and Function  Outcome: Progressing  Intervention: Prevent or Manage Pain  Recent Flowsheet Documentation  Taken 6/23/2024 2152 by Kristy Dawson RN  Medication Review/Management: medications reviewed   Goal Outcome Evaluation:      Plan of Care Reviewed With: patient    Overall Patient Progress: no changeOverall Patient Progress: no change    Outcome Evaluation: CPAP on tonight. Denies SOB. Tolerating antibiotics well.

## 2024-06-24 NOTE — PLAN OF CARE
"Shift Summary (1500 - 1900):    A/O x4. VSS. K/Mg protocols, AM recheck. Tele discontinued. Continue scheduled nebs and IV Rocephin. Start on Colchicine for gout. Needs to be on BiPAP at bedtime, oncoming nurse updated and aware. Assist of 2 lift d/t left ankle pain, repo as tolerated. 1800 fluid restriction maintained.     Goal Outcome Evaluation:      Plan of Care Reviewed With: patient    Overall Patient Progress: no change    Outcome Evaluation: 90-92% on 1L NC. New LLE pain, better at rest, Tylenol given 1x. Ice applied.      Problem: Adult Inpatient Plan of Care  Goal: Plan of Care Review  Description: The Plan of Care Review/Shift note should be completed every shift.  The Outcome Evaluation is a brief statement about your assessment that the patient is improving, declining, or no change.  This information will be displayed automatically on your shift  note.  Outcome: Progressing  Flowsheets (Taken 6/23/2024 2014)  Outcome Evaluation: 90-92% on 1L NC. New LLE pain, better at rest, Tylenol given 1x. Ice applied.  Plan of Care Reviewed With: patient  Overall Patient Progress: no change  Goal: Patient-Specific Goal (Individualized)  Description: You can add care plan individualizations to a care plan. Examples of Individualization might be:  \"Parent requests to be called daily at 9am for status\", \"I have a hard time hearing out of my right ear\", or \"Do not touch me to wake me up as it startles  me\".  Outcome: Progressing  Goal: Absence of Hospital-Acquired Illness or Injury  Outcome: Progressing  Intervention: Identify and Manage Fall Risk  Recent Flowsheet Documentation  Taken 6/23/2024 1619 by Marie Villafuerte, RN  Safety Promotion/Fall Prevention:   activity supervised   clutter free environment maintained   safety round/check completed   supervised activity   room near nurse's station   room organization consistent   nonskid shoes/slippers when out of bed   patient and family education  Intervention: Prevent Skin " Injury  Recent Flowsheet Documentation  Taken 6/23/2024 1619 by Marie Villafuerte RN  Body Position: weight shifting  Intervention: Prevent and Manage VTE (Venous Thromboembolism) Risk  Recent Flowsheet Documentation  Taken 6/23/2024 1619 by Marie Villafuerte RN  VTE Prevention/Management: patient refused intervention  Intervention: Prevent Infection  Recent Flowsheet Documentation  Taken 6/23/2024 1619 by Marie Villafuerte RN  Infection Prevention: hand hygiene promoted  Goal: Optimal Comfort and Wellbeing  Outcome: Progressing  Intervention: Monitor Pain and Promote Comfort  Recent Flowsheet Documentation  Taken 6/23/2024 1825 by Marie Villafuerte RN  Pain Management Interventions: medication (see MAR)  Taken 6/23/2024 1619 by Marie Villafuerte RN  Pain Management Interventions:   rest   pillow support provided  Goal: Readiness for Transition of Care  Outcome: Progressing     Problem: Comorbidity Management  Goal: Maintenance of Heart Failure Symptom Control  Outcome: Progressing  Intervention: Maintain Heart Failure Management  Recent Flowsheet Documentation  Taken 6/23/2024 1619 by Marie Villafuerte RN  Medication Review/Management: medications reviewed  Goal: Blood Pressure in Desired Range  Outcome: Progressing  Intervention: Maintain Blood Pressure Management  Recent Flowsheet Documentation  Taken 6/23/2024 1619 by Marie Villafuerte RN  Medication Review/Management: medications reviewed  Goal: Maintenance of Osteoarthritis Symptom Control  Outcome: Progressing  Intervention: Maintain Osteoarthritis Symptom Control  Recent Flowsheet Documentation  Taken 6/23/2024 1619 by Marie Villafuerte RN  Assistive Device Utilized: lift device  Activity Management: activity adjusted per tolerance  Medication Review/Management: medications reviewed     Problem: Gas Exchange Impaired  Goal: Optimal Gas Exchange  Outcome: Progressing  Intervention: Optimize Oxygenation and Ventilation  Recent Flowsheet Documentation  Taken 6/23/2024 1619 by Marie Villafuerte RN  Head of  Bed (HOB) Positioning: HOB at 20 degrees     Problem: Skin or Soft Tissue Infection  Goal: Absence of Infection Signs and Symptoms  Outcome: Progressing  Intervention: Minimize and Manage Infection Progression  Recent Flowsheet Documentation  Taken 6/23/2024 1619 by Marie Villafuerte RN  Infection Prevention: hand hygiene promoted     Problem: Fall Injury Risk  Goal: Absence of Fall and Fall-Related Injury  Outcome: Progressing  Intervention: Identify and Manage Contributors  Recent Flowsheet Documentation  Taken 6/23/2024 1619 by Marie Villafuerte RN  Medication Review/Management: medications reviewed  Intervention: Promote Injury-Free Environment  Recent Flowsheet Documentation  Taken 6/23/2024 1619 by Marie Villafuerte RN  Safety Promotion/Fall Prevention:   activity supervised   clutter free environment maintained   safety round/check completed   supervised activity   room near nurse's station   room organization consistent   nonskid shoes/slippers when out of bed   patient and family education

## 2024-06-24 NOTE — PROGRESS NOTES
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  She ended up being discharged to TCU and was there for ~6 months, ultimately discharged to [facility] where she has her own apt with maximal services.  She is wheelchair bound and can do some transfers on her own.    For the week leading up to her hospitalization she's noticed increasing LE edema (R>L-which is chronic) with PETTY -> SOB at rest.  She noted that her chronic cough was more productive of white sputum.  No f/c/s. She, and family, have also noticed some bilateral erythema of the LE with right much worse that the left, furthermore her right leg is tender.  Sounds like the [facility] physician was planning on starting an abx but I don't see that she had been getting one as of yet.     ER O2 sats 95 % at rest but drop to 87 % with mild exertion, she was noted to be tachypnic as well.  She was noted to become sob with just talking and at times could only  speak a couple of words at a time.   BMP wnl  Trop 16->15, BNP wnl at 114 and EKG non ischemic  CBC also wnl x for lymphopenia, covid/flu/rsv and respiratory panel negative   VBG 7.45/46    Lactic acid slightly up at 2.2->500 ml bolus->IV furosemide     UA  nitrites + 31 wbc's, small LE, few bacteria but noted to be contaminated with 4 SEC    CT chest PE: negative for PE, improved mild scattered mucous  plugging improved from previous     She was admitted and initially treated for HFpEF with Iv furosemide, steroids/nebs for ? RAD, and abx for possible cellulitis     She improved within hours so retrospectively looks consistent with acute on chronic diastolic HF exacerbation despite normal BNP    She has also noted that she tends to cough up little bits of food periodically-like 3 times per week.  She is not known to chronically aspirate and I don't see that she's been admitted for aspiration pna, but clearly this complicates her picture and could be another contributor to her vulnerable respiratory status     She continues to note improvement not only in her breathing but states her anxiety seems under much better control as well.  I suspect at least some of her anxiety is related to chronic SOB/pulmonary edema     She has basically stabilized from a respiratory standpoint and will need low level oxygen support at discharge.      Hospital course has been complicated by left ankle pain which I suspect is a gout attack.  She's been initiated on colchicine with some improvement but does not able to manage some pivot transfers.  So are checking with [facility] about the highest level of support they could offer, otherwise may need short term TCU prior to returning home     Problem List:   SOB with acute hypoxic respiratory failure   HFpEF exacerbation   She was hospitalized here for a similar reason back in 3/2024 ultimately treated for both HFpEF exacerbation and acute bronchitis (CT at that time did note some GGO on CT scan).  She was discharged with furosemide every day and it was recommended that she follow-up with her pulmonologist Dr Adamson.  In any case presents with SOB and PETTY with acute hypoxia. No e/o PE or pna by symptoms/labs/imaging.   Initially treated broadly with steroids/nebs/diuretics-but given rapid improvement, exam findings of increased bilateral LE edema and bilateral LL crackles I think this is all her  diastolic HF.  Shocking that her BNP is wnl  She hasn't been consistent using her BiPAP for ~a year which is certainly making her more vulnerable to decompensation but trigger for this event is unclear.  -cont with IV diuresis, net negative 3 (although some UO not measured so likely more). Weights unreliable   -no need to repeat echo given just done 3 months ago  -tele NSR  -wean O2 but stills desats with, will need oxygen at discharge   -would discharge with furosemide 40 mg every day, weights daily and can make adjustments in diuretics on an as needed basis     RLE cellulitis   Responding to IV ceftriaxone, on day 5/7 abx    ? Aspiration  Reports periodically coughing up bits of food, perhaps 3 x per week.  Not known to aspirate  -SLP evaluation appreciated and swallowing is wnl     Left ankle pain and swelling  Happened during the night. She thinks she might have strained it yesterday while pivoting to the commode.  No hx of gout.  She is exquisitely tender to any movement of that ankle and it is warm/red  ? Gout  Unlikely bone fx and xray is negative  CRP elevated at 30 and uric acid level is quite elevated at 10  Trialed colchicine with some mild improvement but still impeding ability to do independent pivots, may end up needing TCU if doesn't improve unless [facility] able to provide assistance with     Htn, hlp  Pta regimen furosemide 20 mg every day only.  Looks as it her statin has been discontinued   Cont with IV furosemide 40 mg every day today since will stay another night     JEAN  Previously on biPAP but hasn't been using for the past year or so.  They have been in contact with her pulmonologist who has ordered new supplies which dtr picked up and now is at home waiting for her  Discussed the ABSOLUTE IMPORTANCE of her using BiPAP while here, she     Depression w/anxiety   Pta duloxetine 60 m g every day, lorazepam 0.5 mg tid scheduled, and lorazepam 0.5 mg q6 hours prn  -cont with duloxetine and prn  "lorazepam, I am weaning her tid scheduled lorazepam as not a great agent for long term scheduled use and given improvement in anxiety wonder if some of it was triggered by chronic sob        *lorazepam 0.5 mg tid-> bid and should do this for 2 weeks then would go to every day x 2 weeks and then would stop  and would continue ~ bid prn   -she is quite anxious and frequently blames her breathing on her anxiety but I think it's the other way around    Hypothyroidism   Resumed pta replacement levothyroxine 88 mcg    ? Pain/neuropathy   Resume pta gabapentin     Chronic sacral ulcers  2/2 wheelchair bound, sleeps in recliner, so essentially always putting pressure on the same area   Hopefully if gets back on biPAP then will move back to the bed to help off load some of these areas     DVT Prophylaxis: Enoxaparin (Lovenox) SQ  Code Status: DNR  Functional Status: [facility] and gets around by wheelchair.  [facility] full services [facility]  Diet: regular texture - await SLP input  Benson: not needed  Access : PIV    Medically Ready for Discharge: Anticipated Tomorrow ?with oxygen      Securely message with [employer] (more info)  Text page via [employer] Paging/Directory     Discussed with dtr Alea by phone       I spent 45 minutes reviewing epic including prior labs/imaging/medical history and notes related to this encounter  In addition time was spent in interveiwing the patient, communicating with contacts, and medical decision making      Interval History (Subjective):      Feels good from a breathing standpoint and did use BiPAP last night for ~4 hours !  Denies any cp.  Still with ankle pain                   Physical Exam:      Last Vital Signs:  /65 (BP Location: Right arm)   Pulse 88   Temp 99.4  F (37.4  C) (Oral)   Resp 18   Ht 1.6 m (5' 3\")   Wt 116.2 kg (256 lb 3.2 oz)   LMP  (LMP Unknown)   SpO2 95%   BMI 45.38 kg/m      I/O:  Pleasant nad looks stated age head nc/at sclera clear lungs with diminished in " the bases - shallow inspiration  no longer wet sounding, rrr no mrg 1 + LE edema skin warm and dry no cyanosis or clubbing alert and oriented affect appropriate paul belly obese but s/nt/nd  left ankle yesterday was very hot to the touch, mild erythema, and exquisitely tender to any movement-today with much better movement but still limited, and still with some ttp, less warmth and erythema          Medications:      All current medications were reviewed with changes reflected in problem list.         Data:      All new lab and imaging data was reviewed.   Labs:  Recent Labs   Lab 06/24/24  0645      POTASSIUM 3.5   CHLORIDE 95*   CO2 30*   ANIONGAP 12   *   BUN 15.5   CR 0.54   GFRESTIMATED 88   LAKSHMI 9.1     Recent Labs   Lab 06/23/24  0638   WBC 8.1   HGB 12.9   HCT 42.2   MCV 95         Imaging:   Results for orders placed or performed during the hospital encounter of 06/20/24   XR Chest 2 Views    Narrative    EXAM: XR CHEST 2 VIEWS  LOCATION: [facility]  DATE: 6/20/2024    INDICATION: persistent cough, SOB  COMPARISON: 3/15/2024.    FINDINGS: The heart size is normal. The right hemidiaphragm is elevated. The lungs are clear. There is no pneumothorax or pleural effusion. Degenerative disease in the spine and shoulders.      Impression    IMPRESSION: No acute abnormality.   CT Chest Pulmonary Embolism w Contrast    Narrative    CT CHEST PULMONARY EMBOLISM WITH CONTRAST June 20, 2024 7:20 AM    CLINICAL HISTORY: Shortness of breath.    TECHNIQUE: CT angiogram chest during arterial phase injection IV  contrast. 2D and 3D MIP reconstructions were performed by the CT  technologist. Dose reduction techniques were used.   CONTRAST: 74mL Isovue-370.    COMPARISON: Chest CT 3/15/2024.    FINDINGS:  ANGIOGRAM CHEST: Pulmonary arteries are normal caliber and negative  for pulmonary emboli. No evidence for thoracic aortic aneurysm.  Thoracic aorta is negative for dissection.      LUNGS AND PLEURA: Indeterminate right lower lobe nodule abutting the  major fissure is unchanged for greater than two years, measuring 1.3 x  1.1 cm. Reticular and ground-glass opacities about the periphery of  both lung bases are also unchanged, and are likely fibrotic. Mild  scattered mucous plugging in both lower lobes of the lung, improved  slightly since the previous exam. No pleural effusion.    MEDIASTINUM/AXILLAE: No enlarged lymph nodes in the chest. No  pericardial effusion.    CORONARY ARTERY CALCIFICATION: Mild.    UPPER ABDOMEN: Small hiatal hernia. Hepatic steatosis. Cholelithiasis.    MUSCULOSKELETAL: Degenerative changes in the visualized thoracolumbar  spine.      Impression    IMPRESSION:  1.  No acute abnormality in the chest. No evidence for pulmonary  embolism.  2.  Mild scattered mucous plugging in both lower lobes has improved.  3.  Cholelithiasis.  4.  Hepatic steatosis.    ESME BRADLEY MD         SYSTEM ID:  [id number]     *Note: Due to a large number of results and/or encounters for the requested time period, some results have not been displayed. A complete set of results can be found in Results Review.

## 2024-06-24 NOTE — PLAN OF CARE
"/65 (BP Location: Right arm)   Pulse 88   Temp 99.4  F (37.4  C) (Oral)   Resp 18   Ht 1.6 m (5' 3\")   Wt 116.2 kg (256 lb 3.2 oz)   LMP  (LMP Unknown)   SpO2 95%   BMI 45.38 kg/m      Neuro: a/o x4  Cardiac: WNL  Lungs: 2.5 L NC. Bipap @night- been refusing   GI: incontinent   : purewick  Pain: 8/10 pain after getting up to chair. Gabapentin given.  IV: saline locked  Meds: rocephin, lasix, ativan  Labs/tests: K 3.5, Mg 2.1  Diet: 2g Na, 1800 fluid restriction   Activity: assist x2/michael steady or lift. Uses WC at baseline  Misc: K, Mg protocol. PT following.   Plan: referrals sent to TCU. Currently resting in chair, able to make need known.           Problem: Adult Inpatient Plan of Care  Goal: Plan of Care Review  Description: The Plan of Care Review/Shift note should be completed every shift.  The Outcome Evaluation is a brief statement about your assessment that the patient is improving, declining, or no change.  This information will be displayed automatically on your shift  note.  Outcome: Progressing  Flowsheets (Taken 6/24/2024 1450)  Outcome Evaluation: TCU referrals sent out  Plan of Care Reviewed With: patient  Overall Patient Progress: improving  Goal: Patient-Specific Goal (Individualized)  Description: You can add care plan individualizations to a care plan. Examples of Individualization might be:  \"Parent requests to be called daily at 9am for status\", \"I have a hard time hearing out of my right ear\", or \"Do not touch me to wake me up as it startles  me\".  Outcome: Progressing  Goal: Absence of Hospital-Acquired Illness or Injury  Outcome: Progressing  Intervention: Identify and Manage Fall Risk  Recent Flowsheet Documentation  Taken 6/24/2024 0904 by Sabiha Mcleod RN  Safety Promotion/Fall Prevention:   activity supervised   assistive device/personal items within reach   clutter free environment maintained   nonskid shoes/slippers when out of bed   safety round/check " completed  Intervention: Prevent Skin Injury  Recent Flowsheet Documentation  Taken 6/24/2024 0904 by Sabiha Mcleod RN  Body Position: weight shifting  Intervention: Prevent Infection  Recent Flowsheet Documentation  Taken 6/24/2024 0904 by Sabiha Mcleod RN  Infection Prevention:   rest/sleep promoted   single patient room provided  Goal: Optimal Comfort and Wellbeing  Outcome: Progressing  Goal: Readiness for Transition of Care  Outcome: Progressing     Problem: Comorbidity Management  Goal: Maintenance of Heart Failure Symptom Control  Outcome: Progressing  Intervention: Maintain Heart Failure Management  Recent Flowsheet Documentation  Taken 6/24/2024 0904 by Sabiha Mcleod RN  Medication Review/Management: medications reviewed  Goal: Blood Pressure in Desired Range  Outcome: Progressing  Intervention: Maintain Blood Pressure Management  Recent Flowsheet Documentation  Taken 6/24/2024 0904 by Sabiha Mcleod RN  Medication Review/Management: medications reviewed  Goal: Maintenance of Osteoarthritis Symptom Control  Outcome: Progressing  Intervention: Maintain Osteoarthritis Symptom Control  Recent Flowsheet Documentation  Taken 6/24/2024 1210 by Sabiha Mcleod RN  Assistive Device Utilized: (michael steady)   other (see comments)   gait belt  Activity Management: up in chair  Taken 6/24/2024 0904 by Sabiha Mcleod RN  Assistive Device Utilized: lift device  Activity Management: activity adjusted per tolerance  Medication Review/Management: medications reviewed     Problem: Gas Exchange Impaired  Goal: Optimal Gas Exchange  Outcome: Progressing  Intervention: Optimize Oxygenation and Ventilation  Recent Flowsheet Documentation  Taken 6/24/2024 0904 by Sabiha Mcleod RN  Head of Bed (HOB) Positioning: HOB at 20-30 degrees     Problem: Skin or Soft Tissue Infection  Goal: Absence of Infection Signs and Symptoms  Outcome: Progressing  Intervention: Minimize and Manage Infection Progression  Recent Flowsheet  Documentation  Taken 6/24/2024 0904 by Sabiha Mcleod RN  Infection Prevention:   rest/sleep promoted   single patient room provided     Problem: Fall Injury Risk  Goal: Absence of Fall and Fall-Related Injury  Outcome: Progressing  Intervention: Identify and Manage Contributors  Recent Flowsheet Documentation  Taken 6/24/2024 0904 by Sabiha Mcleod RN  Medication Review/Management: medications reviewed  Intervention: Promote Injury-Free Environment  Recent Flowsheet Documentation  Taken 6/24/2024 0904 by Sabiha Mcleod RN  Safety Promotion/Fall Prevention:   activity supervised   assistive device/personal items within reach   clutter free environment maintained   nonskid shoes/slippers when out of bed   safety round/check completed     Problem: Pain Acute  Goal: Optimal Pain Control and Function  Outcome: Progressing  Intervention: Prevent or Manage Pain  Recent Flowsheet Documentation  Taken 6/24/2024 0904 by Sabiha Mcleod RN  Medication Review/Management: medications reviewed           Goal Outcome Evaluation:      Plan of Care Reviewed With: patient    Overall Patient Progress: improvingOverall Patient Progress: improving    Outcome Evaluation: TCU referrals sent out

## 2024-06-24 NOTE — PROGRESS NOTES
Care Management Follow Up    Length of Stay (days): 4    Expected Discharge Date: 06/25/2024     Concerns to be Addressed: discharge planning     Patient plan of care discussed at interdisciplinary rounds: Yes    Anticipated Discharge Disposition: Assisted Living, Home Care     Anticipated Discharge Services: TCU  Anticipated Discharge DME:      Patient/family educated on Medicare website which has current facility and service quality ratings:  Yes  Education Provided on the Discharge Plan: Yes  Patient/Family in Agreement with the Plan: yes    Referrals Placed by CM/SW:  TCU    Additional Information:  Therapy is now recommending TCU. Met with pt to discuss TCU. Pt is in agreement to have TCU referrals sent to [facility], and [facility].  Referrals sent.  Will continue to follow for discharge planning.    Lizzie Pappas RN   Inpatient Care Coordination  [facility]   Phone: [phone]

## 2024-06-24 NOTE — PROGRESS NOTES
A BiPAP of  10/5 @ 30% was applied to the pt via the mask for an increase in WOB and/or SOB.  The bridge of the nose looks good and remains intact. Pt is tolerating it well. Will continue to monitor and assess the pt's current respiratory status and needs.

## 2024-06-25 ENCOUNTER — LAB REQUISITION (OUTPATIENT)
Dept: LAB | Facility: CLINIC | Age: 89
End: 2024-06-25
Payer: COMMERCIAL

## 2024-06-25 ENCOUNTER — DOCUMENTATION ONLY (OUTPATIENT)
Dept: GERIATRICS | Facility: CLINIC | Age: 89
End: 2024-06-25
Payer: COMMERCIAL

## 2024-06-25 VITALS
HEART RATE: 87 BPM | WEIGHT: 257.5 LBS | RESPIRATION RATE: 20 BRPM | HEIGHT: 63 IN | OXYGEN SATURATION: 96 % | BODY MASS INDEX: 45.62 KG/M2 | SYSTOLIC BLOOD PRESSURE: 133 MMHG | DIASTOLIC BLOOD PRESSURE: 69 MMHG | TEMPERATURE: 98.6 F

## 2024-06-25 DIAGNOSIS — Z11.1 ENCOUNTER FOR SCREENING FOR RESPIRATORY TUBERCULOSIS: ICD-10-CM

## 2024-06-25 LAB
ANION GAP SERPL CALCULATED.3IONS-SCNC: 11 MMOL/L (ref 7–15)
BACTERIA BLD CULT: NO GROWTH
BACTERIA BLD CULT: NO GROWTH
BUN SERPL-MCNC: 16.3 MG/DL (ref 8–23)
CALCIUM SERPL-MCNC: 9.1 MG/DL (ref 8.8–10.2)
CHLORIDE SERPL-SCNC: 96 MMOL/L (ref 98–107)
CREAT SERPL-MCNC: 0.57 MG/DL (ref 0.51–0.95)
DEPRECATED HCO3 PLAS-SCNC: 31 MMOL/L (ref 22–29)
EGFRCR SERPLBLD CKD-EPI 2021: 87 ML/MIN/1.73M2
ERYTHROCYTE [DISTWIDTH] IN BLOOD BY AUTOMATED COUNT: 13.3 % (ref 10–15)
GLUCOSE SERPL-MCNC: 120 MG/DL (ref 70–99)
HCT VFR BLD AUTO: 40 % (ref 35–47)
HGB BLD-MCNC: 12.5 G/DL (ref 11.7–15.7)
MAGNESIUM SERPL-MCNC: 2.2 MG/DL (ref 1.7–2.3)
MCH RBC QN AUTO: 29.5 PG (ref 26.5–33)
MCHC RBC AUTO-ENTMCNC: 31.3 G/DL (ref 31.5–36.5)
MCV RBC AUTO: 94 FL (ref 78–100)
PLATELET # BLD AUTO: 280 10E3/UL (ref 150–450)
POTASSIUM SERPL-SCNC: 3.5 MMOL/L (ref 3.4–5.3)
RBC # BLD AUTO: 4.24 10E6/UL (ref 3.8–5.2)
SODIUM SERPL-SCNC: 138 MMOL/L (ref 135–145)
WBC # BLD AUTO: 8.8 10E3/UL (ref 4–11)

## 2024-06-25 PROCEDURE — 999N000157 HC STATISTIC RCP TIME EA 10 MIN

## 2024-06-25 PROCEDURE — 99239 HOSP IP/OBS DSCHRG MGMT >30: CPT | Performed by: INTERNAL MEDICINE

## 2024-06-25 PROCEDURE — 80048 BASIC METABOLIC PNL TOTAL CA: CPT | Performed by: INTERNAL MEDICINE

## 2024-06-25 PROCEDURE — 250N000012 HC RX MED GY IP 250 OP 636 PS 637: Performed by: INTERNAL MEDICINE

## 2024-06-25 PROCEDURE — 83735 ASSAY OF MAGNESIUM: CPT | Performed by: INTERNAL MEDICINE

## 2024-06-25 PROCEDURE — 250N000013 HC RX MED GY IP 250 OP 250 PS 637: Performed by: INTERNAL MEDICINE

## 2024-06-25 PROCEDURE — 36415 COLL VENOUS BLD VENIPUNCTURE: CPT | Performed by: INTERNAL MEDICINE

## 2024-06-25 PROCEDURE — 250N000009 HC RX 250: Performed by: INTERNAL MEDICINE

## 2024-06-25 PROCEDURE — 85027 COMPLETE CBC AUTOMATED: CPT | Performed by: INTERNAL MEDICINE

## 2024-06-25 PROCEDURE — 250N000011 HC RX IP 250 OP 636: Mod: JZ | Performed by: INTERNAL MEDICINE

## 2024-06-25 PROCEDURE — 94640 AIRWAY INHALATION TREATMENT: CPT

## 2024-06-25 RX ORDER — LORAZEPAM 0.5 MG/1
TABLET ORAL
Qty: 30 TABLET | Refills: 0 | Status: SHIPPED | OUTPATIENT
Start: 2024-06-25 | End: 2024-06-26

## 2024-06-25 RX ORDER — LORAZEPAM 0.5 MG/1
0.5 TABLET ORAL DAILY PRN
Qty: 5 TABLET | Refills: 0 | Status: SHIPPED | OUTPATIENT
Start: 2024-06-25 | End: 2024-06-26

## 2024-06-25 RX ORDER — CEFADROXIL 500 MG/1
500 CAPSULE ORAL 2 TIMES DAILY
DISCHARGE
Start: 2024-06-26 | End: 2024-06-26

## 2024-06-25 RX ORDER — PREDNISONE 20 MG/1
20 TABLET ORAL DAILY
DISCHARGE
Start: 2024-06-26 | End: 2024-06-26

## 2024-06-25 RX ORDER — COLCHICINE 0.6 MG/1
0.6 TABLET ORAL 2 TIMES DAILY
DISCHARGE
Start: 2024-06-25 | End: 2024-07-01

## 2024-06-25 RX ORDER — FUROSEMIDE 40 MG
40 TABLET ORAL DAILY
DISCHARGE
Start: 2024-06-25

## 2024-06-25 RX ADMIN — LORAZEPAM 0.5 MG: 0.5 TABLET ORAL at 08:41

## 2024-06-25 RX ADMIN — GABAPENTIN 300 MG: 300 CAPSULE ORAL at 14:24

## 2024-06-25 RX ADMIN — IPRATROPIUM BROMIDE AND ALBUTEROL SULFATE 3 ML: .5; 3 SOLUTION RESPIRATORY (INHALATION) at 08:05

## 2024-06-25 RX ADMIN — PREDNISONE 20 MG: 20 TABLET ORAL at 08:42

## 2024-06-25 RX ADMIN — GUAIFENESIN 600 MG: 600 TABLET, EXTENDED RELEASE ORAL at 08:41

## 2024-06-25 RX ADMIN — DULOXETINE HYDROCHLORIDE 60 MG: 60 CAPSULE, DELAYED RELEASE ORAL at 08:41

## 2024-06-25 RX ADMIN — FLUTICASONE FUROATE AND VILANTEROL TRIFENATATE 1 PUFF: 100; 25 POWDER RESPIRATORY (INHALATION) at 08:13

## 2024-06-25 RX ADMIN — FUROSEMIDE 40 MG: 40 TABLET ORAL at 08:41

## 2024-06-25 RX ADMIN — ENOXAPARIN SODIUM 40 MG: 40 INJECTION SUBCUTANEOUS at 08:43

## 2024-06-25 RX ADMIN — GABAPENTIN 300 MG: 300 CAPSULE ORAL at 08:42

## 2024-06-25 RX ADMIN — DICLOFENAC SODIUM 2 G: 10 GEL TOPICAL at 08:43

## 2024-06-25 RX ADMIN — CEFTRIAXONE 2 G: 2 INJECTION, POWDER, FOR SOLUTION INTRAMUSCULAR; INTRAVENOUS at 08:41

## 2024-06-25 RX ADMIN — COLCHICINE 0.6 MG: 0.6 TABLET ORAL at 08:42

## 2024-06-25 RX ADMIN — FLUTICASONE PROPIONATE 1 SPRAY: 50 SPRAY, METERED NASAL at 08:44

## 2024-06-25 RX ADMIN — LEVOTHYROXINE SODIUM 88 MCG: 0.09 TABLET ORAL at 08:42

## 2024-06-25 ASSESSMENT — ACTIVITIES OF DAILY LIVING (ADL)
ADLS_ACUITY_SCORE: 51
ADLS_ACUITY_SCORE: 54
ADLS_ACUITY_SCORE: 51
ADLS_ACUITY_SCORE: 54
ADLS_ACUITY_SCORE: 51
ADLS_ACUITY_SCORE: 54
ADLS_ACUITY_SCORE: 51
ADLS_ACUITY_SCORE: 51

## 2024-06-25 NOTE — PLAN OF CARE
"Orientation: AAOx4, VSS  Pain:  numbness and tingling sensation on both arms and legs.  O2: NC 2 LPM  GI/: Purewick in placed  Skin: Lower ext edema  Lung sounds: diminished  Activity: Ax2 with michael steady.  Diet: 2 gm sodium diet, 1800ml fluid restriction  Protocols: K, Mg,  Major shift events:  Plan: continue with plan of care. Waiting for TCU placement  Problem: Adult Inpatient Plan of Care  Goal: Plan of Care Review  Description: The Plan of Care Review/Shift note should be completed every shift.  The Outcome Evaluation is a brief statement about your assessment that the patient is improving, declining, or no change.  This information will be displayed automatically on your shift  note.  Outcome: Progressing  Flowsheets (Taken 6/25/2024 0357)  Plan of Care Reviewed With: patient  Overall Patient Progress: no change  Goal: Patient-Specific Goal (Individualized)  Description: You can add care plan individualizations to a care plan. Examples of Individualization might be:  \"Parent requests to be called daily at 9am for status\", \"I have a hard time hearing out of my right ear\", or \"Do not touch me to wake me up as it startles  me\".  Outcome: Progressing  Goal: Absence of Hospital-Acquired Illness or Injury  Outcome: Progressing  Intervention: Identify and Manage Fall Risk  Recent Flowsheet Documentation  Taken 6/25/2024 0326 by Kofi Goodwin RN  Safety Promotion/Fall Prevention: safety round/check completed  Intervention: Prevent and Manage VTE (Venous Thromboembolism) Risk  Recent Flowsheet Documentation  Taken 6/25/2024 0326 by Kofi Goodwin RN  VTE Prevention/Management: patient refused intervention  Goal: Optimal Comfort and Wellbeing  Outcome: Progressing  Goal: Readiness for Transition of Care  Outcome: Progressing     Problem: Comorbidity Management  Goal: Maintenance of Heart Failure Symptom Control  Outcome: Progressing  Goal: Blood Pressure in Desired Range  Outcome: Progressing  Goal: Maintenance of " Osteoarthritis Symptom Control  Outcome: Progressing     Problem: Gas Exchange Impaired  Goal: Optimal Gas Exchange  Outcome: Progressing     Problem: Skin or Soft Tissue Infection  Goal: Absence of Infection Signs and Symptoms  Outcome: Progressing     Problem: Fall Injury Risk  Goal: Absence of Fall and Fall-Related Injury  Outcome: Progressing  Intervention: Promote Injury-Free Environment  Recent Flowsheet Documentation  Taken 6/25/2024 0326 by Kofi Goodwin RN  Safety Promotion/Fall Prevention: safety round/check completed     Problem: Pain Acute  Goal: Optimal Pain Control and Function  Outcome: Progressing   Goal Outcome Evaluation:      Plan of Care Reviewed With: patient    Overall Patient Progress: no changeOverall Patient Progress: no change

## 2024-06-25 NOTE — PLAN OF CARE
"VSS on 1L nc. LS diminished. Numbness and tingling in BUE. BLE alfa. Right ankle 1-2 edema. BLE tender. Purewick in place. Transferred with 2 assist and michael steady. Given Ativan and melatonin.    Goal Outcome Evaluation:      Plan of Care Reviewed With: patient    Overall Patient Progress: improvingOverall Patient Progress: improving    Outcome Evaluation: Continue to try to wean oxygen. Pain controlled with current medications. TCU referals sent      Problem: Adult Inpatient Plan of Care  Goal: Plan of Care Review  Description: The Plan of Care Review/Shift note should be completed every shift.  The Outcome Evaluation is a brief statement about your assessment that the patient is improving, declining, or no change.  This information will be displayed automatically on your shift  note.  Outcome: Progressing  Flowsheets (Taken 6/24/2024 2242)  Outcome Evaluation: Continue to try to wean oxygen. Pain controlled with current medications. TCU referals sent  Plan of Care Reviewed With: patient  Overall Patient Progress: improving  Goal: Patient-Specific Goal (Individualized)  Description: You can add care plan individualizations to a care plan. Examples of Individualization might be:  \"Parent requests to be called daily at 9am for status\", \"I have a hard time hearing out of my right ear\", or \"Do not touch me to wake me up as it startles  me\".  Outcome: Progressing  Goal: Absence of Hospital-Acquired Illness or Injury  Outcome: Progressing  Intervention: Identify and Manage Fall Risk  Recent Flowsheet Documentation  Taken 6/24/2024 2231 by Jailene Pelayo, RN  Safety Promotion/Fall Prevention:   activity supervised   assistive device/personal items within reach   clutter free environment maintained   increased rounding and observation   nonskid shoes/slippers when out of bed   safety round/check completed  Intervention: Prevent Skin Injury  Recent Flowsheet Documentation  Taken 6/24/2024 2231 by Gita" Jailene MARSHALL RN  Body Position:   position maintained   heels elevated  Intervention: Prevent Infection  Recent Flowsheet Documentation  Taken 6/24/2024 2231 by Jailene Pelayo RN  Infection Prevention: hand hygiene promoted  Goal: Optimal Comfort and Wellbeing  Outcome: Progressing  Intervention: Monitor Pain and Promote Comfort  Recent Flowsheet Documentation  Taken 6/24/2024 2119 by Jailene Pelayo RN  Pain Management Interventions:   medication (see MAR)   repositioned  Goal: Readiness for Transition of Care  Outcome: Progressing     Problem: Comorbidity Management  Goal: Maintenance of Heart Failure Symptom Control  Outcome: Progressing  Intervention: Maintain Heart Failure Management  Recent Flowsheet Documentation  Taken 6/24/2024 2231 by Jailene Pelayo RN  Medication Review/Management: medications reviewed  Goal: Blood Pressure in Desired Range  Outcome: Progressing  Intervention: Maintain Blood Pressure Management  Recent Flowsheet Documentation  Taken 6/24/2024 2231 by Jailene Pelayo RN  Medication Review/Management: medications reviewed  Goal: Maintenance of Osteoarthritis Symptom Control  Outcome: Progressing  Intervention: Maintain Osteoarthritis Symptom Control  Recent Flowsheet Documentation  Taken 6/24/2024 2231 by Jailene Pelayo RN  Activity Management: activity adjusted per tolerance  Medication Review/Management: medications reviewed     Problem: Gas Exchange Impaired  Goal: Optimal Gas Exchange  Outcome: Progressing     Problem: Skin or Soft Tissue Infection  Goal: Absence of Infection Signs and Symptoms  Outcome: Progressing  Intervention: Minimize and Manage Infection Progression  Recent Flowsheet Documentation  Taken 6/24/2024 2231 by Jailene Pelayo RN  Infection Prevention: hand hygiene promoted     Problem: Fall Injury Risk  Goal: Absence of Fall and Fall-Related Injury  Outcome: Progressing  Intervention: Identify and Manage  Contributors  Recent Flowsheet Documentation  Taken 6/24/2024 2231 by Jailene Pelayo RN  Medication Review/Management: medications reviewed  Intervention: Promote Injury-Free Environment  Recent Flowsheet Documentation  Taken 6/24/2024 2231 by Jailene Pelayo RN  Safety Promotion/Fall Prevention:   activity supervised   assistive device/personal items within reach   clutter free environment maintained   increased rounding and observation   nonskid shoes/slippers when out of bed   safety round/check completed     Problem: Pain Acute  Goal: Optimal Pain Control and Function  Outcome: Progressing  Intervention: Develop Pain Management Plan  Recent Flowsheet Documentation  Taken 6/24/2024 2119 by Jailene Pelayo, RN  Pain Management Interventions:   medication (see MAR)   repositioned  Intervention: Prevent or Manage Pain  Recent Flowsheet Documentation  Taken 6/24/2024 2231 by Jailene Pelayo, RN  Medication Review/Management: medications reviewed

## 2024-06-25 NOTE — PLAN OF CARE
Physical Therapy Discharge Summary    Reason for therapy discharge:    Discharged to transitional care facility.    Progress towards therapy goal(s). See goals on Care Plan in [facility] electronic health record for goal details.  Goals not met.  Barriers to achieving goals:   discharge from facility.    Therapy recommendation(s):    Continued therapy is recommended.  Rationale/Recommendations:  PT as indicated at TCU to promote improved functional mobility.

## 2024-06-25 NOTE — DISCHARGE SUMMARY
Discharge Summary  Hospitalist Service    Carol Merida MRN# [id number]   YOB: 1935 Age: 89 year old     Date of Admission:  6/20/2024  Date of Discharge:  6/25/2024  Admitting Physician:  Ez Gifford MD  Discharge Physician: Spring Wooten MD  Discharging Service: Hospitalist Service     Primary Provider: Ruben Humphrey  Primary Care Physician Phone Number: [phone]         Discharge Diagnoses/Problem Oriented Hospital Course (Providers):      Carol Merida was admitted on 6/20/2024 by Ez Gifford MD and I would refer you to their history and physical.  The following problems were addressed during her hospitalization:    SOB with acute hypoxic respiratory failure  Chronic hypoxic respiratory failure -new diagnosis-will discharge with home oxygen   HFpEF   RLE cellulitis  Reported aspiration with normal swallow evaluation   Htn, hlp  JEAN  Depression with anxiety   Hypothyroidism   Pain/neuropathy   Chronic sacral ulcers     Summary of Stay: Carol Merida is a 89 year old female with a history of htn/hlp, chronic diastolic HF with most recent echo 3/2024 EF 65-75% and G1dd, JEAN - previously on BiPAP but hasn't used in over a year, depression w/anxiety chronically on benzos, hypothyroidism, remote hx of breast cancer admitted on 6/20/2024 with increasing SOB      The past year has been fairly rough-she had a mechanical fall in late Feb 2023 complicated by femur fracture which was surgically repaired.  She ended up being discharged to TCU and was there for ~6 months, ultimately discharged to [facility] where she has her own apt with maximal services.  She is wheelchair bound and can do some transfers on her own.     For the week leading up to her hospitalization she's noticed increasing LE edema (R>L-which is chronic) with PETTY -> SOB at rest.  She noted that her chronic cough was more productive of white sputum.  No f/c/s. She, and family, have also noticed some bilateral erythema of  the LE with right much worse that the left, furthermore her right leg is tender.  Sounds like the [facility] physician was planning on starting an abx but I don't see that she had been getting one as of yet.      ER O2 sats 95 % at rest but drop to 87 % with mild exertion, she was noted to be tachypnic as well.  She was noted to become sob with just talking and at times could only  speak a couple of words at a time.   BMP wnl  Trop 16->15, BNP wnl at 114 and EKG non ischemic  CBC also wnl x for lymphopenia, covid/flu/rsv and respiratory panel negative   VBG 7.45/46     Lactic acid slightly up at 2.2->500 ml bolus->IV furosemide      UA  nitrites + 31 wbc's, small LE, few bacteria but noted to be contaminated with 4 SEC     CT chest PE: negative for PE, improved mild scattered mucous plugging improved from previous      She was admitted and initially treated for HFpEF with Iv furosemide, steroids/nebs for ? RAD, and abx for possible cellulitis      She improved within hours so retrospectively looks consistent with acute on chronic diastolic HF exacerbation despite normal BNP     She has also noted that she tends to cough up little bits of food periodically-like 3 times per week.  She is not known to chronically aspirate and I don't see that she's been admitted for aspiration pna, but clearly this complicates her picture and could be another contributor to her vulnerable respiratory status      She continues to note improvement not only in her breathing but states her anxiety seems under much better control as well.  I suspect at least some of her anxiety is related to chronic SOB/pulmonary edema      She has basically stabilized from a respiratory standpoint and will need low level oxygen support at discharge.       Hospital course has been complicated by left ankle pain which I suspect is a gout attack.  She's been initiated on colchicine with some improvement but does not able to manage some pivot transfers.   Therefore will need TCU placement prior to returning back to [facility]     Problem List:   SOB with acute hypoxic respiratory failure   HFpEF exacerbation   She was hospitalized here for a similar reason back in 3/2024 ultimately treated for both HFpEF exacerbation and acute bronchitis (CT at that time did note some GGO on CT scan).  She was discharged with furosemide every day and it was recommended that she follow-up with her pulmonologist Dr Adamson.  In any case presents with SOB and PETTY with acute hypoxia. No e/o PE or pna by symptoms/labs/imaging.   Initially treated broadly with steroids/nebs/diuretics-but given rapid improvement, exam findings of increased bilateral LE edema and bilateral LL crackles I think this is all her diastolic HF.  Shocking that her BNP is wnl  She hasn't been consistent using her BiPAP for ~a year which is certainly making her more vulnerable to decompensation but trigger for this event is unclear.  -cont with IV diuresis, net negative 3 (although some UO not measured so likely more). Weights unreliable   -no need to repeat echo given just done 3 months ago  -tele NSR  -wean O2 but stills desats with, will need oxygen at discharge   - discharge with furosemide 40 mg every day, weights daily and can make adjustments in diuretics on an as needed basis      RLE cellulitis   Responding to IV ceftriaxone, on day 6/7 abx, will discharge with cefadroxil for one day      ? Aspiration  Reports periodically coughing up bits of food, perhaps 3 x per week.  Not known to aspirate  -SLP evaluation appreciated and swallowing is wnl      Left ankle pain and swelling  Happened during the night. She thinks she might have strained it yesterday while pivoting to the commode.  No hx of gout.  She is exquisitely tender to any movement of that ankle and it is warm/red  ? Gout  Unlikely bone fx and xray is negative  CRP elevated at 30 and uric acid level is quite elevated at 10  Trialed colchicine with some mild  improvement , added in brief burst of prednisone with definite improvement.  Will finish prednisone burst tomorrow and will cont with colchicine for one week      Htn, hlp  Pta regimen furosemide 20 mg every day only.  Looks as it her statin has been discontinued   Cont with IV furosemide 40 mg every day today since will stay another night      JEAN  Previously on biPAP but hasn't been using for the past year or so.  They have been in contact with her pulmonologist who has ordered new supplies which dtr picked up and now is at home waiting for her  Discussed the ABSOLUTE IMPORTANCE of her using BiPAP while here, she      Depression w/anxiety   Pta duloxetine 60 m g every day, lorazepam 0.5 mg tid scheduled, and lorazepam 0.5 mg q6 hours prn  -cont with duloxetine and prn lorazepam, I am weaning her tid scheduled lorazepam as not a great agent for long term scheduled use and given improvement in anxiety wonder if some of it was triggered by chronic sob        *lorazepam 0.5 mg tid-> bid and should do this for 2 weeks then would go to every day x 2 weeks and then would stop  and would continue ~ bid prn   -she is quite anxious and frequently blames her breathing on her anxiety but I think it's the other way around     Hypothyroidism   Resumed pta replacement levothyroxine 88 mcg     ? Pain/neuropathy   Resume pta gabapentin      Chronic sacral ulcers  2/2 wheelchair bound, sleeps in recliner, so essentially always putting pressure on the same area   Hopefully if gets back on biPAP then will move back to the bed to help off load some of these areas   Appreciate WOC input      Clean wound with saline or MicroKlenz Spray, pat dry  2. Press a Mepilex Sacral to the area, making sure to conform nicely to skin curvatures.   3. Time and date dressing change.     DVT Prophylaxis: Enoxaparin (Lovenox) SQ  Code Status: DNR  Functional Status: lives in [facility] and gets around by wheelchair.  Has full services at her [facility]         Code  Status:      DNR        Brief Hospital Stay Summary Sent Home With Patient in AVS:          Reason for your hospital stay      Acute on chronic diastolic heart failure exacerbation   New L ankle gout  Untreated sleep apnea                     Important Results:        As noted below          Pending Results:        Unresulted Labs Ordered in the Past 30 Days of this Admission       Date and Time Order Name Status Description    6/20/2024  2:59 AM Blood Culture Wrist, Right Preliminary               Discharge Instructions and Follow-Up:      Follow-up Appointments     Follow Up and recommended labs and tests      Ambrose bmp in 3-5 days after increasing furosemide 20 mg-> 40 mg every day  Wear BiPAP every night  Follow-up with [facility] MD/NP in 7 days              Discharge Disposition:        Discharged to home         Discharge Medications:        Current Discharge Medication List        START taking these medications    Details   cefadroxil (DURICEF) 500 MG capsule Take 1 capsule (500 mg) by mouth 2 times daily for 1 day    Associated Diagnoses: Tension-type headache, not intractable, unspecified chronicity pattern      colchicine (COLCRYS) 0.6 MG tablet Take 1 tablet (0.6 mg) by mouth 2 times daily for 6 days    Associated Diagnoses: Acute gout of left ankle, unspecified cause      predniSONE (DELTASONE) 20 MG tablet Take 1 tablet (20 mg) by mouth daily for 1 day    Associated Diagnoses: Acute gout of left ankle, unspecified cause           CONTINUE these medications which have CHANGED    Details   furosemide (LASIX) 40 MG tablet Take 1 tablet (40 mg) by mouth daily    Associated Diagnoses: COPD exacerbation (H); Heart failure with preserved ejection fraction, NYHA class I (H)      !! LORazepam (ATIVAN) 0.5 MG tablet Lorazepam 0.5 mg bid x 2 weeks then every day x 2 weeks, then every other day x 2 weeks then stop  Qty: 30 tablet, Refills: 0    Associated Diagnoses: Panic disorder without agoraphobia      !! LORazepam  (ATIVAN) 0.5 MG tablet Take 1 tablet (0.5 mg) by mouth daily as needed for anxiety  Qty: 5 tablet, Refills: 0    Associated Diagnoses: Panic disorder without agoraphobia       !! - Potential duplicate medications found. Please discuss with provider.        CONTINUE these medications which have NOT CHANGED    Details   acetaminophen (TYLENOL) 325 MG tablet Take 650 mg by mouth every 8 hours 0630, 1400, 2145      albuterol (PROAIR HFA/PROVENTIL HFA/VENTOLIN HFA) 108 (90 Base) MCG/ACT inhaler INHALE 2 PUFFS INTO THE LUNGS EVERY 4 HOURS AS NEEDED FOR SHORTNESS OF BREATH OR DIFFICULT BREATHING OR WHEEZING  Qty: 8.5 g, Refills: 0    Comments: Pharmacy may dispense brand covered by insurance (Proair, or proventil or ventolin or generic albuterol inhaler)  Associated Diagnoses: SOB (shortness of breath)      albuterol (PROVENTIL) (2.5 MG/3ML) 0.083% neb solution Take 1 vial (2.5 mg) by nebulization every 4 hours as needed for shortness of breath, wheezing or cough And BID  Qty: 90 mL    Associated Diagnoses: Wheezing      ARTIFICIAL SALIVA MT Take 1 strip by mouth 2 times daily After nebs      calcium carbonate 600 mg-vitamin D 400 units (CALTRATE) 600-400 MG-UNIT per tablet Take 1 tablet by mouth every evening   Qty: 100 tablet, Refills: 3    Associated Diagnoses: Osteopenia      celecoxib (CELEBREX) 100 MG capsule Take 100 mg by mouth 2 times daily as needed for moderate pain (4-6)      clobetasol propionate 0.05 % LOTN Externally apply topically 2 times daily To palm of hands      cycloSPORINE (CYCLOSPORINE IN KLARITY) 0.1 % EMUL Place 1 drop into both eyes 2 times daily 0630 and 2145      diclofenac (VOLTAREN) 1 % topical gel Apply 2 g topically 2 times daily      DULoxetine (CYMBALTA) 60 MG capsule Take 60 mg by mouth daily      fluticasone (FLONASE) 50 MCG/ACT nasal spray Spray 1 spray into both nostrils every morning      fluticasone-salmeterol (WIXELA INHUB) 100-50 MCG/ACT inhaler Inhale 1 puff into the lungs 2  times daily  Qty: 60 each, Refills: 5    Associated Diagnoses: Shortness of breath      gabapentin (NEURONTIN) 300 MG capsule Take 1 capsule (300 mg) by mouth 2 times daily    Associated Diagnoses: History of bilateral knee replacement      gabapentin (NEURONTIN) 600 MG tablet Take 1 tablet (600 mg) by mouth At Bedtime    Associated Diagnoses: History of bilateral knee replacement      guaiFENesin 400 MG TABS Take 400 mg by mouth 2 times daily      ipratropium - albuterol 0.5 mg/2.5 mg/3 mL (DUONEB) 0.5-2.5 (3) MG/3ML neb solution Take 1 vial by nebulization 3 times daily      levothyroxine (SYNTHROID/LEVOTHROID) 88 MCG tablet Take 1 tablet (88 mcg) by mouth daily  Qty: 90 tablet, Refills: 0    Associated Diagnoses: Hypothyroidism, unspecified type      melatonin 3 MG tablet Take 3 mg by mouth At Bedtime      nystatin (NYSTOP) 439064 UNIT/GM external powder Apply tid to affectead area prn  Qty: 60 g, Refills: 3    Associated Diagnoses: Intertrigo      polyethylene glycol (MIRALAX) 17 g packet Take 1 packet by mouth daily as needed for constipation      PseudoePHEDrine-guaiFENesin 120-1200 MG TB12 Take 1 tablet by mouth 2 times daily as needed for congestion      triamcinolone (KENALOG) 0.1 % external cream Apply topically every evening      zinc oxide (DESITIN) 40 % external ointment Apply topically as needed for dry skin or irritation (with toileting)      !! ACE/ARB/ARNI NOT PRESCRIBED (INTENTIONAL) Please choose reason not prescribed, below    Associated Diagnoses: Chronic diastolic heart failure (H)      !! BETA BLOCKER NOT PRESCRIBED (INTENTIONAL) Beta Blocker not prescribed intentionally due to EF > 40 % (ejection fraction) will leave up to Cardiology.    Associated Diagnoses: Chronic diastolic heart failure (H)       !! - Potential duplicate medications found. Please discuss with provider.            Allergies:         Allergies   Allergen Reactions    Ace Inhibitors Cough           Consultations This  "Hospital Stay:        WOC RN,  Care coordinator, PT         Condition and Physical on Discharge:        Discharge condition: Stable   Vitals: Blood pressure 133/69, pulse 87, temperature 98.6  F (37  C), temperature source Oral, resp. rate 20, height 1.6 m (5' 3\"), weight 116.8 kg (257 lb 8 oz), SpO2 96%, not currently breastfeeding.     Constitutional: Pleasant nad looks stated age      Lungs: Normal respiratory effort still requiring low level oxygen support    Cardiovascular: Normal cv status   Abdomen: Tolerating po   Skin: Warm and dry no cc   Other: Alert and oriented affect appropriate          Discharge Time:      Greater than 30 minutes.        Image Results From This Hospital Stay (For Non-EPIC Providers):        Results for orders placed or performed during the hospital encounter of 06/20/24   XR Chest 2 Views    Narrative    EXAM: XR CHEST 2 VIEWS  LOCATION: [facility]  DATE: 6/20/2024    INDICATION: persistent cough, SOB  COMPARISON: 3/15/2024.    FINDINGS: The heart size is normal. The right hemidiaphragm is elevated. The lungs are clear. There is no pneumothorax or pleural effusion. Degenerative disease in the spine and shoulders.      Impression    IMPRESSION: No acute abnormality.   CT Chest Pulmonary Embolism w Contrast    Narrative    CT CHEST PULMONARY EMBOLISM WITH CONTRAST June 20, 2024 7:20 AM    CLINICAL HISTORY: Shortness of breath.    TECHNIQUE: CT angiogram chest during arterial phase injection IV  contrast. 2D and 3D MIP reconstructions were performed by the CT  technologist. Dose reduction techniques were used.   CONTRAST: 74mL Isovue-370.    COMPARISON: Chest CT 3/15/2024.    FINDINGS:  ANGIOGRAM CHEST: Pulmonary arteries are normal caliber and negative  for pulmonary emboli. No evidence for thoracic aortic aneurysm.  Thoracic aorta is negative for dissection.     LUNGS AND PLEURA: Indeterminate right lower lobe nodule abutting the  major fissure is unchanged for " greater than two years, measuring 1.3 x  1.1 cm. Reticular and ground-glass opacities about the periphery of  both lung bases are also unchanged, and are likely fibrotic. Mild  scattered mucous plugging in both lower lobes of the lung, improved  slightly since the previous exam. No pleural effusion.    MEDIASTINUM/AXILLAE: No enlarged lymph nodes in the chest. No  pericardial effusion.    CORONARY ARTERY CALCIFICATION: Mild.    UPPER ABDOMEN: Small hiatal hernia. Hepatic steatosis. Cholelithiasis.    MUSCULOSKELETAL: Degenerative changes in the visualized thoracolumbar  spine.      Impression    IMPRESSION:  1.  No acute abnormality in the chest. No evidence for pulmonary  embolism.  2.  Mild scattered mucous plugging in both lower lobes has improved.  3.  Cholelithiasis.  4.  Hepatic steatosis.    ESME BRADLEY MD         SYSTEM ID:  [id number]   XR Chest 2 Views    Narrative    EXAM: XR CHEST 2 VIEWS  LOCATION: [facility]  DATE: 6/22/2024    INDICATION: Persistent hypoxia.  COMPARISON: Chest CT 6/20/2024.      Impression    IMPRESSION: Low lung volumes. Mild bibasilar atelectasis. Normal heart size and pulmonary vascularity. Tortuous thoracic aorta. Moderate degenerative changes of the shoulders.   XR Ankle Left G/E 3 Views    Narrative    EXAM: XR ANKLE LEFT G/E 3 VIEWS  LOCATION: [facility]  DATE: 6/23/2024    INDICATION: Left ankle pain after injury.  COMPARISON: None.      Impression    IMPRESSION: Left ankle negative for fracture or dislocation. Moderate degenerative arthrosis at the ankle and subtalar joints, with partial joint space narrowing and spurring. Moderate degenerative changes also in the TMT joints. Small plantar calcaneal   enthesophyte.     *Note: Due to a large number of results and/or encounters for the requested time period, some results have not been displayed. A complete set of results can be found in Results Review.           Most Recent Lab  Results In EPIC (For Non-EPIC Providers):    Most Recent 3 CBC's:  Recent Labs   Lab Test 06/25/24  0645 06/23/24  0638 06/21/24  0629   WBC 8.8 8.1 7.7   HGB 12.5 12.9 13.5   MCV 94 95 95    281 272      Most Recent 3 BMP's:  Recent Labs   Lab Test 06/25/24  0645 06/24/24  0645 06/23/24  0638    137 137   POTASSIUM 3.5 3.5 3.6   CHLORIDE 96* 95* 94*   CO2 31* 30* 28   BUN 16.3 15.5 16.7   CR 0.57 0.54 0.57   ANIONGAP 11 12 15   LAKSHMI 9.1 9.1 9.4   * 138* 122*     Recent Labs   Lab Test 05/21/24  0944 09/05/23  0938   AST 50* 34   ALT 30 16   ALKPHOS 128 111*   BILITOTAL 0.7 0.5     Most Recent TSH, T4 and HgbA1c:   Recent Labs   Lab Test 05/21/24  0944 02/27/24  1413 09/05/23  0938   TSH 3.66   < > 4.33*   A1C  --   --  5.9*    < > = values in this interval not displayed.

## 2024-06-25 NOTE — PLAN OF CARE
"Cared for [id number]    Ax2 w/michael steady. L limb alert. A&Ox4, lethargic. VSS on 2LPM NC with O2  >92%. LS diminished. Numbness and tingling present in BUE, BLE tenderness and henry. LLE edema +1/2. Purewick in place. TCU referrals sent, awaiting placement.    Plan: Continue with plan of care  For vital signs and complete assessments, please see documentation under flowsheets.    Adela Roberto RN      Goal Outcome Evaluation:    Plan of Care Reviewed With: patient     Overall Patient Progress: improvingOverall Patient Progress: improving    Outcome Evaluation: on 2L NC overnight as pt refused BiPAP overnight, awaiting TCU placement. No acute changes overnight.      Problem: Adult Inpatient Plan of Care  Goal: Plan of Care Review  Description: The Plan of Care Review/Shift note should be completed every shift.  The Outcome Evaluation is a brief statement about your assessment that the patient is improving, declining, or no change.  This information will be displayed automatically on your shift  note.  6/25/2024 0114 by Shemar Roberto RN  Outcome: Progressing  Flowsheets (Taken 6/25/2024 0114)  Plan of Care Reviewed With: patient  Overall Patient Progress: no change  6/25/2024 0114 by Shemar Roberto RN  Outcome: Progressing  Flowsheets (Taken 6/25/2024 0114)  Outcome Evaluation: on 2L NC overnight as pt refused BiPAP overnight, awaiting TCU placement  Plan of Care Reviewed With: patient  Overall Patient Progress: no change  Goal: Patient-Specific Goal (Individualized)  Description: You can add care plan individualizations to a care plan. Examples of Individualization might be:  \"Parent requests to be called daily at 9am for status\", \"I have a hard time hearing out of my right ear\", or \"Do not touch me to wake me up as it startles  me\".  6/25/2024 0114 by Shemar Roberto RN  Outcome: Progressing  6/25/2024 0114 by Shemar Roberto RN  Outcome: Progressing  Goal: Absence of Hospital-Acquired Illness or Injury  6/25/2024 " 0114 by Shemar Roberto RN  Outcome: Progressing  6/25/2024 0114 by Shemar Roberto RN  Outcome: Progressing  Intervention: Identify and Manage Fall Risk  Recent Flowsheet Documentation  Taken 6/24/2024 2335 by Shemar Roberto RN  Safety Promotion/Fall Prevention: safety round/check completed  Intervention: Prevent and Manage VTE (Venous Thromboembolism) Risk  Recent Flowsheet Documentation  Taken 6/24/2024 2335 by Shemar Roberto RN  VTE Prevention/Management: patient refused intervention  Goal: Optimal Comfort and Wellbeing  6/25/2024 0114 by Shemar Roberto RN  Outcome: Progressing  6/25/2024 0114 by Shemar Roberto RN  Outcome: Progressing  Goal: Readiness for Transition of Care  6/25/2024 0114 by Shemar Roberto RN  Outcome: Progressing  6/25/2024 0114 by Shemar Roberto RN  Outcome: Progressing     Problem: Comorbidity Management  Goal: Maintenance of Heart Failure Symptom Control  6/25/2024 0114 by Shemar Roberto RN  Outcome: Progressing  6/25/2024 0114 by Shemar Roberto RN  Outcome: Progressing  Goal: Blood Pressure in Desired Range  6/25/2024 0114 by Shemar Roberto RN  Outcome: Progressing  6/25/2024 0114 by Shemar Roberto RN  Outcome: Progressing  Goal: Maintenance of Osteoarthritis Symptom Control  6/25/2024 0114 by Shemar Roberto RN  Outcome: Progressing  6/25/2024 0114 by Shemar Roberto RN  Outcome: Progressing     Problem: Gas Exchange Impaired  Goal: Optimal Gas Exchange  6/25/2024 0114 by Shemar Roberto RN  Outcome: Progressing  6/25/2024 0114 by Shemar Roberto RN  Outcome: Progressing     Problem: Skin or Soft Tissue Infection  Goal: Absence of Infection Signs and Symptoms  6/25/2024 0114 by Shemar Roberto RN  Outcome: Progressing  6/25/2024 0114 by Shemar Roberto RN  Outcome: Progressing     Problem: Fall Injury Risk  Goal: Absence of Fall and Fall-Related Injury  6/25/2024 0114 by Shemar Roberto RN  Outcome: Progressing  6/25/2024 0114 by Shemar Robetro, JARETH  Outcome:  Progressing  Intervention: Promote Injury-Free Environment  Recent Flowsheet Documentation  Taken 6/24/2024 2335 by Shemar Roberto, RN  Safety Promotion/Fall Prevention: safety round/check completed     Problem: Pain Acute  Goal: Optimal Pain Control and Function  6/25/2024 0114 by Shemar Roberto, RN  Outcome: Progressing  6/25/2024 0114 by Shemar Roberto, RN  Outcome: Progressing

## 2024-06-25 NOTE — PROGRESS NOTES
Care Management Discharge Note    Discharge Date: 06/25/2024       Discharge Disposition: Transitional Care    Discharge Services: Home Care    Discharge Transportation: agency    Private pay costs discussed: private room/amenity fees    Does the patient's insurance plan have a 3 day qualifying hospital stay waiver?  Yes     Which insurance plan 3 day waiver is available? Alternative insurance waiver    Will the waiver be used for post-acute placement? No    PAS Confirmation Code: [id number]  Patient/family educated on Medicare website which has current facility and service quality ratings: yes    Education Provided on the Discharge Plan: Yes  Persons Notified of Discharge Plans: Pt, daughter, provider, bedside nurse, [facility], [facility], [facility] Mar[facility]  Patient/Family in Agreement with the Plan: yes    Handoff Referral Completed: No    Additional Information:  Pt has been offered a TCU bed at [facility] today in a private room and pt has accepted. Pt is aware of the private room fee. Orders have been faxed to [facility]. Notified Maricarmen Marroquin and [facility] of discharge plan.   Pt requesting wheelchair transportation.  Reviewed out of pocket cost for [facility] transport, $89.98 base and $5.79 per mile to the destination. Pt expressed understanding and is agreeable to this.    Called daughter Alea with update on plan and requested she bring pt's bi-pap to TCU, she will deliver later today.  Please call if additional needs arise.    Lizzie Pappas RN   Inpatient Care Coordination  [facility]   Phone: [phone]

## 2024-06-25 NOTE — PROGRESS NOTES
[place] [facility]  INITIAL VISIT NOTE  June 26, 2024    PRIMARY CARE PROVIDER AND CLINIC:  Ruben Humphrey [facility] PHYSICIAN [facility] [address] [address] / [place]*    CHIEF COMPLAINT:  Hospital follow-up/Initial visit     HPI:    Carol Merida is a 89 year old  (1935) female who was seen at [facility] on June 26, 2024 for an initial visit.     Medical history is notable for bronchitis, chronic cough, CHF, hypertension, dyslipidemia, hypothyroidism, breast cancer, melanoma, colon adenoma, anxiety, depression, osteoporosis, osteoarthritis, morbid obesity, and JEAN.    Summary of hospital course:  Patient was hospitalized at [facility] from June 20 through  June 25, 2024 for acute CHF and right lower extremity cellulitis.  She originally presented with increasing shortness of breath, cough, exertional hypoxia, and lower extremity edema.  She had similar presentation back in March 2024 due to bronchitis and suspected reactive airway disease.  Initial laboratory evaluation was significant for creatinine 0.71, CRP 15.74, lactic acid 2.2, procalcitonin 0.12, troponin I 16, N-terminal proBNP 114, white count 7.2, hemoglobin 13.4, and negative screening for COVID-19, influenza, and RSV.  UA was abnormal and urine culture grew more than 100,000 colonies per mL 2 strains of E. coli.  CT chest revealed no pulmonary embolism, no acute abnormality but mildly scattered mucus plugging in both lower lobes, cholelithiasis, and hepatic steatosis.  EKG showed normal sinus rhythm with a heart rate of 94 bpm and premature supraventricular complexes.  She was initially treated with IV furosemide for CHF, steroids and nebs for reactive airway disease, and IV ceftriaxone for UTI and possible cellulitis.  Hospital course was complicated by left ankle pain suspicious for a gout attack.  X-ray showed no fracture or dislocation.  She was treated with colchicine and prednisone burst.  There was  concern for possible aspiration causing her cough but SLP evaluation was normal.  WOC RN was consulted for wound to buttocks due to mixture of friction, moisture, and pressure.  IV ceftriaxone ultimately transitioned to oral cefadroxil.  PTA furosemide dose was increased to 40 mg daily.  She was discharged on supplemental oxygen at 2 L/min. TCU was recommended for rehab    Patient is admitted to this facility for medical management, nursing care, and subacute rehab.     Of note, history was obtained from patient, facility staff, and extensive review of the chart including hospital admission note, consult notes, and discharge summary.    Today's visit:  Patient was seen in her room, lying bed.  She appears weak but comfortable.  She is currently on oxygen at 2 L/min through nasal cannula.  She reports dyspnea with minimal exertion.  Her cough is improving.  Her right ankle has markedly subsided.  Right leg cellulitis has resolved.  She denies fever, chills, chest pain, palpitation, nausea, vomiting, abdominal pain, urinary symptoms.  She had a BM yesterday.      CODE STATUS:   DNR     PAST MEDICAL HISTORY:   Chronic HFpEF (grade 1 LV diastolic dysfunction and LVEF 65-70%, per echo in March 2024)  Hypertension  Dyslipidemia  Hypothyroidism  Left breast cancer (infiltrating ductal), s/p left mastectomy in 2005   Melanoma  Colon adenoma  Cholelithiasis  Hepatic steatosis  Gout (left ankle)  RLE cellulitis in June 2024  E. coli UTI in June 2024  Bronchitis/mucous plugging  Reactive airway disease (suspected)  Anxiety  Depression  Osteoporosis  Osteoarthritis  Chronic sacral ulcers  Morbid obesity  JEAN    Past Medical History:   Diagnosis Date    Abnormal stress test     small area on stress thalium ?2003; but normal angiogram 2012    Anemia     Anxiety     Cardiac dysrhythmia, unspecified     irregular heartbeat    CHF (congestive heart failure) (H) 6/18/2018    PETTY (dyspnea on exertion)     Hyperlipidemia LDL goal <100  2/10/2010    Hypertension goal BP (blood pressure) < 140/90 7/18/2010    Hypothyroid     Malignant neoplasm of breast (female), unspecified site 2005    infltrating ductal    MEDICAL HISTORY OF -     fx humerus Sept 2013.    Melanoma of skin, site unspecified     NONSPECIFIC MEDICAL HISTORY 04    fx fifth finger right hand    Obstructive sleep apnea (adult) (pediatric) 8/25/2006    CPAP     Osteoarthritis     Other chronic pain     Joint pain for many years.    Other osteoporosis     PONV (postoperative nausea and vomiting)     Tubular adenoma in colon 9/01    colonoscopy due 2004       PAST SURGICAL HISTORY:   Past Surgical History:   Procedure Laterality Date    ARTHROPLASTY HIP Left 7/6/2015    Procedure: ARTHROPLASTY HIP;  Surgeon: Junior Giordano MD;  Location: [facility] OR    ARTHROPLASTY HIP Right 11/2/2016    Procedure: ARTHROPLASTY HIP;  Surgeon: Junior Giordano MD;  Location: [facility] OR    ARTHROPLASTY REVISION HIP Left 7/22/2015    Procedure: ARTHROPLASTY REVISION HIP;  Surgeon: Junior Giordano MD;  Location: [facility] OR    CATARACT IOL, RT/LT      COLONOSCOPY  9/01    tubular adenoma; repeat 3 years.    COLONOSCOPY  9/04    normal; repeat 5 years ([facility])    HYSTERECTOMY, MARGAUX  summer 2004    and BSO    OPEN REDUCTION INTERNAL FIXATION RODDING INTRAMEDULLARY FEMUR Right 2/26/2023    Procedure: OPEN REDUCTION INTERNAL FIXATION RIGHT DISTAL FEMUR WITH RETROGRADE NAIL, LATERAL PLATE, AND CABLE;  Surgeon: Newton Lemon MD;  Location: [facility] OR    SURGICAL HISTORY OF -   9/05    left mastectomy    SURGICAL HISTORY OF -   2008    right ankle surgery; triple arthrodesis    SURGICAL HISTORY OF -   5/09    right ankle surgery; triple arthrodesis and deltoid reconstruction; possible other    SURGICAL HISTORY OF -   2/10    removal of hardware from right ankle    [facility] NONSPECIFIC PROCEDURE      Knee replacement x 2 (B)    Z NONSPECIFIC PROCEDURE      s/p Tonsillectomy (college)    Z NONSPECIFIC PROCEDURE    "   s/p Appy (high school)    [facility] NONSPECIFIC PROCEDURE      Melanoma removed with sentinel node bx    [facility] NONSPECIFIC PROCEDURE       bilateral cataract       FAMILY HISTORY:   Family History   Problem Relation Age of Onset    No Known Problems Brother         brain tumor related to shingles in his eye    Arthritis Mother     Hypertension Father     Cancer Father         lung    Cancer Grandchild         terratoma tumors    Breast Cancer Daughter        SOCIAL HISTORY:  Social History     Tobacco Use    Smoking status: Never    Smokeless tobacco: Never   Substance Use Topics    Alcohol use: Yes     Alcohol/week: 0.0 - 0.8 standard drinks of alcohol     Comment: 1 glass wine weekly       MEDICATIONS:  Current Outpatient Medications   Medication Sig Dispense Refill    [START ON 6/26/2024] cefadroxil (DURICEF) 500 MG capsule Take 1 capsule (500 mg) by mouth 2 times daily for 1 day      colchicine (COLCRYS) 0.6 MG tablet Take 1 tablet (0.6 mg) by mouth 2 times daily for 6 days      furosemide (LASIX) 40 MG tablet Take 1 tablet (40 mg) by mouth daily      LORazepam (ATIVAN) 0.5 MG tablet Lorazepam 0.5 mg bid x 2 weeks then every day x 2 weeks, then every other day x 2 weeks then stop 30 tablet 0    LORazepam (ATIVAN) 0.5 MG tablet Take 1 tablet (0.5 mg) by mouth daily as needed for anxiety 5 tablet 0    [START ON 6/26/2024] predniSONE (DELTASONE) 20 MG tablet Take 1 tablet (20 mg) by mouth daily for 1 day         MED REC REQUIRED  Post Medication Reconciliation Status: discharge medications reconciled and changed, per note/orders      ALLERGIES:  Allergies   Allergen Reactions    Ace Inhibitors Cough       ROS:  10 point ROS were negative other than the symptoms noted above in the HPI.    PHYSICAL EXAM:  Vital signs were reviewed in the chart.  Vital Signs: /80   Pulse 87   Temp 98.2  F (36.8  C)   Resp 18   Ht 1.6 m (5' 3\")   Wt 116.8 kg (257 lb 8 oz)   LMP  (LMP Unknown)   SpO2 96%   BMI 45.61 kg/m   "   General: Weak appearing but comfortable and in no acute distress  HEENT: No conjunctival pallor, no scleral icterus or injection, moist oral mucosa  Cardiovascular: Normal S1, S2, RRR  Respiratory: Lungs clear to auscultation bilaterally; no wheezing, rhonchi, or crackles  GI: Abdomen soft, non-tender, somewhat distended, +BS  Extremities: Trace-1+ bilateral LE edema; no ankle swelling  Neuro: CX II-XII grossly intact; ROM in all four extremities grossly intact  Psych: Alert and oriented almost x3; normal affect  Skin: No acute rash; no lower extremity skin erythema    LABORATORY/IMAGING DATA:  All relevant labs and imaging data in [facility] and/or Care Everywhere were personally reviewed today.    Most Recent 3 CBC's:  Recent Labs   Lab Test 06/25/24  0645 06/23/24  0638 06/21/24  0629   WBC 8.8 8.1 7.7   HGB 12.5 12.9 13.5   MCV 94 95 95    281 272     Most Recent 3 BMP's:  Recent Labs   Lab Test 06/25/24  0645 06/24/24  0645 06/23/24  0638    137 137   POTASSIUM 3.5 3.5 3.6   CHLORIDE 96* 95* 94*   CO2 31* 30* 28   BUN 16.3 15.5 16.7   CR 0.57 0.54 0.57   ANIONGAP 11 12 15   LAKSHMI 9.1 9.1 9.4   * 138* 122*     Most Recent 2 LFT's:  Recent Labs   Lab Test 05/21/24  0944 09/05/23  0938   AST 50* 34   ALT 30 16   ALKPHOS 128 111*   BILITOTAL 0.7 0.5         ASSESSMENT/PLAN:  Hypoxic respiratory failure,  Bilateral lower lobe mucous plugging,  Bronchitis,  Reactive airway disease (suspected),  Chronic cough.  Respiratory failure likely chronic and multifactorial due to untreated JEAN, mucous plugging, and CHF.  Patient was treated with steroids and nebs inpatient.  She was discharged on supplemental oxygen at 2 L/min.  She is currently stable from respiratory standpoint.  Cough has subsided.  Plan:  Continue supplemental oxygen, wean as able; but likely would need it chronically  Continue fluticasone-salmeterol 100-50 mcg, 1 puff twice daily  Continue albuterol neb 2.5 mg every 4 hours PRN  Continue  albuterol 2 puffs every 4 hours PRN  Continue Flonase 1 spray into both nostrils daily  Continue guaifenesin 400 mg twice daily  Monitor respiratory status    Acute on chronic HFpEF (grade 1 LV diastolic dysfunction and LVEF 65-70%, per echo in March 2024).  Patient was treated with IV furosemide in the hospital.  PTA furosemide was increased to 40 mg daily upon discharge.  Weight at discharge 257.5 LBS.   Today, she has trace to 1+ bilateral lower extremity edema.  Plan:  Continue furosemide 40 mg daily  Monitor weight and volume status    E. coli UTI.  RLE cellulitis (suspected).  Patient was treated with IV ceftriaxone in the hospital and discharged on oral cefadroxil.  Cellulitis has resolved and patient currently has no urinary symptoms.  Plan:  Complete the course of cefadroxil 500 mg twice daily; last dose today June 26  Monitor symptoms    Acute left ankle gout (suspected),  Hyperuricemia.  Uric acid 9.9 on June 23, 2024.  Treated with colchicine and prednisone burst.  On today's examination, there is no left ankle swelling.  Pain has markedly subsided.  Plan:  Continue colchicine 0.6 mg twice daily through July 1   Complete the course of prednisone burst (20 mg daily); last dose today, June 26  Start allopurinol 100 mg daily in 2 weeks with gradual dose titration for goal uric acid less than 6    Low normal potassium.  Due to diuretic therapy.  Last potassium 3.5 on June 20.  Plan:  Start Klor-Con 40 mEq daily  Monitor potassium level periodically    Hypertension.  Blood pressure is fairly controlled.  Plan:  Continue furosemide 40 mg daily  Monitor blood pressure    Dyslipidemia.  Not on medical therapy.  Plan:  Follow-up as outpatient    Hypothyroidism.  TSH 3.66 on May 21, 2024.  Plan:  Continue levothyroxine 88 mcg daily    History of left breast cancer (infiltrating ductal), s/p left mastectomy in 2005 .  Plan:  Follow-up as outpatient    Anxiety,  Depression,  Insomnia.  Symptoms seem to be  controlled.  Plan:  Discontinue scheduled lorazepam  Start lorazepam 0.5 mg every 8 hours as needed for anxiety  Continue duloxetine 60 mg daily   Continue melatonin 3 mg at bedtime  Monitor symptoms  Refer to ACP PRN    Chronic sacral ulcers.  Present on admission.  Due to friction, moisture, and pressure.  Plan:   Continue wound care per WOC RN instructions  Monitor    Osteoarthritis,  Chronic pain.  Plan:  Continue gabapentin 300 mg twice daily and 600 mg at bedtime  Continue acetaminophen 650 mg every 8 hours  Continue celecoxib 200 mg twice daily PRN  Continue topical diclofenac gel twice daily    Slow transit constipation.  Plan:  Continue the bowel regimen     Morbid obesity,  Hepatic steatosis,  JEAN.  BMI 45.61.  Noncompliant with CPAP for 1 year.  Her CPAP machine is now available in TCU.  Plan:  Continue nocturnal CPAP per home settings   Staff assist with daily care and mobility  Monitor O2 sats    Physical deconditioning.  Plan:  Continue PT/OT evaluation and therapy       Orders written by provider at facility:  Wean supplemental oxygen for goal O2 sats above 91%   Discontinue current lorazepam orders  New order: Lorazepam 0.5 mg p.o. every 8 hours as needed for anxiety; #20, SHEA: [id number]  Daily weight, DX: CHF  Klor-Con 40 mEq p.o. daily, DX: Hypokalemia  BMP on July 1, DX: CHF      Recommendation by provider at facility:  Initiate allopurinol 100 mg daily in 2 weeks      Total time: 80 minutes including face to face time with the patient, reviewing the notes, labs, and imaging in [facility] and [facility], adjusting medications, ordering new labs including BMP, communicating the plan of care with staff, and documenting all information electronically.         Disclaimer: This note contains text created using speech-recognition software and may have unintended word substitutions.      Electronically signed by:  Michael Fernandez MD

## 2024-06-25 NOTE — CONSULTS
SPIRITUAL HEALTH SERVICES - Consult Note  [facility] Med/Surg 3  Referral Source/Reason for Visit: [facility] consult.    Summary and Recommendations -  Pt Everette acknowledged feeling anxious about her transition to TCU today.  She shared that she is affiliated with [facility] and reported that they know about her hospitalization.    Plan: Informed Everette about the availability of  support at [facility].    Noman Jarrett M.Div., [facility]  Staff     SHS available 24/7 for emergent requests/referrals, either by paging the on-call  or by entering an ASAP/STAT consult in [facility], which will also page the on-call .    Assessment    Saw pt Carol HARDY Fuad per [facility] consult to assess pt's emotional/spiritual resources and needs per her length of stay.     Patient/Family Understanding of Illness and Goals of Care - not discussed.    Distress and Loss - Everette shared that she has a history of anxiety and acknowledged feeling anxious about her transition to a TCU today, about which she was just informed.    Strengths, Coping, and Resources - Everette named her four children, nine grandchildren, and five great grandchildren as being part of her support network.    Meaning, Beliefs, and Spirituality -   Trice reported that she is affiliated with [facility] and that they are aware of her hospitalization.    She welcomed prayer and engaged in a few moments of guided contemplative breathing before praying.

## 2024-06-25 NOTE — PLAN OF CARE
"Goal Outcome Evaluation:      Plan of Care Reviewed With: patient    Overall Patient Progress: improvingOverall Patient Progress: improving    Outcome Evaluation: 2L NC. Pt discharging to TCU      Pt A&Ox4. Denies pain. 2L NC. LS diminished. Pure wick in place. LE edema. Ax2 with michael steady. 2g Na diet, 1800mL fluid restriction. K, Mag protocols. Discharging via wheelchair transport today between 1190-2119.    PIV removed. Discharge packet sent with pt. Questions answered. Personal belonging sent with pt.               Problem: Adult Inpatient Plan of Care  Goal: Plan of Care Review  Description: The Plan of Care Review/Shift note should be completed every shift.  The Outcome Evaluation is a brief statement about your assessment that the patient is improving, declining, or no change.  This information will be displayed automatically on your shift  note.  Outcome: Adequate for Care Transition  Flowsheets (Taken 6/25/2024 1352)  Outcome Evaluation: 2L NC. Pt discharging to TCU  Plan of Care Reviewed With: patient  Overall Patient Progress: improving  Goal: Patient-Specific Goal (Individualized)  Description: You can add care plan individualizations to a care plan. Examples of Individualization might be:  \"Parent requests to be called daily at 9am for status\", \"I have a hard time hearing out of my right ear\", or \"Do not touch me to wake me up as it startles  me\".  Outcome: Adequate for Care Transition  Goal: Absence of Hospital-Acquired Illness or Injury  Outcome: Adequate for Care Transition  Intervention: Identify and Manage Fall Risk  Recent Flowsheet Documentation  Taken 6/25/2024 0852 by Jailene Bailon RN  Safety Promotion/Fall Prevention:   safety round/check completed   room organization consistent   room near nurse's station   patient and family education   nonskid shoes/slippers when out of bed   clutter free environment maintained   assistive device/personal items within reach   activity " supervised  Intervention: Prevent Skin Injury  Recent Flowsheet Documentation  Taken 6/25/2024 0837 by Jailene Bailon, RN  Body Position:   position maintained   heels elevated  Intervention: Prevent Infection  Recent Flowsheet Documentation  Taken 6/25/2024 0837 by Jailene Bailon RN  Infection Prevention: hand hygiene promoted  Goal: Optimal Comfort and Wellbeing  Outcome: Adequate for Care Transition  Goal: Readiness for Transition of Care  Outcome: Adequate for Care Transition  Flowsheets (Taken 6/25/2024 1352)  Transportation Anticipated: agency  Concerns to be Addressed: discharge planning  Intervention: Mutually Develop Transition Plan  Recent Flowsheet Documentation  Taken 6/25/2024 1352 by Jailene Bailon RN  Transportation Anticipated: agency  Concerns to be Addressed: discharge planning

## 2024-06-26 ENCOUNTER — PATIENT OUTREACH (OUTPATIENT)
Dept: CARE COORDINATION | Facility: CLINIC | Age: 89
End: 2024-06-26

## 2024-06-26 ENCOUNTER — TRANSITIONAL CARE UNIT VISIT (OUTPATIENT)
Dept: GERIATRICS | Facility: CLINIC | Age: 89
End: 2024-06-26
Payer: COMMERCIAL

## 2024-06-26 VITALS
HEART RATE: 87 BPM | HEIGHT: 63 IN | WEIGHT: 257.5 LBS | DIASTOLIC BLOOD PRESSURE: 80 MMHG | TEMPERATURE: 98.2 F | BODY MASS INDEX: 45.62 KG/M2 | SYSTOLIC BLOOD PRESSURE: 136 MMHG | RESPIRATION RATE: 18 BRPM | OXYGEN SATURATION: 96 %

## 2024-06-26 DIAGNOSIS — F41.9 ANXIETY: ICD-10-CM

## 2024-06-26 DIAGNOSIS — E03.9 HYPOTHYROIDISM, UNSPECIFIED TYPE: ICD-10-CM

## 2024-06-26 DIAGNOSIS — K59.01 SLOW TRANSIT CONSTIPATION: ICD-10-CM

## 2024-06-26 DIAGNOSIS — G47.33 OSA (OBSTRUCTIVE SLEEP APNEA): ICD-10-CM

## 2024-06-26 DIAGNOSIS — J96.91 RESPIRATORY FAILURE WITH HYPOXIA, UNSPECIFIED CHRONICITY (H): Primary | ICD-10-CM

## 2024-06-26 DIAGNOSIS — E79.0 HYPERURICEMIA: ICD-10-CM

## 2024-06-26 DIAGNOSIS — E78.5 DYSLIPIDEMIA: ICD-10-CM

## 2024-06-26 DIAGNOSIS — G89.29 OTHER CHRONIC PAIN: ICD-10-CM

## 2024-06-26 DIAGNOSIS — K76.0 HEPATIC STEATOSIS: ICD-10-CM

## 2024-06-26 DIAGNOSIS — B96.20 E. COLI UTI: ICD-10-CM

## 2024-06-26 DIAGNOSIS — M10.9 ACUTE GOUTY ARTHRITIS: ICD-10-CM

## 2024-06-26 DIAGNOSIS — Z85.3 HISTORY OF BREAST CANCER: ICD-10-CM

## 2024-06-26 DIAGNOSIS — R05.3 CHRONIC COUGH: ICD-10-CM

## 2024-06-26 DIAGNOSIS — J40 BRONCHITIS: ICD-10-CM

## 2024-06-26 DIAGNOSIS — G47.00 INSOMNIA, UNSPECIFIED TYPE: ICD-10-CM

## 2024-06-26 DIAGNOSIS — F32.A DEPRESSION, UNSPECIFIED DEPRESSION TYPE: ICD-10-CM

## 2024-06-26 DIAGNOSIS — R53.81 PHYSICAL DECONDITIONING: ICD-10-CM

## 2024-06-26 DIAGNOSIS — L03.115 CELLULITIS OF RIGHT LOWER EXTREMITY: ICD-10-CM

## 2024-06-26 DIAGNOSIS — I50.33 ACUTE ON CHRONIC HEART FAILURE WITH PRESERVED EJECTION FRACTION (H): ICD-10-CM

## 2024-06-26 DIAGNOSIS — I10 ESSENTIAL HYPERTENSION: ICD-10-CM

## 2024-06-26 DIAGNOSIS — E66.01 MORBID OBESITY (H): ICD-10-CM

## 2024-06-26 DIAGNOSIS — M19.90 OSTEOARTHRITIS, UNSPECIFIED OSTEOARTHRITIS TYPE, UNSPECIFIED SITE: ICD-10-CM

## 2024-06-26 DIAGNOSIS — N39.0 E. COLI UTI: ICD-10-CM

## 2024-06-26 DIAGNOSIS — L89.159 PRESSURE INJURY OF SKIN OF SACRAL REGION, UNSPECIFIED INJURY STAGE: ICD-10-CM

## 2024-06-26 PROCEDURE — 99306 1ST NF CARE HIGH MDM 50: CPT | Performed by: INTERNAL MEDICINE

## 2024-06-26 PROCEDURE — P9604 ONE-WAY ALLOW PRORATED TRIP: HCPCS | Performed by: NURSE PRACTITIONER

## 2024-06-26 PROCEDURE — 36415 COLL VENOUS BLD VENIPUNCTURE: CPT | Performed by: NURSE PRACTITIONER

## 2024-06-26 PROCEDURE — 86481 TB AG RESPONSE T-CELL SUSP: CPT | Performed by: NURSE PRACTITIONER

## 2024-06-26 RX ORDER — POTASSIUM CHLORIDE 1500 MG/1
40 TABLET, EXTENDED RELEASE ORAL DAILY
COMMUNITY

## 2024-06-26 RX ORDER — LORAZEPAM 0.5 MG/1
0.5 TABLET ORAL EVERY 8 HOURS PRN
COMMUNITY

## 2024-06-26 NOTE — LETTER
6/26/2024      Carol Merida  [address]  [place] [address]        [facility]  INITIAL VISIT NOTE  June 26, 2024    PRIMARY CARE PROVIDER AND CLINIC:  Ruben Humphrey PHYSICIAN [facility] [address] [address] / [place]*    CHIEF COMPLAINT:  Hospital follow-up/Initial visit     HPI:    Carol Merida is a 89 year old  (1935) female who was seen at [facility] on June 26, 2024 for an initial visit.     Medical history is notable for bronchitis, chronic cough, CHF, hypertension, dyslipidemia, hypothyroidism, breast cancer, melanoma, colon adenoma, anxiety, depression, osteoporosis, osteoarthritis, morbid obesity, and JEAN.    Summary of hospital course:  Patient was hospitalized at [facility] from June 20 through  June 25, 2024 for acute CHF and right lower extremity cellulitis.  She originally presented with increasing shortness of breath, cough, exertional hypoxia, and lower extremity edema.  She had similar presentation back in March 2024 due to bronchitis and suspected reactive airway disease.  Initial laboratory evaluation was significant for creatinine 0.71, CRP 15.74, lactic acid 2.2, procalcitonin 0.12, troponin I 16, N-terminal proBNP 114, white count 7.2, hemoglobin 13.4, and negative screening for COVID-19, influenza, and RSV.  UA was abnormal and urine culture grew more than 100,000 colonies per mL 2 strains of E. coli.  CT chest revealed no pulmonary embolism, no acute abnormality but mildly scattered mucus plugging in both lower lobes, cholelithiasis, and hepatic steatosis.  EKG showed normal sinus rhythm with a heart rate of 94 bpm and premature supraventricular complexes.  She was initially treated with IV furosemide for CHF, steroids and nebs for reactive airway disease, and IV ceftriaxone for UTI and possible cellulitis.  Hospital course was complicated by left ankle pain suspicious for a gout attack.  X-ray showed no fracture or  dislocation.  She was treated with colchicine and prednisone burst.  There was concern for possible aspiration causing her cough but SLP evaluation was normal.  WOC RN was consulted for wound to buttocks due to mixture of friction, moisture, and pressure.  IV ceftriaxone ultimately transitioned to oral cefadroxil.  PTA furosemide dose was increased to 40 mg daily.  She was discharged on supplemental oxygen at 2 L/min. TCU was recommended for rehab    Patient is admitted to this facility for medical management, nursing care, and subacute rehab.     Of note, history was obtained from patient, facility staff, and extensive review of the chart including hospital admission note, consult notes, and discharge summary.    Today's visit:  Patient was seen in her room, lying bed.  She appears weak but comfortable.  She is currently on oxygen at 2 L/min through nasal cannula.  She reports dyspnea with minimal exertion.  Her cough is improving.  Her right ankle has markedly subsided.  Right leg cellulitis has resolved.  She denies fever, chills, chest pain, palpitation, nausea, vomiting, abdominal pain, urinary symptoms.  She had a BM yesterday.      CODE STATUS:   DNR     PAST MEDICAL HISTORY:   Chronic HFpEF (grade 1 LV diastolic dysfunction and LVEF 65-70%, per echo in March 2024)  Hypertension  Dyslipidemia  Hypothyroidism  Left breast cancer (infiltrating ductal), s/p left mastectomy in 2005   Melanoma  Colon adenoma  Cholelithiasis  Hepatic steatosis  Gout (left ankle)  RLE cellulitis in June 2024  E. coli UTI in June 2024  Bronchitis/mucous plugging  Reactive airway disease (suspected)  Anxiety  Depression  Osteoporosis  Osteoarthritis  Chronic sacral ulcers  Morbid obesity  JEAN    Past Medical History:   Diagnosis Date     Abnormal stress test     small area on stress thalium ?2003; but normal angiogram 2012     Anemia      Anxiety      Cardiac dysrhythmia, unspecified     irregular heartbeat     CHF (congestive heart  failure) (H) 6/18/2018     PETTY (dyspnea on exertion)      Hyperlipidemia LDL goal <100 2/10/2010     Hypertension goal BP (blood pressure) < 140/90 7/18/2010     Hypothyroid      Malignant 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     Melanoma of skin, site unspecified      NONSPECIFIC MEDICAL HISTORY 04    fx fifth finger right hand     Obstructive sleep apnea (adult) (pediatric) 8/25/2006    CPAP      Osteoarthritis      Other chronic pain     Joint pain for many years.     Other osteoporosis      PONV (postoperative nausea and vomiting)      Tubular adenoma in colon 9/01    colonoscopy due 2004       PAST SURGICAL HISTORY:   Past Surgical History:   Procedure Laterality Date     ARTHROPLASTY HIP Left 7/6/2015    Procedure: ARTHROPLASTY HIP;  Surgeon: Junior Giordano MD;  Location: [facility] OR     ARTHROPLASTY HIP Right 11/2/2016    Procedure: ARTHROPLASTY HIP;  Surgeon: Junior Giordano MD;  Location: [facility] OR     ARTHROPLASTY REVISION HIP Left 7/22/2015    Procedure: ARTHROPLASTY REVISION HIP;  Surgeon: Junior Giordano MD;  Location: [facility] OR     CATARACT IOL, RT/LT       COLONOSCOPY  9/01    tubular adenoma; repeat 3 years.     COLONOSCOPY  9/04    normal; repeat 5 years ([facility])     HYSTERECTOMY, MARGAUX  summer 2004    and BSO     OPEN REDUCTION INTERNAL FIXATION RODDING INTRAMEDULLARY FEMUR Right 2/26/2023    Procedure: OPEN REDUCTION INTERNAL FIXATION RIGHT DISTAL FEMUR WITH RETROGRADE NAIL, LATERAL PLATE, AND CABLE;  Surgeon: Newton Lemon MD;  Location: [facility] OR     SURGICAL HISTORY OF -   9/05    left mastectomy     SURGICAL HISTORY OF -   2008    right ankle surgery; triple arthrodesis     SURGICAL HISTORY OF -   5/09    right ankle surgery; triple arthrodesis and deltoid reconstruction; possible other     SURGICAL HISTORY OF -   2/10    removal of hardware from right ankle     [facility] NONSPECIFIC PROCEDURE      Knee  replacement x 2 (B)     [facility] NONSPECIFIC PROCEDURE      s/p Tonsillectomy (college)     [facility] NONSPECIFIC PROCEDURE      s/p Appy (high school)     [facility] NONSPECIFIC PROCEDURE      Melanoma removed with sentinel node bx     [facility] NONSPECIFIC PROCEDURE       bilateral cataract       FAMILY HISTORY:   Family History   Problem Relation Age of Onset     No Known Problems Brother         brain tumor related to shingles in his eye     Arthritis Mother      Hypertension Father      Cancer Father         lung     Cancer Grandchild         terratoma tumors     Breast Cancer Daughter        SOCIAL HISTORY:  Social History     Tobacco Use     Smoking status: Never     Smokeless tobacco: Never   Substance Use Topics     Alcohol use: Yes     Alcohol/week: 0.0 - 0.8 standard drinks of alcohol     Comment: 1 glass wine weekly       MEDICATIONS:  Current Outpatient Medications   Medication Sig Dispense Refill     [START ON 6/26/2024] cefadroxil (DURICEF) 500 MG capsule Take 1 capsule (500 mg) by mouth 2 times daily for 1 day       colchicine (COLCRYS) 0.6 MG tablet Take 1 tablet (0.6 mg) by mouth 2 times daily for 6 days       furosemide (LASIX) 40 MG tablet Take 1 tablet (40 mg) by mouth daily       LORazepam (ATIVAN) 0.5 MG tablet Lorazepam 0.5 mg bid x 2 weeks then every day x 2 weeks, then every other day x 2 weeks then stop 30 tablet 0     LORazepam (ATIVAN) 0.5 MG tablet Take 1 tablet (0.5 mg) by mouth daily as needed for anxiety 5 tablet 0     [START ON 6/26/2024] predniSONE (DELTASONE) 20 MG tablet Take 1 tablet (20 mg) by mouth daily for 1 day         MED REC REQUIRED  Post Medication Reconciliation Status: discharge medications reconciled and changed, per note/orders      ALLERGIES:  Allergies   Allergen Reactions     Ace Inhibitors Cough       ROS:  10 point ROS were negative other than the symptoms noted above in the HPI.    PHYSICAL EXAM:  Vital signs were reviewed in the chart.  Vital Signs: /80   Pulse 87   Temp  "98.2  F (36.8  C)   Resp 18   Ht 1.6 m (5' 3\")   Wt 116.8 kg (257 lb 8 oz)   LMP  (LMP Unknown)   SpO2 96%   BMI 45.61 kg/m     General: Weak appearing but comfortable and in no acute distress  HEENT: No conjunctival pallor, no scleral icterus or injection, moist oral mucosa  Cardiovascular: Normal S1, S2, RRR  Respiratory: Lungs clear to auscultation bilaterally; no wheezing, rhonchi, or crackles  GI: Abdomen soft, non-tender, somewhat distended, +BS  Extremities: Trace-1+ bilateral LE edema; no ankle swelling  Neuro: CX II-XII grossly intact; ROM in all four extremities grossly intact  Psych: Alert and oriented almost x3; normal affect  Skin: No acute rash; no lower extremity skin erythema    LABORATORY/IMAGING DATA:  All relevant labs and imaging data in [facility] and/or Care Everywhere were personally reviewed today.    Most Recent 3 CBC's:  Recent Labs   Lab Test 06/25/24  0645 06/23/24  0638 06/21/24  0629   WBC 8.8 8.1 7.7   HGB 12.5 12.9 13.5   MCV 94 95 95    281 272     Most Recent 3 BMP's:  Recent Labs   Lab Test 06/25/24  0645 06/24/24  0645 06/23/24  0638    137 137   POTASSIUM 3.5 3.5 3.6   CHLORIDE 96* 95* 94*   CO2 31* 30* 28   BUN 16.3 15.5 16.7   CR 0.57 0.54 0.57   ANIONGAP 11 12 15   LAKSMHI 9.1 9.1 9.4   * 138* 122*     Most Recent 2 LFT's:  Recent Labs   Lab Test 05/21/24  0944 09/05/23  0938   AST 50* 34   ALT 30 16   ALKPHOS 128 111*   BILITOTAL 0.7 0.5         ASSESSMENT/PLAN:  Hypoxic respiratory failure,  Bilateral lower lobe mucous plugging,  Bronchitis,  Reactive airway disease (suspected),  Chronic cough.  Respiratory failure likely chronic and multifactorial due to untreated JEAN, mucous plugging, and CHF.  Patient was treated with steroids and nebs inpatient.  She was discharged on supplemental oxygen at 2 L/.  She is currently stable from respiratory standpoint.  Cough has subsided.  Plan:  Continue supplemental oxygen, wean as able; but likely would need it " chronically  Continue fluticasone/salmeterol 100-50 mcg, 1 puff twice daily  Continue albuterol neb 2.5 mg every 4 hours PRN  Continue albuterol 2 puffs every 4 hours PRN  Continue Flonase 1 spray into both nostrils daily  Continue guaifenesin 400 mg twice daily  Monitor respiratory status    Acute on chronic HFpEF (grade 1 LV diastolic dysfunction and LVEF 65-70%, per echo in March 2024).  Patient was treated with IV furosemide in the hospital.  PTA furosemide was increased to 40 mg daily upon discharge.  Weight at discharge 257.5 LBS.   Today, she has trace to 1+ bilateral lower extremity edema.  Plan:  Continue furosemide 40 mg daily  Monitor weight and volume status    E. coli UTI.  RLE cellulitis (suspected).  Patient was treated with IV ceftriaxone in the hospital and discharged on oral cefadroxil.  Cellulitis has resolved and patient currently has no urinary symptoms.  Plan:  Complete the course of cefadroxil 500 mg twice daily; last dose today June 26  Monitor symptoms    Acute left ankle gout (suspected,  Hyperuricemia.  Uric acid 9.9 on June 23, 2024.  Treated with colchicine and prednisone burst.  On today's examination, there is no left ankle swelling.  Pain has markedly subsided.  Plan:  Continue colchicine 0.6 mg twice daily through July 1   Complete the course of prednisone burst (20 mg daily); last dose today, June 26  Start allopurinol 100 mg daily in 2 weeks with gradual dose titration for goal uric acid less than 6    Low normal potassium.  Due to diuretic therapy.  Last potassium 3.5 on June 20.  Plan:  Start Klor-Con 40 mEq daily  Monitor potassium level periodically    Hypertension.  Blood pressure is fairly controlled.  Plan:  Continue furosemide 40 mg daily  Monitor blood pressure    Dyslipidemia.  Not on medical therapy.  Plan:  Follow-up as outpatient    Hypothyroidism.  TSH 3.66 on May 21, 2024.  Plan:  Continue levothyroxine 88 mcg daily    History of left breast cancer (infiltrating  ductal), s/p left mastectomy in 2005 .  Plan:  Follow-up as outpatient    Anxiety,  Depression,  Insomnia.  Symptoms seem to be controlled.  Plan:  Discontinue scheduled lorazepam  Start lorazepam 0.5 mg every 8 hours as needed for anxiety  Continue duloxetine 60 mg daily   Continue melatonin 3 mg at bedtime  Monitor symptoms  Refer to ACP PRN    Chronic sacral ulcers.  Present on admission.  Due to friction, moisture, and pressure.  Plan:   Continue wound care per WOC RN instructions  Monitor    Osteoarthritis,  Chronic pain.  Plan:  Continue gabapentin 300 mg twice daily and 600 mg at bedtime  Continue acetaminophen 650 mg every 8 hours  Continue celecoxib 200 mg twice daily PRN  Continue topical diclofenac gel twice daily    Slow transit constipation.  Plan:  Continue the bowel regimen     Morbid obesity,  Hepatic steatosis,  JEAN.  BMI 45.61.  Noncompliant with CPAP for 1 year.  Her CPAP machine is available in TCU.  Plan:  Continue nocturnal CPAP per home settings   Staff assist with daily care and mobility  Monitor O2 sats    Physical deconditioning.  Plan:  Continue PT/OT evaluation and therapy       Orders written by provider at facility:  Wean supplemental oxygen for goal O2 sats above 91%   Discontinue current lorazepam orders  New order: Lorazepam 0.5 mg p.o. every 8 hours as needed for anxiety; #20, SHEA: [id number]  Daily weight, DX: CHF  Klor-Con 40 mEq p.o. daily, DX: Hypokalemia  BMP on July 1, DX: CHF      Recommendation by provider at facility:  Initiate allopurinol 100 mg daily in 2 weeks      Total time: 80 minutes including face to face time with the patient, reviewing the notes, labs, and imaging in [facility] and [facility], adjusting medications, ordering new labs including BMP, communicating the plan of care with staff, and documenting all information electronically.         Disclaimer: This note contains text created using speech-recognition software and may have unintended word  substitutions.      Electronically signed by:  Michael Fernandez MD                       Sincerely,        Michael Fernandez MD

## 2024-06-26 NOTE — PATIENT INSTRUCTIONS
Orders for Carol Merida (1935), MR# [id number]:    1) Wean supplemental oxygen for goal O2 sats above 91%   2) Discontinue current lorazepam orders  3) New order: Lorazepam 0.5 mg p.o. every 8 hours as needed for anxiety; #20, SHEA: [id number]  4) Daily weight, DX: CHF  5) Klor-Con 40 mEq p.o. daily, DX: Hypokalemia  6) BMP on July 1, DX: CHF    Michael Fernandez MD  [facility]

## 2024-06-26 NOTE — PROGRESS NOTES
General acute hospital    Background: Transitional Care Management program identified per system criteria and reviewed by [facility] team for possible outreach.    Assessment: Upon chart review, CCRC Team member will not proceed with patient outreach related to this episode of Transitional Care Management program due to reason below:    Non-[facility] TCU: CCRC team member noted patient discharged to TCU/ARU/LTACH. Patient is not established with a [facility] currently supported by Primary Care-Care Coordination therefore handoff to Primary Care-Care Coordination is not appropriate at this time.    Plan: Transitional Care Management episode addressed appropriately per reason noted above.      KATELIN Hernández  [phone]  [facility]     *Connected Care Resource Team does NOT follow patient ongoing. Referrals are identified based on internal discharge reports and the outreach is to ensure patient has an understanding of their discharge instructions.

## 2024-06-27 ENCOUNTER — TRANSITIONAL CARE UNIT VISIT (OUTPATIENT)
Dept: GERIATRICS | Facility: CLINIC | Age: 89
End: 2024-06-27
Payer: COMMERCIAL

## 2024-06-27 VITALS
RESPIRATION RATE: 18 BRPM | OXYGEN SATURATION: 95 % | SYSTOLIC BLOOD PRESSURE: 106 MMHG | DIASTOLIC BLOOD PRESSURE: 73 MMHG | BODY MASS INDEX: 44.47 KG/M2 | TEMPERATURE: 97.8 F | HEIGHT: 63 IN | HEART RATE: 80 BPM | WEIGHT: 251 LBS

## 2024-06-27 DIAGNOSIS — L03.115 CELLULITIS OF RIGHT LOWER EXTREMITY: ICD-10-CM

## 2024-06-27 DIAGNOSIS — L03.116 LEFT LEG CELLULITIS: Primary | ICD-10-CM

## 2024-06-27 LAB
QUANTIFERON MITOGEN: 0.5 IU/ML
QUANTIFERON NIL TUBE: 0 IU/ML
QUANTIFERON TB1 TUBE: 0 IU/ML
QUANTIFERON TB2 TUBE: 0

## 2024-06-27 PROCEDURE — 99309 SBSQ NF CARE MODERATE MDM 30: CPT

## 2024-06-27 NOTE — LETTER
6/27/2024      Carol Merida  [address]  [place] [address]        No notes on file      Sincerely,        BEATRICE Fraser CNP

## 2024-06-27 NOTE — PROGRESS NOTES
"[facility]    Chief Complaint   Patient presents with    Nursing Home Acute     HPI:  Carol Merida is a 89 year old  (1935), who is being seen today for an episodic care visit at: [facility]  ([facility]) [[id number]]. Today's concern is: Seen today at nursing request with concern for LLE cellulitis. BY chart review, patient was treated for suspected RLE cellulitis (Rocephin for concurrent UTI> cefadroxil through 6/26.) Seen today in her room in [facility]. She reports starting this morning she had increased left shin swelling, redness, warmth and pain. She has weeping from the leg with moderate serous drainage. She is denying CP, SOB, fever/chills.    Allergies, and PMH/PSH reviewed in EPIC today.  REVIEW OF SYSTEMS:  4 point ROS including Respiratory, CV, GI and , other than that noted in the HPI,  is negative    Objective:   /73   Pulse 80   Temp 97.8  F (36.6  C)   Resp 18   Ht 1.6 m (5' 3\")   Wt 113.9 kg (251 lb)   LMP  (LMP Unknown)   SpO2 95%   BMI 44.46 kg/m    GENERAL APPEARANCE:  Alert, in no distress  RESP:  respiratory effort and palpation of chest normal, lungs clear to auscultation , no respiratory distress  M/S:   Gait and station abnormal tranfers with assist, wheelchair for mobility  SKIN:  anterior left shin red, warm, moderate non-pitting edema, moderate serous drainage  PSYCH:  affect and mood normal    Labs done in SNF are in [facility]. Please refer to them using [facility]/Care Everywhere. and Recent labs in [facility] reviewed by me today.     Assessment/Plan:  (L03.116) Left leg cellulitis  (primary encounter diagnosis)  (L03.115) Cellulitis of right lower extremity  Comment: received IV rocephin for concurrent UTI and suspected RLE cellulitis, discharged on one day of cefadroxil completed yesterday 6/26. Now with new cellulitis in the LLE  Plan: extend cefadroxil 500 mg through 7/2/24. Monitor symptoms. Local wound care per nursing      MED REC REQUIRED  Post " Medication Reconciliation Status: medication reconcilation previously completed during another office visit      Orders:  Cefadroxil 500 mg BID x 5 days    Electronically signed by: BEATRICE Fraser CNP

## 2024-06-27 NOTE — PATIENT INSTRUCTIONS
Orders  Carol Merida  1935     Cefadroxil 500 mg bid x 5 days dx: cellulitis      BEATRICE Fraser CNP on 6/27/2024 at 2:33 PM

## 2024-06-28 LAB
GAMMA INTERFERON BACKGROUND BLD IA-ACNC: 0 IU/ML
M TB IFN-G BLD-IMP: NEGATIVE
M TB IFN-G CD4+ BCKGRND COR BLD-ACNC: 0.5 IU/ML
MITOGEN IGNF BCKGRD COR BLD-ACNC: 0 IU/ML
MITOGEN IGNF BCKGRD COR BLD-ACNC: 0 IU/ML

## 2024-06-28 RX ORDER — CEFADROXIL 500 MG/1
500 CAPSULE ORAL 2 TIMES DAILY
COMMUNITY
End: 2024-07-02

## 2024-06-28 NOTE — PROGRESS NOTES
[facility]  July 1, 2024      CHIEF COMPLAINT:  Episodic/follow-up visit    HPI:    Carol Merida is a 89 year old  (1935), who is being seen today for an episodic care visit at [facility].     Medical history is notable for bronchitis, chronic cough, CHF, hypertension, dyslipidemia, hypothyroidism, breast cancer, melanoma, colon adenoma, anxiety, depression, osteoporosis, osteoarthritis, morbid obesity, and JEAN.     Summary of hospital course:  Patient was hospitalized at [facility] from June 20 through  June 25, 2024 for acute CHF and right lower extremity cellulitis.  She originally presented with increasing shortness of breath, cough, exertional hypoxia, and lower extremity edema.  She had similar presentation back in March 2024 due to bronchitis and suspected reactive airway disease.  Initial laboratory evaluation was significant for creatinine 0.71, CRP 15.74, lactic acid 2.2, procalcitonin 0.12, troponin I 16, N-terminal proBNP 114, white count 7.2, hemoglobin 13.4, and negative screening for COVID-19, influenza, and RSV.  UA was abnormal and urine culture grew more than 100,000 colonies per mL 2 strains of E. coli.  CT chest revealed no pulmonary embolism, no acute abnormality but mildly scattered mucus plugging in both lower lobes, cholelithiasis, and hepatic steatosis.  EKG showed normal sinus rhythm with a heart rate of 94 bpm and premature supraventricular complexes.  She was initially treated with IV furosemide for CHF, steroids and nebs for reactive airway disease, and IV ceftriaxone for UTI and possible cellulitis.  Hospital course was complicated by left ankle pain suspicious for a gout attack.  X-ray showed no fracture or dislocation.  She was treated with colchicine and prednisone burst.  There was concern for possible aspiration causing her cough but SLP evaluation was normal.  WOC RN was consulted for wound to buttocks due to  mixture of friction, moisture, and pressure.  IV ceftriaxone ultimately transitioned to oral cefadroxil.  PTA furosemide dose was increased to 40 mg daily.  She was discharged on supplemental oxygen at 2 L/min. TCU was recommended for rehab. She was then admitted to this facility for medical management, nursing care, and subacute rehab.     Today's visit:  Patient was seen in her room, lying in bed.  She appears somewhat weak but overall feels good and comfortable.  Cefadroxil course was extended last week due to new left leg cellulitis.  She denies fever, chills, chest pain, palpitation, dyspnea, nausea, vomiting, abdominal pain, or urinary symptoms.  Her cough has markedly subsided.  She had a BM yesterday.      CODE STATUS:   DNR / DNI    Past Medical, Surgical, Family, and Social History were reviewed in [facility].    Current Outpatient Medications   Medication Sig Dispense Refill    ACE/ARB/ARNI NOT PRESCRIBED (INTENTIONAL) Please choose reason not prescribed, below (Patient not taking: Reported on 5/29/2024)      acetaminophen (TYLENOL) 325 MG tablet Take 650 mg by mouth every 8 hours 0630, 1400, 2145      albuterol (PROAIR HFA/PROVENTIL HFA/VENTOLIN HFA) 108 (90 Base) MCG/ACT inhaler INHALE 2 PUFFS INTO THE LUNGS EVERY 4 HOURS AS NEEDED FOR SHORTNESS OF BREATH OR DIFFICULT BREATHING OR WHEEZING 8.5 g 0    albuterol (PROVENTIL) (2.5 MG/3ML) 0.083% neb solution Take 1 vial (2.5 mg) by nebulization every 4 hours as needed for shortness of breath, wheezing or cough And BID 90 mL     ARTIFICIAL SALIVA MT Take 1 strip by mouth 2 times daily After nebs      BETA BLOCKER NOT PRESCRIBED (INTENTIONAL) Beta Blocker not prescribed intentionally due to EF > 40 % (ejection fraction) will leave up to Cardiology. (Patient not taking: Reported on 5/29/2024)      calcium carbonate 600 mg-vitamin D 400 units (CALTRATE) 600-400 MG-UNIT per tablet Take 1 tablet by mouth every evening  100 tablet 3    celecoxib (CELEBREX) 100 MG  capsule Take 100 mg by mouth 2 times daily as needed for moderate pain (4-6)      clobetasol propionate 0.05 % LOTN Externally apply topically 2 times daily To palm of hands      colchicine (COLCRYS) 0.6 MG tablet Take 1 tablet (0.6 mg) by mouth 2 times daily for 6 days      cycloSPORINE (CYCLOSPORINE IN KLARITY) 0.1 % EMUL Place 1 drop into both eyes 2 times daily 0630 and 2145      diclofenac (VOLTAREN) 1 % topical gel Apply 2 g topically 2 times daily      DULoxetine (CYMBALTA) 60 MG capsule Take 60 mg by mouth daily      fluticasone (FLONASE) 50 MCG/ACT nasal spray Spray 1 spray into both nostrils every morning      fluticasone-salmeterol (WIXELA INHUB) 100-50 MCG/ACT inhaler Inhale 1 puff into the lungs 2 times daily 60 each 5    furosemide (LASIX) 40 MG tablet Take 1 tablet (40 mg) by mouth daily      gabapentin (NEURONTIN) 300 MG capsule Take 1 capsule (300 mg) by mouth 2 times daily      gabapentin (NEURONTIN) 600 MG tablet Take 1 tablet (600 mg) by mouth At Bedtime      guaiFENesin 400 MG TABS Take 400 mg by mouth 2 times daily      ipratropium - albuterol 0.5 mg/2.5 mg/3 mL (DUONEB) 0.5-2.5 (3) MG/3ML neb solution Take 1 vial by nebulization 3 times daily      levothyroxine (SYNTHROID/LEVOTHROID) 88 MCG tablet Take 1 tablet (88 mcg) by mouth daily 90 tablet 0    LORazepam (ATIVAN) 0.5 MG tablet Take 0.5 mg by mouth every 8 hours as needed for anxiety      melatonin 3 MG tablet Take 3 mg by mouth At Bedtime      nystatin (NYSTOP) 969567 UNIT/GM external powder Apply tid to affectead area prn 60 g 3    polyethylene glycol (MIRALAX) 17 g packet Take 1 packet by mouth daily as needed for constipation      potassium chloride stuart ER (KLOR-CON M20) 20 MEQ CR tablet Take 40 mEq by mouth daily      PseudoePHEDrine-guaiFENesin 120-1200 MG TB12 Take 1 tablet by mouth 2 times daily as needed for congestion      triamcinolone (KENALOG) 0.1 % external cream Apply topically every evening      zinc oxide (DESITIN) 40 %  "external ointment Apply topically as needed for dry skin or irritation (with toileting)         MED REC REQUIRED  Post Medication Reconciliation Status: medication reconcilation previously completed during another office visit      ALLERGIES: Ace inhibitors    REVIEW OF SYSTEMS:  10 point ROS were negative other than the symptoms noted above in the HPI.    PHYSICAL EXAM:  Vital signs were reviewed in the chart.  Vital Signs:  /70   Pulse 82   Temp 97.9  F (36.6  C)   Resp 18   Ht 1.6 m (5' 3\")   Wt 113 kg (249 lb 3.2 oz)   LMP  (LMP Unknown)   SpO2 91%   BMI 44.14 kg/m     General: Somewhat weak appearing but comfortable and in no acute distress  HEENT: No conjunctival pallor, no scleral icterus or injection, moist oral mucosa  Cardiovascular: Normal S1, S2, RRR  Respiratory: Lungs clear to auscultation bilaterally  GI: Abdomen soft, non-tender, non-distended, +BS  Extremities: Trace bilateral LE edema  Neuro: CX II-XII grossly intact; ROM in all four extremities grossly intact  Psych: Alert and oriented x3; normal affect  Skin: Slightly residual left lower extremity erythema    LABORATORY/IMAGING DATA:  All relevant labs and imaging data in [facility] and/or Care Everywhere were personally reviewed today.    Most Recent 3 CBC's:  Recent Labs   Lab Test 06/25/24  0645 06/23/24  0638 06/21/24  0629   WBC 8.8 8.1 7.7   HGB 12.5 12.9 13.5   MCV 94 95 95    281 272     Most Recent 3 BMP's:  Recent Labs   Lab Test 07/01/24  0709 06/25/24  0645 06/24/24  0645    138 137   POTASSIUM 4.4 3.5 3.5   CHLORIDE 98 96* 95*   CO2 24 31* 30*   BUN 17.7 16.3 15.5   CR 0.63 0.57 0.54   ANIONGAP 17* 11 12   LAKSHMI 9.1 9.1 9.1   * 120* 138*     Most Recent 2 LFT's:  Recent Labs   Lab Test 05/21/24  0944 09/05/23  0938   AST 50* 34   ALT 30 16   ALKPHOS 128 111*   BILITOTAL 0.7 0.5       ASSESSMENT/PLAN:  Hypoxic respiratory failure,  Bilateral lower lobe mucous plugging,  Bronchitis,  Reactive airway disease " (suspected),  Chronic cough.  Respiratory failure likely chronic and multifactorial due to untreated JEAN, mucous plugging, and CHF.  Patient was treated with steroids and nebs inpatient.  She was discharged on supplemental oxygen at 2 L/min.  She is currently saturating at 91%.  Cough has markedly subsided.   Plan:  Continue supplemental oxygen, wean as able; but likely would need it chronically  Continue nocturnal CPAP  Continue fluticasone-salmeterol 100-50 mcg, 1 puff twice daily  Continue albuterol neb 2.5 mg every 4 hours PRN  Continue albuterol 2 puffs every 4 hours PRN  Continue Flonase 1 spray into both nostrils daily  Continue guaifenesin 400 mg twice daily  Monitor respiratory status     Acute on chronic HFpEF (grade 1 LV diastolic dysfunction and LVEF 65-70%, per echo in March 2024).  Patient was treated with IV furosemide in the hospital.  PTA furosemide was increased to 40 mg daily upon discharge.  Weight at discharge 257.5 LBS.   She currently weighs 249 LBS and has trace bilateral lower extremity edema.  Plan:  Continue furosemide 40 mg daily  Monitor weight and volume status     E. coli UTI.  RLE cellulitis (suspected),  LLE cellulitis.  Patient was treated with IV ceftriaxone in the hospital for UTI and suspected RLE cellulitis and discharged on oral cefadroxil.  In TCU, she noted to have new left leg cellulitis on June 26 and cefadroxil course was extended.  Cellulitis has markedly improved.  Plan:  Continue cefadroxil 500 mg twice daily through July 2, 2024  Monitor      Acute left ankle gout (suspected),  Hyperuricemia.  Uric acid 9.9 on June 23, 2024.  Treated with colchicine and prednisone burst.  Completed the course of prednisone burst on June 26.  On today's examination, there is no left ankle swelling and patient reports no pain.  Plan:  Complete the course of colchicine 0.6 mg twice daily; last  dose today July 1   Start allopurinol 100 mg daily in 10 days on July 10 with gradual dose  titration for goal uric acid less than 6 (discussed with the patient)     Low normal potassium.  Due to diuretic therapy.  Potassium 3.5 on June 20.  Started on Klor-Con on June 26.  Plan:  Continue Klor-Con 40 mEq daily  Monitor potassium level periodically     Hypertension.  Blood pressure is fairly controlled.  Plan:  Continue furosemide 40 mg daily  Monitor blood pressure     Dyslipidemia.  Not on medical therapy.  Plan:  Follow-up as outpatient     Hypothyroidism.  TSH 3.66 on May 21, 2024.  Plan:  Continue levothyroxine 88 mcg daily     History of left breast cancer (infiltrating ductal), s/p left mastectomy in 2005 .  Plan:  Follow-up as outpatient     Anxiety,  Depression,  Insomnia.  Symptoms seem to be controlled.  Discontinue scheduled lorazepam on June 26.  Plan:  Continue lorazepam 0.5 mg every 8 hours as needed for anxiety  Continue duloxetine 60 mg daily   Continue melatonin 3 mg at bedtime  Monitor symptoms  Refer to ACP PRN     Chronic sacral ulcers.  Present on admission.  Due to friction, moisture, and pressure.  Plan:   Continue wound care per WOC RN instructions  Monitor     Osteoarthritis,  Chronic pain.  Plan:  Continue gabapentin 300 mg twice daily and 600 mg at bedtime  Continue acetaminophen 650 mg every 8 hours  Continue celecoxib 200 mg twice daily PRN  Continue topical diclofenac gel twice daily     Slow transit constipation.  Plan:  Continue the bowel regimen     Hepatic steatosis.  Plan:  Follow-up as outpatient     Morbid obesity,  JEAN.  BMI 45.61.  Noncompliant with CPAP for 1 year.  Her CPAP machine is available in TCU.  Plan:  Continue nocturnal CPAP per home settings   Staff assist with daily care and mobility  Monitor O2 sats     Physical deconditioning.  Plan:  Continue PT/OT evaluation and therapy          Orders written by provider at facility:  BMP on July 8, DX: CHF  Allopurinol 100 mg p.o. daily from July 10, DX: Gout, hyperuricemia        Total time: 45 minutes including  face to face time with the patient, reviewing the notes, labs in EPIC and PCC, ordering new medication, ordering new labs, communicating the plan of care with NP, and documenting all information electronically.     Disclaimer: This note contains text created using speech-recognition software and may have unintended word substitutions.      Electronically signed by  Michael Fernandez MD

## 2024-06-30 ENCOUNTER — LAB REQUISITION (OUTPATIENT)
Dept: LAB | Facility: CLINIC | Age: 89
End: 2024-06-30
Payer: COMMERCIAL

## 2024-06-30 VITALS
TEMPERATURE: 97.9 F | HEART RATE: 82 BPM | SYSTOLIC BLOOD PRESSURE: 117 MMHG | BODY MASS INDEX: 44.16 KG/M2 | RESPIRATION RATE: 18 BRPM | HEIGHT: 63 IN | WEIGHT: 249.2 LBS | OXYGEN SATURATION: 91 % | DIASTOLIC BLOOD PRESSURE: 70 MMHG

## 2024-06-30 DIAGNOSIS — I50.42 CHRONIC COMBINED SYSTOLIC (CONGESTIVE) AND DIASTOLIC (CONGESTIVE) HEART FAILURE (H): ICD-10-CM

## 2024-07-01 ENCOUNTER — TRANSITIONAL CARE UNIT VISIT (OUTPATIENT)
Dept: GERIATRICS | Facility: CLINIC | Age: 89
End: 2024-07-01
Payer: COMMERCIAL

## 2024-07-01 DIAGNOSIS — G89.29 OTHER CHRONIC PAIN: ICD-10-CM

## 2024-07-01 DIAGNOSIS — M19.90 OSTEOARTHRITIS, UNSPECIFIED OSTEOARTHRITIS TYPE, UNSPECIFIED SITE: ICD-10-CM

## 2024-07-01 DIAGNOSIS — M10.9 ACUTE GOUTY ARTHRITIS: ICD-10-CM

## 2024-07-01 DIAGNOSIS — E66.01 MORBID OBESITY (H): ICD-10-CM

## 2024-07-01 DIAGNOSIS — R05.3 CHRONIC COUGH: ICD-10-CM

## 2024-07-01 DIAGNOSIS — E78.5 DYSLIPIDEMIA: ICD-10-CM

## 2024-07-01 DIAGNOSIS — I10 ESSENTIAL HYPERTENSION: ICD-10-CM

## 2024-07-01 DIAGNOSIS — E03.9 HYPOTHYROIDISM, UNSPECIFIED TYPE: ICD-10-CM

## 2024-07-01 DIAGNOSIS — J96.91 RESPIRATORY FAILURE WITH HYPOXIA, UNSPECIFIED CHRONICITY (H): Primary | ICD-10-CM

## 2024-07-01 DIAGNOSIS — K76.0 HEPATIC STEATOSIS: ICD-10-CM

## 2024-07-01 DIAGNOSIS — F41.9 ANXIETY: ICD-10-CM

## 2024-07-01 DIAGNOSIS — L89.159 PRESSURE INJURY OF SKIN OF SACRAL REGION, UNSPECIFIED INJURY STAGE: ICD-10-CM

## 2024-07-01 DIAGNOSIS — N39.0 E. COLI UTI: ICD-10-CM

## 2024-07-01 DIAGNOSIS — G47.33 OSA (OBSTRUCTIVE SLEEP APNEA): ICD-10-CM

## 2024-07-01 DIAGNOSIS — J40 BRONCHITIS: ICD-10-CM

## 2024-07-01 DIAGNOSIS — Z85.3 HISTORY OF BREAST CANCER: ICD-10-CM

## 2024-07-01 DIAGNOSIS — B96.20 E. COLI UTI: ICD-10-CM

## 2024-07-01 DIAGNOSIS — F32.A DEPRESSION, UNSPECIFIED DEPRESSION TYPE: ICD-10-CM

## 2024-07-01 DIAGNOSIS — K59.01 SLOW TRANSIT CONSTIPATION: ICD-10-CM

## 2024-07-01 DIAGNOSIS — I50.33 ACUTE ON CHRONIC HEART FAILURE WITH PRESERVED EJECTION FRACTION (H): ICD-10-CM

## 2024-07-01 DIAGNOSIS — E79.0 HYPERURICEMIA: ICD-10-CM

## 2024-07-01 DIAGNOSIS — R53.81 PHYSICAL DECONDITIONING: ICD-10-CM

## 2024-07-01 DIAGNOSIS — G47.00 INSOMNIA, UNSPECIFIED TYPE: ICD-10-CM

## 2024-07-01 DIAGNOSIS — L03.116 CELLULITIS OF LEFT LOWER EXTREMITY: ICD-10-CM

## 2024-07-01 LAB
ANION GAP SERPL CALCULATED.3IONS-SCNC: 17 MMOL/L (ref 7–15)
BUN SERPL-MCNC: 17.7 MG/DL (ref 8–23)
CALCIUM SERPL-MCNC: 9.1 MG/DL (ref 8.8–10.2)
CHLORIDE SERPL-SCNC: 98 MMOL/L (ref 98–107)
CREAT SERPL-MCNC: 0.63 MG/DL (ref 0.51–0.95)
DEPRECATED HCO3 PLAS-SCNC: 24 MMOL/L (ref 22–29)
EGFRCR SERPLBLD CKD-EPI 2021: 85 ML/MIN/1.73M2
GLUCOSE SERPL-MCNC: 111 MG/DL (ref 70–99)
POTASSIUM SERPL-SCNC: 4.4 MMOL/L (ref 3.4–5.3)
SODIUM SERPL-SCNC: 139 MMOL/L (ref 135–145)

## 2024-07-01 PROCEDURE — 99310 SBSQ NF CARE HIGH MDM 45: CPT | Performed by: INTERNAL MEDICINE

## 2024-07-01 PROCEDURE — 82565 ASSAY OF CREATININE: CPT | Performed by: NURSE PRACTITIONER

## 2024-07-01 PROCEDURE — 36415 COLL VENOUS BLD VENIPUNCTURE: CPT | Performed by: NURSE PRACTITIONER

## 2024-07-01 PROCEDURE — P9604 ONE-WAY ALLOW PRORATED TRIP: HCPCS | Performed by: NURSE PRACTITIONER

## 2024-07-01 RX ORDER — ALLOPURINOL 100 MG/1
100 TABLET ORAL DAILY
COMMUNITY

## 2024-07-01 NOTE — LETTER
7/1/2024      Carol Merida  [address]  [place] [address]        [facility]  July 1, 2024      CHIEF COMPLAINT:  Episodic/follow-up visit    HPI:    Carol Merida is a 89 year old  (1935), who is being seen today for an episodic care visit at [facility].     Medical history is notable for bronchitis, chronic cough, CHF, hypertension, dyslipidemia, hypothyroidism, breast cancer, melanoma, colon adenoma, anxiety, depression, osteoporosis, osteoarthritis, morbid obesity, and JEAN.     Summary of hospital course:  Patient was hospitalized at [facility] from June 20 through  June 25, 2024 for acute CHF and right lower extremity cellulitis.  She originally presented with increasing shortness of breath, cough, exertional hypoxia, and lower extremity edema.  She had similar presentation back in March 2024 due to bronchitis and suspected reactive airway disease.  Initial laboratory evaluation was significant for creatinine 0.71, CRP 15.74, lactic acid 2.2, procalcitonin 0.12, troponin I 16, N-terminal proBNP 114, white count 7.2, hemoglobin 13.4, and negative screening for COVID-19, influenza, and RSV.  UA was abnormal and urine culture grew more than 100,000 colonies per mL 2 strains of E. coli.  CT chest revealed no pulmonary embolism, no acute abnormality but mildly scattered mucus plugging in both lower lobes, cholelithiasis, and hepatic steatosis.  EKG showed normal sinus rhythm with a heart rate of 94 bpm and premature supraventricular complexes.  She was initially treated with IV furosemide for CHF, steroids and nebs for reactive airway disease, and IV ceftriaxone for UTI and possible cellulitis.  Hospital course was complicated by left ankle pain suspicious for a gout attack.  X-ray showed no fracture or dislocation.  She was treated with colchicine and prednisone burst.  There was concern for possible aspiration causing her cough but SLP  evaluation was normal.  WOC RN was consulted for wound to buttocks due to mixture of friction, moisture, and pressure.  IV ceftriaxone ultimately transitioned to oral cefadroxil.  PTA furosemide dose was increased to 40 mg daily.  She was discharged on supplemental oxygen at 2 L/min. TCU was recommended for rehab. She was then admitted to this facility for medical management, nursing care, and subacute rehab.     Today's visit:  Patient was seen in her room, lying in bed.  She appears somewhat weak but overall feels good and comfortable.  Cefadroxil course was extended last week due to new left leg cellulitis.  She denies fever, chills, chest pain, palpitation, dyspnea, nausea, vomiting, abdominal pain, or urinary symptoms.  Her cough has markedly subsided.  She had a BM yesterday.      CODE STATUS:   DNR / DNI    Past Medical, Surgical, Family, and Social History were reviewed in [facility].    Current Outpatient Medications   Medication Sig Dispense Refill     ACE/ARB/ARNI NOT PRESCRIBED (INTENTIONAL) Please choose reason not prescribed, below (Patient not taking: Reported on 5/29/2024)       acetaminophen (TYLENOL) 325 MG tablet Take 650 mg by mouth every 8 hours 0630, 1400, 2145       albuterol (PROAIR HFA/PROVENTIL HFA/VENTOLIN HFA) 108 (90 Base) MCG/ACT inhaler INHALE 2 PUFFS INTO THE LUNGS EVERY 4 HOURS AS NEEDED FOR SHORTNESS OF BREATH OR DIFFICULT BREATHING OR WHEEZING 8.5 g 0     albuterol (PROVENTIL) (2.5 MG/3ML) 0.083% neb solution Take 1 vial (2.5 mg) by nebulization every 4 hours as needed for shortness of breath, wheezing or cough And BID 90 mL      ARTIFICIAL SALIVA MT Take 1 strip by mouth 2 times daily After nebs       BETA BLOCKER NOT PRESCRIBED (INTENTIONAL) Beta Blocker not prescribed intentionally due to EF > 40 % (ejection fraction) will leave up to Cardiology. (Patient not taking: Reported on 5/29/2024)       calcium carbonate 600 mg-vitamin D 400 units (CALTRATE) 600-400 MG-UNIT per tablet Take 1  tablet by mouth every evening  100 tablet 3     celecoxib (CELEBREX) 100 MG capsule Take 100 mg by mouth 2 times daily as needed for moderate pain (4-6)       clobetasol propionate 0.05 % LOTN Externally apply topically 2 times daily To palm of hands       colchicine (COLCRYS) 0.6 MG tablet Take 1 tablet (0.6 mg) by mouth 2 times daily for 6 days       cycloSPORINE (CYCLOSPORINE IN KLARITY) 0.1 % EMUL Place 1 drop into both eyes 2 times daily 0630 and 2145       diclofenac (VOLTAREN) 1 % topical gel Apply 2 g topically 2 times daily       DULoxetine (CYMBALTA) 60 MG capsule Take 60 mg by mouth daily       fluticasone (FLONASE) 50 MCG/ACT nasal spray Spray 1 spray into both nostrils every morning       fluticasone-salmeterol (WIXELA INHUB) 100-50 MCG/ACT inhaler Inhale 1 puff into the lungs 2 times daily 60 each 5     furosemide (LASIX) 40 MG tablet Take 1 tablet (40 mg) by mouth daily       gabapentin (NEURONTIN) 300 MG capsule Take 1 capsule (300 mg) by mouth 2 times daily       gabapentin (NEURONTIN) 600 MG tablet Take 1 tablet (600 mg) by mouth At Bedtime       guaiFENesin 400 MG TABS Take 400 mg by mouth 2 times daily       ipratropium - albuterol 0.5 mg/2.5 mg/3 mL (DUONEB) 0.5-2.5 (3) MG/3ML neb solution Take 1 vial by nebulization 3 times daily       levothyroxine (SYNTHROID/LEVOTHROID) 88 MCG tablet Take 1 tablet (88 mcg) by mouth daily 90 tablet 0     LORazepam (ATIVAN) 0.5 MG tablet Take 0.5 mg by mouth every 8 hours as needed for anxiety       melatonin 3 MG tablet Take 3 mg by mouth At Bedtime       nystatin (NYSTOP) 718715 UNIT/GM external powder Apply tid to affectead area prn 60 g 3     polyethylene glycol (MIRALAX) 17 g packet Take 1 packet by mouth daily as needed for constipation       potassium chloride stuart ER (KLOR-CON M20) 20 MEQ CR tablet Take 40 mEq by mouth daily       PseudoePHEDrine-guaiFENesin 120-1200 MG TB12 Take 1 tablet by mouth 2 times daily as needed for congestion        "triamcinolone (KENALOG) 0.1 % external cream Apply topically every evening       zinc oxide (DESITIN) 40 % external ointment Apply topically as needed for dry skin or irritation (with toileting)         MED REC REQUIRED  Post Medication Reconciliation Status: medication reconcilation previously completed during another office visit      ALLERGIES: Ace inhibitors    REVIEW OF SYSTEMS:  10 point ROS were negative other than the symptoms noted above in the HPI.    PHYSICAL EXAM:  Vital signs were reviewed in the chart.  Vital Signs:  /70   Pulse 82   Temp 97.9  F (36.6  C)   Resp 18   Ht 1.6 m (5' 3\")   Wt 113 kg (249 lb 3.2 oz)   LMP  (LMP Unknown)   SpO2 91%   BMI 44.14 kg/m     General: Somewhat weak appearing but comfortable and in no acute distress  HEENT: No conjunctival pallor, no scleral icterus or injection, moist oral mucosa  Cardiovascular: Normal S1, S2, RRR  Respiratory: Lungs clear to auscultation bilaterally  GI: Abdomen soft, non-tender, non-distended, +BS  Extremities: Trace bilateral LE edema  Neuro: CX II-XII grossly intact; ROM in all four extremities grossly intact  Psych: Alert and oriented x3; normal affect  Skin: Slightly residual left lower extremity erythema    LABORATORY/IMAGING DATA:  All relevant labs and imaging data in [facility] and/or [facility] Everywhere were personally reviewed today.    Most Recent 3 CBC's:  Recent Labs   Lab Test 06/25/24  0645 06/23/24  0638 06/21/24  0629   WBC 8.8 8.1 7.7   HGB 12.5 12.9 13.5   MCV 94 95 95    281 272     Most Recent 3 BMP's:  Recent Labs   Lab Test 07/01/24  0709 06/25/24  0645 06/24/24  0645    138 137   POTASSIUM 4.4 3.5 3.5   CHLORIDE 98 96* 95*   CO2 24 31* 30*   BUN 17.7 16.3 15.5   CR 0.63 0.57 0.54   ANIONGAP 17* 11 12   LAKSHMI 9.1 9.1 9.1   * 120* 138*     Most Recent 2 LFT's:  Recent Labs   Lab Test 05/21/24  0944 09/05/23  0938   AST 50* 34   ALT 30 16   ALKPHOS 128 111*   BILITOTAL 0.7 0.5 "       ASSESSMENT/PLAN:  Hypoxic respiratory failure,  Bilateral lower lobe mucous plugging,  Bronchitis,  Reactive airway disease (suspected),  Chronic cough.  Respiratory failure likely chronic and multifactorial due to untreated JEAN, mucous plugging, and CHF.  Patient was treated with steroids and nebs inpatient.  She was discharged on supplemental oxygen at 2 L/min.  She is currently saturating at 91%.  Cough has markedly subsided.   Plan:  Continue supplemental oxygen, wean as able; but likely would need it chronically  Continue nocturnal CPAP  Continue fluticasone-salmeterol 100-50 mcg, 1 puff twice daily  Continue albuterol neb 2.5 mg every 4 hours PRN  Continue albuterol 2 puffs every 4 hours PRN  Continue Flonase 1 spray into both nostrils daily  Continue guaifenesin 400 mg twice daily  Monitor respiratory status     Acute on chronic HFpEF (grade 1 LV diastolic dysfunction and LVEF 65-70%, per echo in March 2024).  Patient was treated with IV furosemide in the hospital.  PTA furosemide was increased to 40 mg daily upon discharge.  Weight at discharge 257.5 LBS.   She currently weighs 249 LBS and has trace bilateral lower extremity edema.  Plan:  Continue furosemide 40 mg daily  Monitor weight and volume status     E. coli UTI.  RLE cellulitis (suspected),  LLE cellulitis.  Patient was treated with IV ceftriaxone in the hospital for UTI and suspected RLE cellulitis and discharged on oral cefadroxil.  In TCU, she noted to have new left leg cellulitis on June 26 and cefadroxil course was extended.  Cellulitis has markedly improved.  Plan:  Continue cefadroxil 500 mg twice daily through July 2, 2024  Monitor      Acute left ankle gout (suspected),  Hyperuricemia.  Uric acid 9.9 on June 23, 2024.  Treated with colchicine and prednisone burst.  Completed the course of prednisone burst on June 26.  On today's examination, there is no left ankle swelling and patient reports no pain.  Plan:  Complete the course of  colchicine 0.6 mg twice daily; last  dose today July 1   Start allopurinol 100 mg daily in 10 days on July 10 with gradual dose titration for goal uric acid less than 6 (discussed with the patient)     Low normal potassium.  Due to diuretic therapy.  Potassium 3.5 on June 20.  Started on Klor-Con on June 26.  Plan:  Continue Klor-Con 40 mEq daily  Monitor potassium level periodically     Hypertension.  Blood pressure is fairly controlled.  Plan:  Continue furosemide 40 mg daily  Monitor blood pressure     Dyslipidemia.  Not on medical therapy.  Plan:  Follow-up as outpatient     Hypothyroidism.  TSH 3.66 on May 21, 2024.  Plan:  Continue levothyroxine 88 mcg daily     History of left breast cancer (infiltrating ductal), s/p left mastectomy in 2005 .  Plan:  Follow-up as outpatient     Anxiety,  Depression,  Insomnia.  Symptoms seem to be controlled.  Discontinue scheduled lorazepam on June 26.  Plan:  Continue lorazepam 0.5 mg every 8 hours as needed for anxiety  Continue duloxetine 60 mg daily   Continue melatonin 3 mg at bedtime  Monitor symptoms  Refer to ACP PRN     Chronic sacral ulcers.  Present on admission.  Due to friction, moisture, and pressure.  Plan:   Continue wound care per WOC RN instructions  Monitor     Osteoarthritis,  Chronic pain.  Plan:  Continue gabapentin 300 mg twice daily and 600 mg at bedtime  Continue acetaminophen 650 mg every 8 hours  Continue celecoxib 200 mg twice daily PRN  Continue topical diclofenac gel twice daily     Slow transit constipation.  Plan:  Continue the bowel regimen     Hepatic steatosis.  Plan:  Follow-up as outpatient     Morbid obesity,  JEAN.  BMI 45.61.  Noncompliant with CPAP for 1 year.  Her CPAP machine is available in TCU.  Plan:  Continue nocturnal CPAP per home settings   Staff assist with daily care and mobility  Monitor O2 sats     Physical deconditioning.  Plan:  Continue PT/OT evaluation and therapy          Orders written by provider at facility:  MIGUEL  on July 8, DX: CHF  Allopurinol 100 mg p.o. daily from July 10, DX: Gout, hyperuricemia        Total time: 45 minutes including face to face time with the patient, reviewing the notes, labs in EPIC and PCC, ordering new medication, ordering new labs, communicating the plan of care with NP, and documenting all information electronically.     Disclaimer: This note contains text created using speech-recognition software and may have unintended word substitutions.      Electronically signed by  Michael Fernandez MD                       Sincerely,        Michael Fernandez MD

## 2024-07-01 NOTE — PATIENT INSTRUCTIONS
Orders for Carol Merida (1935), MR# [id number]:    1) BMP on July 8, DX: CHF  2) Allopurinol 100 mg p.o. daily from July 10, DX: Gout, hyperuricemia      Michael Fernandez MD  [facility]

## 2024-07-04 ENCOUNTER — LAB REQUISITION (OUTPATIENT)
Dept: LAB | Facility: CLINIC | Age: 89
End: 2024-07-04
Payer: COMMERCIAL

## 2024-07-04 DIAGNOSIS — I10 ESSENTIAL (PRIMARY) HYPERTENSION: ICD-10-CM

## 2024-07-05 ENCOUNTER — DISCHARGE SUMMARY NURSING HOME (OUTPATIENT)
Dept: GERIATRICS | Facility: CLINIC | Age: 89
End: 2024-07-05
Payer: COMMERCIAL

## 2024-07-05 VITALS
BODY MASS INDEX: 44.3 KG/M2 | DIASTOLIC BLOOD PRESSURE: 80 MMHG | HEIGHT: 63 IN | TEMPERATURE: 97.4 F | SYSTOLIC BLOOD PRESSURE: 135 MMHG | HEART RATE: 80 BPM | OXYGEN SATURATION: 96 % | RESPIRATION RATE: 18 BRPM | WEIGHT: 250 LBS

## 2024-07-05 DIAGNOSIS — L89.159 PRESSURE INJURY OF SKIN OF SACRAL REGION, UNSPECIFIED INJURY STAGE: ICD-10-CM

## 2024-07-05 DIAGNOSIS — E79.0 HYPERURICEMIA: ICD-10-CM

## 2024-07-05 DIAGNOSIS — G89.29 OTHER CHRONIC PAIN: ICD-10-CM

## 2024-07-05 DIAGNOSIS — J96.91 RESPIRATORY FAILURE WITH HYPOXIA, UNSPECIFIED CHRONICITY (H): Primary | ICD-10-CM

## 2024-07-05 DIAGNOSIS — R53.81 PHYSICAL DECONDITIONING: ICD-10-CM

## 2024-07-05 DIAGNOSIS — I50.33 ACUTE ON CHRONIC HEART FAILURE WITH PRESERVED EJECTION FRACTION (H): ICD-10-CM

## 2024-07-05 DIAGNOSIS — G47.00 INSOMNIA, UNSPECIFIED TYPE: ICD-10-CM

## 2024-07-05 DIAGNOSIS — M10.9 ACUTE GOUTY ARTHRITIS: ICD-10-CM

## 2024-07-05 DIAGNOSIS — R05.3 CHRONIC COUGH: ICD-10-CM

## 2024-07-05 DIAGNOSIS — M19.90 OSTEOARTHRITIS, UNSPECIFIED OSTEOARTHRITIS TYPE, UNSPECIFIED SITE: ICD-10-CM

## 2024-07-05 DIAGNOSIS — G47.33 OSA (OBSTRUCTIVE SLEEP APNEA): ICD-10-CM

## 2024-07-05 DIAGNOSIS — E66.01 MORBID OBESITY (H): ICD-10-CM

## 2024-07-05 DIAGNOSIS — J40 BRONCHITIS: ICD-10-CM

## 2024-07-05 DIAGNOSIS — F32.A DEPRESSION, UNSPECIFIED DEPRESSION TYPE: ICD-10-CM

## 2024-07-05 DIAGNOSIS — E03.9 HYPOTHYROIDISM, UNSPECIFIED TYPE: ICD-10-CM

## 2024-07-05 DIAGNOSIS — F41.9 ANXIETY: ICD-10-CM

## 2024-07-05 DIAGNOSIS — I10 ESSENTIAL HYPERTENSION: ICD-10-CM

## 2024-07-05 LAB
ANION GAP SERPL CALCULATED.3IONS-SCNC: 16 MMOL/L (ref 7–15)
BUN SERPL-MCNC: 12.9 MG/DL (ref 8–23)
CALCIUM SERPL-MCNC: 8.9 MG/DL (ref 8.8–10.2)
CHLORIDE SERPL-SCNC: 98 MMOL/L (ref 98–107)
CREAT SERPL-MCNC: 0.59 MG/DL (ref 0.51–0.95)
DEPRECATED HCO3 PLAS-SCNC: 26 MMOL/L (ref 22–29)
EGFRCR SERPLBLD CKD-EPI 2021: 86 ML/MIN/1.73M2
GLUCOSE SERPL-MCNC: 130 MG/DL (ref 70–99)
POTASSIUM SERPL-SCNC: 3.8 MMOL/L (ref 3.4–5.3)
SODIUM SERPL-SCNC: 140 MMOL/L (ref 135–145)

## 2024-07-05 PROCEDURE — 99316 NF DSCHRG MGMT 30 MIN+: CPT | Performed by: NURSE PRACTITIONER

## 2024-07-05 PROCEDURE — 36415 COLL VENOUS BLD VENIPUNCTURE: CPT | Performed by: NURSE PRACTITIONER

## 2024-07-05 PROCEDURE — 82565 ASSAY OF CREATININE: CPT | Performed by: NURSE PRACTITIONER

## 2024-07-05 PROCEDURE — P9604 ONE-WAY ALLOW PRORATED TRIP: HCPCS | Performed by: NURSE PRACTITIONER

## 2024-07-05 NOTE — LETTER
" 7/5/2024      Carol Merida  [address]  [place] [address]        [facility] GERIATRICS DISCHARGE SUMMARY  PATIENT'S NAME: Carol Merida  YOB: 1935  MEDICAL RECORD NUMBER:  [id number]  Place of Service where encounter took place:  [facility]  ([facility]) [[id number]]    PRIMARY CARE PROVIDER AND CLINIC RESPONSIBLE AFTER TRANSFER:   Ruben Humphrey PA-C, [facility] PHYSICIAN [facility] [address] [address] / [place]*     Transferring providers: BEATRICE Medrano CNP, Michael Fernandez MD  Recent Hospitalization/ED:  Hospital  [facility] stay 6/20/24 to 6/25/24.  Date of SNF Admission: June 25, 2024  Date of SNF (anticipated) Discharge: July 09, 2024  Discharged to: previous [facility]  Cognitive Scores: SLUMS 17/30, CPT 4.8/5.6  Physical Function: Contact-guard assist to standby assist with transfers, contact-guard assist for upper body dressing, mod assist for lower body dressing, showering max assist, toileting transfers standby assist, clothing manage mod assist, hygiene max assist, set up for eating, grooming/hygiene, contact-guard assist for upper body dressing, standing tolerance 3 to 4 minutes      CODE STATUS/ADVANCE DIRECTIVES DISCUSSION:  Full code  ALLERGIES: Ace inhibitors    NURSING FACILITY COURSE   Medication Changes/Rationale:   Started on potassium supplement    Per previous provider, Dr. Fernandze  \"Medical history is notable for bronchitis, chronic cough, CHF, hypertension, dyslipidemia, hypothyroidism, breast cancer, melanoma, colon adenoma, anxiety, depression, osteoporosis, osteoarthritis, morbid obesity, and JEAN.    Summary of hospital course:  Patient was hospitalized at [facility] from June 20 through  June 25, 2024 for acute CHF and right lower extremity cellulitis.  She originally presented with increasing shortness of breath, cough, exertional hypoxia, and lower extremity edema.  She had similar presentation back " "in March 2024 due to bronchitis and suspected reactive airway disease.  Initial laboratory evaluation was significant for creatinine 0.71, CRP 15.74, lactic acid 2.2, procalcitonin 0.12, troponin I 16, N-terminal proBNP 114, white count 7.2, hemoglobin 13.4, and negative screening for COVID-19, influenza, and RSV.  UA was abnormal and urine culture grew more than 100,000 colonies per mL 2 strains of E. coli.  CT chest revealed no pulmonary embolism, no acute abnormality but mildly scattered mucus plugging in both lower lobes, cholelithiasis, and hepatic steatosis.  EKG showed normal sinus rhythm with a heart rate of 94 bpm and premature supraventricular complexes.  She was initially treated with IV furosemide for CHF, steroids and nebs for reactive airway disease, and IV ceftriaxone for UTI and possible cellulitis.  Hospital course was complicated by left ankle pain suspicious for a gout attack.  X-ray showed no fracture or dislocation.  She was treated with colchicine and prednisone burst.  There was concern for possible aspiration causing her cough but SLP evaluation was normal.  WOC RN was consulted for wound to buttocks due to mixture of friction, moisture, and pressure.  IV ceftriaxone ultimately transitioned to oral cefadroxil.  PTA furosemide dose was increased to 40 mg daily.  She was discharged on supplemental oxygen at 2 L/min. TCU was recommended for rehab. She was then admitted to this facility for medical management, nursing care, and subacute rehab.\"      Today patient is doing well.  She denies shortness of breath, she has occasional cough.  She is on 1 L of oxygen via nasal cannula.  Cellulitis has resolved.  She has no further pain to her ankle related to recent gout flare.  Reviewed instructions today about starting allopurinol after she discharges home.  She reports bowels are moving regularly.  She denies dysuria/trouble urinating.    Specific problems addressed in the TCU:  Hypoxic respiratory " failure  Bronchitis  Bilateral lower lobe mucous plugging  Suspected reactive airway disease  Chronic cough  Suspect respiratory failure is chronic and multifactorial secondary to untreated JEAN, CHF, mucous plugging  Treated with steroids and nebs inpatient and discharged on   2 L of oxygen via nasal cannula  Respiratory status stable, on 1L oxygen via NC  Oxygen saturations 92 to 96% on oxygen  Plan: Continue oxygen. continue CPAP.  Continue albuterol neb as needed and albuterol HFA as needed, guaifenesin twice daily.  Monitor respiratory status. Likely will need oxygen at discharge home- will need to be ordered closer to discharge if still using it. 1800 ml fluid restriction.    Acute on chronic HFpEF, LVEF 65 to 75% and grade 1 LV diastolic dysfunction per echo in 3/2024  Essential hypertension  SBP labile 100-110s mostly  Weight 250.0 lb today, no leg swelling  Plan: Continue furosemide 40 mg daily.  Monitor blood pressure. Monitor fluid status. 1800 ml fluid restriction.    E. coli UTI, resolved  Left lower extremity cellulitis, resolved  Suspected right lower extremity cellulitis  Completed course of IV ceftriaxone inpatient--> cefadroxil on 7/2  Denies dysuria/trouble urinating  Plan: Monitor for recurrence.    Acute left ankle gout, resolved  Hyperuricemia  Completed course of prednisone (last day 6/26) and colchicine (last day 7/1)  Uric acid level 9.9 on 6/23/2024  Plan: Start allopurinol 100 mg daily on 7/10 and titrate for uric acid level less than 6.  Monitor for symptoms.    Low normal potassium  Secondary to diuretics, started on potassium supplementation on 6/27  Potassium 3.8 on 7/5/2024  Plan:  BMP 7/11 through home care to ensure stability. Continue potassium ER 40 mill equivalents daily.    Dyslipidemia  Plan: Not currently on medical management follow-up with PCP    Hypothyroidism  TSH 3.66 on 5/21/2024  Plan: Continue levothyroxine 88 mcg daily    Anxiety, depression, insomnia  PTA scheduled  Ativan discontinued 6/26  Plan: Continue duloxetine 60 mg daily, lorazepam 0.5 mg every 8 hours as needed, melatonin 3 mg at bedtime.  Monitor mood and symptoms.  Follow-up with PCP.    Chronic sacral ulcers  Present on admission  Plan: Continue wound care    Chronic pain  Osteoarthritis  Plan: Continue pain management with Tylenol 650 mg 3 times daily, Celecoxib 100 mg twice daily as needed, duloxetine 60 mg daily, Voltaren gel twice daily, gabapentin 600 mg at bedtime and 300 mg twice daily.  Monitor symptoms.    Morbid obesity, BMI 44.29  JEAN  Complicates cares  Plan: Encourage weight loss.  Continue CPAP    Slow transit constipation  Bowels moving regularly  Plan: Continue MiraLAX 17 g daily as needed.  Monitor bowels.    Cognitive impairment  SLUMS 17/30, CPT 4.8/5.6  Plan: Based on cognitive testing okay for patient to live alone with daily assistance to monitor for safety check on problem solving.    Physical deconditioning  Completed course of therapy in the TCU  Plan: Continue therapy through home care      Discharge Medications:  MED REC REQUIRED  Post Medication Reconciliation Status:  Discharge medications reconciled and changed, see notes/orders     Current Outpatient Medications   Medication Sig Dispense Refill     acetaminophen (TYLENOL) 325 MG tablet Take 650 mg by mouth every 8 hours 0630, 1400, 2145       albuterol (PROAIR HFA/PROVENTIL HFA/VENTOLIN HFA) 108 (90 Base) MCG/ACT inhaler INHALE 2 PUFFS INTO THE LUNGS EVERY 4 HOURS AS NEEDED FOR SHORTNESS OF BREATH OR DIFFICULT BREATHING OR WHEEZING 8.5 g 0     albuterol (PROVENTIL) (2.5 MG/3ML) 0.083% neb solution Take 1 vial (2.5 mg) by nebulization every 4 hours as needed for shortness of breath, wheezing or cough And BID 90 mL      allopurinol (ZYLOPRIM) 100 MG tablet Take 100 mg by mouth daily START 7/10       ARTIFICIAL SALIVA MT Take 1 strip by mouth 2 times daily After nebs       calcium carbonate 600 mg-vitamin D 400 units (CALTRATE) 600-400  MG-UNIT per tablet Take 1 tablet by mouth every evening  100 tablet 3     celecoxib (CELEBREX) 100 MG capsule Take 100 mg by mouth 2 times daily as needed for moderate pain (4-6)       clobetasol propionate 0.05 % LOTN Externally apply topically 2 times daily To palm of hands       cycloSPORINE (CYCLOSPORINE IN KLARITY) 0.1 % EMUL Place 1 drop into both eyes 2 times daily 0630 and 2145       diclofenac (VOLTAREN) 1 % topical gel Apply 2 g topically 2 times daily       DULoxetine (CYMBALTA) 60 MG capsule Take 60 mg by mouth daily       fluticasone (FLONASE) 50 MCG/ACT nasal spray Spray 1 spray into both nostrils every morning       fluticasone-salmeterol (WIXELA INHUB) 100-50 MCG/ACT inhaler Inhale 1 puff into the lungs 2 times daily 60 each 5     furosemide (LASIX) 40 MG tablet Take 1 tablet (40 mg) by mouth daily       gabapentin (NEURONTIN) 300 MG capsule Take 1 capsule (300 mg) by mouth 2 times daily       gabapentin (NEURONTIN) 600 MG tablet Take 1 tablet (600 mg) by mouth At Bedtime       guaiFENesin 400 MG TABS Take 400 mg by mouth 2 times daily       ipratropium - albuterol 0.5 mg/2.5 mg/3 mL (DUONEB) 0.5-2.5 (3) MG/3ML neb solution Take 1 vial by nebulization 3 times daily       levothyroxine (SYNTHROID/LEVOTHROID) 88 MCG tablet Take 1 tablet (88 mcg) by mouth daily 90 tablet 0     LORazepam (ATIVAN) 0.5 MG tablet Take 0.5 mg by mouth every 8 hours as needed for anxiety       melatonin 3 MG tablet Take 3 mg by mouth At Bedtime       nystatin (NYSTOP) 460745 UNIT/GM external powder Apply tid to affectead area prn 60 g 3     polyethylene glycol (MIRALAX) 17 g packet Take 1 packet by mouth daily as needed for constipation       potassium chloride stuart ER (KLOR-CON M20) 20 MEQ CR tablet Take 40 mEq by mouth daily       PseudoePHEDrine-guaiFENesin 120-1200 MG TB12 Take 1 tablet by mouth 2 times daily as needed for congestion       triamcinolone (KENALOG) 0.1 % external cream Apply topically every evening        "zinc oxide (DESITIN) 40 % external ointment Apply topically as needed for dry skin or irritation (with toileting)       ACE/ARB/ARNI NOT PRESCRIBED (INTENTIONAL) Please choose reason not prescribed, below (Patient not taking: Reported on 5/29/2024)       BETA BLOCKER NOT PRESCRIBED (INTENTIONAL) Beta Blocker not prescribed intentionally due to EF > 40 % (ejection fraction) will leave up to Cardiology. (Patient not taking: Reported on 5/29/2024)          Past Medical History:   Past Medical History:   Diagnosis Date     Abnormal stress test     small area on stress thalium ?2003; but normal angiogram 2012     Anemia      Anxiety      Cardiac dysrhythmia, unspecified     irregular heartbeat     CHF (congestive heart failure) (H) 6/18/2018     PETTY (dyspnea on exertion)      Hyperlipidemia LDL goal <100 2/10/2010     Hypertension goal BP (blood pressure) < 140/90 7/18/2010     Hypothyroid      Malignant 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     Melanoma of skin, site unspecified      NONSPECIFIC MEDICAL HISTORY 04    fx fifth finger right hand     Obstructive sleep apnea (adult) (pediatric) 8/25/2006    CPAP      Osteoarthritis      Other chronic pain     Joint pain for many years.     Other osteoporosis      PONV (postoperative nausea and vomiting)      Tubular adenoma in colon 9/01    colonoscopy due 2004     Physical Exam:   Vitals: /80   Pulse 80   Temp 97.4  F (36.3  C)   Resp 18   Ht 1.6 m (5' 3\")   Wt 113.4 kg (250 lb)   LMP  (LMP Unknown)   SpO2 96%   BMI 44.29 kg/m    BMI: Body mass index is 44.29 kg/m .  GENERAL APPEARANCE:  Alert, in NAD  HEENT: normocephalic, moist mucous membranes, nose without drainage or crusting  RESP:  respiratory effort normal, no respiratory distress, Lung sounds clear, patient is on 1L via NC  CV: auscultation of heart done, rate and rhythm regular.   ABDOMEN: + bowel sounds, soft, " nontender, no grimacing or guarding with palpation.  M/S:  no lower extremity edema, no pain with palpation or warmth or redness of left ankle  SKIN: no erythema or warmth to both lower extremities  PSYCH:affect and mood normal      SNF labs: Labs done in SNF are in [facility] EPIC. Please refer to them using EPIC/Care Everywhere. and Recent labs in EPIC reviewed by me today.     DISCHARGE PLAN:  Follow up labs: [facility] 7/11 through home care with results to PCP  Medical Follow Up:     Follow up with primary care provider in 1 week  Discharge Services: Home Care:  Occupational Therapy, Physical Therapy, Registered Nurse, and Home Health Aide through [facility]  Discharge Instructions:   Continue to follow your diet:  2 gram Na diet, 1800 ml fluid restriction  Weigh yourself daily in the morning and keep a record. Call your primary clinic: a) if you are more short of breath, or b) if your weight changes more than 3 pounds in one day or more than 5 pounds in one week.   Wound care: Apply triad barrier cream on bilateral buttocks each toilet uses or incontinent. Do not scrub off clean gently.  Oxygen at ____ liters/minute via nasal cannula. * Nursing to add here if she is still needing at time of discharge  BiPAP when sleeping; settings per usual practice.   Continue Ensure daily    TOTAL DISCHARGE TIME:   Greater than 30 minutes  Electronically signed by:  BEATRICE Medrano CNP     Documentation of Face to Face and Certification for Home Health Services    I certify that patient: Carol Merida is under my care and that I, or a nurse practitioner or physician's assistant working with me, had a face-to-face encounter that meets the physician face-to-face encounter requirements with this patient on: 7/5/2024.    This encounter with the patient was in whole, or in part, for the following medical condition, which is the primary reason for home health care:   1. Respiratory failure with hypoxia, unspecified chronicity  (H)    2. Bronchitis    3. Chronic cough    4. Acute on chronic heart failure with preserved ejection fraction (H)    5. Essential hypertension    6. Acute gouty arthritis    7. Hyperuricemia    8. Hypothyroidism, unspecified type    9. Depression, unspecified depression type    10. Anxiety    11. Insomnia, unspecified type    12. Pressure injury of skin of sacral region, unspecified injury stage    13. Other chronic pain    14. Osteoarthritis, unspecified osteoarthritis type, unspecified site    15. Morbid obesity (H)    16. JEAN (obstructive sleep apnea)    17. Physical deconditioning      .    I certify that, based on my findings, the following services are medically necessary home health services: Nursing, Occupational Therapy, Physical Therapy, and HHA .    My clinical findings support the need for the above services because: Nurse is needed: to provide medication management and BMP 7/11 through home care with results to PCP, Occupational Therapy Services are needed to assess and treat cognitive ability and address ADL safety due to impairment in completing ADLs safely and independently as she returns home from TCU stay. and Physical Therapy Services are needed to assess and treat the following functional impairments: generalized weakness and deconditioning.      Further, I certify that my clinical findings support that this patient is homebound (i.e. absences from home require considerable and taxing effort and are for medical reasons or [employer] services or infrequently or of short duration when for other reasons) because: Requires assistance of another person or specialized equipment to access medical services because patient: Requires supervision of another for safe transfer...    Based on the above findings. I certify that this patient is confined to the home and needs intermittent skilled nursing care, physical therapy and/or speech therapy.  The patient is under my care, and I have initiated the  establishment of the plan of care.  This patient will be followed by a physician who will periodically review the plan of care.  Physician/Provider to provide follow up care: Ruben Humphrey APRN CNP on 7/5/2024 at 11:52 AM              Sincerely,        BEATRICE Medrano CNP

## 2024-07-05 NOTE — PATIENT INSTRUCTIONS
[facility] Discharge Orders    Name: Carol Merida  : 1935  Planned Discharge Date: 2024  Discharged to: [facility]    MEDICAL FOLLOW UP  Follow up with primary care provider in 1 week      FUTURE LABS: [facility]  through home care with results to PCP    ORDER CHANGES:  Started on potassium supplementation and allopurinol (for gout in TCU)    DISCHARGE MEDICATIONS:  The patient s pharmacy is authorized to dispense a 30-day supply of medications. Refill requests should be directed to the primary provider, Ruben Humphrey  Current Outpatient Medications   Medication Sig Dispense Refill    acetaminophen (TYLENOL) 325 MG tablet Take 650 mg by mouth every 8 hours 0630, 1400, 2145      albuterol (PROAIR HFA/PROVENTIL HFA/VENTOLIN HFA) 108 (90 Base) MCG/ACT inhaler INHALE 2 PUFFS INTO THE LUNGS EVERY 4 HOURS AS NEEDED FOR SHORTNESS OF BREATH OR DIFFICULT BREATHING OR WHEEZING 8.5 g 0    albuterol (PROVENTIL) (2.5 MG/3ML) 0.083% neb solution Take 1 vial (2.5 mg) by nebulization every 4 hours as needed for shortness of breath, wheezing or cough And BID 90 mL     allopurinol (ZYLOPRIM) 100 MG tablet Take 100 mg by mouth daily START 7/10      ARTIFICIAL SALIVA MT Take 1 strip by mouth 2 times daily After nebs      calcium carbonate 600 mg-vitamin D 400 units (CALTRATE) 600-400 MG-UNIT per tablet Take 1 tablet by mouth every evening  100 tablet 3    celecoxib (CELEBREX) 100 MG capsule Take 100 mg by mouth 2 times daily as needed for moderate pain (4-6)      clobetasol propionate 0.05 % LOTN Externally apply topically 2 times daily To palm of hands      cycloSPORINE (CYCLOSPORINE IN KLARITY) 0.1 % EMUL Place 1 drop into both eyes 2 times daily 0630 and       diclofenac (VOLTAREN) 1 % topical gel Apply 2 g topically 2 times daily      DULoxetine (CYMBALTA) 60 MG capsule Take 60 mg by mouth daily      fluticasone (FLONASE) 50 MCG/ACT nasal spray Spray 1 spray into both nostrils every morning       fluticasone-salmeterol (WIXELA INHUB) 100-50 MCG/ACT inhaler Inhale 1 puff into the lungs 2 times daily 60 each 5    furosemide (LASIX) 40 MG tablet Take 1 tablet (40 mg) by mouth daily      gabapentin (NEURONTIN) 300 MG capsule Take 1 capsule (300 mg) by mouth 2 times daily      gabapentin (NEURONTIN) 600 MG tablet Take 1 tablet (600 mg) by mouth At Bedtime      guaiFENesin 400 MG TABS Take 400 mg by mouth 2 times daily      ipratropium - albuterol 0.5 mg/2.5 mg/3 mL (DUONEB) 0.5-2.5 (3) MG/3ML neb solution Take 1 vial by nebulization 3 times daily      levothyroxine (SYNTHROID/LEVOTHROID) 88 MCG tablet Take 1 tablet (88 mcg) by mouth daily 90 tablet 0    LORazepam (ATIVAN) 0.5 MG tablet Take 0.5 mg by mouth every 8 hours as needed for anxiety      melatonin 3 MG tablet Take 3 mg by mouth At Bedtime      nystatin (NYSTOP) 245487 UNIT/GM external powder Apply tid to affectead area prn 60 g 3    polyethylene glycol (MIRALAX) 17 g packet Take 1 packet by mouth daily as needed for constipation      potassium chloride stuart ER (KLOR-CON M20) 20 MEQ CR tablet Take 40 mEq by mouth daily      PseudoePHEDrine-guaiFENesin 120-1200 MG TB12 Take 1 tablet by mouth 2 times daily as needed for congestion      triamcinolone (KENALOG) 0.1 % external cream Apply topically every evening      zinc oxide (DESITIN) 40 % external ointment Apply topically as needed for dry skin or irritation (with toileting)      ACE/ARB/ARNI NOT PRESCRIBED (INTENTIONAL) Please choose reason not prescribed, below (Patient not taking: Reported on 5/29/2024)      BETA BLOCKER NOT PRESCRIBED (INTENTIONAL) Beta Blocker not prescribed intentionally due to EF > 40 % (ejection fraction) will leave up to Cardiology. (Patient not taking: Reported on 5/29/2024)         SERVICES:  Home Care:  Occupational Therapy, Physical Therapy, Registered Nurse, and Home Health Aide through [facility]    ADDITIONAL INSTRUCTIONS:  Continue to follow your diet:  2 gram Na  diet, 1800 ml fluid restriction  Weigh yourself daily in the morning and keep a record. Call your primary clinic: a) if you are more short of breath, or b) if your weight changes more than 3 pounds in one day or more than 5 pounds in one week.   Wound care: Apply triad barrier cream on bilateral buttocks each toilet uses or incontinent. Do not scrub off clean gently.  Oxygen at ____ liters/minute via nasal cannula. * Nursing to add here if she is still needing at time of discharge  BiPAP when sleeping; settings per usual practice.   Continue Ensure daily    BEATRICE Medrano CNP  This document was electronically signed on July 5, 2024

## 2024-07-05 NOTE — PROGRESS NOTES
"[facility] GERIATRICS DISCHARGE SUMMARY  PATIENT'S NAME: Carol Merida  YOB: 1935  MEDICAL RECORD NUMBER:  [id number]  Place of Service where encounter took place:  [facility]  ([facility]) [[id number]]    PRIMARY CARE PROVIDER AND CLINIC RESPONSIBLE AFTER TRANSFER:   Ruben Humphrey PA-C, [facility] PHYSICIAN [facility] [address] / [place]*     Transferring providers: BEATRICE Medrano CNP, Michael Fernandez MD  Recent Hospitalization/ED:  Hospital  [facility] stay 6/20/24 to 6/25/24.  Date of SNF Admission: June 25, 2024  Date of SNF (anticipated) Discharge: July 09, 2024  Discharged to: previous [facility]  Cognitive Scores: SLUMS 17/30, CPT 4.8/5.6  Physical Function: Contact-guard assist to standby assist with transfers, contact-guard assist for upper body dressing, mod assist for lower body dressing, showering max assist, toileting transfers standby assist, clothing manage mod assist, hygiene max assist, set up for eating, grooming/hygiene, contact-guard assist for upper body dressing, standing tolerance 3 to 4 minutes      CODE STATUS/ADVANCE DIRECTIVES DISCUSSION:  Full code  ALLERGIES: Ace inhibitors    NURSING FACILITY COURSE   Medication Changes/Rationale:   Started on potassium supplement    Per previous provider, Dr. Fernandez  \"Medical history is notable for bronchitis, chronic cough, CHF, hypertension, dyslipidemia, hypothyroidism, breast cancer, melanoma, colon adenoma, anxiety, depression, osteoporosis, osteoarthritis, morbid obesity, and JEAN.    Summary of hospital course:  Patient was hospitalized at [facility] from June 20 through  June 25, 2024 for acute CHF and right lower extremity cellulitis.  She originally presented with increasing shortness of breath, cough, exertional hypoxia, and lower extremity edema.  She had similar presentation back in March 2024 due to bronchitis and suspected reactive airway disease.  Initial " "laboratory evaluation was significant for creatinine 0.71, CRP 15.74, lactic acid 2.2, procalcitonin 0.12, troponin I 16, N-terminal proBNP 114, white count 7.2, hemoglobin 13.4, and negative screening for COVID-19, influenza, and RSV.  UA was abnormal and urine culture grew more than 100,000 colonies per mL 2 strains of E. coli.  CT chest revealed no pulmonary embolism, no acute abnormality but mildly scattered mucus plugging in both lower lobes, cholelithiasis, and hepatic steatosis.  EKG showed normal sinus rhythm with a heart rate of 94 bpm and premature supraventricular complexes.  She was initially treated with IV furosemide for CHF, steroids and nebs for reactive airway disease, and IV ceftriaxone for UTI and possible cellulitis.  Hospital course was complicated by left ankle pain suspicious for a gout attack.  X-ray showed no fracture or dislocation.  She was treated with colchicine and prednisone burst.  There was concern for possible aspiration causing her cough but SLP evaluation was normal.  WOC RN was consulted for wound to buttocks due to mixture of friction, moisture, and pressure.  IV ceftriaxone ultimately transitioned to oral cefadroxil.  PTA furosemide dose was increased to 40 mg daily.  She was discharged on supplemental oxygen at 2 L/min. TCU was recommended for rehab. She was then admitted to this facility for medical management, nursing care, and subacute rehab.\"      Today patient is doing well.  She denies shortness of breath, she has occasional cough.  She is on 1 L of oxygen via nasal cannula.  Cellulitis has resolved.  She has no further pain to her ankle related to recent gout flare.  Reviewed instructions today about starting allopurinol after she discharges home.  She reports bowels are moving regularly.  She denies dysuria/trouble urinating.    Specific problems addressed in the TCU:  Hypoxic respiratory failure  Bronchitis  Bilateral lower lobe mucous plugging  Suspected reactive " airway disease  Chronic cough  Suspect respiratory failure is chronic and multifactorial secondary to untreated JEAN, CHF, mucous plugging  Treated with steroids and nebs inpatient and discharged on   2 L of oxygen via nasal cannula  Respiratory status stable, on 1L oxygen via NC  Oxygen saturations 92 to 96% on oxygen  Plan: Continue oxygen. continue CPAP.  Continue albuterol neb as needed and albuterol HFA as needed, guaifenesin twice daily.  Monitor respiratory status. Likely will need oxygen at discharge home- will need to be ordered closer to discharge if still using it. 1800 ml fluid restriction.    Acute on chronic HFpEF, LVEF 65 to 75% and grade 1 LV diastolic dysfunction per echo in 3/2024  Essential hypertension  SBP labile 100-110s mostly  Weight 250.0 lb today, no leg swelling  Plan: Continue furosemide 40 mg daily.  Monitor blood pressure. Monitor fluid status. 1800 ml fluid restriction.    E. coli UTI, resolved  Left lower extremity cellulitis, resolved  Suspected right lower extremity cellulitis  Completed course of IV ceftriaxone inpatient--> cefadroxil on 7/2  Denies dysuria/trouble urinating  Plan: Monitor for recurrence.    Acute left ankle gout, resolved  Hyperuricemia  Completed course of prednisone (last day 6/26) and colchicine (last day 7/1)  Uric acid level 9.9 on 6/23/2024  Plan: Start allopurinol 100 mg daily on 7/10 and titrate for uric acid level less than 6.  Monitor for symptoms.    Low normal potassium  Secondary to diuretics, started on potassium supplementation on 6/27  Potassium 3.8 on 7/5/2024  Plan:  BMP 7/11 through home care to ensure stability. Continue potassium ER 40 mill equivalents daily.    Dyslipidemia  Plan: Not currently on medical management follow-up with PCP    Hypothyroidism  TSH 3.66 on 5/21/2024  Plan: Continue levothyroxine 88 mcg daily    Anxiety, depression, insomnia  PTA scheduled Ativan discontinued 6/26  Plan: Continue duloxetine 60 mg daily, lorazepam 0.5  mg every 8 hours as needed, melatonin 3 mg at bedtime.  Monitor mood and symptoms.  Follow-up with PCP.    Chronic sacral ulcers  Present on admission  Plan: Continue wound care    Chronic pain  Osteoarthritis  Plan: Continue pain management with Tylenol 650 mg 3 times daily, Celecoxib 100 mg twice daily as needed, duloxetine 60 mg daily, Voltaren gel twice daily, gabapentin 600 mg at bedtime and 300 mg twice daily.  Monitor symptoms.    Morbid obesity, BMI 44.29  JEAN  Complicates cares  Plan: Encourage weight loss.  Continue CPAP    Slow transit constipation  Bowels moving regularly  Plan: Continue MiraLAX 17 g daily as needed.  Monitor bowels.    Cognitive impairment  SLUMS 17/30, CPT 4.8/5.6  Plan: Based on cognitive testing okay for patient to live alone with daily assistance to monitor for safety check on problem solving.    Physical deconditioning  Completed course of therapy in the TCU  Plan: Continue therapy through home care      Discharge Medications:  MED REC REQUIRED  Post Medication Reconciliation Status:  Discharge medications reconciled and changed, see notes/orders     Current Outpatient Medications   Medication Sig Dispense Refill    acetaminophen (TYLENOL) 325 MG tablet Take 650 mg by mouth every 8 hours 0630, 1400, 2145      albuterol (PROAIR HFA/PROVENTIL HFA/VENTOLIN HFA) 108 (90 Base) MCG/ACT inhaler INHALE 2 PUFFS INTO THE LUNGS EVERY 4 HOURS AS NEEDED FOR SHORTNESS OF BREATH OR DIFFICULT BREATHING OR WHEEZING 8.5 g 0    albuterol (PROVENTIL) (2.5 MG/3ML) 0.083% neb solution Take 1 vial (2.5 mg) by nebulization every 4 hours as needed for shortness of breath, wheezing or cough And BID 90 mL     allopurinol (ZYLOPRIM) 100 MG tablet Take 100 mg by mouth daily START 7/10      ARTIFICIAL SALIVA MT Take 1 strip by mouth 2 times daily After nebs      calcium carbonate 600 mg-vitamin D 400 units (CALTRATE) 600-400 MG-UNIT per tablet Take 1 tablet by mouth every evening  100 tablet 3    celecoxib  (CELEBREX) 100 MG capsule Take 100 mg by mouth 2 times daily as needed for moderate pain (4-6)      clobetasol propionate 0.05 % LOTN Externally apply topically 2 times daily To palm of hands      cycloSPORINE (CYCLOSPORINE IN KLARITY) 0.1 % EMUL Place 1 drop into both eyes 2 times daily 0630 and 2145      diclofenac (VOLTAREN) 1 % topical gel Apply 2 g topically 2 times daily      DULoxetine (CYMBALTA) 60 MG capsule Take 60 mg by mouth daily      fluticasone (FLONASE) 50 MCG/ACT nasal spray Spray 1 spray into both nostrils every morning      fluticasone-salmeterol (WIXELA INHUB) 100-50 MCG/ACT inhaler Inhale 1 puff into the lungs 2 times daily 60 each 5    furosemide (LASIX) 40 MG tablet Take 1 tablet (40 mg) by mouth daily      gabapentin (NEURONTIN) 300 MG capsule Take 1 capsule (300 mg) by mouth 2 times daily      gabapentin (NEURONTIN) 600 MG tablet Take 1 tablet (600 mg) by mouth At Bedtime      guaiFENesin 400 MG TABS Take 400 mg by mouth 2 times daily      ipratropium - albuterol 0.5 mg/2.5 mg/3 mL (DUONEB) 0.5-2.5 (3) MG/3ML neb solution Take 1 vial by nebulization 3 times daily      levothyroxine (SYNTHROID/LEVOTHROID) 88 MCG tablet Take 1 tablet (88 mcg) by mouth daily 90 tablet 0    LORazepam (ATIVAN) 0.5 MG tablet Take 0.5 mg by mouth every 8 hours as needed for anxiety      melatonin 3 MG tablet Take 3 mg by mouth At Bedtime      nystatin (NYSTOP) 068365 UNIT/GM external powder Apply tid to affectead area prn 60 g 3    polyethylene glycol (MIRALAX) 17 g packet Take 1 packet by mouth daily as needed for constipation      potassium chloride stuart ER (KLOR-CON M20) 20 MEQ CR tablet Take 40 mEq by mouth daily      PseudoePHEDrine-guaiFENesin 120-1200 MG TB12 Take 1 tablet by mouth 2 times daily as needed for congestion      triamcinolone (KENALOG) 0.1 % external cream Apply topically every evening      zinc oxide (DESITIN) 40 % external ointment Apply topically as needed for dry skin or irritation (with  "toileting)      ACE/ARB/ARNI NOT PRESCRIBED (INTENTIONAL) Please choose reason not prescribed, below (Patient not taking: Reported on 5/29/2024)      BETA BLOCKER NOT PRESCRIBED (INTENTIONAL) Beta Blocker not prescribed intentionally due to EF > 40 % (ejection fraction) will leave up to Cardiology. (Patient not taking: Reported on 5/29/2024)          Past Medical History:   Past Medical History:   Diagnosis Date    Abnormal stress test     small area on stress thalium ?2003; but normal angiogram 2012    Anemia     Anxiety     Cardiac dysrhythmia, unspecified     irregular heartbeat    CHF (congestive heart failure) (H) 6/18/2018    PETTY (dyspnea on exertion)     Hyperlipidemia LDL goal <100 2/10/2010    Hypertension goal BP (blood pressure) < 140/90 7/18/2010    Hypothyroid     Malignant neoplasm of breast (female), unspecified site 2005    infltrating ductal    MEDICAL HISTORY OF -     fx humerus Sept 2013.    Melanoma of skin, site unspecified     NONSPECIFIC MEDICAL HISTORY 04    fx fifth finger right hand    Obstructive sleep apnea (adult) (pediatric) 8/25/2006    CPAP     Osteoarthritis     Other chronic pain     Joint pain for many years.    Other osteoporosis     PONV (postoperative nausea and vomiting)     Tubular adenoma in colon 9/01    colonoscopy due 2004     Physical Exam:   Vitals: /80   Pulse 80   Temp 97.4  F (36.3  C)   Resp 18   Ht 1.6 m (5' 3\")   Wt 113.4 kg (250 lb)   LMP  (LMP Unknown)   SpO2 96%   BMI 44.29 kg/m    BMI: Body mass index is 44.29 kg/m .  GENERAL APPEARANCE:  Alert, in NAD  HEENT: normocephalic, moist mucous membranes, nose without drainage or crusting  RESP:  respiratory effort normal, no respiratory distress, Lung sounds clear, patient is on 1L via NC  CV: auscultation of heart done, rate and rhythm regular.   ABDOMEN: + bowel sounds, soft, nontender, no grimacing or guarding with palpation.  M/S:  no lower extremity edema, no pain with palpation or warmth or " redness of left ankle  SKIN: no erythema or warmth to both lower extremities  PSYCH:affect and mood normal      SNF labs: Labs done in SNF are in [place] EPIC. Please refer to them using EPIC/Care Everywhere. and Recent labs in EPIC reviewed by me today.     DISCHARGE PLAN:  Follow up labs: [facility] 7/11 through home care with results to PCP  Medical Follow Up:     Follow up with primary care provider in 1 week  Discharge Services: Home Care:  Occupational Therapy, Physical Therapy, Registered Nurse, and Home Health Aide through [facility]  Discharge Instructions:   Continue to follow your diet:  2 gram Na diet, 1800 ml fluid restriction  Weigh yourself daily in the morning and keep a record. Call your primary clinic: a) if you are more short of breath, or b) if your weight changes more than 3 pounds in one day or more than 5 pounds in one week.   Wound care: Apply triad barrier cream on bilateral buttocks each toilet uses or incontinent. Do not scrub off clean gently.  Oxygen at ____ liters/minute via nasal cannula. * Nursing to add here if she is still needing at time of discharge  BiPAP when sleeping; settings per usual practice.   Continue Ensure daily    TOTAL DISCHARGE TIME:   Greater than 30 minutes  Electronically signed by:  BEATRICE Medrano CNP     Documentation of Face to Face and Certification for Home Health Services    I certify that patient: Carol Merida is under my care and that I, or a nurse practitioner or physician's assistant working with me, had a face-to-face encounter that meets the physician face-to-face encounter requirements with this patient on: 7/5/2024.    This encounter with the patient was in whole, or in part, for the following medical condition, which is the primary reason for home health care:   1. Respiratory failure with hypoxia, unspecified chronicity (H)    2. Bronchitis    3. Chronic cough    4. Acute on chronic heart failure with preserved ejection fraction (H)    5.  Essential hypertension    6. Acute gouty arthritis    7. Hyperuricemia    8. Hypothyroidism, unspecified type    9. Depression, unspecified depression type    10. Anxiety    11. Insomnia, unspecified type    12. Pressure injury of skin of sacral region, unspecified injury stage    13. Other chronic pain    14. Osteoarthritis, unspecified osteoarthritis type, unspecified site    15. Morbid obesity (H)    16. JEAN (obstructive sleep apnea)    17. Physical deconditioning      .    I certify that, based on my findings, the following services are medically necessary home health services: Nursing, Occupational Therapy, Physical Therapy, and HHA .    My clinical findings support the need for the above services because: Nurse is needed: to provide medication management and BMP 7/11 through home care with results to PCP, Occupational Therapy Services are needed to assess and treat cognitive ability and address ADL safety due to impairment in completing ADLs safely and independently as she returns home from TCU stay. and Physical Therapy Services are needed to assess and treat the following functional impairments: generalized weakness and deconditioning.      Further, I certify that my clinical findings support that this patient is homebound (i.e. absences from home require considerable and taxing effort and are for medical reasons or [employer] or infrequently or of short duration when for other reasons) because: Requires assistance of another person or specialized equipment to access medical services because patient: Requires supervision of another for safe transfer...    Based on the above findings. I certify that this patient is confined to the home and needs intermittent skilled nursing care, physical therapy and/or speech therapy.  The patient is under my care, and I have initiated the establishment of the plan of care.  This patient will be followed by a physician who will periodically review the plan of  care.  Physician/Provider to provide follow up care: Ruben Humphrey, BEATRICE CNP on 7/5/2024 at 11:52 AM

## 2024-07-07 ENCOUNTER — LAB REQUISITION (OUTPATIENT)
Dept: LAB | Facility: CLINIC | Age: 89
End: 2024-07-07
Payer: COMMERCIAL

## 2024-07-07 DIAGNOSIS — I50.42 CHRONIC COMBINED SYSTOLIC (CONGESTIVE) AND DIASTOLIC (CONGESTIVE) HEART FAILURE (H): ICD-10-CM

## 2024-07-07 DIAGNOSIS — M10.9 GOUT, UNSPECIFIED: ICD-10-CM

## 2024-07-08 LAB
ANION GAP SERPL CALCULATED.3IONS-SCNC: 15 MMOL/L (ref 7–15)
BUN SERPL-MCNC: 15.2 MG/DL (ref 8–23)
CALCIUM SERPL-MCNC: 9.1 MG/DL (ref 8.8–10.2)
CHLORIDE SERPL-SCNC: 99 MMOL/L (ref 98–107)
CREAT SERPL-MCNC: 0.66 MG/DL (ref 0.51–0.95)
DEPRECATED HCO3 PLAS-SCNC: 25 MMOL/L (ref 22–29)
EGFRCR SERPLBLD CKD-EPI 2021: 84 ML/MIN/1.73M2
GLUCOSE SERPL-MCNC: 106 MG/DL (ref 70–99)
POTASSIUM SERPL-SCNC: 3.9 MMOL/L (ref 3.4–5.3)
SODIUM SERPL-SCNC: 139 MMOL/L (ref 135–145)

## 2024-07-08 PROCEDURE — P9604 ONE-WAY ALLOW PRORATED TRIP: HCPCS | Performed by: NURSE PRACTITIONER

## 2024-07-08 PROCEDURE — 36415 COLL VENOUS BLD VENIPUNCTURE: CPT | Performed by: NURSE PRACTITIONER

## 2024-07-08 PROCEDURE — 80048 BASIC METABOLIC PNL TOTAL CA: CPT | Performed by: NURSE PRACTITIONER

## 2024-07-19 ENCOUNTER — LAB REQUISITION (OUTPATIENT)
Dept: LAB | Facility: CLINIC | Age: 89
End: 2024-07-19
Payer: COMMERCIAL

## 2024-07-19 DIAGNOSIS — I10 ESSENTIAL (PRIMARY) HYPERTENSION: ICD-10-CM

## 2024-07-23 PROCEDURE — 36415 COLL VENOUS BLD VENIPUNCTURE: CPT | Mod: ORL | Performed by: PHYSICIAN ASSISTANT

## 2024-07-23 PROCEDURE — 80048 BASIC METABOLIC PNL TOTAL CA: CPT | Mod: ORL | Performed by: PHYSICIAN ASSISTANT

## 2024-07-23 PROCEDURE — P9603 ONE-WAY ALLOW PRORATED MILES: HCPCS | Mod: ORL | Performed by: PHYSICIAN ASSISTANT

## 2024-07-24 LAB
ANION GAP SERPL CALCULATED.3IONS-SCNC: 13 MMOL/L (ref 7–15)
BUN SERPL-MCNC: 13.9 MG/DL (ref 8–23)
CALCIUM SERPL-MCNC: 8.7 MG/DL (ref 8.8–10.4)
CHLORIDE SERPL-SCNC: 100 MMOL/L (ref 98–107)
CREAT SERPL-MCNC: 0.64 MG/DL (ref 0.51–0.95)
EGFRCR SERPLBLD CKD-EPI 2021: 85 ML/MIN/1.73M2
GLUCOSE SERPL-MCNC: 112 MG/DL (ref 70–99)
HCO3 SERPL-SCNC: 25 MMOL/L (ref 22–29)
POTASSIUM SERPL-SCNC: 4.2 MMOL/L (ref 3.4–5.3)
SODIUM SERPL-SCNC: 138 MMOL/L (ref 135–145)

## 2024-08-17 ENCOUNTER — HEALTH MAINTENANCE LETTER (OUTPATIENT)
Age: 89
End: 2024-08-17

## 2024-10-26 ENCOUNTER — HEALTH MAINTENANCE LETTER (OUTPATIENT)
Age: 89
End: 2024-10-26

## 2024-11-21 ENCOUNTER — LAB REQUISITION (OUTPATIENT)
Dept: LAB | Facility: CLINIC | Age: 89
End: 2024-11-21
Payer: COMMERCIAL

## 2024-11-21 DIAGNOSIS — D64.9 ANEMIA, UNSPECIFIED: ICD-10-CM

## 2024-11-21 DIAGNOSIS — E03.9 HYPOTHYROIDISM, UNSPECIFIED: ICD-10-CM

## 2024-11-21 DIAGNOSIS — E11.9 TYPE 2 DIABETES MELLITUS WITHOUT COMPLICATIONS (H): ICD-10-CM

## 2024-11-21 DIAGNOSIS — I10 ESSENTIAL (PRIMARY) HYPERTENSION: ICD-10-CM

## 2024-12-03 LAB
ALBUMIN SERPL BCG-MCNC: 3.9 G/DL (ref 3.5–5.2)
ALP SERPL-CCNC: 130 U/L (ref 40–150)
ALT SERPL W P-5'-P-CCNC: 27 U/L (ref 0–50)
ANION GAP SERPL CALCULATED.3IONS-SCNC: 10 MMOL/L (ref 7–15)
AST SERPL W P-5'-P-CCNC: 52 U/L (ref 0–45)
BILIRUB SERPL-MCNC: 0.5 MG/DL
BUN SERPL-MCNC: 19.4 MG/DL (ref 8–23)
CALCIUM SERPL-MCNC: 9.6 MG/DL (ref 8.8–10.4)
CHLORIDE SERPL-SCNC: 100 MMOL/L (ref 98–107)
CREAT SERPL-MCNC: 0.78 MG/DL (ref 0.51–0.95)
EGFRCR SERPLBLD CKD-EPI 2021: 73 ML/MIN/1.73M2
ERYTHROCYTE [DISTWIDTH] IN BLOOD BY AUTOMATED COUNT: 14.6 % (ref 10–15)
EST. AVERAGE GLUCOSE BLD GHB EST-MCNC: 143 MG/DL
HBA1C MFR BLD: 6.6 %
HCO3 SERPL-SCNC: 30 MMOL/L (ref 22–29)
HCT VFR BLD AUTO: 42.7 % (ref 35–47)
HGB BLD-MCNC: 13 G/DL (ref 11.7–15.7)
MCH RBC QN AUTO: 29.8 PG (ref 26.5–33)
MCHC RBC AUTO-ENTMCNC: 30.4 G/DL (ref 31.5–36.5)
MCV RBC AUTO: 98 FL (ref 78–100)
PLATELET # BLD AUTO: 269 10E3/UL (ref 150–450)
POTASSIUM SERPL-SCNC: 5.3 MMOL/L (ref 3.4–5.3)
PROT SERPL-MCNC: 7.6 G/DL (ref 6.4–8.3)
RBC # BLD AUTO: 4.36 10E6/UL (ref 3.8–5.2)
SODIUM SERPL-SCNC: 140 MMOL/L (ref 135–145)
TSH SERPL DL<=0.005 MIU/L-ACNC: 3.92 UIU/ML (ref 0.3–4.2)
WBC # BLD AUTO: 6.8 10E3/UL (ref 4–11)

## 2024-12-04 LAB — GLUCOSE SERPL-MCNC: 138 MG/DL (ref 70–99)

## 2025-01-08 ENCOUNTER — MEDICAL CORRESPONDENCE (OUTPATIENT)
Dept: HEALTH INFORMATION MANAGEMENT | Facility: CLINIC | Age: 89
End: 2025-01-08
Payer: COMMERCIAL

## 2025-01-19 ENCOUNTER — HOSPITAL ENCOUNTER (INPATIENT)
Facility: CLINIC | Age: 89
LOS: 3 days | Discharge: NURSING, REHAB OR LONG-TERM CARE | DRG: 603 | End: 2025-01-22
Attending: EMERGENCY MEDICINE | Admitting: INTERNAL MEDICINE
Payer: COMMERCIAL

## 2025-01-19 DIAGNOSIS — L03.115 CELLULITIS OF RIGHT LOWER EXTREMITY: Primary | ICD-10-CM

## 2025-01-19 LAB
ALBUMIN SERPL BCG-MCNC: 3.2 G/DL (ref 3.5–5.2)
ALP SERPL-CCNC: 169 U/L (ref 40–150)
ALT SERPL W P-5'-P-CCNC: 22 U/L (ref 0–50)
ANION GAP SERPL CALCULATED.3IONS-SCNC: 9 MMOL/L (ref 7–15)
AST SERPL W P-5'-P-CCNC: 63 U/L (ref 0–45)
BASOPHILS # BLD AUTO: 0 10E3/UL (ref 0–0.2)
BASOPHILS NFR BLD AUTO: 0 %
BILIRUB SERPL-MCNC: 0.4 MG/DL
BUN SERPL-MCNC: 19.9 MG/DL (ref 8–23)
CALCIUM SERPL-MCNC: 9.3 MG/DL (ref 8.8–10.4)
CHLORIDE SERPL-SCNC: 101 MMOL/L (ref 98–107)
CREAT SERPL-MCNC: 0.81 MG/DL (ref 0.51–0.95)
CREAT SERPL-MCNC: 0.81 MG/DL (ref 0.51–0.95)
CRP SERPL-MCNC: 22.44 MG/L
EGFRCR SERPLBLD CKD-EPI 2021: 69 ML/MIN/1.73M2
EGFRCR SERPLBLD CKD-EPI 2021: 69 ML/MIN/1.73M2
EOSINOPHIL # BLD AUTO: 0.8 10E3/UL (ref 0–0.7)
EOSINOPHIL NFR BLD AUTO: 11 %
ERYTHROCYTE [DISTWIDTH] IN BLOOD BY AUTOMATED COUNT: 14.1 % (ref 10–15)
ERYTHROCYTE [SEDIMENTATION RATE] IN BLOOD BY WESTERGREN METHOD: 55 MM/HR (ref 0–30)
GLUCOSE SERPL-MCNC: 83 MG/DL (ref 70–99)
HCO3 BLDV-SCNC: 32 MMOL/L (ref 21–28)
HCO3 SERPL-SCNC: 28 MMOL/L (ref 22–29)
HCT VFR BLD AUTO: 40 % (ref 35–47)
HGB BLD-MCNC: 12.4 G/DL (ref 11.7–15.7)
IMM GRANULOCYTES # BLD: 0 10E3/UL
IMM GRANULOCYTES NFR BLD: 1 %
LACTATE BLD-SCNC: 2 MMOL/L
LYMPHOCYTES # BLD AUTO: 0.8 10E3/UL (ref 0.8–5.3)
LYMPHOCYTES NFR BLD AUTO: 12 %
MCH RBC QN AUTO: 30 PG (ref 26.5–33)
MCHC RBC AUTO-ENTMCNC: 31 G/DL (ref 31.5–36.5)
MCV RBC AUTO: 97 FL (ref 78–100)
MONOCYTES # BLD AUTO: 0.6 10E3/UL (ref 0–1.3)
MONOCYTES NFR BLD AUTO: 9 %
NEUTROPHILS # BLD AUTO: 4.9 10E3/UL (ref 1.6–8.3)
NEUTROPHILS NFR BLD AUTO: 68 %
NRBC # BLD AUTO: 0 10E3/UL
NRBC BLD AUTO-RTO: 0 /100
NT-PROBNP SERPL-MCNC: 145 PG/ML (ref 0–1800)
NT-PROBNP SERPL-MCNC: 168 PG/ML (ref 0–1800)
PCO2 BLDV: 55 MM HG (ref 40–50)
PH BLDV: 7.37 [PH] (ref 7.32–7.43)
PLATELET # BLD AUTO: 270 10E3/UL (ref 150–450)
PO2 BLDV: 28 MM HG (ref 25–47)
POTASSIUM SERPL-SCNC: 4.9 MMOL/L (ref 3.4–5.3)
PROT SERPL-MCNC: 6.9 G/DL (ref 6.4–8.3)
RBC # BLD AUTO: 4.13 10E6/UL (ref 3.8–5.2)
SAO2 % BLDV: 50 % (ref 70–75)
SODIUM SERPL-SCNC: 138 MMOL/L (ref 135–145)
URATE SERPL-MCNC: 7 MG/DL (ref 2.4–5.7)
URATE SERPL-MCNC: 7 MG/DL (ref 2.4–5.7)
WBC # BLD AUTO: 7.1 10E3/UL (ref 4–11)

## 2025-01-19 PROCEDURE — 82565 ASSAY OF CREATININE: CPT | Performed by: NURSE PRACTITIONER

## 2025-01-19 PROCEDURE — 82040 ASSAY OF SERUM ALBUMIN: CPT | Performed by: EMERGENCY MEDICINE

## 2025-01-19 PROCEDURE — 250N000009 HC RX 250: Performed by: NURSE PRACTITIONER

## 2025-01-19 PROCEDURE — 250N000011 HC RX IP 250 OP 636: Performed by: NURSE PRACTITIONER

## 2025-01-19 PROCEDURE — 999N000157 HC STATISTIC RCP TIME EA 10 MIN

## 2025-01-19 PROCEDURE — 250N000011 HC RX IP 250 OP 636: Performed by: EMERGENCY MEDICINE

## 2025-01-19 PROCEDURE — 82803 BLOOD GASES ANY COMBINATION: CPT

## 2025-01-19 PROCEDURE — 85025 COMPLETE CBC W/AUTO DIFF WBC: CPT | Performed by: EMERGENCY MEDICINE

## 2025-01-19 PROCEDURE — 36415 COLL VENOUS BLD VENIPUNCTURE: CPT | Performed by: EMERGENCY MEDICINE

## 2025-01-19 PROCEDURE — 84550 ASSAY OF BLOOD/URIC ACID: CPT | Performed by: NURSE PRACTITIONER

## 2025-01-19 PROCEDURE — 36415 COLL VENOUS BLD VENIPUNCTURE: CPT | Performed by: NURSE PRACTITIONER

## 2025-01-19 PROCEDURE — 250N000013 HC RX MED GY IP 250 OP 250 PS 637: Performed by: NURSE PRACTITIONER

## 2025-01-19 PROCEDURE — 94640 AIRWAY INHALATION TREATMENT: CPT

## 2025-01-19 PROCEDURE — 99285 EMERGENCY DEPT VISIT HI MDM: CPT | Mod: 25

## 2025-01-19 PROCEDURE — 83880 ASSAY OF NATRIURETIC PEPTIDE: CPT | Performed by: NURSE PRACTITIONER

## 2025-01-19 PROCEDURE — 86140 C-REACTIVE PROTEIN: CPT | Performed by: EMERGENCY MEDICINE

## 2025-01-19 PROCEDURE — 96365 THER/PROPH/DIAG IV INF INIT: CPT

## 2025-01-19 PROCEDURE — 87040 BLOOD CULTURE FOR BACTERIA: CPT | Performed by: EMERGENCY MEDICINE

## 2025-01-19 PROCEDURE — 99223 1ST HOSP IP/OBS HIGH 75: CPT | Mod: AI | Performed by: NURSE PRACTITIONER

## 2025-01-19 PROCEDURE — 999N000156 HC STATISTIC RCP CONSULT EA 30 MIN

## 2025-01-19 PROCEDURE — 80053 COMPREHEN METABOLIC PANEL: CPT | Performed by: EMERGENCY MEDICINE

## 2025-01-19 PROCEDURE — 250N000013 HC RX MED GY IP 250 OP 250 PS 637: Performed by: INTERNAL MEDICINE

## 2025-01-19 PROCEDURE — 120N000001 HC R&B MED SURG/OB

## 2025-01-19 PROCEDURE — 85652 RBC SED RATE AUTOMATED: CPT | Performed by: EMERGENCY MEDICINE

## 2025-01-19 RX ORDER — CEFTRIAXONE 2 G/1
2 INJECTION, POWDER, FOR SOLUTION INTRAMUSCULAR; INTRAVENOUS EVERY 24 HOURS
Status: DISCONTINUED | OUTPATIENT
Start: 2025-01-20 | End: 2025-01-20

## 2025-01-19 RX ORDER — IPRATROPIUM BROMIDE AND ALBUTEROL SULFATE 2.5; .5 MG/3ML; MG/3ML
1 SOLUTION RESPIRATORY (INHALATION) 3 TIMES DAILY
Status: DISCONTINUED | OUTPATIENT
Start: 2025-01-19 | End: 2025-01-22 | Stop reason: HOSPADM

## 2025-01-19 RX ORDER — AMOXICILLIN 250 MG
2 CAPSULE ORAL 2 TIMES DAILY PRN
Status: DISCONTINUED | OUTPATIENT
Start: 2025-01-19 | End: 2025-01-22 | Stop reason: HOSPADM

## 2025-01-19 RX ORDER — GABAPENTIN 300 MG/1
300 CAPSULE ORAL 2 TIMES DAILY
Status: DISCONTINUED | OUTPATIENT
Start: 2025-01-20 | End: 2025-01-22 | Stop reason: HOSPADM

## 2025-01-19 RX ORDER — NALOXONE HYDROCHLORIDE 0.4 MG/ML
0.4 INJECTION, SOLUTION INTRAMUSCULAR; INTRAVENOUS; SUBCUTANEOUS
Status: DISCONTINUED | OUTPATIENT
Start: 2025-01-19 | End: 2025-01-22 | Stop reason: HOSPADM

## 2025-01-19 RX ORDER — GABAPENTIN 600 MG/1
600 TABLET ORAL AT BEDTIME
Status: DISCONTINUED | OUTPATIENT
Start: 2025-01-19 | End: 2025-01-22 | Stop reason: HOSPADM

## 2025-01-19 RX ORDER — LEVOTHYROXINE SODIUM 88 UG/1
88 TABLET ORAL DAILY
Status: DISCONTINUED | OUTPATIENT
Start: 2025-01-20 | End: 2025-01-22 | Stop reason: HOSPADM

## 2025-01-19 RX ORDER — POTASSIUM CHLORIDE 1500 MG/1
40 TABLET, EXTENDED RELEASE ORAL DAILY
Status: DISCONTINUED | OUTPATIENT
Start: 2025-01-19 | End: 2025-01-22 | Stop reason: HOSPADM

## 2025-01-19 RX ORDER — ACETAMINOPHEN 325 MG/1
650 TABLET ORAL EVERY 4 HOURS PRN
Status: DISCONTINUED | OUTPATIENT
Start: 2025-01-19 | End: 2025-01-22 | Stop reason: HOSPADM

## 2025-01-19 RX ORDER — ONDANSETRON 4 MG/1
4 TABLET, ORALLY DISINTEGRATING ORAL EVERY 6 HOURS PRN
Status: DISCONTINUED | OUTPATIENT
Start: 2025-01-19 | End: 2025-01-22 | Stop reason: HOSPADM

## 2025-01-19 RX ORDER — ALBUTEROL SULFATE 0.83 MG/ML
2.5 SOLUTION RESPIRATORY (INHALATION) EVERY 4 HOURS PRN
Status: DISCONTINUED | OUTPATIENT
Start: 2025-01-19 | End: 2025-01-22 | Stop reason: HOSPADM

## 2025-01-19 RX ORDER — ACETAMINOPHEN 650 MG/1
650 SUPPOSITORY RECTAL EVERY 4 HOURS PRN
Status: DISCONTINUED | OUTPATIENT
Start: 2025-01-19 | End: 2025-01-22 | Stop reason: HOSPADM

## 2025-01-19 RX ORDER — NALOXONE HYDROCHLORIDE 0.4 MG/ML
0.2 INJECTION, SOLUTION INTRAMUSCULAR; INTRAVENOUS; SUBCUTANEOUS
Status: DISCONTINUED | OUTPATIENT
Start: 2025-01-19 | End: 2025-01-22 | Stop reason: HOSPADM

## 2025-01-19 RX ORDER — ACETAMINOPHEN 500 MG
1000 TABLET ORAL ONCE
Status: COMPLETED | OUTPATIENT
Start: 2025-01-19 | End: 2025-01-19

## 2025-01-19 RX ORDER — ENOXAPARIN SODIUM 100 MG/ML
40 INJECTION SUBCUTANEOUS EVERY 12 HOURS
Status: DISCONTINUED | OUTPATIENT
Start: 2025-01-19 | End: 2025-01-22 | Stop reason: HOSPADM

## 2025-01-19 RX ORDER — PROCHLORPERAZINE MALEATE 5 MG/1
5 TABLET ORAL EVERY 6 HOURS PRN
Status: DISCONTINUED | OUTPATIENT
Start: 2025-01-19 | End: 2025-01-22 | Stop reason: HOSPADM

## 2025-01-19 RX ORDER — FLUTICASONE PROPIONATE 50 MCG
1 SPRAY, SUSPENSION (ML) NASAL EVERY MORNING
Status: DISCONTINUED | OUTPATIENT
Start: 2025-01-20 | End: 2025-01-22 | Stop reason: HOSPADM

## 2025-01-19 RX ORDER — ONDANSETRON 2 MG/ML
4 INJECTION INTRAMUSCULAR; INTRAVENOUS EVERY 6 HOURS PRN
Status: DISCONTINUED | OUTPATIENT
Start: 2025-01-19 | End: 2025-01-22 | Stop reason: HOSPADM

## 2025-01-19 RX ORDER — CEFTRIAXONE 2 G/1
2 INJECTION, POWDER, FOR SOLUTION INTRAMUSCULAR; INTRAVENOUS ONCE
Status: COMPLETED | OUTPATIENT
Start: 2025-01-19 | End: 2025-01-19

## 2025-01-19 RX ORDER — HYDROXYZINE HCL 25 MG
12.5-25 TABLET ORAL EVERY 6 HOURS PRN
Status: DISCONTINUED | OUTPATIENT
Start: 2025-01-19 | End: 2025-01-21

## 2025-01-19 RX ORDER — AMOXICILLIN 250 MG
1 CAPSULE ORAL 2 TIMES DAILY PRN
Status: DISCONTINUED | OUTPATIENT
Start: 2025-01-19 | End: 2025-01-22 | Stop reason: HOSPADM

## 2025-01-19 RX ORDER — DULOXETIN HYDROCHLORIDE 60 MG/1
60 CAPSULE, DELAYED RELEASE ORAL DAILY
Status: DISCONTINUED | OUTPATIENT
Start: 2025-01-20 | End: 2025-01-22 | Stop reason: HOSPADM

## 2025-01-19 RX ORDER — ALLOPURINOL 100 MG/1
100 TABLET ORAL DAILY
Status: DISCONTINUED | OUTPATIENT
Start: 2025-01-20 | End: 2025-01-22 | Stop reason: HOSPADM

## 2025-01-19 RX ORDER — ALBUTEROL SULFATE 90 UG/1
1-2 INHALANT RESPIRATORY (INHALATION) EVERY 4 HOURS PRN
Status: DISCONTINUED | OUTPATIENT
Start: 2025-01-19 | End: 2025-01-22 | Stop reason: HOSPADM

## 2025-01-19 RX ORDER — FUROSEMIDE 40 MG/1
40 TABLET ORAL DAILY
Status: DISCONTINUED | OUTPATIENT
Start: 2025-01-19 | End: 2025-01-22 | Stop reason: HOSPADM

## 2025-01-19 RX ADMIN — HYDROXYZINE HYDROCHLORIDE 25 MG: 25 TABLET, FILM COATED ORAL at 21:52

## 2025-01-19 RX ADMIN — ACETAMINOPHEN 1000 MG: 500 TABLET, FILM COATED ORAL at 15:27

## 2025-01-19 RX ADMIN — ENOXAPARIN SODIUM 40 MG: 40 INJECTION SUBCUTANEOUS at 21:53

## 2025-01-19 RX ADMIN — Medication 1 MG: at 22:10

## 2025-01-19 RX ADMIN — CEFTRIAXONE 2 G: 2 INJECTION, POWDER, FOR SOLUTION INTRAMUSCULAR; INTRAVENOUS at 12:11

## 2025-01-19 RX ADMIN — GABAPENTIN 600 MG: 600 TABLET, FILM COATED ORAL at 21:52

## 2025-01-19 RX ADMIN — ALBUTEROL SULFATE 2.5 MG: 2.5 SOLUTION RESPIRATORY (INHALATION) at 19:13

## 2025-01-19 ASSESSMENT — ACTIVITIES OF DAILY LIVING (ADL)
ADLS_ACUITY_SCORE: 62
ADLS_ACUITY_SCORE: 53
ADLS_ACUITY_SCORE: 59
ADLS_ACUITY_SCORE: 62
ADLS_ACUITY_SCORE: 53
ADLS_ACUITY_SCORE: 62
ADLS_ACUITY_SCORE: 53
ADLS_ACUITY_SCORE: 53
ADLS_ACUITY_SCORE: 62
ADLS_ACUITY_SCORE: 62
ADLS_ACUITY_SCORE: 60

## 2025-01-19 ASSESSMENT — COLUMBIA-SUICIDE SEVERITY RATING SCALE - C-SSRS
1. IN THE PAST MONTH, HAVE YOU WISHED YOU WERE DEAD OR WISHED YOU COULD GO TO SLEEP AND NOT WAKE UP?: NO
6. HAVE YOU EVER DONE ANYTHING, STARTED TO DO ANYTHING, OR PREPARED TO DO ANYTHING TO END YOUR LIFE?: NO
2. HAVE YOU ACTUALLY HAD ANY THOUGHTS OF KILLING YOURSELF IN THE PAST MONTH?: NO

## 2025-01-19 NOTE — ED NOTES
[facility]  ED Nurse Handoff Report    ED Chief complaint: Leg Pain  . ED Diagnosis:   Final diagnoses:   Cellulitis of right lower extremity       Allergies:   Allergies   Allergen Reactions    Ace Inhibitors Cough       Code Status:     Activity level - Baseline/Home:   Wheelchair bound, but can pivot and take a couple steps with assistance to transfer .  Activity Level - Current:    Able to pivot transfer to wheelchair/bed/toilet .   Lift room needed: No.   Bariatric: No   Needed: No   Isolation: No.   Infection: Not Applicable.     Respiratory status: Room air    Vital Signs (within 30 minutes):   Vitals:    01/19/25 1110 01/19/25 1212   BP: 114/84 125/85   Pulse: 87 86   Resp: 24    Temp: 97.8  F (36.6  C)    TempSrc: Temporal    SpO2: 98% 97%       Cardiac Rhythm:  ,      Pain level:    Patient confused: No.   Patient Falls Risk: nonskid shoes/slippers when out of bed, patient and family education, and activity supervised.   Elimination Status: Has voided     Patient Report - Initial Complaint: Left leg pain, swelling and redness.   Focused Assessment: A&O. Presents to ED with leg pain, redness and swelling. States she has been on abx treatment without any improvement. Denies fevers. Rocephin given.      Abnormal Results:   Labs Ordered and Resulted from Time of ED Arrival to Time of ED Departure   COMPREHENSIVE METABOLIC PANEL - Abnormal       Result Value    Sodium 138      Potassium 4.9      Carbon Dioxide (CO2) 28      Anion Gap 9      Urea Nitrogen 19.9      Creatinine 0.81      GFR Estimate 69      Calcium 9.3      Chloride 101      Glucose 83      Alkaline Phosphatase 169 (*)     AST 63 (*)     ALT 22      Protein Total 6.9      Albumin 3.2 (*)     Bilirubin Total 0.4     ERYTHROCYTE SEDIMENTATION RATE AUTO - Abnormal    Erythrocyte Sedimentation Rate 55 (*)    CRP INFLAMMATION - Abnormal    CRP Inflammation 22.44 (*)    CBC WITH PLATELETS AND DIFFERENTIAL - Abnormal     WBC Count 7.1      RBC Count 4.13      Hemoglobin 12.4      Hematocrit 40.0      MCV 97      MCH 30.0      MCHC 31.0 (*)     RDW 14.1      Platelet Count 270      % Neutrophils 68      % Lymphocytes 12      % Monocytes 9      % Eosinophils 11      % Basophils 0      % Immature Granulocytes 1      NRBCs per 100 WBC 0      Absolute Neutrophils 4.9      Absolute Lymphocytes 0.8      Absolute Monocytes 0.6      Absolute Eosinophils 0.8 (*)     Absolute Basophils 0.0      Absolute Immature Granulocytes 0.0      Absolute NRBCs 0.0     ISTAT GASES LACTATE VENOUS POCT - Abnormal    Lactic Acid POCT 2.0      Bicarbonate Venous POCT 32 (*)     O2 Sat, Venous POCT 50 (*)     pCO2 Venous POCT 55 (*)     pH Venous POCT 7.37      pO2 Venous POCT 28     BLOOD CULTURE   BLOOD CULTURE        No orders to display       Treatments provided: See MAR  Family Comments: NA  OBS brochure/video discussed/provided to patient:  No  ED Medications:   Medications   cefTRIAXone (ROCEPHIN) 2 g vial to attach to  ml bag for ADULTS or NS 50 ml bag for PEDS (0 g Intravenous Stopped 1/19/25 1245)       Drips infusing:  No  For the majority of the shift this patient was Green.   Interventions performed were NA.    Sepsis treatment initiated: No    Cares/treatment/interventions/medications to be completed following ED care: NA    ED Nurse Name: Fariba Johnston RN  1:57 PM    RECEIVING UNIT ED HANDOFF REVIEW    Above ED Nurse Handoff Report was reviewed: Yes  Reviewed by: Neeta Iqbal RN on January 19, 2025 at 3:45 PM   LENNY Rosa called the ED to inform them the note was read: Yes

## 2025-01-19 NOTE — ED TRIAGE NOTES
Pt presents with concerns for possible right leg cellulitis. C/o redness to the leg. Has been on one week of abx treatment without change.

## 2025-01-19 NOTE — H&P
[facility]    History and Physical - Hospitalist Service       Date of Admission:  1/19/2025    Assessment & Plan      Carol Merida is a 89 year old female with PMH significant for recurrent RLE cellulitis,HFpEF, gout, JEAN, HTN, HLD, depression and anxiety, hypothyroidism, prior bilateral knee replacements, prior right hip replacement, and prior right hip IM nailing with hardware failure dmitted on 1/19/2025 being admitted for RLE cellulitis.      ED work up notable for elevated inflammatory markers (ESR=55, CRP=22.44), CBC is unremarkable, CMP shows mildly elevated AST of 63, though this appears to be at her baseline.  Patient refused ultrasound to the ED reporting that she recently had 1 done at her living facility.  She also completed a course of twice daily Keflex.  Blood cultures are pending.    Recurrent RLE cellulitis  Symptoms ongoing since Christmas.  She recently completed a round of Keflex started on January 10.  Symptoms have continued to progress and redness is now extending beyond her her knee.  No fevers or chills.  Denies dyspnea, orthopnea, or chest pain.  No leukocytosis noted on her CBC.  Elevated inflammatory markers and question whether or not this could be inflammatory process given her history of gout.  Also multiple joint replacements and hardware in her right lower extremity.  Through chart review, appears this was an issue during her last admission and she was treated with 6 days of IV ceftriaxone and discharged on cefadroxil.  -Ceftriaxone given in the ED, will continue for now  -ID consulted, cultures pending.   -Patient refused repeat ultrasound in the ED.  Will attempt to get a copy of her recent ultrasound from her living facility.  Will hold off on additional imaging at this time.  -Adding on additional labs including BNP and uric acid  -continue to trend labs  -Elevate extremity at rest, consider light compression  -PRN pain control with tylenol, limit   -PT  and SW consult.  -AM CBC and BMP.      HFpEF  Hypertension  Hyperlipidemia  BNP added to labs pending. Last echo in March of 2024 with EF 65-75%. Question whether or not lower extremity edema could be contributing to her recurrent cellulitis or venous insufficiency as she is wheelchair-bound.  Does not currently appear to be in exacerbation.  Not currently on a statin which is reasonable given her age.  -Continue PTA Lasix  -Consider compression stockings to control edema.    Depression and anxiety  Previously on scheduled and as needed lorazepam.  Scheduled lorazepam was discontinued during her last hospital admission.  She still has as needed lorazepam listed, however, would avoid use of a benzodiazepine given her age, high fall risk, and history of hypoxia.  -Continue PTA duloxetine.    Hypothyroidism  -continue PTA levothyroxine    Gout  Noted on last admission with elevated uric acid.  Repeat uric acid pending  -will adjust PTA allopurinol if indicate  -Consider steroid burst    Chronic pain/neuropathy  -continue PTA gabapentin    JEAN  History of non-compliance with BiPap in the past.  Will clarify usage and order at bedtime once clarified.    -monitor for hypoxia.      History of chronic sacral ulcers  Decreased mobility- wheelchair bound  Patient is wheelchair-bound at baseline secondary to multiple joint replacements and failed femur nailing.  Unable to do exam in the ED given she was boarded in the hallway.  -Frequent repositioning and offload sacrum.  -RN to apply Mepilex as needed.  -Can consider WOC consult if needed.        Diet:  Regular  DVT Prophylaxis: Enoxaparin (Lovenox) SQ  Benson Catheter: Not present  Lines: None     Cardiac Monitoring: None  Code Status:  NO CPR, arrest intubation ok    Clinically Significant Risk Factors Present on Admission               # Hypoalbuminemia: Lowest albumin = 3.2 g/dL at 1/19/2025 12:00 PM, will monitor as appropriate     # Hypertension: Noted on problem list  #  Chronic heart failure with preserved ejection fraction: heart failure noted on problem list and last echo with EF >50%         # DMII: A1C = 6.6 % (Ref range: <5.7 %) within past 6 months               Disposition Plan     Medically Ready for Discharge: Anticipated in 2-4 Days         The patient's care was discussed with the Attending Physician, Dr. Nguyen and Patient.    Clarita Stephens DNP  Hospitalist Service  [facility]  Securely message with Player[employer] (more info)  Text page via [facility] Paging/Directory     ______________________________________________________________________    Chief Complaint   RLE cellulitis.      History is obtained from the patient    History of Present Illness   Carol Merida is a 89 year old female with PMH significant for recurrent RLE cellulitis,HFpEF, gout, JEAN, HTN, HLD, depression and anxiety, hypothyroidism, prior bilateral knee replacements, prior right hip replacement, and prior right hip IM nailing with hardware failure dmitted on 1/19/2025 being admitted for RLE cellulitis.  Denies fevers, chills, nausea, vomiting, diarrhea.  Endorses increased pain in her right lower extremity especially with palpation and she notes increasing areas of redness and swelling.  Denies dyspnea, chest pain, and orthopnea.    She has history of multiple joint replacements on the right lower extremity including hip and knee replacement.  Also had hardware failure of the right hip nailing.  Denies infection of the hardware.  She is essentially wheelchair-bound due to decreased mobility.  Patient resides in an skilled nursing.  Reports they did an ultrasound of their which was negative for blood clots.    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    Melanoma of skin, site unspecified     NONSPECIFIC MEDICAL HISTORY 04    fx fifth finger right hand    Obstructive sleep apnea (adult) (pediatric) 8/25/2006    CPAP     Osteoarthritis     Other chronic pain     Joint pain for many years.    Other osteoporosis     PONV (postoperative nausea and vomiting)     Tubular adenoma in colon 9/01    colonoscopy due 2004       Past Surgical History   Past Surgical History:   Procedure Laterality Date    ARTHROPLASTY HIP Left 7/6/2015    Procedure: ARTHROPLASTY HIP;  Surgeon: Junior Giordano MD;  Location: [facility]    ARTHROPLASTY HIP Right 11/2/2016    Procedure: ARTHROPLASTY HIP;  Surgeon: Junior Giordano MD;  Location: [facility] OR    ARTHROPLASTY REVISION HIP Left 7/22/2015    Procedure: ARTHROPLASTY REVISION HIP;  Surgeon: Junior Giordano MD;  Location: [facility] OR    CATARACT IOL, RT/LT      COLONOSCOPY  9/01    tubular adenoma; repeat 3 years.    COLONOSCOPY  9/04    normal; repeat 5 years ([facility])    HYSTERECTOMY, MARGAUX  summer 2004    and BSO    OPEN REDUCTION INTERNAL FIXATION RODDING INTRAMEDULLARY FEMUR Right 2/26/2023    Procedure: OPEN REDUCTION INTERNAL FIXATION RIGHT DISTAL FEMUR WITH RETROGRADE NAIL, LATERAL PLATE, AND CABLE;  Surgeon: Newton Lemon MD;  Location: [facility] OR    SURGICAL HISTORY OF -   9/05    left mastectomy    SURGICAL HISTORY OF -   2008    right ankle surgery; triple arthrodesis    SURGICAL HISTORY OF -   5/09    right ankle surgery; triple arthrodesis and deltoid reconstruction; possible other    SURGICAL HISTORY OF -   2/10    removal of hardware from right ankle    [facility] NONSPECIFIC PROCEDURE      Knee replacement x 2 (B)    [facility] NONSPECIFIC PROCEDURE       s/p Tonsillectomy ([facility])    [facility] NONSPECIFIC PROCEDURE      s/p Appy (high school)    [facility] NONSPECIFIC PROCEDURE      Melanoma removed with sentinel node bx    [facility] NONSPECIFIC PROCEDURE       bilateral cataract       Prior to Admission Medications   Prior to Admission Medications   Prescriptions Last Dose Informant Patient Reported? Taking?   ACE/ARB/ARNI NOT PRESCRIBED (INTENTIONAL)   No No   Sig: Please choose reason not prescribed, below   Patient not taking: Reported on 5/29/2024   ARTIFICIAL SALIVA MT   Yes No   Sig: Take 1 strip by mouth 2 times daily After nebs   BETA BLOCKER NOT PRESCRIBED (INTENTIONAL)   No No   Sig: Beta Blocker not prescribed intentionally due to EF > 40 % (ejection fraction) will leave up to Cardiology.   Patient not taking: Reported on 5/29/2024   DULoxetine (CYMBALTA) 60 MG capsule   Yes No   Sig: Take 60 mg by mouth daily   LORazepam (ATIVAN) 0.5 MG tablet   Yes No   Sig: Take 0.5 mg by mouth every 8 hours as needed for anxiety   PseudoePHEDrine-guaiFENesin 120-1200 MG TB12   Yes No   Sig: Take 1 tablet by mouth 2 times daily as needed for congestion   acetaminophen (TYLENOL) 325 MG tablet   Yes No   Sig: Take 650 mg by mouth every 8 hours 0630, 1400, 2145   albuterol (PROAIR HFA/PROVENTIL HFA/VENTOLIN HFA) 108 (90 Base) MCG/ACT inhaler   No No   Sig: INHALE 2 PUFFS INTO THE LUNGS EVERY 4 HOURS AS NEEDED FOR SHORTNESS OF BREATH OR DIFFICULT BREATHING OR WHEEZING   albuterol (PROVENTIL) (2.5 MG/3ML) 0.083% neb solution   No No   Sig: Take 1 vial (2.5 mg) by nebulization every 4 hours as needed for shortness of breath, wheezing or cough And BID   allopurinol (ZYLOPRIM) 100 MG tablet   Yes No   Sig: Take 100 mg by mouth daily START 7/10   calcium carbonate 600 mg-vitamin D 400 units (CALTRATE) 600-400 MG-UNIT per tablet  Self Yes No   Sig: Take 1 tablet by mouth every evening    celecoxib (CELEBREX) 100 MG capsule   Yes No   Sig: Take 100 mg by mouth 2 times daily as needed for moderate  pain (4-6)   clobetasol propionate 0.05 % LOTN   Yes No   Sig: Externally apply topically 2 times daily To palm of hands   cycloSPORINE (CYCLOSPORINE IN KLARITY) 0.1 % EMUL   Yes No   Sig: Place 1 drop into both eyes 2 times daily 0630 and 2145   diclofenac (VOLTAREN) 1 % topical gel   Yes No   Sig: Apply 2 g topically 2 times daily   fluticasone (FLONASE) 50 MCG/ACT nasal spray   Yes No   Sig: Spray 1 spray into both nostrils every morning   fluticasone-salmeterol (WIXELA INHUB) 100-50 MCG/ACT inhaler   No No   Sig: Inhale 1 puff into the lungs 2 times daily   furosemide (LASIX) 40 MG tablet   No No   Sig: Take 1 tablet (40 mg) by mouth daily   gabapentin (NEURONTIN) 300 MG capsule   No No   Sig: Take 1 capsule (300 mg) by mouth 2 times daily   gabapentin (NEURONTIN) 600 MG tablet   No No   Sig: Take 1 tablet (600 mg) by mouth At Bedtime   guaiFENesin 400 MG TABS   Yes No   Sig: Take 400 mg by mouth 2 times daily   ipratropium - albuterol 0.5 mg/2.5 mg/3 mL (DUONEB) 0.5-2.5 (3) MG/3ML neb solution   Yes No   Sig: Take 1 vial by nebulization 3 times daily   levothyroxine (SYNTHROID/LEVOTHROID) 88 MCG tablet   No No   Sig: Take 1 tablet (88 mcg) by mouth daily   melatonin 3 MG tablet   Yes No   Sig: Take 3 mg by mouth At Bedtime   nystatin (NYSTOP) 115150 UNIT/GM external powder   No No   Sig: Apply tid to affectead area prn   polyethylene glycol (MIRALAX) 17 g packet   Yes No   Sig: Take 1 packet by mouth daily as needed for constipation   potassium chloride stuart ER (KLOR-CON M20) 20 MEQ CR tablet   Yes No   Sig: Take 40 mEq by mouth daily   triamcinolone (KENALOG) 0.1 % external cream   Yes No   Sig: Apply topically every evening   zinc oxide (DESITIN) 40 % external ointment   Yes No   Sig: Apply topically as needed for dry skin or irritation (with toileting)      Facility-Administered Medications: None           Physical Exam   Vital Signs: Temp: 97.8  F (36.6  C) Temp src: Temporal BP: 125/85 Pulse: 86   Resp: 24  SpO2: 97 %      Weight: 0 lbs 0 oz    GENERAL: No apparent distress.  HEENT: Patient is normocephalic, atraumatic.  EYES: Anicteric without injection.  RESPIRATORY: Clear to auscultation bilaterally.  CARDIOVASCULAR: Regular rate and rhythm.  Normal S1, S2 - no m/g/r.  EXTREMITIES: Significant circumferential redness and swelling of right foot and leg extending up to the knee.  Mild redness/discoloration in the left anterior shin.  Difficult to palpate right DP and PT pulses due to edema.  NEUROLOGIC: The patient was alert and conversation was appropriate. Grossly non-focal.  PSYCHIATRIC: Mood and affect congruent.        Medical Decision Making       75 MINUTES SPENT BY ME on the date of service doing chart review, history, exam, documentation & further activities per the note.      Data   ------------------------- PAST 24 HR DATA REVIEWED -----------------------------------------------    I have personally reviewed the following data over the past 24 hrs:    7.1  \   12.4   / 270     138 101 19.9 /  83   4.9 28 0.81 \     ALT: 22 AST: 63 (H) AP: 169 (H) TBILI: 0.4   ALB: 3.2 (L) TOT PROTEIN: 6.9 LIPASE: N/A     Trop: N/A BNP: 145     Procal: N/A CRP: 22.44 (H) Lactic Acid: 2.0         Imaging results reviewed over the past 24 hrs:   No results found for this or any previous visit (from the past 24 hours).

## 2025-01-19 NOTE — ED PROVIDER NOTES
Emergency Department Note      History of Present Illness     Chief Complaint   Leg Pain      HPI   Carol Merida is a 89 year old female presents to the emergency department for right leg pain, swelling, and erythema.  Per the patient, symptoms have been ongoing since near Jie time.  Patient reports that has been progressively worsening.  Patient was evaluated by out side physician and prescribed with cephalexin and has been on this prescription since 1/10/2025.  Patient however reports that since then, the redness has worsened and now extending into the back of her knee and it is tender to palpation.  Patient denies any fever, chills, chest pain, shortness of breath, nausea, vomiting, abdominal pain, or new urinary symptoms.  No new numbness or weakness.    Independent Historian   None    Review of External Notes   6/25/2024 discharge summary    Past Medical History     Medical History and Problem List   Past Medical History:   Diagnosis Date    Abnormal stress test     Anemia     Anxiety     Cardiac dysrhythmia, unspecified     CHF (congestive heart failure) (H) 6/18/2018    PETTY (dyspnea on exertion)     Hyperlipidemia LDL goal <100 2/10/2010    Hypertension goal BP (blood pressure) < 140/90 7/18/2010    Hypothyroid     Malignant neoplasm of breast (female), unspecified site 2005    MEDICAL HISTORY OF -     Melanoma of skin, site unspecified     NONSPECIFIC MEDICAL HISTORY 04    Obstructive sleep apnea (adult) (pediatric) 8/25/2006    Osteoarthritis     Other chronic pain     Other osteoporosis     PONV (postoperative nausea and vomiting)     Tubular adenoma in colon 9/01       Medications   ACE/ARB/ARNI NOT PRESCRIBED (INTENTIONAL)  acetaminophen (TYLENOL) 325 MG tablet  albuterol (PROAIR HFA/PROVENTIL HFA/VENTOLIN HFA) 108 (90 Base) MCG/ACT inhaler  albuterol (PROVENTIL) (2.5 MG/3ML) 0.083% neb solution  allopurinol (ZYLOPRIM) 100 MG tablet  ARTIFICIAL SALIVA MT  BETA BLOCKER NOT PRESCRIBED  (INTENTIONAL)  calcium carbonate 600 mg-vitamin D 400 units (CALTRATE) 600-400 MG-UNIT per tablet  celecoxib (CELEBREX) 100 MG capsule  clobetasol propionate 0.05 % LOTN  cycloSPORINE (CYCLOSPORINE IN KLARITY) 0.1 % EMUL  diclofenac (VOLTAREN) 1 % topical gel  DULoxetine (CYMBALTA) 60 MG capsule  fluticasone (FLONASE) 50 MCG/ACT nasal spray  fluticasone-salmeterol (WIXELA INHUB) 100-50 MCG/ACT inhaler  furosemide (LASIX) 40 MG tablet  gabapentin (NEURONTIN) 300 MG capsule  gabapentin (NEURONTIN) 600 MG tablet  guaiFENesin 400 MG TABS  ipratropium - albuterol 0.5 mg/2.5 mg/3 mL (DUONEB) 0.5-2.5 (3) MG/3ML neb solution  levothyroxine (SYNTHROID/LEVOTHROID) 88 MCG tablet  LORazepam (ATIVAN) 0.5 MG tablet  melatonin 3 MG tablet  nystatin (NYSTOP) 233414 UNIT/GM external powder  polyethylene glycol (MIRALAX) 17 g packet  potassium chloride stuart ER (KLOR-CON M20) 20 MEQ CR tablet  PseudoePHEDrine-guaiFENesin 120-1200 MG TB12  triamcinolone (KENALOG) 0.1 % external cream  zinc oxide (DESITIN) 40 % external ointment        Surgical History   Past Surgical History:   Procedure Laterality Date    ARTHROPLASTY HIP Left 7/6/2015    Procedure: ARTHROPLASTY HIP;  Surgeon: Junior Giordano MD;  Location: [facility] OR    ARTHROPLASTY HIP Right 11/2/2016    Procedure: ARTHROPLASTY HIP;  Surgeon: Juniro Giordano MD;  Location: [facility] OR    ARTHROPLASTY REVISION HIP Left 7/22/2015    Procedure: ARTHROPLASTY REVISION HIP;  Surgeon: Junior Giordano MD;  Location: [facility] OR    CATARACT IOL, RT/LT      COLONOSCOPY  9/01    tubular adenoma; repeat 3 years.    COLONOSCOPY  9/04    normal; repeat 5 years ([facility])    HYSTERECTOMY, MARGAUX  summer 2004    and BSO    OPEN REDUCTION INTERNAL FIXATION RODDING INTRAMEDULLARY FEMUR Right 2/26/2023    Procedure: OPEN REDUCTION INTERNAL FIXATION RIGHT DISTAL FEMUR WITH RETROGRADE NAIL, LATERAL PLATE, AND CABLE;  Surgeon: Newton Lemon MD;  [facility]    SURGICAL HISTORY OF -   9/05     left mastectomy    SURGICAL HISTORY OF -   2008    right ankle surgery; triple arthrodesis    SURGICAL HISTORY OF -   5/09    right ankle surgery; triple arthrodesis and deltoid reconstruction; possible other    SURGICAL HISTORY OF -   2/10    removal of hardware from right ankle    [facility] NONSPECIFIC PROCEDURE      Knee replacement x 2 (B)    [facility] NONSPECIFIC PROCEDURE      s/p Tonsillectomy (college)    [facility] NONSPECIFIC PROCEDURE      s/p Appy (high school)    [facility] NONSPECIFIC PROCEDURE      Melanoma removed with sentinel node bx    [facility] NONSPECIFIC PROCEDURE       bilateral cataract       Physical Exam     Patient Vitals for the past 24 hrs:   BP Temp Temp src Pulse Resp SpO2   01/19/25 1212 125/85 -- -- 86 -- 97 %   01/19/25 1110 114/84 97.8  F (36.6  C) Temporal 87 24 98 %     Physical Exam  Constitutional:       General: Not in acute distress.        Appearance: Normal appearance.   HENT:      Head: Normocephalic and atraumatic.   Eyes:      Extraocular Movements: Extraocular movements intact.      Conjunctiva/sclera: Conjunctivae normal.   Cardiovascular:      Rate and Rhythm: Normal rate and regular rhythm.   Pulmonary:      Effort: Pulmonary effort is normal. No respiratory distress.      Breath sounds: Normal breath sounds.   Abdominal:      General: Abdomen is flat. There is no distension.      Palpations: Abdomen is soft.      Tenderness: There is no abdominal tenderness.   Musculoskeletal:      Cervical back: Normal range of motion. No rigidity.      Right leg: Erythema extending from left ankle to the left popliteal fossa and medial posterior thigh.  Tenderness to palpation.  Excoriation marks in the lateral calf.     Left leg: Mild edema  Skin:     General: Skin is warm and dry.   Neurological:      General: No focal deficit present.      Mental Status: Alert and oriented to person, place, and time.   Psychiatric:         Mood and Affect: Mood normal.         Behavior: Behavior normal.        Diagnostics      Lab Results   Labs Ordered and Resulted from Time of ED Arrival to Time of ED Departure   COMPREHENSIVE METABOLIC PANEL - Abnormal       Result Value    Sodium 138      Potassium 4.9      Carbon Dioxide (CO2) 28      Anion Gap 9      Urea Nitrogen 19.9      Creatinine 0.81      GFR Estimate 69      Calcium 9.3      Chloride 101      Glucose 83      Alkaline Phosphatase 169 (*)     AST 63 (*)     ALT 22      Protein Total 6.9      Albumin 3.2 (*)     Bilirubin Total 0.4     ERYTHROCYTE SEDIMENTATION RATE AUTO - Abnormal    Erythrocyte Sedimentation Rate 55 (*)    CRP INFLAMMATION - Abnormal    CRP Inflammation 22.44 (*)    CBC WITH PLATELETS AND DIFFERENTIAL - Abnormal    WBC Count 7.1      RBC Count 4.13      Hemoglobin 12.4      Hematocrit 40.0      MCV 97      MCH 30.0      MCHC 31.0 (*)     RDW 14.1      Platelet Count 270      % Neutrophils 68      % Lymphocytes 12      % Monocytes 9      % Eosinophils 11      % Basophils 0      % Immature Granulocytes 1      NRBCs per 100 WBC 0      Absolute Neutrophils 4.9      Absolute Lymphocytes 0.8      Absolute Monocytes 0.6      Absolute Eosinophils 0.8 (*)     Absolute Basophils 0.0      Absolute Immature Granulocytes 0.0      Absolute NRBCs 0.0     ISTAT GASES LACTATE VENOUS POCT - Abnormal    Lactic Acid POCT 2.0      Bicarbonate Venous POCT 32 (*)     O2 Sat, Venous POCT 50 (*)     pCO2 Venous POCT 55 (*)     pH Venous POCT 7.37      pO2 Venous POCT 28     URIC ACID - Abnormal    Uric Acid 7.0 (*)    NT PROBNP INPATIENT - Normal    N terminal Pro BNP Inpatient 145     BLOOD CULTURE   BLOOD CULTURE       Imaging   No orders to display       EKG   None    Independent Interpretation   None    ED Course      Medications Administered   Medications   cefTRIAXone (ROCEPHIN) 2 g vial to attach to  ml bag for ADULTS or NS 50 ml bag for PEDS (0 g Intravenous Stopped 1/19/25 1245)   acetaminophen (TYLENOL) tablet 1,000 mg (1,000 mg Oral $Given 1/19/25 8787)        Procedures   Procedures     Discussion of Management   See ED course    ED Course   ED Course as of 01/19/25 1712   Sun Jan 19, 2025   1207 Lactic Acid POCT: 2.0   1211 WBC: 7.1  Re-assuring   1236 Patient declined ultrasound.  Reports that recently was evaluated patient with lower extremity ultrasound arterial and venous and was negative.   1340 Clarita Stephens NP       Additional Documentation  None    Medical Decision Making / Diagnosis     CMS Diagnoses: None    MIPS       None    [facility]   Carol Merida is a 89 year old female as described above presents to the emergency department for worsening right lower extremity redness despite outpatient antibiotic treatment since 1/10/2025.  Patient hemodynamically stable at time of evaluation.  Afebrile.  Nonseptic nontoxic cardiac appearing.  Findings certainly concerning for right lower extremity cellulitis failing outpatient antibiotic treatment given unilateral redness.  Will cover patient with IV Rocephin.  Infectious lab work.  Inflammatory markers.  Will obtain right lower extremity ultrasound for screening for DVT given swelling and tenderness to palpation.  Anticipate hospitalization for IV antibiotic treatment.  Discussed care plan with patient who voiced understanding and agreement with plan.  Answered all questions.  Additional workup and orders as listed in chart.    Ultimately, work up shows no significant leukocytosis. Borderline lactic acid at 2.0. Increased inflammatory markers certainly supportive of cellulitis diagnosis. Patient declined RLE US as she reports she had negative RLE US outpatient this past week. Patient made aware by US tech that it is possible for new clot development but declined repeat imaging.    Patient was admitted to the hospitalist service for further re-evaluation and treatment.    Please refer to ED course above as part of continuation of MDM for details on the patient's treatment course and any potential changes or updates  beyond my initial evaluation and MDM creation.    Disposition   The patient was admitted to the hospital.     Diagnosis     ICD-10-CM    1. Cellulitis of right lower extremity  L03.115            Discharge Medications   New Prescriptions    No medications on file         DO Tomas TAVERA Ferris, DO  01/19/25 1710

## 2025-01-19 NOTE — ED NOTES
Bed: [facility]  Expected date:   Expected time:   Means of arrival:   Comments:  [facility] [place] bed a

## 2025-01-20 LAB
ANION GAP SERPL CALCULATED.3IONS-SCNC: 12 MMOL/L (ref 7–15)
BUN SERPL-MCNC: 17.3 MG/DL (ref 8–23)
CALCIUM SERPL-MCNC: 9.1 MG/DL (ref 8.8–10.4)
CHLORIDE SERPL-SCNC: 102 MMOL/L (ref 98–107)
CREAT SERPL-MCNC: 0.67 MG/DL (ref 0.51–0.95)
EGFRCR SERPLBLD CKD-EPI 2021: 84 ML/MIN/1.73M2
ERYTHROCYTE [DISTWIDTH] IN BLOOD BY AUTOMATED COUNT: 14.1 % (ref 10–15)
GLUCOSE SERPL-MCNC: 126 MG/DL (ref 70–99)
HCO3 SERPL-SCNC: 26 MMOL/L (ref 22–29)
HCT VFR BLD AUTO: 39.3 % (ref 35–47)
HGB BLD-MCNC: 12.2 G/DL (ref 11.7–15.7)
MCH RBC QN AUTO: 29.8 PG (ref 26.5–33)
MCHC RBC AUTO-ENTMCNC: 31 G/DL (ref 31.5–36.5)
MCV RBC AUTO: 96 FL (ref 78–100)
PLATELET # BLD AUTO: 285 10E3/UL (ref 150–450)
POTASSIUM SERPL-SCNC: 4.3 MMOL/L (ref 3.4–5.3)
PROCALCITONIN SERPL IA-MCNC: 0.11 NG/ML
RBC # BLD AUTO: 4.09 10E6/UL (ref 3.8–5.2)
SODIUM SERPL-SCNC: 140 MMOL/L (ref 135–145)
WBC # BLD AUTO: 8 10E3/UL (ref 4–11)

## 2025-01-20 PROCEDURE — 250N000013 HC RX MED GY IP 250 OP 250 PS 637: Performed by: NURSE PRACTITIONER

## 2025-01-20 PROCEDURE — 36415 COLL VENOUS BLD VENIPUNCTURE: CPT | Performed by: NURSE PRACTITIONER

## 2025-01-20 PROCEDURE — 99233 SBSQ HOSP IP/OBS HIGH 50: CPT | Performed by: INTERNAL MEDICINE

## 2025-01-20 PROCEDURE — 85041 AUTOMATED RBC COUNT: CPT | Performed by: NURSE PRACTITIONER

## 2025-01-20 PROCEDURE — 84132 ASSAY OF SERUM POTASSIUM: CPT | Performed by: NURSE PRACTITIONER

## 2025-01-20 PROCEDURE — 85014 HEMATOCRIT: CPT | Performed by: NURSE PRACTITIONER

## 2025-01-20 PROCEDURE — 80048 BASIC METABOLIC PNL TOTAL CA: CPT | Performed by: NURSE PRACTITIONER

## 2025-01-20 PROCEDURE — 82374 ASSAY BLOOD CARBON DIOXIDE: CPT | Performed by: NURSE PRACTITIONER

## 2025-01-20 PROCEDURE — 94640 AIRWAY INHALATION TREATMENT: CPT | Mod: 76

## 2025-01-20 PROCEDURE — 250N000011 HC RX IP 250 OP 636: Performed by: INTERNAL MEDICINE

## 2025-01-20 PROCEDURE — 120N000001 HC R&B MED SURG/OB

## 2025-01-20 PROCEDURE — 84145 PROCALCITONIN (PCT): CPT | Performed by: INTERNAL MEDICINE

## 2025-01-20 PROCEDURE — 250N000011 HC RX IP 250 OP 636: Performed by: NURSE PRACTITIONER

## 2025-01-20 PROCEDURE — 99222 1ST HOSP IP/OBS MODERATE 55: CPT | Performed by: INTERNAL MEDICINE

## 2025-01-20 PROCEDURE — 250N000009 HC RX 250: Performed by: NURSE PRACTITIONER

## 2025-01-20 PROCEDURE — 999N000157 HC STATISTIC RCP TIME EA 10 MIN

## 2025-01-20 PROCEDURE — 94640 AIRWAY INHALATION TREATMENT: CPT

## 2025-01-20 RX ORDER — IPRATROPIUM BROMIDE AND ALBUTEROL SULFATE 2.5; .5 MG/3ML; MG/3ML
1 SOLUTION RESPIRATORY (INHALATION) EVERY 4 HOURS PRN
COMMUNITY

## 2025-01-20 RX ORDER — ESTRADIOL 0.1 MG/G
CREAM VAGINAL
COMMUNITY

## 2025-01-20 RX ORDER — CLINDAMYCIN PHOSPHATE 900 MG/50ML
900 INJECTION, SOLUTION INTRAVENOUS EVERY 8 HOURS
Status: DISCONTINUED | OUTPATIENT
Start: 2025-01-20 | End: 2025-01-22 | Stop reason: HOSPADM

## 2025-01-20 RX ORDER — ACETAMINOPHEN 500 MG
1000 TABLET ORAL 3 TIMES DAILY
COMMUNITY

## 2025-01-20 RX ADMIN — POTASSIUM CHLORIDE 40 MEQ: 1500 TABLET, EXTENDED RELEASE ORAL at 09:17

## 2025-01-20 RX ADMIN — OXYCODONE HYDROCHLORIDE 2.5 MG: 5 TABLET ORAL at 21:29

## 2025-01-20 RX ADMIN — ACETAMINOPHEN 650 MG: 325 TABLET, FILM COATED ORAL at 21:29

## 2025-01-20 RX ADMIN — GABAPENTIN 300 MG: 300 CAPSULE ORAL at 12:57

## 2025-01-20 RX ADMIN — GABAPENTIN 600 MG: 600 TABLET, FILM COATED ORAL at 21:29

## 2025-01-20 RX ADMIN — ACETAMINOPHEN 650 MG: 325 TABLET, FILM COATED ORAL at 13:00

## 2025-01-20 RX ADMIN — ALLOPURINOL 100 MG: 100 TABLET ORAL at 09:17

## 2025-01-20 RX ADMIN — ENOXAPARIN SODIUM 40 MG: 40 INJECTION SUBCUTANEOUS at 09:17

## 2025-01-20 RX ADMIN — DULOXETINE HYDROCHLORIDE 60 MG: 60 CAPSULE, DELAYED RELEASE ORAL at 09:17

## 2025-01-20 RX ADMIN — CLINDAMYCIN PHOSPHATE 900 MG: 900 INJECTION, SOLUTION INTRAVENOUS at 19:16

## 2025-01-20 RX ADMIN — LEVOTHYROXINE SODIUM 88 MCG: 0.09 TABLET ORAL at 09:17

## 2025-01-20 RX ADMIN — ENOXAPARIN SODIUM 40 MG: 40 INJECTION SUBCUTANEOUS at 21:28

## 2025-01-20 RX ADMIN — FUROSEMIDE 40 MG: 40 TABLET ORAL at 09:17

## 2025-01-20 RX ADMIN — FLUTICASONE PROPIONATE 1 SPRAY: 50 SPRAY, METERED NASAL at 09:18

## 2025-01-20 RX ADMIN — IPRATROPIUM BROMIDE AND ALBUTEROL SULFATE 3 ML: .5; 3 SOLUTION RESPIRATORY (INHALATION) at 20:05

## 2025-01-20 RX ADMIN — GABAPENTIN 300 MG: 300 CAPSULE ORAL at 09:17

## 2025-01-20 RX ADMIN — OXYCODONE HYDROCHLORIDE 2.5 MG: 5 TABLET ORAL at 01:14

## 2025-01-20 RX ADMIN — IPRATROPIUM BROMIDE AND ALBUTEROL SULFATE 3 ML: .5; 3 SOLUTION RESPIRATORY (INHALATION) at 12:05

## 2025-01-20 RX ADMIN — CLINDAMYCIN PHOSPHATE 900 MG: 900 INJECTION, SOLUTION INTRAVENOUS at 10:44

## 2025-01-20 ASSESSMENT — ACTIVITIES OF DAILY LIVING (ADL)
ADLS_ACUITY_SCORE: 59
ADLS_ACUITY_SCORE: 68
ADLS_ACUITY_SCORE: 68
ADLS_ACUITY_SCORE: 59
ADLS_ACUITY_SCORE: 60
ADLS_ACUITY_SCORE: 68
ADLS_ACUITY_SCORE: 59
ADLS_ACUITY_SCORE: 59
ADLS_ACUITY_SCORE: 68
ADLS_ACUITY_SCORE: 60
ADLS_ACUITY_SCORE: 68
ADLS_ACUITY_SCORE: 68
ADLS_ACUITY_SCORE: 59
ADLS_ACUITY_SCORE: 68
ADLS_ACUITY_SCORE: 68
ADLS_ACUITY_SCORE: 60
ADLS_ACUITY_SCORE: 59
ADLS_ACUITY_SCORE: 68
DEPENDENT_IADLS:: CLEANING;COOKING;LAUNDRY;SHOPPING;MEAL PREPARATION;MEDICATION MANAGEMENT;MONEY MANAGEMENT;TRANSPORTATION

## 2025-01-20 NOTE — PHARMACY-ADMISSION MEDICATION HISTORY
Pharmacist Admission Medication History    Admission medication history is complete. The information provided in this note is only as accurate as the sources available at the time of the update.    Information Source(s): Facility (U/NH/) medication list/MAR and CareEverywhere/SureScripts via N/A    Pertinent Information:      Changes made to PTA medication list:  Added: Debrox ear drops, Estradiol vag cream, DuoNebs prn  Deleted: albuterol nebs, clobetasol lotion 0.05%,   Changed: None    Allergies reviewed with patient and updates made in EHR: unable to assess    Medication History Completed By: Jovanny Samuel [facility] 1/20/2025 11:55 AM    PTA Med List   Medication Sig Note Last Dose/Taking    acetaminophen (TYLENOL) 500 MG tablet Take 1,000 mg by mouth 3 times daily.  1/19/2025    albuterol (PROAIR HFA/PROVENTIL HFA/VENTOLIN HFA) 108 (90 Base) MCG/ACT inhaler INHALE 2 PUFFS INTO THE LUNGS EVERY 4 HOURS AS NEEDED FOR SHORTNESS OF BREATH OR DIFFICULT BREATHING OR WHEEZING  Unknown    allopurinol (ZYLOPRIM) 100 MG tablet Take 100 mg by mouth daily START 7/10  1/19/2025 Morning    ARTIFICIAL SALIVA MT Take 1 strip by mouth 2 times daily After nebs  1/18/2025 Morning    calcium carbonate 600 mg-vitamin D 400 units (CALTRATE) 600-400 MG-UNIT per tablet Take 1 tablet by mouth every evening   1/18/2025 Bedtime    carbamide peroxide (DEBROX) 6.5 % otic solution Place 3 drops into both ears twice a week. Mon & Thur  1/16/2025 Bedtime    celecoxib (CELEBREX) 100 MG capsule Take 100 mg by mouth 2 times daily as needed for moderate pain (4-6)  1/18/2025 Evening    cycloSPORINE (CYCLOSPORINE IN KLARITY) 0.1 % EMUL Place 1 drop into both eyes 2 times daily 0630 and 2145  1/18/2025 Morning    diclofenac (VOLTAREN) 1 % topical gel Apply 2 g topically 2 times daily. To right side of neck  1/18/2025 Morning    DULoxetine (CYMBALTA) 60 MG capsule Take 60 mg by mouth daily  1/19/2025 Morning    estradiol (ESTRACE) 0.1 MG/GM  vaginal cream Place vaginally every 3 days. Sig: 3 times weekly, actually being given every 3 days.  1/16/2025 Bedtime    fluticasone (FLONASE) 50 MCG/ACT nasal spray Spray 1 spray into both nostrils every morning  1/19/2025 Morning    fluticasone-salmeterol (WIXELA INHUB) 100-50 MCG/ACT inhaler Inhale 1 puff into the lungs 2 times daily  1/18/2025 Morning    furosemide (LASIX) 40 MG tablet Take 1 tablet (40 mg) by mouth daily  1/19/2025 Morning    gabapentin (NEURONTIN) 300 MG capsule Take 1 capsule (300 mg) by mouth 2 times daily 1/20/2025: 6:30AM, 2 PM 1/19/2025 Morning    gabapentin (NEURONTIN) 600 MG tablet Take 1 tablet (600 mg) by mouth At Bedtime  1/18/2025 Bedtime    guaiFENesin 400 MG TABS Take 400 mg by mouth 2 times daily  1/19/2025 Morning    ipratropium - albuterol 0.5 mg/2.5 mg/3 mL (DUONEB) 0.5-2.5 (3) MG/3ML neb solution Take 1 vial by nebulization every 4 hours as needed for shortness of breath or wheezing.  Unknown    ipratropium - albuterol 0.5 mg/2.5 mg/3 mL (DUONEB) 0.5-2.5 (3) MG/3ML neb solution Take 1 vial by nebulization 3 times daily  1/19/2025 Morning    levothyroxine (SYNTHROID/LEVOTHROID) 88 MCG tablet Take 1 tablet (88 mcg) by mouth daily  1/19/2025 Morning    LORazepam (ATIVAN) 0.5 MG tablet Take 0.5 mg by mouth every 8 hours as needed for anxiety  More than a month    melatonin 3 MG tablet Take 3 mg by mouth At Bedtime  1/18/2025 Bedtime    nystatin (NYSTOP) 940743 UNIT/GM external powder Apply tid to affectead area prn  More than a month    polyethylene glycol (MIRALAX) 17 g packet Take 1 packet by mouth daily as needed for constipation  More than a month    potassium chloride stuart ER (KLOR-CON M20) 20 MEQ CR tablet Take 40 mEq by mouth daily  1/19/2025 Morning    PseudoePHEDrine-guaiFENesin 120-1200 MG TB12 Take 1 tablet by mouth 2 times daily as needed for congestion  More than a month    triamcinolone (KENALOG) 0.1 % external cream Apply topically every evening  1/13/2025 Bedtime     zinc oxide (DESITIN) 40 % external ointment Apply topically as needed for dry skin or irritation (with toileting)  More than a month

## 2025-01-20 NOTE — CONSULTS
[facility]    Infectious Disease Consultation     Date of Admission:  1/19/2025  Date of Consult (When I saw the patient): 01/20/25    Assessment & Plan   Carol Merida is a 89 year old female who was admitted on 1/19/2025.     Impression: 1 89-year-old female, underlying venous stasis without much edema control, worsening right leg pain and redness for the last 4 weeks, continued redness on oral antibiotics as an outpatient, admitted for feeling cellulitis, on exam he has poorly controlled venous insufficiency and edema relatively cool to the touch legs, redness all the way up into the thighs, strongly suspect this is primarily edema and stasis changes rather than cellulitis, of note white count normal, no fever, procalcitonin 0.  2 new rash, conceivable cephalosporin allergic reaction not entirely clear  3 chronic sacral ulcer  4 multiple joint replacements    REC 1 edema control far more important here than antimicrobials, concerned this might be an early allergic reaction will switch off ceftriaxone to clindamycin.  2 elevate legs, question some type of compression therapy but has been intolerant of this historically        Sebastian Hyde MD    Reason for Consult   Reason for consult: I was asked to evaluate this patient for cellulitis.    Primary Care Physician   Ruben Humphrey    Chief Complaint   Right leg pain    History is obtained from the patient and medical records    History of Present Illness   Carol Merida is a 89 year old female who presents with some history of chronic leg edema note previously diagnosed as cellulitis unclear at that time as well which she also had pneumonia.  Patient currently has had increasing swelling and redness of her right leg since around [place].  Was seen as an outpatient put on Keflex and continue to have pain and redness.  Now admitted for IV antibiotics but exam is not impressive for cellulitis looks like edema and stasis related changes  primarily.  White count is normal, procalcitonin 0, no significant fever or chills.    Past Medical History   I have reviewed this patient's medical history and updated it with pertinent information if needed.   Past Medical History:   Diagnosis Date    Abnormal stress test     small area on stress thalium ?2003; but normal angiogram 2012    Anemia     Anxiety     Cardiac dysrhythmia, unspecified     irregular heartbeat    CHF (congestive heart failure) (H) 6/18/2018    PETTY (dyspnea on exertion)     Hyperlipidemia LDL goal <100 2/10/2010    Hypertension goal BP (blood pressure) < 140/90 7/18/2010    Hypothyroid     Malignant neoplasm of breast (female), unspecified site 2005    infltrating ductal    MEDICAL HISTORY OF -     fx humerus Sept 2013.    Melanoma of skin, site unspecified     NONSPECIFIC MEDICAL HISTORY 04    fx fifth finger right hand    Obstructive sleep apnea (adult) (pediatric) 8/25/2006    CPAP     Osteoarthritis     Other chronic pain     Joint pain for many years.    Other osteoporosis     PONV (postoperative nausea and vomiting)     Tubular adenoma in colon 9/01    colonoscopy due 2004       Past Surgical History   I have reviewed this patient's surgical history and updated it with pertinent information if needed.  Past Surgical History:   Procedure Laterality Date    ARTHROPLASTY HIP Left 7/6/2015    Procedure: ARTHROPLASTY HIP;  Surgeon: Junior Giordano MD;  Location: [facility] OR    ARTHROPLASTY HIP Right 11/2/2016    Procedure: ARTHROPLASTY HIP;  Surgeon: Junior Giordano MD;  Location: [facility] OR    ARTHROPLASTY REVISION HIP Left 7/22/2015    Procedure: ARTHROPLASTY REVISION HIP;  Surgeon: Junior Giordano MD;  Location: [facility] OR    CATARACT IOL, RT/LT      COLONOSCOPY  9/01    tubular adenoma; repeat 3 years.    COLONOSCOPY  9/04    normal; repeat 5 years ([facility])    HYSTERECTOMY, MARGAUX  summer 2004    and BSO    OPEN REDUCTION INTERNAL FIXATION RODDING INTRAMEDULLARY FEMUR Right 2/26/2023     Procedure: OPEN REDUCTION INTERNAL FIXATION RIGHT DISTAL FEMUR WITH RETROGRADE NAIL, LATERAL PLATE, AND CABLE;  Surgeon: Newton Lemon MD;  Location: [facility] OR    SURGICAL HISTORY OF -   9/05    left mastectomy    SURGICAL HISTORY OF -   2008    right ankle surgery; triple arthrodesis    SURGICAL HISTORY OF -   5/09    right ankle surgery; triple arthrodesis and deltoid reconstruction; possible other    SURGICAL HISTORY OF -   2/10    removal of hardware from right ankle    [facility] NONSPECIFIC PROCEDURE      Knee replacement x 2 (B)    [facility] NONSPECIFIC PROCEDURE      s/p Tonsillectomy ([facility])    [facility] NONSPECIFIC PROCEDURE      s/p [employer] (high school)    [facility] NONSPECIFIC PROCEDURE      Melanoma removed with sentinel node bx    [facility] NONSPECIFIC PROCEDURE       bilateral cataract       Prior to Admission Medications   Prior to Admission Medications   Prescriptions Last Dose Informant Patient Reported? Taking?   ACE/ARB/ARNI NOT PRESCRIBED (INTENTIONAL)   No No   Sig: Please choose reason not prescribed, below   Patient not taking: Reported on 5/29/2024   ARTIFICIAL SALIVA MT 1/18/2025 Morning  Yes Yes   Sig: Take 1 strip by mouth 2 times daily After nebs   BETA BLOCKER NOT PRESCRIBED (INTENTIONAL)   No No   Sig: Beta Blocker not prescribed intentionally due to EF > 40 % (ejection fraction) will leave up to Cardiology.   Patient not taking: Reported on 5/29/2024   DULoxetine (CYMBALTA) 60 MG capsule 1/19/2025 Morning  Yes Yes   Sig: Take 60 mg by mouth daily   LORazepam (ATIVAN) 0.5 MG tablet More than a month  Yes Yes   Sig: Take 0.5 mg by mouth every 8 hours as needed for anxiety   PseudoePHEDrine-guaiFENesin 120-1200 MG TB12 More than a month  Yes Yes   Sig: Take 1 tablet by mouth 2 times daily as needed for congestion   acetaminophen (TYLENOL) 500 MG tablet 1/19/2025  Yes Yes   Sig: Take 1,000 mg by mouth 3 times daily.   albuterol (PROAIR HFA/PROVENTIL HFA/VENTOLIN HFA) 108 (90 Base) MCG/ACT inhaler Unknown  No Yes    Sig: INHALE 2 PUFFS INTO THE LUNGS EVERY 4 HOURS AS NEEDED FOR SHORTNESS OF BREATH OR DIFFICULT BREATHING OR WHEEZING   allopurinol (ZYLOPRIM) 100 MG tablet 1/19/2025 Morning  Yes Yes   Sig: Take 100 mg by mouth daily START 7/10   calcium carbonate 600 mg-vitamin D 400 units (CALTRATE) 600-400 MG-UNIT per tablet 1/18/2025 Bedtime Self Yes Yes   Sig: Take 1 tablet by mouth every evening    carbamide peroxide (DEBROX) 6.5 % otic solution 1/16/2025 Bedtime  Yes Yes   Sig: Place 3 drops into both ears twice a week. Mon & Thur   celecoxib (CELEBREX) 100 MG capsule 1/18/2025 Evening  Yes Yes   Sig: Take 100 mg by mouth 2 times daily as needed for moderate pain (4-6)   cycloSPORINE (CYCLOSPORINE IN KLARITY) 0.1 % EMUL 1/18/2025 Morning  Yes Yes   Sig: Place 1 drop into both eyes 2 times daily 0630 and 2145   diclofenac (VOLTAREN) 1 % topical gel 1/18/2025 Morning  Yes Yes   Sig: Apply 2 g topically 2 times daily. To right side of neck   estradiol (ESTRACE) 0.1 MG/GM vaginal cream 1/16/2025 Bedtime  Yes Yes   Sig: Place vaginally every 3 days. Sig: 3 times weekly, actually being given every 3 days.   fluticasone (FLONASE) 50 MCG/ACT nasal spray 1/19/2025 Morning  Yes Yes   Sig: Spray 1 spray into both nostrils every morning   fluticasone-salmeterol (WIXELA INHUB) 100-50 MCG/ACT inhaler 1/18/2025 Morning  No Yes   Sig: Inhale 1 puff into the lungs 2 times daily   furosemide (LASIX) 40 MG tablet 1/19/2025 Morning  No Yes   Sig: Take 1 tablet (40 mg) by mouth daily   gabapentin (NEURONTIN) 300 MG capsule 1/19/2025 Morning  No Yes   Sig: Take 1 capsule (300 mg) by mouth 2 times daily   gabapentin (NEURONTIN) 600 MG tablet 1/18/2025 Bedtime  No Yes   Sig: Take 1 tablet (600 mg) by mouth At Bedtime   guaiFENesin 400 MG TABS 1/19/2025 Morning  Yes Yes   Sig: Take 400 mg by mouth 2 times daily   ipratropium - albuterol 0.5 mg/2.5 mg/3 mL (DUONEB) 0.5-2.5 (3) MG/3ML neb solution 1/19/2025 Morning  Yes Yes   Sig: Take 1 vial by  nebulization 3 times daily   ipratropium - albuterol 0.5 mg/2.5 mg/3 mL (DUONEB) 0.5-2.5 (3) MG/3ML neb solution Unknown  Yes Yes   Sig: Take 1 vial by nebulization every 4 hours as needed for shortness of breath or wheezing.   levothyroxine (SYNTHROID/LEVOTHROID) 88 MCG tablet 1/19/2025 Morning  No Yes   Sig: Take 1 tablet (88 mcg) by mouth daily   melatonin 3 MG tablet 1/18/2025 Bedtime  Yes Yes   Sig: Take 3 mg by mouth At Bedtime   nystatin (NYSTOP) 012404 UNIT/GM external powder More than a month  No Yes   Sig: Apply tid to affectead area prn   polyethylene glycol (MIRALAX) 17 g packet More than a month  Yes Yes   Sig: Take 1 packet by mouth daily as needed for constipation   potassium chloride stuart ER (KLOR-CON M20) 20 MEQ CR tablet 1/19/2025 Morning  Yes Yes   Sig: Take 40 mEq by mouth daily   triamcinolone (KENALOG) 0.1 % external cream 1/13/2025 Bedtime  Yes Yes   Sig: Apply topically every evening   zinc oxide (DESITIN) 40 % external ointment More than a month  Yes Yes   Sig: Apply topically as needed for dry skin or irritation (with toileting)      Facility-Administered Medications: None     Allergies   Allergies   Allergen Reactions    Ace Inhibitors Cough       Immunization History   Immunization History   Administered Date(s) Administered    COVID-19 Bivalent 12+ (Pfizer) 10/11/2022    COVID-19 Monovalent 18+ (Moderna) 01/12/2021, 02/09/2021, 11/09/2021, 05/25/2022    Influenza (H1N1) 12/15/2009    Influenza (High Dose) Trivalent,PF (Fluzone) 10/20/2010, 10/07/2011, 10/03/2012, 11/19/2013, 12/02/2014, 10/15/2015, 10/04/2016, 11/16/2017, 11/14/2018, 12/11/2019, 09/01/2020    Influenza (IIV3) PF 11/12/2003, 11/22/2004, 11/08/2005, 12/01/2006, 11/08/2007, 11/05/2008, 09/23/2009    Influenza Vaccine 65+ (FLUAD) 09/03/2020    Influenza Vaccine 65+ (Fluzone HD) 09/15/2021, 10/11/2022    Pneumo Conj 13-V (2010&after) 05/29/2015    Pneumococcal 23 valent 05/21/2003, 11/08/2005, 11/30/2008    TD,PF 7+ (Tenivac)  "05/21/2003    TDAP Vaccine (Adacel) 01/22/2013    Td (Adult), Adsorbed 05/21/2003    Zoster vaccine, live 12/05/2012       Social History   I have reviewed this patient's social history and updated it with pertinent information if needed. Carol Merida  reports that she has never smoked. She has never used smokeless tobacco. She reports current alcohol use. She reports that she does not use drugs.    Family History   I have reviewed this patient's family history and updated it with pertinent information if needed.   Family History   Problem Relation Age of Onset    No Known Problems Brother         brain tumor related to shingles in his eye    Arthritis Mother     Hypertension Father     Cancer Father         lung    Cancer Grandchild         terratoma tumors    Breast Cancer Daughter        Review of Systems   The 10 point Review of Systems is negative    Physical Exam   Temp: 97.8  F (36.6  C) Temp src: Temporal BP: 131/60 Pulse: 85   Resp: 18 SpO2: 92 % O2 Device: None (Room air)    Vital Signs with Ranges  Temp:  [97.3  F (36.3  C)-97.8  F (36.6  C)] 97.8  F (36.6  C)  Pulse:  [] 85  Resp:  [18-25] 18  BP: (123-159)/(60-84) 131/60  SpO2:  [92 %-95 %] 92 %  271 lbs 9.6 oz  Body mass index is 49.66 kg/m .    GENERAL APPEARANCE:  awake  EYES: Eyes grossly normal to inspection  NECK: no adenopathy  RESP: lungs clear   CV: regular rates and rhythm  LYMPHATICS: normal ant/post cervical and supraclavicular nodes  ABDOMEN: soft, nontender  MS: extremities bilateral stasis changes much more on the right erythematous up into the thigh mostly cool to the touch although some warmth of the true lower leg, no actual lymphangitis  SKIN: Rash on chest back and arms, not entirely clear allergic        Data   All laboratory and imaging data in the past 24 hours reviewed  No results for input(s): \"CULT\" in the last 168 hours.  Recent Labs   Lab Test 03/12/19  0957 06/02/18  0957 06/02/18  0700   CULT 10,000 to 50,000 " colonies/mL  mixed urogenital yuko   Cultured on the 1st day of incubation:  Streptococcus mitis group  *  Critical Value/Significant Value, preliminary result only, called to and read back by  ILDA CASTILLO RN ([facility]).  06.03.18 0709 GJS    (Note)  POSITIVE for STREPTOCOCCUS SPECIES OTHER THAN pneumococcus, anginosus  group, S. pyogenes and S. agalactiae. Performed using Mixercast  multiplex nucleic acid test. Final identification and antimicrobial  susceptibility testing will be verified by standard methods.    Specimen tested with Verigene multiplex, gram-positive blood culture  nucleic acid test for the following targets: Staph aureus, Staph  epidermidis, Staph lugdunensis, other Staph species, Enterococcus  faecalis, Enterococcus faecium, Streptococcus species, S. agalactiae,  S. anginosus grp., S. pneumoniae, S. pyogenes, Listeria sp., mecA  (methicillin resistance) and Fernando/B (vancomycin resistance).    Critical Value/Significant Value called to and read back by Yanci Fuentes RN ([facility]) on 06.03.18  @10:02AM by DW.       No growth          All cultures:  Recent Labs   Lab 01/19/25  1201 01/19/25  1200   CULTURE No growth after 12 hours No growth after 12 hours      Blood culture:  Results for orders placed or performed during the hospital encounter of 01/19/25   Blood Culture Arm, Right    Collection Time: 01/19/25 12:01 PM    Specimen: Arm, Right; Blood   Result Value Ref Range    Culture No growth after 12 hours    Blood Culture Peripheral Blood    Collection Time: 01/19/25 12:00 PM    Specimen: Peripheral Blood   Result Value Ref Range    Culture No growth after 12 hours    Results for orders placed or performed during the hospital encounter of 06/20/24   Blood Culture Wrist, Right    Collection Time: 06/20/24  4:03 AM    Specimen: Wrist, Right; Blood   Result Value Ref Range    Culture No Growth    Blood Culture Peripheral Blood    Collection Time: 06/20/24  3:18 AM    Specimen: Peripheral  Blood   Result Value Ref Range    Culture No Growth    Results for orders placed or performed during the hospital encounter of 09/28/22   Blood Culture Hand, Right    Collection Time: 09/28/22  4:47 PM    Specimen: Hand, Right; Blood   Result Value Ref Range    Culture No Growth    Blood Culture Peripheral Blood    Collection Time: 09/28/22  4:28 PM    Specimen: Peripheral Blood   Result Value Ref Range    Culture No Growth    Results for orders placed or performed during the hospital encounter of 06/02/18   Blood culture    Collection Time: 06/02/18  9:57 AM    Specimen: Blood    Right Arm   Result Value Ref Range    Specimen Description Blood Right Arm     Special Requests Aerobic and anaerobic bottles received     Culture Micro (A)      Cultured on the 1st day of incubation:  Streptococcus mitis group      Culture Micro       Critical Value/Significant Value, preliminary result only, called to and read back by  ILDA CASTILLO RN ([facility]).  06.03.18 0709 GJS      Culture Micro       (Note)  POSITIVE for STREPTOCOCCUS SPECIES OTHER THAN pneumococcus, anginosus  group, S. pyogenes and S. agalactiae. Performed using Aphria  multiplex nucleic acid test. Final identification and antimicrobial  susceptibility testing will be verified by standard methods.    Specimen tested with Verigene multiplex, gram-positive blood culture  nucleic acid test for the following targets: Staph aureus, Staph  epidermidis, Staph lugdunensis, other Staph species, Enterococcus  faecalis, Enterococcus faecium, Streptococcus species, S. agalactiae,  S. anginosus grp., S. pneumoniae, S. pyogenes, Listeria sp., mecA  (methicillin resistance) and Fernando/B (vancomycin resistance).    Critical Value/Significant Value called to and read back by Yanci Fuentes RN ([facility]) on 06.03.18  @10:02AM by SALLY.             Susceptibility    Streptococcus mitis group - SEDRICK     Ampicillin <=0.06 Susceptible ug/mL     Penicillin <=0.03 Susceptible ug/mL      Vancomycin 0.5 Susceptible ug/mL     Cefotaxime <=0.25 Susceptible ug/mL     Ceftriaxone <=0.25 Susceptible ug/mL     Clindamycin <=0.06 Susceptible ug/mL   Blood culture    Collection Time: 06/02/18  7:00 AM    Specimen: Hand, Right; Blood    Right Hand   Result Value Ref Range    Specimen Description Blood Right Hand     Special Requests Aerobic and anaerobic bottles received     Culture Micro No growth      *Note: Due to a large number of results and/or encounters for the requested time period, some results have not been displayed. A complete set of results can be found in Results Review.      Urine culture:  Results for orders placed or performed during the hospital encounter of 06/20/24   Urine Culture    Collection Time: 06/20/24  5:01 AM    Specimen: Urine, Clean Catch   Result Value Ref Range    Culture >100,000 CFU/mL Escherichia coli (A)     Culture >100,000 CFU/mL Escherichia coli (A)        Susceptibility    Escherichia coli - SEDRICK     Ampicillin 8 Susceptible ug/mL     Ampicillin/ Sulbactam <=2 Susceptible ug/mL     Piperacillin/Tazobactam <=4 Susceptible ug/mL     Cefazolin* <=4 Susceptible ug/mL      * Cefazolin SEDRICK breakpoints are for the treatment of uncomplicated urinary tract infections. For the treatment of systemic infections, please contact the laboratory for additional testing.     Cefoxitin <=4 Susceptible ug/mL     Ceftazidime <=1 Susceptible ug/mL     Ceftriaxone <=1 Susceptible ug/mL     Cefepime <=1 Susceptible ug/mL     Gentamicin <=1 Susceptible ug/mL     Tobramycin <=1 Susceptible ug/mL     Ciprofloxacin <=0.25 Susceptible ug/mL     Levofloxacin <=0.12 Susceptible ug/mL     Nitrofurantoin 32 Susceptible ug/mL     Trimethoprim/Sulfamethoxazole >16/304 Resistant ug/mL    Escherichia coli - SEDRICK     Ampicillin <=2 Susceptible ug/mL     Ampicillin/ Sulbactam <=2 Susceptible ug/mL     Piperacillin/Tazobactam <=4 Susceptible ug/mL     Cefazolin* <=4 Susceptible ug/mL      * Cefazolin SEDRICK  breakpoints are for the treatment of uncomplicated urinary tract infections. For the treatment of systemic infections, please contact the laboratory for additional testing.     Cefoxitin <=4 Susceptible ug/mL     Ceftazidime <=1 Susceptible ug/mL     Ceftriaxone <=1 Susceptible ug/mL     Cefepime <=1 Susceptible ug/mL     Gentamicin <=1 Susceptible ug/mL     Tobramycin <=1 Susceptible ug/mL     Ciprofloxacin <=0.25 Susceptible ug/mL     Levofloxacin <=0.12 Susceptible ug/mL     Nitrofurantoin 32 Susceptible ug/mL     Trimethoprim/Sulfamethoxazole <=1/19 Susceptible ug/mL   Results for orders placed or performed in visit on 03/12/19   Urine Culture Aerobic Bacterial    Collection Time: 03/12/19  9:57 AM    Specimen: Midstream Urine   Result Value Ref Range    Specimen Description Midstream Urine     Culture Micro       10,000 to 50,000 colonies/mL  mixed urogenital yuko       *Note: Due to a large number of results and/or encounters for the requested time period, some results have not been displayed. A complete set of results can be found in Results Review.

## 2025-01-20 NOTE — PROGRESS NOTES
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       ED work up notable for elevated inflammatory markers (ESR=55, CRP=22.44), CBC is unremarkable, CMP shows mildly elevated AST of 63, though this appears to be at her baseline.  Patient refused ultrasound to the ED reporting that she recently had 1 done at her living facility.  She also completed a course of twice daily Keflex.  Blood cultures are pending.    She was initially started on ceftriaxone but has since been transitioned to clindamycin.     Recurrent RLE cellulitis  Symptoms ongoing since Christmas.  She recently completed a round of Keflex started on January 10.  Symptoms have continued to progress and redness extended beyond her her knee. Through chart review, appears this was an issue during her last admission and she was treated with 6 days of IV ceftriaxone and discharged on cefadroxil.  -ID consulted, cultures pending.  Transitioned to clindamycin on 1/20.  -Patient refused repeat ultrasound in the ED.    -Elevate extremity at rest, consider light compression  -PRN pain control with tylenol, limit   -PT and SW consult.        HFpEF not in acute exacerbation  Hypertension  Hyperlipidemia  BNP normal. Last echo in March of 2024 with EF 65-75%. Question whether or not lower extremity edema could be contributing to her recurrent cellulitis or venous insufficiency as she is wheelchair-bound.    -Continue PTA Lasix  -Consider compression stockings to control edema.     Depression and anxiety  Previously on scheduled and as needed lorazepam.   "Scheduled lorazepam was discontinued during her last hospital admission.  She still has as needed lorazepam listed, however, would avoid use of a benzodiazepine given her age, high fall risk, and history of hypoxia.  -Continue PTA duloxetine.     Hypothyroidism  -continue PTA levothyroxine     Gout  Noted on last admission with elevated uric acid.  Repeat uric acid pending  -will adjust PTA allopurinol if indicate  -Consider steroid burst     Chronic pain/neuropathy  -continue PTA gabapentin     JEAN  History of non-compliance with BiPap in the past.  Will clarify usage and order at bedtime once clarified.    -monitor for hypoxia.        History of chronic sacral ulcers  Decreased mobility- wheelchair bound  Patient is wheelchair-bound at baseline secondary to multiple joint replacements and failed femur nailing.  Unable to do exam in the ED given she was boarded in the hallway.  -Frequent repositioning and offload sacrum.  -RN to apply Mepilex as needed.  -WOC consult          Diet: Combination Diet No Caffeine Diet, Low Saturated Fat Na <2400mg Diet    DVT Prophylaxis: Enoxaparin (Lovenox) SQ  Benson Catheter: Not present  Lines: None     Cardiac Monitoring: None  Code Status: No CPR- Pre-arrest intubation OK      Clinically Significant Risk Factors Present on Admission               # Hypoalbuminemia: Lowest albumin = 3.2 g/dL at 1/19/2025 12:00 PM, will monitor as appropriate     # Hypertension: Noted on problem list  # Chronic heart failure with preserved ejection fraction: heart failure noted on problem list and last echo with EF >50%         # DMII: A1C = 6.6 % (Ref range: <5.7 %) within past 6 months    # Severe Obesity: Estimated body mass index is 49.66 kg/m  as calculated from the following:    Height as of this encounter: 1.575 m (5' 2.01\").    Weight as of this encounter: 123.2 kg (271 lb 9.6 oz).              Social Drivers of Health            Disposition Plan     Medically Ready for Discharge: " Anticipated Tomorrow             Kishore Adames MD  Hospitalist Service  [facility]  Securely message with [employer] (more info)  Text page via [employer] Paging/Directory   ______________________________________________________________________    Interval History     Feels okay overall, right lower extremity erythema seems to be slightly more pale  Transitioned from ceftriaxone to clindamycin  Updated her son at the bedside      Physical Exam   Vital Signs: Temp: 97.8  F (36.6  C) Temp src: Temporal BP: 131/60 Pulse: 85   Resp: 18 SpO2: 92 % O2 Device: None (Room air)    Weight: 271 lbs 9.6 oz    General: Alert, awake, no acute distress.  HEENT: NC/AT, eyes anicteric, external occular movements intact, face symmetric.    Cardiac: RRR, S1, S2.  No murmurs appreciated.  Pulmonary: Normal chest rise, normal work of breathing.  Lungs CTA BL  Abdomen: Obese.  Soft, non-tender, non-distended.  Bowel Sounds Present.  No guarding.  Extremities: Right greater than left erythema from the ankle to the knee.  Trace edema.  Skin: no rashes or lesions noted.  Warm and Dry.  Neuro: No focal deficits noted.  Speech clear.  Coordination and strength grossly normal.  Psych: Appropriate affect.      Medical Decision Making       50  MINUTES SPENT BY ME on the date of service doing chart review, history, exam, documentation & further activities per the note.      Data     I have personally reviewed the following data over the past 24 hrs:    8.0  \   12.2   / 285     140 102 17.3 /  126 (H)   4.3 26 0.67 \     Trop: N/A BNP: 168     Procal: 0.11 CRP: N/A Lactic Acid: N/A

## 2025-01-20 NOTE — PLAN OF CARE
"Goal Outcome Evaluation:      Plan of Care Reviewed With: patient, child    Overall Patient Progress: improvingOverall Patient Progress: improving    Outcome Evaluation: Remain on room air. States mild itching to LLE, redness to BLE receding. Mild pain managed with PRN Tylenol. Will be on IV abx for few days.    Problem: Adult Inpatient Plan of Care  Goal: Plan of Care Review  Description: The Plan of Care Review/Shift note should be completed every shift.  The Outcome Evaluation is a brief statement about your assessment that the patient is improving, declining, or no change.  This information will be displayed automatically on your shift  note.  Outcome: Progressing  Flowsheets (Taken 1/20/2025 1403)  Outcome Evaluation: Remain on room air. States mild itching to LLE, redness to BLE receding. Mild pain managed with PRN Tylenol. Will be on IV abx for few days.  Plan of Care Reviewed With:   patient   child  Overall Patient Progress: improving  Goal: Patient-Specific Goal (Individualized)  Description: You can add care plan individualizations to a care plan. Examples of Individualization might be:  \"Parent requests to be called daily at 9am for status\", \"I have a hard time hearing out of my right ear\", or \"Do not touch me to wake me up as it startles  me\".  Outcome: Progressing  Goal: Absence of Hospital-Acquired Illness or Injury  Outcome: Progressing  Intervention: Identify and Manage Fall Risk  Recent Flowsheet Documentation  Taken 1/20/2025 1100 by Da Jefferson RN  Safety Promotion/Fall Prevention:   activity supervised   assistive device/personal items within reach   clutter free environment maintained   nonskid shoes/slippers when out of bed   patient and family education   safety round/check completed   lighting adjusted  Intervention: Prevent Skin Injury  Recent Flowsheet Documentation  Taken 1/20/2025 1100 by Da Jefferson RN  Body Position:   turned   supine, legs elevated  Intervention: Prevent " and Manage VTE (Venous Thromboembolism) Risk  Recent Flowsheet Documentation  Taken 1/20/2025 1100 by Da Jefferson RN  VTE Prevention/Management: SCDs off (sequential compression devices)  Intervention: Prevent Infection  Recent Flowsheet Documentation  Taken 1/20/2025 1100 by Da Jefferson RN  Infection Prevention:   rest/sleep promoted   single patient room provided  Goal: Optimal Comfort and Wellbeing  Outcome: Progressing  Goal: Readiness for Transition of Care  Outcome: Progressing     Problem: Infection  Goal: Absence of Infection Signs and Symptoms  Outcome: Progressing     Problem: Skin or Soft Tissue Infection  Goal: Absence of Infection Signs and Symptoms  Outcome: Progressing  Intervention: Minimize and Manage Infection Progression  Recent Flowsheet Documentation  Taken 1/20/2025 1100 by Da Jefferson RN  Infection Prevention:   rest/sleep promoted   single patient room provided     Problem: Skin Injury Risk Increased  Goal: Skin Health and Integrity  Outcome: Progressing  Intervention: Plan: Nurse Driven Intervention: Moisture Management  Recent Flowsheet Documentation  Taken 1/20/2025 1100 by Da Jefferson RN  Moisture Interventions:   No brief in bed   Encourage regular toileting  Bathing/Skin Care: incontinence care  Taken 1/20/2025 0800 by Da Jefferson RN  Moisture Interventions:   No brief in bed   Encourage regular toileting  Bathing/Skin Care: incontinence care  Intervention: Plan: Nurse Driven Intervention: Friction and Shear  Recent Flowsheet Documentation  Taken 1/20/2025 1100 by Da Jefferson RN  Friction/Shear Interventions: HOB 30 degrees or less  Intervention: Optimize Skin Protection  Recent Flowsheet Documentation  Taken 1/20/2025 1100 by Da Jefferson RN  Pressure Reduction Techniques: heels elevated off bed  Pressure Reduction Devices: heel offloading device utilized  Activity Management: up in chair  Head of Bed (HOB) Positioning: HOB at 20-30 degrees

## 2025-01-20 NOTE — PLAN OF CARE
"Goal Outcome Evaluation:      Plan of Care Reviewed With: patient    Overall Patient Progress: no changeOverall Patient Progress: no change    Outcome Evaluation: Patient arrived to floor this evening around 1730. Oriented to room and answered all questions. Pain noted, but declined need for medication. Voiding via purewick. Up assist of 2 sera steady, able to stand and pivot. Patient at baseline mobility per patient. Redness to BLE - especially RLE. One episode of an anxiety attack this evening and Itchiness reported on upper chest and arms. PRN atarax given. Plans pending.        Problem: Adult Inpatient Plan of Care  Goal: Plan of Care Review  Description: The Plan of Care Review/Shift note should be completed every shift.  The Outcome Evaluation is a brief statement about your assessment that the patient is improving, declining, or no change.  This information will be displayed automatically on your shift  note.  Outcome: Progressing  Flowsheets (Taken 1/19/2025 2639)  Outcome Evaluation: Patient arrived to floor this evening around 1730.  Plan of Care Reviewed With: patient  Overall Patient Progress: no change  Goal: Patient-Specific Goal (Individualized)  Description: You can add care plan individualizations to a care plan. Examples of Individualization might be:  \"Parent requests to be called daily at 9am for status\", \"I have a hard time hearing out of my right ear\", or \"Do not touch me to wake me up as it startles  me\".  Outcome: Progressing  Goal: Absence of Hospital-Acquired Illness or Injury  Outcome: Progressing  Intervention: Identify and Manage Fall Risk  Recent Flowsheet Documentation  Taken 1/19/2025 4725 by Neeta Iqbal RN  Safety Promotion/Fall Prevention:   activity supervised   assistive device/personal items within reach   clutter free environment maintained   nonskid shoes/slippers when out of bed   patient and family education   safety round/check completed  Intervention: Prevent Skin " Injury  Recent Flowsheet Documentation  Taken 1/19/2025 1746 by Neeta Iqbal RN  Body Position: supine, head elevated  Intervention: Prevent and Manage VTE (Venous Thromboembolism) Risk  Recent Flowsheet Documentation  Taken 1/19/2025 1746 by Neeta Iqbal RN  VTE Prevention/Management: SCDs off (sequential compression devices)  Intervention: Prevent Infection  Recent Flowsheet Documentation  Taken 1/19/2025 1746 by Neeta Iqbal RN  Infection Prevention:   rest/sleep promoted   single patient room provided  Goal: Optimal Comfort and Wellbeing  Outcome: Progressing  Intervention: Monitor Pain and Promote Comfort  Recent Flowsheet Documentation  Taken 1/19/2025 1746 by Neeta Iqbal RN  Pain Management Interventions: declines  Goal: Readiness for Transition of Care  Outcome: Progressing  Intervention: Mutually Develop Transition Plan  Recent Flowsheet Documentation  Taken 1/19/2025 1700 by Neeta Iqbal RN  Equipment Currently Used at Home:   grab bar, tub/shower   grab bar, toilet   wheelchair, power   raised toilet seat   lift device     Problem: Infection  Goal: Absence of Infection Signs and Symptoms  Outcome: Progressing  Intervention: Prevent or Manage Infection  Recent Flowsheet Documentation  Taken 1/19/2025 1746 by Neeta Iqbal RN  Infection Management: aseptic technique maintained     Problem: Skin or Soft Tissue Infection  Goal: Absence of Infection Signs and Symptoms  Outcome: Progressing  Intervention: Minimize and Manage Infection Progression  Recent Flowsheet Documentation  Taken 1/19/2025 1746 by Neeta Iqbal RN  Infection Prevention:   rest/sleep promoted   single patient room provided  Infection Management: aseptic technique maintained     Problem: Skin Injury Risk Increased  Goal: Skin Health and Integrity  Outcome: Progressing  Intervention: Plan: Nurse Driven Intervention: Moisture Management  Recent Flowsheet Documentation  Taken 1/19/2025 1746 by Neeta Iqbal  RN  Moisture Interventions:   Urinary collection device   Incontinence pad  Bathing/Skin Care: incontinence care  Intervention: Plan: Nurse Driven Intervention: Friction and Shear  Recent Flowsheet Documentation  Taken 1/19/2025 1746 by Neeta Iqbal, RN  Friction/Shear Interventions: HOB 30 degrees or less  Intervention: Optimize Skin Protection  Recent Flowsheet Documentation  Taken 1/19/2025 2201 by Neeta Iqbal, RN  Activity Management: (repositioned) other (see comments)  Taken 1/19/2025 1746 by Neeta Iqbal, RN  Activity Management: back to bed  Head of Bed (HOB) Positioning: HOB at 20-30 degrees

## 2025-01-20 NOTE — CONSULTS
Care Management Initial Consult    General Information  Assessment completed with: Patient,         Primary Care Provider verified and updated as needed: Yes   Readmission within the last 30 days: no previous admission in last 30 days      Reason for Consult: discharge planning      Communication Assessment  Patient's communication style: spoken language (English or Bilingual)    Hearing Difficulty or Deaf: no   Wear Glasses or Blind: yes    Cognitive  Cognitive/Neuro/Behavioral: WDL                      Living Environment:   People in home: facility resident     Current living Arrangements: assisted living      Able to return to prior arrangements: yes       Family/Social Support:  Care provided by:  (Facility staff)  Provides care for: no one, unable/limited ability to care for self     Support system: Facility resident(s)/Staff, Children          Description of Support System: Supportive, Involved       Current Resources:   Patient receiving home care services: Yes  Skilled Home Care Services: Skilled Nursing     Community Resources:    Equipment currently used at home: wheelchair, power, grab bar, toilet, grab bar, tub/shower, walker, standard  Supplies currently used at home:  Wound Care    Does the patient's insurance plan have a 3 day qualifying hospital stay waiver?  Yes     Which insurance plan 3 day waiver is available? Alternative insurance waiver    Will the waiver be used for post-acute placement? Undetermined at this time    Lifestyle & Psychosocial Needs:  Social Drivers of Health     Food Insecurity: Low Risk  (1/19/2025)    Food Insecurity     Within the past 12 months, did you worry that your food would run out before you got money to buy more?: No     Within the past 12 months, did the food you bought just not last and you didn t have money to get more?: No   Depression: Not at risk (5/29/2024)    PHQ-2     PHQ-2 Score: 1   Housing Stability: Low Risk  (1/19/2025)    Housing Stability     Do you  have housing? : Yes     Are you worried about losing your housing?: No   Tobacco Use: Low Risk  (7/19/2024)    Patient History     Smoking Tobacco Use: Never     Smokeless Tobacco Use: Never     Passive Exposure: Not on file   Financial Resource Strain: Low Risk  (1/19/2025)    Financial Resource Strain     Within the past 12 months, have you or your family members you live with been unable to get utilities (heat, electricity) when it was really needed?: No   Alcohol Use: Not on file   Transportation Needs: Low Risk  (1/19/2025)    Transportation Needs     Within the past 12 months, has lack of transportation kept you from medical appointments, getting your medicines, non-medical meetings or appointments, work, or from getting things that you need?: No   Physical Activity: Not on file   Interpersonal Safety: Low Risk  (1/19/2025)    Interpersonal Safety     Do you feel physically and emotionally safe where you currently live?: Yes     Within the past 12 months, have you been hit, slapped, kicked or otherwise physically hurt by someone?: No     Within the past 12 months, have you been humiliated or emotionally abused in other ways by your partner or ex-partner?: No   Stress: Not on file   Social Connections: Not on file   Health Literacy: Not on file       Functional Status:  Prior to admission patient needed assistance:   Dependent ADLs:: Bathing, Dressing, Toileting, Wheelchair-with assist, Transfers  Dependent IADLs:: Cleaning, Cooking, Laundry, Shopping, Meal Preparation, Medication Management, Money Management, Transportation     Discussed  Partnership in Safe Discharge Planning  document with patient/family: No    Additional Information:  RN CC/SW is following/consulted for discharge planning. Pt admitted with recurrent RLE cellulitis. Per chart review, pt resides in an Assisted Living Facility. Spoke to pt to verify pt s residence at [facility]. Verbal permission was received to speak to the facility to  verify services and to discuss the discharge plan of care. A call was placed to [facility], waiting call back.    Patient receives services as follows: Per pt, med management, meals, bathing, transfers, toileting, housekeeping.    [facility] contact (name/number): [phone] Main, [phone] Nursing  Fax #: [phone]  Barriers to pt returning: None  Baseline mobility: Up with A1 to motorized wheelchair  Time of preferred return: Before 1400  New meds/prescriptions/Pharmacy: [facility]  Transportation: W    Other: Pt is currently open to Home Care services through [facility] for RN visits.    RN CC/SW will continue to follow for discharge planning and return to facility.     Next Steps: Send orders to [facility] and [facility] at discharge.    Lizzie Pappas RN   Inpatient Care Coordination  [facility]   Phone: [phone]

## 2025-01-21 ENCOUNTER — APPOINTMENT (OUTPATIENT)
Dept: PHYSICAL THERAPY | Facility: CLINIC | Age: 89
DRG: 603 | End: 2025-01-21
Attending: NURSE PRACTITIONER
Payer: COMMERCIAL

## 2025-01-21 PROCEDURE — 250N000013 HC RX MED GY IP 250 OP 250 PS 637: Performed by: NURSE PRACTITIONER

## 2025-01-21 PROCEDURE — 94640 AIRWAY INHALATION TREATMENT: CPT | Mod: 76

## 2025-01-21 PROCEDURE — 97530 THERAPEUTIC ACTIVITIES: CPT | Mod: GP | Performed by: PHYSICAL THERAPIST

## 2025-01-21 PROCEDURE — 250N000011 HC RX IP 250 OP 636: Performed by: INTERNAL MEDICINE

## 2025-01-21 PROCEDURE — 250N000013 HC RX MED GY IP 250 OP 250 PS 637: Performed by: INTERNAL MEDICINE

## 2025-01-21 PROCEDURE — 999N000157 HC STATISTIC RCP TIME EA 10 MIN

## 2025-01-21 PROCEDURE — 120N000001 HC R&B MED SURG/OB

## 2025-01-21 PROCEDURE — 94640 AIRWAY INHALATION TREATMENT: CPT

## 2025-01-21 PROCEDURE — 97110 THERAPEUTIC EXERCISES: CPT | Mod: GP | Performed by: PHYSICAL THERAPIST

## 2025-01-21 PROCEDURE — 99232 SBSQ HOSP IP/OBS MODERATE 35: CPT | Performed by: INTERNAL MEDICINE

## 2025-01-21 PROCEDURE — 97161 PT EVAL LOW COMPLEX 20 MIN: CPT | Mod: GP | Performed by: PHYSICAL THERAPIST

## 2025-01-21 PROCEDURE — 250N000012 HC RX MED GY IP 250 OP 636 PS 637: Performed by: INTERNAL MEDICINE

## 2025-01-21 PROCEDURE — 250N000011 HC RX IP 250 OP 636: Performed by: NURSE PRACTITIONER

## 2025-01-21 PROCEDURE — 250N000009 HC RX 250: Performed by: NURSE PRACTITIONER

## 2025-01-21 RX ORDER — PREDNISONE 20 MG/1
20 TABLET ORAL DAILY
Status: DISCONTINUED | OUTPATIENT
Start: 2025-01-22 | End: 2025-01-22 | Stop reason: HOSPADM

## 2025-01-21 RX ORDER — DIPHENHYDRAMINE HCL 25 MG
25 CAPSULE ORAL ONCE
Status: COMPLETED | OUTPATIENT
Start: 2025-01-21 | End: 2025-01-21

## 2025-01-21 RX ORDER — DIPHENHYDRAMINE HCL 25 MG
25 CAPSULE ORAL EVERY 6 HOURS PRN
Status: DISCONTINUED | OUTPATIENT
Start: 2025-01-21 | End: 2025-01-22 | Stop reason: HOSPADM

## 2025-01-21 RX ORDER — PREDNISONE 20 MG/1
40 TABLET ORAL ONCE
Status: COMPLETED | OUTPATIENT
Start: 2025-01-21 | End: 2025-01-21

## 2025-01-21 RX ADMIN — IPRATROPIUM BROMIDE AND ALBUTEROL SULFATE 3 ML: .5; 3 SOLUTION RESPIRATORY (INHALATION) at 14:25

## 2025-01-21 RX ADMIN — ENOXAPARIN SODIUM 40 MG: 40 INJECTION SUBCUTANEOUS at 21:08

## 2025-01-21 RX ADMIN — PREDNISONE 40 MG: 20 TABLET ORAL at 11:46

## 2025-01-21 RX ADMIN — GABAPENTIN 300 MG: 300 CAPSULE ORAL at 11:45

## 2025-01-21 RX ADMIN — IPRATROPIUM BROMIDE AND ALBUTEROL SULFATE 3 ML: .5; 3 SOLUTION RESPIRATORY (INHALATION) at 08:31

## 2025-01-21 RX ADMIN — ALLOPURINOL 100 MG: 100 TABLET ORAL at 09:00

## 2025-01-21 RX ADMIN — FLUTICASONE PROPIONATE 1 SPRAY: 50 SPRAY, METERED NASAL at 09:00

## 2025-01-21 RX ADMIN — FUROSEMIDE 40 MG: 40 TABLET ORAL at 08:59

## 2025-01-21 RX ADMIN — CLINDAMYCIN PHOSPHATE 900 MG: 900 INJECTION, SOLUTION INTRAVENOUS at 18:00

## 2025-01-21 RX ADMIN — HYDROXYZINE HYDROCHLORIDE 12.5 MG: 25 TABLET, FILM COATED ORAL at 00:53

## 2025-01-21 RX ADMIN — ACETAMINOPHEN 650 MG: 325 TABLET, FILM COATED ORAL at 09:05

## 2025-01-21 RX ADMIN — DIPHENHYDRAMINE HYDROCHLORIDE 25 MG: 25 CAPSULE ORAL at 21:07

## 2025-01-21 RX ADMIN — GABAPENTIN 300 MG: 300 CAPSULE ORAL at 08:58

## 2025-01-21 RX ADMIN — DULOXETINE HYDROCHLORIDE 60 MG: 60 CAPSULE, DELAYED RELEASE ORAL at 08:59

## 2025-01-21 RX ADMIN — GABAPENTIN 600 MG: 600 TABLET, FILM COATED ORAL at 21:07

## 2025-01-21 RX ADMIN — POTASSIUM CHLORIDE 40 MEQ: 1500 TABLET, EXTENDED RELEASE ORAL at 09:00

## 2025-01-21 RX ADMIN — CLINDAMYCIN PHOSPHATE 900 MG: 900 INJECTION, SOLUTION INTRAVENOUS at 10:01

## 2025-01-21 RX ADMIN — CLINDAMYCIN PHOSPHATE 900 MG: 900 INJECTION, SOLUTION INTRAVENOUS at 01:57

## 2025-01-21 RX ADMIN — IPRATROPIUM BROMIDE AND ALBUTEROL SULFATE 3 ML: .5; 3 SOLUTION RESPIRATORY (INHALATION) at 19:29

## 2025-01-21 RX ADMIN — ACETAMINOPHEN 650 MG: 325 TABLET, FILM COATED ORAL at 21:07

## 2025-01-21 RX ADMIN — DIPHENHYDRAMINE HYDROCHLORIDE 25 MG: 25 CAPSULE ORAL at 11:45

## 2025-01-21 RX ADMIN — ENOXAPARIN SODIUM 40 MG: 40 INJECTION SUBCUTANEOUS at 09:00

## 2025-01-21 RX ADMIN — LEVOTHYROXINE SODIUM 88 MCG: 0.09 TABLET ORAL at 08:59

## 2025-01-21 RX ADMIN — HYDROXYZINE HYDROCHLORIDE 12.5 MG: 25 TABLET, FILM COATED ORAL at 08:59

## 2025-01-21 ASSESSMENT — ACTIVITIES OF DAILY LIVING (ADL)
ADLS_ACUITY_SCORE: 68

## 2025-01-21 NOTE — PROGRESS NOTES
01/21/25 1000   Appointment Info   Signing Clinician's Name / Credentials (PT) Franny Gaytan PT   Living Environment   People in Home facility resident   Current Living Arrangements assisted living   Transportation Anticipated agency   Self-Care   Usual Activity Tolerance moderate   Current Activity Tolerance fair   Equipment Currently Used at Home wheelchair, power;grab bar, toilet;grab bar, tub/shower;walker, standard   Fall history within last six months no   Activity/Exercise/Self-Care Comment at [facility] pt gets assist w Bathing, Dressing, Toileting, Wheelchair-with assist, Transfers A x 1  Dependent IADLs:: Cleaning, Cooking, Laundry, Shopping, Meal Preparation, Medication Management, Money Management, Transportati   General Information   Onset of Illness/Injury or Date of Surgery 01/20/25   Pertinent History of Current Problem (include personal factors and/or comorbidities that impact the POC) 89 year old female with PMH significant for recurrent RLE cellulitis,HFpEF, gout, JEAN, HTN, HLD, depression and anxiety, hypothyroidism, prior bilateral knee replacements, prior right hip replacement, and 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   Existing Precautions/Restrictions fall   Cognition   Affect/Mental Status (Cognition) WFL   Orientation Status (Cognition) oriented x 4   Follows Commands (Cognition) WFL   Integumentary/Edema   Integumentary/Edema Comments R lower leg cellulitis and edema   Posture    Posture Forward head position;Protracted shoulders   Range of Motion (ROM)   Range of Motion ROM deficits secondary to pain;ROM deficits secondary to swelling   Strength (Manual Muscle Testing)   Strength (Manual Muscle Testing) Deficits observed during functional mobility   Bed Mobility   Comment, (Bed Mobility) baseline pt sleeps in lift chair   Transfers   Comment, (Transfers) sit to stand w min A   Gait/Stairs (Locomotion)   Comment, (Gait/Stairs)  pt is non ambulatory   Balance   Balance Comments good in sitting, pt req UE support in standing   Clinical Impression   Criteria for Skilled Therapeutic Intervention Yes, treatment indicated   PT Diagnosis (PT) Weakness   Influenced by the following impairments dec strength impaired gait dec indep transfers pain   Functional limitations due to impairments imparied mobility   Clinical Presentation (PT Evaluation Complexity) stable   Clinical Presentation Rationale clincial assessment   PT Discharge Planning   PT Discharge Recommendation (DC Rec) home with assist;home with home care physical therapy   PT Rationale for DC Rec Pt mobilizing close to baseline able to complete bed mobility and bed to chair transfer w A x 1, cont to demonstrate dec strength and reduced activity tolerance however, will benefit from PT during stay with return to [facility] and home PT

## 2025-01-21 NOTE — PLAN OF CARE
"Goal Outcome Evaluation:      Plan of Care Reviewed With: patient    Overall Patient Progress: no changeOverall Patient Progress: no change    Outcome Evaluation: Pt reported itching to bilateral shoulders. Skin was assessed and noted to have red raised rashes to bilateral shoulders and hips. Pt endorsed she was also itching last night. [facility] hospitalist, Johnny Aguiar was notified.     Continues with BLE redness and swelling. RLE more erythematous than LLE. Redness in RLE extending from below knee to foot; LLE redness to mid shin. Extremities tender to light touch. Pt endorsed, mild pain at rest 2-3/10; 6/8 with movement. Pain managed with Gabapentin, oxycodone and Tylenol. Pt was encouraged to elevate extremities. Antibiotic therapy, clindamycin administered.      Sacral wound site cleanse and new dressing applied.      Problem: Adult Inpatient Plan of Care  Goal: Plan of Care Review  Description: The Plan of Care Review/Shift note should be completed every shift.  The Outcome Evaluation is a brief statement about your assessment that the patient is improving, declining, or no change.  This information will be displayed automatically on your shift  note.  1/20/2025 2331 by Danielle Wick, RN  Outcome: Progressing  Flowsheets (Taken 1/20/2025 2331)  Outcome Evaluation: Pt reported itching to bilateral shoulders. Skin was assessed and noted to have red raised rashes to bilateral shoulders and hips. Pt endorsed she was also itching last night.  Plan of Care Reviewed With: patient  Overall Patient Progress: no change  1/20/2025 2237 by Danielle Wick, RN  Outcome: Progressing  Flowsheets (Taken 1/20/2025 2231)  Plan of Care Reviewed With: patient  Overall Patient Progress: no change  Goal: Patient-Specific Goal (Individualized)  Description: You can add care plan individualizations to a care plan. Examples of Individualization might be:  \"Parent requests to be called daily at 9am for status\", \"I have a hard time " "hearing out of my right ear\", or \"Do not touch me to wake me up as it startles  me\".  Outcome: Progressing  Goal: Absence of Hospital-Acquired Illness or Injury  Outcome: Progressing  Intervention: Identify and Manage Fall Risk  Recent Flowsheet Documentation  Taken 1/20/2025 1755 by Danielle Wick RN  Safety Promotion/Fall Prevention:   activity supervised   assistive device/personal items within reach   clutter free environment maintained   nonskid shoes/slippers when out of bed   patient and family education   safety round/check completed   lighting adjusted  Intervention: Prevent and Manage VTE (Venous Thromboembolism) Risk  Recent Flowsheet Documentation  Taken 1/20/2025 1755 by Danielle Wick RN  VTE Prevention/Management: SCDs off (sequential compression devices)  Intervention: Prevent Infection  Recent Flowsheet Documentation  Taken 1/20/2025 1755 by Danielle Wick RN  Infection Prevention:   rest/sleep promoted   single patient room provided  Goal: Optimal Comfort and Wellbeing  Outcome: Not Progressing  Intervention: Monitor Pain and Promote Comfort  Recent Flowsheet Documentation  Taken 1/20/2025 1750 by Danielle Wick RN  Pain Management Interventions:   relaxation techniques promoted   repositioned   rest  Goal: Readiness for Transition of Care  Outcome: Not Progressing     Problem: Infection  Goal: Absence of Infection Signs and Symptoms  Outcome: Not Progressing     Problem: Skin or Soft Tissue Infection  Goal: Absence of Infection Signs and Symptoms  Outcome: Not Progressing  Intervention: Minimize and Manage Infection Progression  Recent Flowsheet Documentation  Taken 1/20/2025 1755 by Danielle Wick RN  Infection Prevention:   rest/sleep promoted   single patient room provided     Problem: Skin Injury Risk Increased  Goal: Skin Health and Integrity  Outcome: Progressing  Intervention: Plan: Nurse Driven Intervention: Moisture Management  Recent Flowsheet Documentation  Taken 1/20/2025 2000 by " Danielle Wick, RN  Moisture Interventions:   No brief in bed   Perineal cleanser  Bathing/Skin Care: incontinence care  Taken 1/20/2025 1755 by Danielle Wick RN  Moisture Interventions:   No brief in bed   Encourage regular toileting  Intervention: Plan: Nurse Driven Intervention: Friction and Shear  Recent Flowsheet Documentation  Taken 1/20/2025 1755 by Danielle Wick, RN  Friction/Shear Interventions: HOB 30 degrees or less  Intervention: Optimize Skin Protection  Recent Flowsheet Documentation  Taken 1/20/2025 1755 by Danielle Wick RN  Pressure Reduction Techniques: heels elevated off bed  Pressure Reduction Devices: heel offloading device utilized  Activity Management: up in chair  Head of Bed (HOB) Positioning: HOB at 30-45 degrees

## 2025-01-21 NOTE — PROGRESS NOTES
[facility]  Infectious Disease Progress Note          Assessment and Plan:   Date of Admission:  1/19/2025  Date of Consult (When I saw the patient): 01/20/25        Assessment & Plan  Carol Merida is a 89 year old female who was admitted on 1/19/2025.      Impression: 1 89-year-old female, underlying venous stasis without much edema control, worsening right leg pain and redness for the last 4 weeks, continued redness on oral antibiotics as an outpatient, admitted for feeling cellulitis, on exam he has poorly controlled venous insufficiency and edema relatively cool to the touch legs, redness all the way up into the thighs, strongly suspect this is primarily edema and stasis changes rather than cellulitis, of note white count normal, no fever, procalcitonin 0.  2 new rash, conceivable cephalosporin allergic reaction not entirely clear  3 chronic sacral ulcer  4 multiple joint replacements     REC 1 edema control far more important here than antimicrobials, concerned this might be an early allergic reaction will switch off ceftriaxone now on clindamycin.  2 elevate legs, question some type of compression therapy but has been intolerant of this historically  Leg not really impressively infected looking, unfortunately rashes progressed has an obvious allergic reaction list is allergic to ceftriaxone, has taken cephalosporins without problems historically and may still be able to and simply be allergic to ceftriaxone itself           Interval History:     no new complaints leg about the same, no fever and chills but quite miserable due to itching and skin rash, almost certainly ceftriaxone allergic reaction, of note has had many cephalosporins previously without reactions including oral cephalosporins prior to admission              Medications:     Current Facility-Administered Medications   Medication Dose Route Frequency Provider Last Rate Last Admin    allopurinol (ZYLOPRIM) tablet 100 mg   "100 mg Oral Daily Clarita Stephens DNP   100 mg at 01/21/25 0900    clindamycin (CLEOCIN) 900 mg in 50 mL D5W intermittent infusion  900 mg Intravenous Q8H Sebastian Hyde  mL/hr at 01/21/25 1001 900 mg at 01/21/25 1001    DULoxetine (CYMBALTA) DR capsule 60 mg  60 mg Oral Daily Clarita Stephens DNP   60 mg at 01/21/25 0859    enoxaparin ANTICOAGULANT (LOVENOX) injection 40 mg  40 mg Subcutaneous Q12H Clarita Stephens DNP   40 mg at 01/21/25 0900    fluticasone (FLONASE) 50 MCG/ACT spray 1 spray  1 spray Both Nostrils QAM Clarita Stephens DNP   1 spray at 01/21/25 0900    furosemide (LASIX) tablet 40 mg  40 mg Oral Daily Clarita Stephens DNP   40 mg at 01/21/25 0859    gabapentin (NEURONTIN) capsule 300 mg  300 mg Oral BID Clarita Stephens DNP   300 mg at 01/21/25 1145    gabapentin (NEURONTIN) tablet 600 mg  600 mg Oral At Bedtime Clarita Stephens DNP   600 mg at 01/20/25 2129    ipratropium - albuterol 0.5 mg/2.5 mg/3 mL (DUONEB) neb solution 3 mL  1 vial Nebulization TID Clarita Stephens DNP   3 mL at 01/21/25 1425    levothyroxine (SYNTHROID/LEVOTHROID) tablet 88 mcg  88 mcg Oral Daily Clarita Stephens DNP   88 mcg at 01/21/25 0859    potassium chloride stuart ER (KLOR-CON M20) CR tablet 40 mEq  40 mEq Oral Daily Clarita Stephens DNP   40 mEq at 01/21/25 0900    [START ON 1/22/2025] predniSONE (DELTASONE) tablet 20 mg  20 mg Oral Daily Kishore Adames MD                      Physical Exam:   Blood pressure 110/58, pulse 84, temperature 97.2  F (36.2  C), resp. rate 18, height 1.575 m (5' 2.01\"), weight 123.9 kg (273 lb 2.4 oz), SpO2 92%, not currently breastfeeding.  Wt Readings from Last 2 Encounters:   01/21/25 123.9 kg (273 lb 2.4 oz)   07/05/24 113.4 kg (250 lb)     Vital Signs with Ranges  Temp:  [97.2  F (36.2  C)-98.3  F (36.8  C)] 97.2  F (36.2  C)  Pulse:  [83-94] 84  Resp:  [18-20] 18  BP: (107-119)/(55-68) 110/58  SpO2:  [90 %-93 %] 92 %    Constitutional: Awake, alert, cooperative, no " apparent distress     Lungs: Clear to auscultation bilaterally, no crackles or wheezing   Cardiovascular: Regular rate and rhythm, normal S1 and S2, and no murmur noted   Abdomen: Normal bowel sounds, soft, non-distended, non-tender   Skin: No rashes, no cyanosis, same edema leg redness not very impressive, has a generalized rash consistent with drug eruption   Other:           Data:   All microbiology laboratory data reviewed.  Recent Labs   Lab Test 01/20/25  0611 01/19/25  1200 12/03/24  1246   WBC 8.0 7.1 6.8   HGB 12.2 12.4 13.0   HCT 39.3 40.0 42.7   MCV 96 97 98    270 269     Recent Labs   Lab Test 01/20/25  0611 01/19/25  1759 01/19/25  1200   CR 0.67 0.81 0.81     Recent Labs   Lab Test 01/19/25  1200   SED 55*     Recent Labs   Lab Test 03/12/19  0957 06/02/18  0957 06/02/18  0700   CULT 10,000 to 50,000 colonies/mL  mixed urogenital yuko   Cultured on the 1st day of incubation:  Streptococcus mitis group  *  Critical Value/Significant Value, preliminary result only, called to and read back by  ILDA CASTILLO RN (RHORTS).  06.03.18 0709 GJS    (Note)  POSITIVE for STREPTOCOCCUS SPECIES OTHER THAN pneumococcus, anginosus  group, S. pyogenes and S. agalactiae. Performed using Screenz  multiplex nucleic acid test. Final identification and antimicrobial  susceptibility testing will be verified by standard methods.    Specimen tested with Verigene multiplex, gram-positive blood culture  nucleic acid test for the following targets: Staph aureus, Staph  epidermidis, Staph lugdunensis, other Staph species, Enterococcus  faecalis, Enterococcus faecium, Streptococcus species, S. agalactiae,  S. anginosus grp., S. pneumoniae, S. pyogenes, Listeria sp., mecA  (methicillin resistance) and Fernando/B (vancomycin resistance).    Critical Value/Significant Value called to and read back by Yanci Fuentes RN (RHORTS) on 06.03.18  @10:02AM by DW.       No growth

## 2025-01-21 NOTE — CONSULTS
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         Assessment:      Areas visualized during today's visit: Sacrum/coccyx    Wound location: bilateral buttocks    Last photo: 1/22/25  Wound due to: Friction and Shear  Wound history/plan of care: Pt reports she spend most of her time in the chair. Location of wound on bilateral buttocks mirroring one another with scar tissue and dusky blanchable purple skin supports this chronic shear from sliding in the chair. Skin is friable and has torn open in an irregular pattern (also indicative of chronic shear) with removal of mepilex so will add vaseline to treatment plan  Wound base: 10 % Dermis, 90 % Blanchable  and Purple     Palpation of the wound bed: normal      Drainage: small     Description of drainage: bloody     Measurements (length x width x depth, in cm): scattered small open areas- see photos      Periwound skin: Intact      Color: normal and consistent with surrounding tissue      Temperature: normal   Odor: none  Pain: moderate and during dressing change, sharp and tender  Pain interventions prior to dressing change: slow and gentle cares   Treatment goal: Maintain (prevention of deterioration) and Protection  STATUS: initial assessment  Supplies ordered: supplies stored on unit, discussed with RN, and discussed with patient        Treatment Plan:     Bilateral buttocks: Every 3 days and PRN when soiled  Cleanse the area with NS and  pat dry.  Apply No sting film barrier to periwound skin.  Apply thin layer of vaseline over open areas and red/purple skin  Cover wound with Sacral Mepilex (#[id number]),fold like a taco and place with pointy end towards head for best fit.   Turn and reposition Q 2hrs side to side only.  Ensure pt has Jose D-cushion while sitting up in the chair.  FYI- If pt has constant incontinent loose stools needing dressing changes Q shift please discontinue the Mepilex dressing and apply criticaid barrier paste BID and PRN.    Orders: Written    RECOMMEND PRIMARY TEAM ORDER: None, at this time  Education provided: plan of care, wound progress, and Off-loading pressure  Discussed plan of care with: Patient and Nurse  Notify [facility] if wound(s) deteriorate.  Nursing to notify the Provider(s) and re-consult the [facility] Nurse if new skin concern.    DATA:     Current support surface: Standard  Standard gel mattress (Isoflex)  Containment of urine/stool: Incontinent pad in bed  BMI: Body mass index is 49.95 kg/m .   Active diet order: Orders Placed This Encounter      Combination Diet No Caffeine Diet, Low Saturated Fat Na <2400mg Diet     Output: I/O last 3 completed shifts:  In: 240 [P.O.:240]  Out: 1100 [Urine:1100]     Labs:   Recent Labs   Lab 01/20/25  0611 01/19/25  1200   ALBUMIN  --  3.2*   HGB 12.2 12.4   WBC 8.0 7.1     Pressure injury risk assessment:   Sensory Perception: 4-->no impairment  Moisture: 3-->occasionally moist  Activity: 2-->chairfast  Mobility: 2-->very limited  Nutrition: 3-->adequate  Friction and Shear: 2-->potential problem  Keith Score: 16    Amanda Little RN CWOCN  Pager no longer is use, please contact through [employer] group: [facility] Nurse [place]  Dept. Office Number: [phone]

## 2025-01-21 NOTE — PROGRESS NOTES
Cross Cover    Called for rash on arms and hips.  Noted earlier today by ID and ? Allergic rash prompting change from ceftriaxone to clinda     Has hydroxyzine ordered prn         D/w RN, try hydroxyzine and LMK response

## 2025-01-21 NOTE — PROGRESS NOTES
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       ED work up notable for elevated inflammatory markers (ESR=55, CRP=22.44), CBC is unremarkable, CMP shows mildly elevated AST of 63, though this appears to be at her baseline.  Patient refused ultrasound to the ED reporting that she recently had 1 done at her living facility.  She also completed a course of twice daily Keflex.  Blood cultures are pending.    She was initially started on ceftriaxone but has since been transitioned to clindamycin.    She does have a  pruritic rash which may be related to ceftriaxone (preceded clindamycin).  Giving a dose of 40 mg prednisone and Benadryl 25 mg.  Also possible component of gout causing some of her right leg pain.     Recurrent RLE cellulitis  Symptoms ongoing since Christmas.  She recently completed a round of Keflex started on January 10.  Symptoms have continued to progress and redness extended beyond her her knee. Through chart review, appears this was an issue during her last admission and she was treated with 6 days of IV ceftriaxone and discharged on cefadroxil.  -ID consulted, cultures pending.  Transitioned to clindamycin on 1/20.  -Patient refused repeat ultrasound in the ED.    -Elevate extremity at rest, consider light compression  -PRN pain control with tylenol, limit   -PT and SW consult.     Rash: preceded clindamycin.  ?drug reaction to ceftriaxone.    --treat symptomatically, transitioned from ceftriaxone to clinda.  --prn Benadryl  --Giving a dose of 40 mg prednisone after discussing with her  as it remains quite bothersome in spite of hydroxyzine     HFpEF not in acute exacerbation  Hypertension  Hyperlipidemia  BNP normal. Last echo in March of 2024 with EF 65-75%. Question whether or not lower extremity edema could be contributing to her recurrent cellulitis or venous insufficiency as she is wheelchair-bound.    -Continue PTA Lasix  -Consider compression stockings to control edema.     Depression and anxiety  Previously on scheduled and as needed lorazepam.  Scheduled lorazepam was discontinued during her last hospital admission.  She still has as needed lorazepam listed, however, would avoid use of a benzodiazepine given her age, high fall risk, and history of hypoxia.  -Continue PTA duloxetine.     Hypothyroidism  -continue PTA levothyroxine     Gout: Is having some right knee pain which could be related.  Uric acid 7.0.  Noted on last admission with elevated uric acid.  Repeat uric acid pending  -Allopurinol     Chronic pain/neuropathy  -continue PTA gabapentin     JEAN  History of non-compliance with BiPap in the past.  Will clarify usage and order at bedtime once clarified.    -monitor for hypoxia.        History of chronic sacral ulcers  Decreased mobility- wheelchair bound  Patient is wheelchair-bound at baseline secondary to multiple joint replacements and failed femur nailing.  Unable to do exam in the ED given she was boarded in the hallway.  -Frequent repositioning and offload sacrum.  -RN to apply Mepilex as needed.  -WOC consult          Diet: Combination Diet No Caffeine Diet, Low Saturated Fat Na <2400mg Diet    DVT Prophylaxis: Enoxaparin (Lovenox) SQ  Benson Catheter: Not present  Lines: None     Cardiac Monitoring: None  Code Status: No CPR- Pre-arrest intubation OK      Clinically Significant Risk Factors               # Hypoalbuminemia: Lowest albumin = 3.2 g/dL at 1/19/2025 12:00 PM, will monitor as appropriate     # Hypertension: Noted on problem list    # Chronic heart failure with  "preserved ejection fraction: heart failure noted on problem list and last echo with EF >50%          # DMII: A1C = 6.6 % (Ref range: <5.7 %) within past 6 months, PRESENT ON ADMISSION  # Severe Obesity: Estimated body mass index is 49.95 kg/m  as calculated from the following:    Height as of this encounter: 1.575 m (5' 2.01\").    Weight as of this encounter: 123.9 kg (273 lb 2.4 oz)., PRESENT ON ADMISSION            Social Drivers of Health            Disposition Plan     Medically Ready for Discharge: Anticipated Tomorrow             Kishore Adames MD  Hospitalist Service  [facility]  Securely message with [employer] (more info)  Text page via [facility] Paging/Directory   ______________________________________________________________________    Interval History     Feels okay overall, right lower extremity erythema seems to be slightly more pale  Still has rash, pruritic.  Given a dose of 40 mg prednisone followed by 2 doses of 20 mg prednisone given rash and possible gout flare      Physical Exam   Vital Signs: Temp: 97.2  F (36.2  C) Temp src: Temporal BP: 110/58 Pulse: 94   Resp: 18 SpO2: 92 % O2 Device: None (Room air)    Weight: 273 lbs 2.4 oz    General: Alert, awake, no acute distress.  HEENT: NC/AT, eyes anicteric, external occular movements intact, face symmetric.    Cardiac: RRR, S1, S2.  No murmurs appreciated.  Pulmonary: Normal chest rise, normal work of breathing.  Lungs CTA BL  Abdomen: Obese.  Soft, non-tender, non-distended.  Bowel Sounds Present.  No guarding.  Extremities: Right greater than left erythema from the ankle to the knee.  Trace edema.  Skin: no rashes or lesions noted.  Warm and Dry.  Neuro: No focal deficits noted.  Speech clear.  Coordination and strength grossly normal.  Psych: Appropriate affect.      Medical Decision Making       50  MINUTES SPENT BY ME on the date of service doing chart review, history, exam, documentation & further activities per the " note.      Data     I have personally reviewed the following data over the past 24 hrs:    Procal: N/A CRP: N/A Lactic Acid: N/A

## 2025-01-21 NOTE — PLAN OF CARE
"Goal Outcome Evaluation:      Plan of Care Reviewed With: patient    Overall Patient Progress: improvingOverall Patient Progress: improving    Outcome Evaluation: Pt complained of feeling itchy. Provider call back stating it might be from previous abx, to atarax. Atarax given with relief. WC bound at baseline. Mepilex on sacrum. Cardiac diet. Daily weight. CPAP at HS. Ax2 michael steady. RA. BLE elevated. Ref compression stocking.      Problem: Adult Inpatient Plan of Care  Goal: Plan of Care Review  Description: The Plan of Care Review/Shift note should be completed every shift.  The Outcome Evaluation is a brief statement about your assessment that the patient is improving, declining, or no change.  This information will be displayed automatically on your shift  note.  Outcome: Progressing  Flowsheets (Taken 1/21/2025 0621)  Outcome Evaluation: Pt complained of feeling itchy. Provider call back stating it might be from previous abx, to atarax. Atarax given with relief. WC bound at baseline. Mepilex on sacrum. Cardiac diet. Daily weight. CPAP at HS. Ax2 michael steady. RA. BLE elevated. Ref compression stocking.  Plan of Care Reviewed With: patient  Overall Patient Progress: improving  Goal: Patient-Specific Goal (Individualized)  Description: You can add care plan individualizations to a care plan. Examples of Individualization might be:  \"Parent requests to be called daily at 9am for status\", \"I have a hard time hearing out of my right ear\", or \"Do not touch me to wake me up as it startles  me\".  Outcome: Progressing  Goal: Absence of Hospital-Acquired Illness or Injury  Outcome: Progressing  Intervention: Identify and Manage Fall Risk  Recent Flowsheet Documentation  Taken 1/21/2025 0053 by Francisca Watkins RN  Safety Promotion/Fall Prevention:   activity supervised   assistive device/personal items within reach   clutter free environment maintained   nonskid shoes/slippers when out of bed   patient and family " education   safety round/check completed   lighting adjusted  Intervention: Prevent Skin Injury  Recent Flowsheet Documentation  Taken 1/21/2025 0053 by Francisca Watkins RN  Body Position: position changed independently  Skin Protection: incontinence pads utilized  Intervention: Prevent and Manage VTE (Venous Thromboembolism) Risk  Recent Flowsheet Documentation  Taken 1/21/2025 0053 by Francisca Watkins RN  VTE Prevention/Management: patient refused intervention  Intervention: Prevent Infection  Recent Flowsheet Documentation  Taken 1/21/2025 0053 by Francisca Watkins RN  Infection Prevention:   rest/sleep promoted   single patient room provided  Goal: Optimal Comfort and Wellbeing  Outcome: Progressing  Intervention: Monitor Pain and Promote Comfort  Recent Flowsheet Documentation  Taken 1/21/2025 0053 by Francisca Watkins RN  Pain Management Interventions: (atarax given for itching)   Provider notified (comment)   medication (see MAR)  Goal: Readiness for Transition of Care  Outcome: Progressing     Problem: Infection  Goal: Absence of Infection Signs and Symptoms  Outcome: Progressing  Intervention: Prevent or Manage Infection  Recent Flowsheet Documentation  Taken 1/21/2025 0053 by Francisca aWtkins RN  Infection Management: aseptic technique maintained     Problem: Skin or Soft Tissue Infection  Goal: Absence of Infection Signs and Symptoms  Outcome: Progressing  Intervention: Minimize and Manage Infection Progression  Recent Flowsheet Documentation  Taken 1/21/2025 0053 by Francisca Watkins RN  Infection Prevention:   rest/sleep promoted   single patient room provided  Infection Management: aseptic technique maintained     Problem: Skin Injury Risk Increased  Goal: Skin Health and Integrity  Outcome: Progressing  Intervention: Plan: Nurse Driven Intervention: Moisture Management  Recent Flowsheet Documentation  Taken 1/21/2025 0053 by Francisca Watkins RN  Moisture Interventions:   No brief in bed   Perineal  cleanser  Bathing/Skin Care: incontinence care  Intervention: Plan: Nurse Driven Intervention: Friction and Shear  Recent Flowsheet Documentation  Taken 1/21/2025 0053 by Francisca Watkins RN  Friction/Shear Interventions: HOB 30 degrees or less  Intervention: Optimize Skin Protection  Recent Flowsheet Documentation  Taken 1/21/2025 0053 by Francisca Watkins RN  Pressure Reduction Techniques: heels elevated off bed  Pressure Reduction Devices: heel offloading device utilized  Skin Protection: incontinence pads utilized  Activity Management: activity adjusted per tolerance  Head of Bed (HOB) Positioning: HOB at 30-45 degrees

## 2025-01-22 ENCOUNTER — APPOINTMENT (OUTPATIENT)
Dept: GENERAL RADIOLOGY | Facility: CLINIC | Age: 89
DRG: 603 | End: 2025-01-22
Attending: INTERNAL MEDICINE
Payer: COMMERCIAL

## 2025-01-22 VITALS
DIASTOLIC BLOOD PRESSURE: 73 MMHG | HEIGHT: 62 IN | OXYGEN SATURATION: 90 % | SYSTOLIC BLOOD PRESSURE: 142 MMHG | BODY MASS INDEX: 50.27 KG/M2 | HEART RATE: 99 BPM | RESPIRATION RATE: 16 BRPM | TEMPERATURE: 98.3 F | WEIGHT: 273.15 LBS

## 2025-01-22 LAB
CREAT SERPL-MCNC: 0.7 MG/DL (ref 0.51–0.95)
EGFRCR SERPLBLD CKD-EPI 2021: 83 ML/MIN/1.73M2
PLATELET # BLD AUTO: 286 10E3/UL (ref 150–450)

## 2025-01-22 PROCEDURE — 250N000011 HC RX IP 250 OP 636: Mod: JZ | Performed by: INTERNAL MEDICINE

## 2025-01-22 PROCEDURE — 36415 COLL VENOUS BLD VENIPUNCTURE: CPT | Performed by: NURSE PRACTITIONER

## 2025-01-22 PROCEDURE — G0463 HOSPITAL OUTPT CLINIC VISIT: HCPCS

## 2025-01-22 PROCEDURE — 94640 AIRWAY INHALATION TREATMENT: CPT

## 2025-01-22 PROCEDURE — 99232 SBSQ HOSP IP/OBS MODERATE 35: CPT | Performed by: INTERNAL MEDICINE

## 2025-01-22 PROCEDURE — 250N000009 HC RX 250: Performed by: NURSE PRACTITIONER

## 2025-01-22 PROCEDURE — 73560 X-RAY EXAM OF KNEE 1 OR 2: CPT | Mod: RT

## 2025-01-22 PROCEDURE — 250N000011 HC RX IP 250 OP 636: Performed by: NURSE PRACTITIONER

## 2025-01-22 PROCEDURE — 250N000013 HC RX MED GY IP 250 OP 250 PS 637: Performed by: INTERNAL MEDICINE

## 2025-01-22 PROCEDURE — 82565 ASSAY OF CREATININE: CPT | Performed by: NURSE PRACTITIONER

## 2025-01-22 PROCEDURE — 250N000012 HC RX MED GY IP 250 OP 636 PS 637: Performed by: INTERNAL MEDICINE

## 2025-01-22 PROCEDURE — 250N000013 HC RX MED GY IP 250 OP 250 PS 637: Performed by: NURSE PRACTITIONER

## 2025-01-22 PROCEDURE — 73552 X-RAY EXAM OF FEMUR 2/>: CPT | Mod: RT

## 2025-01-22 PROCEDURE — 85049 AUTOMATED PLATELET COUNT: CPT | Performed by: NURSE PRACTITIONER

## 2025-01-22 PROCEDURE — 999N000157 HC STATISTIC RCP TIME EA 10 MIN

## 2025-01-22 PROCEDURE — 99239 HOSP IP/OBS DSCHRG MGMT >30: CPT | Performed by: INTERNAL MEDICINE

## 2025-01-22 RX ORDER — DIPHENHYDRAMINE HCL 25 MG
25 CAPSULE ORAL EVERY 6 HOURS PRN
Qty: 20 CAPSULE | Refills: 0 | Status: SHIPPED | OUTPATIENT
Start: 2025-01-22

## 2025-01-22 RX ORDER — CLINDAMYCIN HYDROCHLORIDE 300 MG/1
300 CAPSULE ORAL 3 TIMES DAILY
Qty: 15 CAPSULE | Refills: 0 | Status: SHIPPED | OUTPATIENT
Start: 2025-01-22 | End: 2025-01-27

## 2025-01-22 RX ORDER — CLINDAMYCIN HYDROCHLORIDE 300 MG/1
300 CAPSULE ORAL 4 TIMES DAILY
Qty: 28 CAPSULE | Refills: 0 | Status: SHIPPED | OUTPATIENT
Start: 2025-01-22 | End: 2025-01-22

## 2025-01-22 RX ADMIN — FUROSEMIDE 40 MG: 40 TABLET ORAL at 09:13

## 2025-01-22 RX ADMIN — POTASSIUM CHLORIDE 40 MEQ: 1500 TABLET, EXTENDED RELEASE ORAL at 09:12

## 2025-01-22 RX ADMIN — GABAPENTIN 300 MG: 300 CAPSULE ORAL at 12:38

## 2025-01-22 RX ADMIN — DULOXETINE HYDROCHLORIDE 60 MG: 60 CAPSULE, DELAYED RELEASE ORAL at 09:13

## 2025-01-22 RX ADMIN — DIPHENHYDRAMINE HYDROCHLORIDE 25 MG: 25 CAPSULE ORAL at 07:02

## 2025-01-22 RX ADMIN — GABAPENTIN 300 MG: 300 CAPSULE ORAL at 09:13

## 2025-01-22 RX ADMIN — CLINDAMYCIN PHOSPHATE 900 MG: 900 INJECTION, SOLUTION INTRAVENOUS at 02:27

## 2025-01-22 RX ADMIN — ENOXAPARIN SODIUM 40 MG: 40 INJECTION SUBCUTANEOUS at 09:14

## 2025-01-22 RX ADMIN — ALLOPURINOL 100 MG: 100 TABLET ORAL at 09:14

## 2025-01-22 RX ADMIN — LEVOTHYROXINE SODIUM 88 MCG: 0.09 TABLET ORAL at 09:13

## 2025-01-22 RX ADMIN — ACETAMINOPHEN 650 MG: 325 TABLET, FILM COATED ORAL at 07:02

## 2025-01-22 RX ADMIN — PREDNISONE 20 MG: 20 TABLET ORAL at 09:14

## 2025-01-22 RX ADMIN — FLUTICASONE PROPIONATE 1 SPRAY: 50 SPRAY, METERED NASAL at 09:14

## 2025-01-22 RX ADMIN — ACETAMINOPHEN 650 MG: 325 TABLET, FILM COATED ORAL at 12:38

## 2025-01-22 RX ADMIN — CLINDAMYCIN PHOSPHATE 900 MG: 900 INJECTION, SOLUTION INTRAVENOUS at 12:34

## 2025-01-22 RX ADMIN — IPRATROPIUM BROMIDE AND ALBUTEROL SULFATE 3 ML: .5; 3 SOLUTION RESPIRATORY (INHALATION) at 08:47

## 2025-01-22 ASSESSMENT — ACTIVITIES OF DAILY LIVING (ADL)
ADLS_ACUITY_SCORE: 68

## 2025-01-22 NOTE — PLAN OF CARE
"Goal Outcome Evaluation:      Plan of Care Reviewed With: patient    Overall Patient Progress: improvingOverall Patient Progress: improving    Outcome Evaluation: Pt is A&Ox4. RA. LS clear. Assist x2 w/Jasmina Steady. WC bound as baseline. Voiding via external catheter. IV abx. PIV R hand SL. Pain controlled with tylenol. PRN Benadryl given for feeling itchy. Cardiat diet. Daily weights. DC back to assisted living w/PO abx plans TBD.      Problem: Adult Inpatient Plan of Care  Goal: Plan of Care Review  Description: The Plan of Care Review/Shift note should be completed every shift.  The Outcome Evaluation is a brief statement about your assessment that the patient is improving, declining, or no change.  This information will be displayed automatically on your shift  note.  Outcome: Progressing  Flowsheets (Taken 1/22/2025 0122)  Outcome Evaluation: Pt is A&Ox4. RA. LS clear. Assist x2 w/Jasmina Steady. WC bound as baseline. Voiding via external catheter. IV abx. PIV R hand SL. Pain controlled with tylenol. PRN Benadryl given for feeling itchy. Cardiat diet. Daily weights. DC back to assisted living w/PO abx plans TBD.  Plan of Care Reviewed With: patient  Overall Patient Progress: improving  Goal: Patient-Specific Goal (Individualized)  Description: You can add care plan individualizations to a care plan. Examples of Individualization might be:  \"Parent requests to be called daily at 9am for status\", \"I have a hard time hearing out of my right ear\", or \"Do not touch me to wake me up as it startles  me\".  Outcome: Progressing  Goal: Absence of Hospital-Acquired Illness or Injury  Outcome: Progressing  Intervention: Identify and Manage Fall Risk  Recent Flowsheet Documentation  Taken 1/21/2025 2110 by Sb Jeter, RN  Safety Promotion/Fall Prevention:   assistive device/personal items within reach   clutter free environment maintained   increased rounding and observation   nonskid shoes/slippers when out of " bed  Intervention: Prevent Skin Injury  Recent Flowsheet Documentation  Taken 1/21/2025 2110 by Sb Jeter RN  Body Position:   position changed independently   legs elevated  Skin Protection: incontinence pads utilized  Intervention: Prevent and Manage VTE (Venous Thromboembolism) Risk  Recent Flowsheet Documentation  Taken 1/21/2025 2110 by Sb Jeter RN  VTE Prevention/Management: compression stockings off  Intervention: Prevent Infection  Recent Flowsheet Documentation  Taken 1/21/2025 2110 by Sb Jeter RN  Infection Prevention:   rest/sleep promoted   hand hygiene promoted  Goal: Optimal Comfort and Wellbeing  Outcome: Progressing  Intervention: Monitor Pain and Promote Comfort  Recent Flowsheet Documentation  Taken 1/22/2025 0032 by Sb Jeter RN  Pain Management Interventions: rest  Taken 1/21/2025 2105 by Sb Jeter RN  Pain Management Interventions: medication (see MAR)  Goal: Readiness for Transition of Care  Outcome: Progressing     Problem: Infection  Goal: Absence of Infection Signs and Symptoms  Outcome: Progressing     Problem: Skin or Soft Tissue Infection  Goal: Absence of Infection Signs and Symptoms  Outcome: Progressing  Intervention: Minimize and Manage Infection Progression  Recent Flowsheet Documentation  Taken 1/21/2025 2110 by Sb Jeter RN  Infection Prevention:   rest/sleep promoted   hand hygiene promoted     Problem: Skin Injury Risk Increased  Goal: Skin Health and Integrity  Outcome: Progressing  Intervention: Plan: Nurse Driven Intervention: Moisture Management  Recent Flowsheet Documentation  Taken 1/21/2025 2110 by Sb Jeter RN  Moisture Interventions:   No brief in bed   Incontinence pad  Taken 1/21/2025 2000 by Sb Jeter RN  Moisture Interventions: Encourage regular toileting  Bathing/Skin Care: moisturizer applied  Intervention: Plan: Nurse Driven Intervention: Friction and Shear  Recent Flowsheet  Documentation  Taken 1/21/2025 2110 by Sb Jeter, RN  Friction/Shear Interventions: HOB 30 degrees or less  Intervention: Optimize Skin Protection  Recent Flowsheet Documentation  Taken 1/21/2025 2110 by Sb Jeter, RN  Pressure Reduction Techniques: heels elevated off bed  Pressure Reduction Devices: heel offloading device utilized  Skin Protection: incontinence pads utilized  Activity Management:   activity adjusted per tolerance   activity encouraged  Head of Bed (HOB) Positioning: HOB at 20-30 degrees

## 2025-01-22 NOTE — PROGRESS NOTES
[facility]  Infectious Disease Progress Note          Assessment and Plan:   Date of Admission:  1/19/2025  Date of Consult (When I saw the patient): 01/20/25        Assessment & Plan  Carol Merida is a 89 year old female who was admitted on 1/19/2025.      Impression: 1 89-year-old female, underlying venous stasis without much edema control, worsening right leg pain and redness for the last 4 weeks, continued redness on oral antibiotics as an outpatient, admitted for feeling cellulitis, on exam he has poorly controlled venous insufficiency and edema relatively cool to the touch legs, redness all the way up into the thighs, strongly suspect this is primarily edema and stasis changes rather than cellulitis, of note white count normal, no fever, procalcitonin 0.  2 new rash, conceivable cephalosporin allergic reaction not entirely clear  3 chronic sacral ulcer  4 multiple joint replacements     REC 1 edema control far more important here than antimicrobials, concerned this might be an early allergic reaction will switch off ceftriaxone now on clindamycin.  2 elevate legs, question some type of compression therapy but has been intolerant of this historically  Leg not really impressively infected looking, unfortunately rashes progressed has an obvious allergic reaction list is allergic to ceftriaxone, has taken cephalosporins without problems historically and may still be able to and simply be allergic to ceftriaxone itself   Still does not feel very well, rash still bothersome but stable, if discharged I would do Cleocin 300 3 times daily for another 5 days then stop and watch, imperative here is edema controllable short and long-term           Interval History:     no new complaints leg about the same, no fever and chills but quite miserable due to itching and skin rash, almost certainly ceftriaxone allergic reaction, of note has had many cephalosporins previously without reactions including  "oral cephalosporins prior to admission              Medications:     Current Facility-Administered Medications   Medication Dose Route Frequency Provider Last Rate Last Admin    allopurinol (ZYLOPRIM) tablet 100 mg  100 mg Oral Daily Clarita Stephens DNP   100 mg at 01/22/25 0914    clindamycin (CLEOCIN) 900 mg in 50 mL D5W intermittent infusion  900 mg Intravenous Q8H Sebastian Hyde  mL/hr at 01/22/25 0227 900 mg at 01/22/25 0227    DULoxetine (CYMBALTA) DR capsule 60 mg  60 mg Oral Daily Clarita Stephens DNP   60 mg at 01/22/25 0913    enoxaparin ANTICOAGULANT (LOVENOX) injection 40 mg  40 mg Subcutaneous Q12H Clarita Stephens DNP   40 mg at 01/22/25 0914    fluticasone (FLONASE) 50 MCG/ACT spray 1 spray  1 spray Both Nostrils QAM Clarita Stephens DNP   1 spray at 01/22/25 0914    furosemide (LASIX) tablet 40 mg  40 mg Oral Daily Clarita Stephens DNP   40 mg at 01/22/25 0913    gabapentin (NEURONTIN) capsule 300 mg  300 mg Oral BID Clarita Stephens DNP   300 mg at 01/22/25 0913    gabapentin (NEURONTIN) tablet 600 mg  600 mg Oral At Bedtime Clarita Stephens DNP   600 mg at 01/21/25 2107    ipratropium - albuterol 0.5 mg/2.5 mg/3 mL (DUONEB) neb solution 3 mL  1 vial Nebulization TID Clarita Stephens DNP   3 mL at 01/22/25 0847    levothyroxine (SYNTHROID/LEVOTHROID) tablet 88 mcg  88 mcg Oral Daily Clarita Stephens DNP   88 mcg at 01/22/25 0913    potassium chloride stuart ER (KLOR-CON M20) CR tablet 40 mEq  40 mEq Oral Daily Clarita Stephens DNP   40 mEq at 01/22/25 0912    predniSONE (DELTASONE) tablet 20 mg  20 mg Oral Daily Kishore Adames MD   20 mg at 01/22/25 0914                  Physical Exam:   Blood pressure (!) 142/73, pulse 99, temperature 98.3  F (36.8  C), temperature source Temporal, resp. rate 18, height 1.575 m (5' 2.01\"), weight 123.9 kg (273 lb 2.4 oz), SpO2 (!) 90%, not currently breastfeeding.  Wt Readings from Last 2 Encounters:   01/21/25 123.9 kg (273 lb 2.4 oz)   07/05/24 113.4 " kg (250 lb)     Vital Signs with Ranges  Temp:  [98  F (36.7  C)-98.3  F (36.8  C)] 98.3  F (36.8  C)  Pulse:  [] 99  Resp:  [18-24] 18  BP: (110-142)/(55-73) 142/73  SpO2:  [90 %-92 %] 90 %    Constitutional: Awake, alert, cooperative, no apparent distress     Lungs: Clear to auscultation bilaterally, no crackles or wheezing   Cardiovascular: Regular rate and rhythm, normal S1 and S2, and no murmur noted   Abdomen: Normal bowel sounds, soft, non-distended, non-tender   Skin: No rashes, no cyanosis, same edema leg redness not very impressive, has a generalized rash consistent with drug eruption   Other:           Data:   All microbiology laboratory data reviewed.  Recent Labs   Lab Test 01/22/25  0611 01/20/25  0611 01/19/25  1200 12/03/24  1246   WBC  --  8.0 7.1 6.8   HGB  --  12.2 12.4 13.0   HCT  --  39.3 40.0 42.7   MCV  --  96 97 98    285 270 269     Recent Labs   Lab Test 01/22/25  0611 01/20/25  0611 01/19/25  1759   CR 0.70 0.67 0.81     Recent Labs   Lab Test 01/19/25  1200   SED 55*     Recent Labs   Lab Test 03/12/19  0957 06/02/18  0957 06/02/18  0700   CULT 10,000 to 50,000 colonies/mL  mixed urogenital yuko   Cultured on the 1st day of incubation:  Streptococcus mitis group  *  Critical Value/Significant Value, preliminary result only, called to and read back by  ILDA CASTILLO RN ([facility]).  06.03.18 0709 GJS    (Note)  POSITIVE for STREPTOCOCCUS SPECIES OTHER THAN pneumococcus, anginosus  group, S. pyogenes and S. agalactiae. Performed using "[employer]  multiplex nucleic acid test. Final identification and antimicrobial  susceptibility testing will be verified by standard methods.    Specimen tested with Verigene multiplex, gram-positive blood culture  nucleic acid test for the following targets: Staph aureus, Staph  epidermidis, Staph lugdunensis, other Staph species, Enterococcus  faecalis, Enterococcus faecium, Streptococcus species, S. agalactiae,  S. anginosus grp., S. pneumoniae,  S. pyogenes, Listeria sp., mecA  (methicillin resistance) and Fernando/B (vancomycin resistance).    Critical Value/Significant Value called to and read back by Yanci Fuentes [facility] ([facility]) on 06.03.18  @10:02AM by SALLY.       No growth

## 2025-01-22 NOTE — PLAN OF CARE
Goal Outcome Evaluation:      Plan of Care Reviewed With: patient    Overall Patient Progress: improving    Outcome Evaluation: c/o itchiness in BUE and back, given Atarax,applied sween cream and notified MD, obtained order for Benadryl and Prednisone, no c/o itching this evening, voiding per purewick, mary jo PO cardiac diet well, up with assist of 2 with gaitbelt and CONOR whitaker at baseline, given Clindamycin IV per orders      Problem: Adult Inpatient Plan of Care  Goal: Plan of Care Review    Outcome: Progressing  Flowsheets (Taken 1/21/2025 1856)  Outcome Evaluation: c/o itchiness in BUE and back, given Atarax,applied sween cream and notified MD, obtained order for Benadryl and Prednisone, no c/o itching this evening, voiding per purewick, mary jo PO cardiac diet well, up with assist of 2 with gaitbelt and CONOR whitaker at baseline, given Clindamycin IV per orders  Plan of Care Reviewed With: patient  Overall Patient Progress: improving  Goal: Patient-Specific Goal (Individualized)    Outcome: Progressing  Goal: Absence of Hospital-Acquired Illness or Injury  Outcome: Progressing  Intervention: Identify and Manage Fall Risk  Recent Flowsheet Documentation  Taken 1/21/2025 0900 by Bhavani Mann RN  Safety Promotion/Fall Prevention:   activity supervised   assistive device/personal items within reach   clutter free environment maintained   nonskid shoes/slippers when out of bed   patient and family education   safety round/check completed   lighting adjusted  Intervention: Prevent Skin Injury  Recent Flowsheet Documentation  Taken 1/21/2025 0900 by Bhavani Mann RN  Body Position:   position changed independently   legs elevated  Skin Protection: incontinence pads utilized  Intervention: Prevent and Manage VTE (Venous Thromboembolism) Risk  Recent Flowsheet Documentation  Taken 1/21/2025 0900 by Bhavani Mann RN  VTE Prevention/Management: patient refused intervention  Intervention: Prevent  Infection  Recent Flowsheet Documentation  Taken 1/21/2025 0900 by Bhavani Mann RN  Infection Prevention:   rest/sleep promoted   single patient room provided  Goal: Optimal Comfort and Wellbeing  Outcome: Progressing  Intervention: Monitor Pain and Promote Comfort  Recent Flowsheet Documentation  Taken 1/21/2025 0858 by Bhavani Mann RN  Pain Management Interventions: (sween cream applied to BUE and back)   medication (see MAR)   Provider notified (comment)   care clustered   quiet environment facilitated   other (see comments)  Goal: Readiness for Transition of Care  Outcome: Progressing     Problem: Infection  Goal: Absence of Infection Signs and Symptoms  Outcome: Progressing  Intervention: Prevent or Manage Infection  Recent Flowsheet Documentation  Taken 1/21/2025 0900 by Bhavani Mann RN  Infection Management: aseptic technique maintained     Problem: Skin or Soft Tissue Infection  Goal: Absence of Infection Signs and Symptoms  Outcome: Progressing  Intervention: Minimize and Manage Infection Progression  Recent Flowsheet Documentation  Taken 1/21/2025 0900 by Bhavani Mann RN  Infection Prevention:   rest/sleep promoted   single patient room provided  Infection Management: aseptic technique maintained     Problem: Skin Injury Risk Increased  Goal: Skin Health and Integrity  Outcome: Progressing  Intervention: Plan: Nurse Driven Intervention: Moisture Management  Recent Flowsheet Documentation  Taken 1/21/2025 0900 by Bhavani Mann RN  Moisture Interventions:   No brief in bed   Perineal cleanser  Bathing/Skin Care: incontinence care  Intervention: Plan: Nurse Driven Intervention: Friction and Shear  Recent Flowsheet Documentation  Taken 1/21/2025 0900 by Bhavani Mann RN  Friction/Shear Interventions: HOB 30 degrees or less  Intervention: Optimize Skin Protection  Recent Flowsheet Documentation  Taken 1/21/2025 0900 by Bhavani Mann RN  Pressure Reduction Techniques: heels elevated off  bed  Pressure Reduction Devices: heel offloading device utilized  Skin Protection: incontinence pads utilized  Activity Management: activity adjusted per tolerance  Head of Bed (HOB) Positioning: HOB at 30-45 degrees

## 2025-01-22 NOTE — PROGRESS NOTES
Care Management Discharge Note    Discharge Date: 01/22/2025       Discharge Disposition: Assisted Living, Home Care    Discharge Services:  home care    Discharge DME:  none    Discharge Transportation: agency    Private pay costs discussed: transportation costs    Does the patient's insurance plan have a 3 day qualifying hospital stay waiver?  No    Education Provided on the Discharge Plan:  yes  Persons Notified of Discharge Plans: patient  Patient/Family in Agreement with the Plan: yes    Handoff Referral Completed: No, handoff not indicated or clinically appropriate    Additional Information:  Patient discharging back to assisted living with resumption of homecare with [facility].   Transportation set up for [id number]. Care team updated.  Discharge orders faxed to [facility] and updated nurse of transport time.  Discharge orders faxed to [facility] and called intake to update that patient is discharging and orders faxed.     Oneida Meza RN  Care Coordinator  [facility]

## 2025-01-22 NOTE — DISCHARGE INSTRUCTIONS
Bilateral buttocks: Every 3 days and PRN when soiled  Cleanse the area with NS and pat dry.  Apply No sting film barrier to periwound skin.  Apply thin layer of vaseline over open areas and red/purple skin  Cover wound with Sacral Mepilex (#[id number]),fold like a taco and place with pointy end towards head for best fit.   Turn and reposition Q 2hrs side to side only.  Ensure pt has Jose D-cushion while sitting up in the chair.  FYI- If pt has constant incontinent loose stools needing dressing changes Q shift please discontinue the Mepilex dressing and apply criticaid barrier paste BID and PRN.

## 2025-01-22 NOTE — DISCHARGE SUMMARY
[facility]  Discharge Summary  Name: Carol Merida    MRN: [id number]  YOB: 1935    Age: 89 year old  Date of Discharge:  1/22/2025  Date of Admission: 1/19/2025  Primary Care Provider: Ruben Humphrey  Discharge Physician:  Armando Adames MD  Discharging Service:  Hospitalist      Hospital Course/Discharge Diagnoses:  Carol Merida is a 89 year old female with PMH significant for recurrent RLE cellulitis,HFpEF, gout, JEAN, HTN, HLD, depression and anxiety, hypothyroidism, prior bilateral knee replacements, prior right hip replacement, and 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       ED work up notable for elevated inflammatory markers (ESR=55, CRP=22.44), CBC is unremarkable, CMP shows mildly elevated AST of 63, though this appears to be at her baseline.  Patient refused ultrasound to the ED reporting that she recently had 1 done at her living facility.  She also completed a course of twice daily Keflex.  Blood cultures are pending.     She was initially started on ceftriaxone but has since been transitioned to clindamycin.     She does have a  pruritic rash which may be related to ceftriaxone (preceded clindamycin).  Gavea dose of 40 mg prednisone and Benadryl 25 mg.  She has had some right leg pain but as above ultrasound was recently negative and x-rays here are also negative for acute process but does show chronic changes related to prior surgeries and prior distal femur fracture.  Exam overall benign and able to range her knee without major pain which argues against intra-articular process.    At this point she is discharging on oral clindamycin with outpatient follow-up.     Recurrent RLE cellulitis: Not terribly impressive for infection.  Needs edema control and elevation.  Symptoms ongoing since Christmas.  She recently completed a round of Keflex started on January 10.   Through chart review, appears this was an  issue during her last admission and she was treated with 6 days of IV ceftriaxone and discharged on cefadroxil but now there is concern about ceftriaxone allergy.  -ID consulted, cultures negative to date.  Transitioned to clindamycin on 1/20.  -Patient refused repeat ultrasound in the ED.    -X-ray knee and thigh negative for acute process but does have hardware in place  -Elevate extremity at rest, consider light compression  -PRN pain control with tylenol  -Discharging with 5 days more oral clindamycin as per ID.    Prior right total hip arthroplasty, right total knee arthroplasty and distal femur fracture with polo in place: Suspect her pain is likely related to chronic changes noted above.     Rash: preceded clindamycin.  Now much improved.  Suspected drug reaction to ceftriaxone.    --treat symptomatically, transitioned from ceftriaxone to clinda.  --prn Benadryl  -- Gave a dose of 40 mg prednisone after discussing with her with improvement.     HFpEF not in acute exacerbation  Hypertension  Hyperlipidemia  BNP normal. Last echo in March of 2024 with EF 65-75%. Question whether or not lower extremity edema could be contributing to her recurrent cellulitis or venous insufficiency as she is wheelchair-bound.    -Continue PTA Lasix  -Consider compression stockings to control edema.     Depression and anxiety  Previously on scheduled and as needed lorazepam.  Scheduled lorazepam was discontinued during her last hospital admission.  She still has as needed lorazepam listed, however, would avoid use of a benzodiazepine given her age, high fall risk, and history of hypoxia.  -Continue PTA duloxetine.     Hypothyroidism  -continue PTA levothyroxine     Gout: Is having some right knee pain which could be related.  Uric acid 7.0.  Noted on last admission with elevated uric acid.  Repeat uric acid pending  -Allopurinol     Chronic pain/neuropathy  -continue PTA gabapentin     JEAN  History of non-compliance with BiPap in  the past.  Will clarify usage and order at bedtime once clarified.    -monitor for hypoxia.           Discharge Disposition:  Discharged to assisted living     Allergies:  Allergies   Allergen Reactions    Ace Inhibitors Cough    Ceftriaxone Rash     Diffuse rash after IV ceftriaxone 1/20/25.  Previously tolerated other cephalosporins including recent course of keflex.        Discharge Medications:        Review of your medicines        START taking        Dose / Directions   clindamycin 300 MG capsule  Commonly known as: CLEOCIN      Dose: 300 mg  Take 1 capsule (300 mg) by mouth 3 times daily for 5 days.  Quantity: 15 capsule  Refills: 0     diphenhydrAMINE 25 MG capsule  Commonly known as: BENADRYL      Dose: 25 mg  Take 1 capsule (25 mg) by mouth every 6 hours as needed for itching.  Quantity: 20 capsule  Refills: 0            CONTINUE these medicines which have NOT CHANGED        Dose / Directions   ACE/ARB/ARNI NOT PRESCRIBED  Commonly known as: INTENTIONAL  Used for: Chronic diastolic heart failure (H)      Please choose reason not prescribed, below  Refills: 0     acetaminophen 500 MG tablet  Commonly known as: TYLENOL      Dose: 1,000 mg  Take 1,000 mg by mouth 3 times daily.  Refills: 0     albuterol 108 (90 Base) MCG/ACT inhaler  Commonly known as: PROAIR HFA/PROVENTIL HFA/VENTOLIN HFA  Used for: SOB (shortness of breath)      INHALE 2 PUFFS INTO THE LUNGS EVERY 4 HOURS AS NEEDED FOR SHORTNESS OF BREATH OR DIFFICULT BREATHING OR WHEEZING  Quantity: 8.5 g  Refills: 0     allopurinol 100 MG tablet  Commonly known as: ZYLOPRIM  Indication: Gout, Disorder of Excessive Uric Acid in the Blood      Dose: 100 mg  Take 100 mg by mouth daily START 7/10  Refills: 0     ARTIFICIAL SALIVA MT      Dose: 1 strip  Take 1 strip by mouth 2 times daily After nebs  Refills: 0     BETA BLOCKER NOT PRESCRIBED  Commonly known as: INTENTIONAL  Used for: Chronic diastolic heart failure (H)      Beta Blocker not prescribed  intentionally due to EF > 40 % (ejection fraction) will leave up to Cardiology.  Refills: 0     calcium carbonate 600 mg-vitamin D 400 units 600-400 MG-UNIT per tablet  Commonly known as: CALTRATE  Used for: Osteopenia      Dose: 1 tablet  Take 1 tablet by mouth every evening  Quantity: 100 tablet  Refills: 3     carbamide peroxide 6.5 % otic solution  Commonly known as: DEBROX      Dose: 3 drop  Place 3 drops into both ears twice a week. Mon & Thur  Refills: 0     celecoxib 100 MG capsule  Commonly known as: celeBREX      Dose: 100 mg  Take 100 mg by mouth 2 times daily as needed for moderate pain (4-6)  Refills: 0     cycloSPORINE in [place] 0.1 % Emul  Generic drug: cycloSPORINE      Dose: 1 drop  Place 1 drop into both eyes 2 times daily 0630 and 2145  Refills: 0     diclofenac 1 % topical gel  Commonly known as: VOLTAREN      Dose: 2 g  Apply 2 g topically 2 times daily. To right side of neck  Refills: 0     DULoxetine 60 MG capsule  Commonly known as: CYMBALTA      Dose: 60 mg  Take 60 mg by mouth daily  Refills: 0     estradiol 0.1 MG/GM vaginal cream  Commonly known as: ESTRACE      Place vaginally every 3 days. Sig: 3 times weekly, actually being given every 3 days.  Refills: 0     fluticasone 50 MCG/ACT nasal spray  Commonly known as: FLONASE      Dose: 1 spray  Spray 1 spray into both nostrils every morning  Refills: 0     fluticasone-salmeterol 100-50 MCG/ACT inhaler  Commonly known as: Wixela Inhub  Used for: Shortness of breath      Dose: 1 puff  Inhale 1 puff into the lungs 2 times daily  Quantity: 60 each  Refills: 5     furosemide 40 MG tablet  Commonly known as: LASIX  Used for: COPD exacerbation (H), Heart failure with preserved ejection fraction, NYHA class I (H)      Dose: 40 mg  Take 1 tablet (40 mg) by mouth daily  Refills: 0     * gabapentin 300 MG capsule  Commonly known as: NEURONTIN  Used for: History of bilateral knee replacement      Dose: 300 mg  Take 1 capsule (300 mg) by mouth 2 times  daily  Refills: 0     * gabapentin 600 MG tablet  Commonly known as: NEURONTIN  Used for: History of bilateral knee replacement      Dose: 600 mg  Take 1 tablet (600 mg) by mouth At Bedtime  Refills: 0     guaiFENesin 400 MG Tabs      Dose: 400 mg  Take 400 mg by mouth 2 times daily  Refills: 0     * ipratropium - albuterol 0.5 mg/2.5 mg/3 mL 0.5-2.5 (3) MG/3ML neb solution  Commonly known as: DUONEB      Dose: 1 vial  Take 1 vial by nebulization 3 times daily  Refills: 0     * ipratropium - albuterol 0.5 mg/2.5 mg/3 mL 0.5-2.5 (3) MG/3ML neb solution  Commonly known as: DUONEB      Dose: 1 vial  Take 1 vial by nebulization every 4 hours as needed for shortness of breath or wheezing.  Refills: 0     levothyroxine 88 MCG tablet  Commonly known as: SYNTHROID/LEVOTHROID  Used for: Hypothyroidism, unspecified type      Dose: 88 mcg  Take 1 tablet (88 mcg) by mouth daily  Quantity: 90 tablet  Refills: 0     LORazepam 0.5 MG tablet  Commonly known as: ATIVAN      Dose: 0.5 mg  Take 0.5 mg by mouth every 8 hours as needed for anxiety  Refills: 0     melatonin 3 MG tablet      Dose: 3 mg  Take 3 mg by mouth At Bedtime  Refills: 0     nystatin 628850 UNIT/GM external powder  Commonly known as: Nystop  Used for: Intertrigo      Apply tid to affectead area prn  Quantity: 60 g  Refills: 3     polyethylene glycol 17 g packet  Commonly known as: MIRALAX      Dose: 1 packet  Take 1 packet by mouth daily as needed for constipation  Refills: 0     potassium chloride stuart ER 20 MEQ CR tablet  Commonly known as: KLOR-CON M20  Indication: Low Amount of Potassium in the Blood      Dose: 40 mEq  Take 40 mEq by mouth daily  Refills: 0     PseudoePHEDrine-guaiFENesin 120-1200 MG Tb12      Dose: 1 tablet  Take 1 tablet by mouth 2 times daily as needed for congestion  Refills: 0     triamcinolone 0.1 % external cream  Commonly known as: KENALOG      Apply topically every evening  Refills: 0     zinc oxide 40 % external ointment  Commonly  "known as: DESITIN      Apply topically as needed for dry skin or irritation (with toileting)  Refills: 0           * This list has 4 medication(s) that are the same as other medications prescribed for you. Read the directions carefully, and ask your doctor or other care provider to review them with you.                   Where to get your medicines        These [facility] [address] [facility]  [address] [place] [id number]      Phone: [phone]   diphenhydrAMINE 25 MG capsule       Information about where to get these medications is not yet available    Ask your nurse or doctor about these medications  clindamycin 300 MG capsule         Condition on Discharge:  Discharge condition: Stable       Code status on discharge: DNR      History of Illness:  See detailed admission note for full details.    Physical Exam:  Vital signs:  Temp: 98.3  F (36.8  C) Temp src: Temporal BP: (!) 142/73 Pulse: 99   Resp: 18 SpO2: (!) 90 % O2 Device: None (Room air)   Height: 157.5 cm (5' 2.01\") Weight: 123.9 kg (273 lb 2.4 oz)  Estimated body mass index is 49.95 kg/m  as calculated from the following:    Height as of this encounter: 1.575 m (5' 2.01\").    Weight as of this encounter: 123.9 kg (273 lb 2.4 oz).    Wt Readings from Last 1 Encounters:   01/21/25 123.9 kg (273 lb 2.4 oz)     General: Alert, awake, no acute distress.  HEENT: NC/AT, eyes anicteric, external occular movements intact, face symmetric.    Cardiac: RRR, S1, S2.  No murmurs appreciated.  Pulmonary: Normal chest rise, normal work of breathing.  Lungs CTA BL  Abdomen: Obese.  Soft, non-tender, non-distended.  Bowel Sounds Present.  No guarding.  Extremities: Right greater than left erythema from the ankle to the knee.  Trace edema.  Skin: no rashes or lesions noted.  Warm and Dry.  Neuro: No focal deficits noted.  Speech clear.  Coordination and strength grossly normal.  Psych: Appropriate affect.   "     Procedures other than Imaging:  none     Imaging:  Results for orders placed or performed during the hospital encounter of 01/19/25   XR Femur Right 2 Views    Narrative    EXAM: XR FEMUR RIGHT 2 VIEWS  LOCATION: [facility]  DATE: 1/22/2025    INDICATION: right thigh pain, prior hx of femur fracture  COMPARISON: None.      Impression    IMPRESSION:     Reinjured postoperative changes status post right total hip arthroplasty and femur fixation hardware traversing a prior distal femur fracture persistent lucencies around the mid lateral plate and screw fixation screws which is similar to prior exam.   Heterotopic ossification in the region of the greater trochanter. No definite acute fracture or dislocation within limitations of osteopenia.   XR Knee Right 1/2 Views    Narrative    EXAM: XR KNEE RIGHT 1/2 VIEWS  LOCATION: [facility]  DATE: 1/22/2025    INDICATION: right knee pain, prior hx of TKA  COMPARISON: 5/29/2024      Impression    IMPRESSION:     Redemonstrated postoperative changes status post total knee arthroplasty partially visualized distal femoral fixation hardware traversing a prior distal femur fracture. No hardware complication. No new fracture identified.     *Note: Due to a large number of results and/or encounters for the requested time period, some results have not been displayed. A complete set of results can be found in Results Review.        Consultations:  Consultation during this admission received from infectious disease.       Recent Lab Results:  Recent Labs   Lab 01/22/25  0611 01/20/25  0611 01/19/25  1200   WBC  --  8.0 7.1   HGB  --  12.2 12.4   HCT  --  39.3 40.0   MCV  --  96 97    285 270          Lab Results   Component Value Date     01/20/2025     01/19/2025     12/03/2024     04/07/2021     02/13/2021     08/31/2020    Lab Results   Component Value Date    CHLORIDE 102 01/20/2025     CHLORIDE 101 01/19/2025    CHLORIDE 100 12/03/2024    CHLORIDE 99 08/19/2022    CHLORIDE 102 11/04/2021    CHLORIDE 107 09/03/2021    CHLORIDE 101 04/07/2021    CHLORIDE 105 02/13/2021    CHLORIDE 104 08/31/2020    Lab Results   Component Value Date    BUN 17.3 01/20/2025    BUN 19.9 01/19/2025    BUN 19.4 12/03/2024    BUN 13 08/19/2022    BUN 19 11/04/2021    BUN 15 09/03/2021    BUN 16 04/07/2021    BUN 12 02/13/2021    BUN 16 08/31/2020      Lab Results   Component Value Date    POTASSIUM 4.3 01/20/2025    POTASSIUM 4.9 01/19/2025    POTASSIUM 5.3 12/03/2024    POTASSIUM 4.1 02/26/2023    POTASSIUM 4.4 08/19/2022    POTASSIUM 3.7 11/04/2021    POTASSIUM 4.6 09/03/2021    POTASSIUM 4.0 04/07/2021    POTASSIUM 4.1 02/13/2021    POTASSIUM 4.2 08/31/2020    Lab Results   Component Value Date    CO2 26 01/20/2025    CO2 28 01/19/2025    CO2 30 12/03/2024    CO2 28 08/19/2022    CO2 28 11/04/2021    CO2 23 09/03/2021    CO2 29 04/07/2021    CO2 29 02/13/2021    CO2 27 08/31/2020    Lab Results   Component Value Date    CR 0.70 01/22/2025    CR 0.67 01/20/2025    CR 0.81 01/19/2025    CR 0.77 04/07/2021    CR 0.73 02/13/2021    CR 0.66 08/31/2020             Pending Results:    Unresulted Labs Ordered in the Past 30 Days of this Admission       Date and Time Order Name Status Description    1/19/2025 11:42 AM Blood Culture Peripheral Blood Preliminary     1/19/2025 11:42 AM Blood Culture Arm, Right Preliminary              Discharge Instructions and Follow-Up:   Discharge Procedure Orders   Reason for your hospital stay   Order Comments: You were admitted for a suspected right leg cellulitis.  You recently had a negative ultrasound which ruled out DVT.  You initially were treated with a dose of IV ceftriaxone but developed a fairly diffuse itchy rash.  We changed your antibiotic to IV clindamycin after this and now you will complete treatment with 7 days more oral clindamycin.    We gave you 2 days of oral prednisone  to help with the rash and now we will continue with Benadryl at home as needed for any itching.    As discussed, your right leg is achy and we checked x-rays and he recently had an ultrasound.  I suspect this may be related to inflammation from the infection and likely underlying osteoarthritis but if this does not resolve you can pursue further evaluation with your primary care doctor.     Follow-up and recommended labs and tests    Order Comments: Follow up with primary care provider, Ruben Humphrey, within 7 days for hospital follow- up.  The following labs/tests are recommended: follow up on right leg discomfort and cellulitis.     Activity   Order Comments: Your activity upon discharge: activity as tolerated     Order Specific Question Answer Comments   Is discharge order? Yes      Resume Home Care Services     Diet   Order Comments: Follow this diet upon discharge: Current Diet:Orders Placed This Encounter      Combination Diet No Caffeine Diet, Low Saturated Fat Na <2400mg Diet     Order Specific Question Answer Comments   Is discharge order? Yes        Total time spent in face to face contact with the patient and coordinating discharge was:  60 Minutes.

## 2025-01-22 NOTE — PLAN OF CARE
Goal Outcome Evaluation:      Plan of Care Reviewed With: patient    Overall Patient Progress: improving  Nadia PO well, given last dose of IV Clindamycin, up with assist of 2 and michael garcia, voiding, had BM, discharge to [facility] via  [facility] transport now      Problem: Adult Inpatient Plan of Care  Goal: Plan of Care Review    Outcome: Adequate for Care Transition  Goal: Patient-Specific Goal (Individualized)    Outcome: Adequate for Care Transition  Goal: Absence of Hospital-Acquired Illness or Injury  Outcome: Adequate for Care Transition  Intervention: Identify and Manage Fall Risk  Recent Flowsheet Documentation  Taken 1/22/2025 0800 by Bhavani Mann RN  Safety Promotion/Fall Prevention:   assistive device/personal items within reach   clutter free environment maintained   increased rounding and observation   nonskid shoes/slippers when out of bed  Intervention: Prevent Skin Injury  Recent Flowsheet Documentation  Taken 1/22/2025 0800 by Bhavani Mann RN  Body Position:   position changed independently   legs elevated  Skin Protection: incontinence pads utilized  Intervention: Prevent and Manage VTE (Venous Thromboembolism) Risk  Recent Flowsheet Documentation  Taken 1/22/2025 0800 by Bhavani Mann RN  VTE Prevention/Management: compression stockings off  Intervention: Prevent Infection  Recent Flowsheet Documentation  Taken 1/22/2025 0800 by Bhavani Mann RN  Infection Prevention:   rest/sleep promoted   hand hygiene promoted  Goal: Optimal Comfort and Wellbeing  Outcome: Adequate for Care Transition  Goal: Readiness for Transition of Care  Outcome: Adequate for Care Transition     Problem: Infection  Goal: Absence of Infection Signs and Symptoms  Outcome: Adequate for Care Transition  Intervention: Prevent or Manage Infection  Recent Flowsheet Documentation  Taken 1/22/2025 0800 by Bhavani Mann RN  Infection Management: aseptic technique maintained     Problem: Skin or Soft Tissue Infection  Goal:  Absence of Infection Signs and Symptoms  Outcome: Adequate for Care Transition  Intervention: Minimize and Manage Infection Progression  Recent Flowsheet Documentation  Taken 1/22/2025 0800 by Bhavani Mann RN  Infection Prevention:   rest/sleep promoted   hand hygiene promoted  Infection Management: aseptic technique maintained     Problem: Skin Injury Risk Increased  Goal: Skin Health and Integrity  Outcome: Adequate for Care Transition  Intervention: Plan: Nurse Driven Intervention: Moisture Management  Recent Flowsheet Documentation  Taken 1/22/2025 0800 by Bhavani Mann RN  Moisture Interventions:   No brief in bed   Incontinence pad  Intervention: Plan: Nurse Driven Intervention: Friction and Shear  Recent Flowsheet Documentation  Taken 1/22/2025 0800 by Bhavani Mann RN  Friction/Shear Interventions: HOB 30 degrees or less  Intervention: Optimize Skin Protection  Recent Flowsheet Documentation  Taken 1/22/2025 0800 by Bhavani Mann RN  Pressure Reduction Techniques: heels elevated off bed  Pressure Reduction Devices: heel offloading device utilized  Skin Protection: incontinence pads utilized  Activity Management:   activity adjusted per tolerance   activity encouraged  Head of Bed (HOB) Positioning: HOB at 20-30 degrees

## 2025-01-22 NOTE — PLAN OF CARE
Physical Therapy Discharge Summary    Reason for therapy discharge:    Discharged to home with home therapy.    Progress towards therapy goal(s). See goals on Care Plan in [facility] electronic health record for goal details.  Goals not met.  Barriers to achieving goals:   discharge from facility.    Therapy recommendation(s):    Continued therapy is recommended.  Rationale/Recommendations:  Recommending home with assist for all mobility. Rec HHPT to progress strength and IND mobility.

## 2025-01-23 LAB
BACTERIA BLD CULT: NORMAL
BACTERIA BLD CULT: NORMAL

## 2025-01-24 LAB
BACTERIA BLD CULT: NO GROWTH
BACTERIA BLD CULT: NO GROWTH

## 2025-03-23 ENCOUNTER — HEALTH MAINTENANCE LETTER (OUTPATIENT)
Age: 89
End: 2025-03-23

## 2025-04-22 ENCOUNTER — LAB REQUISITION (OUTPATIENT)
Dept: LAB | Facility: CLINIC | Age: 89
End: 2025-04-22
Payer: COMMERCIAL

## 2025-04-22 DIAGNOSIS — E03.9 HYPOTHYROIDISM, UNSPECIFIED: ICD-10-CM

## 2025-04-22 DIAGNOSIS — R41.89 OTHER SYMPTOMS AND SIGNS INVOLVING COGNITIVE FUNCTIONS AND AWARENESS: ICD-10-CM

## 2025-04-22 DIAGNOSIS — L03.119 CELLULITIS OF UNSPECIFIED PART OF LIMB: ICD-10-CM

## 2025-04-22 DIAGNOSIS — E11.22 TYPE 2 DIABETES MELLITUS WITH DIABETIC CHRONIC KIDNEY DISEASE (H): ICD-10-CM

## 2025-04-22 DIAGNOSIS — I50.32 CHRONIC DIASTOLIC (CONGESTIVE) HEART FAILURE (H): ICD-10-CM

## 2025-04-22 DIAGNOSIS — D64.9 ANEMIA, UNSPECIFIED: ICD-10-CM

## 2025-04-22 DIAGNOSIS — L03.115 CELLULITIS OF RIGHT LOWER LIMB: ICD-10-CM

## 2025-04-22 DIAGNOSIS — I11.0 HYPERTENSIVE HEART DISEASE WITH HEART FAILURE (H): ICD-10-CM

## 2025-04-29 LAB
ALBUMIN SERPL BCG-MCNC: 3.4 G/DL (ref 3.5–5.2)
ALP SERPL-CCNC: 163 U/L (ref 40–150)
ALT SERPL W P-5'-P-CCNC: 20 U/L (ref 0–50)
ANION GAP SERPL CALCULATED.3IONS-SCNC: 13 MMOL/L (ref 7–15)
AST SERPL W P-5'-P-CCNC: 72 U/L (ref 0–45)
BILIRUB SERPL-MCNC: 0.8 MG/DL
BUN SERPL-MCNC: 12.4 MG/DL (ref 8–23)
CALCIUM SERPL-MCNC: 9.6 MG/DL (ref 8.8–10.4)
CHLORIDE SERPL-SCNC: 96 MMOL/L (ref 98–107)
CREAT SERPL-MCNC: 0.77 MG/DL (ref 0.51–0.95)
EGFRCR SERPLBLD CKD-EPI 2021: 73 ML/MIN/1.73M2
ERYTHROCYTE [DISTWIDTH] IN BLOOD BY AUTOMATED COUNT: 14.9 % (ref 10–15)
EST. AVERAGE GLUCOSE BLD GHB EST-MCNC: 143 MG/DL
GLUCOSE SERPL-MCNC: 180 MG/DL (ref 70–99)
HBA1C MFR BLD: 6.6 %
HCO3 SERPL-SCNC: 28 MMOL/L (ref 22–29)
HCT VFR BLD AUTO: 41.8 % (ref 35–47)
HGB BLD-MCNC: 12.5 G/DL (ref 11.7–15.7)
MCH RBC QN AUTO: 30 PG (ref 26.5–33)
MCHC RBC AUTO-ENTMCNC: 29.9 G/DL (ref 31.5–36.5)
MCV RBC AUTO: 100 FL (ref 78–100)
NT-PROBNP SERPL-MCNC: 102 PG/ML (ref 0–1800)
PLATELET # BLD AUTO: 206 10E3/UL (ref 150–450)
POTASSIUM SERPL-SCNC: 4.2 MMOL/L (ref 3.4–5.3)
PROT SERPL-MCNC: 7.4 G/DL (ref 6.4–8.3)
RBC # BLD AUTO: 4.17 10E6/UL (ref 3.8–5.2)
SODIUM SERPL-SCNC: 137 MMOL/L (ref 135–145)
TSH SERPL DL<=0.005 MIU/L-ACNC: 2.29 UIU/ML (ref 0.3–4.2)
WBC # BLD AUTO: 4.8 10E3/UL (ref 4–11)

## 2025-05-22 ENCOUNTER — MEDICAL CORRESPONDENCE (OUTPATIENT)
Dept: HEALTH INFORMATION MANAGEMENT | Facility: CLINIC | Age: 89
End: 2025-05-22
Payer: COMMERCIAL

## 2025-05-27 ENCOUNTER — APPOINTMENT (OUTPATIENT)
Dept: GENERAL RADIOLOGY | Facility: CLINIC | Age: 89
DRG: 177 | End: 2025-05-27
Attending: EMERGENCY MEDICINE
Payer: COMMERCIAL

## 2025-05-27 ENCOUNTER — HOSPITAL ENCOUNTER (INPATIENT)
Facility: CLINIC | Age: 89
DRG: 177 | End: 2025-05-27
Attending: EMERGENCY MEDICINE | Admitting: INTERNAL MEDICINE
Payer: COMMERCIAL

## 2025-05-27 ENCOUNTER — APPOINTMENT (OUTPATIENT)
Dept: PHYSICAL THERAPY | Facility: CLINIC | Age: 89
DRG: 177 | End: 2025-05-27
Attending: INTERNAL MEDICINE
Payer: COMMERCIAL

## 2025-05-27 DIAGNOSIS — U07.1 COVID-19 VIRUS INFECTION: ICD-10-CM

## 2025-05-27 DIAGNOSIS — R09.02 HYPOXIA: ICD-10-CM

## 2025-05-27 DIAGNOSIS — J44.1 COPD EXACERBATION (H): ICD-10-CM

## 2025-05-27 DIAGNOSIS — L89.313 PRESSURE INJURY OF RIGHT BUTTOCK, STAGE 3 (H): Primary | ICD-10-CM

## 2025-05-27 DIAGNOSIS — L03.119 CELLULITIS OF LOWER EXTREMITY, UNSPECIFIED LATERALITY: ICD-10-CM

## 2025-05-27 LAB
ALBUMIN SERPL BCG-MCNC: 3.3 G/DL (ref 3.5–5.2)
ALP SERPL-CCNC: 150 U/L (ref 40–150)
ALT SERPL W P-5'-P-CCNC: 25 U/L (ref 0–50)
ANION GAP SERPL CALCULATED.3IONS-SCNC: 13 MMOL/L (ref 7–15)
AST SERPL W P-5'-P-CCNC: 81 U/L (ref 0–45)
ATRIAL RATE - MUSE: 110 BPM
BASE EXCESS BLDV CALC-SCNC: 4.8 MMOL/L (ref -3–3)
BASOPHILS # BLD AUTO: 0 10E3/UL (ref 0–0.2)
BASOPHILS NFR BLD AUTO: 1 %
BILIRUB SERPL-MCNC: 1.1 MG/DL
BUN SERPL-MCNC: 14.9 MG/DL (ref 8–23)
CALCIUM SERPL-MCNC: 9 MG/DL (ref 8.8–10.4)
CHLORIDE SERPL-SCNC: 101 MMOL/L (ref 98–107)
CREAT SERPL-MCNC: 0.79 MG/DL (ref 0.51–0.95)
CRP SERPL-MCNC: 8.84 MG/L
DIASTOLIC BLOOD PRESSURE - MUSE: NORMAL MMHG
EGFRCR SERPLBLD CKD-EPI 2021: 71 ML/MIN/1.73M2
EOSINOPHIL # BLD AUTO: 0.2 10E3/UL (ref 0–0.7)
EOSINOPHIL NFR BLD AUTO: 4 %
ERYTHROCYTE [DISTWIDTH] IN BLOOD BY AUTOMATED COUNT: 15.6 % (ref 10–15)
FLUAV RNA SPEC QL NAA+PROBE: NEGATIVE
FLUBV RNA RESP QL NAA+PROBE: NEGATIVE
GLUCOSE SERPL-MCNC: 122 MG/DL (ref 70–99)
HCO3 BLDV-SCNC: 31 MMOL/L (ref 21–28)
HCO3 SERPL-SCNC: 25 MMOL/L (ref 22–29)
HCT VFR BLD AUTO: 35.4 % (ref 35–47)
HGB BLD-MCNC: 11.2 G/DL (ref 11.7–15.7)
HOLD SPECIMEN: NORMAL
IMM GRANULOCYTES # BLD: 0 10E3/UL
IMM GRANULOCYTES NFR BLD: 0 %
INTERPRETATION ECG - MUSE: NORMAL
LACTATE SERPL-SCNC: 2 MMOL/L (ref 0.7–2)
LACTATE SERPL-SCNC: 2.3 MMOL/L (ref 0.7–2)
LYMPHOCYTES # BLD AUTO: 0.7 10E3/UL (ref 0.8–5.3)
LYMPHOCYTES NFR BLD AUTO: 13 %
MAGNESIUM SERPL-MCNC: 2.2 MG/DL (ref 1.7–2.3)
MCH RBC QN AUTO: 30.6 PG (ref 26.5–33)
MCHC RBC AUTO-ENTMCNC: 31.6 G/DL (ref 31.5–36.5)
MCV RBC AUTO: 97 FL (ref 78–100)
MONOCYTES # BLD AUTO: 0.6 10E3/UL (ref 0–1.3)
MONOCYTES NFR BLD AUTO: 12 %
NEUTROPHILS # BLD AUTO: 3.8 10E3/UL (ref 1.6–8.3)
NEUTROPHILS NFR BLD AUTO: 70 %
NRBC # BLD AUTO: 0 10E3/UL
NRBC BLD AUTO-RTO: 0 /100
NT-PROBNP SERPL-MCNC: 135 PG/ML (ref 0–624)
O2/TOTAL GAS SETTING VFR VENT: 5 %
OXYHGB MFR BLDV: 80 % (ref 70–75)
P AXIS - MUSE: 56 DEGREES
PCO2 BLDV: 55 MM HG (ref 40–50)
PH BLDV: 7.37 [PH] (ref 7.32–7.43)
PLATELET # BLD AUTO: 176 10E3/UL (ref 150–450)
PO2 BLDV: 48 MM HG (ref 25–47)
POTASSIUM SERPL-SCNC: 4 MMOL/L (ref 3.4–5.3)
PR INTERVAL - MUSE: 200 MS
PROCALCITONIN SERPL IA-MCNC: 0.16 NG/ML
PROT SERPL-MCNC: 6.9 G/DL (ref 6.4–8.3)
QRS DURATION - MUSE: 80 MS
QT - MUSE: 326 MS
QTC - MUSE: 441 MS
R AXIS - MUSE: -8 DEGREES
RBC # BLD AUTO: 3.66 10E6/UL (ref 3.8–5.2)
RSV RNA SPEC NAA+PROBE: NEGATIVE
SAO2 % BLDV: 82.1 % (ref 70–75)
SARS-COV-2 RNA RESP QL NAA+PROBE: POSITIVE
SODIUM SERPL-SCNC: 139 MMOL/L (ref 135–145)
SYSTOLIC BLOOD PRESSURE - MUSE: NORMAL MMHG
T AXIS - MUSE: 36 DEGREES
TROPONIN T SERPL HS-MCNC: 27 NG/L
TROPONIN T SERPL HS-MCNC: 27 NG/L
VENTRICULAR RATE- MUSE: 110 BPM
WBC # BLD AUTO: 5.3 10E3/UL (ref 4–11)

## 2025-05-27 PROCEDURE — 83880 ASSAY OF NATRIURETIC PEPTIDE: CPT | Performed by: EMERGENCY MEDICINE

## 2025-05-27 PROCEDURE — 71046 X-RAY EXAM CHEST 2 VIEWS: CPT

## 2025-05-27 PROCEDURE — 84484 ASSAY OF TROPONIN QUANT: CPT | Performed by: EMERGENCY MEDICINE

## 2025-05-27 PROCEDURE — 83605 ASSAY OF LACTIC ACID: CPT | Performed by: INTERNAL MEDICINE

## 2025-05-27 PROCEDURE — 93005 ELECTROCARDIOGRAM TRACING: CPT

## 2025-05-27 PROCEDURE — 94640 AIRWAY INHALATION TREATMENT: CPT | Mod: 76

## 2025-05-27 PROCEDURE — XW033E5 INTRODUCTION OF REMDESIVIR ANTI-INFECTIVE INTO PERIPHERAL VEIN, PERCUTANEOUS APPROACH, NEW TECHNOLOGY GROUP 5: ICD-10-PCS | Performed by: INTERNAL MEDICINE

## 2025-05-27 PROCEDURE — G0463 HOSPITAL OUTPT CLINIC VISIT: HCPCS

## 2025-05-27 PROCEDURE — 83735 ASSAY OF MAGNESIUM: CPT | Performed by: EMERGENCY MEDICINE

## 2025-05-27 PROCEDURE — 82805 BLOOD GASES W/O2 SATURATION: CPT | Performed by: EMERGENCY MEDICINE

## 2025-05-27 PROCEDURE — 258N000003 HC RX IP 258 OP 636: Performed by: INTERNAL MEDICINE

## 2025-05-27 PROCEDURE — 250N000009 HC RX 250: Performed by: INTERNAL MEDICINE

## 2025-05-27 PROCEDURE — 87637 SARSCOV2&INF A&B&RSV AMP PRB: CPT | Performed by: EMERGENCY MEDICINE

## 2025-05-27 PROCEDURE — 99285 EMERGENCY DEPT VISIT HI MDM: CPT | Mod: 25

## 2025-05-27 PROCEDURE — 97161 PT EVAL LOW COMPLEX 20 MIN: CPT | Mod: GP | Performed by: PHYSICAL THERAPIST

## 2025-05-27 PROCEDURE — 999N000156 HC STATISTIC RCP CONSULT EA 30 MIN

## 2025-05-27 PROCEDURE — 96375 TX/PRO/DX INJ NEW DRUG ADDON: CPT

## 2025-05-27 PROCEDURE — 36415 COLL VENOUS BLD VENIPUNCTURE: CPT | Performed by: EMERGENCY MEDICINE

## 2025-05-27 PROCEDURE — 96365 THER/PROPH/DIAG IV INF INIT: CPT

## 2025-05-27 PROCEDURE — 84145 PROCALCITONIN (PCT): CPT | Performed by: EMERGENCY MEDICINE

## 2025-05-27 PROCEDURE — 99223 1ST HOSP IP/OBS HIGH 75: CPT | Performed by: INTERNAL MEDICINE

## 2025-05-27 PROCEDURE — 999N000157 HC STATISTIC RCP TIME EA 10 MIN

## 2025-05-27 PROCEDURE — 86140 C-REACTIVE PROTEIN: CPT | Performed by: EMERGENCY MEDICINE

## 2025-05-27 PROCEDURE — 97530 THERAPEUTIC ACTIVITIES: CPT | Mod: GP | Performed by: PHYSICAL THERAPIST

## 2025-05-27 PROCEDURE — 87040 BLOOD CULTURE FOR BACTERIA: CPT | Performed by: EMERGENCY MEDICINE

## 2025-05-27 PROCEDURE — 85025 COMPLETE CBC W/AUTO DIFF WBC: CPT | Performed by: EMERGENCY MEDICINE

## 2025-05-27 PROCEDURE — 250N000009 HC RX 250: Performed by: EMERGENCY MEDICINE

## 2025-05-27 PROCEDURE — 80053 COMPREHEN METABOLIC PANEL: CPT | Performed by: EMERGENCY MEDICINE

## 2025-05-27 PROCEDURE — 99207 PR NO BILLABLE SERVICE THIS VISIT: CPT | Performed by: INTERNAL MEDICINE

## 2025-05-27 PROCEDURE — 94660 CPAP INITIATION&MGMT: CPT

## 2025-05-27 PROCEDURE — 120N000001 HC R&B MED SURG/OB

## 2025-05-27 PROCEDURE — 36415 COLL VENOUS BLD VENIPUNCTURE: CPT | Performed by: INTERNAL MEDICINE

## 2025-05-27 PROCEDURE — 250N000013 HC RX MED GY IP 250 OP 250 PS 637: Performed by: INTERNAL MEDICINE

## 2025-05-27 PROCEDURE — 83605 ASSAY OF LACTIC ACID: CPT | Performed by: EMERGENCY MEDICINE

## 2025-05-27 PROCEDURE — 94640 AIRWAY INHALATION TREATMENT: CPT

## 2025-05-27 PROCEDURE — 250N000011 HC RX IP 250 OP 636: Mod: JZ | Performed by: INTERNAL MEDICINE

## 2025-05-27 PROCEDURE — 5A09357 ASSISTANCE WITH RESPIRATORY VENTILATION, LESS THAN 24 CONSECUTIVE HOURS, CONTINUOUS POSITIVE AIRWAY PRESSURE: ICD-10-PCS | Performed by: INTERNAL MEDICINE

## 2025-05-27 PROCEDURE — 250N000011 HC RX IP 250 OP 636: Performed by: EMERGENCY MEDICINE

## 2025-05-27 RX ORDER — CALCIUM CARBONATE 500 MG/1
1000 TABLET, CHEWABLE ORAL 4 TIMES DAILY PRN
Status: DISCONTINUED | OUTPATIENT
Start: 2025-05-27 | End: 2025-05-31 | Stop reason: HOSPADM

## 2025-05-27 RX ORDER — POLYVINYL ALCOHOL 14 MG/ML
1 SOLUTION/ DROPS OPHTHALMIC 3 TIMES DAILY
COMMUNITY

## 2025-05-27 RX ORDER — LIDOCAINE 40 MG/G
CREAM TOPICAL
Status: DISCONTINUED | OUTPATIENT
Start: 2025-05-27 | End: 2025-05-31 | Stop reason: HOSPADM

## 2025-05-27 RX ORDER — POLYETHYLENE GLYCOL 3350 17 G/17G
17 POWDER, FOR SOLUTION ORAL 2 TIMES DAILY PRN
Status: DISCONTINUED | OUTPATIENT
Start: 2025-05-27 | End: 2025-05-31 | Stop reason: HOSPADM

## 2025-05-27 RX ORDER — IPRATROPIUM BROMIDE AND ALBUTEROL SULFATE 2.5; .5 MG/3ML; MG/3ML
3 SOLUTION RESPIRATORY (INHALATION)
Status: DISCONTINUED | OUTPATIENT
Start: 2025-05-27 | End: 2025-05-31 | Stop reason: HOSPADM

## 2025-05-27 RX ORDER — LORAZEPAM 0.5 MG/1
0.5 TABLET ORAL EVERY 6 HOURS PRN
Status: DISCONTINUED | OUTPATIENT
Start: 2025-05-27 | End: 2025-05-31 | Stop reason: HOSPADM

## 2025-05-27 RX ORDER — GUAIFENESIN AND PSEUDOEPHEDRINE HCL 1200; 120 MG/1; MG/1
1 TABLET, EXTENDED RELEASE ORAL 2 TIMES DAILY PRN
COMMUNITY

## 2025-05-27 RX ORDER — ALBUTEROL SULFATE 0.83 MG/ML
2.5 SOLUTION RESPIRATORY (INHALATION)
Status: DISCONTINUED | OUTPATIENT
Start: 2025-05-27 | End: 2025-05-31 | Stop reason: HOSPADM

## 2025-05-27 RX ORDER — ENOXAPARIN SODIUM 100 MG/ML
40 INJECTION SUBCUTANEOUS EVERY 12 HOURS
Status: DISCONTINUED | OUTPATIENT
Start: 2025-05-27 | End: 2025-05-31 | Stop reason: HOSPADM

## 2025-05-27 RX ORDER — SEMAGLUTIDE 0.68 MG/ML
0.25 INJECTION, SOLUTION SUBCUTANEOUS
COMMUNITY
Start: 2025-05-01

## 2025-05-27 RX ORDER — AMOXICILLIN 250 MG
1 CAPSULE ORAL 2 TIMES DAILY PRN
Status: DISCONTINUED | OUTPATIENT
Start: 2025-05-27 | End: 2025-05-31 | Stop reason: HOSPADM

## 2025-05-27 RX ORDER — ESTRADIOL 0.1 MG/G
2 CREAM VAGINAL
Status: DISCONTINUED | OUTPATIENT
Start: 2025-05-28 | End: 2025-05-31 | Stop reason: HOSPADM

## 2025-05-27 RX ORDER — METHYLPREDNISOLONE SODIUM SUCCINATE 40 MG/ML
40 INJECTION INTRAMUSCULAR; INTRAVENOUS 2 TIMES DAILY
Status: DISCONTINUED | OUTPATIENT
Start: 2025-05-27 | End: 2025-05-29

## 2025-05-27 RX ORDER — ACETAMINOPHEN 325 MG/1
975 TABLET ORAL 3 TIMES DAILY
Status: DISCONTINUED | OUTPATIENT
Start: 2025-05-27 | End: 2025-05-31 | Stop reason: HOSPADM

## 2025-05-27 RX ORDER — GUAIFENESIN 600 MG/1
600 TABLET, EXTENDED RELEASE ORAL 2 TIMES DAILY
Status: DISCONTINUED | OUTPATIENT
Start: 2025-05-27 | End: 2025-05-31 | Stop reason: HOSPADM

## 2025-05-27 RX ORDER — PIPERACILLIN SODIUM, TAZOBACTAM SODIUM 4; .5 G/20ML; G/20ML
4.5 INJECTION, POWDER, LYOPHILIZED, FOR SOLUTION INTRAVENOUS ONCE
Status: COMPLETED | OUTPATIENT
Start: 2025-05-27 | End: 2025-05-27

## 2025-05-27 RX ORDER — FLUTICASONE PROPIONATE AND SALMETEROL 100; 50 UG/1; UG/1
1 POWDER RESPIRATORY (INHALATION) 2 TIMES DAILY
COMMUNITY

## 2025-05-27 RX ORDER — LEVOTHYROXINE SODIUM 88 UG/1
88 TABLET ORAL DAILY
Status: DISCONTINUED | OUTPATIENT
Start: 2025-05-27 | End: 2025-05-31 | Stop reason: HOSPADM

## 2025-05-27 RX ORDER — ONDANSETRON 4 MG/1
4 TABLET, ORALLY DISINTEGRATING ORAL EVERY 6 HOURS PRN
Status: DISCONTINUED | OUTPATIENT
Start: 2025-05-27 | End: 2025-05-31 | Stop reason: HOSPADM

## 2025-05-27 RX ORDER — FLUTICASONE FUROATE AND VILANTEROL 100; 25 UG/1; UG/1
1 POWDER RESPIRATORY (INHALATION) DAILY
Status: DISCONTINUED | OUTPATIENT
Start: 2025-05-27 | End: 2025-05-31 | Stop reason: HOSPADM

## 2025-05-27 RX ORDER — DEXAMETHASONE SODIUM PHOSPHATE 10 MG/ML
6 INJECTION, SOLUTION INTRAMUSCULAR; INTRAVENOUS ONCE
Status: COMPLETED | OUTPATIENT
Start: 2025-05-27 | End: 2025-05-27

## 2025-05-27 RX ORDER — IPRATROPIUM BROMIDE AND ALBUTEROL SULFATE 2.5; .5 MG/3ML; MG/3ML
3 SOLUTION RESPIRATORY (INHALATION) ONCE
Status: COMPLETED | OUTPATIENT
Start: 2025-05-27 | End: 2025-05-27

## 2025-05-27 RX ORDER — POTASSIUM CHLORIDE 1500 MG/1
40 TABLET, EXTENDED RELEASE ORAL DAILY
Status: DISCONTINUED | OUTPATIENT
Start: 2025-05-27 | End: 2025-05-31 | Stop reason: HOSPADM

## 2025-05-27 RX ORDER — CELECOXIB 100 MG/1
100 CAPSULE ORAL 2 TIMES DAILY PRN
Status: DISCONTINUED | OUTPATIENT
Start: 2025-05-27 | End: 2025-05-31 | Stop reason: HOSPADM

## 2025-05-27 RX ORDER — ONDANSETRON 2 MG/ML
4 INJECTION INTRAMUSCULAR; INTRAVENOUS EVERY 6 HOURS PRN
Status: DISCONTINUED | OUTPATIENT
Start: 2025-05-27 | End: 2025-05-31 | Stop reason: HOSPADM

## 2025-05-27 RX ORDER — GABAPENTIN 400 MG/1
400 CAPSULE ORAL 3 TIMES DAILY
COMMUNITY
Start: 2025-05-03

## 2025-05-27 RX ORDER — IPRATROPIUM BROMIDE AND ALBUTEROL SULFATE 2.5; .5 MG/3ML; MG/3ML
1 SOLUTION RESPIRATORY (INHALATION) 3 TIMES DAILY
COMMUNITY

## 2025-05-27 RX ORDER — MINERAL OIL AND WHITE PETROLATUM 150; 830 MG/G; MG/G
OINTMENT OPHTHALMIC AT BEDTIME
COMMUNITY

## 2025-05-27 RX ORDER — FLUTICASONE PROPIONATE 50 MCG
1 SPRAY, SUSPENSION (ML) NASAL DAILY PRN
Status: DISCONTINUED | OUTPATIENT
Start: 2025-05-27 | End: 2025-05-31 | Stop reason: HOSPADM

## 2025-05-27 RX ORDER — TORSEMIDE 20 MG/1
20 TABLET ORAL DAILY
COMMUNITY
Start: 2025-04-22 | End: 2026-04-16

## 2025-05-27 RX ORDER — BENZONATATE 100 MG/1
100 CAPSULE ORAL 3 TIMES DAILY PRN
Status: DISCONTINUED | OUTPATIENT
Start: 2025-05-27 | End: 2025-05-31 | Stop reason: HOSPADM

## 2025-05-27 RX ORDER — DULOXETIN HYDROCHLORIDE 60 MG/1
60 CAPSULE, DELAYED RELEASE ORAL DAILY
Status: DISCONTINUED | OUTPATIENT
Start: 2025-05-27 | End: 2025-05-31 | Stop reason: HOSPADM

## 2025-05-27 RX ORDER — TORSEMIDE 20 MG/1
20 TABLET ORAL DAILY
Status: DISCONTINUED | OUTPATIENT
Start: 2025-05-27 | End: 2025-05-31 | Stop reason: HOSPADM

## 2025-05-27 RX ORDER — ALLOPURINOL 100 MG/1
100 TABLET ORAL DAILY
Status: DISCONTINUED | OUTPATIENT
Start: 2025-05-27 | End: 2025-05-31 | Stop reason: HOSPADM

## 2025-05-27 RX ORDER — ACETAMINOPHEN 500 MG
1000 TABLET ORAL 3 TIMES DAILY
COMMUNITY

## 2025-05-27 RX ORDER — AMOXICILLIN 250 MG
2 CAPSULE ORAL 2 TIMES DAILY PRN
Status: DISCONTINUED | OUTPATIENT
Start: 2025-05-27 | End: 2025-05-31 | Stop reason: HOSPADM

## 2025-05-27 RX ORDER — HYDROCORTISONE 25 MG/G
CREAM TOPICAL DAILY PRN
COMMUNITY

## 2025-05-27 RX ORDER — DIPHENHYDRAMINE HCL 25 MG
25 CAPSULE ORAL EVERY 6 HOURS PRN
COMMUNITY

## 2025-05-27 RX ORDER — SALIVA STIMULANT COMB. NO.3
2 SPRAY, NON-AEROSOL (ML) MUCOUS MEMBRANE 4 TIMES DAILY PRN
Status: DISCONTINUED | OUTPATIENT
Start: 2025-05-27 | End: 2025-05-31 | Stop reason: HOSPADM

## 2025-05-27 RX ADMIN — TORSEMIDE 20 MG: 20 TABLET ORAL at 08:42

## 2025-05-27 RX ADMIN — PIPERACILLIN AND TAZOBACTAM 4.5 G: 4; .5 INJECTION, POWDER, FOR SOLUTION INTRAVENOUS at 05:09

## 2025-05-27 RX ADMIN — ALLOPURINOL 100 MG: 100 TABLET ORAL at 08:01

## 2025-05-27 RX ADMIN — SODIUM CHLORIDE 50 ML: 0.9 INJECTION, SOLUTION INTRAVENOUS at 08:18

## 2025-05-27 RX ADMIN — ACETAMINOPHEN 975 MG: 325 TABLET, FILM COATED ORAL at 20:43

## 2025-05-27 RX ADMIN — DEXAMETHASONE SODIUM PHOSPHATE 6 MG: 10 INJECTION, SOLUTION INTRAMUSCULAR; INTRAVENOUS at 04:02

## 2025-05-27 RX ADMIN — GABAPENTIN 400 MG: 400 CAPSULE ORAL at 13:28

## 2025-05-27 RX ADMIN — GUAIFENESIN 600 MG: 600 TABLET, EXTENDED RELEASE ORAL at 08:12

## 2025-05-27 RX ADMIN — POTASSIUM CHLORIDE 40 MEQ: 1500 TABLET, EXTENDED RELEASE ORAL at 08:17

## 2025-05-27 RX ADMIN — ENOXAPARIN SODIUM 40 MG: 40 INJECTION SUBCUTANEOUS at 08:16

## 2025-05-27 RX ADMIN — IPRATROPIUM BROMIDE AND ALBUTEROL SULFATE 3 ML: .5; 3 SOLUTION RESPIRATORY (INHALATION) at 15:49

## 2025-05-27 RX ADMIN — ACETAMINOPHEN 975 MG: 325 TABLET, FILM COATED ORAL at 13:29

## 2025-05-27 RX ADMIN — FLUTICASONE FUROATE AND VILANTEROL TRIFENATATE 1 PUFF: 100; 25 POWDER RESPIRATORY (INHALATION) at 08:42

## 2025-05-27 RX ADMIN — ENOXAPARIN SODIUM 40 MG: 40 INJECTION SUBCUTANEOUS at 20:44

## 2025-05-27 RX ADMIN — REMDESIVIR 200 MG: 100 INJECTION, POWDER, LYOPHILIZED, FOR SOLUTION INTRAVENOUS at 08:01

## 2025-05-27 RX ADMIN — METHYLPREDNISOLONE SODIUM SUCCINATE 40 MG: 40 INJECTION, POWDER, FOR SOLUTION INTRAMUSCULAR; INTRAVENOUS at 20:44

## 2025-05-27 RX ADMIN — DULOXETINE 60 MG: 60 CAPSULE, DELAYED RELEASE ORAL at 08:00

## 2025-05-27 RX ADMIN — IPRATROPIUM BROMIDE AND ALBUTEROL SULFATE 3 ML: .5; 3 SOLUTION RESPIRATORY (INHALATION) at 19:21

## 2025-05-27 RX ADMIN — ACETAMINOPHEN 975 MG: 325 TABLET, FILM COATED ORAL at 08:00

## 2025-05-27 RX ADMIN — LEVOTHYROXINE SODIUM 88 MCG: 0.09 TABLET ORAL at 08:42

## 2025-05-27 RX ADMIN — GABAPENTIN 400 MG: 400 CAPSULE ORAL at 08:12

## 2025-05-27 RX ADMIN — Medication 3 MG: at 20:43

## 2025-05-27 RX ADMIN — IPRATROPIUM BROMIDE AND ALBUTEROL SULFATE 3 ML: .5; 3 SOLUTION RESPIRATORY (INHALATION) at 08:17

## 2025-05-27 RX ADMIN — IPRATROPIUM BROMIDE AND ALBUTEROL SULFATE 3 ML: .5; 3 SOLUTION RESPIRATORY (INHALATION) at 11:58

## 2025-05-27 RX ADMIN — IPRATROPIUM BROMIDE AND ALBUTEROL SULFATE 3 ML: .5; 3 SOLUTION RESPIRATORY (INHALATION) at 02:49

## 2025-05-27 RX ADMIN — GUAIFENESIN 600 MG: 600 TABLET, EXTENDED RELEASE ORAL at 20:50

## 2025-05-27 RX ADMIN — GABAPENTIN 400 MG: 400 CAPSULE ORAL at 20:44

## 2025-05-27 ASSESSMENT — ACTIVITIES OF DAILY LIVING (ADL)
ADLS_ACUITY_SCORE: 44
CHANGE_IN_FUNCTIONAL_STATUS_SINCE_ONSET_OF_CURRENT_ILLNESS/INJURY: NO
ADLS_ACUITY_SCORE: 44
ADLS_ACUITY_SCORE: 61
ADLS_ACUITY_SCORE: 44
ADLS_ACUITY_SCORE: 41
ADLS_ACUITY_SCORE: 38
ADLS_ACUITY_SCORE: 38
ADLS_ACUITY_SCORE: 41
ADLS_ACUITY_SCORE: 61
FALL_HISTORY_WITHIN_LAST_SIX_MONTHS: NO
ADLS_ACUITY_SCORE: 41
ADLS_ACUITY_SCORE: 44
ADLS_ACUITY_SCORE: 44
EQUIPMENT_CURRENTLY_USED_AT_HOME: WHEELCHAIR, POWER
ADLS_ACUITY_SCORE: 44
ADLS_ACUITY_SCORE: 61
ADLS_ACUITY_SCORE: 38
ADLS_ACUITY_SCORE: 41
ADLS_ACUITY_SCORE: 38
ADLS_ACUITY_SCORE: 44
DOING_ERRANDS_INDEPENDENTLY_DIFFICULTY: YES
ADLS_ACUITY_SCORE: 44
ADLS_ACUITY_SCORE: 41
ADLS_ACUITY_SCORE: 61

## 2025-05-27 NOTE — PLAN OF CARE
A&Ox4; lethargic throughout day. VSS; weened to 2L of O2 via NC. Denies all pain, N/V. Reports intermittent SOB; dyspnea w/ exertion. Tele: SR. Daily weight, strict I&O, F.R 1800, tolerating 2 g Na+ diet. WOC consulted for wound to buttocks; mepilex applied. Purewick in place w/ adequate UOP. Ax2 w/ Noy Steady. Special precautions for COVID+; Q24 IV Remdesivir and nebs given. +2 BLE edema; elevated on pillow. Lymphedema consult. PT and SW following. Bed alarm on for safety. Call light in reach; pt able to make needs known.    /77 (BP Location: Right arm)   Pulse 97   Temp 97.6  F (36.4  C) (Oral)   Resp 18   Ht 1.524 m (5')   Wt 122.3 kg (269 lb 10 oz)   LMP  (LMP Unknown)   SpO2 97%   BMI 52.66 kg/m        Goal Outcome Evaluation:      Plan of Care Reviewed With: patient    Overall Patient Progress: improvingOverall Patient Progress: improving    Outcome Evaluation: A&Ox4; lethargic throughout day. VSS; weened to 2L of O2 via NC. Denies all pain, N/V. Tele: SR. Daily weight, strict I&O, F.R 1800, tolerating 2 g Na+ diet. WOC consulted for wound to buttocks. Lmyphedema consult.      Problem: Adult Inpatient Plan of Care  Goal: Plan of Care Review  Description: The Plan of Care Review/Shift note should be completed every shift.  The Outcome Evaluation is a brief statement about your assessment that the patient is improving, declining, or no change.  This information will be displayed automatically on your shift  note.  Outcome: Progressing  Flowsheets (Taken 5/27/2025 5122)  Outcome Evaluation:   A&Ox4   lethargic throughout day. VSS   weened to 2L of O2 via NC. Denies all pain, N/V. Tele: SR. Daily weight, strict I&O, F.R 1800, tolerating 2 g Na+ diet. WOC consulted for wound to buttocks. Lmyphedema consult.  Plan of Care Reviewed With: patient  Overall Patient Progress: improving  Goal: Patient-Specific Goal (Individualized)  Description: You can add care plan individualizations to a care plan.  "Examples of Individualization might be:  \"Parent requests to be called daily at 9am for status\", \"I have a hard time hearing out of my right ear\", or \"Do not touch me to wake me up as it startles  me\".  Outcome: Progressing  Goal: Absence of Hospital-Acquired Illness or Injury  Outcome: Progressing  Intervention: Identify and Manage Fall Risk  Recent Flowsheet Documentation  Taken 5/27/2025 1150 by Jannette Mobley RN  Safety Promotion/Fall Prevention:   safety round/check completed   supervised activity  Intervention: Prevent Skin Injury  Recent Flowsheet Documentation  Taken 5/27/2025 1150 by Jannette Mobley RN  Body Position:   weight shifting   supine  Skin Protection:   adhesive use limited   incontinence pads utilized  Intervention: Prevent Infection  Recent Flowsheet Documentation  Taken 5/27/2025 1150 by Jannette Mobley RN  Infection Prevention:   rest/sleep promoted   hand hygiene promoted   equipment surfaces disinfected   personal protective equipment utilized  Goal: Optimal Comfort and Wellbeing  Outcome: Progressing  Intervention: Provide Person-Centered Care  Recent Flowsheet Documentation  Taken 5/27/2025 1150 by Jannette Mobley RN  Trust Relationship/Rapport:   care explained   questions answered   thoughts/feelings acknowledged  Goal: Readiness for Transition of Care  Outcome: Progressing  Intervention: Mutually Develop Transition Plan  Recent Flowsheet Documentation  Taken 5/27/2025 0633 by Jannette Mobley RN  Equipment Currently Used at Home: wheelchair, power     Problem: Delirium  Goal: Optimal Coping  Outcome: Progressing  Goal: Improved Behavioral Control  Outcome: Progressing  Intervention: Minimize Safety Risk  Recent Flowsheet Documentation  Taken 5/27/2025 1150 by Jannette Mobley RN  Enhanced Safety Measures:   room near unit station   pain management  Trust Relationship/Rapport:   care explained   questions answered   thoughts/feelings acknowledged  Goal: Improved " Attention and Thought Clarity  Outcome: Progressing  Goal: Improved Sleep  Outcome: Progressing

## 2025-05-27 NOTE — PHARMACY-ADMISSION MEDICATION HISTORY
Pharmacist Admission Medication History    Admission medication history is complete. The information provided in this note is only as accurate as the sources available at the time of the update.    Information Source(s): Facility ([facility]/) medication list/MAR ([facility]) via faxed MAR    Pertinent Information: none    Changes made to PTA medication list:  Added: acetaminophen, artificial tears, duoneb scheduled, lubrifresh ointment, artificial saliva spray, diphenhydramine, Mucinex D, hydrocortisone  Deleted: diclofenac, Flonase, furosemide, gabapentin 300 mg and 600 mg strengths  Changed: Ativan, melatonin    Allergies reviewed with patient and updates made in EHR: no    Medication History Completed By: Jazzmine Turner [facility] 5/27/2025 12:56 PM    PTA Med List   Medication Sig Last Dose/Taking    acetaminophen (TYLENOL) 500 MG tablet Take 1,000 mg by mouth 3 times daily. 5/26/2025    albuterol (PROAIR HFA/PROVENTIL HFA/VENTOLIN HFA) 108 (90 Base) MCG/ACT inhaler INHALE 2 PUFFS INTO THE LUNGS EVERY 4 HOURS AS NEEDED FOR SHORTNESS OF BREATH OR DIFFICULT BREATHING OR WHEEZING Taking    allopurinol (ZYLOPRIM) 100 MG tablet Take 100 mg by mouth daily START 7/10 5/26/2025 Morning    Artificial Saliva (BIOTENE DRY MOUTH MOIST SPRAY MT) Take 1 spray by mouth every 2 hours as needed (dry mouth). Taking As Needed    ARTIFICIAL SALIVA MT Take 1 strip by mouth 2 times daily After nebs Taking    calcium carbonate 600 mg-vitamin D 400 units (CALTRATE) 600-400 MG-UNIT per tablet Take 1 tablet by mouth every evening  5/25/2025 Evening    carbamide peroxide (DEBROX) 6.5 % otic solution Place 3 drops into both ears twice a week. Mon & Thur 5/19/2025    celecoxib (CELEBREX) 100 MG capsule Take 100 mg by mouth 2 times daily as needed for moderate pain (4-6) Taking As Needed    cycloSPORINE (CYCLOSPORINE IN KLARITY) 0.1 % EMUL Place 1 drop into both eyes 2 times daily 0630 and 2145 5/14/2025    diphenhydrAMINE (BENADRYL)  25 MG capsule Take 25 mg by mouth every 6 hours as needed for itching. Taking As Needed    DULoxetine (CYMBALTA) 60 MG capsule Take 60 mg by mouth daily 5/26/2025 Morning    estradiol (ESTRACE) 0.1 MG/GM vaginal cream Place vaginally every 3 days. Sig: 3 times weekly, actually being given every 3 days. 5/25/2025    fluticasone-salmeterol (ADVAIR) 100-50 MCG/ACT inhaler Inhale 1 puff into the lungs 2 times daily. 5/26/2025 Morning    gabapentin (NEURONTIN) 400 MG capsule Take 400 mg by mouth 3 times daily. 630 AM 2  PM 5/26/2025    guaiFENesin 400 MG TABS Take 400 mg by mouth 2 times daily 5/26/2025    hydrocortisone, Perianal, (ANUSOL-HC) 2.5 % cream Place rectally daily as needed for hemorrhoids. Taking As Needed    ipratropium - albuterol 0.5 mg/2.5 mg/3 mL (DUONEB) 0.5-2.5 (3) MG/3ML neb solution Take 1 vial by nebulization 3 times daily. 630 am, 2 pm, 945 pm Taking    ipratropium - albuterol 0.5 mg/2.5 mg/3 mL (DUONEB) 0.5-2.5 (3) MG/3ML neb solution Take 1 vial by nebulization every 4 hours as needed for shortness of breath or wheezing. Taking As Needed    levothyroxine (SYNTHROID/LEVOTHROID) 88 MCG tablet Take 1 tablet (88 mcg) by mouth daily 5/26/2025 Morning    LORazepam (ATIVAN) 0.5 MG tablet Take 0.5 mg by mouth every 6 hours as needed for anxiety (or shortness of breath). Taking As Needed    melatonin 3 MG tablet Take 3 mg by mouth nightly as needed for sleep. Taking As Needed    nystatin (NYSTOP) 155347 UNIT/GM external powder Apply tid to affectead area prn Taking    OZEMPIC, 0.25 OR 0.5 MG/DOSE, 2 MG/3ML pen Inject 0.25 mg subcutaneously every 7 days. 5/24/2025    polyethylene glycol (MIRALAX) 17 g packet Take 1 packet by mouth daily as needed for constipation Taking As Needed    polyvinyl alcohol (ARTIFICIAL TEARS) 1.4 % ophthalmic solution Place 1 drop into both eyes 3 times daily. 5/26/2025    potassium chloride stuart ER (KLOR-CON M20) 20 MEQ CR tablet Take 40 mEq by mouth daily 5/26/2025  Morning    PseudoePHEDrine-guaiFENesin 120-1200 MG TB12 Take 1 tablet by mouth 2 times daily as needed for congestion or cough. Taking As Needed    torsemide (DEMADEX) 20 MG tablet Take 20 mg by mouth daily. 5/26/2025 Morning    triamcinolone (KENALOG) 0.1 % external cream Apply topically every evening. To lower extremities 5/25/2025 Evening    White Petrolatum-Mineral Oil (LUBRIFRESH P.M.) OINT Place into both eyes at bedtime. Apply a small strip into both eyes 5/26/2025    zinc oxide (DESITIN) 40 % external ointment Apply topically as needed for dry skin or irritation (with toileting) Taking As Needed

## 2025-05-27 NOTE — ED PROVIDER NOTES
Emergency Department Note      History of Present Illness     Chief Complaint   Shortness of Breath and Cough    HPI   Carol Merida is a 89 year old female with a history as noted below who presents to the emergency department for shortness of breath and a cough. The patient states that for 2 days, she has been experiencing shortness of breath, a cough, generalized weakness, myalgias, and intermittent episodes of nausea, vomiting, diarrhea. She notes that she is on CPAP at night for sleep apnea and is on 2L NC at baseline for her history of COPD but reports that tonight in the ED, her O2 has been increased to 5L NC. No fever or chills. She notes that she has baseline erythema to her bilateral lower extremities but adds that this erythema does not appear worse than usual. She has received 5 Covid boosters.     Independent Historian   None    Review of External Notes   Reviewed Care Everywhere and updated Epic.    Past Medical History     Medical History and Problem List   Past Medical History:   Diagnosis Date    Anemia     Anxiety     Breast cancer 2005    Chronic joint pain     Congestive heart failure     COPD (chronic obstructive pulmonary disease)     Depressive disorder     Hypertension     Hypothyroidism     Lumbar spinal stenosis     Melanoma     Mixed hyperlipidemia     Obstructive sleep apnea 08/25/2006    Osteoarthritis     Osteoporosis     Tubular adenoma in colon 09/2001     Medications   albuterol (PROAIR HFA/PROVENTIL HFA/VENTOLIN HFA) 108 (90 Base) MCG/ACT inhaler  allopurinol (ZYLOPRIM) 100 MG tablet  ARTIFICIAL SALIVA MT  calcium carbonate 600 mg-vitamin D 400 units (CALTRATE) 600-400 MG-UNIT per tablet  carbamide peroxide (DEBROX) 6.5 % otic solution  celecoxib (CELEBREX) 100 MG capsule  cycloSPORINE (CYCLOSPORINE IN KLARITY) 0.1 % EMUL  diclofenac (VOLTAREN) 1 % topical gel  DULoxetine (CYMBALTA) 60 MG capsule  estradiol (ESTRACE) 0.1 MG/GM vaginal cream  fluticasone (FLONASE) 50 MCG/ACT nasal  spray  fluticasone-salmeterol (WIXELA INHUB) 100-50 MCG/ACT inhaler  furosemide (LASIX) 40 MG tablet  gabapentin (NEURONTIN) 300 MG capsule  gabapentin (NEURONTIN) 600 MG tablet  guaiFENesin 400 MG TABS  ipratropium - albuterol 0.5 mg/2.5 mg/3 mL (DUONEB) 0.5-2.5 (3) MG/3ML neb solution  levothyroxine (SYNTHROID/LEVOTHROID) 88 MCG tablet  LORazepam (ATIVAN) 0.5 MG tablet  melatonin 3 MG tablet  nystatin (NYSTOP) 957579 UNIT/GM external powder  polyethylene glycol (MIRALAX) 17 g packet  potassium chloride stuart ER (KLOR-CON M20) 20 MEQ CR tablet  triamcinolone (KENALOG) 0.1 % external cream  zinc oxide (DESITIN) 40 % external ointment      Surgical History   Past Surgical History:   Procedure Laterality Date    APPENDECTOMY      ARTHROPLASTY HIP Left 07/06/2015    Procedure: ARTHROPLASTY HIP;  Surgeon: Junior Giordano MD;  Location: [facility] OR    ARTHROPLASTY HIP Right 11/02/2016    Procedure: ARTHROPLASTY HIP;  Surgeon: Junior Giordano MD;  Location: [facility] OR    ARTHROPLASTY KNEE Bilateral     ARTHROPLASTY REVISION HIP Left 07/22/2015    Procedure: ARTHROPLASTY REVISION HIP;  Surgeon: Junior Giordano MD;  Location: [facility] OR    CATARACT IOL, RT/LT Bilateral     COLONOSCOPY  09/2001    tubular adenoma; repeat 3 years.    COLONOSCOPY  09/2004    normal; repeat 5 years ([facility])    HYSTERECTOMY, MARGAUX  summer 2004    and BSO    MASTECTOMY Left     Melanoma excision with sentinel node biopsy      OPEN REDUCTION INTERNAL FIXATION RODDING INTRAMEDULLARY FEMUR Right 02/26/2023    Procedure: OPEN REDUCTION INTERNAL FIXATION RIGHT DISTAL FEMUR WITH RETROGRADE NAIL, LATERAL PLATE, AND CABLE;  Surgeon: Newton Lemon MD;  Location: [facility] OR    ORIF ankle status post removal of hardware Right     Tonsillectomy       Physical Exam     Patient Vitals for the past 24 hrs:   BP Pulse Resp SpO2 Height Weight   05/27/25 0530 115/73 108 13 96 % -- --   05/27/25 0357 121/36 108 30 95 % -- --   05/27/25 0310 -- 109 22 94 % -- --  "  05/27/25 0309 -- 109 25 94 % -- --   05/27/25 0308 -- 109 22 94 % -- --   05/27/25 0307 -- 109 24 94 % -- --   05/27/25 0304 111/43 111 -- -- -- --   05/27/25 0234 121/58 112 28 92 % 1.6 m (5' 3\") 118.4 kg (261 lb)     Physical Exam  Nursing note and vitals reviewed.  Constitutional: Cooperative.   HENT:   Mouth/Throat: Mucous membranes are slightly dry  Cardiovascular: Tachycardic, regular rhythm and normal heart sounds.  Soft systolic murmur.  Pulmonary/Chest: Effort normal and breath sounds normal. No respiratory distress. No wheezes. No rales.   Abdominal: Soft. Normal appearance. No distension. There is no tenderness. There is no rigidity and no guarding.   Musculoskeletal: 1-2+ lower extremity edema bilaterally  Neurological: Alert.  Oriented x 3  Skin: Erythema and warmth to bilateral lower ankles, greater on the right than left. Skin is warm and dry. No rash noted.   Psychiatric: Normal mood and affect.     Diagnostics     Lab Results   Labs Ordered and Resulted from Time of ED Arrival to Time of ED Departure   INFLUENZA A/B, RSV AND SARS-COV2 PCR - Abnormal       Result Value    Influenza A PCR Negative      Influenza B PCR Negative      RSV PCR Negative      SARS CoV2 PCR Positive (*)    CRP INFLAMMATION - Abnormal    CRP Inflammation 8.84 (*)    COMPREHENSIVE METABOLIC PANEL - Abnormal    Sodium 139      Potassium 4.0      Carbon Dioxide (CO2) 25      Anion Gap 13      Urea Nitrogen 14.9      Creatinine 0.79      GFR Estimate 71      Calcium 9.0      Chloride 101      Glucose 122 (*)     Alkaline Phosphatase 150      AST 81 (*)     ALT 25      Protein Total 6.9      Albumin 3.3 (*)     Bilirubin Total 1.1     LACTIC ACID WHOLE BLOOD WITH 1X REPEAT IN 2 HR WHEN >2 - Abnormal    Lactic Acid, Initial 2.3 (*)    TROPONIN T, HIGH SENSITIVITY - Abnormal    Troponin T, High Sensitivity 27 (*)    BLOOD GAS VENOUS - Abnormal    pH Venous 7.37      pCO2 Venous 55 (*)     pO2 Venous 48 (*)     Bicarbonate Venous " 31 (*)     Base Excess/Deficit Venous 4.8 (*)     FIO2 5      Oxyhemoglobin Venous 80 (*)     O2 Sat, Venous 82.1 (*)    CBC WITH PLATELETS AND DIFFERENTIAL - Abnormal    WBC Count 5.3      RBC Count 3.66 (*)     Hemoglobin 11.2 (*)     Hematocrit 35.4      MCV 97      MCH 30.6      MCHC 31.6      RDW 15.6 (*)     Platelet Count 176      % Neutrophils 70      % Lymphocytes 13      % Monocytes 12      % Eosinophils 4      % Basophils 1      % Immature Granulocytes 0      NRBCs per 100 WBC 0      Absolute Neutrophils 3.8      Absolute Lymphocytes 0.7 (*)     Absolute Monocytes 0.6      Absolute Eosinophils 0.2      Absolute Basophils 0.0      Absolute Immature Granulocytes 0.0      Absolute NRBCs 0.0     PROCALCITONIN - Normal    Procalcitonin 0.16     MAGNESIUM - Normal    Magnesium 2.2     NT-PROBNP - Normal    NT-proBNP 135     BLOOD CULTURE   BLOOD CULTURE     Imaging   Chest XR,  PA & LAT   Final Result   IMPRESSION: The heart is at the upper limits of normal. There is central pulmonary venous congestion increased patchy interstitial opacities are seen in the left lower lobe posteriorly which may reflect developing infectious process.        EKG   ECG results from 05/27/25   EKG 12-lead, tracing only     Value    Systolic Blood Pressure     Diastolic Blood Pressure     Ventricular Rate 110    Atrial Rate 110    OR Interval 200    QRS Duration 80        QTc 441    P Axis 56    R AXIS -8    T Axis 36    Interpretation ECG      Sinus tachycardia  Low voltage QRS  Inferior infarct (cited on or before 15-Mar-2024)     Independent Interpretation   CXR: No pneumothorax, infiltrate, or pleural effusion.    ED Course      Medications Administered   Medications   ipratropium - albuterol 0.5 mg/2.5 mg/3 mL (DUONEB) neb solution 3 mL (3 mLs Nebulization $Given 5/27/25 0804)   dexAMETHasone PF (DECADRON) injection 6 mg (6 mg Intravenous $Given 5/27/25 0842)   piperacillin-tazobactam (ZOSYN) 4.5 g vial to attach to NS  100 mL bag (0 g Intravenous Stopped 5/27/25 0527)     Discussion of Management   Admitting Hospitalist, Dr. Gifford    ED Course   ED Course as of 05/27/25 0536   Tue May 27, 2025   0340 I obtained history and examined the patient as noted above.      0522 Spoke with admitting hospitalist, Dr. Gifford, regarding the patient. He accepts patient for admission.       Additional Documentation  None    Medical Decision Making / Diagnosis     MDM   Carol Merida is a 89 year old female who presents with cough and increasing oxygen requirement in the setting of a confirmed COVID infection.  She is vaccinated.  Chest x-ray overall reassuring.  On initial exam there was erythema on her lower extremities which after talking to a provider who is cared for her in the past sounds like it is more chronic.  I did dose antibiotics to cover for possible cellulitis but this seems less likely especially with reassuring inflammatory markers and an alternative infectious source.  She will be admitted to the hospitalist service at this time in stable condition for management of her COVID infection    Disposition   The patient was admitted to the hospital.     Diagnosis     ICD-10-CM    1. COVID-19 virus infection  U07.1       2. Hypoxia  R09.02       3. COPD exacerbation (H)  J44.1       4. Cellulitis of lower extremity, unspecified laterality  L03.119          Scribe Disclosure:  I, Patrick Osorio, am serving as a scribe at 3:36 AM on 5/27/2025 to document services personally performed by Hilario Hull MD based on my observations and the provider's statements to me.        Hilario Hull MD  05/27/25 3617

## 2025-05-27 NOTE — H&P
[facility]  Hospitalist Admission Note  Name: Carol Merida    MRN: [id number]  YOB: 1935    Age: 89 year old  Date of admission: 5/27/2025  Primary care provider: Ruben Humphrey    Chief Complaint: Cough, shortness of breath    Assessment and Plan:   Acute hypoxemic respiratory failure  COVID-19 pneumonia  Acute reactive airway disease  Chronic cough  JEAN: Presenting with 3 days of significant increased cough from baseline chronic cough, shortness of breath, and wheezing.  She says she does not wear any oxygen at home, but does use CPAP at night which she has not done for the last 2 nights due to coughing.  Has had some myalgias and fatigue along with generalized weakness.  No fever, vomiting, diarrhea, dysuria, chest pain.  Has chronic lower extremity edema with venous stasis changes that she feels is at baseline.  Tachycardic in the ER and hypoxic requiring 5 L O2 with tachypnea rates in the 30s.  WBC count 5.3 with decrease in lymphocytes, hemoglobin 11.2, and VBG shows pH 7.37, [id number], , bicarb 31.  Her lactic acid is slightly elevated 2.3, but should be  received a DuoNeb en route.  Troponin T minimally elevated at 27 that is still 27 on recheck and EKG without ischemic change today suspect this is demand ischemia secondary to hypoxia and respiratory failure.  Her chest x-ray shows patchy interstitial opacities left lower lobe and some central pulmonary vascular congestion.  Her COVID-19 PCR is positive which explains her presenting symptoms.  She does use Wixela inhaler at baseline and DuoNebs although I do not see any formal diagnosis of COPD or asthma.  She was wheezing initially so I do suspect a acute reactive airway disease and she will benefit from steroids and nebulizer treatments.  -Start IV Remdesevir, patient agreeable, follow creatinine LFTs  -Received 6 mg IV dexamethasone, will use methylprednisolone 40 mg twice daily initially and then prednisone were improving  for reactive airway disease exacerbation  -Breo Ellipta inhaler substituted for formulary  -Resume PTA Mucinex twice daily and add Tessalon Perles as needed  -CPAP at bedtime  -Continuous pulse ox and supplemental oxygen as needed, wean as able    Chronic diastolic CHF  Chronic lymphedema  History of recurrent right lower extremity cellulitis: She has chronic significant lymphedema and chronic venous stasis changes of both legs with slightly blanchable erythema from ankle to just below the knee bilaterally that are usually warm to the touch.  Receives multiple antibiotic courses,  including a 10-day course of Augmentin last month.  I believe she does lymphedema wraps.  Based on exam I do not think there is active infection here and that what we are seeing is chronic venous stasis changes.  Will not continue the IV Zosyn was started in the ER.  Her CRP is 8 which is lower than it has been in the past, procalcitonin normal at 0.16, and her white blood cell count is normal with lymphopenia from her COVID-19 infection.  Lactic acid elevated secondary to nebulizer treatment and route.  She is on torsemide 20 mg daily now and not furosemide.  Her NT-proBNP is not elevated although she is obese which can affect accuracy of this number.  Chest x-ray comments on pulmonary vascular congestion so we will continue her oral diuretic although I believe her respiratory failure is secondary to your COVID-19 infection.  -Resume PTA torsemide 20 mg daily and 20 meq potassium daily, if unable to wean from oxygen changed to IV diuresis  -Lymphedema consult  -Elevate legs  -Strict intake and output and daily weights  -2 g sodium restriction and 1800 mL fluid restriction diet    Chronic pain  Osteoarthritis  Neuropathy: Reports diffuse pain although this is chronic for her.  History of bilateral TKA, right HUGO, and right femur IM nail.  Resume PTA gabapentin 400 mg 3 times daily, acetaminophen 3 times daily, and celecoxib 100 mg twice  daily as needed.    Gout: Resume allopurinol 100 mg daily.    Morbid obesity: BMI 46 although some of this is fluid weight.  Certainly complicates care limiting her mobility and increasing nursing cares.  Has been Ozempic weekly for the last 5 weeks.  - Hold Ozempic while inpatient    Hypothyroidism: Resume PTA levothyroxine 88 mcg daily.    MDD  Anxiety  Panic attacks: Resume PTA duloxetine 60 mg daily and lorazepam 0.5 mg every 6 hours as needed.    History sacral pressure ulcers: Nursing skin check on admission and if wounds present will need a WOC RN consult.    DVT Prophylaxis: Enoxaparin (Lovenox) SQ  Code Status: No CPR in event of cardiac arrest, prearrest intubation okay for reversible causes.  I discussed this with the patient admission which is a similar conversation that I had with her 1 year ago during admission when she also requested a natural death and no CPR in event of cardiac arrest.  At that time I reviewed this with her daughter Alea who agreed with this.  Her facility needs to be updated with this information and I recommend a new POLST form be filled out as the current one she has is from 2023 and says full code.  FEN: 2 g sodium restriction and 1800 mL fluid restriction diet  Discharge Dispo: Back to previous care facility, consult PT for weakness and social work  Estimated Disch Date / # of Days until Disch: Admit inpatient for respiratory failure secondary to COVID-19 infection.  Anticipate 2-3 night hospitalization.      History of Present Illness:  Carol Merida is a 89 year old female with PMH including morbid obesity, diastolic CHF, JEAN on CPAP, reactive airway disease, MDD, anxiety, panic attacks, hypothyroidism, lymphedema, recurrent right lower extremity cellulitis, chronic cough, chronic pain, neuropathy, osteoarthritis, sacral pressure ulcers, and multiple joint replacement surgeries who presents with cough and shortness of breath.  She has felt unwell for the last 3 days.  She  has a chronic cough that is minimally productive of sputum that is significantly more frequent the last 3 days to the point where she could not wear her CPAP for last 2 nights.  She has had increasing shortness of breath at rest and has been notably more wheezing.  Although she is a lifelong non-smoker and does not have formal diagnosis of COPD or asthma that I can see she does find benefit from her Wixela inhaler and DuoNebs at home.  She received a DuoNeb en route and that helped with her wheezing and shortness of breath.  She has had increased generalized weakness and fatigue the last 3 days.  She has chronic diffuse pain although this is baseline for her.  She has significant chronic lower extreme edema with redness bilaterally of her shins which she feels at her baseline.  She had a 10-day course of Augmentin 1 month ago.  She denies any fevers, chills, nausea, vomiting, diarrhea, dysuria, urinary frequency.  She confirmed DNR status with me in event of cardiac arrest.    History obtained from patient, medical record, and from Dr. Hull in the emergency department.  Blood pressure 121/36, tachycardic at 108, no temperature recorded, respiratory rate in the 30s, oxygen in the mid 90s on 5 L O2.  Initial labs showed WBC 5.3 with decreased lymphocytes, hemoglobin 11.2, platelet count 176.  BMP is within normal limits.  Albumin 3.3 and AST 81 otherwise LFTs normal.  CRP minimally elevated 8 lower than previous levels.  Troponin T is 27 with repeat also 27.  NT-proBNP not elevated at 135.  Procalcitonin not elevated at 0.16.  Glucose is 122.  VBG shows pH 7.37, pCO2 55, pO2 48, and bicarb 31.  EKG shows sinus tachycardia with no ST elevation or depression.  Chest x-ray shows central pulmonary congestion and patchy interstitial opacities in the left lower lobe.  Her COVID-19 PCR is positive which explains her symptoms.  Prior to COVID-19 result she did receive a dose of IV Zosyn in case of cellulitis.  She is also  "received a DuoNeb and 6 mg IV dexamethasone.  Admit inpatient for respiratory failure secondary to COVID-19 infection.       Clinically Significant Risk Factors Present on Admission               # Hypoalbuminemia: Lowest albumin = 3.3 g/dL at 5/27/2025  2:44 AM, will monitor as appropriate     # Hypertension: Noted on problem list    # Chronic heart failure with preserved ejection fraction: heart failure noted on problem list and last echo with EF >50%         # DMII: A1C = 6.6 % (Ref range: <5.7 %) within past 6 months    # Morbid Obesity: Estimated body mass index is 46.23 kg/m  as calculated from the following:    Height as of this encounter: 1.6 m (5' 3\").    Weight as of this encounter: 118.4 kg (261 lb).                        Past Medical History reviewed:  Past Medical History:   Diagnosis Date    Anemia     Anxiety     Breast cancer 2005    infltrating ductal    Chronic joint pain     Joint pain for many years.    Congestive heart failure     COPD (chronic obstructive pulmonary disease)     Depressive disorder     Hypertension     Hypothyroidism     Lumbar spinal stenosis     Melanoma     Mixed hyperlipidemia     Obstructive sleep apnea 08/25/2006    CPAP     Osteoarthritis     Osteoporosis     Tubular adenoma in colon 09/2001    colonoscopy due 2004     Past Surgical History reviewed:  Past Surgical History:   Procedure Laterality Date    APPENDECTOMY      ARTHROPLASTY HIP Left 07/06/2015    Procedure: ARTHROPLASTY HIP;  Surgeon: Junior Giordano MD;  Location: [facility] OR    ARTHROPLASTY HIP Right 11/02/2016    Procedure: ARTHROPLASTY HIP;  Surgeon: Junior Giordano MD;  Location: [facility] OR    ARTHROPLASTY KNEE Bilateral     ARTHROPLASTY REVISION HIP Left 07/22/2015    Procedure: ARTHROPLASTY REVISION HIP;  Surgeon: Junior Giordano MD;  Location: [facility] OR    CATARACT IOL, RT/LT Bilateral     COLONOSCOPY  09/2001    tubular adenoma; repeat 3 years.    COLONOSCOPY  09/2004    normal; repeat 5 years " ([facility])    HYSTERECTOMY, MARGAUX  summer 2004    and BSO    MASTECTOMY Left     Melanoma excision with sentinel node biopsy      OPEN REDUCTION INTERNAL FIXATION RODDING INTRAMEDULLARY FEMUR Right 02/26/2023    Procedure: OPEN REDUCTION INTERNAL FIXATION RIGHT DISTAL FEMUR WITH RETROGRADE NAIL, LATERAL PLATE, AND CABLE;  Surgeon: Newton Lemon MD;  Location: [facility] OR    ORIF ankle status post removal of hardware Right     Tonsillectomy       Social History reviewed:  Social History     Tobacco Use    Smoking status: Never    Smokeless tobacco: Never   Substance Use Topics    Alcohol use: Yes     Alcohol/week: 0.0 - 0.8 standard drinks of alcohol     Comment: 1 glass wine weekly     Social History     Social History Narrative    Not on file     Family History reviewed:  Family History   Problem Relation Age of Onset    No Known Problems Brother         brain tumor related to shingles in his eye    Arthritis Mother     Hypertension Father     Cancer Father         lung    Cancer Grandchild         terratoma tumors    Breast Cancer Daughter      Allergies:  Allergies   Allergen Reactions    Ace Inhibitors Cough    Ceftriaxone Rash     Diffuse rash after IV ceftriaxone 1/20/25.  Previously tolerated other cephalosporins including recent course of keflex.     Medications:  Prior to Admission medications    Medication Sig Last Dose Taking? Auth Provider Long Term End Date   gabapentin (NEURONTIN) 400 MG capsule Take 400 mg by mouth 3 times daily.  Yes Reported, Patient Yes    OZEMPIC, 0.25 OR 0.5 MG/DOSE, 2 MG/3ML pen Inject 0.5 mg subcutaneously every 7 days.  Yes Reported, Patient     torsemide (DEMADEX) 20 MG tablet Take 20 mg by mouth daily.  Yes Reported, Patient Yes 4/16/26   albuterol (PROAIR HFA/PROVENTIL HFA/VENTOLIN HFA) 108 (90 Base) MCG/ACT inhaler INHALE 2 PUFFS INTO THE LUNGS EVERY 4 HOURS AS NEEDED FOR SHORTNESS OF BREATH OR DIFFICULT BREATHING OR WHEEZING   Stella Cutler MD Yes    allopurinol (ZYLOPRIM)  100 MG tablet Take 100 mg by mouth daily START 7/10   Reported, Patient     ARTIFICIAL SALIVA MT Take 1 strip by mouth 2 times daily After nebs   Unknown, Entered By History     calcium carbonate 600 mg-vitamin D 400 units (CALTRATE) 600-400 MG-UNIT per tablet Take 1 tablet by mouth every evening    Yessica Lora MD     carbamide peroxide (DEBROX) 6.5 % otic solution Place 3 drops into both ears twice a week. Mon & Thur   Unknown, Entered By History     celecoxib (CELEBREX) 100 MG capsule Take 100 mg by mouth 2 times daily as needed for moderate pain (4-6)   Unknown, Entered By History No    cycloSPORINE (CYCLOSPORINE IN KLARITY) 0.1 % EMUL Place 1 drop into both eyes 2 times daily 0630 and 2145   Unknown, Entered By History     diclofenac (VOLTAREN) 1 % topical gel Apply 2 g topically 2 times daily. To right side of neck   Unknown, Entered By History     DULoxetine (CYMBALTA) 60 MG capsule Take 60 mg by mouth daily   Reported, Patient Yes    estradiol (ESTRACE) 0.1 MG/GM vaginal cream Place vaginally every 3 days. Sig: 3 times weekly, actually being given every 3 days.   Unknown, Entered By History     fluticasone (FLONASE) 50 MCG/ACT nasal spray Spray 1 spray into both nostrils every morning   Unknown, Entered By History     fluticasone-salmeterol (WIXELA INHUB) 100-50 MCG/ACT inhaler Inhale 1 puff into the lungs 2 times daily   Stella Cutler MD Yes    furosemide (LASIX) 40 MG tablet Take 1 tablet (40 mg) by mouth daily   Spring Wooten MD Yes    gabapentin (NEURONTIN) 300 MG capsule Take 1 capsule (300 mg) by mouth 2 times daily   Anabela Lanza APRN CNS Yes    gabapentin (NEURONTIN) 600 MG tablet Take 1 tablet (600 mg) by mouth At Bedtime   Anabela Lanza APRN CNS Yes    guaiFENesin 400 MG TABS Take 400 mg by mouth 2 times daily   Unknown, Entered By History     ipratropium - albuterol 0.5 mg/2.5 mg/3 mL (DUONEB) 0.5-2.5 (3) MG/3ML neb solution Take 1 vial by nebulization every 4 hours as needed for  "shortness of breath or wheezing.   Unknown, Entered By History     levothyroxine (SYNTHROID/LEVOTHROID) 88 MCG tablet Take 1 tablet (88 mcg) by mouth daily   Patrick Montano MD Yes    LORazepam (ATIVAN) 0.5 MG tablet Take 0.5 mg by mouth every 8 hours as needed for anxiety   Reported, Patient     melatonin 3 MG tablet Take 3 mg by mouth At Bedtime   Reported, Patient     nystatin (NYSTOP) 533213 UNIT/GM external powder Apply tid to affectead area manueln   Stella Cutler MD     polyethylene glycol (MIRALAX) 17 g packet Take 1 packet by mouth daily as needed for constipation   Unknown, Entered By History No    potassium chloride stuart ER (KLOR-CON M20) 20 MEQ CR tablet Take 40 mEq by mouth daily   Reported, Patient     triamcinolone (KENALOG) 0.1 % external cream Apply topically every evening   Unknown, Entered By History     zinc oxide (DESITIN) 40 % external ointment Apply topically as needed for dry skin or irritation (with toileting)   Unknown, Entered By History       Review of Systems:  A Comprehensive greater than 10 system review of systems was carried out.  Pertinent positives and negatives are noted above.  Otherwise negative.     Physical Exam:  Blood pressure 119/68, pulse 108, resp. rate 19, height 1.6 m (5' 3\"), weight 118.4 kg (261 lb), SpO2 96%, not currently breastfeeding.  Wt Readings from Last 1 Encounters:   05/27/25 118.4 kg (261 lb)     Exam:  Constitutional: Appears fatigued  Eyes: sclera white   HEENT: Dry mucous membranes  Respiratory: Mild tachypnea, slight diffuse expiratory wheeze, no focal crackles appreciated  Cardiovascular: Slight regular tachycardia without obvious murmur  GI: Obese, soft non-tender, not distended, bowel sounds present  Skin: Chronic pink venous stasis changes that are slightly blanchable and warm from bilateral ankles to just below the knee (baseline per patient)  Musculoskeletal/extremities: 1-2+ bilateral lower extremity edema to above the knees  Neurologic: Asleep " initially, but awoke easily.  Answering all symptom-based questions appropriately.  Psychiatric: calm currently    Lab and imaging data personally reviewed:  Labs:  Recent Labs   Lab 05/27/25  0438   PHV 7.37   PO2V 48*   PCO2V 55*   HCO3V 31*     Recent Labs   Lab 05/27/25  0244   WBC 5.3   HGB 11.2*   HCT 35.4   MCV 97        Recent Labs   Lab 05/27/25  0244      POTASSIUM 4.0   CHLORIDE 101   CO2 25   ANIONGAP 13   *   BUN 14.9   CR 0.79   GFRESTIMATED 71   LAKSHMI 9.0   MAG 2.2   PROTTOTAL 6.9   ALBUMIN 3.3*   BILITOTAL 1.1   ALKPHOS 150   AST 81*   ALT 25     Recent Labs   Lab 05/27/25  0244   NTBNP 135     Recent Labs   Lab 05/27/25  0438   LACT 2.3*     Troponin T 27 with repeat 27  Procalcitonin 0.16  CRP 8.8  COVID-19 PCR positive  Influenza A/B, RSV PCR negative    EKG: Sinus tachycardia, no ST elevation or depression    Imaging:  Recent Results (from the past 24 hours)   Chest XR,  PA & LAT    Narrative    EXAM: XR CHEST 2 VIEWS  LOCATION: [facility]  DATE: 5/27/2025    INDICATION: Hypoxia, Covid+  COMPARISON: None.      Impression    IMPRESSION: The heart is at the upper limits of normal. There is central pulmonary venous congestion increased patchy interstitial opacities are seen in the left lower lobe posteriorly which may reflect developing infectious process.       Ez Gifford MD  Hospitalist  [facility]

## 2025-05-27 NOTE — PLAN OF CARE
ROOM # ED 32    Living Situation : [facility]  Facility name: [facility]  : Jim ( Son)    Activity level at baseline: W/C bound   Activity level on admit:     Who will be transporting you at discharge: Will find a ride or needs help    Patient registered to observation; given Patient Bill of Rights; given the opportunity to ask questions about observation status and their plan of care.  Patient has been oriented to the observation room, bathroom and call light is in place.    Discussed discharge goals and expectations with patient/family.

## 2025-05-27 NOTE — PLAN OF CARE
"[id number]    Inpatient Progress Note:  A & O X 4. On special precaution for COVID-19. CMS intact.    /68   Pulse (!) 140   Temp 97.7  F (36.5  C)   Resp 27   Ht 1.6 m (5' 3\")   Wt 118.4 kg (261 lb)   LMP  (LMP Unknown)   SpO2 95%   BMI 46.23 kg/m       Problem: Adult Inpatient Plan of Care  Goal: Plan of Care Review  Description: The Plan of Care Review/Shift note should be completed every shift.  The Outcome Evaluation is a brief statement about your assessment that the patient is improving, declining, or no change.  This information will be displayed automatically on your shift  note.  Outcome: Progressing  Goal: Patient-Specific Goal (Individualized)  Description: You can add care plan individualizations to a care plan. Examples of Individualization might be:  \"Parent requests to be called daily at 9am for status\", \"I have a hard time hearing out of my right ear\", or \"Do not touch me to wake me up as it startles  me\".  Outcome: Progressing  Goal: Absence of Hospital-Acquired Illness or Injury  Outcome: Progressing  Goal: Optimal Comfort and Wellbeing  Outcome: Progressing  Goal: Readiness for Transition of Care  Outcome: Progressing     Problem: Delirium  Goal: Optimal Coping  Outcome: Progressing  Goal: Improved Behavioral Control  Outcome: Progressing  Goal: Improved Attention and Thought Clarity  Outcome: Progressing  Goal: Improved Sleep  Outcome: Progressing     Will continue to provide supportive cares.     Shavon Queen RN       "

## 2025-05-27 NOTE — PROVIDER NOTIFICATION
05/27/25 1704   RCAT Assessment   Reason for Assessment Other (see comments)  (neb orders)   Pulmonary Status 0   Surgical Status 0   Chest X-ray 2   Respiratory Pattern 0   Mental Status 1   Breath Sounds 4   Cough Effectiveness 0   Level of Activity 4   O2 Required for SpO2>=92% 1   Acuity Level (points) 12   Acuity Level  3   Re-eval Interval Guideline Every 3 days   Re-evaluation Date 05/30/25   $RT Consult Time Spent RCAT (30 minute increments) 1     Neb ordered QID and PRN, unable to perform IS and Aerobika at this time.  BS coarse w/intermittent wheezes.    Patient uses a CPAP @ night and will have family bring in for this evening.     Shana Hall, RT on 5/27/2025 at 5:08 PM

## 2025-05-27 NOTE — PROGRESS NOTES
05/27/25 1536   Appointment Info   Signing Clinician's Name / Credentials (PT) Sarthak Kidd DPT   Quick Adds   Quick Adds Edema Eval   Living Environment   Living Environment Comments Pt reports living in [facility] by self, no stairs to manage. Use of power scooter at baseline, performs pivot transfers with Ax1. Gets assistance with all ADLs per report.   Self-Care   Usual Activity Tolerance poor   Current Activity Tolerance poor   Equipment Currently Used at Home wheelchair, power   Fall history within last six months no   General Information   Onset of Illness/Injury or Date of Surgery 05/27/25   Referring Physician Ez Gifford MD   Patient/Family Therapy Goals Statement (PT) To return home, improve mobility   Pertinent History of Current Problem (include personal factors and/or comorbidities that impact the POC) Per H&P, pt is an 89 year old female with PMH including morbid obesity, diastolic CHF, JEAN on CPAP, reactive airway disease, MDD, anxiety, panic attacks, hypothyroidism, lymphedema, recurrent right lower extremity cellulitis, chronic cough, chronic pain, neuropathy, osteoarthritis, sacral pressure ulcers, and multiple joint replacement surgeries who presents with cough and shortness of breath.   Existing Precautions/Restrictions fall   Cognition   Affect/Mental Status (Cognition) anxious   Orientation Status (Cognition) oriented x 4   Follows Commands (Cognition) follows one-step commands;over 90% accuracy;repetition of directions required   Pain Assessment   Patient Currently in Pain No   Integumentary/Edema   Integumentary/Edema Comments 1 to 2+ B LE edema distal to knee, inc at dorsum B feet   Onset of Edema   (years)   Affected Body Part(s) Left LE;Right LE   Edema Etiology Unknown   General Comments/Previous Edema Treatment/Edema Equipment Reports getting assistance donning compression stockings from staff at [facility] daily   Edema Examination/Assessment   Skin Condition Pitting;Intact    Ulcerations No   Radial Pulse Symmetrical   Dorsal Pedal Pulse Symmetrical   Pitting Assessment 2+ B dorsum; 1+ from ankle to knee   Range of Motion (ROM)   ROM Comment B LE WFL   Strength (Manual Muscle Testing)   Strength Comments able to complete B SLR   Bed Mobility   Comment, (Bed Mobility) Nile supine to sit   Transfers   Comment, (Transfers) Nile HHA sit <> Stand   Gait/Stairs (Locomotion)   Comment, (Gait/Stairs) not attempted at evaluation   Balance   Balance Comments good sitting; fair standing with HHA   Clinical Impression   Criteria for Skilled Therapeutic Intervention Yes, treatment indicated   PT Diagnosis (PT) impaired functional mobility   Edema: Patient Presentation Stage 2 Lymphedema   Influenced by the following impairments impaired balance, decreased strength/activity tolerance   Functional limitations due to impairments impaired bed mobility, transfers   Clinical Presentation (PT Evaluation Complexity) stable   Clinical Presentation Rationale Clinical judgement   Clinical Decision Making (Complexity) low complexity   Planned Therapy Interventions (PT) balance training;bed mobility training;home exercise program;neuromuscular re-education;patient/family education;postural re-education;strengthening;transfer training;progressive activity/exercise   Edema: Planned Interventions Manual lymph drainage;Gradient compression bandaging;Fit for compression garment;Edema exercises;Education;Precautions to prevent infection/exacerbation;Manual therapy;ADL training   Risk & Benefits of therapy have been explained evaluation/treatment results reviewed;care plan/treatment goals reviewed;risks/benefits reviewed;current/potential barriers reviewed;participants voiced agreement with care plan;participants included;patient   PT Total Evaluation Time   PT Jaelal, Low Complexity Minutes (65018) 14   Physical Therapy Goals   PT Frequency Daily   PT Predicted Duration/Target Date for Goal Attainment 05/30/25   PT  Goals Bed Mobility;Transfers;Edema   PT: Bed Mobility Supervision/stand-by assist;Supine to/from sit   PT: Transfers Minimal assist;Bed to/from chair;Assistive device   PT: Edema education to increase ability to manage edema after discharge from the hospital signs/symptoms of intolerance;Skin care routine;wear schedule;limb positioning;garment/bandage care;discharge recommendations;Patient;Verbalize;Min assist   PT: Management of edema bandages Patient;Verbalize;Min assist;garment(s)   PT: Functional edema exercise program to reduce limb volume, increase activity tolerance and improve independence with ADL Patient;Demonstrates;[place];HEP   PT Discharge Planning   PT Plan progress transfers; reassess edema; monitor 02   PT Discharge Recommendation (DC Rec) home with assist;home with home care physical therapy   PT Rationale for DC Rec Pt currently presenting below baseline mobility, typicallly performs pivot transfers Ax1 at [facility] to motorized scooter. Currently able to stand with PT on evaluation. Anticipate pt to be able to return to facility, at this time recommend HHPT to progress activity tolerance.   PT Brief overview of current status michael baca for transfers   PT Total Distance Amb During Session (feet) 0   Physical Therapy Time and Intention   Timed Code Treatment Minutes 20   Total Session Time (sum of timed and untimed services) 34

## 2025-05-27 NOTE — CONSULTS
[facility]  WO Nurse Inpatient Assessment     Consulted for: buttocks    WO nurse follow-up plan: weekly    Patient History (according to provider note(s):      Carol Merida is a 89 year old female with PMH including morbid obesity, diastolic CHF, JEAN on CPAP, reactive airway disease, MDD, anxiety, panic attacks, hypothyroidism, lymphedema, recurrent right lower extremity cellulitis, chronic cough, chronic pain, neuropathy, osteoarthritis, sacral pressure ulcers, and multiple joint replacement surgeries who presents with cough and shortness of breath.     Assessment:      Areas visualized during today's visit: Focused:    Pressure Injury Location: Right buttock  Last photo: 5/27/25    Wound type: Pressure Injury and Shear     Pressure Injury Stage: 3, present on admission   Wound history/plan of care:   Patient reports wounds on buttocks have been present for years.  Left buttock is currently intact.  Wound base: 80 % Granulation tissue, 20 % Dry drainage     Palpation of the wound bed: normal      Drainage: small     Description of drainage: serosanguinous     Measurements (length x width x depth, in cm) 5  x 4  x  0.1 cm      Tunneling N/A     Undermining N/A  Periwound skin: Ecchymosis and Scar tissue      Color: pink and purple      Temperature: normal   Odor: none  Pain: mild  Pain intervention prior to dressing change: N/A  Treatment goal: Heal  and Protection  STATUS: initial assessment  Supplies ordered: at bedside    My PI Risk Assessment     Sensory Perception: 3 - Slightly Limited     Moisture: 2 - Very moist      Activity: 2 - Chairfast     Mobility: 2 - Very limited     Nutrition: 3 - Adequate     Friction/Shear: 1 - Problem     TOTAL: 13       Treatment Plan:     Buttock wound(s): Every 3 days and prn  Cleanse with wound cleanser and dry  Apply Mepilex 4x4 to each buttock    Pressure Injury Prevention (PIP) Plan:  If patient is declining pressure injury prevention  "interventions: Explore reason why and address patient's concerns, Educate on pressure injury risk and prevention intervention(s), If patient is still declining, document \"informed refusal\" , and Ensure Care team is aware ( provider, charge nurse, etc)  Mattress: Follow bed algorithm, add Low Air Loss (Air+) mattress pump if skin is very moist or constantly moist.   HOB: Maintain at or below 30 degrees, unless contraindicated  Repositioning in bed: Every 1-2 hours  and Left/right positioning; avoid supine  Heels: Pillows under calves  Chair positioning: Chair cushion (#[id number])    If patient has a buttock pressure injury, or high risk for PI use chair cushion or SPS.  Moisture Management: Avoid brief in bed  Under Devices: Inspect skin under all medical devices during skin inspection , Ensure tubes are stabilized without tension, and Ensure patient is not lying on medical devices or equipment when repositioned  Ask provider to discontinue device when no longer needed.       Orders: Written    RECOMMEND PRIMARY TEAM ORDER: None, at this time  Education provided: importance of repositioning, plan of care, and Off-loading pressure  Discussed plan of care with: Patient and Nurse  Notify WOC if wound(s) deteriorate.  Nursing to notify the Provider(s) and re-consult the WOC Nurse if new skin concern.    DATA:     Current support surface: Standard  Standard gel mattress (Isoflex)  Containment of urine/stool: Incontinence Protocol and Suction based external urinary catheter   BMI: Body mass index is 52.66 kg/m .   Active diet order: Orders Placed This Encounter      Combination Diet 2 gm NA Diet     Output: I/O last 3 completed shifts:  In: 250 [I.V.:250]  Out: 950 [Urine:950]     Labs:   Recent Labs   Lab 05/27/25  0244   ALBUMIN 3.3*   HGB 11.2*   WBC 5.3     Pressure injury risk assessment:   Sensory Perception: 3-->slightly limited  Moisture: 3-->occasionally moist  Activity: 2-->chairfast  Mobility: 2-->very " limited  Nutrition: 3-->adequate  Friction and Shear: 2-->potential problem  Keith Score: 15    Mike Orlando RN CWOCN  Contact Via [facility] Nurse ([place])  Dept. Office Number: [phone]

## 2025-05-27 NOTE — ED NOTES
[facility]  ED Nurse Handoff Report    ED Chief complaint: Shortness of Breath and Cough  . ED Diagnosis:   Final diagnoses:   COVID-19 virus infection   Hypoxia   COPD exacerbation (H)   Cellulitis of lower extremity, unspecified laterality       Allergies:   Allergies   Allergen Reactions    Ace Inhibitors Cough    Ceftriaxone Rash     Diffuse rash after IV ceftriaxone 1/20/25.  Previously tolerated other cephalosporins including recent course of keflex.       Code Status: Full Code    Activity level - Baseline/Home:  wheelchair.  Activity Level - Current:   in bed.   Lift room needed: No.   Bariatric: Yes   Needed: No   Isolation: Yes.   Infection: Not Applicable  COVID r/o and special precautions.     Respiratory status: Nasal cannula    Vital Signs (within 30 minutes):   Vitals:    05/27/25 0308 05/27/25 0309 05/27/25 0310 05/27/25 0357   BP:    (!) 121/36   Pulse: 109 109 109 108   Resp: 22 25 22 30   SpO2: 94% 94% 94% 95%   Weight:       Height:           Cardiac Rhythm:  ,   Cardiac  Cardiac Rhythm: Sinus tachycardia  Pain level:    Patient confused: No.   Patient Falls Risk: bed/chair alarm on, nonskid shoes/slippers when out of bed, arm band in place, patient and family education, assistive device/personal items within reach, activity supervised, mobility aid in reach, room door open, and toileting schedule implemented.   Elimination Status: has not had urge     Patient Report - Initial Complaint: SOB/cough.   Focused Assessment: Pt from [facility]. Cough/SOB x3 days, COVID (+) upon our testing. Pt is on 5L O2 NC, baseline is RA. Patient is wheelchair bound at baseline, bed bound so far in the ED. Pt wheezing upon arrival, duo neb w/improvement. Pt is A/Ox4, PERRLA, ABCs intact, no c/o chest pain. Skin is thin but intact, have not viewed coccyx. Pleasant and cooperative.      Abnormal Results:   Labs Ordered and Resulted from Time of ED Arrival to Time  of ED Departure   INFLUENZA A/B, RSV AND SARS-COV2 PCR - Abnormal       Result Value    Influenza A PCR Negative      Influenza B PCR Negative      RSV PCR Negative      SARS CoV2 PCR Positive (*)    CRP INFLAMMATION - Abnormal    CRP Inflammation 8.84 (*)    COMPREHENSIVE METABOLIC PANEL - Abnormal    Sodium 139      Potassium 4.0      Carbon Dioxide (CO2) 25      Anion Gap 13      Urea Nitrogen 14.9      Creatinine 0.79      GFR Estimate 71      Calcium 9.0      Chloride 101      Glucose 122 (*)     Alkaline Phosphatase 150      AST 81 (*)     ALT 25      Protein Total 6.9      Albumin 3.3 (*)     Bilirubin Total 1.1     LACTIC ACID WHOLE BLOOD WITH 1X REPEAT IN 2 HR WHEN >2 - Abnormal    Lactic Acid, Initial 2.3 (*)    TROPONIN T, HIGH SENSITIVITY - Abnormal    Troponin T, High Sensitivity 27 (*)    BLOOD GAS VENOUS - Abnormal    pH Venous 7.37      pCO2 Venous 55 (*)     pO2 Venous 48 (*)     Bicarbonate Venous 31 (*)     Base Excess/Deficit Venous 4.8 (*)     FIO2 5      Oxyhemoglobin Venous 80 (*)     O2 Sat, Venous 82.1 (*)    CBC WITH PLATELETS AND DIFFERENTIAL - Abnormal    WBC Count 5.3      RBC Count 3.66 (*)     Hemoglobin 11.2 (*)     Hematocrit 35.4      MCV 97      MCH 30.6      MCHC 31.6      RDW 15.6 (*)     Platelet Count 176      % Neutrophils 70      % Lymphocytes 13      % Monocytes 12      % Eosinophils 4      % Basophils 1      % Immature Granulocytes 0      NRBCs per 100 WBC 0      Absolute Neutrophils 3.8      Absolute Lymphocytes 0.7 (*)     Absolute Monocytes 0.6      Absolute Eosinophils 0.2      Absolute Basophils 0.0      Absolute Immature Granulocytes 0.0      Absolute NRBCs 0.0     PROCALCITONIN - Normal    Procalcitonin 0.16     MAGNESIUM - Normal    Magnesium 2.2     NT-PROBNP - Normal    NT-proBNP 135     TROPONIN T, HIGH SENSITIVITY   BLOOD CULTURE   BLOOD CULTURE        Chest XR,  PA & LAT   Final Result   IMPRESSION: The heart is at the upper limits of normal. There is central  pulmonary venous congestion increased patchy interstitial opacities are seen in the left lower lobe posteriorly which may reflect developing infectious process.          Treatments provided: oxygen, IV abx  Family Comments: not at bedside  OBS brochure/video discussed/provided to patient:  N/A  ED Medications:   Medications   ipratropium - albuterol 0.5 mg/2.5 mg/3 mL (DUONEB) neb solution 3 mL (3 mLs Nebulization $Given 5/27/25 2564)   dexAMETHasone PF (DECADRON) injection 6 mg (6 mg Intravenous $Given 5/27/25 0402)   piperacillin-tazobactam (ZOSYN) 4.5 g vial to attach to  mL bag (0 g Intravenous Stopped 5/27/25 3846)       Drips infusing:  No  For the majority of the shift this patient was Green.   Interventions performed were n/a.    Sepsis treatment initiated: Yes    Per the ED Provider, Time Zero for severe sepsis or septic shock is:  0438    3 Hour Severe Sepsis Bundle Completion:  1. Initial Lactic Acid Result:   Recent Labs   Lab Test 05/27/25  0438 01/19/25  1201 06/20/24  0804   LACT 2.3* 2.0 1.5     2. Blood Cultures before Antibiotics: Yes  3. Broad Spectrum Antibiotics Administered:     Anti-infectives (From now, onward)      None          4. 250 ml of IV fluids have been given so far      6 Hour Severe Sepsis Bundle Completion:    1. Repeat Lactic Acid Level: Not drawn  2. Patient currently on Vasopressors =  No    Cares/treatment/interventions/medications to be completed following ED care: see provider orders    ED Nurse Name: Yaneth Foster RN  5:27 AM

## 2025-05-27 NOTE — ED TRIAGE NOTES
Pt BIBA from [facility] with c/o cough x3 days and SOB. Pt received a duo neb en route from EMS which patient states improved her breathing and cough. Pt on 2L O2 NC, baseline is RA. States she wears a CPAP at night but hasn't x2 nights s/t cough. Pt states Hx CHF.      Triage Assessment (Adult)       Row Name 05/27/25 0233          Triage Assessment    Airway WDL WDL        Respiratory WDL    Respiratory WDL X;all;cough     Rhythm/Pattern, Respiratory shortness of breath     Mucous Membranes intact     Cough Frequency frequent     Cough Type productive        Skin Circulation/Temperature WDL    Skin Circulation/Temperature WDL X        Cardiac WDL    Cardiac WDL WDL     Cardiac Rhythm ST        Peripheral/Neurovascular WDL    Peripheral Neurovascular WDL X        Cognitive/Neuro/Behavioral WDL    Cognitive/Neuro/Behavioral WDL WDL

## 2025-05-28 ENCOUNTER — APPOINTMENT (OUTPATIENT)
Dept: PHYSICAL THERAPY | Facility: CLINIC | Age: 89
DRG: 177 | End: 2025-05-28
Payer: COMMERCIAL

## 2025-05-28 LAB
ALBUMIN SERPL BCG-MCNC: 3.1 G/DL (ref 3.5–5.2)
ALP SERPL-CCNC: 146 U/L (ref 40–150)
ALT SERPL W P-5'-P-CCNC: 23 U/L (ref 0–50)
ANION GAP SERPL CALCULATED.3IONS-SCNC: 14 MMOL/L (ref 7–15)
AST SERPL W P-5'-P-CCNC: 73 U/L (ref 0–45)
BILIRUB SERPL-MCNC: 0.6 MG/DL
BUN SERPL-MCNC: 19 MG/DL (ref 8–23)
CALCIUM SERPL-MCNC: 9.1 MG/DL (ref 8.8–10.4)
CHLORIDE SERPL-SCNC: 96 MMOL/L (ref 98–107)
CREAT SERPL-MCNC: 0.68 MG/DL (ref 0.51–0.95)
EGFRCR SERPLBLD CKD-EPI 2021: 83 ML/MIN/1.73M2
ERYTHROCYTE [DISTWIDTH] IN BLOOD BY AUTOMATED COUNT: 15.4 % (ref 10–15)
GLUCOSE SERPL-MCNC: 196 MG/DL (ref 70–99)
HCO3 SERPL-SCNC: 26 MMOL/L (ref 22–29)
HCT VFR BLD AUTO: 38.5 % (ref 35–47)
HGB BLD-MCNC: 12.1 G/DL (ref 11.7–15.7)
MAGNESIUM SERPL-MCNC: 2 MG/DL (ref 1.7–2.3)
MCH RBC QN AUTO: 30.3 PG (ref 26.5–33)
MCHC RBC AUTO-ENTMCNC: 31.4 G/DL (ref 31.5–36.5)
MCV RBC AUTO: 96 FL (ref 78–100)
PLATELET # BLD AUTO: 199 10E3/UL (ref 150–450)
POTASSIUM SERPL-SCNC: 4.1 MMOL/L (ref 3.4–5.3)
PROT SERPL-MCNC: 7.2 G/DL (ref 6.4–8.3)
RBC # BLD AUTO: 4 10E6/UL (ref 3.8–5.2)
SODIUM SERPL-SCNC: 136 MMOL/L (ref 135–145)
WBC # BLD AUTO: 5.1 10E3/UL (ref 4–11)

## 2025-05-28 PROCEDURE — 82040 ASSAY OF SERUM ALBUMIN: CPT | Performed by: INTERNAL MEDICINE

## 2025-05-28 PROCEDURE — 250N000013 HC RX MED GY IP 250 OP 250 PS 637: Performed by: INTERNAL MEDICINE

## 2025-05-28 PROCEDURE — 83735 ASSAY OF MAGNESIUM: CPT | Performed by: INTERNAL MEDICINE

## 2025-05-28 PROCEDURE — 94640 AIRWAY INHALATION TREATMENT: CPT | Mod: 76

## 2025-05-28 PROCEDURE — 85041 AUTOMATED RBC COUNT: CPT | Performed by: INTERNAL MEDICINE

## 2025-05-28 PROCEDURE — 94640 AIRWAY INHALATION TREATMENT: CPT

## 2025-05-28 PROCEDURE — 97530 THERAPEUTIC ACTIVITIES: CPT | Mod: GP | Performed by: PHYSICAL THERAPIST

## 2025-05-28 PROCEDURE — 250N000011 HC RX IP 250 OP 636: Performed by: INTERNAL MEDICINE

## 2025-05-28 PROCEDURE — 250N000009 HC RX 250: Performed by: INTERNAL MEDICINE

## 2025-05-28 PROCEDURE — 120N000001 HC R&B MED SURG/OB

## 2025-05-28 PROCEDURE — 258N000003 HC RX IP 258 OP 636: Performed by: INTERNAL MEDICINE

## 2025-05-28 PROCEDURE — 999N000157 HC STATISTIC RCP TIME EA 10 MIN

## 2025-05-28 PROCEDURE — 99233 SBSQ HOSP IP/OBS HIGH 50: CPT | Performed by: HOSPITALIST

## 2025-05-28 PROCEDURE — 36415 COLL VENOUS BLD VENIPUNCTURE: CPT | Performed by: INTERNAL MEDICINE

## 2025-05-28 PROCEDURE — 94660 CPAP INITIATION&MGMT: CPT

## 2025-05-28 RX ORDER — GLIPIZIDE 10 MG/1
1 TABLET ORAL 3 TIMES DAILY
Status: DISCONTINUED | OUTPATIENT
Start: 2025-05-28 | End: 2025-05-31 | Stop reason: HOSPADM

## 2025-05-28 RX ORDER — DIPHENHYDRAMINE HCL 25 MG
25 CAPSULE ORAL EVERY 6 HOURS PRN
Status: DISCONTINUED | OUTPATIENT
Start: 2025-05-28 | End: 2025-05-31 | Stop reason: HOSPADM

## 2025-05-28 RX ADMIN — LORAZEPAM 0.5 MG: 0.5 TABLET ORAL at 04:36

## 2025-05-28 RX ADMIN — DEXTRAN 70, GLYCERIN, HYPROMELLOSE 1 DROP: 1; 2; 3 SOLUTION/ DROPS OPHTHALMIC at 13:47

## 2025-05-28 RX ADMIN — GUAIFENESIN 600 MG: 600 TABLET, EXTENDED RELEASE ORAL at 08:09

## 2025-05-28 RX ADMIN — GABAPENTIN 400 MG: 400 CAPSULE ORAL at 13:47

## 2025-05-28 RX ADMIN — IPRATROPIUM BROMIDE AND ALBUTEROL SULFATE 3 ML: .5; 3 SOLUTION RESPIRATORY (INHALATION) at 15:32

## 2025-05-28 RX ADMIN — REMDESIVIR 100 MG: 100 INJECTION, POWDER, LYOPHILIZED, FOR SOLUTION INTRAVENOUS at 06:14

## 2025-05-28 RX ADMIN — DEXTRAN 70, GLYCERIN, HYPROMELLOSE 1 DROP: 1; 2; 3 SOLUTION/ DROPS OPHTHALMIC at 08:33

## 2025-05-28 RX ADMIN — POTASSIUM CHLORIDE 40 MEQ: 1500 TABLET, EXTENDED RELEASE ORAL at 08:09

## 2025-05-28 RX ADMIN — FLUTICASONE FUROATE AND VILANTEROL TRIFENATATE 1 PUFF: 100; 25 POWDER RESPIRATORY (INHALATION) at 07:27

## 2025-05-28 RX ADMIN — IPRATROPIUM BROMIDE AND ALBUTEROL SULFATE 3 ML: .5; 3 SOLUTION RESPIRATORY (INHALATION) at 07:21

## 2025-05-28 RX ADMIN — MINERAL OIL, WHITE PETROLATUM: .03; .94 OINTMENT OPHTHALMIC at 21:10

## 2025-05-28 RX ADMIN — DEXTRAN 70, GLYCERIN, HYPROMELLOSE 1 DROP: 1; 2; 3 SOLUTION/ DROPS OPHTHALMIC at 21:10

## 2025-05-28 RX ADMIN — DULOXETINE 60 MG: 60 CAPSULE, DELAYED RELEASE ORAL at 08:09

## 2025-05-28 RX ADMIN — ENOXAPARIN SODIUM 40 MG: 40 INJECTION SUBCUTANEOUS at 08:10

## 2025-05-28 RX ADMIN — ESTRADIOL 2 G: 0.1 CREAM VAGINAL at 08:32

## 2025-05-28 RX ADMIN — IPRATROPIUM BROMIDE AND ALBUTEROL SULFATE 3 ML: .5; 3 SOLUTION RESPIRATORY (INHALATION) at 20:06

## 2025-05-28 RX ADMIN — ACETAMINOPHEN 975 MG: 325 TABLET, FILM COATED ORAL at 13:47

## 2025-05-28 RX ADMIN — METHYLPREDNISOLONE SODIUM SUCCINATE 40 MG: 40 INJECTION, POWDER, FOR SOLUTION INTRAMUSCULAR; INTRAVENOUS at 21:09

## 2025-05-28 RX ADMIN — METHYLPREDNISOLONE SODIUM SUCCINATE 40 MG: 40 INJECTION, POWDER, FOR SOLUTION INTRAMUSCULAR; INTRAVENOUS at 08:10

## 2025-05-28 RX ADMIN — ACETAMINOPHEN 975 MG: 325 TABLET, FILM COATED ORAL at 21:09

## 2025-05-28 RX ADMIN — GABAPENTIN 400 MG: 400 CAPSULE ORAL at 08:09

## 2025-05-28 RX ADMIN — ENOXAPARIN SODIUM 40 MG: 40 INJECTION SUBCUTANEOUS at 21:09

## 2025-05-28 RX ADMIN — IPRATROPIUM BROMIDE AND ALBUTEROL SULFATE 3 ML: .5; 3 SOLUTION RESPIRATORY (INHALATION) at 12:20

## 2025-05-28 RX ADMIN — SODIUM CHLORIDE 50 ML: 900 INJECTION INTRAVENOUS at 06:59

## 2025-05-28 RX ADMIN — ALLOPURINOL 100 MG: 100 TABLET ORAL at 08:34

## 2025-05-28 RX ADMIN — GABAPENTIN 400 MG: 400 CAPSULE ORAL at 21:08

## 2025-05-28 RX ADMIN — LEVOTHYROXINE SODIUM 88 MCG: 0.09 TABLET ORAL at 08:09

## 2025-05-28 RX ADMIN — TORSEMIDE 20 MG: 20 TABLET ORAL at 08:35

## 2025-05-28 RX ADMIN — GUAIFENESIN 600 MG: 600 TABLET, EXTENDED RELEASE ORAL at 21:09

## 2025-05-28 RX ADMIN — ACETAMINOPHEN 975 MG: 325 TABLET, FILM COATED ORAL at 08:08

## 2025-05-28 RX ADMIN — Medication 3 MG: at 22:36

## 2025-05-28 ASSESSMENT — ACTIVITIES OF DAILY LIVING (ADL)
ADLS_ACUITY_SCORE: 44
ADLS_ACUITY_SCORE: 44
ADLS_ACUITY_SCORE: 40
ADLS_ACUITY_SCORE: 43
ADLS_ACUITY_SCORE: 43
ADLS_ACUITY_SCORE: 44
ADLS_ACUITY_SCORE: 43
ADLS_ACUITY_SCORE: 40
ADLS_ACUITY_SCORE: 44
ADLS_ACUITY_SCORE: 43
ADLS_ACUITY_SCORE: 44
ADLS_ACUITY_SCORE: 43
ADLS_ACUITY_SCORE: 44
DEPENDENT_IADLS:: CLEANING;COOKING;LAUNDRY;SHOPPING;MEAL PREPARATION;MEDICATION MANAGEMENT;MONEY MANAGEMENT;TRANSPORTATION
ADLS_ACUITY_SCORE: 44
ADLS_ACUITY_SCORE: 44

## 2025-05-28 NOTE — PROGRESS NOTES
[facility]    Medicine Progress Note - Hospitalist Service    Date of Admission:  5/27/2025    Assessment & Plan   Carol Merida is a 89 year old female with PMH including morbid obesity, diastolic CHF, JEAN on CPAP, reactive airway disease, MDD, anxiety, panic attacks, hypothyroidism, lymphedema, recurrent right lower extremity cellulitis, chronic cough, chronic pain, neuropathy, osteoarthritis, sacral pressure ulcers, and multiple joint replacement surgeries who presents with cough and shortness of breath.      Found to have Covid  Continuing to wean off oxygen    Acute hypoxemic respiratory failure  COVID-19 pneumonia  Acute reactive airway disease  Chronic cough  JEAN    - found to have Covid on admission    - admission CXR: patchy interstitial opacities left lower lobe and some central pulmonary vascular congestion    - cont remdesevir     - cont methylprednisolone 40mg BID, likely can change to Prednisone tomorrow am    - cont to wean O2 (currently on 1L)    - cont Breo Ellipta inhaler substituted for formulary, Mucinex twice daily and add Tessalon Perles as needed    - cont CPAP at bedtime     Chronic diastolic CHF  Chronic lymphedema  History of recurrent right lower extremity cellulitis    - was given zosyn in the ED, stopped as there is no concern for active infection    - cont PTA torsemide 20 mg daily and 20 meq potassium daily    - lymphedema consult    - elevate legs (R>L), will order blue foam wedge    - 2 g sodium restriction and 1800 mL fluid restriction diet     Chronic pain  Osteoarthritis  Neuropathy    - history of bilateral TKA, right HUGO, and right femur IM nail    - cont home gabapentin 400 mg 3 times daily, acetaminophen 3 times daily, and celecoxib 100 mg twice daily as needed     Gout    - cont allopurinol 100 mg daily     Morbid obesity    - BMI 46     - hold Ozempic while inpatient     Hypothyroidism    - resume PTA levothyroxine 88 mcg daily     MDD  Anxiety  Panic  attacks    - resume PTA duloxetine 60 mg daily and lorazepam 0.5 mg every 6 hours as needed     History sacral pressure ulcers    -  seen by WOC    Called and spoke with daughter and son        Diet: Combination Diet 2 gm NA Diet  Fluid restriction 1800 ML FLUID    DVT Prophylaxis: Enoxaparin (Lovenox) SQ  Benson Catheter: Not present  Lines: None     Cardiac Monitoring: ACTIVE order. Indication: Tachyarrhythmias, acute (48 hours)  Code Status: No CPR- Pre-arrest intubation OK      Clinically Significant Risk Factors               # Hypoalbuminemia: Lowest albumin = 3.3 g/dL at 5/27/2025  2:44 AM, will monitor as appropriate     # Hypertension: Noted on problem list  # Chronic heart failure with preserved ejection fraction: heart failure noted on problem list and last echo with EF >50%          # DMII: A1C = 6.6 % (Ref range: <5.7 %) within past 6 months, PRESENT ON ADMISSION  # Morbid Obesity: Estimated body mass index is 52.66 kg/m  as calculated from the following:    Height as of this encounter: 1.524 m (5').    Weight as of this encounter: 122.3 kg (269 lb 10 oz)., PRESENT ON ADMISSION            Social Drivers of Health            Disposition Plan     Medically Ready for Discharge: Anticipated Tomorrow    Eduar Do MD  Hospitalist Service  [facility]  Securely message with [employer] (more info)  Text page via [facility] Paging/Directory   ______________________________________________________________________    Interval History   I assumed care of the patient today.  She is in bed.  She states she is feeling better.  She is eating and drinking.  Her shortness of breath is better.  No chest pain.  She states she is coughing up more stuff.  She is eating and drinking.    Physical Exam   Vital Signs: Temp: 97.7  F (36.5  C) Temp src: Oral BP: 134/78 Pulse: 110   Resp: 18 SpO2: 92 % O2 Device: Nasal cannula Oxygen Delivery: 2 LPM  Weight: 269 lbs 9.96 oz    Constitutional: awake, alert,  cooperative, no apparent distress, and appears stated age  Eyes: Lids and lashes normal, pupils equal, round and reactive to light, extra ocular muscles intact, sclera clear, conjunctiva normal  ENT: Normocephalic, without obvious abnormality, atraumatic, sinuses nontender on palpation, external ears without lesions, oral pharynx with moist mucous membranes, tonsils without erythema or exudates, gums normal and good dentition.  Respiratory: no wheezing or crackles  Cardiovascular: Normal apical impulse, regular rate and rhythm, normal S1 and S2, no S3 or S4, and no murmur noted    Medical Decision Making       40 MINUTES SPENT BY ME on the date of service doing chart review, history, exam, documentation & further activities per the note.      Data         Imaging results reviewed over the past 24 hrs:   No results found for this or any previous visit (from the past 24 hours).

## 2025-05-28 NOTE — PLAN OF CARE
"Goal Outcome Evaluation:      Plan of Care Reviewed With: patient    Overall Patient Progress: improvingOverall Patient Progress: improving    Outcome Evaluation: A&Ox3, inconsistent to time and intermittently confused. VSS, no complaints of pain. Purewick in place. Tolerated BiPap most of the night but switched to 2L NC around 0500. 0.5mg of lorazepam given at 0436 for anxiety.2g sodium restriction and 1.8L FR.          Problem: Adult Inpatient Plan of Care  Goal: Plan of Care Review  Description: The Plan of Care Review/Shift note should be completed every shift.  The Outcome Evaluation is a brief statement about your assessment that the patient is improving, declining, or no change.  This information will be displayed automatically on your shift  note.  Outcome: Progressing  Flowsheets (Taken 5/28/2025 0544)  Outcome Evaluation: A&Ox3, inconsistent to time and intermittently confused. VSS, no complaints of pain. Purewick in place. Tolerated BiPap most of the night but switched to 2L NC around 0500. 0.5mg of lorazepam given at 0436 for anxiety.2g sodium restriction and 1.8L FR.  Plan of Care Reviewed With: patient  Overall Patient Progress: improving  Goal: Patient-Specific Goal (Individualized)  Description: You can add care plan individualizations to a care plan. Examples of Individualization might be:  \"Parent requests to be called daily at 9am for status\", \"I have a hard time hearing out of my right ear\", or \"Do not touch me to wake me up as it startles  me\".  Outcome: Progressing  Goal: Absence of Hospital-Acquired Illness or Injury  Outcome: Progressing  Intervention: Identify and Manage Fall Risk  Recent Flowsheet Documentation  Taken 5/27/2025 2015 by Sonny Chaudhari, RN  Safety Promotion/Fall Prevention:   activity supervised   assistive device/personal items within reach   increase visualization of patient   lighting adjusted   mobility aid in reach   nonskid shoes/slippers when out of bed   safety " round/check completed   room organization consistent   room near nurse's station  Intervention: Prevent and Manage VTE (Venous Thromboembolism) Risk  Recent Flowsheet Documentation  Taken 5/27/2025 2015 by Sonny Chaudhari RN  VTE Prevention/Management: compression stockings off  Intervention: Prevent Infection  Recent Flowsheet Documentation  Taken 5/27/2025 2015 by Sonny Chaudhari RN  Infection Prevention:   cohorting utilized   equipment surfaces disinfected   hand hygiene promoted   personal protective equipment utilized   rest/sleep promoted   single patient room provided  Goal: Optimal Comfort and Wellbeing  Outcome: Progressing  Goal: Readiness for Transition of Care  Outcome: Progressing     Problem: Delirium  Goal: Optimal Coping  Outcome: Progressing  Goal: Improved Behavioral Control  Outcome: Progressing  Intervention: Minimize Safety Risk  Recent Flowsheet Documentation  Taken 5/27/2025 2015 by Sonny Chaudhari RN  Enhanced Safety Measures:   room near unit station   pain management  Goal: Improved Attention and Thought Clarity  Outcome: Progressing  Goal: Improved Sleep  Outcome: Progressing

## 2025-05-28 NOTE — CONSULTS
Care Management Initial Consult    General Information  Assessment completed with: -chart review, Caregiver, nurse, Alisson  Type of CM/SW Visit: Initial Assessment    Primary Care Provider verified and updated as needed: Yes   Readmission within the last 30 days: no previous admission in last 30 days      Reason for Consult: discharge planning  Advance Care Planning: Advance Care Planning Reviewed: present on chart          Communication Assessment  Patient's communication style: spoken language (English or Bilingual)    Hearing Difficulty or Deaf: no   Wear Glasses or Blind: yes    Cognitive  Cognitive/Neuro/Behavioral: level of consciousness, .WDL except  Level of Consciousness: intermittent confusion  Arousal Level: opens eyes spontaneously  Orientation: oriented x 4        Speech: spontaneous    Living Environment:   People in home: facility resident     Current living Arrangements: assisted living  Name of Facility: [facility]   Able to return to prior arrangements: yes       Family/Social Support:  Care provided by: self, homecare agency (facility)  Provides care for: no one  Marital Status:   Support system: Facility resident(s)/Staff, Children          Description of Support System: Supportive, Involved    Support Assessment: Adequate family and caregiver support    Current Resources:   Patient receiving home care services: Yes  Skilled Home Care Services: Skilled Nursing     Community Resources:    Equipment currently used at home: wheelchair, power  Supplies currently used at home:      Employment/Financial:  Employment Status:          Financial Concerns: none   Referral to Financial Worker: No     Lifestyle & Psychosocial Needs:  Social Drivers of Health     Food Insecurity: Low Risk  (5/27/2025)    Food Insecurity     Within the past 12 months, did you worry that your food would run out before you got money to buy more?: No     Within the past 12 months, did the food you bought just  not last and you didn t have money to get more?: No   Depression: Not at risk (5/29/2024)    PHQ-2     PHQ-2 Score: 1   Housing Stability: Low Risk  (5/27/2025)    Housing Stability     Do you have housing? : Yes     Are you worried about losing your housing?: No   Tobacco Use: Low Risk  (7/19/2024)    Patient History     Smoking Tobacco Use: Never     Smokeless Tobacco Use: Never     Passive Exposure: Not on file   Financial Resource Strain: Low Risk  (5/27/2025)    Financial Resource Strain     Within the past 12 months, have you or your family members you live with been unable to get utilities (heat, electricity) when it was really needed?: No   Alcohol Use: Not on file   Transportation Needs: Low Risk  (5/27/2025)    Transportation Needs     Within the past 12 months, has lack of transportation kept you from medical appointments, getting your medicines, non-medical meetings or appointments, work, or from getting things that you need?: No   Physical Activity: Not on file   Interpersonal Safety: Low Risk  (5/27/2025)    Interpersonal Safety     Do you feel physically and emotionally safe where you currently live?: Yes     Within the past 12 months, have you been hit, slapped, kicked or otherwise physically hurt by someone?: No     Within the past 12 months, have you been humiliated or emotionally abused in other ways by your partner or ex-partner?: No   Stress: Not on file   Social Connections: Not on file   Health Literacy: Not on file       Functional Status:  Prior to admission patient needed assistance:   Dependent ADLs:: Bathing, Dressing, Toileting, Wheelchair-with assist, Transfers  Dependent IADLs:: Cleaning, Cooking, Laundry, Shopping, Meal Preparation, Medication Management, Money Management, Transportation  Assesssment of Functional Status: Not at baseline with mobility    Mental Health Status:  Mental Health Status: No Current Concerns       Chemical Dependency Status:  Chemical Dependency Status: No  Current Concerns             Values/Beliefs:  Spiritual, Cultural Beliefs, Mu-ism Practices, Values that affect care: no               Discussed  Partnership in Safe Discharge Planning  document with patient/family: No    Additional Information:  SW/CM consulted for discharge planning. Pt has COVID, on precautions. Per chart review, pt resides at [facility]. Gilda called nurseAlisson and obtained baseline info and discussed therapy discharge rationale, Pt can return at current level of care.      Pt receives skilled nursing through [facility] already. GILDA sent referral to add PT.     Patient receives services as follows: Per pt, med management, meals, bathing, transfers, toileting, housekeeping.     [facility] contact (name/number): [phone] Main, [phone] Nursing  Fax #: [phone]  Barriers to pt returning: None  Baseline mobility: Up with A1 to motorized wheelchair  Time of preferred return: Before 1400  New meds/prescriptions/Pharmacy: [facility]  Transportation: [facility]- pt has COVID so would need a stretcher, if family can't provide.    Nurse confirmed pt daughterAlea is main contact. GILDA left a  to return call to discuss discharge plans.    Next Steps: connect with daughter regarding discharge recs and transportation barrier of stretcher/cost due to COVID.    Update: Sw talked to daughter, she stated pt has Medicaid and [facility] transport has been covered by it. SW contacted registration and got Medicaid info added to chart. Family is not able to transport and will need stretcher.     Reviewed potential out of pocket cost for [facility] stretcher transport. Current base rate is $1354.65 and $31.61 per mile to the destination. Pt/family expressed understanding and are agreeable to this.      Magdalena Gibson, MSW, LICSW  Emergency Department/Inpatient Float  Care Coordination  [facility]        MAGDALENA GIBSON, LICSW

## 2025-05-28 NOTE — PROGRESS NOTES
Patient seen in follow-up, admitted earlier this morning.  Presented with acute hypoxemic respiratory failure with COVID-19 pneumonia.  At the time of my exam she tells me she is feeling much better than when she came in but still requiring 4 L/min O2.  On exam still bronchospastic.  Plan to continue current inhalers, steroids and remdesivir.    Med rec completed.    Medically Ready for Discharge: Anticipated in 2-4 Days    Kishore Adames MD

## 2025-05-28 NOTE — PROVIDER NOTIFICATION
05/27/25 2231   Tech Time   $Tech Time (10 minute increments) 3   Mode: CPAP/ BiPAP/ AVAPS/ AVAPS AE   CPAP/BiPAP/ AVAPS/ AVAPS AE Mode CPAP   Equipment   Device Resmed   CPAP/BiPAP/Settings   $CPAP/BiPAP Initial completed   BIPAP/CPAP On Standby On   O2 Flow Rate (L/min) 2   CPAP/BiPAP Patient Parameters   CPAP (cm H2O) 8 cmh2o   RR Total (breaths/min) 18 breaths/min   CPAP/BiPAP/AVAPS/AVAPS AE Alarms   Humidifier Checked N/A   RT Device Skin Assessment   Oxygen Delivery Device CPAP/BiPAP Mask   Interface Face Mask - Small   Ventilator Arm In Place No   Site Appearance neck circumference Clean and dry   Site Appearance bridge of nose Clean and dry   Site Appearance occiput Clean and dry   Strap Tightness Finger Allowance between head and device strap   Device Skin Interventions Taken No adjustments needed   Respiratory WDL   Respiratory WDL X   Rhythm/Pattern, Respiratory assisted mechanically     Hilario Richmond, RT on 5/27/2025 at 10:34 PM

## 2025-05-28 NOTE — PLAN OF CARE
"A&Ox4; intermittent confusion. VSS; weaned to 1L of O2 via NC. C/O mild groin pain. Denied SOB, N/V. F.R 1800 and tolerating 2 g Na+ diet. Mepilex to buttocks wounds. Purewick in place w/ adequate UOP. Ax2 w/ Noy Steady. Special precautions for COVID+; Q24 IV Remdesivir and nebs given. +2 BLE edema; elevated on pillow. PT and SW following. Bed alarm on for safety. Call light in reach; pt able to make needs known.     /65 (BP Location: Right arm)   Pulse 102   Temp 98.3  F (36.8  C) (Oral)   Resp 20   Ht 1.524 m (5')   Wt 122.3 kg (269 lb 10 oz)   LMP  (LMP Unknown)   SpO2 93%   BMI 52.66 kg/m        Goal Outcome Evaluation:      Plan of Care Reviewed With: patient    Overall Patient Progress: improvingOverall Patient Progress: improving    Outcome Evaluation: A&Ox4; intermittent confusion. VSS; weened to 1L. Nebs given. C/O mild groin pain. Up in chair most of day. Tolerating 2g sodium diet; F.R of 1.8L.        Problem: Adult Inpatient Plan of Care  Goal: Plan of Care Review  Description: The Plan of Care Review/Shift note should be completed every shift.  The Outcome Evaluation is a brief statement about your assessment that the patient is improving, declining, or no change.  This information will be displayed automatically on your shift  note.  Outcome: Progressing  Flowsheets (Taken 5/28/2025 9536)  Outcome Evaluation:   A&Ox4   intermittent confusion. VSS   weened to 1L. Nebs given. C/O mild groin pain. Up in chair most of day. Tolerating 2g sodium diet   F.R of 1.8L.  Plan of Care Reviewed With: patient  Overall Patient Progress: improving  Goal: Patient-Specific Goal (Individualized)  Description: You can add care plan individualizations to a care plan. Examples of Individualization might be:  \"Parent requests to be called daily at 9am for status\", \"I have a hard time hearing out of my right ear\", or \"Do not touch me to wake me up as it startles  me\".  Outcome: Progressing  Goal: Absence of " Hospital-Acquired Illness or Injury  Outcome: Progressing  Intervention: Identify and Manage Fall Risk  Recent Flowsheet Documentation  Taken 5/28/2025 0910 by Jannette Mobley RN  Safety Promotion/Fall Prevention:   safety round/check completed   supervised activity  Intervention: Prevent Skin Injury  Recent Flowsheet Documentation  Taken 5/28/2025 0910 by Jannette Mobley RN  Body Position:   weight shifting   supine  Skin Protection:   adhesive use limited   incontinence pads utilized  Intervention: Prevent Infection  Recent Flowsheet Documentation  Taken 5/28/2025 0910 by Jannette Mobley RN  Infection Prevention:   rest/sleep promoted   hand hygiene promoted   equipment surfaces disinfected   personal protective equipment utilized  Goal: Optimal Comfort and Wellbeing  Outcome: Progressing  Intervention: Provide Person-Centered Care  Recent Flowsheet Documentation  Taken 5/28/2025 0910 by Jannette Mobley RN  Trust Relationship/Rapport:   care explained   questions answered   thoughts/feelings acknowledged  Goal: Readiness for Transition of Care  Outcome: Progressing     Problem: Delirium  Goal: Optimal Coping  Outcome: Progressing  Goal: Improved Behavioral Control  Outcome: Progressing  Intervention: Minimize Safety Risk  Recent Flowsheet Documentation  Taken 5/28/2025 0910 by Jannette Mobley RN  Enhanced Safety Measures:   room near unit station   pain management  Trust Relationship/Rapport:   care explained   questions answered   thoughts/feelings acknowledged  Goal: Improved Attention and Thought Clarity  Outcome: Progressing  Goal: Improved Sleep  Outcome: Progressing

## 2025-05-29 VITALS
HEART RATE: 99 BPM | WEIGHT: 269.62 LBS | TEMPERATURE: 97.7 F | RESPIRATION RATE: 20 BRPM | BODY MASS INDEX: 52.93 KG/M2 | SYSTOLIC BLOOD PRESSURE: 131 MMHG | DIASTOLIC BLOOD PRESSURE: 73 MMHG | OXYGEN SATURATION: 95 % | HEIGHT: 60 IN

## 2025-05-29 LAB
ANION GAP SERPL CALCULATED.3IONS-SCNC: 14 MMOL/L (ref 7–15)
BACTERIA SPEC CULT: NORMAL
BACTERIA SPEC CULT: NORMAL
BASOPHILS # BLD AUTO: 0 10E3/UL (ref 0–0.2)
BASOPHILS NFR BLD AUTO: 0 %
BUN SERPL-MCNC: 25.9 MG/DL (ref 8–23)
CALCIUM SERPL-MCNC: 9 MG/DL (ref 8.8–10.4)
CHLORIDE SERPL-SCNC: 97 MMOL/L (ref 98–107)
CREAT SERPL-MCNC: 0.83 MG/DL (ref 0.51–0.95)
EGFRCR SERPLBLD CKD-EPI 2021: 67 ML/MIN/1.73M2
EOSINOPHIL # BLD AUTO: 0 10E3/UL (ref 0–0.7)
EOSINOPHIL NFR BLD AUTO: 0 %
ERYTHROCYTE [DISTWIDTH] IN BLOOD BY AUTOMATED COUNT: 15.5 % (ref 10–15)
GLUCOSE BLDC GLUCOMTR-MCNC: 150 MG/DL (ref 70–99)
GLUCOSE BLDC GLUCOMTR-MCNC: 206 MG/DL (ref 70–99)
GLUCOSE BLDC GLUCOMTR-MCNC: 232 MG/DL (ref 70–99)
GLUCOSE SERPL-MCNC: 179 MG/DL (ref 70–99)
HCO3 SERPL-SCNC: 28 MMOL/L (ref 22–29)
HCT VFR BLD AUTO: 37.7 % (ref 35–47)
HGB BLD-MCNC: 12.1 G/DL (ref 11.7–15.7)
IMM GRANULOCYTES # BLD: 0 10E3/UL
IMM GRANULOCYTES NFR BLD: 0 %
LYMPHOCYTES # BLD AUTO: 0.6 10E3/UL (ref 0.8–5.3)
LYMPHOCYTES NFR BLD AUTO: 10 %
MCH RBC QN AUTO: 30.9 PG (ref 26.5–33)
MCHC RBC AUTO-ENTMCNC: 32.1 G/DL (ref 31.5–36.5)
MCV RBC AUTO: 96 FL (ref 78–100)
MONOCYTES # BLD AUTO: 0.2 10E3/UL (ref 0–1.3)
MONOCYTES NFR BLD AUTO: 4 %
NEUTROPHILS # BLD AUTO: 5.7 10E3/UL (ref 1.6–8.3)
NEUTROPHILS NFR BLD AUTO: 87 %
NRBC # BLD AUTO: 0 10E3/UL
NRBC BLD AUTO-RTO: 0 /100
PLATELET # BLD AUTO: 201 10E3/UL (ref 150–450)
POTASSIUM SERPL-SCNC: 3.8 MMOL/L (ref 3.4–5.3)
RBC # BLD AUTO: 3.92 10E6/UL (ref 3.8–5.2)
SODIUM SERPL-SCNC: 139 MMOL/L (ref 135–145)
WBC # BLD AUTO: 6.6 10E3/UL (ref 4–11)

## 2025-05-29 PROCEDURE — 99233 SBSQ HOSP IP/OBS HIGH 50: CPT | Performed by: HOSPITALIST

## 2025-05-29 PROCEDURE — 999N000157 HC STATISTIC RCP TIME EA 10 MIN

## 2025-05-29 PROCEDURE — 120N000001 HC R&B MED SURG/OB

## 2025-05-29 PROCEDURE — 250N000013 HC RX MED GY IP 250 OP 250 PS 637: Performed by: INTERNAL MEDICINE

## 2025-05-29 PROCEDURE — 94640 AIRWAY INHALATION TREATMENT: CPT

## 2025-05-29 PROCEDURE — 250N000012 HC RX MED GY IP 250 OP 636 PS 637: Performed by: HOSPITALIST

## 2025-05-29 PROCEDURE — 250N000009 HC RX 250: Performed by: INTERNAL MEDICINE

## 2025-05-29 PROCEDURE — 94640 AIRWAY INHALATION TREATMENT: CPT | Mod: 76

## 2025-05-29 PROCEDURE — 82374 ASSAY BLOOD CARBON DIOXIDE: CPT | Performed by: HOSPITALIST

## 2025-05-29 PROCEDURE — 250N000011 HC RX IP 250 OP 636: Performed by: INTERNAL MEDICINE

## 2025-05-29 PROCEDURE — 258N000003 HC RX IP 258 OP 636: Performed by: INTERNAL MEDICINE

## 2025-05-29 PROCEDURE — 36415 COLL VENOUS BLD VENIPUNCTURE: CPT | Performed by: HOSPITALIST

## 2025-05-29 PROCEDURE — 85004 AUTOMATED DIFF WBC COUNT: CPT | Performed by: HOSPITALIST

## 2025-05-29 RX ORDER — GUAIFENESIN/DEXTROMETHORPHAN 100-10MG/5
10 SYRUP ORAL EVERY 4 HOURS PRN
Status: DISCONTINUED | OUTPATIENT
Start: 2025-05-29 | End: 2025-05-31 | Stop reason: HOSPADM

## 2025-05-29 RX ORDER — PREDNISONE 20 MG/1
20 TABLET ORAL DAILY
Status: DISCONTINUED | OUTPATIENT
Start: 2025-05-30 | End: 2025-05-31 | Stop reason: HOSPADM

## 2025-05-29 RX ORDER — NICOTINE POLACRILEX 4 MG
15-30 LOZENGE BUCCAL
Status: DISCONTINUED | OUTPATIENT
Start: 2025-05-29 | End: 2025-05-31 | Stop reason: HOSPADM

## 2025-05-29 RX ORDER — DEXTROSE MONOHYDRATE 25 G/50ML
25-50 INJECTION, SOLUTION INTRAVENOUS
Status: DISCONTINUED | OUTPATIENT
Start: 2025-05-29 | End: 2025-05-31 | Stop reason: HOSPADM

## 2025-05-29 RX ADMIN — ACETAMINOPHEN 975 MG: 325 TABLET, FILM COATED ORAL at 20:35

## 2025-05-29 RX ADMIN — IPRATROPIUM BROMIDE AND ALBUTEROL SULFATE 3 ML: .5; 3 SOLUTION RESPIRATORY (INHALATION) at 15:22

## 2025-05-29 RX ADMIN — IPRATROPIUM BROMIDE AND ALBUTEROL SULFATE 3 ML: .5; 3 SOLUTION RESPIRATORY (INHALATION) at 20:08

## 2025-05-29 RX ADMIN — METHYLPREDNISOLONE SODIUM SUCCINATE 40 MG: 40 INJECTION, POWDER, FOR SOLUTION INTRAMUSCULAR; INTRAVENOUS at 08:05

## 2025-05-29 RX ADMIN — Medication 3 MG: at 22:40

## 2025-05-29 RX ADMIN — ALLOPURINOL 100 MG: 100 TABLET ORAL at 08:04

## 2025-05-29 RX ADMIN — GABAPENTIN 400 MG: 400 CAPSULE ORAL at 20:34

## 2025-05-29 RX ADMIN — LEVOTHYROXINE SODIUM 88 MCG: 0.09 TABLET ORAL at 08:05

## 2025-05-29 RX ADMIN — DEXTRAN 70, GLYCERIN, HYPROMELLOSE 1 DROP: 1; 2; 3 SOLUTION/ DROPS OPHTHALMIC at 08:09

## 2025-05-29 RX ADMIN — INSULIN ASPART 3 UNITS: 100 INJECTION, SOLUTION INTRAVENOUS; SUBCUTANEOUS at 17:26

## 2025-05-29 RX ADMIN — FLUTICASONE FUROATE AND VILANTEROL TRIFENATATE 1 PUFF: 100; 25 POWDER RESPIRATORY (INHALATION) at 07:31

## 2025-05-29 RX ADMIN — REMDESIVIR 100 MG: 100 INJECTION, POWDER, LYOPHILIZED, FOR SOLUTION INTRAVENOUS at 05:12

## 2025-05-29 RX ADMIN — DEXTRAN 70, GLYCERIN, HYPROMELLOSE 1 DROP: 1; 2; 3 SOLUTION/ DROPS OPHTHALMIC at 20:35

## 2025-05-29 RX ADMIN — ACETAMINOPHEN 975 MG: 325 TABLET, FILM COATED ORAL at 08:05

## 2025-05-29 RX ADMIN — GABAPENTIN 400 MG: 400 CAPSULE ORAL at 14:07

## 2025-05-29 RX ADMIN — GABAPENTIN 400 MG: 400 CAPSULE ORAL at 08:05

## 2025-05-29 RX ADMIN — SODIUM CHLORIDE 50 ML: 900 INJECTION INTRAVENOUS at 05:52

## 2025-05-29 RX ADMIN — TORSEMIDE 20 MG: 20 TABLET ORAL at 08:05

## 2025-05-29 RX ADMIN — GUAIFENESIN 600 MG: 600 TABLET, EXTENDED RELEASE ORAL at 08:05

## 2025-05-29 RX ADMIN — BENZONATATE 100 MG: 100 CAPSULE ORAL at 11:32

## 2025-05-29 RX ADMIN — DEXTRAN 70, GLYCERIN, HYPROMELLOSE 1 DROP: 1; 2; 3 SOLUTION/ DROPS OPHTHALMIC at 14:08

## 2025-05-29 RX ADMIN — ACETAMINOPHEN 975 MG: 325 TABLET, FILM COATED ORAL at 14:06

## 2025-05-29 RX ADMIN — ENOXAPARIN SODIUM 40 MG: 40 INJECTION SUBCUTANEOUS at 20:35

## 2025-05-29 RX ADMIN — DULOXETINE 60 MG: 60 CAPSULE, DELAYED RELEASE ORAL at 08:05

## 2025-05-29 RX ADMIN — POTASSIUM CHLORIDE 40 MEQ: 1500 TABLET, EXTENDED RELEASE ORAL at 08:04

## 2025-05-29 RX ADMIN — IPRATROPIUM BROMIDE AND ALBUTEROL SULFATE 3 ML: .5; 3 SOLUTION RESPIRATORY (INHALATION) at 07:31

## 2025-05-29 RX ADMIN — ENOXAPARIN SODIUM 40 MG: 40 INJECTION SUBCUTANEOUS at 08:08

## 2025-05-29 RX ADMIN — INSULIN ASPART 4 UNITS: 100 INJECTION, SOLUTION INTRAVENOUS; SUBCUTANEOUS at 14:08

## 2025-05-29 RX ADMIN — IPRATROPIUM BROMIDE AND ALBUTEROL SULFATE 3 ML: .5; 3 SOLUTION RESPIRATORY (INHALATION) at 11:39

## 2025-05-29 RX ADMIN — GUAIFENESIN 600 MG: 600 TABLET, EXTENDED RELEASE ORAL at 20:35

## 2025-05-29 ASSESSMENT — ACTIVITIES OF DAILY LIVING (ADL)
ADLS_ACUITY_SCORE: 40
ADLS_ACUITY_SCORE: 40
ADLS_ACUITY_SCORE: 47
ADLS_ACUITY_SCORE: 47
ADLS_ACUITY_SCORE: 46
ADLS_ACUITY_SCORE: 46
ADLS_ACUITY_SCORE: 47
ADLS_ACUITY_SCORE: 46
ADLS_ACUITY_SCORE: 47
ADLS_ACUITY_SCORE: 40
ADLS_ACUITY_SCORE: 40
ADLS_ACUITY_SCORE: 47
ADLS_ACUITY_SCORE: 46
ADLS_ACUITY_SCORE: 46
ADLS_ACUITY_SCORE: 47

## 2025-05-29 NOTE — PLAN OF CARE
"Goal Outcome Evaluation:  To Do:  Admitting Diagnosis:  SOB/COVID   Pertinent History: CHF,lymphadema, R-lower extremity cellulitis, chronic pain  Living Situation: [facility]  Pain plan:  tylenol    Mobility: Ax2/WC; stand and pivot with walker from chair to bed.   Baseline activity: Wheelchair bound  Alarms/Safety: Bed Alarm  LDA's:  Peripheral  Pertinent test results:    Consults:  SW/PT/WOC/Lymphedema therapy  Abnormals/Pending:   Other Cares/Comments: DW, elevate legs, pulse ox, CPAP at night   Discharge Disposition:  Back to [facility], with HHPT, will need stretcher transport at discharge. Placed on 1 LPM NC for shortness of breath; O2 saturation >90% on RA.  Discharge Time:  TBD          Plan of Care Reviewed With: patient    Overall Patient Progress: no changeOverall Patient Progress: no change    Outcome Evaluation: Felt short of breath; O2 saturation > 90% on RA.  Placed on 1 lpm nc; perfermed IS and practiced deep breathing exercisies. Possible discharge 1-2 days.          Problem: Adult Inpatient Plan of Care  Goal: Plan of Care Review  Description: The Plan of Care Review/Shift note should be completed every shift.  The Outcome Evaluation is a brief statement about your assessment that the patient is improving, declining, or no change.  This information will be displayed automatically on your shift  note.  5/29/2025 1731 by Nazanin Hua, RN  Outcome: Progressing  Flowsheets (Taken 5/29/2025 1731)  Outcome Evaluation:   Felt short of breath   O2 saturation > 90% on RA.  Placed on 1 lpm nc   perfermed IS and practiced deep breathing exercisies. Possible discharge 1-2 days.  Plan of Care Reviewed With: patient  Overall Patient Progress: no change  5/29/2025 1626 by Nazanin Hua, RN  Outcome: Progressing  Goal: Patient-Specific Goal (Individualized)  Description: You can add care plan individualizations to a care plan. Examples of Individualization might be:  \"Parent " "requests to be called daily at 9am for status\", \"I have a hard time hearing out of my right ear\", or \"Do not touch me to wake me up as it startles  me\".  5/29/2025 1731 by Nazanin Hua RN  Outcome: Progressing  5/29/2025 1626 by Nazanin Hua RN  Outcome: Progressing  Goal: Absence of Hospital-Acquired Illness or Injury  5/29/2025 1731 by Nazanin Hua RN  Outcome: Progressing  5/29/2025 1626 by Nazanin Hua RN  Outcome: Progressing  Intervention: Prevent Skin Injury  Recent Flowsheet Documentation  Taken 5/29/2025 1601 by Nazanin Hua RN  Body Position: weight shifting  Intervention: Prevent and Manage VTE (Venous Thromboembolism) Risk  Recent Flowsheet Documentation  Taken 5/29/2025 1601 by Nazanin Hua RN  VTE Prevention/Management: compression stockings on  Intervention: Prevent Infection  Recent Flowsheet Documentation  Taken 5/29/2025 1601 by Nazanin Hua RN  Infection Prevention:   personal protective equipment utilized   hand hygiene promoted   rest/sleep promoted   single patient room provided  Goal: Optimal Comfort and Wellbeing  5/29/2025 1731 by Nazanin Hua RN  Outcome: Progressing  5/29/2025 1626 by Nazanin Hua RN  Outcome: Progressing  Intervention: Monitor Pain and Promote Comfort  Recent Flowsheet Documentation  Taken 5/29/2025 1601 by Nazanin Hua RN  Pain Management Interventions:   distraction   declines   pain management plan reviewed with patient/caregiver  Goal: Readiness for Transition of Care  5/29/2025 1731 by Nazanin Hua RN  Outcome: Progressing  5/29/2025 1626 by Nazanin Hua RN  Outcome: Progressing     Problem: Delirium  Goal: Optimal Coping  5/29/2025 1731 by Nazanin Hua RN  Outcome: Progressing  5/29/2025 1626 by Nazanin Hua RN  Outcome: Progressing  Goal: Improved Behavioral Control  5/29/2025 1731 by Nazanin Hua RN  Outcome: Progressing  5/29/2025 1626 by " Nazanin Hua RN  Outcome: Progressing  Goal: Improved Attention and Thought Clarity  5/29/2025 1731 by Nazanin Hua RN  Outcome: Progressing  5/29/2025 1626 by Nazanin Hua RN  Outcome: Progressing  Goal: Improved Sleep  5/29/2025 1731 by Nazanin Hua RN  Outcome: Progressing  5/29/2025 1626 by Nazanin Hua RN  Outcome: Progressing     Problem: Comorbidity Management  Goal: Maintenance of Heart Failure Symptom Control  5/29/2025 1731 by Nazanin Hua RN  Outcome: Progressing  5/29/2025 1626 by Nazanin Hua RN  Outcome: Progressing

## 2025-05-29 NOTE — PLAN OF CARE
"INPATIENT NOTE: 1500 - 2300     PRIMARY PROBLEM: SOB/Cough/Covid 19 +     Vital Signs: Stable        Orientation: A/O x 4, intermittent confusions  Neuro: intact  Pain status: Denies  Resp: Lung sounds CTA, denies any SOB  Cardiac: CTA  GI: Bowel sounds active.  : Pure wick on  Skin: Intact  LDA: Peripheral IV SL  Pertinent Labs/Imaging: Covid +  Diet: Combination 2 grm Na  Activity: Assist x 2 S.S  Alarms/Safety: All alarms on and audible   Consults: SW/PT/WOC   Discharge Plan: Continue with the current Tx  Discharge Date: TBD     Will continue to monitor and provide cares.     Cheri Sumner RN     Problem: Adult Inpatient Plan of Care  Goal: Plan of Care Review  Description: The Plan of Care Review/Shift note should be completed every shift.  The Outcome Evaluation is a brief statement about your assessment that the patient is improving, declining, or no change.  This information will be displayed automatically on your shift  note.  Outcome: Progressing  Goal: Patient-Specific Goal (Individualized)  Description: You can add care plan individualizations to a care plan. Examples of Individualization might be:  \"Parent requests to be called daily at 9am for status\", \"I have a hard time hearing out of my right ear\", or \"Do not touch me to wake me up as it startles  me\".  Outcome: Progressing  Goal: Absence of Hospital-Acquired Illness or Injury  Outcome: Progressing  Intervention: Identify and Manage Fall Risk  Recent Flowsheet Documentation  Taken 5/28/2025 1639 by Cheri Sumner RN  Safety Promotion/Fall Prevention:   activity supervised   clutter free environment maintained   lighting adjusted   mobility aid in reach   nonskid shoes/slippers when out of bed  Intervention: Prevent Skin Injury  Recent Flowsheet Documentation  Taken 5/28/2025 1639 by Cheri Sumner RN  Body Position: position maintained  Intervention: Prevent and Manage VTE (Venous Thromboembolism) Risk  Recent Flowsheet Documentation  Taken " 5/28/2025 1639 by Cheri Sumner RN  VTE Prevention/Management: compression stockings off  Intervention: Prevent Infection  Recent Flowsheet Documentation  Taken 5/28/2025 1639 by Cheri Sumner RN  Infection Prevention:   cohorting utilized   hand hygiene promoted   rest/sleep promoted   personal protective equipment utilized   single patient room provided  Goal: Optimal Comfort and Wellbeing  Outcome: Progressing  Intervention: Provide Person-Centered Care  Recent Flowsheet Documentation  Taken 5/28/2025 1639 by Cheri Sumner RN  Trust Relationship/Rapport:   care explained   questions answered   thoughts/feelings acknowledged  Goal: Readiness for Transition of Care  Outcome: Progressing     Problem: Delirium  Goal: Optimal Coping  Outcome: Progressing  Goal: Improved Behavioral Control  Outcome: Progressing  Intervention: Minimize Safety Risk  Recent Flowsheet Documentation  Taken 5/28/2025 1639 by Cheri Sumner RN  Enhanced Safety Measures:   room near unit station   pain management  Trust Relationship/Rapport:   care explained   questions answered   thoughts/feelings acknowledged  Goal: Improved Attention and Thought Clarity  Outcome: Progressing  Goal: Improved Sleep  Outcome: Progressing

## 2025-05-29 NOTE — PROGRESS NOTES
[facility]    Medicine Progress Note - Hospitalist Service    Date of Admission:  5/27/2025    Assessment & Plan   Carol Merida is a 89 year old female with PMH including morbid obesity, diastolic CHF, JEAN on CPAP, reactive airway disease, MDD, anxiety, panic attacks, hypothyroidism, lymphedema, recurrent right lower extremity cellulitis, chronic cough, chronic pain, neuropathy, osteoarthritis, sacral pressure ulcers, and multiple joint replacement surgeries who presents with cough and shortness of breath.      Found to have Covid  Continuing to wean off oxygen    Acute hypoxemic respiratory failure  COVID-19 pneumonia  Acute reactive airway disease  Chronic cough  JEAN    - found to have Covid on admission    - admission CXR: patchy interstitial opacities left lower lobe and some central pulmonary vascular congestion    - cont remdesevir     - cont methylprednisolone 40mg BID, likely can change to Prednisone tomorrow am (20mg)    - cont to wean O2: weaned to RA this am    - cont Breo Ellipta inhaler substituted for formulary, Mucinex twice daily and add Tessalon Perles as needed    - cont CPAP at bedtime (she lees snot like the hospital CPAP and will have her son bring her own)     Chronic diastolic CHF  Chronic lymphedema  History of recurrent right lower extremity cellulitis    - was given zosyn in the ED, stopped as there is no concern for active infection    - cont PTA torsemide 20 mg daily and 20 meq potassium daily    - lymphedema consult    - BNP normal on admission    - elevate legs (R>L), will order blue foam wedge    - 2 g sodium restriction      Chronic pain  Osteoarthritis  Neuropathy    - history of bilateral TKA, right HUGO, and right femur IM nail    - cont home gabapentin 400 mg 3 times daily, acetaminophen 3 times daily, and celecoxib 100 mg twice daily as needed     Gout    - cont allopurinol 100 mg daily     Morbid obesity    - BMI 46     - hold Ozempic while inpatient      Hypothyroidism    - resume PTA levothyroxine 88 mcg daily     MDD  Anxiety  Panic attacks    - resume PTA duloxetine 60 mg daily and lorazepam 0.5 mg every 6 hours as needed     History sacral pressure ulcers  Right Buttock Pressure Injury, Stage 3 (POA)     -  seen by WOC    Called and spoke with daughter    No labs needed in am       Diet: Combination Diet 2 gm NA Diet  Fluid restriction 1800 ML FLUID    DVT Prophylaxis: Enoxaparin (Lovenox) SQ  Benson Catheter: Not present  Lines: None     Cardiac Monitoring: None  Code Status: No CPR- Pre-arrest intubation OK      Clinically Significant Risk Factors          # Hypochloremia: Lowest Cl = 96 mmol/L in last 2 days, will monitor as appropriate      # Hypoalbuminemia: Lowest albumin = 3.1 g/dL at 5/28/2025 11:52 AM, will monitor as appropriate     # Hypertension: Noted on problem list    # Chronic heart failure with preserved ejection fraction: heart failure noted on problem list and last echo with EF >50%          # DMII: A1C = 6.6 % (Ref range: <5.7 %) within past 6 months, PRESENT ON ADMISSION  # Morbid Obesity: Estimated body mass index is 52.66 kg/m  as calculated from the following:    Height as of this encounter: 1.524 m (5').    Weight as of this encounter: 122.3 kg (269 lb 10 oz)., PRESENT ON ADMISSION     # Financial/Environmental Concerns: none         Social Drivers of Health            Disposition Plan     Medically Ready for Discharge: Anticipated Tomorrow    Eduar Do MD  Hospitalist Service  [facility]  Securely message with [employer] (more info)  Text page via [facility] Paging/Directory   ______________________________________________________________________    Interval History     Patient is in the chair. States she is a little better today. Still has cough. Eating and drinking. No new complaints    Physical Exam   Vital Signs: Temp: 97.7  F (36.5  C) Temp src: Oral BP: 106/74 Pulse: 89   Resp: 24 SpO2: 93 % O2 Device: None  (Room air) Oxygen Delivery: 1 LPM  Weight: 269 lbs 9.96 oz    Constitutional: awake, alert, cooperative, no apparent distress, and appears stated age  Eyes: Lids and lashes normal, pupils equal, round and reactive to light, extra ocular muscles intact, sclera clear, conjunctiva normal  ENT: Normocephalic, without obvious abnormality, atraumatic, sinuses nontender on palpation, external ears without lesions, oral pharynx with moist mucous membranes, tonsils without erythema or exudates, gums normal and good dentition.  Respiratory: no crackles, occ exp wheeze  Cardiovascular: Normal apical impulse, regular rate and rhythm, normal S1 and S2, no S3 or S4, and no murmur noted    Medical Decision Making       40 MINUTES SPENT BY ME on the date of service doing chart review, history, exam, documentation & further activities per the note.      Data     I have personally reviewed the following data over the past 24 hrs:    6.6  \   12.1   / 201     139 97 (L) 25.9 (H) /  179 (H)   3.8 28 0.83 \     ALT: 23 AST: 73 (H) AP: 146 TBILI: 0.6   ALB: 3.1 (L) TOT PROTEIN: 7.2 LIPASE: N/A       Imaging results reviewed over the past 24 hrs:   No results found for this or any previous visit (from the past 24 hours).

## 2025-05-29 NOTE — PLAN OF CARE
"Goal Outcome Evaluation:      Plan of Care Reviewed With: patient    Overall Patient Progress: improvingOverall Patient Progress: improving    Outcome Evaluation: CPAP, on 1 l nc  Pt is A & O with confusion, up A x 1 walker/ GB, on 2 L nc CPAP, PT recs home with assist, 1800 fluid restriction, buttock pressure injury covered with mepilex, discharge back to residential.    Problem: Adult Inpatient Plan of Care  Goal: Plan of Care Review  Description: The Plan of Care Review/Shift note should be completed every shift.  The Outcome Evaluation is a brief statement about your assessment that the patient is improving, declining, or no change.  This information will be displayed automatically on your shift  note.  Outcome: Progressing  Flowsheets (Taken 5/29/2025 0124)  Outcome Evaluation: CPAP, on 1 l nc  Plan of Care Reviewed With: patient  Overall Patient Progress: improving  Goal: Patient-Specific Goal (Individualized)  Description: You can add care plan individualizations to a care plan. Examples of Individualization might be:  \"Parent requests to be called daily at 9am for status\", \"I have a hard time hearing out of my right ear\", or \"Do not touch me to wake me up as it startles  me\".  Outcome: Progressing  Goal: Absence of Hospital-Acquired Illness or Injury  Outcome: Progressing  Goal: Optimal Comfort and Wellbeing  Outcome: Progressing  Goal: Readiness for Transition of Care  Outcome: Progressing     Problem: Delirium  Goal: Optimal Coping  Outcome: Progressing  Goal: Improved Behavioral Control  Outcome: Progressing  Goal: Improved Attention and Thought Clarity  Outcome: Progressing  Goal: Improved Sleep  Outcome: Progressing         "

## 2025-05-29 NOTE — PLAN OF CARE
"Goal Outcome Evaluation:      Plan of Care Reviewed With: patient    Overall Patient Progress: improvingOverall Patient Progress: improving    Outcome Evaluation: Weaned to RA.        /74 (BP Location: Left arm)   Pulse 89   Temp 97.7  F (36.5  C) (Oral)   Resp 24   Ht 1.524 m (5')   Wt 122.3 kg (269 lb 10 oz)   LMP  (LMP Unknown)   SpO2 93%   BMI 52.66 kg/m      On RA.    Pertinent Assessments: A/Ox4. Forgetful. Anxious at times. PETTY noted. Frequent productive cough noted. BLE edema +1. Ernesto legs. Denies pain. Up with 1-2A pivot. WC bound at baseline.   Major Shift Events: Weaned to RA. Glucose checks ACHS added. IV steroids changed to PO.   Treatment Plan: Lovenox. Remdesivir. PO steroids. CM following for discharge planning. [facility]?      Problem: Adult Inpatient Plan of Care  Goal: Plan of Care Review  Description: The Plan of Care Review/Shift note should be completed every shift.  The Outcome Evaluation is a brief statement about your assessment that the patient is improving, declining, or no change.  This information will be displayed automatically on your shift  note.  Outcome: Progressing  Flowsheets (Taken 5/29/2025 1123)  Outcome Evaluation: Weaned to RA.  Plan of Care Reviewed With: patient  Overall Patient Progress: improving  Goal: Patient-Specific Goal (Individualized)  Description: You can add care plan individualizations to a care plan. Examples of Individualization might be:  \"Parent requests to be called daily at 9am for status\", \"I have a hard time hearing out of my right ear\", or \"Do not touch me to wake me up as it startles  me\".  Outcome: Progressing  Goal: Absence of Hospital-Acquired Illness or Injury  Outcome: Progressing  Intervention: Identify and Manage Fall Risk  Recent Flowsheet Documentation  Taken 5/29/2025 0800 by Kerry Ramirez RN  Safety Promotion/Fall Prevention:   activity supervised   clutter free environment maintained   " nonskid shoes/slippers when out of bed   patient and family education   safety round/check completed  Intervention: Prevent and Manage VTE (Venous Thromboembolism) Risk  Recent Flowsheet Documentation  Taken 5/29/2025 0800 by Kerry Ramirez RN  VTE Prevention/Management: compression stockings off  Intervention: Prevent Infection  Recent Flowsheet Documentation  Taken 5/29/2025 0800 by Kerry Ramirez RN  Infection Prevention:   single patient room provided   rest/sleep promoted   personal protective equipment utilized  Goal: Optimal Comfort and Wellbeing  Outcome: Progressing  Goal: Readiness for Transition of Care  Outcome: Progressing     Problem: Delirium  Goal: Optimal Coping  Outcome: Progressing  Goal: Improved Behavioral Control  Outcome: Progressing  Intervention: Minimize Safety Risk  Recent Flowsheet Documentation  Taken 5/29/2025 0800 by Kerry Ramirez RN  Enhanced Safety Measures:   pain management   review medications for side effects with activity  Goal: Improved Attention and Thought Clarity  Outcome: Progressing  Goal: Improved Sleep  Outcome: Progressing     Problem: Comorbidity Management  Goal: Maintenance of Heart Failure Symptom Control  Outcome: Progressing  Intervention: Maintain Heart Failure Management  Recent Flowsheet Documentation  Taken 5/29/2025 0800 by Kerry Ramirez RN  Medication Review/Management: medications reviewed

## 2025-05-29 NOTE — PROVIDER NOTIFICATION
05/28/25 2310   Tech Time   $Tech Time (10 minute increments) 3   Mode: CPAP/ BiPAP/ AVAPS/ AVAPS AE   CPAP/BiPAP/ AVAPS/ AVAPS AE Mode CPAP   Equipment   Device Resmed   CPAP/BiPAP/Settings   BIPAP/CPAP On Standby On   O2 Flow Rate (L/min) 2   CPAP/BiPAP Patient Parameters   CPAP (cm H2O) 8 cmh2o   RR Total (breaths/min) 20 breaths/min   CPAP/BiPAP/AVAPS/AVAPS AE Alarms   Humidifier Checked N/A   RT Device Skin Assessment   Oxygen Delivery Device CPAP/BiPAP Mask   Interface Face Mask - Small   Ventilator Arm In Place No   Site Appearance neck circumference Clean and dry   Site Appearance bridge of nose Clean and dry   Site Appearance occiput Clean and dry   Strap Tightness Finger Allowance between head and device strap   Device Skin Interventions Taken No adjustments needed   Respiratory WDL   Respiratory WDL X   Rhythm/Pattern, Respiratory assisted mechanically   Expansion/Accessory Muscles/Retractions expansion symmetric;no retractions;no use of accessory muscles     Hilario Richmond, RT on 5/28/2025 at 11:12 PM

## 2025-05-30 ENCOUNTER — APPOINTMENT (OUTPATIENT)
Dept: PHYSICAL THERAPY | Facility: CLINIC | Age: 89
DRG: 177 | End: 2025-05-30
Payer: COMMERCIAL

## 2025-05-30 LAB
GLUCOSE BLDC GLUCOMTR-MCNC: 122 MG/DL (ref 70–99)
GLUCOSE BLDC GLUCOMTR-MCNC: 145 MG/DL (ref 70–99)
GLUCOSE BLDC GLUCOMTR-MCNC: 149 MG/DL (ref 70–99)
GLUCOSE BLDC GLUCOMTR-MCNC: 152 MG/DL (ref 70–99)
GLUCOSE BLDC GLUCOMTR-MCNC: 242 MG/DL (ref 70–99)

## 2025-05-30 PROCEDURE — 120N000001 HC R&B MED SURG/OB

## 2025-05-30 PROCEDURE — 250N000012 HC RX MED GY IP 250 OP 636 PS 637: Performed by: HOSPITALIST

## 2025-05-30 PROCEDURE — 97110 THERAPEUTIC EXERCISES: CPT | Mod: GP | Performed by: PHYSICAL THERAPIST

## 2025-05-30 PROCEDURE — 94640 AIRWAY INHALATION TREATMENT: CPT

## 2025-05-30 PROCEDURE — 250N000011 HC RX IP 250 OP 636: Mod: JZ | Performed by: INTERNAL MEDICINE

## 2025-05-30 PROCEDURE — 999N000156 HC STATISTIC RCP CONSULT EA 30 MIN

## 2025-05-30 PROCEDURE — 258N000003 HC RX IP 258 OP 636: Performed by: INTERNAL MEDICINE

## 2025-05-30 PROCEDURE — 99232 SBSQ HOSP IP/OBS MODERATE 35: CPT | Performed by: HOSPITALIST

## 2025-05-30 PROCEDURE — 97530 THERAPEUTIC ACTIVITIES: CPT | Mod: GP | Performed by: PHYSICAL THERAPIST

## 2025-05-30 PROCEDURE — 94640 AIRWAY INHALATION TREATMENT: CPT | Mod: 76

## 2025-05-30 PROCEDURE — 999N000157 HC STATISTIC RCP TIME EA 10 MIN

## 2025-05-30 PROCEDURE — 250N000009 HC RX 250: Performed by: INTERNAL MEDICINE

## 2025-05-30 PROCEDURE — 250N000013 HC RX MED GY IP 250 OP 250 PS 637: Performed by: INTERNAL MEDICINE

## 2025-05-30 RX ORDER — PREDNISONE 20 MG/1
TABLET ORAL
Qty: 5 TABLET | Refills: 0 | Status: SHIPPED | OUTPATIENT
Start: 2025-06-01 | End: 2025-06-07

## 2025-05-30 RX ORDER — BENZONATATE 100 MG/1
100 CAPSULE ORAL 3 TIMES DAILY PRN
Qty: 20 CAPSULE | Refills: 0 | Status: SHIPPED | OUTPATIENT
Start: 2025-05-30

## 2025-05-30 RX ORDER — BENZONATATE 100 MG/1
100 CAPSULE ORAL 3 TIMES DAILY PRN
Qty: 20 CAPSULE | Refills: 0 | Status: SHIPPED | OUTPATIENT
Start: 2025-05-30 | End: 2025-05-30

## 2025-05-30 RX ORDER — PREDNISONE 20 MG/1
TABLET ORAL
Qty: 5 TABLET | Refills: 0 | Status: SHIPPED | OUTPATIENT
Start: 2025-05-31 | End: 2025-05-30

## 2025-05-30 RX ADMIN — PREDNISONE 20 MG: 20 TABLET ORAL at 09:21

## 2025-05-30 RX ADMIN — LEVOTHYROXINE SODIUM 88 MCG: 0.09 TABLET ORAL at 09:21

## 2025-05-30 RX ADMIN — IPRATROPIUM BROMIDE AND ALBUTEROL SULFATE 3 ML: .5; 3 SOLUTION RESPIRATORY (INHALATION) at 19:49

## 2025-05-30 RX ADMIN — IPRATROPIUM BROMIDE AND ALBUTEROL SULFATE 3 ML: .5; 3 SOLUTION RESPIRATORY (INHALATION) at 07:35

## 2025-05-30 RX ADMIN — SODIUM CHLORIDE 50 ML: 900 INJECTION INTRAVENOUS at 06:32

## 2025-05-30 RX ADMIN — DULOXETINE 60 MG: 60 CAPSULE, DELAYED RELEASE ORAL at 09:20

## 2025-05-30 RX ADMIN — ACETAMINOPHEN 975 MG: 325 TABLET, FILM COATED ORAL at 09:21

## 2025-05-30 RX ADMIN — GUAIFENESIN 600 MG: 600 TABLET, EXTENDED RELEASE ORAL at 21:09

## 2025-05-30 RX ADMIN — DEXTRAN 70, GLYCERIN, HYPROMELLOSE 1 DROP: 1; 2; 3 SOLUTION/ DROPS OPHTHALMIC at 14:02

## 2025-05-30 RX ADMIN — Medication 3 MG: at 21:10

## 2025-05-30 RX ADMIN — GABAPENTIN 400 MG: 400 CAPSULE ORAL at 14:01

## 2025-05-30 RX ADMIN — TORSEMIDE 20 MG: 20 TABLET ORAL at 09:20

## 2025-05-30 RX ADMIN — ACETAMINOPHEN 975 MG: 325 TABLET, FILM COATED ORAL at 21:08

## 2025-05-30 RX ADMIN — ACETAMINOPHEN 975 MG: 325 TABLET, FILM COATED ORAL at 14:01

## 2025-05-30 RX ADMIN — ENOXAPARIN SODIUM 40 MG: 40 INJECTION SUBCUTANEOUS at 09:19

## 2025-05-30 RX ADMIN — INSULIN ASPART 1 UNITS: 100 INJECTION, SOLUTION INTRAVENOUS; SUBCUTANEOUS at 18:41

## 2025-05-30 RX ADMIN — GABAPENTIN 400 MG: 400 CAPSULE ORAL at 09:21

## 2025-05-30 RX ADMIN — IPRATROPIUM BROMIDE AND ALBUTEROL SULFATE 3 ML: .5; 3 SOLUTION RESPIRATORY (INHALATION) at 11:27

## 2025-05-30 RX ADMIN — FLUTICASONE FUROATE AND VILANTEROL TRIFENATATE 1 PUFF: 100; 25 POWDER RESPIRATORY (INHALATION) at 07:35

## 2025-05-30 RX ADMIN — REMDESIVIR 100 MG: 100 INJECTION, POWDER, LYOPHILIZED, FOR SOLUTION INTRAVENOUS at 05:58

## 2025-05-30 RX ADMIN — POTASSIUM CHLORIDE 40 MEQ: 1500 TABLET, EXTENDED RELEASE ORAL at 09:20

## 2025-05-30 RX ADMIN — IPRATROPIUM BROMIDE AND ALBUTEROL SULFATE 3 ML: .5; 3 SOLUTION RESPIRATORY (INHALATION) at 15:20

## 2025-05-30 RX ADMIN — GABAPENTIN 400 MG: 400 CAPSULE ORAL at 21:08

## 2025-05-30 RX ADMIN — INSULIN ASPART 5 UNITS: 100 INJECTION, SOLUTION INTRAVENOUS; SUBCUTANEOUS at 14:01

## 2025-05-30 RX ADMIN — GUAIFENESIN 600 MG: 600 TABLET, EXTENDED RELEASE ORAL at 09:20

## 2025-05-30 RX ADMIN — ENOXAPARIN SODIUM 40 MG: 40 INJECTION SUBCUTANEOUS at 21:10

## 2025-05-30 RX ADMIN — ALLOPURINOL 100 MG: 100 TABLET ORAL at 09:20

## 2025-05-30 RX ADMIN — DEXTRAN 70, GLYCERIN, HYPROMELLOSE 1 DROP: 1; 2; 3 SOLUTION/ DROPS OPHTHALMIC at 09:21

## 2025-05-30 ASSESSMENT — ACTIVITIES OF DAILY LIVING (ADL)
ADLS_ACUITY_SCORE: 46

## 2025-05-30 NOTE — PLAN OF CARE
"Pt is Aox4 and calm but is prone to anxiety. IV is saline locked. Pt denies pain. Pt is up to bedside commode SBA with walker and GB. Pt choosing to sleep in recliner chair during the night. Pt on 1L O2 via NC and will try to wean closer to the morning. Mepilex pads in place over buttocks wounds. Legs elevated on pillows in chair. Pt is on special precautions for COVID infection. RT, PT, and SW following. Continue to follow plan of care.     /70 (BP Location: Left arm)   Pulse 86   Temp 97.7  F (36.5  C) (Axillary)   Resp 18   Ht 1.524 m (5')   Wt 122.3 kg (269 lb 10 oz)   LMP  (LMP Unknown)   SpO2 93%   BMI 52.66 kg/m             Goal Outcome Evaluation:      Plan of Care Reviewed With: patient    Overall Patient Progress: no changeOverall Patient Progress: no change    Outcome Evaluation: pt had some shortness of breath. up to bedside commode with SBA. on 1L O2 via NC overnight, will wean closer to the morning depending on tolerance        Problem: Adult Inpatient Plan of Care  Goal: Plan of Care Review  Description: The Plan of Care Review/Shift note should be completed every shift.  The Outcome Evaluation is a brief statement about your assessment that the patient is improving, declining, or no change.  This information will be displayed automatically on your shift  note.  Outcome: Progressing  Flowsheets (Taken 5/30/2025 0402)  Outcome Evaluation: pt had some shortness of breath. up to bedside commode with SBA. on 1L O2 via NC overnight, will wean closer to the morning depending on tolerance  Plan of Care Reviewed With: patient  Overall Patient Progress: no change  Goal: Patient-Specific Goal (Individualized)  Description: You can add care plan individualizations to a care plan. Examples of Individualization might be:  \"Parent requests to be called daily at 9am for status\", \"I have a hard time hearing out of my right ear\", or \"Do not touch me to wake me up as it startles  me\".  Outcome: " Progressing  Goal: Absence of Hospital-Acquired Illness or Injury  Outcome: Progressing  Intervention: Identify and Manage Fall Risk  Recent Flowsheet Documentation  Taken 5/29/2025 2230 by Rai Teran RN  Safety Promotion/Fall Prevention:   assistive device/personal items within reach   clutter free environment maintained   nonskid shoes/slippers when out of bed   safety round/check completed  Taken 5/29/2025 2035 by Rai Teran RN  Safety Promotion/Fall Prevention:   assistive device/personal items within reach   clutter free environment maintained   nonskid shoes/slippers when out of bed   safety round/check completed  Intervention: Prevent Skin Injury  Recent Flowsheet Documentation  Taken 5/29/2025 2230 by Rai Teran RN  Body Position: weight shifting  Taken 5/29/2025 2035 by Rai Teran RN  Body Position: weight shifting  Intervention: Prevent and Manage VTE (Venous Thromboembolism) Risk  Recent Flowsheet Documentation  Taken 5/29/2025 2035 by Rai Trean RN  VTE Prevention/Management: compression stockings off  Goal: Optimal Comfort and Wellbeing  Outcome: Progressing  Intervention: Monitor Pain and Promote Comfort  Recent Flowsheet Documentation  Taken 5/29/2025 2035 by Rai Teran RN  Pain Management Interventions: medication (see MAR)  Taken 5/29/2025 2034 by Rai Teran RN  Pain Management Interventions: medication (see MAR)  Goal: Readiness for Transition of Care  Outcome: Progressing     Problem: Delirium  Goal: Optimal Coping  Outcome: Progressing  Goal: Improved Behavioral Control  Outcome: Progressing  Goal: Improved Attention and Thought Clarity  Outcome: Progressing  Goal: Improved Sleep  Outcome: Progressing     Problem: Comorbidity Management  Goal: Maintenance of Heart Failure Symptom Control  Outcome: Progressing  Intervention: Maintain Heart Failure Management  Recent Flowsheet Documentation  Taken 5/29/2025 2035 by Rai Teran RN  Medication Review/Management:  medications reviewed

## 2025-05-30 NOTE — PLAN OF CARE
"Goal Outcome Evaluation:      Plan of Care Reviewed With: patient    Overall Patient Progress: improvingOverall Patient Progress: improving    Outcome Evaluation: A&O x4, denies of pain. Was on 0.5 L oxygen, weaned to RA, complains of dry cough. Wound care done per plan of care. Pure wick in use.     Plan: discharge tomorrow, transportation set up between [id number], please see SW note.         Problem: Adult Inpatient Plan of Care  Goal: Plan of Care Review  Description: The Plan of Care Review/Shift note should be completed every shift.  The Outcome Evaluation is a brief statement about your assessment that the patient is improving, declining, or no change.  This information will be displayed automatically on your shift  note.  Outcome: Progressing  Flowsheets (Taken 5/30/2025 1258)  Outcome Evaluation: A&O x4, denies of pain.  Plan of Care Reviewed With: patient  Overall Patient Progress: improving  Goal: Patient-Specific Goal (Individualized)  Description: You can add care plan individualizations to a care plan. Examples of Individualization might be:  \"Parent requests to be called daily at 9am for status\", \"I have a hard time hearing out of my right ear\", or \"Do not touch me to wake me up as it startles  me\".  Outcome: Progressing  Goal: Absence of Hospital-Acquired Illness or Injury  Outcome: Progressing  Intervention: Identify and Manage Fall Risk  Recent Flowsheet Documentation  Taken 5/30/2025 0918 by Lucy Veras RN  Safety Promotion/Fall Prevention: safety round/check completed  Intervention: Prevent Skin Injury  Recent Flowsheet Documentation  Taken 5/30/2025 0918 by Lucy Veras RN  Body Position: weight shifting  Intervention: Prevent and Manage VTE (Venous Thromboembolism) Risk  Recent Flowsheet Documentation  Taken 5/30/2025 0918 by Lucy Veras RN  VTE Prevention/Management: compression stockings off  Intervention: Prevent Infection  Recent Flowsheet Documentation  Taken 5/30/2025 0918 by " Lucy Veras RN  Infection Prevention: hand hygiene promoted  Goal: Optimal Comfort and Wellbeing  Outcome: Progressing  Goal: Readiness for Transition of Care  Outcome: Progressing     Problem: Delirium  Goal: Optimal Coping  Outcome: Progressing  Goal: Improved Behavioral Control  Outcome: Progressing  Intervention: Minimize Safety Risk  Recent Flowsheet Documentation  Taken 5/30/2025 0918 by Lucy Veras RN  Enhanced Safety Measures: review medications for side effects with activity  Goal: Improved Attention and Thought Clarity  Outcome: Progressing  Goal: Improved Sleep  Outcome: Progressing     Problem: Comorbidity Management  Goal: Maintenance of Heart Failure Symptom Control  Outcome: Progressing  Intervention: Maintain Heart Failure Management  Recent Flowsheet Documentation  Taken 5/30/2025 0918 by Lucy Veras RN  Medication Review/Management: medications reviewed

## 2025-05-30 NOTE — PROGRESS NOTES
Care Management Follow Up    Length of Stay (days): 3    Expected Discharge Date: 05/31/2025     Concerns to be Addressed: discharge planning     Patient plan of care discussed at interdisciplinary rounds: Yes    Anticipated Discharge Disposition: Assisted Living, Home Care    Anticipated Discharge Services: None  Anticipated Discharge DME: None    Patient/family educated on Medicare website which has current facility and service quality ratings: yes  Education Provided on the Discharge Plan: Yes  Patient/Family in Agreement with the Plan: yes    Referrals Placed by CM/SW: Homecare  Private pay costs discussed: transportation costs    Discussed  Partnership in Safe Discharge Planning  document with patient/family: Yes     Handoff Completed: No, handoff not indicated or clinically appropriate    Additional Information:  Per provider pt is medically ready for return to [facility] w/ HC RN/PT/OT/HHA. Pt already open to [facility] for HC RN, informed them of order change.    Spoke with dtr Alea, family unable to provide transportation due to pt's mobility status. Stretcher transport booked out until after 1900 today, [facility] needing pt to return by 1400. Will plan for discharge tomorrow 5/31 between 2547-8338. Bedside RN updated, she will inform pt of time. Updated provider who will update Alea with ride time. Electronic PCS form completed in Epic.     Spoke with Maricarmen [employer] [facility] nurse Stella and updated. She is requesting we fill medications here and send with. Saturday 5/31 YAYO/GILDA should contact on-call weekend nurse P: [phone] with any updates. Orders should be sent to [facility] at F: [phone].     Next Steps: Send orders to [facility] +     Justa Castellon RN BSN   Inpatient Care Coordination  [facility]   Phone [phone]

## 2025-05-30 NOTE — PROGRESS NOTES
[facility]    Medicine Progress Note - Hospitalist Service    Date of Admission:  5/27/2025    Assessment & Plan   Carol Merida is a 89 year old female with PMH including morbid obesity, diastolic CHF, JEAN on CPAP, reactive airway disease, MDD, anxiety, panic attacks, hypothyroidism, lymphedema, recurrent right lower extremity cellulitis, chronic cough, chronic pain, neuropathy, osteoarthritis, sacral pressure ulcers, and multiple joint replacement surgeries who presents with cough and shortness of breath.      Found to have Covid  Has been weaned off oxygen  Plan for discharge back to her [facility] tomorrow morning (orders done and meds have been filled)    Acute hypoxemic respiratory failure  COVID-19 pneumonia  Acute reactive airway disease  Chronic cough  JEAN    - found to have Covid on admission    - admission CXR: patchy interstitial opacities left lower lobe and some central pulmonary vascular congestion    - cont remdesevir     - initially on methylprednisolone 40mg BID    - now on Prednisone 20mg, with short taper on discharge     - cont to wean O2: weaned to RA yesterday, but was on O2 overnight due to not using CPAP    - cont Breo Ellipta inhaler substituted for formulary, Mucinex twice daily and add Tessalon Perles as needed    - cont CPAP at bedtime (she had family bring hers in yesterday, but apparently RT stated she could not use is?? No note from RT)    - resume home CPAP on discharge (family to have it looked at)    Chronic diastolic CHF  Chronic lymphedema  History of recurrent right lower extremity cellulitis    - was given zosyn in the ED, stopped as there is no concern for active infection    - cont PTA torsemide 20 mg daily and 20 meq potassium daily    - lymphedema consult    - BNP normal on admission    - elevate legs (R>L), will order blue foam wedge    - 2 g sodium restriction      Chronic pain  Osteoarthritis  Neuropathy    - history of bilateral TKA, right HUGO, and  right femur IM nail    - cont home gabapentin 400 mg 3 times daily, acetaminophen 3 times daily, and celecoxib 100 mg twice daily as needed     Gout    - cont allopurinol 100 mg daily     Morbid obesity    - BMI 46     - hold Ozempic while inpatient     Hypothyroidism    - resume PTA levothyroxine 88 mcg daily     MDD  Anxiety  Panic attacks    - resume PTA duloxetine 60 mg daily and lorazepam 0.5 mg every 6 hours as needed     History sacral pressure ulcers  Right Buttock Pressure Injury, Stage 3 (POA)     -  seen by [facility]    Called and spoke with daughter    No labs needed in am       Diet: Combination Diet 2 gm NA Diet  Diet    DVT Prophylaxis: Enoxaparin (Lovenox) SQ  Benson Catheter: Not present  Lines: None     Cardiac Monitoring: None  Code Status: No CPR- Pre-arrest intubation OK      Clinically Significant Risk Factors          # Hypochloremia: Lowest Cl = 97 mmol/L in last 2 days, will monitor as appropriate      # Hypoalbuminemia: Lowest albumin = 3.1 g/dL at 5/28/2025 11:52 AM, will monitor as appropriate     # Hypertension: Noted on problem list    # Chronic heart failure with preserved ejection fraction: heart failure noted on problem list and last echo with EF >50%          # DMII: A1C = 6.6 % (Ref range: <5.7 %) within past 6 months, PRESENT ON ADMISSION  # Morbid Obesity: Estimated body mass index is 52.66 kg/m  as calculated from the following:    Height as of this encounter: 1.524 m (5').    Weight as of this encounter: 122.3 kg (269 lb 10 oz)., PRESENT ON ADMISSION     # Financial/Environmental Concerns: none         Social Drivers of Health            Disposition Plan     Medically Ready for Discharge: Anticipated Tomorrow    Eduar Do MD  Hospitalist Service  [facility]  Securely message with Moe (more info)  Text page via [employer] Paging/Directory   ______________________________________________________________________    Interval History     Patient is in the chair.  States she is a little better each day. No chest pain or increased SOB. No abdo pain, n/v/d. Eating and drinking well. +BM    Physical Exam   Vital Signs: Temp: 97.6  F (36.4  C) Temp src: Oral BP: 105/67 Pulse: 89   Resp: 20 SpO2: 93 % O2 Device: None (Room air) Oxygen Delivery: 1/2 LPM  Weight: 269 lbs 9.96 oz    Constitutional: awake, alert, cooperative, no apparent distress, and appears stated age  Eyes: Lids and lashes normal, pupils equal, round and reactive to light, extra ocular muscles intact, sclera clear, conjunctiva normal  ENT: Normocephalic, without obvious abnormality, atraumatic, sinuses nontender on palpation, external ears without lesions, oral pharynx with moist mucous membranes, tonsils without erythema or exudates, gums normal and good dentition.  Respiratory: no crackles, occ exp wheeze  Cardiovascular: Normal apical impulse, regular rate and rhythm, normal S1 and S2, no S3 or S4, and no murmur noted    Medical Decision Making       40 MINUTES SPENT BY ME on the date of service doing chart review, history, exam, documentation & further activities per the note.      Data         Imaging results reviewed over the past 24 hrs:   No results found for this or any previous visit (from the past 24 hours).

## 2025-05-31 VITALS
SYSTOLIC BLOOD PRESSURE: 130 MMHG | HEART RATE: 86 BPM | HEIGHT: 60 IN | DIASTOLIC BLOOD PRESSURE: 92 MMHG | OXYGEN SATURATION: 93 % | RESPIRATION RATE: 18 BRPM | WEIGHT: 269.62 LBS | TEMPERATURE: 97.9 F | BODY MASS INDEX: 52.93 KG/M2

## 2025-05-31 LAB
GLUCOSE BLDC GLUCOMTR-MCNC: 120 MG/DL (ref 70–99)
GLUCOSE BLDC GLUCOMTR-MCNC: 120 MG/DL (ref 70–99)

## 2025-05-31 PROCEDURE — 94640 AIRWAY INHALATION TREATMENT: CPT

## 2025-05-31 PROCEDURE — 250N000011 HC RX IP 250 OP 636: Mod: JZ | Performed by: INTERNAL MEDICINE

## 2025-05-31 PROCEDURE — 250N000013 HC RX MED GY IP 250 OP 250 PS 637: Performed by: HOSPITALIST

## 2025-05-31 PROCEDURE — 250N000009 HC RX 250: Performed by: INTERNAL MEDICINE

## 2025-05-31 PROCEDURE — 999N000157 HC STATISTIC RCP TIME EA 10 MIN

## 2025-05-31 PROCEDURE — 99239 HOSP IP/OBS DSCHRG MGMT >30: CPT | Performed by: INTERNAL MEDICINE

## 2025-05-31 PROCEDURE — 250N000013 HC RX MED GY IP 250 OP 250 PS 637: Performed by: INTERNAL MEDICINE

## 2025-05-31 PROCEDURE — 250N000012 HC RX MED GY IP 250 OP 636 PS 637: Performed by: HOSPITALIST

## 2025-05-31 PROCEDURE — 258N000003 HC RX IP 258 OP 636: Performed by: INTERNAL MEDICINE

## 2025-05-31 RX ADMIN — ENOXAPARIN SODIUM 40 MG: 40 INJECTION SUBCUTANEOUS at 08:12

## 2025-05-31 RX ADMIN — GUAIFENESIN 600 MG: 600 TABLET, EXTENDED RELEASE ORAL at 08:10

## 2025-05-31 RX ADMIN — FLUTICASONE FUROATE AND VILANTEROL TRIFENATATE 1 PUFF: 100; 25 POWDER RESPIRATORY (INHALATION) at 07:34

## 2025-05-31 RX ADMIN — POTASSIUM CHLORIDE 40 MEQ: 1500 TABLET, EXTENDED RELEASE ORAL at 08:09

## 2025-05-31 RX ADMIN — ALLOPURINOL 100 MG: 100 TABLET ORAL at 08:09

## 2025-05-31 RX ADMIN — DULOXETINE 60 MG: 60 CAPSULE, DELAYED RELEASE ORAL at 08:10

## 2025-05-31 RX ADMIN — TORSEMIDE 20 MG: 20 TABLET ORAL at 08:11

## 2025-05-31 RX ADMIN — ACETAMINOPHEN 975 MG: 325 TABLET, FILM COATED ORAL at 08:08

## 2025-05-31 RX ADMIN — GUAIFENESIN SYRUP AND DEXTROMETHORPHAN 10 ML: 100; 10 SYRUP ORAL at 06:46

## 2025-05-31 RX ADMIN — GABAPENTIN 400 MG: 400 CAPSULE ORAL at 08:09

## 2025-05-31 RX ADMIN — PREDNISONE 20 MG: 20 TABLET ORAL at 08:11

## 2025-05-31 RX ADMIN — IPRATROPIUM BROMIDE AND ALBUTEROL SULFATE 3 ML: .5; 3 SOLUTION RESPIRATORY (INHALATION) at 07:34

## 2025-05-31 RX ADMIN — REMDESIVIR 100 MG: 100 INJECTION, POWDER, LYOPHILIZED, FOR SOLUTION INTRAVENOUS at 05:39

## 2025-05-31 RX ADMIN — LEVOTHYROXINE SODIUM 88 MCG: 0.09 TABLET ORAL at 08:10

## 2025-05-31 RX ADMIN — SODIUM CHLORIDE 50 ML: 900 INJECTION INTRAVENOUS at 06:14

## 2025-05-31 RX ADMIN — DEXTRAN 70, GLYCERIN, HYPROMELLOSE 1 DROP: 1; 2; 3 SOLUTION/ DROPS OPHTHALMIC at 08:12

## 2025-05-31 ASSESSMENT — ACTIVITIES OF DAILY LIVING (ADL)
ADLS_ACUITY_SCORE: 47
ADLS_ACUITY_SCORE: 47
ADLS_ACUITY_SCORE: 46
ADLS_ACUITY_SCORE: 47
ADLS_ACUITY_SCORE: 46
ADLS_ACUITY_SCORE: 47

## 2025-05-31 NOTE — PROGRESS NOTES
Care Management Discharge Note    Discharge Date: 05/31/2025       Discharge Disposition: Assisted Living, Home Care    Discharge Services: Home Care Resumption of Rn and new PT     Discharge DME: None    Discharge Transportation: agency    Private pay costs discussed: transportation costs    Does the patient's insurance plan have a 3 day qualifying hospital stay waiver?  No     Patient/family educated on Medicare website which has current facility and service quality ratings: yes    Education Provided on the Discharge Plan: Yes  Persons Notified of Discharge Plans: JARETH May at [facility]IV[facility]   Patient/Family in Agreement with the Plan: yes    Handoff Referral Completed: No, handoff not indicated or clinically appropriate    Additional Information:  Called [facility]. Spoke with Yadira.  [phone]  Faxed [phone]  She is aware that final orders have been faxed and confirmed transportation time  Sent home care orders to [facility]     Corinne C. White, [facility]  Care t team   [phone]

## 2025-05-31 NOTE — DISCHARGE SUMMARY
[facility]    Discharge Summary  Hospitalist    Date of Admission:  5/27/2025  Date of Discharge:  5/31/2025  Discharging Provider: Montserrat Chavis MD  Date of Service (when I saw the patient): 05/31/25    Discharge Diagnoses   Acute hypoxemic respiratory failure  COVID-19 pneumonia  Acute reactive airway disease  Chronic cough  JEAN  Chronic diastolic CHF  Chronic lymphedema  History of recurrent right lower extremity cellulitis  Chronic pain  Osteoarthritis  Neuropathy  Gout  Morbid obesity  Hypothyroidism  MDD  Anxiety  Panic attacks  History sacral pressure ulcers    History of Present Illness   Carol Merida is a 89 year old woman who was admitted on 5/27/2025. PMH significant for morbid obesity, diastolic CHF, JEAN on CPAP, reactive airway disease, MDD, anxiety, panic attacks, hypothyroidism, lymphedema, recurrent right lower extremity cellulitis, chronic cough, chronic pain, neuropathy, osteoarthritis, sacral pressure ulcers, and multiple joint replacement surgeries who presents with cough and shortness of breath.       Found to have Covid. Has been weaned off oxygen  Plan for discharge back to her [facility].    Hospital Course   Carol Merida was admitted on 5/27/2025.  The following problems were addressed during her hospitalization:    Acute hypoxemic respiratory failure  COVID-19 pneumonia  Acute reactive airway disease  Chronic cough  JEAN    - found to have Covid on admission    - admission CXR: patchy interstitial opacities left lower lobe and some central pulmonary vascular congestion    - Patient received remdesevir     - initially on methylprednisolone 40mg BID    - now on Prednisone 20mg, with short taper on discharge     - On room air.     - cont Breo Ellipta inhaler substituted for formulary, Mucinex twice daily and add Tessalon Perles as needed    - cont CPAP at bedtime     - Follow up with primary care in next week.      Chronic diastolic CHF  Chronic lymphedema  History of  recurrent right lower extremity cellulitis    - was given zosyn in the ED, stopped as there is no concern for active infection    - cont PTA torsemide 20 mg daily and 20 meq potassium daily    - lymphedema consult    - BNP normal on admission    - elevate legs (R>L), will order blue foam wedge    - 2 g sodium restriction      Chronic pain  Osteoarthritis  Neuropathy    - history of bilateral TKA, right HUGO, and right femur IM nail    - cont home gabapentin 400 mg 3 times daily, acetaminophen 3 times daily, and celecoxib 100 mg twice daily as needed     Gout    - cont allopurinol 100 mg daily     Morbid obesity    - BMI 46     - noted. Complicates cares.      Hypothyroidism    - resume PTA levothyroxine 88 mcg daily     MDD  Anxiety  Panic attacks    - resume PTA duloxetine 60 mg daily and lorazepam 0.5 mg every 6 hours as needed     History sacral pressure ulcers  Right Buttock Pressure Injury, Stage 3 (POA)     -  seen by [facility]. Recommend continued outpatient follow up.     Montserrat Chavis MD FACP  Hospitalist Service  [facility]      Significant Results and Procedures   Imaging and lab results below    Pending Results   These results will be followed up by primary care  Unresulted Labs Ordered in the Past 30 Days of this Admission       Date and Time Order Name Status Description    5/27/2025  4:00 AM Blood Culture Peripheral blood (BC) Hand, Left Preliminary     5/27/2025  4:00 AM Blood Culture Peripheral blood (BC) Arm, Right Preliminary             Code Status   DNR       Primary Care Physician   Ruben Humphrey    Physical Exam   Temp: 97.9  F (36.6  C) Temp src: Oral BP: (!) 130/92 Pulse: 86   Resp: 18 SpO2: 93 % O2 Device: None (Room air) Oxygen Delivery: 2 LPM  Vitals:    05/27/25 0234 05/27/25 0923   Weight: 118.4 kg (261 lb) 122.3 kg (269 lb 10 oz)     Vital Signs with Ranges  Temp:  [97.6  F (36.4  C)-98.1  F (36.7  C)] 97.9  F (36.6  C)  Pulse:  [57-93] 86  Resp:  [16-20] 18  BP:  (105-137)/(67-92) 130/92  SpO2:  [90 %-96 %] 93 %  I/O last 3 completed shifts:  In: 600 [P.O.:600]  Out: 2700 [Urine:2700]    Constitutional: Pleasant woman seen sitting up in her chair and ordering breakfast. Alert and oriented x3. No acute distress.   HEENT: NCAT. EOMI. Moist oral mucosa.  Respiratory: Occasional cough. No increased work of breathing. On room air.   Musculoskeletal: No gross deformities.   Neurologic: Alert and oriented x3. No focal neurologic deficits. Did not assess gait.    Discharge Disposition   Discharged to assisted living  Condition at discharge: Stable    Consultations This Hospital Stay   LYMPHEDEMA THERAPY IP CONSULT  PHYSICAL THERAPY ADULT IP CONSULT  CARE MANAGEMENT / SOCIAL WORK IP CONSULT  WOUND OSTOMY CONTINENCE NURSE  IP CONSULT    Time Spent on this Encounter   IMontserrat MD, personally saw the patient today and spent greater than 30 minutes discharging this patient.    Discharge Orders      Home Care Referral      Reason for your hospital stay    Acute hypoxemic respiratory failure  COVID-19 pneumonia  Acute reactive airway disease/COPD exacerbation  Chronic cough     Activity    Your activity upon discharge: activity as tolerated     Discharge Instructions    Buttock wound(s): Every 3 days and prn  1. Cleanse with wound cleanser and dry  2. Apply Mepilex 4x4 to each buttock     Diet    Follow this diet upon discharge: Current Diet:Orders Placed This Encounter      Combination Diet 2 gm NA Diet     Hospital Follow-up with Existing Primary Care Provider (PCP)          Discharge Medications   Current Discharge Medication List        START taking these medications    Details   benzonatate (TESSALON) 100 MG capsule Take 1 capsule (100 mg) by mouth 3 times daily as needed for cough.  Qty: 20 capsule, Refills: 0    Associated Diagnoses: COVID-19 virus infection; COPD exacerbation (H)      predniSONE (DELTASONE) 20 MG tablet Take 1 tablet (20 mg) by mouth daily for 3 days,  THEN 0.5 tablets (10 mg) daily for 3 days.  Qty: 5 tablet, Refills: 0    Associated Diagnoses: COVID-19 virus infection; COPD exacerbation (H)           CONTINUE these medications which have NOT CHANGED    Details   acetaminophen (TYLENOL) 500 MG tablet Take 1,000 mg by mouth 3 times daily.      albuterol (PROAIR HFA/PROVENTIL HFA/VENTOLIN HFA) 108 (90 Base) MCG/ACT inhaler INHALE 2 PUFFS INTO THE LUNGS EVERY 4 HOURS AS NEEDED FOR SHORTNESS OF BREATH OR DIFFICULT BREATHING OR WHEEZING  Qty: 8.5 g, Refills: 0    Comments: Pharmacy may dispense brand covered by insurance (Proair, or proventil or ventolin or generic albuterol inhaler)  Associated Diagnoses: SOB (shortness of breath)      allopurinol (ZYLOPRIM) 100 MG tablet Take 100 mg by mouth daily START 7/10      !! Artificial Saliva (BIOTENE DRY MOUTH MOIST SPRAY MT) Take 1 spray by mouth every 2 hours as needed (dry mouth).      !! ARTIFICIAL SALIVA MT Take 1 strip by mouth 2 times daily After nebs      calcium carbonate 600 mg-vitamin D 400 units (CALTRATE) 600-400 MG-UNIT per tablet Take 1 tablet by mouth every evening   Qty: 100 tablet, Refills: 3    Associated Diagnoses: Osteopenia      carbamide peroxide (DEBROX) 6.5 % otic solution Place 3 drops into both ears twice a week. Mon & Thur      celecoxib (CELEBREX) 100 MG capsule Take 100 mg by mouth 2 times daily as needed for moderate pain (4-6)      cycloSPORINE (CYCLOSPORINE IN KLARITY) 0.1 % EMUL Place 1 drop into both eyes 2 times daily 0630 and 2145      diphenhydrAMINE (BENADRYL) 25 MG capsule Take 25 mg by mouth every 6 hours as needed for itching.      DULoxetine (CYMBALTA) 60 MG capsule Take 60 mg by mouth daily      estradiol (ESTRACE) 0.1 MG/GM vaginal cream Place vaginally every 3 days. Sig: 3 times weekly, actually being given every 3 days.      fluticasone-salmeterol (ADVAIR) 100-50 MCG/ACT inhaler Inhale 1 puff into the lungs 2 times daily.      gabapentin (NEURONTIN) 400 MG capsule Take 400 mg  by mouth 3 times daily. 630 AM 2  PM      guaiFENesin 400 MG TABS Take 400 mg by mouth 2 times daily      hydrocortisone, Perianal, (ANUSOL-HC) 2.5 % cream Place rectally daily as needed for hemorrhoids.      !! ipratropium - albuterol 0.5 mg/2.5 mg/3 mL (DUONEB) 0.5-2.5 (3) MG/3ML neb solution Take 1 vial by nebulization 3 times daily. 630 am, 2 pm, 945 pm      !! ipratropium - albuterol 0.5 mg/2.5 mg/3 mL (DUONEB) 0.5-2.5 (3) MG/3ML neb solution Take 1 vial by nebulization every 4 hours as needed for shortness of breath or wheezing.      levothyroxine (SYNTHROID/LEVOTHROID) 88 MCG tablet Take 1 tablet (88 mcg) by mouth daily  Qty: 90 tablet, Refills: 0    Associated Diagnoses: Hypothyroidism, unspecified type      LORazepam (ATIVAN) 0.5 MG tablet Take 0.5 mg by mouth every 6 hours as needed for anxiety (or shortness of breath).      melatonin 3 MG tablet Take 3 mg by mouth nightly as needed for sleep.      nystatin (NYSTOP) 554832 UNIT/GM external powder Apply tid to affectead area prn  Qty: 60 g, Refills: 3    Associated Diagnoses: Intertrigo      OZEMPIC, 0.25 OR 0.5 MG/DOSE, 2 MG/3ML pen Inject 0.25 mg subcutaneously every 7 days.      polyethylene glycol (MIRALAX) 17 g packet Take 1 packet by mouth daily as needed for constipation      polyvinyl alcohol (ARTIFICIAL TEARS) 1.4 % ophthalmic solution Place 1 drop into both eyes 3 times daily.      potassium chloride stuart ER (KLOR-CON M20) 20 MEQ CR tablet Take 40 mEq by mouth daily      PseudoePHEDrine-guaiFENesin 120-1200 MG TB12 Take 1 tablet by mouth 2 times daily as needed for congestion or cough.      torsemide (DEMADEX) 20 MG tablet Take 20 mg by mouth daily.      triamcinolone (KENALOG) 0.1 % external cream Apply topically every evening. To lower extremities      White Petrolatum-Mineral Oil (LUBRIFRESH P.M.) OINT Place into both eyes at bedtime. Apply a small strip into both eyes      zinc oxide (DESITIN) 40 % external ointment Apply topically as  needed for dry skin or irritation (with toileting)       !! - Potential duplicate medications found. Please discuss with provider.        STOP taking these medications       fluticasone (FLONASE) 50 MCG/ACT nasal spray Comments:   Reason for Stopping:             Allergies   Allergies   Allergen Reactions    Ace Inhibitors Cough    Ceftriaxone Rash     Diffuse rash after IV ceftriaxone 1/20/25.  Previously tolerated other cephalosporins including recent course of keflex.     Data   Most Recent 3 CBC's:  Recent Labs   Lab Test 05/29/25  0537 05/28/25  1152 05/27/25  0244   WBC 6.6 5.1 5.3   HGB 12.1 12.1 11.2*   MCV 96 96 97    199 176      Most Recent 3 BMP's:  Recent Labs   Lab Test 05/31/25  0212 05/30/25  2155 05/30/25  1811 05/29/25  1316 05/29/25  0537 05/28/25  1152 05/27/25  0244   NA  --   --   --   --  139 136 139   POTASSIUM  --   --   --   --  3.8 4.1 4.0   CHLORIDE  --   --   --   --  97* 96* 101   CO2  --   --   --   --  28 26 25   BUN  --   --   --   --  25.9* 19.0 14.9   CR  --   --   --   --  0.83 0.68 0.79   ANIONGAP  --   --   --   --  14 14 13   LAKSHMI  --   --   --   --  9.0 9.1 9.0   * 152* 145*   < > 179* 196* 122*    < > = values in this interval not displayed.     Most Recent 2 LFT's:  Recent Labs   Lab Test 05/28/25  1152 05/27/25  0244   AST 73* 81*   ALT 23 25   ALKPHOS 146 150   BILITOTAL 0.6 1.1     Most Recent TSH, T4 and A1c Labs:  Recent Labs   Lab Test 04/29/25  1056   TSH 2.29   A1C 6.6*     Results for orders placed or performed during the hospital encounter of 05/27/25   Chest XR,  PA & LAT    Narrative    EXAM: XR CHEST 2 VIEWS  LOCATION: [facility]  DATE: 5/27/2025    INDICATION: Hypoxia, Covid+  COMPARISON: None.      Impression    IMPRESSION: The heart is at the upper limits of normal. There is central pulmonary venous congestion increased patchy interstitial opacities are seen in the left lower lobe posteriorly which may reflect developing  infectious process.     *Note: Due to a large number of results and/or encounters for the requested time period, some results have not been displayed. A complete set of results can be found in Results Review.

## 2025-05-31 NOTE — PLAN OF CARE
"  Problem: Adult Inpatient Plan of Care  Goal: Plan of Care Review  Description: The Plan of Care Review/Shift note should be completed every shift.  The Outcome Evaluation is a brief statement about your assessment that the patient is improving, declining, or no change.  This information will be displayed automatically on your shift  note.  5/30/2025 2259 by Sonny Chandra RN  Flowsheets (Taken 5/30/2025 2259)  Outcome Evaluation: Alert and oriented, denying pain.  frequent cough, tolerating NC at 2L.  Pt resting between cares  5/30/2025 2259 by Sonny Chandra RN  Outcome: Progressing  Flowsheets (Taken 5/30/2025 2259)  Outcome Evaluation: Alert and oriented, denying pain.  frequent cough, tolerating NC at 2L.  Pt resting between cares  Goal: Patient-Specific Goal (Individualized)  Description: You can add care plan individualizations to a care plan. Examples of Individualization might be:  \"Parent requests to be called daily at 9am for status\", \"I have a hard time hearing out of my right ear\", or \"Do not touch me to wake me up as it startles  me\".  Outcome: Progressing  Goal: Absence of Hospital-Acquired Illness or Injury  Outcome: Progressing  Intervention: Identify and Manage Fall Risk  Recent Flowsheet Documentation  Taken 5/30/2025 2115 by Sonny Chandra RN  Safety Promotion/Fall Prevention: safety round/check completed  Intervention: Prevent Skin Injury  Recent Flowsheet Documentation  Taken 5/30/2025 2115 by Sonny Chandra RN  Body Position: weight shifting  Intervention: Prevent and Manage VTE (Venous Thromboembolism) Risk  Recent Flowsheet Documentation  Taken 5/30/2025 2115 by Sonny Chandra RN  VTE Prevention/Management: compression stockings off  Intervention: Prevent Infection  Recent Flowsheet Documentation  Taken 5/30/2025 2115 by Sonny Chandra RN  Infection Prevention:   equipment surfaces disinfected   single patient room provided   hand hygiene promoted   personal protective " equipment utilized  Goal: Optimal Comfort and Wellbeing  Outcome: Progressing  Intervention: Provide Person-Centered Care  Recent Flowsheet Documentation  Taken 5/30/2025 2115 by Sonny Chandra, RN  Trust Relationship/Rapport:   care explained   questions answered   thoughts/feelings acknowledged  Goal: Readiness for Transition of Care  Outcome: Progressing

## 2025-05-31 NOTE — PLAN OF CARE
Physical Therapy Discharge Summary    Reason for therapy discharge:    Discharged to home with home therapy.    Progress towards therapy goal(s). See goals on Care Plan in [facility] electronic health record for goal details.  Goals not met.  Barriers to achieving goals:   discharge from facility.    Therapy recommendation(s):    Continued therapy is recommended.  Rationale/Recommendations:  Recommend HHPT to progress independence with mobility, functional strength and activity tolerance.

## 2025-05-31 NOTE — PLAN OF CARE
"Pt is alert and oriented. Pt denies any pain. Breathing is unlabored and pt on 2L NC overnight.    Blood glucose: 120. Redemsevir administered.    External catheter in place. Pt declined to get into bed overnight; slept on the recliner chair with lower extremities elevated, chair alarm on.    Special precaution maintained. Ongoing monitoring.    Plan: Discharge back to [facility] today.    BP (!) 130/92 (BP Location: Left arm, Patient Position: Chair, Cuff Size: Adult Large)   Pulse 86   Temp 98.1  F (36.7  C) (Oral)   Resp 16   Ht 1.524 m (5')   Wt 122.3 kg (269 lb 10 oz)   LMP  (LMP Unknown)   SpO2 96%   BMI 52.66 kg/m       Problem: Adult Inpatient Plan of Care  Goal: Plan of Care Review  Description: The Plan of Care Review/Shift note should be completed every shift.  The Outcome Evaluation is a brief statement about your assessment that the patient is improving, declining, or no change.  This information will be displayed automatically on your shift  note.  Outcome: Progressing  Flowsheets (Taken 5/31/2025 0321)  Outcome Evaluation: Pt denies any pain. O2 2L NC overnight.  Plan of Care Reviewed With: patient  Overall Patient Progress: improving  Goal: Patient-Specific Goal (Individualized)  Description: You can add care plan individualizations to a care plan. Examples of Individualization might be:  \"Parent requests to be called daily at 9am for status\", \"I have a hard time hearing out of my right ear\", or \"Do not touch me to wake me up as it startles  me\".  Outcome: Progressing  Goal: Absence of Hospital-Acquired Illness or Injury  Outcome: Progressing  Intervention: Identify and Manage Fall Risk  Recent Flowsheet Documentation  Taken 5/31/2025 0300 by Robe Haynes RN  Safety Promotion/Fall Prevention: safety round/check completed  Taken 5/31/2025 0200 by Robe Haynes RN  Safety Promotion/Fall Prevention: safety round/check completed  Taken 5/31/2025 0100 by Robe Haynes, JARETH  Safety " Promotion/Fall Prevention: safety round/check completed  Taken 5/31/2025 0000 by Robe Haynes RN  Safety Promotion/Fall Prevention: safety round/check completed  Taken 5/30/2025 2336 by Robe Haynes RN  Safety Promotion/Fall Prevention:   safety round/check completed   patient and family education   nonskid shoes/slippers when out of bed   lighting adjusted   assistive device/personal items within reach   activity supervised  Intervention: Prevent Skin Injury  Recent Flowsheet Documentation  Taken 5/30/2025 2336 by Robe Haynes RN  Body Position: (in chair)   other (see comments)   heels elevated   upper extremity elevated  Intervention: Prevent and Manage VTE (Venous Thromboembolism) Risk  Recent Flowsheet Documentation  Taken 5/30/2025 2336 by Robe Haynes RN  VTE Prevention/Management: SCDs off (sequential compression devices)  Intervention: Prevent Infection  Recent Flowsheet Documentation  Taken 5/30/2025 2336 by Robe Haynes RN  Infection Prevention:   single patient room provided   rest/sleep promoted   personal protective equipment utilized   hand hygiene promoted  Goal: Optimal Comfort and Wellbeing  Outcome: Progressing  Goal: Readiness for Transition of Care  Outcome: Progressing     Problem: Delirium  Goal: Optimal Coping  Outcome: Progressing  Goal: Improved Behavioral Control  Outcome: Progressing  Intervention: Minimize Safety Risk  Recent Flowsheet Documentation  Taken 5/30/2025 2336 by Robe Haynes RN  Enhanced Safety Measures:   room near unit station   review medications for side effects with activity  Goal: Improved Attention and Thought Clarity  Outcome: Progressing  Goal: Improved Sleep  Outcome: Progressing     Problem: Comorbidity Management  Goal: Maintenance of Heart Failure Symptom Control  Outcome: Progressing  Intervention: Maintain Heart Failure Management  Recent Flowsheet Documentation  Taken 5/30/2025 2336 by Robe Haynes RN  Medication  Review/Management: medications reviewed   Goal Outcome Evaluation:      Plan of Care Reviewed With: patient    Overall Patient Progress: improvingOverall Patient Progress: improving    Outcome Evaluation: Pt denies any pain. O2 2L NC overnight.

## 2025-05-31 NOTE — PROGRESS NOTES
DISCHARGE                         5/31/2025 10:01 AM  -----------------------------------------------------------------------  Discharged to: Home  Via: EMS stretcher  Accompanied by: Family  Discharge Instructions: diet, activity, medications, follow up appointments, when to call the MD, aftercare instructions all sent to facility  Prescriptions: To be filled  and sent by [facility]  Belongings: All sent with pt  IV: d/c'd  Pt exhibits understanding of above discharge instructions; all questions answered.    Discharge Paperwork: Signed, copied, and sent home with patient.

## 2025-06-01 LAB
BACTERIA SPEC CULT: NO GROWTH
BACTERIA SPEC CULT: NO GROWTH

## 2025-06-02 ENCOUNTER — PATIENT OUTREACH (OUTPATIENT)
Dept: CARE COORDINATION | Facility: CLINIC | Age: 89
End: 2025-06-02
Payer: COMMERCIAL

## 2025-06-02 NOTE — PROGRESS NOTES
Connected Care Resource Center: [facility]    Background: Transitional Care Management program identified per system criteria and reviewed by [facility] [place] team for possible outreach.    Assessment: Upon chart review, [facility] Team member will not proceed with patient outreach related to this episode of Transitional Care Management program due to reason below:    Patient has discharged to a Memory Care, Long-term Care, Assisted Living or Group Home where patient is receiving on-site support with their daily cares, including support with hospital follow up plan.    Plan: Transitional Care Management episode addressed appropriately per reason noted above.      KATELIN Lambert  Connected [facility] [place] [facility] [place], [facility]    *Connected Care Resource Team does NOT follow patient ongoing. Referrals are identified based on internal discharge reports and the outreach is to ensure patient has an understanding of their discharge instructions.

## 2025-07-16 ENCOUNTER — APPOINTMENT (OUTPATIENT)
Dept: ULTRASOUND IMAGING | Facility: CLINIC | Age: 89
DRG: 391 | End: 2025-07-16
Attending: EMERGENCY MEDICINE
Payer: COMMERCIAL

## 2025-07-16 ENCOUNTER — HOSPITAL ENCOUNTER (INPATIENT)
Facility: CLINIC | Age: 89
DRG: 391 | End: 2025-07-16
Attending: EMERGENCY MEDICINE | Admitting: INTERNAL MEDICINE
Payer: COMMERCIAL

## 2025-07-16 ENCOUNTER — APPOINTMENT (OUTPATIENT)
Dept: CT IMAGING | Facility: CLINIC | Age: 89
DRG: 391 | End: 2025-07-16
Attending: EMERGENCY MEDICINE
Payer: COMMERCIAL

## 2025-07-16 DIAGNOSIS — K59.00 CONSTIPATION, UNSPECIFIED CONSTIPATION TYPE: ICD-10-CM

## 2025-07-16 DIAGNOSIS — F41.9 ANXIETY: Primary | ICD-10-CM

## 2025-07-16 DIAGNOSIS — J44.1 COPD EXACERBATION (H): ICD-10-CM

## 2025-07-16 DIAGNOSIS — R10.84 GENERALIZED ABDOMINAL PAIN: ICD-10-CM

## 2025-07-16 DIAGNOSIS — K80.20 CALCULUS OF GALLBLADDER WITHOUT CHOLECYSTITIS WITHOUT OBSTRUCTION: ICD-10-CM

## 2025-07-16 DIAGNOSIS — J44.9 COPD, SEVERE (H): ICD-10-CM

## 2025-07-16 DIAGNOSIS — R09.02 HYPOXIA: ICD-10-CM

## 2025-07-16 LAB
ALBUMIN SERPL BCG-MCNC: 3.8 G/DL (ref 3.5–5.2)
ALBUMIN SERPL BCG-MCNC: 3.9 G/DL (ref 3.5–5.2)
ALBUMIN UR-MCNC: NEGATIVE MG/DL
ALP SERPL-CCNC: 167 U/L (ref 40–150)
ALP SERPL-CCNC: 169 U/L (ref 40–150)
ALT SERPL W P-5'-P-CCNC: 45 U/L (ref 0–50)
ALT SERPL W P-5'-P-CCNC: 46 U/L (ref 0–50)
ANION GAP SERPL CALCULATED.3IONS-SCNC: 14 MMOL/L (ref 7–15)
APPEARANCE UR: CLEAR
AST SERPL W P-5'-P-CCNC: 95 U/L (ref 0–45)
AST SERPL W P-5'-P-CCNC: ABNORMAL U/L
ATRIAL RATE - MUSE: 94 BPM
BASE EXCESS BLDV CALC-SCNC: -0.8 MMOL/L (ref -3–3)
BASOPHILS # BLD AUTO: 0 10E3/UL (ref 0–0.2)
BASOPHILS NFR BLD AUTO: 0 %
BILIRUB DIRECT SERPL-MCNC: 0.66 MG/DL (ref 0–0.3)
BILIRUB SERPL-MCNC: 1.1 MG/DL
BILIRUB SERPL-MCNC: 1.2 MG/DL
BILIRUB UR QL STRIP: ABNORMAL
BUN SERPL-MCNC: 19.9 MG/DL (ref 8–23)
CALCIUM SERPL-MCNC: 9.5 MG/DL (ref 8.8–10.4)
CHLORIDE SERPL-SCNC: 99 MMOL/L (ref 98–107)
CK SERPL-CCNC: 43 U/L (ref 26–192)
COLOR UR AUTO: ABNORMAL
CREAT SERPL-MCNC: 0.89 MG/DL (ref 0.51–0.95)
DIASTOLIC BLOOD PRESSURE - MUSE: NORMAL MMHG
EGFRCR SERPLBLD CKD-EPI 2021: 62 ML/MIN/1.73M2
EOSINOPHIL # BLD AUTO: 0.3 10E3/UL (ref 0–0.7)
EOSINOPHIL NFR BLD AUTO: 3 %
ERYTHROCYTE [DISTWIDTH] IN BLOOD BY AUTOMATED COUNT: 15.2 % (ref 10–15)
FLUAV RNA SPEC QL NAA+PROBE: NEGATIVE
FLUBV RNA RESP QL NAA+PROBE: NEGATIVE
GLUCOSE SERPL-MCNC: 149 MG/DL (ref 70–99)
GLUCOSE UR STRIP-MCNC: NEGATIVE MG/DL
HCO3 BLDV-SCNC: 26 MMOL/L (ref 21–28)
HCO3 SERPL-SCNC: 24 MMOL/L (ref 22–29)
HCT VFR BLD AUTO: 41.8 % (ref 35–47)
HGB BLD-MCNC: 13.2 G/DL (ref 11.7–15.7)
HGB UR QL STRIP: NEGATIVE
HOLD SPECIMEN: NORMAL
HOLD SPECIMEN: NORMAL
IMM GRANULOCYTES # BLD: 0 10E3/UL
IMM GRANULOCYTES NFR BLD: 1 %
INTERPRETATION ECG - MUSE: NORMAL
KETONES UR STRIP-MCNC: NEGATIVE MG/DL
LACTATE SERPL-SCNC: 2 MMOL/L (ref 0.7–2)
LEUKOCYTE ESTERASE UR QL STRIP: NEGATIVE
LIPASE SERPL-CCNC: 8 U/L (ref 13–60)
LYMPHOCYTES # BLD AUTO: 0.8 10E3/UL (ref 0.8–5.3)
LYMPHOCYTES NFR BLD AUTO: 11 %
MCH RBC QN AUTO: 31.4 PG (ref 26.5–33)
MCHC RBC AUTO-ENTMCNC: 31.6 G/DL (ref 31.5–36.5)
MCV RBC AUTO: 99 FL (ref 78–100)
MONOCYTES # BLD AUTO: 0.3 10E3/UL (ref 0–1.3)
MONOCYTES NFR BLD AUTO: 3 %
MUCOUS THREADS #/AREA URNS LPF: PRESENT /LPF
NEUTROPHILS # BLD AUTO: 6.4 10E3/UL (ref 1.6–8.3)
NEUTROPHILS NFR BLD AUTO: 82 %
NITRATE UR QL: NEGATIVE
NRBC # BLD AUTO: 0 10E3/UL
NRBC BLD AUTO-RTO: 0 /100
NT-PROBNP SERPL-MCNC: 275 PG/ML (ref 0–624)
O2/TOTAL GAS SETTING VFR VENT: 0 %
OXYHGB MFR BLDV: 73 % (ref 70–75)
P AXIS - MUSE: 53 DEGREES
PCO2 BLDV: 52 MM HG (ref 40–50)
PH BLDV: 7.31 [PH] (ref 7.32–7.43)
PH UR STRIP: 5.5 [PH] (ref 5–7)
PLATELET # BLD AUTO: 233 10E3/UL (ref 150–450)
PO2 BLDV: 42 MM HG (ref 25–47)
POTASSIUM SERPL-SCNC: 5 MMOL/L (ref 3.4–5.3)
PR INTERVAL - MUSE: 184 MS
PROT SERPL-MCNC: 7.9 G/DL (ref 6.4–8.3)
PROT SERPL-MCNC: 8 G/DL (ref 6.4–8.3)
QRS DURATION - MUSE: 82 MS
QT - MUSE: 358 MS
QTC - MUSE: 447 MS
R AXIS - MUSE: -14 DEGREES
RBC # BLD AUTO: 4.21 10E6/UL (ref 3.8–5.2)
RBC URINE: 3 /HPF
RSV RNA SPEC NAA+PROBE: NEGATIVE
SAO2 % BLDV: 74.2 % (ref 70–75)
SARS-COV-2 RNA RESP QL NAA+PROBE: POSITIVE
SODIUM SERPL-SCNC: 137 MMOL/L (ref 135–145)
SP GR UR STRIP: 1.02 (ref 1–1.03)
SQUAMOUS EPITHELIAL: 4 /HPF
SYSTOLIC BLOOD PRESSURE - MUSE: NORMAL MMHG
T AXIS - MUSE: 28 DEGREES
TROPONIN T SERPL HS-MCNC: 21 NG/L
TROPONIN T SERPL HS-MCNC: 22 NG/L
UROBILINOGEN UR STRIP-MCNC: 6 MG/DL
VENTRICULAR RATE- MUSE: 94 BPM
WBC # BLD AUTO: 7.8 10E3/UL (ref 4–11)
WBC URINE: 1 /HPF

## 2025-07-16 PROCEDURE — 250N000011 HC RX IP 250 OP 636: Performed by: EMERGENCY MEDICINE

## 2025-07-16 PROCEDURE — 36415 COLL VENOUS BLD VENIPUNCTURE: CPT | Performed by: EMERGENCY MEDICINE

## 2025-07-16 PROCEDURE — 84484 ASSAY OF TROPONIN QUANT: CPT | Performed by: EMERGENCY MEDICINE

## 2025-07-16 PROCEDURE — 76705 ECHO EXAM OF ABDOMEN: CPT

## 2025-07-16 PROCEDURE — 99285 EMERGENCY DEPT VISIT HI MDM: CPT | Mod: 25

## 2025-07-16 PROCEDURE — 84075 ASSAY ALKALINE PHOSPHATASE: CPT | Performed by: EMERGENCY MEDICINE

## 2025-07-16 PROCEDURE — 94640 AIRWAY INHALATION TREATMENT: CPT

## 2025-07-16 PROCEDURE — G0378 HOSPITAL OBSERVATION PER HR: HCPCS

## 2025-07-16 PROCEDURE — 82805 BLOOD GASES W/O2 SATURATION: CPT | Performed by: EMERGENCY MEDICINE

## 2025-07-16 PROCEDURE — 5A09357 ASSISTANCE WITH RESPIRATORY VENTILATION, LESS THAN 24 CONSECUTIVE HOURS, CONTINUOUS POSITIVE AIRWAY PRESSURE: ICD-10-PCS | Performed by: INTERNAL MEDICINE

## 2025-07-16 PROCEDURE — 83690 ASSAY OF LIPASE: CPT | Performed by: EMERGENCY MEDICINE

## 2025-07-16 PROCEDURE — 87637 SARSCOV2&INF A&B&RSV AMP PRB: CPT | Performed by: EMERGENCY MEDICINE

## 2025-07-16 PROCEDURE — 85025 COMPLETE CBC W/AUTO DIFF WBC: CPT | Performed by: EMERGENCY MEDICINE

## 2025-07-16 PROCEDURE — 82248 BILIRUBIN DIRECT: CPT | Performed by: EMERGENCY MEDICINE

## 2025-07-16 PROCEDURE — 71275 CT ANGIOGRAPHY CHEST: CPT

## 2025-07-16 PROCEDURE — 99222 1ST HOSP IP/OBS MODERATE 55: CPT | Mod: FS | Performed by: COLON & RECTAL SURGERY

## 2025-07-16 PROCEDURE — 96374 THER/PROPH/DIAG INJ IV PUSH: CPT | Mod: 59

## 2025-07-16 PROCEDURE — 99223 1ST HOSP IP/OBS HIGH 75: CPT | Performed by: PHYSICIAN ASSISTANT

## 2025-07-16 PROCEDURE — 83605 ASSAY OF LACTIC ACID: CPT | Performed by: EMERGENCY MEDICINE

## 2025-07-16 PROCEDURE — 999N000156 HC STATISTIC RCP CONSULT EA 30 MIN

## 2025-07-16 PROCEDURE — 250N000013 HC RX MED GY IP 250 OP 250 PS 637: Performed by: PHYSICIAN ASSISTANT

## 2025-07-16 PROCEDURE — 250N000009 HC RX 250: Performed by: PHYSICIAN ASSISTANT

## 2025-07-16 PROCEDURE — G0463 HOSPITAL OUTPT CLINIC VISIT: HCPCS

## 2025-07-16 PROCEDURE — 83880 ASSAY OF NATRIURETIC PEPTIDE: CPT | Performed by: EMERGENCY MEDICINE

## 2025-07-16 PROCEDURE — 999N000157 HC STATISTIC RCP TIME EA 10 MIN

## 2025-07-16 PROCEDURE — 250N000013 HC RX MED GY IP 250 OP 250 PS 637: Performed by: INTERNAL MEDICINE

## 2025-07-16 PROCEDURE — 85048 AUTOMATED LEUKOCYTE COUNT: CPT | Performed by: EMERGENCY MEDICINE

## 2025-07-16 PROCEDURE — 250N000009 HC RX 250: Performed by: EMERGENCY MEDICINE

## 2025-07-16 PROCEDURE — 93005 ELECTROCARDIOGRAM TRACING: CPT

## 2025-07-16 PROCEDURE — 96375 TX/PRO/DX INJ NEW DRUG ADDON: CPT

## 2025-07-16 PROCEDURE — 81001 URINALYSIS AUTO W/SCOPE: CPT

## 2025-07-16 PROCEDURE — 99207 PR APP CREDIT; MD BILLING SHARED VISIT: CPT | Mod: FS

## 2025-07-16 PROCEDURE — 120N000001 HC R&B MED SURG/OB

## 2025-07-16 PROCEDURE — 82550 ASSAY OF CK (CPK): CPT

## 2025-07-16 RX ORDER — LORAZEPAM 0.5 MG/1
0.5 TABLET ORAL EVERY 6 HOURS PRN
Status: DISCONTINUED | OUTPATIENT
Start: 2025-07-16 | End: 2025-07-25 | Stop reason: HOSPADM

## 2025-07-16 RX ORDER — GUAIFENESIN 200 MG/1
400 TABLET ORAL 2 TIMES DAILY
Status: DISCONTINUED | OUTPATIENT
Start: 2025-07-16 | End: 2025-07-25 | Stop reason: HOSPADM

## 2025-07-16 RX ORDER — ACETAMINOPHEN 325 MG/1
650 TABLET ORAL EVERY 4 HOURS PRN
Status: DISCONTINUED | OUTPATIENT
Start: 2025-07-16 | End: 2025-07-25 | Stop reason: HOSPADM

## 2025-07-16 RX ORDER — POLYETHYLENE GLYCOL 3350 17 G/17G
17 POWDER, FOR SOLUTION ORAL 2 TIMES DAILY
Status: DISCONTINUED | OUTPATIENT
Start: 2025-07-17 | End: 2025-07-21

## 2025-07-16 RX ORDER — POTASSIUM CHLORIDE 1500 MG/1
40 TABLET, EXTENDED RELEASE ORAL DAILY
Status: DISCONTINUED | OUTPATIENT
Start: 2025-07-17 | End: 2025-07-25 | Stop reason: HOSPADM

## 2025-07-16 RX ORDER — ONDANSETRON 2 MG/ML
4 INJECTION INTRAMUSCULAR; INTRAVENOUS EVERY 30 MIN PRN
Status: DISCONTINUED | OUTPATIENT
Start: 2025-07-16 | End: 2025-07-16

## 2025-07-16 RX ORDER — IPRATROPIUM BROMIDE AND ALBUTEROL SULFATE 2.5; .5 MG/3ML; MG/3ML
1 SOLUTION RESPIRATORY (INHALATION)
Status: DISCONTINUED | OUTPATIENT
Start: 2025-07-16 | End: 2025-07-19

## 2025-07-16 RX ORDER — SALIVA STIMULANT COMB. NO.3
2 SPRAY, NON-AEROSOL (ML) MUCOUS MEMBRANE 4 TIMES DAILY PRN
Status: DISCONTINUED | OUTPATIENT
Start: 2025-07-16 | End: 2025-07-25 | Stop reason: HOSPADM

## 2025-07-16 RX ORDER — ALBUTEROL SULFATE 0.83 MG/ML
2.5 SOLUTION RESPIRATORY (INHALATION) EVERY 6 HOURS PRN
Status: DISCONTINUED | OUTPATIENT
Start: 2025-07-16 | End: 2025-07-25 | Stop reason: HOSPADM

## 2025-07-16 RX ORDER — SENNOSIDES 8.6 MG
2 TABLET ORAL AT BEDTIME
Status: DISCONTINUED | OUTPATIENT
Start: 2025-07-17 | End: 2025-07-25 | Stop reason: HOSPADM

## 2025-07-16 RX ORDER — DULOXETIN HYDROCHLORIDE 60 MG/1
60 CAPSULE, DELAYED RELEASE ORAL DAILY
Status: DISCONTINUED | OUTPATIENT
Start: 2025-07-16 | End: 2025-07-25 | Stop reason: HOSPADM

## 2025-07-16 RX ORDER — TORSEMIDE 20 MG/1
20 TABLET ORAL DAILY
Status: DISCONTINUED | OUTPATIENT
Start: 2025-07-17 | End: 2025-07-25 | Stop reason: HOSPADM

## 2025-07-16 RX ORDER — CARBOXYMETHYLCELLULOSE SODIUM 5 MG/ML
1 SOLUTION/ DROPS OPHTHALMIC 3 TIMES DAILY
Status: DISCONTINUED | OUTPATIENT
Start: 2025-07-16 | End: 2025-07-25 | Stop reason: HOSPADM

## 2025-07-16 RX ORDER — ACETAMINOPHEN 650 MG/1
650 SUPPOSITORY RECTAL EVERY 4 HOURS PRN
Status: DISCONTINUED | OUTPATIENT
Start: 2025-07-16 | End: 2025-07-25 | Stop reason: HOSPADM

## 2025-07-16 RX ORDER — ALLOPURINOL 100 MG/1
100 TABLET ORAL DAILY
Status: DISCONTINUED | OUTPATIENT
Start: 2025-07-16 | End: 2025-07-25 | Stop reason: HOSPADM

## 2025-07-16 RX ORDER — SIMETHICONE 40MG/0.6ML
40 SUSPENSION, DROPS(FINAL DOSAGE FORM)(ML) ORAL EVERY 6 HOURS PRN
Status: DISCONTINUED | OUTPATIENT
Start: 2025-07-16 | End: 2025-07-16

## 2025-07-16 RX ORDER — IPRATROPIUM BROMIDE AND ALBUTEROL SULFATE 2.5; .5 MG/3ML; MG/3ML
1 SOLUTION RESPIRATORY (INHALATION) EVERY 4 HOURS PRN
COMMUNITY

## 2025-07-16 RX ORDER — POLYVINYL ALCOHOL 14 MG/ML
1 SOLUTION/ DROPS OPHTHALMIC 3 TIMES DAILY
Status: DISCONTINUED | OUTPATIENT
Start: 2025-07-16 | End: 2025-07-16

## 2025-07-16 RX ORDER — PROCHLORPERAZINE MALEATE 5 MG/1
5 TABLET ORAL EVERY 6 HOURS PRN
Status: DISCONTINUED | OUTPATIENT
Start: 2025-07-16 | End: 2025-07-25 | Stop reason: HOSPADM

## 2025-07-16 RX ORDER — SIMETHICONE 80 MG
40 TABLET,CHEWABLE ORAL EVERY 6 HOURS PRN
Status: DISCONTINUED | OUTPATIENT
Start: 2025-07-16 | End: 2025-07-25 | Stop reason: HOSPADM

## 2025-07-16 RX ORDER — ESTRADIOL 0.1 MG/G
2 CREAM VAGINAL
Status: DISCONTINUED | OUTPATIENT
Start: 2025-07-18 | End: 2025-07-25 | Stop reason: HOSPADM

## 2025-07-16 RX ORDER — CELECOXIB 100 MG/1
100 CAPSULE ORAL 2 TIMES DAILY PRN
Status: DISCONTINUED | OUTPATIENT
Start: 2025-07-16 | End: 2025-07-25 | Stop reason: HOSPADM

## 2025-07-16 RX ORDER — LACTULOSE 10 G/15ML
20 SOLUTION ORAL
Status: ACTIVE | OUTPATIENT
Start: 2025-07-16 | End: 2025-07-17

## 2025-07-16 RX ORDER — IOPAMIDOL 755 MG/ML
500 INJECTION, SOLUTION INTRAVASCULAR ONCE
Status: COMPLETED | OUTPATIENT
Start: 2025-07-16 | End: 2025-07-16

## 2025-07-16 RX ORDER — GABAPENTIN 400 MG/1
400 CAPSULE ORAL 3 TIMES DAILY
Status: DISCONTINUED | OUTPATIENT
Start: 2025-07-16 | End: 2025-07-25 | Stop reason: HOSPADM

## 2025-07-16 RX ORDER — DEXAMETHASONE SODIUM PHOSPHATE 10 MG/ML
10 INJECTION, SOLUTION INTRAMUSCULAR; INTRAVENOUS ONCE
Status: COMPLETED | OUTPATIENT
Start: 2025-07-16 | End: 2025-07-16

## 2025-07-16 RX ORDER — ONDANSETRON 2 MG/ML
4 INJECTION INTRAMUSCULAR; INTRAVENOUS EVERY 6 HOURS PRN
Status: DISCONTINUED | OUTPATIENT
Start: 2025-07-16 | End: 2025-07-25 | Stop reason: HOSPADM

## 2025-07-16 RX ORDER — HYDROCORTISONE 25 MG/G
CREAM TOPICAL 2 TIMES DAILY
Status: DISCONTINUED | OUTPATIENT
Start: 2025-07-16 | End: 2025-07-25 | Stop reason: HOSPADM

## 2025-07-16 RX ORDER — LEVOTHYROXINE SODIUM 88 UG/1
88 TABLET ORAL
Status: DISCONTINUED | OUTPATIENT
Start: 2025-07-17 | End: 2025-07-25 | Stop reason: HOSPADM

## 2025-07-16 RX ORDER — KETOROLAC TROMETHAMINE 15 MG/ML
15 INJECTION, SOLUTION INTRAMUSCULAR; INTRAVENOUS ONCE
Status: COMPLETED | OUTPATIENT
Start: 2025-07-16 | End: 2025-07-16

## 2025-07-16 RX ORDER — MINERAL OIL AND PETROLATUM 150; 830 MG/G; MG/G
OINTMENT OPHTHALMIC AT BEDTIME
Status: DISCONTINUED | OUTPATIENT
Start: 2025-07-16 | End: 2025-07-25 | Stop reason: HOSPADM

## 2025-07-16 RX ORDER — IPRATROPIUM BROMIDE AND ALBUTEROL SULFATE 2.5; .5 MG/3ML; MG/3ML
3 SOLUTION RESPIRATORY (INHALATION)
Status: DISCONTINUED | OUTPATIENT
Start: 2025-07-16 | End: 2025-07-16

## 2025-07-16 RX ORDER — LIDOCAINE 40 MG/G
CREAM TOPICAL
Status: DISCONTINUED | OUTPATIENT
Start: 2025-07-16 | End: 2025-07-25 | Stop reason: HOSPADM

## 2025-07-16 RX ORDER — SULFAMETHOXAZOLE AND TRIMETHOPRIM 800; 160 MG/1; MG/1
1 TABLET ORAL 2 TIMES DAILY
Status: ON HOLD | COMMUNITY
Start: 2025-07-08 | End: 2025-07-25

## 2025-07-16 RX ORDER — IPRATROPIUM BROMIDE AND ALBUTEROL SULFATE 2.5; .5 MG/3ML; MG/3ML
1 SOLUTION RESPIRATORY (INHALATION)
Status: DISCONTINUED | OUTPATIENT
Start: 2025-07-16 | End: 2025-07-16

## 2025-07-16 RX ORDER — ONDANSETRON 4 MG/1
4 TABLET, ORALLY DISINTEGRATING ORAL EVERY 6 HOURS PRN
Status: DISCONTINUED | OUTPATIENT
Start: 2025-07-16 | End: 2025-07-25 | Stop reason: HOSPADM

## 2025-07-16 RX ORDER — CALCIUM CARBONATE 500 MG/1
1000 TABLET, CHEWABLE ORAL 4 TIMES DAILY PRN
Status: DISCONTINUED | OUTPATIENT
Start: 2025-07-16 | End: 2025-07-25 | Stop reason: HOSPADM

## 2025-07-16 RX ADMIN — MINERAL OIL, WHITE PETROLATUM: .03; .94 OINTMENT OPHTHALMIC at 21:22

## 2025-07-16 RX ADMIN — GABAPENTIN 400 MG: 400 CAPSULE ORAL at 14:20

## 2025-07-16 RX ADMIN — HYDROCORTISONE: 25 CREAM TOPICAL at 21:23

## 2025-07-16 RX ADMIN — IPRATROPIUM BROMIDE AND ALBUTEROL SULFATE 3 ML: .5; 3 SOLUTION RESPIRATORY (INHALATION) at 15:21

## 2025-07-16 RX ADMIN — DULOXETINE 60 MG: 60 CAPSULE, DELAYED RELEASE ORAL at 14:20

## 2025-07-16 RX ADMIN — IOPAMIDOL 100 ML: 755 INJECTION, SOLUTION INTRAVENOUS at 07:16

## 2025-07-16 RX ADMIN — CARBOXYMETHYLCELLULOSE SODIUM 1 DROP: 5 SOLUTION/ DROPS OPHTHALMIC at 21:21

## 2025-07-16 RX ADMIN — GUAIFENESIN 400 MG: 200 TABLET ORAL at 21:22

## 2025-07-16 RX ADMIN — SIMETHICONE 40 MG: 80 TABLET, CHEWABLE ORAL at 14:21

## 2025-07-16 RX ADMIN — GABAPENTIN 400 MG: 400 CAPSULE ORAL at 21:21

## 2025-07-16 RX ADMIN — ALLOPURINOL 100 MG: 100 TABLET ORAL at 14:21

## 2025-07-16 RX ADMIN — ONDANSETRON 4 MG: 2 INJECTION, SOLUTION INTRAMUSCULAR; INTRAVENOUS at 06:33

## 2025-07-16 RX ADMIN — IPRATROPIUM BROMIDE AND ALBUTEROL SULFATE 3 ML: .5; 3 SOLUTION RESPIRATORY (INHALATION) at 07:48

## 2025-07-16 RX ADMIN — SODIUM CHLORIDE 100 ML: 9 INJECTION, SOLUTION INTRAVENOUS at 07:16

## 2025-07-16 RX ADMIN — KETOROLAC TROMETHAMINE 15 MG: 15 INJECTION, SOLUTION INTRAMUSCULAR; INTRAVENOUS at 06:31

## 2025-07-16 RX ADMIN — DEXAMETHASONE SODIUM PHOSPHATE 10 MG: 10 INJECTION, SOLUTION INTRAMUSCULAR; INTRAVENOUS at 07:44

## 2025-07-16 ASSESSMENT — ACTIVITIES OF DAILY LIVING (ADL)
ADLS_ACUITY_SCORE: 61
ADLS_ACUITY_SCORE: 41
ADLS_ACUITY_SCORE: 61
ADLS_ACUITY_SCORE: 41
ADLS_ACUITY_SCORE: 61
ADLS_ACUITY_SCORE: 61
ADLS_ACUITY_SCORE: 41
ADLS_ACUITY_SCORE: 61

## 2025-07-16 ASSESSMENT — COLUMBIA-SUICIDE SEVERITY RATING SCALE - C-SSRS
1. IN THE PAST MONTH, HAVE YOU WISHED YOU WERE DEAD OR WISHED YOU COULD GO TO SLEEP AND NOT WAKE UP?: NO
2. HAVE YOU ACTUALLY HAD ANY THOUGHTS OF KILLING YOURSELF IN THE PAST MONTH?: NO
6. HAVE YOU EVER DONE ANYTHING, STARTED TO DO ANYTHING, OR PREPARED TO DO ANYTHING TO END YOUR LIFE?: NO

## 2025-07-16 NOTE — CONSULTS
[facility]   Consultation      Place of Service: [facility]  Reason for Consultation: Abdominal pain, obstipation   Consult Requested by: hospitalist    History of Present Illness: Carol Merida is an 89 yo F with history of morbid obesity BMI 47.6, CHF, JEAN on CPAP, MDD, hypothyroidism who presented to the ED from her nursing home for abdominal pain,constipation, and dyspnea. She was previously admitted 5/27-5/31 for COVID. She has difficulty with LLQ abdominal pain overnight. She has not had a BM for the past 5 days and denies passing gas. She has had nausea and vomiting w/ decrease in appetite. She does not typically use any laxatives, stool softeners, or fiber supplements. In the ED she was placed on 2LPM NC, vitally stable. She underwent CT Chest PE abd w/ that was negative for PE, gas and stool throughout the colon. Sigmoid colon is   extremely redundant extending into the upper central abdomen. This extends inferiorly without complete rotation of the sigmoid mesentery, but with some mild mesenteric narrowing. This could potentially lead to sigmoid volvulus. Recommend correlation for obstipation/constipation. No small bowel dilatation. No evidence for bowel wall thickening or perforation.     She has difficulty with her history and most information obtained with patients son. She had difficulty with abdominal pain and has not had a BM in the past 5 days. She states that typically she has a BM daily. She denies any rectal bleeding. She denies any family history of colorectal cancer, UC, or Crohn's. She has never had a colonoscopy or abdominal surgery.        Pertinent Labs:   Lab Results: personally reviewed   Lab Results   Component Value Date     07/16/2025     05/29/2025     05/28/2025     04/07/2021     02/13/2021     08/31/2020    CO2 24 07/16/2025    CO2 28 05/29/2025    CO2 26 05/28/2025    CO2 28 08/19/2022    CO2 28 11/04/2021    CO2 23  09/03/2021    CO2 29 04/07/2021    CO2 29 02/13/2021    CO2 27 08/31/2020    BUN 19.9 07/16/2025    BUN 25.9 05/29/2025    BUN 19.0 05/28/2025    BUN 13 08/19/2022    BUN 19 11/04/2021    BUN 15 09/03/2021    BUN 16 04/07/2021    BUN 12 02/13/2021    BUN 16 08/31/2020     Lab Results   Component Value Date    WBC 7.8 07/16/2025    WBC 6.6 05/29/2025    WBC 5.1 05/28/2025    WBC 7.6 04/07/2021    WBC 5.4 02/13/2021    WBC 7.5 03/12/2019    HGB 13.2 07/16/2025    HGB 12.1 05/29/2025    HGB 12.1 05/28/2025    HGB 13.8 04/07/2021    HGB 13.7 02/13/2021    HGB 14.6 03/12/2019    HCT 41.8 07/16/2025    HCT 37.7 05/29/2025    HCT 38.5 05/28/2025    HCT 42.8 04/07/2021    HCT 43.1 02/13/2021    HCT 45.6 03/12/2019    MCV 99 07/16/2025    MCV 96 05/29/2025    MCV 96 05/28/2025    MCV 93 04/07/2021    MCV 94 02/13/2021    MCV 91 03/12/2019     07/16/2025     05/29/2025     05/28/2025     04/07/2021     02/13/2021     03/12/2019       Pertinent Radiology:   Narrative & Impression   EXAM: CT CHEST PE ABDOMEN PELVIS W CONTRAST  LOCATION: [facility]  DATE: 7/16/2025     INDICATION: Shortness of breath. Left lower quadrant abdominal pain, nausea and vomiting.  COMPARISON: None.  TECHNIQUE: CT chest pulmonary angiogram and routine CT abdomen pelvis with IV contrast. Arterial phase through the chest and venous phase through the abdomen and pelvis. Multiplanar reformats and MIP reconstructions were performed. Dose reduction   techniques were used.   CONTRAST: 100mL Isovue 370.     FINDINGS:  ANGIOGRAM CHEST: Motion artifact limits evaluation. Allowing for the aforementioned limitation, there is no evidence for pulmonary embolism. Normal caliber thoracic aorta. No dissection. Minimal vascular calcification diffusely.     LUNGS AND PLEURA: Normal.     MEDIASTINUM/AXILLAE: Mild subpleural interstitial process involving both lower lungs which could be due to interstitial  fibrosis or mild edema. No significant pleural fluid. Mild linear atelectasis right lung base. Mild dependent atelectasis posterior   aspect both upper lobes. No evidence for definitive pulmonary infiltrate or pneumonia.     CORONARY ARTERY CALCIFICATION: Mild.     HEPATOBILIARY: 1.5 cm enhancing observation at the dome of the right hepatic lobe (13/24). Marked diffuse hepatic steatosis. Mild surface contour nodularity to the liver. Distended gallbladder with cholelithiasis with two large 1 cm gallstones near the   neck. Suggestion of mild edema adjacent to the gallbladder. No biliary dilatation. Patent portal vein.     PANCREAS: Diffuse fatty replacement. No ductal dilatation or inflammatory change.     SPLEEN: Normal size. Patent splenic vein.     ADRENAL GLANDS: Unremarkable.     KIDNEYS/BLADDER: Mild atrophy bilaterally. No urinary collecting system dilatation or evidence for obstructing calculi allowing for streak artifact in the pelvis, which limits evaluation of bladder and distal ureters.     BOWEL: Evaluation of bowel loops in the pelvis limited due to streak artifact from bilateral THAs. Marked amount of gas in the stool throughout the proximal colon extending in the descending colon and into the mid sigmoid colon. Sigmoid colon is   extremely redundant extending into the upper central abdomen. This extends inferiorly without complete rotation of the sigmoid mesentery, but with some mild mesenteric narrowing (series 13, image [id number]). This could potentially lead to sigmoid volvulus.   Recommend correlation for obstipation/constipation. No small bowel dilatation. No evidence for bowel wall thickening or perforation.     LYMPH NODES: No lymphadenopathy.     VASCULATURE: Normal caliber aorta. Moderate vascular calcification. Patent celiac, splenic and hepatic arteries. Patent SMA/QAMAR. Patent bilateral renal renal arteries. Moderate vascular calcification.     PELVIC ORGANS: No free fluid allowing for streak  artifact.     MUSCULOSKELETAL: Bilateral THAs. Marked degenerative hypertrophic changes in the spine.                                                                      IMPRESSION:  1.  No evidence for pulmonary embolism.  2.  Normal caliber aorta without dissection.  3.  Mild subpleural interstitial process involving both lower lungs which could be due to interstitial fibrosis or mild edema. No significant pleural fluid. Recommend correlation for early CHF/volume overload. No evidence for pneumonia.  4.  Marked amount of gas and stool throughout the colon extending from the cecum to the mid sigmoid colon. The sigmoid colon is extremely redundant extending into the upper central abdomen. This extends inferiorly without complete rotation of the sigmoid   mesentery with transition point/narrowing of the sigmoid colon but without evidence for mass, edema or perforation at this location. No significant sigmoid mesenteric rotation. This could potentially lead to sigmoid volvulus. Recommend correlation for   obstipation/constipation and close clinical follow-up.  5.  Marked diffuse hepatic steatosis with surface contour nodularity to liver. Findings can be seen with chronic underlying hepatic parenchymal disease.  6.  1.5 cm enhancing observation at the dome of the right hepatic lobe. Recommend nonemergent contrast-enhanced MRI for further evaluation.  7.  Distended gallbladder with cholelithiasis with two large 1 cm gallstones near the neck. Suggestion of mild edema adjacent to the gallbladder. Recommend correlation for right upper quadrant pain and consideration of ultrasound for further evaluation.           Past Medical History:   Diagnosis Date    Anemia     Anxiety     Breast cancer 2005    infltrating ductal    Chronic joint pain     Joint pain for many years.    Congestive heart failure     COPD (chronic obstructive pulmonary disease)     Depressive disorder     Hypertension     Hypothyroidism     Lumbar spinal  stenosis     Melanoma     Mixed hyperlipidemia     Obstructive sleep apnea 08/25/2006    CPAP     Osteoarthritis     Osteoporosis     Tubular adenoma in colon 09/2001    colonoscopy due 2004       Past Surgical History:   Procedure Laterality Date    APPENDECTOMY      ARTHROPLASTY HIP Left 07/06/2015    Procedure: ARTHROPLASTY HIP;  Surgeon: Junior Giordano MD;  Location: [facility] OR    ARTHROPLASTY HIP Right 11/02/2016    Procedure: ARTHROPLASTY HIP;  Surgeon: Junior Giordano MD;  Location: [facility] OR    ARTHROPLASTY KNEE Bilateral     ARTHROPLASTY REVISION HIP Left 07/22/2015    Procedure: ARTHROPLASTY REVISION HIP;  Surgeon: Junior Giordano MD;  Location: [facility] OR    CATARACT IOL, RT/LT Bilateral     COLONOSCOPY  09/2001    tubular adenoma; repeat 3 years.    COLONOSCOPY  09/2004    normal; repeat 5 years ([facility])    HYSTERECTOMY, MARGAUX  summer 2004    and BSO    MASTECTOMY Left     Melanoma excision with sentinel node biopsy      OPEN REDUCTION INTERNAL FIXATION RODDING INTRAMEDULLARY FEMUR Right 02/26/2023    Procedure: OPEN REDUCTION INTERNAL FIXATION RIGHT DISTAL FEMUR WITH RETROGRADE NAIL, LATERAL PLATE, AND CABLE;  Surgeon: Newton Lemon MD;  Location: [facility] OR    ORIF ankle status post removal of hardware Right     Tonsillectomy         Family History   Problem Relation Age of Onset    No Known Problems Brother         brain tumor related to shingles in his eye    Arthritis Mother     Hypertension Father     Cancer Father         lung    Cancer Grandchild         terratoma tumors    Breast Cancer Daughter        Social History     Socioeconomic History    Marital status:      Spouse name: Yudi    Number of children: 4    Years of education: 16    Highest education level: Not on file   Occupational History    Occupation: [employer]     Comment: Retired   Tobacco Use    Smoking status: Never    Smokeless tobacco: Never   Vaping Use    Vaping status: Never Used   Substance and  Sexual Activity    Alcohol use: Yes     Alcohol/week: 0.0 - 0.8 standard drinks of alcohol     Comment: 1 glass wine weekly    Drug use: Never    Sexual activity: Not Currently     Partners: Male   Other Topics Concern     Service Not Asked    Blood Transfusions Not Asked    Caffeine Concern Not Asked    Occupational Exposure Not Asked    Hobby Hazards Not Asked    Sleep Concern Not Asked    Stress Concern Not Asked    Weight Concern No     Comment: fruits and veggies.    Special Diet Not Asked    Back Care Not Asked    Exercise No    Bike Helmet Not Asked    Seat Belt Not Asked    Self-Exams Not Asked    Parent/sibling w/ CABG, MI or angioplasty before 65F 55M? No   Social History Narrative    Not on file     Social Drivers of Health     Financial Resource Strain: Low Risk  (5/27/2025)    Financial Resource Strain     Within the past 12 months, have you or your family members you live with been unable to get utilities (heat, electricity) when it was really needed?: No   Food Insecurity: Low Risk  (5/27/2025)    Food Insecurity     Within the past 12 months, did you worry that your food would run out before you got money to buy more?: No     Within the past 12 months, did the food you bought just not last and you didn t have money to get more?: No   Transportation Needs: Low Risk  (5/27/2025)    Transportation Needs     Within the past 12 months, has lack of transportation kept you from medical appointments, getting your medicines, non-medical meetings or appointments, work, or from getting things that you need?: No   Physical Activity: Not on file   Stress: Not on file   Social Connections: Not on file   Interpersonal Safety: Low Risk  (5/27/2025)    Interpersonal Safety     Do you feel physically and emotionally safe where you currently live?: Yes     Within the past 12 months, have you been hit, slapped, kicked or otherwise physically hurt by someone?: No     Within the past 12 months, have you been  humiliated or emotionally abused in other ways by your partner or ex-partner?: No   Housing Stability: Low Risk  (5/27/2025)    Housing Stability     Do you have housing? : Yes     Are you worried about losing your housing?: No       Current Facility-Administered Medications   Medication Dose Route Frequency Provider Last Rate Last Admin    acetaminophen (TYLENOL) tablet 650 mg  650 mg Oral Q4H PRN Geni Ponce PA-C        Or    acetaminophen (TYLENOL) Suppository 650 mg  650 mg Rectal Q4H PRN Geni Ponce PA-TARA        albuterol (PROVENTIL) neb solution 2.5 mg  2.5 mg Nebulization Q6H PRN Geni Ponce PA-C        allopurinol (ZYLOPRIM) tablet 100 mg  100 mg Oral Daily Geni Ponce PA-TARA        artificial saliva (BIOTENE MT) solution 2 spray  2 spray Mouth/Throat 4x Daily PRN Geni Ponce PA-C        calcium carbonate (TUMS) chewable tablet 1,000 mg  1,000 mg Oral 4x Daily PRN Geni Ponce PA-C        celecoxib (celeBREX) capsule 100 mg  100 mg Oral BID PRN Geni Ponce PA-C        DULoxetine (CYMBALTA) DR capsule 60 mg  60 mg Oral Daily Geni Ponce PA-TARA        [START ON 7/17/2025] Enema Compound (docusate/mineral oil/NaPhos) NO MAG CIT PREMIX  226 mL Rectal Once Geni Ponce PA-C        estradiol (ESTRACE) cream 2 g  2 g Vaginal Q3 Days Geni Ponce PA-TARA        gabapentin (NEURONTIN) capsule 400 mg  400 mg Oral TID Geni Ponce PA-C        guaiFENesin (ORGANIDIN) tablet 400 mg  400 mg Oral BID Geni Ponce PA-TARA        hydrocortisone (Perianal) (ANUSOL-HC) 2.5 % cream   Rectal BID Geni Ponce PA-C        ipratropium - albuterol 0.5 mg/2.5 mg (3mg)/3 mL (DUONEB) neb solution 3 mL  1 vial Nebulization Q4H Geni Ponce PA-C        lactulose (CHRONULAC) solution 20 g  20 g Oral Once May Repeat x1 Geni Ponce PA-C        [START ON 7/17/2025] levothyroxine (SYNTHROID/LEVOTHROID) tablet 88 mcg  88 mcg Oral QAM AC Geni Ponce,  LIBRA        lidocaine (LMX4) cream   Topical Q1H PRN Geni Ponce PA-C        lidocaine 1 % 0.1-1 mL  0.1-1 mL Other Q1H PRN Geni Ponce PA-C        LORazepam (ATIVAN) tablet 0.5 mg  0.5 mg Oral Q6H PRN Geni Ponce PA-C        LubriFresh P.M. OINT   Both Eyes At Bedtime Geni Ponce PA-C        ondansetron (ZOFRAN ODT) ODT tab 4 mg  4 mg Oral Q6H PRN Geni Ponce PA-C        Or    ondansetron (ZOFRAN) injection 4 mg  4 mg Intravenous Q6H PRN Geni Ponce PA-C        [START ON 7/17/2025] polyethylene glycol (MIRALAX) Packet 17 g  17 g Oral BID Geni Ponce PA-C        polyvinyl alcohol (LIQUIFILM TEARS) 1.4 % ophthalmic solution 1 drop  1 drop Both Eyes TID Geni Ponce PA-C        [START ON 7/17/2025] potassium chloride stuart ER (KLOR-CON M20) CR tablet 40 mEq  40 mEq Oral Daily Geni Ponce PA-C        prochlorperazine (COMPAZINE) injection 5 mg  5 mg Intravenous Q6H PRN Geni Ponce PA-C        Or    prochlorperazine (COMPAZINE) tablet 5 mg  5 mg Oral Q6H PRN Geni Ponce PA-C        [START ON 7/17/2025] sennosides (SENOKOT) tablet 2 tablet  2 tablet Oral At Bedtime Geni Ponce PA-C        simethicone (MYLICON) suspension 40 mg  40 mg Oral Q6H PRN Geni Ponce PA-C        sodium chloride (PF) 0.9% PF flush 3 mL  3 mL Intracatheter Q8H CALVIN Geni Ponce PA-TARA        sodium chloride (PF) 0.9% PF flush 3 mL  3 mL Intracatheter q1 min prn Geni Ponce PA-C        [START ON 7/17/2025] torsemide (DEMADEX) tablet 20 mg  20 mg Oral Daily Geni Ponce PA-C         Current Outpatient Medications   Medication Sig Dispense Refill    acetaminophen (TYLENOL) 500 MG tablet Take 1,000 mg by mouth 3 times daily.      albuterol (PROAIR HFA/PROVENTIL HFA/VENTOLIN HFA) 108 (90 Base) MCG/ACT inhaler INHALE 2 PUFFS INTO THE LUNGS EVERY 4 HOURS AS NEEDED FOR SHORTNESS OF BREATH OR DIFFICULT BREATHING OR WHEEZING 8.5 g 0    allopurinol  (ZYLOPRIM) 100 MG tablet Take 100 mg by mouth daily START 7/10      Artificial Saliva (BIOTENE DRY MOUTH MOIST SPRAY MT) Take 1 spray by mouth every 2 hours as needed (dry mouth).      ARTIFICIAL SALIVA MT Take 1 strip by mouth 2 times daily After nebs      benzonatate (TESSALON) 100 MG capsule Take 1 capsule (100 mg) by mouth 3 times daily as needed for cough. 20 capsule 0    calcium carbonate 600 mg-vitamin D 400 units (CALTRATE) 600-400 MG-UNIT per tablet Take 1 tablet by mouth every evening  100 tablet 3    carbamide peroxide (DEBROX) 6.5 % otic solution Place 3 drops into both ears twice a week. Mon & Thur      celecoxib (CELEBREX) 100 MG capsule Take 100 mg by mouth 2 times daily as needed for moderate pain (4-6)      diphenhydrAMINE (BENADRYL) 25 MG capsule Take 25 mg by mouth every 6 hours as needed for itching.      DULoxetine (CYMBALTA) 60 MG capsule Take 60 mg by mouth daily      estradiol (ESTRACE) 0.1 MG/GM vaginal cream Place vaginally every 3 days. Sig: 3 times weekly, actually being given every 3 days.      fluticasone-salmeterol (ADVAIR) 100-50 MCG/ACT inhaler Inhale 1 puff into the lungs 2 times daily.      gabapentin (NEURONTIN) 400 MG capsule Take 400 mg by mouth 3 times daily. 630 AM 2  PM      guaiFENesin 400 MG TABS Take 400 mg by mouth 2 times daily      hydrocortisone, Perianal, (ANUSOL-HC) 2.5 % cream Place rectally daily as needed for hemorrhoids.      ipratropium - albuterol 0.5 mg/2.5 mg, 3mg,/3 mL (DUONEB) 0.5-2.5 (3) MG/3ML neb solution Take 1 vial by nebulization every 4 hours as needed for shortness of breath, wheezing or cough.      ipratropium - albuterol 0.5 mg/2.5 mg/3 mL (DUONEB) 0.5-2.5 (3) MG/3ML neb solution Take 1 vial by nebulization every 4 hours as needed for shortness of breath or wheezing.      levothyroxine (SYNTHROID/LEVOTHROID) 88 MCG tablet Take 1 tablet (88 mcg) by mouth daily 90 tablet 0    LORazepam (ATIVAN) 0.5 MG tablet Take 0.5 mg by mouth every 6 hours  as needed for anxiety (or shortness of breath).      melatonin 3 MG tablet Take 3 mg by mouth nightly as needed for sleep.      nystatin (NYSTOP) 218734 UNIT/GM external powder Apply tid to affectead area prn 60 g 3    polyethylene glycol (MIRALAX) 17 g packet Take 1 packet by mouth daily as needed for constipation      polyvinyl alcohol (ARTIFICIAL TEARS) 1.4 % ophthalmic solution Place 1 drop into both eyes 3 times daily.      potassium chloride stuart ER (KLOR-CON M20) 20 MEQ CR tablet Take 40 mEq by mouth daily      PseudoePHEDrine-guaiFENesin 120-1200 MG TB12 Take 1 tablet by mouth 2 times daily as needed for congestion or cough.      semaglutide (OZEMPIC) 2 MG/3ML pen Inject 0.5 mg subcutaneously every 7 days.      sulfamethoxazole-trimethoprim (BACTRIM DS) 800-160 MG tablet Take 1 tablet by mouth 2 times daily.      torsemide (DEMADEX) 20 MG tablet Take 20 mg by mouth daily.      triamcinolone (KENALOG) 0.1 % external cream Apply topically every evening. To lower extremities      White Petrolatum-Mineral Oil (LUBRIFRESH P.M.) OINT Place into both eyes at bedtime. Apply a small strip into both eyes      zinc oxide (DESITIN) 40 % external ointment Apply topically as needed for dry skin or irritation (with toileting)      cycloSPORINE (CYCLOSPORINE IN KLARITY) 0.1 % EMUL Place 1 drop into both eyes 2 times daily 0630 and 2145         Allergies   Allergen Reactions    Ace Inhibitors Cough    Ceftriaxone Rash     Diffuse rash after IV ceftriaxone 1/20/25.  Previously tolerated other cephalosporins including recent course of keflex.           Intake/Output past 24 hrs:  No intake or output data in the 24 hours ending 07/16/25 1308    Physical Exam:  Temp:  [98.1  F (36.7  C)-98.2  F (36.8  C)] 98.2  F (36.8  C)  Pulse:  [] 92  Resp:  [22] 22  BP: ()/(62-90) 108/73  SpO2:  [92 %-96 %] 95 %    General: NAD, alert, cooperative  Head: normocephalic, without abnormality / atraumatic  Respiratory: non-labored  breathing  Abdomen: soft, non-tender, distended.  No guarding or rebound  Skin: no rashes or lesions  Musculoskeletal: moves all four extremities equally; no calf edema or tenderness  Psychological: alert and oriented, answers questions appropriately  Neurological: cranial nerves grossly intact    Assessment/Plan: Carol Merida is an 89 yo F with history of morbid obesity BMI 47.6, CHF, JEAN on CPAP, MDD, hypothyroidism who presented to the ED from her [facility] for abdominal pain,constipation, and dyspnea. She was previously admitted 5/27-5/31 for COVID. She has difficulty with LLQ abdominal pain overnight. She has not had a BM for the past 5 days and denies passing gas. She has had nausea and vomiting w/ decrease in appetite. She does not typically use any laxatives, stool softeners, or fiber supplements. In the ED she was placed on 2LPM NC, vitally stable. She underwent CT Chest PE abd w/ that was negative for PE, gas and stool throughout the colon. Sigmoid colon is   extremely redundant extending into the upper central abdomen. This extends inferiorly without complete rotation of the sigmoid mesentery, but with some mild mesenteric narrowing. This could potentially lead to sigmoid volvulus. Recommend correlation for obstipation/constipation. No small bowel dilatation. No evidence for bowel wall thickening or perforation.     WBC 7.8  Hgb 13.2    1. No acute surgical intervention as no obstruction/volvulus on CT  2. May need a GGE if fails to pass gas or BM  3. Would benefit from GI consult possible scope for further evaluation of redundant tissue  4. Would start with conservative management with constipation with stool softeners  5. CRS will continue to follow    Medical Decision Making:   Additional workup / testing ordered: None at this time  Procedure / Surgery recommended: None at this time   Old records reviewed - Old notes, labs, imaging  Medications added / changed: None at this time    This case was discussed  with: Dr. Prado    Thank you for consulting [facility] regarding this patient's care. Please contact us with questions or concerns.     Level of MDM: Moderate    Aron Willoughby PA-C  [facility]  [phone]

## 2025-07-16 NOTE — ED TRIAGE NOTES
Pt arrives from [facility] due to a sudden onset of midline abdominal pain with nausea and vomiting. States pain began around 0200 but comes and goes.    Pt also endorses an increase in SOB and WOB past couple of days - SOB with any activity or talking. Pt effort of breathing increasing while speaking with writer.     Per facility staff, pt has not had a BM in 5 days. Pt states she feels constipated.     Hx hemorrhoids and prolapsed rectum which pt states makes it difficult to pass stool

## 2025-07-16 NOTE — ED NOTES
Pt. Refusing enema at this time. Reports she would like it after evening meal. Would like to rest right now. Reports she can stand and transfer to commode when she needs to use the restroom. Enema supplies at bedside. Pt. Provided with ice water.

## 2025-07-16 NOTE — H&P
[facility]    History and Physical - Hospitalist Service       Date of Admission:  7/16/2025    Assessment & Plan Carol Merida is a 89 year old female with Pmhx of morbid obesity, diastolic CHF, JEAN on CPAP, reactive airway disease, MDD, anxiety, panic attacks, hypothyroidism, lymphedema, recurrent right lower extremity cellulitis, chronic cough, chronic pain, neuropathy, osteoarthritis, sacral pressure ulcers, and multiple joint replacement surgeries, who was admitted on 7/16/2025. She presented for evaluation of abdominal pain, constipation concerns and dyspnea.      In the ED Spo2 92% placed on 2 LPM NC, vital signs stable and afebrile.   VBG with pH 7.31, pCO2 of 52. Lactic acid 2.0. CMP with mildly elevated ast, alk phos 169, total bilirubin and lipase not elevated. Serial Tn I stable in the 20's near prior, 22, EKG with NSR no acute ST elevation.  Viral PCR still Covid positive, recent COVID infection in May '25.   CT Chest PE Abdomen with contrast negative for PE or PNA, no significant hypervolemia, Gaseous and stool throughout the colon from the cecum to the mid sigmoid colon, redundant tissue in the central abdomen without significant sigmoid mesenteric rotation.     Colorectal Surgery were consulted from the ED, reviewed patient and imaging and felt that the patient did not have a sigmoid volvulus. Recommended monitoring and constipation treatment.     RUQ US Limited evaluation with no definitive evidence for gallbladder wall thickening.    Given Toradol, Decadron, DuoNeb, Zofran.   Admission was requested from hospitalist service for further cares.    Abdominal Pain   Currently suspected to be due to constipation, obstipation. Pain is in LLQ, associated nausea and emesis with lack of BM for at least 5 days. Per patient history of constipation related to prior hemmrhoids and prolapse.   Imaging in the ED with gaseous and stool throughout the colon from the cecum to the mid sigmoid  colon, redundant tissue in the central abdomen without significant sigmoid mesenteric rotation.     Colorectal Surgery were consulted from the ED, reviewed patient and imaging and felt that the patient did not have a sigmoid volvulus. Recommended monitoring and constipation treatment.   Lower suspicion for cholecystitis or biliary colic. Hepatic panel not c/w this. Afebrile and no RUQ tenderness.  -admit IP in conjunction with respiratory issues   -Treating constipation: give x2 soap suds enema, lactulose 1-2 dose PRN. Start scheduled senna and miralax BID am of 7/17 with PRN additional enema in the AM  -ADAT  -anti emetics, non narcotic analgesia PRN  -associated global weakness and low intake due to symptoms. At baseline WC transfer. May require therapy evals while admitted pending improvement. SW consult for discharge planning.     Acute Respiratory Failure, Hypoxia with Respiratory Acidosis   Hx of chronic Cough and JEAN  Presented with dyspnea increased from baseline. No overt hypoxia recorded in the ED but maintained on 2 L/min nasal cannula.  VBG shows respiratory acidosis.  Suspect this is mild reactive airway exacerbation versus shortness of breath due to significant abdominal distention and obstipation symptoms.  Patient was reportedly initially a little wheezy on exam in the ED and given Decadron. Pulmonary imaging unremarkable for anything acute and does not appear hypervolemic.   She does not wear continuous supplemental oxygen at home at baseline per her report.  -wean supplemental o2 as able, goal saturation around 89 to 90% at rest  - Continue bronchodilators with scheduled DuoNebs and albuterol as needed.  Patient uses Advair at home as well.   -resume PTA Ativan (takes PRN PTA for anxiety and dyspnea)  -continue PTA diuretics   -hold further steroids currently until reassessment   -hold PTA JEAN tonight if still having nausea and emesis to reduce aspiration risk      Viral PCR still Covid positive,  recent COVID infection in May '25.   -no isolation requirements, not an active or new COVID viral infection     Cholelithiasis   Appears asymptomatic as noted above currently lower suspicion for biliary colic as cause for symptoms. No RUQ pain. Limited evaluation with no definitive evidence for gallbladder wall thickening. LFT Labs stable from baseline.   -monitor symptoms, follow CMP  -currently would not recommend antibiotics      Chronic diastolic CHF  Chronic lymphedema  History of recurrent right lower extremity cellulitis  Appears hypovolemic.     - cont PTA torsemide 20 mg daily and 20 meq potassium daily    - 2 g sodium restriction     - Patient's daughter reports that she was started on a course of Bactrim for recurrent cellulitis in her right lower extremity, she will complete this on July 18.  Will continue while admitted for now.      Hepatic Steatosis   Morbid Obesity   -noted, LFTs stable. BMI of 46  -f/up with PCP    Hepatic Lesion   -  1.5 cm enhancing observation at the dome of the right hepatic lobe. Recommend nonemergent contrast-enhanced MRI for further evaluation.       Chronic pain  Osteoarthritis  Neuropathy    - history of bilateral TKA, right HUGO, and right femur IM nail    - WC transfers at baseline     - cont home gabapentin 400 mg 3 times daily, acetaminophen 3 times daily, and celecoxib 100 mg twice daily as needed     Gout    - cont allopurinol 100 mg daily       Hypothyroidism    - resume PTA levothyroxine 88 mcg daily     MDD  Anxiety  Panic attacks    - resume PTA duloxetine 60 mg daily and lorazepam 0.5 mg every 6 hours as needed. Also takes for dyspnea.      History sacral pressure ulcers  Right Buttock Pressure Injury, Stage 3 (POA)   -WOC          Diet: NPO for Medical/Clinical Reasons Except for: No Exceptions      DVT Prophylaxis: Pneumatic Compression Devices  Benson Catheter: Not present    Cardiac Monitoring: None    Code Status:  FULL CODE as discussed on this admission,  patient has previously requested DNR/DNI      Clinically Significant Risk Factors Present on Admission                   # Hypertension: Noted on problem list  # Chronic heart failure with preserved ejection fraction: heart failure noted on problem list and last echo with EF >50%         # DMII: A1C = 6.6 % (Ref range: <5.7 %) within past 6 months        # Financial/Environmental Concerns:           Disposition Plan      Expected Discharge Date: 07/18/2025                Updated patient's daughter, Ms Clarke, over the phone.    Geni Ponce PA-C    ______________________________________________________________________    Chief Complaint   Abdominal pain    History is obtained from the patient and discussion with the ED.     History of Present Illness   Carol Merida is a 89 year old female who presented to the emergency department for evaluation of abdominal pain with associated nausea and vomiting.    The patient reported that she had somewhat sudden onset of left-sided lower abdominal pain around 2 AM.  Pain started to be intermittent but became more constant and associated with nausea and vomiting so she presented to the ED to be evaluated.    Reports that she has not had a bowel movement for at least 5 days and feels constipated.  She has not had any enemas or suppositories.  She is unsure whether she has been burping and passing gas.    She also noted in the ED and increased of shortness of breath from her baseline over the last couple days.  She is short of breath with any activity such as sitting up in bed or talking.  She denies any cough or fever.  No sore throat or URI symptoms.  She reports history of hemorrhoids and prolapsed rectum which makes it difficult for her to pass bowel movements.    She reports history of home oxygen use in the past.  No weight gain or increased leg swelling.  At her baseline she is mostly wheelchair/bed-bound but does complete transfers.      Past Medical History    Past  Medical History:   Diagnosis Date    Anemia     Anxiety     Breast cancer 2005    infltrating ductal    Chronic joint pain     Joint pain for many years.    Congestive heart failure     COPD (chronic obstructive pulmonary disease)     Depressive disorder     Hypertension     Hypothyroidism     Lumbar spinal stenosis     Melanoma     Mixed hyperlipidemia     Obstructive sleep apnea 08/25/2006    CPAP     Osteoarthritis     Osteoporosis     Tubular adenoma in colon 09/2001    colonoscopy due 2004       Past Surgical History   Past Surgical History:   Procedure Laterality Date    APPENDECTOMY      ARTHROPLASTY HIP Left 07/06/2015    Procedure: ARTHROPLASTY HIP;  Surgeon: Junior Giordano MD;  Location: [facility] OR    ARTHROPLASTY HIP Right 11/02/2016    Procedure: ARTHROPLASTY HIP;  Surgeon: Junior Giordano MD;  Location: [facility] OR    ARTHROPLASTY KNEE Bilateral     ARTHROPLASTY REVISION HIP Left 07/22/2015    Procedure: ARTHROPLASTY REVISION HIP;  Surgeon: Junior Giordano MD;  Location: [facility] OR    CATARACT IOL, RT/LT Bilateral     COLONOSCOPY  09/2001    tubular adenoma; repeat 3 years.    COLONOSCOPY  09/2004    normal; repeat 5 years ([facility])    HYSTERECTOMY, MARGAUX  summer 2004    and BSO    MASTECTOMY Left     Melanoma excision with sentinel node biopsy      OPEN REDUCTION INTERNAL FIXATION RODDING INTRAMEDULLARY FEMUR Right 02/26/2023    Procedure: OPEN REDUCTION INTERNAL FIXATION RIGHT DISTAL FEMUR WITH RETROGRADE NAIL, LATERAL PLATE, AND CABLE;  Surgeon: Newton Lemon MD;  Location: [facility] OR    ORIF ankle status post removal of hardware Right     Tonsillectomy         Prior to Admission Medications   Prior to Admission Medications   Prescriptions Last Dose Informant Patient Reported? Taking?   ARTIFICIAL SALIVA MT   Yes Yes   Sig: Take 1 strip by mouth 2 times daily After nebs   Artificial Saliva (BIOTENE DRY MOUTH MOIST SPRAY MT)   Yes Yes   Sig: Take 1 spray by mouth every 2 hours as needed (dry  mouth).   DULoxetine (CYMBALTA) 60 MG capsule 7/15/2025 Morning  Yes Yes   Sig: Take 60 mg by mouth daily   LORazepam (ATIVAN) 0.5 MG tablet   Yes Yes   Sig: Take 0.5 mg by mouth every 6 hours as needed for anxiety (or shortness of breath).   PseudoePHEDrine-guaiFENesin 120-1200 MG TB12   Yes Yes   Sig: Take 1 tablet by mouth 2 times daily as needed for congestion or cough.   White Petrolatum-Mineral Oil (LUBRIFRESH P.M.) OINT 7/15/2025 Bedtime  Yes Yes   Sig: Place into both eyes at bedtime. Apply a small strip into both eyes   acetaminophen (TYLENOL) 500 MG tablet 7/15/2025 Evening  Yes Yes   Sig: Take 1,000 mg by mouth 3 times daily.   albuterol (PROAIR HFA/PROVENTIL HFA/VENTOLIN HFA) 108 (90 Base) MCG/ACT inhaler   No Yes   Sig: INHALE 2 PUFFS INTO THE LUNGS EVERY 4 HOURS AS NEEDED FOR SHORTNESS OF BREATH OR DIFFICULT BREATHING OR WHEEZING   allopurinol (ZYLOPRIM) 100 MG tablet 7/15/2025 Morning  Yes Yes   Sig: Take 100 mg by mouth daily START 7/10   benzonatate (TESSALON) 100 MG capsule   No Yes   Sig: Take 1 capsule (100 mg) by mouth 3 times daily as needed for cough.   calcium carbonate 600 mg-vitamin D 400 units (CALTRATE) 600-400 MG-UNIT per tablet 7/15/2025 Evening Self Yes Yes   Sig: Take 1 tablet by mouth every evening    carbamide peroxide (DEBROX) 6.5 % otic solution 7/14/2025 Evening  Yes Yes   Sig: Place 3 drops into both ears twice a week. Mon & Thur   celecoxib (CELEBREX) 100 MG capsule   Yes Yes   Sig: Take 100 mg by mouth 2 times daily as needed for moderate pain (4-6)   cycloSPORINE (CYCLOSPORINE IN KLARITY) 0.1 % EMUL   Yes No   Sig: Place 1 drop into both eyes 2 times daily 0630 and 2145   diphenhydrAMINE (BENADRYL) 25 MG capsule   Yes Yes   Sig: Take 25 mg by mouth every 6 hours as needed for itching.   estradiol (ESTRACE) 0.1 MG/GM vaginal cream 7/15/2025  Yes Yes   Sig: Place vaginally every 3 days. Sig: 3 times weekly, actually being given every 3 days.   fluticasone-salmeterol (ADVAIR)  100-50 MCG/ACT inhaler 7/15/2025  Yes Yes   Sig: Inhale 1 puff into the lungs 2 times daily.   gabapentin (NEURONTIN) 400 MG capsule 7/15/2025 Evening  Yes Yes   Sig: Take 400 mg by mouth 3 times daily. 630 AM 2  PM   guaiFENesin 400 MG TABS 7/15/2025 Evening  Yes Yes   Sig: Take 400 mg by mouth 2 times daily   hydrocortisone, Perianal, (ANUSOL-HC) 2.5 % cream   Yes Yes   Sig: Place rectally daily as needed for hemorrhoids.   ipratropium - albuterol 0.5 mg/2.5 mg, 3mg,/3 mL (DUONEB) 0.5-2.5 (3) MG/3ML neb solution   Yes Yes   Sig: Take 1 vial by nebulization every 4 hours as needed for shortness of breath, wheezing or cough.   ipratropium - albuterol 0.5 mg/2.5 mg/3 mL (DUONEB) 0.5-2.5 (3) MG/3ML neb solution 7/15/2025 Evening  Yes Yes   Sig: Take 1 vial by nebulization every 4 hours as needed for shortness of breath or wheezing.   levothyroxine (SYNTHROID/LEVOTHROID) 88 MCG tablet 7/15/2025 Morning  No Yes   Sig: Take 1 tablet (88 mcg) by mouth daily   melatonin 3 MG tablet   Yes Yes   Sig: Take 3 mg by mouth nightly as needed for sleep.   nystatin (NYSTOP) 864803 UNIT/GM external powder   No Yes   Sig: Apply tid to affectead area prn   polyethylene glycol (MIRALAX) 17 g packet   Yes Yes   Sig: Take 1 packet by mouth daily as needed for constipation   polyvinyl alcohol (ARTIFICIAL TEARS) 1.4 % ophthalmic solution 7/15/2025 Evening  Yes Yes   Sig: Place 1 drop into both eyes 3 times daily.   potassium chloride stuart ER (KLOR-CON M20) 20 MEQ CR tablet 7/15/2025 Morning  Yes Yes   Sig: Take 40 mEq by mouth daily   semaglutide (OZEMPIC) 2 MG/3ML pen Past Week  Yes Yes   Sig: Inject 0.5 mg subcutaneously every 7 days.   sulfamethoxazole-trimethoprim (BACTRIM DS) 800-160 MG tablet 7/15/2025 at  8:00 PM  Yes Yes   Sig: Take 1 tablet by mouth 2 times daily.   torsemide (DEMADEX) 20 MG tablet 7/15/2025 Morning  Yes Yes   Sig: Take 20 mg by mouth daily.   triamcinolone (KENALOG) 0.1 % external cream 7/15/2025 Evening   Yes Yes   Sig: Apply topically every evening. To lower extremities   zinc oxide (DESITIN) 40 % external ointment   Yes Yes   Sig: Apply topically as needed for dry skin or irritation (with toileting)      Facility-Administered Medications: None        Review of Systems    The 10 point Review of Systems is negative other than noted in the HPI or here.     Social History   I have reviewed this patient's social history and updated it with pertinent information if needed.  Social History     Tobacco Use    Smoking status: Never    Smokeless tobacco: Never   Vaping Use    Vaping status: Never Used   Substance Use Topics    Alcohol use: Yes     Alcohol/week: 0.0 - 0.8 standard drinks of alcohol     Comment: 1 glass wine weekly    Drug use: Never        Physical Exam   Vital Signs: Temp: 98.1  F (36.7  C) Temp src: Oral BP: 108/73 Pulse: 100   Resp: 22 SpO2: 92 % O2 Device: Nasal cannula Oxygen Delivery: 1 LPM  Weight: 0 lbs 0 oz    Constitutional: Awake, alert,  uncomfortable appearing.  Eyes: Conjunctiva and pupils examined and normal.  HEENT: dry mucous membranes, dried emesis around mouth.   Respiratory: Increased work of breathing, retractions. Mild expiratory wheezing. No crackles.    Cardiovascular: Regular rate and rhythm, normal S1 and S2, and no murmur noted.  GI: Soft,  distended, tender to palpation LLQ, bowel sounds diminished. No rebound tenderness or guarding.  Skin: No rashes, no cyanosis, +1 bilateral ankle edema amongst venous stasis changes.   Musculoskeletal: No deformities noted.  No erythema or tenderness. Moving all extremities in bed with globally reduced strength.  Neurologic: No focal deficits noted. Speech is clear. Coordination grossly normal.   Psychiatric: Appropriate affect.       Medical Decision Making       75 MINUTES SPENT BY ME on the date of service doing chart review, history, exam, documentation & further activities per the note.      Data   ------------------------- PAST 24 HR DATA  REVIEWED -----------------------------------------------

## 2025-07-16 NOTE — PROGRESS NOTES
ROOM # 228    Living Situation (if not independent, order SW consult): [place]  Living  Facility name:  : children    Activity level at baseline: W/C  Activity level on admit: unable to pivot on admission      Patient registered to observation; given Patient Bill of Rights; given the opportunity to ask questions about observation status and their plan of care.  Patient has been oriented to the observation room, bathroom and call light is in place.    Discussed discharge goals and expectations with patient/family.

## 2025-07-16 NOTE — PROGRESS NOTES
"[facility]     Date: 7/16/2025  Admission Dx: Abdominal pain   Pulmonary History COPD, JEAN  Home Nebulizer/MDI Use: duoneb Q4 prn, albuterol MDI Q4 prn, Advair BID  Home Oxygen: none  Acuity Level (RCAT flow sheet): 4  Aerosol Therapy initiated: will change to duoneb QID, albuterol Q6 prn      Pulmonary Hygiene initiated: deep breath and cough       Volume Expansion initiated: IS.       Current Oxygen Requirements: 2 lpm NC  Current SpO2: 95%    Re-evaluation date: 7/19/2025. Will pass along to re assess tomorrow. BS dim, x-ray unremarkable. Pt does claim she is SOB.     Patient Education: ask pt to have someone bring in home CPAP, if not, will wear hospital CPAP tonight. Pt declined wearing one now for sleep.       See \"RT Assessments\" flow sheet for patient assessment scoring and Acuity Level Details.           "

## 2025-07-16 NOTE — ED NOTES
[facility]  ED Nurse Handoff Report    ED Chief complaint: Abdominal Pain and Nausea & Vomiting  . ED Diagnosis:   Final diagnoses:   None       Allergies:   Allergies   Allergen Reactions    Ace Inhibitors Cough    Ceftriaxone Rash     Diffuse rash after IV ceftriaxone 1/20/25.  Previously tolerated other cephalosporins including recent course of keflex.       Code Status: Full Code    Activity level - Baseline/Home:  independent.  Activity Level - Current:   in bed.   Lift room needed: No.   Bariatric: No   Needed: No   Isolation: Yes.   Infection: COVID+.     Respiratory status: Nasal cannula    Vital Signs (within 30 minutes):   Vitals:    07/16/25 0548 07/16/25 0605 07/16/25 0700 07/16/25 0743   BP:  92/66 111/79 119/62   Pulse:   95 100   Resp:       Temp:       TempSrc:       SpO2: 94% 96% 92% 92%       Cardiac Rhythm:  ,      Pain level:    Patient confused: No.   Patient Falls Risk: bed/chair alarm on, patient and family education, and activity supervised.   Elimination Status: Has voided     Patient Report - Initial Complaint: Abd pain, SOB, dyspnea, constipation.   Focused Assessment:    Carol Merida is a 89 year old female with a history of CHF, breast cancer, and hypothyroidism presenting with abdominal pain, nausea, and vomiting. The patient reports waking up by LLQ pain this morning. She endorses nausea, vomiting, and shortness of breath with the pain. She denies fever, urinary symptoms, or chest pain. Last bowel movement was 5 days ago, which is unusual for her. Denies new edema. Not usually on O2 at baseline.      Abnormal Results:   Labs Ordered and Resulted from Time of ED Arrival to Time of ED Departure   COMPREHENSIVE METABOLIC PANEL - Abnormal       Result Value    Sodium 137      Potassium 5.0      Carbon Dioxide (CO2) 24      Anion Gap 14      Urea Nitrogen 19.9      Creatinine 0.89      GFR Estimate 62      Calcium 9.5      Chloride 99      Glucose 149 (*)      Alkaline Phosphatase 167 (*)     AST        ALT 46      Protein Total 8.0      Albumin 3.9      Bilirubin Total 1.1     CBC WITH PLATELETS AND DIFFERENTIAL - Abnormal    WBC Count 7.8      RBC Count 4.21      Hemoglobin 13.2      Hematocrit 41.8      MCV 99      MCH 31.4      MCHC 31.6      RDW 15.2 (*)     Platelet Count 233      % Neutrophils 82      % Lymphocytes 11      % Monocytes 3      % Eosinophils 3      % Basophils 0      % Immature Granulocytes 1      NRBCs per 100 WBC 0      Absolute Neutrophils 6.4      Absolute Lymphocytes 0.8      Absolute Monocytes 0.3      Absolute Eosinophils 0.3      Absolute Basophils 0.0      Absolute Immature Granulocytes 0.0      Absolute NRBCs 0.0     LIPASE - Abnormal    Lipase 8 (*)    TROPONIN T, HIGH SENSITIVITY - Abnormal    Troponin T, High Sensitivity 21 (*)    BLOOD GAS VENOUS - Abnormal    pH Venous 7.31 (*)     pCO2 Venous 52 (*)     pO2 Venous 42      Bicarbonate Venous 26      Base Excess/Deficit Venous -0.8      FIO2 0      Oxyhemoglobin Venous 73      O2 Sat, Venous 74.2     INFLUENZA A/B, RSV AND SARS-COV2 PCR - Abnormal    Influenza A PCR Negative      Influenza B PCR Negative      RSV PCR Negative      SARS CoV2 PCR Positive (*)    TROPONIN T, HIGH SENSITIVITY - Abnormal    Troponin T, High Sensitivity 22 (*)    NT-PROBNP - Normal    NT-proBNP 275     LACTIC ACID WHOLE BLOOD WITH 1X REPEAT IN 2 HR WHEN >2 - Normal    Lactic Acid, Initial 2.0     HEPATIC FUNCTION PANEL        CT Chest (PE) Abdomen Pelvis w Contrast   Final Result   IMPRESSION:   1.  No evidence for pulmonary embolism.   2.  Normal caliber aorta without dissection.   3.  Mild subpleural interstitial process involving both lower lungs which could be due to interstitial fibrosis or mild edema. No significant pleural fluid. Recommend correlation for early CHF/volume overload. No evidence for pneumonia.   4.  Marked amount of gas and stool throughout the colon extending from the cecum to the  mid sigmoid colon. The sigmoid colon is extremely redundant extending into the upper central abdomen. This extends inferiorly without complete rotation of the sigmoid    mesentery with transition point/narrowing of the sigmoid colon but without evidence for mass, edema or perforation at this location. No significant sigmoid mesenteric rotation. This could potentially lead to sigmoid volvulus. Recommend correlation for    obstipation/constipation and close clinical follow-up.   5.  Marked diffuse hepatic steatosis with surface contour nodularity to liver. Findings can be seen with chronic underlying hepatic parenchymal disease.   6.  1.5 cm enhancing observation at the dome of the right hepatic lobe. Recommend nonemergent contrast-enhanced MRI for further evaluation.   7.  Distended gallbladder with cholelithiasis with two large 1 cm gallstones near the neck. Suggestion of mild edema adjacent to the gallbladder. Recommend correlation for right upper quadrant pain and consideration of ultrasound for further evaluation.         US Abdomen Limited (RUQ)    (Results Pending)       Treatments provided: see MAR  Family Comments: at bedside  OBS brochure/video discussed/provided to patient:  N/A  ED Medications:   Medications   ondansetron (ZOFRAN) injection 4 mg (4 mg Intravenous $Given 7/16/25 0633)   sodium chloride 0.9 % bag for CT scan flush (100 mLs As instructed $Given 7/16/25 0716)   ipratropium - albuterol 0.5 mg/2.5 mg (3mg)/3 mL (DUONEB) neb solution 3 mL (3 mLs Nebulization $Given 7/16/25 0748)   ketorolac (TORADOL) injection 15 mg (15 mg Intravenous $Given 7/16/25 0631)   iopamidol (ISOVUE-370) solution 500 mL (100 mLs Intravenous $Given 7/16/25 0716)   dexAMETHasone PF (DECADRON) injection 10 mg (10 mg Intravenous $Given 7/16/25 0744)       Drips infusing:  No  For the majority of the shift this patient was Green.   Interventions performed were see MAR.    Sepsis treatment initiated:  No    Cares/treatment/interventions/medications to be completed following ED care: N/A    ED Nurse Name: Aleksandar Petersen RN  9:06 AM    RECEIVING UNIT ED HANDOFF REVIEW    Above ED Nurse Handoff Report was reviewed: Yes  Reviewed by: Rosie Rogers RN on July 16, 2025 at 2:24 PM   LENNY Rosa called the ED to inform them the note was read: No

## 2025-07-16 NOTE — ED NOTES
[facility]    ED Boarding Nurse Handoff Addendum Report:    Date/time: 7/16/2025, 1:15 PM    Isolation Precautions:   special precautions: COVID.    Neuro: Alert and Oriented x4  Activity: have not been out of bed yet  Pain: complaining of 5/10 pain in their coccyx/sacrum.  Scheduled meds given for pain.  Labs / Tests:   O2: 2 L nc  LDA's: Peripheral  Fluids: is Saline locked.  Discharge Disposition: TBD    Plan of Care:    Enema needed. Pt is now willing to receive intervention.  Neb due    Vital signs (within last 30 minutes):    Vitals:    07/16/25 1237 07/16/25 1238 07/16/25 1239 07/16/25 1428   BP:    97/48   BP Location:    Left arm   Pulse:       Resp:    22   Temp:       TempSrc:       SpO2: 94% 94% 95% 95%       ED Boarding Nurse name: Lizzie Shirley RN

## 2025-07-16 NOTE — CONSULTS
[facility] Nurse Inpatient Assessment     Consulted for: Sacrum    Summary: Patient with history of pressure injury to bilateral buttocks which are now healed.  Patient also with chronic skin discoloration to bilateral buttocks from recliner use (sleeps in recliner).  Continue dressings to protect buttocks.      [facility] nurse follow-up plan: signing off    Patient History (according to provider note(s):      Carol Merida is a 89 year old female with Pmhx of morbid obesity, diastolic CHF, JEAN on CPAP, reactive airway disease, MDD, anxiety, panic attacks, hypothyroidism, lymphedema, recurrent right lower extremity cellulitis, chronic cough, chronic pain, neuropathy, osteoarthritis, sacral pressure ulcers, and multiple joint replacement surgeries, who was admitted on 7/16/2025. She presented for evaluation of abdominal pain, constipation concerns and dyspnea.     Assessment:      Areas visualized during today's visit: Focused:    Skin Injury Location: Bilateral buttocks  Last photo: 7/16/25    Skin injury due to: Chronic skin injury (often related to prolonged recliner use)  Skin history and plan of care:   Patient with history of pressure injury to bilateral buttocks which are now healed.  Patient also with chronic skin discoloration to bilateral buttocks from recliner use (sleeps in recliner).  Affected area:      Skin assessment: Ecchymosis     Measurements (length x width x depth, in cm) Left is 8x7cm and right is 7x6cm      Color: purple     Temperature  normal      Drainage: none .      Color: none      Odor: none  Pain: denies   Pain interventions prior to dressing change: N/A  Treatment goal: Protection  STATUS: initial assessment  Supplies ordered: supplies stored on unit       Treatment Plan:     Bilateral buttocks: Every 5 days  Apply Mepilex sacral to each buttock to protect skin    Pressure Injury Prevention (PIP) Plan:  If patient is declining pressure injury prevention  "interventions: Explore reason why and address patient's concerns, Educate on pressure injury risk and prevention intervention(s), and If patient is still declining, document \"informed refusal\"   Mattress: Follow bed algorithm, add Low Air Loss (Air+) mattress pump if skin is very moist or constantly moist.   HOB: Maintain at or below 30 degrees, unless contraindicated  Repositioning in bed: Every 1-2 hours  and Left/right positioning; avoid supine  Heels: Pillows under calves  Chair positioning: Chair cushion (#[id number])    If patient has a buttock pressure injury, or high risk for PI use chair cushion or SPS.  Moisture Management: Avoid brief in bed  Under Devices: Inspect skin under all medical devices during skin inspection , Ensure tubes are stabilized without tension, and Ensure patient is not lying on medical devices or equipment when repositioned  Ask provider to discontinue device when no longer needed.       Orders: Written    RECOMMEND PRIMARY TEAM ORDER: None, at this time  Education provided: importance of repositioning, plan of care, and Off-loading pressure  Discussed plan of care with: Patient  Notify WOC if wound(s) deteriorate.  Nursing to notify the Provider(s) and re-consult the WOC Nurse if new skin concern.    DATA:     Current support surface: Standard  Standard gel mattress (Isoflex)  Containment of urine/stool: Incontinence Protocol  BMI: There is no height or weight on file to calculate BMI.   Active diet order: Orders Placed This Encounter      Advance Diet as Tolerated: Clear Liquid Diet; Regular Diet Adult; 2 gm NA Diet     Output: No intake/output data recorded.     Labs:   Recent Labs   Lab 07/16/25  0709 07/16/25  0516   ALBUMIN 3.8 3.9   HGB  --  13.2   WBC  --  7.8     Pressure injury risk assessment:                          Mike Orlando RN CWOCN  Contact Via [facility] Nurse ([place])  Dept. Office Number: [phone]   "

## 2025-07-16 NOTE — ED PROVIDER NOTES
Emergency Department Note      History of Present Illness     Chief Complaint   Abdominal Pain and Nausea & Vomiting      HPI   Carol Merida is a 89 year old female with a history of CHF, breast cancer, and hypothyroidism presenting with abdominal pain, nausea, and vomiting. The patient reports waking up by LLQ pain this morning. She endorses nausea, vomiting, and shortness of breath with the pain. She denies fever, urinary symptoms, or chest pain. Last bowel movement was 5 days ago, which is unusual for her. Denies new edema. Not usually on O2 at baseline.      Independent Historian   None    Past Medical History     Medical History and Problem List   Sleep apnea  Hypersomnia  CHF  Hypothyroidism  Arthritis   Breast cancer  Venous stasis   Neuropathy   MDD  Osteoarthritis   Anxiety   Melanoma   GOUT     Medications   Levothyroxine   Metoprolol   Exemestane   Asprin 81 mg   Alprazolam  Amlodipine   Bupropion  Buspirone  Fluoxetine   Furosemide  Gabapentin   Losartan   Simvastatin   Ozempic   Torsemide   Duloxetine   Celecoxib   Lorazepam     Surgical History   Mastectomy   Knee replacement   Hysterectomy   Appendectomy   Hip arthoplasty  Open reduction intramedullary femur   ORIF ankle  Tonsillectomy     Physical Exam     Patient Vitals for the past 24 hrs:   BP Temp Temp src Pulse Resp SpO2   07/16/25 1018 108/73 -- -- -- 22 92 %   07/16/25 0743 119/62 -- -- 100 -- 92 %   07/16/25 0700 111/79 -- -- 95 -- 92 %   07/16/25 0605 92/66 -- -- -- -- 96 %   07/16/25 0548 -- -- -- -- -- 94 %   07/16/25 0502 -- 98.1  F (36.7  C) Oral -- -- --   07/16/25 0501 (!) 115/90 -- -- 96 22 93 %     Physical Exam  General: Patient is awake, alert and interactive when I enter the room. Appears uncomfortable.   Head: The scalp, face, and head appear normal  Eyes: Conjunctivae and sclerae are normal  Neck: Normal range of motion.   CV: Regular rate.   Resp:  NC in place. Faint wheezing. No respiratory distress.   GI: diffuse  tenderness but abdomen is soft, no rigidity. No evidence of pulsatile mass. No fluid waves or evidence of ascites. No distension. No hernias or bruising are noted in detailed exam. No CVA tenderness.    MS: Normal tone.   Skin: Normal capillary refill noted  Neuro: Speech is normal and fluent. Face is symmetric. Moving all extremities.   Psych:  Normal affect.  Appropriate interactions.    Diagnostics     Lab Results   Labs Ordered and Resulted from Time of ED Arrival to Time of ED Departure   COMPREHENSIVE METABOLIC PANEL - Abnormal       Result Value    Sodium 137      Potassium 5.0      Carbon Dioxide (CO2) 24      Anion Gap 14      Urea Nitrogen 19.9      Creatinine 0.89      GFR Estimate 62      Calcium 9.5      Chloride 99      Glucose 149 (*)     Alkaline Phosphatase 167 (*)     AST        ALT 46      Protein Total 8.0      Albumin 3.9      Bilirubin Total 1.1     CBC WITH PLATELETS AND DIFFERENTIAL - Abnormal    WBC Count 7.8      RBC Count 4.21      Hemoglobin 13.2      Hematocrit 41.8      MCV 99      MCH 31.4      MCHC 31.6      RDW 15.2 (*)     Platelet Count 233      % Neutrophils 82      % Lymphocytes 11      % Monocytes 3      % Eosinophils 3      % Basophils 0      % Immature Granulocytes 1      NRBCs per 100 WBC 0      Absolute Neutrophils 6.4      Absolute Lymphocytes 0.8      Absolute Monocytes 0.3      Absolute Eosinophils 0.3      Absolute Basophils 0.0      Absolute Immature Granulocytes 0.0      Absolute NRBCs 0.0     LIPASE - Abnormal    Lipase 8 (*)    TROPONIN T, HIGH SENSITIVITY - Abnormal    Troponin T, High Sensitivity 21 (*)    BLOOD GAS VENOUS - Abnormal    pH Venous 7.31 (*)     pCO2 Venous 52 (*)     pO2 Venous 42      Bicarbonate Venous 26      Base Excess/Deficit Venous -0.8      FIO2 0      Oxyhemoglobin Venous 73      O2 Sat, Venous 74.2     INFLUENZA A/B, RSV AND SARS-COV2 PCR - Abnormal    Influenza A PCR Negative      Influenza B PCR Negative      RSV PCR Negative      SARS  CoV2 PCR Positive (*)    TROPONIN T, HIGH SENSITIVITY - Abnormal    Troponin T, High Sensitivity 22 (*)    HEPATIC FUNCTION PANEL - Abnormal    Protein Total 7.9      Albumin 3.8      Bilirubin Total 1.2      Alkaline Phosphatase 169 (*)     AST 95 (*)     ALT 45      Bilirubin Direct 0.66 (*)    NT-PROBNP - Normal    NT-proBNP 275     LACTIC ACID WHOLE BLOOD WITH 1X REPEAT IN 2 HR WHEN >2 - Normal    Lactic Acid, Initial 2.0         Imaging   US Abdomen Limited (RUQ)   Preliminary Result   IMPRESSION:   1.  Evaluation limited due to technical limitations and bowel gas.   2.  Allowing for this there is no definitive evidence for gallbladder wall thickening. Sludge or small stone material within the gallbladder lumen near the area of the gallbladder neck.   3.  Hepatic steatosis.   4.  Extrahepatic biliary system unable to be visualized due to bowel gas. No definitive evidence for biliary dilatation.            CT Chest (PE) Abdomen Pelvis w Contrast   Final Result   IMPRESSION:   1.  No evidence for pulmonary embolism.   2.  Normal caliber aorta without dissection.   3.  Mild subpleural interstitial process involving both lower lungs which could be due to interstitial fibrosis or mild edema. No significant pleural fluid. Recommend correlation for early CHF/volume overload. No evidence for pneumonia.   4.  Marked amount of gas and stool throughout the colon extending from the cecum to the mid sigmoid colon. The sigmoid colon is extremely redundant extending into the upper central abdomen. This extends inferiorly without complete rotation of the sigmoid    mesentery with transition point/narrowing of the sigmoid colon but without evidence for mass, edema or perforation at this location. No significant sigmoid mesenteric rotation. This could potentially lead to sigmoid volvulus. Recommend correlation for    obstipation/constipation and close clinical follow-up.   5.  Marked diffuse hepatic steatosis with surface  contour nodularity to liver. Findings can be seen with chronic underlying hepatic parenchymal disease.   6.  1.5 cm enhancing observation at the dome of the right hepatic lobe. Recommend nonemergent contrast-enhanced MRI for further evaluation.   7.  Distended gallbladder with cholelithiasis with two large 1 cm gallstones near the neck. Suggestion of mild edema adjacent to the gallbladder. Recommend correlation for right upper quadrant pain and consideration of ultrasound for further evaluation.             EKG   ECG results from 07/16/25   EKG 12 lead     Value    Systolic Blood Pressure     Diastolic Blood Pressure     Ventricular Rate 94    Atrial Rate 94    DE Interval 184    QRS Duration 82        QTc 447    P Axis 53    R AXIS -14    T Axis 28    Interpretation ECG      Sinus rhythm  Inferior infarct (cited on or before 15-Mar-2024)  Abnormal ECG  ECG reviewed by me at 0611              Independent Interpretation   None    ED Course      Medications Administered   Medications   ondansetron (ZOFRAN) injection 4 mg (4 mg Intravenous $Given 7/16/25 0633)   sodium chloride 0.9 % bag for CT scan flush (100 mLs As instructed $Given 7/16/25 0716)   ipratropium - albuterol 0.5 mg/2.5 mg (3mg)/3 mL (DUONEB) neb solution 3 mL (3 mLs Nebulization $Given 7/16/25 0748)   ketorolac (TORADOL) injection 15 mg (15 mg Intravenous $Given 7/16/25 0631)   iopamidol (ISOVUE-370) solution 500 mL (100 mLs Intravenous $Given 7/16/25 0716)   dexAMETHasone PF (DECADRON) injection 10 mg (10 mg Intravenous $Given 7/16/25 0744)       Procedures   Procedures     Discussion of Management   Admitting Hospitalist, Dr. Baron    ED Course   ED Course as of 07/16/25 1211   Wed Jul 16, 2025   0611 I obtained the history and examined the patient as noted above.      0838 I spoke with Aron Willoughby from colorectal surgery regarding the patient's presentation, findings here in the ED and plan of care.    0926 I spoke with GI regarding the  patient's presentation, findings here in the ED and plan of care.      1113 I rechecked and updated the patient.     1137 I spoke with Dr. Baron from the hospitalist service regarding the patient's presentation, findings here in the ED and plan of care.          Additional Documentation  None    Medical Decision Making / Diagnosis     CT for PE was ordered because the patient is high risk for pulmonary embolism.      JOSS Merida is a 89 year old female who presents to the emergency department with epigastric abdominal pain, nausea, vomiting, constipation and shortness of breath.  Upon initial evaluation she is mildly tachycardic but otherwise hemodynamically stable and afebrile.  She is on nasal cannula oxygen currently 1 to 2 L.  She states she is not on oxygen at baseline.  Broad workup was initiated to investigate patient's abdominal pain and shortness of breath.  EKG was obtained which does not show any acute signs of ischemia or dysrhythmia.  Troponin is  mildly elevated but did not display significant rise.  Lower concern for acute coronary syndrome.  Lower concern for congestive heart failure.  CT PE study was obtained which shows no signs of pulmonary embolism or significant pneumonia.  However CT of the abdomen and pelvis shows dilated bowels with redundant tissue and radiology brings up suspicion of possible sigmoid volvulus.  Case was discussed with colorectal surgery.  At this point they do not feel that this represents sigmoid volvulus and no surgical indication at this time.  CT also showed possible cholelithiasis.  Right upper quadrant ultrasound was obtained which does not show any acute evidence of cholecystitis however this is a poor quality ultrasound.  May need general surgery consultation as inpatient.  Given her ongoing abdominal pain and shortness of breath with new oxygen requirement will be admitted for further evaluation and treatment.    Disposition   The patient was admitted  to the hospital.     Diagnosis     ICD-10-CM    1. Constipation, unspecified constipation type  K59.00       2. Generalized abdominal pain  R10.84       3. Calculus of gallbladder without cholecystitis without obstruction  K80.20            Scribe Disclosure:  I, Brandi Viveros, am serving as a scribe at 6:16 AM on 7/16/2025 to document services personally performed by Nestor Diaz MD based on my observations and the provider's statements to me.        Nestor Diaz MD  07/16/25 [id number]

## 2025-07-16 NOTE — PHARMACY-ADMISSION MEDICATION HISTORY
Pharmacy Intern Admission Medication History    Admission medication history is complete. The information provided in this note is only as accurate as the sources available at the time of the update.    Information Source(s): Facility (TCU/NH/) medication list/MAR via Paper    Pertinent Information: Used the list of medications facility sent out. However, there weren't any admit dates, so I just put most recent doses as if the facility was administering her meds as written. Patient on Sulfa antibiotic until 7/18/2025. Likely received dose yesterday but not today    Changes made to PTA medication list:  Added: Sulfamethaxozole  Deleted: None  Changed: Ozempic 0.25mg to Ozempic 0.5mg injection weekly    Allergies reviewed with patient and updates made in EHR: yes    Medication History Completed By: Gregory Waggoner 7/16/2025 10:21 AM    PTA Med List   Medication Sig Last Dose/Taking    acetaminophen (TYLENOL) 500 MG tablet Take 1,000 mg by mouth 3 times daily. 7/15/2025 Evening    albuterol (PROAIR HFA/PROVENTIL HFA/VENTOLIN HFA) 108 (90 Base) MCG/ACT inhaler INHALE 2 PUFFS INTO THE LUNGS EVERY 4 HOURS AS NEEDED FOR SHORTNESS OF BREATH OR DIFFICULT BREATHING OR WHEEZING Taking    allopurinol (ZYLOPRIM) 100 MG tablet Take 100 mg by mouth daily START 7/10 7/15/2025 Morning    Artificial Saliva (BIOTENE DRY MOUTH MOIST SPRAY MT) Take 1 spray by mouth every 2 hours as needed (dry mouth). Taking As Needed    ARTIFICIAL SALIVA MT Take 1 strip by mouth 2 times daily After nebs Taking    benzonatate (TESSALON) 100 MG capsule Take 1 capsule (100 mg) by mouth 3 times daily as needed for cough. Taking As Needed    calcium carbonate 600 mg-vitamin D 400 units (CALTRATE) 600-400 MG-UNIT per tablet Take 1 tablet by mouth every evening  7/15/2025 Evening    carbamide peroxide (DEBROX) 6.5 % otic solution Place 3 drops into both ears twice a week. Mon & Thur 7/14/2025 Evening    celecoxib (CELEBREX) 100 MG capsule Take 100 mg by mouth 2  times daily as needed for moderate pain (4-6) Taking As Needed    diphenhydrAMINE (BENADRYL) 25 MG capsule Take 25 mg by mouth every 6 hours as needed for itching. Taking As Needed    DULoxetine (CYMBALTA) 60 MG capsule Take 60 mg by mouth daily 7/15/2025 Morning    estradiol (ESTRACE) 0.1 MG/GM vaginal cream Place vaginally every 3 days. Sig: 3 times weekly, actually being given every 3 days. 7/15/2025    fluticasone-salmeterol (ADVAIR) 100-50 MCG/ACT inhaler Inhale 1 puff into the lungs 2 times daily. 7/15/2025    gabapentin (NEURONTIN) 400 MG capsule Take 400 mg by mouth 3 times daily. 630 AM 2  PM 7/15/2025 Evening    guaiFENesin 400 MG TABS Take 400 mg by mouth 2 times daily 7/15/2025 Evening    hydrocortisone, Perianal, (ANUSOL-HC) 2.5 % cream Place rectally daily as needed for hemorrhoids. Taking As Needed    ipratropium - albuterol 0.5 mg/2.5 mg, 3mg,/3 mL (DUONEB) 0.5-2.5 (3) MG/3ML neb solution Take 1 vial by nebulization every 4 hours as needed for shortness of breath, wheezing or cough. Taking As Needed    ipratropium - albuterol 0.5 mg/2.5 mg/3 mL (DUONEB) 0.5-2.5 (3) MG/3ML neb solution Take 1 vial by nebulization every 4 hours as needed for shortness of breath or wheezing. 7/15/2025 Evening    levothyroxine (SYNTHROID/LEVOTHROID) 88 MCG tablet Take 1 tablet (88 mcg) by mouth daily 7/15/2025 Morning    LORazepam (ATIVAN) 0.5 MG tablet Take 0.5 mg by mouth every 6 hours as needed for anxiety (or shortness of breath). Taking As Needed    melatonin 3 MG tablet Take 3 mg by mouth nightly as needed for sleep. Taking As Needed    nystatin (NYSTOP) 920950 UNIT/GM external powder Apply tid to affectead area prn Taking    polyethylene glycol (MIRALAX) 17 g packet Take 1 packet by mouth daily as needed for constipation Taking As Needed    polyvinyl alcohol (ARTIFICIAL TEARS) 1.4 % ophthalmic solution Place 1 drop into both eyes 3 times daily. 7/15/2025 Evening    potassium chloride stuart ER (KLOR-CON M20)  20 MEQ CR tablet Take 40 mEq by mouth daily 7/15/2025 Morning    PseudoePHEDrine-guaiFENesin 120-1200 MG TB12 Take 1 tablet by mouth 2 times daily as needed for congestion or cough. Taking As Needed    semaglutide (OZEMPIC) 2 MG/3ML pen Inject 0.5 mg subcutaneously every 7 days. Past Week    sulfamethoxazole-trimethoprim (BACTRIM DS) 800-160 MG tablet Take 1 tablet by mouth 2 times daily. 7/15/2025 at  8:00 PM    torsemide (DEMADEX) 20 MG tablet Take 20 mg by mouth daily. 7/15/2025 Morning    triamcinolone (KENALOG) 0.1 % external cream Apply topically every evening. To lower extremities 7/15/2025 Evening    White Petrolatum-Mineral Oil (LUBRIFRESH P.M.) OINT Place into both eyes at bedtime. Apply a small strip into both eyes 7/15/2025 Bedtime    zinc oxide (DESITIN) 40 % external ointment Apply topically as needed for dry skin or irritation (with toileting) Taking As Needed

## 2025-07-17 ENCOUNTER — APPOINTMENT (OUTPATIENT)
Dept: GENERAL RADIOLOGY | Facility: CLINIC | Age: 89
DRG: 391 | End: 2025-07-17
Attending: HOSPITALIST
Payer: COMMERCIAL

## 2025-07-17 VITALS
DIASTOLIC BLOOD PRESSURE: 74 MMHG | RESPIRATION RATE: 20 BRPM | HEART RATE: 95 BPM | OXYGEN SATURATION: 95 % | BODY MASS INDEX: 51.67 KG/M2 | TEMPERATURE: 98.5 F | SYSTOLIC BLOOD PRESSURE: 111 MMHG | WEIGHT: 264.55 LBS

## 2025-07-17 LAB
ALBUMIN SERPL BCG-MCNC: 3.5 G/DL (ref 3.5–5.2)
ALP SERPL-CCNC: 136 U/L (ref 40–150)
ALT SERPL W P-5'-P-CCNC: 39 U/L (ref 0–50)
ANION GAP SERPL CALCULATED.3IONS-SCNC: 11 MMOL/L (ref 7–15)
AST SERPL W P-5'-P-CCNC: 74 U/L (ref 0–45)
BILIRUB SERPL-MCNC: 1 MG/DL
BUN SERPL-MCNC: 27.5 MG/DL (ref 8–23)
CALCIUM SERPL-MCNC: 9 MG/DL (ref 8.8–10.4)
CHLORIDE SERPL-SCNC: 101 MMOL/L (ref 98–107)
CREAT SERPL-MCNC: 0.92 MG/DL (ref 0.51–0.95)
EGFRCR SERPLBLD CKD-EPI 2021: 59 ML/MIN/1.73M2
ERYTHROCYTE [DISTWIDTH] IN BLOOD BY AUTOMATED COUNT: 15.3 % (ref 10–15)
GLUCOSE SERPL-MCNC: 134 MG/DL (ref 70–99)
HCO3 SERPL-SCNC: 26 MMOL/L (ref 22–29)
HCT VFR BLD AUTO: 38.8 % (ref 35–47)
HGB BLD-MCNC: 12.3 G/DL (ref 11.7–15.7)
MCH RBC QN AUTO: 31.6 PG (ref 26.5–33)
MCHC RBC AUTO-ENTMCNC: 31.7 G/DL (ref 31.5–36.5)
MCV RBC AUTO: 100 FL (ref 78–100)
PLATELET # BLD AUTO: 244 10E3/UL (ref 150–450)
POTASSIUM SERPL-SCNC: 5.4 MMOL/L (ref 3.4–5.3)
PROT SERPL-MCNC: 7.1 G/DL (ref 6.4–8.3)
RBC # BLD AUTO: 3.89 10E6/UL (ref 3.8–5.2)
SODIUM SERPL-SCNC: 138 MMOL/L (ref 135–145)
WBC # BLD AUTO: 18.9 10E3/UL (ref 4–11)

## 2025-07-17 PROCEDURE — 250N000013 HC RX MED GY IP 250 OP 250 PS 637: Performed by: INTERNAL MEDICINE

## 2025-07-17 PROCEDURE — 74270 X-RAY XM COLON 1CNTRST STD: CPT

## 2025-07-17 PROCEDURE — 94640 AIRWAY INHALATION TREATMENT: CPT | Mod: 76

## 2025-07-17 PROCEDURE — 120N000001 HC R&B MED SURG/OB

## 2025-07-17 PROCEDURE — 999N000157 HC STATISTIC RCP TIME EA 10 MIN

## 2025-07-17 PROCEDURE — 250N000009 HC RX 250: Performed by: INTERNAL MEDICINE

## 2025-07-17 PROCEDURE — 250N000013 HC RX MED GY IP 250 OP 250 PS 637: Performed by: PHYSICIAN ASSISTANT

## 2025-07-17 PROCEDURE — 82040 ASSAY OF SERUM ALBUMIN: CPT | Performed by: PHYSICIAN ASSISTANT

## 2025-07-17 PROCEDURE — 36415 COLL VENOUS BLD VENIPUNCTURE: CPT | Performed by: PHYSICIAN ASSISTANT

## 2025-07-17 PROCEDURE — 85018 HEMOGLOBIN: CPT | Performed by: PHYSICIAN ASSISTANT

## 2025-07-17 PROCEDURE — 99232 SBSQ HOSP IP/OBS MODERATE 35: CPT | Performed by: COLON & RECTAL SURGERY

## 2025-07-17 PROCEDURE — 99233 SBSQ HOSP IP/OBS HIGH 50: CPT | Performed by: HOSPITALIST

## 2025-07-17 RX ORDER — SULFAMETHOXAZOLE AND TRIMETHOPRIM 800; 160 MG/1; MG/1
1 TABLET ORAL 2 TIMES DAILY
Status: COMPLETED | OUTPATIENT
Start: 2025-07-17 | End: 2025-07-18

## 2025-07-17 RX ORDER — DIATRIZOATE MEGLUMINE AND DIATRIZOATE SODIUM 660; 100 MG/ML; MG/ML
120 SOLUTION ORAL; RECTAL ONCE
Status: COMPLETED | OUTPATIENT
Start: 2025-07-17 | End: 2025-07-17

## 2025-07-17 RX ADMIN — SIMETHICONE 226 ML: 125 CAPSULE, LIQUID FILLED ORAL at 06:27

## 2025-07-17 RX ADMIN — GABAPENTIN 400 MG: 400 CAPSULE ORAL at 14:23

## 2025-07-17 RX ADMIN — CARBOXYMETHYLCELLULOSE SODIUM 1 DROP: 5 SOLUTION/ DROPS OPHTHALMIC at 21:27

## 2025-07-17 RX ADMIN — MINERAL OIL, WHITE PETROLATUM: .03; .94 OINTMENT OPHTHALMIC at 21:27

## 2025-07-17 RX ADMIN — GUAIFENESIN 400 MG: 200 TABLET ORAL at 21:26

## 2025-07-17 RX ADMIN — LEVOTHYROXINE SODIUM 88 MCG: 0.09 TABLET ORAL at 06:26

## 2025-07-17 RX ADMIN — SULFAMETHOXAZOLE AND TRIMETHOPRIM 1 TABLET: 800; 160 TABLET ORAL at 21:26

## 2025-07-17 RX ADMIN — DIATRIZOATE MEGLUMINE AND DIATRIZOATE SODIUM 120 ML: 660; 100 SOLUTION ORAL; RECTAL at 13:42

## 2025-07-17 RX ADMIN — CARBOXYMETHYLCELLULOSE SODIUM 1 DROP: 5 SOLUTION/ DROPS OPHTHALMIC at 08:48

## 2025-07-17 RX ADMIN — POTASSIUM CHLORIDE 40 MEQ: 20 TABLET, EXTENDED RELEASE ORAL at 08:47

## 2025-07-17 RX ADMIN — HYDROCORTISONE: 25 CREAM TOPICAL at 08:49

## 2025-07-17 RX ADMIN — GUAIFENESIN 400 MG: 200 TABLET ORAL at 08:48

## 2025-07-17 RX ADMIN — IPRATROPIUM BROMIDE AND ALBUTEROL SULFATE 3 ML: .5; 3 SOLUTION RESPIRATORY (INHALATION) at 16:31

## 2025-07-17 RX ADMIN — POLYETHYLENE GLYCOL 3350 17 G: 17 POWDER, FOR SOLUTION ORAL at 08:48

## 2025-07-17 RX ADMIN — CARBOXYMETHYLCELLULOSE SODIUM 1 DROP: 5 SOLUTION/ DROPS OPHTHALMIC at 14:23

## 2025-07-17 RX ADMIN — ALLOPURINOL 100 MG: 100 TABLET ORAL at 08:48

## 2025-07-17 RX ADMIN — IPRATROPIUM BROMIDE AND ALBUTEROL SULFATE 3 ML: .5; 3 SOLUTION RESPIRATORY (INHALATION) at 21:36

## 2025-07-17 RX ADMIN — IPRATROPIUM BROMIDE AND ALBUTEROL SULFATE 3 ML: .5; 3 SOLUTION RESPIRATORY (INHALATION) at 08:48

## 2025-07-17 RX ADMIN — SULFAMETHOXAZOLE AND TRIMETHOPRIM 1 TABLET: 800; 160 TABLET ORAL at 08:48

## 2025-07-17 RX ADMIN — DULOXETINE 60 MG: 60 CAPSULE, DELAYED RELEASE ORAL at 08:47

## 2025-07-17 RX ADMIN — GABAPENTIN 400 MG: 400 CAPSULE ORAL at 06:26

## 2025-07-17 RX ADMIN — POLYETHYLENE GLYCOL 3350 17 G: 17 POWDER, FOR SOLUTION ORAL at 21:26

## 2025-07-17 RX ADMIN — SENNOSIDES 2 TABLET: 8.6 TABLET, FILM COATED ORAL at 21:26

## 2025-07-17 RX ADMIN — GABAPENTIN 400 MG: 400 CAPSULE ORAL at 21:26

## 2025-07-17 RX ADMIN — ACETAMINOPHEN 650 MG: 325 TABLET ORAL at 14:26

## 2025-07-17 ASSESSMENT — ACTIVITIES OF DAILY LIVING (ADL)
ADLS_ACUITY_SCORE: 51
DEPENDENT_IADLS:: CLEANING;COOKING;LAUNDRY;SHOPPING;MEAL PREPARATION;MEDICATION MANAGEMENT;TRANSPORTATION
ADLS_ACUITY_SCORE: 51

## 2025-07-17 NOTE — PROGRESS NOTES
[facility]    Medicine Progress Note - Hospitalist Service    Date of Admission:  7/16/2025    Assessment & Plan   Carol Merida is a 89 year old female with Pmhx of morbid obesity, diastolic CHF, JEAN on CPAP, reactive airway disease, MDD, anxiety, panic attacks, hypothyroidism, lymphedema, recurrent right lower extremity cellulitis, chronic cough, chronic pain, neuropathy, osteoarthritis, sacral pressure ulcers, and multiple joint replacement surgeries, who was admitted on 7/16/2025. She presented for evaluation of abdominal pain, constipation concerns and dyspnea.  CT Chest PE Abdomen with contrast negative for PE or PNA, no significant hypervolemia, Gaseous and stool throughout the colon from the cecum to the mid sigmoid colon, redundant tissue in the central abdomen without significant sigmoid mesenteric rotation.      Colorectal Surgery were consulted from the ED, reviewed patient and imaging and felt that the patient did not have a sigmoid volvulus. Recommended gastrografin enema that proved narrowing of sigmoid, possible stricture        Abdominal Pain   Sigmoid with possible stricture as proven in Gastrografin enema  Currently suspected to be due to constipation, obstipation. Pain is in LLQ, associated nausea and emesis with lack of BM for at least 5 days. Per patient history of constipation related to prior hemmrhoids and prolapse.   Imaging in the ED with gaseous and stool throughout the colon from the cecum to the mid sigmoid colon, redundant tissue in the central abdomen without significant sigmoid mesenteric rotation.      Colorectal Surgery were consulted from the ED, reviewed patient and imaging and felt that the patient did not have a sigmoid volvulus. Recommended monitoring and constipation treatment.   Lower suspicion for cholecystitis or biliary colic. Hepatic panel not c/w this. Afebrile and no RUQ tenderness.  -admit IP in conjunction with respiratory issues   -Treating  constipation: give x2 soap suds enema, lactulose 1-2 dose PRN. Start scheduled senna and miralax BID am of 7/17 with PRN additional enema in the AM  -ADAT  -anti emetics, non narcotic analgesia PRN  -associated global weakness and low intake due to symptoms. At baseline WC transfer. May require therapy evals while admitted pending improvement. SW consult for discharge planning.     Case discussed over the phone with ANIL Pastor.  She will contact her family to discuss goal of care.  At this point colorectal consider she is a very high risk surgical candidate     Acute Respiratory Failure, Hypoxia with Respiratory Acidosis   Hx of chronic Cough and JEAN  Presented with dyspnea increased from baseline. No overt hypoxia recorded in the ED but maintained on 2 L/min nasal cannula.  VBG shows respiratory acidosis.  Suspect this is mild reactive airway exacerbation versus shortness of breath due to significant abdominal distention and obstipation symptoms.  Patient was reportedly initially a little wheezy on exam in the ED and given Decadron. Pulmonary imaging unremarkable for anything acute and does not appear hypervolemic.   She does not wear continuous supplemental oxygen at home at baseline per her report.  -wean supplemental o2 as able, goal saturation around 89 to 90% at rest  - Continue bronchodilators with scheduled DuoNebs and albuterol as needed.  Patient uses Advair at home as well.   -resume PTA Ativan (takes PRN PTA for anxiety and dyspnea)  -continue PTA diuretics   -hold further steroids currently until reassessment   -hold PTA JEAN tonight if still having nausea and emesis to reduce aspiration risk       Viral PCR still Covid positive, recent COVID infection in May '25.   -no isolation requirements, not an active or new COVID viral infection      Cholelithiasis   Appears asymptomatic as noted above currently lower suspicion for biliary colic as cause for symptoms. No RUQ pain. Limited evaluation with no  definitive evidence for gallbladder wall thickening. LFT Labs stable from baseline.   -monitor symptoms, follow CMP  -currently would not recommend antibiotics        Chronic diastolic CHF  Chronic lymphedema  History of recurrent right lower extremity cellulitis  Appears hypovolemic.     - cont PTA torsemide 20 mg daily and 20 meq potassium daily    - 2 g sodium restriction     - Patient's daughter reports that she was started on a course of Bactrim for recurrent cellulitis in her right lower extremity, she will complete this on July 18.  Will continue while admitted for now.       Hepatic Steatosis   Morbid Obesity   -noted, LFTs stable. BMI of 46  -f/up with PCP     Hepatic Lesion   -  1.5 cm enhancing observation at the dome of the right hepatic lobe. Recommend nonemergent contrast-enhanced MRI for further evaluation.        Chronic pain  Osteoarthritis  Neuropathy    - history of bilateral TKA, right HUGO, and right femur IM nail    - WC transfers at baseline     - cont home gabapentin 400 mg 3 times daily, acetaminophen 3 times daily, and celecoxib 100 mg twice daily as needed     Gout    - cont allopurinol 100 mg daily        Hypothyroidism    - resume PTA levothyroxine 88 mcg daily     MDD  Anxiety  Panic attacks    - resume PTA duloxetine 60 mg daily and lorazepam 0.5 mg every 6 hours as needed. Also takes for dyspnea.      History sacral pressure ulcers  Right Buttock Pressure Injury, Stage 3 (POA)   -WOC              Diet: Regular Diet Adult    DVT Prophylaxis: Pneumatic Compression Devices  Benson Catheter: Not present  Lines: None     Cardiac Monitoring: None  Code Status: Full Code      Clinically Significant Risk Factors        # Hyperkalemia: Highest K = 5.4 mmol/L in last 2 days, will monitor as appropriate            # Hypertension: Noted on problem list  # Chronic heart failure with preserved ejection fraction: heart failure noted on problem list and last echo with EF >50%          # DMII: A1C = 6.6  % (Ref range: <5.7 %) within past 6 months, PRESENT ON ADMISSION      # Financial/Environmental Concerns:           Social Drivers of Health            Disposition Plan     Medically Ready for Discharge: Anticipated in 2-4 Days     Liam Yuan MD  Hospitalist Service  [facility]  Securely message with [employer] (more info)  Text page via [facility] Paging/Directory   ______________________________________________________________________    Interval History   She is about the same of yesterday, bloated, no bowel movement, low level of pain at this moment.    Physical Exam   Vital Signs: Temp: 98.1  F (36.7  C) Temp src: Oral BP: 107/69 Pulse: 92   Resp: 20 SpO2: 95 % O2 Device: Nasal cannula Oxygen Delivery: 1 LPM  Weight: 264 lbs 8.83 oz    GEN:  Alert, cooperative, appears comfortable, NAD.  HEENT:  Normocephalic/atraumatic, no scleral icterus, no nasal discharge, mouth moist.  CV:  Regular rate and rhythm, no murmur or JVD.  S1 + S2 noted, no S3 or S4.  LUNGS:  Clear to auscultation bilaterally without rales/rhonchi/wheezing/retractions.  Symmetric chest rise on inhalation noted.  ABD: Prominent.  Active bowel sounds, soft, non-tender/ distended and tympanic to percussion.  No rebound/guarding/rigidity.  EXT:  No edema or cyanosis.  No joint synovitis noted.  SKIN:  Dry to touch, no exanthems noted in the visualized areas.         Medical Decision Making       52 MINUTES SPENT BY ME on the date of service doing chart review, history, exam, documentation & further activities per the note.    I personally coordinated this case with staff radiology department and radiologist on duty today (Dr. Hernandez)  I also discussed her case with colorectal specialist.  I met Jono, her son or grandson and explained in detail the findings of Gastrografin enema and current sedation.    Data     I have personally reviewed the following data over the past 24 hrs:    18.9 (H)  \   12.3   / 244     138 101 27.5 (H) /   134 (H)   5.4 (H) 26 0.92 \     ALT: 39 AST: 74 (H) AP: 136 TBILI: 1.0   ALB: 3.5 TOT PROTEIN: 7.1 LIPASE: N/A       Imaging results reviewed over the past 24 hrs:   Recent Results (from the past 24 hours)   XR Colon Water Soluble Diagnostic    Narrative    EXAM: XR COLON WATER SOLUBLE DIAGNOSTIC  LOCATION: [facility]  DATE: 07/17/2025    INDICATION: Redundant colon suspicious for partial mesenteric rotation per CT report.  COMPARISON: CT of the chest, abdomen, and pelvis 07/16/2025.  TECHNIQUE: Routine.    FINDINGS:  RADIATION DOSE: Total Air Kerma 165.6 mGy.    COLON: The exam was limited related to patient immobility and discomfort during the exam. The colon is again noted to be markedly tortuous. Water-soluble contrast was instilled into the rectum and distended the sigmoid colon. There was an abrupt   narrowing of the colonic lumen which appeared to be within the proximal sigmoid colon. Contrast could not be induced to flow beyond this focal area of colonic narrowing. There were several loops of dilated gas-filled loops of colon noted proximal to this   area of luminal narrowing.      Impression    IMPRESSION:  1.  Limited exam due to patient immobility and discomfort during the exam.  2.  There was an abrupt narrowing of the colonic lumen which appeared to be within the proximal sigmoid colon. Contrast could not be induced to flow beyond this focal area of colonic narrowing. Findings are suspicious for a colonic stricture, and some   degree of associated colonic obstruction is also suspected.

## 2025-07-17 NOTE — PROGRESS NOTES
COLON & RECTAL SURGERY  PROGRESS NOTE    July 17, 2025    SUBJECTIVE:  No bowel movements overnight. Awaiting GGE today.     OBJECTIVE:  Temp:  [98.1  F (36.7  C)-98.9  F (37.2  C)] 98.1  F (36.7  C)  Pulse:  [] 101  Resp:  [16-22] 18  BP: ()/(48-59) 114/57  SpO2:  [93 %-95 %] 93 %    Intake/Output Summary (Last 24 hours) at 7/17/2025 1145  Last data filed at 7/17/2025 0549  Gross per 24 hour   Intake --   Output 500 ml   Net -500 ml       GENERAL:  Awake, alert, no acute distress  EXTREMITIES: Warm and well perfused  ABDOMEN:  Soft, non tender, non-distended. No guarding, rigidity, or peritoneal signs.     LABS:  Lab Results   Component Value Date    WBC 18.9 07/17/2025    WBC 7.6 04/07/2021     Lab Results   Component Value Date    HGB 12.3 07/17/2025    HGB 13.8 04/07/2021     Lab Results   Component Value Date    HCT 38.8 07/17/2025    HCT 42.8 04/07/2021     Lab Results   Component Value Date     07/17/2025     04/07/2021     Last Basic Metabolic Panel:  Lab Results   Component Value Date     07/17/2025     04/07/2021      Lab Results   Component Value Date    POTASSIUM 5.4 07/17/2025    POTASSIUM 4.1 02/26/2023    POTASSIUM 4.4 08/19/2022    POTASSIUM 4.0 04/07/2021     Lab Results   Component Value Date    CHLORIDE 101 07/17/2025    CHLORIDE 99 08/19/2022    CHLORIDE 101 04/07/2021     Lab Results   Component Value Date    LAKSHMI 9.0 07/17/2025    LAKSHMI 8.6 04/07/2021     Lab Results   Component Value Date    CO2 26 07/17/2025    CO2 28 08/19/2022    CO2 29 04/07/2021     Lab Results   Component Value Date    BUN 27.5 07/17/2025    BUN 13 08/19/2022    BUN 16 04/07/2021     Lab Results   Component Value Date    CR 0.92 07/17/2025    CR 0.77 04/07/2021     Lab Results   Component Value Date     07/17/2025     05/31/2025     08/19/2022     04/07/2021       ASSESSMENT/PLAN: Carol HARDY Fuad is a 89 year old female with history of morbid obesity BMI 47.6,  CHF, JEAN on CPAP, MDD, hypothyroidism who presented to the ED from her [facility] for abdominal pain,constipation, and dyspnea.  CT scan on admission demonstrated a significant amount of gas and stool throughout the colon, with a significantly redundant sigmoid colon.  There was no evidence of a sigmoid volvulus.    Gastrografin enema ordered to today to rule out obstruction.  This may also help clear her colonic stool burden.  If the Gastrografin enema demonstrates no evidence of a sigmoid volvulus, would recommend an aggressive bowel regimen and diet advancement as tolerated.  If she were to require surgical intervention for a possible sigmoid volvulus, this would involve creation of a permanent colostomy bag, and she would be at very high risk for perioperative morbidity and mortality given her age, morbid obesity and multiple underlying chronic medical comorbidities.      Clinically Significant Risk Factors Present on Admission        # Hyperkalemia: Highest K = 5.4 mmol/L in last 2 days, will monitor as appropriate            # Hypertension: Noted on problem list  # Chronic heart failure with preserved ejection fraction: heart failure noted on problem list and last echo with EF >50%         # DMII: A1C = 6.6 % (Ref range: <5.7 %) within past 6 months        # Financial/Environmental Concerns:         A total of 32 minutes was spent on today's visit.    For questions/paging, please contact the CRS office at [phone].    Stella Jaquez MD  Colorectal Surgery    [facility]  [address] [facility] [address] [address]  T: [phone]  F: [phone]        CRS Addendum    Gastrografin enema results noted.  There is concern for a stricture within the sigmoid colon.  I discussed these results with Dr. Yuan, as well as the patient's son Jim Merida.    At this point, I think the most important thing for Everette is a goals of care discussion with the palliative care team.  Given her age,  medical comorbidities, morbid obesity, as well as altered mental status, I think we need to define what interventions Everette and her family would want moving forward.  If the overall goal is to keep her comfortable and maintain her quality of life, I would recommend proceeding with an aggressive bowel regimen to keep her stools loose so they can pass through the colonic narrowing, and continue with a diet as tolerated.  I would not recommend any further evaluation for the colonic stricture and continued symptomatic management.    If she does have a colonic stricture, whether benign or malignant, if the family would like to pursue aggressive intervention, the next step would be to consult gastroenterology to perform a colonoscopy, and possible evaluation for colonic stenting. In the setting of a colonic mass, or benign stricture from diverticular disease, definitive management may ultimately require surgical intervention.  With her age, medical comorbidities, obesity and body habitus, surgery would have a high risk of perioperative complications and mortality.  In addition, surgery would entail a permanent colostomy bag.  Given the high likelihood of complications, as well as diminished quality of life with surgical intervention, ultimately, I do not think surgery is in her best interest.     Colorectal surgery will be available to answer any further questions, and will continue to help facilitate care.  Please call with any questions, concerns or changes in clinical status.    Stella Jaquez MD  Colorectal Surgery    [facility]  [address] [address]  T: [phone]  F: [phone]

## 2025-07-17 NOTE — CONSULTS
Care Management Initial Consult    General Information  Assessment completed with: Patient,    Type of CM/SW Visit: Initial Assessment    Primary Care Provider verified and updated as needed: Yes   Readmission within the last 30 days: no previous admission in last 30 days      Reason for Consult: discharge planning    Communication Assessment  Patient's communication style: spoken language (English or Bilingual)    Hearing Difficulty or Deaf: no   Wear Glasses or Blind: yes    Cognitive  Cognitive/Neuro/Behavioral: WDL  Level of Consciousness: alert  Arousal Level: opens eyes spontaneously  Orientation: oriented x 4 (intermittently confused)  Mood/Behavior: cooperative, calm  Best Language: 0 - No aphasia  Speech: clear, spontaneous    Living Environment:   People in home: facility resident     Current living Arrangements: assisted living      Able to return to prior arrangements: yes     Family/Social Support:  Care provided by: self ([facility] staff)  Provides care for: no one, unable/limited ability to care for self  Marital Status:   Support system: Children          Description of Support System: Supportive, Involved    Support Assessment: Adequate family and caregiver support    Current Resources:   Patient receiving home care services: Yes  Skilled Home Care Services: Skilled Nursing, Occupational Therapy     Community Resources: None  Equipment currently used at home: wheelchair, power  Supplies currently used at home: None    Lifestyle & Psychosocial Needs:  Social Drivers of Health     Food Insecurity: Low Risk  (7/16/2025)    Food Insecurity     Within the past 12 months, did you worry that your food would run out before you got money to buy more?: No     Within the past 12 months, did the food you bought just not last and you didn t have money to get more?: No   Depression: Not at risk (5/29/2024)    PHQ-2     PHQ-2 Score: 1   Housing Stability: Low Risk  (7/16/2025)    Housing Stability     Do you have  housing? : Yes     Are you worried about losing your housing?: No   Tobacco Use: Low Risk  (7/19/2024)    Patient History     Smoking Tobacco Use: Never     Smokeless Tobacco Use: Never     Passive Exposure: Not on file   Financial Resource Strain: Low Risk  (7/16/2025)    Financial Resource Strain     Within the past 12 months, have you or your family members you live with been unable to get utilities (heat, electricity) when it was really needed?: No   Alcohol Use: Not on file   Transportation Needs: Low Risk  (7/16/2025)    Transportation Needs     Within the past 12 months, has lack of transportation kept you from medical appointments, getting your medicines, non-medical meetings or appointments, work, or from getting things that you need?: No   Physical Activity: Not on file   Interpersonal Safety: Low Risk  (7/16/2025)    Interpersonal Safety     Do you feel physically and emotionally safe where you currently live?: Yes     Within the past 12 months, have you been hit, slapped, kicked or otherwise physically hurt by someone?: No     Within the past 12 months, have you been humiliated or emotionally abused in other ways by your partner or ex-partner?: No   Stress: Not on file   Social Connections: Not on file   Health Literacy: Not on file     Functional Status:  Prior to admission patient needed assistance:   Dependent ADLs:: Bathing, Dressing, Toileting, Wheelchair-with assist, Transfers  Dependent IADLs:: Cleaning, Cooking, Laundry, Shopping, Meal Preparation, Medication Management, Transportation  Assesssment of Functional Status: At functional baseline    Discussed  Partnership in Safe Discharge Planning  document with patient/family: Yes    Additional Information:  CM consulted for discharge planning, met with pt at bedside to discuss. Pt reports that she resides at [facility] and gives permission to coordinate care with them, called and spoke with their nurse Negin who reports that patient receives  services as follows: med management, meals, dressing, bathing, transfers, toileting, housekeeping + Interim HC for RN/OT      ROC contact (name/number): Main P: [phone], nurse line P: [phone] F: [phone]  Barriers to pt returning: None  Baseline mobility: pivot transfer with A1 to power wheelchair  Time of preferred return: Prefer weekday return before 1600, ok for weekend return but there is no on-site nurse available. For weekend returns CM is to call on-call nurse Angie P: [phone]   New meds/prescriptions/Pharmacy: [facility]  Transportation: W    Reviewed out of pocket cost for [facility] transport, $99.20 base and $6.38 per mile to the destination. Spoke with pt, they expressed understanding and are agreeable to this.     Next Steps: Coordinate ride and ROC return once ready    Justa Castellon RN BSN   Inpatient Care Coordination  [facility]   Phone [phone]

## 2025-07-17 NOTE — PLAN OF CARE
"Patient is AOx4 with int. Confusion, tolerated a clear and full liquid diet so her diet was advanced to regular. Had a gastrografin enema this morning, she still has not been able to have a bowel movement. No increase in abdominal pain, feels like she is passing some gas. Colorectal surgery is following, palliative care was consulted. External cath is in place. Oxygen 1L is on, her baseline is RA during the day and CPAP at night.     Problem: Adult Inpatient Plan of Care  Goal: Plan of Care Review  Description: The Plan of Care Review/Shift note should be completed every shift.  The Outcome Evaluation is a brief statement about your assessment that the patient is improving, declining, or no change.  This information will be displayed automatically on your shift  note.  Outcome: Progressing  Flowsheets (Taken 7/17/2025 1647)  Plan of Care Reviewed With: patient  Overall Patient Progress: improving  Goal: Patient-Specific Goal (Individualized)  Description: You can add care plan individualizations to a care plan. Examples of Individualization might be:  \"Parent requests to be called daily at 9am for status\", \"I have a hard time hearing out of my right ear\", or \"Do not touch me to wake me up as it startles  me\".  Outcome: Progressing  Goal: Absence of Hospital-Acquired Illness or Injury  Outcome: Progressing  Intervention: Identify and Manage Fall Risk  Recent Flowsheet Documentation  Taken 7/17/2025 0900 by Alpa Rogers RN  Safety Promotion/Fall Prevention: safety round/check completed  Intervention: Prevent Skin Injury  Recent Flowsheet Documentation  Taken 7/17/2025 0900 by Alpa Rogers RN  Body Position: supine, head elevated  Goal: Optimal Comfort and Wellbeing  Outcome: Progressing  Goal: Readiness for Transition of Care  Outcome: Progressing     Problem: Delirium  Goal: Optimal Coping  Outcome: Progressing  Goal: Improved Behavioral Control  Outcome: Progressing  Goal: Improved Attention and Thought " Clarity  Outcome: Progressing  Goal: Improved Sleep  Outcome: Progressing     Problem: Skin Injury Risk Increased  Goal: Skin Health and Integrity  Outcome: Progressing  Intervention: Plan: Nurse Driven Intervention: Moisture Management  Recent Flowsheet Documentation  Taken 7/17/2025 0900 by Alpa Rogers RN  Moisture Interventions:   Urinary collection device   Incontinence pad   No brief in bed  Intervention: Plan: Nurse Driven Intervention: Friction and Shear  Recent Flowsheet Documentation  Taken 7/17/2025 0900 by Alpa Rogers RN  Friction/Shear Interventions:   HOB 30 degrees or less   Silicone foam sacral dressing  Intervention: Optimize Skin Protection  Recent Flowsheet Documentation  Taken 7/17/2025 0900 by Alpa Rogers, RN  Activity Management: activity adjusted per tolerance  Head of Bed (HOB) Positioning: HOB at 30-45 degrees   Goal Outcome Evaluation:      Plan of Care Reviewed With: patient    Overall Patient Progress: improvingOverall Patient Progress: improving

## 2025-07-17 NOTE — PLAN OF CARE
"Goal Outcome Evaluation:      Plan of Care Reviewed With: patient    Overall Patient Progress: improvingOverall Patient Progress: improving    Outcome Evaluation: Pt is alert and oriented x4. Turned and repositioned with pillows q2-3 hrs and as needed. On O2 at 2LPM via NC. On Bowel magement protocol. Purewick in place with tea colored urine. High risk for skin breakdown and on PIP plan with a HASMUKH mattress. Limb restriction on LUE. TOlerating clear liquid diet. ADAT. POC ongoing      Blood pressure 103/59, pulse 94, temperature 98.9  F (37.2  C), temperature source Oral, resp. rate 18, weight 120 kg (264 lb 8.8 oz), SpO2 94%, not currently breastfeeding.       Problem: Adult Inpatient Plan of Care  Goal: Plan of Care Review  Description: The Plan of Care Review/Shift note should be completed every shift.  The Outcome Evaluation is a brief statement about your assessment that the patient is improving, declining, or no change.  This information will be displayed automatically on your shift  note.  Outcome: Progressing  Flowsheets (Taken 7/17/2025 0606)  Outcome Evaluation: Pt is alert and oriented x4. Turned and repositioned with pillows q2-3 hrs and as needed. On O2 at 2LPM via NC. On Bowel magement protocol. Purewick in place with tea colored urine. High risk for skin breakdown and on PIP plan with a HASMUKH mattress. Limb restriction on LUE. TOlerating clear liquid diet. ADAT. POC ongoing  Plan of Care Reviewed With: patient  Overall Patient Progress: improving  Goal: Patient-Specific Goal (Individualized)  Description: You can add care plan individualizations to a care plan. Examples of Individualization might be:  \"Parent requests to be called daily at 9am for status\", \"I have a hard time hearing out of my right ear\", or \"Do not touch me to wake me up as it startles  me\".  Outcome: Progressing  Goal: Absence of Hospital-Acquired Illness or Injury  Outcome: Progressing  Intervention: Identify and Manage Fall " Risk  Recent Flowsheet Documentation  Taken 7/17/2025 0039 by Ingrid Stanford RN  Safety Promotion/Fall Prevention:   mobility aid in reach   clutter free environment maintained   room near nurse's station   room door open  Goal: Optimal Comfort and Wellbeing  Outcome: Progressing  Intervention: Provide Person-Centered Care  Recent Flowsheet Documentation  Taken 7/17/2025 0039 by Ingrid Stanford RN  Trust Relationship/Rapport:   care explained   emotional support provided   reassurance provided  Goal: Readiness for Transition of Care  Outcome: Progressing     Problem: Delirium  Goal: Optimal Coping  Outcome: Progressing  Intervention: Optimize Psychosocial Adjustment to Delirium  Recent Flowsheet Documentation  Taken 7/17/2025 0039 by Ingrid Stanford RN  Family/Support System Care: self-care encouraged  Goal: Improved Behavioral Control  Outcome: Progressing  Intervention: Minimize Safety Risk  Recent Flowsheet Documentation  Taken 7/17/2025 0039 by Ingrid Stanford RN  Enhanced Safety Measures: review medications for side effects with activity  Trust Relationship/Rapport:   care explained   emotional support provided   reassurance provided  Goal: Improved Attention and Thought Clarity  Outcome: Progressing  Goal: Improved Sleep  Outcome: Progressing     Problem: Skin Injury Risk Increased  Goal: Skin Health and Integrity  Outcome: Progressing  Intervention: Plan: Nurse Driven Intervention: Moisture Management  Recent Flowsheet Documentation  Taken 7/17/2025 0039 by Ingrid Stanford RN  Moisture Interventions:   Urinary collection device   Incontinence pad   No brief in bed  Taken 7/16/2025 2000 by Ingrid Stanford RN  Moisture Interventions: Urinary collection device  Bathing/Skin Care: incontinence care  Intervention: Plan: Nurse Driven Intervention: Friction and Shear  Recent Flowsheet Documentation  Taken 7/17/2025 0039 by Ingrid Stanford RN  Friction/Shear Interventions:   HOB 30 degrees or less    Silicone foam sacral dressing  Intervention: Optimize Skin Protection  Recent Flowsheet Documentation  Taken 7/17/2025 0039 by Ingrid Stanford, RN  Pressure Reduction Techniques:   frequent weight shift encouraged   heels elevated off bed

## 2025-07-17 NOTE — PLAN OF CARE
"  Problem: Adult Inpatient Plan of Care  Goal: Plan of Care Review  Description: The Plan of Care Review/Shift note should be completed every shift.  The Outcome Evaluation is a brief statement about your assessment that the patient is improving, declining, or no change.  This information will be displayed automatically on your shift  note.  Outcome: Not Progressing  Flowsheets (Taken 7/16/2025 9420)  Outcome Evaluation: Pt feels SOB. requiring 2L.  Goal: Patient-Specific Goal (Individualized)  Description: You can add care plan individualizations to a care plan. Examples of Individualization might be:  \"Parent requests to be called daily at 9am for status\", \"I have a hard time hearing out of my right ear\", or \"Do not touch me to wake me up as it startles  me\".  Outcome: Not Progressing  Goal: Absence of Hospital-Acquired Illness or Injury  Outcome: Not Progressing  Goal: Optimal Comfort and Wellbeing  Outcome: Not Progressing  Goal: Readiness for Transition of Care  Outcome: Not Progressing  Intervention: Mutually Develop Transition Plan  Recent Flowsheet Documentation  Taken 7/16/2025 1555 by Rosie Rogers, RN  Equipment Currently Used at Home: wheelchair, power     Problem: Delirium  Goal: Optimal Coping  Outcome: Not Progressing  Goal: Improved Behavioral Control  Outcome: Not Progressing  Goal: Improved Attention and Thought Clarity  Outcome: Not Progressing  Goal: Improved Sleep  Outcome: Not Progressing     Problem: Skin Injury Risk Increased  Goal: Skin Health and Integrity  Outcome: Not Progressing  Intervention: Plan: Nurse Driven Intervention: Moisture Management  Recent Flowsheet Documentation  Taken 7/16/2025 1800 by Rosie Rogers, RN  Moisture Interventions:   Urinary collection device   Incontinence pad   No brief in bed  Intervention: Plan: Nurse Driven Intervention: Friction and Shear  Recent Flowsheet Documentation  Taken 7/16/2025 1800 by Rosie Rogers, RN  Friction/Shear " Interventions:   HOB 30 degrees or less   Silicone foam sacral dressing   Goal Outcome Evaluation:                 Outcome Evaluation: Pt feels SOB. requiring 2L.

## 2025-07-18 ENCOUNTER — APPOINTMENT (OUTPATIENT)
Dept: GENERAL RADIOLOGY | Facility: CLINIC | Age: 89
DRG: 391 | End: 2025-07-18
Attending: HOSPITALIST
Payer: COMMERCIAL

## 2025-07-18 LAB
ANION GAP SERPL CALCULATED.3IONS-SCNC: 10 MMOL/L (ref 7–15)
BASOPHILS # BLD AUTO: 0 10E3/UL (ref 0–0.2)
BASOPHILS NFR BLD AUTO: 0 %
BUN SERPL-MCNC: 28.6 MG/DL (ref 8–23)
CALCIUM SERPL-MCNC: 9 MG/DL (ref 8.8–10.4)
CHLORIDE SERPL-SCNC: 100 MMOL/L (ref 98–107)
CREAT SERPL-MCNC: 0.91 MG/DL (ref 0.51–0.95)
EGFRCR SERPLBLD CKD-EPI 2021: 60 ML/MIN/1.73M2
EOSINOPHIL # BLD AUTO: 0 10E3/UL (ref 0–0.7)
EOSINOPHIL NFR BLD AUTO: 0 %
ERYTHROCYTE [DISTWIDTH] IN BLOOD BY AUTOMATED COUNT: 15.3 % (ref 10–15)
FLEXIBLE SIGMOIDOSCOPY: NORMAL
GLUCOSE SERPL-MCNC: 126 MG/DL (ref 70–99)
HCO3 SERPL-SCNC: 26 MMOL/L (ref 22–29)
HCT VFR BLD AUTO: 39.8 % (ref 35–47)
HGB BLD-MCNC: 12.3 G/DL (ref 11.7–15.7)
IMM GRANULOCYTES # BLD: 0.1 10E3/UL
IMM GRANULOCYTES NFR BLD: 1 %
LYMPHOCYTES # BLD AUTO: 1.2 10E3/UL (ref 0.8–5.3)
LYMPHOCYTES NFR BLD AUTO: 9 %
MCH RBC QN AUTO: 31.4 PG (ref 26.5–33)
MCHC RBC AUTO-ENTMCNC: 30.9 G/DL (ref 31.5–36.5)
MCV RBC AUTO: 102 FL (ref 78–100)
MONOCYTES # BLD AUTO: 0.9 10E3/UL (ref 0–1.3)
MONOCYTES NFR BLD AUTO: 7 %
NEUTROPHILS # BLD AUTO: 11.4 10E3/UL (ref 1.6–8.3)
NEUTROPHILS NFR BLD AUTO: 84 %
NRBC # BLD AUTO: 0 10E3/UL
NRBC BLD AUTO-RTO: 0 /100
PLATELET # BLD AUTO: 247 10E3/UL (ref 150–450)
POTASSIUM SERPL-SCNC: 5 MMOL/L (ref 3.4–5.3)
RBC # BLD AUTO: 3.92 10E6/UL (ref 3.8–5.2)
SODIUM SERPL-SCNC: 136 MMOL/L (ref 135–145)
WBC # BLD AUTO: 13.6 10E3/UL (ref 4–11)

## 2025-07-18 PROCEDURE — 94640 AIRWAY INHALATION TREATMENT: CPT

## 2025-07-18 PROCEDURE — 120N000001 HC R&B MED SURG/OB

## 2025-07-18 PROCEDURE — 250N000011 HC RX IP 250 OP 636: Performed by: INTERNAL MEDICINE

## 2025-07-18 PROCEDURE — 99223 1ST HOSP IP/OBS HIGH 75: CPT | Performed by: CLINICAL NURSE SPECIALIST

## 2025-07-18 PROCEDURE — 45330 DIAGNOSTIC SIGMOIDOSCOPY: CPT | Performed by: INTERNAL MEDICINE

## 2025-07-18 PROCEDURE — 250N000013 HC RX MED GY IP 250 OP 250 PS 637: Performed by: PHYSICIAN ASSISTANT

## 2025-07-18 PROCEDURE — 250N000013 HC RX MED GY IP 250 OP 250 PS 637: Performed by: NURSE PRACTITIONER

## 2025-07-18 PROCEDURE — 74019 RADEX ABDOMEN 2 VIEWS: CPT

## 2025-07-18 PROCEDURE — 99418 PROLNG IP/OBS E/M EA 15 MIN: CPT | Performed by: CLINICAL NURSE SPECIALIST

## 2025-07-18 PROCEDURE — 85025 COMPLETE CBC W/AUTO DIFF WBC: CPT | Performed by: HOSPITALIST

## 2025-07-18 PROCEDURE — 99233 SBSQ HOSP IP/OBS HIGH 50: CPT | Performed by: PHYSICIAN ASSISTANT

## 2025-07-18 PROCEDURE — G0500 MOD SEDAT ENDO SERVICE >5YRS: HCPCS | Performed by: INTERNAL MEDICINE

## 2025-07-18 PROCEDURE — 0DJD8ZZ INSPECTION OF LOWER INTESTINAL TRACT, VIA NATURAL OR ARTIFICIAL OPENING ENDOSCOPIC: ICD-10-PCS | Performed by: INTERNAL MEDICINE

## 2025-07-18 PROCEDURE — 999N000157 HC STATISTIC RCP TIME EA 10 MIN

## 2025-07-18 PROCEDURE — 250N000009 HC RX 250: Performed by: INTERNAL MEDICINE

## 2025-07-18 PROCEDURE — 36415 COLL VENOUS BLD VENIPUNCTURE: CPT | Performed by: HOSPITALIST

## 2025-07-18 PROCEDURE — 80048 BASIC METABOLIC PNL TOTAL CA: CPT | Performed by: HOSPITALIST

## 2025-07-18 RX ORDER — ATROPINE SULFATE 0.1 MG/ML
1 INJECTION INTRAVENOUS
Status: DISCONTINUED | OUTPATIENT
Start: 2025-07-18 | End: 2025-07-18 | Stop reason: HOSPADM

## 2025-07-18 RX ORDER — SODIUM PHOSPHATE,MONO-DIBASIC 19G-7G/118
1 ENEMA (ML) RECTAL ONCE
Status: COMPLETED | OUTPATIENT
Start: 2025-07-18 | End: 2025-07-18

## 2025-07-18 RX ORDER — NALOXONE HYDROCHLORIDE 0.4 MG/ML
0.4 INJECTION, SOLUTION INTRAMUSCULAR; INTRAVENOUS; SUBCUTANEOUS
Status: DISCONTINUED | OUTPATIENT
Start: 2025-07-18 | End: 2025-07-18 | Stop reason: HOSPADM

## 2025-07-18 RX ORDER — NALOXONE HYDROCHLORIDE 0.4 MG/ML
0.2 INJECTION, SOLUTION INTRAMUSCULAR; INTRAVENOUS; SUBCUTANEOUS
Status: DISCONTINUED | OUTPATIENT
Start: 2025-07-18 | End: 2025-07-18 | Stop reason: HOSPADM

## 2025-07-18 RX ORDER — FENTANYL CITRATE 50 UG/ML
25-100 INJECTION, SOLUTION INTRAMUSCULAR; INTRAVENOUS EVERY 5 MIN PRN
Refills: 0 | Status: DISCONTINUED | OUTPATIENT
Start: 2025-07-18 | End: 2025-07-18 | Stop reason: HOSPADM

## 2025-07-18 RX ORDER — NALOXONE HYDROCHLORIDE 0.4 MG/ML
0.4 INJECTION, SOLUTION INTRAMUSCULAR; INTRAVENOUS; SUBCUTANEOUS
Status: DISCONTINUED | OUTPATIENT
Start: 2025-07-18 | End: 2025-07-25 | Stop reason: HOSPADM

## 2025-07-18 RX ORDER — DIPHENHYDRAMINE HYDROCHLORIDE 50 MG/ML
25-50 INJECTION, SOLUTION INTRAMUSCULAR; INTRAVENOUS
Status: DISCONTINUED | OUTPATIENT
Start: 2025-07-18 | End: 2025-07-18 | Stop reason: HOSPADM

## 2025-07-18 RX ORDER — SIMETHICONE 40MG/0.6ML
133 SUSPENSION, DROPS(FINAL DOSAGE FORM)(ML) ORAL
Status: DISCONTINUED | OUTPATIENT
Start: 2025-07-18 | End: 2025-07-18 | Stop reason: HOSPADM

## 2025-07-18 RX ORDER — NALOXONE HYDROCHLORIDE 0.4 MG/ML
0.2 INJECTION, SOLUTION INTRAMUSCULAR; INTRAVENOUS; SUBCUTANEOUS
Status: DISCONTINUED | OUTPATIENT
Start: 2025-07-18 | End: 2025-07-25 | Stop reason: HOSPADM

## 2025-07-18 RX ORDER — SODIUM PHOSPHATE,MONO-DIBASIC 19G-7G/118
2 ENEMA (ML) RECTAL ONCE
Status: COMPLETED | OUTPATIENT
Start: 2025-07-18 | End: 2025-07-18

## 2025-07-18 RX ORDER — FLUMAZENIL 0.1 MG/ML
0.2 INJECTION, SOLUTION INTRAVENOUS
Status: DISCONTINUED | OUTPATIENT
Start: 2025-07-18 | End: 2025-07-18 | Stop reason: HOSPADM

## 2025-07-18 RX ORDER — LACTULOSE 10 G/15ML
20 SOLUTION ORAL
Status: DISCONTINUED | OUTPATIENT
Start: 2025-07-18 | End: 2025-07-19

## 2025-07-18 RX ORDER — EPINEPHRINE 1 MG/ML
0.1 INJECTION, SOLUTION, CONCENTRATE INTRAVENOUS
Status: DISCONTINUED | OUTPATIENT
Start: 2025-07-18 | End: 2025-07-18 | Stop reason: HOSPADM

## 2025-07-18 RX ORDER — FLUMAZENIL 0.1 MG/ML
0.2 INJECTION, SOLUTION INTRAVENOUS
Status: ACTIVE | OUTPATIENT
Start: 2025-07-18 | End: 2025-07-19

## 2025-07-18 RX ADMIN — ALLOPURINOL 100 MG: 100 TABLET ORAL at 08:25

## 2025-07-18 RX ADMIN — LEVOTHYROXINE SODIUM 88 MCG: 0.09 TABLET ORAL at 06:17

## 2025-07-18 RX ADMIN — MINERAL OIL, WHITE PETROLATUM: .03; .94 OINTMENT OPHTHALMIC at 21:15

## 2025-07-18 RX ADMIN — MIDAZOLAM 1 MG: 1 INJECTION INTRAMUSCULAR; INTRAVENOUS at 18:08

## 2025-07-18 RX ADMIN — GUAIFENESIN 400 MG: 200 TABLET ORAL at 19:47

## 2025-07-18 RX ADMIN — ACETAMINOPHEN 650 MG: 325 TABLET ORAL at 21:15

## 2025-07-18 RX ADMIN — GABAPENTIN 400 MG: 400 CAPSULE ORAL at 21:15

## 2025-07-18 RX ADMIN — IPRATROPIUM BROMIDE AND ALBUTEROL SULFATE 3 ML: .5; 3 SOLUTION RESPIRATORY (INHALATION) at 09:37

## 2025-07-18 RX ADMIN — GABAPENTIN 400 MG: 400 CAPSULE ORAL at 13:18

## 2025-07-18 RX ADMIN — SENNOSIDES 2 TABLET: 8.6 TABLET, FILM COATED ORAL at 21:15

## 2025-07-18 RX ADMIN — SULFAMETHOXAZOLE AND TRIMETHOPRIM 1 TABLET: 800; 160 TABLET ORAL at 19:47

## 2025-07-18 RX ADMIN — HYDROCORTISONE: 25 CREAM TOPICAL at 09:33

## 2025-07-18 RX ADMIN — FENTANYL CITRATE 50 MCG: 50 INJECTION, SOLUTION INTRAMUSCULAR; INTRAVENOUS at 18:08

## 2025-07-18 RX ADMIN — DULOXETINE 60 MG: 60 CAPSULE, DELAYED RELEASE ORAL at 08:24

## 2025-07-18 RX ADMIN — CARBOXYMETHYLCELLULOSE SODIUM 1 DROP: 5 SOLUTION/ DROPS OPHTHALMIC at 19:48

## 2025-07-18 RX ADMIN — CARBOXYMETHYLCELLULOSE SODIUM 1 DROP: 5 SOLUTION/ DROPS OPHTHALMIC at 08:24

## 2025-07-18 RX ADMIN — SULFAMETHOXAZOLE AND TRIMETHOPRIM 1 TABLET: 800; 160 TABLET ORAL at 09:33

## 2025-07-18 RX ADMIN — IPRATROPIUM BROMIDE AND ALBUTEROL SULFATE 3 ML: .5; 3 SOLUTION RESPIRATORY (INHALATION) at 13:14

## 2025-07-18 RX ADMIN — GABAPENTIN 400 MG: 400 CAPSULE ORAL at 06:17

## 2025-07-18 RX ADMIN — SODIUM PHOSPHATE, DIBASIC AND SODIUM PHOSPHATE, MONOBASIC 2 ENEMA: 7; 19 ENEMA RECTAL at 16:50

## 2025-07-18 RX ADMIN — POLYETHYLENE GLYCOL 3350 17 G: 17 POWDER, FOR SOLUTION ORAL at 19:47

## 2025-07-18 RX ADMIN — GUAIFENESIN 400 MG: 200 TABLET ORAL at 08:25

## 2025-07-18 RX ADMIN — HYDROCORTISONE: 25 CREAM TOPICAL at 19:47

## 2025-07-18 RX ADMIN — POLYETHYLENE GLYCOL 3350 17 G: 17 POWDER, FOR SOLUTION ORAL at 08:25

## 2025-07-18 RX ADMIN — LACTULOSE 20 G: 20 SOLUTION ORAL at 16:51

## 2025-07-18 RX ADMIN — POTASSIUM CHLORIDE 40 MEQ: 20 TABLET, EXTENDED RELEASE ORAL at 08:25

## 2025-07-18 RX ADMIN — CARBOXYMETHYLCELLULOSE SODIUM 1 DROP: 5 SOLUTION/ DROPS OPHTHALMIC at 13:18

## 2025-07-18 RX ADMIN — SODIUM PHOSPHATE, DIBASIC AND SODIUM PHOSPHATE, MONOBASIC 1 ENEMA: 7; 19 ENEMA RECTAL at 13:18

## 2025-07-18 ASSESSMENT — ACTIVITIES OF DAILY LIVING (ADL)
ADLS_ACUITY_SCORE: 51
ADLS_ACUITY_SCORE: 51
ADLS_ACUITY_SCORE: 55
ADLS_ACUITY_SCORE: 55
ADLS_ACUITY_SCORE: 51
ADLS_ACUITY_SCORE: 55
ADLS_ACUITY_SCORE: 51
ADLS_ACUITY_SCORE: 55
ADLS_ACUITY_SCORE: 51
ADLS_ACUITY_SCORE: 55
ADLS_ACUITY_SCORE: 51
ADLS_ACUITY_SCORE: 55
ADLS_ACUITY_SCORE: 51
ADLS_ACUITY_SCORE: 55

## 2025-07-18 NOTE — PROGRESS NOTES
[facility]    Medicine Progress Note - Hospitalist Service    Date of Admission:  7/16/2025    Assessment & Plan   Carol Merida is a 89 year old female with PMHx of morbid obesity, diastolic CHF, JEAN on CPAP, reactive airway disease, MDD, anxiety, panic attacks, hypothyroidism, lymphedema, recurrent right lower extremity cellulitis, chronic cough, chronic pain, neuropathy, osteoarthritis, sacral pressure ulcers, and multiple joint replacement surgeries, who was admitted on 7/16/2025 with abdominal pain likely due to constipation. She also notes dyspnea.     CT Chest PE Abdomen with contrast negative for PE or PNA, no significant hypervolemia, gaseous and stool throughout the colon from the cecum to the mid sigmoid colon, redundant tissue in the central abdomen without significant sigmoid mesenteric rotation.      Colorectal Surgery were consulted from the ED, reviewed patient and imaging and felt that the patient did not have a sigmoid volvulus. Recommended gastrografin enema that proved narrowing of sigmoid, possible stricture.      Abdominal Pain   Sigmoid with possible stricture as proven in Gastrografin enema  Currently suspected to be due to constipation, obstipation. Pain is in LLQ, associated nausea and emesis with lack of BM for at least 5 days. Per patient history of constipation related to prior hemorrhoids and prolapse.   Imaging in the ED with gaseous and stool throughout the colon from the cecum to the mid sigmoid colon, redundant tissue in the central abdomen without significant sigmoid mesenteric rotation.   Colorectal Surgery were consulted from the ED, reviewed patient and imaging and felt that the patient did not have a sigmoid volvulus. Recommended keeping stools soft to pass through colonic stricture.   CRS discussed option of surgery, felt pt was a very poor surgical candidate and would likely not survive surgery if needed.    Lower suspicion for cholecystitis or biliary  colic. Hepatic panel not c/w this. Afebrile and no RUQ tenderness.  - s/p Gastrogaffin enema, abrupt narrowing noted probable proximal sigmoid colon  - pt did have a medium sized bowel movement this AM with improvement in discomfort  - continue lactulose BID, senokot daily and Miralax BID.   - PRN Fleets enema and pepto bismol and PRN simethicone.   - ADAT  - anti emetics, non narcotic analgesia PRN  - encourage sitting in chair or head elevated to alleviate diaphragm pressure  - GI consult to discuss possible less invasive options for management     Acute Respiratory Failure, Hypoxia with Respiratory Acidosis   Hx of chronic Cough and JEAN  Presented with dyspnea increased from baseline. No overt hypoxia recorded in the ED but maintained on 2 L/min nasal cannula.  VBG shows respiratory acidosis.  Suspect this is mild reactive airway exacerbation versus shortness of breath due to significant abdominal distention and obstipation symptoms.    Patient was reportedly initially a little wheezy on exam in the ED and given Decadron. Pulmonary imaging unremarkable for anything acute and does not appear hypervolemic.   She does not wear continuous supplemental oxygen at home at baseline per her report.  - wean supplemental o2 as able, goal saturation around 89 to 90% at rest  - Continue bronchodilators with scheduled DuoNebs and albuterol as needed.  Patient uses Advair at home as well.   - continue PTA Ativan (takes PRN PTA for anxiety and dyspnea)  - continue PTA diuretics   - hold further steroids currently until reassessment   - resume CPAP if not vomiting     Viral PCR still Covid positive, recent COVID infection in May '25.   -no isolation requirements, not an active or new COVID viral infection      Cholelithiasis   Appears asymptomatic as noted above currently lower suspicion for biliary colic as cause for symptoms. No RUQ pain. Limited evaluation with no definitive evidence for gallbladder wall thickening. LFT Labs  stable from baseline.   - monitor symptoms, follow CMP  - currently would not recommend antibiotics        Chronic diastolic CHF  Chronic lymphedema  History of recurrent right lower extremity cellulitis  Appears euvolemic.   - cont PTA torsemide 20 mg daily and 20 meq potassium daily  - 2 g sodium restriction   - Patient's daughter reports that she was started on a course of Bactrim for recurrent cellulitis in her right lower extremity, she will complete this evening      Hepatic Steatosis   Morbid Obesity   - noted, LFTs stable. BMI of 46  - f/up with PCP     Hepatic Lesion   - 1.5 cm enhancing observation at the dome of the right hepatic lobe. Recommend nonemergent contrast-enhanced MRI for further evaluation.      Chronic pain  Osteoarthritis  Neuropathy  - history of bilateral TKA, right HUGO, and right femur IM nail  - WC transfers at baseline   - cont home gabapentin 400 mg 3 times daily, acetaminophen 3 times daily, and celecoxib 100 mg twice daily as needed     Gout  - cont allopurinol 100 mg daily     Hypothyroidism  - continue PTA levothyroxine 88 mcg daily     MDD  Anxiety  Panic attacks  - continue PTA duloxetine 60 mg daily and lorazepam 0.5 mg every 6 hours as needed. Also takes for dyspnea.      History sacral pressure ulcers  Right Buttock Pressure Injury, Stage 3 (POA)   - WOC            Diet: Regular Diet Adult    DVT Prophylaxis: Pneumatic Compression Devices  Benson Catheter: Not present  Lines: None     Cardiac Monitoring: None  Code Status: Full Code      Clinically Significant Risk Factors        # Hyperkalemia: Highest K = 5.4 mmol/L in last 2 days, will monitor as appropriate            # Hypertension: Noted on problem list  # Chronic heart failure with preserved ejection fraction: heart failure noted on problem list and last echo with EF >50%          # DMII: A1C = 6.6 % (Ref range: <5.7 %) within past 6 months, PRESENT ON ADMISSION      # Financial/Environmental Concerns:           Social  Drivers of Health            Disposition Plan     Medically Ready for Discharge: Anticipated in 2-4 Days           The patient's care was discussed with the Bedside Nurse, Patient, and Patient's Family.    Rosi Keys PA-C  Hospitalist Service  [facility]  Securely message with [employer] (more info)  Text page via [facility] Paging/Directory   ______________________________________________________________________    Interval History   Had BM this morning around 630, pain improved but notes abdominal distention that increases dyspnea    Physical Exam   Vital Signs: Temp: 97.8  F (36.6  C) Temp src: Oral BP: 104/53 Pulse: 85   Resp: 20 SpO2: 97 % O2 Device: Nasal cannula Oxygen Delivery: 1/2 LPM  Weight: 264 lbs 8.83 oz    GENERAL:  Comfortable.  PSYCH: pleasant, oriented, No acute distress.  HEART:  Normal S1, S2 with no murmur, no pericardial rub, gallops or S3 or S4.  LUNGS:  dyspneic, prolonged expiration. Clear to auscultation. No wheezing, rales or rhonchi.  GI:  distended, mildly tender, hypoactive bowel sounds.   EXTREMITIES:  sitting up in chair  NEUROLOGIC:  grossly intact    Medical Decision Making       60 MINUTES SPENT BY ME on the date of service doing chart review, history, exam, documentation & further activities per the note.      Data     I have personally reviewed the following data over the past 24 hrs:    13.6 (H)  \   12.3   / 247     136 100 28.6 (H) /  126 (H)   5.0 26 0.91 \       Imaging results reviewed over the past 24 hrs:   Recent Results (from the past 24 hours)   XR Abdomen 2 Views    Narrative    EXAM: XR ABDOMEN 2 VIEWS  LOCATION: [facility]  DATE: 7/18/2025    INDICATION: Distension. obstipation, abd ng  COMPARISON: 7/17/2025, 7/16/2025      Impression    IMPRESSION: Moderate to large volume formed stool is seen throughout a majority of the colon, suggestive of constipation in the appropriate clinical setting. There is gaseous distention  of the sigmoid colon, which appears to be redundant. Cannot exclude   colonic obstruction. There is general paucity of small bowel gas. No signs of pneumatosis or pneumoperitoneum. Round calcific densities in the right upper quadrant correspond to gallstones. Multilevel hypertrophic and degenerative changes of the spine.   Bilateral hip arthroplasties. No acute osseous abnormality.

## 2025-07-18 NOTE — PLAN OF CARE
"Goal Outcome Evaluation:  Alert and oriented x 4,VSS on 1 LPM NC,regular diet tolerated well,external cathter in place, no BM ,Scheduled laxative is given,    /74 (BP Location: Right arm)   Pulse 95   Temp 98.5  F (36.9  C) (Oral)   Resp 20   Wt 120 kg (264 lb 8.8 oz)   LMP  (LMP Unknown)   SpO2 95%   BMI 51.67 kg/m     Problem: Adult Inpatient Plan of Care  Goal: Plan of Care Review  Description: The Plan of Care Review/Shift note should be completed every shift.  The Outcome Evaluation is a brief statement about your assessment that the patient is improving, declining, or no change.  This information will be displayed automatically on your shift  note.  Outcome: Progressing  Goal: Patient-Specific Goal (Individualized)  Description: You can add care plan individualizations to a care plan. Examples of Individualization might be:  \"Parent requests to be called daily at 9am for status\", \"I have a hard time hearing out of my right ear\", or \"Do not touch me to wake me up as it startles  me\".  Outcome: Progressing  Goal: Absence of Hospital-Acquired Illness or Injury  Outcome: Progressing  Intervention: Identify and Manage Fall Risk  Recent Flowsheet Documentation  Taken 7/17/2025 1600 by Jef Cano, RN  Safety Promotion/Fall Prevention: safety round/check completed  Intervention: Prevent Skin Injury  Recent Flowsheet Documentation  Taken 7/17/2025 1600 by Jef Cano RN  Body Position: supine, head elevated  Goal: Optimal Comfort and Wellbeing  Outcome: Progressing  Goal: Readiness for Transition of Care  Outcome: Progressing     Problem: Delirium  Goal: Optimal Coping  Outcome: Progressing  Goal: Improved Behavioral Control  Outcome: Progressing  Goal: Improved Attention and Thought Clarity  Outcome: Progressing  Goal: Improved Sleep  Outcome: Progressing     Problem: Skin Injury Risk Increased  Goal: Skin Health and Integrity  Outcome: Progressing  Intervention: Plan: Nurse Driven Intervention: " Moisture Management  Recent Flowsheet Documentation  Taken 7/17/2025 1600 by Jef Cano, RN  Moisture Interventions:   Urinary collection device   Incontinence pad   No brief in bed  Intervention: Plan: Nurse Driven Intervention: Friction and Shear  Recent Flowsheet Documentation  Taken 7/17/2025 1600 by Jef Cano, RN  Friction/Shear Interventions:   HOB 30 degrees or less   Silicone foam sacral dressing  Intervention: Optimize Skin Protection  Recent Flowsheet Documentation  Taken 7/17/2025 1600 by Jef Cano, RN  Activity Management: activity adjusted per tolerance  Head of Bed (HOB) Positioning: HOB at 30-45 degrees

## 2025-07-18 NOTE — CONSULTS
GASTROENTEROLOGY CONSULTATION     Carol Merida  [address]   [place] [address]  89 year old female    Admission Date/Time: 7/16/2025  Primary Care Provider: Ruben Humphrey    We were asked to see the patient in consultation by Gayle Keys PA-C for evaluation of constipation and sigmoid colon stricture.      PAST MEDICAL HISTORY:  Patient Active Problem List    Diagnosis Date Noted    Generalized abdominal pain 07/16/2025     Priority: Medium    Constipation, unspecified constipation type 07/16/2025     Priority: Medium    COVID-19 virus infection 05/27/2025     Priority: Medium    Cellulitis of right lower extremity 01/19/2025     Priority: Medium    Acute hypoxemic respiratory failure (H) 06/20/2024     Priority: Medium    Hypoxia 03/15/2024     Priority: Medium    COPD exacerbation (H) 03/15/2024     Priority: Medium    Delayed union of closed fracture of femoral shaft, right 07/29/2023     Priority: Medium    Dry mouth 03/06/2023     Priority: Medium    Sore throat 03/06/2023     Priority: Medium    Fall from standing, initial encounter 02/26/2023     Priority: Medium    Periprosthetic fracture of shaft of femur 02/26/2023     Priority: Medium    Mary Kay-prosthetic supracondylar fracture of femur, initial encounter 02/25/2023     Priority: Medium    History of bilateral knee replacement 02/25/2023     Priority: Medium    H/O bilateral hip replacements 02/25/2023     Priority: Medium    Major depression, recurrent 10/11/2022     Priority: Medium    Chills 09/28/2022     Priority: Medium    Cellulitis of lower extremity, unspecified laterality 09/28/2022     Priority: Medium    Tension-type headache, not intractable, unspecified chronicity pattern 09/28/2022     Priority: Medium    Rectocele 08/24/2022     Priority: Medium    Leg swelling 08/09/2022     Priority: Medium    Pulmonary nodules 08/09/2022     Priority: Medium    Calculus of gallbladder without cholecystitis without obstruction  08/09/2022     Priority: Medium    Elevated d-dimer 08/09/2022     Priority: Medium    Chest pain, unspecified type 08/09/2022     Priority: Medium    Severe obesity (BMI >= 40) (H) 06/20/2020     Priority: Medium    Spinal stenosis of lumbar region, unspecified whether neurogenic claudication present 05/14/2020     Priority: Medium    Neuropathy 06/02/2019     Priority: Medium    Chronic diastolic heart failure (H) 03/12/2019     Priority: Medium    Mild depression 08/23/2018     Priority: Medium    CHF (congestive heart failure) (H) 06/18/2018     Priority: Medium    Elevated BMI (H) 06/17/2018     Priority: Medium    Pneumonia 06/02/2018     Priority: Medium    Vitamin D deficiency 05/04/2017     Priority: Medium     Vitamin D 11 (April 2017)      Long term (current) use of anticoagulants 11/14/2016     Priority: Medium    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 11/11/2016     Priority: Medium    Fracture of greater trochanter of right femur 11/6/2016, closed, with routine healing, subsequent encounter 11/09/2016     Priority: Medium    Anxiety 11/09/2016     Priority: Medium     Patient is followed by LOYD EUBANKS for ongoing prescription of benzodiazepines.  All refills should be approved by this provider, or covering partner.    Medication(s): xanax..   Maximum quantity per month: anticipate short term use  Clinic visit frequency required: Q 6  months     Controlled substance agreement on file: Yes       Date(s): 10/4/16 related to pain medication  Benzodiazepine use reviewed by psychiatry:  No    Last [facility] website verification:  done on ?   [email or web address]          Osteoarthritis of multiple joints, unspecified osteoarthritis type 11/09/2016     Priority: Medium    Anemia due to blood loss, acute 11/09/2016     Priority: Medium    Chronic pain syndrome - hips 10/05/2016     Priority: Medium     Patient is followed by Loyd Eubanks MD, MD for ongoing prescription of pain medication.   All refills should be approved by this provider, or covering partner.    Medication(s): Ultram.   Maximum quantity per month: 120  Clinic visit frequency required: Q 6  months     Controlled substance agreement:  Encounter-Level CSA:     There are no encounter-level csa.      10/4/16  Pain Clinic evaluation in the past: No    DIRE Total Score(s):  No flowsheet data found.    Last [facility] website verification:  done on ?   [email or web address]        Periprosthetic fracture around internal prosthetic left hip joint (H) 07/23/2015     Priority: Medium    Physical deconditioning 07/17/2015     Priority: Medium    Anticoagulation management encounter 07/15/2015     Priority: Medium    Constipation 07/10/2015     Priority: Medium    DVT prophylaxis 07/10/2015     Priority: Medium    S/P total hip arthroplasty 07/06/2015     Priority: Medium    PONV (postoperative nausea and vomiting)      Priority: Medium    Arthritis 12/13/2014     Priority: Medium    Panic disorder without agoraphobia 08/27/2013     Priority: Medium    History of melanoma 12/05/2012     Priority: Medium    ACP (advance care planning) 05/02/2012     Priority: Medium     Advance Care Planning 2/7/2017: Receipt of ACP document:  Received: POLST which was signed and dated by provider on 11/11/16.  Document previously scanned on 12/7/16.  Order reviewed and found to be valid.  Code Status reflects choices in most recent ACP document.  Confirmed/documented designated decision maker(s).  Added by Nicki Duarte   Advance Care Planning Liaison  Advance Care Planning  5/2/2012:  Discussed advance care planning with patient; however, patient declined at this time.  Patient has completed one at a different place.      Impaired fasting glucose 01/17/2012     Priority: Medium    Osteopenia 01/11/2011     Priority: Medium    Hypertension goal BP (blood pressure) < 140/90 07/18/2010     Priority: Medium    HYPERLIPIDEMIA LDL GOAL <100 02/10/2010      Priority: Medium    Pain in joint, ankle and foot 09/03/2009     Priority: Medium    Arthrodesis status 09/03/2009     Priority: Medium    Degeneration of lumbar or lumbosacral intervertebral disc 05/22/2007     Priority: Medium    * * * SBE PROPHYLAXIS * * * 04/24/2007     Priority: Medium    Obstructive sleep apnea 08/25/2006     Priority: Medium     Problem list name updated by automated process. Provider to review      Benign neoplasm of colon 05/12/2004     Priority: Medium    Hypothyroidism 03/30/2004     Priority: Medium     Problem list name updated by automated process. Provider to review      Obesity 05/21/2003     Priority: Medium     Problem list name updated by automated process. Provider to review         CC:colonic obstruction     HPI:  Carol Merida is a 89 year old female with PMH of morbid obesity, CHF, sleep apnea, hypothyroidism, sacral decubitus ulcer  She presented [facility] on 7/16 for abdominal pain, constipation, dyspnea.    CRS was consulted on admission.  Patient had no evidence of sigmoid volvulus, however gastrografin enema showed narrowing at the sigmoid colon, possible stricture.      Attempted resolution of constipation with 2 soap suds enemas, lactulose 1-2 doses daily, scheduled senna and MiraLax.      Patient has also been on diuretics as well as bronchodilators this admission for acute respiratory failure.    Given patient's advanced age and multiple co-morbidities would be at high risk for surgical intervention.    Patient did have a medium sized bowel movement this morning.    Family at bedside with patient and supportive.  They, as well as patient agree with proceeding with flexible sigmoidoscopy today.    REVIEW OF SYSTEMS: A 10 point review of systems negative other than above noted.    MEDICATIONS:   Prior to Admission medications    Medication Sig Start Date End Date Taking? Authorizing Provider   acetaminophen (TYLENOL) 500 MG tablet Take 1,000 mg by mouth 3 times  daily.   Yes Unknown, Entered By History   albuterol (PROAIR HFA/PROVENTIL HFA/VENTOLIN HFA) 108 (90 Base) MCG/ACT inhaler INHALE 2 PUFFS INTO THE LUNGS EVERY 4 HOURS AS NEEDED FOR SHORTNESS OF BREATH OR DIFFICULT BREATHING OR WHEEZING 11/17/21  Yes Stella Cutler MD   allopurinol (ZYLOPRIM) 100 MG tablet Take 100 mg by mouth daily START 7/10   Yes Reported, Patient   Artificial Saliva (BIOTENE DRY MOUTH MOIST SPRAY MT) Take 1 spray by mouth every 2 hours as needed (dry mouth).   Yes Unknown, Entered By History   ARTIFICIAL SALIVA MT Take 1 strip by mouth 2 times daily After nebs   Yes Unknown, Entered By History   benzonatate (TESSALON) 100 MG capsule Take 1 capsule (100 mg) by mouth 3 times daily as needed for cough. 5/30/25  Yes Eduar Do MD   calcium carbonate 600 mg-vitamin D 400 units (CALTRATE) 600-400 MG-UNIT per tablet Take 1 tablet by mouth every evening  11/17/16  Yes Yessica Lora MD   carbamide peroxide (DEBROX) 6.5 % otic solution Place 3 drops into both ears twice a week. Mon & Thur   Yes Unknown, Entered By History   celecoxib (CELEBREX) 100 MG capsule Take 100 mg by mouth 2 times daily as needed for moderate pain (4-6)   Yes Unknown, Entered By History   diphenhydrAMINE (BENADRYL) 25 MG capsule Take 25 mg by mouth every 6 hours as needed for itching.   Yes Unknown, Entered By History   DULoxetine (CYMBALTA) 60 MG capsule Take 60 mg by mouth daily 5/7/24  Yes Reported, Patient   estradiol (ESTRACE) 0.1 MG/GM vaginal cream Place vaginally every 3 days. Sig: 3 times weekly, actually being given every 3 days.   Yes Unknown, Entered By History   fluticasone-salmeterol (ADVAIR) 100-50 MCG/ACT inhaler Inhale 1 puff into the lungs 2 times daily.   Yes Unknown, Entered By History   gabapentin (NEURONTIN) 400 MG capsule Take 400 mg by mouth 3 times daily. 630 AM 2  PM 5/3/25  Yes Reported, Patient   guaiFENesin 400 MG TABS Take 400 mg by mouth 2 times daily   Yes Unknown, Entered By History    hydrocortisone, Perianal, (ANUSOL-HC) 2.5 % cream Place rectally daily as needed for hemorrhoids.   Yes Unknown, Entered By History   ipratropium - albuterol 0.5 mg/2.5 mg, 3mg,/3 mL (DUONEB) 0.5-2.5 (3) MG/3ML neb solution Take 1 vial by nebulization every 4 hours as needed for shortness of breath, wheezing or cough.   Yes Unknown, Entered By History   ipratropium - albuterol 0.5 mg/2.5 mg/3 mL (DUONEB) 0.5-2.5 (3) MG/3ML neb solution Take 1 vial by nebulization every 4 hours as needed for shortness of breath or wheezing.   Yes Unknown, Entered By History   levothyroxine (SYNTHROID/LEVOTHROID) 88 MCG tablet Take 1 tablet (88 mcg) by mouth daily 5/18/23  Yes Patrick Montano MD   LORazepam (ATIVAN) 0.5 MG tablet Take 0.5 mg by mouth every 6 hours as needed for anxiety (or shortness of breath).   Yes Reported, Patient   melatonin 3 MG tablet Take 3 mg by mouth nightly as needed for sleep.   Yes Reported, Patient   nystatin (NYSTOP) 365995 UNIT/GM external powder Apply tid to affectead area prn 12/21/21  Yes Stella Cutler MD   polyethylene glycol (MIRALAX) 17 g packet Take 1 packet by mouth daily as needed for constipation   Yes Unknown, Entered By History   polyvinyl alcohol (ARTIFICIAL TEARS) 1.4 % ophthalmic solution Place 1 drop into both eyes 3 times daily.   Yes Unknown, Entered By History   potassium chloride stuart ER (KLOR-CON M20) 20 MEQ CR tablet Take 40 mEq by mouth daily   Yes Reported, Patient   PseudoePHEDrine-guaiFENesin 120-1200 MG TB12 Take 1 tablet by mouth 2 times daily as needed for congestion or cough.   Yes Unknown, Entered By History   semaglutide (OZEMPIC) 2 MG/3ML pen Inject 0.5 mg subcutaneously every 7 days.   Yes Unknown, Entered By History   sulfamethoxazole-trimethoprim (BACTRIM DS) 800-160 MG tablet Take 1 tablet by mouth 2 times daily. 7/8/25 7/18/25 Yes Unknown, Entered By History   torsemide (DEMADEX) 20 MG tablet Take 20 mg by mouth daily. 4/22/25 4/16/26 Yes Reported, Patient    triamcinolone (KENALOG) 0.1 % external cream Apply topically every evening. To lower extremities   Yes Unknown, Entered By History   White Petrolatum-Mineral Oil (LUBRIFRESH P.M.) OINT Place into both eyes at bedtime. Apply a small strip into both eyes   Yes Unknown, Entered By History   zinc oxide (DESITIN) 40 % external ointment Apply topically as needed for dry skin or irritation (with toileting)   Yes Unknown, Entered By History   cycloSPORINE (CYCLOSPORINE IN KLARITY) 0.1 % EMUL Place 1 drop into both eyes 2 times daily 0630 and 2145    Unknown, Entered By History       ALLERGIES:   Allergies   Allergen Reactions    Ace Inhibitors Cough    Ceftriaxone Rash     Diffuse rash after IV ceftriaxone 1/20/25.  Previously tolerated other cephalosporins including recent course of keflex.       SOCIAL HISTORY:  Social History     Tobacco Use    Smoking status: Never    Smokeless tobacco: Never   Vaping Use    Vaping status: Never Used   Substance Use Topics    Alcohol use: Yes     Alcohol/week: 0.0 - 0.8 standard drinks of alcohol     Comment: 1 glass wine weekly    Drug use: Never       FAMILY HISTORY:  Family History   Problem Relation Age of Onset    No Known Problems Brother         brain tumor related to shingles in his eye    Arthritis Mother     Hypertension Father     Cancer Father         lung    Cancer Grandchild         terratoma tumors    Breast Cancer Daughter        PHYSICAL EXAM:   General  up in chair NAD family at bedside   /53 (BP Location: Right leg)   Pulse 85   Temp 97.8  F (36.6  C) (Oral)   Resp 20   Wt 120 kg (264 lb 8.8 oz)   LMP  (LMP Unknown)   SpO2 97%   BMI 51.67 kg/m        RESPIRATORY: On minimal oxygen, decreased lung sounds in bases  CARDIOVASCULAR: Normal  GASTROINTESTINAL: Normal  NEUROLOGIC: Normal   PSYCHIATRIC: Normal        ADDITIONAL COMMENTS:   I reviewed the patient's new clinical lab test results.   Recent Labs   Lab Test 07/18/25  0620 07/17/25  0638  07/16/25  0516 02/26/23  1746 02/26/23  0607 02/26/23  0106 09/29/22  0714 09/28/22  1631   WBC 13.6* 18.9* 7.8   < > 7.9  --    < >  --    HGB 12.3 12.3 13.2   < > 12.3  --    < >  --    * 100 99   < > 91  --    < >  --     244 233   < > 223  --    < >  --    INR  --   --   --   --  1.04 1.07  --  1.15    < > = values in this interval not displayed.     Recent Labs   Lab Test 07/18/25  0620 07/17/25  0643 07/16/25  0516   POTASSIUM 5.0 5.4* 5.0   CHLORIDE 100 101 99   CO2 26 26 24   BUN 28.6* 27.5* 19.9   ANIONGAP 10 11 14     Recent Labs   Lab Test 07/17/25  0643 07/16/25  1946 07/16/25  0709 07/16/25  0516 05/28/25  1152 12/03/24  1246 06/20/24  0501 03/03/23  1029 03/02/23  1313 09/28/22  1628 08/09/22  1608   ALBUMIN 3.5  --  3.8 3.9 3.1*   < >  --    < >  --    < >  --    BILITOTAL 1.0  --  1.2 1.1 0.6   < >  --    < >  --    < >  --    ALT 39  --  45 46 23   < >  --    < >  --    < >  --    AST 74*  --  95*  --  73*   < >  --    < >  --    < >  --    PROTEIN  --  Negative  --   --   --   --  Negative  --  30*   < >  --    LIPASE  --   --   --  8*  --   --   --   --   --   --  4*    < > = values in this interval not displayed.       IMAGING     EXAM: CT CHEST PE ABDOMEN PELVIS W CONTRAST  LOCATION: [facility]  DATE: 7/16/2025    IMPRESSION:  1.  No evidence for pulmonary embolism.  2.  Normal caliber aorta without dissection.  3.  Mild subpleural interstitial process involving both lower lungs which could be due to interstitial fibrosis or mild edema. No significant pleural fluid. Recommend correlation for early CHF/volume overload. No evidence for pneumonia.  4.  Marked amount of gas and stool throughout the colon extending from the cecum to the mid sigmoid colon. The sigmoid colon is extremely redundant extending into the upper central abdomen. This extends inferiorly without complete rotation of the sigmoid   mesentery with transition point/narrowing of the sigmoid colon  but without evidence for mass, edema or perforation at this location. No significant sigmoid mesenteric rotation. This could potentially lead to sigmoid volvulus. Recommend correlation for   obstipation/constipation and close clinical follow-up.  5.  Marked diffuse hepatic steatosis with surface contour nodularity to liver. Findings can be seen with chronic underlying hepatic parenchymal disease.  6.  1.5 cm enhancing observation at the dome of the right hepatic lobe. Recommend nonemergent contrast-enhanced MRI for further evaluation.  7.  Distended gallbladder with cholelithiasis with two large 1 cm gallstones near the neck. Suggestion of mild edema adjacent to the gallbladder. Recommend correlation for right upper quadrant pain and consideration of ultrasound for further evaluation.    EXAM: XR COLON WATER SOLUBLE DIAGNOSTIC  LOCATION: [facility] [place]  DATE: 07/17/2025    IMPRESSION:  1.  Limited exam due to patient immobility and discomfort during the exam.  2.  There was an abrupt narrowing of the colonic lumen which appeared to be within the proximal sigmoid colon. Contrast could not be induced to flow beyond this focal area of colonic narrowing. Findings are suspicious for a colonic stricture, and some   degree of associated colonic obstruction is also suspected.     EXAM: XR ABDOMEN 2 VIEWS  LOCATION: [facility] [place]  DATE: 7/18/2025    IMPRESSION: Moderate to large volume formed stool is seen throughout a majority of the colon, suggestive of constipation in the appropriate clinical setting. There is gaseous distention of the sigmoid colon, which appears to be redundant. Cannot exclude   colonic obstruction. There is general paucity of small bowel gas. No signs of pneumatosis or pneumoperitoneum. Round calcific densities in the right upper quadrant correspond to gallstones. Multilevel hypertrophic and degenerative changes of the spine.   Bilateral hip arthroplasties. No  acute osseous abnormality.      Assessment:      PLAN:    -attempt at flex sig today to see if we can clear colon, or identify area of potential stricture    -based on results of flex sig, further recommendations to follow -agree that best option would be to keep stools loose that stools can more easily pass through area of stricture    -avoid narcotics if able      20 minutes of total time was spent providing patient care, including patient evaluation, reviewing documentation/ test results, and .     I discussed the patient's findings and plan with Dr. Hassan.  Ruthann De Souza NP   [facility]  Weekdays after 5 pm and weekends: [phone]

## 2025-07-18 NOTE — PLAN OF CARE
"Patient is alert and oriented, VS WNL, patients denies abd pain at the moment, a couple of BM today, abd not as hard to palpate, patient transferred Ax2 with michael steady, patient in recliner, flex sig showed constipation, O2 stable on 1L of oxygen, social work following, Colorectal following  /48 (BP Location: Right arm)   Pulse 101   Temp 98.7  F (37.1  C) (Oral)   Resp 18   Wt 120 kg (264 lb 8.8 oz)   LMP  (LMP Unknown)   SpO2 98%   BMI 51.67 kg/m      Problem: Adult Inpatient Plan of Care  Goal: Plan of Care Review  Description: The Plan of Care Review/Shift note should be completed every shift.  The Outcome Evaluation is a brief statement about your assessment that the patient is improving, declining, or no change.  This information will be displayed automatically on your shift  note.  Outcome: Progressing  Flowsheets (Taken 7/18/2025 1023)  Plan of Care Reviewed With:   patient   child  Overall Patient Progress: improving  Goal: Patient-Specific Goal (Individualized)  Description: You can add care plan individualizations to a care plan. Examples of Individualization might be:  \"Parent requests to be called daily at 9am for status\", \"I have a hard time hearing out of my right ear\", or \"Do not touch me to wake me up as it startles  me\".  Outcome: Progressing  Goal: Absence of Hospital-Acquired Illness or Injury  Outcome: Progressing  Intervention: Identify and Manage Fall Risk  Recent Flowsheet Documentation  Taken 7/18/2025 0824 by Zoltan Mayers RN  Safety Promotion/Fall Prevention:   activity supervised   nonskid shoes/slippers when out of bed   safety round/check completed  Intervention: Prevent Skin Injury  Recent Flowsheet Documentation  Taken 7/18/2025 0824 by Zoltan Mayers RN  Skin Protection:   adhesive use limited   incontinence pads utilized  Intervention: Prevent Infection  Recent Flowsheet Documentation  Taken 7/18/2025 0824 by Zoltan Mayers RN  Infection " Prevention:   equipment surfaces disinfected   single patient room provided   hand hygiene promoted   personal protective equipment utilized  Goal: Optimal Comfort and Wellbeing  Outcome: Progressing  Intervention: Provide Person-Centered Care  Recent Flowsheet Documentation  Taken 7/18/2025 0824 by Zoltan Mayers RN  Trust Relationship/Rapport: care explained  Goal: Readiness for Transition of Care  Outcome: Progressing     Problem: Delirium  Goal: Optimal Coping  Outcome: Progressing  Goal: Improved Behavioral Control  Outcome: Progressing  Intervention: Minimize Safety Risk  Recent Flowsheet Documentation  Taken 7/18/2025 0824 by Zoltan Mayers RN  Enhanced Safety Measures: review medications for side effects with activity  Trust Relationship/Rapport: care explained  Goal: Improved Attention and Thought Clarity  Outcome: Progressing  Goal: Improved Sleep  Outcome: Progressing     Problem: Skin Injury Risk Increased  Goal: Skin Health and Integrity  Outcome: Progressing  Intervention: Plan: Nurse Driven Intervention: Moisture Management  Recent Flowsheet Documentation  Taken 7/18/2025 0824 by Zoltan Mayers RN  Moisture Interventions: Urinary collection device  Intervention: Plan: Nurse Driven Intervention: Friction and Shear  Recent Flowsheet Documentation  Taken 7/18/2025 0824 by Zoltan Mayers RN  Friction/Shear Interventions: HOB 30 degrees or less  Intervention: Optimize Skin Protection  Recent Flowsheet Documentation  Taken 7/18/2025 0824 by Zoltan Mayers RN  Pressure Reduction Techniques:   frequent weight shift encouraged   heels elevated off bed  Pressure Reduction Devices: heel offloading device utilized  Skin Protection:   adhesive use limited   incontinence pads utilized  Activity Management: activity adjusted per tolerance   Goal Outcome Evaluation:      Plan of Care Reviewed With: patient, child    Overall Patient Progress: improvingOverall Patient  Progress: improving

## 2025-07-18 NOTE — CONSULTS
Palliative Care Consultation Note  [facility]      Patient: Carol Merida  Date of Admission:  7/16/2025    Requesting Clinician / Team: Colorectal Surgery Stella Jaquez MD   Reason for consult: Goals of care  Patient and family support       Recommendations & Counseling     GOALS OF CARE:   Life-prolonging with limits  - today she has made request for No CPR- Do NOT Intubate. Continue all other restorative efforts.    ADVANCE CARE PLANNING:  No health care directive on file. Per system policy, Surrogate Decision-makers for Patients With Diminished Decision-making Capacity offers guidance on possible decision-makers. Son, Jono Merida and daughter Alea Clarke has been identified as a surrogate decision maker.   New POLST form completed today.   Code status: No CPR- Do NOT Intubate    MEDICAL MANAGEMENT:   We are not actively managing symptoms at this time.    PSYCHOSOCIAL/SPIRITUAL SUPPORT:  Family - Four adult children  [facility]: [employer]   Would appreciate Spiritual Health Services, Consult has been placed for support   Retired HR worker    Palliative Care will continue to follow. Thank you for the consult and allowing us to aid in the care of Carol Merida.    These recommendations have been discussed with Hospitalist Rosi Keys PA-C and bedside nurse JARETH Charlton..    BEATRICE Barnes CNS  [facility], Palliative Care  Securely message with the [employer] Web Console (learn more here) or  Text page via [facility] Paging/Directory       Assessment      Carol Merida is a 89 year old female admitted 7/16/2025 with abdominal pain, constipation, and dyspnea. 7/16/2025 CT demonstrates marked amount of gas and stool throughout the colon extending from the cecum to the mid sigmoid colon. The sigmoid colon is extremely redundant extending into the upper central abdomen. This extends inferiorly without complete rotation of the sigmoid mesentery with transition point/narrowing of the  "sigmoid colon but without evidence for mass, edema or perforation at this location. No significant sigmoid mesenteric rotation. This could potentially lead to sigmoid volvulus. 7/17/2025 GGE with several bowel movements following. Colorectal considers patient a very high risk surgical candidate.  The patient's past medical history is significant for   morbid obesity, diastolic CHF, JEAN with CPAP, reactive airway disease, chronic cough, hypothyroidism, lymphedema, recurrent right lower extremity cellulitis, sacral pressure ulcers, neuropathy, osteoarthritis, chronic pain, multiple joint replacement surgeries, MDD, anxiety, and panic attacks     Today, the patient was seen for:  #Goals of care  #Abdominal pain    History of Present Illness   Met with Carol \"Everette\" as she was sitting up in a chair. She introduced her son, Jono and daughter-in-law. I introduced our role as an extra layer of support and how we help patients and families dealing with serious, potentially life-limiting illnesses. I explained the composition of the palliative care team.  Palliative care helps patients and families navigate their care while focusing on the whole person; providing emotional, social and spiritual support  Palliative care often assists with symptom management, information sharing about what to expect from the illness, available treatment options and what effect those options may have on the disease course, and provide effective communication and caring support. Jono had questions regarding the hospital plan. Appreciate Hospitalist Rosi Keys PA-C for joining our discussion and consulting GI for possible colonoscopy. We continued our discussion regarding goals of care. Everette would like her son, Jono and daughter Alea make medical decisions if she were unable. We discussed options for care. Everette often asked Jono what his father had decided, so I inquired further about her husbands health circumstance. Jono explained his father, " Yudi had polio later requiring a tracheotomy and feeding tube. Everette is mindful of his decisions and request No CPR- Do NOT Intubate. When I returned with an Advanced Directive form and a POLST for Everette, GI arrived to discussed a possible colonoscopy today.     Prognosis, Goals, & Planning:   Functional Status just prior to this current hospitalization:  No assessed    Prognosis, Goals, and/or Advance Care Planning:  We discussed general treatment options (full/restorative, selective/conservatives, and comfort only/hospice). We then discussed how these specifically apply to Everette's wishes.  Based on this discussion, Everette has decided to continue restorative efforts    Code Status was addressed today:   Yes, We discussed potential risks and rationale of attempting cardiac resuscitation, intubation, and mechanical ventilation.  We also discussed probability of survival as well as quality of life implications.  Based on this discussion, patient or surrogate response/decision: No CPR- Do NOT Intubate      Patient's decision making preferences: with input from medical clinicians and loved ones        Patient has decision-making capacity today for complex decisions: Intact           Coping, Meaning, & Spirituality:   Mood, coping, and/or meaning in the context of serious illness were addressed today: Yes    Social:   Living situation: [facility]    Medications:  Reviewed this patient's medication profile and medications from this hospitalization.   [place] Board of Pharmacy Data Base Reviewed: Yes:   reviewed - controlled substances reflected in medication list.. Chronic Lorazepam 0.5 mg     ROS:  Comprehensive ROS is reviewed and is negative except as here & per HPI:     Physical Exam   Vital Signs with Ranges  Temp:  [97.8  F (36.6  C)-98.5  F (36.9  C)] 97.8  F (36.6  C)  Pulse:  [92-99] 95  Resp:  [17-20] 18  BP: (107-140)/(69-79) 140/79  SpO2:  [94 %-99 %] 99 %  Wt Readings from Last 10 Encounters:    07/17/25 120 kg (264 lb 8.8 oz)   05/27/25 122.3 kg (269 lb 10 oz)   01/21/25 123.9 kg (273 lb 2.4 oz)   07/05/24 113.4 kg (250 lb)   06/30/24 113 kg (249 lb 3.2 oz)   06/27/24 113.9 kg (251 lb)   06/26/24 116.8 kg (257 lb 8 oz)   06/25/24 116.8 kg (257 lb 8 oz)   03/18/24 112.6 kg (248 lb 3.8 oz)   08/14/23 109.3 kg (241 lb)     264 lbs 8.83 oz    PHYSICAL EXAM:  GEN:  Morbidly obese, pleasant elderly female. Alert, oriented x 3, appears comfortable, no apparent distress.  HEENT:  Normocephalic/atraumatic, no scleral icterus, no nasal discharge, mouth moist.  CV:  RRR, S1, S2; no murmurs or other irregularities noted.  +3 DP/PT pulses bilatererally; no edema BLE.  RESP:  Clear to auscultation bilaterally without rales/rhonchi/wheezing/retractions.  Symmetric chest rise on inhalation noted.  Normal respiratory effort on oxygen via nasal cannula.  ABD:  Rounded, soft, non-tender/non-distended.  +BS  EXT:  CMS intact x 4.     M/S:   Denies pain or discomfort with palpation of extremities and abdomen.    SKIN:  Warm and dry to touch, no exanthems noted in the visualized areas.    PAIN BEHAVIOR: Cooperative  Psych:  Normal affect.  Calm, cooperative, conversant appropriately.    Data reviewed:  Results for orders placed or performed during the hospital encounter of 07/16/25   CT Chest (PE) Abdomen Pelvis w Contrast     Status: None    Narrative    EXAM: CT CHEST PE ABDOMEN PELVIS W CONTRAST  LOCATION: [facility]  DATE: 7/16/2025    INDICATION: Shortness of breath. Left lower quadrant abdominal pain, nausea and vomiting.  COMPARISON: None.  TECHNIQUE: CT chest pulmonary angiogram and routine CT abdomen pelvis with IV contrast. Arterial phase through the chest and venous phase through the abdomen and pelvis. Multiplanar reformats and MIP reconstructions were performed. Dose reduction   techniques were used.   CONTRAST: 100mL Isovue 370.    FINDINGS:  ANGIOGRAM CHEST: Motion artifact limits  evaluation. Allowing for the aforementioned limitation, there is no evidence for pulmonary embolism. Normal caliber thoracic aorta. No dissection. Minimal vascular calcification diffusely.    LUNGS AND PLEURA: Normal.    MEDIASTINUM/AXILLAE: Mild subpleural interstitial process involving both lower lungs which could be due to interstitial fibrosis or mild edema. No significant pleural fluid. Mild linear atelectasis right lung base. Mild dependent atelectasis posterior   aspect both upper lobes. No evidence for definitive pulmonary infiltrate or pneumonia.    CORONARY ARTERY CALCIFICATION: Mild.    HEPATOBILIARY: 1.5 cm enhancing observation at the dome of the right hepatic lobe (13/24). Marked diffuse hepatic steatosis. Mild surface contour nodularity to the liver. Distended gallbladder with cholelithiasis with two large 1 cm gallstones near the   neck. Suggestion of mild edema adjacent to the gallbladder. No biliary dilatation. Patent portal vein.    PANCREAS: Diffuse fatty replacement. No ductal dilatation or inflammatory change.    SPLEEN: Normal size. Patent splenic vein.    ADRENAL GLANDS: Unremarkable.    KIDNEYS/BLADDER: Mild atrophy bilaterally. No urinary collecting system dilatation or evidence for obstructing calculi allowing for streak artifact in the pelvis, which limits evaluation of bladder and distal ureters.    BOWEL: Evaluation of bowel loops in the pelvis limited due to streak artifact from bilateral THAs. Marked amount of gas in the stool throughout the proximal colon extending in the descending colon and into the mid sigmoid colon. Sigmoid colon is   extremely redundant extending into the upper central abdomen. This extends inferiorly without complete rotation of the sigmoid mesentery, but with some mild mesenteric narrowing (series 13, image [id number]). This could potentially lead to sigmoid volvulus.   Recommend correlation for obstipation/constipation. No small bowel dilatation. No evidence for  bowel wall thickening or perforation.    LYMPH NODES: No lymphadenopathy.    VASCULATURE: Normal caliber aorta. Moderate vascular calcification. Patent celiac, splenic and hepatic arteries. Patent SMA/QAMAR. Patent bilateral renal renal arteries. Moderate vascular calcification.    PELVIC ORGANS: No free fluid allowing for streak artifact.    MUSCULOSKELETAL: Bilateral THAs. Marked degenerative hypertrophic changes in the spine.      Impression    IMPRESSION:  1.  No evidence for pulmonary embolism.  2.  Normal caliber aorta without dissection.  3.  Mild subpleural interstitial process involving both lower lungs which could be due to interstitial fibrosis or mild edema. No significant pleural fluid. Recommend correlation for early CHF/volume overload. No evidence for pneumonia.  4.  Marked amount of gas and stool throughout the colon extending from the cecum to the mid sigmoid colon. The sigmoid colon is extremely redundant extending into the upper central abdomen. This extends inferiorly without complete rotation of the sigmoid   mesentery with transition point/narrowing of the sigmoid colon but without evidence for mass, edema or perforation at this location. No significant sigmoid mesenteric rotation. This could potentially lead to sigmoid volvulus. Recommend correlation for   obstipation/constipation and close clinical follow-up.  5.  Marked diffuse hepatic steatosis with surface contour nodularity to liver. Findings can be seen with chronic underlying hepatic parenchymal disease.  6.  1.5 cm enhancing observation at the dome of the right hepatic lobe. Recommend nonemergent contrast-enhanced MRI for further evaluation.  7.  Distended gallbladder with cholelithiasis with two large 1 cm gallstones near the neck. Suggestion of mild edema adjacent to the gallbladder. Recommend correlation for right upper quadrant pain and consideration of ultrasound for further evaluation.     US Abdomen Limited (RUQ)     Status: None     Narrative    EXAM: US ABDOMEN LIMITED  LOCATION: [facility]  DATE: 7/16/2025    INDICATION: Gallstones on CT, evaluate for cholecystitis.  COMPARISON: None.  TECHNIQUE: Limited abdominal ultrasound.    FINDINGS: Evaluation limited due to technical limitations and bowel gas.    GALLBLADDER: Not well-visualized due to bowel gas and technical limitations. Allowing for this there is no definitive evidence for gallbladder wall thickening. Wall thickness 2 mm. Sludge or small stone material within the gallbladder lumen near the area   of the gallbladder neck.    BILE DUCTS: No definitive evidence for biliary dilatation. Extrahepatic biliary system unable to be visualized due to bowel gas.    LIVER: Hepatic steatosis. No surface contour nodularity. Difficult to visualize due to hepatic steatosis. No definitive evidence for focal mass. Obscured by bowel gas.    RIGHT KIDNEY: 8 cm in length. No hydronephrosis.    PANCREAS: Obscured by bowel gas.    No ascites.      Impression    IMPRESSION:  1.  Evaluation limited due to technical limitations and bowel gas.  2.  Allowing for this there is no definitive evidence for gallbladder wall thickening. Sludge or small stone material within the gallbladder lumen near the area of the gallbladder neck.  3.  Hepatic steatosis.  4.  Extrahepatic biliary system unable to be visualized due to bowel gas. No definitive evidence for biliary dilatation.       XR Colon Water Soluble Diagnostic     Status: None    Narrative    EXAM: XR COLON WATER SOLUBLE DIAGNOSTIC  LOCATION: [facility]  DATE: 07/17/2025    INDICATION: Redundant colon suspicious for partial mesenteric rotation per CT report.  COMPARISON: CT of the chest, abdomen, and pelvis 07/16/2025.  TECHNIQUE: Routine.    FINDINGS:  RADIATION DOSE: Total Air Kerma 165.6 mGy.    COLON: The exam was limited related to patient immobility and discomfort during the exam. The colon is again noted to  be markedly tortuous. Water-soluble contrast was instilled into the rectum and distended the sigmoid colon. There was an abrupt   narrowing of the colonic lumen which appeared to be within the proximal sigmoid colon. Contrast could not be induced to flow beyond this focal area of colonic narrowing. There were several loops of dilated gas-filled loops of colon noted proximal to this   area of luminal narrowing.      Impression    IMPRESSION:  1.  Limited exam due to patient immobility and discomfort during the exam.  2.  There was an abrupt narrowing of the colonic lumen which appeared to be within the proximal sigmoid colon. Contrast could not be induced to flow beyond this focal area of colonic narrowing. Findings are suspicious for a colonic stricture, and some   degree of associated colonic obstruction is also suspected.     XR Abdomen 2 Views     Status: None    Narrative    EXAM: XR ABDOMEN 2 VIEWS  LOCATION: [facility]  DATE: 7/18/2025    INDICATION: Distension. obstipation, abd ng  COMPARISON: 7/17/2025, 7/16/2025      Impression    IMPRESSION: Moderate to large volume formed stool is seen throughout a majority of the colon, suggestive of constipation in the appropriate clinical setting. There is gaseous distention of the sigmoid colon, which appears to be redundant. Cannot exclude   colonic obstruction. There is general paucity of small bowel gas. No signs of pneumatosis or pneumoperitoneum. Round calcific densities in the right upper quadrant correspond to gallstones. Multilevel hypertrophic and degenerative changes of the spine.   Bilateral hip arthroplasties. No acute osseous abnormality.   Comprehensive metabolic panel     Status: Abnormal   Result Value Ref Range    Sodium 137 135 - 145 mmol/L    Potassium 5.0 3.4 - 5.3 mmol/L    Carbon Dioxide (CO2) 24 22 - 29 mmol/L    Anion Gap 14 7 - 15 mmol/L    Urea Nitrogen 19.9 8.0 - 23.0 mg/dL    Creatinine 0.89 0.51 - 0.95 mg/dL    GFR  Estimate 62 >60 mL/min/1.73m2    Calcium 9.5 8.8 - 10.4 mg/dL    Chloride 99 98 - 107 mmol/L    Glucose 149 (H) 70 - 99 mg/dL    Alkaline Phosphatase 167 (H) 40 - 150 U/L    AST      ALT 46 0 - 50 U/L    Protein Total 8.0 6.4 - 8.3 g/dL    Albumin 3.9 3.5 - 5.2 g/dL    Bilirubin Total 1.1 <=1.2 mg/dL   [place] Draw     Status: None    Narrative    The following orders were created for panel order [place] Draw.  Procedure                               Abnormality         Status                     ---------                               -----------         ------                     Extra Red Top Tube[[id number]]                              Final result               Extra Green Top (Lithiu...[[id number]]                                                 Extra Purple Top Tube[[id number]]                                                        Please view results for these tests on the individual orders.   [place] Draw     Status: None    Narrative    The following orders were created for panel order [place] Draw.  Procedure                               Abnormality         Status                     ---------                               -----------         ------                     Extra Blue Top Tube[[id number]]                             Final result                 Please view results for these tests on the individual orders.   CBC with platelets and differential     Status: Abnormal   Result Value Ref Range    WBC Count 7.8 4.0 - 11.0 10e3/uL    RBC Count 4.21 3.80 - 5.20 10e6/uL    Hemoglobin 13.2 11.7 - 15.7 g/dL    Hematocrit 41.8 35.0 - 47.0 %    MCV 99 78 - 100 fL    MCH 31.4 26.5 - 33.0 pg    MCHC 31.6 31.5 - 36.5 g/dL    RDW 15.2 (H) 10.0 - 15.0 %    Platelet Count 233 150 - 450 10e3/uL    % Neutrophils 82 %    % Lymphocytes 11 %    % Monocytes 3 %    % Eosinophils 3 %    % Basophils 0 %    % Immature Granulocytes 1 %    NRBCs per 100 WBC 0 <1 /100    Absolute Neutrophils 6.4 1.6 - 8.3 10e3/uL     Absolute Lymphocytes 0.8 0.8 - 5.3 10e3/uL    Absolute Monocytes 0.3 0.0 - 1.3 10e3/uL    Absolute Eosinophils 0.3 0.0 - 0.7 10e3/uL    Absolute Basophils 0.0 0.0 - 0.2 10e3/uL    Absolute Immature Granulocytes 0.0 <=0.4 10e3/uL    Absolute NRBCs 0.0 10e3/uL   Extra Red Top Tube     Status: None   Result Value Ref Range    Hold Specimen JIC    Extra Blue Top Tube     Status: None   Result Value Ref Range    Hold Specimen JIC    Lipase     Status: Abnormal   Result Value Ref Range    Lipase 8 (L) 13 - 60 U/L   Troponin T, High Sensitivity     Status: Abnormal   Result Value Ref Range    Troponin T, High Sensitivity 21 (H) <=14 ng/L   Blood gas venous     Status: Abnormal   Result Value Ref Range    pH Venous 7.31 (L) 7.32 - 7.43    pCO2 Venous 52 (H) 40 - 50 mm Hg    pO2 Venous 42 25 - 47 mm Hg    Bicarbonate Venous 26 21 - 28 mmol/L    Base Excess/Deficit Venous -0.8 -3.0 - 3.0 mmol/L    FIO2 0     Oxyhemoglobin Venous 73 70 - 75 %    O2 Sat, Venous 74.2 70.0 - 75.0 %    Narrative    In healthy individuals, oxyhemoglobin (O2Hb) and oxygen saturation (SO2) are approximately equal. In the presence of dyshemoglobins, oxyhemoglobin can be considerably lower than oxygen saturation.   NT-proBNP     Status: Normal   Result Value Ref Range    NT-proBNP 275 0 - 624 pg/mL   Influenza A/B, RSV and SARS-CoV2 PCR (COVID-19) Nasopharyngeal     Status: Abnormal    Specimen: Nasopharyngeal; Swab   Result Value Ref Range    Influenza A PCR Negative Negative    Influenza B PCR Negative Negative    RSV PCR Negative Negative    SARS CoV2 PCR Positive (A) Negative    Narrative    Testing was performed using the Xpert Xpress CoV2/Flu/RSV Assay on the [employer] GeneXpert Instrument. This test should be ordered for the detection of SARS-CoV2, influenza, and RSV viruses in individuals with signs and symptoms of respiratory tract infection. This test is for in vitro diagnostic use under the [place] FDA for laboratories certified under CLIA to  perform high or moderate complexity testing. This test has been [place] FDA cleared. A negative result does not rule out the presence of PCR inhibitors in the specimen or target RNA in concentration below the limit of detection for the assay. If only one viral target is positive but coinfection with multiple targets is suspected, the sample should be re-tested with another FDA cleared, approved, or authorized test, if coninfection would change clinical management. This test was validated by the [facility]. These laboratories are certified under the Clinical Laboratory Improvement Amendments of 1988 (CLIA-88) as qualified to perfom high complexity laboratory testing.   Troponin T, High Sensitivity     Status: Abnormal   Result Value Ref Range    Troponin T, High Sensitivity 22 (H) <=14 ng/L   Lactic acid whole blood with 1x repeat in 2 hr when >2     Status: Normal   Result Value Ref Range    Lactic Acid, Initial 2.0 0.7 - 2.0 mmol/L   Hepatic function panel     Status: Abnormal   Result Value Ref Range    Protein Total 7.9 6.4 - 8.3 g/dL    Albumin 3.8 3.5 - 5.2 g/dL    Bilirubin Total 1.2 <=1.2 mg/dL    Alkaline Phosphatase 169 (H) 40 - 150 U/L    AST 95 (H) 0 - 45 U/L    ALT 45 0 - 50 U/L    Bilirubin Direct 0.66 (H) 0.00 - 0.30 mg/dL   UA with Microscopic reflex to Culture     Status: Abnormal    Specimen: Urine, Catheter   Result Value Ref Range    Color Urine Dark Yellow (A) Colorless, Straw, Light Yellow, Yellow    Appearance Urine Clear Clear    Glucose Urine Negative Negative mg/dL    Bilirubin Urine Small (A) Negative    Ketones Urine Negative Negative mg/dL    Specific Gravity Urine 1.020 1.003 - 1.035    Blood Urine Negative Negative    pH Urine 5.5 5.0 - 7.0    Protein Albumin Urine Negative Negative mg/dL    Urobilinogen Urine 6.0 (A) Normal mg/dL    Nitrite Urine Negative Negative    Leukocyte Esterase Urine Negative Negative    Mucus Urine Present (A) None Seen /LPF    RBC Urine 3 (H)  <=2 /HPF    WBC Urine 1 <=5 /HPF    Squamous Epithelials Urine 4 (H) <=1 /HPF    Narrative    Urine Culture not indicated   CK total     Status: Normal   Result Value Ref Range    CK 43 26 - 192 U/L   Comprehensive metabolic panel     Status: Abnormal   Result Value Ref Range    Sodium 138 135 - 145 mmol/L    Potassium 5.4 (H) 3.4 - 5.3 mmol/L    Carbon Dioxide (CO2) 26 22 - 29 mmol/L    Anion Gap 11 7 - 15 mmol/L    Urea Nitrogen 27.5 (H) 8.0 - 23.0 mg/dL    Creatinine 0.92 0.51 - 0.95 mg/dL    GFR Estimate 59 (L) >60 mL/min/1.73m2    Calcium 9.0 8.8 - 10.4 mg/dL    Chloride 101 98 - 107 mmol/L    Glucose 134 (H) 70 - 99 mg/dL    Alkaline Phosphatase 136 40 - 150 U/L    AST 74 (H) 0 - 45 U/L    ALT 39 0 - 50 U/L    Protein Total 7.1 6.4 - 8.3 g/dL    Albumin 3.5 3.5 - 5.2 g/dL    Bilirubin Total 1.0 <=1.2 mg/dL   CBC with platelets     Status: Abnormal   Result Value Ref Range    WBC Count 18.9 (H) 4.0 - 11.0 10e3/uL    RBC Count 3.89 3.80 - 5.20 10e6/uL    Hemoglobin 12.3 11.7 - 15.7 g/dL    Hematocrit 38.8 35.0 - 47.0 %     78 - 100 fL    MCH 31.6 26.5 - 33.0 pg    MCHC 31.7 31.5 - 36.5 g/dL    RDW 15.3 (H) 10.0 - 15.0 %    Platelet Count 244 150 - 450 10e3/uL   Basic metabolic panel     Status: Abnormal   Result Value Ref Range    Sodium 136 135 - 145 mmol/L    Potassium 5.0 3.4 - 5.3 mmol/L    Chloride 100 98 - 107 mmol/L    Carbon Dioxide (CO2) 26 22 - 29 mmol/L    Anion Gap 10 7 - 15 mmol/L    Urea Nitrogen 28.6 (H) 8.0 - 23.0 mg/dL    Creatinine 0.91 0.51 - 0.95 mg/dL    GFR Estimate 60 (L) >60 mL/min/1.73m2    Calcium 9.0 8.8 - 10.4 mg/dL    Glucose 126 (H) 70 - 99 mg/dL   CBC with platelets and differential     Status: Abnormal   Result Value Ref Range    WBC Count 13.6 (H) 4.0 - 11.0 10e3/uL    RBC Count 3.92 3.80 - 5.20 10e6/uL    Hemoglobin 12.3 11.7 - 15.7 g/dL    Hematocrit 39.8 35.0 - 47.0 %     (H) 78 - 100 fL    MCH 31.4 26.5 - 33.0 pg    MCHC 30.9 (L) 31.5 - 36.5 g/dL    RDW 15.3 (H)  10.0 - 15.0 %    Platelet Count 247 150 - 450 10e3/uL    % Neutrophils 84 %    % Lymphocytes 9 %    % Monocytes 7 %    % Eosinophils 0 %    % Basophils 0 %    % Immature Granulocytes 1 %    NRBCs per 100 WBC 0 <1 /100    Absolute Neutrophils 11.4 (H) 1.6 - 8.3 10e3/uL    Absolute Lymphocytes 1.2 0.8 - 5.3 10e3/uL    Absolute Monocytes 0.9 0.0 - 1.3 10e3/uL    Absolute Eosinophils 0.0 0.0 - 0.7 10e3/uL    Absolute Basophils 0.0 0.0 - 0.2 10e3/uL    Absolute Immature Granulocytes 0.1 <=0.4 10e3/uL    Absolute NRBCs 0.0 10e3/uL   EKG 12 lead     Status: None   Result Value Ref Range    Systolic Blood Pressure  mmHg    Diastolic Blood Pressure  mmHg    Ventricular Rate 94 BPM    Atrial Rate 94 BPM    MS Interval 184 ms    QRS Duration 82 ms     ms    QTc 447 ms    P Axis 53 degrees    R AXIS -14 degrees    T Axis 28 degrees    Interpretation ECG       Sinus rhythm  Inferior infarct (cited on or before 15-Mar-2024)  Abnormal ECG  When compared with ECG of 27-May-2025 02:31,  No significant change was found  Confirmed by SEE ED PROVIDER NOTE FOR, ECG INTERPRETATION ([id number]),  KAMILLE VILLAGOMEZ ([id number]) on 7/16/2025 7:03:00 AM     CBC with Platelets & Differential     Status: Abnormal    Narrative    The following orders were created for panel order CBC with Platelets & Differential.  Procedure                               Abnormality         Status                     ---------                               -----------         ------                     CBC with platelets and ...[[id number]]  Abnormal            Final result                 Please view results for these tests on the individual orders.   CBC with Platelets & Differential     Status: Abnormal    Narrative    The following orders were created for panel order CBC with Platelets & Differential.  Procedure                               Abnormality         Status                     ---------                               -----------         ------                      CBC with platelets and ...[[id number]]  Abnormal            Final result                 Please view results for these tests on the individual orders.           MANAGEMENT DISCUSSED with the following over the past 24 hours: Hospitalist Rosi Keys PA-C and bedside nurse JARETH Charlton.   120 MINUTES SPENT BY ME on the date of service doing chart review, history, exam, documentation & further activities per the note.

## 2025-07-18 NOTE — PROGRESS NOTES
Colon & Rectal Surgery Progress Note             Interval History:     More alert today.  Notes several bowel movements after GGE yesterday.  Remains distended.    She is currently on a regular diet and denies nausea, vomiting, fevers, or chills.    Appears short of breath this morning.                Medications:   I have reviewed this patient's current medications               Physical Exam:   Blood pressure (!) 140/79, pulse 95, temperature 97.8  F (36.6  C), temperature source Oral, resp. rate 18, weight 120 kg (264 lb 8.8 oz), SpO2 99%, not currently breastfeeding.    Intake/Output Summary (Last 24 hours) at 7/18/2025 0846  Last data filed at 7/17/2025 2216  Gross per 24 hour   Intake 450 ml   Output 1200 ml   Net -750 ml     GEN: Alert  ABD: Round, nontender         Data:        Lab Results   Component Value Date     07/18/2025     04/07/2021    Lab Results   Component Value Date    CHLORIDE 100 07/18/2025    CHLORIDE 99 08/19/2022    CHLORIDE 101 04/07/2021    Lab Results   Component Value Date    BUN 28.6 07/18/2025    BUN 13 08/19/2022    BUN 16 04/07/2021      Lab Results   Component Value Date    POTASSIUM 5.0 07/18/2025    POTASSIUM 4.1 02/26/2023    POTASSIUM 4.4 08/19/2022    POTASSIUM 4.0 04/07/2021    Lab Results   Component Value Date    CO2 26 07/18/2025    CO2 28 08/19/2022    CO2 29 04/07/2021    Lab Results   Component Value Date    CR 0.91 07/18/2025    CR 0.92 07/17/2025    CR 0.77 04/07/2021    CR 0.73 02/13/2021        Lab Results   Component Value Date    HGB 12.3 07/18/2025    HGB 12.3 07/17/2025     Lab Results   Component Value Date     07/18/2025     07/17/2025     Lab Results   Component Value Date    WBC 13.6 (H) 07/18/2025    WBC 18.9 (H) 07/17/2025            Assessment and Plan:     Carol is an 89-year-old woman with an significant morbid obesity, CHF, sleep apnea, hypothyroidism, general deconditioning, sacral decubitus, now with diminished bowel  function.  Had a Gastrografin enema that did not show passage above the sigmoid colon although limited due to her discomfort.    Given the significant comorbidities and body habitus I think surgical high risks and limited benefits would be prohibitive.  If she is interested in being aggressive would recommend GI consult for possible luminal evaluation, possible stenting if that makes sense.    She did not wish to have any surgery but was agreeable to GI consultation.       Clinically Significant Risk Factors        # Hyperkalemia: Highest K = 5.4 mmol/L in last 2 days, will monitor as appropriate            # Hypertension: Noted on problem list  # Chronic heart failure with preserved ejection fraction: heart failure noted on problem list and last echo with EF >50%          # DMII: A1C = 6.6 % (Ref range: <5.7 %) within past 6 months, PRESENT ON ADMISSION      # Financial/Environmental Concerns:             Noy Prado MD  [facility]  [address] [place] [address], [address]  [place] [address]  T: [phone]  F: [phone]   [email or web address]

## 2025-07-18 NOTE — PLAN OF CARE
"Patient is Aox4, VSS, has not been out of bed this shift, pillows in place to help with wounds, tolerating regular diet and oral medications, PIV in the right arm SL, colorectal following, Palliative following, SW following for safe discharge following, continuous pulse ox in place, wounds on buttocks non blanchable, repositioning to help with wounds, on 1L O2, purewick in place, sleeping well, able to make needs known, will continue to monitor and provide cares.     0638: Patient up to bedside commode and had a medium loose stool.    Problem: Adult Inpatient Plan of Care  Goal: Plan of Care Review  Description: The Plan of Care Review/Shift note should be completed every shift.  The Outcome Evaluation is a brief statement about your assessment that the patient is improving, declining, or no change.  This information will be displayed automatically on your shift  note.  Outcome: Progressing  Flowsheets (Taken 7/18/2025 0400)  Outcome Evaluation: Patient is Aox4, VSS, tolerating regular diet and oral medications, PIV in the right arm SL, 1L O2, sleeping well.  Plan of Care Reviewed With: patient  Overall Patient Progress: improving  Goal: Patient-Specific Goal (Individualized)  Description: You can add care plan individualizations to a care plan. Examples of Individualization might be:  \"Parent requests to be called daily at 9am for status\", \"I have a hard time hearing out of my right ear\", or \"Do not touch me to wake me up as it startles  me\".  Outcome: Progressing  Goal: Absence of Hospital-Acquired Illness or Injury  Outcome: Progressing  Intervention: Identify and Manage Fall Risk  Recent Flowsheet Documentation  Taken 7/18/2025 0121 by Maria Antonia Wilkinson RN  Safety Promotion/Fall Prevention:   activity supervised   nonskid shoes/slippers when out of bed   safety round/check completed  Intervention: Prevent Skin Injury  Recent Flowsheet Documentation  Taken 7/18/2025 0121 by Maria Antonia Wilkinson RN  Body Position: " position maintained  Goal: Optimal Comfort and Wellbeing  Outcome: Progressing  Intervention: Provide Person-Centered Care  Recent Flowsheet Documentation  Taken 7/18/2025 0121 by Maria Antonia Wilkinson RN  Trust Relationship/Rapport: care explained  Goal: Readiness for Transition of Care  Outcome: Progressing     Problem: Delirium  Goal: Optimal Coping  Outcome: Progressing  Goal: Improved Behavioral Control  Outcome: Progressing  Intervention: Minimize Safety Risk  Recent Flowsheet Documentation  Taken 7/18/2025 0121 by Maria Antonia Wilkinson RN  Enhanced Safety Measures: review medications for side effects with activity  Trust Relationship/Rapport: care explained  Goal: Improved Attention and Thought Clarity  Outcome: Progressing  Goal: Improved Sleep  Outcome: Progressing     Problem: Skin Injury Risk Increased  Goal: Skin Health and Integrity  Outcome: Progressing  Intervention: Plan: Nurse Driven Intervention: Friction and Shear  Recent Flowsheet Documentation  Taken 7/18/2025 0121 by Maria Antonia Wilkinson RN  Friction/Shear Interventions: HOB 30 degrees or less  Intervention: Optimize Skin Protection  Recent Flowsheet Documentation  Taken 7/18/2025 0121 by Maria Antonia Wilkinson RN  Activity Management: activity adjusted per tolerance  Head of Bed (HOB) Positioning: HOB at 20-30 degrees         Goal Outcome Evaluation:      Plan of Care Reviewed With: patient    Overall Patient Progress: improvingOverall Patient Progress: improving    Outcome Evaluation: Patient is Aox4, VSS, tolerating regular diet and oral medications, PIV in the right arm SL, 1L O2, sleeping well.

## 2025-07-19 LAB
ALBUMIN SERPL BCG-MCNC: 3.4 G/DL (ref 3.5–5.2)
ALP SERPL-CCNC: 133 U/L (ref 40–150)
ALT SERPL W P-5'-P-CCNC: 48 U/L (ref 0–50)
ANION GAP SERPL CALCULATED.3IONS-SCNC: 13 MMOL/L (ref 7–15)
AST SERPL W P-5'-P-CCNC: 97 U/L (ref 0–45)
BILIRUB SERPL-MCNC: 1 MG/DL
BUN SERPL-MCNC: 17.8 MG/DL (ref 8–23)
CALCIUM SERPL-MCNC: 8.7 MG/DL (ref 8.8–10.4)
CHLORIDE SERPL-SCNC: 99 MMOL/L (ref 98–107)
CREAT SERPL-MCNC: 0.69 MG/DL (ref 0.51–0.95)
EGFRCR SERPLBLD CKD-EPI 2021: 82 ML/MIN/1.73M2
ERYTHROCYTE [DISTWIDTH] IN BLOOD BY AUTOMATED COUNT: 15.2 % (ref 10–15)
GLUCOSE SERPL-MCNC: 137 MG/DL (ref 70–99)
HCO3 SERPL-SCNC: 24 MMOL/L (ref 22–29)
HCT VFR BLD AUTO: 41.4 % (ref 35–47)
HGB BLD-MCNC: 12.7 G/DL (ref 11.7–15.7)
MCH RBC QN AUTO: 31.8 PG (ref 26.5–33)
MCHC RBC AUTO-ENTMCNC: 30.7 G/DL (ref 31.5–36.5)
MCV RBC AUTO: 104 FL (ref 78–100)
PLATELET # BLD AUTO: 213 10E3/UL (ref 150–450)
POTASSIUM SERPL-SCNC: 4.3 MMOL/L (ref 3.4–5.3)
PROT SERPL-MCNC: 6.8 G/DL (ref 6.4–8.3)
RBC # BLD AUTO: 3.99 10E6/UL (ref 3.8–5.2)
SODIUM SERPL-SCNC: 136 MMOL/L (ref 135–145)
WBC # BLD AUTO: 10.6 10E3/UL (ref 4–11)

## 2025-07-19 PROCEDURE — 250N000011 HC RX IP 250 OP 636: Performed by: PHYSICIAN ASSISTANT

## 2025-07-19 PROCEDURE — 250N000013 HC RX MED GY IP 250 OP 250 PS 637: Performed by: PHYSICIAN ASSISTANT

## 2025-07-19 PROCEDURE — 120N000001 HC R&B MED SURG/OB

## 2025-07-19 PROCEDURE — 80053 COMPREHEN METABOLIC PANEL: CPT | Performed by: PHYSICIAN ASSISTANT

## 2025-07-19 PROCEDURE — 999N000156 HC STATISTIC RCP CONSULT EA 30 MIN

## 2025-07-19 PROCEDURE — 250N000009 HC RX 250: Performed by: PHYSICIAN ASSISTANT

## 2025-07-19 PROCEDURE — 250N000009 HC RX 250: Performed by: INTERNAL MEDICINE

## 2025-07-19 PROCEDURE — 250N000013 HC RX MED GY IP 250 OP 250 PS 637: Performed by: INTERNAL MEDICINE

## 2025-07-19 PROCEDURE — 94640 AIRWAY INHALATION TREATMENT: CPT | Mod: 76

## 2025-07-19 PROCEDURE — 999N000157 HC STATISTIC RCP TIME EA 10 MIN

## 2025-07-19 PROCEDURE — 99232 SBSQ HOSP IP/OBS MODERATE 35: CPT | Performed by: PHYSICIAN ASSISTANT

## 2025-07-19 PROCEDURE — 36415 COLL VENOUS BLD VENIPUNCTURE: CPT | Performed by: PHYSICIAN ASSISTANT

## 2025-07-19 PROCEDURE — 85041 AUTOMATED RBC COUNT: CPT | Performed by: PHYSICIAN ASSISTANT

## 2025-07-19 RX ORDER — IPRATROPIUM BROMIDE AND ALBUTEROL SULFATE 2.5; .5 MG/3ML; MG/3ML
1 SOLUTION RESPIRATORY (INHALATION) EVERY 4 HOURS PRN
Status: DISCONTINUED | OUTPATIENT
Start: 2025-07-19 | End: 2025-07-24

## 2025-07-19 RX ORDER — ENOXAPARIN SODIUM 100 MG/ML
40 INJECTION SUBCUTANEOUS EVERY 12 HOURS
Status: DISCONTINUED | OUTPATIENT
Start: 2025-07-19 | End: 2025-07-25 | Stop reason: HOSPADM

## 2025-07-19 RX ADMIN — ALLOPURINOL 100 MG: 100 TABLET ORAL at 07:55

## 2025-07-19 RX ADMIN — LACTULOSE 20 G: 20 SOLUTION ORAL at 07:55

## 2025-07-19 RX ADMIN — CARBOXYMETHYLCELLULOSE SODIUM 1 DROP: 5 SOLUTION/ DROPS OPHTHALMIC at 22:18

## 2025-07-19 RX ADMIN — HYDROCORTISONE: 25 CREAM TOPICAL at 07:56

## 2025-07-19 RX ADMIN — CARBOXYMETHYLCELLULOSE SODIUM 1 DROP: 5 SOLUTION/ DROPS OPHTHALMIC at 07:55

## 2025-07-19 RX ADMIN — POLYETHYLENE GLYCOL 3350 17 G: 17 POWDER, FOR SOLUTION ORAL at 07:55

## 2025-07-19 RX ADMIN — GABAPENTIN 400 MG: 400 CAPSULE ORAL at 22:14

## 2025-07-19 RX ADMIN — GABAPENTIN 400 MG: 400 CAPSULE ORAL at 14:05

## 2025-07-19 RX ADMIN — MINERAL OIL, WHITE PETROLATUM: .03; .94 OINTMENT OPHTHALMIC at 22:14

## 2025-07-19 RX ADMIN — IPRATROPIUM BROMIDE AND ALBUTEROL SULFATE 3 ML: .5; 3 SOLUTION RESPIRATORY (INHALATION) at 22:43

## 2025-07-19 RX ADMIN — GUAIFENESIN 400 MG: 200 TABLET ORAL at 07:55

## 2025-07-19 RX ADMIN — ACETAMINOPHEN 650 MG: 325 TABLET ORAL at 22:25

## 2025-07-19 RX ADMIN — HYDROCORTISONE: 25 CREAM TOPICAL at 22:16

## 2025-07-19 RX ADMIN — ENOXAPARIN SODIUM 40 MG: 40 INJECTION SUBCUTANEOUS at 14:06

## 2025-07-19 RX ADMIN — GUAIFENESIN 400 MG: 200 TABLET ORAL at 22:18

## 2025-07-19 RX ADMIN — POLYETHYLENE GLYCOL 3350, SODIUM SULFATE ANHYDROUS, SODIUM BICARBONATE, SODIUM CHLORIDE, POTASSIUM CHLORIDE 4000 ML: 236; 22.74; 6.74; 5.86; 2.97 POWDER, FOR SOLUTION ORAL at 01:52

## 2025-07-19 RX ADMIN — IPRATROPIUM BROMIDE AND ALBUTEROL SULFATE 3 ML: .5; 3 SOLUTION RESPIRATORY (INHALATION) at 12:03

## 2025-07-19 RX ADMIN — CARBOXYMETHYLCELLULOSE SODIUM 1 DROP: 5 SOLUTION/ DROPS OPHTHALMIC at 14:05

## 2025-07-19 RX ADMIN — LEVOTHYROXINE SODIUM 88 MCG: 0.09 TABLET ORAL at 05:35

## 2025-07-19 RX ADMIN — TORSEMIDE 20 MG: 20 TABLET ORAL at 07:55

## 2025-07-19 RX ADMIN — POTASSIUM CHLORIDE 40 MEQ: 20 TABLET, EXTENDED RELEASE ORAL at 07:55

## 2025-07-19 RX ADMIN — GABAPENTIN 400 MG: 400 CAPSULE ORAL at 05:35

## 2025-07-19 RX ADMIN — IPRATROPIUM BROMIDE AND ALBUTEROL SULFATE 3 ML: .5; 3 SOLUTION RESPIRATORY (INHALATION) at 07:55

## 2025-07-19 RX ADMIN — DULOXETINE 60 MG: 60 CAPSULE, DELAYED RELEASE ORAL at 07:55

## 2025-07-19 ASSESSMENT — ACTIVITIES OF DAILY LIVING (ADL)
ADLS_ACUITY_SCORE: 55
ADLS_ACUITY_SCORE: 56
ADLS_ACUITY_SCORE: 56
ADLS_ACUITY_SCORE: 52
ADLS_ACUITY_SCORE: 56
ADLS_ACUITY_SCORE: 52
ADLS_ACUITY_SCORE: 52
ADLS_ACUITY_SCORE: 56
ADLS_ACUITY_SCORE: 56
ADLS_ACUITY_SCORE: 52
ADLS_ACUITY_SCORE: 55
ADLS_ACUITY_SCORE: 55
ADLS_ACUITY_SCORE: 56
ADLS_ACUITY_SCORE: 56
ADLS_ACUITY_SCORE: 52
ADLS_ACUITY_SCORE: 56
ADLS_ACUITY_SCORE: 52
ADLS_ACUITY_SCORE: 56

## 2025-07-19 NOTE — PROGRESS NOTES
[facility]    Medicine Progress Note - Hospitalist Service    Date of Admission:  7/16/2025    Assessment & Plan   Carol Merida is a 89 year old female with PMHx of morbid obesity, diastolic CHF, JEAN on CPAP, reactive airway disease, MDD, anxiety, panic attacks, diet controlled DM type II, hypothyroidism, lymphedema, recurrent right lower extremity cellulitis, chronic cough, chronic pain, neuropathy, osteoarthritis, sacral pressure ulcers, and multiple joint replacement surgeries, who was admitted on 7/16/2025 with abdominal pain likely due to constipation. She also notes dyspnea.     CT Chest PE Abdomen with contrast negative for PE or PNA, no significant hypervolemia, gaseous and stool throughout the colon from the cecum to the mid sigmoid colon, redundant tissue in the central abdomen without significant sigmoid mesenteric rotation.      Colorectal Surgery were consulted from the ED, reviewed patient and imaging and felt that the patient did not have a sigmoid volvulus. Recommended gastrografin enema that proved narrowing of sigmoid, possible stricture.   Lower suspicion for cholecystitis or biliary colic. Hepatic panel not c/w this. Afebrile and no RUQ tenderness     Abdominal Pain   Sigmoid with possible stricture as proven in Gastrografin enema  Presents with abdominal pain and constipation with associated nausea and emesis.   CT chest/abd/pelvis showed marked amount of gas and stool throughout colon with redundant sigmoid colon. There is a transition point in the sigmoid colon without obvious mass, edema or perforation.   Colorectal Surgery were consulted from the ED, low suspicion for sigmoid volvulus.   - CRS consulted. Recommended gastrograffin enema to evaluate for obstruction.  - gastrograffin enema XR showed abrupt narrowing of colon lumen in proximal sigmoid colon without flow of contrast beyond this raising suspicion for stricture  - CRS discussed option of surgery, felt pt  was a very poor surgical candidate and would likely not survive surgery if needed.    - GI consulted, recommended flex sig to evaluate for stricture as well as disimpact bowel  - no stricture noted on flex sig, GI recommended bowel prep  - multiple loose stools after starting bowel prep, not complete  - recommend daily Miralax once bowel prep completed  - PRN Fleets enema and pepto bismol and PRN simethicone.   - ADAT  - anti emetics, non narcotic analgesia PRN  - encourage sitting in chair or head elevated to alleviate diaphragm pressure     Acute Respiratory Failure, Hypoxia with Respiratory Acidosis   Hx of chronic Cough and JEAN  Presented with dyspnea increased from baseline. No overt hypoxia recorded in the ED but maintained on 2 L/min nasal cannula.  VBG shows respiratory acidosis.  Suspect this is mild reactive airway exacerbation versus shortness of breath due to significant abdominal distention and obstipation symptoms.    Patient was reportedly initially a little wheezy on exam in the ED and given Decadron. Pulmonary imaging unremarkable for anything acute and does not appear hypervolemic.   She does not wear continuous supplemental oxygen at home at baseline per her report.  Lung sounds are fairly clear at this time but still dyspneic  - wean supplemental o2 as able, goal saturation around 89 to 90% at rest  - Continue bronchodilators with scheduled DuoNebs and albuterol as needed.  Patient uses Advair at home as well.   - continue PTA Ativan (takes PRN PTA for anxiety and dyspnea)  - continue PTA diuretics   - hold further steroids currently until reassessment   - resume CPAP if not vomiting  - obesity hypoventilation syndrome likely contributing, worsened by distended bowels and constipation. Encourage movement up to chair     Viral PCR still Covid positive, recent COVID infection in May '25.   No isolation requirements, not an active or new COVID viral infection      Cholelithiasis   Appears  asymptomatic as noted above currently lower suspicion for biliary colic as cause for symptoms. No RUQ pain. Limited evaluation with no definitive evidence for gallbladder wall thickening. LFT Labs stable from baseline.   - monitor symptoms, follow CMP  - currently would not recommend antibiotics     Chronic diastolic CHF  Chronic lymphedema  History of recurrent right lower extremity cellulitis  Appears euvolemic.   - cont PTA torsemide 20 mg daily and 20 meq potassium daily  - 2 g sodium restriction   - Patient's daughter reports that she was started on a course of Bactrim for recurrent cellulitis in her right lower extremity, she completed this 7/18     Hepatic Steatosis   Morbid Obesity   - noted, LFTs stable. BMI of 46  - f/up with PCP     Hepatic Lesion   - 1.5 cm enhancing observation at the dome of the right hepatic lobe. Recommend nonemergent contrast-enhanced MRI for further evaluation.      Chronic pain  Osteoarthritis  Neuropathy  - history of bilateral TKA, right HUGO, and right femur IM nail  - WC transfers at baseline   - cont home gabapentin 400 mg 3 times daily, acetaminophen 3 times daily, and celecoxib 100 mg twice daily as needed     Gout  - cont allopurinol 100 mg daily     Hypothyroidism  - continue PTA levothyroxine 88 mcg daily     MDD  Anxiety  Panic attacks  - continue PTA duloxetine 60 mg daily and lorazepam 0.5 mg every 6 hours as needed. Also takes for dyspnea.      History sacral pressure ulcers  Right Buttock Pressure Injury, Stage 3 (POA)   - WOC      DM type II  HgbA1c 6.6 4/2025  - diet controlled        Diet: Advance Diet as Tolerated: Clear Liquid Diet    DVT Prophylaxis: Enoxaparin (Lovenox) SQ  Benson Catheter: Not present  Lines: None     Cardiac Monitoring: None  Code Status: No CPR- Do NOT Intubate      Clinically Significant Risk Factors               # Hypoalbuminemia: Lowest albumin = 3.4 g/dL at 7/19/2025  5:32 AM, will monitor as appropriate     # Hypertension: Noted on  problem list  # Chronic heart failure with preserved ejection fraction: heart failure noted on problem list and last echo with EF >50%          # DMII: A1C = 6.6 % (Ref range: <5.7 %) within past 6 months, PRESENT ON ADMISSION      # Financial/Environmental Concerns:           Social Drivers of Health            Disposition Plan     Medically Ready for Discharge: Anticipated in 2-4 Days           The patient's care was discussed with the Bedside Nurse, Patient, and Patient's Family.    Rosi Keys PA-C  Hospitalist Service  [facility]  Securely message with [employer] (more info)  Text page via [facility] Paging/Directory   ______________________________________________________________________    Interval History   Notes improvement in abd discomfort, has had several large BMs. Appears fatigued, which is expected. Remains mildly dyspneic which is somewhat chronic, pt notes breathing is a little worse than baseline    Physical Exam   Vital Signs: Temp: 98.6  F (37  C) Temp src: Oral BP: 130/63 Pulse: 98   Resp: 20 SpO2: 94 % O2 Device: Nasal cannula Oxygen Delivery: 1 LPM  Weight: 264 lbs 8.83 oz    GENERAL:  Comfortable.  PSYCH: pleasant, oriented, No acute distress.  HEART:  Normal S1, S2 with no murmur, no pericardial rub, gallops or S3 or S4.  LUNGS:  diminished but clear to auscultation, prolonged expiratory phase  GI:  Soft, normal bowel sounds. Mildly distended.   NEUROLOGIC:  grossly intact    Medical Decision Making       45 MINUTES SPENT BY ME on the date of service doing chart review, history, exam, documentation & further activities per the note.      Data     I have personally reviewed the following data over the past 24 hrs:    10.6  \   12.7   / 213     136 99 17.8 /  137 (H)   4.3 24 0.69 \     ALT: 48 AST: 97 (H) AP: 133 TBILI: 1.0   ALB: 3.4 (L) TOT PROTEIN: 6.8 LIPASE: N/A       Imaging results reviewed over the past 24 hrs:   No results found for this or any previous visit  (from the past 24 hours).

## 2025-07-19 NOTE — PROGRESS NOTES
COLON & RECTAL SURGERY  PROGRESS NOTE    July 19, 2025    SUBJECTIVE:  Endoscopy yesterday with no evidence of a large bowel obstruction, sigmoid stricture or significant luminal pathology.    OBJECTIVE:  Temp:  [97.6  F (36.4  C)-98.7  F (37.1  C)] 98.6  F (37  C)  Pulse:  [] 104  Resp:  [14-30] 21  BP: (104-150)/(48-97) 130/63  SpO2:  [86 %-99 %] 93 %    Intake/Output Summary (Last 24 hours) at 7/19/2025 0921  Last data filed at 7/19/2025 0518  Gross per 24 hour   Intake --   Output 1100 ml   Net -1100 ml       LABS:  Lab Results   Component Value Date    WBC 10.6 07/19/2025    WBC 7.6 04/07/2021     Lab Results   Component Value Date    HGB 12.7 07/19/2025    HGB 13.8 04/07/2021     Lab Results   Component Value Date    HCT 41.4 07/19/2025    HCT 42.8 04/07/2021     Lab Results   Component Value Date     07/19/2025     04/07/2021     Last Basic Metabolic Panel:  Lab Results   Component Value Date     07/19/2025     04/07/2021      Lab Results   Component Value Date    POTASSIUM 4.3 07/19/2025    POTASSIUM 4.1 02/26/2023    POTASSIUM 4.4 08/19/2022    POTASSIUM 4.0 04/07/2021     Lab Results   Component Value Date    CHLORIDE 99 07/19/2025    CHLORIDE 99 08/19/2022    CHLORIDE 101 04/07/2021     Lab Results   Component Value Date    LAKSHMI 8.7 07/19/2025    LAKSHMI 8.6 04/07/2021     Lab Results   Component Value Date    CO2 24 07/19/2025    CO2 28 08/19/2022    CO2 29 04/07/2021     Lab Results   Component Value Date    BUN 17.8 07/19/2025    BUN 13 08/19/2022    BUN 16 04/07/2021     Lab Results   Component Value Date    CR 0.69 07/19/2025    CR 0.77 04/07/2021     Lab Results   Component Value Date     07/19/2025     05/31/2025     08/19/2022     04/07/2021       ASSESSMENT/PLAN: Carol Merida is a 89 year old female morbid obesity, CHF, sleep apnea, hypothyroidism, general deconditioning, sacral decubitus, now with diminished bowel function.  Gastrografin  enema 7/17 with concern for a sigmoid colon stricture.  Colonoscopy 7/18 with no evidence of significant luminal pathology.    For management of her slow transit constipation, would recommend a an aggressive bowel regimen.  Will defer to gastroenterology for management of her medications.  As patient does not have a sigmoid stricture, no current indications for surgical intervention, and does not desire surgical intervention in the future, colorectal surgery will sign off.    Clinically Significant Risk Factors               # Hypoalbuminemia: Lowest albumin = 3.4 g/dL at 7/19/2025  5:32 AM, will monitor as appropriate     # Hypertension: Noted on problem list  # Chronic heart failure with preserved ejection fraction: heart failure noted on problem list and last echo with EF >50%          # DMII: A1C = 6.6 % (Ref range: <5.7 %) within past 6 months, PRESENT ON ADMISSION      # Financial/Environmental Concerns:         For questions/paging, please contact the CRS office at [phone].    Stella Jaquez MD  Colorectal Surgery    [facility]  [address] [address]  T: [phone]  F: [phone]

## 2025-07-19 NOTE — PROGRESS NOTES
07/19/25 1500   RCAT Assessment   Reason for Assessment Other (see comments)  (and SOB)   Pulmonary Status 0   Surgical Status 0   Chest X-ray 0   Respiratory Pattern 0   Mental Status 0   Breath Sounds 2   Cough Effectiveness 0   Level of Activity 2  (wheelchair bound)   O2 Required for SpO2>=92% 1   Acuity Level (points) 5   Acuity Level  5   Re-eval Interval Guideline Every 3 days   Re-evaluation Date 07/22/25   Clinical Indications/Symptoms   Aerosol Therapy Physician order;RCAT protocol   Volume Expansion Decreased breath sounds;Prevent atelectasis   Aerosol Therapy Plan   RT Treatment Nebulizer   Anticholinergic/Beta-Andrenergic Agonist Duoneb soln (0.5mg/3mg per 3mL) neb Max 6 doses/24h   Aerosol Treatment Frequency Acuity Level 4: PRN [id number] wheezing   Volume Expansion Plan   Volume Expansion Treatment Incentive Spirometer   Volume Expansion Frequency Acuity Level 5: Incentive Spirometry-Able to perform well on their own   Breath Sounds   Breath Sounds All Fields   All Lung Fields Breath Sounds diminished         Ken Ray, RT

## 2025-07-19 NOTE — PROGRESS NOTES
GI Follow Up    No c/o pain or nausea.  She reported several stools since starting on the bowel prep.    AF, VSS  ABD: obese, soft, non-tender    Labs reviewed    IMP: Obstipation: improving since starting on oral bowel prep.  Flex sig yesterday w/o significant findings.    PLAN: Complete ingestion of oral prep. Consider AXR tomorrow.

## 2025-07-19 NOTE — PLAN OF CARE
"Goal Outcome Evaluation:      Plan of Care Reviewed With: patient    Overall Patient Progress: improvingOverall Patient Progress: improving     /66 (BP Location: Right arm)   Pulse 91   Temp 98.5  F (36.9  C) (Oral)   Resp 20   Wt 120 kg (264 lb 8.8 oz)   LMP  (LMP Unknown)   SpO2 94%   BMI 51.67 kg/m      Pt A 7 O x 4 with forgetfulness, able to make needs known, call light with in reach for needs, VSS,  bowel prep almost completed, up Ax2 with michael steady, purewick in place, clear liquid diet, saline locked, colorectal and SW following.    Problem: Adult Inpatient Plan of Care  Goal: Plan of Care Review  Description: The Plan of Care Review/Shift note should be completed every shift.  The Outcome Evaluation is a brief statement about your assessment that the patient is improving, declining, or no change.  This information will be displayed automatically on your shift  note.  Outcome: Progressing  Flowsheets (Taken 7/19/2025 1711)  Plan of Care Reviewed With: patient  Overall Patient Progress: improving  Goal: Patient-Specific Goal (Individualized)  Description: You can add care plan individualizations to a care plan. Examples of Individualization might be:  \"Parent requests to be called daily at 9am for status\", \"I have a hard time hearing out of my right ear\", or \"Do not touch me to wake me up as it startles  me\".  Outcome: Progressing  Goal: Absence of Hospital-Acquired Illness or Injury  Outcome: Progressing  Intervention: Identify and Manage Fall Risk  Recent Flowsheet Documentation  Taken 7/19/2025 1600 by Raine Amato RN  Safety Promotion/Fall Prevention:   clutter free environment maintained   room door open   room near nurse's station   safety round/check completed   nonskid shoes/slippers when out of bed  Intervention: Prevent Skin Injury  Recent Flowsheet Documentation  Taken 7/19/2025 1600 by Raine Amato RN  Body Position: position changed independently  Intervention: " Prevent Infection  Recent Flowsheet Documentation  Taken 7/19/2025 1600 by Raine Amato RN  Infection Prevention:   rest/sleep promoted   single patient room provided  Goal: Optimal Comfort and Wellbeing  Outcome: Progressing  Goal: Readiness for Transition of Care  Outcome: Progressing     Problem: Delirium  Goal: Optimal Coping  Outcome: Progressing  Goal: Improved Behavioral Control  Outcome: Progressing  Intervention: Minimize Safety Risk  Recent Flowsheet Documentation  Taken 7/19/2025 1600 by Raine Amato RN  Enhanced Safety Measures: review medications for side effects with activity  Goal: Improved Attention and Thought Clarity  Outcome: Progressing  Goal: Improved Sleep  Outcome: Progressing     Problem: Skin Injury Risk Increased  Goal: Skin Health and Integrity  Outcome: Progressing  Intervention: Plan: Nurse Driven Intervention: Moisture Management  Recent Flowsheet Documentation  Taken 7/19/2025 1600 by Raine Amato RN  Moisture Interventions:   Encourage regular toileting   Urinary collection device  Intervention: Plan: Nurse Driven Intervention: Friction and Shear  Recent Flowsheet Documentation  Taken 7/19/2025 1600 by Raine Amato RN  Friction/Shear Interventions: HOB 30 degrees or less  Intervention: Optimize Skin Protection  Recent Flowsheet Documentation  Taken 7/19/2025 1600 by Raine Amato RN  Head of Bed (HOB) Positioning: HOB at 20-30 degrees

## 2025-07-19 NOTE — PLAN OF CARE
"[id number]    Inpatient Progress Note:    /67 (BP Location: Right arm)   Pulse 101   Temp 98.6  F (37  C) (Oral)   Resp 22   Wt 120 kg (264 lb 8.8 oz)   LMP  (LMP Unknown)   SpO2 98%   BMI 51.67 kg/m          Orientation: A&Ox4  Pain status: Denies pain  Activity: Ax2 michael steady W/C baseline  Resp: X, 1-2 L oxygen overnight, tried to wean off oxygen this morning and pt oxygen de-stat to 86%, placed 1 L oxygen back.  Cardiac: WDL  GI: X, Narrowing of sigmoid colon-Flex sig-constipation- keep stool loose due to narrowing- Golytely/senna/mirlax/lactulose, Pt had x3 extra large loose brown stools overnight  : X, purewick  Skin: X, bottom wound  LDA: PIV  Infusions: SL  Diet: Clear  Consults: GI/colorectal/PT/SW  Discharge Plan: TBD    Will continue to monitor and provide cares.     Britany Cotton RN       Problem: Adult Inpatient Plan of Care  Goal: Plan of Care Review  Description: The Plan of Care Review/Shift note should be completed every shift.  The Outcome Evaluation is a brief statement about your assessment that the patient is improving, declining, or no change.  This information will be displayed automatically on your shift  note.  Outcome: Progressing  Flowsheets (Taken 7/19/2025 0035)  Outcome Evaluation: A&Ox4, Ax2 michael steady. GI/Colorectal following. Narrowing of sigmoid colon possible stricture- Flex sig constipation- keep stool loose  due to narrowing- Golytely/Senna/mirlax/lactulose. SW/PT following  Plan of Care Reviewed With: patient  Overall Patient Progress: improving  Goal: Patient-Specific Goal (Individualized)  Description: You can add care plan individualizations to a care plan. Examples of Individualization might be:  \"Parent requests to be called daily at 9am for status\", \"I have a hard time hearing out of my right ear\", or \"Do not touch me to wake me up as it startles  me\".  Outcome: Progressing  Goal: Absence of Hospital-Acquired Illness or Injury  Outcome: " Progressing  Goal: Optimal Comfort and Wellbeing  Outcome: Progressing  Goal: Readiness for Transition of Care  Outcome: Progressing     Problem: Delirium  Goal: Optimal Coping  Outcome: Progressing  Goal: Improved Behavioral Control  Outcome: Progressing  Goal: Improved Attention and Thought Clarity  Outcome: Progressing  Goal: Improved Sleep  Outcome: Progressing     Problem: Skin Injury Risk Increased  Goal: Skin Health and Integrity  Outcome: Progressing   Goal Outcome Evaluation:      Plan of Care Reviewed With: patient    Overall Patient Progress: improvingOverall Patient Progress: improving    Outcome Evaluation: A&Ox4, Ax2 michael steady. GI/Colorectal following. Narrowing of sigmoid colon possible stricture- Flex sig constipation- keep stool loose  due to narrowing- Golytely/Senna/mirlax/lactulose. SW/PT following

## 2025-07-19 NOTE — PLAN OF CARE
"Patient is alert and oriented with some forgetfulness, VSS, patient had a couple of loose BMs today, not yet clear, bowel prep almost completed, Ax2 with michael steady, purewick in place, clear liquid diet, saline locked, social work and colorectal following.  /63 (BP Location: Right arm)   Pulse 98   Temp 98.6  F (37  C) (Oral)   Resp 20   Wt 120 kg (264 lb 8.8 oz)   LMP  (LMP Unknown)   SpO2 94%   BMI 51.67 kg/m      Problem: Adult Inpatient Plan of Care  Goal: Plan of Care Review  Description: The Plan of Care Review/Shift note should be completed every shift.  The Outcome Evaluation is a brief statement about your assessment that the patient is improving, declining, or no change.  This information will be displayed automatically on your shift  note.  Outcome: Progressing  Flowsheets (Taken 7/19/2025 0922)  Plan of Care Reviewed With: patient  Overall Patient Progress: improving  Goal: Patient-Specific Goal (Individualized)  Description: You can add care plan individualizations to a care plan. Examples of Individualization might be:  \"Parent requests to be called daily at 9am for status\", \"I have a hard time hearing out of my right ear\", or \"Do not touch me to wake me up as it startles  me\".  Outcome: Progressing  Goal: Absence of Hospital-Acquired Illness or Injury  Outcome: Progressing  Intervention: Identify and Manage Fall Risk  Recent Flowsheet Documentation  Taken 7/19/2025 0753 by Zoltan Mayers RN  Safety Promotion/Fall Prevention:   clutter free environment maintained   room door open   room near nurse's station   safety round/check completed   nonskid shoes/slippers when out of bed  Intervention: Prevent Skin Injury  Recent Flowsheet Documentation  Taken 7/19/2025 0753 by Zoltan Mayers, RN  Body Position: position changed independently  Intervention: Prevent Infection  Recent Flowsheet Documentation  Taken 7/19/2025 0753 by Zoltan Mayers, JARETH  Infection " Prevention:   rest/sleep promoted   single patient room provided  Goal: Optimal Comfort and Wellbeing  Outcome: Progressing  Goal: Readiness for Transition of Care  Outcome: Progressing     Problem: Delirium  Goal: Optimal Coping  Outcome: Progressing  Goal: Improved Behavioral Control  Outcome: Progressing  Intervention: Minimize Safety Risk  Recent Flowsheet Documentation  Taken 7/19/2025 0753 by Zoltan Mayers RN  Enhanced Safety Measures: review medications for side effects with activity  Goal: Improved Attention and Thought Clarity  Outcome: Progressing  Goal: Improved Sleep  Outcome: Progressing     Problem: Skin Injury Risk Increased  Goal: Skin Health and Integrity  Outcome: Progressing  Intervention: Plan: Nurse Driven Intervention: Moisture Management  Recent Flowsheet Documentation  Taken 7/19/2025 0753 by Zoltan Mayers RN  Moisture Interventions:   Encourage regular toileting   Urinary collection device  Bathing/Skin Care: incontinence care  Intervention: Plan: Nurse Driven Intervention: Friction and Shear  Recent Flowsheet Documentation  Taken 7/19/2025 0753 by Zoltan Mayers RN  Friction/Shear Interventions: HOB 30 degrees or less  Intervention: Optimize Skin Protection  Recent Flowsheet Documentation  Taken 7/19/2025 0753 by Zoltan Mayers RN  Activity Management:   activity adjusted per tolerance   up in chair  Head of Bed (HOB) Positioning: HOB at 20-30 degrees   Goal Outcome Evaluation:      Plan of Care Reviewed With: patient    Overall Patient Progress: improvingOverall Patient Progress: improving

## 2025-07-20 ENCOUNTER — APPOINTMENT (OUTPATIENT)
Dept: GENERAL RADIOLOGY | Facility: CLINIC | Age: 89
DRG: 391 | End: 2025-07-20
Attending: PHYSICIAN ASSISTANT
Payer: COMMERCIAL

## 2025-07-20 PROCEDURE — 250N000013 HC RX MED GY IP 250 OP 250 PS 637: Performed by: PHYSICIAN ASSISTANT

## 2025-07-20 PROCEDURE — 250N000011 HC RX IP 250 OP 636: Performed by: PHYSICIAN ASSISTANT

## 2025-07-20 PROCEDURE — 74018 RADEX ABDOMEN 1 VIEW: CPT

## 2025-07-20 PROCEDURE — 120N000001 HC R&B MED SURG/OB

## 2025-07-20 PROCEDURE — 99232 SBSQ HOSP IP/OBS MODERATE 35: CPT | Performed by: PHYSICIAN ASSISTANT

## 2025-07-20 RX ADMIN — HYDROCORTISONE: 25 CREAM TOPICAL at 08:09

## 2025-07-20 RX ADMIN — ACETAMINOPHEN 650 MG: 325 TABLET ORAL at 21:57

## 2025-07-20 RX ADMIN — GUAIFENESIN 400 MG: 200 TABLET ORAL at 20:08

## 2025-07-20 RX ADMIN — CARBOXYMETHYLCELLULOSE SODIUM 1 DROP: 5 SOLUTION/ DROPS OPHTHALMIC at 07:49

## 2025-07-20 RX ADMIN — CARBOXYMETHYLCELLULOSE SODIUM 1 DROP: 5 SOLUTION/ DROPS OPHTHALMIC at 14:52

## 2025-07-20 RX ADMIN — GABAPENTIN 400 MG: 400 CAPSULE ORAL at 14:53

## 2025-07-20 RX ADMIN — ACETAMINOPHEN 650 MG: 325 TABLET ORAL at 14:53

## 2025-07-20 RX ADMIN — POTASSIUM CHLORIDE 40 MEQ: 20 TABLET, EXTENDED RELEASE ORAL at 07:48

## 2025-07-20 RX ADMIN — GABAPENTIN 400 MG: 400 CAPSULE ORAL at 21:48

## 2025-07-20 RX ADMIN — ENOXAPARIN SODIUM 40 MG: 40 INJECTION SUBCUTANEOUS at 14:52

## 2025-07-20 RX ADMIN — GUAIFENESIN 400 MG: 200 TABLET ORAL at 07:48

## 2025-07-20 RX ADMIN — DULOXETINE 60 MG: 60 CAPSULE, DELAYED RELEASE ORAL at 07:49

## 2025-07-20 RX ADMIN — ENOXAPARIN SODIUM 40 MG: 40 INJECTION SUBCUTANEOUS at 02:48

## 2025-07-20 RX ADMIN — ALLOPURINOL 100 MG: 100 TABLET ORAL at 07:48

## 2025-07-20 RX ADMIN — GABAPENTIN 400 MG: 400 CAPSULE ORAL at 05:45

## 2025-07-20 RX ADMIN — TORSEMIDE 20 MG: 20 TABLET ORAL at 07:48

## 2025-07-20 RX ADMIN — LEVOTHYROXINE SODIUM 88 MCG: 0.09 TABLET ORAL at 05:45

## 2025-07-20 RX ADMIN — CARBOXYMETHYLCELLULOSE SODIUM 1 DROP: 5 SOLUTION/ DROPS OPHTHALMIC at 20:07

## 2025-07-20 RX ADMIN — MINERAL OIL, WHITE PETROLATUM: .03; .94 OINTMENT OPHTHALMIC at 21:50

## 2025-07-20 RX ADMIN — HYDROCORTISONE: 25 CREAM TOPICAL at 21:32

## 2025-07-20 ASSESSMENT — ACTIVITIES OF DAILY LIVING (ADL)
ADLS_ACUITY_SCORE: 52
ADLS_ACUITY_SCORE: 54
ADLS_ACUITY_SCORE: 52

## 2025-07-20 NOTE — PLAN OF CARE
"Patient is alert and oriented with some forgetfulness, VSS, 1 loose stool this shift, patient was up in the recliner for the majority of this shift, Ax2 with michael steady, regular diet, saline locked. Plan: colorectal following, possible discharge tomorrow.  /67 (BP Location: Right arm)   Pulse 93   Temp 98.6  F (37  C) (Oral)   Resp 18   Wt 120 kg (264 lb 8.8 oz)   LMP  (LMP Unknown)   SpO2 94%   BMI 51.67 kg/m      Problem: Adult Inpatient Plan of Care  Goal: Plan of Care Review  Description: The Plan of Care Review/Shift note should be completed every shift.  The Outcome Evaluation is a brief statement about your assessment that the patient is improving, declining, or no change.  This information will be displayed automatically on your shift  note.  Outcome: Progressing  Flowsheets (Taken 7/20/2025 1007)  Plan of Care Reviewed With: patient  Overall Patient Progress: improving  Goal: Patient-Specific Goal (Individualized)  Description: You can add care plan individualizations to a care plan. Examples of Individualization might be:  \"Parent requests to be called daily at 9am for status\", \"I have a hard time hearing out of my right ear\", or \"Do not touch me to wake me up as it startles  me\".  Outcome: Progressing  Goal: Absence of Hospital-Acquired Illness or Injury  Outcome: Progressing  Intervention: Identify and Manage Fall Risk  Recent Flowsheet Documentation  Taken 7/20/2025 0749 by Zoltan Mayers, RN  Safety Promotion/Fall Prevention:   clutter free environment maintained   room door open   room near nurse's station   safety round/check completed   nonskid shoes/slippers when out of bed  Intervention: Prevent Infection  Recent Flowsheet Documentation  Taken 7/20/2025 0749 by Zoltan Mayers, RN  Infection Prevention:   rest/sleep promoted   single patient room provided  Goal: Optimal Comfort and Wellbeing  Outcome: Progressing  Intervention: Provide Person-Centered Care  Recent " Flowsheet Documentation  Taken 7/20/2025 0749 by Zoltan Mayers RN  Trust Relationship/Rapport: care explained  Goal: Readiness for Transition of Care  Outcome: Progressing     Problem: Delirium  Goal: Optimal Coping  Outcome: Progressing  Goal: Improved Behavioral Control  Outcome: Progressing  Intervention: Minimize Safety Risk  Recent Flowsheet Documentation  Taken 7/20/2025 0749 by Zoltan Mayers RN  Enhanced Safety Measures: review medications for side effects with activity  Trust Relationship/Rapport: care explained  Goal: Improved Attention and Thought Clarity  Outcome: Progressing  Goal: Improved Sleep  Outcome: Progressing     Problem: Skin Injury Risk Increased  Goal: Skin Health and Integrity  Outcome: Progressing  Intervention: Plan: Nurse Driven Intervention: Moisture Management  Recent Flowsheet Documentation  Taken 7/20/2025 0749 by Zoltan Mayers RN  Moisture Interventions:   Encourage regular toileting   Urinary collection device  Intervention: Plan: Nurse Driven Intervention: Friction and Shear  Recent Flowsheet Documentation  Taken 7/20/2025 0749 by Zoltan Mayers RN  Friction/Shear Interventions: HOB 30 degrees or less  Intervention: Optimize Skin Protection  Recent Flowsheet Documentation  Taken 7/20/2025 0749 by Zoltan Mayers RN  Activity Management: activity adjusted per tolerance   Goal Outcome Evaluation:      Plan of Care Reviewed With: patient    Overall Patient Progress: improvingOverall Patient Progress: improving

## 2025-07-20 NOTE — PLAN OF CARE
"Goal Outcome Evaluation:  Care from [id number]    Inpatient Progress Note:  For complete assessment see flow sheet documentation.    /62   Pulse 102   Temp 98.6  F (37  C) (Oral)   Resp 18   Wt 120 kg (264 lb 8.8 oz)   LMP  (LMP Unknown)   SpO2 93%   BMI 51.67 kg/m     Alert & oriented with some forgetfulness. Able to make needs known. Tylenol given for pain. Remains on 1L 02 nasal cannula.Completed bowel prep. Tolerating clear liquid diet. Assist x 2 with cares. Mepilex remains to bottom.Colorectal signed off. SW following.            Problem: Adult Inpatient Plan of Care  Goal: Plan of Care Review  Description: The Plan of Care Review/Shift note should be completed every shift.  The Outcome Evaluation is a brief statement about your assessment that the patient is improving, declining, or no change.  This information will be displayed automatically on your shift  note.  Outcome: Progressing  Goal: Patient-Specific Goal (Individualized)  Description: You can add care plan individualizations to a care plan. Examples of Individualization might be:  \"Parent requests to be called daily at 9am for status\", \"I have a hard time hearing out of my right ear\", or \"Do not touch me to wake me up as it startles  me\".  Outcome: Progressing  Goal: Absence of Hospital-Acquired Illness or Injury  Outcome: Progressing  Intervention: Identify and Manage Fall Risk  Recent Flowsheet Documentation  Taken 7/19/2025 2028 by Cinthia Tripathi, RN  Safety Promotion/Fall Prevention:   clutter free environment maintained   room door open   room near nurse's station   safety round/check completed   nonskid shoes/slippers when out of bed  Intervention: Prevent Skin Injury  Recent Flowsheet Documentation  Taken 7/19/2025 2028 by Cinthia Tripathi, RN  Body Position: position changed independently  Intervention: Prevent Infection  Recent Flowsheet Documentation  Taken 7/19/2025 2028 by Cinthia Tripathi RN  Infection Prevention:   " rest/sleep promoted   single patient room provided  Goal: Optimal Comfort and Wellbeing  Outcome: Progressing  Intervention: Monitor Pain and Promote Comfort  Recent Flowsheet Documentation  Taken 7/19/2025 2225 by Cinthia Tripathi RN  Pain Management Interventions: medication (see MAR)  Intervention: Provide Person-Centered Care  Recent Flowsheet Documentation  Taken 7/19/2025 2028 by Cinthia Tripathi RN  Trust Relationship/Rapport: care explained  Goal: Readiness for Transition of Care  Outcome: Progressing     Problem: Delirium  Goal: Optimal Coping  Outcome: Progressing  Goal: Improved Behavioral Control  Outcome: Progressing  Intervention: Minimize Safety Risk  Recent Flowsheet Documentation  Taken 7/19/2025 2028 by Cinthia Tripathi RN  Enhanced Safety Measures: review medications for side effects with activity  Trust Relationship/Rapport: care explained  Goal: Improved Attention and Thought Clarity  Outcome: Progressing  Goal: Improved Sleep  Outcome: Progressing     Problem: Skin Injury Risk Increased  Goal: Skin Health and Integrity  Outcome: Progressing  Intervention: Plan: Nurse Driven Intervention: Moisture Management  Recent Flowsheet Documentation  Taken 7/19/2025 2028 by Cinthia Tripathi RN  Moisture Interventions:   Encourage regular toileting   Urinary collection device  Intervention: Plan: Nurse Driven Intervention: Friction and Shear  Recent Flowsheet Documentation  Taken 7/19/2025 2028 by Cinthia Tripathi RN  Friction/Shear Interventions: HOB 30 degrees or less  Intervention: Optimize Skin Protection  Recent Flowsheet Documentation  Taken 7/19/2025 2028 by Cinthia Tripathi RN  Activity Management: activity adjusted per tolerance  Head of Bed (HOB) Positioning: HOB at 20-30 degrees

## 2025-07-20 NOTE — PROGRESS NOTES
GASTROENTEROLOGY PROGRESS NOTE     SUBJECTIVE: She reported feeling much better today after completing the bowel prep regimen.  She reported passing a lot of stool.       OBJECTIVE:  /67 (BP Location: Right arm)   Pulse 93   Temp 98.6  F (37  C) (Oral)   Resp 18   Wt 120 kg (264 lb 8.8 oz)   LMP  (LMP Unknown)   SpO2 94%   BMI 51.67 kg/m    Temp (24hrs), Av.5  F (36.9  C), Min:98.3  F (36.8  C), Max:98.6  F (37  C)    No data found.    Intake/Output Summary (Last 24 hours) at 2025 1526  Last data filed at 2025 0641  Gross per 24 hour   Intake --   Output 1200 ml   Net -1200 ml        General Appearance: alert, oriented x3, no acute distress.    Neuro: alert, non-focal.     Labs:  Recent Labs   Lab Test 25  0532 25  0620 25  0643 23  1746 23  0607 23  0106 22  0714 22  1631   WBC 10.6 13.6* 18.9*   < > 7.9  --    < >  --    HGB 12.7 12.3 12.3   < > 12.3  --    < >  --    * 102* 100   < > 91  --    < >  --     247 244   < > 223  --    < >  --    INR  --   --   --   --  1.04 1.07  --  1.15    < > = values in this interval not displayed.     Recent Labs   Lab Test 25  0532 25  0620 25  0643   POTASSIUM 4.3 5.0 5.4*   CHLORIDE 99 100 101   CO2 24 26 26   BUN 17.8 28.6* 27.5*   ANIONGAP 13 10 11     Recent Labs   Lab Test 25  0532 25  0643 25  1946 25  0709 25  0516 24  1246 24  0501 23  1029 23  1313 22  1628 22  1608   ALBUMIN 3.4* 3.5  --  3.8 3.9   < >  --    < >  --    < >  --    BILITOTAL 1.0 1.0  --  1.2 1.1   < >  --    < >  --    < >  --    ALT 48 39  --  45 46   < >  --    < >  --    < >  --    AST 97* 74*  --  95*  --    < >  --    < >  --    < >  --    PROTEIN  --   --  Negative  --   --   --  Negative  --  30*   < >  --    LIPASE  --   --   --   --  8*  --   --   --   --   --  4*    < > = values in this interval not displayed.         Assessment and Plan: 89 year old female who presented with significant abdominal pain secondary to fecal obstipation.  Endoscopic exam was negative for neoplasia or fixed tight stricture.  She has improved significantly after taking an entire bowel prep.    Plan: I recommend she take MiraLAX daily and minimize the use of constipating medications.  Advance diet as tolerated.  Consider checking a TSH if this has not been done recently.  At this time we will sign off.            Silverio Tidwell MD  Thank you for the opportunity to participate in the care of this patient.   Please feel free to call with any questions or concerns.  [phone].

## 2025-07-20 NOTE — PLAN OF CARE
"[id number]    Inpatient Progress Note:    /62   Pulse 102   Temp 98.6  F (37  C) (Oral)   Resp 18   Wt 120 kg (264 lb 8.8 oz)   LMP  (LMP Unknown)   SpO2 93%   BMI 51.67 kg/m         Orientation: A&Ox4  Activity: Ax2 michael steady (WC baseline)  Resp: X 1 L NC oxygen at night, BS diminished  Cardiac: WDL  GI: X, loose stool- bowel prep finished, possible AXR today.  :  X, incontinent- purewick in place  Skin: X, mepilex on bottom  LDA: PIV  Infusions: SL- New IV placed  Diet: Clear liquid  Consults: GI/SW  Discharge Plan: TBD, possible AXR today    Will continue to monitor and provide cares.     Britany Cotton RN       Problem: Adult Inpatient Plan of Care  Goal: Plan of Care Review  Description: The Plan of Care Review/Shift note should be completed every shift.  The Outcome Evaluation is a brief statement about your assessment that the patient is improving, declining, or no change.  This information will be displayed automatically on your shift  note.  Outcome: Progressing  Flowsheets (Taken 7/20/2025 0355)  Outcome Evaluation: A&Ox4, Ax2 michael steady, bowel prep done, possible AXR today. GI following  Plan of Care Reviewed With: patient  Overall Patient Progress: improving  Goal: Patient-Specific Goal (Individualized)  Description: You can add care plan individualizations to a care plan. Examples of Individualization might be:  \"Parent requests to be called daily at 9am for status\", \"I have a hard time hearing out of my right ear\", or \"Do not touch me to wake me up as it startles  me\".  Outcome: Progressing  Goal: Absence of Hospital-Acquired Illness or Injury  Outcome: Progressing  Intervention: Identify and Manage Fall Risk  Recent Flowsheet Documentation  Taken 7/20/2025 0150 by Britany Cotton RN  Safety Promotion/Fall Prevention:   clutter free environment maintained   room door open   room near nurse's station   safety round/check completed   nonskid shoes/slippers when out of " bed  Intervention: Prevent Infection  Recent Flowsheet Documentation  Taken 7/20/2025 0154 by Britany Cotton RN  Infection Prevention:   rest/sleep promoted   single patient room provided  Goal: Optimal Comfort and Wellbeing  Outcome: Progressing  Goal: Readiness for Transition of Care  Outcome: Progressing     Problem: Delirium  Goal: Optimal Coping  Outcome: Progressing  Goal: Improved Behavioral Control  Outcome: Progressing  Intervention: Minimize Safety Risk  Recent Flowsheet Documentation  Taken 7/20/2025 0154 by Britany Cotton RN  Enhanced Safety Measures: review medications for side effects with activity  Goal: Improved Attention and Thought Clarity  Outcome: Progressing  Goal: Improved Sleep  Outcome: Progressing     Problem: Skin Injury Risk Increased  Goal: Skin Health and Integrity  Outcome: Progressing  Intervention: Plan: Nurse Driven Intervention: Moisture Management  Recent Flowsheet Documentation  Taken 7/20/2025 0154 by Britany Cotton RN  Moisture Interventions:   Encourage regular toileting   Urinary collection device  Intervention: Plan: Nurse Driven Intervention: Friction and Shear  Recent Flowsheet Documentation  Taken 7/20/2025 0154 by Britany Cotton RN  Friction/Shear Interventions: HOB 30 degrees or less   Goal Outcome Evaluation:      Plan of Care Reviewed With: patient    Overall Patient Progress: improvingOverall Patient Progress: improving    Outcome Evaluation: A&Ox4, Ax2 michael steady, bowel prep done, possible AXR today. GI following

## 2025-07-21 ENCOUNTER — APPOINTMENT (OUTPATIENT)
Dept: PHYSICAL THERAPY | Facility: CLINIC | Age: 89
DRG: 391 | End: 2025-07-21
Attending: PHYSICIAN ASSISTANT
Payer: COMMERCIAL

## 2025-07-21 LAB
ANION GAP SERPL CALCULATED.3IONS-SCNC: 10 MMOL/L (ref 7–15)
BUN SERPL-MCNC: 10.8 MG/DL (ref 8–23)
CALCIUM SERPL-MCNC: 8.9 MG/DL (ref 8.8–10.4)
CHLORIDE SERPL-SCNC: 92 MMOL/L (ref 98–107)
CREAT SERPL-MCNC: 0.67 MG/DL (ref 0.51–0.95)
EGFRCR SERPLBLD CKD-EPI 2021: 83 ML/MIN/1.73M2
ERYTHROCYTE [DISTWIDTH] IN BLOOD BY AUTOMATED COUNT: 14.5 % (ref 10–15)
GLUCOSE SERPL-MCNC: 128 MG/DL (ref 70–99)
HCO3 SERPL-SCNC: 30 MMOL/L (ref 22–29)
HCT VFR BLD AUTO: 37.4 % (ref 35–47)
HGB BLD-MCNC: 12.3 G/DL (ref 11.7–15.7)
MCH RBC QN AUTO: 32.5 PG (ref 26.5–33)
MCHC RBC AUTO-ENTMCNC: 32.9 G/DL (ref 31.5–36.5)
MCV RBC AUTO: 99 FL (ref 78–100)
PLATELET # BLD AUTO: 202 10E3/UL (ref 150–450)
POTASSIUM SERPL-SCNC: 3.4 MMOL/L (ref 3.4–5.3)
RBC # BLD AUTO: 3.79 10E6/UL (ref 3.8–5.2)
SODIUM SERPL-SCNC: 132 MMOL/L (ref 135–145)
TSH SERPL DL<=0.005 MIU/L-ACNC: 4.09 UIU/ML (ref 0.3–4.2)
WBC # BLD AUTO: 7.2 10E3/UL (ref 4–11)

## 2025-07-21 PROCEDURE — 85027 COMPLETE CBC AUTOMATED: CPT | Performed by: PHYSICIAN ASSISTANT

## 2025-07-21 PROCEDURE — 250N000009 HC RX 250: Performed by: PHYSICIAN ASSISTANT

## 2025-07-21 PROCEDURE — 250N000011 HC RX IP 250 OP 636: Performed by: PHYSICIAN ASSISTANT

## 2025-07-21 PROCEDURE — 97110 THERAPEUTIC EXERCISES: CPT | Mod: GP | Performed by: PHYSICAL THERAPIST

## 2025-07-21 PROCEDURE — 97161 PT EVAL LOW COMPLEX 20 MIN: CPT | Mod: GP | Performed by: PHYSICAL THERAPIST

## 2025-07-21 PROCEDURE — 84443 ASSAY THYROID STIM HORMONE: CPT | Performed by: PHYSICIAN ASSISTANT

## 2025-07-21 PROCEDURE — 36415 COLL VENOUS BLD VENIPUNCTURE: CPT | Performed by: PHYSICIAN ASSISTANT

## 2025-07-21 PROCEDURE — 250N000013 HC RX MED GY IP 250 OP 250 PS 637: Performed by: PHYSICIAN ASSISTANT

## 2025-07-21 PROCEDURE — 99233 SBSQ HOSP IP/OBS HIGH 50: CPT | Performed by: PHYSICIAN ASSISTANT

## 2025-07-21 PROCEDURE — 82374 ASSAY BLOOD CARBON DIOXIDE: CPT | Performed by: PHYSICIAN ASSISTANT

## 2025-07-21 PROCEDURE — 120N000001 HC R&B MED SURG/OB

## 2025-07-21 PROCEDURE — 97530 THERAPEUTIC ACTIVITIES: CPT | Mod: GP | Performed by: PHYSICAL THERAPIST

## 2025-07-21 PROCEDURE — 99232 SBSQ HOSP IP/OBS MODERATE 35: CPT | Performed by: CLINICAL NURSE SPECIALIST

## 2025-07-21 RX ORDER — POLYETHYLENE GLYCOL 3350 17 G/17G
17 POWDER, FOR SOLUTION ORAL DAILY
Status: DISCONTINUED | OUTPATIENT
Start: 2025-07-22 | End: 2025-07-25 | Stop reason: HOSPADM

## 2025-07-21 RX ADMIN — DULOXETINE 60 MG: 60 CAPSULE, DELAYED RELEASE ORAL at 09:29

## 2025-07-21 RX ADMIN — GUAIFENESIN 400 MG: 200 TABLET ORAL at 20:09

## 2025-07-21 RX ADMIN — GUAIFENESIN 400 MG: 200 TABLET ORAL at 09:29

## 2025-07-21 RX ADMIN — LEVOTHYROXINE SODIUM 88 MCG: 0.09 TABLET ORAL at 06:32

## 2025-07-21 RX ADMIN — LORAZEPAM 0.5 MG: 0.5 TABLET ORAL at 23:14

## 2025-07-21 RX ADMIN — CARBOXYMETHYLCELLULOSE SODIUM 1 DROP: 5 SOLUTION/ DROPS OPHTHALMIC at 20:09

## 2025-07-21 RX ADMIN — LORAZEPAM 0.5 MG: 0.5 TABLET ORAL at 06:11

## 2025-07-21 RX ADMIN — ALLOPURINOL 100 MG: 100 TABLET ORAL at 09:29

## 2025-07-21 RX ADMIN — GABAPENTIN 400 MG: 400 CAPSULE ORAL at 21:45

## 2025-07-21 RX ADMIN — TORSEMIDE 20 MG: 20 TABLET ORAL at 09:29

## 2025-07-21 RX ADMIN — ESTRADIOL 2 G: 0.1 CREAM VAGINAL at 09:33

## 2025-07-21 RX ADMIN — GABAPENTIN 400 MG: 400 CAPSULE ORAL at 06:31

## 2025-07-21 RX ADMIN — GABAPENTIN 400 MG: 400 CAPSULE ORAL at 14:33

## 2025-07-21 RX ADMIN — IPRATROPIUM BROMIDE AND ALBUTEROL SULFATE 3 ML: .5; 3 SOLUTION RESPIRATORY (INHALATION) at 23:14

## 2025-07-21 RX ADMIN — HYDROCORTISONE: 25 CREAM TOPICAL at 09:29

## 2025-07-21 RX ADMIN — MINERAL OIL, WHITE PETROLATUM: .03; .94 OINTMENT OPHTHALMIC at 21:45

## 2025-07-21 RX ADMIN — ENOXAPARIN SODIUM 40 MG: 40 INJECTION SUBCUTANEOUS at 02:21

## 2025-07-21 RX ADMIN — POTASSIUM CHLORIDE 40 MEQ: 20 TABLET, EXTENDED RELEASE ORAL at 09:28

## 2025-07-21 RX ADMIN — ACETAMINOPHEN 650 MG: 325 TABLET ORAL at 06:36

## 2025-07-21 RX ADMIN — CARBOXYMETHYLCELLULOSE SODIUM 1 DROP: 5 SOLUTION/ DROPS OPHTHALMIC at 09:28

## 2025-07-21 RX ADMIN — ENOXAPARIN SODIUM 40 MG: 40 INJECTION SUBCUTANEOUS at 14:34

## 2025-07-21 RX ADMIN — HYDROCORTISONE: 25 CREAM TOPICAL at 20:09

## 2025-07-21 ASSESSMENT — ACTIVITIES OF DAILY LIVING (ADL)
ADLS_ACUITY_SCORE: 54

## 2025-07-21 NOTE — PLAN OF CARE
"Goal Outcome Evaluation:    Patient is alert and oriented x3, forgetful. 1L of oxygen, continuous pulse ox, sats >90s. Pain managed with prn tylenol. Denies nausea, dizziness. Dyspnea upon exertion. Ativan given for anxiety. Repositioning q2. Encouraged to utilize incentive spirometer. Regular diet. Assist of 2, gait belt, & michael steady. Sacral mepilex in place for pressure ulcer, redness also noted. External catheter in place. Plan to discharge today, SW consult pending for transportation planning.       Plan of Care Reviewed With: patient    Overall Patient Progress: improvingOverall Patient Progress: improving       Problem: Adult Inpatient Plan of Care  Goal: Plan of Care Review  Description: The Plan of Care Review/Shift note should be completed every shift.  The Outcome Evaluation is a brief statement about your assessment that the patient is improving, declining, or no change.  This information will be displayed automatically on your shift  note.  Outcome: Progressing  Flowsheets (Taken 7/21/2025 0554)  Plan of Care Reviewed With: patient  Overall Patient Progress: improving  Goal: Patient-Specific Goal (Individualized)  Description: You can add care plan individualizations to a care plan. Examples of Individualization might be:  \"Parent requests to be called daily at 9am for status\", \"I have a hard time hearing out of my right ear\", or \"Do not touch me to wake me up as it startles  me\".  Outcome: Progressing  Goal: Absence of Hospital-Acquired Illness or Injury  Outcome: Progressing  Intervention: Identify and Manage Fall Risk  Recent Flowsheet Documentation  Taken 7/21/2025 0000 by Radha Holden RN  Safety Promotion/Fall Prevention:   activity supervised   safety round/check completed   clutter free environment maintained   nonskid shoes/slippers when out of bed   patient and family education   room organization consistent  Intervention: Prevent Skin Injury  Recent Flowsheet Documentation  Taken " 7/21/2025 0000 by Radha Holden RN  Body Position:   turned   right   lower extremity elevated  Skin Protection: adhesive use limited  Intervention: Prevent and Manage VTE (Venous Thromboembolism) Risk  Recent Flowsheet Documentation  Taken 7/21/2025 0000 by Radha Holden RN  VTE Prevention/Management: SCDs off (sequential compression devices)  Intervention: Prevent Infection  Recent Flowsheet Documentation  Taken 7/21/2025 0000 by Radha Holden RN  Infection Prevention:   hand hygiene promoted   rest/sleep promoted   single patient room provided  Goal: Optimal Comfort and Wellbeing  Outcome: Progressing  Intervention: Monitor Pain and Promote Comfort  Recent Flowsheet Documentation  Taken 7/21/2025 0000 by Radha Holden RN  Pain Management Interventions: rest  Intervention: Provide Person-Centered Care  Recent Flowsheet Documentation  Taken 7/21/2025 0000 by Radha Holden RN  Trust Relationship/Rapport:   care explained   reassurance provided  Goal: Readiness for Transition of Care  Outcome: Progressing     Problem: Delirium  Goal: Optimal Coping  Outcome: Progressing  Goal: Improved Behavioral Control  Outcome: Progressing  Intervention: Minimize Safety Risk  Recent Flowsheet Documentation  Taken 7/21/2025 0000 by Radha Holden RN  Enhanced Safety Measures:   pain management   review medications for side effects with activity  Trust Relationship/Rapport:   care explained   reassurance provided  Goal: Improved Attention and Thought Clarity  Outcome: Progressing  Intervention: Maximize Cognitive Function  Recent Flowsheet Documentation  Taken 7/21/2025 0000 by Radha Holden RN  Sensory Stimulation Regulation:   care clustered   quiet environment promoted  Reorientation Measures: reorientation provided  Goal: Improved Sleep  Outcome: Progressing     Problem: Skin Injury Risk Increased  Goal: Skin Health and Integrity  Outcome: Progressing  Intervention: Plan: Nurse  Driven Intervention: Moisture Management  Recent Flowsheet Documentation  Taken 7/21/2025 0000 by Radha Holden RN  Moisture Interventions:   Incontinence pad   Urinary collection device  Intervention: Plan: Nurse Driven Intervention: Friction and Shear  Recent Flowsheet Documentation  Taken 7/21/2025 0000 by Radha Holden RN  Friction/Shear Interventions: HOB 30 degrees or less  Intervention: Optimize Skin Protection  Recent Flowsheet Documentation  Taken 7/21/2025 0000 by Radha Holden RN  Pressure Reduction Techniques:   frequent weight shift encouraged   heels elevated off bed  Pressure Reduction Devices: positioning supports utilized  Skin Protection: adhesive use limited  Activity Management: activity adjusted per tolerance  Head of Bed (HOB) Positioning: HOB at 30 degrees              You can access the FollowMyHealth Patient Portal offered by [facility] by registering at the following website: [email or web address]. By joining [employer]’s FollowMyHealth portal, you will also be able to view your health information using other applications (apps) compatible with our system.

## 2025-07-21 NOTE — PLAN OF CARE
"Assumed care for pt from [id number]    Pt disoriented to situation. VSS. Requires 1L of O2 via NC. Reports intermittent shortness of breath with exertion. 1 episode of loose stool this AM. Denies abdominal discomfort. Adequate urine output via pure wick. mepilex changed and skin barrier cream applied prior to bedtime. PRN tylenol given prior to bedtime for neck pain.     Son came today to visit. Discharge questions were answered by RN. SW consult order for pt to be discharged by [facility] transportation. Son would like to be notified via phone call with SW updates.     /64 (BP Location: Left arm)   Pulse 94   Temp 98.2  F (36.8  C) (Oral)   Resp 20   Wt 120 kg (264 lb 8.8 oz)   LMP  (LMP Unknown)   SpO2 (!) 90%   BMI 51.67 kg/m      Problem: Adult Inpatient Plan of Care  Goal: Plan of Care Review  Description: The Plan of Care Review/Shift note should be completed every shift.  The Outcome Evaluation is a brief statement about your assessment that the patient is improving, declining, or no change.  This information will be displayed automatically on your shift  note.  Outcome: Progressing  Flowsheets (Taken 7/20/2025 7034)  Plan of Care Reviewed With: patient  Overall Patient Progress: improving  Goal: Patient-Specific Goal (Individualized)  Description: You can add care plan individualizations to a care plan. Examples of Individualization might be:  \"Parent requests to be called daily at 9am for status\", \"I have a hard time hearing out of my right ear\", or \"Do not touch me to wake me up as it startles  me\".  Outcome: Progressing  Goal: Absence of Hospital-Acquired Illness or Injury  Outcome: Progressing  Intervention: Identify and Manage Fall Risk  Recent Flowsheet Documentation  Taken 7/20/2025 2000 by Nicolle Mancilla, RN  Safety Promotion/Fall Prevention:   activity supervised   clutter free environment maintained   lighting adjusted   patient and family education   nonskid shoes/slippers when out " of bed  Intervention: Prevent Skin Injury  Recent Flowsheet Documentation  Taken 7/20/2025 2000 by Nicolle Mancilla RN  Skin Protection:   adhesive use limited   incontinence pads utilized   tubing/devices free from skin contact  Intervention: Prevent and Manage VTE (Venous Thromboembolism) Risk  Recent Flowsheet Documentation  Taken 7/20/2025 2000 by Nicolle Mancilla RN  VTE Prevention/Management: patient refused intervention  Intervention: Prevent Infection  Recent Flowsheet Documentation  Taken 7/20/2025 2000 by Nicolle Mancilla RN  Infection Prevention:   rest/sleep promoted   single patient room provided  Goal: Optimal Comfort and Wellbeing  Outcome: Progressing  Intervention: Provide Person-Centered Care  Recent Flowsheet Documentation  Taken 7/20/2025 2000 by Nicolle Mancilla RN  Trust Relationship/Rapport: care explained  Goal: Readiness for Transition of Care  Outcome: Progressing     Problem: Delirium  Goal: Optimal Coping  Outcome: Progressing  Intervention: Optimize Psychosocial Adjustment to Delirium  Recent Flowsheet Documentation  Taken 7/20/2025 2000 by Nicolle Mancilla RN  Family/Support System Care: involvement promoted  Goal: Improved Behavioral Control  Outcome: Progressing  Intervention: Minimize Safety Risk  Recent Flowsheet Documentation  Taken 7/20/2025 2000 by Nicolle Mancilla RN  Trust Relationship/Rapport: care explained  Goal: Improved Attention and Thought Clarity  Outcome: Progressing  Goal: Improved Sleep  Outcome: Progressing     Problem: Skin Injury Risk Increased  Goal: Skin Health and Integrity  Outcome: Progressing  Intervention: Plan: Nurse Driven Intervention: Moisture Management  Recent Flowsheet Documentation  Taken 7/20/2025 2000 by Nicolle Mancilla RN  Moisture Interventions:   Incontinence pad   Urinary collection device   Perineal cleanser   Barrier ointment (CriticAid, Triad paste)  Bathing/Skin Care: incontinence care  Intervention: Plan: Nurse  Driven Intervention: Friction and Shear  Recent Flowsheet Documentation  Taken 7/20/2025 2000 by Nicolle Mancilla, RN  Friction/Shear Interventions:   HOB 30 degrees or less   Assistive lifting device (portable/ceiling lift, etc.)   Silicone foam sacral dressing  Intervention: Optimize Skin Protection  Recent Flowsheet Documentation  Taken 7/20/2025 2000 by Nicolle Mancilla, RN  Pressure Reduction Devices: heel offloading device utilized  Skin Protection:   adhesive use limited   incontinence pads utilized   tubing/devices free from skin contact  Activity Management: activity encouraged   Goal Outcome Evaluation:      Plan of Care Reviewed With: patient    Overall Patient Progress: improvingOverall Patient Progress: improving

## 2025-07-21 NOTE — PLAN OF CARE
"Patient is alert and oriented with some forgetfulness, VSS, patient O2 sats is stable on RA, pt has SOB on exertion, denies abd pain saline locked, regular diet. Plan: PT, colorectal following, possible discharge to TCU.  /64 (BP Location: Left arm)   Pulse 90   Temp 98.7  F (37.1  C) (Oral)   Resp 20   Wt 120 kg (264 lb 8.8 oz)   LMP  (LMP Unknown)   SpO2 (!) 90%   BMI 51.67 kg/m      Problem: Adult Inpatient Plan of Care  Goal: Plan of Care Review  Description: The Plan of Care Review/Shift note should be completed every shift.  The Outcome Evaluation is a brief statement about your assessment that the patient is improving, declining, or no change.  This information will be displayed automatically on your shift  note.  Outcome: Progressing  Flowsheets (Taken 7/21/2025 1839)  Plan of Care Reviewed With: patient  Overall Patient Progress: no change  Goal: Patient-Specific Goal (Individualized)  Description: You can add care plan individualizations to a care plan. Examples of Individualization might be:  \"Parent requests to be called daily at 9am for status\", \"I have a hard time hearing out of my right ear\", or \"Do not touch me to wake me up as it startles  me\".  Outcome: Progressing  Goal: Absence of Hospital-Acquired Illness or Injury  Outcome: Progressing  Intervention: Identify and Manage Fall Risk  Recent Flowsheet Documentation  Taken 7/21/2025 1604 by Zoltan Mayers, RN  Safety Promotion/Fall Prevention:   activity supervised   safety round/check completed   clutter free environment maintained   nonskid shoes/slippers when out of bed   patient and family education   room organization consistent  Intervention: Prevent Skin Injury  Recent Flowsheet Documentation  Taken 7/21/2025 1604 by Zoltan Mayers, RN  Skin Protection: adhesive use limited  Intervention: Prevent and Manage VTE (Venous Thromboembolism) Risk  Recent Flowsheet Documentation  Taken 7/21/2025 1604 by Sienna Sanabria" Zoltan Montaño RN  VTE Prevention/Management: SCDs off (sequential compression devices)  Goal: Optimal Comfort and Wellbeing  Outcome: Progressing  Intervention: Provide Person-Centered Care  Recent Flowsheet Documentation  Taken 7/21/2025 1604 by Zoltan Mayers RN  Trust Relationship/Rapport:   care explained   choices provided   questions encouraged   questions answered  Goal: Readiness for Transition of Care  Outcome: Progressing     Problem: Delirium  Goal: Optimal Coping  Outcome: Progressing  Goal: Improved Behavioral Control  Outcome: Progressing  Intervention: Minimize Safety Risk  Recent Flowsheet Documentation  Taken 7/21/2025 1604 by Zoltan Mayers RN  Enhanced Safety Measures:   pain management   review medications for side effects with activity  Trust Relationship/Rapport:   care explained   choices provided   questions encouraged   questions answered  Goal: Improved Attention and Thought Clarity  Outcome: Progressing  Goal: Improved Sleep  Outcome: Progressing     Problem: Skin Injury Risk Increased  Goal: Skin Health and Integrity  Outcome: Progressing  Intervention: Plan: Nurse Driven Intervention: Moisture Management  Recent Flowsheet Documentation  Taken 7/21/2025 1604 by Zoltan Mayers RN  Moisture Interventions:   Incontinence pad   Urinary collection device  Intervention: Plan: Nurse Driven Intervention: Friction and Shear  Recent Flowsheet Documentation  Taken 7/21/2025 1604 by Zoltan Mayers RN  Friction/Shear Interventions: HOB 30 degrees or less  Intervention: Optimize Skin Protection  Recent Flowsheet Documentation  Taken 7/21/2025 1604 by Zoltan Mayers RN  Pressure Reduction Techniques:   frequent weight shift encouraged   heels elevated off bed  Pressure Reduction Devices: positioning supports utilized  Skin Protection: adhesive use limited  Activity Management:   activity adjusted per tolerance   up in chair   Goal Outcome Evaluation:       Plan of Care Reviewed With: patient    Overall Patient Progress: no changeOverall Patient Progress: no change

## 2025-07-21 NOTE — CONSULTS
Care Management Follow Up    Length of Stay (days): 5    Expected Discharge Date: 07/22/2025     Concerns to be Addressed: discharge planning     Patient plan of care discussed at interdisciplinary rounds: Yes    Anticipated Discharge Disposition: Assisted Living, Home Care  Anticipated Discharge Services: Home Care  Anticipated Discharge DME: None    Patient/family educated on Medicare website which has current facility and service quality ratings: yes  Education Provided on the Discharge Plan: Yes  Patient/Family in Agreement with the Plan: yes    Referrals Placed by CM/SW: Homecare    Discussed  Partnership in Safe Discharge Planning  document with patient/family: No     Handoff Completed: No, handoff not indicated or clinically appropriate    Additional Information:  Per chart review, patient is requiring an assist of 2 w michael steady for transfers. CM spoke with [facility] JARETH Ruiz ph: [phone] who shared patient is an assist of 1 to pivot transfer to her power WC at baseline and they would not be able to accept her back as an assist of 2. If she is needing a lift for transfers, they also need to be notified so they can coordinate w family to obtain this for patient. Hospitalist was updated and PT consult was placed.     Next Steps: follow for therapy recs    Addendum 1556: PT recommending TCU placement for discharge, CM updated pt and son Jono at bedside. They were in agreement w this rec. [facility], CM sent. She confirmed she would need [facility] for discharge transport.     CM was provided w health care directive by pt son, CM sent to [employer] for review and the original copy was given back to her son.    Nikki Mattson, RN, BSN  Inpatient Care Coordination  [facility]  [phone]

## 2025-07-21 NOTE — PROGRESS NOTES
PALLIATIVE CARE PROGRESS NOTE  [facility]     Patient Name: Carol Merida  Date of Admission: 7/16/2025   Today the patient was seen for: Dismissal planning     Recommendations & Counseling     GOALS OF CARE:   Life-prolonging with limits  - No CPR- Do NOT Intubate. Continue all other restorative efforts.  Appreciate Care Coordinator JARETH Jordan efforts in TCU dismissal plan.     ADVANCE CARE PLANNING:  No health care directive on file. Per system policy, Surrogate Decision-makers for Patients With Diminished Decision-making Capacity offers guidance on possible decision-makers. SonJono and daughter Alea Clarke has been identified as a surrogate decision maker. Advanced Directive has been sent to [employer] to be verified (Thanks JARETH Jordan for taking care of this.)  POLST form dated 7/18/2025 can be found in ACP docs.   Code status: No CPR- Do NOT Intubate     MEDICAL MANAGEMENT:   We are not actively managing symptoms at this time.     PSYCHOSOCIAL/SPIRITUAL SUPPORT:  Family - Four adult children  [place]riella community: [facility]   Appreciate input of ANTONY Bejarano.   Retired HR worker     Palliative Care will continue to follow. Thank you for the consult and allowing us to aid in the care of Carol Merida.    These recommendations have been discussed with bedside nurses JARETH Mathews & Zoltan, RN and Care Coordinator JARETH Jordan APRN CNS  [facility], Palliative Care  Securely message with the [employer] Web Console (learn more here) or  Text page via [facility] Paging/Directory        Assessment          Carol Merida is a 89 year old female admitted 7/16/2025 with abdominal pain, constipation, and dyspnea. 7/16/2025 CT demonstrates marked amount of gas and stool throughout the colon extending from the cecum to the mid sigmoid colon. The sigmoid colon is extremely redundant extending into the upper central abdomen. This extends inferiorly without complete  rotation of the sigmoid mesentery with transition point/narrowing of the sigmoid colon but without evidence for mass, edema or perforation at this location. No significant sigmoid mesenteric rotation. This could potentially lead to sigmoid volvulus. 7/17/2025 GGE with several bowel movements following. Colorectal considers patient a very high risk surgical candidate.  The patient's past medical history is significant for   morbid obesity, diastolic CHF, JEAN with CPAP, reactive airway disease, chronic cough, hypothyroidism, lymphedema, recurrent right lower extremity cellulitis, sacral pressure ulcers, neuropathy, osteoarthritis, chronic pain, multiple joint replacement surgeries, MDD, anxiety, and panic attacks      Today, the patient was seen for:  #Goals of care  #Abdominal pain      Interval History:     Multidisciplinary collaboration:  Patient continues to progress and no longer has abdominal pain.    Patient/family narrative  Met with Everette as she was sitting up in a chair. Her son, Jono at bedside. Jono acknowledged they had completed Everette's Advanced Directive and they had given it to the nurse not longer than 30 minutes ago.  Appreciate Care Coordinator JARETH Jordan for submitting the document to [employer] to be added to the chart. Discussed plan to dismiss to TCU. Everette is disappointed as [place] was going to [place] [facility] this Wednesday, but she agrees to TCU dismissal.     Review of Systems:     Besides above, ROS was reviewed and is unremarkable        Physical Exam:   Temp:  [98.2  F (36.8  C)-98.8  F (37.1  C)] 98.8  F (37.1  C)  Pulse:  [86-94] 90  Resp:  [16-20] 16  BP: (114-126)/(64-96) 114/96  SpO2:  [90 %-96 %] 96 %  264 lbs 8.83 oz    Physical Exam  GEN:  Pleasant elderly female. Alert, oriented x 3, appears comfortable, no apparent distress.  HEENT:  Normocephalic/atraumatic, no scleral icterus, no nasal discharge, mouth moist.  RESP:   Symmetric chest rise on inhalation  noted.  Normal respiratory effort.  ABD:  Denies abdominal pain.   PAIN BEHAVIOR: Cooperative  Psych:  Normal affect.  Calm, cooperative, conversant appropriately.        Data Reviewed:     Recent Results (from the past 24 hours)   Basic metabolic panel   Result Value Ref Range    Sodium 132 (L) 135 - 145 mmol/L    Potassium 3.4 3.4 - 5.3 mmol/L    Chloride 92 (L) 98 - 107 mmol/L    Carbon Dioxide (CO2) 30 (H) 22 - 29 mmol/L    Anion Gap 10 7 - 15 mmol/L    Urea Nitrogen 10.8 8.0 - 23.0 mg/dL    Creatinine 0.67 0.51 - 0.95 mg/dL    GFR Estimate 83 >60 mL/min/1.73m2    Calcium 8.9 8.8 - 10.4 mg/dL    Glucose 128 (H) 70 - 99 mg/dL   TSH with free T4 reflex   Result Value Ref Range    TSH 4.09 0.30 - 4.20 uIU/mL   CBC with platelets   Result Value Ref Range    WBC Count 7.2 4.0 - 11.0 10e3/uL    RBC Count 3.79 (L) 3.80 - 5.20 10e6/uL    Hemoglobin 12.3 11.7 - 15.7 g/dL    Hematocrit 37.4 35.0 - 47.0 %    MCV 99 78 - 100 fL    MCH 32.5 26.5 - 33.0 pg    MCHC 32.9 31.5 - 36.5 g/dL    RDW 14.5 10.0 - 15.0 %    Platelet Count 202 150 - 450 10e3/uL       MANAGEMENT DISCUSSED with the following over the past 24 hours: Care Coordinator JARETH Jordan and bedside nurse JARETH Mathews and JARETH Charlton.   40 MINUTES SPENT BY ME on the date of service doing chart review, history, exam, documentation & further activities per the note.

## 2025-07-21 NOTE — PLAN OF CARE
"BP (!) 114/96 (BP Location: Right arm)   Pulse 90   Temp 98.8  F (37.1  C) (Oral)   Resp 16   Wt 120 kg (264 lb 8.8 oz)   LMP  (LMP Unknown)   SpO2 96%   BMI 51.67 kg/m      Patient is alert and oriented x4 w/ forgetfulness. Has intermittent SOB. Weaned pt's O2 from 1L to 0.5L then to RA w/ sats > 90. Placed back on O2 0.5L post transfer due to de-sating < 88. On continuous pulse ox. Has dyspnea upon exertion. Denies ABD pain, dizziness, lightheadedness/ n/v/d. Repositioning q2h. Encouraged to utilize incentive spirometer. Reg diet. Assist of 2-3 w/ GB & michael steady. Sacral mepilex in place for pressure ulcer. Blanchable redness noted on bottom. Barrier cream applied. Purewick in place. Pt refused 1400pm artificial tears stating she uses as needed at home and administering it often it's is causing eyes irritation. Provider notified via [place].    Plan: Possibly discharge today  Consult: SW consult pending for transportation planning.      Goal Outcome Evaluation:      Plan of Care Reviewed With: patient    Overall Patient Progress: improvingOverall Patient Progress: improving    Outcome Evaluation: Pt's alert and oriented w/ forgetfulness.    Problem: Adult Inpatient Plan of Care  Goal: Plan of Care Review  Description: The Plan of Care Review/Shift note should be completed every shift.  The Outcome Evaluation is a brief statement about your assessment that the patient is improving, declining, or no change.  This information will be displayed automatically on your shift  note.  Outcome: Progressing  Flowsheets (Taken 7/21/2025 1142)  Outcome Evaluation: Pt's alert and oriented w/ forgetfulness.  Plan of Care Reviewed With: patient  Overall Patient Progress: improving  Goal: Patient-Specific Goal (Individualized)  Description: You can add care plan individualizations to a care plan. Examples of Individualization might be:  \"Parent requests to be called daily at 9am for status\", \"I have a hard time hearing out " "of my right ear\", or \"Do not touch me to wake me up as it startles  me\".  Outcome: Progressing  Goal: Absence of Hospital-Acquired Illness or Injury  Outcome: Progressing  Intervention: Identify and Manage Fall Risk  Recent Flowsheet Documentation  Taken 7/21/2025 0719 by Bisi Abraham RN  Safety Promotion/Fall Prevention:   activity supervised   safety round/check completed   clutter free environment maintained   nonskid shoes/slippers when out of bed   patient and family education   room organization consistent  Intervention: Prevent Skin Injury  Recent Flowsheet Documentation  Taken 7/21/2025 0719 by Bisi Abraham RN  Body Position:   turned   left   lower extremity elevated  Skin Protection: adhesive use limited  Intervention: Prevent and Manage VTE (Venous Thromboembolism) Risk  Recent Flowsheet Documentation  Taken 7/21/2025 0719 by Bisi Abraham RN  VTE Prevention/Management: SCDs off (sequential compression devices)  Intervention: Prevent Infection  Recent Flowsheet Documentation  Taken 7/21/2025 0719 by Bisi Abraham RN  Infection Prevention:   hand hygiene promoted   rest/sleep promoted   single patient room provided  Goal: Optimal Comfort and Wellbeing  Outcome: Progressing  Intervention: Provide Person-Centered Care  Recent Flowsheet Documentation  Taken 7/21/2025 0719 by Bisi Abraham RN  Trust Relationship/Rapport:   care explained   choices provided   questions encouraged   questions answered  Goal: Readiness for Transition of Care  Outcome: Progressing     Problem: Delirium  Goal: Optimal Coping  Outcome: Progressing  Goal: Improved Behavioral Control  Outcome: Progressing  Intervention: Minimize Safety Risk  Recent Flowsheet Documentation  Taken 7/21/2025 0719 by Bisi Abraham RN  Enhanced Safety Measures:   pain management   review medications for side effects with activity  Trust Relationship/Rapport:   care explained   choices provided   questions encouraged   questions " answered  Goal: Improved Attention and Thought Clarity  Outcome: Progressing  Goal: Improved Sleep  Outcome: Progressing     Problem: Skin Injury Risk Increased  Goal: Skin Health and Integrity  Outcome: Progressing  Intervention: Plan: Nurse Driven Intervention: Moisture Management  Recent Flowsheet Documentation  Taken 7/21/2025 0719 by Bisi Abraham RN  Moisture Interventions:   Incontinence pad   Urinary collection device  Intervention: Plan: Nurse Driven Intervention: Friction and Shear  Recent Flowsheet Documentation  Taken 7/21/2025 0719 by Bisi Abraham RN  Friction/Shear Interventions: HOB 30 degrees or less  Intervention: Optimize Skin Protection  Recent Flowsheet Documentation  Taken 7/21/2025 1031 by Bisi Abraham RN  Activity Management:   activity adjusted per tolerance   up in chair  Taken 7/21/2025 0719 by Bisi Abraham RN  Pressure Reduction Techniques:   frequent weight shift encouraged   heels elevated off bed  Pressure Reduction Devices: positioning supports utilized  Skin Protection: adhesive use limited  Activity Management: activity adjusted per tolerance  Head of Bed (HOB) Positioning: HOB at 30 degrees

## 2025-07-21 NOTE — CONSULTS
SPIRITUAL HEALTH SERVICES - Consult Note  [facility] Observation Unit    Referral Source: [facility] consult to assess pt's emotional/spiritual resources and needs per her length of stay and Palliative Care consult.     Pt Everette's son Jono was present.  Oriented them to [facility].  Everette reported that her  from [facility] the [facility] [facility] [employer] visited her yesterday.  Everette shared that she is well supported by her four children and her grandchildren; all of her children live in the area.  Everette acknowledged that she was feeling concerned about her medical situation but has found it supportive to talk to Jono about her concerns and to think about her blessings.  She requested prayer, which was provided.    Plan: Informed pt and her son how they can request further  support.  This author and other chaplains remain available per pt/family request.     Noman Jarrett M.Div., [facility]  Staff     SHS available 24/7 for emergent requests/referrals, either by paging the on-call  or by entering an ASAP/STAT consult in [facility], which will also page the on-call .

## 2025-07-21 NOTE — PROGRESS NOTES
07/21/25 1100   Appointment Info   Signing Clinician's Name / Credentials (PT) Bettina Mann, PT   Living Environment   People in Home alone   Current Living Arrangements assisted living   Home Accessibility no concerns   Self-Care   Usual Activity Tolerance moderate   Current Activity Tolerance fair   Equipment Currently Used at Home wheelchair, power   Activity/Exercise/Self-Care Comment Per CC note pt received assist for stand pivot transfers to her electric WC.  Pt reports receiving assist with all ADLs, getting in/OOB.  Pt reports taking herself to the dinning room for meals in her electric WC, though states she has assist at times.   General Information   Onset of Illness/Injury or Date of Surgery 07/16/25   Referring Physician Zoie Enamorado PA-C   Patient/Family Therapy Goals Statement (PT) pt wanting to return to her ROC   Pertinent History of Current Problem (include personal factors and/or comorbidities that impact the POC) Per medical chart: Carol Merida is a 89 year old female with PMHx of morbid obesity, diastolic CHF, JEAN on CPAP, reactive airway disease, MDD, anxiety, panic attacks, diet controlled DM type II, hypothyroidism, lymphedema, recurrent right lower extremity cellulitis, chronic cough, chronic pain, neuropathy, osteoarthritis, sacral pressure ulcers, and multiple joint replacement surgeries, who was admitted on 7/16/2025 with abdominal pain likely due to constipation. She also notes dyspnea.      CT Chest PE Abdomen with contrast negative for PE or PNA.   Existing Precautions/Restrictions fall   General Observations Pt sitting in chair on .5L O2, SpO2 95%, HR 74.   Cognition   Affect/Mental Status (Cognition) confused   Orientation Status (Cognition) oriented to;person   Cognitive Status Comments Pt states she is at her ROC.  Pt oriented to month and date.   Pain Assessment   Patient Currently in Pain No   Integumentary/Edema   Integumentary/Edema Comments Per chart, pt has pressure sore  on coccyx.   Posture    Posture Forward head position;Protracted shoulders   Range of Motion (ROM)   ROM Comment tight hamstrings and gastrocs with decreased terminal knee ext in sitting and ankle DF to 0 deg.   Strength (Manual Muscle Testing)   Strength Comments Global mms weakness.  B UEs and LEs grossly 3 to 3-/5 strength.   Bed Mobility   Comment, (Bed Mobility) NA - pt up in chair eating lunch   Transfers   Comment, (Transfers) Per chart - pt transferred with nursing with A x 2 and michael steady   Gait/Stairs (Locomotion)   Comment, (Gait/Stairs) unable   Balance   Balance Comments Pt able to sit on edge of chair and maintain midline with close SBA.  Poor standing balance requiring A x 2 and B UE support on FWW   Sensory Examination   Sensory Perception Comments Pt reports numbess B hands   Coordination   Coordination no deficits were identified   Muscle Tone   Muscle Tone no deficits were identified   Clinical Impression   Criteria for Skilled Therapeutic Intervention Yes, treatment indicated   PT Diagnosis (PT) Impaired functional mobility   Influenced by the following impairments decreased strength and ROM, impaired balance, decreased activity tolerance, impaired cognition   Functional limitations due to impairments high falls risk, A x 2 for safe mobility with use of device, pt unable to perform stand pivot transfers to power Zauber   Clinical Presentation (PT Evaluation Complexity) stable   Clinical Presentation Rationale clinical judgement   Clinical Decision Making (Complexity) low complexity   Planned Therapy Interventions (PT) balance training;bed mobility training;home exercise program;patient/family education;ROM (range of motion);strengthening;stretching;transfer training;progressive activity/exercise;risk factor education;home program guidelines   Risk & Benefits of therapy have been explained evaluation/treatment results reviewed;care plan/treatment goals reviewed;risks/benefits  reviewed;current/potential barriers reviewed;participants voiced agreement with care plan;participants included;patient   PT Total Evaluation Time   PT Eval, Low Complexity Minutes (77326) 10   Physical Therapy Goals   PT Frequency 5x/week   PT Predicted Duration/Target Date for Goal Attainment 07/23/25   PT Goals Bed Mobility;Transfers   PT: Bed Mobility Minimal assist;Supine to/from sit;Rolling   PT: Transfers Minimal assist;Sit to/from stand;Bed to/from chair;Assistive device   PT Discharge Planning   PT Plan assess bed mobility, trial stand pivot transfer bed <> chair with A x 2, repeated STS, LE strengthening   PT Discharge Recommendation (DC Rec) Transitional Care Facility   PT Rationale for DC Rec Pt is below baseline with mobility.  Pt lives at an ROC and was an A x 1 with pivot transfers to her power WC.  Pt currently requiring Mod A x 2 for sit <> stand and A x 2 and use of michael steady for stand pivot transfers.  Recommend TCU to increase strength and progress safety and independence with all mobility to enable pt to return to her skilled nursing at her PLOF.   PT Brief overview of current status A x 2 michael steady transfer   PT Total Distance Amb During Session (feet) 0

## 2025-07-21 NOTE — PROGRESS NOTES
[facility]    Medicine Progress Note - Hospitalist Service    Date of Admission:  7/16/2025    Assessment & Plan   Carol Merida is a 89 year old female with PMHx of morbid obesity, diastolic CHF, JEAN on CPAP, reactive airway disease, MDD, anxiety, panic attacks, diet controlled DM type II, hypothyroidism, lymphedema, recurrent right lower extremity cellulitis, chronic cough, chronic pain, neuropathy, osteoarthritis, sacral pressure ulcers, and multiple joint replacement surgeries, who was admitted on 7/16/2025 with abdominal pain likely due to constipation. She also notes dyspnea.      In the ED Spo2 92% placed on 2 LPM NC, vital signs stable and afebrile. VBG with pH 7.31, pCO2 of 52. Lactic acid 2.0. CMP with mildly elevated ast, alk phos 169, total bilirubin and lipase not elevated. Serial Tn I stable in the 20's near prior, 22, EKG with NSR no acute ST elevation. Viral PCR still Covid positive, recent COVID infection in May '25. CT Chest PE Abdomen with contrast negative for PE or PNA, no significant hypervolemia, gaseous and stool throughout the colon from the cecum to the mid sigmoid colon, redundant tissue in the central abdomen without significant sigmoid mesenteric rotation. RUQ US Limited evaluation with no definitive evidence for gallbladder wall thickening.    Colorectal Surgery were consulted from the ED, reviewed patient and imaging and felt that the patient did not have a sigmoid volvulus. Given Toradol, Decadron, DuoNeb, Zofran. Admission was requested from hospitalist service for further cares.    Since admission CRS consulted recommending gastrograffin enema XR on 7/16 which showed abrupt narrowing of colon lumen in proximal sigmoid colon without flow of contrast beyond this raising suspicion for stricture. CRS discussed option of surgery, felt pt was a very poor surgical candidate. GI consulted and flexible sigmoidoscopy performed on 7/18 that did not show obvious  stricture but the prep was poor. GI recommended colonoscopy prep and repeat imaging showed improved stool burden with significant improvement of patient's symptoms.     Patient is now eating and drinking but mobility/strength is below baseline so will have PT assess. Also has been intermittently requiring oxygen. If still needed today will obtain CXR.     Abdominal Pain due to constipation-resolved  Presented with abdominal pain and constipation with associated nausea and emesis.   CT chest/abd/pelvis showed marked amount of gas and stool throughout colon with redundant sigmoid colon. There is a transition point in the sigmoid colon without obvious mass, edema or perforation.   Colorectal Surgery were consulted from the ED, low suspicion for sigmoid volvulus.   - CRS recommended gastrograffin enema to evaluate for obstruction  - On 716 gastrograffin enema XR showed abrupt narrowing of colon lumen in proximal sigmoid colon without flow of contrast beyond this raising suspicion for stricture   - CRS discussed option of surgery, felt pt was a very poor surgical candidate and would likely not survive surgery if needed.   - GI consulted, recommended flex sig performed on 7/18 to evaluate for stricture as well as disimpact bowel  - no stricture noted on flex sig, GI recommended bowel prep  - multiple loose stools after starting bowel prep, now completed  - significant improvement in sx  - repeat Abd XR shows small stool burden, air throughout colon  - Start Miralax daily on 7/21  - PRN Fleets enema and pepto bismol and PRN simethicone.   - ADAT  - anti emetics, non narcotic analgesia PRN  - encourage sitting in chair or head elevated to alleviate diaphragm pressure     Acute Respiratory Failure, Hypoxia with Respiratory Acidosis   Hx of chronic Cough and JEAN  -Presented with dyspnea increased from baseline. No overt hypoxia recorded in the ED but maintained on 2 L/min nasal cannula.  VBG shows respiratory acidosis.     -Patient was reportedly initially a little wheezy on exam in the ED and given Decadron. Pulmonary imaging unremarkable for anything acute and does not appear hypervolemic.   -She does not wear continuous supplemental oxygen at home at baseline per her report but does use CPAP  - obesity hypoventilation syndrome likely contributing, worsened by distended bowels and constipation. Encourage movement up to chair  - encourage IS today, up to chair for meals  - Continue PRN DuoNebs and albuterol nebs.  Patient uses Advair at home as well.   - continue PTA Ativan (takes PRN PTA for anxiety and dyspnea)  - continue PTA diuretics   - hold further steroids   - resume CPAP   - If Hypoxia continues on 7/21 will obtain CXR    Weakness  -At baseline is able to pivot to wheelchair but now requiring assist of 2 with michael steady which is below physical baseline  -PT assessment       Viral PCR still Covid positive, recent COVID infection in May '25.   No isolation requirements, not an active or new COVID viral infection      Cholelithiasis   Appears asymptomatic as noted above currently lower suspicion for biliary colic as cause for symptoms. No RUQ pain. Limited evaluation with no definitive evidence for gallbladder wall thickening. LFT Labs stable from baseline.   - no symptoms consistent with biliary colic at this time, CMP has remained unremarkable  - currently would not recommend antibiotics     Chronic diastolic CHF  Chronic lymphedema  History of recurrent right lower extremity cellulitis  Appears euvolemic.   - cont PTA torsemide 20 mg daily and 20 meq potassium daily  - 2 g sodium restriction   - Patient's daughter reports that she was started on a course of Bactrim for recurrent cellulitis in her right lower extremity, she completed this 7/18     Hepatic Steatosis   Morbid Obesity   - noted, LFTs stable. BMI of 46  - f/up with PCP     Hepatic Lesion   - 1.5 cm enhancing observation at the dome of the right hepatic lobe.  Recommend nonemergent contrast-enhanced MRI for further evaluation.      Chronic pain  Osteoarthritis  Neuropathy  - history of bilateral TKA, right HUGO, and right femur IM nail  - WC transfers at baseline   - cont home gabapentin 400 mg 3 times daily, acetaminophen 3 times daily, and celecoxib 100 mg twice daily as needed     Gout  - cont allopurinol 100 mg daily     Hypothyroidism  - continue PTA levothyroxine 88 mcg daily     MDD  Anxiety  Panic attacks  - continue PTA duloxetine 60 mg daily and lorazepam 0.5 mg every 6 hours as needed. Also takes for dyspnea.      History sacral pressure ulcers  Right Buttock Pressure Injury, Stage 3 (POA)   - WOC       DM type II  HgbA1c 6.6 4/2025  - diet controlled          Diet: Regular Diet Adult    DVT Prophylaxis: Enoxaparin (Lovenox) SQ  Benson Catheter: Not present  Lines: None     Cardiac Monitoring: None  Code Status: No CPR- Do NOT Intubate      Clinically Significant Risk Factors         # Hyponatremia: Lowest Na = 132 mmol/L in last 2 days, will monitor as appropriate  # Hypochloremia: Lowest Cl = 92 mmol/L in last 2 days, will monitor as appropriate      # Hypoalbuminemia: Lowest albumin = 3.4 g/dL at 7/19/2025  5:32 AM, will monitor as appropriate     # Hypertension: Noted on problem list  # Chronic heart failure with preserved ejection fraction: heart failure noted on problem list and last echo with EF >50%          # DMII: A1C = 6.6 % (Ref range: <5.7 %) within past 6 months       # Financial/Environmental Concerns:           Social Drivers of Health            Disposition Plan     Medically Ready for Discharge: Anticipated Tomorrow           The patient's care was discussed with the Bedside Nurse, Care Coordinator/, Patient, and Patient's Family.    Zoie Enamorado PA-C  Hospitalist Service  [facility]  Securely message with [employer] (more info)  Text page via [employer] Paging/Directory    ______________________________________________________________________    Interval History   Very sleepy this AM but orientated. Denies abdominal pain, nausea and vomiting. No shortness of breath, cough, chest pain, nausea, vomiting, diarrhea or urinary symptoms.     Physical Exam   Vital Signs: Temp: 98.8  F (37.1  C) Temp src: Oral BP: (!) 114/96 Pulse: 90   Resp: 16 SpO2: 96 % O2 Device: None (Room air) Oxygen Delivery: 1 LPM  Weight: 264 lbs 8.83 oz    General Appearance: Sleepy, alert and orientated   Respiratory: CTAB  Cardiovascular: RRR without murmu  GI: Bowel sounds are present without distention or tenderness to palpation  Skin: No rash or open sores noted  Other: No lower extremity edema     Medical Decision Making       55 MINUTES SPENT BY ME on the date of service doing chart review, history, exam, documentation & further activities per the note.      Data     I have personally reviewed the following data over the past 24 hrs:    7.2  \   12.3   / 202     132 (L) 92 (L) 10.8 /  128 (H)   3.4 30 (H) 0.67 \     TSH: 4.09 T4: N/A A1C: N/A       Imaging results reviewed over the past 24 hrs:   No results found for this or any previous visit (from the past 24 hours).

## 2025-07-22 ENCOUNTER — DOCUMENTATION ONLY (OUTPATIENT)
Dept: OTHER | Facility: CLINIC | Age: 89
End: 2025-07-22
Payer: COMMERCIAL

## 2025-07-22 ENCOUNTER — APPOINTMENT (OUTPATIENT)
Dept: GENERAL RADIOLOGY | Facility: CLINIC | Age: 89
DRG: 391 | End: 2025-07-22
Attending: PHYSICIAN ASSISTANT
Payer: COMMERCIAL

## 2025-07-22 LAB
ANION GAP SERPL CALCULATED.3IONS-SCNC: 11 MMOL/L (ref 7–15)
BUN SERPL-MCNC: 8.9 MG/DL (ref 8–23)
CALCIUM SERPL-MCNC: 8.7 MG/DL (ref 8.8–10.4)
CHLORIDE SERPL-SCNC: 93 MMOL/L (ref 98–107)
CREAT SERPL-MCNC: 0.64 MG/DL (ref 0.51–0.95)
EGFRCR SERPLBLD CKD-EPI 2021: 84 ML/MIN/1.73M2
ERYTHROCYTE [DISTWIDTH] IN BLOOD BY AUTOMATED COUNT: 14.5 % (ref 10–15)
GLUCOSE SERPL-MCNC: 137 MG/DL (ref 70–99)
HCO3 SERPL-SCNC: 32 MMOL/L (ref 22–29)
HCT VFR BLD AUTO: 38 % (ref 35–47)
HGB BLD-MCNC: 11.9 G/DL (ref 11.7–15.7)
MCH RBC QN AUTO: 31 PG (ref 26.5–33)
MCHC RBC AUTO-ENTMCNC: 31.3 G/DL (ref 31.5–36.5)
MCV RBC AUTO: 99 FL (ref 78–100)
PLATELET # BLD AUTO: 219 10E3/UL (ref 150–450)
POTASSIUM SERPL-SCNC: 3.6 MMOL/L (ref 3.4–5.3)
RBC # BLD AUTO: 3.84 10E6/UL (ref 3.8–5.2)
SODIUM SERPL-SCNC: 136 MMOL/L (ref 135–145)
WBC # BLD AUTO: 8.6 10E3/UL (ref 4–11)

## 2025-07-22 PROCEDURE — 85014 HEMATOCRIT: CPT | Performed by: PHYSICIAN ASSISTANT

## 2025-07-22 PROCEDURE — 71045 X-RAY EXAM CHEST 1 VIEW: CPT

## 2025-07-22 PROCEDURE — 250N000011 HC RX IP 250 OP 636: Performed by: PHYSICIAN ASSISTANT

## 2025-07-22 PROCEDURE — 250N000013 HC RX MED GY IP 250 OP 250 PS 637: Performed by: PHYSICIAN ASSISTANT

## 2025-07-22 PROCEDURE — 120N000001 HC R&B MED SURG/OB

## 2025-07-22 PROCEDURE — 99233 SBSQ HOSP IP/OBS HIGH 50: CPT | Performed by: PHYSICIAN ASSISTANT

## 2025-07-22 PROCEDURE — 80048 BASIC METABOLIC PNL TOTAL CA: CPT | Performed by: PHYSICIAN ASSISTANT

## 2025-07-22 PROCEDURE — 36415 COLL VENOUS BLD VENIPUNCTURE: CPT | Performed by: PHYSICIAN ASSISTANT

## 2025-07-22 RX ORDER — FUROSEMIDE 10 MG/ML
40 INJECTION INTRAMUSCULAR; INTRAVENOUS ONCE
Status: COMPLETED | OUTPATIENT
Start: 2025-07-23 | End: 2025-07-23

## 2025-07-22 RX ORDER — FUROSEMIDE 10 MG/ML
40 INJECTION INTRAMUSCULAR; INTRAVENOUS ONCE
Status: COMPLETED | OUTPATIENT
Start: 2025-07-22 | End: 2025-07-22

## 2025-07-22 RX ORDER — FLUTICASONE FUROATE AND VILANTEROL 100; 25 UG/1; UG/1
1 POWDER RESPIRATORY (INHALATION) DAILY
Status: DISCONTINUED | OUTPATIENT
Start: 2025-07-22 | End: 2025-07-25 | Stop reason: HOSPADM

## 2025-07-22 RX ADMIN — GABAPENTIN 400 MG: 400 CAPSULE ORAL at 06:23

## 2025-07-22 RX ADMIN — HYDROCORTISONE: 25 CREAM TOPICAL at 10:01

## 2025-07-22 RX ADMIN — HYDROCORTISONE: 25 CREAM TOPICAL at 21:55

## 2025-07-22 RX ADMIN — GABAPENTIN 400 MG: 400 CAPSULE ORAL at 20:56

## 2025-07-22 RX ADMIN — ACETAMINOPHEN 650 MG: 325 TABLET ORAL at 20:56

## 2025-07-22 RX ADMIN — GUAIFENESIN 400 MG: 200 TABLET ORAL at 10:01

## 2025-07-22 RX ADMIN — DULOXETINE 60 MG: 60 CAPSULE, DELAYED RELEASE ORAL at 10:01

## 2025-07-22 RX ADMIN — CARBOXYMETHYLCELLULOSE SODIUM 1 DROP: 5 SOLUTION/ DROPS OPHTHALMIC at 10:01

## 2025-07-22 RX ADMIN — GABAPENTIN 400 MG: 400 CAPSULE ORAL at 14:09

## 2025-07-22 RX ADMIN — ENOXAPARIN SODIUM 40 MG: 40 INJECTION SUBCUTANEOUS at 02:16

## 2025-07-22 RX ADMIN — ENOXAPARIN SODIUM 40 MG: 40 INJECTION SUBCUTANEOUS at 14:09

## 2025-07-22 RX ADMIN — FUROSEMIDE 40 MG: 10 INJECTION, SOLUTION INTRAVENOUS at 17:57

## 2025-07-22 RX ADMIN — LEVOTHYROXINE SODIUM 88 MCG: 0.09 TABLET ORAL at 06:23

## 2025-07-22 RX ADMIN — GUAIFENESIN 400 MG: 200 TABLET ORAL at 20:44

## 2025-07-22 RX ADMIN — POLYETHYLENE GLYCOL 3350 17 G: 17 POWDER, FOR SOLUTION ORAL at 10:07

## 2025-07-22 RX ADMIN — CARBOXYMETHYLCELLULOSE SODIUM 1 DROP: 5 SOLUTION/ DROPS OPHTHALMIC at 20:44

## 2025-07-22 RX ADMIN — ALLOPURINOL 100 MG: 100 TABLET ORAL at 10:01

## 2025-07-22 RX ADMIN — POTASSIUM CHLORIDE 40 MEQ: 20 TABLET, EXTENDED RELEASE ORAL at 10:01

## 2025-07-22 RX ADMIN — TORSEMIDE 20 MG: 20 TABLET ORAL at 10:01

## 2025-07-22 RX ADMIN — CARBOXYMETHYLCELLULOSE SODIUM 1 DROP: 5 SOLUTION/ DROPS OPHTHALMIC at 14:09

## 2025-07-22 ASSESSMENT — ACTIVITIES OF DAILY LIVING (ADL)
ADLS_ACUITY_SCORE: 54
ADLS_ACUITY_SCORE: 50
ADLS_ACUITY_SCORE: 54
ADLS_ACUITY_SCORE: 50
ADLS_ACUITY_SCORE: 54

## 2025-07-22 NOTE — PLAN OF CARE
"Goal Outcome Evaluation:      Plan of Care Reviewed With: patient      /71 (BP Location: Right arm)   Pulse 91   Temp 98.7  F (37.1  C) (Oral)   Resp 20   Wt 119 kg (262 lb 5.6 oz)   LMP  (LMP Unknown)   SpO2 95%   BMI 51.24 kg/m     Problem: Adult Inpatient Plan of Care  Goal: Plan of Care Review  Description: The Plan of Care Review/Shift note should be completed every shift.  The Outcome Evaluation is a brief statement about your assessment that the patient is improving, declining, or no change.  This information will be displayed automatically on your shift  note.  Outcome: Not Progressing  Flowsheets (Taken 7/22/2025 1600)  Outcome Evaluation: Pt A&O, on 1 l oxygen, for mobility use Sera-steady and 2 asssits, pure-wick in place with good output, ate 100% of her meal, denied pain.  Plan of Care Reviewed With: patient  Goal: Patient-Specific Goal (Individualized)  Description: You can add care plan individualizations to a care plan. Examples of Individualization might be:  \"Parent requests to be called daily at 9am for status\", \"I have a hard time hearing out of my right ear\", or \"Do not touch me to wake me up as it startles  me\".  Outcome: Not Progressing  Goal: Absence of Hospital-Acquired Illness or Injury  Outcome: Not Progressing  Intervention: Identify and Manage Fall Risk  Recent Flowsheet Documentation  Taken 7/22/2025 1549 by Louisa Johns RN  Safety Promotion/Fall Prevention:   safety round/check completed   room organization consistent   room near nurse's station   room door open  Intervention: Prevent Skin Injury  Recent Flowsheet Documentation  Taken 7/22/2025 1549 by Louisa Johns RN  Body Position: side-lying 90 degrees  Skin Protection: tubing/devices free from skin contact  Taken 7/22/2025 1500 by Louisa Johns RN  Body Position: side-lying 90 degrees  Intervention: Prevent and Manage VTE (Venous Thromboembolism) Risk  Recent Flowsheet Documentation  Taken 7/22/2025 " 1549 by Louisa Johns RN  VTE Prevention/Management: SCDs off (sequential compression devices)  Intervention: Prevent Infection  Recent Flowsheet Documentation  Taken 7/22/2025 1549 by Louisa Johns RN  Infection Prevention:   hand hygiene promoted   rest/sleep promoted   single patient room provided  Goal: Optimal Comfort and Wellbeing  Outcome: Not Progressing  Intervention: Provide Person-Centered Care  Recent Flowsheet Documentation  Taken 7/22/2025 1549 by Louisa Johns RN  Trust Relationship/Rapport:   care explained   choices provided   emotional support provided   thoughts/feelings acknowledged  Goal: Readiness for Transition of Care  Outcome: Not Progressing     Problem: Delirium  Goal: Optimal Coping  Outcome: Not Progressing  Intervention: Optimize Psychosocial Adjustment to Delirium  Recent Flowsheet Documentation  Taken 7/22/2025 1549 by Louisa Johns RN  Family/Support System Care: involvement promoted  Goal: Improved Behavioral Control  Outcome: Not Progressing  Intervention: Minimize Safety Risk  Recent Flowsheet Documentation  Taken 7/22/2025 1549 by Louisa Johns RN  Enhanced Safety Measures: pain management  Trust Relationship/Rapport:   care explained   choices provided   emotional support provided   thoughts/feelings acknowledged  Goal: Improved Attention and Thought Clarity  Outcome: Not Progressing  Intervention: Maximize Cognitive Function  Recent Flowsheet Documentation  Taken 7/22/2025 1549 by Louisa Johns RN  Sensory Stimulation Regulation:   care clustered   quiet environment promoted  Reorientation Measures: reorientation provided  Goal: Improved Sleep  Outcome: Not Progressing     Problem: Skin Injury Risk Increased  Goal: Skin Health and Integrity  Outcome: Not Progressing  Intervention: Plan: Nurse Driven Intervention: Moisture Management  Recent Flowsheet Documentation  Taken 7/22/2025 1549 by Louisa Johns RN  Moisture Interventions:    Encourage regular toileting   Urinary collection device   Perineal cleanser  Bathing/Skin Care: incontinence care  Taken 7/22/2025 1500 by Louisa Johns RN  Bathing/Skin Care: incontinence care  Intervention: Plan: Nurse Driven Intervention: Friction and Shear  Recent Flowsheet Documentation  Taken 7/22/2025 1549 by Louisa Johns RN  Friction/Shear Interventions: HOB 30 degrees or less  Intervention: Optimize Skin Protection  Recent Flowsheet Documentation  Taken 7/22/2025 1549 by Louisa Johns RN  Pressure Reduction Techniques:   frequent weight shift encouraged   heels elevated off bed  Pressure Reduction Devices: positioning supports utilized  Skin Protection: tubing/devices free from skin contact  Activity Management: activity adjusted per tolerance  Head of Bed (HOB) Positioning: HOB at 90 degrees  Taken 7/22/2025 1500 by Louisa Johns RN  Activity Management: activity adjusted per tolerance  Head of Bed (HOB) Positioning: HOB at 90 degrees       Outcome Evaluation: Pt A&O, on 1 l oxygen, for mobility use Sera-steady and 2 asssits, pure-wick in place with good output, ate 100% of her meal, denied pain.

## 2025-07-22 NOTE — PROGRESS NOTES
[facility]    Medicine Progress Note - Hospitalist Service    Date of Admission:  7/16/2025    Assessment & Plan   Carol Merida is a 89 year old female with PMHx of morbid obesity, diastolic CHF, JEAN on CPAP, reactive airway disease, MDD, anxiety, panic attacks, diet controlled DM type II, hypothyroidism, lymphedema, recurrent right lower extremity cellulitis, chronic cough, chronic pain, neuropathy, osteoarthritis, sacral pressure ulcers, and multiple joint replacement surgeries, who was admitted on 7/16/2025 with abdominal pain likely due to constipation. She also notes dyspnea.      In the ED Spo2 92% placed on 2 LPM NC, vital signs stable and afebrile. VBG with pH 7.31, pCO2 of 52. Lactic acid 2.0. CMP with mildly elevated ast, alk phos 169, total bilirubin and lipase not elevated. Serial Tn I stable in the 20's near prior, 22, EKG with NSR no acute ST elevation. Viral PCR still Covid positive, recent COVID infection in May '25. CT Chest PE Abdomen with contrast negative for PE or PNA, no significant hypervolemia, gaseous and stool throughout the colon from the cecum to the mid sigmoid colon, redundant tissue in the central abdomen without significant sigmoid mesenteric rotation. RUQ US Limited evaluation with no definitive evidence for gallbladder wall thickening.    Colorectal Surgery were consulted from the ED, reviewed patient and imaging and felt that the patient did not have a sigmoid volvulus. Given Toradol, Decadron, DuoNeb, Zofran. Admission was requested from hospitalist service for further cares.    Since admission CRS consulted recommending gastrograffin enema XR on 7/16 which showed abrupt narrowing of colon lumen in proximal sigmoid colon without flow of contrast beyond this raising suspicion for stricture. CRS discussed option of surgery but felt pt was a very poor surgical candidate. GI consulted and flexible sigmoidoscopy performed on 7/18 that did not show obvious  stricture but the prep was poor. GI recommended colonoscopy prep and repeat imaging showed improved stool burden with significant improvement of patient's symptoms.     Patient is now eating and drinking but mobility/strength is below baseline with PT recommending rehab. Has intermittent hypoxia. CXR on 7/22 shows evidence of mild fluid overload. Will give dose of IV lasix this evening and 7/23 AM. Likely can discharge on 7/23 afternoon if off of oxygen.     Acute hypoxemic respiratory failure  Hx of chronic Cough and JEAN  -Presented with dyspnea increased from baseline. No overt hypoxia recorded in the ED but maintained on 2 L/min nasal cannula.  VBG in ED shows respiratory acidosis.    -Patient was reportedly initially a little wheezy on exam in the ED and given Decadron. CT PE scan unremarkable for anything acute.  -She does not wear continuous supplemental oxygen at home at baseline per her report but does use CPAP  - continues to require intermittent oxygen especially at night.   - CXR on 7/22 shows evidence of bilateral interstitial thickening likely edema  - Received PTA Torsemide on 7/22 AM so will give Lasix 40 mg IV this evening and tomorrow AM with strict intake/output and daily weight  - encourage IS today, up to chair for meals  -Continue home Advair and PRN duo nebs  - continue PTA Ativan (takes PRN PTA for anxiety and dyspnea)  - Hold PTA diuretics while receiving IV diuresis  - CPAP ordered on admission but has only been on oxygen at night. Told nursing to call respiratory for CPAP tonight      Abdominal Pain due to constipation-resolved  Presented with abdominal pain and constipation with associated nausea and emesis.   CT chest/abd/pelvis showed marked amount of gas and stool throughout colon with redundant sigmoid colon. There is a transition point in the sigmoid colon without obvious mass, edema or perforation.   Colorectal Surgery were consulted from the ED, low suspicion for sigmoid volvulus.    - CRS recommended gastrograffin enema to evaluate for obstruction  - On 716 gastrograffin enema XR showed abrupt narrowing of colon lumen in proximal sigmoid colon without flow of contrast beyond this raising suspicion for stricture   - CRS discussed option of surgery, felt pt was a very poor surgical candidate and would likely not survive surgery if needed.   - GI consulted, recommended flex sig performed on 7/18 to evaluate for stricture as well as disimpact bowel  - no stricture noted on flex sig, GI recommended bowel prep  - multiple loose stools after starting bowel prep, now completed  - significant improvement in sx  - repeat Abd XR shows small stool burden, air throughout colon  - Start Miralax daily on 7/21  - PRN Fleets enema and pepto bismol and PRN simethicone.   - Regular diet    Chronic diastolic CHF with mild exacerbation  Chronic lymphedema  History of recurrent right lower extremity cellulitis  Appears euvolemic but hypoxia present suggestive of overload  - Hold PTA torsemide 20 mg daily  - Diurese as above  - 20 meq potassium daily  - 2 g sodium restriction   - Patient's daughter reports that she was started on a course of Bactrim for recurrent cellulitis in her right lower extremity, she completed this 7/18     Weakness  -At baseline is able to pivot to wheelchair but now requiring assist of 2 with michael steady which is below physical baseline  -PT recommending TCU    Bilateral pressure injury to buttocks  -WOC consulted and daily Mepilex applied    Viral PCR still Covid positive, recent COVID infection in May '25.   No isolation requirements, not an active or new COVID viral infection      Cholelithiasis   Appears asymptomatic as noted above currently lower suspicion for biliary colic as cause for symptoms. No RUQ pain. Limited evaluation with no definitive evidence for gallbladder wall thickening. LFT Labs stable from baseline.   - no symptoms consistent with biliary colic at this time, CMP has  remained unremarkable  - currently would not recommend antibiotics     Hepatic Steatosis   Morbid Obesity   - noted, LFTs stable. BMI of 46  - f/up with PCP     Hepatic Lesion   - 1.5 cm enhancing observation at the dome of the right hepatic lobe. Recommend nonemergent contrast-enhanced MRI for further evaluation.      Chronic pain  Osteoarthritis  Neuropathy  - history of bilateral TKA, right HUGO, and right femur IM nail  - WC transfers at baseline   - cont home gabapentin 400 mg 3 times daily, acetaminophen 3 times daily, and celecoxib 100 mg twice daily as needed     Gout  - cont allopurinol 100 mg daily     Hypothyroidism  - continue PTA levothyroxine 88 mcg daily     MDD  Anxiety  Panic attacks  - continue PTA duloxetine 60 mg daily and lorazepam 0.5 mg every 6 hours as needed. Also takes for dyspnea.      History sacral pressure ulcers  Right Buttock Pressure Injury, Stage 3 (POA)   - [facility]       DM type II  HgbA1c 6.6 4/2025  - diet controlled          Diet: Regular Diet Adult    DVT Prophylaxis: Enoxaparin (Lovenox) SQ  Benson Catheter: Not present  Lines: None     Cardiac Monitoring: None  Code Status: No CPR- Do NOT Intubate      Clinically Significant Risk Factors         # Hyponatremia: Lowest Na = 132 mmol/L in last 2 days, will monitor as appropriate  # Hypochloremia: Lowest Cl = 92 mmol/L in last 2 days, will monitor as appropriate      # Hypoalbuminemia: Lowest albumin = 3.4 g/dL at 7/19/2025  5:32 AM, will monitor as appropriate     # Hypertension: Noted on problem list  # Chronic heart failure with preserved ejection fraction: heart failure noted on problem list and last echo with EF >50%          # DMII: A1C = 6.6 % (Ref range: <5.7 %) within past 6 months       # Financial/Environmental Concerns:           Social Drivers of Health            Disposition Plan     Medically Ready for Discharge: Anticipated Tomorrow           The patient's care was discussed with the Bedside Nurse, Care  Coordinator/, Patient, and Patient's Family.    Zoie Enamorado PA-C  Hospitalist Service  [facility]  Securely message with e27 (more info)  Text page via [facility] Paging/Directory   ______________________________________________________________________    Interval History   Reports feeling tired, having a hard time keeping eyes open. Denies increased shortness of breath and cough. Using a purewick for urination. Passing gas and having bowel movements. Eating and drinking without difficulty.     Physical Exam   Vital Signs: Temp: 98.7  F (37.1  C) Temp src: Oral BP: 129/71 Pulse: 91   Resp: 20 SpO2: 95 % O2 Device: Nasal cannula Oxygen Delivery: 2 LPM  Weight: 262 lbs 5.56 oz    General Appearance: Alert and orientated x 3  Respiratory: Some crackles in bilateral bases  Cardiovascular: RRR without murmur  GI: Bowel sounds are present without tenderness  Skin: No rash or open sores  Other: No lower extremity swelling      Medical Decision Making       55 MINUTES SPENT BY ME on the date of service doing chart review, history, exam, documentation & further activities per the note.      Data     I have personally reviewed the following data over the past 24 hrs:    8.6  \   11.9   / 219     136 93 (L) 8.9 /  137 (H)   3.6 32 (H) 0.64 \       Imaging results reviewed over the past 24 hrs:   Recent Results (from the past 24 hours)   XR Chest Port 1 View    Narrative    EXAM: XR CHEST PORT 1 VIEW  LOCATION: [facility]  DATE: 7/22/2025    INDICATION: Hypoxia, rule out fluid overload and pneumonia  COMPARISON: 07/16/2025, 05/27/2025.      Impression    IMPRESSION: Mild interstitial thickening is seen, suggestive of edema. Minimal blunting of the right costophrenic angle, which may represent trace pleural fluid, pleural thickening, and/or atelectasis. No large volume pleural fluid collection. No   findings specific for pneumonia. Heart size is mildly  enlarged. Mild central vascular congestion. Degenerative changes of the shoulders and spine. No acute osseous abnormality.

## 2025-07-22 NOTE — PLAN OF CARE
"/71 (BP Location: Right arm)   Pulse 91   Temp 98.7  F (37.1  C) (Oral)   Resp 20   Wt 119 kg (262 lb 5.6 oz)   LMP  (LMP Unknown)   SpO2 95%   BMI 51.24 kg/m      Pt's alert and oriented x4 w/ forgetfulness. Has intermittent SOB. Weaned pt's O2 from 2L to 0.5L sats. When on RA O2 sats < 88. On continuous pulse ox. Has dyspnea upon exertion. Denies ABD pain, dizziness, lightheadedness/ n/v/d. Repositioning q2h. Encouraged to utilize incentive spirometer. Reg diet. Pt ate breakfast, but refused lunch. Assist of 2 w/ GB & michael steady. Sacral mepilex in place for pressure ulcer. Blanchable redness noted on bottom. Barrier cream applied. Purewick in place. CPAP at night.   Consult: SW consult pending for transportation planning.       Goal Outcome Evaluation:      Plan of Care Reviewed With: patient    Overall Patient Progress: no changeOverall Patient Progress: no change         Problem: Adult Inpatient Plan of Care  Goal: Plan of Care Review  Description: The Plan of Care Review/Shift note should be completed every shift.  The Outcome Evaluation is a brief statement about your assessment that the patient is improving, declining, or no change.  This information will be displayed automatically on your shift  note.  Outcome: Not Progressing  Flowsheets (Taken 7/22/2025 9129)  Plan of Care Reviewed With: patient  Overall Patient Progress: no change  Goal: Patient-Specific Goal (Individualized)  Description: You can add care plan individualizations to a care plan. Examples of Individualization might be:  \"Parent requests to be called daily at 9am for status\", \"I have a hard time hearing out of my right ear\", or \"Do not touch me to wake me up as it startles  me\".  Outcome: Not Progressing  Goal: Absence of Hospital-Acquired Illness or Injury  Outcome: Not Progressing  Intervention: Identify and Manage Fall Risk  Recent Flowsheet Documentation  Taken 7/22/2025 1058 by Bisi Abraham RN  Safety Promotion/Fall " Prevention:   activity supervised   safety round/check completed   clutter free environment maintained   nonskid shoes/slippers when out of bed   patient and family education   room organization consistent  Intervention: Prevent Skin Injury  Recent Flowsheet Documentation  Taken 7/22/2025 0723 by Bisi Abraham RN  Body Position: right  Intervention: Prevent and Manage VTE (Venous Thromboembolism) Risk  Recent Flowsheet Documentation  Taken 7/22/2025 0723 by Bisi Abraham RN  VTE Prevention/Management: SCDs off (sequential compression devices)  Intervention: Prevent Infection  Recent Flowsheet Documentation  Taken 7/22/2025 0723 by Bisi Abraham RN  Infection Prevention:   hand hygiene promoted   rest/sleep promoted   single patient room provided  Goal: Optimal Comfort and Wellbeing  Outcome: Not Progressing  Intervention: Provide Person-Centered Care  Recent Flowsheet Documentation  Taken 7/22/2025 0723 by Bisi Abraham RN  Trust Relationship/Rapport:   care explained   choices provided   emotional support provided   thoughts/feelings acknowledged  Goal: Readiness for Transition of Care  Outcome: Not Progressing     Problem: Delirium  Goal: Optimal Coping  Outcome: Not Progressing  Intervention: Optimize Psychosocial Adjustment to Delirium  Recent Flowsheet Documentation  Taken 7/22/2025 0723 by Bisi Abraham RN  Family/Support System Care: involvement promoted  Goal: Improved Behavioral Control  Outcome: Not Progressing  Intervention: Minimize Safety Risk  Recent Flowsheet Documentation  Taken 7/22/2025 0723 by Bisi Abraham RN  Enhanced Safety Measures: pain management  Trust Relationship/Rapport:   care explained   choices provided   emotional support provided   thoughts/feelings acknowledged  Goal: Improved Attention and Thought Clarity  Outcome: Not Progressing  Goal: Improved Sleep  Outcome: Not Progressing     Problem: Skin Injury Risk Increased  Goal: Skin Health and Integrity  Outcome: Not  Progressing  Intervention: Plan: Nurse Driven Intervention: Moisture Management  Recent Flowsheet Documentation  Taken 7/22/2025 0723 by Bisi Abraham RN  Moisture Interventions:   Encourage regular toileting   Urinary collection device   Perineal cleanser  Intervention: Plan: Nurse Driven Intervention: Friction and Shear  Recent Flowsheet Documentation  Taken 7/22/2025 0723 by Bisi Abraham RN  Friction/Shear Interventions: HOB 30 degrees or less  Intervention: Optimize Skin Protection  Recent Flowsheet Documentation  Taken 7/22/2025 0723 by Bisi Abraham RN  Pressure Reduction Techniques:   frequent weight shift encouraged   heels elevated off bed  Pressure Reduction Devices: positioning supports utilized  Activity Management: activity adjusted per tolerance  Head of Bed (HOB) Positioning: HOB at 20-30 degrees

## 2025-07-22 NOTE — PROGRESS NOTES
Care Management Follow Up    Length of Stay (days): 6    Expected Discharge Date: 07/22/2025     Concerns to be Addressed: discharge planning     Patient plan of care discussed at interdisciplinary rounds: Yes    Anticipated Discharge Disposition: Transitional Care     Anticipated Discharge Services:  (TCU)  Anticipated Discharge DME: None    Patient/family educated on Medicare website which has current facility and service quality ratings: yes  Education Provided on the Discharge Plan: Yes  Patient/Family in Agreement with the Plan: yes    Referrals Placed by CM/SW: Post Acute Facilities  Private pay costs discussed: transportation costs    Discussed  Partnership in Safe Discharge Planning  document with patient/family: No     Handoff Completed: No, handoff not indicated or clinically appropriate    Additional Information:  Pt has been offered a TCU at [facility] for 7/23/2025 and pt has accepted. Message left for son per pt request with plan.   Pt is requested wheelchair transportation.  Reviewed out of pocket cost for [facility] transport, $99.20 base and $6.38 per mile to the destination. Pt states her MA will cover the transportation cost. Transportation arranged for 7/23/2025 1338 to 1423.  Pt will need to have her family bring c-pap to TCU. Pt is aware her Ozempic needs to be on hold while in rehab.    Next Steps: Send orders to [facility] when available.    ADDENDUM: [facility] called to inform they have a COVID case on their unit and would like this relayed to pt. Spoke with pt regarding COVID+ pt. She would still like to continue with plan to discharge to [facility].    Lizzie Pappas RN   Inpatient Care Coordination  [facility]   Phone: [phone]

## 2025-07-22 NOTE — PLAN OF CARE
"[id number]    Inpatient Progress Note:  A & O X 4 with forgetfulness. Lung sounds diminished. Expiratory wheezes noted, Prn Duoneb administered. Noted to be anxious and shortness of breath, prn Ativan given X 1-See MAR. Incontinent of bowel and bladder. PIV saline lock. Afebrile.   /63 (BP Location: Right arm)   Pulse 98   Temp 98.5  F (36.9  C) (Oral)   Resp 18   Wt 119 kg (262 lb 5.6 oz)   LMP  (LMP Unknown)   SpO2 95%   BMI 51.24 kg/m       Problem: Adult Inpatient Plan of Care  Goal: Plan of Care Review  Description: The Plan of Care Review/Shift note should be completed every shift.  The Outcome Evaluation is a brief statement about your assessment that the patient is improving, declining, or no change.  This information will be displayed automatically on your shift  note.  Outcome: Progressing  Flowsheets (Taken 7/22/2025 0704)  Plan of Care Reviewed With: patient  Goal: Patient-Specific Goal (Individualized)  Description: You can add care plan individualizations to a care plan. Examples of Individualization might be:  \"Parent requests to be called daily at 9am for status\", \"I have a hard time hearing out of my right ear\", or \"Do not touch me to wake me up as it startles  me\".  Outcome: Progressing  Goal: Absence of Hospital-Acquired Illness or Injury  Outcome: Progressing  Intervention: Identify and Manage Fall Risk  Recent Flowsheet Documentation  Taken 7/22/2025 0401 by Shavon Queen RN  Safety Promotion/Fall Prevention:   activity supervised   safety round/check completed   clutter free environment maintained   nonskid shoes/slippers when out of bed   patient and family education   room organization consistent  Intervention: Prevent Skin Injury  Recent Flowsheet Documentation  Taken 7/22/2025 0401 by Shavon Queen, RN  Body Position: supine, head elevated  Skin Protection: tubing/devices free from skin contact  Intervention: Prevent and Manage VTE (Venous Thromboembolism) " Risk  Recent Flowsheet Documentation  Taken 7/22/2025 0401 by Shavon Queen RN  VTE Prevention/Management: SCDs off (sequential compression devices)  Intervention: Prevent Infection  Recent Flowsheet Documentation  Taken 7/22/2025 0401 by Shavon Queen RN  Infection Prevention:   hand hygiene promoted   rest/sleep promoted   single patient room provided  Goal: Optimal Comfort and Wellbeing  Outcome: Progressing  Intervention: Provide Person-Centered Care  Recent Flowsheet Documentation  Taken 7/22/2025 0401 by Shavon Queen RN  Trust Relationship/Rapport:   care explained   choices provided   emotional support provided   thoughts/feelings acknowledged  Goal: Readiness for Transition of Care  Outcome: Progressing     Problem: Delirium  Goal: Optimal Coping  Outcome: Progressing  Goal: Improved Behavioral Control  Outcome: Progressing  Intervention: Minimize Safety Risk  Recent Flowsheet Documentation  Taken 7/22/2025 0401 by Shavon Queen RN  Enhanced Safety Measures: pain management  Trust Relationship/Rapport:   care explained   choices provided   emotional support provided   thoughts/feelings acknowledged  Goal: Improved Attention and Thought Clarity  Outcome: Progressing  Goal: Improved Sleep  Outcome: Progressing     Problem: Skin Injury Risk Increased  Goal: Skin Health and Integrity  Outcome: Progressing  Intervention: Plan: Nurse Driven Intervention: Friction and Shear  Recent Flowsheet Documentation  Taken 7/22/2025 0401 by Shavon Queen RN  Friction/Shear Interventions: HOB 30 degrees or less  Intervention: Optimize Skin Protection  Recent Flowsheet Documentation  Taken 7/22/2025 0401 by Shavon Queen RN  Pressure Reduction Techniques:   frequent weight shift encouraged   heels elevated off bed  Pressure Reduction Devices: positioning supports utilized  Skin Protection: tubing/devices free from skin contact  Activity Management: activity adjusted per tolerance  Head  of Bed (HOB) Positioning: HOB at 20-30 degrees     Will continue to provide supportive cares.     Shavon Queen RN       Plan of Care Reviewed With: patient

## 2025-07-23 ENCOUNTER — APPOINTMENT (OUTPATIENT)
Dept: PHYSICAL THERAPY | Facility: CLINIC | Age: 89
DRG: 391 | End: 2025-07-23
Payer: COMMERCIAL

## 2025-07-23 LAB
ANION GAP SERPL CALCULATED.3IONS-SCNC: 14 MMOL/L (ref 7–15)
BUN SERPL-MCNC: 9.6 MG/DL (ref 8–23)
CALCIUM SERPL-MCNC: 9 MG/DL (ref 8.8–10.4)
CHLORIDE SERPL-SCNC: 90 MMOL/L (ref 98–107)
CREAT SERPL-MCNC: 0.59 MG/DL (ref 0.51–0.95)
EGFRCR SERPLBLD CKD-EPI 2021: 86 ML/MIN/1.73M2
ERYTHROCYTE [DISTWIDTH] IN BLOOD BY AUTOMATED COUNT: 14.4 % (ref 10–15)
GLUCOSE SERPL-MCNC: 112 MG/DL (ref 70–99)
HCO3 SERPL-SCNC: 31 MMOL/L (ref 22–29)
HCT VFR BLD AUTO: 39.7 % (ref 35–47)
HGB BLD-MCNC: 12.6 G/DL (ref 11.7–15.7)
HOLD SPECIMEN: NORMAL
MCH RBC QN AUTO: 31.6 PG (ref 26.5–33)
MCHC RBC AUTO-ENTMCNC: 31.7 G/DL (ref 31.5–36.5)
MCV RBC AUTO: 100 FL (ref 78–100)
NT-PROBNP SERPL-MCNC: 212 PG/ML (ref 0–624)
PLATELET # BLD AUTO: 211 10E3/UL (ref 150–450)
POTASSIUM SERPL-SCNC: 3.3 MMOL/L (ref 3.4–5.3)
RBC # BLD AUTO: 3.99 10E6/UL (ref 3.8–5.2)
SODIUM SERPL-SCNC: 135 MMOL/L (ref 135–145)
WBC # BLD AUTO: 8.9 10E3/UL (ref 4–11)

## 2025-07-23 PROCEDURE — 250N000011 HC RX IP 250 OP 636: Performed by: PHYSICIAN ASSISTANT

## 2025-07-23 PROCEDURE — 250N000013 HC RX MED GY IP 250 OP 250 PS 637: Performed by: INTERNAL MEDICINE

## 2025-07-23 PROCEDURE — 97110 THERAPEUTIC EXERCISES: CPT | Mod: GP | Performed by: PHYSICAL THERAPIST

## 2025-07-23 PROCEDURE — 97530 THERAPEUTIC ACTIVITIES: CPT | Mod: GP | Performed by: PHYSICAL THERAPIST

## 2025-07-23 PROCEDURE — 99232 SBSQ HOSP IP/OBS MODERATE 35: CPT | Performed by: INTERNAL MEDICINE

## 2025-07-23 PROCEDURE — 120N000001 HC R&B MED SURG/OB

## 2025-07-23 PROCEDURE — 250N000013 HC RX MED GY IP 250 OP 250 PS 637: Performed by: PHYSICIAN ASSISTANT

## 2025-07-23 PROCEDURE — 999N000157 HC STATISTIC RCP TIME EA 10 MIN

## 2025-07-23 PROCEDURE — 80048 BASIC METABOLIC PNL TOTAL CA: CPT | Performed by: INTERNAL MEDICINE

## 2025-07-23 PROCEDURE — 83880 ASSAY OF NATRIURETIC PEPTIDE: CPT | Performed by: INTERNAL MEDICINE

## 2025-07-23 PROCEDURE — 36415 COLL VENOUS BLD VENIPUNCTURE: CPT | Performed by: PHYSICIAN ASSISTANT

## 2025-07-23 PROCEDURE — 85018 HEMOGLOBIN: CPT | Performed by: PHYSICIAN ASSISTANT

## 2025-07-23 PROCEDURE — 94660 CPAP INITIATION&MGMT: CPT

## 2025-07-23 PROCEDURE — 94640 AIRWAY INHALATION TREATMENT: CPT

## 2025-07-23 RX ADMIN — HYDROCORTISONE: 25 CREAM TOPICAL at 19:26

## 2025-07-23 RX ADMIN — SENNOSIDES 2 TABLET: 8.6 TABLET, FILM COATED ORAL at 19:33

## 2025-07-23 RX ADMIN — ENOXAPARIN SODIUM 40 MG: 40 INJECTION SUBCUTANEOUS at 02:26

## 2025-07-23 RX ADMIN — GUAIFENESIN 400 MG: 200 TABLET ORAL at 19:33

## 2025-07-23 RX ADMIN — LEVOTHYROXINE SODIUM 88 MCG: 0.09 TABLET ORAL at 06:19

## 2025-07-23 RX ADMIN — ACETAMINOPHEN 650 MG: 325 TABLET ORAL at 14:36

## 2025-07-23 RX ADMIN — ENOXAPARIN SODIUM 40 MG: 40 INJECTION SUBCUTANEOUS at 14:36

## 2025-07-23 RX ADMIN — GABAPENTIN 400 MG: 400 CAPSULE ORAL at 14:37

## 2025-07-23 RX ADMIN — FLUTICASONE FUROATE AND VILANTEROL TRIFENATATE 1 PUFF: 100; 25 POWDER RESPIRATORY (INHALATION) at 08:39

## 2025-07-23 RX ADMIN — FUROSEMIDE 40 MG: 10 INJECTION, SOLUTION INTRAVENOUS at 06:18

## 2025-07-23 RX ADMIN — GABAPENTIN 400 MG: 400 CAPSULE ORAL at 06:18

## 2025-07-23 RX ADMIN — CARBOXYMETHYLCELLULOSE SODIUM 1 DROP: 5 SOLUTION/ DROPS OPHTHALMIC at 09:04

## 2025-07-23 RX ADMIN — ALLOPURINOL 100 MG: 100 TABLET ORAL at 09:04

## 2025-07-23 RX ADMIN — DULOXETINE 60 MG: 60 CAPSULE, DELAYED RELEASE ORAL at 09:04

## 2025-07-23 RX ADMIN — POTASSIUM CHLORIDE 40 MEQ: 20 TABLET, EXTENDED RELEASE ORAL at 09:04

## 2025-07-23 RX ADMIN — MINERAL OIL, WHITE PETROLATUM: .03; .94 OINTMENT OPHTHALMIC at 19:34

## 2025-07-23 RX ADMIN — LORAZEPAM 0.5 MG: 0.5 TABLET ORAL at 14:40

## 2025-07-23 RX ADMIN — CARBOXYMETHYLCELLULOSE SODIUM 1 DROP: 5 SOLUTION/ DROPS OPHTHALMIC at 19:34

## 2025-07-23 RX ADMIN — POLYETHYLENE GLYCOL 3350 17 G: 17 POWDER, FOR SOLUTION ORAL at 09:03

## 2025-07-23 RX ADMIN — GABAPENTIN 400 MG: 400 CAPSULE ORAL at 22:01

## 2025-07-23 RX ADMIN — HYDROCORTISONE: 25 CREAM TOPICAL at 09:04

## 2025-07-23 RX ADMIN — GUAIFENESIN 400 MG: 200 TABLET ORAL at 09:04

## 2025-07-23 ASSESSMENT — ACTIVITIES OF DAILY LIVING (ADL)
ADLS_ACUITY_SCORE: 58

## 2025-07-23 NOTE — PROGRESS NOTES
"CLINICAL NUTRITION SERVICES    Reviewed chart for nutrition risk factors due to LOS.     - MST of 0, indicating no recent unintentional wt loss or poor intakes d/t low appetite PTA (7/17/25).  - Currently on a regular diet. Of note, pt was NPO/CLD/FLD x3 days this admit related to abdominal pain and obstipation, CRS and GI involved.   - Pt underwent flex sig on 7/18 and bowel prep 7/19. Noted decreased stool burden per repeat imaging and significant improvement in pt's symptoms following bowel prep.   - 7/22: \"Eating and drinking without difficulty.\"  - 100% x2 intakes per nursing flowsheets. Intakes reduced during initial days of admission, although seem to be improving now following medical interventions. Energy/protein content of meals has been improving daily since regular diet was resumed 7/20.  - Documented hx of sacrum pressure injuries. WOCN assessed on 7/16 and ultimately signed off - \"Patient with history of pressure injury to bilateral buttocks which are now healed.\"  - No significant weight loss indicated.     Wt Readings from Last 10 Encounters:   07/22/25 119 kg (262 lb 5.6 oz)   05/27/25 122.3 kg (269 lb 10 oz)   01/21/25 123.9 kg (273 lb 2.4 oz)   07/05/24 113.4 kg (250 lb)   06/30/24 113 kg (249 lb 3.2 oz)   06/27/24 113.9 kg (251 lb)   06/26/24 116.8 kg (257 lb 8 oz)   06/25/24 116.8 kg (257 lb 8 oz)   03/18/24 112.6 kg (248 lb 3.8 oz)   08/14/23 109.3 kg (241 lb)     Follow Up / Monitoring:   Given above, RD team will not formally sign on at this time. Will chart check in 7-10 days per LOS policy. Please formally consult if needs arise.    Rosi Jaimes, MS, RD, LD  [facility]: \"Dietitian [Floyd]\"  "

## 2025-07-23 NOTE — PROGRESS NOTES
Care Management Follow Up    Length of Stay (days): 7    Expected Discharge Date: 07/24/2025     Concerns to be Addressed: discharge planning     Patient plan of care discussed at interdisciplinary rounds: Yes    Anticipated Discharge Disposition: Transitional Care     Anticipated Discharge Services:  (TCU)  Anticipated Discharge DME: None    Patient/family educated on Medicare website which has current facility and service quality ratings: yes  Education Provided on the Discharge Plan: Yes  Patient/Family in Agreement with the Plan: yes    Referrals Placed by CM/SW: Post Acute Facilities  Private pay costs discussed: private room/amenity fees and transportation costs    Discussed  Partnership in Safe Discharge Planning  document with patient/family: Yes     Handoff Completed: No, handoff not indicated or clinically appropriate    Additional Information:  Per provider, pt not yet medically ready for discharge today. Hopeful for tomorrow 7/24, ride rescheduled to tomorrow between [id number]. Pt/family would prefer a private room and agreeable to cost, there is no private room at this time and pt will be placed on the waiting list. Updated edith De La Cruz.     Next Steps: Send orders to TCU at discharge.     Justa Castellon RN BSN   Inpatient Care Coordination  [facility]   Phone [phone]

## 2025-07-23 NOTE — PLAN OF CARE
"/76 (BP Location: Right arm)   Pulse 84   Temp 98.1  F (36.7  C) (Oral)   Resp 18   Wt 119 kg (262 lb 5.6 oz)   LMP  (LMP Unknown)   SpO2 93%   BMI 51.24 kg/m      Pt's alert and oriented x4 w/ forgetfulness. Has intermittent SOB. Pt on 0.5L O2, sats >88. On continuous pulse ox. Has dyspnea on exertion. Reported 3/10 abd pain, gave prn Tylenol. Denies dizziness, lightheadedness/ n/v/d. Repositioning q2h. Perineal care done, purewick in place, sacral mepilex in place for pressure ulcer and barrier cream applied. Encouraged pt to utilize incentive spirometer. Reg diet. Assist of 2-3 w/ GB & michael steady. Called Respiratory Therapy for CPAP at night. Gave prn Ativan for anxiety.     Consult: SW/ PT. Possibly discharging tomorrow to TCU between 2852-2643.      Goal Outcome Evaluation:      Plan of Care Reviewed With: patient    Overall Patient Progress: improvingOverall Patient Progress: improving         Problem: Adult Inpatient Plan of Care  Goal: Plan of Care Review  Description: The Plan of Care Review/Shift note should be completed every shift.  The Outcome Evaluation is a brief statement about your assessment that the patient is improving, declining, or no change.  This information will be displayed automatically on your shift  note.  Outcome: Progressing  Flowsheets (Taken 7/23/2025 1313)  Plan of Care Reviewed With: patient  Overall Patient Progress: improving  Goal: Patient-Specific Goal (Individualized)  Description: You can add care plan individualizations to a care plan. Examples of Individualization might be:  \"Parent requests to be called daily at 9am for status\", \"I have a hard time hearing out of my right ear\", or \"Do not touch me to wake me up as it startles  me\".  Outcome: Progressing  Goal: Absence of Hospital-Acquired Illness or Injury  Outcome: Progressing  Intervention: Identify and Manage Fall Risk  Recent Flowsheet Documentation  Taken 7/23/2025 0760 by Bisi Abraham RN  Safety " Promotion/Fall Prevention:   activity supervised   clutter free environment maintained   safety round/check completed   supervised activity  Intervention: Prevent Skin Injury  Recent Flowsheet Documentation  Taken 7/23/2025 0723 by Bisi Abraham RN  Body Position:   turned   right   heels elevated   legs elevated  Skin Protection: tubing/devices free from skin contact  Intervention: Prevent and Manage VTE (Venous Thromboembolism) Risk  Recent Flowsheet Documentation  Taken 7/23/2025 0723 by Bisi Abraham RN  VTE Prevention/Management: SCDs off (sequential compression devices)  Intervention: Prevent Infection  Recent Flowsheet Documentation  Taken 7/23/2025 0723 by Bisi Abraham RN  Infection Prevention:   hand hygiene promoted   rest/sleep promoted   single patient room provided  Goal: Optimal Comfort and Wellbeing  Outcome: Progressing  Intervention: Provide Person-Centered Care  Recent Flowsheet Documentation  Taken 7/23/2025 0723 by Bisi Abraham RN  Trust Relationship/Rapport:   care explained   choices provided   emotional support provided   thoughts/feelings acknowledged  Goal: Readiness for Transition of Care  Outcome: Progressing     Problem: Delirium  Goal: Optimal Coping  Outcome: Progressing  Intervention: Optimize Psychosocial Adjustment to Delirium  Recent Flowsheet Documentation  Taken 7/23/2025 0723 by Bisi Abraham RN  Family/Support System Care:   involvement promoted   self-care encouraged  Goal: Improved Behavioral Control  Outcome: Progressing  Intervention: Minimize Safety Risk  Recent Flowsheet Documentation  Taken 7/23/2025 0723 by Bisi Abraham RN  Enhanced Safety Measures: pain management  Trust Relationship/Rapport:   care explained   choices provided   emotional support provided   thoughts/feelings acknowledged  Goal: Improved Attention and Thought Clarity  Outcome: Progressing  Goal: Improved Sleep  Outcome: Progressing     Problem: Skin Injury Risk Increased  Goal: Skin  Health and Integrity  Outcome: Progressing  Intervention: Plan: Nurse Driven Intervention: Moisture Management  Recent Flowsheet Documentation  Taken 7/23/2025 0723 by Bisi Abraham RN  Moisture Interventions:   Encourage regular toileting   Urinary collection device   Barrier ointment (CriticAid, Triad paste)  Intervention: Plan: Nurse Driven Intervention: Friction and Shear  Recent Flowsheet Documentation  Taken 7/23/2025 0723 by Bisi Abraham RN  Friction/Shear Interventions: HOB 30 degrees or less  Intervention: Optimize Skin Protection  Recent Flowsheet Documentation  Taken 7/23/2025 0723 by Bisi Abraham RN  Pressure Reduction Techniques:   frequent weight shift encouraged   heels elevated off bed  Pressure Reduction Devices: positioning supports utilized  Skin Protection: tubing/devices free from skin contact  Activity Management: activity adjusted per tolerance  Head of Bed (HOB) Positioning: HOB at 30-45 degrees

## 2025-07-23 NOTE — PLAN OF CARE
"[id number]    Inpatient Progress Note:    A & O X 4 with forgetfulness. Lung sounds diminished. No wheezes, crackles, rales/rhonchi auscultated. Bowel sounds present and active.  Incontinent of bowel and bladder. Pure wick in place with adequate output. PIV saline lock. A X 2 with cares. Admitting DX: Acute hypoxemic respiratory failure. Patient on 2 LPM supplemental oxygen. T & R as tolerated . Sacral mepilex CDI. Tylenol available for pain management. C/O of lower back pain, Tylenol given X 1-See MAR. Possible discharge to [facility] TCU today. Afebrile.   /67 (BP Location: Right arm)   Pulse 95   Temp 98.7  F (37.1  C) (Oral)   Resp 18   Wt 119 kg (262 lb 5.6 oz)   LMP  (LMP Unknown)   SpO2 95%   BMI 51.24 kg/m       Problem: Adult Inpatient Plan of Care  Goal: Plan of Care Review  Description: The Plan of Care Review/Shift note should be completed every shift.  The Outcome Evaluation is a brief statement about your assessment that the patient is improving, declining, or no change.  This information will be displayed automatically on your shift  note.  Outcome: Progressing  Flowsheets (Taken 7/23/2025 0514)  Plan of Care Reviewed With: patient  Goal: Patient-Specific Goal (Individualized)  Description: You can add care plan individualizations to a care plan. Examples of Individualization might be:  \"Parent requests to be called daily at 9am for status\", \"I have a hard time hearing out of my right ear\", or \"Do not touch me to wake me up as it startles  me\".  Outcome: Progressing  Goal: Absence of Hospital-Acquired Illness or Injury  Outcome: Progressing  Intervention: Identify and Manage Fall Risk  Recent Flowsheet Documentation  Taken 7/22/2025 2047 by Shavon Queen RN  Safety Promotion/Fall Prevention:   safety round/check completed   room organization consistent   room near nurse's station   room door open  Intervention: Prevent Skin Injury  Recent Flowsheet Documentation  Taken " 7/22/2025 2047 by Shavon Queen RN  Body Position: supine, head elevated  Skin Protection: tubing/devices free from skin contact  Intervention: Prevent and Manage VTE (Venous Thromboembolism) Risk  Recent Flowsheet Documentation  Taken 7/22/2025 2047 by Shavon Queen RN  VTE Prevention/Management: SCDs off (sequential compression devices)  Intervention: Prevent Infection  Recent Flowsheet Documentation  Taken 7/22/2025 2047 by Shavon Queen RN  Infection Prevention:   hand hygiene promoted   rest/sleep promoted   single patient room provided  Goal: Optimal Comfort and Wellbeing  Outcome: Progressing  Intervention: Monitor Pain and Promote Comfort  Recent Flowsheet Documentation  Taken 7/22/2025 2045 by Shavon Queen RN  Pain Management Interventions:   medication (see MAR)   rest  Intervention: Provide Person-Centered Care  Recent Flowsheet Documentation  Taken 7/22/2025 2047 by Shavon Queen RN  Trust Relationship/Rapport:   care explained   choices provided   emotional support provided   thoughts/feelings acknowledged  Goal: Readiness for Transition of Care  Outcome: Progressing     Problem: Delirium  Goal: Optimal Coping  Outcome: Progressing  Intervention: Optimize Psychosocial Adjustment to Delirium  Recent Flowsheet Documentation  Taken 7/22/2025 2047 by Shavon Queen RN  Family/Support System Care:   involvement promoted   self-care encouraged  Goal: Improved Behavioral Control  Outcome: Progressing  Intervention: Minimize Safety Risk  Recent Flowsheet Documentation  Taken 7/22/2025 2047 by Shavon Queen RN  Enhanced Safety Measures: pain management  Trust Relationship/Rapport:   care explained   choices provided   emotional support provided   thoughts/feelings acknowledged  Goal: Improved Attention and Thought Clarity  Outcome: Progressing  Intervention: Maximize Cognitive Function  Recent Flowsheet Documentation  Taken 7/22/2025 2047 by Shavon Queen  RN  Sensory Stimulation Regulation:   care clustered   quiet environment promoted  Goal: Improved Sleep  Outcome: Progressing     Problem: Skin Injury Risk Increased  Goal: Skin Health and Integrity  Outcome: Progressing  Intervention: Plan: Nurse Driven Intervention: Moisture Management  Recent Flowsheet Documentation  Taken 7/22/2025 2047 by Shavon Queen RN  Moisture Interventions:   Encourage regular toileting   Urinary collection device   Perineal cleanser  Bathing/Skin Care: incontinence care  Intervention: Plan: Nurse Driven Intervention: Friction and Shear  Recent Flowsheet Documentation  Taken 7/22/2025 2047 by Shavon Queen RN  Friction/Shear Interventions: HOB 30 degrees or less  Intervention: Optimize Skin Protection  Recent Flowsheet Documentation  Taken 7/22/2025 2047 by Shavon Queen RN  Pressure Reduction Techniques:   frequent weight shift encouraged   heels elevated off bed  Skin Protection: tubing/devices free from skin contact  Activity Management: activity adjusted per tolerance  Head of Bed (HOB) Positioning: HOB at 30-45 degrees    Will continue to provide supportive cares.     Shavon Queen RN       Plan of Care Reviewed With: patient

## 2025-07-23 NOTE — PROGRESS NOTES
[facility]    Medicine Progress Note - Hospitalist Service    Date of Admission:  7/16/2025    Assessment & Plan   Carol Merida is a 89 year old female with PMHx of morbid obesity, diastolic CHF, JEAN on CPAP, reactive airway disease, MDD, anxiety, panic attacks, diet controlled DM type II, hypothyroidism, lymphedema, recurrent right lower extremity cellulitis, chronic cough, chronic pain, neuropathy, osteoarthritis, sacral pressure ulcers, and multiple joint replacement surgeries, who was admitted on 7/16/2025 with abdominal pain likely due to constipation. She also notes dyspnea.      In the ED Spo2 92% placed on 2 LPM NC, vital signs stable and afebrile. VBG with pH 7.31, pCO2 of 52. Lactic acid 2.0. CMP with mildly elevated ast, alk phos 169, total bilirubin and lipase not elevated. Serial Tn I stable in the 20's near prior, 22, EKG with NSR no acute ST elevation. Viral PCR still Covid positive, recent COVID infection in May '25. CT Chest PE Abdomen with contrast negative for PE or PNA, no significant hypervolemia, gaseous and stool throughout the colon from the cecum to the mid sigmoid colon, redundant tissue in the central abdomen without significant sigmoid mesenteric rotation. RUQ US Limited evaluation with no definitive evidence for gallbladder wall thickening.    Colorectal Surgery were consulted from the ED, reviewed patient and imaging and felt that the patient did not have a sigmoid volvulus. Given Toradol, Decadron, DuoNeb, Zofran. Admission was requested from hospitalist service for further cares.    Since admission CRS consulted recommending gastrograffin enema XR on 7/16 which showed abrupt narrowing of colon lumen in proximal sigmoid colon without flow of contrast beyond this raising suspicion for stricture. CRS discussed option of surgery but felt pt was a very poor surgical candidate. GI consulted and flexible sigmoidoscopy performed on 7/18 that did not show obvious  stricture but the prep was poor. GI recommended colonoscopy prep and repeat imaging showed improved stool burden with significant improvement of patient's symptoms.     Patient is now eating and drinking but mobility/strength is below baseline with PT recommending rehab. Has intermittent hypoxia. CXR on 7/22 shows evidence of mild fluid overload.  Patient received IV Lasix 7/22 and 7/23.  Is currently requiring half liter of oxygen and has had good urine output.  Will plan to resume her PTA diuretic 7/24.  Continue efforts to wean oxygen and likely discharge to TCU 7/24.    Acute hypoxemic respiratory failure  Hx of chronic Cough and JEAN  -Presented with dyspnea increased from baseline. No overt hypoxia recorded in the ED but maintained on 2 L/min nasal cannula.  VBG in ED shows respiratory acidosis.    -Patient was reportedly initially a little wheezy on exam in the ED and given Decadron. CT PE scan unremarkable for anything acute.  -She does not wear continuous supplemental oxygen at home at baseline per her report but does use CPAP  - continues to require intermittent oxygen especially at night.   - CXR on 7/22 shows evidence of bilateral interstitial thickening likely edema  - Received PTA Torsemide on 7/22 AM so will give Lasix 40 mg IV this evening and tomorrow AM with strict intake/output and daily weight  - encourage IS today, up to chair for meals  -Continue home Advair and PRN duo nebs  - continue PTA Ativan (resuming PTA diuretic 7/24)  - CPAP ordered on admission but has only been on oxygen at night. Told nursing to call respiratory for CPAP overnight.    Abdominal Pain due to constipation-resolved  Presented with abdominal pain and constipation with associated nausea and emesis.   CT chest/abd/pelvis showed marked amount of gas and stool throughout colon with redundant sigmoid colon. There is a transition point in the sigmoid colon without obvious mass, edema or perforation.   Colorectal Surgery were  consulted from the ED, low suspicion for sigmoid volvulus.   - CRS recommended gastrograffin enema to evaluate for obstruction  - On 716 gastrograffin enema XR showed abrupt narrowing of colon lumen in proximal sigmoid colon without flow of contrast beyond this raising suspicion for stricture   - CRS discussed option of surgery, felt pt was a very poor surgical candidate and would likely not survive surgery if needed.   - GI consulted, recommended flex sig performed on 7/18 to evaluate for stricture as well as disimpact bowel  - no stricture noted on flex sig, GI recommended bowel prep  - multiple loose stools after starting bowel prep, now completed  - significant improvement in sx  - repeat Abd XR shows small stool burden, air throughout colon  - Start Miralax daily on 7/21  - PRN Fleets enema and pepto bismol and PRN simethicone.   -Resume scheduled regimen.  Hold parameters in place.  - Regular diet    Chronic diastolic CHF with mild exacerbation  Chronic lymphedema  History of recurrent right lower extremity cellulitis  Appears euvolemic but hypoxia present suggestive of overload  - PTA torsemide 20 mg daily  - Diuresed with IV Lasix 7/22 and 7/23.  Had good output.  BNP is not elevated.  Appears euvolemic.  Plan to resume PTA torsemide 7/24.   - 20 meq potassium daily  - 2 g sodium restriction   - Patient's daughter reports that she was started on a course of Bactrim for recurrent cellulitis in her right lower extremity, she completed this 7/18     Weakness  -At baseline is able to pivot to wheelchair but now requiring assist of 2 with michael steady which is below physical baseline  -PT recommending TCU    Bilateral pressure injury to buttocks  -WOC consulted and daily Mepilex applied    Viral PCR still Covid positive, recent COVID infection in May '25.   No isolation requirements, not an active or new COVID viral infection      Cholelithiasis   Appears asymptomatic as noted above currently lower suspicion for  biliary colic as cause for symptoms. No RUQ pain. Limited evaluation with no definitive evidence for gallbladder wall thickening. LFT Labs stable from baseline.   - no symptoms consistent with biliary colic at this time, CMP has remained unremarkable  - currently would not recommend antibiotics     Hepatic Steatosis   Morbid Obesity   - noted, LFTs stable. BMI of 46  - f/up with PCP     Hepatic Lesion   - 1.5 cm enhancing observation at the dome of the right hepatic lobe. Recommend nonemergent contrast-enhanced MRI for further evaluation.      Chronic pain  Osteoarthritis  Neuropathy  - history of bilateral TKA, right HUGO, and right femur IM nail  - WC transfers at baseline   - cont home gabapentin 400 mg 3 times daily, acetaminophen 3 times daily, and celecoxib 100 mg twice daily as needed     Gout  - cont allopurinol 100 mg daily     Hypothyroidism  - continue PTA levothyroxine 88 mcg daily     MDD  Anxiety  Panic attacks  - continue PTA duloxetine 60 mg daily and lorazepam 0.5 mg every 6 hours as needed. Also takes for dyspnea.      History sacral pressure ulcers  Right Buttock Pressure Injury, Stage 3 (POA)   - WOC       DM type II  HgbA1c 6.6 4/2025  - diet controlled        Diet: Regular Diet Adult    DVT Prophylaxis: Enoxaparin (Lovenox) SQ  Benson Catheter: Not present  Lines: None     Cardiac Monitoring: None  Code Status: No CPR- Do NOT Intubate      Clinically Significant Risk Factors        # Hypokalemia: Lowest K = 3.3 mmol/L in last 2 days, will replace as needed   # Hypochloremia: Lowest Cl = 90 mmol/L in last 2 days, will monitor as appropriate      # Hypoalbuminemia: Lowest albumin = 3.4 g/dL at 7/19/2025  5:32 AM, will monitor as appropriate     # Hypertension: Noted on problem list    # Acute heart failure with preserved ejection fraction: heart failure noted on problem list, last echo with EF >50%, and receiving IV diuretics          # DMII: A1C = 6.6 % (Ref range: <5.7 %) within past 6 months          # Financial/Environmental Concerns:           Social Drivers of Health            Disposition Plan     Medically Ready for Discharge: Anticipated Tomorrow       The patient's care was discussed with the Bedside Nurse, Care Coordinator/, Patient, and Patient's Family.    Montserrat Chavis MD  Hospitalist Service      ______________________________________________________________________    Interval History   Patient received IV Lasix 7/22 and 7/23. Monitoring response. Did have good diuretic response. Plan to resume PTA dose tomorrow and likely discharge to TCU at that time. Continues to require assist of 2 and will require TCU since cannot return to facility requiring more than assist of 1.      Physical Exam   Vital Signs: Temp: 98.1  F (36.7  C) Temp src: Oral BP: 128/76 Pulse: 84   Resp: 18 SpO2: 93 % O2 Device: Nasal cannula Oxygen Delivery: 1/2 LPM  Weight: 262 lbs 5.56 oz    Constitutional: Pleasant older woman seen resting in bed.  Alert and oriented x3.  Answering questions appropriately.  No acute distress.   HEENT: NCAT. EOMI. Moist oral mucosa.  Respiratory: Clear to auscultation bilaterally. No crackles or wheezes though taking shallow inspirations..  Cardiovascular: Regular rate and rhythm.  GI: Soft, nontender, nondistended.   Neurologic: Alert and oriented x3. No focal neurologic deficits.  Generalized weakness throughout.        Medical Decision Making       38 MINUTES SPENT BY ME on the date of service doing chart review, history, exam, documentation & further activities per the note.      Data     I have personally reviewed the following data over the past 24 hrs:    8.9  \   12.6   / 211     135 90 (L) 9.6 /  112 (H)   3.3 (L) 31 (H) 0.59 \     Trop: N/A BNP: 212       Imaging results reviewed over the past 24 hrs:   No results found for this or any previous visit (from the past 24 hours).

## 2025-07-24 ENCOUNTER — APPOINTMENT (OUTPATIENT)
Dept: PHYSICAL THERAPY | Facility: CLINIC | Age: 89
DRG: 391 | End: 2025-07-24
Payer: COMMERCIAL

## 2025-07-24 VITALS
WEIGHT: 259.7 LBS | HEART RATE: 93 BPM | DIASTOLIC BLOOD PRESSURE: 69 MMHG | TEMPERATURE: 98.7 F | BODY MASS INDEX: 50.72 KG/M2 | OXYGEN SATURATION: 91 % | SYSTOLIC BLOOD PRESSURE: 130 MMHG | RESPIRATION RATE: 26 BRPM

## 2025-07-24 LAB
ANION GAP SERPL CALCULATED.3IONS-SCNC: 12 MMOL/L (ref 7–15)
BUN SERPL-MCNC: 9.8 MG/DL (ref 8–23)
CALCIUM SERPL-MCNC: 9.3 MG/DL (ref 8.8–10.4)
CHLORIDE SERPL-SCNC: 90 MMOL/L (ref 98–107)
CREAT SERPL-MCNC: 0.61 MG/DL (ref 0.51–0.95)
EGFRCR SERPLBLD CKD-EPI 2021: 85 ML/MIN/1.73M2
ERYTHROCYTE [DISTWIDTH] IN BLOOD BY AUTOMATED COUNT: 14.3 % (ref 10–15)
GLUCOSE SERPL-MCNC: 135 MG/DL (ref 70–99)
HCO3 SERPL-SCNC: 33 MMOL/L (ref 22–29)
HCT VFR BLD AUTO: 39.5 % (ref 35–47)
HGB BLD-MCNC: 12.5 G/DL (ref 11.7–15.7)
MCH RBC QN AUTO: 31.5 PG (ref 26.5–33)
MCHC RBC AUTO-ENTMCNC: 31.6 G/DL (ref 31.5–36.5)
MCV RBC AUTO: 100 FL (ref 78–100)
PLATELET # BLD AUTO: 209 10E3/UL (ref 150–450)
POTASSIUM SERPL-SCNC: 3.9 MMOL/L (ref 3.4–5.3)
RBC # BLD AUTO: 3.97 10E6/UL (ref 3.8–5.2)
SODIUM SERPL-SCNC: 135 MMOL/L (ref 135–145)
WBC # BLD AUTO: 7.5 10E3/UL (ref 4–11)

## 2025-07-24 PROCEDURE — 250N000011 HC RX IP 250 OP 636: Performed by: PHYSICIAN ASSISTANT

## 2025-07-24 PROCEDURE — 94640 AIRWAY INHALATION TREATMENT: CPT

## 2025-07-24 PROCEDURE — 94660 CPAP INITIATION&MGMT: CPT

## 2025-07-24 PROCEDURE — 250N000011 HC RX IP 250 OP 636: Performed by: INTERNAL MEDICINE

## 2025-07-24 PROCEDURE — 999N000157 HC STATISTIC RCP TIME EA 10 MIN

## 2025-07-24 PROCEDURE — 97530 THERAPEUTIC ACTIVITIES: CPT | Mod: GP

## 2025-07-24 PROCEDURE — 36415 COLL VENOUS BLD VENIPUNCTURE: CPT | Performed by: INTERNAL MEDICINE

## 2025-07-24 PROCEDURE — 999N000156 HC STATISTIC RCP CONSULT EA 30 MIN

## 2025-07-24 PROCEDURE — 250N000013 HC RX MED GY IP 250 OP 250 PS 637: Performed by: INTERNAL MEDICINE

## 2025-07-24 PROCEDURE — 250N000013 HC RX MED GY IP 250 OP 250 PS 637: Performed by: PHYSICIAN ASSISTANT

## 2025-07-24 PROCEDURE — 85027 COMPLETE CBC AUTOMATED: CPT | Performed by: INTERNAL MEDICINE

## 2025-07-24 PROCEDURE — 120N000001 HC R&B MED SURG/OB

## 2025-07-24 PROCEDURE — 250N000009 HC RX 250: Performed by: INTERNAL MEDICINE

## 2025-07-24 PROCEDURE — 94640 AIRWAY INHALATION TREATMENT: CPT | Mod: 76

## 2025-07-24 PROCEDURE — 82310 ASSAY OF CALCIUM: CPT | Performed by: INTERNAL MEDICINE

## 2025-07-24 PROCEDURE — 99232 SBSQ HOSP IP/OBS MODERATE 35: CPT | Performed by: INTERNAL MEDICINE

## 2025-07-24 RX ORDER — IPRATROPIUM BROMIDE AND ALBUTEROL SULFATE 2.5; .5 MG/3ML; MG/3ML
3 SOLUTION RESPIRATORY (INHALATION)
Status: DISCONTINUED | OUTPATIENT
Start: 2025-07-24 | End: 2025-07-24

## 2025-07-24 RX ORDER — FUROSEMIDE 10 MG/ML
40 INJECTION INTRAMUSCULAR; INTRAVENOUS ONCE
Status: COMPLETED | OUTPATIENT
Start: 2025-07-25 | End: 2025-07-25

## 2025-07-24 RX ORDER — FUROSEMIDE 10 MG/ML
40 INJECTION INTRAMUSCULAR; INTRAVENOUS ONCE
Status: COMPLETED | OUTPATIENT
Start: 2025-07-24 | End: 2025-07-24

## 2025-07-24 RX ORDER — IPRATROPIUM BROMIDE AND ALBUTEROL SULFATE 2.5; .5 MG/3ML; MG/3ML
3 SOLUTION RESPIRATORY (INHALATION) EVERY 4 HOURS PRN
Status: DISCONTINUED | OUTPATIENT
Start: 2025-07-24 | End: 2025-07-25 | Stop reason: HOSPADM

## 2025-07-24 RX ADMIN — GUAIFENESIN 400 MG: 200 TABLET ORAL at 20:22

## 2025-07-24 RX ADMIN — MINERAL OIL, WHITE PETROLATUM: .03; .94 OINTMENT OPHTHALMIC at 22:49

## 2025-07-24 RX ADMIN — ESTRADIOL 2 G: 0.1 CREAM VAGINAL at 09:39

## 2025-07-24 RX ADMIN — ONDANSETRON 4 MG: 4 TABLET, ORALLY DISINTEGRATING ORAL at 14:22

## 2025-07-24 RX ADMIN — ACETAMINOPHEN 650 MG: 325 TABLET ORAL at 14:31

## 2025-07-24 RX ADMIN — DULOXETINE 60 MG: 60 CAPSULE, DELAYED RELEASE ORAL at 08:19

## 2025-07-24 RX ADMIN — POLYETHYLENE GLYCOL 3350 17 G: 17 POWDER, FOR SOLUTION ORAL at 08:19

## 2025-07-24 RX ADMIN — FUROSEMIDE 40 MG: 10 INJECTION, SOLUTION INTRAVENOUS at 12:12

## 2025-07-24 RX ADMIN — CARBOXYMETHYLCELLULOSE SODIUM 1 DROP: 5 SOLUTION/ DROPS OPHTHALMIC at 14:22

## 2025-07-24 RX ADMIN — IPRATROPIUM BROMIDE AND ALBUTEROL SULFATE 3 ML: .5; 3 SOLUTION RESPIRATORY (INHALATION) at 19:39

## 2025-07-24 RX ADMIN — GUAIFENESIN 400 MG: 200 TABLET ORAL at 08:19

## 2025-07-24 RX ADMIN — HYDROCORTISONE: 25 CREAM TOPICAL at 08:20

## 2025-07-24 RX ADMIN — GABAPENTIN 400 MG: 400 CAPSULE ORAL at 14:22

## 2025-07-24 RX ADMIN — LORAZEPAM 0.5 MG: 0.5 TABLET ORAL at 14:22

## 2025-07-24 RX ADMIN — GABAPENTIN 400 MG: 400 CAPSULE ORAL at 20:22

## 2025-07-24 RX ADMIN — FLUTICASONE FUROATE AND VILANTEROL TRIFENATATE 1 PUFF: 100; 25 POWDER RESPIRATORY (INHALATION) at 08:03

## 2025-07-24 RX ADMIN — SENNOSIDES 2 TABLET: 8.6 TABLET, FILM COATED ORAL at 20:22

## 2025-07-24 RX ADMIN — GABAPENTIN 400 MG: 400 CAPSULE ORAL at 06:17

## 2025-07-24 RX ADMIN — HYDROCORTISONE: 25 CREAM TOPICAL at 20:15

## 2025-07-24 RX ADMIN — LEVOTHYROXINE SODIUM 88 MCG: 0.09 TABLET ORAL at 06:17

## 2025-07-24 RX ADMIN — ENOXAPARIN SODIUM 40 MG: 40 INJECTION SUBCUTANEOUS at 14:22

## 2025-07-24 RX ADMIN — POTASSIUM CHLORIDE 40 MEQ: 20 TABLET, EXTENDED RELEASE ORAL at 08:18

## 2025-07-24 RX ADMIN — ALLOPURINOL 100 MG: 100 TABLET ORAL at 08:19

## 2025-07-24 RX ADMIN — CARBOXYMETHYLCELLULOSE SODIUM 1 DROP: 5 SOLUTION/ DROPS OPHTHALMIC at 08:19

## 2025-07-24 RX ADMIN — TORSEMIDE 20 MG: 20 TABLET ORAL at 08:18

## 2025-07-24 RX ADMIN — CARBOXYMETHYLCELLULOSE SODIUM 1 DROP: 5 SOLUTION/ DROPS OPHTHALMIC at 20:22

## 2025-07-24 ASSESSMENT — ACTIVITIES OF DAILY LIVING (ADL)
ADLS_ACUITY_SCORE: 54
ADLS_ACUITY_SCORE: 58
ADLS_ACUITY_SCORE: 54
ADLS_ACUITY_SCORE: 54
ADLS_ACUITY_SCORE: 62
ADLS_ACUITY_SCORE: 58
ADLS_ACUITY_SCORE: 54
ADLS_ACUITY_SCORE: 58
ADLS_ACUITY_SCORE: 58
ADLS_ACUITY_SCORE: 62
ADLS_ACUITY_SCORE: 54
ADLS_ACUITY_SCORE: 54
ADLS_ACUITY_SCORE: 58
ADLS_ACUITY_SCORE: 54

## 2025-07-24 NOTE — CONSULTS
[facility] - Consult Note  [facility]    Referral Source: Pt requested  support per staff.    Pt Everette shared that she is feeling anxious today.  She reported that she has used mindful breathing and medication to help her cope with her anxiety.  Everette expressed interest in learning a couple of prayerful breathing techniques and engaged in each of them.  Later, Everette shared that one of her worries is that she will not be able to attend her grandson wedding this Saturday.  She has a couple of friends who will be with her on Saturday to help her watch the wedding by video.  Everette reflected that she moved into her care facility soon after her spouse  in  and shared that she has adjusted to living there.  She welcomed prayer at the end of our conversation.      Plan: Will see pt at the beginning of next week, if she is still admitted, for an emotional support check-in.    Noman Jarrett M.Div., [facility]  Staff     SHS available  for emergent requests/referrals, either by paging the on-call  or by entering an ASAP/STAT consult in [employer], which will also page the on-call .

## 2025-07-24 NOTE — PROGRESS NOTES
A CPAP of +10 and 30% was applied to pt for overnight use. Skin is intact. RT will monitor.    Barb Jj RT

## 2025-07-24 NOTE — PLAN OF CARE
"Goal Outcome Evaluation:      Plan of Care Reviewed With: patient    Overall Patient Progress: improvingOverall Patient Progress: improving     BP 99/61   Pulse 88   Temp 98.1  F (36.7  C) (Oral)   Resp 18   Wt 117.8 kg (259 lb 11.2 oz)   LMP  (LMP Unknown)   SpO2 95%   BMI 50.72 kg/m      Pt A & O x 4 with some forgetfulness, able to make needs known, call light with in reach for needs, VSS,  up Ax2 with michael steady, purewick in place, SL, still requiring oxygen during the day, sleeps most of the time, plan was discharge today and plan changed due to pt's oxygen level, one time lasix was given, plan is to discharge tomorrow.     Problem: Adult Inpatient Plan of Care  Goal: Plan of Care Review  Description: The Plan of Care Review/Shift note should be completed every shift.  The Outcome Evaluation is a brief statement about your assessment that the patient is improving, declining, or no change.  This information will be displayed automatically on your shift  note.  Outcome: Progressing  Flowsheets (Taken 7/24/2025 1346)  Plan of Care Reviewed With: patient  Overall Patient Progress: improving  Goal: Patient-Specific Goal (Individualized)  Description: You can add care plan individualizations to a care plan. Examples of Individualization might be:  \"Parent requests to be called daily at 9am for status\", \"I have a hard time hearing out of my right ear\", or \"Do not touch me to wake me up as it startles  me\".  Outcome: Progressing  Goal: Absence of Hospital-Acquired Illness or Injury  Outcome: Progressing  Intervention: Identify and Manage Fall Risk  Recent Flowsheet Documentation  Taken 7/24/2025 0900 by Raine Amato, RN  Safety Promotion/Fall Prevention:   safety round/check completed   room near nurse's station   room door open  Intervention: Prevent Skin Injury  Recent Flowsheet Documentation  Taken 7/24/2025 0900 by Raine Amato, RN  Body Position:   weight shifting   supine, head " elevated  Intervention: Prevent and Manage VTE (Venous Thromboembolism) Risk  Recent Flowsheet Documentation  Taken 7/24/2025 0900 by Raine Amato RN  VTE Prevention/Management: SCDs off (sequential compression devices)  Intervention: Prevent Infection  Recent Flowsheet Documentation  Taken 7/24/2025 0900 by Raine Amato RN  Infection Prevention: hand hygiene promoted  Goal: Optimal Comfort and Wellbeing  Outcome: Progressing  Goal: Readiness for Transition of Care  Outcome: Progressing     Problem: Delirium  Goal: Optimal Coping  Outcome: Progressing  Goal: Improved Behavioral Control  Outcome: Progressing  Intervention: Minimize Safety Risk  Recent Flowsheet Documentation  Taken 7/24/2025 0900 by Raine Amato RN  Enhanced Safety Measures: room near unit station  Goal: Improved Attention and Thought Clarity  Outcome: Progressing  Goal: Improved Sleep  Outcome: Progressing     Problem: Skin Injury Risk Increased  Goal: Skin Health and Integrity  Outcome: Progressing  Intervention: Plan: Nurse Driven Intervention: Moisture Management  Recent Flowsheet Documentation  Taken 7/24/2025 0900 by Raine Amato RN  Moisture Interventions:   Encourage regular toileting   Incontinence pad   Urinary collection device   Barrier ointment (CriticAid, Triad paste)   Perineal cleanser  Intervention: Plan: Nurse Driven Intervention: Friction and Shear  Recent Flowsheet Documentation  Taken 7/24/2025 0900 by Raine Amato RN  Friction/Shear Interventions:   HOB 30 degrees or less   Silicone foam sacral dressing  Intervention: Optimize Skin Protection  Recent Flowsheet Documentation  Taken 7/24/2025 0900 by Raine Amato RN  Pressure Reduction Techniques:   frequent weight shift encouraged   heels elevated off bed   pressure points protected  Pressure Reduction Devices: positioning supports utilized  Head of Bed (HOB) Positioning: HOB at 30 degrees     Problem: Pain Acute  Goal: Optimal Pain  Control and Function  Outcome: Progressing

## 2025-07-24 NOTE — PLAN OF CARE
"Goal Outcome Evaluation:      Plan of Care Reviewed With: patient    Overall Patient Progress: improvingOverall Patient Progress: improving    Outcome Evaluation: AOx4- forgetful and sleepy but wakes to voice. A2 to turn and repo and change in bed.  Purewick in place.  Poor appetite-needs encouragement for PO. 0.5L O2 NC to keep sats >90%.  Pain in back 2/10- no need for inervention. mepilex on IT/buttocks c/d/i. Offloading with pillows throughout shift. Plan: possible discharge to TCU tomorrow.        Problem: Adult Inpatient Plan of Care  Goal: Plan of Care Review  Description: The Plan of Care Review/Shift note should be completed every shift.  The Outcome Evaluation is a brief statement about your assessment that the patient is improving, declining, or no change.  This information will be displayed automatically on your shift  note.  Outcome: Progressing  Flowsheets (Taken 7/23/2025 5015)  Outcome Evaluation: AOx4- forgetful and sleepy but wakes to voice. A2 to turn and repo and change in bed.  Purewick in place.  Poor appetite-needs encouragement for PO. 0.5L O2 NC to keep sats >90%.  Pain in back 2/10- no need for inervention. mepilex on IT/buttocks c/d/i. Offloading with pillows throughout shift. Plan: possible discharge to TCU tomorrow.  Plan of Care Reviewed With: patient  Overall Patient Progress: improving  Goal: Patient-Specific Goal (Individualized)  Description: You can add care plan individualizations to a care plan. Examples of Individualization might be:  \"Parent requests to be called daily at 9am for status\", \"I have a hard time hearing out of my right ear\", or \"Do not touch me to wake me up as it startles  me\".  Outcome: Progressing  Goal: Absence of Hospital-Acquired Illness or Injury  Outcome: Progressing  Intervention: Prevent Skin Injury  Recent Flowsheet Documentation  Taken 7/23/2025 2335 by Hamida Sage RN  Body Position: position changed independently  Intervention: Prevent and Manage " VTE (Venous Thromboembolism) Risk  Recent Flowsheet Documentation  Taken 7/23/2025 1942 by Hamida Sage, RN  VTE Prevention/Management: SCDs off (sequential compression devices)  Goal: Optimal Comfort and Wellbeing  Outcome: Progressing  Intervention: Monitor Pain and Promote Comfort  Recent Flowsheet Documentation  Taken 7/23/2025 1436 by Hamida Sage, RN  Pain Management Interventions:   medication (see MAR)   rest  Goal: Readiness for Transition of Care  Outcome: Progressing

## 2025-07-24 NOTE — PLAN OF CARE
"[id number]    Inpatient Progress Note:    /71 (BP Location: Right arm)   Pulse 94   Temp 98.1  F (36.7  C) (Oral)   Resp 18   Wt 117.8 kg (259 lb 11.2 oz)   LMP  (LMP Unknown)   SpO2 96%   BMI 50.72 kg/m      Goal Outcome Evaluation:      Plan of Care Reviewed With: patient    Overall Patient Progress: improvingOverall Patient Progress: improving    Outcome Evaluation: A/O*4, w/ some forgetfulness, VSS, O2 1L NC, pt not OOB, AX2 in bed w/personal cares. barrium cream applied buttock area, pillows supported. Dsicharge today.    Problem: Adult Inpatient Plan of Care  Goal: Plan of Care Review  Description:   Outcome: Progressing  Flowsheets (Taken 7/24/2025 0641)  Outcome Evaluation: A/O*4, w/ some forgetfulness, VSS, O2 1L NC, pt not OOB, AX2 in bed w/personal cares. barrium cream applied buttock area, pillows supported. Dsicharge today.  Plan of Care Reviewed With: patient  Overall Patient Progress: improving  Goal: Patient-Specific Goal (Individualized)  Description: You can add care plan individualizations to a care plan. Examples of Individualization might be:  \"Parent requests to be called daily at 9am for status\", \"I have a hard time hearing out of my right ear\", or \"Do not touch me to wake me up as it startles  me\".  Outcome: Progressing  Goal: Absence of Hospital-Acquired Illness or Injury  Outcome: Progressing  Intervention: Identify and Manage Fall Risk  Recent Flowsheet Documentation  Taken 7/24/2025 0030 by Acacia Ontiveros RN  Safety Promotion/Fall Prevention:   safety round/check completed   room near nurse's station   room door open  Intervention: Prevent Skin Injury  Recent Flowsheet Documentation  Taken 7/24/2025 0030 by Acacia Ontiveros, RN  Body Position:   weight shifting   supine, head elevated  Intervention: Prevent and Manage VTE (Venous Thromboembolism) Risk  Recent Flowsheet Documentation  Taken 7/24/2025 0030 by Acacia Ontiveros, RN  VTE Prevention/Management: SCDs off (sequential " compression devices)  Intervention: Prevent Infection  Recent Flowsheet Documentation  Taken 7/24/2025 0030 by Acacia Ontiveros, RN  Infection Prevention: hand hygiene promoted  Goal: Optimal Comfort and Wellbeing  Outcome: Progressing  Goal: Readiness for Transition of Care  Outcome: Progressing

## 2025-07-24 NOTE — PROGRESS NOTES
Care Management Follow Up    Length of Stay (days): 8    Expected Discharge Date: 07/25/2025     Concerns to be Addressed: discharge planning     Patient plan of care discussed at interdisciplinary rounds: Yes    Anticipated Discharge Disposition: Transitional Care     Anticipated Discharge Services:  (TCU)  Anticipated Discharge DME: None    Patient/family educated on Medicare website which has current facility and service quality ratings: yes  Education Provided on the Discharge Plan: Yes  Patient/Family in Agreement with the Plan: yes    Referrals Placed by CM/SW: Post Acute Facilities  Private pay costs discussed: private room/amenity fees and transportation costs    Discussed  Partnership in Safe Discharge Planning  document with patient/family: Yes     Handoff Completed: No, handoff not indicated or clinically appropriate    Additional Information:  Per provider, pt not yet medically ready for discharge today. Hopeful for tomorrow 7/25, ride rescheduled to tomorrow between [id number]. Updated TCU who reports that they will have a private room for pt at discharge. Updated pt's son Jono.     Update 1115: Son with questions about LTC placement in the event pt doesn't make progress at [facility]CU. [facility] and report pt would have [facility] coverage if needed, they also have LTC availability if needed. Called and updated Jono, all questions answered.      Next Steps: Send orders to TCU at discharge.      Justa Castellon RN BSN   Inpatient Care Coordination  [facility]   Phone [phone]

## 2025-07-24 NOTE — PLAN OF CARE
"Goal Outcome Evaluation:      Plan of Care Reviewed With: patient    Overall Patient Progress: improvingOverall Patient Progress: improving     /74 (BP Location: Left arm)   Pulse 93   Temp 98.8  F (37.1  C) (Oral)   Resp 18   Wt 117.8 kg (259 lb 11.2 oz)   LMP  (LMP Unknown)   SpO2 94%   BMI 50.72 kg/m        Pt A & O x 4 with some forgetfulness, able to make needs known, call light with in reach for needs, VSS,  up Ax2 with michael steady, purewick in place, SL, still requiring oxygen during the day and night, sleeps most of the time, plan was discharge today and plan changed due to pt's oxygen level, one time lasix was given, plan is to discharge tomorrow.     Problem: Adult Inpatient Plan of Care  Goal: Plan of Care Review  Description: The Plan of Care Review/Shift note should be completed every shift.  The Outcome Evaluation is a brief statement about your assessment that the patient is improving, declining, or no change.  This information will be displayed automatically on your shift  note.  Outcome: Progressing  Flowsheets (Taken 7/24/2025 1346)  Plan of Care Reviewed With: patient  Overall Patient Progress: improving  Goal: Patient-Specific Goal (Individualized)  Description: You can add care plan individualizations to a care plan. Examples of Individualization might be:  \"Parent requests to be called daily at 9am for status\", \"I have a hard time hearing out of my right ear\", or \"Do not touch me to wake me up as it startles  me\".  Outcome: Progressing  Goal: Absence of Hospital-Acquired Illness or Injury  Outcome: Progressing  Intervention: Identify and Manage Fall Risk  Recent Flowsheet Documentation  Taken 7/24/2025 0900 by Raine Amato RN  Safety Promotion/Fall Prevention:   safety round/check completed   room near nurse's station   room door open  Intervention: Prevent Skin Injury  Recent Flowsheet Documentation  Taken 7/24/2025 0900 by Raine Amato RN  Body Position:   weight " shifting   supine, head elevated  Intervention: Prevent and Manage VTE (Venous Thromboembolism) Risk  Recent Flowsheet Documentation  Taken 7/24/2025 0900 by Raine Amato RN  VTE Prevention/Management: SCDs off (sequential compression devices)  Intervention: Prevent Infection  Recent Flowsheet Documentation  Taken 7/24/2025 0900 by Raine Amato RN  Infection Prevention: hand hygiene promoted  Goal: Optimal Comfort and Wellbeing  Outcome: Progressing  Goal: Readiness for Transition of Care  Outcome: Progressing     Problem: Delirium  Goal: Optimal Coping  Outcome: Progressing  Goal: Improved Behavioral Control  Outcome: Progressing  Intervention: Minimize Safety Risk  Recent Flowsheet Documentation  Taken 7/24/2025 0900 by Raine Amato RN  Enhanced Safety Measures: room near unit station  Goal: Improved Attention and Thought Clarity  Outcome: Progressing  Goal: Improved Sleep  Outcome: Progressing     Problem: Skin Injury Risk Increased  Goal: Skin Health and Integrity  Outcome: Progressing  Intervention: Plan: Nurse Driven Intervention: Moisture Management  Recent Flowsheet Documentation  Taken 7/24/2025 0900 by Raine Amato RN  Moisture Interventions:   Encourage regular toileting   Incontinence pad   Urinary collection device   Barrier ointment (CriticAid, Triad paste)   Perineal cleanser  Intervention: Plan: Nurse Driven Intervention: Friction and Shear  Recent Flowsheet Documentation  Taken 7/24/2025 0900 by Raine Amato RN  Friction/Shear Interventions:   HOB 30 degrees or less   Silicone foam sacral dressing  Intervention: Optimize Skin Protection  Recent Flowsheet Documentation  Taken 7/24/2025 0900 by Raine Amato RN  Pressure Reduction Techniques:   frequent weight shift encouraged   heels elevated off bed   pressure points protected  Pressure Reduction Devices: positioning supports utilized  Head of Bed (HOB) Positioning: HOB at 30 degrees     Problem: Pain  Acute  Goal: Optimal Pain Control and Function  Outcome: Progressing

## 2025-07-24 NOTE — PROGRESS NOTES
[facility]    Medicine Progress Note - Hospitalist Service    Date of Admission:  7/16/2025    Assessment & Plan   Carol Merida is a 89 year old female with PMHx of morbid obesity, diastolic CHF, JEAN on CPAP, reactive airway disease, MDD, anxiety, panic attacks, diet controlled DM type II, hypothyroidism, lymphedema, recurrent right lower extremity cellulitis, chronic cough, chronic pain, neuropathy, osteoarthritis, sacral pressure ulcers, and multiple joint replacement surgeries, who was admitted on 7/16/2025 with abdominal pain likely due to constipation. She also notes dyspnea.      In the ED Spo2 92% placed on 2 LPM NC, vital signs stable and afebrile. VBG with pH 7.31, pCO2 of 52. Lactic acid 2.0. CMP with mildly elevated ast, alk phos 169, total bilirubin and lipase not elevated. Serial Tn I stable in the 20's near prior, 22, EKG with NSR no acute ST elevation. Viral PCR still Covid positive, recent COVID infection in May '25. CT Chest PE Abdomen with contrast negative for PE or PNA, no significant hypervolemia, gaseous and stool throughout the colon from the cecum to the mid sigmoid colon, redundant tissue in the central abdomen without significant sigmoid mesenteric rotation. RUQ US Limited evaluation with no definitive evidence for gallbladder wall thickening.    Colorectal Surgery were consulted from the ED, reviewed patient and imaging and felt that the patient did not have a sigmoid volvulus. Given Toradol, Decadron, DuoNeb, Zofran. Admission was requested from hospitalist service for further cares.    Since admission CRS consulted recommending gastrograffin enema XR on 7/16 which showed abrupt narrowing of colon lumen in proximal sigmoid colon without flow of contrast beyond this raising suspicion for stricture. CRS discussed option of surgery but felt pt was a very poor surgical candidate. GI consulted and flexible sigmoidoscopy performed on 7/18 that did not show obvious  stricture but the prep was poor. GI recommended colonoscopy prep and repeat imaging showed improved stool burden with significant improvement of patient's symptoms.     Patient is now eating and drinking but mobility/strength is below baseline with PT recommending rehab. Has intermittent hypoxia. CXR on 7/22 shows evidence of mild fluid overload.  Patient received IV Lasix 7/22 and 7/23. Patient saturating 86-88% on room air on 7/24 morning. Continuing IV diuresis and hopeful for possible discharge to TCU 7/25. Can also consider home O2 on discharge.    Acute hypoxemic respiratory failure  Hx of chronic Cough and JEAN  -Presented with dyspnea increased from baseline. No overt hypoxia recorded in the ED but maintained on 2 L/min nasal cannula.  VBG in ED shows respiratory acidosis.    -Patient was reportedly initially a little wheezy on exam in the ED and given Decadron. CT PE scan unremarkable for anything acute.  -She does not wear continuous supplemental oxygen at home at baseline per her report but does use CPAP  - CXR on 7/22 shows evidence of bilateral interstitial thickening likely edema  - Diuresed with IV Lasix 7/22 and 7/23.  Had good output.  BNP not elevated.  With continued O2 requirement, giving additional IV Lasix 7/24 and 7/25 morning. Plan to resume PTA torsemide thereafter.   - Strict in/out and daily weights.   - encourage IS, up to chair for meals. However, patient declined to do IS with writer on 7/24 and was supine in bed while eating and declined efforts to even sit up in bed. Patient seemed reluctant to increase efforts at deep inspiration.   - Continue home Advair and PRN duo nebs  - continue PTA Ativan (resuming PTA diuretic 7/24)  - CPAP ordered on admission but has only been on oxygen at night. Told nursing to call respiratory for CPAP overnight.  - Patient continues to require O2. Patient saturating 86-88% on room air 7/24 morning. Placed back on O2.    Chronic diastolic CHF with mild  exacerbation  Chronic lymphedema  History of recurrent right lower extremity cellulitis  Appears euvolemic but hypoxia present suggestive of overload  - PTA torsemide 20 mg daily  - Diuresed with IV Lasix 7/22 and 7/23.  Had good output.  BNP not elevated.  With continued O2 requirement, giving additional IV Lasix 7/24 and 7/25 morning. Plan to resume PTA torsemide thereafter.   - 20 meq potassium daily  - 2 g sodium restriction   - Patient's daughter reports that she was started on a course of Bactrim for recurrent cellulitis in her right lower extremity, she completed this 7/18     Abdominal Pain due to constipation-resolved  Presented with abdominal pain and constipation with associated nausea and emesis.   CT chest/abd/pelvis showed marked amount of gas and stool throughout colon with redundant sigmoid colon. There is a transition point in the sigmoid colon without obvious mass, edema or perforation.   Colorectal Surgery were consulted from the ED, low suspicion for sigmoid volvulus.   - CRS recommended gastrograffin enema to evaluate for obstruction  - On 716 gastrograffin enema XR showed abrupt narrowing of colon lumen in proximal sigmoid colon without flow of contrast beyond this raising suspicion for stricture   - CRS discussed option of surgery, felt pt was a very poor surgical candidate and would likely not survive surgery if needed.   - GI consulted, recommended flex sig performed on 7/18 to evaluate for stricture as well as disimpact bowel  - no stricture noted on flex sig, GI recommended bowel prep  - multiple loose stools after starting bowel prep, now completed  - significant improvement in sx  - repeat Abd XR shows small stool burden, air throughout colon  - Start Miralax daily on 7/21  - PRN Fleets enema and pepto bismol and PRN simethicone.   -Resume scheduled regimen.  Hold parameters in place.  - Regular diet    Weakness  -At baseline is able to pivot to wheelchair but now requiring assist of 2  with michael steady which is below physical baseline  -PT recommending TCU    Bilateral pressure injury to buttocks  -WOC consulted and daily Mepilex applied    Viral PCR still Covid positive, recent COVID infection in May '25.   No isolation requirements, not an active or new COVID viral infection      Cholelithiasis   Appears asymptomatic as noted above currently lower suspicion for biliary colic as cause for symptoms. No RUQ pain. Limited evaluation with no definitive evidence for gallbladder wall thickening. LFT Labs stable from baseline.   - no symptoms consistent with biliary colic at this time, CMP has remained unremarkable  - currently would not recommend antibiotics     Hepatic Steatosis   Morbid Obesity   - noted, LFTs stable. BMI of 46  - f/up with PCP     Hepatic Lesion   - 1.5 cm enhancing observation at the dome of the right hepatic lobe. Recommend nonemergent contrast-enhanced MRI for further evaluation.      Chronic pain  Osteoarthritis  Neuropathy  - history of bilateral TKA, right HUGO, and right femur IM nail  - WC transfers at baseline   - cont home gabapentin 400 mg 3 times daily, acetaminophen 3 times daily, and celecoxib 100 mg twice daily as needed     Gout  - cont allopurinol 100 mg daily     Hypothyroidism  - continue PTA levothyroxine 88 mcg daily     MDD  Anxiety  Panic attacks  - continue PTA duloxetine 60 mg daily and lorazepam 0.5 mg every 6 hours as needed. Also takes for dyspnea.      History sacral pressure ulcers  Right Buttock Pressure Injury, Stage 3 (POA)   - [facility]       DM type II  HgbA1c 6.6 4/2025  - diet controlled    Son Jono updated by phone ([phone]).         Diet: Regular Diet Adult    DVT Prophylaxis: Enoxaparin (Lovenox) SQ  Benson Catheter: Not present  Lines: None     Cardiac Monitoring: None  Code Status: No CPR- Do NOT Intubate      Clinically Significant Risk Factors        # Hypokalemia: Lowest K = 3.3 mmol/L in last 2 days, will replace as needed   # Hypochloremia:  Lowest Cl = 90 mmol/L in last 2 days, will monitor as appropriate      # Hypoalbuminemia: Lowest albumin = 3.4 g/dL at 7/19/2025  5:32 AM, will monitor as appropriate     # Hypertension: Noted on problem list    # Acute heart failure with preserved ejection fraction: heart failure noted on problem list, last echo with EF >50%, and receiving IV diuretics          # DMII: A1C = 6.6 % (Ref range: <5.7 %) within past 6 months         # Financial/Environmental Concerns:           Social Drivers of Health            Disposition Plan     Medically Ready for Discharge: Anticipated Tomorrow       The patient's care was discussed with the Bedside Nurse, Care Coordinator/, and Patient.    Montserrat Chavis MD  Hospitalist Service  [facility]    ______________________________________________________________________    Interval History   Patient saturating 86-88% on room air. Placed back on O2. Planning additional IV Lasix today and tomorrow morning. Spoke with son at length. Continue efforts for weaning O2 and possible discharge to TCU tomorrow. Concern that patient may not do well at TCU in which case family would look toward alternative long term care arrangements. PT to work with patient this afternoon, if possible. Son also requested that a  see patient, as well.         Physical Exam   Vital Signs: Temp: 98.1  F (36.7  C) Temp src: Oral BP: 99/61 Pulse: 88   Resp: 18 SpO2: 95 % O2 Device: Nasal cannula Oxygen Delivery: 1 LPM  Weight: 259 lbs 11.23 oz    Constitutional: Pleasant older woman resting in bed and eating her breakfast (begrudgingly). Patient is alert and oriented x3.  Answering questions appropriately.  No acute distress.   HEENT: NCAT. EOMI. Moist oral mucosa.  Respiratory: Clear to auscultation bilaterally. No crackles or wheezes though taking shallow inspirations.  Cardiovascular: Regular rate and rhythm.  GI: Soft, nontender, nondistended.   Neurologic: Alert and  oriented x3. No focal neurologic deficits.  Generalized weakness throughout.        Medical Decision Making       35 MINUTES SPENT BY ME on the date of service doing chart review, history, exam, documentation & further activities per the note.      Data     I have personally reviewed the following data over the past 24 hrs:    7.5  \   12.5   / 209     135 90 (L) 9.8 /  135 (H)   3.9 33 (H) 0.61 \       Imaging results reviewed over the past 24 hrs:   No results found for this or any previous visit (from the past 24 hours).

## 2025-07-25 ENCOUNTER — LAB REQUISITION (OUTPATIENT)
Dept: LAB | Facility: CLINIC | Age: 89
End: 2025-07-25
Payer: COMMERCIAL

## 2025-07-25 VITALS
TEMPERATURE: 98.6 F | OXYGEN SATURATION: 97 % | DIASTOLIC BLOOD PRESSURE: 58 MMHG | HEART RATE: 82 BPM | SYSTOLIC BLOOD PRESSURE: 111 MMHG | BODY MASS INDEX: 50.72 KG/M2 | WEIGHT: 259.7 LBS | RESPIRATION RATE: 24 BRPM

## 2025-07-25 DIAGNOSIS — Z11.1 ENCOUNTER FOR SCREENING FOR RESPIRATORY TUBERCULOSIS: ICD-10-CM

## 2025-07-25 LAB
CREAT SERPL-MCNC: 0.71 MG/DL (ref 0.51–0.95)
EGFRCR SERPLBLD CKD-EPI 2021: 81 ML/MIN/1.73M2
MCV RBC AUTO: 100 FL (ref 78–100)
PLATELET # BLD AUTO: 200 10E3/UL (ref 150–450)

## 2025-07-25 PROCEDURE — 250N000011 HC RX IP 250 OP 636: Performed by: INTERNAL MEDICINE

## 2025-07-25 PROCEDURE — 82565 ASSAY OF CREATININE: CPT | Performed by: PHYSICIAN ASSISTANT

## 2025-07-25 PROCEDURE — 94660 CPAP INITIATION&MGMT: CPT

## 2025-07-25 PROCEDURE — 250N000013 HC RX MED GY IP 250 OP 250 PS 637: Performed by: PHYSICIAN ASSISTANT

## 2025-07-25 PROCEDURE — 99239 HOSP IP/OBS DSCHRG MGMT >30: CPT | Performed by: INTERNAL MEDICINE

## 2025-07-25 PROCEDURE — 250N000011 HC RX IP 250 OP 636: Performed by: PHYSICIAN ASSISTANT

## 2025-07-25 PROCEDURE — 85049 AUTOMATED PLATELET COUNT: CPT | Performed by: PHYSICIAN ASSISTANT

## 2025-07-25 PROCEDURE — 36415 COLL VENOUS BLD VENIPUNCTURE: CPT | Performed by: PHYSICIAN ASSISTANT

## 2025-07-25 PROCEDURE — 999N000157 HC STATISTIC RCP TIME EA 10 MIN

## 2025-07-25 PROCEDURE — 94640 AIRWAY INHALATION TREATMENT: CPT

## 2025-07-25 RX ORDER — SENNOSIDES 8.6 MG
2 TABLET ORAL AT BEDTIME
DISCHARGE
Start: 2025-07-25

## 2025-07-25 RX ORDER — LORAZEPAM 0.5 MG/1
0.5 TABLET ORAL EVERY 6 HOURS PRN
Qty: 6 TABLET | Refills: 0 | Status: SHIPPED | OUTPATIENT
Start: 2025-07-25

## 2025-07-25 RX ORDER — POLYETHYLENE GLYCOL 3350 17 G/17G
1 POWDER, FOR SOLUTION ORAL DAILY
DISCHARGE
Start: 2025-07-25

## 2025-07-25 RX ORDER — IPRATROPIUM BROMIDE AND ALBUTEROL SULFATE 2.5; .5 MG/3ML; MG/3ML
3 SOLUTION RESPIRATORY (INHALATION) 3 TIMES DAILY
DISCHARGE
Start: 2025-07-25

## 2025-07-25 RX ADMIN — FUROSEMIDE 40 MG: 10 INJECTION, SOLUTION INTRAMUSCULAR; INTRAVENOUS at 06:06

## 2025-07-25 RX ADMIN — POLYETHYLENE GLYCOL 3350 17 G: 17 POWDER, FOR SOLUTION ORAL at 10:42

## 2025-07-25 RX ADMIN — ALLOPURINOL 100 MG: 100 TABLET ORAL at 10:44

## 2025-07-25 RX ADMIN — LEVOTHYROXINE SODIUM 88 MCG: 0.09 TABLET ORAL at 06:06

## 2025-07-25 RX ADMIN — POTASSIUM CHLORIDE 40 MEQ: 20 TABLET, EXTENDED RELEASE ORAL at 10:45

## 2025-07-25 RX ADMIN — GUAIFENESIN 400 MG: 200 TABLET ORAL at 10:44

## 2025-07-25 RX ADMIN — FLUTICASONE FUROATE AND VILANTEROL TRIFENATATE 1 PUFF: 100; 25 POWDER RESPIRATORY (INHALATION) at 07:58

## 2025-07-25 RX ADMIN — ENOXAPARIN SODIUM 40 MG: 40 INJECTION SUBCUTANEOUS at 01:40

## 2025-07-25 RX ADMIN — HYDROCORTISONE: 25 CREAM TOPICAL at 11:51

## 2025-07-25 RX ADMIN — DULOXETINE 60 MG: 60 CAPSULE, DELAYED RELEASE ORAL at 10:47

## 2025-07-25 RX ADMIN — GABAPENTIN 400 MG: 400 CAPSULE ORAL at 14:12

## 2025-07-25 RX ADMIN — ACETAMINOPHEN 650 MG: 325 TABLET ORAL at 11:52

## 2025-07-25 RX ADMIN — GABAPENTIN 400 MG: 400 CAPSULE ORAL at 06:06

## 2025-07-25 RX ADMIN — ENOXAPARIN SODIUM 40 MG: 40 INJECTION SUBCUTANEOUS at 14:13

## 2025-07-25 ASSESSMENT — ACTIVITIES OF DAILY LIVING (ADL)
ADLS_ACUITY_SCORE: 62
ADLS_ACUITY_SCORE: 64
ADLS_ACUITY_SCORE: 64
ADLS_ACUITY_SCORE: 62
ADLS_ACUITY_SCORE: 64
ADLS_ACUITY_SCORE: 62
ADLS_ACUITY_SCORE: 62
ADLS_ACUITY_SCORE: 64
ADLS_ACUITY_SCORE: 64
ADLS_ACUITY_SCORE: 62

## 2025-07-25 NOTE — PROGRESS NOTES
Care Management Discharge Note    Discharge Date: 07/25/2025       Discharge Disposition: Transitional Care    Discharge Services:  (TCU)    Discharge DME: None    Discharge Transportation: agency    Private pay costs discussed: private room/amenity fees and transportation costs    PAS Confirmation Code: [id number]  Patient/family educated on Medicare website which has current facility and service quality ratings: yes    Education Provided on the Discharge Plan: Yes  Persons Notified of Discharge Plans: Pt/son  Patient/Family in Agreement with the Plan: yes    Handoff Referral Completed: No, handoff not indicated or clinically appropriate    Additional Information:  Pt discharging to [facility] TCU today between [id number]. Orders completed and sent, signed script faxed and placed in hard chart. Updated son with plan. No further needs anticipated.     Justa Castellon RN BSN   Inpatient Care Coordination  [facility]   Phone [phone]

## 2025-07-25 NOTE — PROVIDER NOTIFICATION
07/24/25 1939   RCAT Assessment   Reason for Assessment Other (see comments)   Pulmonary Status 0   Surgical Status 0   Chest X-ray 1   Respiratory Pattern 2   Mental Status 0   Breath Sounds 2   Cough Effectiveness 0   Level of Activity 2   O2 Required for SpO2>=92% 1   Acuity Level (points) 8   Acuity Level  4   Re-eval Interval Guideline Every 3 days   Re-evaluation Date 07/28/25   $RT Consult Time Spent RCAT (30 minute increments) 1   Clinical Indications/Symptoms   Aerosol Therapy RCAT protocol   Broncho-pulmonary Hygiene History of mucous producing disease   Volume Expansion Decreased breath sounds   Aerosol Therapy Plan   RT Treatment Nebulizer   Anticholinergic/Beta-Andrenergic Agonist Duoneb soln (0.5mg/3mg per 3mL) neb Max 6 doses/24h   Aerosol Treatment Frequency Acuity Level 4: PRN [id number] wheezing   Aerosol Therapy (SVN)   Daily Review of Necessity (SVN) completed   Respiratory Treatment Status (SVN) given   Patient Position HOB elevated   Posttreatment Assessment (SVN) breath sounds unchanged   Signs of Intolerance (SVN) none   Broncho-Pulmonary Hygiene Plan   Broncho-Pulmonary Hygiene Treatment Coughing techniques   Broncho-Pulm Hygiene Frequency Acuity Level 4: BID-Unable to deep breathe & cough spontaneously   Volume Expansion Plan   Volume Expansion Treatment Incentive Spirometer   Volume Expansion Frequency Acuity Level 4: BID-Prevention of atelectasis   Breath Sounds   Breath Sounds All Fields   All Lung Fields Breath Sounds diminished     Barb Jj, RT

## 2025-07-25 NOTE — PLAN OF CARE
Inpatient: 0700 - 1723      Dx: Acute Hypoxic Respiratory Failure   Chronic Cough & JEAN  Abdominal Pain and Constipation       Orientation: x4   Pain: Denies   Activity: Ax2 with Michael Steady   LDA: Left PIV SL - CPAP when sleeping   Diet: Regular diet   Neuro: Tenderness to bilateral lower extremities  HEENT: Teeth missing/broken   Cardio: Mild swelling to bilateral feet   Resp: On 1 lpm supplemental oxygen - Breath sounds diminished - Breathing unlabored but shallow - unable to deep breath and poor performance with IS. Infrequent productive cough present.   MS: Significantly impaired - chair bound.   GI: Loose/watery stool - fecal incontinence - abdomen rounded and umbilical bulge present.   : Pure wick removed.   Derm: Bruising to right forearm and abdomen. Rash to abdominal fold. Purplish discoloration to bilateral buttocks - history of pressure wounds - skin intact and discoloration is blanchable.   Plan: SW following for TCU placement           Attempted to wean off oxygen - eventually desaturated on room air at rest - lowest was 76% - placed back on NC - Oxygen titrated to adequate saturation then left at 1 LPM. She desaturated again at 1 LPM upon repositioning in bed and utilizing michael steady to get in chair - titrated again as above and left at 1 LPM.         Foam dressings applied to bilateral buttocks for previous pressure injuries. Skin is purple concerning for deep tissue concerns although is blanchable with adequate perfusion - following WOC orders.         Able to get patient in chair - remained reclined and comfortable while waiting for transportation.           /58 (BP Location: Right arm, Patient Position: Semi-Benson's, Cuff Size: Adult Regular)   Pulse 82   Temp 98.6  F (37  C) (Oral)   Resp 24   Wt 117.8 kg (259 lb 11.2 oz)   LMP  (LMP Unknown)   SpO2 97%   BMI 50.72 kg/m            Problem: Adult Inpatient Plan of Care  Goal: Plan of Care Review  Description: The Plan of Care  "Review/Shift note should be completed every shift.  The Outcome Evaluation is a brief statement about your assessment that the patient is improving, declining, or no change.  This information will be displayed automatically on your shift  note.  Outcome: Progressing  Flowsheets (Taken 7/25/2025 1213)  Outcome Evaluation: Still on supplemental Oxygen  Plan of Care Reviewed With: patient  Overall Patient Progress: no change  Goal: Patient-Specific Goal (Individualized)  Description: You can add care plan individualizations to a care plan. Examples of Individualization might be:  \"Parent requests to be called daily at 9am for status\", \"I have a hard time hearing out of my right ear\", or \"Do not touch me to wake me up as it startles  me\".  Outcome: Progressing  Goal: Absence of Hospital-Acquired Illness or Injury  Outcome: Progressing  Intervention: Identify and Manage Fall Risk  Recent Flowsheet Documentation  Taken 7/25/2025 1157 by Cyrus Juarez RN  Safety Promotion/Fall Prevention:   activity supervised   assistive device/personal items within reach   clutter free environment maintained   lighting adjusted   mobility aid in reach   nonskid shoes/slippers when out of bed   patient and family education   room organization consistent   safety round/check completed   supervised activity  Intervention: Prevent and Manage VTE (Venous Thromboembolism) Risk  Recent Flowsheet Documentation  Taken 7/25/2025 1157 by Cyrus Juarez RN  VTE Prevention/Management: SCDs off (sequential compression devices)  Goal: Optimal Comfort and Wellbeing  Outcome: Progressing  Goal: Readiness for Transition of Care  Outcome: Progressing     Problem: Delirium  Goal: Optimal Coping  Outcome: Progressing  Goal: Improved Behavioral Control  Outcome: Progressing  Intervention: Minimize Safety Risk  Recent Flowsheet Documentation  Taken 7/25/2025 1157 by Cyrus Juarez RN  Enhanced Safety Measures:   pain management   patient/family teach " back on injury risk   review medications for side effects with activity  Goal: Improved Attention and Thought Clarity  Outcome: Progressing  Goal: Improved Sleep  Outcome: Progressing     Problem: Skin Injury Risk Increased  Goal: Skin Health and Integrity  Outcome: Progressing  Intervention: Plan: Nurse Driven Intervention: Moisture Management  Recent Flowsheet Documentation  Taken 7/25/2025 1157 by Cyrus Juarez RN  Moisture Interventions: Encourage regular toileting  Intervention: Plan: Nurse Driven Intervention: Friction and Shear  Recent Flowsheet Documentation  Taken 7/25/2025 1157 by Cyrus Juarez RN  Friction/Shear Interventions: HOB 30 degrees or less  Intervention: Optimize Skin Protection  Recent Flowsheet Documentation  Taken 7/25/2025 1157 by Cyrus Juarez RN  Activity Management: up in chair     Problem: Pain Acute  Goal: Optimal Pain Control and Function  Outcome: Progressing  Intervention: Prevent or Manage Pain  Recent Flowsheet Documentation  Taken 7/25/2025 1157 by Cyrus Juarez RN  Medication Review/Management: medications reviewed   Goal Outcome Evaluation:      Plan of Care Reviewed With: patient    Overall Patient Progress: no changeOverall Patient Progress: no change    Outcome Evaluation: Still on supplemental Oxygen

## 2025-07-25 NOTE — PROGRESS NOTES
A CPAP of +10 and 24% was applied to pt for overnight use. Skin is intact. RT will monitor.    Barb Jj RT

## 2025-07-25 NOTE — PLAN OF CARE
"[id number]    Inpatient Progress Note:    A & O X 4 with forgetfulness. Lung sounds diminished. No wheezes, crackles, rales/rhonchi auscultated. Bowel sounds present and active.  Incontinent of bowel and bladder. Pure wick in place with adequate output. PIV saline lock. A X 2 with cares. Admitting DX: Acute hypoxemic respiratory failure. Unable to wean off oxygen, sats between 87-89 on RA. Patient on 0.5 LPM supplemental oxygen. Patient on CPAP/BiPAP at HS. T & R as tolerated . Sacral mepilex CDI. Tylenol available for pain management. Afebrile.   /52 (BP Location: Right arm)   Pulse 91   Temp 98.6  F (37  C) (Oral)   Resp 22   Wt 117.8 kg (259 lb 11.2 oz)   LMP  (LMP Unknown)   SpO2 92%   BMI 50.72 kg/m       Problem: Adult Inpatient Plan of Care  Goal: Plan of Care Review  Description: The Plan of Care Review/Shift note should be completed every shift.  The Outcome Evaluation is a brief statement about your assessment that the patient is improving, declining, or no change.  This information will be displayed automatically on your shift  note.  Outcome: Progressing  Flowsheets (Taken 7/25/2025 0025)  Plan of Care Reviewed With: patient  Goal: Patient-Specific Goal (Individualized)  Description: You can add care plan individualizations to a care plan. Examples of Individualization might be:  \"Parent requests to be called daily at 9am for status\", \"I have a hard time hearing out of my right ear\", or \"Do not touch me to wake me up as it startles  me\".  Outcome: Progressing  Goal: Absence of Hospital-Acquired Illness or Injury  Outcome: Progressing  Intervention: Identify and Manage Fall Risk  Recent Flowsheet Documentation  Taken 7/24/2025 2119 by Shavon Queen RN  Safety Promotion/Fall Prevention:   safety round/check completed   room near nurse's station   supervised activity   clutter free environment maintained  Intervention: Prevent Skin Injury  Recent Flowsheet Documentation  Taken 7/24/2025 " 2119 by Shavon Queen RN  Body Position:   weight shifting   supine, head elevated  Intervention: Prevent and Manage VTE (Venous Thromboembolism) Risk  Recent Flowsheet Documentation  Taken 7/24/2025 2119 by Shavon Queen RN  VTE Prevention/Management: SCDs off (sequential compression devices)  Intervention: Prevent Infection  Recent Flowsheet Documentation  Taken 7/24/2025 2119 by Shavon Queen RN  Infection Prevention: hand hygiene promoted  Goal: Optimal Comfort and Wellbeing  Outcome: Progressing  Goal: Readiness for Transition of Care  Outcome: Progressing     Problem: Delirium  Goal: Optimal Coping  Outcome: Progressing  Goal: Improved Behavioral Control  Outcome: Progressing  Intervention: Minimize Safety Risk  Recent Flowsheet Documentation  Taken 7/24/2025 2119 by Shavon Queen RN  Enhanced Safety Measures: pain management  Goal: Improved Attention and Thought Clarity  Outcome: Progressing  Goal: Improved Sleep  Outcome: Progressing     Problem: Skin Injury Risk Increased  Goal: Skin Health and Integrity  Outcome: Progressing  Intervention: Plan: Nurse Driven Intervention: Moisture Management  Recent Flowsheet Documentation  Taken 7/24/2025 2119 by Shavon Queen RN  Moisture Interventions:   Encourage regular toileting   Body-worn absorptive product when OOB (brief, etc.)  Bathing/Skin Care: incontinence care  Intervention: Plan: Nurse Driven Intervention: Friction and Shear  Recent Flowsheet Documentation  Taken 7/24/2025 2119 by Shavon Queen RN  Friction/Shear Interventions: HOB 30 degrees or less  Intervention: Optimize Skin Protection  Recent Flowsheet Documentation  Taken 7/24/2025 2119 by Shavon Queen RN  Pressure Reduction Techniques:   frequent weight shift encouraged   heels elevated off bed   pressure points protected  Pressure Reduction Devices: positioning supports utilized  Activity Management: activity adjusted per tolerance  Head of Bed (HOB)  Positioning: HOB at 30 degrees     Problem: Pain Acute  Goal: Optimal Pain Control and Function  Outcome: Progressing  Intervention: Prevent or Manage Pain  Recent Flowsheet Documentation  Taken 7/24/2025 2119 by Shavon Queen, RN  Medication Review/Management: medications reviewed     Will continue to provide supportive cares.     Shavon Queen, JARETH       Plan of Care Reviewed With: patient

## 2025-07-25 NOTE — PROGRESS NOTES
[facility] GERIATRICS    PRIMARY CARE PROVIDER AND CLINIC:  Ruben Humphrey PA-C, [facility] PHYSICIAN [facility] [address] / [place]*  Chief Complaint   Patient presents with    Hospital F/U      [place] Medical Record Number:  [id number]  Place of Service where encounter took place:  [facility] ([facility]) [[id number]]    Carol Merida  is a 89 year old  (1935), admitted to the above facility from  [facility]. Hospital stay 7/16/25 through 7/25/25..   HPI:    Notes from hospitalization reviewed including H&P colorectal surgery consult, palliative care consult, GI consult and D/c summary    Carol Merida is a very pleasant 89-year-old female with past medical history of anxiety, neuropathy, depression, type 2 diabetes, morbid obesity who was recently hospitalized at [facility].  Presented for evaluation of abdominal pain and nausea.  CT of the abdomen concerning for possible sigmoid volvulus.  Colorectal surgery consulted and not deemed to be a surgical candidate.  Palliative care and GI consulted.  Ultimately underwent flex sig my anoscopy which showed no evidence of obstruction or stricture.  Initiated on aggressive bowel regimen with improvement.  Incidentally noted to have hypoxia.  Does have history of HFpEF as well as COPD.  Treated with nebulizers as well as diuresis.  Continue to require oxygen at discharge.  Deconditioned compared to baseline so discharged to [facility]    Carol is evaluated in her room.  She did note she had an episode of loose stool this morning.  Abdomen is still very gassy and occasionally uncomfortable.  No bowel movement yesterday.  Denies nausea.  Not hungry but tolerating food okay.  Hospitalization reviewed.  Continues on 1.5 L nasal cannula.  Does have CPAP at bedside.  Denies cough.  No shortness of breath.  Lives at [facility]    Separately, called and spoke with son Jono.  Introduced self and role.  Discussed plan of care and typical  TCU course.  Questions answered  Review of nursing home EMR: -126 Weight 210  BG      CODE STATUS/ADVANCE DIRECTIVES DISCUSSION:  No CPR- Do NOT Intubate  DNR / DNI  ALLERGIES:   Allergies   Allergen Reactions    Ace Inhibitors Cough    Ceftriaxone Rash     Diffuse rash after IV ceftriaxone 1/20/25.  Previously tolerated other cephalosporins including recent course of keflex.      PAST MEDICAL HISTORY:   Past Medical History:   Diagnosis Date    Anemia     Anxiety     Breast cancer 2005    infltrating ductal    Chronic joint pain     Joint pain for many years.    Congestive heart failure     COPD (chronic obstructive pulmonary disease)     Depressive disorder     Hypertension     Hypothyroidism     Lumbar spinal stenosis     Melanoma     Mixed hyperlipidemia     Obstructive sleep apnea 08/25/2006    CPAP     Osteoarthritis     Osteoporosis     Tubular adenoma in colon 09/2001    colonoscopy due 2004      PAST SURGICAL HISTORY:   has a past surgical history that includes colonoscopy (09/2001); colonoscopy (09/2004); hysterectomy, naomi (summer 2004); Arthroplasty hip (Left, 07/06/2015); Arthroplasty revision hip (Left, 07/22/2015); Arthroplasty hip (Right, 11/02/2016); cataract iol, rt/lt (Bilateral); Open reduction internal fixation rodding intramedullary femur (Right, 02/26/2023); Mastectomy (Left); appendectomy; Melanoma excision with sentinel node biopsy; Tonsillectomy; Arthroplasty knee (Bilateral); ORIF ankle status post removal of hardware (Right); and Sigmoidoscopy flexible (N/A, 7/18/2025).  FAMILY HISTORY: family history includes Arthritis in her mother; Breast Cancer in her daughter; Cancer in her father and grandchild; Hypertension in her father; No Known Problems in her brother.  SOCIAL HISTORY:   reports that she has never smoked. She has never used smokeless tobacco. She reports current alcohol use. She reports that she does not use drugs.  Patient's living condition: lives alone. Lives in  [facility]    Post Discharge Medication Reconciliation Status:   MED REC REQUIRED  Post Medication Reconciliation Status: discharge medications reconciled and changed, per note/orders       Current Outpatient Medications   Medication Sig Dispense Refill    acetaminophen (TYLENOL) 500 MG tablet Take 1,000 mg by mouth 3 times daily.      albuterol (PROAIR HFA/PROVENTIL HFA/VENTOLIN HFA) 108 (90 Base) MCG/ACT inhaler INHALE 2 PUFFS INTO THE LUNGS EVERY 4 HOURS AS NEEDED FOR SHORTNESS OF BREATH OR DIFFICULT BREATHING OR WHEEZING 8.5 g 0    allopurinol (ZYLOPRIM) 100 MG tablet Take 100 mg by mouth daily START 7/10      Artificial Saliva (BIOTENE DRY MOUTH MOIST SPRAY MT) Take 1 spray by mouth every 2 hours as needed (dry mouth).      ARTIFICIAL SALIVA MT Take 1 strip by mouth 2 times daily After nebs      benzonatate (TESSALON) 100 MG capsule Take 1 capsule (100 mg) by mouth 3 times daily as needed for cough. 20 capsule 0    calcium carbonate 600 mg-vitamin D 400 units (CALTRATE) 600-400 MG-UNIT per tablet Take 1 tablet by mouth every evening  100 tablet 3    carbamide peroxide (DEBROX) 6.5 % otic solution Place 3 drops into both ears twice a week. Mon & Thur      celecoxib (CELEBREX) 100 MG capsule Take 100 mg by mouth 2 times daily as needed for moderate pain (4-6)      cycloSPORINE (CYCLOSPORINE IN KLARITY) 0.1 % EMUL Place 1 drop into both eyes 2 times daily 0630 and 2145      DULoxetine (CYMBALTA) 60 MG capsule Take 60 mg by mouth daily      estradiol (ESTRACE) 0.1 MG/GM vaginal cream Place vaginally every 3 days. Sig: 3 times weekly, actually being given every 3 days.      fluticasone-salmeterol (ADVAIR) 100-50 MCG/ACT inhaler Inhale 1 puff into the lungs 2 times daily.      gabapentin (NEURONTIN) 400 MG capsule Take 400 mg by mouth 3 times daily. 630 AM 2  PM      guaiFENesin 400 MG TABS Take 400 mg by mouth 2 times daily      hydrocortisone, Perianal, (ANUSOL-HC) 2.5 % cream Place rectally daily as needed for  hemorrhoids.      ipratropium - albuterol 0.5 mg/2.5 mg, 3mg,/3 mL (DUONEB) 0.5-2.5 (3) MG/3ML neb solution Take 1 vial (3 mLs) by nebulization 3 times daily.      ipratropium - albuterol 0.5 mg/2.5 mg, 3mg,/3 mL (DUONEB) 0.5-2.5 (3) MG/3ML neb solution Take 1 vial by nebulization every 4 hours as needed for shortness of breath, wheezing or cough.      levothyroxine (SYNTHROID/LEVOTHROID) 88 MCG tablet Take 1 tablet (88 mcg) by mouth daily 90 tablet 0    LORazepam (ATIVAN) 0.5 MG tablet Take 1 tablet (0.5 mg) by mouth every 6 hours as needed for anxiety (or shortness of breath). 6 tablet 0    melatonin 3 MG tablet Take 3 mg by mouth nightly as needed for sleep.      nystatin (NYSTOP) 416006 UNIT/GM external powder Apply tid to affectead area prn 60 g 3    polyethylene glycol (MIRALAX) 17 GM/Dose powder Take 17 g (1 Capful) by mouth daily.      polyvinyl alcohol (ARTIFICIAL TEARS) 1.4 % ophthalmic solution Place 1 drop into both eyes 3 times daily.      potassium chloride stuart ER (KLOR-CON M20) 20 MEQ CR tablet Take 40 mEq by mouth daily      [Paused] semaglutide (OZEMPIC) 2 MG/3ML pen Inject 0.5 mg subcutaneously every 7 days.      sennosides (SENOKOT) 8.6 MG tablet Take 2 tablets by mouth at bedtime. Hold for loose stools.      torsemide (DEMADEX) 20 MG tablet Take 20 mg by mouth daily.      triamcinolone (KENALOG) 0.1 % external cream Apply topically every evening. To lower extremities      White Petrolatum-Mineral Oil (LUBRIFRESH P.M.) OINT Place into both eyes at bedtime. Apply a small strip into both eyes      zinc oxide (DESITIN) 40 % external ointment Apply topically as needed for dry skin or irritation (with toileting)       No current facility-administered medications for this visit.       ROS:  10 point ROS of systems including Constitutional, Eyes, Respiratory, Cardiovascular, Gastroenterology, Genitourinary, Integumentary, Musculoskeletal, Psychiatric were all negative except for pertinent positives  noted in my HPI.    Vitals:  /74   Pulse 64   Temp 98.2  F (36.8  C)   Resp 18   Ht 1.524 m (5')   Wt 95.3 kg (210 lb 1.6 oz)   LMP  (LMP Unknown)   SpO2 94%   BMI 41.03 kg/m    Exam:  Physical Exam  Vitals reviewed.   Constitutional:       Appearance: Normal appearance. She is well-developed. She is obese.   HENT:      Mouth/Throat:      Mouth: Mucous membranes are moist.   Eyes:      Conjunctiva/sclera: Conjunctivae normal.   Cardiovascular:      Rate and Rhythm: Normal rate and regular rhythm.      Heart sounds: Normal heart sounds.   Pulmonary:      Effort: Pulmonary effort is normal.      Breath sounds: Normal breath sounds. No wheezing or rales.      Comments: On 1.5L  Abdominal:      General: There is distension.      Tenderness: There is no abdominal tenderness. There is no guarding.   Musculoskeletal:      Right shoulder: No swelling.      Left shoulder: No swelling.      Cervical back: Normal range of motion.   Skin:     General: Skin is warm.      Findings: No rash.   Neurological:      Mental Status: She is alert.           Lab/Diagnostic data:  Recent labs in [facility] reviewed by me today.  and Most Recent 3 CBC's:  Recent Labs   Lab Test 07/25/25  0708 07/24/25  0549 07/23/25  0529 07/22/25  0612   WBC  --  7.5 8.9 8.6   HGB  --  12.5 12.6 11.9    100 100 99    209 211 219     Most Recent 3 BMP's:  Recent Labs   Lab Test 07/25/25  0708 07/24/25  0549 07/23/25  0529 07/22/25  0611   NA  --  135 135 136   POTASSIUM  --  3.9 3.3* 3.6   CHLORIDE  --  90* 90* 93*   CO2  --  33* 31* 32*   BUN  --  9.8 9.6 8.9   CR 0.71 0.61 0.59 0.64   ANIONGAP  --  12 14 11   LAKSHMI  --  9.3 9.0 8.7*   GLC  --  135* 112* 137*     Most Recent 2 LFT's:  Recent Labs   Lab Test 07/19/25  0532 07/17/25  0643   AST 97* 74*   ALT 48 39   ALKPHOS 133 136   BILITOTAL 1.0 1.0     Most Recent TSH and T4:  Recent Labs   Lab Test 07/21/25  0514   TSH 4.09     Most Recent Hemoglobin A1c:  Recent Labs   Lab Test  04/29/25  1056   A1C 6.6*     Most Recent ESR & CRP:  Recent Labs   Lab Test 05/27/25  0244 01/19/25  1200 09/28/22  1628 07/21/21  1336   SED  --  55*   < >  --    CRP  --   --   --  1.8   CRPI 8.84* 22.44*   < >  --     < > = values in this interval not displayed.     Most Recent Anemia Panel:  Recent Labs   Lab Test 07/25/25  0708 07/24/25  0549 02/27/24  1413 09/05/23  0938   WBC  --  7.5   < > 5.4   HGB  --  12.5   < > 13.2   HCT  --  39.5   < > 43.3    100   < > 94    209   < > 279   B12  --   --   --  480    < > = values in this interval not displayed.       Study Result    Narrative & Impression   EXAM: CT CHEST PE ABDOMEN PELVIS W CONTRAST  LOCATION: [facility]  DATE: 7/16/2025     INDICATION: Shortness of breath. Left lower quadrant abdominal pain, nausea and vomiting.  COMPARISON: None.  TECHNIQUE: CT chest pulmonary angiogram and routine CT abdomen pelvis with IV contrast. Arterial phase through the chest and venous phase through the abdomen and pelvis. Multiplanar reformats and MIP reconstructions were performed. Dose reduction   techniques were used.   CONTRAST: 100mL Isovue 370.     FINDINGS:  ANGIOGRAM CHEST: Motion artifact limits evaluation. Allowing for the aforementioned limitation, there is no evidence for pulmonary embolism. Normal caliber thoracic aorta. No dissection. Minimal vascular calcification diffusely.     LUNGS AND PLEURA: Normal.     MEDIASTINUM/AXILLAE: Mild subpleural interstitial process involving both lower lungs which could be due to interstitial fibrosis or mild edema. No significant pleural fluid. Mild linear atelectasis right lung base. Mild dependent atelectasis posterior   aspect both upper lobes. No evidence for definitive pulmonary infiltrate or pneumonia.     CORONARY ARTERY CALCIFICATION: Mild.     HEPATOBILIARY: 1.5 cm enhancing observation at the dome of the right hepatic lobe (13/24). Marked diffuse hepatic steatosis. Mild surface  contour nodularity to the liver. Distended gallbladder with cholelithiasis with two large 1 cm gallstones near the   neck. Suggestion of mild edema adjacent to the gallbladder. No biliary dilatation. Patent portal vein.     PANCREAS: Diffuse fatty replacement. No ductal dilatation or inflammatory change.     SPLEEN: Normal size. Patent splenic vein.     ADRENAL GLANDS: Unremarkable.     KIDNEYS/BLADDER: Mild atrophy bilaterally. No urinary collecting system dilatation or evidence for obstructing calculi allowing for streak artifact in the pelvis, which limits evaluation of bladder and distal ureters.     BOWEL: Evaluation of bowel loops in the pelvis limited due to streak artifact from bilateral THAs. Marked amount of gas in the stool throughout the proximal colon extending in the descending colon and into the mid sigmoid colon. Sigmoid colon is   extremely redundant extending into the upper central abdomen. This extends inferiorly without complete rotation of the sigmoid mesentery, but with some mild mesenteric narrowing (series 13, image [id number]). This could potentially lead to sigmoid volvulus.   Recommend correlation for obstipation/constipation. No small bowel dilatation. No evidence for bowel wall thickening or perforation.     LYMPH NODES: No lymphadenopathy.     VASCULATURE: Normal caliber aorta. Moderate vascular calcification. Patent celiac, splenic and hepatic arteries. Patent SMA/QAMAR. Patent bilateral renal renal arteries. Moderate vascular calcification.     PELVIC ORGANS: No free fluid allowing for streak artifact.     MUSCULOSKELETAL: Bilateral THAs. Marked degenerative hypertrophic changes in the spine.                                                                      IMPRESSION:  1.  No evidence for pulmonary embolism.  2.  Normal caliber aorta without dissection.  3.  Mild subpleural interstitial process involving both lower lungs which could be due to interstitial fibrosis or mild edema. No  significant pleural fluid. Recommend correlation for early CHF/volume overload. No evidence for pneumonia.  4.  Marked amount of gas and stool throughout the colon extending from the cecum to the mid sigmoid colon. The sigmoid colon is extremely redundant extending into the upper central abdomen. This extends inferiorly without complete rotation of the sigmoid   mesentery with transition point/narrowing of the sigmoid colon but without evidence for mass, edema or perforation at this location. No significant sigmoid mesenteric rotation. This could potentially lead to sigmoid volvulus. Recommend correlation for   obstipation/constipation and close clinical follow-up.  5.  Marked diffuse hepatic steatosis with surface contour nodularity to liver. Findings can be seen with chronic underlying hepatic parenchymal disease.  6.  1.5 cm enhancing observation at the dome of the right hepatic lobe. Recommend nonemergent contrast-enhanced MRI for further evaluation.  7.  Distended gallbladder with cholelithiasis with two large 1 cm gallstones near the neck. Suggestion of mild edema adjacent to the gallbladder. Recommend correlation for right upper quadrant pain and consideration of ultrasound for further evaluation.     Findings:       Extensive amounts of solid stool was found in the entire colon,        interfering with visualization. Lavage of the area was performed using a        large volume (500cc) of sterile wather and Fleets enemas (x2) into the        right colon. There was incomplete clearance with continued poor        visualization.        The exam was otherwise without abnormality to the level of the hepatic        flexure. There was no evidence of a fixed stricture or obstructing mass.                                                                                    Impression:               - Preparation of the colon was poor.                             - Stool in the entire examined colon.                              - The examination was otherwise normal.                             - No specimens collected.                             - Clinical picture suggestive of either                             constipation/obstipation althoug sigmoid volvulus                             is a possibility.   Recommendation:           - Start colonoscopy prep and continue until passing                             clear liquid.                             - Thereafter, start daily osmotic laxative regimen                             (most likely Miralax 1 capful twice daily).     ASSESSMENT/PLAN:  Abdominal pain  Constipation: CT with marked amount of gas/stool with concern for transition point.  Colorectal surgery consulted and underwent Gastrografin enema did show evidence of colonic obstruction.  Not felt to be a surgical candidate.  GI consulted and underwent flex sigmoidoscopy 7/18.  No stricture noted and recommended aggressive bowel regimen  Patient indicates had large loose bowel movement today.  No bowel movement Sunday or Saturday per her report  Continue MiraLAX and senna as scheduled.  Hold for loose stools  Continue regular diet  Monitor    Acute HFpEF  Lymphedema: Developed some volume overload in the setting of IV fluids.  Received IV diuresis  Continue PTA torsemide 20 mg daily  Monitor volume status  BMP 7/30    COPD:   Continue Advair and scheduled DuoNeb  As needed DuoNebs available as needed  Continue Mucinex    JEAN  CPAP at home settings    Acute hypoxic respiratory failure: Multifactorial due to the above  Treat CHF and COPD  Wean oxygen as tolerated    Physical deconditioning  PT/OT    Anxiety  Continue duloxetine and as needed lorazepam.  Monitor use of lorazepam.  No utilization since TCU admission    Chronic pain  Neuropathy  Continue Celebrex, Cymbalta and gabapentin    Hypothyroidism: Recent TSH WNL  Continue levothyroxine    Type 2 diabetes: A1c 6.6.  Hold Ozempic at TCU  May benefit from follow-up with  PCP to determine if resumption appropriate given significant constipation/obstipation    Hepatic lesion: 1.5 cm noted incidentally on CT of the abdomen  Consider outpatient MRI    Obestiy:  Known issue that I take into account for their medical decisions, no current exacerbations or new concerns      Electronically signed by:  Maritza Woods PA-C     This note was completed in part using Dragon voice recognition software. Although reviewed after completion, some word and grammatical errors may occur.      A total >45 min were spent on this hospital follow-up visit including review of hospitalization, medication reconciliation, evaluating patient discussing plan of care, writing orders, phone call to patient's family member and documenting.  All services performed the day of visit

## 2025-07-25 NOTE — PLAN OF CARE
"[id number]    Inpatient Progress Note:    A & O X 4 with forgetfulness. Lung sounds diminished. No wheezes, crackles, rales/rhonchi auscultated. Bowel sounds present and active.  Incontinent of bowel and bladder. Pure wick in place with adequate output. PIV saline lock. A X 2 with cares. Admitting DX: Acute hypoxemic respiratory failure. Unable to wean off oxygen, sats between 87-89 on RA. Patient on 0.5 LPM supplemental oxygen. Patient on CPAP/BiPAP at HS. T & R as tolerated . Sacral mepilex CDI. Tylenol available for pain management. Afebrile.   /52 (BP Location: Right arm)   Pulse 75   Temp 98.6  F (37  C) (Oral)   Resp 17   Wt 117.8 kg (259 lb 11.2 oz)   LMP  (LMP Unknown)   SpO2 95%   BMI 50.72 kg/m       Problem: Adult Inpatient Plan of Care  Goal: Plan of Care Review  Description: The Plan of Care Review/Shift note should be completed every shift.  The Outcome Evaluation is a brief statement about your assessment that the patient is improving, declining, or no change.  This information will be displayed automatically on your shift  note.  Outcome: Progressing  Flowsheets (Taken 7/25/2025 0025)  Plan of Care Reviewed With: patient  Goal: Patient-Specific Goal (Individualized)  Description: You can add care plan individualizations to a care plan. Examples of Individualization might be:  \"Parent requests to be called daily at 9am for status\", \"I have a hard time hearing out of my right ear\", or \"Do not touch me to wake me up as it startles  me\".  Outcome: Progressing  Goal: Absence of Hospital-Acquired Illness or Injury  Outcome: Progressing  Intervention: Identify and Manage Fall Risk  Recent Flowsheet Documentation  Taken 7/24/2025 2119 by Shavon Queen RN  Safety Promotion/Fall Prevention:   safety round/check completed   room near nurse's station   supervised activity   clutter free environment maintained  Intervention: Prevent Skin Injury  Recent Flowsheet Documentation  Taken 7/24/2025 " 2119 by Shavon Queen RN  Body Position:   weight shifting   supine, head elevated  Intervention: Prevent and Manage VTE (Venous Thromboembolism) Risk  Recent Flowsheet Documentation  Taken 7/24/2025 2119 by Shavon Queen RN  VTE Prevention/Management: SCDs off (sequential compression devices)  Intervention: Prevent Infection  Recent Flowsheet Documentation  Taken 7/24/2025 2119 by Shavon Queen RN  Infection Prevention: hand hygiene promoted  Goal: Optimal Comfort and Wellbeing  Outcome: Progressing  Goal: Readiness for Transition of Care  Outcome: Progressing     Problem: Delirium  Goal: Optimal Coping  Outcome: Progressing  Goal: Improved Behavioral Control  Outcome: Progressing  Intervention: Minimize Safety Risk  Recent Flowsheet Documentation  Taken 7/24/2025 2119 by Shavon Queen RN  Enhanced Safety Measures: pain management  Goal: Improved Attention and Thought Clarity  Outcome: Progressing  Goal: Improved Sleep  Outcome: Progressing     Problem: Skin Injury Risk Increased  Goal: Skin Health and Integrity  Outcome: Progressing  Intervention: Plan: Nurse Driven Intervention: Moisture Management  Recent Flowsheet Documentation  Taken 7/24/2025 2119 by Shavon Queen RN  Moisture Interventions:   Encourage regular toileting   Body-worn absorptive product when OOB (brief, etc.)  Bathing/Skin Care: incontinence care  Intervention: Plan: Nurse Driven Intervention: Friction and Shear  Recent Flowsheet Documentation  Taken 7/24/2025 2119 by Shavon Queen RN  Friction/Shear Interventions: HOB 30 degrees or less  Intervention: Optimize Skin Protection  Recent Flowsheet Documentation  Taken 7/24/2025 2119 by Shavon Queen RN  Pressure Reduction Techniques:   frequent weight shift encouraged   heels elevated off bed   pressure points protected  Pressure Reduction Devices: positioning supports utilized  Activity Management: activity adjusted per tolerance  Head of Bed (HOB)  Positioning: HOB at 30 degrees     Problem: Pain Acute  Goal: Optimal Pain Control and Function  Outcome: Progressing  Intervention: Prevent or Manage Pain  Recent Flowsheet Documentation  Taken 7/24/2025 2119 by Shavon Queen, RN  Medication Review/Management: medications reviewed     Will continue to provide supportive cares.     Shavon Queen, JARETH       Plan of Care Reviewed With: patient

## 2025-07-25 NOTE — PLAN OF CARE
Facility packet sent with patient and transport tech  Patient verbalized understanding of After Visit Summary, recommended follow up and was given an opportunity to ask questions.   Discharge medications sent home with patient/family: Not applicable   Discharged with transport tech            /58 (BP Location: Right arm, Patient Position: Semi-Benson's, Cuff Size: Adult Regular)   Pulse 82   Temp 98.6  F (37  C) (Oral)   Resp 24   Wt 117.8 kg (259 lb 11.2 oz)   LMP  (LMP Unknown)   SpO2 97%   BMI 50.72 kg/m             Goal Outcome Evaluation:      Plan of Care Reviewed With: patient    Overall Patient Progress: no changeOverall Patient Progress: no change    Outcome Evaluation: Still on supplemental Oxygen

## 2025-07-25 NOTE — PLAN OF CARE
Physical Therapy Discharge Summary    Reason for therapy discharge:    Discharged to transitional care facility.    Progress towards therapy goal(s). See goals on Care Plan in [facility] electronic health record for goal details.  Goals not met.  Barriers to achieving goals:   limited tolerance for therapy and discharge from facility.    Therapy recommendation(s):    Continued therapy is recommended.  Rationale/Recommendations:  Pt is below baseline with mobility. Pt lives at an [facility] and was an A x 1 with pivot transfers to her power WC. Pt currently requiring Mod A x 2 for sit <> stand and A x 2 and use of michael steady for transfers to chair. Recommend TCU to increase strength and progress safety and independence with all mobility to enable pt to return to her [facility] at her PLOF.    Patient not seen by this therapist on this date, discharge summary written based on chart review and previous PT notes. Please see PT flowsheets for further details.

## 2025-07-25 NOTE — DISCHARGE SUMMARY
[facility]    Discharge Summary  Hospitalist    Date of Admission:  7/16/2025  Date of Discharge:  7/25/2025  Discharging Provider: Montserrat Chavis MD  Date of Service (when I saw the patient): 07/25/25    Discharge Diagnoses   Acute vs chronic hypoxemic respiratory failure  Chronic Cough  JEAN  Severe COPD  Chronic diastolic CHF with mild exacerbation  Chronic lymphedema  History of recurrent right lower extremity cellulitis  Abdominal Pain due to constipation-resolved  Weakness  Bilateral pressure injury to buttocks  Viral PCR still Covid positive, recent COVID infection in May '25.   Cholelithiasis   Hepatic Steatosis   Morbid Obesity   Hepatic Lesion   Chronic pain  Osteoarthritis  Neuropathy  Gout  Hypothyroidism  MDD  Anxiety  Panic attacks  History sacral pressure ulcers  DM type II    History of Present Illness   Carol Merida is a 89 year old woman who was admitted on 7/16/2025. PMH significant for morbid obesity, diastolic CHF, JEAN on CPAP, reactive airway disease, MDD, anxiety, panic attacks, diet controlled DM type II, hypothyroidism, lymphedema, recurrent right lower extremity cellulitis, chronic cough, chronic pain, neuropathy, osteoarthritis, sacral pressure ulcers, and multiple joint replacement surgeries, who was admitted on 7/16/2025 with abdominal pain likely due to constipation. She also notes dyspnea.      In the ED Spo2 92% placed on 2 LPM NC, vital signs stable and afebrile. VBG with pH 7.31, pCO2 of 52. Lactic acid 2.0. CMP with mildly elevated ast, alk phos 169, total bilirubin and lipase not elevated. Serial Tn I stable in the 20's near prior, 22, EKG with NSR no acute ST elevation. Viral PCR still Covid positive, recent COVID infection in May '25. CT Chest PE Abdomen with contrast negative for PE or PNA, no significant hypervolemia, gaseous and stool throughout the colon from the cecum to the mid sigmoid colon, redundant tissue in the central abdomen without  significant sigmoid mesenteric rotation. RUQ US Limited evaluation with no definitive evidence for gallbladder wall thickening.    Colorectal Surgery were consulted from the ED, reviewed patient and imaging and felt that the patient did not have a sigmoid volvulus. Given Toradol, Decadron, DuoNeb, Zofran. Admission was requested from hospitalist service for further cares.     Since admission CRS consulted recommending gastrograffin enema XR on 7/16 which showed abrupt narrowing of colon lumen in proximal sigmoid colon without flow of contrast beyond this raising suspicion for stricture. CRS discussed option of surgery but felt pt was a very poor surgical candidate. GI consulted and flexible sigmoidoscopy performed on 7/18 that did not show obvious stricture but the prep was poor. GI recommended colonoscopy prep and repeat imaging showed improved stool burden with significant improvement of patient's symptoms.      Patient is now eating and drinking but mobility/strength is below baseline with PT recommending rehab. Has intermittent hypoxia. CXR on 7/22 shows evidence of mild fluid overload.  Patient received IV Lasix 7/22 - 7/25. Patient is euvolemic and home diuretics resumed.   Suspect that patient has chronic respiratory failure with hypoxia and will benefit from ongoing O2. Patient should follow with her outpatient pulmonologist, Dr. Adamson.       Hospital Course   Carol Merida was admitted on 7/16/2025.  The following problems were addressed during her hospitalization:     Acute vs chronic hypoxemic respiratory failure  Chronic Cough  JEAN  Severe COPD  -Presented with dyspnea increased from baseline. No overt hypoxia recorded in the ED but maintained on 2 L/min nasal cannula.  VBG in ED shows respiratory acidosis.    -Patient was reportedly initially a little wheezy on exam in the ED and given Decadron. CT PE scan unremarkable for anything acute.  -She does not wear continuous supplemental oxygen at home at  baseline per her report but does use CPAP  - CXR on 7/22 shows evidence of bilateral interstitial thickening likely edema  - Diuresed with IV Lasix 7/22 and 7/23.  Had good output.  BNP not elevated.  With continued O2 requirement, giving additional IV Lasix 7/24 and 7/25 morning. Plan to resume PTA torsemide thereafter.   - Strict in/out and daily weights.   - encourage IS, up to chair for meals. However, patient declined to do IS with writer on 7/24 and was supine in bed while eating and declined efforts to even sit up in bed. Patient seemed reluctant to increase efforts at deep inspiration.   - Continue home Advair and PRN duo nebs  - continue PTA Ativan  - CPAP brought in by family. Continue use at night at TCU.  - Patient continues to require O2 during the day. Patient saturating 86-88% on room air 7/24 morning. Have placed back on O2. Patient is euvolemic. Resuming PTA medications. Suspect that patient has chronic respiratory failure with hypoxia and will benefit from ongoing O2. Patient should follow with her outpatient pulmonologist, Dr. Adamson.     Chronic diastolic CHF with mild exacerbation  Chronic lymphedema  History of recurrent right lower extremity cellulitis  Appears euvolemic but hypoxia present suggestive of overload  - PTA torsemide 20 mg daily  - Diuresed with IV Lasix 7/22 and 7/23.  Had good output.  BNP not elevated.  With continued O2 requirement, giving additional IV Lasix 7/24 and 7/25 morning. Plan to resume PTA torsemide thereafter.   - 20 meq potassium daily  - 2 g sodium restriction   - Patient's daughter reports that she was started on a course of Bactrim for recurrent cellulitis in her right lower extremity, she completed this 7/18     Abdominal Pain due to constipation-resolved  Presented with abdominal pain and constipation with associated nausea and emesis.   CT chest/abd/pelvis showed marked amount of gas and stool throughout colon with redundant sigmoid colon. There is a  transition point in the sigmoid colon without obvious mass, edema or perforation.   Colorectal Surgery were consulted from the ED, low suspicion for sigmoid volvulus.   - CRS recommended gastrograffin enema to evaluate for obstruction  - On 716 gastrograffin enema XR showed abrupt narrowing of colon lumen in proximal sigmoid colon without flow of contrast beyond this raising suspicion for stricture   - CRS discussed option of surgery, felt pt was a very poor surgical candidate and would likely not survive surgery if needed.   - GI consulted, recommended flex sig performed on 7/18 to evaluate for stricture as well as disimpact bowel  - no stricture noted on flex sig, GI recommended bowel prep  - multiple loose stools after starting bowel prep, now completed  - significant improvement in sx  - repeat Abd XR shows small stool burden, air throughout colon  - Start Miralax daily on 7/21  - PRN Fleets enema and pepto bismol and PRN simethicone.   -Resume scheduled regimen with Miralax and senna.  Hold parameters in place.  - Regular diet as tolerated.     Weakness  -At baseline is able to pivot to wheelchair but now requiring assist of 2 with michael steady which is below physical baseline  -PT recommending TCU     Bilateral pressure injury to buttocks  -WOC consulted and daily Mepilex applied     Viral PCR still Covid positive, recent COVID infection in May '25.   No isolation requirements, not an active or new COVID viral infection      Cholelithiasis   Appears asymptomatic as noted above currently lower suspicion for biliary colic as cause for symptoms. No RUQ pain. Limited evaluation with no definitive evidence for gallbladder wall thickening. LFT Labs stable from baseline.   - no symptoms consistent with biliary colic at this time, CMP has remained unremarkable  - No antibiotics recommended. Can follow with PCP.      Hepatic Steatosis   Morbid Obesity   - noted, LFTs stable. BMI of 46  - f/up with PCP     Hepatic Lesion    - 1.5 cm enhancing observation at the dome of the right hepatic lobe. Recommend nonemergent contrast-enhanced MRI for further evaluation.      Chronic pain  Osteoarthritis  Neuropathy  - history of bilateral TKA, right HUGO, and right femur IM nail  - WC transfers at baseline   - cont home gabapentin 400 mg 3 times daily, acetaminophen 3 times daily, and celecoxib 100 mg twice daily as needed     Gout  - cont allopurinol 100 mg daily     Hypothyroidism  - continue PTA levothyroxine 88 mcg daily     MDD  Anxiety  Panic attacks  - continue PTA duloxetine 60 mg daily and lorazepam 0.5 mg every 6 hours as needed. Also takes for dyspnea.      History sacral pressure ulcers  Right Buttock Pressure Injury, Stage 3 (POA)   - WOC recommends repositioning and changing dressing every 5 days     DM type II  HgbA1c 6.6% 4/2025  - diet controlled     Son Jono updated by phone ([phone]) on day of discharge.     Montserrat Chavis MD FACP  Hospitalist Service  [facility]      Significant Results and Procedures   7/18/25 Flexible sigmoidoscopy with [facility] Dr. Hassan:                                                                               Findings:       Extensive amounts of solid stool was found in the entire colon,        interfering with visualization. Lavage of the area was performed using a        large volume (500cc) of sterile wather and Fleets enemas (x2) into the        right colon. There was incomplete clearance with continued poor        visualization.        The exam was otherwise without abnormality to the level of the hepatic        flexure. There was no evidence of a fixed stricture or obstructing mass.                                                                                    Impression:               - Preparation of the colon was poor.                             - Stool in the entire examined colon.                             - The examination was otherwise normal.                              - No specimens collected.                             - Clinical picture suggestive of either                             constipation/obstipation althoug sigmoid volvulus                             is a possibility.   Recommendation:           - Start colonoscopy prep and continue until passing                             clear liquid.                             - Thereafter, start daily osmotic laxative regimen                             (most likely Miralax 1 capful twice daily).                                                                                       Pending Results   N/A    Code Status   DNR / DNI       Primary Care Physician   Ruben Humphrey    Physical Exam   Temp: 98.6  F (37  C) Temp src: Oral BP: 111/58 Pulse: 82   Resp: 24 SpO2: 97 % O2 Device: Nasal cannula Oxygen Delivery: 1 LPM  Vitals:    07/17/25 0510 07/22/25 0641 07/24/25 0500   Weight: 120 kg (264 lb 8.8 oz) 119 kg (262 lb 5.6 oz) 117.8 kg (259 lb 11.2 oz)     Vital Signs with Ranges  Temp:  [98.6  F (37  C)-98.8  F (37.1  C)] 98.6  F (37  C)  Pulse:  [75-98] 82  Resp:  [17-26] 24  BP: (111-133)/(52-74) 111/58  FiO2 (%):  [21 %-24 %] 24 %  SpO2:  [76 %-97 %] 97 %  I/O last 3 completed shifts:  In: 425 [P.O.:425]  Out: 450 [Urine:450]    Constitutional: Pleasant older woman sitting up in chair and in good spirits. Alert and oriented x3.  Answering questions appropriately.  No acute distress. Non-toxic.  HEENT: NCAT. EOMI. Moist oral mucosa.  Respiratory: Clear to auscultation bilaterally. No crackles or wheezes though continuing to take shallow inspirations. Incentive spirometry placed in front of her. Patient saturating 97% on 1L O2 at time of exam.  Cardiovascular: Regular rate and rhythm.  GI: Soft, nontender, nondistended.   Neurologic: Alert and oriented x3. No focal neurologic deficits.  Generalized weakness throughout.         Discharge Disposition   Discharged to rehabilitation facility  Condition at discharge:  Stable    Consultations This Hospital Stay   COLORECTAL SURGERY IP CONSULT  WOUND OSTOMY CONTINENCE NURSE  IP CONSULT  CARE MANAGEMENT / SOCIAL WORK IP CONSULT  PALLIATIVE CARE ADULT IP CONSULT  PHYSICAL THERAPY ADULT IP CONSULT  GASTROENTEROLOGY IP CONSULT  CARE MANAGEMENT / SOCIAL WORK IP CONSULT  SOCIAL WORK IP CONSULT  PHYSICAL THERAPY ADULT IP CONSULT  SPIRITUAL HEALTH SERVICES IP CONSULT  CARE MANAGEMENT / SOCIAL WORK IP CONSULT  SPIRITUAL HEALTH SERVICES IP CONSULT  PHYSICAL THERAPY ADULT IP CONSULT  OCCUPATIONAL THERAPY ADULT IP CONSULT  NUTRITION SERVICES ADULT IP CONSULT    Time Spent on this Encounter   I, Montserrat Chavis MD, personally saw the patient today and spent greater than 30 minutes discharging this patient.    Discharge Orders      General info for SNF    Length of Stay Estimate: Short Term Care: Estimated # of Days <30  Condition at Discharge: Improving  Level of care:skilled   Rehabilitation Potential: Good  Admission H&P remains valid and up-to-date: Yes  Recent Chemotherapy: N/A  Use Nursing Home Standing Orders: Yes     Mantoux instructions    Give two-step Mantoux (PPD) Per Facility Policy Yes     Activity - Up with nursing assistance     CPAP - Continue Home CPAP    Continue Home CPAP per home equipment settings.     Wound care (specify)    Site:   Sacral pressure injury present on admission  Instructions:  Importance of repositioning and Off-loading pressure. Continue with Mepilex and change every 5 days or as needed.     Reason for your hospital stay    You were brought in to the hospital for evaluation of abdominal pain and shortness of breath. The abdominal pain was ultimately felt to be constipation and you have been started on a scheduled bowel regimen that should be continued. Can hold senna doses if having loose stools. Colorectal surgery evaluated you while you were here for evaluation of possible sigmoid volvulus or stricture. You were felt to be a poor surgical candidate,  "though symptoms have improved without surgery. Flexible sigmoidoscopy on 7/18 with GI did not show obvious stricture, but prep was poor. Additional GI prep was given and repeat imaging showed improved stool burden. You are now having regular and soft bowel movements. You have been requiring oxygen and looking at previous records you have required O2 in the past and have remained on the border of needing O2. At this time, recommend home O2. You may require chronic O2 for your severe COPD. Discharging to TCU to improve strength. Recommend outpatient follow up with your pulmonologist, Dr. Adamson.     Follow Up and recommended labs and tests    Follow up with Nursing home physician within 1 week.  The following labs/tests are recommended: CBC and BMP.     Recommend follow up with primary care within 1 week of discharge from TCU. Imaging noted a \"1.5 cm enhancing observation at the dome of the right hepatic lobe. Recommend nonemergent contrast-enhanced MRI for further evaluation.\"     Follow up with your pulmonologist, Dr. Adamson.     No CPR- Do NOT Intubate     Physical Therapy Adult Consult    Evaluate and treat as clinically indicated.    Reason:  Deconditioning     Occupational Therapy Adult Consult    Evaluate and treat as clinically indicated.    Reason:  assess for return to prior living environment     Nutrition Services Adult IP Consult    Reason:  Recommend dietician evaluation depending on intake for consideration of supplements.     Oxygen (SNF/TCU) Discharge    Suspect that patient has chronic respiratory failure with hypoxia requiring 1 L O2 by NC. Continue to monitor.     Fall precautions     Diet    Follow this diet upon discharge: Regular diet     Discharge Medications   Current Discharge Medication List        START taking these medications    Details   sennosides (SENOKOT) 8.6 MG tablet Take 2 tablets by mouth at bedtime. Hold for loose stools.    Associated Diagnoses: Constipation, unspecified " constipation type           CONTINUE these medications which have CHANGED    Details   !! ipratropium - albuterol 0.5 mg/2.5 mg, 3mg,/3 mL (DUONEB) 0.5-2.5 (3) MG/3ML neb solution Take 1 vial (3 mLs) by nebulization 3 times daily.    Associated Diagnoses: COPD exacerbation (H)      LORazepam (ATIVAN) 0.5 MG tablet Take 1 tablet (0.5 mg) by mouth every 6 hours as needed for anxiety (or shortness of breath).  Qty: 6 tablet, Refills: 0    Associated Diagnoses: Anxiety      polyethylene glycol (MIRALAX) 17 GM/Dose powder Take 17 g (1 Capful) by mouth daily.    Associated Diagnoses: Constipation, unspecified constipation type       !! - Potential duplicate medications found. Please discuss with provider.        CONTINUE these medications which have NOT CHANGED    Details   acetaminophen (TYLENOL) 500 MG tablet Take 1,000 mg by mouth 3 times daily.      albuterol (PROAIR HFA/PROVENTIL HFA/VENTOLIN HFA) 108 (90 Base) MCG/ACT inhaler INHALE 2 PUFFS INTO THE LUNGS EVERY 4 HOURS AS NEEDED FOR SHORTNESS OF BREATH OR DIFFICULT BREATHING OR WHEEZING  Qty: 8.5 g, Refills: 0    Comments: Pharmacy may dispense brand covered by insurance (Proair, or proventil or ventolin or generic albuterol inhaler)  Associated Diagnoses: SOB (shortness of breath)      allopurinol (ZYLOPRIM) 100 MG tablet Take 100 mg by mouth daily START 7/10      !! Artificial Saliva (BIOTENE DRY MOUTH MOIST SPRAY MT) Take 1 spray by mouth every 2 hours as needed (dry mouth).      !! ARTIFICIAL SALIVA MT Take 1 strip by mouth 2 times daily After nebs      benzonatate (TESSALON) 100 MG capsule Take 1 capsule (100 mg) by mouth 3 times daily as needed for cough.  Qty: 20 capsule, Refills: 0    Associated Diagnoses: COVID-19 virus infection; COPD exacerbation (H)      calcium carbonate 600 mg-vitamin D 400 units (CALTRATE) 600-400 MG-UNIT per tablet Take 1 tablet by mouth every evening   Qty: 100 tablet, Refills: 3    Associated Diagnoses: Osteopenia      carbamide  peroxide (DEBROX) 6.5 % otic solution Place 3 drops into both ears twice a week. Mon & Thur      celecoxib (CELEBREX) 100 MG capsule Take 100 mg by mouth 2 times daily as needed for moderate pain (4-6)      DULoxetine (CYMBALTA) 60 MG capsule Take 60 mg by mouth daily      estradiol (ESTRACE) 0.1 MG/GM vaginal cream Place vaginally every 3 days. Sig: 3 times weekly, actually being given every 3 days.      fluticasone-salmeterol (ADVAIR) 100-50 MCG/ACT inhaler Inhale 1 puff into the lungs 2 times daily.      gabapentin (NEURONTIN) 400 MG capsule Take 400 mg by mouth 3 times daily. 630 AM 2  PM      guaiFENesin 400 MG TABS Take 400 mg by mouth 2 times daily      hydrocortisone, Perianal, (ANUSOL-HC) 2.5 % cream Place rectally daily as needed for hemorrhoids.      !! ipratropium - albuterol 0.5 mg/2.5 mg, 3mg,/3 mL (DUONEB) 0.5-2.5 (3) MG/3ML neb solution Take 1 vial by nebulization every 4 hours as needed for shortness of breath, wheezing or cough.      levothyroxine (SYNTHROID/LEVOTHROID) 88 MCG tablet Take 1 tablet (88 mcg) by mouth daily  Qty: 90 tablet, Refills: 0    Associated Diagnoses: Hypothyroidism, unspecified type      melatonin 3 MG tablet Take 3 mg by mouth nightly as needed for sleep.      nystatin (NYSTOP) 632143 UNIT/GM external powder Apply tid to affectead area prn  Qty: 60 g, Refills: 3    Associated Diagnoses: Intertrigo      polyvinyl alcohol (ARTIFICIAL TEARS) 1.4 % ophthalmic solution Place 1 drop into both eyes 3 times daily.      potassium chloride stuart ER (KLOR-CON M20) 20 MEQ CR tablet Take 40 mEq by mouth daily      PseudoePHEDrine-guaiFENesin 120-1200 MG TB12 Take 1 tablet by mouth 2 times daily as needed for congestion or cough.      semaglutide (OZEMPIC) 2 MG/3ML pen Inject 0.5 mg subcutaneously every 7 days.      torsemide (DEMADEX) 20 MG tablet Take 20 mg by mouth daily.      triamcinolone (KENALOG) 0.1 % external cream Apply topically every evening. To lower extremities       White Petrolatum-Mineral Oil (LUBRIFRESH P.M.) OINT Place into both eyes at bedtime. Apply a small strip into both eyes      zinc oxide (DESITIN) 40 % external ointment Apply topically as needed for dry skin or irritation (with toileting)      cycloSPORINE (CYCLOSPORINE IN KLARITY) 0.1 % EMUL Place 1 drop into both eyes 2 times daily 0630 and 2145       !! - Potential duplicate medications found. Please discuss with provider.        STOP taking these medications       diphenhydrAMINE (BENADRYL) 25 MG capsule Comments:   Reason for Stopping:         sulfamethoxazole-trimethoprim (BACTRIM DS) 800-160 MG tablet Comments:   Reason for Stopping:             Allergies   Allergies   Allergen Reactions    Ace Inhibitors Cough    Ceftriaxone Rash     Diffuse rash after IV ceftriaxone 1/20/25.  Previously tolerated other cephalosporins including recent course of keflex.     Data   Most Recent 3 CBC's:  Recent Labs   Lab Test 07/25/25  0708 07/24/25  0549 07/23/25  0529 07/22/25  0612   WBC  --  7.5 8.9 8.6   HGB  --  12.5 12.6 11.9    100 100 99    209 211 219      Most Recent 3 BMP's:  Recent Labs   Lab Test 07/25/25  0708 07/24/25  0549 07/23/25  0529 07/22/25  0611   NA  --  135 135 136   POTASSIUM  --  3.9 3.3* 3.6   CHLORIDE  --  90* 90* 93*   CO2  --  33* 31* 32*   BUN  --  9.8 9.6 8.9   CR 0.71 0.61 0.59 0.64   ANIONGAP  --  12 14 11   LAKSHMI  --  9.3 9.0 8.7*   GLC  --  135* 112* 137*     Most Recent 2 LFT's:  Recent Labs   Lab Test 07/19/25  0532 07/17/25  0643   AST 97* 74*   ALT 48 39   ALKPHOS 133 136   BILITOTAL 1.0 1.0     Most Recent TSH, T4 and A1c Labs:  Recent Labs   Lab Test 07/21/25  0514 04/29/25  1056   TSH 4.09 2.29   A1C  --  6.6*     Results for orders placed or performed during the hospital encounter of 07/16/25   CT Chest (PE) Abdomen Pelvis w Contrast    Narrative    EXAM: CT CHEST PE ABDOMEN PELVIS W CONTRAST  LOCATION: [facility]  DATE: 7/16/2025    INDICATION:  Shortness of breath. Left lower quadrant abdominal pain, nausea and vomiting.  COMPARISON: None.  TECHNIQUE: CT chest pulmonary angiogram and routine CT abdomen pelvis with IV contrast. Arterial phase through the chest and venous phase through the abdomen and pelvis. Multiplanar reformats and MIP reconstructions were performed. Dose reduction   techniques were used.   CONTRAST: 100mL Isovue 370.    FINDINGS:  ANGIOGRAM CHEST: Motion artifact limits evaluation. Allowing for the aforementioned limitation, there is no evidence for pulmonary embolism. Normal caliber thoracic aorta. No dissection. Minimal vascular calcification diffusely.    LUNGS AND PLEURA: Normal.    MEDIASTINUM/AXILLAE: Mild subpleural interstitial process involving both lower lungs which could be due to interstitial fibrosis or mild edema. No significant pleural fluid. Mild linear atelectasis right lung base. Mild dependent atelectasis posterior   aspect both upper lobes. No evidence for definitive pulmonary infiltrate or pneumonia.    CORONARY ARTERY CALCIFICATION: Mild.    HEPATOBILIARY: 1.5 cm enhancing observation at the dome of the right hepatic lobe (13/24). Marked diffuse hepatic steatosis. Mild surface contour nodularity to the liver. Distended gallbladder with cholelithiasis with two large 1 cm gallstones near the   neck. Suggestion of mild edema adjacent to the gallbladder. No biliary dilatation. Patent portal vein.    PANCREAS: Diffuse fatty replacement. No ductal dilatation or inflammatory change.    SPLEEN: Normal size. Patent splenic vein.    ADRENAL GLANDS: Unremarkable.    KIDNEYS/BLADDER: Mild atrophy bilaterally. No urinary collecting system dilatation or evidence for obstructing calculi allowing for streak artifact in the pelvis, which limits evaluation of bladder and distal ureters.    BOWEL: Evaluation of bowel loops in the pelvis limited due to streak artifact from bilateral THAs. Marked amount of gas in the stool throughout  the proximal colon extending in the descending colon and into the mid sigmoid colon. Sigmoid colon is   extremely redundant extending into the upper central abdomen. This extends inferiorly without complete rotation of the sigmoid mesentery, but with some mild mesenteric narrowing (series 13, image [id number]). This could potentially lead to sigmoid volvulus.   Recommend correlation for obstipation/constipation. No small bowel dilatation. No evidence for bowel wall thickening or perforation.    LYMPH NODES: No lymphadenopathy.    VASCULATURE: Normal caliber aorta. Moderate vascular calcification. Patent celiac, splenic and hepatic arteries. Patent SMA/QAMAR. Patent bilateral renal renal arteries. Moderate vascular calcification.    PELVIC ORGANS: No free fluid allowing for streak artifact.    MUSCULOSKELETAL: Bilateral THAs. Marked degenerative hypertrophic changes in the spine.      Impression    IMPRESSION:  1.  No evidence for pulmonary embolism.  2.  Normal caliber aorta without dissection.  3.  Mild subpleural interstitial process involving both lower lungs which could be due to interstitial fibrosis or mild edema. No significant pleural fluid. Recommend correlation for early CHF/volume overload. No evidence for pneumonia.  4.  Marked amount of gas and stool throughout the colon extending from the cecum to the mid sigmoid colon. The sigmoid colon is extremely redundant extending into the upper central abdomen. This extends inferiorly without complete rotation of the sigmoid   mesentery with transition point/narrowing of the sigmoid colon but without evidence for mass, edema or perforation at this location. No significant sigmoid mesenteric rotation. This could potentially lead to sigmoid volvulus. Recommend correlation for   obstipation/constipation and close clinical follow-up.  5.  Marked diffuse hepatic steatosis with surface contour nodularity to liver. Findings can be seen with chronic underlying hepatic  parenchymal disease.  6.  1.5 cm enhancing observation at the dome of the right hepatic lobe. Recommend nonemergent contrast-enhanced MRI for further evaluation.  7.  Distended gallbladder with cholelithiasis with two large 1 cm gallstones near the neck. Suggestion of mild edema adjacent to the gallbladder. Recommend correlation for right upper quadrant pain and consideration of ultrasound for further evaluation.     US Abdomen Limited (RUQ)    Narrative    EXAM: US ABDOMEN LIMITED  LOCATION: [facility]  DATE: 7/16/2025    INDICATION: Gallstones on CT, evaluate for cholecystitis.  COMPARISON: None.  TECHNIQUE: Limited abdominal ultrasound.    FINDINGS: Evaluation limited due to technical limitations and bowel gas.    GALLBLADDER: Not well-visualized due to bowel gas and technical limitations. Allowing for this there is no definitive evidence for gallbladder wall thickening. Wall thickness 2 mm. Sludge or small stone material within the gallbladder lumen near the area   of the gallbladder neck.    BILE DUCTS: No definitive evidence for biliary dilatation. Extrahepatic biliary system unable to be visualized due to bowel gas.    LIVER: Hepatic steatosis. No surface contour nodularity. Difficult to visualize due to hepatic steatosis. No definitive evidence for focal mass. Obscured by bowel gas.    RIGHT KIDNEY: 8 cm in length. No hydronephrosis.    PANCREAS: Obscured by bowel gas.    No ascites.      Impression    IMPRESSION:  1.  Evaluation limited due to technical limitations and bowel gas.  2.  Allowing for this there is no definitive evidence for gallbladder wall thickening. Sludge or small stone material within the gallbladder lumen near the area of the gallbladder neck.  3.  Hepatic steatosis.  4.  Extrahepatic biliary system unable to be visualized due to bowel gas. No definitive evidence for biliary dilatation.       XR Colon Water Soluble Diagnostic    Narrative    EXAM: XR COLON WATER  SOLUBLE DIAGNOSTIC  LOCATION: [facility]  DATE: 07/17/2025    INDICATION: Redundant colon suspicious for partial mesenteric rotation per CT report.  COMPARISON: CT of the chest, abdomen, and pelvis 07/16/2025.  TECHNIQUE: Routine.    FINDINGS:  RADIATION DOSE: Total Air Kerma 165.6 mGy.    COLON: The exam was limited related to patient immobility and discomfort during the exam. The colon is again noted to be markedly tortuous. Water-soluble contrast was instilled into the rectum and distended the sigmoid colon. There was an abrupt   narrowing of the colonic lumen which appeared to be within the proximal sigmoid colon. Contrast could not be induced to flow beyond this focal area of colonic narrowing. There were several loops of dilated gas-filled loops of colon noted proximal to this   area of luminal narrowing.      Impression    IMPRESSION:  1.  Limited exam due to patient immobility and discomfort during the exam.  2.  There was an abrupt narrowing of the colonic lumen which appeared to be within the proximal sigmoid colon. Contrast could not be induced to flow beyond this focal area of colonic narrowing. Findings are suspicious for a colonic stricture, and some   degree of associated colonic obstruction is also suspected.     XR Abdomen 2 Views    Narrative    EXAM: XR ABDOMEN 2 VIEWS  LOCATION: [facility]  DATE: 7/18/2025    INDICATION: Distension. obstipation, abd ng  COMPARISON: 7/17/2025, 7/16/2025      Impression    IMPRESSION: Moderate to large volume formed stool is seen throughout a majority of the colon, suggestive of constipation in the appropriate clinical setting. There is gaseous distention of the sigmoid colon, which appears to be redundant. Cannot exclude   colonic obstruction. There is general paucity of small bowel gas. No signs of pneumatosis or pneumoperitoneum. Round calcific densities in the right upper quadrant correspond to gallstones.  Multilevel hypertrophic and degenerative changes of the spine.   Bilateral hip arthroplasties. No acute osseous abnormality.   XR Abdomen 1 View    Narrative    EXAM: XR ABDOMEN 1 VIEW  LOCATION: [facility]  DATE: 7/20/2025    INDICATION: assess stool burden, bowel distention  COMPARISON: 7/18/2025      Impression    IMPRESSION: Stable mild diffuse distention of the colon. Decreased small colonic stool burden. No dilated small bowel. Stable calcifications in the right upper quadrant which most likely represent gallstones. Bilateral hip arthroplasty. Osseous   demineralization. Degenerative changes of the spine. Stable bibasilar opacities.   XR Chest Port 1 View    Narrative    EXAM: XR CHEST PORT 1 VIEW  LOCATION: [facility]  DATE: 7/22/2025    INDICATION: Hypoxia, rule out fluid overload and pneumonia  COMPARISON: 07/16/2025, 05/27/2025.      Impression    IMPRESSION: Mild interstitial thickening is seen, suggestive of edema. Minimal blunting of the right costophrenic angle, which may represent trace pleural fluid, pleural thickening, and/or atelectasis. No large volume pleural fluid collection. No   findings specific for pneumonia. Heart size is mildly enlarged. Mild central vascular congestion. Degenerative changes of the shoulders and spine. No acute osseous abnormality.     *Note: Due to a large number of results and/or encounters for the requested time period, some results have not been displayed. A complete set of results can be found in Results Review.

## 2025-07-26 ENCOUNTER — PATIENT OUTREACH (OUTPATIENT)
Dept: CARE COORDINATION | Facility: CLINIC | Age: 89
End: 2025-07-26
Payer: COMMERCIAL

## 2025-07-26 NOTE — PROGRESS NOTES
[facility]    Background: Transitional Care Management program identified per system criteria and reviewed by [facility] team for possible outreach.    Assessment: Upon chart review, [facility] Team member will not proceed with patient outreach related to this episode of Transitional Care Management program due to reason below:    Non-[facility] TCU: CCRC team member noted patient discharged to TCU/ARU/LTACH. Patient is not established with a [facility] currently supported by Primary Care-Care Coordination therefore handoff to Primary Care-Care Coordination is not appropriate at this time.    Plan: Transitional Care Management episode addressed appropriately per reason noted above.      Naomi Burch MA  [facility] [place], [facility]    *Connected Care Resource Team does NOT follow patient ongoing. Referrals are identified based on internal discharge reports and the outreach is to ensure patient has an understanding of their discharge instructions.

## 2025-07-28 ENCOUNTER — LAB REQUISITION (OUTPATIENT)
Dept: LAB | Facility: CLINIC | Age: 89
End: 2025-07-28
Payer: COMMERCIAL

## 2025-07-28 ENCOUNTER — TRANSITIONAL CARE UNIT VISIT (OUTPATIENT)
Dept: GERIATRICS | Facility: CLINIC | Age: 89
End: 2025-07-28
Payer: COMMERCIAL

## 2025-07-28 VITALS
HEART RATE: 64 BPM | RESPIRATION RATE: 18 BRPM | TEMPERATURE: 98.2 F | SYSTOLIC BLOOD PRESSURE: 126 MMHG | BODY MASS INDEX: 41.25 KG/M2 | HEIGHT: 60 IN | WEIGHT: 210.1 LBS | OXYGEN SATURATION: 94 % | DIASTOLIC BLOOD PRESSURE: 74 MMHG

## 2025-07-28 DIAGNOSIS — G62.9 NEUROPATHY: ICD-10-CM

## 2025-07-28 DIAGNOSIS — K59.00 CONSTIPATION, UNSPECIFIED CONSTIPATION TYPE: ICD-10-CM

## 2025-07-28 DIAGNOSIS — K76.9 HEPATIC LESION: ICD-10-CM

## 2025-07-28 DIAGNOSIS — R53.81 PHYSICAL DECONDITIONING: ICD-10-CM

## 2025-07-28 DIAGNOSIS — E66.01 MORBID OBESITY (H): ICD-10-CM

## 2025-07-28 DIAGNOSIS — J42 CHRONIC BRONCHITIS, UNSPECIFIED CHRONIC BRONCHITIS TYPE (H): ICD-10-CM

## 2025-07-28 DIAGNOSIS — K56.609 UNSPECIFIED INTESTINAL OBSTRUCTION, UNSPECIFIED AS TO PARTIAL VERSUS COMPLETE OBSTRUCTION (H): ICD-10-CM

## 2025-07-28 DIAGNOSIS — I50.33 ACUTE ON CHRONIC HEART FAILURE WITH PRESERVED EJECTION FRACTION (H): Primary | ICD-10-CM

## 2025-07-28 DIAGNOSIS — E03.9 HYPOTHYROIDISM, UNSPECIFIED TYPE: ICD-10-CM

## 2025-07-28 DIAGNOSIS — J96.91 RESPIRATORY FAILURE WITH HYPOXIA, UNSPECIFIED CHRONICITY (H): ICD-10-CM

## 2025-07-28 DIAGNOSIS — E11.9 TYPE 2 DIABETES MELLITUS WITHOUT COMPLICATION, WITHOUT LONG-TERM CURRENT USE OF INSULIN (H): ICD-10-CM

## 2025-07-28 DIAGNOSIS — G47.33 OSA (OBSTRUCTIVE SLEEP APNEA): ICD-10-CM

## 2025-07-28 DIAGNOSIS — F41.9 ANXIETY: ICD-10-CM

## 2025-07-28 PROCEDURE — 99310 SBSQ NF CARE HIGH MDM 45: CPT | Performed by: PHYSICIAN ASSISTANT

## 2025-07-28 PROCEDURE — P9604 ONE-WAY ALLOW PRORATED TRIP: HCPCS | Mod: ORL | Performed by: PHYSICIAN ASSISTANT

## 2025-07-28 PROCEDURE — 86481 TB AG RESPONSE T-CELL SUSP: CPT | Mod: ORL | Performed by: PHYSICIAN ASSISTANT

## 2025-07-28 PROCEDURE — 36415 COLL VENOUS BLD VENIPUNCTURE: CPT | Mod: ORL | Performed by: PHYSICIAN ASSISTANT

## 2025-07-28 NOTE — LETTER
7/28/2025      Carol Merida  [address]  [place] [address]        [facility] GERIATRICS    PRIMARY CARE PROVIDER AND CLINIC:  Ruben Humphrey PA-C, [facility] PHYSICIAN [facility] [address] [address] / [place]*  Chief Complaint   Patient presents with     Hospital F/U      [place] Medical Record Number:  [id number]  Place of Service where encounter took place:  [facility] [[id number]]    Carol Merida  is a 89 year old  (1935), admitted to the above facility from  [facility]. Hospital stay 7/16/25 through 7/25/25..   HPI:    Notes from hospitalization reviewed including H&P colorectal surgery consult, palliative care consult, GI consult and D/c summary    Carol Merida is a very pleasant 89-year-old female with past medical history of anxiety, neuropathy, depression, type 2 diabetes, morbid obesity who was recently hospitalized at [facility].  Presented for evaluation of abdominal pain and nausea.  CT of the abdomen concerning for possible sigmoid volvulus.  Colorectal surgery consulted and not deemed to be a surgical candidate.  Palliative care and GI consulted.  Ultimately underwent flex sig my anoscopy which showed no evidence of obstruction or stricture.  Initiated on aggressive bowel regimen with improvement.  Incidentally noted to have hypoxia.  Does have history of HFpEF as well as COPD.  Treated with nebulizers as well as diuresis.  Continue to require oxygen at discharge.  Deconditioned compared to baseline so discharged to [facility]    Carol is evaluated in her room.  She did note she had an episode of loose stool this morning.  Abdomen is still very gassy and occasionally uncomfortable.  No bowel movement yesterday.  Denies nausea.  Not hungry but tolerating food okay.  Hospitalization reviewed.  Continues on 1.5 L nasal cannula.  Does have CPAP at bedside.  Denies cough.  No shortness of breath.  Lives at [facility]    Separately, called and  spoke with son Jono.  Introduced self and role.  Discussed plan of care and typical TCU course.  Questions answered  Review of nursing home EMR: -126 Weight 210  BG      CODE STATUS/ADVANCE DIRECTIVES DISCUSSION:  No CPR- Do NOT Intubate  DNR / DNI  ALLERGIES:   Allergies   Allergen Reactions     Ace Inhibitors Cough     Ceftriaxone Rash     Diffuse rash after IV ceftriaxone 1/20/25.  Previously tolerated other cephalosporins including recent course of keflex.      PAST MEDICAL HISTORY:   Past Medical History:   Diagnosis Date     Anemia      Anxiety      Breast cancer 2005    infltrating ductal     Chronic joint pain     Joint pain for many years.     Congestive heart failure      COPD (chronic obstructive pulmonary disease)      Depressive disorder      Hypertension      Hypothyroidism      Lumbar spinal stenosis      Melanoma      Mixed hyperlipidemia      Obstructive sleep apnea 08/25/2006    CPAP      Osteoarthritis      Osteoporosis      Tubular adenoma in colon 09/2001    colonoscopy due 2004      PAST SURGICAL HISTORY:   has a past surgical history that includes colonoscopy (09/2001); colonoscopy (09/2004); hysterectomy, naomi (summer 2004); Arthroplasty hip (Left, 07/06/2015); Arthroplasty revision hip (Left, 07/22/2015); Arthroplasty hip (Right, 11/02/2016); cataract iol, rt/lt (Bilateral); Open reduction internal fixation rodding intramedullary femur (Right, 02/26/2023); Mastectomy (Left); appendectomy; Melanoma excision with sentinel node biopsy; Tonsillectomy; Arthroplasty knee (Bilateral); ORIF ankle status post removal of hardware (Right); and Sigmoidoscopy flexible (N/A, 7/18/2025).  FAMILY HISTORY: family history includes Arthritis in her mother; Breast Cancer in her daughter; Cancer in her father and grandchild; Hypertension in her father; No Known Problems in her brother.  SOCIAL HISTORY:   reports that she has never smoked. She has never used smokeless tobacco. She reports current alcohol  use. She reports that she does not use drugs.  Patient's living condition: lives alone. Lives in [facility]    Post Discharge Medication Reconciliation Status:   MED REC REQUIRED  Post Medication Reconciliation Status: discharge medications reconciled and changed, per note/orders       Current Outpatient Medications   Medication Sig Dispense Refill     acetaminophen (TYLENOL) 500 MG tablet Take 1,000 mg by mouth 3 times daily.       albuterol (PROAIR HFA/PROVENTIL HFA/VENTOLIN HFA) 108 (90 Base) MCG/ACT inhaler INHALE 2 PUFFS INTO THE LUNGS EVERY 4 HOURS AS NEEDED FOR SHORTNESS OF BREATH OR DIFFICULT BREATHING OR WHEEZING 8.5 g 0     allopurinol (ZYLOPRIM) 100 MG tablet Take 100 mg by mouth daily START 7/10       Artificial Saliva (BIOTENE DRY MOUTH MOIST SPRAY MT) Take 1 spray by mouth every 2 hours as needed (dry mouth).       ARTIFICIAL SALIVA MT Take 1 strip by mouth 2 times daily After nebs       benzonatate (TESSALON) 100 MG capsule Take 1 capsule (100 mg) by mouth 3 times daily as needed for cough. 20 capsule 0     calcium carbonate 600 mg-vitamin D 400 units (CALTRATE) 600-400 MG-UNIT per tablet Take 1 tablet by mouth every evening  100 tablet 3     carbamide peroxide (DEBROX) 6.5 % otic solution Place 3 drops into both ears twice a week. Mon & Thur       celecoxib (CELEBREX) 100 MG capsule Take 100 mg by mouth 2 times daily as needed for moderate pain (4-6)       cycloSPORINE (CYCLOSPORINE IN KLARITY) 0.1 % EMUL Place 1 drop into both eyes 2 times daily 0630 and 2145       DULoxetine (CYMBALTA) 60 MG capsule Take 60 mg by mouth daily       estradiol (ESTRACE) 0.1 MG/GM vaginal cream Place vaginally every 3 days. Sig: 3 times weekly, actually being given every 3 days.       fluticasone-salmeterol (ADVAIR) 100-50 MCG/ACT inhaler Inhale 1 puff into the lungs 2 times daily.       gabapentin (NEURONTIN) 400 MG capsule Take 400 mg by mouth 3 times daily. 630 AM 2  PM       guaiFENesin 400 MG TABS Take 400 mg by  mouth 2 times daily       hydrocortisone, Perianal, (ANUSOL-HC) 2.5 % cream Place rectally daily as needed for hemorrhoids.       ipratropium - albuterol 0.5 mg/2.5 mg, 3mg,/3 mL (DUONEB) 0.5-2.5 (3) MG/3ML neb solution Take 1 vial (3 mLs) by nebulization 3 times daily.       ipratropium - albuterol 0.5 mg/2.5 mg, 3mg,/3 mL (DUONEB) 0.5-2.5 (3) MG/3ML neb solution Take 1 vial by nebulization every 4 hours as needed for shortness of breath, wheezing or cough.       levothyroxine (SYNTHROID/LEVOTHROID) 88 MCG tablet Take 1 tablet (88 mcg) by mouth daily 90 tablet 0     LORazepam (ATIVAN) 0.5 MG tablet Take 1 tablet (0.5 mg) by mouth every 6 hours as needed for anxiety (or shortness of breath). 6 tablet 0     melatonin 3 MG tablet Take 3 mg by mouth nightly as needed for sleep.       nystatin (NYSTOP) 507022 UNIT/GM external powder Apply tid to affectead area prn 60 g 3     polyethylene glycol (MIRALAX) 17 GM/Dose powder Take 17 g (1 Capful) by mouth daily.       polyvinyl alcohol (ARTIFICIAL TEARS) 1.4 % ophthalmic solution Place 1 drop into both eyes 3 times daily.       potassium chloride stuart ER (KLOR-CON M20) 20 MEQ CR tablet Take 40 mEq by mouth daily       [Paused] semaglutide (OZEMPIC) 2 MG/3ML pen Inject 0.5 mg subcutaneously every 7 days.       sennosides (SENOKOT) 8.6 MG tablet Take 2 tablets by mouth at bedtime. Hold for loose stools.       torsemide (DEMADEX) 20 MG tablet Take 20 mg by mouth daily.       triamcinolone (KENALOG) 0.1 % external cream Apply topically every evening. To lower extremities       White Petrolatum-Mineral Oil (LUBRIFRESH P.M.) OINT Place into both eyes at bedtime. Apply a small strip into both eyes       zinc oxide (DESITIN) 40 % external ointment Apply topically as needed for dry skin or irritation (with toileting)       No current facility-administered medications for this visit.       ROS:  10 point ROS of systems including Constitutional, Eyes, Respiratory, Cardiovascular,  Gastroenterology, Genitourinary, Integumentary, Musculoskeletal, Psychiatric were all negative except for pertinent positives noted in my HPI.    Vitals:  /74   Pulse 64   Temp 98.2  F (36.8  C)   Resp 18   Ht 1.524 m (5')   Wt 95.3 kg (210 lb 1.6 oz)   LMP  (LMP Unknown)   SpO2 94%   BMI 41.03 kg/m    Exam:  Physical Exam  Vitals reviewed.   Constitutional:       Appearance: Normal appearance. She is well-developed. She is obese.   HENT:      Mouth/Throat:      Mouth: Mucous membranes are moist.   Eyes:      Conjunctiva/sclera: Conjunctivae normal.   Cardiovascular:      Rate and Rhythm: Normal rate and regular rhythm.      Heart sounds: Normal heart sounds.   Pulmonary:      Effort: Pulmonary effort is normal.      Breath sounds: Normal breath sounds. No wheezing or rales.      Comments: On 1.5L  Abdominal:      General: There is distension.      Tenderness: There is no abdominal tenderness. There is no guarding.   Musculoskeletal:      Right shoulder: No swelling.      Left shoulder: No swelling.      Cervical back: Normal range of motion.   Skin:     General: Skin is warm.      Findings: No rash.   Neurological:      Mental Status: She is alert.           Lab/Diagnostic data:  Recent labs in [facility] reviewed by me today.  and Most Recent 3 CBC's:  Recent Labs   Lab Test 07/25/25  0708 07/24/25  0549 07/23/25  0529 07/22/25  0612   WBC  --  7.5 8.9 8.6   HGB  --  12.5 12.6 11.9    100 100 99    209 211 219     Most Recent 3 BMP's:  Recent Labs   Lab Test 07/25/25  0708 07/24/25  0549 07/23/25  0529 07/22/25  0611   NA  --  135 135 136   POTASSIUM  --  3.9 3.3* 3.6   CHLORIDE  --  90* 90* 93*   CO2  --  33* 31* 32*   BUN  --  9.8 9.6 8.9   CR 0.71 0.61 0.59 0.64   ANIONGAP  --  12 14 11   LAKSHMI  --  9.3 9.0 8.7*   GLC  --  135* 112* 137*     Most Recent 2 LFT's:  Recent Labs   Lab Test 07/19/25  0532 07/17/25  0643   AST 97* 74*   ALT 48 39   ALKPHOS 133 136   BILITOTAL 1.0 1.0     Most  Recent TSH and T4:  Recent Labs   Lab Test 07/21/25  0514   TSH 4.09     Most Recent Hemoglobin A1c:  Recent Labs   Lab Test 04/29/25  1056   A1C 6.6*     Most Recent ESR & CRP:  Recent Labs   Lab Test 05/27/25  0244 01/19/25  1200 09/28/22  1628 07/21/21  1336   SED  --  55*   < >  --    CRP  --   --   --  1.8   CRPI 8.84* 22.44*   < >  --     < > = values in this interval not displayed.     Most Recent Anemia Panel:  Recent Labs   Lab Test 07/25/25  0708 07/24/25  0549 02/27/24  1413 09/05/23  0938   WBC  --  7.5   < > 5.4   HGB  --  12.5   < > 13.2   HCT  --  39.5   < > 43.3    100   < > 94    209   < > 279   B12  --   --   --  480    < > = values in this interval not displayed.       Study Result    Narrative & Impression   EXAM: CT CHEST PE ABDOMEN PELVIS W CONTRAST  LOCATION: [facility]  DATE: 7/16/2025     INDICATION: Shortness of breath. Left lower quadrant abdominal pain, nausea and vomiting.  COMPARISON: None.  TECHNIQUE: CT chest pulmonary angiogram and routine CT abdomen pelvis with IV contrast. Arterial phase through the chest and venous phase through the abdomen and pelvis. Multiplanar reformats and MIP reconstructions were performed. Dose reduction   techniques were used.   CONTRAST: 100mL Isovue 370.     FINDINGS:  ANGIOGRAM CHEST: Motion artifact limits evaluation. Allowing for the aforementioned limitation, there is no evidence for pulmonary embolism. Normal caliber thoracic aorta. No dissection. Minimal vascular calcification diffusely.     LUNGS AND PLEURA: Normal.     MEDIASTINUM/AXILLAE: Mild subpleural interstitial process involving both lower lungs which could be due to interstitial fibrosis or mild edema. No significant pleural fluid. Mild linear atelectasis right lung base. Mild dependent atelectasis posterior   aspect both upper lobes. No evidence for definitive pulmonary infiltrate or pneumonia.     CORONARY ARTERY CALCIFICATION: Mild.      HEPATOBILIARY: 1.5 cm enhancing observation at the dome of the right hepatic lobe (13/24). Marked diffuse hepatic steatosis. Mild surface contour nodularity to the liver. Distended gallbladder with cholelithiasis with two large 1 cm gallstones near the   neck. Suggestion of mild edema adjacent to the gallbladder. No biliary dilatation. Patent portal vein.     PANCREAS: Diffuse fatty replacement. No ductal dilatation or inflammatory change.     SPLEEN: Normal size. Patent splenic vein.     ADRENAL GLANDS: Unremarkable.     KIDNEYS/BLADDER: Mild atrophy bilaterally. No urinary collecting system dilatation or evidence for obstructing calculi allowing for streak artifact in the pelvis, which limits evaluation of bladder and distal ureters.     BOWEL: Evaluation of bowel loops in the pelvis limited due to streak artifact from bilateral THAs. Marked amount of gas in the stool throughout the proximal colon extending in the descending colon and into the mid sigmoid colon. Sigmoid colon is   extremely redundant extending into the upper central abdomen. This extends inferiorly without complete rotation of the sigmoid mesentery, but with some mild mesenteric narrowing (series 13, image [id number]). This could potentially lead to sigmoid volvulus.   Recommend correlation for obstipation/constipation. No small bowel dilatation. No evidence for bowel wall thickening or perforation.     LYMPH NODES: No lymphadenopathy.     VASCULATURE: Normal caliber aorta. Moderate vascular calcification. Patent celiac, splenic and hepatic arteries. Patent SMA/QAMAR. Patent bilateral renal renal arteries. Moderate vascular calcification.     PELVIC ORGANS: No free fluid allowing for streak artifact.     MUSCULOSKELETAL: Bilateral THAs. Marked degenerative hypertrophic changes in the spine.                                                                      IMPRESSION:  1.  No evidence for pulmonary embolism.  2.  Normal caliber aorta without  dissection.  3.  Mild subpleural interstitial process involving both lower lungs which could be due to interstitial fibrosis or mild edema. No significant pleural fluid. Recommend correlation for early CHF/volume overload. No evidence for pneumonia.  4.  Marked amount of gas and stool throughout the colon extending from the cecum to the mid sigmoid colon. The sigmoid colon is extremely redundant extending into the upper central abdomen. This extends inferiorly without complete rotation of the sigmoid   mesentery with transition point/narrowing of the sigmoid colon but without evidence for mass, edema or perforation at this location. No significant sigmoid mesenteric rotation. This could potentially lead to sigmoid volvulus. Recommend correlation for   obstipation/constipation and close clinical follow-up.  5.  Marked diffuse hepatic steatosis with surface contour nodularity to liver. Findings can be seen with chronic underlying hepatic parenchymal disease.  6.  1.5 cm enhancing observation at the dome of the right hepatic lobe. Recommend nonemergent contrast-enhanced MRI for further evaluation.  7.  Distended gallbladder with cholelithiasis with two large 1 cm gallstones near the neck. Suggestion of mild edema adjacent to the gallbladder. Recommend correlation for right upper quadrant pain and consideration of ultrasound for further evaluation.     Findings:       Extensive amounts of solid stool was found in the entire colon,        interfering with visualization. Lavage of the area was performed using a        large volume (500cc) of sterile wather and Fleets enemas (x2) into the        right colon. There was incomplete clearance with continued poor        visualization.        The exam was otherwise without abnormality to the level of the hepatic        flexure. There was no evidence of a fixed stricture or obstructing mass.                                                                                     Impression:               - Preparation of the colon was poor.                             - Stool in the entire examined colon.                             - The examination was otherwise normal.                             - No specimens collected.                             - Clinical picture suggestive of either                             constipation/obstipation althoug sigmoid volvulus                             is a possibility.   Recommendation:           - Start colonoscopy prep and continue until passing                             clear liquid.                             - Thereafter, start daily osmotic laxative regimen                             (most likely Miralax 1 capful twice daily).     ASSESSMENT/PLAN:  Abdominal pain  Constipation: CT with marked amount of gas/stool with concern for transition point.  Colorectal surgery consulted and underwent Gastrografin enema did show evidence of colonic obstruction.  Not felt to be a surgical candidate.  GI consulted and underwent flex sigmoidoscopy 7/18.  No stricture noted and recommended aggressive bowel regimen  Patient indicates had large loose bowel movement today.  No bowel movement Sunday or Saturday per her report  Continue MiraLAX and senna as scheduled.  Hold for loose stools  Continue regular diet  Monitor    Acute HFpEF  Lymphedema: Developed some volume overload in the setting of IV fluids.  Received IV diuresis  Continue PTA torsemide 20 mg daily  Monitor volume status  BMP 7/30    COPD:   Continue Advair and scheduled DuoNeb  As needed DuoNebs available as needed  Continue Mucinex    JEAN  CPAP at home settings    Acute hypoxic respiratory failure: Multifactorial due to the above  Treat CHF and COPD  Wean oxygen as tolerated    Physical deconditioning  PT/OT    Anxiety  Continue duloxetine and as needed lorazepam.  Monitor use of lorazepam.  No utilization since TCU admission    Chronic pain  Neuropathy  Continue Celebrex, Cymbalta and  gabapentin    Hypothyroidism: Recent TSH WNL  Continue levothyroxine    Type 2 diabetes: A1c 6.6.  Hold Ozempic at TCU  May benefit from follow-up with PCP to determine if resumption appropriate given significant constipation/obstipation    Hepatic lesion: 1.5 cm noted incidentally on CT of the abdomen  Consider outpatient MRI    Obestiy:  Known issue that I take into account for their medical decisions, no current exacerbations or new concerns      Electronically signed by:  Maritza Woods PA-C     This note was completed in part using Dragon voice recognition software. Although reviewed after completion, some word and grammatical errors may occur.      A total >45 min were spent on this hospital follow-up visit including review of hospitalization, medication reconciliation, evaluating patient discussing plan of care, writing orders, phone call to patient's family member and documenting.  All services performed the day of visit                     Sincerely,        Maritza Woods PA-C    Electronically signed

## 2025-07-29 LAB
GAMMA INTERFERON BACKGROUND BLD IA-ACNC: 0.08 IU/ML
M TB IFN-G BLD-IMP: NEGATIVE
M TB IFN-G CD4+ BCKGRND COR BLD-ACNC: 7.06 IU/ML
MITOGEN IGNF BCKGRD COR BLD-ACNC: -0.01 IU/ML
MITOGEN IGNF BCKGRD COR BLD-ACNC: -0.01 IU/ML
QUANTIFERON MITOGEN: 7.14 IU/ML
QUANTIFERON NIL TUBE: 0.08 IU/ML
QUANTIFERON TB1 TUBE: 0.07 IU/ML
QUANTIFERON TB2 TUBE: 0.07

## 2025-07-30 ENCOUNTER — TRANSITIONAL CARE UNIT VISIT (OUTPATIENT)
Dept: GERIATRICS | Facility: CLINIC | Age: 89
End: 2025-07-30
Payer: COMMERCIAL

## 2025-07-30 VITALS
DIASTOLIC BLOOD PRESSURE: 60 MMHG | WEIGHT: 210.6 LBS | RESPIRATION RATE: 17 BRPM | OXYGEN SATURATION: 96 % | HEIGHT: 60 IN | HEART RATE: 85 BPM | BODY MASS INDEX: 41.35 KG/M2 | TEMPERATURE: 97.4 F | SYSTOLIC BLOOD PRESSURE: 126 MMHG

## 2025-07-30 DIAGNOSIS — J96.01 ACUTE RESPIRATORY FAILURE WITH HYPOXIA (H): ICD-10-CM

## 2025-07-30 DIAGNOSIS — G47.33 OSA (OBSTRUCTIVE SLEEP APNEA): ICD-10-CM

## 2025-07-30 DIAGNOSIS — E87.5 HYPERKALEMIA: ICD-10-CM

## 2025-07-30 DIAGNOSIS — J44.9 CHRONIC OBSTRUCTIVE PULMONARY DISEASE, UNSPECIFIED COPD TYPE (H): ICD-10-CM

## 2025-07-30 DIAGNOSIS — K56.2 SIGMOID VOLVULUS (H): ICD-10-CM

## 2025-07-30 DIAGNOSIS — K59.00 CONSTIPATION, UNSPECIFIED CONSTIPATION TYPE: Primary | ICD-10-CM

## 2025-07-30 DIAGNOSIS — E11.9 TYPE 2 DIABETES MELLITUS WITHOUT COMPLICATION, WITHOUT LONG-TERM CURRENT USE OF INSULIN (H): ICD-10-CM

## 2025-07-30 DIAGNOSIS — R53.81 PHYSICAL DECONDITIONING: ICD-10-CM

## 2025-07-30 DIAGNOSIS — I87.2 VENOUS STASIS DERMATITIS OF BOTH LOWER EXTREMITIES: ICD-10-CM

## 2025-07-30 DIAGNOSIS — I50.33 ACUTE ON CHRONIC HEART FAILURE WITH PRESERVED EJECTION FRACTION (H): ICD-10-CM

## 2025-07-30 LAB
ANION GAP SERPL CALCULATED.3IONS-SCNC: 11 MMOL/L (ref 7–15)
BUN SERPL-MCNC: 19.6 MG/DL (ref 8–23)
CALCIUM SERPL-MCNC: 10.4 MG/DL (ref 8.8–10.4)
CHLORIDE SERPL-SCNC: 96 MMOL/L (ref 98–107)
CREAT SERPL-MCNC: 0.87 MG/DL (ref 0.51–0.95)
EGFRCR SERPLBLD CKD-EPI 2021: 63 ML/MIN/1.73M2
ERYTHROCYTE [DISTWIDTH] IN BLOOD BY AUTOMATED COUNT: 14.3 % (ref 10–15)
GLUCOSE SERPL-MCNC: 120 MG/DL (ref 70–99)
HCO3 SERPL-SCNC: 31 MMOL/L (ref 22–29)
HCT VFR BLD AUTO: 41.1 % (ref 35–47)
HGB BLD-MCNC: 12.3 G/DL (ref 11.7–15.7)
MCH RBC QN AUTO: 31.1 PG (ref 26.5–33)
MCHC RBC AUTO-ENTMCNC: 29.9 G/DL (ref 31.5–36.5)
MCV RBC AUTO: 104 FL (ref 78–100)
PLATELET # BLD AUTO: 241 10E3/UL (ref 150–450)
POTASSIUM SERPL-SCNC: 5.5 MMOL/L (ref 3.4–5.3)
RBC # BLD AUTO: 3.96 10E6/UL (ref 3.8–5.2)
SODIUM SERPL-SCNC: 138 MMOL/L (ref 135–145)
WBC # BLD AUTO: 8 10E3/UL (ref 4–11)

## 2025-07-30 PROCEDURE — P9604 ONE-WAY ALLOW PRORATED TRIP: HCPCS | Mod: ORL | Performed by: PHYSICIAN ASSISTANT

## 2025-07-30 PROCEDURE — 36415 COLL VENOUS BLD VENIPUNCTURE: CPT | Mod: ORL | Performed by: PHYSICIAN ASSISTANT

## 2025-07-30 PROCEDURE — 85027 COMPLETE CBC AUTOMATED: CPT | Mod: ORL | Performed by: PHYSICIAN ASSISTANT

## 2025-07-30 PROCEDURE — 80048 BASIC METABOLIC PNL TOTAL CA: CPT | Mod: ORL | Performed by: PHYSICIAN ASSISTANT

## 2025-07-30 RX ORDER — POTASSIUM CHLORIDE 1500 MG/1
20 TABLET, EXTENDED RELEASE ORAL DAILY
Qty: 30 TABLET | Refills: 0 | Status: SHIPPED | OUTPATIENT
Start: 2025-08-01

## 2025-07-30 NOTE — LETTER
7/30/2025      Carol Merida  [address]  [place] [address]        No notes on file      Sincerely,        Stella Dawn, DO    Electronically signed

## 2025-07-30 NOTE — PROGRESS NOTES
"[facility]  PHYSICIAN NOTE    PRIMARY CARE PROVIDER AND CLINIC:  Ruben Humphrey PA-C, [facility] PHYSICIAN [facility] [address] [facility] [address] / [place]    Chief Complaint   Patient presents with    Hospital F/U     [facility] Record Number:  [id number]  Place of Service where encounter took place:  [facility] ([facility]) [[id number]]    Carol Merida is a 89 year old (1935), admitted to the above facility from  [facility]. Hospital stay 7/16/25 through 7/25/25. Admitted to this facility for  rehab, medical management, and nursing care.     HPI:    HPI information obtained from: facility chart records, facility staff, patient report, and [facility] chart review.     Brief summary of hospital course: Everette Merida is an 89yoF ***    Updates on status since skilled nursing admission: Recent vitals: Afebrile, -130/50-70, HR 60-80 and weight in 210s#. SLUMS 23/30 with CPT in process. Has electric scooter at home.   Everette is seen in her room after working with physical therapy.  Thankfully is already making favorable progress.  Is able to transfer with an assist of 1 and ambulate using a walker.  Was able to wean off of oxygen last evening and use her CPAP only and has not needed any oxygen today.  She actually feels her breathing is \"the best it has been in a long time\".  Says even prior to admission, even leaning forward in her chair would make her short of breath.  Her bowels are also continuing to move along.  Describes daily soft stool though it seems to be a small quantity.  Does still have a lot of gas but think thankfully not the cramping that she wants had.  Still feels her appetite is not all the way back to its baseline.  She notes that there used to be a \"hump\" on her left side of her abdomen that resolved after the hospitalization and the treatment of her constipation/volvulus.  She really hopes she can discharge back to her prior assisted living " facility as soon as possible.  Has a care conference scheduled for Friday of this week.  She has family support as well and she reports her kids feel she is looking a lot better when they see her as well which is something that gives them hope.  She had some labs today noting mild hyperkalemia on potassium supplementation that I can adjust.  Otherwise labs looking satisfactory.  She has not utilized any of her as needed Ativan yet.  She does say that the triamcinolone cream on her legs does help with some what appears to be venous stasis changes and says at times she can be prone to cellulitis.  She is on several eyedrops which she perceives that prior to admission she had an appointment with her ophthalmologist about 6 weeks ago in which the eyedrops were all to be as needed but on hospital admission they were still scheduled.  Says her daughter Alea would have attended the appointment and says they can check with her assisted living facility as they help manage her medications at home.  She never use the estradiol cream yet at the assisted living facility but would be open to trying it here as ordered.    CODE STATUS/ADVANCE DIRECTIVES DISCUSSION:   DNR / DNI  Patient's living condition: lives in an assisted living facility [facility]    ALLERGIES: Ace inhibitors and Ceftriaxone    Past Medical History:   Diagnosis Date    Anemia     Anxiety     Breast cancer 2005    infltrating ductal    Chronic joint pain     Joint pain for many years.    Congestive heart failure     COPD (chronic obstructive pulmonary disease)     Depressive disorder     Hypertension     Hypothyroidism     Lumbar spinal stenosis     Melanoma     Mixed hyperlipidemia     Obstructive sleep apnea 08/25/2006    CPAP     Osteoarthritis     Osteoporosis     Tubular adenoma in colon 09/2001    colonoscopy due 2004      Past Surgical History:   Procedure Laterality Date    APPENDECTOMY      ARTHROPLASTY HIP Left 07/06/2015    Procedure:  ARTHROPLASTY HIP;  Surgeon: Junior Giordano MD;  Location: [facility] OR    ARTHROPLASTY HIP Right 11/02/2016    Procedure: ARTHROPLASTY HIP;  Surgeon: Junior Giordano MD;  Location: [facility] OR    ARTHROPLASTY KNEE Bilateral     ARTHROPLASTY REVISION HIP Left 07/22/2015    Procedure: ARTHROPLASTY REVISION HIP;  Surgeon: Junior Giordano MD;  Location: [facility] OR    CATARACT IOL, RT/LT Bilateral     COLONOSCOPY  09/2001    tubular adenoma; repeat 3 years.    COLONOSCOPY  09/2004    normal; repeat 5 years ([facility])    HYSTERECTOMY, MARGAUX  summer 2004    and BSO    MASTECTOMY Left     Melanoma excision with sentinel node biopsy      OPEN REDUCTION INTERNAL FIXATION RODDING INTRAMEDULLARY FEMUR Right 02/26/2023    Procedure: OPEN REDUCTION INTERNAL FIXATION RIGHT DISTAL FEMUR WITH RETROGRADE NAIL, LATERAL PLATE, AND CABLE;  Surgeon: Newton Lemon MD;  Location: [facility] OR    ORIF ankle status post removal of hardware Right     SIGMOIDOSCOPY FLEXIBLE N/A 7/18/2025    Procedure: Sigmoidoscopy flexible;  Surgeon: Raleigh Hassan MD;  Location:  GI    Tonsillectomy           Post-discharge medication reconciliation status: Reviewed and updated in [facility] according to facility MAR    Current Outpatient Medications   Medication Sig Dispense Refill    acetaminophen (TYLENOL) 500 MG tablet Take 1,000 mg by mouth 3 times daily.      albuterol (PROAIR HFA/PROVENTIL HFA/VENTOLIN HFA) 108 (90 Base) MCG/ACT inhaler INHALE 2 PUFFS INTO THE LUNGS EVERY 4 HOURS AS NEEDED FOR SHORTNESS OF BREATH OR DIFFICULT BREATHING OR WHEEZING 8.5 g 0    allopurinol (ZYLOPRIM) 100 MG tablet Take 100 mg by mouth daily START 7/10      Artificial Saliva (BIOTENE DRY MOUTH MOIST SPRAY MT) Take 1 spray by mouth every 2 hours as needed (dry mouth).      benzonatate (TESSALON) 100 MG capsule Take 1 capsule (100 mg) by mouth 3 times daily as needed for cough. 20 capsule 0    calcium carbonate 600 mg-vitamin D 400 units (CALTRATE) 600-400 MG-UNIT per  tablet Take 1 tablet by mouth every evening  100 tablet 3    carbamide peroxide (DEBROX) 6.5 % otic solution Place 3 drops into both ears twice a week. Mon & Thur      celecoxib (CELEBREX) 100 MG capsule Take 100 mg by mouth 2 times daily as needed for moderate pain (4-6)      cycloSPORINE (CYCLOSPORINE IN KLARITY) 0.1 % EMUL Place 1 drop into both eyes 2 times daily 0630 and 2145      DULoxetine (CYMBALTA) 60 MG capsule Take 60 mg by mouth daily      estradiol (ESTRACE) 0.1 MG/GM vaginal cream Place vaginally every 3 days. Sig: 3 times weekly, actually being given every 3 days.      fluticasone-salmeterol (ADVAIR) 100-50 MCG/ACT inhaler Inhale 1 puff into the lungs 2 times daily.      gabapentin (NEURONTIN) 400 MG capsule Take 400 mg by mouth 3 times daily. 630 AM 2  PM      guaiFENesin 400 MG TABS Take 400 mg by mouth 2 times daily      hydrocortisone, Perianal, (ANUSOL-HC) 2.5 % cream Place rectally daily as needed for hemorrhoids.      ipratropium - albuterol 0.5 mg/2.5 mg, 3mg,/3 mL (DUONEB) 0.5-2.5 (3) MG/3ML neb solution Take 1 vial (3 mLs) by nebulization 3 times daily.      ipratropium - albuterol 0.5 mg/2.5 mg, 3mg,/3 mL (DUONEB) 0.5-2.5 (3) MG/3ML neb solution Take 1 vial by nebulization every 4 hours as needed for shortness of breath, wheezing or cough.      levothyroxine (SYNTHROID/LEVOTHROID) 88 MCG tablet Take 1 tablet (88 mcg) by mouth daily 90 tablet 0    LORazepam (ATIVAN) 0.5 MG tablet Take 1 tablet (0.5 mg) by mouth every 6 hours as needed for anxiety (or shortness of breath). 6 tablet 0    melatonin 3 MG tablet Take 3 mg by mouth nightly as needed for sleep.      nystatin (NYSTOP) 214046 UNIT/GM external powder Apply tid to affectead area prn 60 g 3    polyethylene glycol (MIRALAX) 17 GM/Dose powder Take 17 g (1 Capful) by mouth daily.      polyvinyl alcohol (ARTIFICIAL TEARS) 1.4 % ophthalmic solution Place 1 drop into both eyes 3 times daily.      [START ON 8/1/2025] potassium chloride  ER (K-TAB) 20 MEQ CR tablet Take 1 tablet (20 mEq) by mouth daily. 30 tablet 0    sennosides (SENOKOT) 8.6 MG tablet Take 2 tablets by mouth at bedtime. Hold for loose stools.      torsemide (DEMADEX) 20 MG tablet Take 20 mg by mouth daily.      triamcinolone (KENALOG) 0.1 % external cream Apply topically every evening. To lower extremities      White Petrolatum-Mineral Oil (LUBRIFRESH P.M.) OINT Place into both eyes at bedtime. Apply a small strip into both eyes      zinc oxide (DESITIN) 40 % external ointment Apply topically as needed for dry skin or irritation (with toileting)      [Paused] semaglutide (OZEMPIC) 2 MG/3ML pen Inject 0.5 mg subcutaneously every 7 days. (Patient not taking: Reported on 7/30/2025)         ROS:  10 point ROS of systems including Constitutional, Eyes, Respiratory, Cardiovascular, Gastroenterology, Genitourinary, Integumentary, Musculoskeletal, Psychiatric were all negative except for pertinent positives noted in my HPI.    Exam:  /60   Pulse 85   Temp 97.4  F (36.3  C)   Resp 17   Ht 1.524 m (5')   Wt 95.5 kg (210 lb 9.6 oz)   LMP  (LMP Unknown)   SpO2 96%   BMI 41.13 kg/m    Alert, pleasant, casually dressed, sitting up in recliner in no acute distress  No scleral icterus  Moist oral mucosa  Heart tones regular without murmur rub or gallop  Breathing unlabored on room air, no cough, clear posteriorly without wheezes crackles or rhonchi  Abdomen obese, rotund, hypoactive bowel sounds, soft, nontender, no masses appreciated  No significant lower extremity edema at this time but there are some chronic appearing venous stasis hyperpigmentation discoloration changes on the lower shins bilaterally without any signs of cellulitis  Normal speech, no tremor  Cheerful disposition    Lab/Diagnostic data:  Most Recent 3 CBC's:  Recent Labs   Lab Test 07/30/25  0537 07/25/25  0708 07/24/25  0549 07/23/25  0529   WBC 8.0  --  7.5 8.9   HGB 12.3  --  12.5 12.6   * 100 100 100     200 209 211     Most Recent 3 BMP's:  Recent Labs   Lab Test 07/30/25  0537 07/25/25  0708 07/24/25  0549 07/23/25  0529     --  135 135   POTASSIUM 5.5*  --  3.9 3.3*   CHLORIDE 96*  --  90* 90*   CO2 31*  --  33* 31*   BUN 19.6  --  9.8 9.6   CR 0.87 0.71 0.61 0.59   ANIONGAP 11  --  12 14   LAKSHMI 10.4  --  9.3 9.0   *  --  135* 112*     Most Recent 2 LFT's:  Recent Labs   Lab Test 07/19/25  0532 07/17/25  0643   AST 97* 74*   ALT 48 39   ALKPHOS 133 136   BILITOTAL 1.0 1.0     Most Recent TSH and T4:  Recent Labs   Lab Test 07/21/25  0514   TSH 4.09     Lab Results   Component Value Date    A1C 6.6 04/29/2025    A1C 6.6 12/03/2024    A1C 5.9 09/05/2023    A1C 5.8 09/03/2021    A1C 5.9 08/31/2020    A1C 5.6 08/29/2019    A1C 5.9 08/23/2018    A1C 5.9 08/15/2017    A1C 6.0 03/11/2016     Hospital imaging:  ***  ASSESSMENT/PLAN:  Constipation, unspecified constipation type  Sigmoid volvulus (H)  Sounds like the aggressive treatment of her constipation was helpful in the hospital  She continues on MiraLAX and Senokot  Bowels are small and soft daily with still significant gas but thankfully no longer having abdominal cramping  Hopefully her appetite will continue to improve  Adjust bowel regimen as needed    Physical deconditioning  Understandably deconditioned with all she has been through with prolonged hospitalization and sounds like she was getting sick leading up to the hospitalization as well  Glad she is starting to see some progress  Has a care conference on Friday of this week with goal to return to her prior assisted living facility    Acute respiratory failure with hypoxia (H)  Likely multifactorial with history of COPD and heart failure  Thankfully she weaned off of oxygen therapy as of last night and feels her breathing is dramatically improved overall  Monitor for need to resume oxygen therapy  As per below does wear her CPAP at night    Chronic obstructive pulmonary disease,  unspecified COPD type (H)  Breathing comfortably on room air now  No wheezing on exam  Continue maintenance inhaled therapies    Acute on chronic heart failure with preserved ejection fraction (H)  Seems euvolemic on exam  Continue torsemide 20 mg daily  See below that I am reducing her potassium supplementation based on hyperkalemia today    Hyperkalemia  Potassium 5.5 today which does not appear was necessarily hemolyzed  At baseline she does continue on potassium supplementation 40 mEq daily with her torsemide  I will hold the potassium tomorrow and then the subsequent day on August 1 start lower dose potassium supplementation 20 mEq daily  Recheck a BMP on Monday, August 4    JEAN (obstructive sleep apnea)  Glad she is using her CPAP at bedtime here at the TCU    Venous stasis dermatitis of both lower extremities  No current signs of cellulitis which she is prone to  Keep skin hydrated  Also currently using triamcinolone to her legs though suspect eventually that this could be transition to as needed    Type 2 diabetes mellitus without complication, without long-term current use of insulin (H)   Diet controlled  Ozempic is on hold at the TCU  Given her recent GI symptoms risk versus benefit of whether it would be reasonable to resume this as an outpatient    History of anxiety  No recent use of PRN Ativan since TCU admission    Hypothyroidism  TSH recently within normal limits on Levothyroxine therapy which is helpful to know especially with recent GI symptoms      Will need to clarify her eyedrops with her assisted living facility as she thinks all are as needed rather than scheduled***  She does agree she will try the estrogen cream as never did try that as an outpatient but would be willing to see if that is helpful with staff support here      Total time spent with patient visit at the skilled nursing facility was {1/2/3/4/5:081289} including {1/2/3/4/5:934353}. Greater than 50% of total time spent with  counseling and coordinating care due to ***    Electronically signed by:  Stella Dawn DO

## 2025-08-05 ENCOUNTER — DISCHARGE SUMMARY NURSING HOME (OUTPATIENT)
Dept: GERIATRICS | Facility: CLINIC | Age: 89
End: 2025-08-05
Payer: COMMERCIAL

## 2025-08-05 ENCOUNTER — LAB REQUISITION (OUTPATIENT)
Dept: LAB | Facility: CLINIC | Age: 89
End: 2025-08-05
Payer: COMMERCIAL

## 2025-08-05 VITALS
SYSTOLIC BLOOD PRESSURE: 130 MMHG | HEIGHT: 60 IN | TEMPERATURE: 97.2 F | RESPIRATION RATE: 16 BRPM | WEIGHT: 252.8 LBS | BODY MASS INDEX: 49.63 KG/M2 | DIASTOLIC BLOOD PRESSURE: 78 MMHG | HEART RATE: 87 BPM | OXYGEN SATURATION: 96 %

## 2025-08-05 DIAGNOSIS — I10 HYPERTENSION GOAL BP (BLOOD PRESSURE) < 140/90: ICD-10-CM

## 2025-08-05 DIAGNOSIS — I10 ESSENTIAL HYPERTENSION: Primary | ICD-10-CM

## 2025-08-05 DIAGNOSIS — E87.6 HYPOKALEMIA: ICD-10-CM

## 2025-08-05 DIAGNOSIS — J44.9 CHRONIC OBSTRUCTIVE PULMONARY DISEASE, UNSPECIFIED COPD TYPE (H): ICD-10-CM

## 2025-08-05 DIAGNOSIS — I50.33 ACUTE ON CHRONIC HEART FAILURE WITH PRESERVED EJECTION FRACTION (H): ICD-10-CM

## 2025-08-05 DIAGNOSIS — K59.00 CONSTIPATION, UNSPECIFIED CONSTIPATION TYPE: ICD-10-CM

## 2025-08-05 DIAGNOSIS — E11.9 TYPE 2 DIABETES MELLITUS WITHOUT COMPLICATION, WITHOUT LONG-TERM CURRENT USE OF INSULIN (H): ICD-10-CM

## 2025-08-05 DIAGNOSIS — E87.5 HYPERKALEMIA: ICD-10-CM

## 2025-08-05 PROCEDURE — 99316 NF DSCHRG MGMT 30 MIN+: CPT | Performed by: PHYSICIAN ASSISTANT

## 2025-08-06 LAB
ANION GAP SERPL CALCULATED.3IONS-SCNC: 13 MMOL/L (ref 7–15)
BUN SERPL-MCNC: 17.7 MG/DL (ref 8–23)
CALCIUM SERPL-MCNC: 9.8 MG/DL (ref 8.8–10.4)
CHLORIDE SERPL-SCNC: 101 MMOL/L (ref 98–107)
CREAT SERPL-MCNC: 0.68 MG/DL (ref 0.51–0.95)
EGFRCR SERPLBLD CKD-EPI 2021: 83 ML/MIN/1.73M2
GLUCOSE SERPL-MCNC: 121 MG/DL (ref 70–99)
HCO3 SERPL-SCNC: 26 MMOL/L (ref 22–29)
POTASSIUM SERPL-SCNC: 3.6 MMOL/L (ref 3.4–5.3)
SODIUM SERPL-SCNC: 140 MMOL/L (ref 135–145)

## 2025-08-06 PROCEDURE — P9604 ONE-WAY ALLOW PRORATED TRIP: HCPCS | Mod: ORL | Performed by: PHYSICIAN ASSISTANT

## 2025-08-06 PROCEDURE — 36415 COLL VENOUS BLD VENIPUNCTURE: CPT | Mod: ORL | Performed by: PHYSICIAN ASSISTANT

## 2025-08-06 PROCEDURE — 80048 BASIC METABOLIC PNL TOTAL CA: CPT | Mod: ORL | Performed by: PHYSICIAN ASSISTANT

## 2025-08-17 ENCOUNTER — HEALTH MAINTENANCE LETTER (OUTPATIENT)
Age: 89
End: 2025-08-17

## (undated) DEVICE — POSITIONER ARMBOARD FOAM 1PAIR LF FP-ARMB1

## (undated) DEVICE — WIRE GUIDE STRK BALL TIP 3X800MM 1806-0080S

## (undated) DEVICE — SPONGE LAP 18X18" X8435

## (undated) DEVICE — LINEN TOWEL PACK X30 5481

## (undated) DEVICE — DRILL BIT STRK 3.2X216MM NON-LOCKING SHORT 705032

## (undated) DEVICE — ESU PENCIL W/SMOKE EVAC NEPTUNE STRYKER 0703-046-000

## (undated) DEVICE — DRILL POINT STRK 4.2X340MM 1806-4260S

## (undated) DEVICE — DRILL BIT STRK 4.3X216MM LOCKING SHORT 705043

## (undated) DEVICE — SOL WATER IRRIG 1000ML BOTTLE 2F7114

## (undated) DEVICE — SOL NACL 0.9% IRRIG 1000ML BOTTLE 2F7124

## (undated) DEVICE — NDL SPINAL 18GA 3.5" 405184

## (undated) DEVICE — DRAPE C-ARMOR 5 SIDED 5523

## (undated) DEVICE — DRSG TEGADERM 4X4 3/4" 1626W

## (undated) DEVICE — STPL SKIN 35W ROTATING HEAD PRW35

## (undated) DEVICE — SU VICRYL 2-0 CT-2 8X18" UND D8144

## (undated) DEVICE — LINEN BACK PACK 5440

## (undated) DEVICE — GLOVE BIOGEL PI MICRO SZ 6.5 48565

## (undated) DEVICE — GOWN XLG DISP 9545

## (undated) DEVICE — SYR 50ML LL W/O NDL 309653

## (undated) DEVICE — BASIN SET MAJOR

## (undated) DEVICE — DRAPE C-ARM W/STRAPS 42X72" 07-CA104

## (undated) DEVICE — GLOVE BIOGEL PI ULTRATOUCH SZ 6.5 41165

## (undated) DEVICE — DRSG TEGADERM 4X10" 1627

## (undated) DEVICE — GLOVE BIOGEL PI SZ 8.0 40880

## (undated) DEVICE — SYR BULB IRRIG DOVER 60 ML LATEX FREE 67000

## (undated) DEVICE — Device

## (undated) DEVICE — DRSG TEGADERM ALGINATE AG 4X5" 90303

## (undated) DEVICE — KIT ENDO TURNOVER/PROCEDURE W/CLEAN A SCOPE LINERS 103888

## (undated) DEVICE — REAMER SHAFT MODIFIED TRINKLE 8X510MM 0227-8510S

## (undated) DEVICE — SU VICRYL 1 CT-1 CR 8X18" J741D

## (undated) RX ORDER — FENTANYL CITRATE 50 UG/ML
INJECTION, SOLUTION INTRAMUSCULAR; INTRAVENOUS
Status: DISPENSED
Start: 2025-07-18

## (undated) RX ORDER — ACETAMINOPHEN 325 MG/1
TABLET ORAL
Status: DISPENSED
Start: 2023-02-26

## (undated) RX ORDER — VANCOMYCIN HYDROCHLORIDE 1 G/20ML
INJECTION, POWDER, LYOPHILIZED, FOR SOLUTION INTRAVENOUS
Status: DISPENSED
Start: 2023-02-26

## (undated) RX ORDER — PROPOFOL 10 MG/ML
INJECTION, EMULSION INTRAVENOUS
Status: DISPENSED
Start: 2023-02-26

## (undated) RX ORDER — HYDROMORPHONE HYDROCHLORIDE 1 MG/ML
INJECTION, SOLUTION INTRAMUSCULAR; INTRAVENOUS; SUBCUTANEOUS
Status: DISPENSED
Start: 2023-02-26

## (undated) RX ORDER — FENTANYL CITRATE 50 UG/ML
INJECTION, SOLUTION INTRAMUSCULAR; INTRAVENOUS
Status: DISPENSED
Start: 2023-02-26

## (undated) RX ORDER — CEFAZOLIN SODIUM/WATER 2 G/20 ML
SYRINGE (ML) INTRAVENOUS
Status: DISPENSED
Start: 2023-02-26

## (undated) RX ORDER — ONDANSETRON 2 MG/ML
INJECTION INTRAMUSCULAR; INTRAVENOUS
Status: DISPENSED
Start: 2023-02-26